# Patient Record
Sex: MALE | Race: WHITE | NOT HISPANIC OR LATINO | Employment: OTHER | URBAN - METROPOLITAN AREA
[De-identification: names, ages, dates, MRNs, and addresses within clinical notes are randomized per-mention and may not be internally consistent; named-entity substitution may affect disease eponyms.]

---

## 2017-01-20 ENCOUNTER — HOSPITAL ENCOUNTER (INPATIENT)
Facility: HOSPITAL | Age: 57
LOS: 1 days | Discharge: HOME | DRG: 683 | End: 2017-01-21
Attending: EMERGENCY MEDICINE | Admitting: INTERNAL MEDICINE
Payer: MEDICARE

## 2017-01-20 DIAGNOSIS — E78.5 HYPERLIPIDEMIA, UNSPECIFIED HYPERLIPIDEMIA TYPE: ICD-10-CM

## 2017-01-20 DIAGNOSIS — R55 SYNCOPE: ICD-10-CM

## 2017-01-20 DIAGNOSIS — I10 ESSENTIAL HYPERTENSION: ICD-10-CM

## 2017-01-20 DIAGNOSIS — I95.1 ORTHOSTATIC HYPOTENSION: ICD-10-CM

## 2017-01-20 DIAGNOSIS — E87.5 HYPERKALEMIA: ICD-10-CM

## 2017-01-20 DIAGNOSIS — G89.29 CHRONIC BILATERAL LOW BACK PAIN, WITH SCIATICA PRESENCE UNSPECIFIED: ICD-10-CM

## 2017-01-20 DIAGNOSIS — F20.3 UNDIFFERENTIATED SCHIZOPHRENIA: ICD-10-CM

## 2017-01-20 DIAGNOSIS — I95.9 HYPOTENSION, UNSPECIFIED HYPOTENSION TYPE: ICD-10-CM

## 2017-01-20 DIAGNOSIS — E86.0 DEHYDRATION: ICD-10-CM

## 2017-01-20 DIAGNOSIS — N17.9 AKI (ACUTE KIDNEY INJURY): Primary | ICD-10-CM

## 2017-01-20 DIAGNOSIS — R79.89 PRERENAL AZOTEMIA: ICD-10-CM

## 2017-01-20 DIAGNOSIS — M54.5 CHRONIC BILATERAL LOW BACK PAIN, WITH SCIATICA PRESENCE UNSPECIFIED: ICD-10-CM

## 2017-01-20 DIAGNOSIS — N28.9 ACUTE RENAL INSUFFICIENCY: ICD-10-CM

## 2017-01-20 DIAGNOSIS — R19.7 DIARRHEA, UNSPECIFIED TYPE: ICD-10-CM

## 2017-01-20 DIAGNOSIS — E11.9 TYPE 2 DIABETES MELLITUS WITHOUT COMPLICATION, WITHOUT LONG-TERM CURRENT USE OF INSULIN: ICD-10-CM

## 2017-01-20 LAB
ALBUMIN SERPL BCP-MCNC: 3.8 G/DL
ALP SERPL-CCNC: 67 U/L
ALT SERPL W/O P-5'-P-CCNC: 24 U/L
ANION GAP SERPL CALC-SCNC: 14 MMOL/L
AST SERPL-CCNC: 24 U/L
BASOPHILS # BLD AUTO: 0.02 K/UL
BASOPHILS NFR BLD: 0.3 %
BILIRUB SERPL-MCNC: 0.6 MG/DL
BUN SERPL-MCNC: 26 MG/DL
CALCIUM SERPL-MCNC: 9.1 MG/DL
CHLORIDE SERPL-SCNC: 103 MMOL/L
CO2 SERPL-SCNC: 17 MMOL/L
CREAT SERPL-MCNC: 2.3 MG/DL
CREAT UR-MCNC: 72.3 MG/DL
DIFFERENTIAL METHOD: ABNORMAL
EOSINOPHIL # BLD AUTO: 0.3 K/UL
EOSINOPHIL NFR BLD: 4.4 %
ERYTHROCYTE [DISTWIDTH] IN BLOOD BY AUTOMATED COUNT: 13.4 %
EST. GFR  (AFRICAN AMERICAN): 35 ML/MIN/1.73 M^2
EST. GFR  (NON AFRICAN AMERICAN): 31 ML/MIN/1.73 M^2
GLUCOSE SERPL-MCNC: 184 MG/DL
HCT VFR BLD AUTO: 38.6 %
HGB BLD-MCNC: 14 G/DL
LYMPHOCYTES # BLD AUTO: 0.9 K/UL
LYMPHOCYTES NFR BLD: 11.5 %
MCH RBC QN AUTO: 32.7 PG
MCHC RBC AUTO-ENTMCNC: 36.3 %
MCV RBC AUTO: 90 FL
MONOCYTES # BLD AUTO: 0.5 K/UL
MONOCYTES NFR BLD: 7 %
NEUTROPHILS # BLD AUTO: 5.8 K/UL
NEUTROPHILS NFR BLD: 76.7 %
PLATELET # BLD AUTO: 180 K/UL
PMV BLD AUTO: 9.2 FL
POCT GLUCOSE: 128 MG/DL (ref 70–110)
POCT GLUCOSE: 142 MG/DL (ref 70–110)
POTASSIUM SERPL-SCNC: 5.5 MMOL/L
PROT SERPL-MCNC: 6.2 G/DL
RBC # BLD AUTO: 4.28 M/UL
SODIUM SERPL-SCNC: 134 MMOL/L
SODIUM UR-SCNC: 47 MMOL/L
TROPONIN I SERPL DL<=0.01 NG/ML-MCNC: <0.006 NG/ML
WBC # BLD AUTO: 7.54 K/UL

## 2017-01-20 PROCEDURE — 84300 ASSAY OF URINE SODIUM: CPT

## 2017-01-20 PROCEDURE — 11000001 HC ACUTE MED/SURG PRIVATE ROOM

## 2017-01-20 PROCEDURE — 84484 ASSAY OF TROPONIN QUANT: CPT

## 2017-01-20 PROCEDURE — 96361 HYDRATE IV INFUSION ADD-ON: CPT

## 2017-01-20 PROCEDURE — 96360 HYDRATION IV INFUSION INIT: CPT

## 2017-01-20 PROCEDURE — 82570 ASSAY OF URINE CREATININE: CPT

## 2017-01-20 PROCEDURE — 80053 COMPREHEN METABOLIC PANEL: CPT

## 2017-01-20 PROCEDURE — 25000003 PHARM REV CODE 250: Performed by: STUDENT IN AN ORGANIZED HEALTH CARE EDUCATION/TRAINING PROGRAM

## 2017-01-20 PROCEDURE — 25000003 PHARM REV CODE 250: Performed by: EMERGENCY MEDICINE

## 2017-01-20 PROCEDURE — 93005 ELECTROCARDIOGRAM TRACING: CPT

## 2017-01-20 PROCEDURE — 25000003 PHARM REV CODE 250: Performed by: INTERNAL MEDICINE

## 2017-01-20 PROCEDURE — 83036 HEMOGLOBIN GLYCOSYLATED A1C: CPT

## 2017-01-20 PROCEDURE — 82962 GLUCOSE BLOOD TEST: CPT

## 2017-01-20 PROCEDURE — 96372 THER/PROPH/DIAG INJ SC/IM: CPT

## 2017-01-20 PROCEDURE — 85025 COMPLETE CBC W/AUTO DIFF WBC: CPT

## 2017-01-20 PROCEDURE — 93010 ELECTROCARDIOGRAM REPORT: CPT | Mod: ,,, | Performed by: INTERNAL MEDICINE

## 2017-01-20 PROCEDURE — 99285 EMERGENCY DEPT VISIT HI MDM: CPT | Mod: 25

## 2017-01-20 RX ORDER — FLUOXETINE HYDROCHLORIDE 20 MG/1
20 CAPSULE ORAL DAILY
Status: DISCONTINUED | OUTPATIENT
Start: 2017-01-21 | End: 2017-01-21 | Stop reason: HOSPADM

## 2017-01-20 RX ORDER — PREGABALIN 50 MG/1
50 CAPSULE ORAL 3 TIMES DAILY
Status: DISCONTINUED | OUTPATIENT
Start: 2017-01-20 | End: 2017-01-21 | Stop reason: HOSPADM

## 2017-01-20 RX ORDER — INSULIN ASPART 100 [IU]/ML
1-10 INJECTION, SOLUTION INTRAVENOUS; SUBCUTANEOUS
Status: DISCONTINUED | OUTPATIENT
Start: 2017-01-20 | End: 2017-01-21 | Stop reason: HOSPADM

## 2017-01-20 RX ORDER — QUETIAPINE FUMARATE 100 MG/1
100 TABLET, FILM COATED ORAL NIGHTLY
Status: DISCONTINUED | OUTPATIENT
Start: 2017-01-20 | End: 2017-01-21 | Stop reason: HOSPADM

## 2017-01-20 RX ORDER — IBUPROFEN 200 MG
24 TABLET ORAL
Status: DISCONTINUED | OUTPATIENT
Start: 2017-01-20 | End: 2017-01-21 | Stop reason: HOSPADM

## 2017-01-20 RX ORDER — DULOXETIN HYDROCHLORIDE 30 MG/1
60 CAPSULE, DELAYED RELEASE ORAL 2 TIMES DAILY
Status: DISCONTINUED | OUTPATIENT
Start: 2017-01-20 | End: 2017-01-21 | Stop reason: HOSPADM

## 2017-01-20 RX ORDER — GLUCAGON 1 MG
1 KIT INJECTION
Status: DISCONTINUED | OUTPATIENT
Start: 2017-01-20 | End: 2017-01-21 | Stop reason: HOSPADM

## 2017-01-20 RX ORDER — SODIUM CHLORIDE 9 MG/ML
INJECTION, SOLUTION INTRAVENOUS CONTINUOUS
Status: DISCONTINUED | OUTPATIENT
Start: 2017-01-20 | End: 2017-01-20

## 2017-01-20 RX ORDER — IBUPROFEN 200 MG
16 TABLET ORAL
Status: DISCONTINUED | OUTPATIENT
Start: 2017-01-20 | End: 2017-01-21 | Stop reason: HOSPADM

## 2017-01-20 RX ORDER — HEPARIN SODIUM 5000 [USP'U]/ML
5000 INJECTION, SOLUTION INTRAVENOUS; SUBCUTANEOUS EVERY 8 HOURS
Status: DISCONTINUED | OUTPATIENT
Start: 2017-01-20 | End: 2017-01-21 | Stop reason: HOSPADM

## 2017-01-20 RX ORDER — ACETAMINOPHEN 325 MG/1
650 TABLET ORAL EVERY 6 HOURS PRN
Status: DISCONTINUED | OUTPATIENT
Start: 2017-01-20 | End: 2017-01-21 | Stop reason: HOSPADM

## 2017-01-20 RX ORDER — SODIUM CHLORIDE 9 MG/ML
INJECTION, SOLUTION INTRAVENOUS CONTINUOUS
Status: ACTIVE | OUTPATIENT
Start: 2017-01-20 | End: 2017-01-21

## 2017-01-20 RX ORDER — PRAVASTATIN SODIUM 40 MG/1
40 TABLET ORAL DAILY
Status: DISCONTINUED | OUTPATIENT
Start: 2017-01-20 | End: 2017-01-21 | Stop reason: HOSPADM

## 2017-01-20 RX ORDER — OXYCODONE AND ACETAMINOPHEN 7.5; 325 MG/1; MG/1
1 TABLET ORAL ONCE AS NEEDED
Status: COMPLETED | OUTPATIENT
Start: 2017-01-20 | End: 2017-01-20

## 2017-01-20 RX ADMIN — PRAVASTATIN SODIUM 40 MG: 40 TABLET ORAL at 05:01

## 2017-01-20 RX ADMIN — SODIUM CHLORIDE: 0.9 INJECTION, SOLUTION INTRAVENOUS at 08:01

## 2017-01-20 RX ADMIN — SODIUM CHLORIDE 1000 ML: 0.9 INJECTION, SOLUTION INTRAVENOUS at 12:01

## 2017-01-20 RX ADMIN — HEPARIN SODIUM 5000 UNITS: 5000 INJECTION, SOLUTION INTRAVENOUS; SUBCUTANEOUS at 09:01

## 2017-01-20 RX ADMIN — DULOXETINE 60 MG: 30 CAPSULE, DELAYED RELEASE ORAL at 09:01

## 2017-01-20 RX ADMIN — OXYCODONE AND ACETAMINOPHEN 1 TABLET: 7.5; 325 TABLET ORAL at 09:01

## 2017-01-20 RX ADMIN — PREGABALIN 50 MG: 50 CAPSULE ORAL at 09:01

## 2017-01-20 RX ADMIN — HEPARIN SODIUM 5000 UNITS: 5000 INJECTION, SOLUTION INTRAVENOUS; SUBCUTANEOUS at 03:01

## 2017-01-20 RX ADMIN — SODIUM CHLORIDE 1000 ML: 0.9 INJECTION, SOLUTION INTRAVENOUS at 01:01

## 2017-01-20 RX ADMIN — SODIUM CHLORIDE: 0.9 INJECTION, SOLUTION INTRAVENOUS at 03:01

## 2017-01-20 RX ADMIN — QUETIAPINE FUMARATE 100 MG: 100 TABLET, FILM COATED ORAL at 09:01

## 2017-01-20 NOTE — H&P
[facility] Internal Medicine History and Physical - Resident Note    Admitting Team: [facility] Internal Medicine Team A  Attending Physician: Iris  Resident: Yair  Interns: Radha    Date of Admit: 1/20/2017    Chief Complaint and Duration     Sent by PCP for hypotension for 1 day    Subjective:      History of Present Illness:  Fabiano Malik is a 56 y.o. male who  has a past medical history of Chronic back pain greater than 3 months duration; Depression; Diabetes mellitus; Hypertension; and Schizophrenia. The patient presented to [facility] on 1/20/2017 with a primary complaint of Sent by PCP for hypotension.    The patient was in their usual state of health until Tuesday. Patient reports that at that time, he experienced 4 to 5 episodes of watery non-bloody diarrhea with concomitant crampy abdominal pain. Patient reports that his abdominal pain resolved since then. However, this AM, patient reports that he woke up and felt incredibly weak and dizzy. Patient states that while preparing to go to his PCP's office, he felt weak and fell though denies a loss of consciousness. Patient states that once he was at the PCP's office, he was urged to present to the ER due to his blood pressure. Patient denies chest pain, fevers, chills, problems urinating, problems tolerating PO, SOB.    Past Medical History:  Past Medical History   Diagnosis Date    Chronic back pain greater than 3 months duration     Depression     Diabetes mellitus     Hypertension     Schizophrenia        Past Surgical History:  Past Surgical History   Procedure Laterality Date    Neck surgery      Appendectomy         Allergies:  Review of patient's allergies indicates:   Allergen Reactions    Pcn [penicillins] Anaphylaxis       Home Medications:  Prior to Admission medications    Medication Sig Start Date End Date Taking? Authorizing Provider   amlodipine (NORVASC) 10 MG tablet Take 10 mg by mouth once  "daily.    Historical Provider, MD   benazepril (LOTENSIN) 40 MG tablet Take 40 mg by mouth once daily.    Historical Provider, MD   clonazePAM (KLONOPIN) 0.5 MG tablet Take 0.5 mg by mouth 2 (two) times daily as needed for Anxiety.    Historical Provider, MD   duloxetine (CYMBALTA) 60 MG capsule Take 1 capsule (60 mg total) by mouth 2 (two) times daily. 7/27/16 7/27/17  Richard Butcher MD   fluoxetine (PROZAC) 20 MG capsule Take 1 capsule (20 mg total) by mouth once daily. Take 1/2 table for 3 days then take 1 tablet by mouth daily starting on 7/30/16 7/30/16 7/30/17  Richard Butcher MD   metformin (GLUCOPHAGE) 1000 MG tablet Take 1,000 mg by mouth 2 (two) times daily with meals.    Historical Provider, MD   nabumetone (RELAFEN) 750 MG tablet Take 750 mg by mouth 2 (two) times daily.    Historical Provider, MD   omeprazole (PRILOSEC) 20 MG capsule Take 20 mg by mouth once daily.    Historical Provider, MD   pravastatin (PRAVACHOL) 40 MG tablet Take 40 mg by mouth once daily.    Historical Provider, MD   pregabalin (LYRICA) 50 MG capsule Take 50 mg by mouth 3 (three) times daily.    Historical Provider, MD   quetiapine (SEROQUEL) 300 MG Tab Take 1 tablet (300 mg total) by mouth every evening. 7/27/16 7/27/17  Richard Butcher MD       Family History:  History reviewed. No pertinent family history.    Social History:  Social History   Substance Use Topics    Smoking status: Never Smoker    Smokeless tobacco: None    Alcohol use Yes      Comment: "couple of beers a day"       Review of Systems:  Pertinent items are noted in HPI.    Health Maintaince:   Primary Care Physician: Hernesto Barbosa MD  Immunizations:   Currently on File:   Most Recent Immunizations   Administered Date(s) Administered    Pneumococcal Conjugate - 13 Valent 07/27/2016     TDap is up to date.  Influenza is up to date.  Pneumovax is up to date.  Cancer Screening:  Colonoscopy: is not up to date.    Objective:     Last 24 Hour Vital Signs:  BP  Min: " 71/50  Max: 116/56  Temp  Av.2 °F (36.8 °C)  Min: 98.2 °F (36.8 °C)  Max: 98.2 °F (36.8 °C)  Pulse  Av.9  Min: 95  Max: 121  Resp  Av  Min: 11  Max: 24  SpO2  Av.9 %  Min: 93 %  Max: 97 %  Height  Av' (182.9 cm)  Min: 6' (182.9 cm)  Max: 6' (182.9 cm)  Weight  Av.4 kg (250 lb)  Min: 113.4 kg (250 lb)  Max: 113.4 kg (250 lb)  Body mass index is 33.91 kg/(m^2).       Physical Examination:  Triage Vitals:  ED Triage Vitals   Enc Vitals Group      BP 17 1159 116/56      Pulse 17 1159 112      Resp 17 1159 20      Temp 17 1159 98.2 °F (36.8 °C)      Temp src --       SpO2 17 1159 97 %      Weight 17 1159 250 lb      Height 17 1159 6'       Admit Vitals:  Visit Vitals    BP 92/60    Pulse 95    Temp 98.2 °F (36.8 °C)    Resp (!) 24    Ht 6' (1.829 m)    Wt 113.4 kg (250 lb)    SpO2 95%    BMI 33.91 kg/m2       General appearance: alert, appears stated age, cooperative and no distress  Head: Normocephalic, without obvious abnormality, atraumatic  Eyes: conjunctivae/corneas clear. PERRL, EOM's intact. Fundi benign.  Throat: lips, mucosa, and tongue normal; teeth and gums normal  Neck: no adenopathy, no carotid bruit, no JVD, supple, symmetrical, trachea midline and thyroid not enlarged, symmetric, no tenderness/mass/nodules  Lungs: clear to auscultation bilaterally  Heart: regular rate and rhythm, S1, S2 normal, no murmur, click, rub or gallop  Abdomen: soft, non-tender; bowel sounds normal; no masses,  no organomegaly  Extremities: extremities normal, atraumatic, mild trace edema to BLE  Pulses: 2+ and symmetric  Skin: Skin color, texture, turgor normal. No rashes or lesions      Laboratory:  Most Recent Data:  CBC:  Lab Results   Component Value Date    WBC 7.54 2017    RBC 4.28 (L) 2017    HGB 14.0 2017    HCT 38.6 (L) 2017    MCV 90 2017    MCH 32.7 (H) 2017    MCHC 36.3 (H) 2017    RDW 13.4  01/20/2017     01/20/2017    MPV 9.2 01/20/2017    GRAN 5.8 01/20/2017    GRAN 76.7 (H) 01/20/2017    LYMPH 0.9 (L) 01/20/2017    LYMPH 11.5 (L) 01/20/2017    MONO 0.5 01/20/2017    MONO 7.0 01/20/2017    EOS 0.3 01/20/2017    BASO 0.02 01/20/2017    EOSINOPHIL 4.4 01/20/2017    BASOPHIL 0.3 01/20/2017     No additional cells seen    BMP: Lab Results   Component Value Date     (L) 01/20/2017    K 5.5 (H) 01/20/2017     01/20/2017    CO2 17 (L) 01/20/2017    BUN 26 (H) 01/20/2017    CREATININE 2.3 (H) 01/20/2017     (H) 01/20/2017    CALCIUM 9.1 01/20/2017    MG 1.3 (L) 07/25/2016     LFTs: Lab Results   Component Value Date    PROT 6.2 01/20/2017    ALBUMIN 3.8 01/20/2017    BILITOT 0.6 01/20/2017    AST 24 01/20/2017    ALKPHOS 67 01/20/2017    ALT 24 01/20/2017     Coags: No results found for: INR, PROTIME, PTT  FLP: No results found for: CHOL, HDL, LDLCALC, TRIG, CHOLHDL  DM: Lab Results   Component Value Date    HGBA1C 6.0 07/26/2016    CREATININE 2.3 (H) 01/20/2017     Thyroid: Lab Results   Component Value Date    TSH 2.421 07/25/2016     Anemia: No results found for: IRON, TIBC, FERRITIN, [id number], FOLATE  Cardiac: Lab Results   Component Value Date    TROPONINI <0.006 01/20/2017     Urinalysis: Lab Results   Component Value Date    COLORU Yellow 07/25/2016    SPECGRAV 1.015 07/25/2016    NITRITE Negative 07/25/2016    KETONESU Negative 07/25/2016    UROBILINOGEN Negative 07/25/2016       Trended Lab Data:    Recent Labs  Lab 01/20/17  1224   WBC 7.54   HGB 14.0   HCT 38.6*      MCV 90   RDW 13.4   *   K 5.5*      CO2 17*   BUN 26*   CREATININE 2.3*   *   PROT 6.2   ALBUMIN 3.8   BILITOT 0.6   AST 24   ALKPHOS 67   ALT 24       Trended Cardiac Data:    Recent Labs  Lab 01/20/17  1224   TROPONINI <0.006         Other Results:  EKG (my interpretation): sinus tachycardia.    Radiology:  Imaging Results         CT Head Without Contrast (Final result) Result  time:  01/20/17 13:12:31    Final result by Angela Armstrong MD (01/20/17 13:12:31)    Impression:        No acute intracranial process seen.      Electronically signed by: ANGELA ARMSTRONG MD  Date:     01/20/17  Time:    13:12     Narrative:    Head CT without contrast.    Comparison: none.    Technique: 5 mm axial images of the head were obtained.  No IV contrast was utilized.    Results: The brain is normally formed and exhibits normal density throughout.  The ventricular system is within normal limits of size for age and shows no distortion by mass effect.  The brain parenchyma shows no evidence of mass, hemorrhage, or abnormal calcifications. No evidence of acute or remote infarction. No extra-axial masses, hemorrhage or abnormal fluid collections are identified.  The skull appears normal.  The visualized paranasal sinuses demonstrate no significant abnormalities.  The orbits demonstrate no abnormalities.  The mastoid air cells are well aerated.            X-Ray Chest 1 View (Final result) Result time:  01/20/17 12:45:12    Final result by Anita Pond MD (01/20/17 12:45:12)    Impression:    Clear lungs.      Electronically signed by: ANITA POND MD  Date:     01/20/17  Time:    12:45     Narrative:    EXAM:  AP CHEST RADIOGRAPH.     CLINICAL INDICATION:  Syncope.    TECHNIQUE: Single AP view of the chest was obtained.     COMPARISON: Parkinson's/26/16    FINDINGS: The mediastinal structures are midline.  The cardiac silhouette is not enlarged. there is mild atherosclerotic calcification of the aortic arch.  The lungs appear grossly clear.  No osseous abnormalities are identified.               Assessment:     Fabiano Malik is a 56 y.o. male with:  Patient Active Problem List    Diagnosis Date Noted    Visual hallucinations 07/26/2016    Acute kidney failure 07/26/2016    Chronic lower back pain 07/26/2016    Type 2 diabetes mellitus without complication 07/26/2016    Essential  hypertension 07/26/2016    HLD (hyperlipidemia) 07/26/2016    MARY GRACE (acute kidney injury) 07/26/2016    Schizophrenia 07/26/2016    Hyperkalemia 07/26/2016       Plan:     MARY GRACE  - Patient's likely baseline Cr 1.2 to 1.3  - Cr 2.3 at admit  - Urine studies ordered  - IVF hydration as below  - Likely 2/2 Pre-Renal Azotemia given history of diarrheal illness  - Will continue to monitor for improvement in renal function    Essential Hypertension  - On Norvasc 10 qD and Lotensin 40 qD at home  - Hypotensive at admit; will hold and continue to monitor for improvement in BP    Schizophrenia  - Takes Prozac 20 qD, Cymbalta 60 BID, and Seroquel 300 qHS at home  - Will continue Seroquel at 100 qHS while in-house    History of Type 2 Diabetes Mellitus  - HgbA1c 6.0 on 7/2016  - On Metformin 1000 BID at home; will hold  - SSI with accuchecks while in-house  - Repeat A1c pending    Chronic Lower Back Pain  - Follows with outpatient pain management  - Takes Lyrica 50 TID, Duloxetine 60 BID, Nabumetone 750 BID, and Klonopin 0.5 BID PRN at home  - Will continue Pregabalin while in-house    Hyperlipidemia  - On Pravastatin 40 qD at home; will continue  - Lipid Panel pending      F: NS @ 100cc/hr x 10hr  E:   N: Cardiac, Diabetic  PPx: SQH  Dispo: Pending improvement in renal function    Code Status: Nathan Demarco  [facility] Internal Medicine HO-I  U IM Service    [facility] Medicine Hospitalist Pager Numbers:   [facility] Hospitalist Medicine Team A (Rah/Anum): [phone]  [facility] Hospitalist Medicine Team B (Shantelle/Cricket):  [phone]

## 2017-01-20 NOTE — ED NOTES
Pt co increasing dizziness for the past year, pt co diarrhea for the past few days with intermittent abd pain denies abd pain currently, pt denies chest pain or sob, awake alert in no acute distress, pt reports falling this morning without head trauma denies LOC, denies trauma from fall, pt reports low bp at MD office this morning

## 2017-01-20 NOTE — IP AVS SNAPSHOT
[facility]  [address]  [place] [address]  Phone: [phone]           Patient Discharge Instructions     Our goal is to set you up for success. This packet includes information on your condition, medications, and your home care. It will help you to care for yourself so you don't get sicker and need to go back to the hospital.     Please ask your nurse if you have any questions.        There are many details to remember when preparing to leave the hospital. Here is what you will need to do:    1. Take your medicine. If you are prescribed medications, review your Medication List in the following pages. You may have new medications to  at the pharmacy and others that you'll need to stop taking. Review the instructions for how and when to take your medications. Talk with your doctor or nurses if you are unsure of what to do.     2. Go to your follow-up appointments. Specific follow-up information is listed in the following pages. Your may be contacted by a transition nurse or clinical provider about future appointments. Be sure we have all of the phone numbers to reach you, if needed. Please contact your provider's office if you are unable to make an appointment.     3. Watch for warning signs. Your doctor or nurse will give you detailed warning signs to watch for and when to call for assistance. These instructions may also include educational information about your condition. If you experience any of warning signs to your health, call your doctor.               ** Verify the list of medication(s) below is accurate and up to date. Carry this with you in case of emergency. If your medications have changed, please notify your healthcare provider.             Medication List      CHANGE how you take these medications        Additional Info                      quetiapine 300 MG Tab   Commonly known as:  SEROQUEL   Quantity:  30 tablet   Refills:  11   Dose:  300 mg   What changed:  Another  medication with the same name was removed. Continue taking this medication, and follow the directions you see here.    Last time this was given:  100 mg on 1/20/2017  9:32 PM   Instructions:  Take 1 tablet (300 mg total) by mouth every evening.     Begin Date    AM    Noon    PM    Bedtime         CONTINUE taking these medications        Additional Info                      clonazePAM 0.5 MG tablet   Commonly known as:  KLONOPIN   Refills:  0   Dose:  0.5 mg    Instructions:  Take 0.5 mg by mouth 2 (two) times daily as needed for Anxiety.     Begin Date    AM    Noon    PM    Bedtime       duloxetine 60 MG capsule   Commonly known as:  CYMBALTA   Quantity:  60 capsule   Refills:  11   Dose:  60 mg    Last time this was given:  60 mg on 1/21/2017  9:13 AM   Instructions:  Take 1 capsule (60 mg total) by mouth 2 (two) times daily.     Begin Date    AM    Noon    PM    Bedtime       fluoxetine 20 MG capsule   Commonly known as:  PROZAC   Quantity:  30 capsule   Refills:  11   Dose:  20 mg    Last time this was given:  20 mg on 1/21/2017  9:13 AM   Instructions:  Take 1 capsule (20 mg total) by mouth once daily. Take 1/2 table for 3 days then take 1 tablet by mouth daily starting on 7/30/16     Begin Date    AM    Noon    PM    Bedtime       metformin 1000 MG tablet   Commonly known as:  GLUCOPHAGE   Refills:  0   Dose:  1000 mg    Instructions:  Take 1,000 mg by mouth 2 (two) times daily with meals.     Begin Date    AM    Noon    PM    Bedtime       nabumetone 750 MG tablet   Commonly known as:  RELAFEN   Refills:  0   Dose:  750 mg    Instructions:  Take 750 mg by mouth 2 (two) times daily.     Begin Date    AM    Noon    PM    Bedtime       omeprazole 20 MG capsule   Commonly known as:  PRILOSEC   Refills:  0   Dose:  20 mg    Instructions:  Take 20 mg by mouth once daily.     Begin Date    AM    Noon    PM    Bedtime       pravastatin 40 MG tablet   Commonly known as:  PRAVACHOL   Refills:  0   Dose:  40 mg    Last  time this was given:  40 mg on 1/21/2017  9:16 AM   Instructions:  Take 40 mg by mouth once daily.     Begin Date    AM    Noon    PM    Bedtime       pregabalin 50 MG capsule   Commonly known as:  LYRICA   Refills:  0   Dose:  50 mg    Last time this was given:  50 mg on 1/21/2017  5:58 AM   Instructions:  Take 50 mg by mouth 3 (three) times daily.     Begin Date    AM    Noon    PM    Bedtime         STOP taking these medications     amlodipine 10 MG tablet   Commonly known as:  NORVASC       benazepril 40 MG tablet   Commonly known as:  LOTENSIN                  Please bring to all follow up appointments:    1. A copy of your discharge instructions.  2. All medicines you are currently taking in their original bottles.  3. Identification and insurance card.    Please arrive 15 minutes ahead of scheduled appointment time.    Please call 24 hours in advance if you must reschedule your appointment and/or time.        Follow-up Information     Follow up with Hernesto Barbosa MD In 1 week.    Specialty:  Family Medicine    Contact information:    [address] [address]  [phone]          Discharge Instructions     Future Orders    Diet Cardiac     Diet Diabetic 2000 Calories         Discharge Instructions       KIDNEY INJURY, ACUTE, DISCHARGE INSTRUCTIONS FOR (ENGLISH) View Edit Remove  DEHYDRATION (ADULT) (ENGLISH) View Edit Remove  DEHYDRATION, PREVENTING (CHILD) (ENGLISH) View Edit Remove    KIDNEY INJURY, ACUTE, DISCHARGE INSTRUCTIONS FOR (ENGLISH) View Edit Remove  DEHYDRATION (ADULT) (ENGLISH) View Edit Remove  DEHYDRATION, PREVENTING (CHILD) (ENGLISH) View Edit Remove  QUETIAPINE FUMARATE ORAL TABLET (ENGLISH) View Edit Remove        Discharge References/Attachments     KIDNEY INJURY, ACUTE, DISCHARGE INSTRUCTIONS FOR (ENGLISH)    DEHYDRATION (ADULT) (ENGLISH)    DEHYDRATION, PREVENTING (CHILD) (ENGLISH)    QUETIAPINE FUMARATE ORAL TABLET (ENGLISH)        Primary Diagnosis     Your primary  diagnosis was:  Acute Nontraumatic Kidney Injury      Admission Information     Date & Time Provider Department CSN    1/20/2017 12:02 PM Yanely Rowan MD [facility] [id number]      Care Providers     Provider Role Specialty Primary office phone    Yanely Rowan MD Attending Provider Internal Medicine [phone]    Yanely Rowan MD Team Attending  Internal Medicine [phone]    Shivani Monreal MD Team Attending  Internal Medicine [phone]      Your Vitals Were     BP Pulse Temp Resp Height Weight    110/64 (BP Location: Right arm, Patient Position: Lying, BP Method: Automatic) 75 98.5 °F (36.9 °C) (Oral) 18 6' (1.829 m) 115 kg (253 lb 8.5 oz)    SpO2 BMI             95% 34.38 kg/m2         Recent Lab Values        7/26/2016 1/20/2017                        5:10 AM 12:24 PM          A1C 6.0 6.3 (H)          Comment for A1C at  5:10 AM on 7/26/2016:  According to ADA guidelines, hemoglobin A1C <7.0% represents  optimal control in non-pregnant diabetic patients.  Different  metrics may apply to specific populations.   Standards of Medical Care in Diabetes - 2016.  For the purpose of screening for the presence of diabetes:  <5.7%     Consistent with the absence of diabetes  5.7-6.4%  Consistent with increasing risk for diabetes   (prediabetes)  >or=6.5%  Consistent with diabetes  Currently no consensus exists for use of hemoglobin A1C  for diagnosis of diabetes for children.      Comment for A1C at 12:24 PM on 1/20/2017:  According to ADA guidelines, hemoglobin A1C <7.0% represents  optimal control in non-pregnant diabetic patients.  Different  metrics may apply to specific populations.   Standards of Medical Care in Diabetes - 2016.  For the purpose of screening for the presence of diabetes:  <5.7%     Consistent with the absence of diabetes  5.7-6.4%  Consistent with increasing risk for diabetes   (prediabetes)  >or=6.5%  Consistent with diabetes  Currently no consensus exists for  use of hemoglobin A1C  for diagnosis of diabetes for children.        Allergies as of 1/21/2017        Reactions    Pcn [Penicillins] Anaphylaxis      [facility]     [facility] Nurse Care Line - 24/7 Assistance  Unless otherwise directed by your provider, please contact [facility], our nurse care line that is available for 24/7 assistance.     Registered nurses in the [facility] provide clinical advisement, health education, appointment booking, and other advisory services.  Call for this free service at [phone].        Advance Directives     An advance directive is a document which, in the event you are no longer able to make decisions for yourself, tells your healthcare team what kind of treatment you do or do not want to receive, or who you would like to make those decisions for you.  If you do not currently have an advance directive, [facility] encourages you to create one.  For more information call:  [phone]), [phone] ([phone]),  or log on to [email or web address].        Language Assistance Services     ATTENTION: Language assistance services are available, free of charge. Please call [phone].      ATENCIÓN: Si habla español, tiene a borden disposición servicios gratuitos de asistencia lingüística. Llame al [phone].     CHÚ Ý: N?u b?n nói Ti?ng Vi?t, có các d?ch v? h? tr? ngôn ng? mi?n phí dành cho b?n. G?i s? [phone].        Diabetes Discharge Instructions                                   [facility] Sign-Up     Activating your [facility] account is as easy as 1-2-3!     1) Visit [email or web address], select Sign Up Now, enter this activation code and your date of birth, then select Next.  [email or web address]  Expires: 3/7/2017  8:52 AM      2) Create a username and password to use when you visit [facility] in the future and select a security question in case you lose your password and select Next.    3) Enter your e-mail address and click  Sign Up!    Additional Information  If you have questions, please e-mail [email or web address] or call [phone] to talk to our [facility] staff. Remember, [facility] is NOT to be used for urgent needs. For medical emergencies, dial 911.          [facility] complies with applicable Federal civil rights laws and does not discriminate on the basis of race, color, national origin, age, disability, or sex.

## 2017-01-21 VITALS
BODY MASS INDEX: 34.34 KG/M2 | RESPIRATION RATE: 18 BRPM | OXYGEN SATURATION: 95 % | TEMPERATURE: 99 F | DIASTOLIC BLOOD PRESSURE: 64 MMHG | HEART RATE: 75 BPM | WEIGHT: 253.5 LBS | SYSTOLIC BLOOD PRESSURE: 110 MMHG | HEIGHT: 72 IN

## 2017-01-21 PROBLEM — R19.7 DIARRHEA: Status: ACTIVE | Noted: 2017-01-21

## 2017-01-21 PROBLEM — R79.89 PRERENAL AZOTEMIA: Status: ACTIVE | Noted: 2017-01-21

## 2017-01-21 LAB
ALBUMIN SERPL BCP-MCNC: 3.3 G/DL
ALP SERPL-CCNC: 55 U/L
ALT SERPL W/O P-5'-P-CCNC: 21 U/L
ANION GAP SERPL CALC-SCNC: 7 MMOL/L
AST SERPL-CCNC: 20 U/L
BASOPHILS # BLD AUTO: 0.04 K/UL
BASOPHILS NFR BLD: 1 %
BILIRUB SERPL-MCNC: 0.6 MG/DL
BUN SERPL-MCNC: 17 MG/DL
CALCIUM SERPL-MCNC: 8.2 MG/DL
CHLORIDE SERPL-SCNC: 108 MMOL/L
CHOLEST/HDLC SERPL: 4 {RATIO}
CO2 SERPL-SCNC: 23 MMOL/L
CREAT SERPL-MCNC: 1.5 MG/DL
DIFFERENTIAL METHOD: ABNORMAL
EOSINOPHIL # BLD AUTO: 0.3 K/UL
EOSINOPHIL NFR BLD: 8.6 %
ERYTHROCYTE [DISTWIDTH] IN BLOOD BY AUTOMATED COUNT: 13.4 %
EST. GFR  (AFRICAN AMERICAN): 59 ML/MIN/1.73 M^2
EST. GFR  (NON AFRICAN AMERICAN): 51 ML/MIN/1.73 M^2
ESTIMATED AVG GLUCOSE: 134 MG/DL
GLUCOSE SERPL-MCNC: 91 MG/DL
HBA1C MFR BLD HPLC: 6.3 %
HCT VFR BLD AUTO: 34.8 %
HDL/CHOLESTEROL RATIO: 25 %
HDLC SERPL-MCNC: 124 MG/DL
HDLC SERPL-MCNC: 31 MG/DL
HGB BLD-MCNC: 11.9 G/DL
LDLC SERPL CALC-MCNC: 30 MG/DL
LYMPHOCYTES # BLD AUTO: 1.3 K/UL
LYMPHOCYTES NFR BLD: 32.5 %
MAGNESIUM SERPL-MCNC: 1.3 MG/DL
MCH RBC QN AUTO: 31.6 PG
MCHC RBC AUTO-ENTMCNC: 34.2 %
MCV RBC AUTO: 92 FL
MONOCYTES # BLD AUTO: 0.5 K/UL
MONOCYTES NFR BLD: 11.8 %
NEUTROPHILS # BLD AUTO: 1.8 K/UL
NEUTROPHILS NFR BLD: 45.8 %
NONHDLC SERPL-MCNC: 93 MG/DL
PHOSPHATE SERPL-MCNC: 3 MG/DL
PLATELET # BLD AUTO: 158 K/UL
PMV BLD AUTO: 9.4 FL
POCT GLUCOSE: 161 MG/DL (ref 70–110)
POTASSIUM SERPL-SCNC: 4.9 MMOL/L
PROT SERPL-MCNC: 5.5 G/DL
RBC # BLD AUTO: 3.77 M/UL
SODIUM SERPL-SCNC: 138 MMOL/L
TRIGL SERPL-MCNC: 315 MG/DL
WBC # BLD AUTO: 3.97 K/UL

## 2017-01-21 PROCEDURE — 85025 COMPLETE CBC W/AUTO DIFF WBC: CPT

## 2017-01-21 PROCEDURE — 80061 LIPID PANEL: CPT

## 2017-01-21 PROCEDURE — 25000003 PHARM REV CODE 250: Performed by: STUDENT IN AN ORGANIZED HEALTH CARE EDUCATION/TRAINING PROGRAM

## 2017-01-21 PROCEDURE — 83735 ASSAY OF MAGNESIUM: CPT

## 2017-01-21 PROCEDURE — 84100 ASSAY OF PHOSPHORUS: CPT

## 2017-01-21 PROCEDURE — 80053 COMPREHEN METABOLIC PANEL: CPT

## 2017-01-21 PROCEDURE — 36415 COLL VENOUS BLD VENIPUNCTURE: CPT

## 2017-01-21 PROCEDURE — 25000003 PHARM REV CODE 250: Performed by: INTERNAL MEDICINE

## 2017-01-21 RX ORDER — SODIUM CHLORIDE 9 MG/ML
INJECTION, SOLUTION INTRAVENOUS CONTINUOUS
Status: DISCONTINUED | OUTPATIENT
Start: 2017-01-21 | End: 2017-01-21 | Stop reason: HOSPADM

## 2017-01-21 RX ADMIN — SODIUM CHLORIDE: 0.9 INJECTION, SOLUTION INTRAVENOUS at 06:01

## 2017-01-21 RX ADMIN — HEPARIN SODIUM 5000 UNITS: 5000 INJECTION, SOLUTION INTRAVENOUS; SUBCUTANEOUS at 05:01

## 2017-01-21 RX ADMIN — DULOXETINE 60 MG: 30 CAPSULE, DELAYED RELEASE ORAL at 09:01

## 2017-01-21 RX ADMIN — PREGABALIN 50 MG: 50 CAPSULE ORAL at 05:01

## 2017-01-21 RX ADMIN — FLUOXETINE 20 MG: 20 CAPSULE ORAL at 09:01

## 2017-01-21 RX ADMIN — PRAVASTATIN SODIUM 40 MG: 40 TABLET ORAL at 09:01
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
## 2017-01-21 NOTE — PROGRESS NOTES
U Internal Medicine Resident HOAkilI Progress Note    Subjective:      NAEON. Reports lower back pain. Reports vigorous urine output. Denies feeling weak or lightheaded.      Objective:     Last 24 Hour Vital Signs:  BP  Min: 71/50  Max: 126/76  Temp  Av.5 °F (36.9 °C)  Min: 98.1 °F (36.7 °C)  Max: 98.9 °F (37.2 °C)  Pulse  Av.1  Min: 72  Max: 121  Resp  Av  Min: 11  Max: 24  SpO2  Av.8 %  Min: 93 %  Max: 97 %  Height  Av' (182.9 cm)  Min: 6' (182.9 cm)  Max: 6' (182.9 cm)  Weight  Av.2 kg (251 lb 12.2 oz)  Min: 113.4 kg (250 lb)  Max: 115 kg (253 lb 8.5 oz)       Physical Examination:  Visit Vitals    /65 (BP Location: Right arm, Patient Position: Lying, BP Method: Automatic)    Pulse 72    Temp 98.1 °F (36.7 °C) (Oral)    Resp 20    Ht 6' (1.829 m)    Wt 115 kg (253 lb 8.5 oz)    SpO2 95%    BMI 34.38 kg/m2     General appearance: alert, appears stated age, cooperative and no distress  Head: Normocephalic, without obvious abnormality, atraumatic  Eyes: conjunctivae/corneas clear. PERRL, EOM's intact. Fundi benign.  Throat: lips, mucosa, and tongue normal; teeth and gums normal  Neck: no adenopathy, no carotid bruit, no JVD, supple, symmetrical, trachea midline and thyroid not enlarged, symmetric, no tenderness/mass/nodules  Lungs: clear to auscultation bilaterally  Heart: regular rate and rhythm, S1, S2 normal, no murmur, click, rub or gallop  Abdomen: soft, non-tender; bowel sounds normal; no masses, no organomegaly  Extremities: extremities normal, atraumatic, mild trace edema to BLE  Pulses: 2+ and symmetric  Skin: Skin color, texture, turgor normal. No rashes or lesions     Laboratory:  Laboratory Data Reviewed: yes  Recent Labs      17   1224  17   0430   WBC  7.54  3.97   HGB  14.0  11.9*   HCT  38.6*  34.8*   PLT  180  158   MCV  90  92   RDW  13.4  13.4   GRAN  76.7*  5.8  45.8  1.8   LYMPH  11.5*  0.9*  32.5  1.3   MONO  7.0  0.5  11.8  0.5    EOS  0.3  0.3   BASO  0.02  0.04   EOSINOPHIL  4.4  8.6*   BASOPHIL  0.3  1.0       Recent Labs      01/20/17   1224  01/21/17   0430   NA  134*  138   K  5.5*  4.9   CL  103  108   CO2  17*  23   BUN  26*  17   CREATININE  2.3*  1.5*     Recent Labs      01/20/17   1757  01/20/17   2054  01/21/17   0559   POCTGLUCOSE  128*  142*  161*     Recent Labs      01/20/17   1224  01/21/17   0430   PROT  6.2  5.5*   ALBUMIN  3.8  3.3*   BILITOT  0.6  0.6   AST  24  20   ALT  24  21   ALKPHOS  67  55       Microbiology Data Reviewed: yes  None    Other Results:  EKG (my interpretation): No new    Radiology Data Reviewed: yes  No new    Current Medications:     Infusions:   sodium chloride 0.9% 100 mL/hr at 01/21/17 0647        Scheduled:   duloxetine  60 mg Oral BID    fluoxetine  20 mg Oral Daily    heparin (porcine)  5,000 Units Subcutaneous Q8H    pravastatin  40 mg Oral Daily    pregabalin  50 mg Oral TID    quetiapine  100 mg Oral QHS        PRN:  acetaminophen, dextrose 50%, dextrose 50%, glucagon (human recombinant), glucose, glucose, insulin aspart    Antibiotics and Day Number of Therapy:  None    Assessment:     Fabiano Malik is a 56 y.o.male with  Patient Active Problem List    Diagnosis Date Noted    Hypotension 01/20/2017    Visual hallucinations 07/26/2016    Acute kidney failure 07/26/2016    Chronic lower back pain 07/26/2016    Type 2 diabetes mellitus without complication 07/26/2016    Essential hypertension 07/26/2016    HLD (hyperlipidemia) 07/26/2016    MARY GRACE (acute kidney injury) 07/26/2016    Schizophrenia 07/26/2016    Hyperkalemia 07/26/2016          Plan:     MARY GRACE 2/2 Pre-Renal Azotemia  - Patient's likely baseline Cr 1.2 to 1.3  - Cr 2.3 at admit  - FENa at 1.12%; but with good urine output (some unmeasured) and taken after multiple fluid boluses in ER  - IVF hydration as below  - Likely 2/2 Pre-Renal Azotemia given history of diarrheal illness     Essential Hypertension  - On  Norvasc 10 qD and Lotensin 40 qD at home  - Hypotensive at admit; will hold and continue to monitor for improvement in BP     Schizophrenia  - Takes Prozac 20 qD, Cymbalta 60 BID, and Seroquel 300 qHS at home  - Will continue Seroquel at 100 qHS, Prozac, and Cymbalta while in-house     History of Type 2 Diabetes Mellitus  - HgbA1c 6.0 on 7/2016  - On Metformin 1000 BID at home; will hold  - SSI with accuchecks while in-house  - Repeat A1c pending     Chronic Lower Back Pain  - Follows with outpatient pain management  - Takes Lyrica 50 TID, Cymbalta 60 BID, Nabumetone 750 BID, and Klonopin 0.5 BID PRN at home  - Will continue Pregabalin, Cymbalta, and Prozac (see above) while in-house     Hyperlipidemia  - On Pravastatin 40 qD at home; will continue  - Lipid Panel TChol 124 / HDL 31 / LDL 30 /         F: NS @ 100cc/hr x 10hr  E:   N: Cardiac, Diabetic  PPx: SQH  Dispo: Pending improvement in renal function. Likely today.     Code Status: Full     Arun Demarco  [facility] Internal Medicine HO-I  U IM Service    [facility] Medicine Hospitalist Pager Numbers:   [facility] Hospitalist Medicine Team A (Rah/Anum): [phone]  [facility] Hospitalist Medicine Team B (Shantelle/Cricket):  [phone]

## 2017-01-21 NOTE — DISCHARGE INSTRUCTIONS
KIDNEY INJURY, ACUTE, DISCHARGE INSTRUCTIONS FOR (ENGLISH) View Edit Remove  DEHYDRATION (ADULT) (ENGLISH) View Edit Remove  DEHYDRATION, PREVENTING (CHILD) (ENGLISH) View Edit Remove    KIDNEY INJURY, ACUTE, DISCHARGE INSTRUCTIONS FOR (ENGLISH) View Edit Remove  DEHYDRATION (ADULT) (ENGLISH) View Edit Remove  DEHYDRATION, PREVENTING (CHILD) (ENGLISH) View Edit Remove  QUETIAPINE FUMARATE ORAL TABLET (ENGLISH) View Edit Remove

## 2017-01-21 NOTE — PLAN OF CARE
Discharge orders noted. No HH or DME.       01/21/17 0852   Final Note   Assessment Type Final Discharge Note   Discharge Disposition Home   What phone number can be called within the next 1-3 days to see how you are doing after discharge? [phone]   Hospital Follow Up  Appt(s) scheduled? No  (Offices closed on weekend, pt to call for follow up as soon as possible for  appt with Dr. Barbosa/Family Medicine for 1 wk)   Offered [facility] -- Bedside Delivery? n/a   Discharge/Hospital Encounter Summary to (non-[facility]) PCP Yes   Referral to Outpatient Case Management complete? n/a   Referral to / orders for Home Health Complete? n/a   30 day supply of medicines given at discharge, if documented non-compliance / non-adherence? n/a   Any social issues identified prior to discharge? n/a   Did you assess the readiness or willingness of the family or caregiver to support self management of care? n/a   Right Care Referral Info   Post Acute Recommendation No Care     Lulu Porras RN Transitional Navigator  [phone]

## 2018-01-08 NOTE — PROGRESS NOTES
Chronic Pain - New Consult    Referring Physician: Eron, Autumn    Chief Complaint: back pain     SUBJECTIVE:    Fabiano Malik presents to the clinic for the evaluation of back pain. The pain started over 10 ago following no specific incident and symptoms have been worsening.The pain is located in the lower back area and radiates to the back of both legs down to ankle.  The pain is described as burning, stabbing, throbbing and tingling and is rated as 10/10. The pain is rated with a score of  6/10 on the BEST day and a score of 10/10 on the WORST day.  Symptoms interfere with daily activity and sleeping. The pain is exacerbated by Sitting, Standing, Walking and Getting out of bed/chair.  The pain is mitigated by heat and rest. He reports spending 0 hours per day reclining. The patient reports 4 hours of uninterrupted sleep per night.  Patient states had seen pain provider outside where he received a steroid shot that helped with his pain . He states our office was recommended by his neighbor and he wanted to come for an evaluation   Patient denies night fever/night sweats, urinary incontinence, bowel incontinence, significant weight loss, significant motor weakness and loss of sensations.    Physical Therapy/Home Exercise: no      Pain Disability Index Review:  Last 3 PDI Scores 1/9/2018   Pain Disability Index (PDI) 68       Pain Medications:    - Opioids: None  - Adjuvant Medications: Cymbalta ( Duloxetine) and Lyrica ( Pregabalin)  - Anti-Coagulants: None  - Others: See med list     report:  Reviewed and consistent with medication use as prescribed.    Pain Procedures: Injection with Dr. McMayne ([facility])    Imaging:MRI Lumbar Spine Without Contrast    Narrative     MRI LUMBAR SPINE    TECHNIQUE: MRI lumbar spine was performed without contrast on a 1.5T magnet. The following sequences were obtained: Localizer; sagittal T1, T2, STIR; axial T1 and T2.    COMPARISON: Not  available.    FINDINGS:    There are 5 lumbar vertebrae.  Vertebral body heights and alignment are maintained.  Bone marrow signal is preserved.  Disk heights are mildly narrowed throughout the lumbar spine. Conus terminates at L1 and appears unremarkable. Limited evaluation of   posterior abdominal structures is unremarkable.  Paraspinal musculature is within normal limits.  Evaluation of sacroiliac joints is unremarkable.    L1-L2: Mild circumferential bulge is noted.  No spinal canal stenosis or neuroforaminal narrowing.    L2-L3: Mild circumferential bulge is noted. No spinal canal stenosis or neuroforaminal narrowing.    L3-L4: Mild circumferential disk bulge is noted.  No spinal canal stenosis or neuroforaminal narrowing.    L4-L5: Mild circumferential disk bulge is noted.  No spinal canal stenosis or neuroforaminal narrowing.    L5-S1: Mild circumferential disk bulge and moderate bilateral facet arthrosis result in mild right neural foraminal narrowing.   Impression      Mild degenerative changes as above.  Mild right neuroforaminal narrowing at L5-S1.      Electronically signed by: DANN DAVIDSON MD  Date: 08/08/13  Time: 11:42      MRI Cervical Spine Without Contrast    Narrative     Cervical spine MRI    Technique: MRI of cervical spine was performed without contrast and the following sequences were obtained: Localizer; sagittal T1, T2, and STIR; axial T2 and gradient.    Comparison: Not available.    Findings:    Status post C4-5 fusion. There is straightening of cervical lordosis.  Alignment is maintained.  Bone marrow signal is normal.    Visualized posterior fossa structures and cervical cord are unremarkable.    Limited evaluation of the neck soft tissues is unremarkable.    C2-3: No spinal canal stenosis or neuroforaminal narrowing.    C3-4: There is mild posterior disk osteophyte complex.  No spinal canal stenosis or neuroforaminal narrowing.    C4-5: No spinal canal stenosis or neuroforaminal  "narrowing.    C5-6: There is mild posterior disk osteophyte complex resulting in mild effacement of the ventral thecal sac and mild right neural foraminal narrowing.    C6-7: There is asymmetric right disk osteophyte complex with uncovertebral spurring resulting in mild right neural foraminal narrowing and mild effacement of the ventral thecal sac.    C7-T1: No spinal canal stenosis or neuroforaminal narrowing.   Impression       1.  Status post C4-5 fusion.  Mild degenerative changes as above.      Electronically signed by: DANN DAVIDSON MD  Date: 08/08/13  Time: 11:27          Past Medical History:   Diagnosis Date    Chronic back pain greater than 3 months duration     Depression     Diabetes mellitus     Hypertension     Schizophrenia      Past Surgical History:   Procedure Laterality Date    APPENDECTOMY      NECK SURGERY       Social History     Social History    Marital status:      Spouse name: N/A    Number of children: N/A    Years of education: N/A     Occupational History    Not on file.     Social History Main Topics    Smoking status: Never Smoker    Smokeless tobacco: Not on file    Alcohol use Yes      Comment: "couple of beers a day"    Drug use: No    Sexual activity: Not on file     Other Topics Concern    Not on file     Social History Narrative    No narrative on file     No family history on file.    Review of patient's allergies indicates:   Allergen Reactions    Pcn [penicillins] Anaphylaxis       Current Outpatient Prescriptions   Medication Sig    clonazePAM (KLONOPIN) 0.5 MG tablet Take 0.5 mg by mouth 2 (two) times daily as needed for Anxiety.    duloxetine (CYMBALTA) 60 MG capsule Take 1 capsule (60 mg total) by mouth 2 (two) times daily.    fluoxetine (PROZAC) 20 MG capsule Take 1 capsule (20 mg total) by mouth once daily. Take 1/2 table for 3 days then take 1 tablet by mouth daily starting on 7/30/16    metformin (GLUCOPHAGE) 1000 MG tablet Take 1,000 mg " "by mouth 2 (two) times daily with meals.    nabumetone (RELAFEN) 750 MG tablet Take 750 mg by mouth 2 (two) times daily.    omeprazole (PRILOSEC) 20 MG capsule Take 20 mg by mouth once daily.    pravastatin (PRAVACHOL) 40 MG tablet Take 40 mg by mouth once daily.    pregabalin (LYRICA) 50 MG capsule Take 50 mg by mouth 3 (three) times daily.    quetiapine (SEROQUEL) 300 MG Tab Take 1 tablet (300 mg total) by mouth every evening.     No current facility-administered medications for this visit.        REVIEW OF SYSTEMS:    GENERAL:  No weight loss, malaise or fevers.  HEENT:  Negative for frequent or significant headaches.  NECK:  Negative for lumps, goiter, pain and significant neck swelling.  RESPIRATORY:  Negative for cough, wheezing or shortness of breath.  CARDIOVASCULAR:  Negative for chest pain, leg swelling or palpitations.  GI:  Negative for abdominal discomfort, blood in stools or black stools or change in bowel habits.  MUSCULOSKELETAL:  See HPI.  SKIN:  Negative for lesions, rash, and itching.  PSYCH:  positive for sleep disturbance, mood disorder and recent psychosocial stressors.  HEMATOLOGY/LYMPHOLOGY:  Negative for prolonged bleeding, bruising easily or swollen nodes.  NEURO:   No history of headaches, syncope, paralysis, seizures or tremors.  All other reviewed and negative other than HPI.    OBJECTIVE:    /73   Pulse 79   Temp 98.1 °F (36.7 °C)   Ht 6' 1" (1.854 m)   Wt 108 kg (238 lb 1.6 oz)   BMI 31.41 kg/m²     PHYSICAL EXAMINATION:    General appearance: Well appearing, in no acute distress, alert and oriented x3.  Psych:  Mood and affect appropriate.  Skin: Skin color, texture, turgor normal, no rashes or lesions, in both upper and lower body.  Head/face:  Normocephalic, atraumatic. No palpable lymph nodes.  Neck: No pain to palpation over the cervical paraspinous muscles. Spurling Negative. No pain with neck flexion, extension, or lateral flexion.   Cor: RRR  Pulm: CTA  GI:  " Soft and non-tender.  Back: Straight leg raising in the sitting and supine positions is positive to radicular pain. moderate pain to palpation over the spine or costovertebral angles. Normal range of motion without pain reproduction.  Extremities: Peripheral joint ROM is full and pain free without obvious instability or laxity in all four extremities. No deformities, edema, or skin discoloration. Good capillary refill.  Musculoskeletal: Shoulder, hip, sacroiliac and knee provocative maneuvers are negative. Bilateral upper and lower extremity strength is normal and symmetric.  No atrophy or tone abnormalities are noted. Positive axial loading test bilateral. Positive tenderness over both SIJ, Positive FABERE,Ganselin and Yeoman's test on the both side.negative FADIR  Neuro: Bilateral upper and lower extremity coordination and muscle stretch reflexes are physiologic and symmetric.  Plantar response are downgoing. No loss of sensation is noted.  Gait: antalgic    ASSESSMENT: 57 y.o. year old male with low back pain, consistent with      1. Myositis, unspecified myositis type, unspecified site  X-Ray Lumbar Spine Complete 5 View    X-Ray Cervical Spine Complete 5 view    MRI Lumbar Spine Without Contrast    X-Ray Hips Bilateral 2 View Incl AP Pelvis   2. DDD (degenerative disc disease), lumbar  X-Ray Lumbar Spine Complete 5 View    X-Ray Cervical Spine Complete 5 view    MRI Lumbar Spine Without Contrast    X-Ray Hips Bilateral 2 View Incl AP Pelvis   3. DDD (degenerative disc disease), cervical  X-Ray Lumbar Spine Complete 5 View    X-Ray Cervical Spine Complete 5 view    MRI Lumbar Spine Without Contrast    X-Ray Hips Bilateral 2 View Incl AP Pelvis   4. Radiculopathy of thoracolumbar region  X-Ray Lumbar Spine Complete 5 View    X-Ray Cervical Spine Complete 5 view    MRI Lumbar Spine Without Contrast    X-Ray Hips Bilateral 2 View Incl AP Pelvis         PLAN:     - I have stressed the importance of physical activity  and a home exercise plan to help with pain and improve health.  - Patient can continue with medications for now per his providers  since they are providing benefits, using them appropriately, and without side effects.  - obtain outside records and images  -xray lumbar cervical spine and bilateral hips  - Schedule for TPI to help with pain and progress with a Home exercise program.  - RTC 4-6 weeks  - Counseled patient regarding the importance of activity modification, constant sleeping habits and physical therapy.    The above plan and management options were discussed at length with patient. Patient is in agreement with the above and verbalized understanding. It will be communicated with the referring physician via electronic record, fax, or mail.    Donavon Dennis MD    01/08/2018     Patient Name: Fabiano Malik  MRN: [id number]    INFORMED CONSENT: The procedure, risks, benefits and options were discussed with patient. There are no contraindications to the procedure. The patient expressed understanding and agreed to proceed. The personnel performing the procedure was discussed. I verify that I personally obtained Fabiano's consent prior to the start of the procedure and the signed consent can be found on the patient's chart.    Procedure Date: 01/09/2018    Anesthesia: Topical    Pre Procedure diagnosis:   1. Myositis, unspecified myositis type, unspecified site    2. DDD (degenerative disc disease), lumbar    3. DDD (degenerative disc disease), cervical    4. Radiculopathy of thoracolumbar region        Post-Procedure diagnosis: same      Sedation: None    PROCEDURE: TRIGGER POINT INJECTION  The patient was placed in a seated position. The site of pain and procedure were confirmed with the patient prior to starting the procedure. After performing time out. The patient's  trigger points were identified and marked. The skin was prepped with chlorhexidine three times.   After performing time out A 25-gauge 1.5  inch  needle was advanced through the skin and subcutaneous tissues.  Aspiration for blood, air and CSF was negative.  A total of 10 ml of Bupivacaine 0.25% and 40 mg Kenalog  was injected at all trigger point.  No complications were evident. No specimens collected.    Blood Loss: Nill  Specimen: None    Donavon Dennis MD

## 2018-01-09 ENCOUNTER — OFFICE VISIT (OUTPATIENT)
Dept: PAIN MEDICINE | Facility: CLINIC | Age: 58
End: 2018-01-09
Attending: ANESTHESIOLOGY
Payer: MEDICARE

## 2018-01-09 VITALS
WEIGHT: 238.13 LBS | SYSTOLIC BLOOD PRESSURE: 137 MMHG | HEART RATE: 79 BPM | DIASTOLIC BLOOD PRESSURE: 73 MMHG | HEIGHT: 73 IN | TEMPERATURE: 98 F | BODY MASS INDEX: 31.56 KG/M2

## 2018-01-09 DIAGNOSIS — M60.9 MYOSITIS, UNSPECIFIED MYOSITIS TYPE, UNSPECIFIED SITE: Primary | ICD-10-CM

## 2018-01-09 DIAGNOSIS — M50.30 DDD (DEGENERATIVE DISC DISEASE), CERVICAL: ICD-10-CM

## 2018-01-09 DIAGNOSIS — M51.36 DDD (DEGENERATIVE DISC DISEASE), LUMBAR: ICD-10-CM

## 2018-01-09 DIAGNOSIS — M54.15 RADICULOPATHY OF THORACOLUMBAR REGION: ICD-10-CM

## 2018-01-09 PROCEDURE — 20553 NJX 1/MLT TRIGGER POINTS 3/>: CPT | Mod: S$GLB,,, | Performed by: ANESTHESIOLOGY

## 2018-01-09 PROCEDURE — 99204 OFFICE O/P NEW MOD 45 MIN: CPT | Mod: S$GLB,,, | Performed by: ANESTHESIOLOGY

## 2018-01-09 PROCEDURE — 99999 PR PBB SHADOW E&M-EST. PATIENT-LVL III: CPT | Mod: PBBFAC,,, | Performed by: ANESTHESIOLOGY

## 2018-01-09 RX ORDER — BENAZEPRIL HYDROCHLORIDE 10 MG/1
TABLET ORAL
COMMUNITY
Start: 2018-01-06 | End: 2018-05-16

## 2018-01-09 RX ORDER — PREGABALIN 150 MG/1
CAPSULE ORAL
Status: ON HOLD | COMMUNITY
Start: 2018-01-02 | End: 2018-02-06

## 2018-01-09 RX ORDER — AZITHROMYCIN 250 MG/1
TABLET, FILM COATED ORAL
Refills: 0 | Status: ON HOLD | COMMUNITY
Start: 2017-12-01 | End: 2018-02-20

## 2018-01-09 RX ORDER — QUETIAPINE FUMARATE 400 MG/1
400 TABLET, FILM COATED ORAL DAILY
COMMUNITY
Start: 2018-01-08 | End: 2021-05-10

## 2018-01-09 RX ORDER — CLONAZEPAM 1 MG/1
TABLET ORAL
Refills: 3 | COMMUNITY
Start: 2017-12-05 | End: 2021-03-29

## 2018-01-09 RX ORDER — DULOXETIN HYDROCHLORIDE 60 MG/1
CAPSULE, DELAYED RELEASE ORAL
Refills: 1 | Status: ON HOLD | COMMUNITY
Start: 2017-12-01 | End: 2018-11-30 | Stop reason: HOSPADM

## 2018-01-11 DIAGNOSIS — M54.9 BACK PAIN, UNSPECIFIED BACK LOCATION, UNSPECIFIED BACK PAIN LATERALITY, UNSPECIFIED CHRONICITY: Primary | ICD-10-CM

## 2018-01-11 PROCEDURE — 96372 THER/PROPH/DIAG INJ SC/IM: CPT | Mod: S$GLB,,, | Performed by: ANESTHESIOLOGY

## 2018-01-11 RX ORDER — TRIAMCINOLONE ACETONIDE 40 MG/ML
40 INJECTION, SUSPENSION INTRA-ARTICULAR; INTRAMUSCULAR
Status: COMPLETED | OUTPATIENT
Start: 2018-01-09 | End: 2018-01-11

## 2018-01-11 RX ADMIN — TRIAMCINOLONE ACETONIDE 40 MG: 40 INJECTION, SUSPENSION INTRA-ARTICULAR; INTRAMUSCULAR at 01:01

## 2018-01-18 ENCOUNTER — HOSPITAL ENCOUNTER (OUTPATIENT)
Dept: RADIOLOGY | Facility: HOSPITAL | Age: 58
Discharge: HOME | End: 2018-01-18
Attending: ANESTHESIOLOGY
Payer: MEDICARE

## 2018-01-18 ENCOUNTER — TELEPHONE (OUTPATIENT)
Dept: PAIN MEDICINE | Facility: CLINIC | Age: 58
End: 2018-01-18

## 2018-01-18 DIAGNOSIS — M60.9 MYOSITIS, UNSPECIFIED MYOSITIS TYPE, UNSPECIFIED SITE: ICD-10-CM

## 2018-01-18 DIAGNOSIS — M54.15 RADICULOPATHY OF THORACOLUMBAR REGION: ICD-10-CM

## 2018-01-18 DIAGNOSIS — M51.36 DDD (DEGENERATIVE DISC DISEASE), LUMBAR: ICD-10-CM

## 2018-01-18 DIAGNOSIS — M50.30 DDD (DEGENERATIVE DISC DISEASE), CERVICAL: ICD-10-CM

## 2018-01-18 PROCEDURE — 72110 X-RAY EXAM L-2 SPINE 4/>VWS: CPT | Mod: TC,FY

## 2018-01-18 PROCEDURE — 72148 MRI LUMBAR SPINE W/O DYE: CPT | Mod: 26,,, | Performed by: RADIOLOGY

## 2018-01-18 PROCEDURE — 73521 X-RAY EXAM HIPS BI 2 VIEWS: CPT | Mod: 26,,, | Performed by: RADIOLOGY

## 2018-01-18 PROCEDURE — 73521 X-RAY EXAM HIPS BI 2 VIEWS: CPT | Mod: TC,FY

## 2018-01-18 PROCEDURE — 72050 X-RAY EXAM NECK SPINE 4/5VWS: CPT | Mod: 26,,, | Performed by: RADIOLOGY

## 2018-01-18 PROCEDURE — 72110 X-RAY EXAM L-2 SPINE 4/>VWS: CPT | Mod: 26,,, | Performed by: RADIOLOGY

## 2018-01-18 PROCEDURE — 72148 MRI LUMBAR SPINE W/O DYE: CPT | Mod: TC

## 2018-01-18 PROCEDURE — 72050 X-RAY EXAM NECK SPINE 4/5VWS: CPT | Mod: TC,FY

## 2018-01-18 NOTE — TELEPHONE ENCOUNTER
Called pt back.  NO answer.  Will wait for pt to call back.       ----- Message from Opal Hope sent at 1/18/2018 12:29 PM CST -----  Contact: pt   x 1st Request  _ 2nd Request  _ 3rd Request    Who: pt     Why: pt is calling to see if he has to complete the MRI. Pt states he completed a MRI at  in December.     What Number to Call Back:[phone]     When to Expect a call back: (Before the end of the day)  -- if call after 3:00 call back will be tomorrow.

## 2018-01-22 NOTE — PROGRESS NOTES
Chronic patient Established Note (Follow up visit)      SUBJECTIVE:    Fabiano Malik presents to the clinic for a follow-up appointment for back pain. He is S/P TRIGGER POINT INJECTION on 18 with 75% relief for a couple of days.  Pt presents today with low back with bilateral leg pain. Onset has been years, duration of pain is constant, pain is described as sharp, tingling in the legs, aching in back. Rest and heat mitigate pain, standing, getting out of bed, sitting makes pain worse. MRI shows degenerative disc, spondylosis, facet arthropathy, discussed that I will s/f a Lumbar MBB @ L2,3,4,5 bilaterally. I will also request his previous [facility] records, he has hx of injections from Dr. Devonte Medellin.   Since the last visit, Fabiano Malik states the pain has been stable. Current pain intensity is 10/10.    Pain Disability Index Review:  Last 3 PDI Scores 2018   Pain Disability Index (PDI) 61 68   `    Pain Medications:     - Opioids: None  - Adjuvant Medications: Cymbalta ( Duloxetine) and Lyrica ( Pregabalin)  - Anti-Coagulants: None  - Others: See med list    Opioid Contract: no     report:  Reviewed and consistent with medication use as prescribed.    Pain Procedures: 18 TRIGGER POINT INJECTION -75% relief  Injection with Dr. McMayne ([facility])     Physical Therapy/Home Exercise: no    Imagin18 X-Ray Hips Bilateral 2 View Incl AP Pelvis    Narrative     The AP pelvis, AP and lateral views both hips, 5 total views.  Dextroscoliosis, pelvis left hemipelvis.  Minimal mild DJD hip joints.  No fracture dislocation.   Impression      See results above.      Electronically signed by: NICKY DOYLE MD  Date: 18  Time: 15:08     Encounter     View Encounter          Signed by     Signed Credentials Date/Time  Phone Pager   HALLE DOYLE MD 2018 15:08 [phone]   Reviewed By     Donavon Dennis MD on 2018 15:56   Exam Details      Performed Procedure Technologist Supporting Staff Performing Physician   X-Ray Hips Bilateral 2 View Inc AP Pelvis Alex Thao        Appointment Date/Status Modality Department    1/18/2018     Completed [facility] XR1 [facility] XRAY OP       Begin Exam End Exam  End Exam Questionnaires   1/18/2018  2:27 PM 1/18/2018  2:53 PM  IMAGING END ALL      Reason For Exam   Priority: Routine   Dx: Myositis, unspecified myositis type, unspecified site [M60.9 (ICD-10-CM)]; DDD (degenerative disc disease), lumbar [M51.36 (ICD-10-CM)]; DDD (degenerative disc disease), cervical [M50.30 (ICD-10-CM)]; Radiculopathy of thoracolumbar region [M54.15 (ICD-10-CM)]   Order Report      Order Details     1/18/18 X-Ray Cervical Spine Complete 5 view    Narrative     Cervical spine with obliques.  There is fusion of the C4 and C5 vertebral bodies.  Prominent osteophytes C3-C7.  Mild neural foraminal narrowing on the right at the C5-6 level.  The left oblique is suboptimal in position making it difficult to evaluate the neural foramen.   Impression      See above      Electronically signed by: TAMMY BALL MD  Date: 01/18/18  Time: 15:49     Encounter     View Encounter          Signed by     Signed Credentials Date/Time  Phone Pager   TAMMY BALL JR, MD 1/18/2018 15:49 [phone]   Reviewed By     Donavon Dennis MD on 1/18/2018 15:55   Exam Details     Performed Procedure Technologist Supporting Staff Performing Physician   X-Ray Cervical Spine Complete 5 view Alex Thao        Appointment Date/Status Modality Department    1/18/2018     Completed [facility] XR1 [facility] XRAY OP       Begin Exam End Exam  End Exam Questionnaires   1/18/2018  2:27 PM 1/18/2018  2:53 PM  IMAGING END ALL      Reason For Exam   Priority: Routine   Dx: Myositis, unspecified myositis type, unspecified site [M60.9 (ICD-10-CM)]; DDD (degenerative disc disease), lumbar [M51.36 (ICD-10-CM)]; DDD (degenerative disc disease), cervical [M50.30 (ICD-10-CM)];  Radiculopathy of thoracolumbar region [M54.15 (ICD-10-CM)]   Order Report      Order Details     1/18/18 X-Ray Lumbar Spine Complete 5 View    Narrative     Lumbar spine 5 views, mild dextroscoliosis, bridging osteophytes lower dorsal lumbar spine,, straightening thoracic lumbar relationship on lateral view.  No fracture subluxation.   Impression      Moderate degenerative disc spondylosis facet arthropathy.      Electronically signed by: NICKY DOYLE MD  Date: 01/18/18  Time: 15:07     Encounter     View Encounter          Signed by     Signed Credentials Date/Time  Phone Pager   HALLE DOYLE MD 1/18/2018 15:07 [phone]   Reviewed By     Donavon Dennis MD on 1/18/2018 15:56   Exam Details     Performed Procedure Technologist Supporting Staff Performing Physician   X-Ray Lumbar Spine Complete 5 View Alex Thao        Appointment Date/Status Modality Department    1/18/2018     Completed [facility] XR1 [facility] XRAY OP       Begin Exam End Exam  End Exam Questionnaires   1/18/2018  2:27 PM 1/18/2018  2:52 PM  IMAGING END ALL      Reason For Exam   Priority: Routine   Dx: Myositis, unspecified myositis type, unspecified site [M60.9 (ICD-10-CM)]; DDD (degenerative disc disease), lumbar [M51.36 (ICD-10-CM)]; DDD (degenerative disc disease), cervical [M50.30 (ICD-10-CM)]; Radiculopathy of thoracolumbar region [M54.15 (ICD-10-CM)]   Order Report      Order Details         Allergies:   Review of patient's allergies indicates:   Allergen Reactions    Pcn [penicillins] Anaphylaxis       Current Medications:   Current Outpatient Prescriptions   Medication Sig Dispense Refill    azithromycin (Z-MCKAYLA) 250 MG tablet TK UTD  0    benazepril (LOTENSIN) 10 MG tablet       clonazePAM (KLONOPIN) 0.5 MG tablet Take 0.5 mg by mouth 2 (two) times daily as needed for Anxiety.      clonazePAM (KLONOPIN) 1 MG tablet TK 1 T PO TID  3    DULoxetine (CYMBALTA) 60 MG capsule TK 2 CS PO QAM  1    FLUVIRIN  5750-2495 45 mcg (15 mcg x 3)/0.5 mL Susp ADM 0.5ML IM UTD  0    LYRICA 150 mg capsule       metformin (GLUCOPHAGE) 1000 MG tablet Take 1,000 mg by mouth 2 (two) times daily with meals.      nabumetone (RELAFEN) 750 MG tablet Take 750 mg by mouth 2 (two) times daily.      omeprazole (PRILOSEC) 20 MG capsule Take 20 mg by mouth once daily.      pravastatin (PRAVACHOL) 40 MG tablet Take 40 mg by mouth once daily.      pregabalin (LYRICA) 50 MG capsule Take 50 mg by mouth 3 (three) times daily.      QUEtiapine (SEROQUEL) 400 MG tablet       fluoxetine (PROZAC) 20 MG capsule Take 1 capsule (20 mg total) by mouth once daily. Take 1/2 table for 3 days then take 1 tablet by mouth daily starting on 7/30/16 30 capsule 11     No current facility-administered medications for this visit.        REVIEW OF SYSTEMS:    GENERAL:  No weight loss, malaise or fevers.  HEENT:  Negative for frequent or significant headaches.  NECK:  Negative for lumps, goiter, pain and significant neck swelling.  RESPIRATORY:  Negative for cough, wheezing or shortness of breath.  CARDIOVASCULAR:  Negative for chest pain, leg swelling or palpitations.  GI:  Negative for abdominal discomfort, blood in stools or black stools or change in bowel habits.  MUSCULOSKELETAL:  See HPI.  SKIN:  Negative for lesions, rash, and itching.  PSYCH:  positive for sleep disturbance, mood disorder and recent psychosocial stressors.  HEMATOLOGY/LYMPHOLOGY:  Negative for prolonged bleeding, bruising easily or swollen nodes.  NEURO:   No history of headaches, syncope, paralysis, seizures or tremors.  All other reviewed and negative other than HPI.      Past Medical History:  Past Medical History:   Diagnosis Date    Chronic back pain greater than 3 months duration     Depression     Diabetes mellitus     Hypertension     Schizophrenia        Past Surgical History:  Past Surgical History:   Procedure Laterality Date    APPENDECTOMY      NECK SURGERY    "      Family History:  History reviewed. No pertinent family history.    Social History:  Social History     Social History    Marital status:      Spouse name: N/A    Number of children: N/A    Years of education: N/A     Social History Main Topics    Smoking status: Never Smoker    Smokeless tobacco: None    Alcohol use Yes      Comment: "couple of beers a day"    Drug use: No    Sexual activity: Not Asked     Other Topics Concern    None     Social History Narrative    None       OBJECTIVE:    /86   Pulse 98   Wt 117.5 kg (259 lb 0.7 oz)   BMI 34.18 kg/m²     PHYSICAL EXAMINATION:    General appearance: Well appearing, in no acute distress, alert and oriented x3.  Psych:  Mood and affect appropriate.  Skin: Skin color, texture, turgor normal, no rashes or lesions, in both upper and lower body.  Head/face:  Normocephalic, atraumatic. No palpable lymph nodes.  Neck: No pain to palpation over the cervical paraspinous muscles. Spurling Negative. No pain with neck flexion, extension, or lateral flexion.   Cor: RRR  Pulm: CTA  GI:  Soft and non-tender.  Back: Straight leg raising in the sitting and supine positions is positive to radicular pain. moderate pain to palpation over the spine or costovertebral angles. Normal range of motion without pain reproduction.  Extremities: Peripheral joint ROM is full and pain free without obvious instability or laxity in all four extremities. No deformities, edema, or skin discoloration. Good capillary refill.  Musculoskeletal: Shoulder, hip, sacroiliac and knee provocative maneuvers are negative. Bilateral upper and lower extremity strength is normal and symmetric.  No atrophy or tone abnormalities are noted. Positive axial loading test bilateral. Positive tenderness over both SIJ, Positive FABERE,Ganselin and Yeoman's test on the both side.negative FADIR  Neuro: Bilateral upper and lower extremity coordination and muscle stretch reflexes are physiologic " and symmetric.  Plantar response are downgoing. No loss of sensation is noted.  Gait: antalgic    ASSESSMENT: 57 y.o. year old male with low back pain, consistent with       Diagnosis:    1. DDD (degenerative disc disease), lumbar     2. Lumbar spondylolysis     3. Facet arthropathy     4. Facet hypertrophy of lumbar region           PLAN:     - I have stressed the importance of physical activity and a home exercise plan to help with pain and improve health.  - Patient can continue with medications for now since they are providing benefits, using them appropriately, and without side effects.  - Counseled patient regarding the importance of activity modification, constant sleeping habits and physical therapy  - Previous imaging was reviewed and discussed with the patient today.   -I will also request his past medical records from . He has a hx of injections from Dr. Devonte Gregg.   - Schedule for a Diagnostic/Therapeutic Medial branch block at Left/ Right L 2, 3,4, and L5 to help with axial low back pain and progress with a home exercise program.  - I have explained the risks, benefits, and alternatives of the procedure in detail. The patient voices understanding and all questions have been answered. The patient agrees to proceed as planned. Written Consent obtained.    -The above plan and management options were discussed at length with patient. Patient is in agreement with the above and verbalized understanding. Dr. Dennis   was consulted on this patient  and agrees with this plan.       CARLOS Swanson    01/23/2018

## 2018-01-23 ENCOUNTER — TELEPHONE (OUTPATIENT)
Dept: PAIN MEDICINE | Facility: CLINIC | Age: 58
End: 2018-01-23

## 2018-01-23 ENCOUNTER — OFFICE VISIT (OUTPATIENT)
Dept: PAIN MEDICINE | Facility: CLINIC | Age: 58
End: 2018-01-23
Payer: MEDICARE

## 2018-01-23 VITALS
BODY MASS INDEX: 34.18 KG/M2 | DIASTOLIC BLOOD PRESSURE: 86 MMHG | HEART RATE: 98 BPM | WEIGHT: 259.06 LBS | SYSTOLIC BLOOD PRESSURE: 126 MMHG

## 2018-01-23 DIAGNOSIS — M47.816 FACET HYPERTROPHY OF LUMBAR REGION: ICD-10-CM

## 2018-01-23 DIAGNOSIS — M43.06 LUMBAR SPONDYLOLYSIS: ICD-10-CM

## 2018-01-23 DIAGNOSIS — M47.816 ARTHROPATHY OF LUMBAR FACET JOINT: Primary | ICD-10-CM

## 2018-01-23 DIAGNOSIS — M51.36 DDD (DEGENERATIVE DISC DISEASE), LUMBAR: Primary | ICD-10-CM

## 2018-01-23 DIAGNOSIS — M47.819 FACET ARTHROPATHY: ICD-10-CM

## 2018-01-23 PROCEDURE — 99213 OFFICE O/P EST LOW 20 MIN: CPT | Mod: S$GLB,,, | Performed by: NURSE PRACTITIONER

## 2018-01-23 PROCEDURE — 99999 PR PBB SHADOW E&M-EST. PATIENT-LVL III: CPT | Mod: PBBFAC,,, | Performed by: NURSE PRACTITIONER

## 2018-01-25 ENCOUNTER — TELEPHONE (OUTPATIENT)
Dept: PAIN MEDICINE | Facility: CLINIC | Age: 58
End: 2018-01-25

## 2018-01-25 NOTE — TELEPHONE ENCOUNTER
----- Message from Link Trujillo sent at 1/25/2018 12:43 PM CST -----  Contact: self/[phone]  Patient called to speak with your office in regard to he fell last night and is in severe pain.      Please call and advise.

## 2018-01-25 NOTE — TELEPHONE ENCOUNTER
Pt states he fell last night at home, hurting his left side, pain is > than 10/10.  Advised pt that I will fwd his msg to Joseph Shafer NP for further advise.  I advised him that someone will get back with him tomorrow.  Pt verbalized understanding.

## 2018-01-29 ENCOUNTER — TELEPHONE (OUTPATIENT)
Dept: PAIN MEDICINE | Facility: CLINIC | Age: 58
End: 2018-01-29

## 2018-01-29 NOTE — TELEPHONE ENCOUNTER
Scheduled appt for pt Wednesday 1/31/18, due to ongoing pain, due to fall last Wednesday 1/24/18.  Pt does not have a ride, unless one of his children can bring him in or appt needed to be scheduled out 3d in order to scheduled transportation with [facility].  Pt verbalized understanding.

## 2018-02-02 ENCOUNTER — TELEPHONE (OUTPATIENT)
Dept: PAIN MEDICINE | Facility: HOSPITAL | Age: 58
End: 2018-02-02

## 2018-02-06 ENCOUNTER — SURGERY (OUTPATIENT)
Age: 58
End: 2018-02-06

## 2018-02-06 DIAGNOSIS — M54.40 CHRONIC BILATERAL LOW BACK PAIN WITH SCIATICA, SCIATICA LATERALITY UNSPECIFIED: Primary | ICD-10-CM

## 2018-02-06 DIAGNOSIS — G89.29 CHRONIC BILATERAL LOW BACK PAIN WITH SCIATICA, SCIATICA LATERALITY UNSPECIFIED: Primary | ICD-10-CM

## 2018-02-06 RX ORDER — PREGABALIN 200 MG/1
200 CAPSULE ORAL 2 TIMES DAILY
Qty: 60 CAPSULE | Refills: 0 | Status: SHIPPED | OUTPATIENT
Start: 2018-02-06 | End: 2018-04-17 | Stop reason: SDUPTHER

## 2018-02-06 RX ORDER — MELOXICAM 7.5 MG/1
7.5 TABLET ORAL
Qty: 30 TABLET | Refills: 2 | Status: SHIPPED | OUTPATIENT
Start: 2018-02-06 | End: 2018-06-26 | Stop reason: SDUPTHER

## 2018-02-08 ENCOUNTER — TELEPHONE (OUTPATIENT)
Dept: PAIN MEDICINE | Facility: CLINIC | Age: 58
End: 2018-02-08

## 2018-02-08 DIAGNOSIS — M47.816 LUMBAR FACET ARTHROPATHY: ICD-10-CM

## 2018-02-08 DIAGNOSIS — M51.36 DDD (DEGENERATIVE DISC DISEASE), LUMBAR: Primary | ICD-10-CM

## 2018-02-08 DIAGNOSIS — M47.816 FACET HYPERTROPHY OF LUMBAR REGION: ICD-10-CM

## 2018-02-08 DIAGNOSIS — M47.816 LUMBAR SPONDYLOSIS: ICD-10-CM

## 2018-02-16 ENCOUNTER — TELEPHONE (OUTPATIENT)
Dept: PAIN MEDICINE | Facility: HOSPITAL | Age: 58
End: 2018-02-16

## 2018-02-19 NOTE — DISCHARGE INSTRUCTIONS
Home Care Instructions Pain Management:    1.  DIET:    You may resume your normal diet today.    2.  BATHING:    You may shower with luke warm water.    3.  DRESSING:    You may remove your bandage today.    4.  ACTIVITY LEVEL:      You may resume your normal activities 24 hours after your procedure.    5.  MEDICATIONS:    You may resume your normal medications today.    6.  SPECIAL INSTRUCTIONS:    No heat to the injection site for 24 hours including bath or shower, heating pad, moist heat or hot tubs.    Use an ice pack to the injection site for any pain or discomfort.  Apply ice packs for 20 minute intervals as needed.    If you have received any sedatives by mouth today, you can not drive for 12 hours.    If you have received sedation through an IV, you can not drive for 24 hours.    PLEASE CALL YOUR DOCTOR FOR THE FOLLOWIN.  Redness or swelling around the injection site.  2.  Fever of 101 degrees.  3.  Drainage (pus) from the injection site.  4.  For any continuous bleeding (some dried blood over the incision is normal.)    FOR EMERGENCIES:    If any unusual problems or difficulties occur during clinic hours, call [phone] or dial [phone].    Follow up with with your physician in 2-3 weeks.       Procedural Sedation  Procedural sedation is medicine to ease discomfort, pain, and anxiety during a procedure. The medicine is often given through an IV (intravenous) line in your arm or hand. In some cases, the medicine may be taken by mouth or inhaled. While you are under sedation, you will likely be awake. But you may not remember anything afterward.  Why procedural sedation is used  Sedation is used for many types of procedures. The goal is to reduce pain, anxiety, and stressful memories of a procedure. It can also help your healthcare provider treat you. For example, having a broken bone fixed may be easier if you feel relaxed.  Procedural sedation is used only for short, basic procedures. It is not used  for complex surgeries. Some procedures that use this type of sedation include:  · Dental surgery  · Breast biopsy, to take a sample of breast tissue  · Endoscopy, to look at gastrointestinal problems  · Bronchoscopy, to check for lung problems  · Bone or joint realignment, to fix a broken bone or dislocated joint  · Minor foot or skin surgery  · Electrical cardioversion, to restore a normal heart rhythm  · Lumbar puncture, to assess neurological disease  Risks of procedural sedation  Procedural sedation has some risks and possible side effects, such as:  · Headache  · Nausea and vomiting  · Unpleasant memory of the procedure  · Lowered rate of breathing  · Changes in heart rate and blood pressure (rare)  · Inhalation of stomach contents into your lungs (rare)  Side effects will likely go away shortly after the procedure. Your healthcare team will watch your heart rate and breathing during your sedation. This is to help prevent problems.  Your own risks may vary based on your age and your overall health. They also depend on the type of sedation you are given. Talk with your healthcare provider about the risks that apply most to you.  Getting ready for procedural sedation  Talk with your healthcare provider about how to get ready for your procedure. Tell him or her about all the medicines you take. This includes over-the-counter medicines such as ibuprofen. It also includes vitamins, herbs, and other supplements. You may need to stop taking some medicines before the procedure, such as blood thinners and aspirin. If you smoke, you should stop to lessen the chance of a lung issue. Talk with your healthcare provider if you need help to stop smoking.  Tell your healthcare provider if you:  · Have had any problems in the past with sedation or anesthesia  · Have had any recent changes in your health, such as an infection or fever  · Are pregnant or think you may be pregnant  Also be sure to:  · Ask a family member or friend  to take you home after the procedure. You cant drive on the day you receive sedation.  · Not eat or drink after midnight the night before your procedure, if advised.  · Not plan on making any important decisions, such as financial or legal, for the day after you receive the sedation.  · Follow all other instructions from your healthcare provider.  During your procedural sedation  You may have your procedure in a hospital or a medical clinic. Sedation is done by a trained healthcare provider. In general, you can expect the following:  · You will be given medicine through an IV line in your arm or hand. Or you may receive a shot. The medicine may also be given by mouth. Or you may inhale it through a mask.  · If you receive medicine through an IV, you may feel the effects very quickly. You will start to feel relaxed and drowsy.  · During the procedure, your heart rate, breathing, and blood pressure will be closely watched. Your breathing and blood pressure may decrease a little. But you will likely not need help with your breathing. You may receive a little extra oxygen through a mask or through some soft plastic prongs underneath your nose.  · You will probably be awake the entire time. If you do fall asleep, you should be easy to wake up, if needed. You should feel little or no pain.  · When your procedure is over, the sedative medicine will be stopped.  After your procedural sedation  You will begin to feel more awake and aware. But you will likely be drowsy for a while afterward. You will be closely watched as you become more alert. You may have a faint memory of the procedure. Or you may not remember it at all.  You should be able to return home within an hour or two after your procedure. Plan to have someone stay with you for a few hours. Side effects such as headache and nausea may go away quickly. Tell your healthcare provider if they continue.  Dont drive or make any important decisions for at least 24  hours. Be sure to follow all after-care instructions.     When to call your healthcare provider  Have someone call your healthcare provider right away if you have any of these:  · Drowsiness that gets worse  · Weakness or dizziness that gets worse  · Repeated vomiting  · You cant be awakened  · Severe or ongoing pain from the procedure, not relieved by the pain medicine   Date Last Reviewed: 2/1/2017  © 6463-9760 [employer]. [address] [place] [address], [place] [address]. All rights reserved. This information is not intended as a substitute for professional medical care. Always follow your healthcare professional's instructions.

## 2018-02-20 ENCOUNTER — HOSPITAL ENCOUNTER (OUTPATIENT)
Facility: HOSPITAL | Age: 58
Discharge: HOME | End: 2018-02-20
Attending: ANESTHESIOLOGY | Admitting: ANESTHESIOLOGY
Payer: MEDICARE

## 2018-02-20 ENCOUNTER — SURGERY (OUTPATIENT)
Age: 58
End: 2018-02-20

## 2018-02-20 VITALS
HEART RATE: 95 BPM | SYSTOLIC BLOOD PRESSURE: 143 MMHG | TEMPERATURE: 99 F | OXYGEN SATURATION: 93 % | HEIGHT: 72 IN | RESPIRATION RATE: 16 BRPM | DIASTOLIC BLOOD PRESSURE: 81 MMHG | BODY MASS INDEX: 33.18 KG/M2 | WEIGHT: 245 LBS

## 2018-02-20 DIAGNOSIS — M47.9 OSTEOARTHRITIS OF SPINE, UNSPECIFIED SPINAL OSTEOARTHRITIS COMPLICATION STATUS, UNSPECIFIED SPINAL REGION: Primary | ICD-10-CM

## 2018-02-20 DIAGNOSIS — M47.819 SPONDYLOSIS WITHOUT MYELOPATHY: ICD-10-CM

## 2018-02-20 DIAGNOSIS — G89.29 CHRONIC PAIN: ICD-10-CM

## 2018-02-20 LAB — POCT GLUCOSE: 213 MG/DL (ref 70–110)

## 2018-02-20 PROCEDURE — 63600175 PHARM REV CODE 636 W HCPCS: Performed by: ANESTHESIOLOGY

## 2018-02-20 PROCEDURE — 25000003 PHARM REV CODE 250: Performed by: ANESTHESIOLOGY

## 2018-02-20 PROCEDURE — 64493 INJ PARAVERT F JNT L/S 1 LEV: CPT | Mod: 50 | Performed by: ANESTHESIOLOGY

## 2018-02-20 PROCEDURE — 64495 INJ PARAVERT F JNT L/S 3 LEV: CPT | Mod: 50 | Performed by: ANESTHESIOLOGY

## 2018-02-20 PROCEDURE — 64494 INJ PARAVERT F JNT L/S 2 LEV: CPT | Mod: 50,,, | Performed by: ANESTHESIOLOGY

## 2018-02-20 PROCEDURE — 64494 INJ PARAVERT F JNT L/S 2 LEV: CPT | Mod: 50 | Performed by: ANESTHESIOLOGY

## 2018-02-20 PROCEDURE — 64495 INJ PARAVERT F JNT L/S 3 LEV: CPT | Mod: 50,,, | Performed by: ANESTHESIOLOGY

## 2018-02-20 PROCEDURE — 64493 INJ PARAVERT F JNT L/S 1 LEV: CPT | Mod: 50,,, | Performed by: ANESTHESIOLOGY

## 2018-02-20 RX ORDER — MIDAZOLAM HYDROCHLORIDE 1 MG/ML
INJECTION, SOLUTION INTRAMUSCULAR; INTRAVENOUS
Status: DISCONTINUED | OUTPATIENT
Start: 2018-02-20 | End: 2018-02-20 | Stop reason: HOSPADM

## 2018-02-20 RX ORDER — BUPIVACAINE HYDROCHLORIDE 2.5 MG/ML
INJECTION, SOLUTION EPIDURAL; INFILTRATION; INTRACAUDAL
Status: DISCONTINUED | OUTPATIENT
Start: 2018-02-20 | End: 2018-02-20 | Stop reason: HOSPADM

## 2018-02-20 RX ORDER — LIDOCAINE HYDROCHLORIDE 10 MG/ML
INJECTION, SOLUTION EPIDURAL; INFILTRATION; INTRACAUDAL; PERINEURAL
Status: DISCONTINUED | OUTPATIENT
Start: 2018-02-20 | End: 2018-02-20 | Stop reason: HOSPADM

## 2018-02-20 RX ORDER — SODIUM CHLORIDE 9 MG/ML
500 INJECTION, SOLUTION INTRAVENOUS CONTINUOUS
Status: DISCONTINUED | OUTPATIENT
Start: 2018-02-20 | End: 2018-02-20 | Stop reason: HOSPADM

## 2018-02-20 RX ADMIN — BUPIVACAINE HYDROCHLORIDE 5 ML: 2.5 INJECTION, SOLUTION EPIDURAL; INFILTRATION; INTRACAUDAL; PERINEURAL at 10:02

## 2018-02-20 RX ADMIN — MIDAZOLAM 2 MG: 1 INJECTION INTRAMUSCULAR; INTRAVENOUS at 10:02

## 2018-02-20 RX ADMIN — LIDOCAINE HYDROCHLORIDE 5 ML: 10 INJECTION, SOLUTION EPIDURAL; INFILTRATION; INTRACAUDAL; PERINEURAL at 10:02

## 2018-02-20 RX ADMIN — SODIUM CHLORIDE 500 ML: 0.9 INJECTION, SOLUTION INTRAVENOUS at 10:02

## 2018-02-20 NOTE — OP NOTE
"Patient Name: Fabiano Malik Jr.  MRN: [id number]    INFORMED CONSENT: The procedure, risks, benefits and options were discussed with patient. There are no contraindications to the procedure. The patient expressed understanding and agreed to proceed. The personnel performing the procedure was discussed. I verify that I personally obtained Fabiano's consent prior to the start of the procedure and the signed consent can be found on the patient's chart.    Procedure Date: 02/20/2018    Anesthesia: Topical    Pre Procedure diagnosis: Lumbar spondylosis [M47.816]  DDD (degenerative disc disease), lumbar [M51.36]  Lumbar facet arthropathy [M46.96]  Facet hypertrophy of lumbar region [M47.896]  1. Osteoarthritis of spine, unspecified spinal osteoarthritis complication status, unspecified spinal region    2. Spondylosis without myelopathy    3. Chronic pain      Post-Procedure diagnosis: SAME      Moderate Sedation:  no- Midazolam 2 mg single dose      PROCEDURE: bilateral L2-3-4-5 LUMBAR FACET MEDIAL BRANCH NERVE BLOCK      DESCRIPTION OF PROCEDURE:The patient was brought to the procedure room. After performing time out. IV access was obtained prior to the procedure. The patient was positioned prone on the fluoroscopy table. Continuous hemodynamic monitoring was initiated including blood pressure, EKG, and pulse oximetry. The area of the lumbar spine was prepped chlorhexidine and draped into a sterile field. Fluoroscopy was used to identify the location of the bilateral side L2, L3, L4, and L5 medial branch nerves at the junctions of the superior articular process and the transverse processes of L3, L4, L5, and the sacral ala respectively. Skin anesthesia was achieved using 5 cc of Lidocaine 1% over the injection sites. A 22 gauge, 3 1/2" spinal needle was slowly inserted at each level using AP, lateral and oblique fluoroscopic imaging. Negative aspiration for blood or CSF was confirmed.  8 ml bupivacaine 0.25%was " injected at all sites. The needles were removed and bleeding was nil. A sterile dressing was applied. No specimens collected. Fabiano was taken back to the recovery room for further observation.     Blood Loss: Nill  Specimen: None      Discharge Diagnosis: Lumbar spondylosis [M47.816]  DDD (degenerative disc disease), lumbar [M51.36]  Lumbar facet arthropathy [M46.96]  Facet hypertrophy of lumbar region [M47.896]  Condition on Discharge: Stable.  Diet on Discharge: Same as before.  Activity: as per instruction sheet.  Discharge to: Home with a responsible adult.  Follow up: as per Discharge instructions        Donavon Dennis MD

## 2018-02-20 NOTE — H&P (VIEW-ONLY)
Patient BG>200 after measuring twice this am, procedure to be rescheduled   Patient informed nursing that he would harm himself because of the pain   When questioned by myself he stated he has no intention to and he has no plan   He follows with psychiatry   One offered to go to ED or have an emergent psych consult, he refused   We contacted and informed his psychiatrist for emergent appointment if needed    His psychiatrist mentioned patient has chronic suicidal ideation and it is fine to discharge if he had no plan and stated to physician he had no intension to harm himself or others   Patient was provided a script for lyrica and mobic to help control his pain until rescheduled procedure and given specific instruction to go to the ED and contact our office if pain increased       Donavon Dennis MD

## 2018-04-17 ENCOUNTER — OFFICE VISIT (OUTPATIENT)
Dept: PAIN MEDICINE | Facility: CLINIC | Age: 58
End: 2018-04-17
Payer: MEDICARE

## 2018-04-17 ENCOUNTER — TELEPHONE (OUTPATIENT)
Dept: ADMINISTRATIVE | Facility: OTHER | Age: 58
End: 2018-04-17

## 2018-04-17 VITALS
WEIGHT: 250 LBS | SYSTOLIC BLOOD PRESSURE: 124 MMHG | DIASTOLIC BLOOD PRESSURE: 84 MMHG | HEART RATE: 94 BPM | BODY MASS INDEX: 33.91 KG/M2

## 2018-04-17 DIAGNOSIS — M43.06 LUMBAR SPONDYLOLYSIS: ICD-10-CM

## 2018-04-17 DIAGNOSIS — G89.4 CHRONIC PAIN SYNDROME: ICD-10-CM

## 2018-04-17 DIAGNOSIS — M47.816 FACET HYPERTROPHY OF LUMBAR REGION: Primary | ICD-10-CM

## 2018-04-17 DIAGNOSIS — M54.9 BACK PAIN, UNSPECIFIED BACK LOCATION, UNSPECIFIED BACK PAIN LATERALITY, UNSPECIFIED CHRONICITY: ICD-10-CM

## 2018-04-17 DIAGNOSIS — M47.819 FACET ARTHROPATHY: ICD-10-CM

## 2018-04-17 DIAGNOSIS — M51.36 DDD (DEGENERATIVE DISC DISEASE), LUMBAR: ICD-10-CM

## 2018-04-17 PROCEDURE — 3074F SYST BP LT 130 MM HG: CPT | Mod: CPTII,S$GLB,, | Performed by: NURSE PRACTITIONER

## 2018-04-17 PROCEDURE — 99999 PR PBB SHADOW E&M-EST. PATIENT-LVL III: CPT | Mod: PBBFAC,,, | Performed by: NURSE PRACTITIONER

## 2018-04-17 PROCEDURE — 3079F DIAST BP 80-89 MM HG: CPT | Mod: CPTII,S$GLB,, | Performed by: NURSE PRACTITIONER

## 2018-04-17 PROCEDURE — 99214 OFFICE O/P EST MOD 30 MIN: CPT | Mod: S$GLB,,, | Performed by: NURSE PRACTITIONER

## 2018-04-17 RX ORDER — PREGABALIN 200 MG/1
200 CAPSULE ORAL 2 TIMES DAILY
Qty: 60 CAPSULE | Refills: 0 | Status: SHIPPED | OUTPATIENT
Start: 2018-04-17 | End: 2018-05-22 | Stop reason: SDUPTHER

## 2018-04-17 RX ORDER — PREGABALIN 200 MG/1
200 CAPSULE ORAL 2 TIMES DAILY
Qty: 60 CAPSULE | Refills: 0 | Status: SHIPPED | OUTPATIENT
Start: 2018-04-17 | End: 2018-04-17 | Stop reason: SDUPTHER

## 2018-04-17 NOTE — TELEPHONE ENCOUNTER
Left message on voicemail that appointment was moved to Joseph Shafer NP scheduled for same date and time.

## 2018-04-17 NOTE — TELEPHONE ENCOUNTER
----- Message from Donavon Dennis MD sent at 4/17/2018  6:32 AM CDT -----  pls move to [facility]    Mg

## 2018-04-17 NOTE — PROGRESS NOTES
Chronic patient Established Note (Follow up visit)      SUBJECTIVE:    Fabiano Malik Jr. presents to the clinic for a follow-up appointment for low back pain. He is S/P bilateral L2-3-4-5 LUMBAR FACET MEDIAL BRANCH NERVE BLOCK on 18 with 75% relief for 1-2 months, discussed that we will repeat bilateral L2-3-4-5 MBB and in the future I will consider Radiofrequency rhizotomy for longer pain relief. RFA has shown to be helpful for up to 6 to 18 months.  Since the last visit, Fabiano Malik Jr. states the pain has been worsening. Current pain intensity is 10/10.    Pain Disability Index Review:  Last 3 PDI Scores 2018   Pain Disability Index (PDI) 56 61 68          Pain Medications:     - Opioids: None  - Adjuvant Medications: Cymbalta ( Duloxetine) and Lyrica ( Pregabalin)  - Anti-Coagulants: None  - Others: See med list     Opioid Contract: no      report:  Reviewed and consistent with medication use as prescribed.     Pain Procedures: 18 bilateral L2-3-4-5 LUMBAR FACET MEDIAL BRANCH NERVE BLOCK   18 TRIGGER POINT INJECTION -75% relief  Injection with Dr. McMayne ([facility])     Physical Therapy/Home Exercise: no     Imagin18 X-Ray Hips Bilateral 2 View Incl AP Pelvis     Narrative      The AP pelvis, AP and lateral views both hips, 5 total views.  Dextroscoliosis, pelvis left hemipelvis.  Minimal mild DJD hip joints.  No fracture dislocation.   Impression       See results above.      Electronically signed by: NICKY DOYLE MD  Date: 18  Time: 15:08     Encounter      View Encounter          Signed by      Signed Credentials Date/Time   Phone Pager   HALLE DOYLE MD 2018 15:08 [phone]   Reviewed By      Donavon Dennis MD on 2018 15:56   Exam Details      Performed Procedure Technologist Supporting Staff Performing Physician   X-Ray Hips Bilateral 2 View Inc AP Pelvis Alex Thao           Appointment  Date/Status Modality Department     1/18/2018     Completed [facility] XR1 [facility] XRAY OP         Begin Exam End Exam   End Exam Questionnaires   1/18/2018  2:27 PM 1/18/2018  2:53 PM   IMAGING END ALL       Reason For Exam   Priority: Routine   Dx: Myositis, unspecified myositis type, unspecified site [M60.9 (ICD-10-CM)]; DDD (degenerative disc disease), lumbar [M51.36 (ICD-10-CM)]; DDD (degenerative disc disease), cervical [M50.30 (ICD-10-CM)]; Radiculopathy of thoracolumbar region [M54.15 (ICD-10-CM)]   Order Report       Order Details      1/18/18 X-Ray Cervical Spine Complete 5 view     Narrative      Cervical spine with obliques.  There is fusion of the C4 and C5 vertebral bodies.  Prominent osteophytes C3-C7.  Mild neural foraminal narrowing on the right at the C5-6 level.  The left oblique is suboptimal in position making it difficult to evaluate the neural foramen.   Impression       See above      Electronically signed by: TAMMY BALL MD  Date: 01/18/18  Time: 15:49     Encounter      View Encounter          Signed by      Signed Credentials Date/Time   Phone Pager   TAMMY BALL JR, MD 1/18/2018 15:49 [phone]   Reviewed By      Donavon Dennis MD on 1/18/2018 15:55   Exam Details      Performed Procedure Technologist Supporting Staff Performing Physician   X-Ray Cervical Spine Complete 5 view Alex Thao           Appointment Date/Status Modality Department     1/18/2018     Completed [facility] XR1 [facility] XRAY OP         Begin Exam End Exam   End Exam Questionnaires   1/18/2018  2:27 PM 1/18/2018  2:53 PM   IMAGING END ALL       Reason For Exam   Priority: Routine   Dx: Myositis, unspecified myositis type, unspecified site [M60.9 (ICD-10-CM)]; DDD (degenerative disc disease), lumbar [M51.36 (ICD-10-CM)]; DDD (degenerative disc disease), cervical [M50.30 (ICD-10-CM)]; Radiculopathy of thoracolumbar region [M54.15 (ICD-10-CM)]   Order Report       Order Details      1/18/18 X-Ray Lumbar Spine  Complete 5 View     Narrative      Lumbar spine 5 views, mild dextroscoliosis, bridging osteophytes lower dorsal lumbar spine,, straightening thoracic lumbar relationship on lateral view.  No fracture subluxation.   Impression       Moderate degenerative disc spondylosis facet arthropathy.      Electronically signed by: NICKY DOYLE MD  Date: 01/18/18  Time: 15:07     Encounter      View Encounter          Signed by      Signed Credentials Date/Time   Phone Pager   HALLE DOYLE MD 1/18/2018 15:07 [phone]   Reviewed By      Donavon Dennis MD on 1/18/2018 15:56   Exam Details      Performed Procedure Technologist Supporting Staff Performing Physician   X-Ray Lumbar Spine Complete 5 View Alex Thao           Appointment Date/Status Modality Department     1/18/2018     Completed [facility] XR1 [facility] XRAY OP         Begin Exam End Exam   End Exam Questionnaires   1/18/2018  2:27 PM 1/18/2018  2:52 PM   IMAGING END ALL       Reason For Exam   Priority: Routine   Dx: Myositis, unspecified myositis type, unspecified site [M60.9 (ICD-10-CM)]; DDD (degenerative disc disease), lumbar [M51.36 (ICD-10-CM)]; DDD (degenerative disc disease), cervical [M50.30 (ICD-10-CM)]; Radiculopathy of thoracolumbar region [M54.15 (ICD-10-CM)]   Order Report       Order     Allergies:   Review of patient's allergies indicates:   Allergen Reactions    Pcn [penicillins] Anaphylaxis       Current Medications:   Current Outpatient Prescriptions   Medication Sig Dispense Refill    benazepril (LOTENSIN) 10 MG tablet       clonazePAM (KLONOPIN) 0.5 MG tablet Take 0.5 mg by mouth 2 (two) times daily as needed for Anxiety.      clonazePAM (KLONOPIN) 1 MG tablet TK 1 T PO TID  3    DULoxetine (CYMBALTA) 60 MG capsule TK 2 CS PO QAM  1    FLUVIRIN [id number] 45 mcg (15 mcg x 3)/0.5 mL Susp ADM 0.5ML IM UTD  0    metformin (GLUCOPHAGE) 1000 MG tablet Take 1,000 mg by mouth 2 (two) times daily with meals.      nabumetone  (RELAFEN) 750 MG tablet Take 750 mg by mouth 2 (two) times daily.      omeprazole (PRILOSEC) 20 MG capsule Take 20 mg by mouth once daily.      pravastatin (PRAVACHOL) 40 MG tablet Take 40 mg by mouth once daily.      QUEtiapine (SEROQUEL) 400 MG tablet       fluoxetine (PROZAC) 20 MG capsule Take 1 capsule (20 mg total) by mouth once daily. Take 1/2 table for 3 days then take 1 tablet by mouth daily starting on 7/30/16 30 capsule 11    pregabalin (LYRICA) 200 MG Cap Take 1 capsule (200 mg total) by mouth 2 (two) times daily. 60 capsule 0     No current facility-administered medications for this visit.        REVIEW OF SYSTEMS:    GENERAL:  No weight loss, malaise or fevers.  HEENT:  Negative for frequent or significant headaches.  NECK:  Negative for lumps, goiter, pain and significant neck swelling.  RESPIRATORY:  Negative for cough, wheezing or shortness of breath.  CARDIOVASCULAR:  Negative for chest pain, leg swelling or palpitations.  GI:  Negative for abdominal discomfort, blood in stools or black stools or change in bowel habits.  MUSCULOSKELETAL:  See HPI.  SKIN:  Negative for lesions, rash, and itching.  PSYCH:  + for sleep disturbance, mood disorder and recent psychosocial stressors.  HEMATOLOGY/LYMPHOLOGY:  Negative for prolonged bleeding, bruising easily or swollen nodes.  NEURO:   No history of headaches, syncope, paralysis, seizures or tremors.  All other reviewed and negative other than HPI.    Past Medical History:  Past Medical History:   Diagnosis Date    Chronic back pain greater than 3 months duration     Depression     Diabetes mellitus     Hypertension     Schizophrenia        Past Surgical History:  Past Surgical History:   Procedure Laterality Date    APPENDECTOMY      NECK SURGERY         Family History:  No family history on file.    Social History:  Social History     Social History    Marital status:      Spouse name: N/A    Number of children: N/A    Years of  "education: N/A     Social History Main Topics    Smoking status: Never Smoker    Smokeless tobacco: None    Alcohol use Yes      Comment: "couple of beers a day"    Drug use: No    Sexual activity: Not Asked     Other Topics Concern    None     Social History Narrative    None       OBJECTIVE:    /84   Pulse 94   Wt 113.4 kg (250 lb)   BMI 33.91 kg/m²     PHYSICAL EXAMINATION:    General appearance: Well appearing, in no acute distress, alert and oriented x3.  Psych:  Mood and affect appropriate.  Skin: Skin color, texture, turgor normal, no rashes or lesions, in both upper and lower body.  Head/face:  Normocephalic, atraumatic. No palpable lymph nodes.  Neck: No pain to palpation over the cervical paraspinous muscles. Spurling Negative. No pain with neck flexion, extension, or lateral flexion.   Cor: RRR  Pulm: CTA  GI:  Soft and non-tender.  Back: Straight leg raising in the sitting and supine positions is positive to radicular pain. moderate pain to palpation over the spine or costovertebral angles. Normal range of motion without pain reproduction.  Extremities: Peripheral joint ROM is full and pain free without obvious instability or laxity in all four extremities. No deformities, edema, or skin discoloration. Good capillary refill.  Musculoskeletal: Shoulder, hip, sacroiliac and knee provocative maneuvers are negative. Bilateral upper and lower extremity strength is normal and symmetric.  No atrophy or tone abnormalities are noted. Positive axial loading test bilateral. Positive tenderness over both SIJ, Positive FABERE,Ganselin and Yeoman's test on the both side.negative FADIR  Neuro: Bilateral upper and lower extremity coordination and muscle stretch reflexes are physiologic and symmetric.  Plantar response are downgoing. No loss of sensation is noted.  Gait: antalgic    ASSESSMENT: 57 y.o. year old male with low back pain, consistent with       Diagnosis:    1. Facet hypertrophy of lumbar " region     2. Lumbar spondylolysis     3. Facet arthropathy     4. Back pain, unspecified back location, unspecified back pain laterality, unspecified chronicity     5. DDD (degenerative disc disease), lumbar     6. Chronic pain syndrome           PLAN:     - I have stressed the importance of physical activity and a home exercise plan to help with pain and improve health.  - Patient can continue with medications for now since they are providing benefits, using them appropriately, and without side effects.  - Previous imaging was reviewed and discussed with the patient today.   -Discussed and explained in detail Lumbar RFA procedure.   - Schedule for a Diagnostic/Therapeutic Medial branch block at Left/ Right L 2, 3,4, and L5 to help with axial low back pain and progress with a home exercise program.  - I have explained the risks, benefits, and alternatives of the procedure in detail. The patient voices understanding and all questions have been answered. The patient agrees to proceed as planned. Written Consent obtained.   - Counseled patient regarding the importance of activity modification, constant sleeping habits and physical therapy.  -RTC 2-3 weeks following procedure.  -The above plan and management options were discussed at length with patient. Patient is in agreement with the above and verbalized understanding. Dr. Dennis   was consulted on this patient  and agrees with this plan.    Joseph Shafer, BRANDON-C  Interventional Pain Management      04/17/2018

## 2018-04-20 ENCOUNTER — TELEPHONE (OUTPATIENT)
Dept: PAIN MEDICINE | Facility: CLINIC | Age: 58
End: 2018-04-20

## 2018-04-20 NOTE — TELEPHONE ENCOUNTER
Left message for patient to contact the office regarding message left for the staff. No answer at this time.

## 2018-04-20 NOTE — TELEPHONE ENCOUNTER
----- Message from Lilo Faye sent at 4/20/2018 11:54 AM CDT -----  Contact: alondra            Name of Who is Calling: alondra      What is the request in detail: needs information regarding procedure on 4/24/18. Call pt      Can the clinic reply by [facility]: no      [facility]: [phone]

## 2018-04-23 NOTE — DISCHARGE INSTRUCTIONS
Home Care Instructions Pain Management:    1.  DIET:    You may resume your normal diet today.    2.  BATHING:    You may shower with luke warm water.    3.  DRESSING:    You may remove your bandage today.    4.  ACTIVITY LEVEL:      You may resume your normal activities 24 hours after your procedure.    5.  MEDICATIONS:    You may resume your normal medications today.    6.  SPECIAL INSTRUCTIONS:    No heat to the injection site for 24 hours including bath or shower, heating pad, moist heat or hot tubs.    Use an ice pack to the injection site for any pain or discomfort.  Apply ice packs for 20 minute intervals as needed.    If you have received any sedatives by mouth today, you can not drive for 12 hours.    If you have received sedation through an IV, you can not drive for 24 hours.    PLEASE CALL YOUR DOCTOR FOR THE FOLLOWIN.  Redness or swelling around the injection site.  2.  Fever of 101 degrees.  3.  Drainage (pus) from the injection site.  4.  For any continuous bleeding (some dried blood over the incision is normal.)    FOR EMERGENCIES:    If any unusual problems or difficulties occur during clinic hours, call [phone] or dial [phone].    Follow up with with your physician in 2-3 weeks.       Procedural Sedation  Procedural sedation is medicine to ease discomfort, pain, and anxiety during a procedure. The medicine is often given through an IV (intravenous) line in your arm or hand. In some cases, the medicine may be taken by mouth or inhaled. While you are under sedation, you will likely be awake. But you may not remember anything afterward.  Why procedural sedation is used  Sedation is used for many types of procedures. The goal is to reduce pain, anxiety, and stressful memories of a procedure. It can also help your healthcare provider treat you. For example, having a broken bone fixed may be easier if you feel relaxed.  Procedural sedation is used only for short, basic procedures. It is not used  for complex surgeries. Some procedures that use this type of sedation include:  · Dental surgery  · Breast biopsy, to take a sample of breast tissue  · Endoscopy, to look at gastrointestinal problems  · Bronchoscopy, to check for lung problems  · Bone or joint realignment, to fix a broken bone or dislocated joint  · Minor foot or skin surgery  · Electrical cardioversion, to restore a normal heart rhythm  · Lumbar puncture, to assess neurological disease  Risks of procedural sedation  Procedural sedation has some risks and possible side effects, such as:  · Headache  · Nausea and vomiting  · Unpleasant memory of the procedure  · Lowered rate of breathing  · Changes in heart rate and blood pressure (rare)  · Inhalation of stomach contents into your lungs (rare)  Side effects will likely go away shortly after the procedure. Your healthcare team will watch your heart rate and breathing during your sedation. This is to help prevent problems.  Your own risks may vary based on your age and your overall health. They also depend on the type of sedation you are given. Talk with your healthcare provider about the risks that apply most to you.  Getting ready for procedural sedation  Talk with your healthcare provider about how to get ready for your procedure. Tell him or her about all the medicines you take. This includes over-the-counter medicines such as ibuprofen. It also includes vitamins, herbs, and other supplements. You may need to stop taking some medicines before the procedure, such as blood thinners and aspirin. If you smoke, you should stop to lessen the chance of a lung issue. Talk with your healthcare provider if you need help to stop smoking.  Tell your healthcare provider if you:  · Have had any problems in the past with sedation or anesthesia  · Have had any recent changes in your health, such as an infection or fever  · Are pregnant or think you may be pregnant  Also be sure to:  · Ask a family member or friend  to take you home after the procedure. You cant drive on the day you receive sedation.  · Not eat or drink after midnight the night before your procedure, if advised.  · Not plan on making any important decisions, such as financial or legal, for the day after you receive the sedation.  · Follow all other instructions from your healthcare provider.  During your procedural sedation  You may have your procedure in a hospital or a medical clinic. Sedation is done by a trained healthcare provider. In general, you can expect the following:  · You will be given medicine through an IV line in your arm or hand. Or you may receive a shot. The medicine may also be given by mouth. Or you may inhale it through a mask.  · If you receive medicine through an IV, you may feel the effects very quickly. You will start to feel relaxed and drowsy.  · During the procedure, your heart rate, breathing, and blood pressure will be closely watched. Your breathing and blood pressure may decrease a little. But you will likely not need help with your breathing. You may receive a little extra oxygen through a mask or through some soft plastic prongs underneath your nose.  · You will probably be awake the entire time. If you do fall asleep, you should be easy to wake up, if needed. You should feel little or no pain.  · When your procedure is over, the sedative medicine will be stopped.  After your procedural sedation  You will begin to feel more awake and aware. But you will likely be drowsy for a while afterward. You will be closely watched as you become more alert. You may have a faint memory of the procedure. Or you may not remember it at all.  You should be able to return home within an hour or two after your procedure. Plan to have someone stay with you for a few hours. Side effects such as headache and nausea may go away quickly. Tell your healthcare provider if they continue.  Dont drive or make any important decisions for at least 24  hours. Be sure to follow all after-care instructions.     When to call your healthcare provider  Have someone call your healthcare provider right away if you have any of these:  · Drowsiness that gets worse  · Weakness or dizziness that gets worse  · Repeated vomiting  · You cant be awakened  · Severe or ongoing pain from the procedure, not relieved by the pain medicine   Date Last Reviewed: 2/1/2017  © 4541-1792 [employer]. [address] [place] [address], [place] [address]. All rights reserved. This information is not intended as a substitute for professional medical care. Always follow your healthcare professional's instructions.

## 2018-04-24 ENCOUNTER — HOSPITAL ENCOUNTER (OUTPATIENT)
Facility: HOSPITAL | Age: 58
Discharge: HOME | End: 2018-04-24
Attending: ANESTHESIOLOGY | Admitting: ANESTHESIOLOGY
Payer: MEDICARE

## 2018-04-24 ENCOUNTER — SURGERY (OUTPATIENT)
Age: 58
End: 2018-04-24

## 2018-04-24 VITALS
TEMPERATURE: 99 F | WEIGHT: 250 LBS | OXYGEN SATURATION: 96 % | DIASTOLIC BLOOD PRESSURE: 76 MMHG | SYSTOLIC BLOOD PRESSURE: 133 MMHG | RESPIRATION RATE: 16 BRPM | HEART RATE: 95 BPM | BODY MASS INDEX: 33.86 KG/M2 | HEIGHT: 72 IN

## 2018-04-24 DIAGNOSIS — G89.29 CHRONIC PAIN: ICD-10-CM

## 2018-04-24 DIAGNOSIS — M47.819 SPONDYLOSIS WITHOUT MYELOPATHY: ICD-10-CM

## 2018-04-24 DIAGNOSIS — M47.9 OSTEOARTHRITIS OF SPINE, UNSPECIFIED SPINAL OSTEOARTHRITIS COMPLICATION STATUS, UNSPECIFIED SPINAL REGION: Primary | ICD-10-CM

## 2018-04-24 PROCEDURE — 64493 INJ PARAVERT F JNT L/S 1 LEV: CPT | Mod: 50,,, | Performed by: ANESTHESIOLOGY

## 2018-04-24 PROCEDURE — 64494 INJ PARAVERT F JNT L/S 2 LEV: CPT | Mod: 50,,, | Performed by: ANESTHESIOLOGY

## 2018-04-24 PROCEDURE — 64494 INJ PARAVERT F JNT L/S 2 LEV: CPT | Mod: 50 | Performed by: ANESTHESIOLOGY

## 2018-04-24 PROCEDURE — 64495 INJ PARAVERT F JNT L/S 3 LEV: CPT | Mod: 50,,, | Performed by: ANESTHESIOLOGY

## 2018-04-24 PROCEDURE — 63600175 PHARM REV CODE 636 W HCPCS: Performed by: ANESTHESIOLOGY

## 2018-04-24 PROCEDURE — 64495 INJ PARAVERT F JNT L/S 3 LEV: CPT | Mod: 50 | Performed by: ANESTHESIOLOGY

## 2018-04-24 PROCEDURE — 64493 INJ PARAVERT F JNT L/S 1 LEV: CPT | Mod: 50 | Performed by: ANESTHESIOLOGY

## 2018-04-24 PROCEDURE — 25000003 PHARM REV CODE 250: Performed by: ANESTHESIOLOGY

## 2018-04-24 RX ORDER — SODIUM CHLORIDE 9 MG/ML
500 INJECTION, SOLUTION INTRAVENOUS CONTINUOUS
Status: DISCONTINUED | OUTPATIENT
Start: 2018-04-24 | End: 2018-04-24 | Stop reason: HOSPADM

## 2018-04-24 RX ORDER — LIDOCAINE HYDROCHLORIDE 10 MG/ML
INJECTION, SOLUTION EPIDURAL; INFILTRATION; INTRACAUDAL; PERINEURAL
Status: DISCONTINUED | OUTPATIENT
Start: 2018-04-24 | End: 2018-04-24 | Stop reason: HOSPADM

## 2018-04-24 RX ORDER — BUPIVACAINE HYDROCHLORIDE 2.5 MG/ML
INJECTION, SOLUTION EPIDURAL; INFILTRATION; INTRACAUDAL
Status: DISCONTINUED | OUTPATIENT
Start: 2018-04-24 | End: 2018-04-24 | Stop reason: HOSPADM

## 2018-04-24 RX ORDER — MIDAZOLAM HYDROCHLORIDE 1 MG/ML
INJECTION, SOLUTION INTRAMUSCULAR; INTRAVENOUS
Status: DISCONTINUED | OUTPATIENT
Start: 2018-04-24 | End: 2018-04-24 | Stop reason: HOSPADM

## 2018-04-24 RX ADMIN — LIDOCAINE HYDROCHLORIDE 5 ML: 10 INJECTION, SOLUTION EPIDURAL; INFILTRATION; INTRACAUDAL; PERINEURAL at 11:04

## 2018-04-24 RX ADMIN — BUPIVACAINE HYDROCHLORIDE 5 ML: 2.5 INJECTION, SOLUTION EPIDURAL; INFILTRATION; INTRACAUDAL; PERINEURAL at 11:04

## 2018-04-24 RX ADMIN — MIDAZOLAM 2 MG: 1 INJECTION INTRAMUSCULAR; INTRAVENOUS at 11:04

## 2018-04-24 NOTE — PROGRESS NOTES
Discharge instructions reviewed with patient. Verbalized understanding, no questions at this time. IV d/c'd with tip intact. Procedure sites are CDI. VSS. No acute distress noted. Staff at bedside to help pt change. Wheeled to DC are by staff. Home with family in private vehicle.

## 2018-04-24 NOTE — OP NOTE
"Patient Name: Fabiano Malik Jr.  MRN: [id number]    INFORMED CONSENT: The procedure, risks, benefits and options were discussed with patient. There are no contraindications to the procedure. The patient expressed understanding and agreed to proceed. The personnel performing the procedure was discussed. I verify that I personally obtained Fabiano's consent prior to the start of the procedure and the signed consent can be found on the patient's chart.    Procedure Date: 04/24/2018    Anesthesia: Topical    Pre Procedure diagnosis: Facet arthropathy, lumbar [M46.96]  Facet hypertrophy of lumbar region [M47.896]  1. Osteoarthritis of spine, unspecified spinal osteoarthritis complication status, unspecified spinal region    2. Spondylosis without myelopathy    3. Chronic pain      Post-Procedure diagnosis: SAME      Moderate Sedation:  no- Midazolam 2 mg single dose        PROCEDURE: BILATERAL L2-3-4-5 LUMBAR FACET MEDIAL BRANCH NERVE BLOCK          DESCRIPTION OF PROCEDURE:The patient was brought to the procedure room.  After performing time out IV access was obtained prior to the procedure. The patient was positioned prone on the fluoroscopy table. Continuous hemodynamic monitoring was initiated including blood pressure, EKG, and pulse oximetry. The area of the lumbar spine was prepped chlorhexidine and draped into a sterile field. Fluoroscopy was used to identify the location of the bilateral side L2, L3, L4, and L5 medial branch nerves at the junctions of the superior articular process and the transverse processes of L3, L4, L5, and the sacral ala respectively. Skin anesthesia was achieved using 5 cc of Lidocaine 1% over the injection sites. A 22 gauge, 3 1/2" spinal needle was slowly inserted at each level using AP, lateral and oblique fluoroscopic imaging. Negative aspiration for blood or CSF was confirmed. .  8 ml bupivacaine 0.25%was injected at all sites.. The needles were removed and bleeding was nil. A " sterile dressing was applied. No specimens collected. Fabiano was taken back to the recovery room for further observation.     Blood Loss: Nill  Specimen: None        Discharge Diagnosis: Same as Pre and Post Procedure  Condition on Discharge: Stable.  Diet on Discharge: Same as before.  Activity: as per instruction sheet.  Discharge to: Home with a responsible adult.  Follow up: as per Discharge instructions        Donavon Dennis MD

## 2018-04-24 NOTE — H&P (VIEW-ONLY)
Chronic patient Established Note (Follow up visit)      SUBJECTIVE:    Fabiano Malik Jr. presents to the clinic for a follow-up appointment for low back pain. He is S/P bilateral L2-3-4-5 LUMBAR FACET MEDIAL BRANCH NERVE BLOCK on 18 with 75% relief for 1-2 months, discussed that we will repeat bilateral L2-3-4-5 MBB and in the future I will consider Radiofrequency rhizotomy for longer pain relief. RFA has shown to be helpful for up to 6 to 18 months.  Since the last visit, Fabiano Malik Jr. states the pain has been worsening. Current pain intensity is 10/10.    Pain Disability Index Review:  Last 3 PDI Scores 2018   Pain Disability Index (PDI) 56 61 68          Pain Medications:     - Opioids: None  - Adjuvant Medications: Cymbalta ( Duloxetine) and Lyrica ( Pregabalin)  - Anti-Coagulants: None  - Others: See med list     Opioid Contract: no      report:  Reviewed and consistent with medication use as prescribed.     Pain Procedures: 18 bilateral L2-3-4-5 LUMBAR FACET MEDIAL BRANCH NERVE BLOCK   18 TRIGGER POINT INJECTION -75% relief  Injection with Dr. McMayne ([facility])     Physical Therapy/Home Exercise: no     Imagin18 X-Ray Hips Bilateral 2 View Incl AP Pelvis     Narrative      The AP pelvis, AP and lateral views both hips, 5 total views.  Dextroscoliosis, pelvis left hemipelvis.  Minimal mild DJD hip joints.  No fracture dislocation.   Impression       See results above.      Electronically signed by: NICKY DOYLE MD  Date: 18  Time: 15:08     Encounter      View Encounter          Signed by      Signed Credentials Date/Time   Phone Pager   HALLE DOYLE MD 2018 15:08 [phone]   Reviewed By      Donavon Dennis MD on 2018 15:56   Exam Details      Performed Procedure Technologist Supporting Staff Performing Physician   X-Ray Hips Bilateral 2 View Inc AP Pelvis Alex Thao           Appointment  Date/Status Modality Department     1/18/2018     Completed [facility] XR1 [facility] XRAY OP         Begin Exam End Exam   End Exam Questionnaires   1/18/2018  2:27 PM 1/18/2018  2:53 PM   IMAGING END ALL       Reason For Exam   Priority: Routine   Dx: Myositis, unspecified myositis type, unspecified site [M60.9 (ICD-10-CM)]; DDD (degenerative disc disease), lumbar [M51.36 (ICD-10-CM)]; DDD (degenerative disc disease), cervical [M50.30 (ICD-10-CM)]; Radiculopathy of thoracolumbar region [M54.15 (ICD-10-CM)]   Order Report       Order Details      1/18/18 X-Ray Cervical Spine Complete 5 view     Narrative      Cervical spine with obliques.  There is fusion of the C4 and C5 vertebral bodies.  Prominent osteophytes C3-C7.  Mild neural foraminal narrowing on the right at the C5-6 level.  The left oblique is suboptimal in position making it difficult to evaluate the neural foramen.   Impression       See above      Electronically signed by: TAMMY BALL MD  Date: 01/18/18  Time: 15:49     Encounter      View Encounter          Signed by      Signed Credentials Date/Time   Phone Pager   TAMMY BALL JR, MD 1/18/2018 15:49 [phone]   Reviewed By      Donavon Dennis MD on 1/18/2018 15:55   Exam Details      Performed Procedure Technologist Supporting Staff Performing Physician   X-Ray Cervical Spine Complete 5 view Alex Thao           Appointment Date/Status Modality Department     1/18/2018     Completed [facility] XR1 [facility] XRAY OP         Begin Exam End Exam   End Exam Questionnaires   1/18/2018  2:27 PM 1/18/2018  2:53 PM   IMAGING END ALL       Reason For Exam   Priority: Routine   Dx: Myositis, unspecified myositis type, unspecified site [M60.9 (ICD-10-CM)]; DDD (degenerative disc disease), lumbar [M51.36 (ICD-10-CM)]; DDD (degenerative disc disease), cervical [M50.30 (ICD-10-CM)]; Radiculopathy of thoracolumbar region [M54.15 (ICD-10-CM)]   Order Report       Order Details      1/18/18 X-Ray Lumbar Spine  Complete 5 View     Narrative      Lumbar spine 5 views, mild dextroscoliosis, bridging osteophytes lower dorsal lumbar spine,, straightening thoracic lumbar relationship on lateral view.  No fracture subluxation.   Impression       Moderate degenerative disc spondylosis facet arthropathy.      Electronically signed by: NICKY DOYLE MD  Date: 01/18/18  Time: 15:07     Encounter      View Encounter          Signed by      Signed Credentials Date/Time   Phone Pager   HALLE DOYLE MD 1/18/2018 15:07 [phone]   Reviewed By      Donavon Dennis MD on 1/18/2018 15:56   Exam Details      Performed Procedure Technologist Supporting Staff Performing Physician   X-Ray Lumbar Spine Complete 5 View Alex Thao           Appointment Date/Status Modality Department     1/18/2018     Completed [facility] XR1 [facility] XRAY OP         Begin Exam End Exam   End Exam Questionnaires   1/18/2018  2:27 PM 1/18/2018  2:52 PM   IMAGING END ALL       Reason For Exam   Priority: Routine   Dx: Myositis, unspecified myositis type, unspecified site [M60.9 (ICD-10-CM)]; DDD (degenerative disc disease), lumbar [M51.36 (ICD-10-CM)]; DDD (degenerative disc disease), cervical [M50.30 (ICD-10-CM)]; Radiculopathy of thoracolumbar region [M54.15 (ICD-10-CM)]   Order Report       Order     Allergies:   Review of patient's allergies indicates:   Allergen Reactions    Pcn [penicillins] Anaphylaxis       Current Medications:   Current Outpatient Prescriptions   Medication Sig Dispense Refill    benazepril (LOTENSIN) 10 MG tablet       clonazePAM (KLONOPIN) 0.5 MG tablet Take 0.5 mg by mouth 2 (two) times daily as needed for Anxiety.      clonazePAM (KLONOPIN) 1 MG tablet TK 1 T PO TID  3    DULoxetine (CYMBALTA) 60 MG capsule TK 2 CS PO QAM  1    FLUVIRIN 1338-8940 45 mcg (15 mcg x 3)/0.5 mL Susp ADM 0.5ML IM UTD  0    metformin (GLUCOPHAGE) 1000 MG tablet Take 1,000 mg by mouth 2 (two) times daily with meals.      nabumetone  (RELAFEN) 750 MG tablet Take 750 mg by mouth 2 (two) times daily.      omeprazole (PRILOSEC) 20 MG capsule Take 20 mg by mouth once daily.      pravastatin (PRAVACHOL) 40 MG tablet Take 40 mg by mouth once daily.      QUEtiapine (SEROQUEL) 400 MG tablet       fluoxetine (PROZAC) 20 MG capsule Take 1 capsule (20 mg total) by mouth once daily. Take 1/2 table for 3 days then take 1 tablet by mouth daily starting on 7/30/16 30 capsule 11    pregabalin (LYRICA) 200 MG Cap Take 1 capsule (200 mg total) by mouth 2 (two) times daily. 60 capsule 0     No current facility-administered medications for this visit.        REVIEW OF SYSTEMS:    GENERAL:  No weight loss, malaise or fevers.  HEENT:  Negative for frequent or significant headaches.  NECK:  Negative for lumps, goiter, pain and significant neck swelling.  RESPIRATORY:  Negative for cough, wheezing or shortness of breath.  CARDIOVASCULAR:  Negative for chest pain, leg swelling or palpitations.  GI:  Negative for abdominal discomfort, blood in stools or black stools or change in bowel habits.  MUSCULOSKELETAL:  See HPI.  SKIN:  Negative for lesions, rash, and itching.  PSYCH:  + for sleep disturbance, mood disorder and recent psychosocial stressors.  HEMATOLOGY/LYMPHOLOGY:  Negative for prolonged bleeding, bruising easily or swollen nodes.  NEURO:   No history of headaches, syncope, paralysis, seizures or tremors.  All other reviewed and negative other than HPI.    Past Medical History:  Past Medical History:   Diagnosis Date    Chronic back pain greater than 3 months duration     Depression     Diabetes mellitus     Hypertension     Schizophrenia        Past Surgical History:  Past Surgical History:   Procedure Laterality Date    APPENDECTOMY      NECK SURGERY         Family History:  No family history on file.    Social History:  Social History     Social History    Marital status:      Spouse name: N/A    Number of children: N/A    Years of  "education: N/A     Social History Main Topics    Smoking status: Never Smoker    Smokeless tobacco: None    Alcohol use Yes      Comment: "couple of beers a day"    Drug use: No    Sexual activity: Not Asked     Other Topics Concern    None     Social History Narrative    None       OBJECTIVE:    /84   Pulse 94   Wt 113.4 kg (250 lb)   BMI 33.91 kg/m²     PHYSICAL EXAMINATION:    General appearance: Well appearing, in no acute distress, alert and oriented x3.  Psych:  Mood and affect appropriate.  Skin: Skin color, texture, turgor normal, no rashes or lesions, in both upper and lower body.  Head/face:  Normocephalic, atraumatic. No palpable lymph nodes.  Neck: No pain to palpation over the cervical paraspinous muscles. Spurling Negative. No pain with neck flexion, extension, or lateral flexion.   Cor: RRR  Pulm: CTA  GI:  Soft and non-tender.  Back: Straight leg raising in the sitting and supine positions is positive to radicular pain. moderate pain to palpation over the spine or costovertebral angles. Normal range of motion without pain reproduction.  Extremities: Peripheral joint ROM is full and pain free without obvious instability or laxity in all four extremities. No deformities, edema, or skin discoloration. Good capillary refill.  Musculoskeletal: Shoulder, hip, sacroiliac and knee provocative maneuvers are negative. Bilateral upper and lower extremity strength is normal and symmetric.  No atrophy or tone abnormalities are noted. Positive axial loading test bilateral. Positive tenderness over both SIJ, Positive FABERE,Ganselin and Yeoman's test on the both side.negative FADIR  Neuro: Bilateral upper and lower extremity coordination and muscle stretch reflexes are physiologic and symmetric.  Plantar response are downgoing. No loss of sensation is noted.  Gait: antalgic    ASSESSMENT: 57 y.o. year old male with low back pain, consistent with       Diagnosis:    1. Facet hypertrophy of lumbar " region     2. Lumbar spondylolysis     3. Facet arthropathy     4. Back pain, unspecified back location, unspecified back pain laterality, unspecified chronicity     5. DDD (degenerative disc disease), lumbar     6. Chronic pain syndrome           PLAN:     - I have stressed the importance of physical activity and a home exercise plan to help with pain and improve health.  - Patient can continue with medications for now since they are providing benefits, using them appropriately, and without side effects.  - Previous imaging was reviewed and discussed with the patient today.   -Discussed and explained in detail Lumbar RFA procedure.   - Schedule for a Diagnostic/Therapeutic Medial branch block at Left/ Right L 2, 3,4, and L5 to help with axial low back pain and progress with a home exercise program.  - I have explained the risks, benefits, and alternatives of the procedure in detail. The patient voices understanding and all questions have been answered. The patient agrees to proceed as planned. Written Consent obtained.   - Counseled patient regarding the importance of activity modification, constant sleeping habits and physical therapy.  -RTC 2-3 weeks following procedure.  -The above plan and management options were discussed at length with patient. Patient is in agreement with the above and verbalized understanding. Dr. Dennis   was consulted on this patient  and agrees with this plan.    Joseph Shafer, BRANDON-C  Interventional Pain Management      04/17/2018

## 2018-04-25 LAB — POCT GLUCOSE: 240 MG/DL (ref 70–110)

## 2018-05-08 ENCOUNTER — OFFICE VISIT (OUTPATIENT)
Dept: PAIN MEDICINE | Facility: CLINIC | Age: 58
End: 2018-05-08
Payer: MEDICARE

## 2018-05-08 VITALS
DIASTOLIC BLOOD PRESSURE: 85 MMHG | BODY MASS INDEX: 34.09 KG/M2 | HEART RATE: 101 BPM | WEIGHT: 251.31 LBS | SYSTOLIC BLOOD PRESSURE: 134 MMHG

## 2018-05-08 DIAGNOSIS — M47.9 OSTEOARTHRITIS OF SPINE, UNSPECIFIED SPINAL OSTEOARTHRITIS COMPLICATION STATUS, UNSPECIFIED SPINAL REGION: Primary | ICD-10-CM

## 2018-05-08 DIAGNOSIS — G89.4 CHRONIC PAIN SYNDROME: ICD-10-CM

## 2018-05-08 DIAGNOSIS — M47.819 FACET ARTHROPATHY: ICD-10-CM

## 2018-05-08 DIAGNOSIS — M47.816 FACET HYPERTROPHY OF LUMBAR REGION: ICD-10-CM

## 2018-05-08 DIAGNOSIS — M54.9 BACK PAIN, UNSPECIFIED BACK LOCATION, UNSPECIFIED BACK PAIN LATERALITY, UNSPECIFIED CHRONICITY: ICD-10-CM

## 2018-05-08 DIAGNOSIS — M47.819 SPONDYLOSIS WITHOUT MYELOPATHY: ICD-10-CM

## 2018-05-08 PROCEDURE — 3008F BODY MASS INDEX DOCD: CPT | Mod: CPTII,S$GLB,, | Performed by: NURSE PRACTITIONER

## 2018-05-08 PROCEDURE — 3075F SYST BP GE 130 - 139MM HG: CPT | Mod: CPTII,S$GLB,, | Performed by: NURSE PRACTITIONER

## 2018-05-08 PROCEDURE — 99213 OFFICE O/P EST LOW 20 MIN: CPT | Mod: S$GLB,,, | Performed by: NURSE PRACTITIONER

## 2018-05-08 PROCEDURE — 99999 PR PBB SHADOW E&M-EST. PATIENT-LVL III: CPT | Mod: PBBFAC,,, | Performed by: NURSE PRACTITIONER

## 2018-05-08 PROCEDURE — 3079F DIAST BP 80-89 MM HG: CPT | Mod: CPTII,S$GLB,, | Performed by: NURSE PRACTITIONER

## 2018-05-08 NOTE — PROGRESS NOTES
Chronic patient Established Note (Follow up visit)      SUBJECTIVE:    Fabiano Malik Jr. presents to the clinic for a 2 wk follow-up appointment for low back pain. He is S/P BILATERAL L2-3-4-5 LUMBAR FACET MEDIAL BRANCH NERVE BLOCK on 18 with 75% relief for 3 days, this will be his second Lumbar MBB with 70-80% relief for an average of 7 days or more. I will s/f right then left Lumbar RFA @ L2-3-4-5 for axial low back pain.  Previous imaging was reviewed and discussed with the patient today.   Since the last visit, Fabiano Malik Jr. states the pain has been persistant. Current pain intensity is 10/10.    Pain Disability Index Review:  Last 3 PDI Scores 2018   Pain Disability Index (PDI) 60 56 61       Pain Medications:     - Opioids: None  - Adjuvant Medications: Cymbalta ( Duloxetine) and Lyrica ( Pregabalin)  - Anti-Coagulants: None  - Others: See med list     Opioid Contract: no      report:  Reviewed and consistent with medication use as prescribed.     Pain Procedures: 18 bilateral L2-3-4-5 LUMBAR FACET MEDIAL BRANCH NERVE BLOCK   18 TRIGGER POINT INJECTION -75% relief  Injection with Dr. McMayne ([facility])     Physical Therapy/Home Exercise: no     Imagin18 X-Ray Hips Bilateral 2 View Incl AP Pelvis     Narrative      The AP pelvis, AP and lateral views both hips, 5 total views.  Dextroscoliosis, pelvis left hemipelvis.  Minimal mild DJD hip joints.  No fracture dislocation.   Impression       See results above.      Electronically signed by: NICKY DOYLE MD  Date: 18  Time: 15:08     Encounter      View Encounter          Signed by      Signed Credentials Date/Time   Phone Pager   HALLE DOYLE MD 2018 15:08 [phone]   Reviewed By      Donavon Dennis MD on 2018 15:56   Exam Details      Performed Procedure Technologist Supporting Staff Performing Physician   X-Ray Hips Bilateral 2 View Inc AP  Pelvis Alex Thao           Appointment Date/Status Modality Department     1/18/2018     Completed [facility] XR1 [facility] XRAY OP         Begin Exam End Exam   End Exam Questionnaires   1/18/2018  2:27 PM 1/18/2018  2:53 PM   IMAGING END ALL       Reason For Exam   Priority: Routine   Dx: Myositis, unspecified myositis type, unspecified site [M60.9 (ICD-10-CM)]; DDD (degenerative disc disease), lumbar [M51.36 (ICD-10-CM)]; DDD (degenerative disc disease), cervical [M50.30 (ICD-10-CM)]; Radiculopathy of thoracolumbar region [M54.15 (ICD-10-CM)]   Order Report       Order Details      1/18/18 X-Ray Cervical Spine Complete 5 view     Narrative      Cervical spine with obliques.  There is fusion of the C4 and C5 vertebral bodies.  Prominent osteophytes C3-C7.  Mild neural foraminal narrowing on the right at the C5-6 level.  The left oblique is suboptimal in position making it difficult to evaluate the neural foramen.   Impression       See above      Electronically signed by: TAMMY BALL MD  Date: 01/18/18  Time: 15:49     Encounter      View Encounter          Signed by      Signed Credentials Date/Time   Phone Pager   TAMMY BALL JR, MD 1/18/2018 15:49 [phone]   Reviewed By      Donavon Dennis MD on 1/18/2018 15:55   Exam Details      Performed Procedure Technologist Supporting Staff Performing Physician   X-Ray Cervical Spine Complete 5 view Alex Thao           Appointment Date/Status Modality Department     1/18/2018     Completed [facility] XR1 [facility] XRAY OP         Begin Exam End Exam   End Exam Questionnaires   1/18/2018  2:27 PM 1/18/2018  2:53 PM   IMAGING END ALL       Reason For Exam   Priority: Routine   Dx: Myositis, unspecified myositis type, unspecified site [M60.9 (ICD-10-CM)]; DDD (degenerative disc disease), lumbar [M51.36 (ICD-10-CM)]; DDD (degenerative disc disease), cervical [M50.30 (ICD-10-CM)]; Radiculopathy of thoracolumbar region [M54.15 (ICD-10-CM)]   Order Report       Order  Details      1/18/18 X-Ray Lumbar Spine Complete 5 View     Narrative      Lumbar spine 5 views, mild dextroscoliosis, bridging osteophytes lower dorsal lumbar spine,, straightening thoracic lumbar relationship on lateral view.  No fracture subluxation.   Impression       Moderate degenerative disc spondylosis facet arthropathy.      Electronically signed by: NICKY DOYLE MD  Date: 01/18/18  Time: 15:07     Encounter      View Encounter          Signed by      Signed Credentials Date/Time   Phone Pager   HALLE DOYLE MD 1/18/2018 15:07 [phone]   Reviewed By      Donavon Dennis MD on 1/18/2018 15:56   Exam Details      Performed Procedure Technologist Supporting Staff Performing Physician   X-Ray Lumbar Spine Complete 5 View Alex Thao           Appointment Date/Status Modality Department     1/18/2018     Completed [facility] XR1 [facility] XRAY OP         Begin Exam End Exam   End Exam Questionnaires   1/18/2018  2:27 PM 1/18/2018  2:52 PM   IMAGING END ALL       Reason For Exam   Priority: Routine   Dx: Myositis, unspecified myositis type, unspecified site [M60.9 (ICD-10-CM)]; DDD (degenerative disc disease), lumbar [M51.36 (ICD-10-CM)]; DDD (degenerative disc disease), cervical [M50.30 (ICD-10-CM)]; Radiculopathy of thoracolumbar region [M54.15 (ICD-10-CM)]   Order Report       Order     Allergies:   Review of patient's allergies indicates:   Allergen Reactions    Pcn [penicillins] Anaphylaxis       Current Medications:   Current Outpatient Prescriptions   Medication Sig Dispense Refill    benazepril (LOTENSIN) 10 MG tablet       clonazePAM (KLONOPIN) 0.5 MG tablet Take 0.5 mg by mouth 2 (two) times daily as needed for Anxiety.      clonazePAM (KLONOPIN) 1 MG tablet TK 1 T PO TID  3    DULoxetine (CYMBALTA) 60 MG capsule TK 2 CS PO QAM  1    FLUVIRIN 3805-1045 45 mcg (15 mcg x 3)/0.5 mL Susp ADM 0.5ML IM UTD  0    metformin (GLUCOPHAGE) 1000 MG tablet Take 1,000 mg by mouth 2 (two)  times daily with meals.      nabumetone (RELAFEN) 750 MG tablet Take 750 mg by mouth 2 (two) times daily.      omeprazole (PRILOSEC) 20 MG capsule Take 20 mg by mouth once daily.      pravastatin (PRAVACHOL) 40 MG tablet Take 40 mg by mouth once daily.      pregabalin (LYRICA) 200 MG Cap Take 1 capsule (200 mg total) by mouth 2 (two) times daily. 60 capsule 0    QUEtiapine (SEROQUEL) 400 MG tablet       fluoxetine (PROZAC) 20 MG capsule Take 1 capsule (20 mg total) by mouth once daily. Take 1/2 table for 3 days then take 1 tablet by mouth daily starting on 7/30/16 30 capsule 11     No current facility-administered medications for this visit.        REVIEW OF SYSTEMS:    GENERAL:  No weight loss, malaise or fevers.  HEENT:  Negative for frequent or significant headaches.  NECK:  Negative for lumps, goiter, pain and significant neck swelling.  RESPIRATORY:  Negative for cough, wheezing or shortness of breath.  CARDIOVASCULAR:  Negative for chest pain, leg swelling or palpitations.  GI:  Negative for abdominal discomfort, blood in stools or black stools or change in bowel habits.  MUSCULOSKELETAL:  See HPI.  SKIN:  Negative for lesions, rash, and itching.  PSYCH:  + for sleep disturbance, mood disorder and recent psychosocial stressors.  HEMATOLOGY/LYMPHOLOGY:  Negative for prolonged bleeding, bruising easily or swollen nodes.  NEURO:   No history of headaches, syncope, paralysis, seizures or tremors.  All other reviewed and negative other than HPI.    Past Medical History:  Past Medical History:   Diagnosis Date    Chronic back pain greater than 3 months duration     Depression     Diabetes mellitus     Hypertension     Schizophrenia        Past Surgical History:  Past Surgical History:   Procedure Laterality Date    APPENDECTOMY      NECK SURGERY         Family History:  No family history on file.    Social History:  Social History     Social History    Marital status:      Spouse name: N/A     "Number of children: N/A    Years of education: N/A     Social History Main Topics    Smoking status: Never Smoker    Smokeless tobacco: None    Alcohol use Yes      Comment: "couple of beers a day"    Drug use: No    Sexual activity: Not Asked     Other Topics Concern    None     Social History Narrative    None       OBJECTIVE:    /85   Pulse 101   Wt 114 kg (251 lb 5.2 oz)   BMI 34.09 kg/m²     PHYSICAL EXAMINATION:    General appearance: Well appearing, in no acute distress, alert and oriented x3.  Psych:  Mood and affect appropriate.  Skin: Skin color, texture, turgor normal, no rashes or lesions, in both upper and lower body.  Head/face:  Normocephalic, atraumatic. No palpable lymph nodes.  Neck: No pain to palpation over the cervical paraspinous muscles. Spurling Negative. No pain with neck flexion, extension, or lateral flexion.   Cor: RRR  Pulm: CTA  GI:  Soft and non-tender.  Back: Straight leg raising in the sitting and supine positions is positive to radicular pain. moderate pain to palpation over the spine or costovertebral angles. Normal range of motion without pain reproduction.  Extremities: Peripheral joint ROM is full and pain free without obvious instability or laxity in all four extremities. No deformities, edema, or skin discoloration. Good capillary refill.  Musculoskeletal: Shoulder, hip, sacroiliac and knee provocative maneuvers are negative. Bilateral upper and lower extremity strength is normal and symmetric.  No atrophy or tone abnormalities are noted. Positive axial loading test bilateral. Positive tenderness over both SIJ, Positive FABERE,Ganselin and Yeoman's test on the both side.negative FADIR  Neuro: Bilateral upper and lower extremity coordination and muscle stretch reflexes are physiologic and symmetric.  Plantar response are downgoing. No loss of sensation is noted.  Gait: antalgic    ASSESSMENT: 57 y.o. year old male with low back pain, consistent with     "   Diagnosis:    1. Osteoarthritis of spine, unspecified spinal osteoarthritis complication status, unspecified spinal region     2. Spondylosis without myelopathy     3. Chronic pain syndrome     4. Facet hypertrophy of lumbar region     5. Facet arthropathy     6. Back pain, unspecified back location, unspecified back pain laterality, unspecified chronicity           PLAN:     - I have stressed the importance of physical activity and a home exercise plan to help with pain and improve health.  - Patient can continue with medications for now since they are providing benefits, using them appropriately, and without side effects.  - Previous imaging was reviewed and discussed with the patient today.   - Counseled patient regarding the importance of activity modification, constant sleeping habits and physical therapy.  - Schedule for a  RIGHT then LEFT Radiofrequency ablation of the medial branches at L 2, 3,4, and L5 for longer pain relief.  - I have explained the risks, benefits, and alternatives of the procedure in detail. The patient voices understanding and all questions have been answered. The patient agrees to proceed as planned. Written Consent obtained.  -RTC 2-3 weeks following procedure.  -The above plan and management options were discussed at length with patient. Patient is in agreement with the above and verbalized understanding. Dr. Dennis   was consulted on this patient  and agrees with this plan.    TY SwansonC  Interventional Pain Management      05/08/2018

## 2018-05-16 ENCOUNTER — OFFICE VISIT (OUTPATIENT)
Dept: NEUROLOGY | Facility: HOSPITAL | Age: 58
End: 2018-05-16
Attending: INTERNAL MEDICINE
Payer: MEDICARE

## 2018-05-16 VITALS
HEART RATE: 97 BPM | HEIGHT: 72 IN | BODY MASS INDEX: 34.5 KG/M2 | SYSTOLIC BLOOD PRESSURE: 118 MMHG | WEIGHT: 254.75 LBS | DIASTOLIC BLOOD PRESSURE: 81 MMHG | TEMPERATURE: 99 F

## 2018-05-16 DIAGNOSIS — Z12.11 ENCOUNTER FOR SCREENING COLONOSCOPY: Primary | ICD-10-CM

## 2018-05-16 PROCEDURE — 99213 OFFICE O/P EST LOW 20 MIN: CPT | Performed by: INTERNAL MEDICINE

## 2018-05-16 RX ORDER — POLYETHYLENE GLYCOL 3350, SODIUM SULFATE ANHYDROUS, SODIUM BICARBONATE, SODIUM CHLORIDE, POTASSIUM CHLORIDE 236; 22.74; 6.74; 5.86; 2.97 G/4L; G/4L; G/4L; G/4L; G/4L
4 POWDER, FOR SOLUTION ORAL ONCE
Qty: 4000 ML | Refills: 0 | Status: SHIPPED | OUTPATIENT
Start: 2018-05-16 | End: 2018-05-16

## 2018-05-16 RX ORDER — MELOXICAM 15 MG/1
TABLET ORAL
Refills: 2 | COMMUNITY
Start: 2018-02-07 | End: 2018-06-26 | Stop reason: SDUPTHER

## 2018-05-16 NOTE — PROGRESS NOTES
[facility]    CC: screening colonoscopy    HPI 57 y.o. male with past medical history of HTN who presents for evaluation for a screening colonoscopy.  He denies melena, hematochezia and changes in his bowel patterns.  No family history of colon cancer.  He has never had a colonoscopy before.        Past Medical History:   Diagnosis Date    Chronic back pain greater than 3 months duration     Depression     Diabetes mellitus     Hypertension     Schizophrenia          Review of Systems  General ROS: negative for chills, fever or weight loss  Cardiovascular ROS: no chest pain or dyspnea on exertion  Gastrointestinal ROS: no abdominal pain, change in bowel habits, or black/ bloody stools    Physical Examination  /81   Pulse 97   Temp 99.2 °F (37.3 °C) (Oral)   Ht 6' (1.829 m)   Wt 115.5 kg (254 lb 11.9 oz)   BMI 34.55 kg/m²   General appearance: alert, cooperative, no distress  HENT: Normocephalic, atraumatic, neck symmetrical, no nasal discharge   Lungs: clear to auscultation bilaterally, no dullness to percussion bilaterally  Heart: regular rate and rhythm without rub; no displacement of the PMI   Abdomen: soft, non-tender; bowel sounds normoactive; no organomegaly  Extremities: extremities symmetric; no clubbing, cyanosis, or edema  Neurologic: Alert and oriented X 3, normal strength, normal coordination and gait    Labs:  H/H 11.9/34.8  Plt 158      Assessment: The patient is a 58 yo man with past medical history of HTN who presents today for evaluation for a screening colonoscopy.    Plan:  Scheduled screening colonoscopy for May 31, 2018    A prescription for bowel prep was sent to his pharmacy.    Patient of Dr. Hernesto Hatch MD   [address] [place] [address], [address]   [place] [address] [phone]

## 2018-05-16 NOTE — PATIENT INSTRUCTIONS
Nulytely Colonoscopy Prep Instructions    [facility] [place]   [address], [place] [address]  [phone]      PATIENT NAME:        Fabiano Malik          PROCEDURE DAY: Thursday, May 31, 2018    *Your procedure time many change.  The hospital will contact you the day prior to   the procedure to confirm your procedure time and arrival.       CLEAR LIQUID DIET (START THE DAY BEFORE PROCEDURE): Wednesday, May 30, 2018      Clear Liquid Diet means any liquid from the list below that is not red or purple in color:   Gatorade, Jose-Aid, Lemonade (Yellow ONLY)-Gatorade is the preferred liquid   Tea (no milk or dairy)   Carbonated beverages (soft drink), regular or diet   Apple juice, white grape juice, white cranberry juice   Jell-O (orange, lemon, or lime flavors ONLY)   Clear, fat-free, beef or chicken broth   Bouillon, clear consommé   Snowball, Popsicles (NOT red or purple)  * No Solid Food or Alcohol     ITEMS TO BE PURCHASED FOR PREP (Nu-Lytely requires a prescription):         Nu-Lytely preparation solution.          Gas tablets (Gas-X, Mylanta Gas, Simethicone)    BOWEL PREP INSTRUCTIONS THE DAY BEFORE THE EXAM:Wednesday, May 30, 2018  1. Drink only clear liquids (see the above diet) all day. Gatorade is the best liquid.   Drink an extra 8 ounces of clear liquid every hour from 11am to 5pm.         2.   At 6pm, mix Nu-Lytely powder according to the directions on the container and               drink 8 ounces of solution every 10 minutes until about half of the solution is                consumed. Place the remainder of the solution in the refrigerator.         3.   At 9pm, take two gas tablets with 8 ounces of clear liquid.        4.   At 10pm, take two gas tablets with 8 ounces of clear liquid.      THE DAY OF THE EXAM: Thursday, May 31, 2018        1.  Beginning 5 hours before your procedure time, drink the remaining half of              Nu-Lytely solution. Drink 8 ounces  of solution every 10 minutes until the solution              is gone.              *If your procedure is scheduled for the early morning, you will need to get up in               the middle of the night to take this dose of preparation. The correct timing of this               dose is essential to an effective preparation. If you do not take this dose, your               exam may be incomplete and need to be repeated.         2.  Have nothing to eat or drink for 3 hours before the procedure.         3.  Take your morning medications, if any, with a small sip of water.         4.  Bring someone to drive you home (you should not drive for 12 hrs after the exam)        5.  Report to Admitting, 1st floor hospital entrance 2 hours before procedure time.       If you are diabetic, do not take insulin or oral medications the morning of the procedure. Take only a half dose of insulin the day before your procedure. Do not take your diabetic pills the day of your procedure               Colonoscopy is used to view the inside of your lower digestive tract (colon and rectum). It can help screen for colon cancer and can also help find the source of abdominal pain, bleeding, and changes in bowel habits. The test is usually done in the hospital on an outpatient basis. During the exam, the doctor can remove a small tissue sample ( a biopsy) for testing. Small growths, such as polyps, may also be removed during colonoscopy.     A camera attached to a flexible tube with a viewing lens is used to take video pictures.    Getting Ready    Be sure to tell your doctor about any medications you take. Also tell your doctor about any health conditions you may have.   Discuss the risks of the test with your doctor. These include bleeding and bowel puncture.   Your rectum and colon must be empty for the test. So be sure to follow the diet and bowel prep instructions exactly. If you dont, the test may need to be rescheduled.   You will  need to have a friend or family member prepared to drive you home after the test.     Colonoscopy provides an inside view of the entire colon.    During the Test    You are given sedating (relaxing) medication through an IV line. You may be drowsy or completely asleep.   The procedure takes 30 minutes or longer.   The doctor performs a digital rectal exam to check for anal and rectal problems. The rectum is lubricated and the scope inserted.   If you are awake, you may have a feeling similar to needing to have a bowel movement. You may also feel pressure as air is pumped into the colon. Its okay to pass gas during the procedure.  After the Test    You may discuss the results with your doctor right away or at a future visit.   Try to pass all the gas right after the test to help prevent bloating and cramping.   After the test, you can go back to your normal eating and other activities.  Risks and Possible Complications Include:   Bleeding  A puncture or tear in the colon  Risks of anesthesia

## 2018-05-18 NOTE — DISCHARGE INSTRUCTIONS
Home Care Instructions Pain Management:    1.  DIET:    You may resume your normal diet today.    2.  BATHING:    You may shower with luke warm water.    3.  DRESSING:    You may remove your bandage today.    4.  ACTIVITY LEVEL:      You may resume your normal activities 24 hours after your procedure.    5.  MEDICATIONS:    You may resume your normal medications today.    6.  SPECIAL INSTRUCTIONS:    No heat to the injection site for 24 hours including bath or shower, heating pad, moist heat or hot tubs.    Use an ice pack to the injection site for any pain or discomfort.  Apply ice packs for 20 minute intervals as needed.    If you have received any sedatives by mouth today, you can not drive for 12 hours.    If you have received sedation through an IV, you can not drive for 24 hours.    PLEASE CALL YOUR DOCTOR FOR THE FOLLOWIN.  Redness or swelling around the injection site.  2.  Fever of 101 degrees.  3.  Drainage (pus) from the injection site.  4.  For any continuous bleeding (some dried blood over the incision is normal.)    FOR EMERGENCIES:    If any unusual problems or difficulties occur during clinic hours, call [phone] or dial [phone].    Follow up with with your physician in 2-3 weeks.       Procedural Sedation  Procedural sedation is medicine to ease discomfort, pain, and anxiety during a procedure. The medicine is often given through an IV (intravenous) line in your arm or hand. In some cases, the medicine may be taken by mouth or inhaled. While you are under sedation, you will likely be awake. But you may not remember anything afterward.  Why procedural sedation is used  Sedation is used for many types of procedures. The goal is to reduce pain, anxiety, and stressful memories of a procedure. It can also help your healthcare provider treat you. For example, having a broken bone fixed may be easier if you feel relaxed.  Procedural sedation is used only for short, basic procedures. It is not used  for complex surgeries. Some procedures that use this type of sedation include:  · Dental surgery  · Breast biopsy, to take a sample of breast tissue  · Endoscopy, to look at gastrointestinal problems  · Bronchoscopy, to check for lung problems  · Bone or joint realignment, to fix a broken bone or dislocated joint  · Minor foot or skin surgery  · Electrical cardioversion, to restore a normal heart rhythm  · Lumbar puncture, to assess neurological disease  Risks of procedural sedation  Procedural sedation has some risks and possible side effects, such as:  · Headache  · Nausea and vomiting  · Unpleasant memory of the procedure  · Lowered rate of breathing  · Changes in heart rate and blood pressure (rare)  · Inhalation of stomach contents into your lungs (rare)  Side effects will likely go away shortly after the procedure. Your healthcare team will watch your heart rate and breathing during your sedation. This is to help prevent problems.  Your own risks may vary based on your age and your overall health. They also depend on the type of sedation you are given. Talk with your healthcare provider about the risks that apply most to you.  Getting ready for procedural sedation  Talk with your healthcare provider about how to get ready for your procedure. Tell him or her about all the medicines you take. This includes over-the-counter medicines such as ibuprofen. It also includes vitamins, herbs, and other supplements. You may need to stop taking some medicines before the procedure, such as blood thinners and aspirin. If you smoke, you should stop to lessen the chance of a lung issue. Talk with your healthcare provider if you need help to stop smoking.  Tell your healthcare provider if you:  · Have had any problems in the past with sedation or anesthesia  · Have had any recent changes in your health, such as an infection or fever  · Are pregnant or think you may be pregnant  Also be sure to:  · Ask a family member or friend  to take you home after the procedure. You cant drive on the day you receive sedation.  · Not eat or drink after midnight the night before your procedure, if advised.  · Not plan on making any important decisions, such as financial or legal, for the day after you receive the sedation.  · Follow all other instructions from your healthcare provider.  During your procedural sedation  You may have your procedure in a hospital or a medical clinic. Sedation is done by a trained healthcare provider. In general, you can expect the following:  · You will be given medicine through an IV line in your arm or hand. Or you may receive a shot. The medicine may also be given by mouth. Or you may inhale it through a mask.  · If you receive medicine through an IV, you may feel the effects very quickly. You will start to feel relaxed and drowsy.  · During the procedure, your heart rate, breathing, and blood pressure will be closely watched. Your breathing and blood pressure may decrease a little. But you will likely not need help with your breathing. You may receive a little extra oxygen through a mask or through some soft plastic prongs underneath your nose.  · You will probably be awake the entire time. If you do fall asleep, you should be easy to wake up, if needed. You should feel little or no pain.  · When your procedure is over, the sedative medicine will be stopped.  After your procedural sedation  You will begin to feel more awake and aware. But you will likely be drowsy for a while afterward. You will be closely watched as you become more alert. You may have a faint memory of the procedure. Or you may not remember it at all.  You should be able to return home within an hour or two after your procedure. Plan to have someone stay with you for a few hours. Side effects such as headache and nausea may go away quickly. Tell your healthcare provider if they continue.  Dont drive or make any important decisions for at least 24  hours. Be sure to follow all after-care instructions.     When to call your healthcare provider  Have someone call your healthcare provider right away if you have any of these:  · Drowsiness that gets worse  · Weakness or dizziness that gets worse  · Repeated vomiting  · You cant be awakened  · Severe or ongoing pain from the procedure, not relieved by the pain medicine   Date Last Reviewed: 2/1/2017  © 8374-4954 [employer]. [address] [place] [address], [place] [address]. All rights reserved. This information is not intended as a substitute for professional medical care. Always follow your healthcare professional's instructions.

## 2018-05-21 ENCOUNTER — TELEPHONE (OUTPATIENT)
Dept: NEUROLOGY | Facility: HOSPITAL | Age: 58
End: 2018-05-21

## 2018-05-21 NOTE — TELEPHONE ENCOUNTER
----- Message from Lulu Arango LPN sent at 5/16/2018  3:56 PM CDT -----  Colonoscopy on May 31, 2018.  Cpt 71829, [id number].      Thanks.

## 2018-05-22 ENCOUNTER — SURGERY (OUTPATIENT)
Age: 58
End: 2018-05-22

## 2018-05-22 ENCOUNTER — HOSPITAL ENCOUNTER (OUTPATIENT)
Facility: HOSPITAL | Age: 58
Discharge: HOME | End: 2018-05-22
Attending: ANESTHESIOLOGY | Admitting: ANESTHESIOLOGY
Payer: MEDICARE

## 2018-05-22 VITALS
WEIGHT: 250 LBS | SYSTOLIC BLOOD PRESSURE: 134 MMHG | RESPIRATION RATE: 15 BRPM | HEIGHT: 72 IN | DIASTOLIC BLOOD PRESSURE: 81 MMHG | BODY MASS INDEX: 33.86 KG/M2 | HEART RATE: 87 BPM | TEMPERATURE: 98 F | OXYGEN SATURATION: 96 %

## 2018-05-22 DIAGNOSIS — M47.9 OSTEOARTHRITIS OF SPINE, UNSPECIFIED SPINAL OSTEOARTHRITIS COMPLICATION STATUS, UNSPECIFIED SPINAL REGION: Primary | ICD-10-CM

## 2018-05-22 DIAGNOSIS — G89.29 CHRONIC PAIN: ICD-10-CM

## 2018-05-22 PROCEDURE — 25000003 PHARM REV CODE 250: Performed by: ANESTHESIOLOGY

## 2018-05-22 PROCEDURE — 64636 DESTROY L/S FACET JNT ADDL: CPT | Performed by: ANESTHESIOLOGY

## 2018-05-22 PROCEDURE — 64635 DESTROY LUMB/SAC FACET JNT: CPT | Mod: RT,,, | Performed by: ANESTHESIOLOGY

## 2018-05-22 PROCEDURE — 99152 MOD SED SAME PHYS/QHP 5/>YRS: CPT | Mod: ,,, | Performed by: ANESTHESIOLOGY

## 2018-05-22 PROCEDURE — 64635 DESTROY LUMB/SAC FACET JNT: CPT | Performed by: ANESTHESIOLOGY

## 2018-05-22 PROCEDURE — 63600175 PHARM REV CODE 636 W HCPCS: Performed by: ANESTHESIOLOGY

## 2018-05-22 PROCEDURE — 64636 DESTROY L/S FACET JNT ADDL: CPT | Mod: RT,,, | Performed by: ANESTHESIOLOGY

## 2018-05-22 RX ORDER — DEXAMETHASONE SODIUM PHOSPHATE 4 MG/ML
INJECTION, SOLUTION INTRA-ARTICULAR; INTRALESIONAL; INTRAMUSCULAR; INTRAVENOUS; SOFT TISSUE
Status: DISCONTINUED | OUTPATIENT
Start: 2018-05-22 | End: 2018-05-22 | Stop reason: HOSPADM

## 2018-05-22 RX ORDER — SODIUM CHLORIDE 9 MG/ML
500 INJECTION, SOLUTION INTRAVENOUS CONTINUOUS
Status: DISCONTINUED | OUTPATIENT
Start: 2018-05-22 | End: 2018-05-22 | Stop reason: HOSPADM

## 2018-05-22 RX ORDER — FENTANYL CITRATE 50 UG/ML
INJECTION, SOLUTION INTRAMUSCULAR; INTRAVENOUS
Status: DISCONTINUED | OUTPATIENT
Start: 2018-05-22 | End: 2018-05-22 | Stop reason: HOSPADM

## 2018-05-22 RX ORDER — PREGABALIN 200 MG/1
200 CAPSULE ORAL 2 TIMES DAILY
Qty: 60 CAPSULE | Refills: 0 | Status: SHIPPED | OUTPATIENT
Start: 2018-05-22 | End: 2018-06-26 | Stop reason: SDUPTHER

## 2018-05-22 RX ORDER — MIDAZOLAM HYDROCHLORIDE 1 MG/ML
INJECTION INTRAMUSCULAR; INTRAVENOUS
Status: DISCONTINUED | OUTPATIENT
Start: 2018-05-22 | End: 2018-05-22 | Stop reason: HOSPADM

## 2018-05-22 RX ORDER — LIDOCAINE HYDROCHLORIDE 10 MG/ML
INJECTION, SOLUTION EPIDURAL; INFILTRATION; INTRACAUDAL; PERINEURAL
Status: DISCONTINUED | OUTPATIENT
Start: 2018-05-22 | End: 2018-05-22 | Stop reason: HOSPADM

## 2018-05-22 RX ORDER — BUPIVACAINE HYDROCHLORIDE 2.5 MG/ML
INJECTION, SOLUTION EPIDURAL; INFILTRATION; INTRACAUDAL
Status: DISCONTINUED | OUTPATIENT
Start: 2018-05-22 | End: 2018-05-22 | Stop reason: HOSPADM

## 2018-05-22 RX ADMIN — MIDAZOLAM HYDROCHLORIDE 1 MG: 1 INJECTION INTRAMUSCULAR; INTRAVENOUS at 10:05

## 2018-05-22 RX ADMIN — DEXAMETHASONE SODIUM PHOSPHATE 10 MG: 4 INJECTION, SOLUTION INTRAMUSCULAR; INTRAVENOUS at 10:05

## 2018-05-22 RX ADMIN — SODIUM CHLORIDE 500 ML: 9 INJECTION, SOLUTION INTRAVENOUS at 10:05

## 2018-05-22 RX ADMIN — FENTANYL CITRATE 50 MCG: 50 INJECTION, SOLUTION INTRAMUSCULAR; INTRAVENOUS at 10:05

## 2018-05-22 RX ADMIN — LIDOCAINE HYDROCHLORIDE 5 ML: 10 INJECTION, SOLUTION EPIDURAL; INFILTRATION; INTRACAUDAL; PERINEURAL at 10:05

## 2018-05-22 RX ADMIN — BUPIVACAINE HYDROCHLORIDE 5 ML: 2.5 INJECTION, SOLUTION EPIDURAL; INFILTRATION; INTRACAUDAL; PERINEURAL at 10:05

## 2018-05-22 NOTE — DISCHARGE SUMMARY
Discharge Note  Short Stay      SUMMARY     Admit Date: 5/22/2018    Attending Physician: Donavon Dennis      Discharge Physician: Donavon Dennis      Discharge Date: 5/22/2018 11:02 AM    Procedure(s) (LRB):  RADIOFREQUENCY THERMOCOAGULATION (RFTC)-NERVE-MEDIAN BRANCH-LUMBAR- Right L2-3-4-5 (Right)    Final Diagnosis: Facet arthropathy, lumbar [M46.96]  Facet hypertrophy of lumbar region [M47.896]    Disposition: Home or self care    Patient Instructions:   Current Discharge Medication List      CONTINUE these medications which have NOT CHANGED    Details   clonazePAM (KLONOPIN) 1 MG tablet TK 1 T PO TID  Refills: 3      DULoxetine (CYMBALTA) 60 MG capsule TK 2 CS PO QAM  Refills: 1      meloxicam (MOBIC) 15 MG tablet TK 1 T PO D WITH BREAKFAST  Refills: 2      metformin (GLUCOPHAGE) 1000 MG tablet Take 1,000 mg by mouth 2 (two) times daily with meals.      nabumetone (RELAFEN) 750 MG tablet Take 750 mg by mouth 2 (two) times daily.      omeprazole (PRILOSEC) 20 MG capsule Take 20 mg by mouth once daily.      pravastatin (PRAVACHOL) 40 MG tablet Take 40 mg by mouth once daily.      pregabalin (LYRICA) 200 MG Cap Take 1 capsule (200 mg total) by mouth 2 (two) times daily.  Qty: 60 capsule, Refills: 0      QUEtiapine (SEROQUEL) 400 MG tablet       fluoxetine (PROZAC) 20 MG capsule Take 1 capsule (20 mg total) by mouth once daily. Take 1/2 table for 3 days then take 1 tablet by mouth daily starting on 7/30/16  Qty: 30 capsule, Refills: 11      FLUVIRIN [id number] 45 mcg (15 mcg x 3)/0.5 mL Susp ADM 0.5ML IM UTD  Refills: 0                 Discharge Diagnosis: Facet arthropathy, lumbar [M46.96]  Facet hypertrophy of lumbar region [M47.896]  Condition on Discharge: Stable with no complications to procedure   Diet on Discharge: Same as before.  Activity: as per instruction sheet.  Discharge to: Home with a responsible adult.  Follow up: 2-4 weeks

## 2018-05-22 NOTE — H&P (VIEW-ONLY)
Chronic patient Established Note (Follow up visit)      SUBJECTIVE:    Fabiano Malik Jr. presents to the clinic for a 2 wk follow-up appointment for low back pain. He is S/P BILATERAL L2-3-4-5 LUMBAR FACET MEDIAL BRANCH NERVE BLOCK on 18 with 75% relief for 3 days, this will be his second Lumbar MBB with 70-80% relief for an average of 7 days or more. I will s/f right then left Lumbar RFA @ L2-3-4-5 for axial low back pain.  Previous imaging was reviewed and discussed with the patient today.   Since the last visit, Fabiano Malik Jr. states the pain has been persistant. Current pain intensity is 10/10.    Pain Disability Index Review:  Last 3 PDI Scores 2018   Pain Disability Index (PDI) 60 56 61       Pain Medications:     - Opioids: None  - Adjuvant Medications: Cymbalta ( Duloxetine) and Lyrica ( Pregabalin)  - Anti-Coagulants: None  - Others: See med list     Opioid Contract: no      report:  Reviewed and consistent with medication use as prescribed.     Pain Procedures: 18 bilateral L2-3-4-5 LUMBAR FACET MEDIAL BRANCH NERVE BLOCK   18 TRIGGER POINT INJECTION -75% relief  Injection with Dr. McMayne ([facility])     Physical Therapy/Home Exercise: no     Imagin18 X-Ray Hips Bilateral 2 View Incl AP Pelvis     Narrative      The AP pelvis, AP and lateral views both hips, 5 total views.  Dextroscoliosis, pelvis left hemipelvis.  Minimal mild DJD hip joints.  No fracture dislocation.   Impression       See results above.      Electronically signed by: NICKY DOYLE MD  Date: 18  Time: 15:08     Encounter      View Encounter          Signed by      Signed Credentials Date/Time   Phone Pager   HALLE DOYLE MD 2018 15:08 [phone]   Reviewed By      Donavon Dennis MD on 2018 15:56   Exam Details      Performed Procedure Technologist Supporting Staff Performing Physician   X-Ray Hips Bilateral 2 View Inc AP  Pelvis Alex Thao           Appointment Date/Status Modality Department     1/18/2018     Completed [facility] XR1 [facility] XRAY OP         Begin Exam End Exam   End Exam Questionnaires   1/18/2018  2:27 PM 1/18/2018  2:53 PM   IMAGING END ALL       Reason For Exam   Priority: Routine   Dx: Myositis, unspecified myositis type, unspecified site [M60.9 (ICD-10-CM)]; DDD (degenerative disc disease), lumbar [M51.36 (ICD-10-CM)]; DDD (degenerative disc disease), cervical [M50.30 (ICD-10-CM)]; Radiculopathy of thoracolumbar region [M54.15 (ICD-10-CM)]   Order Report       Order Details      1/18/18 X-Ray Cervical Spine Complete 5 view     Narrative      Cervical spine with obliques.  There is fusion of the C4 and C5 vertebral bodies.  Prominent osteophytes C3-C7.  Mild neural foraminal narrowing on the right at the C5-6 level.  The left oblique is suboptimal in position making it difficult to evaluate the neural foramen.   Impression       See above      Electronically signed by: TAMMY BALL MD  Date: 01/18/18  Time: 15:49     Encounter      View Encounter          Signed by      Signed Credentials Date/Time   Phone Pager   TAMMY BALL JR, MD 1/18/2018 15:49 [phone]   Reviewed By      Donavon Dennis MD on 1/18/2018 15:55   Exam Details      Performed Procedure Technologist Supporting Staff Performing Physician   X-Ray Cervical Spine Complete 5 view Alex Thao           Appointment Date/Status Modality Department     1/18/2018     Completed [facility] XR1 [facility] XRAY OP         Begin Exam End Exam   End Exam Questionnaires   1/18/2018  2:27 PM 1/18/2018  2:53 PM   IMAGING END ALL       Reason For Exam   Priority: Routine   Dx: Myositis, unspecified myositis type, unspecified site [M60.9 (ICD-10-CM)]; DDD (degenerative disc disease), lumbar [M51.36 (ICD-10-CM)]; DDD (degenerative disc disease), cervical [M50.30 (ICD-10-CM)]; Radiculopathy of thoracolumbar region [M54.15 (ICD-10-CM)]   Order Report       Order  Details      1/18/18 X-Ray Lumbar Spine Complete 5 View     Narrative      Lumbar spine 5 views, mild dextroscoliosis, bridging osteophytes lower dorsal lumbar spine,, straightening thoracic lumbar relationship on lateral view.  No fracture subluxation.   Impression       Moderate degenerative disc spondylosis facet arthropathy.      Electronically signed by: NICKY DOYLE MD  Date: 01/18/18  Time: 15:07     Encounter      View Encounter          Signed by      Signed Credentials Date/Time   Phone Pager   HALLE DOYLE MD 1/18/2018 15:07 [phone]   Reviewed By      Donavon Dennis MD on 1/18/2018 15:56   Exam Details      Performed Procedure Technologist Supporting Staff Performing Physician   X-Ray Lumbar Spine Complete 5 View Alex Thao           Appointment Date/Status Modality Department     1/18/2018     Completed [facility] XR1 [facility] XRAY OP         Begin Exam End Exam   End Exam Questionnaires   1/18/2018  2:27 PM 1/18/2018  2:52 PM   IMAGING END ALL       Reason For Exam   Priority: Routine   Dx: Myositis, unspecified myositis type, unspecified site [M60.9 (ICD-10-CM)]; DDD (degenerative disc disease), lumbar [M51.36 (ICD-10-CM)]; DDD (degenerative disc disease), cervical [M50.30 (ICD-10-CM)]; Radiculopathy of thoracolumbar region [M54.15 (ICD-10-CM)]   Order Report       Order     Allergies:   Review of patient's allergies indicates:   Allergen Reactions    Pcn [penicillins] Anaphylaxis       Current Medications:   Current Outpatient Prescriptions   Medication Sig Dispense Refill    benazepril (LOTENSIN) 10 MG tablet       clonazePAM (KLONOPIN) 0.5 MG tablet Take 0.5 mg by mouth 2 (two) times daily as needed for Anxiety.      clonazePAM (KLONOPIN) 1 MG tablet TK 1 T PO TID  3    DULoxetine (CYMBALTA) 60 MG capsule TK 2 CS PO QAM  1    FLUVIRIN [id number] 45 mcg (15 mcg x 3)/0.5 mL Susp ADM 0.5ML IM UTD  0    metformin (GLUCOPHAGE) 1000 MG tablet Take 1,000 mg by mouth 2 (two)  times daily with meals.      nabumetone (RELAFEN) 750 MG tablet Take 750 mg by mouth 2 (two) times daily.      omeprazole (PRILOSEC) 20 MG capsule Take 20 mg by mouth once daily.      pravastatin (PRAVACHOL) 40 MG tablet Take 40 mg by mouth once daily.      pregabalin (LYRICA) 200 MG Cap Take 1 capsule (200 mg total) by mouth 2 (two) times daily. 60 capsule 0    QUEtiapine (SEROQUEL) 400 MG tablet       fluoxetine (PROZAC) 20 MG capsule Take 1 capsule (20 mg total) by mouth once daily. Take 1/2 table for 3 days then take 1 tablet by mouth daily starting on 7/30/16 30 capsule 11     No current facility-administered medications for this visit.        REVIEW OF SYSTEMS:    GENERAL:  No weight loss, malaise or fevers.  HEENT:  Negative for frequent or significant headaches.  NECK:  Negative for lumps, goiter, pain and significant neck swelling.  RESPIRATORY:  Negative for cough, wheezing or shortness of breath.  CARDIOVASCULAR:  Negative for chest pain, leg swelling or palpitations.  GI:  Negative for abdominal discomfort, blood in stools or black stools or change in bowel habits.  MUSCULOSKELETAL:  See HPI.  SKIN:  Negative for lesions, rash, and itching.  PSYCH:  + for sleep disturbance, mood disorder and recent psychosocial stressors.  HEMATOLOGY/LYMPHOLOGY:  Negative for prolonged bleeding, bruising easily or swollen nodes.  NEURO:   No history of headaches, syncope, paralysis, seizures or tremors.  All other reviewed and negative other than HPI.    Past Medical History:  Past Medical History:   Diagnosis Date    Chronic back pain greater than 3 months duration     Depression     Diabetes mellitus     Hypertension     Schizophrenia        Past Surgical History:  Past Surgical History:   Procedure Laterality Date    APPENDECTOMY      NECK SURGERY         Family History:  No family history on file.    Social History:  Social History     Social History    Marital status:      Spouse name: N/A     "Number of children: N/A    Years of education: N/A     Social History Main Topics    Smoking status: Never Smoker    Smokeless tobacco: None    Alcohol use Yes      Comment: "couple of beers a day"    Drug use: No    Sexual activity: Not Asked     Other Topics Concern    None     Social History Narrative    None       OBJECTIVE:    /85   Pulse 101   Wt 114 kg (251 lb 5.2 oz)   BMI 34.09 kg/m²     PHYSICAL EXAMINATION:    General appearance: Well appearing, in no acute distress, alert and oriented x3.  Psych:  Mood and affect appropriate.  Skin: Skin color, texture, turgor normal, no rashes or lesions, in both upper and lower body.  Head/face:  Normocephalic, atraumatic. No palpable lymph nodes.  Neck: No pain to palpation over the cervical paraspinous muscles. Spurling Negative. No pain with neck flexion, extension, or lateral flexion.   Cor: RRR  Pulm: CTA  GI:  Soft and non-tender.  Back: Straight leg raising in the sitting and supine positions is positive to radicular pain. moderate pain to palpation over the spine or costovertebral angles. Normal range of motion without pain reproduction.  Extremities: Peripheral joint ROM is full and pain free without obvious instability or laxity in all four extremities. No deformities, edema, or skin discoloration. Good capillary refill.  Musculoskeletal: Shoulder, hip, sacroiliac and knee provocative maneuvers are negative. Bilateral upper and lower extremity strength is normal and symmetric.  No atrophy or tone abnormalities are noted. Positive axial loading test bilateral. Positive tenderness over both SIJ, Positive FABERE,Ganselin and Yeoman's test on the both side.negative FADIR  Neuro: Bilateral upper and lower extremity coordination and muscle stretch reflexes are physiologic and symmetric.  Plantar response are downgoing. No loss of sensation is noted.  Gait: antalgic    ASSESSMENT: 57 y.o. year old male with low back pain, consistent with     "   Diagnosis:    1. Osteoarthritis of spine, unspecified spinal osteoarthritis complication status, unspecified spinal region     2. Spondylosis without myelopathy     3. Chronic pain syndrome     4. Facet hypertrophy of lumbar region     5. Facet arthropathy     6. Back pain, unspecified back location, unspecified back pain laterality, unspecified chronicity           PLAN:     - I have stressed the importance of physical activity and a home exercise plan to help with pain and improve health.  - Patient can continue with medications for now since they are providing benefits, using them appropriately, and without side effects.  - Previous imaging was reviewed and discussed with the patient today.   - Counseled patient regarding the importance of activity modification, constant sleeping habits and physical therapy.  - Schedule for a  RIGHT then LEFT Radiofrequency ablation of the medial branches at L 2, 3,4, and L5 for longer pain relief.  - I have explained the risks, benefits, and alternatives of the procedure in detail. The patient voices understanding and all questions have been answered. The patient agrees to proceed as planned. Written Consent obtained.  -RTC 2-3 weeks following procedure.  -The above plan and management options were discussed at length with patient. Patient is in agreement with the above and verbalized understanding. Dr. Dennis   was consulted on this patient  and agrees with this plan.    TY SwansonC  Interventional Pain Management      05/08/2018

## 2018-05-22 NOTE — OP NOTE
Patient Name: Fabiano Malik Jr.  MRN: [id number]    INFORMED CONSENT: The procedure, risks, benefits and options were discussed with patient. There are no contraindications to the procedure. The patient expressed understanding and agreed to proceed. The personnel performing the procedure was discussed. I verify that I personally obtained Fabiano's consent prior to the start of the procedure and the signed consent can be found on the patient's chart.    Procedure Date: 05/22/2018    Anesthesia: Topical    Pre Procedure diagnosis: Facet arthropathy, lumbar [M46.96]  Facet hypertrophy of lumbar region [M47.896]  1. Osteoarthritis of spine, unspecified spinal osteoarthritis complication status, unspecified spinal region    2. Chronic pain      Post-Procedure diagnosis: SAME    Moderate Sedation: Yes - Fentanyl 100 mcg and Midazolam 2 mg    PROCEDURE: RIGHT L2-3-4-5 FACET MEDIAL BRANCH NERVE RADIOFREQUENCY NEUROTOMY (lumbar)      DESCRIPTION OF PROCEDURE: The patient was brought to the procedure room.  After performing time out IV access was obtained prior to the procedure. The patient was positioned prone on the fluoroscopy table. Continuous hemodynamic monitoring was initiated including blood pressure and pulse oximetry. IV sedation was administered incrementally to allow the patient to remain comfortable and conversant throughout the procedure. The area of the lumbar spine was prepped chlorhexidine three times and draped into a sterile field.  Fluoroscopy was used to identify the location of the RT side L2, L3, L4, and L5 medial branch nerves at the junctions of the superior articular process and the transverse processes of L3, L4, L5, and the sacral ala respectively.  Skin anesthesia was achieved using 3 cc of Lidocaine 1% over the injection sites. A 22 gauge, 100mm (10mm active tip) curved RF needle was slowly inserted at each level using AP, lateral and oblique fluoroscopic imaging. Negative aspiration for blood  or CSF was confirmed.  Sensory stimulation at 50Hz below 0.5V was achieved at every level. Motor stimulation at 2Hz up to 1.5V did not cause any radicular symptoms at any level. Each level was anesthetized with 1.5 cc of lidocaine 1%.  Radiofrequency lesioning was performed for 90 seconds at 80 degrees in two different positions at each level.  Total of 3 cc of bupivacaine 0.25% and 10 mg of Decadron was injected was injected at all levels.. The needles were removed and bleeding was nil.  A sterile dressing was applied. Fabiano was taken back to the recovery room for further observation.     Stimulation Results:  L2 = Sensory positive @ 0.4, Motor negative @ 1.5  L3 = Sensory positive @ 0.4, Motor negative @ 1.5  L4 = Sensory positive @ 0.7, Motor negative @ 1.5  L5 = Sensory positive @ 0.6, Motor negative @ 1.5    Blood Loss: Nill  Specimen: None    Donavon Dennis MD

## 2018-05-23 LAB — POCT GLUCOSE: 146 MG/DL (ref 70–110)

## 2018-05-24 ENCOUNTER — TELEPHONE (OUTPATIENT)
Dept: NEUROLOGY | Facility: HOSPITAL | Age: 58
End: 2018-05-24

## 2018-05-24 ENCOUNTER — TELEPHONE (OUTPATIENT)
Dept: PAIN MEDICINE | Facility: CLINIC | Age: 58
End: 2018-05-24

## 2018-05-24 NOTE — TELEPHONE ENCOUNTER
----- Message from Xenia Mcintyre sent at 5/24/2018  3:42 PM CDT -----  Contact: Patient  GI- Patient needs to speak to nurse regarding the prep. Call back number [phone].

## 2018-05-24 NOTE — TELEPHONE ENCOUNTER
Hello, this is staff I've received your request I'm returning your call from the pain management clinic at [facility]. I just wanted to let you know that I'm working on getting an answer for you by asked for a return call to the procedure area . ( Please add all other notes here if needed.)

## 2018-05-24 NOTE — TELEPHONE ENCOUNTER
----- Message from Laura Tapia sent at 5/24/2018  9:49 AM CDT -----  Contact: [phone]  Pt requesting to speak with you in regarding his procedure instructions . Please advise

## 2018-05-28 RX ORDER — SODIUM CHLORIDE 9 MG/ML
500 INJECTION, SOLUTION INTRAVENOUS CONTINUOUS
Status: CANCELLED | OUTPATIENT
Start: 2018-05-28

## 2018-05-28 NOTE — DISCHARGE INSTRUCTIONS
Home Care Instructions Pain Management:    1.  DIET:    You may resume your normal diet today.    2.  BATHING:    You may shower with luke warm water.    3.  DRESSING:    You may remove your bandage today.    4.  ACTIVITY LEVEL:      You may resume your normal activities 24 hours after your procedure.    5.  MEDICATIONS:    You may resume your normal medications today.    6.  SPECIAL INSTRUCTIONS:    No heat to the injection site for 24 hours including bath or shower, heating pad, moist heat or hot tubs.    Use an ice pack to the injection site for any pain or discomfort.  Apply ice packs for 20 minute intervals as needed.    If you have received any sedatives by mouth today, you can not drive for 12 hours.    If you have received sedation through an IV, you can not drive for 24 hours.    PLEASE CALL YOUR DOCTOR FOR THE FOLLOWIN.  Redness or swelling around the injection site.  2.  Fever of 101 degrees.  3.  Drainage (pus) from the injection site.  4.  For any continuous bleeding (some dried blood over the incision is normal.)    FOR EMERGENCIES:    If any unusual problems or difficulties occur during clinic hours, call [phone] or dial [phone].    Follow up with with your physician in 2-3 weeks.       Procedural Sedation  Procedural sedation is medicine to ease discomfort, pain, and anxiety during a procedure. The medicine is often given through an IV (intravenous) line in your arm or hand. In some cases, the medicine may be taken by mouth or inhaled. While you are under sedation, you will likely be awake. But you may not remember anything afterward.  Why procedural sedation is used  Sedation is used for many types of procedures. The goal is to reduce pain, anxiety, and stressful memories of a procedure. It can also help your healthcare provider treat you. For example, having a broken bone fixed may be easier if you feel relaxed.  Procedural sedation is used only for short, basic procedures. It is not used  for complex surgeries. Some procedures that use this type of sedation include:  · Dental surgery  · Breast biopsy, to take a sample of breast tissue  · Endoscopy, to look at gastrointestinal problems  · Bronchoscopy, to check for lung problems  · Bone or joint realignment, to fix a broken bone or dislocated joint  · Minor foot or skin surgery  · Electrical cardioversion, to restore a normal heart rhythm  · Lumbar puncture, to assess neurological disease  Risks of procedural sedation  Procedural sedation has some risks and possible side effects, such as:  · Headache  · Nausea and vomiting  · Unpleasant memory of the procedure  · Lowered rate of breathing  · Changes in heart rate and blood pressure (rare)  · Inhalation of stomach contents into your lungs (rare)  Side effects will likely go away shortly after the procedure. Your healthcare team will watch your heart rate and breathing during your sedation. This is to help prevent problems.  Your own risks may vary based on your age and your overall health. They also depend on the type of sedation you are given. Talk with your healthcare provider about the risks that apply most to you.  Getting ready for procedural sedation  Talk with your healthcare provider about how to get ready for your procedure. Tell him or her about all the medicines you take. This includes over-the-counter medicines such as ibuprofen. It also includes vitamins, herbs, and other supplements. You may need to stop taking some medicines before the procedure, such as blood thinners and aspirin. If you smoke, you should stop to lessen the chance of a lung issue. Talk with your healthcare provider if you need help to stop smoking.  Tell your healthcare provider if you:  · Have had any problems in the past with sedation or anesthesia  · Have had any recent changes in your health, such as an infection or fever  · Are pregnant or think you may be pregnant  Also be sure to:  · Ask a family member or friend  to take you home after the procedure. You cant drive on the day you receive sedation.  · Not eat or drink after midnight the night before your procedure, if advised.  · Not plan on making any important decisions, such as financial or legal, for the day after you receive the sedation.  · Follow all other instructions from your healthcare provider.  During your procedural sedation  You may have your procedure in a hospital or a medical clinic. Sedation is done by a trained healthcare provider. In general, you can expect the following:  · You will be given medicine through an IV line in your arm or hand. Or you may receive a shot. The medicine may also be given by mouth. Or you may inhale it through a mask.  · If you receive medicine through an IV, you may feel the effects very quickly. You will start to feel relaxed and drowsy.  · During the procedure, your heart rate, breathing, and blood pressure will be closely watched. Your breathing and blood pressure may decrease a little. But you will likely not need help with your breathing. You may receive a little extra oxygen through a mask or through some soft plastic prongs underneath your nose.  · You will probably be awake the entire time. If you do fall asleep, you should be easy to wake up, if needed. You should feel little or no pain.  · When your procedure is over, the sedative medicine will be stopped.  After your procedural sedation  You will begin to feel more awake and aware. But you will likely be drowsy for a while afterward. You will be closely watched as you become more alert. You may have a faint memory of the procedure. Or you may not remember it at all.  You should be able to return home within an hour or two after your procedure. Plan to have someone stay with you for a few hours. Side effects such as headache and nausea may go away quickly. Tell your healthcare provider if they continue.  Dont drive or make any important decisions for at least 24  hours. Be sure to follow all after-care instructions.     When to call your healthcare provider  Have someone call your healthcare provider right away if you have any of these:  · Drowsiness that gets worse  · Weakness or dizziness that gets worse  · Repeated vomiting  · You cant be awakened  · Severe or ongoing pain from the procedure, not relieved by the pain medicine   Date Last Reviewed: 2/1/2017  © 6175-8737 [employer]. [address] [place] [address], [place] [address]. All rights reserved. This information is not intended as a substitute for professional medical care. Always follow your healthcare professional's instructions.

## 2018-05-29 ENCOUNTER — SURGERY (OUTPATIENT)
Age: 58
End: 2018-05-29

## 2018-05-29 ENCOUNTER — HOSPITAL ENCOUNTER (OUTPATIENT)
Facility: HOSPITAL | Age: 58
Discharge: HOME | End: 2018-05-29
Attending: ANESTHESIOLOGY | Admitting: ANESTHESIOLOGY
Payer: MEDICARE

## 2018-05-29 VITALS
TEMPERATURE: 99 F | HEIGHT: 72 IN | BODY MASS INDEX: 33.86 KG/M2 | DIASTOLIC BLOOD PRESSURE: 80 MMHG | WEIGHT: 250 LBS | HEART RATE: 80 BPM | SYSTOLIC BLOOD PRESSURE: 166 MMHG | RESPIRATION RATE: 16 BRPM | OXYGEN SATURATION: 96 %

## 2018-05-29 DIAGNOSIS — G89.29 CHRONIC PAIN: ICD-10-CM

## 2018-05-29 DIAGNOSIS — M47.9 OSTEOARTHRITIS OF SPINE, UNSPECIFIED SPINAL OSTEOARTHRITIS COMPLICATION STATUS, UNSPECIFIED SPINAL REGION: ICD-10-CM

## 2018-05-29 DIAGNOSIS — M47.819 SPONDYLOSIS WITHOUT MYELOPATHY: Primary | ICD-10-CM

## 2018-05-29 LAB — POCT GLUCOSE: 195 MG/DL (ref 70–110)

## 2018-05-29 PROCEDURE — 25000003 PHARM REV CODE 250: Performed by: ANESTHESIOLOGY

## 2018-05-29 PROCEDURE — 63600175 PHARM REV CODE 636 W HCPCS: Performed by: ANESTHESIOLOGY

## 2018-05-29 PROCEDURE — 64635 DESTROY LUMB/SAC FACET JNT: CPT | Mod: LT,,, | Performed by: ANESTHESIOLOGY

## 2018-05-29 PROCEDURE — 64635 DESTROY LUMB/SAC FACET JNT: CPT | Performed by: ANESTHESIOLOGY

## 2018-05-29 PROCEDURE — 99152 MOD SED SAME PHYS/QHP 5/>YRS: CPT | Mod: ,,, | Performed by: ANESTHESIOLOGY

## 2018-05-29 PROCEDURE — 64636 DESTROY L/S FACET JNT ADDL: CPT | Performed by: ANESTHESIOLOGY

## 2018-05-29 PROCEDURE — 64636 DESTROY L/S FACET JNT ADDL: CPT | Mod: LT,,, | Performed by: ANESTHESIOLOGY

## 2018-05-29 RX ORDER — SODIUM CHLORIDE 9 MG/ML
500 INJECTION, SOLUTION INTRAVENOUS CONTINUOUS
Status: DISCONTINUED | OUTPATIENT
Start: 2018-05-29 | End: 2018-05-29 | Stop reason: HOSPADM

## 2018-05-29 RX ORDER — DEXAMETHASONE SODIUM PHOSPHATE 10 MG/ML
INJECTION INTRAMUSCULAR; INTRAVENOUS
Status: DISCONTINUED | OUTPATIENT
Start: 2018-05-29 | End: 2018-05-29 | Stop reason: HOSPADM

## 2018-05-29 RX ORDER — FENTANYL CITRATE 50 UG/ML
INJECTION, SOLUTION INTRAMUSCULAR; INTRAVENOUS
Status: DISCONTINUED | OUTPATIENT
Start: 2018-05-29 | End: 2018-05-29 | Stop reason: HOSPADM

## 2018-05-29 RX ORDER — LIDOCAINE HYDROCHLORIDE 10 MG/ML
INJECTION INFILTRATION; PERINEURAL
Status: DISCONTINUED | OUTPATIENT
Start: 2018-05-29 | End: 2018-05-29 | Stop reason: HOSPADM

## 2018-05-29 RX ORDER — BUPIVACAINE HYDROCHLORIDE 2.5 MG/ML
INJECTION, SOLUTION EPIDURAL; INFILTRATION; INTRACAUDAL
Status: DISCONTINUED | OUTPATIENT
Start: 2018-05-29 | End: 2018-05-29 | Stop reason: HOSPADM

## 2018-05-29 RX ORDER — MIDAZOLAM HYDROCHLORIDE 1 MG/ML
INJECTION INTRAMUSCULAR; INTRAVENOUS
Status: DISCONTINUED | OUTPATIENT
Start: 2018-05-29 | End: 2018-05-29 | Stop reason: HOSPADM

## 2018-05-29 RX ADMIN — BUPIVACAINE HYDROCHLORIDE 5 ML: 2.5 INJECTION, SOLUTION EPIDURAL; INFILTRATION; INTRACAUDAL; PERINEURAL at 10:05

## 2018-05-29 RX ADMIN — FENTANYL CITRATE 50 MCG: 50 INJECTION, SOLUTION INTRAMUSCULAR; INTRAVENOUS at 10:05

## 2018-05-29 RX ADMIN — MIDAZOLAM HYDROCHLORIDE 2 MG: 1 INJECTION INTRAMUSCULAR; INTRAVENOUS at 10:05

## 2018-05-29 RX ADMIN — SODIUM CHLORIDE 500 ML: 9 INJECTION, SOLUTION INTRAVENOUS at 09:05

## 2018-05-29 RX ADMIN — LIDOCAINE HYDROCHLORIDE 5 ML: 10 INJECTION, SOLUTION INFILTRATION; PERINEURAL at 10:05

## 2018-05-29 RX ADMIN — DEXAMETHASONE SODIUM PHOSPHATE 10 MG: 10 INJECTION, SOLUTION INTRAMUSCULAR; INTRAVENOUS at 10:05

## 2018-05-29 NOTE — OP NOTE
Patient Name: Fabiano Malik Jr.  MRN: [id number]    INFORMED CONSENT: The procedure, risks, benefits and options were discussed with patient. There are no contraindications to the procedure. The patient expressed understanding and agreed to proceed. The personnel performing the procedure was discussed. I verify that I personally obtained Fabiano's consent prior to the start of the procedure and the signed consent can be found on the patient's chart.    Procedure Date: 05/29/2018    Anesthesia: Topical    Pre Procedure diagnosis: Facet arthropathy, lumbar [M46.96]  Facet hypertrophy of lumbar region [M47.896]  1. Spondylosis without myelopathy    2. Osteoarthritis of spine, unspecified spinal osteoarthritis complication status, unspecified spinal region    3. Chronic pain      Post-Procedure diagnosis: SAME      Moderate Sedation: Yes - Fentanyl 100 mcg and Midazolam 2 mg    PROCEDURE: left L2-3-4-5 FACET MEDIAL BRANCH NERVE RADIOFREQUENCY NEUROTOMY (lumbar)          DESCRIPTION OF PROCEDURE: The patient was brought to the procedure room.  After performing time out IV access was obtained prior to the procedure. The patient was positioned prone on the fluoroscopy table. Continuous hemodynamic monitoring was initiated including blood pressure and pulse oximetry. IV sedation was administered incrementally to allow the patient to remain comfortable and conversant throughout the procedure. The area of the lumbar spine was prepped chlorhexidine three times and draped into a sterile field.  Fluoroscopy was used to identify the location of the left side L2, L3, L4, and L5 medial branch nerves at the junctions of the superior articular process and the transverse processes of L3, L4, L5, and the sacral ala respectively.  Skin anesthesia was achieved using 3 cc of Lidocaine 1% over the injection sites. A 18 gauge, 150mm (10mm active tip) curved RF needle was slowly inserted at each level using AP, lateral and oblique  fluoroscopic imaging. Negative aspiration for blood or CSF was confirmed.  Sensory stimulation at 50Hz below 0.5V was achieved at every level. Motor stimulation at 2Hz up to 1.5V did not cause any radicular symptoms at any level. Each level was anesthetized with 1.5 cc of lidocaine 1%.  Radiofrequency lesioning was performed for 90 seconds at 80 degrees in two different positions at each level.  Total of 5 cc of bupivacaine 0.25% and 10 mg of Decadron was injected was injected at all levels.. The needles were removed and bleeding was nil.  A sterile dressing was applied. Fabiano was taken back to the recovery room for further observation.     Stimulation Results:  L2 = Sensory positive @ 0.4, Motor negative @ 1.5  L3 = Sensory positive @ 0.5, Motor negative @ 1.5  L4 = Sensory positive @ 0.4, Motor negative @ 1.5  L5 = Sensory positive @ 0.8, Motor negative @ 1.5    Blood Loss: Nill  Specimen: None    Donavon Dennis MD

## 2018-05-29 NOTE — DISCHARGE SUMMARY
Discharge Note  Short Stay      SUMMARY     Admit Date: 5/29/2018    Attending Physician: Donavon Dennis      Discharge Physician: Donavon Dennis      Discharge Date: 5/29/2018 2:20 PM    Procedure(s) (LRB):  RADIOFREQUENCY THERMOCOAGULATION (RFTC)-NERVE-MEDIAN BRANCH-LUMBAR- Left L2-3-4-5 (Left)    Final Diagnosis: Facet arthropathy, lumbar [M46.96]  Facet hypertrophy of lumbar region [M47.896]    Disposition: Home or self care    Patient Instructions:   Discharge Medication List as of 5/28/2018  9:07 AM      START taking these medications    Details   clonazePAM (KLONOPIN) 1 MG tablet TK 1 T PO TID, Historical Med      DULoxetine (CYMBALTA) 60 MG capsule TK 2 CS PO QAM, Historical Med      meloxicam (MOBIC) 15 MG tablet TK 1 T PO D WITH BREAKFAST, Historical Med      metformin (GLUCOPHAGE) 1000 MG tablet Take 1,000 mg by mouth 2 (two) times daily with meals., Until Discontinued, Historical Med      nabumetone (RELAFEN) 750 MG tablet Take 750 mg by mouth 2 (two) times daily., Until Discontinued, Historical Med      omeprazole (PRILOSEC) 20 MG capsule Take 20 mg by mouth once daily., Until Discontinued, Historical Med      pravastatin (PRAVACHOL) 40 MG tablet Take 40 mg by mouth once daily., Until Discontinued, Historical Med      pregabalin (LYRICA) 200 MG Cap Take 1 capsule (200 mg total) by mouth 2 (two) times daily., Starting Tue 5/22/2018, Until Thu 6/21/2018, Normal      QUEtiapine (SEROQUEL) 400 MG tablet Starting Mon 1/8/2018, Historical Med      FLUVIRIN [id number] 45 mcg (15 mcg x 3)/0.5 mL Susp ADM 0.5ML IM UTD, Historical Med         CONTINUE these medications which have NOT CHANGED    Details   fluoxetine (PROZAC) 20 MG capsule Take 1 capsule (20 mg total) by mouth once daily. Take 1/2 table for 3 days then take 1 tablet by mouth daily starting on 7/30/16, Starting 7/30/2016, Until Sun 7/30/17, Print                 Discharge Diagnosis: Facet arthropathy, lumbar [M46.96]  Facet hypertrophy of lumbar  region [M47.896]  Condition on Discharge: Stable with no complications to procedure   Diet on Discharge: Same as before.  Activity: as per instruction sheet.  Discharge to: Home with a responsible adult.  Follow up: 2-4 weeks

## 2018-05-31 ENCOUNTER — SURGERY (OUTPATIENT)
Age: 58
End: 2018-05-31

## 2018-05-31 ENCOUNTER — ANESTHESIA (OUTPATIENT)
Dept: ENDOSCOPY | Facility: HOSPITAL | Age: 58
End: 2018-05-31
Payer: MEDICARE

## 2018-05-31 ENCOUNTER — ANESTHESIA EVENT (OUTPATIENT)
Dept: ENDOSCOPY | Facility: HOSPITAL | Age: 58
End: 2018-05-31
Payer: MEDICARE

## 2018-05-31 ENCOUNTER — HOSPITAL ENCOUNTER (OUTPATIENT)
Facility: HOSPITAL | Age: 58
Discharge: HOME | End: 2018-05-31
Attending: INTERNAL MEDICINE | Admitting: INTERNAL MEDICINE
Payer: MEDICARE

## 2018-05-31 DIAGNOSIS — K63.5 POLYP OF COLON, UNSPECIFIED PART OF COLON, UNSPECIFIED TYPE: ICD-10-CM

## 2018-05-31 DIAGNOSIS — Z12.11 ENCOUNTER FOR SCREENING COLONOSCOPY: Primary | ICD-10-CM

## 2018-05-31 LAB — GLUCOSE SERPL-MCNC: 206 MG/DL (ref 70–110)

## 2018-05-31 PROCEDURE — 37000008 HC ANESTHESIA 1ST 15 MINUTES: Performed by: INTERNAL MEDICINE

## 2018-05-31 PROCEDURE — 25000003 PHARM REV CODE 250: Performed by: INTERNAL MEDICINE

## 2018-05-31 PROCEDURE — 45385 COLONOSCOPY W/LESION REMOVAL: CPT | Performed by: INTERNAL MEDICINE

## 2018-05-31 PROCEDURE — 27201089 HC SNARE, DISP (ANY): Performed by: INTERNAL MEDICINE

## 2018-05-31 PROCEDURE — 63600175 PHARM REV CODE 636 W HCPCS: Performed by: NURSE ANESTHETIST, CERTIFIED REGISTERED

## 2018-05-31 PROCEDURE — 37000009 HC ANESTHESIA EA ADD 15 MINS: Performed by: INTERNAL MEDICINE

## 2018-05-31 PROCEDURE — 45380 COLONOSCOPY AND BIOPSY: CPT | Performed by: INTERNAL MEDICINE

## 2018-05-31 PROCEDURE — 88305 TISSUE EXAM BY PATHOLOGIST: CPT | Mod: 26,,, | Performed by: PATHOLOGY

## 2018-05-31 PROCEDURE — 88305 TISSUE EXAM BY PATHOLOGIST: CPT | Mod: 59 | Performed by: PATHOLOGY

## 2018-05-31 PROCEDURE — 82962 GLUCOSE BLOOD TEST: CPT | Performed by: INTERNAL MEDICINE

## 2018-05-31 PROCEDURE — 27201012 HC FORCEPS, HOT/COLD, DISP: Performed by: INTERNAL MEDICINE

## 2018-05-31 RX ORDER — PROPOFOL 10 MG/ML
VIAL (ML) INTRAVENOUS CONTINUOUS PRN
Status: DISCONTINUED | OUTPATIENT
Start: 2018-05-31 | End: 2018-05-31

## 2018-05-31 RX ORDER — PROPOFOL 10 MG/ML
VIAL (ML) INTRAVENOUS
Status: DISCONTINUED | OUTPATIENT
Start: 2018-05-31 | End: 2018-05-31

## 2018-05-31 RX ORDER — SODIUM CHLORIDE 9 MG/ML
INJECTION, SOLUTION INTRAVENOUS CONTINUOUS
Status: DISCONTINUED | OUTPATIENT
Start: 2018-05-31 | End: 2018-05-31 | Stop reason: HOSPADM

## 2018-05-31 RX ORDER — LIDOCAINE HCL/PF 100 MG/5ML
SYRINGE (ML) INTRAVENOUS
Status: DISCONTINUED | OUTPATIENT
Start: 2018-05-31 | End: 2018-05-31

## 2018-05-31 RX ORDER — LIDOCAINE HYDROCHLORIDE 10 MG/ML
1 INJECTION, SOLUTION EPIDURAL; INFILTRATION; INTRACAUDAL; PERINEURAL ONCE
Status: DISCONTINUED | OUTPATIENT
Start: 2018-05-31 | End: 2018-05-31 | Stop reason: HOSPADM

## 2018-05-31 RX ADMIN — PROPOFOL 100 MG: 10 INJECTION, EMULSION INTRAVENOUS at 08:05

## 2018-05-31 RX ADMIN — PROPOFOL 100 MG: 10 INJECTION, EMULSION INTRAVENOUS at 09:05

## 2018-05-31 RX ADMIN — LIDOCAINE HYDROCHLORIDE 100 MG: 20 INJECTION, SOLUTION INTRAVENOUS at 08:05

## 2018-05-31 RX ADMIN — PROPOFOL 160 MCG/KG/MIN: 10 INJECTION, EMULSION INTRAVENOUS at 08:05

## 2018-05-31 RX ADMIN — SODIUM CHLORIDE: 0.9 INJECTION, SOLUTION INTRAVENOUS at 08:05

## 2018-05-31 NOTE — ANESTHESIA PREPROCEDURE EVALUATION
05/31/2018  Fabiano Malik Jr. is a 57 y.o., male here for screening colonoscopy..  BMI 34.  DM type 2.  Schizophrenia.   Past Surgical History:   Procedure Laterality Date    APPENDECTOMY      NECK SURGERY      RADIOFREQUENCY ABLATION OF LUMBAR MEDIAL BRANCH NERVE AT SINGLE LEVEL Left 5/29/2018    Procedure: RADIOFREQUENCY THERMOCOAGULATION (RFTC)-NERVE-MEDIAN BRANCH-LUMBAR- Left L2-3-4-5;  Surgeon: Donavon Dennis MD;  Location: [facility];  Service: Pain Management;  Laterality: Left;       Anesthesia Evaluation         Review of Systems      Physical Exam  General:  Well nourished, Obesity    Airway/Jaw/Neck:  Airway Findings: Mouth Opening: Normal Tongue: Normal  General Airway Assessment: Adult  Mallampati: IV  TM Distance: Normal, at least 6 cm  Jaw/Neck Findings:     Neck ROM: Normal ROM      Dental:  Dental Findings: In tact   Chest/Lungs:  Chest/Lungs Findings: Clear to auscultation, Normal Respiratory Rate     Heart/Vascular:  Heart Findings: Rate: Normal  Rhythm: Regular Rhythm        Mental Status:  Mental Status Findings:  Cooperative, Alert and Oriented         Anesthesia Plan  Type of Anesthesia, risks & benefits discussed:  Anesthesia Type:  MAC  Patient's Preference: MAC  Intra-op Monitoring Plan:   Intra-op Monitoring Plan Comments:   Post Op Pain Control Plan:   Post Op Pain Control Plan Comments:   Induction:   IV  Beta Blocker:  Patient is not currently on a Beta-Blocker (No further documentation required).       Informed Consent: Patient understands risks and agrees with Anesthesia plan.  Questions answered. Anesthesia consent signed with patient.  ASA Score: 2     Day of Surgery Review of History & Physical:    H&P update referred to the provider.         Ready For Surgery From Anesthesia Perspective.

## 2018-05-31 NOTE — [employer] PATIENT INSTRUCTIONS
Discharge Summary/Instructions after an Endoscopic Procedure  Patient Name: Fabiano Malik  Patient MRN: [id number]  Patient YOB: 1960  Thursday, May 31, 2018  Guillaume Hatch MD  RESTRICTIONS:  During your procedure today, you received medications for sedation.  These   medications may affect your judgment, balance and coordination.  Therefore,   for 24 hours, you have the following restrictions:   - DO NOT drive a car, operate machinery, make legal/financial decisions,   sign important papers or drink alcohol.    ACTIVITY:  The following day: return to full activity including work, except no heavy   lifting, straining or running for 3 days if polyps were removed.  DIET:  Eat and drink normally unless instructed otherwise.     TREATMENT FOR COMMON SIDE EFFECTS:  - Mild abdominal pain, nausea, belching, bloating or excessive gas:  rest,   eat lightly and use a heating pad.  - Sore Throat: treat with throat lozenges and/or gargle with warm salt   water.  - Because air was used during the procedure, expelling large amounts of air   from your rectum or belching is normal.  - If a bowel prep was taken, you may not have a bowel movement for 1-3 days.    This is normal.  SYMPTOMS TO WATCH FOR AND REPORT TO YOUR PHYSICIAN:  1. Abdominal pain or bloating, other than gas cramps.  2. Chest pain.  3. Back pain.  4. Signs of infection such as: chills or fever occurring within 24 hours   after the procedure.  5. Rectal bleeding, which would show as bright red, maroon, or black stools.   (A tablespoon of blood from the rectum is not serious, especially if   hemorrhoids are present.)  6. Vomiting.  7. Weakness or dizziness.  GO DIRECTLY TO THE NEAREST EMERGENCY ROOM IF YOU HAVE ANY OF THE FOLLOWING:      Difficulty breathing              Chills and/or fever over 101 F   Persistent vomiting and/or vomiting blood   Severe abdominal pain   Severe chest pain   Black, tarry stools   Bleeding- more than one  tablespoon   Any other symptom or condition that you feel may need urgent attention  Your doctor recommends these additional instructions:  If any biopsies were taken, your doctors clinic will contact you in 1 to 2   weeks with any results.  Repeat colonoscopy in 3-5 years if one or more polyps are adenomatous   Condition stable   Resume previous diet   The signs and symptoms of potential delayed complications were discussed   with the patient. If signs or symptoms of these complications develop, call   the [facility] at 1 [phone].   Return to normal activities tomorrow.  Written discharge instructions were   provided to the patient.  Discharge patient to home  For questions, problems or results please call your physician - Guillaume Hatch MD at Work:  [phone].  EMERGENCY PHONE NUMBER: [phone],  LAB RESULTS: [phone]  IF A COMPLICATION OR EMERGENCY SITUATION ARISES AND YOU ARE UNABLE TO REACH   YOUR PHYSICIAN - GO DIRECTLY TO THE EMERGENCY ROOM.  MD Guillaume Franco MD  5/31/2018 9:13:56 AM  This report has been verified and signed electronically.  [employer]

## 2018-05-31 NOTE — ANESTHESIA POSTPROCEDURE EVALUATION
Anesthesia Post Evaluation    Patient: Fabiano Malik Jr.    Procedure(s) Performed: Procedure(s) (LRB):  COLONOSCOPY (N/A)    Final Anesthesia Type: MAC  Patient location during evaluation: GI PACU  Patient participation: Yes- Able to Participate  Level of consciousness: awake and alert  Post-procedure vital signs: reviewed and stable  Pain management: adequate  Airway patency: patent  PONV status at discharge: No PONV  Anesthetic complications: no      Cardiovascular status: blood pressure returned to baseline and hemodynamically stable  Respiratory status: unassisted, spontaneous ventilation and room air  Hydration status: euvolemic  Follow-up needed         Visit Vitals  /80 (Patient Position: Lying)   Pulse 75   Temp 36.8 °C (98.2 °F) (Oral)   Resp 18   Ht 6' (1.829 m)   Wt 113.4 kg (250 lb)   SpO2 95%   BMI 33.91 kg/m²       Pain/Pauline Score: Pain Assessment Performed: Yes (5/31/2018  8:12 AM)  Presence of Pain: complains of pain/discomfort (5/31/2018  8:12 AM)  Pain Rating Prior to Med Admin: 8 (5/31/2018  8:12 AM)  Pain Rating Post Med Admin: 8 (5/31/2018  8:12 AM)

## 2018-05-31 NOTE — H&P
I have reviewed the below H and P and agree.  There are no changes.    [facility]     CC: screening colonoscopy     HPI 57 y.o. male with past medical history of HTN who presents for evaluation for a screening colonoscopy.  He denies melena, hematochezia and changes in his bowel patterns.  No family history of colon cancer.  He has never had a colonoscopy before.                Past Medical History:   Diagnosis Date    Chronic back pain greater than 3 months duration      Depression      Diabetes mellitus      Hypertension      Schizophrenia              Review of Systems  General ROS: negative for chills, fever or weight loss  Cardiovascular ROS: no chest pain or dyspnea on exertion  Gastrointestinal ROS: no abdominal pain, change in bowel habits, or black/ bloody stools     Physical Examination  /81   Pulse 97   Temp 99.2 °F (37.3 °C) (Oral)   Ht 6' (1.829 m)   Wt 115.5 kg (254 lb 11.9 oz)   BMI 34.55 kg/m²   General appearance: alert, cooperative, no distress  HENT: Normocephalic, atraumatic, neck symmetrical, no nasal discharge   Lungs: clear to auscultation bilaterally, no dullness to percussion bilaterally  Heart: regular rate and rhythm without rub; no displacement of the PMI   Abdomen: soft, non-tender; bowel sounds normoactive; no organomegaly  Extremities: extremities symmetric; no clubbing, cyanosis, or edema  Neurologic: Alert and oriented X 3, normal strength, normal coordination and gait     Labs:  H/H 11.9/34.8  Plt 158        Assessment: The patient is a 58 yo man with past medical history of HTN who presents today for evaluation for a screening colonoscopy.     Plan:  Scheduled screening colonoscopy for May 31, 2018     A prescription for bowel prep was sent to his pharmacy.     Patient of Dr. Hernesto Hatch MD   [address] [place] [address], [address]   [place] [address] [phone]

## 2018-05-31 NOTE — TRANSFER OF CARE
Anesthesia Transfer of Care Note    Patient: Fabiano Malik Jr.    Procedure(s) Performed: Procedure(s) (LRB):  COLONOSCOPY (N/A)    Patient location: GI    Anesthesia Type: MAC    Transport from OR: Transported from OR on room air with adequate spontaneous ventilation    Post pain: adequate analgesia    Post assessment: no apparent anesthetic complications and tolerated procedure well    Post vital signs: stable    Level of consciousness: awake, alert and oriented    Nausea/Vomiting: no nausea/vomiting    Complications: none    Transfer of care protocol was followed      Last vitals:   Visit Vitals  /80 (Patient Position: Lying)   Pulse 75   Temp 36.8 °C (98.2 °F) (Oral)   Resp 18   Ht 6' (1.829 m)   Wt 113.4 kg (250 lb)   SpO2 95%   BMI 33.91 kg/m²

## 2018-06-01 VITALS
OXYGEN SATURATION: 98 % | WEIGHT: 250 LBS | SYSTOLIC BLOOD PRESSURE: 148 MMHG | HEART RATE: 64 BPM | BODY MASS INDEX: 33.86 KG/M2 | RESPIRATION RATE: 10 BRPM | TEMPERATURE: 98 F | DIASTOLIC BLOOD PRESSURE: 86 MMHG | HEIGHT: 72 IN

## 2018-06-04 ENCOUNTER — TELEPHONE (OUTPATIENT)
Dept: NEUROLOGY | Facility: HOSPITAL | Age: 58
End: 2018-06-04

## 2018-06-04 NOTE — TELEPHONE ENCOUNTER
I called to review his pathology but could not reach him; left a voicemail.  He has multiple (at least 3) small TA polyps removed and should have his colonoscopy repeated in 3 years.

## 2018-06-13 ENCOUNTER — TELEPHONE (OUTPATIENT)
Dept: PAIN MEDICINE | Facility: CLINIC | Age: 58
End: 2018-06-13

## 2018-06-13 NOTE — TELEPHONE ENCOUNTER
----- Message from Nataly Loza sent at 6/13/2018  2:57 PM CDT -----  Contact: Self [phone]  Patient Returning Your Phone Call

## 2018-06-13 NOTE — TELEPHONE ENCOUNTER
Spoke with pt regarding his upcoming appt. Pt was informed his appt was rescheduled from 6/19/18 to 6/21/18. Pt verbalized understanding and confirmed appt date and time.

## 2018-06-21 ENCOUNTER — TELEPHONE (OUTPATIENT)
Dept: PAIN MEDICINE | Facility: CLINIC | Age: 58
End: 2018-06-21

## 2018-06-22 NOTE — PROGRESS NOTES
Chronic patient Established Note (Follow up visit)      SUBJECTIVE:    Fabiano Malik Jr. presents to the clinic for a 4 wk follow-up appointment for neck and low back pain. He is S/P  left L2-3-4-5 FACET MEDIAL BRANCH NERVE RADIOFREQUENCY NEUROTOMY (lumbar) on 18 and  RIGHT L2-3-4-5 FACET MEDIAL BRANCH NERVE RADIOFREQUENCY NEUROTOMY on 18 with 70% relief for 2 weeks, pt states pain is back but overall better since procedure, I will order PT and refill Mobic and Lyrica today.  Since the last visit, Fabiano Mlaik Jr. states the pain has been persistant. Current pain intensity is 8/10.    Pain Disability Index Review:  Last 3 PDI Scores 2018   Pain Disability Index (PDI) 60 60 56       Pain Medications:     - Opioids: None  - Adjuvant Medications: Cymbalta ( Duloxetine) and Lyrica ( Pregabalin)  - Anti-Coagulants: None  - Others: See med list     Opioid Contract: no      report:  Reviewed and consistent with medication use as prescribed.     Pain Procedures: 18  left L2-3-4-5 FACET MEDIAL BRANCH NERVE RADIOFREQUENCY NEUROTOMY (lumbar)    18 RIGHT L2-3-4-5 FACET MEDIAL BRANCH NERVE RADIOFREQUENCY NEUROTOMY  18 bilateral L2-3-4-5 LUMBAR FACET MEDIAL BRANCH NERVE BLOCK   18 TRIGGER POINT INJECTION -75% relief  [place]     Physical Therapy/Home Exercise: no     Imagin18 X-Ray Hips Bilateral 2 View Incl AP Pelvis     Narrative      The AP pelvis, AP and lateral views both hips, 5 total views.  Dextroscoliosis, pelvis left hemipelvis.  Minimal mild DJD hip joints.  No fracture dislocation.   Impression       See results above.      Electronically signed by: NICKY DOYLE MD  Date: 18  Time: 15:08     Encounter      View Encounter          Signed by      Signed Credentials Date/Time   Phone Pager   HALLE DOYLE MD 2018 15:08 [phone]   Reviewed By      Donavon Dennis MD  on 1/18/2018 15:56   Exam Details      Performed Procedure Technologist Supporting Staff Performing Physician   X-Ray Hips Bilateral 2 View Inc AP Pelvis Alex Keesha           Appointment Date/Status Modality Department     1/18/2018     Completed [facility] XR1 [facility] XRAY OP         Begin Exam End Exam   End Exam Questionnaires   1/18/2018  2:27 PM 1/18/2018  2:53 PM   IMAGING END ALL       Reason For Exam   Priority: Routine   Dx: Myositis, unspecified myositis type, unspecified site [M60.9 (ICD-10-CM)]; DDD (degenerative disc disease), lumbar [M51.36 (ICD-10-CM)]; DDD (degenerative disc disease), cervical [M50.30 (ICD-10-CM)]; Radiculopathy of thoracolumbar region [M54.15 (ICD-10-CM)]   Order Report       Order Details      1/18/18 X-Ray Cervical Spine Complete 5 view     Narrative      Cervical spine with obliques.  There is fusion of the C4 and C5 vertebral bodies.  Prominent osteophytes C3-C7.  Mild neural foraminal narrowing on the right at the C5-6 level.  The left oblique is suboptimal in position making it difficult to evaluate the neural foramen.   Impression       See above      Electronically signed by: TAMMY BALL MD  Date: 01/18/18  Time: 15:49     Encounter      View Encounter          Signed by      Signed Credentials Date/Time   Phone Pager   TAMMY BALL JR, MD 1/18/2018 15:49 [phone]   Reviewed By      Donavon Dennis MD on 1/18/2018 15:55   Exam Details      Performed Procedure Technologist Supporting Staff Performing Physician   X-Ray Cervical Spine Complete 5 view Alex Thao           Appointment Date/Status Modality Department     1/18/2018     Completed [facility] XR1 [facility] XRAY OP         Begin Exam End Exam   End Exam Questionnaires   1/18/2018  2:27 PM 1/18/2018  2:53 PM   IMAGING END ALL       Reason For Exam   Priority: Routine   Dx: Myositis, unspecified myositis type, unspecified site [M60.9 (ICD-10-CM)]; DDD (degenerative disc disease), lumbar [M51.36 (ICD-10-CM)]; DDD  (degenerative disc disease), cervical [M50.30 (ICD-10-CM)]; Radiculopathy of thoracolumbar region [M54.15 (ICD-10-CM)]   Order Report       Order Details      1/18/18 X-Ray Lumbar Spine Complete 5 View     Narrative      Lumbar spine 5 views, mild dextroscoliosis, bridging osteophytes lower dorsal lumbar spine,, straightening thoracic lumbar relationship on lateral view.  No fracture subluxation.          Allergies:   Review of patient's allergies indicates:   Allergen Reactions    Pcn [penicillins] Anaphylaxis       Current Medications:   Current Outpatient Prescriptions   Medication Sig Dispense Refill    clonazePAM (KLONOPIN) 1 MG tablet TK 1 T PO TID  3    DULoxetine (CYMBALTA) 60 MG capsule TK 2 CS PO QAM  1    FLUVIRIN [id number] 45 mcg (15 mcg x 3)/0.5 mL Susp ADM 0.5ML IM UTD  0    meloxicam (MOBIC) 15 MG tablet TK 1 T PO D WITH BREAKFAST  2    metformin (GLUCOPHAGE) 1000 MG tablet Take 1,000 mg by mouth 2 (two) times daily with meals.      nabumetone (RELAFEN) 750 MG tablet Take 750 mg by mouth 2 (two) times daily.      omeprazole (PRILOSEC) 20 MG capsule Take 20 mg by mouth once daily.      pravastatin (PRAVACHOL) 40 MG tablet Take 40 mg by mouth once daily.      QUEtiapine (SEROQUEL) 400 MG tablet       fluoxetine (PROZAC) 20 MG capsule Take 1 capsule (20 mg total) by mouth once daily. Take 1/2 table for 3 days then take 1 tablet by mouth daily starting on 7/30/16 30 capsule 11    pregabalin (LYRICA) 200 MG Cap Take 1 capsule (200 mg total) by mouth 2 (two) times daily. 60 capsule 0     No current facility-administered medications for this visit.        REVIEW OF SYSTEMS:    GENERAL:  No weight loss, malaise or fevers.  HEENT:  Negative for frequent or significant headaches.  NECK:  Negative for lumps, goiter, pain and significant neck swelling.  RESPIRATORY:  Negative for cough, wheezing or shortness of breath.  CARDIOVASCULAR:  Negative for chest pain, leg swelling or palpitations.  GI:  " Negative for abdominal discomfort, blood in stools or black stools or change in bowel habits.  MUSCULOSKELETAL:  See HPI.  SKIN:  Negative for lesions, rash, and itching.  PSYCH:  + for sleep disturbance, mood disorder and recent psychosocial stressors.  HEMATOLOGY/LYMPHOLOGY:  Negative for prolonged bleeding, bruising easily or swollen nodes.  NEURO:   No history of headaches, syncope, paralysis, seizures or tremors.  All other reviewed and negative other than HPI.    Past Medical History:  Past Medical History:   Diagnosis Date    Chronic back pain greater than 3 months duration     Depression     Diabetes mellitus     Hypertension     Schizophrenia        Past Surgical History:  Past Surgical History:   Procedure Laterality Date    APPENDECTOMY      COLONOSCOPY N/A 5/31/2018    Procedure: COLONOSCOPY;  Surgeon: Guillaume Hatch MD;  Location: [facility] ENDO;  Service: Endoscopy;  Laterality: N/A;    NECK SURGERY      RADIOFREQUENCY ABLATION OF LUMBAR MEDIAL BRANCH NERVE AT SINGLE LEVEL Left 5/29/2018    Procedure: RADIOFREQUENCY THERMOCOAGULATION (RFTC)-NERVE-MEDIAN BRANCH-LUMBAR- Left L2-3-4-5;  Surgeon: Donavon Dennis MD;  Location: [facility] PAIN MGT;  Service: Pain Management;  Laterality: Left;       Family History:  No family history on file.    Social History:  Social History     Social History    Marital status:      Spouse name: N/A    Number of children: N/A    Years of education: N/A     Social History Main Topics    Smoking status: Never Smoker    Smokeless tobacco: None    Alcohol use Yes      Comment: "couple of beers a day"    Drug use: No    Sexual activity: Not Asked     Other Topics Concern    None     Social History Narrative    None       OBJECTIVE:    BP (!) 127/91   Pulse 88   Wt 114.5 kg (252 lb 6.8 oz)   BMI 34.24 kg/m²     PHYSICAL EXAMINATION:    General appearance: Well appearing, in no acute distress, alert and oriented x3.  Psych:  Mood and affect " appropriate.  Skin: Skin color, texture, turgor normal, no rashes or lesions, in both upper and lower body.  Head/face:  Normocephalic, atraumatic. No palpable lymph nodes.  Neck: No pain to palpation over the cervical paraspinous muscles. Spurling Negative. No pain with neck flexion, extension, or lateral flexion.   Cor: RRR  Pulm: CTA  GI:  Soft and non-tender.  Back: Straight leg raising in the sitting and supine positions is positive to radicular pain. moderate pain to palpation over the spine or costovertebral angles. Normal range of motion without pain reproduction.  Extremities: Peripheral joint ROM is full and pain free without obvious instability or laxity in all four extremities. No deformities, edema, or skin discoloration. Good capillary refill.  Musculoskeletal: Shoulder, hip, sacroiliac and knee provocative maneuvers are negative. Bilateral upper and lower extremity strength is normal and symmetric.  No atrophy or tone abnormalities are noted. Positive axial loading test bilateral. Positive tenderness over both SIJ, Positive FABERE,Ganselin and Yeoman's test on the both side.negative FADIR  Neuro: Bilateral upper and lower extremity coordination and muscle stretch reflexes are physiologic and symmetric.  Plantar response are downgoing. No loss of sensation is noted.  Gait: antalgic    ASSESSMENT: 57 y.o. year old male with low back pain, consistent with       Diagnosis:    1. Spondylosis without myelopathy  Ambulatory consult to Physical Therapy    meloxicam (MOBIC) 7.5 MG tablet    DISCONTINUED: meloxicam (MOBIC) 7.5 MG tablet   2. Osteoarthritis of spine, unspecified spinal osteoarthritis complication status, unspecified spinal region  Ambulatory consult to Physical Therapy    meloxicam (MOBIC) 7.5 MG tablet    DISCONTINUED: meloxicam (MOBIC) 7.5 MG tablet   3. Chronic pain syndrome  Ambulatory consult to Physical Therapy    meloxicam (MOBIC) 7.5 MG tablet    DISCONTINUED: meloxicam (MOBIC) 7.5 MG  tablet   4. Facet arthropathy  Ambulatory consult to Physical Therapy    meloxicam (MOBIC) 7.5 MG tablet    DISCONTINUED: meloxicam (MOBIC) 7.5 MG tablet   5. Back pain, unspecified back location, unspecified back pain laterality, unspecified chronicity  Ambulatory consult to Physical Therapy    meloxicam (MOBIC) 7.5 MG tablet    DISCONTINUED: meloxicam (MOBIC) 7.5 MG tablet   6. Lumbar spondylolysis     7. DDD (degenerative disc disease), lumbar           PLAN:     - I have stressed the importance of physical activity and a home exercise plan to help with pain and improve health.  - Patient can continue with medications for now since they are providing benefits, using them appropriately, and without side effects.  - Counseled patient regarding the importance of activity modification, constant sleeping habits and physical therapy.  -Referral to PT   -Rx of Meloxicam 15 mg daily PRN at breakfast  - Rx Lyrica 200 mg BID   -RTC as needed for returning or new pain  -The above plan and management options were discussed at length with patient. Patient is in agreement with the above and verbalized understanding. Dr. Dennis   was consulted on this patient  and agrees with this plan.      Joseph Shafer NP-C  Interventional Pain Management      06/26/2018

## 2018-06-26 ENCOUNTER — OFFICE VISIT (OUTPATIENT)
Dept: PAIN MEDICINE | Facility: CLINIC | Age: 58
End: 2018-06-26
Payer: MEDICARE

## 2018-06-26 VITALS
WEIGHT: 252.44 LBS | HEART RATE: 88 BPM | BODY MASS INDEX: 34.24 KG/M2 | DIASTOLIC BLOOD PRESSURE: 91 MMHG | SYSTOLIC BLOOD PRESSURE: 127 MMHG

## 2018-06-26 DIAGNOSIS — G89.4 CHRONIC PAIN SYNDROME: ICD-10-CM

## 2018-06-26 DIAGNOSIS — M54.9 BACK PAIN, UNSPECIFIED BACK LOCATION, UNSPECIFIED BACK PAIN LATERALITY, UNSPECIFIED CHRONICITY: ICD-10-CM

## 2018-06-26 DIAGNOSIS — M43.06 LUMBAR SPONDYLOLYSIS: ICD-10-CM

## 2018-06-26 DIAGNOSIS — M47.9 OSTEOARTHRITIS OF SPINE, UNSPECIFIED SPINAL OSTEOARTHRITIS COMPLICATION STATUS, UNSPECIFIED SPINAL REGION: ICD-10-CM

## 2018-06-26 DIAGNOSIS — M47.819 SPONDYLOSIS WITHOUT MYELOPATHY: Primary | ICD-10-CM

## 2018-06-26 DIAGNOSIS — M47.819 FACET ARTHROPATHY: ICD-10-CM

## 2018-06-26 DIAGNOSIS — M51.36 DDD (DEGENERATIVE DISC DISEASE), LUMBAR: ICD-10-CM

## 2018-06-26 PROCEDURE — 3080F DIAST BP >= 90 MM HG: CPT | Mod: CPTII,S$GLB,, | Performed by: NURSE PRACTITIONER

## 2018-06-26 PROCEDURE — 3074F SYST BP LT 130 MM HG: CPT | Mod: CPTII,S$GLB,, | Performed by: NURSE PRACTITIONER

## 2018-06-26 PROCEDURE — 99214 OFFICE O/P EST MOD 30 MIN: CPT | Mod: S$GLB,,, | Performed by: NURSE PRACTITIONER

## 2018-06-26 PROCEDURE — 99999 PR PBB SHADOW E&M-EST. PATIENT-LVL IV: CPT | Mod: PBBFAC,,, | Performed by: NURSE PRACTITIONER

## 2018-06-26 PROCEDURE — 3008F BODY MASS INDEX DOCD: CPT | Mod: CPTII,S$GLB,, | Performed by: NURSE PRACTITIONER

## 2018-06-26 RX ORDER — PREGABALIN 200 MG/1
200 CAPSULE ORAL 2 TIMES DAILY
Qty: 60 CAPSULE | Refills: 0 | Status: CANCELLED | OUTPATIENT
Start: 2018-06-26 | End: 2018-07-26

## 2018-06-26 RX ORDER — MELOXICAM 7.5 MG/1
7.5 TABLET ORAL
Qty: 30 TABLET | Refills: 2 | Status: SHIPPED | OUTPATIENT
Start: 2018-06-26 | End: 2018-06-26 | Stop reason: CLARIF

## 2018-06-26 RX ORDER — MELOXICAM 15 MG/1
7.5 TABLET ORAL DAILY PRN
Qty: 90 TABLET | Refills: 2 | Status: ON HOLD | OUTPATIENT
Start: 2018-06-26 | End: 2019-02-17 | Stop reason: SDUPTHER

## 2018-06-26 RX ORDER — PREGABALIN 200 MG/1
200 CAPSULE ORAL 2 TIMES DAILY
Qty: 60 CAPSULE | Refills: 0 | Status: SHIPPED | OUTPATIENT
Start: 2018-06-26 | End: 2018-11-28 | Stop reason: ALTCHOICE

## 2018-06-26 RX ORDER — MELOXICAM 7.5 MG/1
7.5 TABLET ORAL
Qty: 30 TABLET | Refills: 2 | Status: SHIPPED | OUTPATIENT
Start: 2018-06-26 | End: 2019-02-12 | Stop reason: SDUPTHER

## 2018-07-09 ENCOUNTER — TELEPHONE (OUTPATIENT)
Dept: NEUROLOGY | Facility: HOSPITAL | Age: 58
End: 2018-07-09

## 2018-07-09 NOTE — TELEPHONE ENCOUNTER
----- Message from Karina Murcia sent at 7/9/2018  1:50 PM CDT -----  Contact: Ally  GI- Patient has a question in regards to his medication polyethylene gly clay. Call patient at [phone].

## 2018-07-09 NOTE — TELEPHONE ENCOUNTER
----- Message from Karina Murcia sent at 7/9/2018  4:05 PM CDT -----  Contact: Patient  GI-Patient wants to know if a prescription was called into [facility] on [facility]. Call back number is [phone].

## 2018-07-09 NOTE — TELEPHONE ENCOUNTER
Pt notified Nulytely colon prep for pre colonoscopy only.  One time dispense.  Pt acknowledged understanding.

## 2018-07-19 ENCOUNTER — TELEPHONE (OUTPATIENT)
Dept: NEUROLOGY | Facility: HOSPITAL | Age: 58
End: 2018-07-19

## 2018-07-19 NOTE — TELEPHONE ENCOUNTER
----- Message from Karina Murcia sent at 7/19/2018 10:20 AM CDT -----  Contact: Patient  GI-Patient is returning a missed call from the nurse.  Call back number is [phone]

## 2018-07-19 NOTE — TELEPHONE ENCOUNTER
Pt recently checked voice messages.  Pt notified this nurse spoke to him on July 9th.  Pt had no additional needs at this time.

## 2018-08-15 ENCOUNTER — TELEPHONE (OUTPATIENT)
Dept: NEUROLOGY | Facility: HOSPITAL | Age: 58
End: 2018-08-15

## 2018-08-15 ENCOUNTER — TELEPHONE (OUTPATIENT)
Dept: PAIN MEDICINE | Facility: CLINIC | Age: 58
End: 2018-08-15

## 2018-08-15 NOTE — TELEPHONE ENCOUNTER
----- Message from Xenia Mcintyre sent at 8/15/2018 10:39 AM CDT -----  Contact: patient  GI- Patient was wondering if Dr Hatch could recommend a Doctor for his hernia. His hernia is hurting and he really needs a doctor. Call back number [phone]

## 2018-08-15 NOTE — TELEPHONE ENCOUNTER
Staff contacted patient regarding his message. There was no answer, left a voicemail to contact the office to discuss his request further.         Hello, this is staff I've received your request I'm returning your call from the pain management clinic at [facility]. I just wanted to let you know that I'm working on getting an answer for you by see above. ( Please add all other notes here if needed.)

## 2018-08-15 NOTE — TELEPHONE ENCOUNTER
"----- Message from Griselda Forbesin sent at 8/15/2018 10:42 AM CDT -----  Contact: URBAN SANCHEZ JR. [[id number]]      Name of Who is Calling: URBAN SANCHEZ JR. [[id number]]      What is the request in detail:  Patient called requesting a call. Patient is asking, "when will it be time for his next injection?"    Please give a call back at your earliest convenience.      THANKS!      Can the clinic reply by [facility]: No      What Number to Call Back:  URBAN SANCHEZ JR. / # [phone]                                    "

## 2018-09-12 DIAGNOSIS — M54.9 BACK PAIN, UNSPECIFIED BACK LOCATION, UNSPECIFIED BACK PAIN LATERALITY, UNSPECIFIED CHRONICITY: ICD-10-CM

## 2018-09-12 DIAGNOSIS — M47.819 FACET ARTHROPATHY: ICD-10-CM

## 2018-09-12 DIAGNOSIS — M47.9 OSTEOARTHRITIS OF SPINE, UNSPECIFIED SPINAL OSTEOARTHRITIS COMPLICATION STATUS, UNSPECIFIED SPINAL REGION: ICD-10-CM

## 2018-09-12 DIAGNOSIS — M47.819 SPONDYLOSIS WITHOUT MYELOPATHY: ICD-10-CM

## 2018-09-12 DIAGNOSIS — G89.4 CHRONIC PAIN SYNDROME: ICD-10-CM

## 2018-09-12 RX ORDER — MELOXICAM 7.5 MG/1
TABLET ORAL
Qty: 30 TABLET | Refills: 0 | OUTPATIENT
Start: 2018-09-12

## 2018-11-12 ENCOUNTER — TELEPHONE (OUTPATIENT)
Dept: ADMINISTRATIVE | Facility: HOSPITAL | Age: 58
End: 2018-11-12

## 2018-11-12 DIAGNOSIS — Z13.5 ENCOUNTER FOR SCREENING FOR DIABETIC RETINOPATHY: Primary | ICD-10-CM

## 2018-11-28 ENCOUNTER — HOSPITAL ENCOUNTER (INPATIENT)
Facility: HOSPITAL | Age: 58
LOS: 2 days | Discharge: HOME | DRG: 312 | End: 2018-11-30
Attending: EMERGENCY MEDICINE | Admitting: INTERNAL MEDICINE
Payer: MEDICARE

## 2018-11-28 ENCOUNTER — OFFICE VISIT (OUTPATIENT)
Dept: INTERNAL MEDICINE | Facility: CLINIC | Age: 58
End: 2018-11-28
Payer: MEDICARE

## 2018-11-28 VITALS
HEART RATE: 124 BPM | SYSTOLIC BLOOD PRESSURE: 80 MMHG | DIASTOLIC BLOOD PRESSURE: 52 MMHG | OXYGEN SATURATION: 97 % | BODY MASS INDEX: 33.35 KG/M2 | HEIGHT: 72 IN | WEIGHT: 246.25 LBS

## 2018-11-28 DIAGNOSIS — R06.02 SOB (SHORTNESS OF BREATH): ICD-10-CM

## 2018-11-28 DIAGNOSIS — Z12.5 PROSTATE CANCER SCREENING: ICD-10-CM

## 2018-11-28 DIAGNOSIS — E87.20 LACTIC ACIDOSIS: ICD-10-CM

## 2018-11-28 DIAGNOSIS — R00.0 TACHYCARDIA: ICD-10-CM

## 2018-11-28 DIAGNOSIS — I15.2 HYPERTENSION ASSOCIATED WITH DIABETES: ICD-10-CM

## 2018-11-28 DIAGNOSIS — M54.40 CHRONIC BILATERAL LOW BACK PAIN WITH SCIATICA, SCIATICA LATERALITY UNSPECIFIED: Chronic | ICD-10-CM

## 2018-11-28 DIAGNOSIS — A41.9 SEPSIS: ICD-10-CM

## 2018-11-28 DIAGNOSIS — R79.89 TROPONIN LEVEL ELEVATED: Primary | ICD-10-CM

## 2018-11-28 DIAGNOSIS — R42 DIZZINESS: ICD-10-CM

## 2018-11-28 DIAGNOSIS — M54.9 CHRONIC BACK PAIN, UNSPECIFIED BACK LOCATION, UNSPECIFIED BACK PAIN LATERALITY: ICD-10-CM

## 2018-11-28 DIAGNOSIS — G89.29 CHRONIC BACK PAIN, UNSPECIFIED BACK LOCATION, UNSPECIFIED BACK PAIN LATERALITY: ICD-10-CM

## 2018-11-28 DIAGNOSIS — N28.9 KIDNEY DISEASE: ICD-10-CM

## 2018-11-28 DIAGNOSIS — J06.9 UPPER RESPIRATORY TRACT INFECTION, UNSPECIFIED TYPE: ICD-10-CM

## 2018-11-28 DIAGNOSIS — E86.0 DEHYDRATION: ICD-10-CM

## 2018-11-28 DIAGNOSIS — G89.29 CHRONIC BILATERAL LOW BACK PAIN WITH SCIATICA, SCIATICA LATERALITY UNSPECIFIED: Chronic | ICD-10-CM

## 2018-11-28 DIAGNOSIS — I95.9 HYPOTENSION, UNSPECIFIED HYPOTENSION TYPE: ICD-10-CM

## 2018-11-28 DIAGNOSIS — E11.9 TYPE 2 DIABETES MELLITUS WITHOUT COMPLICATION, WITHOUT LONG-TERM CURRENT USE OF INSULIN: ICD-10-CM

## 2018-11-28 DIAGNOSIS — I95.9 HYPOTENSION, UNSPECIFIED HYPOTENSION TYPE: Primary | ICD-10-CM

## 2018-11-28 DIAGNOSIS — N17.9 AKI (ACUTE KIDNEY INJURY): ICD-10-CM

## 2018-11-28 DIAGNOSIS — E66.9 CLASS 1 OBESITY WITH SERIOUS COMORBIDITY AND BODY MASS INDEX (BMI) OF 33.0 TO 33.9 IN ADULT, UNSPECIFIED OBESITY TYPE: ICD-10-CM

## 2018-11-28 DIAGNOSIS — I95.9 HYPOTENSION: ICD-10-CM

## 2018-11-28 DIAGNOSIS — E11.59 HYPERTENSION ASSOCIATED WITH DIABETES: ICD-10-CM

## 2018-11-28 DIAGNOSIS — E78.5 HYPERLIPIDEMIA, UNSPECIFIED HYPERLIPIDEMIA TYPE: ICD-10-CM

## 2018-11-28 DIAGNOSIS — Z00.00 PREVENTATIVE HEALTH CARE: ICD-10-CM

## 2018-11-28 PROBLEM — Z12.11 ENCOUNTER FOR SCREENING COLONOSCOPY: Status: RESOLVED | Noted: 2018-05-31 | Resolved: 2018-11-28

## 2018-11-28 PROBLEM — E66.811 CLASS 1 OBESITY WITH SERIOUS COMORBIDITY AND BODY MASS INDEX (BMI) OF 33.0 TO 33.9 IN ADULT: Status: ACTIVE | Noted: 2018-11-28

## 2018-11-28 LAB
ALBUMIN SERPL BCP-MCNC: 3.8 G/DL
ALP SERPL-CCNC: 58 U/L
ALT SERPL W/O P-5'-P-CCNC: 29 U/L
ANION GAP SERPL CALC-SCNC: 15 MMOL/L
AST SERPL-CCNC: 32 U/L
BASOPHILS # BLD AUTO: 0.01 K/UL
BASOPHILS NFR BLD: 0.2 %
BILIRUB SERPL-MCNC: 0.8 MG/DL
BILIRUB UR QL STRIP: NEGATIVE
BNP SERPL-MCNC: 27 PG/ML
BUN SERPL-MCNC: 13 MG/DL
CALCIUM SERPL-MCNC: 9.3 MG/DL
CHLORIDE SERPL-SCNC: 104 MMOL/L
CLARITY UR: CLEAR
CO2 SERPL-SCNC: 20 MMOL/L
COLOR UR: YELLOW
CREAT SERPL-MCNC: 2 MG/DL
DIFFERENTIAL METHOD: ABNORMAL
EOSINOPHIL # BLD AUTO: 0 K/UL
EOSINOPHIL NFR BLD: 0.5 %
ERYTHROCYTE [DISTWIDTH] IN BLOOD BY AUTOMATED COUNT: 13.2 %
EST. GFR  (AFRICAN AMERICAN): 42 ML/MIN/1.73 M^2
EST. GFR  (NON AFRICAN AMERICAN): 36 ML/MIN/1.73 M^2
ESTIMATED AVG GLUCOSE: 148 MG/DL
FLUAV AG SPEC QL IA: NEGATIVE
FLUBV AG SPEC QL IA: NEGATIVE
GLUCOSE SERPL-MCNC: 124 MG/DL
GLUCOSE UR QL STRIP: NEGATIVE
HBA1C MFR BLD HPLC: 6.8 %
HCT VFR BLD AUTO: 39.5 %
HGB BLD-MCNC: 13.8 G/DL
HGB UR QL STRIP: NEGATIVE
INR PPP: 1
KETONES UR QL STRIP: ABNORMAL
LACTATE SERPL-SCNC: 1 MMOL/L
LACTATE SERPL-SCNC: 3.3 MMOL/L
LEUKOCYTE ESTERASE UR QL STRIP: NEGATIVE
LIPASE SERPL-CCNC: 10 U/L
LYMPHOCYTES # BLD AUTO: 0.7 K/UL
LYMPHOCYTES NFR BLD: 11.2 %
MAGNESIUM SERPL-MCNC: 1.5 MG/DL
MCH RBC QN AUTO: 31.4 PG
MCHC RBC AUTO-ENTMCNC: 34.9 G/DL
MCV RBC AUTO: 90 FL
MONOCYTES # BLD AUTO: 0.4 K/UL
MONOCYTES NFR BLD: 7.1 %
NEUTROPHILS # BLD AUTO: 5 K/UL
NEUTROPHILS NFR BLD: 80.8 %
NITRITE UR QL STRIP: NEGATIVE
PH UR STRIP: 6 [PH] (ref 5–8)
PHOSPHATE SERPL-MCNC: 5.9 MG/DL
PLATELET # BLD AUTO: 209 K/UL
PMV BLD AUTO: 8.3 FL
POCT GLUCOSE: 74 MG/DL (ref 70–110)
POCT GLUCOSE: 76 MG/DL (ref 70–110)
POTASSIUM SERPL-SCNC: 4.8 MMOL/L
PROT SERPL-MCNC: 6.3 G/DL
PROT UR QL STRIP: ABNORMAL
PROTHROMBIN TIME: 10.7 SEC
RBC # BLD AUTO: 4.39 M/UL
SODIUM SERPL-SCNC: 139 MMOL/L
SP GR UR STRIP: 1.02 (ref 1–1.03)
SPECIMEN SOURCE: NORMAL
TROPONIN I SERPL DL<=0.01 NG/ML-MCNC: 0.11 NG/ML
TROPONIN I SERPL DL<=0.01 NG/ML-MCNC: 0.32 NG/ML
URN SPEC COLLECT METH UR: ABNORMAL
UROBILINOGEN UR STRIP-ACNC: NEGATIVE EU/DL
WBC # BLD AUTO: 6.16 K/UL

## 2018-11-28 PROCEDURE — 63600175 PHARM REV CODE 636 W HCPCS

## 2018-11-28 PROCEDURE — 85025 COMPLETE CBC W/AUTO DIFF WBC: CPT

## 2018-11-28 PROCEDURE — 96365 THER/PROPH/DIAG IV INF INIT: CPT

## 2018-11-28 PROCEDURE — 99999 PR PBB SHADOW E&M-EST. PATIENT-LVL IV: CPT | Mod: PBBFAC,,, | Performed by: INTERNAL MEDICINE

## 2018-11-28 PROCEDURE — 83735 ASSAY OF MAGNESIUM: CPT

## 2018-11-28 PROCEDURE — 3074F SYST BP LT 130 MM HG: CPT | Mod: CPTII,S$GLB,, | Performed by: INTERNAL MEDICINE

## 2018-11-28 PROCEDURE — 96375 TX/PRO/DX INJ NEW DRUG ADDON: CPT

## 2018-11-28 PROCEDURE — 83036 HEMOGLOBIN GLYCOSYLATED A1C: CPT

## 2018-11-28 PROCEDURE — 25000003 PHARM REV CODE 250

## 2018-11-28 PROCEDURE — 84484 ASSAY OF TROPONIN QUANT: CPT

## 2018-11-28 PROCEDURE — 3008F BODY MASS INDEX DOCD: CPT | Mod: CPTII,S$GLB,, | Performed by: INTERNAL MEDICINE

## 2018-11-28 PROCEDURE — 82962 GLUCOSE BLOOD TEST: CPT

## 2018-11-28 PROCEDURE — 3078F DIAST BP <80 MM HG: CPT | Mod: CPTII,S$GLB,, | Performed by: INTERNAL MEDICINE

## 2018-11-28 PROCEDURE — 93005 ELECTROCARDIOGRAM TRACING: CPT

## 2018-11-28 PROCEDURE — 99214 OFFICE O/P EST MOD 30 MIN: CPT | Mod: S$GLB,,, | Performed by: INTERNAL MEDICINE

## 2018-11-28 PROCEDURE — 63600175 PHARM REV CODE 636 W HCPCS: Performed by: EMERGENCY MEDICINE

## 2018-11-28 PROCEDURE — 84100 ASSAY OF PHOSPHORUS: CPT

## 2018-11-28 PROCEDURE — 99285 EMERGENCY DEPT VISIT HI MDM: CPT | Mod: 25

## 2018-11-28 PROCEDURE — 81003 URINALYSIS AUTO W/O SCOPE: CPT

## 2018-11-28 PROCEDURE — 87040 BLOOD CULTURE FOR BACTERIA: CPT | Mod: 59

## 2018-11-28 PROCEDURE — 83690 ASSAY OF LIPASE: CPT

## 2018-11-28 PROCEDURE — 83880 ASSAY OF NATRIURETIC PEPTIDE: CPT

## 2018-11-28 PROCEDURE — 84484 ASSAY OF TROPONIN QUANT: CPT | Mod: 91

## 2018-11-28 PROCEDURE — 96367 TX/PROPH/DG ADDL SEQ IV INF: CPT

## 2018-11-28 PROCEDURE — 80053 COMPREHEN METABOLIC PANEL: CPT

## 2018-11-28 PROCEDURE — 99499 UNLISTED E&M SERVICE: CPT | Mod: S$GLB,,, | Performed by: INTERNAL MEDICINE

## 2018-11-28 PROCEDURE — 83605 ASSAY OF LACTIC ACID: CPT | Mod: 91

## 2018-11-28 PROCEDURE — S0030 INJECTION, METRONIDAZOLE: HCPCS | Performed by: EMERGENCY MEDICINE

## 2018-11-28 PROCEDURE — 85610 PROTHROMBIN TIME: CPT

## 2018-11-28 PROCEDURE — 25000003 PHARM REV CODE 250: Performed by: EMERGENCY MEDICINE

## 2018-11-28 PROCEDURE — 87400 INFLUENZA A/B EACH AG IA: CPT | Mod: 59

## 2018-11-28 PROCEDURE — 11000001 HC ACUTE MED/SURG PRIVATE ROOM

## 2018-11-28 RX ORDER — DULOXETIN HYDROCHLORIDE 30 MG/1
120 CAPSULE, DELAYED RELEASE ORAL DAILY
Status: DISCONTINUED | OUTPATIENT
Start: 2018-11-29 | End: 2018-11-30

## 2018-11-28 RX ORDER — ACETAMINOPHEN 325 MG/1
650 TABLET ORAL EVERY 6 HOURS PRN
Status: DISCONTINUED | OUTPATIENT
Start: 2018-11-28 | End: 2018-11-30 | Stop reason: HOSPADM

## 2018-11-28 RX ORDER — IBUPROFEN 200 MG
16 TABLET ORAL
Status: DISCONTINUED | OUTPATIENT
Start: 2018-11-28 | End: 2018-11-30 | Stop reason: HOSPADM

## 2018-11-28 RX ORDER — IBUPROFEN 200 MG
24 TABLET ORAL
Status: DISCONTINUED | OUTPATIENT
Start: 2018-11-28 | End: 2018-11-30 | Stop reason: HOSPADM

## 2018-11-28 RX ORDER — METRONIDAZOLE 500 MG/100ML
500 INJECTION, SOLUTION INTRAVENOUS
Status: DISCONTINUED | OUTPATIENT
Start: 2018-11-29 | End: 2018-11-30

## 2018-11-28 RX ORDER — QUETIAPINE FUMARATE 100 MG/1
400 TABLET, FILM COATED ORAL NIGHTLY
Status: DISCONTINUED | OUTPATIENT
Start: 2018-11-28 | End: 2018-11-30

## 2018-11-28 RX ORDER — MAGNESIUM SULFATE HEPTAHYDRATE 40 MG/ML
2 INJECTION, SOLUTION INTRAVENOUS ONCE
Status: COMPLETED | OUTPATIENT
Start: 2018-11-28 | End: 2018-11-29

## 2018-11-28 RX ORDER — GLUCAGON 1 MG
1 KIT INJECTION
Status: DISCONTINUED | OUTPATIENT
Start: 2018-11-28 | End: 2018-11-30 | Stop reason: HOSPADM

## 2018-11-28 RX ORDER — CLONAZEPAM 0.5 MG/1
1 TABLET ORAL 3 TIMES DAILY
Status: DISCONTINUED | OUTPATIENT
Start: 2018-11-28 | End: 2018-11-30 | Stop reason: HOSPADM

## 2018-11-28 RX ORDER — SODIUM CHLORIDE 0.9 % (FLUSH) 0.9 %
5 SYRINGE (ML) INJECTION
Status: DISCONTINUED | OUTPATIENT
Start: 2018-11-28 | End: 2018-11-30 | Stop reason: HOSPADM

## 2018-11-28 RX ORDER — BENAZEPRIL HYDROCHLORIDE 10 MG/1
10 TABLET ORAL DAILY
COMMUNITY
End: 2018-12-12 | Stop reason: SDUPTHER

## 2018-11-28 RX ORDER — METRONIDAZOLE 500 MG/100ML
500 INJECTION, SOLUTION INTRAVENOUS
Status: COMPLETED | OUTPATIENT
Start: 2018-11-28 | End: 2018-11-28

## 2018-11-28 RX ORDER — ASPIRIN 325 MG
325 TABLET, DELAYED RELEASE (ENTERIC COATED) ORAL
Status: COMPLETED | OUTPATIENT
Start: 2018-11-28 | End: 2018-11-28

## 2018-11-28 RX ORDER — KETOROLAC TROMETHAMINE 30 MG/ML
15 INJECTION, SOLUTION INTRAMUSCULAR; INTRAVENOUS
Status: COMPLETED | OUTPATIENT
Start: 2018-11-28 | End: 2018-11-28

## 2018-11-28 RX ORDER — PRAVASTATIN SODIUM 40 MG/1
40 TABLET ORAL DAILY
Status: DISCONTINUED | OUTPATIENT
Start: 2018-11-29 | End: 2018-11-30 | Stop reason: HOSPADM

## 2018-11-28 RX ORDER — CIPROFLOXACIN 2 MG/ML
400 INJECTION, SOLUTION INTRAVENOUS
Status: COMPLETED | OUTPATIENT
Start: 2018-11-28 | End: 2018-11-28

## 2018-11-28 RX ORDER — INSULIN ASPART 100 [IU]/ML
0-5 INJECTION, SOLUTION INTRAVENOUS; SUBCUTANEOUS
Status: DISCONTINUED | OUTPATIENT
Start: 2018-11-28 | End: 2018-11-30 | Stop reason: HOSPADM

## 2018-11-28 RX ORDER — CIPROFLOXACIN 2 MG/ML
400 INJECTION, SOLUTION INTRAVENOUS
Status: DISCONTINUED | OUTPATIENT
Start: 2018-11-29 | End: 2018-11-30

## 2018-11-28 RX ORDER — HEPARIN SODIUM 5000 [USP'U]/ML
5000 INJECTION, SOLUTION INTRAVENOUS; SUBCUTANEOUS EVERY 8 HOURS
Status: DISCONTINUED | OUTPATIENT
Start: 2018-11-28 | End: 2018-11-30 | Stop reason: HOSPADM

## 2018-11-28 RX ORDER — PANTOPRAZOLE SODIUM 40 MG/1
40 TABLET, DELAYED RELEASE ORAL DAILY
Status: DISCONTINUED | OUTPATIENT
Start: 2018-11-29 | End: 2018-11-30 | Stop reason: HOSPADM

## 2018-11-28 RX ADMIN — MAGNESIUM SULFATE HEPTAHYDRATE 2 G: 40 INJECTION, SOLUTION INTRAVENOUS at 11:11

## 2018-11-28 RX ADMIN — ASPIRIN 325 MG: 325 TABLET, COATED ORAL at 08:11

## 2018-11-28 RX ADMIN — CLONAZEPAM 1 MG: 0.5 TABLET ORAL at 09:11

## 2018-11-28 RX ADMIN — QUETIAPINE FUMARATE 400 MG: 100 TABLET ORAL at 09:11

## 2018-11-28 RX ADMIN — METRONIDAZOLE 500 MG: 500 INJECTION, SOLUTION INTRAVENOUS at 05:11

## 2018-11-28 RX ADMIN — SODIUM CHLORIDE 2755 ML: 0.9 INJECTION, SOLUTION INTRAVENOUS at 05:11

## 2018-11-28 RX ADMIN — HEPARIN SODIUM 5000 UNITS: 5000 INJECTION, SOLUTION INTRAVENOUS; SUBCUTANEOUS at 11:11

## 2018-11-28 RX ADMIN — CIPROFLOXACIN 400 MG: 2 INJECTION, SOLUTION INTRAVENOUS at 07:11

## 2018-11-28 RX ADMIN — KETOROLAC TROMETHAMINE 15 MG: 30 INJECTION, SOLUTION INTRAMUSCULAR at 06:11

## 2018-11-28 NOTE — ED PROVIDER NOTES
Encounter Date: 11/28/2018    SCRIBE #1 NOTE: I, Heidi Hale, am scribing for, and in the presence of,  Dr. Walton. I have scribed the following portions of the note - the EKG reading. Other sections scribed: updates on patient's care.       History     Chief Complaint   Patient presents with    Hypotension     had appt with new PCP, patient was tachycardic and hypotensive. denies n/v/d, states has had URI for 2-3 weeks     Patient describes not feeling well for several days, dizzy with fast heart rate. No chest pain, though has mild right upper abd pain, no n/v, no urinary complaints. Slight, non productive cough. No Nausea or vomiting or diarrhea. Chronic back pain.           Review of patient's allergies indicates:   Allergen Reactions    Pcn [penicillins] Anaphylaxis     Past Medical History:   Diagnosis Date    Chronic back pain greater than 3 months duration     Depression     Diabetes mellitus     Hypertension     Schizophrenia      Past Surgical History:   Procedure Laterality Date    APPENDECTOMY      BLOCK-NERVE-MEDIAL BRANCH-LUMBAR- Bilateral L2-3-4-5 Bilateral 4/24/2018    Performed by [facility]avon Dennis MD at [facility]    BLOCK-NERVE-MEDIAL BRANCH-LUMBAR- BILATERAL, L2,3,4,5 Bilateral 2/20/2018    Performed by [facility]on Dennis [facility] at [facility]    BLOCK-NERVE-MEDIAL BRANCH-LUMBAR- LUMBAR- BILATERAL- L2,3,4,5 Bilateral 2/6/2018    Performed by [facility]on Dennis [facility] at [facility]    COLONOSCOPY N/A 5/31/2018    Procedure: COLONOSCOPY;  Surgeon: Guillaume Hatch MD;  Location: [facility];  Service: Endoscopy;  Laterality: N/A;    COLONOSCOPY N/A 5/31/2018    Per[facility] by [facility] at [facility] ENDO    NECK SURGERY      RADIOFREQUENCY ABLATION OF LUMBAR MEDIAL BRANCH NERVE AT SINGLE LEVEL Left 5/29/2018    Procedure: RADIOFREQUENCY THERMOCOAGULATION (RFTC)-NERVE-MEDIAN BRANCH-LUMBAR- Left L2-3-4-5;  Surgeon: Donavon Dennis MD;  Location: [facility];  Service:  "Pain Management;  Laterality: Left;    RADIOFREQUENCY THERMOCOAGULATION (RFTC)-NERVE-MEDIAN BRANCH-LUMBAR- Left L2-3-4-5 Left 5/29/2018    Performed by [facility] at [facility]    RADIOFREQUENCY THERMOCOAGULATION (RFTC)-NERVE-MEDIAN BRANCH-LUMBAR- Right L2-3-4-5 Right 5/22/2018    Performed by Don[facility]on Dennis MD at [facility]     Family History   Problem Relation Age of Onset    Alzheimer's disease Mother     Heart defect Father     Cancer Father     Stroke Father     Heart attack Father     Heart defect Sister     Alzheimer's disease Sister      Social History     Tobacco Use    Smoking status: Never Smoker    Smokeless tobacco: Never Used   Substance Use Topics    Alcohol use: Yes     Comment: "couple of beers a day"    Drug use: No     Review of Systems   Constitutional: Positive for appetite change and fatigue. Negative for activity change, diaphoresis, fever and unexpected weight change.   HENT: Negative for postnasal drip, rhinorrhea, sinus pressure, sinus pain, sore throat and voice change.    Respiratory: Positive for cough and shortness of breath. Negative for apnea, choking, chest tightness, wheezing and stridor.    Cardiovascular: Negative for chest pain, palpitations and leg swelling.   Gastrointestinal: Positive for abdominal pain and nausea. Negative for abdominal distention, anal bleeding, blood in stool, constipation, diarrhea, rectal pain and vomiting.   Endocrine: Negative for cold intolerance, heat intolerance, polydipsia, polyphagia and polyuria.   Genitourinary: Negative for decreased urine volume, difficulty urinating, dysuria, enuresis, frequency, hematuria and urgency.   Musculoskeletal: Positive for arthralgias, back pain, myalgias and neck pain. Negative for gait problem, joint swelling and neck stiffness.   Skin: Negative.    Allergic/Immunologic: Negative.    Neurological: Positive for dizziness and weakness. Negative for tremors, seizures, syncope, facial " asymmetry, speech difficulty, light-headedness, numbness and headaches.   Hematological: Negative.    Psychiatric/Behavioral: Negative.        Physical Exam     Initial Vitals   BP Pulse Resp Temp SpO2   11/28/18 1725 11/28/18 1723 11/28/18 1723 11/28/18 1723 11/28/18 1723   (!) 92/50 (!) 120 20 97.8 °F (36.6 °C) 98 %      MAP       --                Physical Exam    Constitutional: He appears well-developed and well-nourished. He is not diaphoretic. He appears distressed.   HENT:   Head: Normocephalic and atraumatic.   Right Ear: External ear normal.   Left Ear: External ear normal.   Dry oropharynx, coated tongue, no signs of infection   Eyes: Conjunctivae and EOM are normal. Pupils are equal, round, and reactive to light. Right eye exhibits no discharge. Left eye exhibits no discharge. No scleral icterus.   Neck: Normal range of motion. Neck supple. No thyromegaly present. No tracheal deviation present.   Cardiovascular: Exam reveals no friction rub.    No murmur heard.  Tachycardic at 120   Pulmonary/Chest: No stridor.   Abdominal: Soft. Bowel sounds are normal. He exhibits no distension and no mass. There is tenderness. There is no rebound and no guarding.   Right upper quad pain to light and deep palp, no guard, no rebound, no mass   Musculoskeletal: Normal range of motion. He exhibits no edema or tenderness.   Lymphadenopathy:     He has no cervical adenopathy.   Neurological: He is alert and oriented to person, place, and time. He has normal strength. He displays normal reflexes. No cranial nerve deficit or sensory deficit. GCS score is 15. GCS eye subscore is 4. GCS verbal subscore is 5. GCS motor subscore is 6.   Skin: Skin is warm and dry. Capillary refill takes less than 2 seconds. No abscess noted. No pallor.   Psychiatric: He has a normal mood and affect. His behavior is normal. Thought content normal.         ED Course   Procedures  Labs Reviewed   CBC W/ AUTO DIFFERENTIAL - Abnormal; Notable for the  following components:       Result Value    RBC 4.39 (*)     Hemoglobin 13.8 (*)     Hematocrit 39.5 (*)     MCH 31.4 (*)     MPV 8.3 (*)     Lymph # 0.7 (*)     Gran% 80.8 (*)     Lymph% 11.2 (*)     All other components within normal limits   COMPREHENSIVE METABOLIC PANEL - Abnormal; Notable for the following components:    CO2 20 (*)     Glucose 124 (*)     Creatinine 2.0 (*)     eGFR if  42 (*)     eGFR if non  36 (*)     All other components within normal limits   LACTIC ACID, PLASMA - Abnormal; Notable for the following components:    Lactate (Lactic Acid) 3.3 (*)     All other components within normal limits   MAGNESIUM - Abnormal; Notable for the following components:    Magnesium 1.5 (*)     All other components within normal limits   PHOSPHORUS - Abnormal; Notable for the following components:    Phosphorus 5.9 (*)     All other components within normal limits   TROPONIN I - Abnormal; Notable for the following components:    Troponin I 0.113 (*)     All other components within normal limits   CULTURE, BLOOD   CULTURE, BLOOD   INFLUENZA A AND B ANTIGEN   PROTIME-INR   B-TYPE NATRIURETIC PEPTIDE   LIPASE   LACTIC ACID, PLASMA   URINALYSIS, REFLEX TO URINE CULTURE     EKG Readings: (Independently Interpreted)   Normal sinus rhythm with a rate of 95. WI interval of 170 ms. No STEMI   Other EKG Interpretations: Repeat EKG Reading: Normal sinus rhythm with rate of 73. QRS duration of 172 ms. WI interval f 172 ms. No STEMI. RBBB present before 1/23/17       Imaging Results          CT Abdomen Pelvis  Without Contrast (Final result)  Result time 11/28/18 19:31:10   Procedure changed from CT Abdomen Pelvis With Contrast     Final result by Guillaume So Jr., MD (11/28/18 19:31:10)                 Impression:      No significant abnormality identified to explain the abdominal pain.  No extraluminal fluid collection to indicate an abscess.  There is some perinephric stranding about  the kidneys nonspecific.  Correlation with clinical findings would be needed.      Electronically signed by: Guillaume So MD  Date:    11/28/2018  Time:    19:31             Narrative:    EXAMINATION:  CT ABDOMEN PELVIS WITHOUT CONTRAST    CLINICAL HISTORY:  Abd pain, fever, abscess suspected;    TECHNIQUE:  Low dose axial images, sagittal and coronal reformations were obtained from the lung bases to the pubic symphysis.    COMPARISON:  None    FINDINGS:  There are few patchy increased markings in the region of the right middle lobe and lingula.  No confluent consolidation.  No pleural fluid.    In the abdomen liver is normal in size.  Gallbladder is not distended.  Some high-density material in the gallbladder more likely sludge.  Pancreas is not enlarged.  No splenic lesion on this noncontrast study.  No adrenal masses.    Evaluation of the kidneys demonstrate 1.4 cm hypodensity in the right kidney presumably a cyst.  There is some perinephric stranding bilaterally.  There is a 9 mm hypodensity lower pole of the left kidney.  No intrarenal stones.  No significant dilatation of the collecting systems or ureters.    Aorta tapers normally.  Mild amount of plaque.  No significant para-aortic or pelvic adenopathy.  Prostate not enlarged.  Bladder is not significantly distended.    Visualized loops of bowel are not dilated.  Appendix not definitely visualized though no significant inflammatory change.  Scattered feces in the colon.  Bone windows demonstrate no lytic or blastic lesion.                               X-Ray Chest AP Portable (Final result)  Result time 11/28/18 18:17:07    Final result by Julio Garcia MD (11/28/18 18:17:07)                 Impression:      1. No acute cardiopulmonary process, hypoventilatory exam.      Electronically signed by: Julio Garcia MD  Date:    11/28/2018  Time:    18:17             Narrative:    EXAMINATION:  XR CHEST AP PORTABLE    CLINICAL  HISTORY:  Sepsis;    TECHNIQUE:  Single frontal view of the chest was performed.    COMPARISON:  01/20/2017    FINDINGS:  The cardiomediastinal silhouette is prominent, magnified by technique, stable as compared to the previous exam..  There is no pleural effusion.  The trachea is midline.  The lungs are symmetrically expanded bilaterally without evidence of acute parenchymal process. No large focal consolidation seen.  There is mild bilateral basilar subsegmental atelectasis.  There is no pneumothorax.  The osseous structures are remarkable for degenerative changes..  There is elevation of the right hemidiaphragm.                                 Medical Decision Making:   ED Management:  7:40 PM Case discussed with [facility] resident, will come to evaluate and admit the patient to the service of Dr. Pepper         medical decision making patient presents with clinical sepsis hypotensive tachycardic, afebrile elevated lactic acid worsening renal function with troponin elevation no clear source of infection with clean chest x-ray urine and CT scan of the abdomen and pelvis has recently you adequate fluid hydration here in the emergency department recheck of lactic acid pending received IV antibiotics for presumed intra-abdominal infection prior to CT findings subjectively much improved with normalization of blood pressure and heart rate.  To be admitted for further evaluation trending of labs.              Clinical Impression:     1. Troponin level elevated    2. Hypotension    3. Sepsis    4. Dehydration    5. Kidney disease    6. Lactic acidosis                I, Dr. Dustin Walton, personally performed the services described in this documentation.   All medical record entries made by the scribe were at my direction and in my presence.   I have reviewed the chart and agree that the record is accurate and complete.   Dustin Walton MD.  8:03 PM 11/28/2018              Dustin Walton MD  11/28/18  2003

## 2018-11-28 NOTE — PROGRESS NOTES
Pt. ID: Fabiano Malik Jr. is a 57 y.o. male      Chief complaint:   Chief Complaint   Patient presents with    Annual Exam       HPI: Pt. Here for f/u for DM, hyperlipedemia and HTN and to establish care; pt. Daughter, Priyanka, is with the pt.;  last HGBA1C dated 1/20/17 was 6.3; of note. Pt. Sees pain management for chronic back pain; weight is elevated; of note; pt. has low BP, dizziness and tachycardia associated SOB; he denies chest pain; he is taking BP med; pt. Reports cough for past 2-3 weeks; O2 sat 96 % RA; I advised pt. And daughter that pt. Should proceed to ER for acute evaluation of hypotension, dizziness and SOB; daughter will transport pt. To [facility] ER; I explained risks of non-compliance; pt. Is in no acute distress and can be transported via car      Review of Systems   Constitutional: Negative for chills and fever.   HENT: Positive for sore throat.    Respiratory: Positive for cough and shortness of breath.    Cardiovascular: Negative for chest pain.   Gastrointestinal: Negative for abdominal pain, nausea and vomiting.   Genitourinary: Negative for dysuria.   Musculoskeletal: Positive for myalgias.   Neurological: Positive for dizziness.         Objective:    Physical Exam   Constitutional: He is oriented to person, place, and time.   obese   Eyes: EOM are normal.   Neck: Normal range of motion.   Cardiovascular: Normal rate, regular rhythm and normal heart sounds.   Pulmonary/Chest: Effort normal and breath sounds normal. No respiratory distress. He has no wheezes. He has no rales.   Abdominal: Soft. There is no tenderness. There is no rebound and no guarding.   Musculoskeletal: Normal range of motion.   Neurological: He is alert and oriented to person, place, and time.   Skin: No rash noted.   Vitals reviewed.        Health Maintenance   Topic Date Due    Hepatitis C Screening  1960    Foot Exam  12/15/1970    Eye Exam  12/15/1970    TETANUS VACCINE  12/15/1978     Pneumococcal PPSV23 (Medium Risk) (1) 12/15/1978    Hemoglobin A1c  07/20/2017    Lipid Panel  01/21/2018    Influenza Vaccine  08/01/2018    Colonoscopy  05/31/2028         Assessment:     1. Hypotension, unspecified hypotension type Active   2. Tachycardia Active   3. Upper respiratory tract infection, unspecified type Active   4. Dizziness Active   5. SOB (shortness of breath) Active   6. Type 2 diabetes mellitus without complication, without long-term current use of insulin Active   7. Hypertension associated with diabetes Active   8. Hyperlipidemia, unspecified hyperlipidemia type Active   9. Chronic back pain, unspecified back location, unspecified back pain laterality Active   10. Class 1 obesity with serious comorbidity and body mass index (BMI) of 33.0 to 33.9 in adult, unspecified obesity type Sub-optimally controlled   11. Prostate cancer screening Active   12. Preventative health care Active         Plan: Hypotension, unspecified hypotension type  Comments:  with dizziness and SOB; refer to [facility] ER for acute evaluation     Tachycardia  Comments:  get EKG     Upper respiratory tract infection, unspecified type  Comments:  further workup in ER     Dizziness  Comments:  refer to ER for acute evaluation     SOB (shortness of breath)  Comments:  refer to ER for acute evaluation     Type 2 diabetes mellitus without complication, without long-term current use of insulin  Comments:  continue current regimen and encouraged ADA diet; repeat HGBA1C   Orders:  -     CBC auto differential; Future; Expected date: 11/28/2018  -     Comprehensive metabolic panel; Future; Expected date: 11/28/2018  -     Hemoglobin A1c; Future; Expected date: 11/28/2018  -     Urinalysis; Future; Expected date: 11/28/2018  -     Microalbumin/creatinine urine ratio; Future; Expected date: 11/28/2018    Hypertension associated with diabetes  Comments:  hold BP med and pt. will proceed to [facility] ER for evaluation for  hypotension   Orders:  -     CBC auto differential; Future; Expected date: 11/28/2018  -     Comprehensive metabolic panel; Future; Expected date: 11/28/2018  -     Urinalysis; Future; Expected date: 11/28/2018    Hyperlipidemia, unspecified hyperlipidemia type  Comments:  continue current regimen and encouraged diet modification; repeat lipids  Orders:  -     Lipid panel; Future; Expected date: 11/28/2018    Chronic back pain, unspecified back location, unspecified back pain laterality  Comments:  f/u pain management and continue current regimen     Class 1 obesity with serious comorbidity and body mass index (BMI) of 33.0 to 33.9 in adult, unspecified obesity type  Comments:  encouraged diet and explained risks     Prostate cancer screening  -     PSA, Screening; Future; Expected date: 11/28/2018    Preventative health care  -     CBC auto differential; Future; Expected date: 11/28/2018  -     Comprehensive metabolic panel; Future; Expected date: 11/28/2018  -     Lipid panel; Future; Expected date: 11/28/2018  -     Urinalysis; Future; Expected date: 11/28/2018        Problem List Items Addressed This Visit        ENT    Upper respiratory tract infection       Cardiac/Vascular    HLD (hyperlipidemia)    Relevant Orders    Lipid panel    Hypotension - Primary    Hypertension associated with diabetes    Relevant Orders    CBC auto differential    Comprehensive metabolic panel    Urinalysis    Tachycardia       Renal/    Prostate cancer screening    Relevant Orders    PSA, Screening       Endocrine    Type 2 diabetes mellitus without complication    Relevant Orders    CBC auto differential    Comprehensive metabolic panel    Hemoglobin A1c    Urinalysis    Microalbumin/creatinine urine ratio    Class 1 obesity with serious comorbidity and body mass index (BMI) of 33.0 to 33.9 in adult       Orthopedic    Chronic back pain       Other    Dizziness    SOB (shortness of breath)

## 2018-11-29 LAB
ALBUMIN SERPL BCP-MCNC: 3 G/DL
ALP SERPL-CCNC: 48 U/L
ALT SERPL W/O P-5'-P-CCNC: 22 U/L
ANION GAP SERPL CALC-SCNC: 7 MMOL/L
AORTIC ROOT ANNULUS: 3.83 CM
AORTIC VALVE CUSP SEPERATION: 2.47 CM
AST SERPL-CCNC: 24 U/L
AV INDEX (PROSTH): 1.04
AV MEAN GRADIENT: 2.96 MMHG
AV PEAK GRADIENT: 4.93 MMHG
AV VALVE AREA: 4.08 CM2
BASOPHILS # BLD AUTO: 0.03 K/UL
BASOPHILS NFR BLD: 0.8 %
BILIRUB SERPL-MCNC: 0.6 MG/DL
BSA FOR ECHO PROCEDURE: 2.4 M2
BUN SERPL-MCNC: 14 MG/DL
CALCIUM SERPL-MCNC: 8.1 MG/DL
CHLORIDE SERPL-SCNC: 105 MMOL/L
CO2 SERPL-SCNC: 24 MMOL/L
CREAT SERPL-MCNC: 1.6 MG/DL
CV ECHO LV RWT: 0.55 CM
DIFFERENTIAL METHOD: ABNORMAL
DOP CALC AO PEAK VEL: 1.11 M/S
DOP CALC AO VTI: 20.51 CM
DOP CALC LVOT AREA: 3.9 CM2
DOP CALC LVOT DIAMETER: 2.23 CM
DOP CALC LVOT STROKE VOLUME: 83.58 CM3
DOP CALCLVOT PEAK VEL VTI: 21.41 CM
E WAVE DECELERATION TIME: 206.18 MSEC
E/A RATIO: 0.84
ECHO LV POSTERIOR WALL: 1.29 CM (ref 0.6–1.1)
EOSINOPHIL # BLD AUTO: 0.2 K/UL
EOSINOPHIL NFR BLD: 5 %
ERYTHROCYTE [DISTWIDTH] IN BLOOD BY AUTOMATED COUNT: 13.3 %
EST. GFR  (AFRICAN AMERICAN): 54 ML/MIN/1.73 M^2
EST. GFR  (NON AFRICAN AMERICAN): 47 ML/MIN/1.73 M^2
FRACTIONAL SHORTENING: 34 % (ref 28–44)
GLUCOSE SERPL-MCNC: 219 MG/DL
HCT VFR BLD AUTO: 35.7 %
HGB BLD-MCNC: 12.3 G/DL
INTERVENTRICULAR SEPTUM: 1.69 CM (ref 0.6–1.1)
IVRT: 0.06 MSEC
LACTATE SERPL-SCNC: 1.1 MMOL/L
LEFT ATRIUM SIZE: 3.71 CM
LEFT INTERNAL DIMENSION IN SYSTOLE: 3.1 CM (ref 2.1–4)
LEFT VENTRICLE DIASTOLIC VOLUME INDEX: 42.53 ML/M2
LEFT VENTRICLE DIASTOLIC VOLUME: 102.06 ML
LEFT VENTRICLE MASS INDEX: 120.9 G/M2
LEFT VENTRICLE SYSTOLIC VOLUME INDEX: 15.8 ML/M2
LEFT VENTRICLE SYSTOLIC VOLUME: 38.01 ML
LEFT VENTRICULAR INTERNAL DIMENSION IN DIASTOLE: 4.69 CM (ref 3.5–6)
LEFT VENTRICULAR MASS: 290.18 G
LYMPHOCYTES # BLD AUTO: 1.1 K/UL
LYMPHOCYTES NFR BLD: 29.7 %
MAGNESIUM SERPL-MCNC: 2 MG/DL
MCH RBC QN AUTO: 31.2 PG
MCHC RBC AUTO-ENTMCNC: 34.5 G/DL
MCV RBC AUTO: 91 FL
MONOCYTES # BLD AUTO: 0.3 K/UL
MONOCYTES NFR BLD: 8.6 %
MV PEAK A VEL: 1.03 M/S
MV PEAK E VEL: 0.87 M/S
NEUTROPHILS # BLD AUTO: 2 K/UL
NEUTROPHILS NFR BLD: 55.6 %
PISA TR MAX VEL: 2.67 M/S
PLATELET # BLD AUTO: 156 K/UL
PMV BLD AUTO: 7.8 FL
POCT GLUCOSE: 150 MG/DL (ref 70–110)
POCT GLUCOSE: 168 MG/DL (ref 70–110)
POCT GLUCOSE: 175 MG/DL (ref 70–110)
POCT GLUCOSE: 179 MG/DL (ref 70–110)
POTASSIUM SERPL-SCNC: 3.8 MMOL/L
PROT SERPL-MCNC: 5.3 G/DL
PULM VEIN S/D RATIO: 1.21
PV PEAK D VEL: 0.33 M/S
PV PEAK S VEL: 0.4 M/S
PV PEAK VELOCITY: 0.96 CM/S
RA PRESSURE: 3 MMHG
RBC # BLD AUTO: 3.94 M/UL
RIGHT VENTRICULAR END-DIASTOLIC DIMENSION: 3.72 CM
SODIUM SERPL-SCNC: 136 MMOL/L
TR MAX PG: 28.52 MMHG
TROPONIN I SERPL DL<=0.01 NG/ML-MCNC: 0.12 NG/ML
TROPONIN I SERPL DL<=0.01 NG/ML-MCNC: 0.16 NG/ML
TROPONIN I SERPL DL<=0.01 NG/ML-MCNC: 0.27 NG/ML
TV REST PULMONARY ARTERY PRESSURE: 31.52 MMHG
WBC # BLD AUTO: 3.6 K/UL

## 2018-11-29 PROCEDURE — 85025 COMPLETE CBC W/AUTO DIFF WBC: CPT

## 2018-11-29 PROCEDURE — 36415 COLL VENOUS BLD VENIPUNCTURE: CPT

## 2018-11-29 PROCEDURE — 83605 ASSAY OF LACTIC ACID: CPT

## 2018-11-29 PROCEDURE — 80053 COMPREHEN METABOLIC PANEL: CPT

## 2018-11-29 PROCEDURE — 11000001 HC ACUTE MED/SURG PRIVATE ROOM

## 2018-11-29 PROCEDURE — 25000003 PHARM REV CODE 250: Performed by: STUDENT IN AN ORGANIZED HEALTH CARE EDUCATION/TRAINING PROGRAM

## 2018-11-29 PROCEDURE — 25000003 PHARM REV CODE 250

## 2018-11-29 PROCEDURE — S0030 INJECTION, METRONIDAZOLE: HCPCS

## 2018-11-29 PROCEDURE — 83735 ASSAY OF MAGNESIUM: CPT

## 2018-11-29 PROCEDURE — 84484 ASSAY OF TROPONIN QUANT: CPT | Mod: 91

## 2018-11-29 PROCEDURE — 63600175 PHARM REV CODE 636 W HCPCS

## 2018-11-29 PROCEDURE — 94761 N-INVAS EAR/PLS OXIMETRY MLT: CPT

## 2018-11-29 RX ORDER — TRAMADOL HYDROCHLORIDE 50 MG/1
50 TABLET ORAL ONCE
Status: COMPLETED | OUTPATIENT
Start: 2018-11-29 | End: 2018-11-29

## 2018-11-29 RX ORDER — BENZONATATE 100 MG/1
100 CAPSULE ORAL 3 TIMES DAILY PRN
Status: DISCONTINUED | OUTPATIENT
Start: 2018-11-29 | End: 2018-11-29

## 2018-11-29 RX ORDER — BENZONATATE 100 MG/1
100 CAPSULE ORAL 3 TIMES DAILY
Status: DISCONTINUED | OUTPATIENT
Start: 2018-11-29 | End: 2018-11-30 | Stop reason: HOSPADM

## 2018-11-29 RX ADMIN — QUETIAPINE FUMARATE 400 MG: 100 TABLET ORAL at 10:11

## 2018-11-29 RX ADMIN — HEPARIN SODIUM 5000 UNITS: 5000 INJECTION, SOLUTION INTRAVENOUS; SUBCUTANEOUS at 06:11

## 2018-11-29 RX ADMIN — BENZONATATE 100 MG: 100 CAPSULE ORAL at 02:11

## 2018-11-29 RX ADMIN — METRONIDAZOLE 500 MG: 500 INJECTION, SOLUTION INTRAVENOUS at 09:11

## 2018-11-29 RX ADMIN — CLONAZEPAM 1 MG: 0.5 TABLET ORAL at 09:11

## 2018-11-29 RX ADMIN — TRAMADOL HYDROCHLORIDE 50 MG: 50 TABLET, COATED ORAL at 10:11

## 2018-11-29 RX ADMIN — BENZONATATE 100 MG: 100 CAPSULE ORAL at 10:11

## 2018-11-29 RX ADMIN — METRONIDAZOLE 500 MG: 500 INJECTION, SOLUTION INTRAVENOUS at 02:11

## 2018-11-29 RX ADMIN — PANTOPRAZOLE SODIUM 40 MG: 40 TABLET, DELAYED RELEASE ORAL at 09:11

## 2018-11-29 RX ADMIN — PRAVASTATIN SODIUM 40 MG: 40 TABLET ORAL at 09:11

## 2018-11-29 RX ADMIN — CIPROFLOXACIN 400 MG: 2 INJECTION, SOLUTION INTRAVENOUS at 06:11

## 2018-11-29 RX ADMIN — CLONAZEPAM 1 MG: 0.5 TABLET ORAL at 02:11

## 2018-11-29 RX ADMIN — BENZONATATE 100 MG: 100 CAPSULE ORAL at 04:11

## 2018-11-29 RX ADMIN — HEPARIN SODIUM 5000 UNITS: 5000 INJECTION, SOLUTION INTRAVENOUS; SUBCUTANEOUS at 10:11

## 2018-11-29 RX ADMIN — DULOXETINE 120 MG: 30 CAPSULE, DELAYED RELEASE ORAL at 09:11

## 2018-11-29 RX ADMIN — BENZONATATE 100 MG: 100 CAPSULE ORAL at 11:11

## 2018-11-29 RX ADMIN — HEPARIN SODIUM 5000 UNITS: 5000 INJECTION, SOLUTION INTRAVENOUS; SUBCUTANEOUS at 02:11

## 2018-11-29 RX ADMIN — ACETAMINOPHEN 650 MG: 325 TABLET, FILM COATED ORAL at 05:11

## 2018-11-29 RX ADMIN — CLONAZEPAM 1 MG: 0.5 TABLET ORAL at 10:11

## 2018-11-29 RX ADMIN — METRONIDAZOLE 500 MG: 500 INJECTION, SOLUTION INTRAVENOUS at 05:11

## 2018-11-29 NOTE — NURSING
Admitted 57 year old male patient from ED, brought in to the unit around 2230H via wheelchair. Conscious , coherent to time, place date, and situation, with saline lock on the left AC gauge 20 , and gauge 18 wrist,  flushed patent, with clean and dry dressing.  Patient case of sepsis. Patient has chronic pain on his back, 6/10 afebrile, stable VS. Dr. Wiley informed regarding this admission. Telemetry  NSR to sinus tachy 's. Patient verbalized that he is experiencing sadness due to recent loss, his girlfriend passed away recently. Dr informed. Patient has no suicidal ideation, he said he is just feeling sad but not thinking of hurting himself. Oxygen saturation 97% on room air. Advised patient to call for any assistance. Safety fall precaution measures noted, Bed alarm ON. Call bell with in reach. Ipad provided. Will continue to monitor patient.

## 2018-11-29 NOTE — NURSING
VN rounding note: Pt resting comfortably in bed. Floor nurse at bedside. pt complaining of back and neck pain 9/10 and requesting a different pain medication. VN informed MD and will put order in. Bedside nurse notified. NAD noted. Allowed time for questions. Will cont to be available and intervene as needed.
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
## 2018-11-29 NOTE — ED NOTES
Patient's daughter left phone numbers for family as listed:  Tiff - daughter : [phone]  Behzad - Son: [phone]  Priyanka- daughter: [phone]

## 2018-11-29 NOTE — PLAN OF CARE
Problem: Patient Care Overview  Goal: Plan of Care Review  Outcome: Ongoing (interventions implemented as appropriate)   11/29/18 1170   Coping/Psychosocial   Plan Of Care Reviewed With patient   Patient is awake, alert, and orient. Patient is on tele, no ectopy noted. Patient is calm and cooperative, patient is not showing any signs of suicide risk. Patient is sad about personal death in the family. Patient glucose is monitoring ac/hs and covered per sliding scale. Patient bed alarm is n, bed in the lowest position, and call light within reach.

## 2018-11-29 NOTE — ED NOTES
Daughter states woman he lived with recently  and she is concerned that her father has been drinking because of it patient did not admit to drinking when asked states he drinks on occasion.

## 2018-11-29 NOTE — NURSING
Doctor and nursing staff notified of patient having thoughts of suicide and wanting to talk to someone about.  He states that he was placed in the same room in the ER where his girlfriend past away this week and it has increased his depression.

## 2018-11-29 NOTE — NURSING
VN cued into pt's room for introduction. Informed pt that VN would be working closely along side floor nurse, PCT, MD, the rest of care team and making rounds throughout the shift. Fall risk and bed alarm protocol education provided. Pt aware and agreeable. IV antibiotic currently infusing. Allowed time for questions. Pt states he's been coughing all morning. VN informed pt that MD ordered tessalon perles for his cough. NAD noted. Will cont to be available as needed.

## 2018-11-29 NOTE — ED TRIAGE NOTES
Patient states he has been feeling bad for the last couple of days with Upper respiratory symptoms; Today started to feel worse with some dizziness and lightheadedness went to pcp and had a high heart rate and blood pressure so was advised to come to ED for further evaluation.  Patient also mentioned that significant other recently passed away in this hospital.
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
## 2018-11-30 VITALS
DIASTOLIC BLOOD PRESSURE: 99 MMHG | WEIGHT: 240.5 LBS | OXYGEN SATURATION: 98 % | TEMPERATURE: 97 F | HEART RATE: 82 BPM | RESPIRATION RATE: 16 BRPM | HEIGHT: 72 IN | BODY MASS INDEX: 32.57 KG/M2 | SYSTOLIC BLOOD PRESSURE: 155 MMHG

## 2018-11-30 LAB
ALBUMIN SERPL BCP-MCNC: 3.1 G/DL
ALP SERPL-CCNC: 49 U/L
ALT SERPL W/O P-5'-P-CCNC: 21 U/L
ANION GAP SERPL CALC-SCNC: 7 MMOL/L
AST SERPL-CCNC: 22 U/L
BASOPHILS # BLD AUTO: 0.03 K/UL
BASOPHILS NFR BLD: 0.9 %
BILIRUB SERPL-MCNC: 0.5 MG/DL
BUN SERPL-MCNC: 9 MG/DL
CALCIUM SERPL-MCNC: 8.7 MG/DL
CHLORIDE SERPL-SCNC: 106 MMOL/L
CO2 SERPL-SCNC: 26 MMOL/L
CREAT SERPL-MCNC: 1.2 MG/DL
DIFFERENTIAL METHOD: ABNORMAL
EOSINOPHIL # BLD AUTO: 0.3 K/UL
EOSINOPHIL NFR BLD: 9 %
ERYTHROCYTE [DISTWIDTH] IN BLOOD BY AUTOMATED COUNT: 13.1 %
EST. GFR  (AFRICAN AMERICAN): >60 ML/MIN/1.73 M^2
EST. GFR  (NON AFRICAN AMERICAN): >60 ML/MIN/1.73 M^2
GLUCOSE SERPL-MCNC: 210 MG/DL
HCT VFR BLD AUTO: 36.9 %
HGB BLD-MCNC: 12.7 G/DL
LYMPHOCYTES # BLD AUTO: 1.1 K/UL
LYMPHOCYTES NFR BLD: 32.2 %
MAGNESIUM SERPL-MCNC: 1.7 MG/DL
MCH RBC QN AUTO: 31.2 PG
MCHC RBC AUTO-ENTMCNC: 34.4 G/DL
MCV RBC AUTO: 91 FL
MONOCYTES # BLD AUTO: 0.3 K/UL
MONOCYTES NFR BLD: 10.2 %
NEUTROPHILS # BLD AUTO: 1.6 K/UL
NEUTROPHILS NFR BLD: 47.4 %
PLATELET # BLD AUTO: 175 K/UL
PMV BLD AUTO: 8.7 FL
POCT GLUCOSE: 200 MG/DL (ref 70–110)
POCT GLUCOSE: 203 MG/DL (ref 70–110)
POTASSIUM SERPL-SCNC: 3.7 MMOL/L
PROCALCITONIN SERPL IA-MCNC: 0.07 NG/ML
PROT SERPL-MCNC: 5.4 G/DL
RBC # BLD AUTO: 4.07 M/UL
SODIUM SERPL-SCNC: 139 MMOL/L
WBC # BLD AUTO: 3.32 K/UL

## 2018-11-30 PROCEDURE — 83735 ASSAY OF MAGNESIUM: CPT

## 2018-11-30 PROCEDURE — S0030 INJECTION, METRONIDAZOLE: HCPCS

## 2018-11-30 PROCEDURE — 94761 N-INVAS EAR/PLS OXIMETRY MLT: CPT

## 2018-11-30 PROCEDURE — 85025 COMPLETE CBC W/AUTO DIFF WBC: CPT

## 2018-11-30 PROCEDURE — 25000003 PHARM REV CODE 250

## 2018-11-30 PROCEDURE — 36415 COLL VENOUS BLD VENIPUNCTURE: CPT

## 2018-11-30 PROCEDURE — 63600175 PHARM REV CODE 636 W HCPCS

## 2018-11-30 PROCEDURE — 84145 PROCALCITONIN (PCT): CPT

## 2018-11-30 PROCEDURE — 80053 COMPREHEN METABOLIC PANEL: CPT

## 2018-11-30 RX ORDER — POLYETHYLENE GLYCOL 3350 17 G/17G
17 POWDER, FOR SOLUTION ORAL 2 TIMES DAILY
Status: DISCONTINUED | OUTPATIENT
Start: 2018-11-30 | End: 2018-11-30

## 2018-11-30 RX ORDER — DULOXETIN HYDROCHLORIDE 60 MG/1
60 CAPSULE, DELAYED RELEASE ORAL 2 TIMES DAILY
Qty: 60 CAPSULE | Refills: 11 | Status: SHIPPED | OUTPATIENT
Start: 2018-11-30 | End: 2018-11-30

## 2018-11-30 RX ORDER — RISPERIDONE 0.25 MG/1
0.5 TABLET ORAL NIGHTLY
Status: DISCONTINUED | OUTPATIENT
Start: 2018-11-30 | End: 2018-11-30 | Stop reason: HOSPADM

## 2018-11-30 RX ORDER — METRONIDAZOLE 500 MG/1
500 TABLET ORAL EVERY 8 HOURS
Status: DISCONTINUED | OUTPATIENT
Start: 2018-11-30 | End: 2018-11-30

## 2018-11-30 RX ORDER — CIPROFLOXACIN 500 MG/1
500 TABLET ORAL EVERY 12 HOURS
Status: DISCONTINUED | OUTPATIENT
Start: 2018-11-30 | End: 2018-11-30

## 2018-11-30 RX ORDER — DULOXETIN HYDROCHLORIDE 30 MG/1
60 CAPSULE, DELAYED RELEASE ORAL 2 TIMES DAILY
Status: DISCONTINUED | OUTPATIENT
Start: 2018-11-30 | End: 2018-11-30 | Stop reason: HOSPADM

## 2018-11-30 RX ORDER — MAGNESIUM SULFATE HEPTAHYDRATE 40 MG/ML
2 INJECTION, SOLUTION INTRAVENOUS ONCE
Status: COMPLETED | OUTPATIENT
Start: 2018-11-30 | End: 2018-11-30

## 2018-11-30 RX ORDER — ACETAMINOPHEN 325 MG/1
650 TABLET ORAL EVERY 6 HOURS PRN
Qty: 120 TABLET | Refills: 3 | Status: ON HOLD | OUTPATIENT
Start: 2018-11-30 | End: 2023-08-18 | Stop reason: HOSPADM

## 2018-11-30 RX ORDER — IBUPROFEN 600 MG/1
600 TABLET ORAL EVERY 6 HOURS PRN
Qty: 120 TABLET | Refills: 3 | Status: SHIPPED | OUTPATIENT
Start: 2018-11-30 | End: 2019-01-14 | Stop reason: ALTCHOICE

## 2018-11-30 RX ORDER — BISACODYL 5 MG
5 TABLET, DELAYED RELEASE (ENTERIC COATED) ORAL ONCE
Status: COMPLETED | OUTPATIENT
Start: 2018-11-30 | End: 2018-11-30

## 2018-11-30 RX ORDER — BENAZEPRIL HYDROCHLORIDE 10 MG/1
10 TABLET ORAL DAILY
Status: DISCONTINUED | OUTPATIENT
Start: 2018-11-30 | End: 2018-11-30 | Stop reason: HOSPADM

## 2018-11-30 RX ORDER — TRAMADOL HYDROCHLORIDE 50 MG/1
50 TABLET ORAL EVERY 6 HOURS PRN
Status: DISCONTINUED | OUTPATIENT
Start: 2018-11-30 | End: 2018-11-30 | Stop reason: HOSPADM

## 2018-11-30 RX ORDER — RISPERIDONE 0.5 MG/1
0.5 TABLET ORAL NIGHTLY
Qty: 30 TABLET | Refills: 11 | Status: SHIPPED | OUTPATIENT
Start: 2018-11-30 | End: 2019-10-24 | Stop reason: SDUPTHER

## 2018-11-30 RX ORDER — DULOXETIN HYDROCHLORIDE 60 MG/1
60 CAPSULE, DELAYED RELEASE ORAL 2 TIMES DAILY
Qty: 60 CAPSULE | Refills: 11 | Status: SHIPPED | OUTPATIENT
Start: 2018-11-30 | End: 2019-02-12 | Stop reason: SDUPTHER

## 2018-11-30 RX ADMIN — BENZONATATE 100 MG: 100 CAPSULE ORAL at 08:11

## 2018-11-30 RX ADMIN — TRAMADOL HYDROCHLORIDE 50 MG: 50 TABLET, COATED ORAL at 01:11

## 2018-11-30 RX ADMIN — CLONAZEPAM 1 MG: 0.5 TABLET ORAL at 01:11

## 2018-11-30 RX ADMIN — METRONIDAZOLE 500 MG: 500 INJECTION, SOLUTION INTRAVENOUS at 01:11

## 2018-11-30 RX ADMIN — HEPARIN SODIUM 5000 UNITS: 5000 INJECTION, SOLUTION INTRAVENOUS; SUBCUTANEOUS at 01:11

## 2018-11-30 RX ADMIN — DULOXETINE 120 MG: 30 CAPSULE, DELAYED RELEASE ORAL at 08:11

## 2018-11-30 RX ADMIN — METRONIDAZOLE 500 MG: 500 INJECTION, SOLUTION INTRAVENOUS at 10:11

## 2018-11-30 RX ADMIN — INSULIN ASPART 2 UNITS: 100 INJECTION, SOLUTION INTRAVENOUS; SUBCUTANEOUS at 08:11

## 2018-11-30 RX ADMIN — CLONAZEPAM 1 MG: 0.5 TABLET ORAL at 08:11

## 2018-11-30 RX ADMIN — MAGNESIUM SULFATE IN WATER 2 G: 40 INJECTION, SOLUTION INTRAVENOUS at 08:11

## 2018-11-30 RX ADMIN — BENAZEPRIL HYDROCHLORIDE 10 MG: 10 TABLET, FILM COATED ORAL at 08:11

## 2018-11-30 RX ADMIN — CIPROFLOXACIN 400 MG: 2 INJECTION, SOLUTION INTRAVENOUS at 06:11

## 2018-11-30 RX ADMIN — PANTOPRAZOLE SODIUM 40 MG: 40 TABLET, DELAYED RELEASE ORAL at 08:11

## 2018-11-30 RX ADMIN — PRAVASTATIN SODIUM 40 MG: 40 TABLET ORAL at 08:11

## 2018-11-30 RX ADMIN — HEPARIN SODIUM 5000 UNITS: 5000 INJECTION, SOLUTION INTRAVENOUS; SUBCUTANEOUS at 06:11

## 2018-11-30 RX ADMIN — BISACODYL 5 MG: 5 TABLET, DELAYED RELEASE ORAL at 12:11

## 2018-11-30 RX ADMIN — BENZONATATE 100 MG: 100 CAPSULE ORAL at 01:11
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
## 2018-11-30 NOTE — NURSING
VN note: VN cued into pt's room for introduction. VN informed pt that VN would be working closely along side bedside nurse, PCT, and the rest of care team and making rounds throughout the shift. Fall risk education provided. Pt verbalized understanding. Patient requesting prn pain medication for his 9/10 chronic back pain. Secure chat message left for nurse. Allowed time for questions. VN will continue to be available to patient and intervene prn.

## 2018-11-30 NOTE — PROGRESS NOTES
Pharmacist Intervention IV to PO Note    Fabiano Malik Jr. is a 57 y.o. male being treated with IV medication metronidazole    Patient Data:    Vital Signs (Most Recent):  Temp: 96.2 °F (35.7 °C) (11/30/18 0713)  Pulse: 72 (11/30/18 0800)  Resp: 16 (11/30/18 0713)  BP: (!) 159/89 (11/30/18 0800)  SpO2: 98 % (11/30/18 0830)   Vital Signs (72h Range):  Temp:  [96.2 °F (35.7 °C)-98 °F (36.7 °C)]   Pulse:  []   Resp:  [9-20]   BP: ()/(50-92)   SpO2:  [89 %-100 %]      CBC:  Recent Labs   Lab 11/28/18 1742 11/29/18 0213 11/30/18  0522   WBC 6.16 3.60* 3.32*   RBC 4.39* 3.94* 4.07*   HGB 13.8* 12.3* 12.7*   HCT 39.5* 35.7* 36.9*    156 175   MCV 90 91 91   MCH 31.4* 31.2* 31.2*   MCHC 34.9 34.5 34.4     CMP:     Recent Labs   Lab 11/28/18 1742 11/29/18 0213 11/30/18  0521   * 219* 210*   CALCIUM 9.3 8.1* 8.7   ALBUMIN 3.8 3.0* 3.1*   PROT 6.3 5.3* 5.4*    136 139   K 4.8 3.8 3.7   CO2 20* 24 26    105 106   BUN 13 14 9   CREATININE 2.0* 1.6* 1.2   ALKPHOS 58 48* 49*   ALT 29 22 21   AST 32 24 22   BILITOT 0.8 0.6 0.5       Dietary Orders:  Diet Orders            Diet diabetic [facility]; 2000 Calorie: Diabetic starting at 11/28 2040            Based on the following criteria, this patient qualifies for intravenous to oral conversion:  [x] The patients gastrointestinal tract is functioning (tolerating medications via oral or enteral route for 24 hours and tolerating food or enteral feeds for 24 hours).  [x] The patient is hemodynamically stable for 24 hours (heart rate <100 beats per minute, systolic blood pressure >99 mm Hg, and respiratory rate <20 breaths per minute).  [x] The patient shows clinical improvement (afebrile for at least 24 hours and white blood cell count downtrending or normalized). Additionally, the patient must be non-neutropenic (absolute neutrophil count >500 cells/mm3).  [x] For antimicrobials, the patient has received IV therapy for at least 24  hours.    IV medication metronidazole 500 mg will be changed to oral medication metronidazole 500 mg po q8h    Pharmacist's Name: Sonia Pratt  Pharmacist's Extension: [phone]

## 2018-11-30 NOTE — PLAN OF CARE
Problem: Patient Care Overview  Goal: Plan of Care Review  Outcome: Ongoing (interventions implemented as appropriate)  PT IV and tele box removed. Education on diet, medications and follow up care give. Pt verbalized understanding dc instructions. Medications delivered to bedside. No acute distress noted at time of dc. Awaiting ride.

## 2018-11-30 NOTE — DISCHARGE INSTRUCTIONS
Hypotension, All Causes (English) View Edit Remove   Dizziness, Uncertain Cause (English) View Edit Remove   Hypotension, Orthostatic (English) View Edit Remove   Acetaminophen tablets or caplets (English) View Edit Remove   Ibuprofen tablets and capsules (English) View Edit Remove   Risperidone tablets (English) View Edit Remove   Kidney Injury, Acute, Discharge Instructions for (English) View Edit Remove
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
## 2018-11-30 NOTE — PROGRESS NOTES
Pharmacist Intervention IV to PO Note    Fabiano Malik Jr. is a 57 y.o. male being treated with IV medication ciprofloxacin    Patient Data:    Vital Signs (Most Recent):  Temp: 96.2 °F (35.7 °C) (11/30/18 0713)  Pulse: 72 (11/30/18 0800)  Resp: 16 (11/30/18 0713)  BP: (!) 159/89 (11/30/18 0800)  SpO2: 98 % (11/30/18 0830)   Vital Signs (72h Range):  Temp:  [96.2 °F (35.7 °C)-98 °F (36.7 °C)]   Pulse:  []   Resp:  [9-20]   BP: ()/(50-92)   SpO2:  [89 %-100 %]      CBC:  Recent Labs   Lab 11/28/18 1742 11/29/18 0213 11/30/18  0522   WBC 6.16 3.60* 3.32*   RBC 4.39* 3.94* 4.07*   HGB 13.8* 12.3* 12.7*   HCT 39.5* 35.7* 36.9*    156 175   MCV 90 91 91   MCH 31.4* 31.2* 31.2*   MCHC 34.9 34.5 34.4     CMP:     Recent Labs   Lab 11/28/18 1742 11/29/18 0213 11/30/18  0521   * 219* 210*   CALCIUM 9.3 8.1* 8.7   ALBUMIN 3.8 3.0* 3.1*   PROT 6.3 5.3* 5.4*    136 139   K 4.8 3.8 3.7   CO2 20* 24 26    105 106   BUN 13 14 9   CREATININE 2.0* 1.6* 1.2   ALKPHOS 58 48* 49*   ALT 29 22 21   AST 32 24 22   BILITOT 0.8 0.6 0.5       Dietary Orders:  Diet Orders            Diet diabetic [facility]; 2000 Calorie: Diabetic starting at 11/28 2040            Based on the following criteria, this patient qualifies for intravenous to oral conversion:  [x] The patients gastrointestinal tract is functioning (tolerating medications via oral or enteral route for 24 hours and tolerating food or enteral feeds for 24 hours).  [x] The patient is hemodynamically stable for 24 hours (heart rate <100 beats per minute, systolic blood pressure >99 mm Hg, and respiratory rate <20 breaths per minute).  [x] The patient shows clinical improvement (afebrile for at least 24 hours and white blood cell count downtrending or normalized). Additionally, the patient must be non-neutropenic (absolute neutrophil count >500 cells/mm3).  [x] For antimicrobials, the patient has received IV therapy for at least 24  hours.    IV medication ciprofloxacin 400mg will be changed to oral medication ciprofloxacin 500 mg po q12h    Pharmacist's Name: Sonia Pratt  Pharmacist's Extension: [phone]

## 2018-11-30 NOTE — PLAN OF CARE
Discharge order noted. No HH or PT noted.    Discharge rounds on patient. Discussed followup appointments, blue discharge folder, discharge nurse will go over home medications and reasons for medications and final discharge instructions. All patient/caregiver questions answered. Patient verbalized understanding.    Follow-up With  Details  Why  Contact Info   Trey Stanton MD  Schedule an appointment as soon as possible for a visit in 1 week  pt has to call for own appointment per Dr. Stanton's (Psychiatry & neurology - psychiatry) office staff  [address]   [place] [address]  [phone]   Hernesto Barbosa MD  Schedule an appointment as soon as possible for a visit in 1 week  Office is closed. Please call for hospital follow up appointment.  [address]  [place] [id number] [phone]          11/30/18 1553   Final Note   Assessment Type Final Discharge Note   Anticipated Discharge Disposition Home   What phone number can be called within the next 1-3 days to see how you are doing after discharge? [phone]   Hospital Follow Up  Appt(s) scheduled? No  (unable to schedule; office is closed)   Discharge plans and expectations educations in teach back method with documentation complete? Yes   Right Care Referral Info   Post Acute Recommendation SNF / Sub-Acute Rehab   Callie Ramirez RN-BC  Transitional Navigator  [phone]

## 2018-11-30 NOTE — PLAN OF CARE
TN met with pt. Pt AAOx 4,pt lives alone, pt independent with ADLs,  pt stated that he takes [employer] transportation at times to get to doctor's appointment, No DME or HH noted.  pt's daughter to provide transportation on discharge, pt admitted with sepsis.    Discharge brochure and blue discharge folder given to pt. TN updated contact information on whiteboard.       11/29/18 6891   Discharge Assessment   Assessment Type Discharge Planning Assessment   Confirmed/corrected address and phone number on facesheet? Yes   Assessment information obtained from? Patient;Medical Record   Prior to hospitilization cognitive status: Alert/Oriented   Prior to hospitalization functional status: Independent   Current cognitive status: Alert/Oriented   Current Functional Status: Independent   Lives With alone   Able to Return to Prior Arrangements yes   Is patient able to care for self after discharge? Yes   Patient's perception of discharge disposition admitted as an inpatient   Readmission Within The Last 30 Days no previous admission in last 30 days   Patient currently being followed by outpatient case management? No   Patient currently receives any other outside agency services? No   Equipment Currently Used at Home none   Do you have any problems affording any of your prescribed medications? No   Is the patient taking medications as prescribed? yes   Does the patient have transportation home? Yes   Does the patient receive services at the [facility]? No   Discharge Plan A Home;Home with family   Patient/Family In Agreement With Plan yes     Callie Ramirez RN-BC  Transitional Navigator  [phone]

## 2018-11-30 NOTE — PLAN OF CARE
Problem: Patient Care Overview  Goal: Plan of Care Review   11/30/18 0209   Coping/Psychosocial   Plan Of Care Reviewed With patient   Pt reports that he is under phychiatric care w/  at .  Gave pt condolences regarding significant others death.  Pt reports that she had been ill for many years but her death was unexpected.  Pt mentioned that he was a life long resident of [place].  Nurse & Pt Reminisced about how [place]  [place] we were kids.  Pt chuckled and laughed a couple of times and seemed to be less sad.       Tele: NSR,  HR 70 90,  No alarms.     Bed in lowest position, wheels locked, non skid socks, ID band worn, personal items and call bell with in reach, bed alarm set.

## 2018-12-03 LAB
BACTERIA BLD CULT: NORMAL
BACTERIA BLD CULT: NORMAL

## 2018-12-03 RX ORDER — INSULIN PUMP SYRINGE, 3 ML
EACH MISCELLANEOUS
Qty: 1 EACH | Refills: 0 | Status: SHIPPED | OUTPATIENT
Start: 2018-12-03 | End: 2020-04-23 | Stop reason: SDUPTHER

## 2018-12-03 RX ORDER — LANCETS 33 GAUGE
1 EACH MISCELLANEOUS 2 TIMES DAILY
Qty: 200 EACH | Refills: 2 | Status: SHIPPED | OUTPATIENT
Start: 2018-12-03 | End: 2020-03-24 | Stop reason: SDUPTHER
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
## 2018-12-03 NOTE — TELEPHONE ENCOUNTER
----- Message from Zoe Hernandez sent at 12/3/2018  4:11 PM CST -----  Contact: Self [phone]  Patient would like to speak with you about getting a blood monitor kit to help with his diabetes. Please advise

## 2018-12-06 ENCOUNTER — LAB VISIT (OUTPATIENT)
Dept: LAB | Facility: HOSPITAL | Age: 58
End: 2018-12-06
Attending: INTERNAL MEDICINE
Payer: MEDICARE

## 2018-12-06 DIAGNOSIS — Z12.5 PROSTATE CANCER SCREENING: ICD-10-CM

## 2018-12-06 DIAGNOSIS — Z00.00 PREVENTATIVE HEALTH CARE: ICD-10-CM

## 2018-12-06 DIAGNOSIS — E78.5 HYPERLIPIDEMIA, UNSPECIFIED HYPERLIPIDEMIA TYPE: ICD-10-CM

## 2018-12-06 DIAGNOSIS — E11.9 TYPE 2 DIABETES MELLITUS WITHOUT COMPLICATION, WITHOUT LONG-TERM CURRENT USE OF INSULIN: ICD-10-CM

## 2018-12-06 DIAGNOSIS — E11.59 HYPERTENSION ASSOCIATED WITH DIABETES: ICD-10-CM

## 2018-12-06 DIAGNOSIS — I15.2 HYPERTENSION ASSOCIATED WITH DIABETES: ICD-10-CM

## 2018-12-06 LAB
ALBUMIN SERPL BCP-MCNC: 3.2 G/DL
ALP SERPL-CCNC: 53 U/L
ALT SERPL W/O P-5'-P-CCNC: 26 U/L
ANION GAP SERPL CALC-SCNC: 7 MMOL/L
AST SERPL-CCNC: 31 U/L
BASOPHILS # BLD AUTO: 0.04 K/UL
BASOPHILS NFR BLD: 1 %
BILIRUB SERPL-MCNC: 0.7 MG/DL
BUN SERPL-MCNC: 10 MG/DL
CALCIUM SERPL-MCNC: 8.1 MG/DL
CHLORIDE SERPL-SCNC: 108 MMOL/L
CHOLEST SERPL-MCNC: 134 MG/DL
CHOLEST/HDLC SERPL: 3.1 {RATIO}
CO2 SERPL-SCNC: 27 MMOL/L
COMPLEXED PSA SERPL-MCNC: 0.23 NG/ML
CREAT SERPL-MCNC: 1.3 MG/DL
DIFFERENTIAL METHOD: ABNORMAL
EOSINOPHIL # BLD AUTO: 0.3 K/UL
EOSINOPHIL NFR BLD: 7.9 %
ERYTHROCYTE [DISTWIDTH] IN BLOOD BY AUTOMATED COUNT: 13.5 %
EST. GFR  (AFRICAN AMERICAN): >60 ML/MIN/1.73 M^2
EST. GFR  (NON AFRICAN AMERICAN): >60 ML/MIN/1.73 M^2
ESTIMATED AVG GLUCOSE: 146 MG/DL
GLUCOSE SERPL-MCNC: 176 MG/DL
HBA1C MFR BLD HPLC: 6.7 %
HCT VFR BLD AUTO: 37 %
HDLC SERPL-MCNC: 43 MG/DL
HDLC SERPL: 32.1 %
HGB BLD-MCNC: 12.5 G/DL
IMM GRANULOCYTES # BLD AUTO: 0.01 K/UL
IMM GRANULOCYTES NFR BLD AUTO: 0.2 %
LDLC SERPL CALC-MCNC: 58.4 MG/DL
LYMPHOCYTES # BLD AUTO: 1.3 K/UL
LYMPHOCYTES NFR BLD: 30.9 %
MCH RBC QN AUTO: 31.3 PG
MCHC RBC AUTO-ENTMCNC: 33.8 G/DL
MCV RBC AUTO: 93 FL
MONOCYTES # BLD AUTO: 0.4 K/UL
MONOCYTES NFR BLD: 10.6 %
NEUTROPHILS # BLD AUTO: 2 K/UL
NEUTROPHILS NFR BLD: 49.4 %
NONHDLC SERPL-MCNC: 91 MG/DL
NRBC BLD-RTO: 0 /100 WBC
PLATELET # BLD AUTO: 168 K/UL
PMV BLD AUTO: 9.3 FL
POTASSIUM SERPL-SCNC: 4.6 MMOL/L
PROT SERPL-MCNC: 5.7 G/DL
RBC # BLD AUTO: 4 M/UL
SODIUM SERPL-SCNC: 142 MMOL/L
TRIGL SERPL-MCNC: 163 MG/DL
WBC # BLD AUTO: 4.05 K/UL

## 2018-12-06 PROCEDURE — 36415 COLL VENOUS BLD VENIPUNCTURE: CPT | Mod: PO

## 2018-12-06 PROCEDURE — 80061 LIPID PANEL: CPT

## 2018-12-06 PROCEDURE — 80053 COMPREHEN METABOLIC PANEL: CPT

## 2018-12-06 PROCEDURE — 84153 ASSAY OF PSA TOTAL: CPT

## 2018-12-06 PROCEDURE — 85025 COMPLETE CBC W/AUTO DIFF WBC: CPT

## 2018-12-06 PROCEDURE — 83036 HEMOGLOBIN GLYCOSYLATED A1C: CPT

## 2018-12-12 ENCOUNTER — OFFICE VISIT (OUTPATIENT)
Dept: INTERNAL MEDICINE | Facility: CLINIC | Age: 58
End: 2018-12-12
Payer: MEDICARE

## 2018-12-12 VITALS
BODY MASS INDEX: 33.98 KG/M2 | WEIGHT: 250.88 LBS | DIASTOLIC BLOOD PRESSURE: 92 MMHG | SYSTOLIC BLOOD PRESSURE: 142 MMHG | OXYGEN SATURATION: 96 % | HEART RATE: 91 BPM | HEIGHT: 72 IN

## 2018-12-12 DIAGNOSIS — Z11.59 NEED FOR HEPATITIS C SCREENING TEST: ICD-10-CM

## 2018-12-12 DIAGNOSIS — G89.29 CHRONIC BACK PAIN, UNSPECIFIED BACK LOCATION, UNSPECIFIED BACK PAIN LATERALITY: ICD-10-CM

## 2018-12-12 DIAGNOSIS — D64.9 ANEMIA, UNSPECIFIED TYPE: ICD-10-CM

## 2018-12-12 DIAGNOSIS — N28.9 RENAL INSUFFICIENCY: ICD-10-CM

## 2018-12-12 DIAGNOSIS — M54.9 CHRONIC BACK PAIN, UNSPECIFIED BACK LOCATION, UNSPECIFIED BACK PAIN LATERALITY: ICD-10-CM

## 2018-12-12 DIAGNOSIS — E66.9 CLASS 1 OBESITY WITH SERIOUS COMORBIDITY AND BODY MASS INDEX (BMI) OF 34.0 TO 34.9 IN ADULT, UNSPECIFIED OBESITY TYPE: ICD-10-CM

## 2018-12-12 DIAGNOSIS — Z23 NEED FOR VACCINATION AGAINST STREPTOCOCCUS PNEUMONIAE USING PNEUMOCOCCAL CONJUGATE VACCINE 7: ICD-10-CM

## 2018-12-12 DIAGNOSIS — I15.2 HYPERTENSION ASSOCIATED WITH DIABETES: ICD-10-CM

## 2018-12-12 DIAGNOSIS — F32.A DEPRESSION, UNSPECIFIED DEPRESSION TYPE: ICD-10-CM

## 2018-12-12 DIAGNOSIS — E78.5 HYPERLIPIDEMIA, UNSPECIFIED HYPERLIPIDEMIA TYPE: ICD-10-CM

## 2018-12-12 DIAGNOSIS — E11.9 TYPE 2 DIABETES MELLITUS WITHOUT COMPLICATION, WITHOUT LONG-TERM CURRENT USE OF INSULIN: Primary | ICD-10-CM

## 2018-12-12 DIAGNOSIS — E11.59 HYPERTENSION ASSOCIATED WITH DIABETES: ICD-10-CM

## 2018-12-12 PROCEDURE — 3077F SYST BP >= 140 MM HG: CPT | Mod: CPTII,S$GLB,, | Performed by: INTERNAL MEDICINE

## 2018-12-12 PROCEDURE — 99214 OFFICE O/P EST MOD 30 MIN: CPT | Mod: 25,S$GLB,, | Performed by: INTERNAL MEDICINE

## 2018-12-12 PROCEDURE — 99999 PR PBB SHADOW E&M-EST. PATIENT-LVL IV: CPT | Mod: PBBFAC,,, | Performed by: INTERNAL MEDICINE

## 2018-12-12 PROCEDURE — 3080F DIAST BP >= 90 MM HG: CPT | Mod: CPTII,S$GLB,, | Performed by: INTERNAL MEDICINE

## 2018-12-12 PROCEDURE — 3044F HG A1C LEVEL LT 7.0%: CPT | Mod: CPTII,S$GLB,, | Performed by: INTERNAL MEDICINE

## 2018-12-12 PROCEDURE — G0009 ADMIN PNEUMOCOCCAL VACCINE: HCPCS | Mod: S$GLB,,, | Performed by: INTERNAL MEDICINE

## 2018-12-12 PROCEDURE — 3008F BODY MASS INDEX DOCD: CPT | Mod: CPTII,S$GLB,, | Performed by: INTERNAL MEDICINE

## 2018-12-12 PROCEDURE — 90732 PPSV23 VACC 2 YRS+ SUBQ/IM: CPT | Mod: S$GLB,,, | Performed by: INTERNAL MEDICINE

## 2018-12-12 RX ORDER — BENAZEPRIL HYDROCHLORIDE 10 MG/1
10 TABLET ORAL 2 TIMES DAILY
Qty: 60 TABLET | Refills: 1 | Status: SHIPPED | OUTPATIENT
Start: 2018-12-12 | End: 2019-02-15 | Stop reason: ALTCHOICE

## 2018-12-12 NOTE — PROGRESS NOTES
Pt. ID: Fabiano Malik Jr. is a 57 y.o. male      Chief complaint:   Chief Complaint   Patient presents with    Hospital Follow Up       HPI: pt. Here for f/u from hospital for hypotension; he was admitted to hospital and given fluids; echo showed EF of 55 %; diastolic dysfxn was noted; he is feeling better I reviewed labs dated 12/6/18; anemia is stable and colonoscopy dated 5/31/18 showed polyps which were removed; repeat colonoscopy in 3 years; kidney fxn is improving; HGBA1C was 6.7; cholesterol is WNL; triglycerides are mildly elevated ; BP is borderline elevated; pt. Has gained a few pounds; CT abdomen  And CXR showed no significant abnormalities; of note he is seeing psychiatry for depression and bipolar; eye exam 7/10/18; he had flu shot; he would like pneumovax and will get tetanus  Shot at local pharmacy; of note, pt. Sees pain management for chronic back pain     Review of Systems   Constitutional: Negative for chills and fever.   Respiratory: Negative for cough and shortness of breath.    Cardiovascular: Negative for chest pain.   Gastrointestinal: Negative for abdominal pain, nausea and vomiting.   Genitourinary: Negative for dysuria.         Objective:    Physical Exam   Constitutional: He is oriented to person, place, and time.   obese   Eyes: EOM are normal.   Neck: Normal range of motion.   Cardiovascular: Normal rate, regular rhythm and normal heart sounds.   Pulmonary/Chest: Effort normal and breath sounds normal.   Abdominal: Soft. There is no tenderness. There is no rebound and no guarding.   Musculoskeletal: Normal range of motion.   Neurological: He is alert and oriented to person, place, and time.   Skin: No rash noted.   Diabetic foot exam: decreased sensation to B/L feet without ulcers    Vitals reviewed.        Health Maintenance   Topic Date Due    Hepatitis C Screening  1960    Pneumococcal Vaccine (Medium Risk) (1 of 1 - PPSV23) 12/15/1979    TETANUS VACCINE  12/12/2019  (Originally 12/15/1978)    Hemoglobin A1c  06/06/2019    Eye Exam  07/10/2019    Lipid Panel  12/06/2019    Foot Exam  12/12/2019    Colonoscopy  05/31/2028    Influenza Vaccine  Completed         Assessment:     1. Type 2 diabetes mellitus without complication, without long-term current use of insulin Well controlled   2. Hypertension associated with diabetes Sub-optimally controlled   3. Hyperlipidemia, unspecified hyperlipidemia type Well controlled   4. Renal insufficiency Active   5. Anemia, unspecified type Stable   6. Chronic back pain, unspecified back location, unspecified back pain laterality Active   7. Depression, unspecified depression type Active   8. Class 1 obesity with serious comorbidity and body mass index (BMI) of 34.0 to 34.9 in adult, unspecified obesity type Sub-optimally controlled   9. Need for vaccination against Streptococcus pneumoniae using pneumococcal conjugate vaccine 7 Active   10. Need for hepatitis C screening test Active         Plan: Type 2 diabetes mellitus without complication, without long-term current use of insulin  Comments:  continue current regimen and encouraged ADA diet   Orders:  -     CBC auto differential; Future; Expected date: 12/12/2018  -     Comprehensive metabolic panel; Future; Expected date: 12/12/2018    Hypertension associated with diabetes  Comments:  increase benazepril to 10 mg BID and encouraged low na diet and weight loss; repeat BP on f/u in 1 month   Orders:  -     benazepril (LOTENSIN) 10 MG tablet; Take 1 tablet (10 mg total) by mouth 2 (two) times daily.  Dispense: 60 tablet; Refill: 1  -     CBC auto differential; Future; Expected date: 12/12/2018  -     Comprehensive metabolic panel; Future; Expected date: 12/12/2018    Hyperlipidemia, unspecified hyperlipidemia type  Comments:  continue current regimen and encouraged stricter low triglyceride diet     Renal insufficiency  Comments:  improving; monitor   Orders:  -     Comprehensive  metabolic panel; Future; Expected date: 12/12/2018    Anemia, unspecified type  Comments:  monitor   Orders:  -     CBC auto differential; Future; Expected date: 12/12/2018    Chronic back pain, unspecified back location, unspecified back pain laterality  Comments:  f/u pain management who is managing     Depression, unspecified depression type  Comments:  continue current regimen and f/u psychiatry who is managing     Class 1 obesity with serious comorbidity and body mass index (BMI) of 34.0 to 34.9 in adult, unspecified obesity type  Comments:  encouraged diet and explained risks     Need for vaccination against Streptococcus pneumoniae using pneumococcal conjugate vaccine 7  -     Pneumococcal Polysaccharide Vaccine (23 Valent) (SQ/IM)    Need for hepatitis C screening test  -     Hepatitis C antibody; Future; Expected date: 12/12/2018        Problem List Items Addressed This Visit        Psychiatric    Depression       Cardiac/Vascular    Hypertension associated with diabetes    Relevant Medications    benazepril (LOTENSIN) 10 MG tablet    Other Relevant Orders    CBC auto differential    Comprehensive metabolic panel    HLD (hyperlipidemia)       Renal/    Renal insufficiency    Relevant Orders    Comprehensive metabolic panel       ID    Need for hepatitis C screening test    Relevant Orders    Hepatitis C antibody    Need for vaccination against Streptococcus pneumoniae using pneumococcal conjugate vaccine 7    Relevant Orders    Pneumococcal Polysaccharide Vaccine (23 Valent) (SQ/IM)       Oncology    Anemia    Relevant Orders    CBC auto differential       Endocrine    Type 2 diabetes mellitus without complication, without long-term current use of insulin - Primary    Relevant Orders    CBC auto differential    Comprehensive metabolic panel    Class 1 obesity with serious comorbidity and body mass index (BMI) of 34.0 to 34.9 in adult       Orthopedic    Chronic back pain

## 2018-12-17 ENCOUNTER — TELEPHONE (OUTPATIENT)
Dept: PAIN MEDICINE | Facility: CLINIC | Age: 58
End: 2018-12-17

## 2018-12-17 ENCOUNTER — TELEPHONE (OUTPATIENT)
Dept: INTERNAL MEDICINE | Facility: CLINIC | Age: 58
End: 2018-12-17

## 2018-12-17 ENCOUNTER — OFFICE VISIT (OUTPATIENT)
Dept: PAIN MEDICINE | Facility: CLINIC | Age: 58
End: 2018-12-17
Payer: MEDICARE

## 2018-12-17 VITALS
HEART RATE: 99 BPM | DIASTOLIC BLOOD PRESSURE: 87 MMHG | SYSTOLIC BLOOD PRESSURE: 147 MMHG | BODY MASS INDEX: 33.09 KG/M2 | WEIGHT: 244 LBS

## 2018-12-17 DIAGNOSIS — G89.4 CHRONIC PAIN SYNDROME: ICD-10-CM

## 2018-12-17 DIAGNOSIS — M47.9 OSTEOARTHRITIS OF SPINE, UNSPECIFIED SPINAL OSTEOARTHRITIS COMPLICATION STATUS, UNSPECIFIED SPINAL REGION: ICD-10-CM

## 2018-12-17 DIAGNOSIS — M47.819 SPONDYLOSIS WITHOUT MYELOPATHY: Primary | ICD-10-CM

## 2018-12-17 DIAGNOSIS — M54.9 BACK PAIN, UNSPECIFIED BACK LOCATION, UNSPECIFIED BACK PAIN LATERALITY, UNSPECIFIED CHRONICITY: ICD-10-CM

## 2018-12-17 DIAGNOSIS — G89.29 CHRONIC BILATERAL LOW BACK PAIN WITH SCIATICA, SCIATICA LATERALITY UNSPECIFIED: ICD-10-CM

## 2018-12-17 DIAGNOSIS — M47.819 FACET ARTHROPATHY: ICD-10-CM

## 2018-12-17 DIAGNOSIS — M54.40 CHRONIC BILATERAL LOW BACK PAIN WITH SCIATICA, SCIATICA LATERALITY UNSPECIFIED: ICD-10-CM

## 2018-12-17 PROCEDURE — 3008F BODY MASS INDEX DOCD: CPT | Mod: CPTII,S$GLB,, | Performed by: NURSE PRACTITIONER

## 2018-12-17 PROCEDURE — 3079F DIAST BP 80-89 MM HG: CPT | Mod: CPTII,S$GLB,, | Performed by: NURSE PRACTITIONER

## 2018-12-17 PROCEDURE — 99214 OFFICE O/P EST MOD 30 MIN: CPT | Mod: S$GLB,,, | Performed by: NURSE PRACTITIONER

## 2018-12-17 PROCEDURE — 99999 PR PBB SHADOW E&M-EST. PATIENT-LVL III: CPT | Mod: PBBFAC,,, | Performed by: NURSE PRACTITIONER

## 2018-12-17 PROCEDURE — 3077F SYST BP >= 140 MM HG: CPT | Mod: CPTII,S$GLB,, | Performed by: NURSE PRACTITIONER

## 2018-12-17 RX ORDER — MELOXICAM 15 MG/1
7.5 TABLET ORAL DAILY PRN
Qty: 90 TABLET | Refills: 2 | Status: SHIPPED | OUTPATIENT
Start: 2018-12-17 | End: 2019-01-14 | Stop reason: ALTCHOICE

## 2018-12-17 NOTE — TELEPHONE ENCOUNTER
----- Message from Carmella Villa sent at 12/14/2018 10:35 AM CST -----  Contact: [employer]/[phone]/Sj  [employer] is requesting how often does patient test for diabetic supplies. Please call and advise

## 2018-12-17 NOTE — PROGRESS NOTES
[facility] Established Patient Evaluation    Chief Complaint  Chief Complaint   Patient presents with    Low-back Pain    Neck Pain       Last 3 PDI Scores 6/26/2018 5/8/2018 4/17/2018   Pain Disability Index (PDI) 60 60 56       Interval Update  Fabiano Malik Jr. presents to me for low back and neck pain her reports 9.5/10 pain today he has not been seen in our clinic for 6 months  patient states today his primary source of pain is low back with some bilateral leg pain. He is status post right and left lumbar RFA at L2-3-4-5 70% relief in May of 2018.    Location: Low back   Onset: 3 months  Current Pain Score: 9/10  Daily Pain of Range: 9-10/10  Quality: Burning, Throbbing, Grabbing and Sharp  Radiation: Bilateral legs   Worsened by: exercise, extension, flexion, lifting, lying down and sneezing  Improved by: heat and rest    Background from Chart Review      Fabiano Malik Jr. presents to the clinic for a 4 wk follow-up appointment for neck and low back pain. He is S/P  left L2-3-4-5 FACET MEDIAL BRANCH NERVE RADIOFREQUENCY NEUROTOMY (lumbar) on 5/29/18 and  RIGHT L2-3-4-5 FACET MEDIAL BRANCH NERVE RADIOFREQUENCY NEUROTOMY on 5/22/18 with 70% relief for 2 weeks, pt states pain is back but overall better since procedure, I will order PT and refill Mobic and Lyrica today.  Since the last visit, Fabiano Malik Jr. states the pain has been persistant. Current pain intensity is 8/10.         Treatment History  PT/OT:   HEP:   TENS:  Injections:   Surgery:  Medications:   - NSAIDS:   - MSK Relaxants:   - TCAs:   - SNRIs:   - Topicals:   - Opioids:   - Anticonvulsants:     Current Pain Medications  1. Cymbalta  60 mg BID   2. Tylenol   3. Klonopin  4. Seroquel  5. Risperdal    Full Medication List    Current Outpatient Medications:     acetaminophen (TYLENOL) 325 MG tablet, Take 2 tablets (650 mg total) by mouth every 6 (six) hours as needed., Disp: 120 tablet, Rfl: 3    benazepril (LOTENSIN)  10 MG tablet, Take 1 tablet (10 mg total) by mouth 2 (two) times daily., Disp: 60 tablet, Rfl: 1    blood sugar diagnostic Strp, 2 strips by Misc.(Non-Drug; Combo Route) route 2 (two) times daily., Disp: 200 strip, Rfl: 2    blood-glucose meter kit, Use as instructed, Disp: 1 each, Rfl: 0    clonazePAM (KLONOPIN) 1 MG tablet, TK 1 T PO TID, Disp: , Rfl: 3    DULoxetine (CYMBALTA) 60 MG capsule, Take 1 capsule (60 mg total) by mouth 2 (two) times daily., Disp: 60 capsule, Rfl: 11    ibuprofen (ADVIL,MOTRIN) 600 MG tablet, Take 1 tablet (600 mg total) by mouth every 6 (six) hours as needed for Pain., Disp: 120 tablet, Rfl: 3    lancets (ONETOUCH DELICA LANCETS) 33 gauge Misc, 1 lancet by Misc.(Non-Drug; Combo Route) route 2 (two) times daily., Disp: 200 each, Rfl: 2    meloxicam (MOBIC) 15 MG tablet, Take 0.5 tablets (7.5 mg total) by mouth daily as needed for Pain., Disp: 90 tablet, Rfl: 2    metformin (GLUCOPHAGE) 1000 MG tablet, Take 1,000 mg by mouth 2 (two) times daily with meals., Disp: , Rfl:     omeprazole (PRILOSEC) 20 MG capsule, Take 20 mg by mouth once daily., Disp: , Rfl:     pravastatin (PRAVACHOL) 40 MG tablet, Take 40 mg by mouth once daily., Disp: , Rfl:     QUEtiapine (SEROQUEL) 400 MG tablet, Take 400 mg by mouth once daily. , Disp: , Rfl:     risperiDONE (RISPERDAL) 0.5 MG Tab, Take 1 tablet (0.5 mg total) by mouth every evening., Disp: 30 tablet, Rfl: 11     Review of Systems  ROS    Medical History  Past Medical History:   Diagnosis Date    Chronic back pain greater than 3 months duration     Depression     Diabetes mellitus     Hypertension     Schizophrenia         Surgical History  Past Surgical History:   Procedure Laterality Date    APPENDECTOMY      BLOCK-NERVE-MEDIAL BRANCH-LUMBAR- Bilateral L2-3-4-5 Bilateral 4/24/2018    [facility] at [facility] PAIN MGT    BLOCK-NERVE-MEDIAL BRANCH-LUMBAR- BILATERAL, L2,3,4,5 Bilateral 2/20/2018    Performed by  [facility] at [facility]    BLOCK-NERVE-MEDIAL BRANCH-LUMBAR- LUMBAR- BILATERAL- L2,3,4,5 Bilateral 2/6/2018    Performed by [facility] at [facility]    COLONOSCOPY N/A 5/31/2018    Procedure: COLONOSCOPY;  Surgeon: Guillaume Hatch MD;  Location: [facility];  Service: Endoscopy;  Laterality: N/A;    COLONOSCOPY N/A 5/31/2018    [facility]O    NECK SURGERY      RADIOFREQUENCY ABLATION OF LUMBAR MEDIAL BRANCH NERVE AT SINGLE LEVEL Left 5/29/2018    Procedure: RADIOFREQUENCY THERMOCOAGULATION (RFTC)-NERVE-MEDIAN BRANCH-LUMBAR- Left L2-3-4-5;  Surgeon: Donavon Dennis MD;  Location: [facility];  Service: Pain Management;  Laterality: Left;    RADIOFREQUENCY THERMOCOAGULATION (RFTC)-NERVE-MEDIAN BRANCH-LUMBAR- Left L2-3-4-5 Left 5/29/2018    Performed by [facility] Dennis MD at [facility]    RADIOFREQUENCY THERMOCOAGULATION (RFTC)-NERVE-MEDIAN BRANCH-LUMBAR- Right L2-3-4-5 Right 5/22/2018    Performed by Donavon Dennis MD at [facility]        Physical Exam  Vitals:    12/17/18 1649   BP: (!) 147/87   Pulse: 99   Weight: 110.7 kg (244 lb)   PainSc:   9         General Musculoskeletal Exam   Gait: antalgic     Back (L-Spine & T-Spine) / Neck (C-Spine) Exam     Back (L-Spine & T-Spine) Range of Motion   Lateral bend right: abnormal   Lateral bend left: abnormal   Rotation right: abnormal   Rotation left: abnormal     Spinal Sensation   Right Side Sensation  L-Spine Level: hypersensitive  Left Side Sensation  L-Spine Level: hypersensitive    Back (L-Spine & T-Spine) Tests   Right Side Tests  Squat Test: unable to perform  Left Side Tests  Squat: unable to perform    Comments:  + Facet Loading Bilaterally.  Positive FABERE, Yeoman's and Galensen test on the both side.          Imaging    MRI lumbar spine I. contrast.  Comparison to previous radiographs.  Few 1 CM size is cyst mid and lower renal poles.  Marrow space normal.  Chronic  endplate change particularly L4-L5.  No fracture subluxation.  Spinal cord normal.    T11-T12 limited posterior disc bulge.    L1-L2 mild posterior paracentral disc bulge, mild compression adjacent spinal sac, facet arthropathy with mild spinal and foraminal stenosis.    L2-L3 minimal mild posterior disc bulge, mild facet arthropathy with mild spinal and foraminal stenosis.    L3-L4 minimal posterior disc bulge, facet joint arthropathy with minimal mild spinal and foraminal stenosis.    L4 L5 minimal mild posterior disc bulge without spinal or foraminal stenosis.    L5-S1 mild/moderate facet joint arthropathy, mild posterior disc bulge, mild spinal and foraminal stenosis.      Impression      1. Degenerative disc, spondylosis, facet arthropathy discussed above.    2.  No fracture, subluxation or disc prolapse.       Labs:  BMP  Lab Results   Component Value Date     12/06/2018    K 4.6 12/06/2018     12/06/2018    CO2 27 12/06/2018    BUN 10 12/06/2018    CREATININE 1.3 12/06/2018    CALCIUM 8.1 (L) 12/06/2018    ANIONGAP 7 (L) 12/06/2018    ESTGFRAFRICA >60.0 12/06/2018    EGFRNONAA >60.0 12/06/2018     Lab Results   Component Value Date    ALT 26 12/06/2018    AST 31 12/06/2018    ALKPHOS 53 (L) 12/06/2018    BILITOT 0.7 12/06/2018       Assessment:  Fabiano Malik Jr. is a 58 y.o. male with the following diagnoses based on history, exam, and imaging:    Problem List Items Addressed This Visit        Orthopedic    Chronic back pain      Other Visit Diagnoses     Spondylosis without myelopathy    -  Primary    Osteoarthritis of spine, unspecified spinal osteoarthritis complication status, unspecified spinal region        Chronic pain syndrome        Facet arthropathy        Back pain, unspecified back location, unspecified back pain laterality, unspecified chronicity              Plan & Recommendations  Procedures: S/F a right than left lumbar L2-3-4-5 RFA  Medications:  Continue medications as they  are prescribed refilled Cymbalta 60 mg b.i.d.  and Mobic today 15 mg p.r.n.  Imaging:  Previous imaging reviewed and discussed with patient today  PT/OT:  Once patient has completed RFA procedure will consider physical therapy  HEP: I encouraged the patient to maintain a home exercise regimen that includes daily, moderate cardiovascular exercise lasting at least 30 minutes.  This may include yoga, rachelle chi, walking, swimming, aqua aerobics, or other exercises that maintain a heart rate of 50-70% of the calculated maximum heart rate.  I also encouraged light, daily stretching focused on the target area.  Follow Up: RTC in 3-4 weeks    CARLOS Swanson  Interventional Pain Management      Disclaimer: This note was partly generated using dictation software which may occasionally result in transcription errors.

## 2018-12-17 NOTE — H&P (VIEW-ONLY)
[facility] Established Patient Evaluation    Chief Complaint  Chief Complaint   Patient presents with    Low-back Pain    Neck Pain       Last 3 PDI Scores 6/26/2018 5/8/2018 4/17/2018   Pain Disability Index (PDI) 60 60 56       Interval Update  Fabiano Malik Jr. presents to me for low back and neck pain her reports 9.5/10 pain today he has not been seen in our clinic for 6 months  patient states today his primary source of pain is low back with some bilateral leg pain. He is status post right and left lumbar RFA at L2-3-4-5 70% relief in May of 2018.    Location: Low back   Onset: 3 months  Current Pain Score: 9/10  Daily Pain of Range: 9-10/10  Quality: Burning, Throbbing, Grabbing and Sharp  Radiation: Bilateral legs   Worsened by: exercise, extension, flexion, lifting, lying down and sneezing  Improved by: heat and rest    Background from Chart Review      Fabiano Malik Jr. presents to the clinic for a 4 wk follow-up appointment for neck and low back pain. He is S/P  left L2-3-4-5 FACET MEDIAL BRANCH NERVE RADIOFREQUENCY NEUROTOMY (lumbar) on 5/29/18 and  RIGHT L2-3-4-5 FACET MEDIAL BRANCH NERVE RADIOFREQUENCY NEUROTOMY on 5/22/18 with 70% relief for 2 weeks, pt states pain is back but overall better since procedure, I will order PT and refill Mobic and Lyrica today.  Since the last visit, Fabiano Malik Jr. states the pain has been persistant. Current pain intensity is 8/10.         Treatment History  PT/OT:   HEP:   TENS:  Injections:   Surgery:  Medications:   - NSAIDS:   - MSK Relaxants:   - TCAs:   - SNRIs:   - Topicals:   - Opioids:   - Anticonvulsants:     Current Pain Medications  1. Cymbalta  60 mg BID   2. Tylenol   3. Klonopin  4. Seroquel  5. Risperdal    Full Medication List    Current Outpatient Medications:     acetaminophen (TYLENOL) 325 MG tablet, Take 2 tablets (650 mg total) by mouth every 6 (six) hours as needed., Disp: 120 tablet, Rfl: 3    benazepril (LOTENSIN)  10 MG tablet, Take 1 tablet (10 mg total) by mouth 2 (two) times daily., Disp: 60 tablet, Rfl: 1    blood sugar diagnostic Strp, 2 strips by Misc.(Non-Drug; Combo Route) route 2 (two) times daily., Disp: 200 strip, Rfl: 2    blood-glucose meter kit, Use as instructed, Disp: 1 each, Rfl: 0    clonazePAM (KLONOPIN) 1 MG tablet, TK 1 T PO TID, Disp: , Rfl: 3    DULoxetine (CYMBALTA) 60 MG capsule, Take 1 capsule (60 mg total) by mouth 2 (two) times daily., Disp: 60 capsule, Rfl: 11    ibuprofen (ADVIL,MOTRIN) 600 MG tablet, Take 1 tablet (600 mg total) by mouth every 6 (six) hours as needed for Pain., Disp: 120 tablet, Rfl: 3    lancets (ONETOUCH DELICA LANCETS) 33 gauge Misc, 1 lancet by Misc.(Non-Drug; Combo Route) route 2 (two) times daily., Disp: 200 each, Rfl: 2    meloxicam (MOBIC) 15 MG tablet, Take 0.5 tablets (7.5 mg total) by mouth daily as needed for Pain., Disp: 90 tablet, Rfl: 2    metformin (GLUCOPHAGE) 1000 MG tablet, Take 1,000 mg by mouth 2 (two) times daily with meals., Disp: , Rfl:     omeprazole (PRILOSEC) 20 MG capsule, Take 20 mg by mouth once daily., Disp: , Rfl:     pravastatin (PRAVACHOL) 40 MG tablet, Take 40 mg by mouth once daily., Disp: , Rfl:     QUEtiapine (SEROQUEL) 400 MG tablet, Take 400 mg by mouth once daily. , Disp: , Rfl:     risperiDONE (RISPERDAL) 0.5 MG Tab, Take 1 tablet (0.5 mg total) by mouth every evening., Disp: 30 tablet, Rfl: 11     Review of Systems  ROS    Medical History  Past Medical History:   Diagnosis Date    Chronic back pain greater than 3 months duration     Depression     Diabetes mellitus     Hypertension     Schizophrenia         Surgical History  Past Surgical History:   Procedure Laterality Date    APPENDECTOMY      BLOCK-NERVE-MEDIAL BRANCH-LUMBAR- Bilateral L2-3-4-5 Bilateral 4/24/2018    Performed by [facility] PAIN MGT    BLOCK-NERVE-MEDIAL BRANCH-LUMBAR- BILATERAL, L2,3,4,5 Bilateral 2/20/2018    Performed by  [facility] at [facility]    BLOCK-NERVE-MEDIAL BRANCH-LUMBAR- LUMBAR- BILATERAL- L2,3,4,5 Bilateral 2/6/2018    Performed by [facility] MD at [facility]    COLONOSCOPY N/A 5/31/2018    Procedure: COLONOSCOPY;  Surgeon: Guillaume Hatch MD;  Location: [facility];  Service: Endoscopy;  Laterality: N/A;    COLONOSCOPY N/A 5/31/2018    [facility] at [facility] ENDO    NECK SURGERY      RADIOFREQUENCY ABLATION OF LUMBAR MEDIAL BRANCH NERVE AT SINGLE LEVEL Left 5/29/2018    Procedure: RADIOFREQUENCY THERMOCOAGULATION (RFTC)-NERVE-MEDIAN BRANCH-LUMBAR- Left L2-3-4-5;  Surgeon: Donavon Dennis MD;  Location: [facility];  Service: Pain Management;  Laterality: Left;    RADIOFREQUENCY THERMOCOAGULATION (RFTC)-NERVE-MEDIAN BRANCH-LUMBAR- Left L2-3-4-5 Left 5/29/2018    Performed by [facility] Dennis MD at [facility]    RADIOFREQUENCY THERMOCOAGULATION (RFTC)-NERVE-MEDIAN BRANCH-LUMBAR- Right L2-3-4-5 Right 5/22/2018    Performed by Donavon Dennis MD at [facility]        Physical Exam  Vitals:    12/17/18 1649   BP: (!) 147/87   Pulse: 99   Weight: 110.7 kg (244 lb)   PainSc:   9         General Musculoskeletal Exam   Gait: antalgic     Back (L-Spine & T-Spine) / Neck (C-Spine) Exam     Back (L-Spine & T-Spine) Range of Motion   Lateral bend right: abnormal   Lateral bend left: abnormal   Rotation right: abnormal   Rotation left: abnormal     Spinal Sensation   Right Side Sensation  L-Spine Level: hypersensitive  Left Side Sensation  L-Spine Level: hypersensitive    Back (L-Spine & T-Spine) Tests   Right Side Tests  Squat Test: unable to perform  Left Side Tests  Squat: unable to perform    Comments:  + Facet Loading Bilaterally.  Positive FABERE, Yeoman's and Galensen test on the both side.          Imaging    MRI lumbar spine I. contrast.  Comparison to previous radiographs.  Few 1 CM size is cyst mid and lower renal poles.  Marrow space normal.  Chronic  endplate change particularly L4-L5.  No fracture subluxation.  Spinal cord normal.    T11-T12 limited posterior disc bulge.    L1-L2 mild posterior paracentral disc bulge, mild compression adjacent spinal sac, facet arthropathy with mild spinal and foraminal stenosis.    L2-L3 minimal mild posterior disc bulge, mild facet arthropathy with mild spinal and foraminal stenosis.    L3-L4 minimal posterior disc bulge, facet joint arthropathy with minimal mild spinal and foraminal stenosis.    L4 L5 minimal mild posterior disc bulge without spinal or foraminal stenosis.    L5-S1 mild/moderate facet joint arthropathy, mild posterior disc bulge, mild spinal and foraminal stenosis.      Impression      1. Degenerative disc, spondylosis, facet arthropathy discussed above.    2.  No fracture, subluxation or disc prolapse.       Labs:  BMP  Lab Results   Component Value Date     12/06/2018    K 4.6 12/06/2018     12/06/2018    CO2 27 12/06/2018    BUN 10 12/06/2018    CREATININE 1.3 12/06/2018    CALCIUM 8.1 (L) 12/06/2018    ANIONGAP 7 (L) 12/06/2018    ESTGFRAFRICA >60.0 12/06/2018    EGFRNONAA >60.0 12/06/2018     Lab Results   Component Value Date    ALT 26 12/06/2018    AST 31 12/06/2018    ALKPHOS 53 (L) 12/06/2018    BILITOT 0.7 12/06/2018       Assessment:  Fabiano Malik Jr. is a 58 y.o. male with the following diagnoses based on history, exam, and imaging:    Problem List Items Addressed This Visit        Orthopedic    Chronic back pain      Other Visit Diagnoses     Spondylosis without myelopathy    -  Primary    Osteoarthritis of spine, unspecified spinal osteoarthritis complication status, unspecified spinal region        Chronic pain syndrome        Facet arthropathy        Back pain, unspecified back location, unspecified back pain laterality, unspecified chronicity              Plan & Recommendations  Procedures: S/F a right than left lumbar L2-3-4-5 RFA  Medications:  Continue medications as they  are prescribed refilled Cymbalta 60 mg b.i.d.  and Mobic today 15 mg p.r.n.  Imaging:  Previous imaging reviewed and discussed with patient today  PT/OT:  Once patient has completed RFA procedure will consider physical therapy  HEP: I encouraged the patient to maintain a home exercise regimen that includes daily, moderate cardiovascular exercise lasting at least 30 minutes.  This may include yoga, rachelle chi, walking, swimming, aqua aerobics, or other exercises that maintain a heart rate of 50-70% of the calculated maximum heart rate.  I also encouraged light, daily stretching focused on the target area.  Follow Up: RTC in 3-4 weeks    CARLOS Swanson  Interventional Pain Management      Disclaimer: This note was partly generated using dictation software which may occasionally result in transcription errors.

## 2018-12-18 ENCOUNTER — TELEPHONE (OUTPATIENT)
Dept: INTERNAL MEDICINE | Facility: CLINIC | Age: 58
End: 2018-12-18

## 2018-12-18 NOTE — TELEPHONE ENCOUNTER
----- Message from Chago Sheldon sent at 12/18/2018  3:29 PM CST -----  Contact: [phone]/self  Patient states he's returning your call  Please call and advise

## 2018-12-18 NOTE — TELEPHONE ENCOUNTER
----- Message from Emma Swanson sent at 12/18/2018 12:49 PM CST -----  Contact: Self/ [phone]  Patient called in returning your call. Please call and advise.

## 2018-12-19 ENCOUNTER — TELEPHONE (OUTPATIENT)
Dept: PAIN MEDICINE | Facility: CLINIC | Age: 58
End: 2018-12-19

## 2018-12-19 DIAGNOSIS — M47.816 FACET ARTHROPATHY, LUMBAR: Primary | ICD-10-CM

## 2018-12-21 ENCOUNTER — TELEPHONE (OUTPATIENT)
Dept: PAIN MEDICINE | Facility: CLINIC | Age: 58
End: 2018-12-21

## 2018-12-21 NOTE — TELEPHONE ENCOUNTER
Returned call no answer left detailed message that only Mobic 15 mg was sent to his pharm, and to call us back if his question was not answered.         ----- Message from Elva Steven sent at 12/21/2018 11:13 AM CST -----  Contact: self [phone]  Patient would like about his medication. He wants to make sure it was the correct medication that was called in. Please call and advise.

## 2019-01-03 RX ORDER — PRAVASTATIN SODIUM 40 MG/1
40 TABLET ORAL DAILY
Qty: 30 TABLET | Refills: 2 | Status: SHIPPED | OUTPATIENT
Start: 2019-01-03 | End: 2019-03-19 | Stop reason: SDUPTHER

## 2019-01-03 NOTE — TELEPHONE ENCOUNTER
----- Message from Miriam Cornell sent at 1/3/2019  9:17 AM CST -----  Contact: [phone]/self  Refill request for: pravastatin (PRAVACHOL) 40 MG tablet    Be sent to: [id number] - [place], [place] - [address] AT [facility] [address]

## 2019-01-04 ENCOUNTER — TELEPHONE (OUTPATIENT)
Dept: PAIN MEDICINE | Facility: CLINIC | Age: 59
End: 2019-01-04

## 2019-01-04 NOTE — DISCHARGE INSTRUCTIONS
Home Care Instructions Pain Management:    1.  DIET:    You may resume your normal diet today.    2.  BATHING:    You may shower with luke warm water.    3.  DRESSING:    You may remove your bandage today.    4.  ACTIVITY LEVEL:      You may resume your normal activities 24 hours after your procedure.    5.  MEDICATIONS:    You may resume your normal medications today.    6.  SPECIAL INSTRUCTIONS:    No heat to the injection site for 24 hours including bath or shower, heating pad, moist heat or hot tubs.    Use an ice pack to the injection site for any pain or discomfort.  Apply ice packs for 20 minute intervals as needed.    If you have received any sedatives by mouth today, you can not drive for 12 hours.    If you have received sedation through an IV, you can not drive for 24 hours.    PLEASE CALL YOUR DOCTOR FOR THE FOLLOWIN.  Redness or swelling around the injection site.  2.  Fever of 101 degrees.  3.  Drainage (pus) from the injection site.  4.  For any continuous bleeding (some dried blood over the incision is normal.)    FOR EMERGENCIES:    If any unusual problems or difficulties occur during clinic hours, call [phone] or dial [phone].    Follow up with with your physician in 2-3 weeks.

## 2019-01-07 ENCOUNTER — LAB VISIT (OUTPATIENT)
Dept: LAB | Facility: HOSPITAL | Age: 59
End: 2019-01-07
Attending: INTERNAL MEDICINE
Payer: MEDICARE

## 2019-01-07 ENCOUNTER — TELEPHONE (OUTPATIENT)
Dept: PAIN MEDICINE | Facility: HOSPITAL | Age: 59
End: 2019-01-07

## 2019-01-07 DIAGNOSIS — I15.2 HYPERTENSION ASSOCIATED WITH DIABETES: ICD-10-CM

## 2019-01-07 DIAGNOSIS — D64.9 ANEMIA, UNSPECIFIED TYPE: ICD-10-CM

## 2019-01-07 DIAGNOSIS — N28.9 RENAL INSUFFICIENCY: ICD-10-CM

## 2019-01-07 DIAGNOSIS — Z11.59 NEED FOR HEPATITIS C SCREENING TEST: ICD-10-CM

## 2019-01-07 DIAGNOSIS — E11.9 TYPE 2 DIABETES MELLITUS WITHOUT COMPLICATION, WITHOUT LONG-TERM CURRENT USE OF INSULIN: ICD-10-CM

## 2019-01-07 DIAGNOSIS — E11.59 HYPERTENSION ASSOCIATED WITH DIABETES: ICD-10-CM

## 2019-01-07 LAB
ALBUMIN SERPL BCP-MCNC: 3.7 G/DL
ALP SERPL-CCNC: 51 U/L
ALT SERPL W/O P-5'-P-CCNC: 27 U/L
ANION GAP SERPL CALC-SCNC: 8 MMOL/L
AST SERPL-CCNC: 33 U/L
BASOPHILS # BLD AUTO: 0.06 K/UL
BASOPHILS NFR BLD: 1.1 %
BILIRUB SERPL-MCNC: 0.7 MG/DL
BUN SERPL-MCNC: 14 MG/DL
CALCIUM SERPL-MCNC: 9.6 MG/DL
CHLORIDE SERPL-SCNC: 107 MMOL/L
CO2 SERPL-SCNC: 22 MMOL/L
CREAT SERPL-MCNC: 1.5 MG/DL
DIFFERENTIAL METHOD: ABNORMAL
EOSINOPHIL # BLD AUTO: 0.5 K/UL
EOSINOPHIL NFR BLD: 9.1 %
ERYTHROCYTE [DISTWIDTH] IN BLOOD BY AUTOMATED COUNT: 12.8 %
EST. GFR  (AFRICAN AMERICAN): 58.5 ML/MIN/1.73 M^2
EST. GFR  (NON AFRICAN AMERICAN): 50.6 ML/MIN/1.73 M^2
GLUCOSE SERPL-MCNC: 155 MG/DL
HCT VFR BLD AUTO: 37.8 %
HGB BLD-MCNC: 13.2 G/DL
IMM GRANULOCYTES # BLD AUTO: 0.01 K/UL
IMM GRANULOCYTES NFR BLD AUTO: 0.2 %
LYMPHOCYTES # BLD AUTO: 1.4 K/UL
LYMPHOCYTES NFR BLD: 25.8 %
MCH RBC QN AUTO: 32 PG
MCHC RBC AUTO-ENTMCNC: 34.9 G/DL
MCV RBC AUTO: 92 FL
MONOCYTES # BLD AUTO: 0.5 K/UL
MONOCYTES NFR BLD: 9.1 %
NEUTROPHILS # BLD AUTO: 2.9 K/UL
NEUTROPHILS NFR BLD: 54.7 %
NRBC BLD-RTO: 0 /100 WBC
PLATELET # BLD AUTO: 192 K/UL
PMV BLD AUTO: 8.9 FL
POTASSIUM SERPL-SCNC: 5.5 MMOL/L
PROT SERPL-MCNC: 6.5 G/DL
RBC # BLD AUTO: 4.12 M/UL
SODIUM SERPL-SCNC: 137 MMOL/L
WBC # BLD AUTO: 5.28 K/UL

## 2019-01-07 PROCEDURE — 85025 COMPLETE CBC W/AUTO DIFF WBC: CPT

## 2019-01-07 PROCEDURE — 36415 COLL VENOUS BLD VENIPUNCTURE: CPT | Mod: PO

## 2019-01-07 PROCEDURE — 86803 HEPATITIS C AB TEST: CPT

## 2019-01-07 PROCEDURE — 80053 COMPREHEN METABOLIC PANEL: CPT

## 2019-01-07 NOTE — TELEPHONE ENCOUNTER
Spoke with pt and given arrival time of 0730 for procedure on 1/8/2019.  Instructed to remain NPO after MN and have someone available to drive pt home after.

## 2019-01-08 ENCOUNTER — HOSPITAL ENCOUNTER (OUTPATIENT)
Facility: HOSPITAL | Age: 59
Discharge: HOME | End: 2019-01-08
Attending: PAIN MEDICINE | Admitting: PAIN MEDICINE
Payer: MEDICARE

## 2019-01-08 VITALS
SYSTOLIC BLOOD PRESSURE: 120 MMHG | HEART RATE: 92 BPM | BODY MASS INDEX: 33.05 KG/M2 | HEIGHT: 72 IN | DIASTOLIC BLOOD PRESSURE: 57 MMHG | TEMPERATURE: 98 F | OXYGEN SATURATION: 98 % | RESPIRATION RATE: 16 BRPM | WEIGHT: 244 LBS

## 2019-01-08 DIAGNOSIS — G89.29 CHRONIC PAIN: ICD-10-CM

## 2019-01-08 DIAGNOSIS — G89.29 CHRONIC BILATERAL LOW BACK PAIN WITH SCIATICA, SCIATICA LATERALITY UNSPECIFIED: Primary | Chronic | ICD-10-CM

## 2019-01-08 DIAGNOSIS — M54.40 CHRONIC BILATERAL LOW BACK PAIN WITH SCIATICA, SCIATICA LATERALITY UNSPECIFIED: Primary | Chronic | ICD-10-CM

## 2019-01-08 DIAGNOSIS — M47.816 LUMBAR SPONDYLOSIS: ICD-10-CM

## 2019-01-08 LAB
HCV AB SERPL QL IA: NEGATIVE
POCT GLUCOSE: 190 MG/DL (ref 70–110)

## 2019-01-08 PROCEDURE — 64636 DESTROY L/S FACET JNT ADDL: CPT | Mod: 50,,, | Performed by: PAIN MEDICINE

## 2019-01-08 PROCEDURE — 25000003 PHARM REV CODE 250: Performed by: PAIN MEDICINE

## 2019-01-08 PROCEDURE — 64635 DESTROY LUMB/SAC FACET JNT: CPT | Performed by: PAIN MEDICINE

## 2019-01-08 PROCEDURE — 64636 DESTROY L/S FACET JNT ADDL: CPT | Performed by: PAIN MEDICINE

## 2019-01-08 PROCEDURE — 63600175 PHARM REV CODE 636 W HCPCS: Performed by: PAIN MEDICINE

## 2019-01-08 PROCEDURE — 64635 DESTROY LUMB/SAC FACET JNT: CPT | Mod: 50,,, | Performed by: PAIN MEDICINE

## 2019-01-08 PROCEDURE — S0020 INJECTION, BUPIVICAINE HYDRO: HCPCS | Performed by: PAIN MEDICINE

## 2019-01-08 PROCEDURE — 64635 PR DESTROY LUMB/SAC FACET JNT: ICD-10-PCS | Mod: 50,,, | Performed by: PAIN MEDICINE

## 2019-01-08 PROCEDURE — 99152 MOD SED SAME PHYS/QHP 5/>YRS: CPT | Mod: ,,, | Performed by: PAIN MEDICINE

## 2019-01-08 PROCEDURE — 99152 PR MOD CONSCIOUS SEDATION, SAME PHYS, 5+ YRS, FIRST 15 MIN: ICD-10-PCS | Mod: ,,, | Performed by: PAIN MEDICINE

## 2019-01-08 PROCEDURE — 64636 PR DESTROY L/S FACET JNT ADDL: ICD-10-PCS | Mod: 50,,, | Performed by: PAIN MEDICINE

## 2019-01-08 PROCEDURE — 99152 MOD SED SAME PHYS/QHP 5/>YRS: CPT | Performed by: PAIN MEDICINE

## 2019-01-08 RX ORDER — BUPIVACAINE HYDROCHLORIDE 5 MG/ML
INJECTION, SOLUTION EPIDURAL; INTRACAUDAL
Status: DISCONTINUED | OUTPATIENT
Start: 2019-01-08 | End: 2019-01-08 | Stop reason: HOSPADM

## 2019-01-08 RX ORDER — SODIUM CHLORIDE 9 MG/ML
500 INJECTION, SOLUTION INTRAVENOUS CONTINUOUS
Status: DISCONTINUED | OUTPATIENT
Start: 2019-01-08 | End: 2019-01-08 | Stop reason: HOSPADM

## 2019-01-08 RX ORDER — MIDAZOLAM HYDROCHLORIDE 1 MG/ML
INJECTION INTRAMUSCULAR; INTRAVENOUS
Status: DISCONTINUED | OUTPATIENT
Start: 2019-01-08 | End: 2019-01-08 | Stop reason: HOSPADM

## 2019-01-08 RX ORDER — FENTANYL CITRATE 50 UG/ML
INJECTION, SOLUTION INTRAMUSCULAR; INTRAVENOUS
Status: DISCONTINUED | OUTPATIENT
Start: 2019-01-08 | End: 2019-01-08 | Stop reason: HOSPADM

## 2019-01-08 RX ORDER — METHYLPREDNISOLONE ACETATE 40 MG/ML
INJECTION, SUSPENSION INTRA-ARTICULAR; INTRALESIONAL; INTRAMUSCULAR; SOFT TISSUE
Status: DISCONTINUED | OUTPATIENT
Start: 2019-01-08 | End: 2019-01-08 | Stop reason: HOSPADM

## 2019-01-08 RX ORDER — INDOMETHACIN 25 MG/1
CAPSULE ORAL
Status: DISCONTINUED | OUTPATIENT
Start: 2019-01-08 | End: 2019-01-08 | Stop reason: HOSPADM

## 2019-01-08 RX ORDER — LIDOCAINE HYDROCHLORIDE 10 MG/ML
INJECTION, SOLUTION EPIDURAL; INFILTRATION; INTRACAUDAL; PERINEURAL
Status: DISCONTINUED | OUTPATIENT
Start: 2019-01-08 | End: 2019-01-08 | Stop reason: HOSPADM

## 2019-01-08 RX ORDER — LIDOCAINE HYDROCHLORIDE 10 MG/ML
1 INJECTION, SOLUTION EPIDURAL; INFILTRATION; INTRACAUDAL; PERINEURAL ONCE
Status: DISCONTINUED | OUTPATIENT
Start: 2019-01-08 | End: 2019-01-08 | Stop reason: HOSPADM

## 2019-01-08 RX ADMIN — SODIUM CHLORIDE 500 ML: 0.9 INJECTION, SOLUTION INTRAVENOUS at 08:01

## 2019-01-08 NOTE — OP NOTE
Lumbar Medial Branch Nerve Radiofrequency Ablation    Preoperative Diagnosis: Lumbar Spondylosis  Postoperative Diagnosis: Lumbar Spondylosis  Procedure Date: 01/08/2019  Procedure Title:  (1) BILATERAL L2,3,4,5 Lumbar Medial Branch Radiofrequency Ablation     (2) Intraoperative Fluoroscopy     (3) IV Moderate Sedation (Midazolam 2 mg, Fentanyl 75 mcg)    Anesthesia: Local    Indications: Fabiano Malik Jr. is a 58 y.o. year-old male with a diagnosis of back pain due to lumbar or lumbosacral spondylosis.  The patient had significant relief of the pain with the previous diagnostic nerve blocks.     Findings: Final needle placement consistent with technically sucsessful procedure.     Procedure in Detail: The patients history and physical exam were reviewed.  Informed consent obtained after explaining the procedure and potential complications including local discomfort, infection, headache, temporary or permanent weakness and/or numbness of one or both legs, temporary or permanent paraplegia, heart attack and stroke. The patient agreed to proceed, and written informed consent was obtained.    Patient was brought back to procedure room and placed in a prone position and head resting comfortably in head ring. Prior to the initiation of the procedure, the patient's identity, the site, and the nature of the procedure were verified.  AP and oblique fluoroscopy were used to identify and vamshi the junctions between the superior articular processes and transverse processes at the L3, L4, and L5 levels on the Right side.  The Right sacral ala was also identified and marked.  The skin and subcutaneous tissues in these identified areas were anesthetized with 1% lidocaine. A 18 gauge 100 mm probe radiofrequency probe was advanced towards each of these points under fluoroscopic guidance. Once bone was contacted, negative aspiration was confirmed. Sensory stimulation was performed at 50 Hz & 0.4V, generating a pressure  sensation. Motor stimulation at 2 Hz & 2 V was negative for muscle contractions in the above mentioned nerve distributions. One mL of 2% lidocaine was injected at each level prior to lesioning, which was performed for 90 seconds at 80 degrees Centigrade. Once the lesion was complete, 1 mL of a solution consisting of 3 mL 0.5% bupivacaine and 40mg depomedrol was injected through each probe. The probes were removed with a 1% lidocaine flush.      After the procedure was completed, the patients back was cleaned and bandages were placed at the needle insertion sites.    Estimated blood loss: None  Complications: None     Disposition:  The patient tolerated the procedure well and there were no apparent complications. Vital signs remained stable throughout the procedure. The patient was taken to the recovery area where written discharge instructions for the procedure were given.     Follow-up: RTC as scheduled    Alberto Hernandez Jr, MD  Interventional Pain Medicine / Anesthesiology

## 2019-01-08 NOTE — DISCHARGE SUMMARY
[facility]  Discharge Note  Short Stay     Admit Date: 1/8/2019    Discharge Date: 1/8/2019     Attending Physician: Alberto Hernandez Jr, MD    Diagnoses:  Active Hospital Problems    Diagnosis  POA    *Chronic bilateral low back pain [M54.5, G89.29]  Yes     Chronic    Chronic pain [G89.29]  Yes      Resolved Hospital Problems   No resolved problems to display.     Discharged Condition: Good     Hospital Course: Patient was admitted for an outpatient interventional pain management procedure and tolerated the procedure well with no complications.     Final Diagnoses: Same as principal problem.     Disposition: Home or Self Care     Follow up/Patient Instructions:    Follow-up Information     Alberto Hernandez Jr, MD. Go in 2 weeks.    Specialty:  Pain Medicine  Why:  Post-procedural Follow Up As Scheduled, Call to make an appointment if you do not have one  Contact information:  [address]  [address]  [place] [address]  [phone]                   Reconciled Medications:     Medication List      CONTINUE taking these medications    acetaminophen 325 MG tablet  Commonly known as:  TYLENOL  Take 2 tablets (650 mg total) by mouth every 6 (six) hours as needed.     benazepril 10 MG tablet  Commonly known as:  LOTENSIN  Take 1 tablet (10 mg total) by mouth 2 (two) times daily.     blood sugar diagnostic Strp  2 strips by Misc.(Non-Drug; Combo Route) route 2 (two) times daily.     blood-glucose meter kit  Use as instructed     clonazePAM 1 MG tablet  Commonly known as:  KLONOPIN  TK 1 T PO TID     DULoxetine 60 MG capsule  Commonly known as:  CYMBALTA  Take 1 capsule (60 mg total) by mouth 2 (two) times daily.     ibuprofen 600 MG tablet  Commonly known as:  ADVIL,MOTRIN  Take 1 tablet (600 mg total) by mouth every 6 (six) hours as needed for Pain.     lancets 33 gauge Misc  Commonly known as:  ONETOUCH DELICA LANCETS  1 lancet by Misc.(Non-Drug; Combo Route) route 2 (two) times daily.      meloxicam 15 MG tablet  Commonly known as:  MOBIC  Take 0.5 tablets (7.5 mg total) by mouth daily as needed for Pain.     metFORMIN 1000 MG tablet  Commonly known as:  GLUCOPHAGE  Take 1,000 mg by mouth 2 (two) times daily with meals.     omeprazole 20 MG capsule  Commonly known as:  PRILOSEC  Take 20 mg by mouth once daily.     pravastatin 40 MG tablet  Commonly known as:  PRAVACHOL  Take 1 tablet (40 mg total) by mouth once daily.     QUEtiapine 400 MG tablet  Commonly known as:  SEROQUEL  Take 400 mg by mouth once daily.     risperiDONE 0.5 MG Tab  Commonly known as:  RISPERDAL  Take 1 tablet (0.5 mg total) by mouth every evening.           Discharge Procedure Orders (must include Diet, Follow-up, Activity)   Call MD for:  temperature >100.4     Call MD for:  severe uncontrolled pain     Call MD for:  redness, tenderness, or signs of infection (pain, swelling, redness, odor or green/yellow discharge around incision site)     Call MD for:  difficulty breathing or increased cough     Call MD for:  severe persistent headache     Call MD for:  worsening rash     Remove dressing in 24 hours       Alberto Hernandez Jr, MD  Interventional Pain Medicine / Anesthesiology

## 2019-01-10 ENCOUNTER — TELEPHONE (OUTPATIENT)
Dept: PAIN MEDICINE | Facility: CLINIC | Age: 59
End: 2019-01-10

## 2019-01-14 ENCOUNTER — OFFICE VISIT (OUTPATIENT)
Dept: INTERNAL MEDICINE | Facility: CLINIC | Age: 59
End: 2019-01-14
Payer: MEDICARE

## 2019-01-14 VITALS
HEART RATE: 114 BPM | OXYGEN SATURATION: 95 % | SYSTOLIC BLOOD PRESSURE: 152 MMHG | DIASTOLIC BLOOD PRESSURE: 84 MMHG | HEIGHT: 72 IN | BODY MASS INDEX: 33.68 KG/M2 | WEIGHT: 248.69 LBS

## 2019-01-14 DIAGNOSIS — G89.29 CHRONIC NECK PAIN: ICD-10-CM

## 2019-01-14 DIAGNOSIS — R94.31 EKG ABNORMALITIES: ICD-10-CM

## 2019-01-14 DIAGNOSIS — K21.9 GASTROESOPHAGEAL REFLUX DISEASE, ESOPHAGITIS PRESENCE NOT SPECIFIED: ICD-10-CM

## 2019-01-14 DIAGNOSIS — G89.29 CHRONIC MIDLINE BACK PAIN, UNSPECIFIED BACK LOCATION: ICD-10-CM

## 2019-01-14 DIAGNOSIS — R00.0 TACHYCARDIA: ICD-10-CM

## 2019-01-14 DIAGNOSIS — E87.5 HYPERKALEMIA: ICD-10-CM

## 2019-01-14 DIAGNOSIS — M54.2 CHRONIC NECK PAIN: ICD-10-CM

## 2019-01-14 DIAGNOSIS — E11.59 HYPERTENSION ASSOCIATED WITH DIABETES: ICD-10-CM

## 2019-01-14 DIAGNOSIS — E11.9 TYPE 2 DIABETES MELLITUS WITHOUT COMPLICATION, WITHOUT LONG-TERM CURRENT USE OF INSULIN: Primary | ICD-10-CM

## 2019-01-14 DIAGNOSIS — I15.2 HYPERTENSION ASSOCIATED WITH DIABETES: ICD-10-CM

## 2019-01-14 DIAGNOSIS — N18.30 CKD (CHRONIC KIDNEY DISEASE) STAGE 3, GFR 30-59 ML/MIN: ICD-10-CM

## 2019-01-14 DIAGNOSIS — M54.9 CHRONIC MIDLINE BACK PAIN, UNSPECIFIED BACK LOCATION: ICD-10-CM

## 2019-01-14 DIAGNOSIS — E66.9 CLASS 1 OBESITY WITH SERIOUS COMORBIDITY AND BODY MASS INDEX (BMI) OF 33.0 TO 33.9 IN ADULT, UNSPECIFIED OBESITY TYPE: ICD-10-CM

## 2019-01-14 DIAGNOSIS — R07.9 CHEST PAIN, UNSPECIFIED TYPE: ICD-10-CM

## 2019-01-14 PROCEDURE — 99499 UNLISTED E&M SERVICE: CPT | Mod: S$GLB,,, | Performed by: INTERNAL MEDICINE

## 2019-01-14 PROCEDURE — 93000 ELECTROCARDIOGRAM COMPLETE: CPT | Mod: S$GLB,,, | Performed by: INTERNAL MEDICINE

## 2019-01-14 PROCEDURE — 99999 PR PBB SHADOW E&M-EST. PATIENT-LVL III: CPT | Mod: PBBFAC,,, | Performed by: INTERNAL MEDICINE

## 2019-01-14 PROCEDURE — 3008F PR BODY MASS INDEX (BMI) DOCUMENTED: ICD-10-PCS | Mod: CPTII,S$GLB,, | Performed by: INTERNAL MEDICINE

## 2019-01-14 PROCEDURE — 3044F HG A1C LEVEL LT 7.0%: CPT | Mod: CPTII,S$GLB,, | Performed by: INTERNAL MEDICINE

## 2019-01-14 PROCEDURE — 99215 OFFICE O/P EST HI 40 MIN: CPT | Mod: S$GLB,,, | Performed by: INTERNAL MEDICINE

## 2019-01-14 PROCEDURE — 3044F PR MOST RECENT HEMOGLOBIN A1C LEVEL <7.0%: ICD-10-PCS | Mod: CPTII,S$GLB,, | Performed by: INTERNAL MEDICINE

## 2019-01-14 PROCEDURE — 99999 PR PBB SHADOW E&M-EST. PATIENT-LVL III: ICD-10-PCS | Mod: PBBFAC,,, | Performed by: INTERNAL MEDICINE

## 2019-01-14 PROCEDURE — 3077F SYST BP >= 140 MM HG: CPT | Mod: CPTII,S$GLB,, | Performed by: INTERNAL MEDICINE

## 2019-01-14 PROCEDURE — 93000 EKG 12-LEAD: ICD-10-PCS | Mod: S$GLB,,, | Performed by: INTERNAL MEDICINE

## 2019-01-14 PROCEDURE — 3008F BODY MASS INDEX DOCD: CPT | Mod: CPTII,S$GLB,, | Performed by: INTERNAL MEDICINE

## 2019-01-14 PROCEDURE — 3079F DIAST BP 80-89 MM HG: CPT | Mod: CPTII,S$GLB,, | Performed by: INTERNAL MEDICINE

## 2019-01-14 PROCEDURE — 93005 EKG 12-LEAD: ICD-10-PCS | Mod: S$GLB,,, | Performed by: INTERNAL MEDICINE

## 2019-01-14 PROCEDURE — 93005 ELECTROCARDIOGRAM TRACING: CPT | Mod: S$GLB,,, | Performed by: INTERNAL MEDICINE

## 2019-01-14 PROCEDURE — 99215 PR OFFICE/OUTPT VISIT, EST, LEVL V, 40-54 MIN: ICD-10-PCS | Mod: S$GLB,,, | Performed by: INTERNAL MEDICINE

## 2019-01-14 PROCEDURE — 3077F PR MOST RECENT SYSTOLIC BLOOD PRESSURE >= 140 MM HG: ICD-10-PCS | Mod: CPTII,S$GLB,, | Performed by: INTERNAL MEDICINE

## 2019-01-14 PROCEDURE — 99499 RISK ADDL DX/OHS AUDIT: ICD-10-PCS | Mod: S$GLB,,, | Performed by: INTERNAL MEDICINE

## 2019-01-14 PROCEDURE — 3079F PR MOST RECENT DIASTOLIC BLOOD PRESSURE 80-89 MM HG: ICD-10-PCS | Mod: CPTII,S$GLB,, | Performed by: INTERNAL MEDICINE

## 2019-01-14 RX ORDER — PANTOPRAZOLE SODIUM 40 MG/1
TABLET, DELAYED RELEASE ORAL
Qty: 90 TABLET | Refills: 0 | Status: SHIPPED | OUTPATIENT
Start: 2019-01-14 | End: 2019-10-18

## 2019-01-14 RX ORDER — TRAMADOL HYDROCHLORIDE 50 MG/1
50 TABLET ORAL 3 TIMES DAILY PRN
Qty: 21 TABLET | Refills: 0 | Status: SHIPPED | OUTPATIENT
Start: 2019-01-14 | End: 2019-01-21

## 2019-01-14 RX ORDER — PANTOPRAZOLE SODIUM 40 MG/1
40 TABLET, DELAYED RELEASE ORAL DAILY PRN
Qty: 30 TABLET | Refills: 1 | Status: SHIPPED | OUTPATIENT
Start: 2019-01-14 | End: 2019-01-14 | Stop reason: SDUPTHER

## 2019-01-14 NOTE — PROGRESS NOTES
Pt. ID: Fabiano Malik Jr. is a 58 y.o. male      Chief complaint:   Chief Complaint   Patient presents with    Follow-up    Diabetes       HPI: Pt. Here for f/u for DM and HTN; pt. Has increased benazepril to 10 mg BID and BP remains elevated; of note, pt. Is seeing pain management for chronic low back and neck pain; he had recent pain procedure with some improvement and his pain is 9/10; he is scheduled to see pain management tommorow;  I reviewed labs dated 1/7/19; anemia is improving;  Potassium was elevated; kidney fxn went up and he states he is taking ibuprofen and mobic and I advised him that he could not take both together; HGBA1C dated 12/6/18 was 6.7; weight is elevated but stable; repeat HR is 110; pt. Reports L sided chest pain with last episode last week; pain is stabbing in nature, lasting a few hours to days at a time; symptoms are on and off for years and he states he never brought it to anyone's attention and has never had stress test; he will start asa QD; he gets occasional GERD      Review of Systems   Constitutional: Negative for chills and fever.   Respiratory: Negative for cough and shortness of breath.    Cardiovascular: Positive for chest pain.   Gastrointestinal: Positive for heartburn. Negative for abdominal pain, nausea and vomiting.        GERD: does not take anything for it    Genitourinary: Negative for dysuria.   Musculoskeletal: Positive for back pain and neck pain.        Neck and low back pain especially with          Objective:    Physical Exam   Constitutional: He is oriented to person, place, and time.   obese   Eyes: EOM are normal.   Neck: Normal range of motion.   Cardiovascular: Normal rate, regular rhythm and normal heart sounds.   Pulmonary/Chest: Effort normal and breath sounds normal. No respiratory distress. He has no wheezes. He has no rales.   Abdominal: Soft. There is no tenderness. There is no rebound and no guarding.   Musculoskeletal:   neck and low back  pain with ROM    Neurological: He is alert and oriented to person, place, and time.   Skin: No rash noted.   Vitals reviewed.        Health Maintenance   Topic Date Due    TETANUS VACCINE  12/12/2019 (Originally 12/15/1978)    Hemoglobin A1c  06/06/2019    Eye Exam  07/10/2019    Lipid Panel  12/06/2019    Foot Exam  12/12/2019    Colonoscopy  05/31/2028    Hepatitis C Screening  Completed    Pneumococcal Vaccine (Medium Risk)  Completed    Influenza Vaccine  Completed         Assessment:     1. Type 2 diabetes mellitus without complication, without long-term current use of insulin Well controlled   2. Hypertension associated with diabetes Sub-optimally controlled   3. CKD (chronic kidney disease) stage 3, GFR 30-59 ml/min Sub-optimally controlled   4. Tachycardia Active   5. Hyperkalemia Active   6. Chest pain, unspecified type Active   7. EKG abnormalities Active   8. Chronic midline back pain, unspecified back location Active   9. Chronic neck pain Active   10. Gastroesophageal reflux disease, esophagitis presence not specified Active   11. Class 1 obesity with serious comorbidity and body mass index (BMI) of 33.0 to 33.9 in adult, unspecified obesity type Sub-optimally controlled         Plan: Type 2 diabetes mellitus without complication, without long-term current use of insulin  Comments:  continue current regimen and encouraged ADA diet   Orders:  -     Comprehensive metabolic panel; Future; Expected date: 02/04/2019    Hypertension associated with diabetes  Comments:  continue current regimen and encouraged low Na diet and weight loss; repeat BP in 1 month with better pain control   Orders:  -     Comprehensive metabolic panel; Future; Expected date: 02/04/2019    CKD (chronic kidney disease) stage 3, GFR 30-59 ml/min  Comments:  D/C NSAIDs; encouraged PO fluid intake and repeat kidney fxn prior to 1 month f/u   Orders:  -     Comprehensive metabolic panel; Future; Expected date:  02/04/2019    Tachycardia  Comments:  likeley related to pain; get EKG   Orders:  -     IN OFFICE EKG 12-LEAD (to Muse)    Hyperkalemia  Comments:  start kayexelate x 1 dose and repeat potassium prior to 1 month f/u   Orders:  -     sodium polystyrene (KAYEXALATE) 15 gram/60 mL Susp; Take 60 mLs (15 g total) by mouth once. for 1 dose  Dispense: 60 mL; Refill: 0  -     Comprehensive metabolic panel; Future; Expected date: 02/04/2019    Chest pain, unspecified type  Comments:  start asa QD and get EKG and dobutamine stress echo; asked pt. to proceed to ER for recurrence and monitor with treatment of GERD  Orders:  -     Echocardiogram stress test ([facility] Only); Future; Expected date: 01/15/2019    EKG abnormalities  Comments:  get stress echo; sinus tachycardia noted     Chronic midline back pain, unspecified back location  Comments:  D/C NSAIDs due to MARY GRACE and start 7 days of tramadol prn until pt. f/u pain management who is managing   Orders:  -     traMADol (ULTRAM) 50 mg tablet; Take 1 tablet (50 mg total) by mouth 3 (three) times daily as needed for Pain.  Dispense: 21 tablet; Refill: 0    Chronic neck pain  Comments:  D/C NSAIDs due to MARY GRACE and start 7 days of tramadol prn until pt. f/u pain management who is managing   Orders:  -     traMADol (ULTRAM) 50 mg tablet; Take 1 tablet (50 mg total) by mouth 3 (three) times daily as needed for Pain.  Dispense: 21 tablet; Refill: 0    Gastroesophageal reflux disease, esophagitis presence not specified  Comments:  start protonix prn   Orders:  -     pantoprazole (PROTONIX) 40 MG tablet; Take 1 tablet (40 mg total) by mouth daily as needed.  Dispense: 30 tablet; Refill: 1    Class 1 obesity with serious comorbidity and body mass index (BMI) of 33.0 to 33.9 in adult, unspecified obesity type  Comments:  encouraged diet and explained risks         Problem List Items Addressed This Visit        Cardiac/Vascular    Hypertension associated with diabetes    Relevant Orders     Comprehensive metabolic panel    Tachycardia    Relevant Orders    IN OFFICE EKG 12-LEAD (to Muse)    EKG abnormalities       Renal/    Hyperkalemia    Relevant Medications    sodium polystyrene (KAYEXALATE) 15 gram/60 mL Susp    Other Relevant Orders    Comprehensive metabolic panel    CKD (chronic kidney disease) stage 3, GFR 30-59 ml/min    Relevant Orders    Comprehensive metabolic panel       Endocrine    Type 2 diabetes mellitus without complication, without long-term current use of insulin - Primary    Relevant Orders    Comprehensive metabolic panel    Class 1 obesity with serious comorbidity and body mass index (BMI) of 33.0 to 33.9 in adult       GI    Gastroesophageal reflux disease    Relevant Medications    pantoprazole (PROTONIX) 40 MG tablet       Orthopedic    Chronic back pain    Relevant Medications    traMADol (ULTRAM) 50 mg tablet    Chronic neck pain    Relevant Medications    traMADol (ULTRAM) 50 mg tablet       Other    Chest pain    Relevant Orders    Echocardiogram stress test ([facility] Only)

## 2019-01-15 ENCOUNTER — DOCUMENTATION ONLY (OUTPATIENT)
Dept: ADMINISTRATIVE | Facility: HOSPITAL | Age: 59
End: 2019-01-15

## 2019-01-15 PROBLEM — G89.29 CHRONIC PAIN: Status: ACTIVE | Noted: 2019-01-15

## 2019-01-15 NOTE — PROGRESS NOTES
Patient states he had recent lasik surgery and had eye exam done, but does not remember when he had it, patient states he will call with that information.

## 2019-01-16 ENCOUNTER — TELEPHONE (OUTPATIENT)
Dept: INTERNAL MEDICINE | Facility: CLINIC | Age: 59
End: 2019-01-16

## 2019-01-16 ENCOUNTER — TELEPHONE (OUTPATIENT)
Dept: FAMILY MEDICINE | Facility: CLINIC | Age: 59
End: 2019-01-16

## 2019-01-16 DIAGNOSIS — E87.5 HYPERKALEMIA: ICD-10-CM

## 2019-01-16 NOTE — TELEPHONE ENCOUNTER
----- Message from Dinah Wood sent at 1/16/2019  8:49 AM CST -----  Contact: nilda [phone]  Pharmacist advised they do not have the KAYEXALATE and need an alternate medication. Please call.

## 2019-01-16 NOTE — TELEPHONE ENCOUNTER
See if [facility] has Kayexalate and I will forward  To that pharmacy for ptJohn Bahena

## 2019-01-16 NOTE — TELEPHONE ENCOUNTER
Called patient, no answer, left VM, prescription was sent to [facility], if you have any questions, please call [phone].

## 2019-01-17 ENCOUNTER — OFFICE VISIT (OUTPATIENT)
Dept: CARDIOLOGY | Facility: CLINIC | Age: 59
End: 2019-01-17
Payer: MEDICARE

## 2019-01-17 VITALS
BODY MASS INDEX: 33.61 KG/M2 | WEIGHT: 248.13 LBS | DIASTOLIC BLOOD PRESSURE: 63 MMHG | HEIGHT: 72 IN | OXYGEN SATURATION: 95 % | HEART RATE: 114 BPM | SYSTOLIC BLOOD PRESSURE: 92 MMHG

## 2019-01-17 DIAGNOSIS — R07.89 ATYPICAL CHEST PAIN: Primary | ICD-10-CM

## 2019-01-17 DIAGNOSIS — F20.3 UNDIFFERENTIATED SCHIZOPHRENIA: Chronic | ICD-10-CM

## 2019-01-17 DIAGNOSIS — R94.31 EKG ABNORMALITIES: ICD-10-CM

## 2019-01-17 DIAGNOSIS — E11.59 HYPERTENSION ASSOCIATED WITH DIABETES: ICD-10-CM

## 2019-01-17 DIAGNOSIS — G89.29 OTHER CHRONIC PAIN: ICD-10-CM

## 2019-01-17 DIAGNOSIS — I15.2 HYPERTENSION ASSOCIATED WITH DIABETES: ICD-10-CM

## 2019-01-17 DIAGNOSIS — R07.9 CHEST PAIN, UNSPECIFIED TYPE: ICD-10-CM

## 2019-01-17 DIAGNOSIS — R06.09 DOE (DYSPNEA ON EXERTION): ICD-10-CM

## 2019-01-17 DIAGNOSIS — E78.2 MIXED HYPERLIPIDEMIA: ICD-10-CM

## 2019-01-17 PROCEDURE — 3044F HG A1C LEVEL LT 7.0%: CPT | Mod: CPTII,S$GLB,, | Performed by: INTERNAL MEDICINE

## 2019-01-17 PROCEDURE — 99204 OFFICE O/P NEW MOD 45 MIN: CPT | Mod: S$GLB,,, | Performed by: INTERNAL MEDICINE

## 2019-01-17 PROCEDURE — 3074F SYST BP LT 130 MM HG: CPT | Mod: CPTII,S$GLB,, | Performed by: INTERNAL MEDICINE

## 2019-01-17 PROCEDURE — 99999 PR PBB SHADOW E&M-EST. PATIENT-LVL III: ICD-10-PCS | Mod: PBBFAC,,, | Performed by: INTERNAL MEDICINE

## 2019-01-17 PROCEDURE — 3078F DIAST BP <80 MM HG: CPT | Mod: CPTII,S$GLB,, | Performed by: INTERNAL MEDICINE

## 2019-01-17 PROCEDURE — 99499 UNLISTED E&M SERVICE: CPT | Mod: S$GLB,,, | Performed by: INTERNAL MEDICINE

## 2019-01-17 PROCEDURE — 3008F PR BODY MASS INDEX (BMI) DOCUMENTED: ICD-10-PCS | Mod: CPTII,S$GLB,, | Performed by: INTERNAL MEDICINE

## 2019-01-17 PROCEDURE — 99499 RISK ADDL DX/OHS AUDIT: ICD-10-PCS | Mod: S$GLB,,, | Performed by: INTERNAL MEDICINE

## 2019-01-17 PROCEDURE — 3074F PR MOST RECENT SYSTOLIC BLOOD PRESSURE < 130 MM HG: ICD-10-PCS | Mod: CPTII,S$GLB,, | Performed by: INTERNAL MEDICINE

## 2019-01-17 PROCEDURE — 99999 PR PBB SHADOW E&M-EST. PATIENT-LVL III: CPT | Mod: PBBFAC,,, | Performed by: INTERNAL MEDICINE

## 2019-01-17 PROCEDURE — 99204 PR OFFICE/OUTPT VISIT, NEW, LEVL IV, 45-59 MIN: ICD-10-PCS | Mod: S$GLB,,, | Performed by: INTERNAL MEDICINE

## 2019-01-17 PROCEDURE — 3078F PR MOST RECENT DIASTOLIC BLOOD PRESSURE < 80 MM HG: ICD-10-PCS | Mod: CPTII,S$GLB,, | Performed by: INTERNAL MEDICINE

## 2019-01-17 PROCEDURE — 3008F BODY MASS INDEX DOCD: CPT | Mod: CPTII,S$GLB,, | Performed by: INTERNAL MEDICINE

## 2019-01-17 PROCEDURE — 3044F PR MOST RECENT HEMOGLOBIN A1C LEVEL <7.0%: ICD-10-PCS | Mod: CPTII,S$GLB,, | Performed by: INTERNAL MEDICINE

## 2019-01-17 NOTE — PROGRESS NOTES
Subjective:   Patient ID:  Fabiano Malik Jr. is a 58 y.o. male who presents for evaluation of Abnormal ECG      HPI: 59 y/o male with PMH of chronic pain, DM and HTN present to establish care secondary to abnormal ECG showing possible inferior infarct. Echo done recently however showed normal ef with no WMA. He is having occasional chest pain located in mid chest and none radiating and usually occurs at rest. Pain is usually sharp and moderate in intensity and last 5-10 minutes. BP is controlled. He is having associated VALIENTE that is worsening. He is a diabetic but does not smoke. He does have family history of heart disease.     Past Medical History:   Diagnosis Date    Chronic back pain greater than 3 months duration     Depression     Diabetes mellitus     Hypertension     Schizophrenia        Past Surgical History:   Procedure Laterality Date    APPENDECTOMY      BLOCK-NERVE-MEDIAL BRANCH-LUMBAR- Bilateral L2-3-4-5 Bilateral 4/24/2018    Performed by Donavon Dennis MD at [facility]    BLOCK-NERVE-MEDIAL BRANCH-LUMBAR- BILATERAL, L2,3,4,5 Bilateral 2/20/2018    Performed by [facility] at [facility]    BLOCK-NERVE-MEDIAL BRANCH-LUMBAR- LUMBAR- BILATERAL- L2,3,4,5 Bilateral 2/6/2018    Performed by [facility]on [facility] at [facility]    COLONOSCOPY N/A 5/31/2018    Performed by [facility] at [facility]    NECK SURGERY      Radiofrequency Ablation, Nerve, Spinal, Lumbar, Medial Branch, L2,3,4,5 Right 1/8/2019    Performed by Alberto Hernandez Jr., MD at [facility]    RADIOFREQUENCY THERMOCOAGULATION (RFTC)-NERVE-MEDIAN BRANCH-LUMBAR- Left L2-3-4-5 Left 5/29/2018    Performed by Don[facility]on Dennis MD at [facility]    RADIOFREQUENCY THERMOCOAGULATION (RFTC)-NERVE-MEDIAN BRANCH-LUMBAR- Right L2-3-4-5 Right 5/22/2018    Performed by Donavon Dennis MD at [facility]       Social History     Tobacco Use    Smoking status: Never Smoker    Smokeless tobacco:  "Never Used   Substance Use Topics    Alcohol use: Yes     Comment: "couple of beers a day"    Drug use: No       Family History   Problem Relation Age of Onset    Alzheimer's disease Mother     Heart defect Father     Cancer Father     Stroke Father     Heart attack Father     Heart defect Sister     Alzheimer's disease Sister           Medication List           Accurate as of 1/17/19  8:58 AM. If you have any questions, ask your nurse or doctor.               CONTINUE taking these medications    acetaminophen 325 MG tablet  Commonly known as:  TYLENOL  Take 2 tablets (650 mg total) by mouth every 6 (six) hours as needed.     aspirin 81 MG EC tablet  Commonly known as:  ECOTRIN     benazepril 10 MG tablet  Commonly known as:  LOTENSIN  Take 1 tablet (10 mg total) by mouth 2 (two) times daily.     blood sugar diagnostic Strp  2 strips by Misc.(Non-Drug; Combo Route) route 2 (two) times daily.     blood-glucose meter kit  Use as instructed     clonazePAM 1 MG tablet  Commonly known as:  KLONOPIN     DULoxetine 60 MG capsule  Commonly known as:  CYMBALTA  Take 1 capsule (60 mg total) by mouth 2 (two) times daily.     lancets 33 gauge Misc  Commonly known as:  ONETOUCH DELICA LANCETS  1 lancet by Misc.(Non-Drug; Combo Route) route 2 (two) times daily.     metFORMIN 1000 MG tablet  Commonly known as:  GLUCOPHAGE     pantoprazole 40 MG tablet  Commonly known as:  PROTONIX  TAKE 1 TABLET(40 MG) BY MOUTH DAILY AS NEEDED     pravastatin 40 MG tablet  Commonly known as:  PRAVACHOL  Take 1 tablet (40 mg total) by mouth once daily.     QUEtiapine 400 MG tablet  Commonly known as:  SEROQUEL     risperiDONE 0.5 MG Tab  Commonly known as:  RISPERDAL  Take 1 tablet (0.5 mg total) by mouth every evening.     sodium polystyrene 15 gram/60 mL Susp  Commonly known as:  KAYEXALATE  Take 60 mLs (15 g total) by mouth once. for 1 dose     traMADol 50 mg tablet  Commonly known as:  ULTRAM  Take 1 tablet (50 mg total) by mouth 3 " (three) times daily as needed for Pain.             Review of patient's allergies indicates:   Allergen Reactions    Pcn [penicillins] Other (See Comments)     Childhood rxn, pt does not recall type of rxn       Review of Systems   Constitution: Negative.   HENT: Negative.    Eyes: Negative.    Cardiovascular: Positive for chest pain and dyspnea on exertion.   Respiratory: Negative.    Endocrine: Negative.    Hematologic/Lymphatic: Negative.    Skin: Negative.    Musculoskeletal: Negative.    Gastrointestinal: Negative.    Neurological: Negative.    Psychiatric/Behavioral: Negative.    Allergic/Immunologic: Negative.      Objective:   Physical Exam   Constitutional: He is oriented to person, place, and time. He appears well-developed and well-nourished. No distress.   Examination of the digits showed no clubbing or cyanosis   HENT:   Head: Normocephalic and atraumatic.   Eyes: Conjunctivae are normal. Pupils are equal, round, and reactive to light. Right eye exhibits no discharge.   Neck: Normal range of motion. Neck supple. No JVD present. No thyromegaly present.   No carotid bruits   Cardiovascular: Normal rate, regular rhythm, S1 normal, S2 normal, normal heart sounds, intact distal pulses and normal pulses. PMI is not displaced. Exam reveals no gallop, no friction rub and no opening snap.   No murmur heard.  Pulmonary/Chest: Effort normal and breath sounds normal. No respiratory distress. He has no wheezes. He has no rales. He exhibits no tenderness.   Abdominal: Soft. Bowel sounds are normal. He exhibits no distension and no mass. There is no tenderness. There is no guarding.   No hepatosplenomegaly   Musculoskeletal: Normal range of motion. He exhibits no edema or tenderness.   Lymphadenopathy:     He has no cervical adenopathy.   Neurological: He is alert and oriented to person, place, and time.   Skin: Skin is warm. No rash noted. He is not diaphoretic. No erythema.   Psychiatric: He has a normal mood and  affect.   Nursing note and vitals reviewed.      Lab Results   Component Value Date    WBC 5.28 01/07/2019    HGB 13.2 (L) 01/07/2019    HCT 37.8 (L) 01/07/2019    MCV 92 01/07/2019     01/07/2019         Chemistry        Component Value Date/Time     01/07/2019 0845    K 5.5 (H) 01/07/2019 0845     01/07/2019 0845    CO2 22 (L) 01/07/2019 0845    BUN 14 01/07/2019 0845    CREATININE 1.5 (H) 01/07/2019 0845     (H) 01/07/2019 0845        Component Value Date/Time    CALCIUM 9.6 01/07/2019 0845    ALKPHOS 51 (L) 01/07/2019 0845    AST 33 01/07/2019 0845    ALT 27 01/07/2019 0845    BILITOT 0.7 01/07/2019 0845    ESTGFRAFRICA 58.5 (A) 01/07/2019 0845    EGFRNONAA 50.6 (A) 01/07/2019 0845            Lab Results   Component Value Date    CHOL 134 12/06/2018    CHOL 124 01/21/2017     Lab Results   Component Value Date    HDL 43 12/06/2018    HDL 31 (L) 01/21/2017     Lab Results   Component Value Date    LDLCALC 58.4 (L) 12/06/2018    LDLCALC 30.0 (L) 01/21/2017     Lab Results   Component Value Date    TRIG 163 (H) 12/06/2018    TRIG 315 (H) 01/21/2017     Lab Results   Component Value Date    CHOLHDL 32.1 12/06/2018    CHOLHDL 25.0 01/21/2017       Lab Results   Component Value Date    TSH 2.421 07/25/2016       Lab Results   Component Value Date    HGBA1C 6.7 (H) 12/06/2018       ECGs reviewed- NSR  LABS reviewed  Imaging including Echoes reviewed- normal ef    Assessment:     1. Atypical chest pain    2. Hypertension associated with diabetes    3. Mixed hyperlipidemia    4. EKG abnormalities    5. Other chronic pain    6. Undifferentiated schizophrenia    7. VALIENTE (dyspnea on exertion)        Plan:     Patient with risk factors for CAD having ongoing chest pain with VALIENTE. Hold pain injections.  Will get Nuclear stress. Patient unable to walk treadmill and baseline ECG have some changes  Continue current medications  F/u in 1 month. Will clear for pain injection procedure after  stress.    Clinic time spent with patient discussing and treating medical condition including reviewing images, ECG, labs and medications > 40 minutes.

## 2019-01-17 NOTE — LETTER
January 17, 2019      [address] [address]           [place] [facility]  [address] [facility], [address]  [facility] [address]  Phone: [phone]          Patient: Fabiano Malik Jr.   MR Number: [id number]   YOB: 1960   Date of Visit: 1/17/2019       Dear [facility]:    Thank you for referring Fabiano Malik to me for evaluation. Attached you will find relevant portions of my assessment and plan of care.    If you have questions, please do not hesitate to call me. I look forward to following Fabiano Malik along with you.    Sincerely,    Kathie Magana MD    Enclosure  CC:  No Recipients    If you would like to receive this communication electronically, please contact [email or web address] or [phone] to request more information on [employer] Link access.    For providers and/or their staff who would like to refer a patient to [facility], please contact us through our one-stop-shop provider referral line, Marsha Lewis, at [phone].    If you feel you have received this communication in error or would no longer like to receive these types of communications, please e-mail [email or web address]

## 2019-01-22 ENCOUNTER — HOSPITAL ENCOUNTER (OUTPATIENT)
Dept: CARDIOLOGY | Facility: HOSPITAL | Age: 59
Discharge: HOME | End: 2019-01-22
Attending: INTERNAL MEDICINE
Payer: MEDICARE

## 2019-01-22 ENCOUNTER — HOSPITAL ENCOUNTER (OUTPATIENT)
Dept: RADIOLOGY | Facility: HOSPITAL | Age: 59
Discharge: HOME | End: 2019-01-22
Attending: INTERNAL MEDICINE
Payer: MEDICARE

## 2019-01-22 ENCOUNTER — TELEPHONE (OUTPATIENT)
Dept: CARDIOLOGY | Facility: CLINIC | Age: 59
End: 2019-01-22

## 2019-01-22 DIAGNOSIS — M54.9 CHRONIC MIDLINE BACK PAIN, UNSPECIFIED BACK LOCATION: ICD-10-CM

## 2019-01-22 DIAGNOSIS — G89.29 CHRONIC NECK PAIN: ICD-10-CM

## 2019-01-22 DIAGNOSIS — R07.9 CHEST PAIN, UNSPECIFIED TYPE: ICD-10-CM

## 2019-01-22 DIAGNOSIS — R07.89 ATYPICAL CHEST PAIN: ICD-10-CM

## 2019-01-22 DIAGNOSIS — M54.2 CHRONIC NECK PAIN: ICD-10-CM

## 2019-01-22 DIAGNOSIS — G89.29 CHRONIC MIDLINE BACK PAIN, UNSPECIFIED BACK LOCATION: ICD-10-CM

## 2019-01-22 LAB
CV STRESS BASE HR: 87
DIASTOLIC BLOOD PRESSURE: 72
OHS CV CPX 85 PERCENT MAX PREDICTED HEART RATE MALE: 138
OHS CV CPX MAX PREDICTED HEART RATE: 162
OHS CV CPX PATIENT IS FEMALE: 0
OHS CV CPX PATIENT IS MALE: 1
OHS CV CPX PEAK DIASTOLIC BLOOD PRESSURE: 72 MMHG
OHS CV CPX PEAK HEAR RATE: 105
OHS CV CPX PEAK RATE PRESSURE PRODUCT: NORMAL
OHS CV CPX PEAK SYSTOLIC BLOOD PRESSURE: 118
OHS CV CPX PERCENT MAX PREDICTED HEART RATE ACHIEVED: 65
OHS CV CPX RATE PRESSURE PRODUCT PRESENTING: NORMAL
SYSTOLIC BLOOD PRESSURE: 118

## 2019-01-22 PROCEDURE — 93018 CV STRESS TEST I&R ONLY: CPT | Mod: ,,, | Performed by: INTERNAL MEDICINE

## 2019-01-22 PROCEDURE — 93018 STRESS TEST ONLY (CUPID ONLY): ICD-10-PCS | Mod: ,,, | Performed by: INTERNAL MEDICINE

## 2019-01-22 PROCEDURE — 63600175 PHARM REV CODE 636 W HCPCS: Performed by: INTERNAL MEDICINE

## 2019-01-22 PROCEDURE — 93016 CV STRESS TEST SUPVJ ONLY: CPT | Mod: ,,, | Performed by: INTERNAL MEDICINE

## 2019-01-22 PROCEDURE — 93017 CV STRESS TEST TRACING ONLY: CPT

## 2019-01-22 PROCEDURE — 93016 STRESS TEST ONLY (CUPID ONLY): ICD-10-PCS | Mod: ,,, | Performed by: INTERNAL MEDICINE

## 2019-01-22 PROCEDURE — A9502 TC99M TETROFOSMIN: HCPCS

## 2019-01-22 PROCEDURE — 78452 HT MUSCLE IMAGE SPECT MULT: CPT | Mod: 26,,, | Performed by: RADIOLOGY

## 2019-01-22 PROCEDURE — 78452 NM MYOCARDIAL PERFUSION SPECT MULTI PHARM: ICD-10-PCS | Mod: 26,,, | Performed by: RADIOLOGY

## 2019-01-22 RX ORDER — REGADENOSON 0.08 MG/ML
0.4 INJECTION, SOLUTION INTRAVENOUS ONCE
Status: COMPLETED | OUTPATIENT
Start: 2019-01-22 | End: 2019-01-22

## 2019-01-22 RX ADMIN — REGADENOSON 0.4 MG: 0.08 INJECTION, SOLUTION INTRAVENOUS at 11:01

## 2019-01-22 NOTE — TELEPHONE ENCOUNTER
Informed patient that his stress test is negative for any concerning coronary blockages.  Patient expressed understanding.

## 2019-01-22 NOTE — TELEPHONE ENCOUNTER
Called and spoke w/ patient. Rx Kayexalate is on back order at [facility], patient okay to send to [facility] and is requesting a refill for Tramadol. I will forward refill message to Dr. Ellis. Patient verbalized understanding.        ----- Message from Brooklynn Brandt sent at 1/22/2019  3:54 PM CST -----  Contact: [phone]/self  Patient requesting to speak with you regarding medication that was supposed to be called in for him. He does not know the name.

## 2019-01-23 RX ORDER — TRAMADOL HYDROCHLORIDE 50 MG/1
50 TABLET ORAL 3 TIMES DAILY PRN
Qty: 21 TABLET | Refills: 0 | OUTPATIENT
Start: 2019-01-23 | End: 2019-01-30

## 2019-01-30 ENCOUNTER — TELEPHONE (OUTPATIENT)
Dept: INTERNAL MEDICINE | Facility: CLINIC | Age: 59
End: 2019-01-30

## 2019-01-30 ENCOUNTER — TELEPHONE (OUTPATIENT)
Dept: FAMILY MEDICINE | Facility: CLINIC | Age: 59
End: 2019-01-30

## 2019-01-30 NOTE — TELEPHONE ENCOUNTER
----- Message from Lilo Lemon sent at 1/30/2019 10:20 AM CST -----  Contact: self / [phone]  Patient is requesting a call back regarding, medication questions. Please advise

## 2019-01-30 NOTE — TELEPHONE ENCOUNTER
Notify pt. That I typically recommend a multi vitamin daily, such as centrum silver; Pt. Should be cautious taking OTC supplements

## 2019-01-30 NOTE — TELEPHONE ENCOUNTER
Called and spoke w/ patient. Patient states he already takes a multivitamin and will probably not take OTC supplement.

## 2019-01-31 DIAGNOSIS — M54.9 CHRONIC MIDLINE BACK PAIN, UNSPECIFIED BACK LOCATION: ICD-10-CM

## 2019-01-31 DIAGNOSIS — G89.29 CHRONIC NECK PAIN: ICD-10-CM

## 2019-01-31 DIAGNOSIS — M54.2 CHRONIC NECK PAIN: ICD-10-CM

## 2019-01-31 DIAGNOSIS — G89.29 CHRONIC MIDLINE BACK PAIN, UNSPECIFIED BACK LOCATION: ICD-10-CM

## 2019-02-05 RX ORDER — TRAMADOL HYDROCHLORIDE 50 MG/1
TABLET ORAL
Qty: 21 TABLET | Refills: 0 | OUTPATIENT
Start: 2019-02-05

## 2019-02-08 ENCOUNTER — LAB VISIT (OUTPATIENT)
Dept: LAB | Facility: HOSPITAL | Age: 59
End: 2019-02-08
Attending: INTERNAL MEDICINE
Payer: MEDICARE

## 2019-02-08 DIAGNOSIS — N18.30 CKD (CHRONIC KIDNEY DISEASE) STAGE 3, GFR 30-59 ML/MIN: ICD-10-CM

## 2019-02-08 DIAGNOSIS — E87.5 HYPERKALEMIA: ICD-10-CM

## 2019-02-08 DIAGNOSIS — I15.2 HYPERTENSION ASSOCIATED WITH DIABETES: ICD-10-CM

## 2019-02-08 DIAGNOSIS — E11.59 HYPERTENSION ASSOCIATED WITH DIABETES: ICD-10-CM

## 2019-02-08 DIAGNOSIS — E11.9 TYPE 2 DIABETES MELLITUS WITHOUT COMPLICATION, WITHOUT LONG-TERM CURRENT USE OF INSULIN: ICD-10-CM

## 2019-02-08 LAB
ALBUMIN SERPL BCP-MCNC: 4.2 G/DL
ALP SERPL-CCNC: 57 U/L
ALT SERPL W/O P-5'-P-CCNC: 23 U/L
ANION GAP SERPL CALC-SCNC: 11 MMOL/L
AST SERPL-CCNC: 21 U/L
BILIRUB SERPL-MCNC: 0.7 MG/DL
BUN SERPL-MCNC: 11 MG/DL
CALCIUM SERPL-MCNC: 10.3 MG/DL
CHLORIDE SERPL-SCNC: 104 MMOL/L
CO2 SERPL-SCNC: 26 MMOL/L
CREAT SERPL-MCNC: 1.1 MG/DL
EST. GFR  (AFRICAN AMERICAN): >60 ML/MIN/1.73 M^2
EST. GFR  (NON AFRICAN AMERICAN): >60 ML/MIN/1.73 M^2
GLUCOSE SERPL-MCNC: 131 MG/DL
POTASSIUM SERPL-SCNC: 4.4 MMOL/L
PROT SERPL-MCNC: 7.3 G/DL
SODIUM SERPL-SCNC: 141 MMOL/L

## 2019-02-08 PROCEDURE — 80053 COMPREHEN METABOLIC PANEL: CPT

## 2019-02-08 PROCEDURE — 36415 COLL VENOUS BLD VENIPUNCTURE: CPT | Mod: PO

## 2019-02-12 ENCOUNTER — OFFICE VISIT (OUTPATIENT)
Dept: PAIN MEDICINE | Facility: CLINIC | Age: 59
End: 2019-02-12
Payer: MEDICARE

## 2019-02-12 VITALS
DIASTOLIC BLOOD PRESSURE: 67 MMHG | WEIGHT: 240.75 LBS | BODY MASS INDEX: 32.65 KG/M2 | SYSTOLIC BLOOD PRESSURE: 96 MMHG | HEART RATE: 132 BPM

## 2019-02-12 DIAGNOSIS — M47.9 OSTEOARTHRITIS OF SPINE, UNSPECIFIED SPINAL OSTEOARTHRITIS COMPLICATION STATUS, UNSPECIFIED SPINAL REGION: ICD-10-CM

## 2019-02-12 DIAGNOSIS — M54.9 BACK PAIN, UNSPECIFIED BACK LOCATION, UNSPECIFIED BACK PAIN LATERALITY, UNSPECIFIED CHRONICITY: ICD-10-CM

## 2019-02-12 DIAGNOSIS — M47.819 FACET ARTHROPATHY: ICD-10-CM

## 2019-02-12 DIAGNOSIS — M47.819 SPONDYLOSIS WITHOUT MYELOPATHY: ICD-10-CM

## 2019-02-12 DIAGNOSIS — G89.4 CHRONIC PAIN SYNDROME: ICD-10-CM

## 2019-02-12 PROCEDURE — 99213 OFFICE O/P EST LOW 20 MIN: CPT | Mod: S$GLB,,, | Performed by: NURSE PRACTITIONER

## 2019-02-12 PROCEDURE — 99999 PR PBB SHADOW E&M-EST. PATIENT-LVL III: ICD-10-PCS | Mod: PBBFAC,,, | Performed by: NURSE PRACTITIONER

## 2019-02-12 PROCEDURE — 3008F PR BODY MASS INDEX (BMI) DOCUMENTED: ICD-10-PCS | Mod: CPTII,S$GLB,, | Performed by: NURSE PRACTITIONER

## 2019-02-12 PROCEDURE — 3078F DIAST BP <80 MM HG: CPT | Mod: CPTII,S$GLB,, | Performed by: NURSE PRACTITIONER

## 2019-02-12 PROCEDURE — 3078F PR MOST RECENT DIASTOLIC BLOOD PRESSURE < 80 MM HG: ICD-10-PCS | Mod: CPTII,S$GLB,, | Performed by: NURSE PRACTITIONER

## 2019-02-12 PROCEDURE — 3074F SYST BP LT 130 MM HG: CPT | Mod: CPTII,S$GLB,, | Performed by: NURSE PRACTITIONER

## 2019-02-12 PROCEDURE — 3074F PR MOST RECENT SYSTOLIC BLOOD PRESSURE < 130 MM HG: ICD-10-PCS | Mod: CPTII,S$GLB,, | Performed by: NURSE PRACTITIONER

## 2019-02-12 PROCEDURE — 99999 PR PBB SHADOW E&M-EST. PATIENT-LVL III: CPT | Mod: PBBFAC,,, | Performed by: NURSE PRACTITIONER

## 2019-02-12 PROCEDURE — 3008F BODY MASS INDEX DOCD: CPT | Mod: CPTII,S$GLB,, | Performed by: NURSE PRACTITIONER

## 2019-02-12 PROCEDURE — 99213 PR OFFICE/OUTPT VISIT, EST, LEVL III, 20-29 MIN: ICD-10-PCS | Mod: S$GLB,,, | Performed by: NURSE PRACTITIONER

## 2019-02-12 RX ORDER — MELOXICAM 7.5 MG/1
7.5 TABLET ORAL
Qty: 30 TABLET | Refills: 2 | Status: SHIPPED | OUTPATIENT
Start: 2019-02-12 | End: 2019-03-14

## 2019-02-12 RX ORDER — DULOXETIN HYDROCHLORIDE 60 MG/1
60 CAPSULE, DELAYED RELEASE ORAL 2 TIMES DAILY
Qty: 60 CAPSULE | Refills: 11 | Status: SHIPPED | OUTPATIENT
Start: 2019-02-12 | End: 2020-08-31

## 2019-02-12 NOTE — H&P (VIEW-ONLY)
[facility] Established Patient Evaluation    Chief Complaint  Chief Complaint   Patient presents with    Low-back Pain    Neck Pain       Last 3 PDI Scores 2/12/2019 6/26/2018 5/8/2018   Pain Disability Index (PDI) 70 60 60       Interval Update 2/12/19 Pt returns today S/P Right  L2,3,4,5 Lumbar Medial Branch Radiofrequency Ablation on 1/8/19 with 75% relief.       Fabiano Malik Jr. presents to me for low back and neck pain her reports 9.5/10 pain today he has not been seen in our clinic for 6 months  patient states today his primary source of pain is low back with some bilateral leg pain. He is status post right and left lumbar RFA at L2-3-4-5 70% relief in May of 2018.    Location: Low back   Onset: 3 months  Current Pain Score: 10/9/10  Daily Pain of Range: 9-10/10  Quality: Burning, Throbbing, Grabbing and Sharp  Radiation: Bilateral legs   Worsened by: exercise, extension, flexion, lifting, lying down and sneezing  Improved by: heat and rest    Background from Chart Review      Fabiano Malik Jr. presents to the clinic for a 4 wk follow-up appointment for neck and low back pain. He is S/P  left L2-3-4-5 FACET MEDIAL BRANCH NERVE RADIOFREQUENCY NEUROTOMY (lumbar) on 5/29/18 and  RIGHT L2-3-4-5 FACET MEDIAL BRANCH NERVE RADIOFREQUENCY NEUROTOMY on 5/22/18 with 70% relief for 2 weeks, pt states pain is back but overall better since procedure, I will order PT and refill Mobic and Lyrica today.  Since the last visit, Fabiano Malik Jr. states the pain has been persistant. Current pain intensity is 8/10.         Treatment History  PT/OT:   HEP:   TENS:  Injections: 1/8/19 BILATERAL L2,3,4,5 Lumbar Medial Branch Radiofrequency Ablation  Surgery:  Medications:   - NSAIDS:   - MSK Relaxants:   - TCAs:   - SNRIs:   - Topicals:   - Opioids:   - Anticonvulsants:     Current Pain Medications  1. Cymbalta  60 mg BID   2. Tylenol   3. Klonopin  4. Seroquel  5. Risperdal    Full Medication  List    Current Outpatient Medications:     acetaminophen (TYLENOL) 325 MG tablet, Take 2 tablets (650 mg total) by mouth every 6 (six) hours as needed., Disp: 120 tablet, Rfl: 3    aspirin (ECOTRIN) 81 MG EC tablet, Take 81 mg by mouth once daily., Disp: , Rfl:     benazepril (LOTENSIN) 10 MG tablet, Take 1 tablet (10 mg total) by mouth 2 (two) times daily., Disp: 60 tablet, Rfl: 1    blood sugar diagnostic Strp, 2 strips by Misc.(Non-Drug; Combo Route) route 2 (two) times daily., Disp: 200 strip, Rfl: 2    blood-glucose meter kit, Use as instructed, Disp: 1 each, Rfl: 0    clonazePAM (KLONOPIN) 1 MG tablet, TK 1 T PO TID, Disp: , Rfl: 3    DULoxetine (CYMBALTA) 60 MG capsule, Take 1 capsule (60 mg total) by mouth 2 (two) times daily., Disp: 60 capsule, Rfl: 11    lancets (ONETOUCH DELICA LANCETS) 33 gauge Misc, 1 lancet by Misc.(Non-Drug; Combo Route) route 2 (two) times daily., Disp: 200 each, Rfl: 2    metformin (GLUCOPHAGE) 1000 MG tablet, Take 1,000 mg by mouth 2 (two) times daily with meals., Disp: , Rfl:     pantoprazole (PROTONIX) 40 MG tablet, TAKE 1 TABLET(40 MG) BY MOUTH DAILY AS NEEDED, Disp: 90 tablet, Rfl: 0    pravastatin (PRAVACHOL) 40 MG tablet, Take 1 tablet (40 mg total) by mouth once daily., Disp: 30 tablet, Rfl: 2    QUEtiapine (SEROQUEL) 400 MG tablet, Take 400 mg by mouth once daily. , Disp: , Rfl:     risperiDONE (RISPERDAL) 0.5 MG Tab, Take 1 tablet (0.5 mg total) by mouth every evening., Disp: 30 tablet, Rfl: 11    sodium polystyrene (KAYEXALATE) 15 gram/60 mL Susp, Take 15 g by mouth., Disp: , Rfl:      Review of Systems  ROS    Medical History  Past Medical History:   Diagnosis Date    Chronic back pain greater than 3 months duration     Depression     Diabetes mellitus     Hypertension     Schizophrenia         Surgical History  Past Surgical History:   Procedure Laterality Date    APPENDECTOMY      BLOCK-NERVE-MEDIAL BRANCH-LUMBAR- Bilateral L2-3-4-5 Bilateral  4/24/2018    Performed by [facility]on [facility] at [facility]    BLOCK-NERVE-MEDIAL BRANCH-LUMBAR- BILATERAL, L2,3,4,5 Bilateral 2/20/2018    Performed by [facility]    BLOCK-NERVE-MEDIAL BRANCH-LUMBAR- LUMBAR- BILATERAL- L2,3,4,5 Bilateral 2/6/2018    Performed by [facility] at [facility]    COLONOSCOPY N/A 5/31/2018    [facility]    NECK SURGERY      Radiofrequency Ablation, Nerve, Spinal, Lumbar, Medial Branch, L2,3,4,5 Right 1/8/2019    Performed by Alberto Hernandez Jr., MD at [facility]    RADIOFREQUENCY THERMOCOAGULATION (RFTC)-NERVE-MEDIAN BRANCH-LUMBAR- Left L2-3-4-5 Left 5/29/2018    Performed by [facility] at [facility]    RADIOFREQUENCY THERMOCOAGULATION (RFTC)-NERVE-MEDIAN BRANCH-LUMBAR- Right L2-3-4-5 Right 5/22/2018    Performed by Donavon Dennis MD at [facility]        Physical Exam  Vitals:    02/12/19 1058   BP: 96/67   Pulse: (!) 132   Weight: 109.2 kg (240 lb 11.9 oz)   PainSc: 10-Worst pain ever         General Musculoskeletal Exam   Gait: antalgic     Back (L-Spine & T-Spine) / Neck (C-Spine) Exam     Back (L-Spine & T-Spine) Range of Motion   Lateral bend right: abnormal   Lateral bend left: abnormal   Rotation right: abnormal   Rotation left: abnormal     Spinal Sensation   Right Side Sensation  L-Spine Level: hypersensitive  Left Side Sensation  L-Spine Level: hypersensitive    Back (L-Spine & T-Spine) Tests   Right Side Tests  Squat Test: unable to perform  Left Side Tests  Squat: unable to perform    Comments:  + Facet Loading Bilaterally.  Positive FABERE, Yeoman's and Galensen test on the both side.          Imaging    MRI lumbar spine I. contrast.  Comparison to previous radiographs.  Few 1 CM size is cyst mid and lower renal poles.  Marrow space normal.  Chronic endplate change particularly L4-L5.  No fracture subluxation.  Spinal cord normal.    T11-T12 limited posterior  disc bulge.    L1-L2 mild posterior paracentral disc bulge, mild compression adjacent spinal sac, facet arthropathy with mild spinal and foraminal stenosis.    L2-L3 minimal mild posterior disc bulge, mild facet arthropathy with mild spinal and foraminal stenosis.    L3-L4 minimal posterior disc bulge, facet joint arthropathy with minimal mild spinal and foraminal stenosis.    L4 L5 minimal mild posterior disc bulge without spinal or foraminal stenosis.    L5-S1 mild/moderate facet joint arthropathy, mild posterior disc bulge, mild spinal and foraminal stenosis.      Impression      1. Degenerative disc, spondylosis, facet arthropathy discussed above.    2.  No fracture, subluxation or disc prolapse.       Labs:  BMP  Lab Results   Component Value Date     02/08/2019    K 4.4 02/08/2019     02/08/2019    CO2 26 02/08/2019    BUN 11 02/08/2019    CREATININE 1.1 02/08/2019    CALCIUM 10.3 02/08/2019    ANIONGAP 11 02/08/2019    ESTGFRAFRICA >60.0 02/08/2019    EGFRNONAA >60.0 02/08/2019     Lab Results   Component Value Date    ALT 23 02/08/2019    AST 21 02/08/2019    ALKPHOS 57 02/08/2019    BILITOT 0.7 02/08/2019       Assessment:  Fabiano Malik Jr. is a 58 y.o. male with the following diagnoses based on history, exam, and imaging:    Problem List Items Addressed This Visit        Neuro    Chronic pain    Relevant Medications    meloxicam (MOBIC) 7.5 MG tablet      Other Visit Diagnoses     Spondylosis without myelopathy        Relevant Medications    meloxicam (MOBIC) 7.5 MG tablet    Osteoarthritis of spine, unspecified spinal osteoarthritis complication status, unspecified spinal region        Relevant Medications    meloxicam (MOBIC) 7.5 MG tablet    Facet arthropathy        Relevant Medications    meloxicam (MOBIC) 7.5 MG tablet    Back pain, unspecified back location, unspecified back pain laterality, unspecified chronicity        Relevant Medications    meloxicam (MOBIC) 7.5 MG tablet           2/12/2019- 57 y/o male s/p right L2-3-4-5 Lumbar RFA with 75% relief, his left sided RFA s/f 1/15/2019  was cancelled due to reports of dyspnea on exertion and recent recommendation from his PCP to seek cardiology evaluation, per Dr. Magana's Patient has risk factors for CAD with ongoing chest pain with VALIENTE (dyspnea on exertion) and all injection should be held at this point.        Plan & Recommendations  Procedures:  None at this time, once cleared by Cardiology patient can be rescheduled for left lumbar RFA.  Medications:  Continue medications as they are prescribed refilled Cymbalta 60 mg b.i.d.  and Mobic today 15 mg p.r.n.  Imaging:  Previous imaging reviewed and discussed with patient today  PT/OT:  Once patient has completed RFA procedure will consider physical therapy  HEP: I encouraged the patient to maintain a home exercise regimen that includes daily, moderate cardiovascular exercise lasting at least 30 minutes.  This may include yoga, rachelle chi, walking, swimming, aqua aerobics, or other exercises that maintain a heart rate of 50-70% of the calculated maximum heart rate.  I also encouraged light, daily stretching focused on the target area.  Follow Up: RTC once he has been cleared by cardiology we can proceed with the left side RFA     CARLOS Swanson  Interventional Pain Management      Disclaimer: This note was partly generated using dictation software which may occasionally result in transcription errors.

## 2019-02-12 NOTE — PROGRESS NOTES
[facility] Established Patient Evaluation    Chief Complaint  Chief Complaint   Patient presents with    Low-back Pain    Neck Pain       Last 3 PDI Scores 2/12/2019 6/26/2018 5/8/2018   Pain Disability Index (PDI) 70 60 60       Interval Update 2/12/19 Pt returns today S/P Right  L2,3,4,5 Lumbar Medial Branch Radiofrequency Ablation on 1/8/19 with 75% relief.       Fabiano Malik Jr. presents to me for low back and neck pain her reports 9.5/10 pain today he has not been seen in our clinic for 6 months  patient states today his primary source of pain is low back with some bilateral leg pain. He is status post right and left lumbar RFA at L2-3-4-5 70% relief in May of 2018.    Location: Low back   Onset: 3 months  Current Pain Score: 10/9/10  Daily Pain of Range: 9-10/10  Quality: Burning, Throbbing, Grabbing and Sharp  Radiation: Bilateral legs   Worsened by: exercise, extension, flexion, lifting, lying down and sneezing  Improved by: heat and rest    Background from Chart Review      Fabiano Malik Jr. presents to the clinic for a 4 wk follow-up appointment for neck and low back pain. He is S/P  left L2-3-4-5 FACET MEDIAL BRANCH NERVE RADIOFREQUENCY NEUROTOMY (lumbar) on 5/29/18 and  RIGHT L2-3-4-5 FACET MEDIAL BRANCH NERVE RADIOFREQUENCY NEUROTOMY on 5/22/18 with 70% relief for 2 weeks, pt states pain is back but overall better since procedure, I will order PT and refill Mobic and Lyrica today.  Since the last visit, Fabiano Malik Jr. states the pain has been persistant. Current pain intensity is 8/10.         Treatment History  PT/OT:   HEP:   TENS:  Injections: 1/8/19 BILATERAL L2,3,4,5 Lumbar Medial Branch Radiofrequency Ablation  Surgery:  Medications:   - NSAIDS:   - MSK Relaxants:   - TCAs:   - SNRIs:   - Topicals:   - Opioids:   - Anticonvulsants:     Current Pain Medications  1. Cymbalta  60 mg BID   2. Tylenol   3. Klonopin  4. Seroquel  5. Risperdal    Full Medication  List    Current Outpatient Medications:     acetaminophen (TYLENOL) 325 MG tablet, Take 2 tablets (650 mg total) by mouth every 6 (six) hours as needed., Disp: 120 tablet, Rfl: 3    aspirin (ECOTRIN) 81 MG EC tablet, Take 81 mg by mouth once daily., Disp: , Rfl:     benazepril (LOTENSIN) 10 MG tablet, Take 1 tablet (10 mg total) by mouth 2 (two) times daily., Disp: 60 tablet, Rfl: 1    blood sugar diagnostic Strp, 2 strips by Misc.(Non-Drug; Combo Route) route 2 (two) times daily., Disp: 200 strip, Rfl: 2    blood-glucose meter kit, Use as instructed, Disp: 1 each, Rfl: 0    clonazePAM (KLONOPIN) 1 MG tablet, TK 1 T PO TID, Disp: , Rfl: 3    DULoxetine (CYMBALTA) 60 MG capsule, Take 1 capsule (60 mg total) by mouth 2 (two) times daily., Disp: 60 capsule, Rfl: 11    lancets (ONETOUCH DELICA LANCETS) 33 gauge Misc, 1 lancet by Misc.(Non-Drug; Combo Route) route 2 (two) times daily., Disp: 200 each, Rfl: 2    metformin (GLUCOPHAGE) 1000 MG tablet, Take 1,000 mg by mouth 2 (two) times daily with meals., Disp: , Rfl:     pantoprazole (PROTONIX) 40 MG tablet, TAKE 1 TABLET(40 MG) BY MOUTH DAILY AS NEEDED, Disp: 90 tablet, Rfl: 0    pravastatin (PRAVACHOL) 40 MG tablet, Take 1 tablet (40 mg total) by mouth once daily., Disp: 30 tablet, Rfl: 2    QUEtiapine (SEROQUEL) 400 MG tablet, Take 400 mg by mouth once daily. , Disp: , Rfl:     risperiDONE (RISPERDAL) 0.5 MG Tab, Take 1 tablet (0.5 mg total) by mouth every evening., Disp: 30 tablet, Rfl: 11    sodium polystyrene (KAYEXALATE) 15 gram/60 mL Susp, Take 15 g by mouth., Disp: , Rfl:      Review of Systems  ROS    Medical History  Past Medical History:   Diagnosis Date    Chronic back pain greater than 3 months duration     Depression     Diabetes mellitus     Hypertension     Schizophrenia         Surgical History  Past Surgical History:   Procedure Laterality Date    APPENDECTOMY      BLOCK-NERVE-MEDIAL BRANCH-LUMBAR- Bilateral L2-3-4-5 Bilateral  4/24/2018    Performed by [facility]    BLOCK-NERVE-MEDIAL BRANCH-LUMBAR- BILATERAL, L2,3,4,5 Bilateral 2/20/2018    Per[facility] by [facility] at [facility]    BLOCK-NERVE-MEDIAL BRANCH-LUMBAR- LUMBAR- BILATERAL- L2,3,4,5 Bilateral 2/6/2018    Performed by [facility] at [facility]    COLONOSCOPY N/A 5/31/2018    [facility] ENDO    NECK SURGERY      Radiofrequency Ablation, Nerve, Spinal, Lumbar, Medial Branch, L2,3,4,5 Right 1/8/2019    Performed by Alberto Hernandez Jr., [facility] at [facility]    RADIOFREQUENCY THERMOCOAGULATION (RFTC)-NERVE-MEDIAN BRANCH-LUMBAR- Left L2-3-4-5 Left 5/29/2018    Performed by [facility] Dennis [facility] at [facility]    RADIOFREQUENCY THERMOCOAGULATION (RFTC)-NERVE-MEDIAN BRANCH-LUMBAR- Right L2-3-4-5 Right 5/22/2018    Performed by Donavon Dennis MD at [facility]        Physical Exam  Vitals:    02/12/19 1058   BP: 96/67   Pulse: (!) 132   Weight: 109.2 kg (240 lb 11.9 oz)   PainSc: 10-Worst pain ever         General Musculoskeletal Exam   Gait: antalgic     Back (L-Spine & T-Spine) / Neck (C-Spine) Exam     Back (L-Spine & T-Spine) Range of Motion   Lateral bend right: abnormal   Lateral bend left: abnormal   Rotation right: abnormal   Rotation left: abnormal     Spinal Sensation   Right Side Sensation  L-Spine Level: hypersensitive  Left Side Sensation  L-Spine Level: hypersensitive    Back (L-Spine & T-Spine) Tests   Right Side Tests  Squat Test: unable to perform  Left Side Tests  Squat: unable to perform    Comments:  + Facet Loading Bilaterally.  Positive FABERE, Yeoman's and Galensen test on the both side.          Imaging    MRI lumbar spine I. contrast.  Comparison to previous radiographs.  Few 1 CM size is cyst mid and lower renal poles.  Marrow space normal.  Chronic endplate change particularly L4-L5.  No fracture subluxation.  Spinal cord normal.    T11-T12 limited posterior  disc bulge.    L1-L2 mild posterior paracentral disc bulge, mild compression adjacent spinal sac, facet arthropathy with mild spinal and foraminal stenosis.    L2-L3 minimal mild posterior disc bulge, mild facet arthropathy with mild spinal and foraminal stenosis.    L3-L4 minimal posterior disc bulge, facet joint arthropathy with minimal mild spinal and foraminal stenosis.    L4 L5 minimal mild posterior disc bulge without spinal or foraminal stenosis.    L5-S1 mild/moderate facet joint arthropathy, mild posterior disc bulge, mild spinal and foraminal stenosis.      Impression      1. Degenerative disc, spondylosis, facet arthropathy discussed above.    2.  No fracture, subluxation or disc prolapse.       Labs:  BMP  Lab Results   Component Value Date     02/08/2019    K 4.4 02/08/2019     02/08/2019    CO2 26 02/08/2019    BUN 11 02/08/2019    CREATININE 1.1 02/08/2019    CALCIUM 10.3 02/08/2019    ANIONGAP 11 02/08/2019    ESTGFRAFRICA >60.0 02/08/2019    EGFRNONAA >60.0 02/08/2019     Lab Results   Component Value Date    ALT 23 02/08/2019    AST 21 02/08/2019    ALKPHOS 57 02/08/2019    BILITOT 0.7 02/08/2019       Assessment:  Fabiano Malik Jr. is a 58 y.o. male with the following diagnoses based on history, exam, and imaging:    Problem List Items Addressed This Visit        Neuro    Chronic pain    Relevant Medications    meloxicam (MOBIC) 7.5 MG tablet      Other Visit Diagnoses     Spondylosis without myelopathy        Relevant Medications    meloxicam (MOBIC) 7.5 MG tablet    Osteoarthritis of spine, unspecified spinal osteoarthritis complication status, unspecified spinal region        Relevant Medications    meloxicam (MOBIC) 7.5 MG tablet    Facet arthropathy        Relevant Medications    meloxicam (MOBIC) 7.5 MG tablet    Back pain, unspecified back location, unspecified back pain laterality, unspecified chronicity        Relevant Medications    meloxicam (MOBIC) 7.5 MG tablet           2/12/2019- 57 y/o male s/p right L2-3-4-5 Lumbar RFA with 75% relief, his left sided RFA s/f 1/15/2019  was cancelled due to reports of dyspnea on exertion and recent recommendation from his PCP to seek cardiology evaluation, per Dr. Magana's Patient has risk factors for CAD with ongoing chest pain with VALIENTE (dyspnea on exertion) and all injection should be held at this point.        Plan & Recommendations  Procedures:  None at this time, once cleared by Cardiology patient can be rescheduled for left lumbar RFA.  Medications:  Continue medications as they are prescribed refilled Cymbalta 60 mg b.i.d.  and Mobic today 15 mg p.r.n.  Imaging:  Previous imaging reviewed and discussed with patient today  PT/OT:  Once patient has completed RFA procedure will consider physical therapy  HEP: I encouraged the patient to maintain a home exercise regimen that includes daily, moderate cardiovascular exercise lasting at least 30 minutes.  This may include yoga, rachelle chi, walking, swimming, aqua aerobics, or other exercises that maintain a heart rate of 50-70% of the calculated maximum heart rate.  I also encouraged light, daily stretching focused on the target area.  Follow Up: RTC once he has been cleared by cardiology we can proceed with the left side RFA     CARLOS Swanson  Interventional Pain Management      Disclaimer: This note was partly generated using dictation software which may occasionally result in transcription errors.

## 2019-02-15 ENCOUNTER — OFFICE VISIT (OUTPATIENT)
Dept: INTERNAL MEDICINE | Facility: CLINIC | Age: 59
End: 2019-02-15
Payer: MEDICARE

## 2019-02-15 VITALS
DIASTOLIC BLOOD PRESSURE: 96 MMHG | BODY MASS INDEX: 32.67 KG/M2 | SYSTOLIC BLOOD PRESSURE: 162 MMHG | HEIGHT: 72 IN | WEIGHT: 241.19 LBS | HEART RATE: 98 BPM | OXYGEN SATURATION: 97 %

## 2019-02-15 DIAGNOSIS — E11.9 TYPE 2 DIABETES MELLITUS WITHOUT COMPLICATION, WITHOUT LONG-TERM CURRENT USE OF INSULIN: Primary | ICD-10-CM

## 2019-02-15 DIAGNOSIS — R22.32 PALMAR NODULE, LEFT: ICD-10-CM

## 2019-02-15 DIAGNOSIS — E87.5 HYPERKALEMIA: ICD-10-CM

## 2019-02-15 DIAGNOSIS — M65.321 TRIGGER INDEX FINGER OF RIGHT HAND: ICD-10-CM

## 2019-02-15 DIAGNOSIS — N28.9 RENAL INSUFFICIENCY: ICD-10-CM

## 2019-02-15 DIAGNOSIS — E11.59 HYPERTENSION ASSOCIATED WITH DIABETES: ICD-10-CM

## 2019-02-15 DIAGNOSIS — E66.9 CLASS 1 OBESITY WITH SERIOUS COMORBIDITY AND BODY MASS INDEX (BMI) OF 32.0 TO 32.9 IN ADULT, UNSPECIFIED OBESITY TYPE: ICD-10-CM

## 2019-02-15 DIAGNOSIS — I15.2 HYPERTENSION ASSOCIATED WITH DIABETES: ICD-10-CM

## 2019-02-15 DIAGNOSIS — K21.9 GASTROESOPHAGEAL REFLUX DISEASE, ESOPHAGITIS PRESENCE NOT SPECIFIED: ICD-10-CM

## 2019-02-15 PROCEDURE — 3077F SYST BP >= 140 MM HG: CPT | Mod: CPTII,S$GLB,, | Performed by: INTERNAL MEDICINE

## 2019-02-15 PROCEDURE — 3077F PR MOST RECENT SYSTOLIC BLOOD PRESSURE >= 140 MM HG: ICD-10-PCS | Mod: CPTII,S$GLB,, | Performed by: INTERNAL MEDICINE

## 2019-02-15 PROCEDURE — 99999 PR PBB SHADOW E&M-EST. PATIENT-LVL III: ICD-10-PCS | Mod: PBBFAC,,, | Performed by: INTERNAL MEDICINE

## 2019-02-15 PROCEDURE — 3044F HG A1C LEVEL LT 7.0%: CPT | Mod: CPTII,S$GLB,, | Performed by: INTERNAL MEDICINE

## 2019-02-15 PROCEDURE — 3008F BODY MASS INDEX DOCD: CPT | Mod: CPTII,S$GLB,, | Performed by: INTERNAL MEDICINE

## 2019-02-15 PROCEDURE — 3080F PR MOST RECENT DIASTOLIC BLOOD PRESSURE >= 90 MM HG: ICD-10-PCS | Mod: CPTII,S$GLB,, | Performed by: INTERNAL MEDICINE

## 2019-02-15 PROCEDURE — 99214 OFFICE O/P EST MOD 30 MIN: CPT | Mod: S$GLB,,, | Performed by: INTERNAL MEDICINE

## 2019-02-15 PROCEDURE — 3080F DIAST BP >= 90 MM HG: CPT | Mod: CPTII,S$GLB,, | Performed by: INTERNAL MEDICINE

## 2019-02-15 PROCEDURE — 3044F PR MOST RECENT HEMOGLOBIN A1C LEVEL <7.0%: ICD-10-PCS | Mod: CPTII,S$GLB,, | Performed by: INTERNAL MEDICINE

## 2019-02-15 PROCEDURE — 99999 PR PBB SHADOW E&M-EST. PATIENT-LVL III: CPT | Mod: PBBFAC,,, | Performed by: INTERNAL MEDICINE

## 2019-02-15 PROCEDURE — 3008F PR BODY MASS INDEX (BMI) DOCUMENTED: ICD-10-PCS | Mod: CPTII,S$GLB,, | Performed by: INTERNAL MEDICINE

## 2019-02-15 PROCEDURE — 99214 PR OFFICE/OUTPT VISIT, EST, LEVL IV, 30-39 MIN: ICD-10-PCS | Mod: S$GLB,,, | Performed by: INTERNAL MEDICINE

## 2019-02-15 RX ORDER — OMEPRAZOLE 20 MG/1
20 CAPSULE, DELAYED RELEASE ORAL DAILY
Qty: 30 CAPSULE | Refills: 4 | Status: SHIPPED | OUTPATIENT
Start: 2019-02-15 | End: 2019-04-26 | Stop reason: SDUPTHER

## 2019-02-15 RX ORDER — OLMESARTAN MEDOXOMIL 20 MG/1
20 TABLET ORAL 2 TIMES DAILY
Qty: 60 TABLET | Refills: 1 | Status: SHIPPED | OUTPATIENT
Start: 2019-02-15 | End: 2019-03-19 | Stop reason: SDUPTHER

## 2019-02-15 NOTE — PROGRESS NOTES
Pt. ID: Fabiano Malik Jr. is a 58 y.o. male      Chief complaint:   Chief Complaint   Patient presents with    Follow-up       HPI: Of note, Epic system is down and intermittent; 2 notes have been created for this pt. And pt. Was informed of the system issue and I explained to pt. The reasons for visit delay; information from todays visit is on both notes  Review of Systems   Constitutional: Negative for chills and fever.   Respiratory: Negative for cough and shortness of breath.    Cardiovascular: Negative for chest pain.   Musculoskeletal: Positive for back pain and joint pain.        R trigger finger which has been bothersome on and off for years; L palmar nodule which has been present for years; back pain which is worse with ROM         Objective:    Physical Exam   Constitutional: He is oriented to person, place, and time.   obese   Eyes: EOM are normal.   Neck: Normal range of motion.   Cardiovascular: Normal rate, regular rhythm and normal heart sounds.   Pulmonary/Chest: Effort normal and breath sounds normal. No respiratory distress. He has no wheezes. He has no rales.   Abdominal: Soft. There is no tenderness. There is no rebound and no guarding.   Musculoskeletal:   Back pain with ROM; R index trigger finger and L palmar nodule   Neurological: He is alert and oriented to person, place, and time.   Skin: No rash noted.   Vitals reviewed.        Health Maintenance   Topic Date Due    TETANUS VACCINE  12/12/2019 (Originally 12/15/1978)    Hemoglobin A1c  06/06/2019    Eye Exam  07/10/2019    Lipid Panel  12/06/2019    Foot Exam  12/12/2019    Low Dose Statin  02/15/2020    Colonoscopy  05/31/2028    Hepatitis C Screening  Completed    Pneumococcal Vaccine (Medium Risk)  Completed    Influenza Vaccine  Completed         Assessment:     1. Type 2 diabetes mellitus without complication, without long-term current use of insulin Well controlled   2. Hypertension associated with diabetes  Sub-optimally controlled   3. Trigger index finger of right hand Active   4. Palmar nodule, left Active   5. Renal insufficiency Well controlled   6. Hyperkalemia Well controlled   7. Gastroesophageal reflux disease, esophagitis presence not specified Well controlled   8. Class 1 obesity with serious comorbidity and body mass index (BMI) of 32.0 to 32.9 in adult, unspecified obesity type Sub-optimally controlled         Plan: Type 2 diabetes mellitus without complication, without long-term current use of insulin  Comments:  continue current regimen and encouraged ADA diet   Orders:  -     Basic metabolic panel; Future; Expected date: 03/08/2019    Hypertension associated with diabetes  Comments:  change ACE to benicar 20 mg BID and repeat BP in 1 month   Orders:  -     olmesartan (BENICAR) 20 MG tablet; Take 1 tablet (20 mg total) by mouth 2 (two) times daily.  Dispense: 60 tablet; Refill: 1  -     Basic metabolic panel; Future; Expected date: 03/08/2019    Trigger index finger of right hand  Comments:  refer to ortho  Orders:  -     Ambulatory referral to Orthopedics    Palmar nodule, left  Comments:  refer to ortho  Orders:  -     Ambulatory referral to Orthopedics    Renal insufficiency  Comments:  normalized; monitor   Orders:  -     Basic metabolic panel; Future; Expected date: 03/08/2019    Hyperkalemia  Comments:  normalized; monitor   Orders:  -     Basic metabolic panel; Future; Expected date: 03/08/2019    Gastroesophageal reflux disease, esophagitis presence not specified  Comments:  continue prilosec prn   Orders:  -     omeprazole (PRILOSEC) 20 MG capsule; Take 1 capsule (20 mg total) by mouth once daily.  Dispense: 30 capsule; Refill: 4    Class 1 obesity with serious comorbidity and body mass index (BMI) of 32.0 to 32.9 in adult, unspecified obesity type  Comments:  encouraged diet and explained risks         Problem List Items Addressed This Visit        Cardiac/Vascular    Hypertension associated  with diabetes    Relevant Medications    olmesartan (BENICAR) 20 MG tablet    Other Relevant Orders    Basic metabolic panel       Renal/    Hyperkalemia    Relevant Orders    Basic metabolic panel    Renal insufficiency    Relevant Orders    Basic metabolic panel       Endocrine    Type 2 diabetes mellitus without complication, without long-term current use of insulin - Primary    Relevant Orders    Basic metabolic panel    Class 1 obesity with serious comorbidity and body mass index (BMI) of 32.0 to 32.9 in adult       GI    Gastroesophageal reflux disease    Relevant Medications    omeprazole (PRILOSEC) 20 MG capsule       Orthopedic    Trigger index finger of right hand    Relevant Orders    Ambulatory referral to Orthopedics       Other    Palmar nodule, left    Relevant Orders    Ambulatory referral to Orthopedics

## 2019-02-15 NOTE — PROGRESS NOTES
Pt. ID: Fabiano Malik Jr. is a 58 y.o. male      Chief complaint:   Chief Complaint   Patient presents with    Follow-up       HPI: Pt. Here for f/u for DM, back pain and HTN; I reviewed labs dated 2/8/19; potassium and kidney fxn have normalized; heart rate has improved; HGBA1C was 6.7; pt. Is seeing pain management for back pain; pt. Has f/u cardiology for chest pain and stress test was negative for ischemia; of note, EF at rest was 22% which is believed to be artifactual; pt. Will f/u cardiology; he is compliant with meds; BP is elevated      ROS    Reviewed:       Objective:    Physical Exam   Constitutional: He is oriented to person, place, and time.   Eyes: EOM are normal.   Neck: Normal range of motion.   Cardiovascular: Normal rate, regular rhythm and normal heart sounds.   Pulmonary/Chest: Effort normal and breath sounds normal.   Abdominal: Soft. There is no tenderness. There is no rebound and no guarding.   Musculoskeletal: Normal range of motion.   Neurological: He is alert and oriented to person, place, and time.   Skin: No rash noted.         Health Maintenance   Topic Date Due    TETANUS VACCINE  12/12/2019 (Originally 12/15/1978)    Hemoglobin A1c  06/06/2019    Eye Exam  07/10/2019    Lipid Panel  12/06/2019    Foot Exam  12/12/2019    Low Dose Statin  02/15/2020    Colonoscopy  05/31/2028    Hepatitis C Screening  Completed    Pneumococcal Vaccine (Medium Risk)  Completed    Influenza Vaccine  Completed         Assessment:     1. Type 2 diabetes mellitus without complication, without long-term current use of insulin    2. Hypertension associated with diabetes          Plan:     Problem List Items Addressed This Visit        Cardiac/Vascular    Hypertension associated with diabetes       Endocrine    Type 2 diabetes mellitus without complication, without long-term current use of insulin - Primary

## 2019-02-17 RX ORDER — MELOXICAM 15 MG/1
TABLET ORAL
Qty: 90 TABLET | Refills: 0 | Status: SHIPPED | OUTPATIENT
Start: 2019-02-17 | End: 2019-05-29 | Stop reason: ALTCHOICE

## 2019-02-18 ENCOUNTER — TELEPHONE (OUTPATIENT)
Dept: ADMINISTRATIVE | Facility: OTHER | Age: 59
End: 2019-02-18

## 2019-02-19 ENCOUNTER — TELEPHONE (OUTPATIENT)
Dept: PAIN MEDICINE | Facility: CLINIC | Age: 59
End: 2019-02-19

## 2019-02-19 ENCOUNTER — OFFICE VISIT (OUTPATIENT)
Dept: CARDIOLOGY | Facility: CLINIC | Age: 59
End: 2019-02-19
Payer: MEDICARE

## 2019-02-19 VITALS
SYSTOLIC BLOOD PRESSURE: 122 MMHG | DIASTOLIC BLOOD PRESSURE: 75 MMHG | WEIGHT: 238.31 LBS | HEIGHT: 72 IN | OXYGEN SATURATION: 99 % | HEART RATE: 75 BPM | BODY MASS INDEX: 32.28 KG/M2

## 2019-02-19 DIAGNOSIS — R94.31 EKG ABNORMALITIES: ICD-10-CM

## 2019-02-19 DIAGNOSIS — E11.59 HYPERTENSION ASSOCIATED WITH DIABETES: Primary | ICD-10-CM

## 2019-02-19 DIAGNOSIS — M47.816 LUMBAR FACET ARTHROPATHY: Primary | ICD-10-CM

## 2019-02-19 DIAGNOSIS — K21.9 GASTROESOPHAGEAL REFLUX DISEASE, ESOPHAGITIS PRESENCE NOT SPECIFIED: ICD-10-CM

## 2019-02-19 DIAGNOSIS — E78.2 MIXED HYPERLIPIDEMIA: ICD-10-CM

## 2019-02-19 DIAGNOSIS — I15.2 HYPERTENSION ASSOCIATED WITH DIABETES: Primary | ICD-10-CM

## 2019-02-19 PROCEDURE — 3074F SYST BP LT 130 MM HG: CPT | Mod: CPTII,S$GLB,, | Performed by: INTERNAL MEDICINE

## 2019-02-19 PROCEDURE — 3078F PR MOST RECENT DIASTOLIC BLOOD PRESSURE < 80 MM HG: ICD-10-PCS | Mod: CPTII,S$GLB,, | Performed by: INTERNAL MEDICINE

## 2019-02-19 PROCEDURE — 3044F HG A1C LEVEL LT 7.0%: CPT | Mod: CPTII,S$GLB,, | Performed by: INTERNAL MEDICINE

## 2019-02-19 PROCEDURE — 3008F PR BODY MASS INDEX (BMI) DOCUMENTED: ICD-10-PCS | Mod: CPTII,S$GLB,, | Performed by: INTERNAL MEDICINE

## 2019-02-19 PROCEDURE — 99999 PR PBB SHADOW E&M-EST. PATIENT-LVL III: ICD-10-PCS | Mod: PBBFAC,,, | Performed by: INTERNAL MEDICINE

## 2019-02-19 PROCEDURE — 99213 PR OFFICE/OUTPT VISIT, EST, LEVL III, 20-29 MIN: ICD-10-PCS | Mod: S$GLB,,, | Performed by: INTERNAL MEDICINE

## 2019-02-19 PROCEDURE — 3008F BODY MASS INDEX DOCD: CPT | Mod: CPTII,S$GLB,, | Performed by: INTERNAL MEDICINE

## 2019-02-19 PROCEDURE — 3078F DIAST BP <80 MM HG: CPT | Mod: CPTII,S$GLB,, | Performed by: INTERNAL MEDICINE

## 2019-02-19 PROCEDURE — 3044F PR MOST RECENT HEMOGLOBIN A1C LEVEL <7.0%: ICD-10-PCS | Mod: CPTII,S$GLB,, | Performed by: INTERNAL MEDICINE

## 2019-02-19 PROCEDURE — 3074F PR MOST RECENT SYSTOLIC BLOOD PRESSURE < 130 MM HG: ICD-10-PCS | Mod: CPTII,S$GLB,, | Performed by: INTERNAL MEDICINE

## 2019-02-19 PROCEDURE — 99213 OFFICE O/P EST LOW 20 MIN: CPT | Mod: S$GLB,,, | Performed by: INTERNAL MEDICINE

## 2019-02-19 PROCEDURE — 99999 PR PBB SHADOW E&M-EST. PATIENT-LVL III: CPT | Mod: PBBFAC,,, | Performed by: INTERNAL MEDICINE

## 2019-02-19 RX ORDER — TRAMADOL HYDROCHLORIDE 50 MG/1
50 TABLET ORAL
COMMUNITY
End: 2019-09-04

## 2019-02-19 NOTE — PROGRESS NOTES
Subjective:   Patient ID:  Fabiano Malik Jr. is a 58 y.o. male who presents for evaluation of Follow-up      HPI: 57 y/o male with PMH of chronic pain, DM and HTN present as f/u. He is doing better with no acute concerns. Nuclear stress negative for ischemia. BP is controlled. No further chest pain but does get VALIENTE with moderate activities. Discussed family history of CAD with patient. He is compliant with medications.      Patient's Medications   New Prescriptions    No medications on file   Previous Medications    ACETAMINOPHEN (TYLENOL) 325 MG TABLET    Take 2 tablets (650 mg total) by mouth every 6 (six) hours as needed.    ASPIRIN (ECOTRIN) 81 MG EC TABLET    Take 81 mg by mouth once daily.    BLOOD SUGAR DIAGNOSTIC STRP    2 strips by Misc.(Non-Drug; Combo Route) route 2 (two) times daily.    BLOOD-GLUCOSE METER KIT    Use as instructed    CLONAZEPAM (KLONOPIN) 1 MG TABLET    TK 1 T PO TID    DULOXETINE (CYMBALTA) 60 MG CAPSULE    Take 1 capsule (60 mg total) by mouth 2 (two) times daily.    LANCETS (ONETOUCH DELICA LANCETS) 33 GAUGE MISC    1 lancet by Misc.(Non-Drug; Combo Route) route 2 (two) times daily.    MELOXICAM (MOBIC) 15 MG TABLET    TAKE 1/2 TABLET= 7.5 MG BY MOUTH EVERY DAY AS NEEDED PAIN    MELOXICAM (MOBIC) 7.5 MG TABLET    Take 1 tablet (7.5 mg total) by mouth daily with breakfast.    METFORMIN (GLUCOPHAGE) 1000 MG TABLET    Take 1,000 mg by mouth 2 (two) times daily with meals.    OLMESARTAN (BENICAR) 20 MG TABLET    Take 1 tablet (20 mg total) by mouth 2 (two) times daily.    OMEPRAZOLE (PRILOSEC) 20 MG CAPSULE    Take 1 capsule (20 mg total) by mouth once daily.    PANTOPRAZOLE (PROTONIX) 40 MG TABLET    TAKE 1 TABLET(40 MG) BY MOUTH DAILY AS NEEDED    PRAVASTATIN (PRAVACHOL) 40 MG TABLET    Take 1 tablet (40 mg total) by mouth once daily.    QUETIAPINE (SEROQUEL) 400 MG TABLET    Take 400 mg by mouth once daily.     RISPERIDONE (RISPERDAL) 0.5 MG TAB    Take 1 tablet (0.5 mg total) by  mouth every evening.    SODIUM POLYSTYRENE (KAYEXALATE) 15 GRAM/60 ML SUSP    Take 15 g by mouth.   Modified Medications    No medications on file   Discontinued Medications    No medications on file        Review of patient's allergies indicates:   Allergen Reactions    Pcn [penicillins] Other (See Comments)     Childhood rxn, pt does not recall type of rxn       Review of Systems   Constitution: Negative.   HENT: Negative.    Eyes: Negative.    Cardiovascular: Positive for dyspnea on exertion. Negative for chest pain.   Respiratory: Negative.    Endocrine: Negative.    Hematologic/Lymphatic: Negative.    Skin: Negative.    Musculoskeletal: Negative.    Gastrointestinal: Negative.    Neurological: Negative.    Psychiatric/Behavioral: Negative.    Allergic/Immunologic: Negative.      Objective:   Physical Exam   Constitutional: He is oriented to person, place, and time. He appears well-developed and well-nourished. No distress.   Examination of the digits showed no clubbing or cyanosis   HENT:   Head: Normocephalic and atraumatic.   Eyes: Conjunctivae are normal. Pupils are equal, round, and reactive to light. Right eye exhibits no discharge.   Neck: Normal range of motion. Neck supple. No JVD present. No thyromegaly present.   No carotid bruits   Cardiovascular: Normal rate, regular rhythm, S1 normal, S2 normal, normal heart sounds, intact distal pulses and normal pulses. PMI is not displaced. Exam reveals no gallop, no friction rub and no opening snap.   No murmur heard.  Pulmonary/Chest: Effort normal and breath sounds normal. No respiratory distress. He has no wheezes. He has no rales. He exhibits no tenderness.   Abdominal: Soft. Bowel sounds are normal. He exhibits no distension and no mass. There is no tenderness. There is no guarding.   No hepatosplenomegaly   Musculoskeletal: Normal range of motion. He exhibits no edema or tenderness.   Lymphadenopathy:     He has no cervical adenopathy.   Neurological: He  is alert and oriented to person, place, and time.   Skin: Skin is warm. No rash noted. He is not diaphoretic. No erythema.   Psychiatric: He has a normal mood and affect.   Nursing note and vitals reviewed.      Lab Results   Component Value Date    WBC 5.28 01/07/2019    HGB 13.2 (L) 01/07/2019    HCT 37.8 (L) 01/07/2019    MCV 92 01/07/2019     01/07/2019         Chemistry        Component Value Date/Time     02/08/2019 0735    K 4.4 02/08/2019 0735     02/08/2019 0735    CO2 26 02/08/2019 0735    BUN 11 02/08/2019 0735    CREATININE 1.1 02/08/2019 0735     (H) 02/08/2019 0735        Component Value Date/Time    CALCIUM 10.3 02/08/2019 0735    ALKPHOS 57 02/08/2019 0735    AST 21 02/08/2019 0735    ALT 23 02/08/2019 0735    BILITOT 0.7 02/08/2019 0735    ESTGFRAFRICA >60.0 02/08/2019 0735    EGFRNONAA >60.0 02/08/2019 0735            Lab Results   Component Value Date    CHOL 134 12/06/2018    CHOL 124 01/21/2017     Lab Results   Component Value Date    HDL 43 12/06/2018    HDL 31 (L) 01/21/2017     Lab Results   Component Value Date    LDLCALC 58.4 (L) 12/06/2018    LDLCALC 30.0 (L) 01/21/2017     Lab Results   Component Value Date    TRIG 163 (H) 12/06/2018    TRIG 315 (H) 01/21/2017     Lab Results   Component Value Date    CHOLHDL 32.1 12/06/2018    CHOLHDL 25.0 01/21/2017       Lab Results   Component Value Date    TSH 2.421 07/25/2016       Lab Results   Component Value Date    HGBA1C 6.7 (H) 12/06/2018       ECGs reviewed- NSR  LABS reviewed  Imaging including Echoes reviewed- normal ef  Nuclear stress- negative for ischemia    Assessment:     1. Hypertension associated with diabetes    2. Mixed hyperlipidemia    3. EKG abnormalities        Plan:     Patient is low risk for pain management procedures with no contraindications. Mets>4.     Continue current medications  Activity as tolerate  Weight loss  F/u in 6 months.

## 2019-02-19 NOTE — LETTER
February 19, 2019        Joseph Shafer, BRYCE  [address]  [address]  [place] [address]             [facility]  [address], [address]  [place] [address]  Phone: [phone]   Patient: Fabiano Malik Jr.   MR Number: [id number]   YOB: 1960   Date of Visit: 2/19/2019       Dear Dr. Shafer:    Thank you for referring Faibano Malik to me for evaluation. Below are the relevant portions of my assessment and plan of care.       ECGs reviewed- NSR  LABS reviewed  Imaging including Echoes reviewed- normal ef  Nuclear stress- negative for ischemia    Assessment:     1. Hypertension associated with diabetes    2. Mixed hyperlipidemia    3. EKG abnormalities        Plan:     Patient is low risk for pain management procedures with no contraindications. Mets>4.     Continue current medications  Activity as tolerate  Weight loss  F/u in 6 months.        If you have questions, please do not hesitate to call me. I look forward to following Fabiano along with you.    Sincerely,      Kathie Magana MD           CC  No Recipients

## 2019-02-22 RX ORDER — PANTOPRAZOLE SODIUM 40 MG/1
TABLET, DELAYED RELEASE ORAL
Qty: 90 TABLET | Refills: 0 | OUTPATIENT
Start: 2019-02-22

## 2019-02-22 RX ORDER — TRAMADOL HYDROCHLORIDE 50 MG/1
50 TABLET ORAL
OUTPATIENT
Start: 2019-02-22

## 2019-03-04 ENCOUNTER — TELEPHONE (OUTPATIENT)
Dept: PAIN MEDICINE | Facility: CLINIC | Age: 59
End: 2019-03-04

## 2019-03-06 DIAGNOSIS — G89.29 CHRONIC NECK PAIN: ICD-10-CM

## 2019-03-06 DIAGNOSIS — M54.2 CHRONIC NECK PAIN: ICD-10-CM

## 2019-03-06 DIAGNOSIS — G89.29 CHRONIC MIDLINE BACK PAIN, UNSPECIFIED BACK LOCATION: ICD-10-CM

## 2019-03-06 DIAGNOSIS — M54.9 CHRONIC MIDLINE BACK PAIN, UNSPECIFIED BACK LOCATION: ICD-10-CM

## 2019-03-06 RX ORDER — TRAMADOL HYDROCHLORIDE 50 MG/1
TABLET ORAL
Qty: 21 TABLET | Refills: 0 | OUTPATIENT
Start: 2019-03-06

## 2019-03-08 ENCOUNTER — TELEPHONE (OUTPATIENT)
Dept: PAIN MEDICINE | Facility: CLINIC | Age: 59
End: 2019-03-08

## 2019-03-08 NOTE — DISCHARGE INSTRUCTIONS
Home Care Instructions Pain Management:    1.  DIET:    You may resume your normal diet today.    2.  BATHING:    You may shower with luke warm water.    3.  DRESSING:    You may remove your bandage today.    4.  ACTIVITY LEVEL:      You may resume your normal activities 24 hours after your procedure.    5.  MEDICATIONS:    You may resume your normal medications today.    6.  SPECIAL INSTRUCTIONS:    No heat to the injection site for 24 hours including bath or shower, heating pad, moist heat or hot tubs.    Use an ice pack to the injection site for any pain or discomfort.  Apply ice packs for 20 minute intervals as needed.    If you have received any sedatives by mouth today, you can not drive for 12 hours.    If you have received sedation through an IV, you can not drive for 24 hours.    PLEASE CALL YOUR DOCTOR FOR THE FOLLOWIN.  Redness or swelling around the injection site.  2.  Fever of 101 degrees.  3.  Drainage (pus) from the injection site.  4.  For any continuous bleeding (some dried blood over the incision is normal.)    FOR EMERGENCIES:    If any unusual problems or difficulties occur during clinic hours, call [phone] or dial [phone].    Follow up with with your physician in 2-3 weeks.       Recovery After Procedural Sedation (Adult)  You have been given medicine by vein to make you sleep during your surgery. This may have included both a pain medicine and sleeping medicine. Most of the effects have worn off. But you may still have some drowsiness for the next 6 to 8 hours.  Home care  Follow these guidelines when you get home:  · For the next 8 hours, you should be watched by a responsible adult. This person should make sure your condition is not getting worse.  · Don't drink any alcohol for the next 24 hours.  · Don't drive, operate dangerous machinery, or make important business or personal decisions during the next 24 hours.  Note: Your healthcare provider may tell you not to take any  medicine by mouth for pain or sleep in the next 4 hours. These medicines may react with the medicines you were given in the hospital. This could cause a much stronger response than usual.  Follow-up care  Follow up with your healthcare provider if you are not alert and back to your usual level of activity within 12 hours.  When to seek medical advice  Call your healthcare provider right away if any of these occur:  · Drowsiness gets worse  · Weakness or dizziness gets worse  · Repeated vomiting  · You can't be awakened   Date Last Reviewed: 10/18/2016  © 6700-1423 [employer]. [address] [place] [address]. All rights reserved. This information is not intended as a substitute for professional medical care. Always follow your healthcare professional's instructions.

## 2019-03-12 ENCOUNTER — HOSPITAL ENCOUNTER (OUTPATIENT)
Facility: HOSPITAL | Age: 59
Discharge: HOME | End: 2019-03-12
Attending: PAIN MEDICINE | Admitting: PAIN MEDICINE
Payer: MEDICARE

## 2019-03-12 VITALS
WEIGHT: 240 LBS | BODY MASS INDEX: 32.51 KG/M2 | RESPIRATION RATE: 16 BRPM | TEMPERATURE: 98 F | SYSTOLIC BLOOD PRESSURE: 155 MMHG | DIASTOLIC BLOOD PRESSURE: 84 MMHG | HEIGHT: 72 IN | OXYGEN SATURATION: 98 % | HEART RATE: 85 BPM

## 2019-03-12 DIAGNOSIS — G89.29 CHRONIC PAIN: ICD-10-CM

## 2019-03-12 DIAGNOSIS — M47.816 LUMBAR SPONDYLOSIS: ICD-10-CM

## 2019-03-12 DIAGNOSIS — M47.816 LUMBAR FACET ARTHROPATHY: Primary | ICD-10-CM

## 2019-03-12 PROCEDURE — 25000003 PHARM REV CODE 250: Performed by: PAIN MEDICINE

## 2019-03-12 PROCEDURE — 64635 DESTROY LUMB/SAC FACET JNT: CPT | Performed by: PAIN MEDICINE

## 2019-03-12 PROCEDURE — 64635 DESTROY LUMB/SAC FACET JNT: CPT | Mod: LT,,, | Performed by: PAIN MEDICINE

## 2019-03-12 PROCEDURE — 64636 PR DESTROY L/S FACET JNT ADDL: ICD-10-PCS | Mod: LT,,, | Performed by: PAIN MEDICINE

## 2019-03-12 PROCEDURE — 99152 MOD SED SAME PHYS/QHP 5/>YRS: CPT | Mod: ,,, | Performed by: PAIN MEDICINE

## 2019-03-12 PROCEDURE — 99152 PR MOD CONSCIOUS SEDATION, SAME PHYS, 5+ YRS, FIRST 15 MIN: ICD-10-PCS | Mod: ,,, | Performed by: PAIN MEDICINE

## 2019-03-12 PROCEDURE — 64636 DESTROY L/S FACET JNT ADDL: CPT | Performed by: PAIN MEDICINE

## 2019-03-12 PROCEDURE — 63600175 PHARM REV CODE 636 W HCPCS: Performed by: PAIN MEDICINE

## 2019-03-12 PROCEDURE — 64636 DESTROY L/S FACET JNT ADDL: CPT | Mod: LT,,, | Performed by: PAIN MEDICINE

## 2019-03-12 PROCEDURE — 64635 PR DESTROY LUMB/SAC FACET JNT: ICD-10-PCS | Mod: LT,,, | Performed by: PAIN MEDICINE

## 2019-03-12 RX ORDER — LIDOCAINE HYDROCHLORIDE 10 MG/ML
INJECTION INFILTRATION; PERINEURAL
Status: DISCONTINUED | OUTPATIENT
Start: 2019-03-12 | End: 2019-03-12 | Stop reason: HOSPADM

## 2019-03-12 RX ORDER — FENTANYL CITRATE 50 UG/ML
INJECTION, SOLUTION INTRAMUSCULAR; INTRAVENOUS
Status: DISCONTINUED | OUTPATIENT
Start: 2019-03-12 | End: 2019-03-12 | Stop reason: HOSPADM

## 2019-03-12 RX ORDER — MIDAZOLAM HYDROCHLORIDE 1 MG/ML
INJECTION, SOLUTION INTRAMUSCULAR; INTRAVENOUS
Status: DISCONTINUED | OUTPATIENT
Start: 2019-03-12 | End: 2019-03-12 | Stop reason: HOSPADM

## 2019-03-12 RX ORDER — BUPIVACAINE HYDROCHLORIDE 2.5 MG/ML
INJECTION, SOLUTION EPIDURAL; INFILTRATION; INTRACAUDAL
Status: DISCONTINUED | OUTPATIENT
Start: 2019-03-12 | End: 2019-03-12 | Stop reason: HOSPADM

## 2019-03-12 RX ORDER — ALPRAZOLAM 0.5 MG/1
0.5 TABLET, ORALLY DISINTEGRATING ORAL ONCE AS NEEDED
Status: DISCONTINUED | OUTPATIENT
Start: 2019-03-12 | End: 2021-03-29

## 2019-03-12 NOTE — DISCHARGE SUMMARY
[facility]  Discharge Note  Short Stay     Admit Date: 3/12/2019    Discharge Date: 3/12/2019     Attending Physician: Alberto Hernandez Jr, MD    Diagnoses:  Active Hospital Problems    Diagnosis  POA    *Lumbar facet arthropathy [M47.816]  Yes    Chronic pain [G89.29]  Yes      Resolved Hospital Problems   No resolved problems to display.     Discharged Condition: Good     Hospital Course: Patient was admitted for an outpatient interventional pain management procedure and tolerated the procedure well with no complications.     Final Diagnoses: Same as principal problem.     Disposition: Home or Self Care     Follow up/Patient Instructions:    Follow-up Information     Alberto Hernandez Jr, MD. Go in 2 weeks.    Specialty:  Pain Medicine  Why:  Post-procedural Follow Up As Scheduled, Call to make an appointment if you do not have one  Contact information:  [address]  [address]  [place] [address]  [phone]                   Reconciled Medications:     Medication List      CONTINUE taking these medications    acetaminophen 325 MG tablet  Commonly known as:  TYLENOL  Take 2 tablets (650 mg total) by mouth every 6 (six) hours as needed.     aspirin 81 MG EC tablet  Commonly known as:  ECOTRIN  Take 81 mg by mouth once daily.     blood sugar diagnostic Strp  2 strips by Misc.(Non-Drug; Combo Route) route 2 (two) times daily.     blood-glucose meter kit  Use as instructed     clonazePAM 1 MG tablet  Commonly known as:  KLONOPIN  TK 1 T PO TID     DULoxetine 60 MG capsule  Commonly known as:  CYMBALTA  Take 1 capsule (60 mg total) by mouth 2 (two) times daily.     lancets 33 gauge Misc  Commonly known as:  ONETOUCH DELICA LANCETS  1 lancet by Misc.(Non-Drug; Combo Route) route 2 (two) times daily.     * meloxicam 7.5 MG tablet  Commonly known as:  MOBIC  Take 1 tablet (7.5 mg total) by mouth daily with breakfast.     * meloxicam 15 MG tablet  Commonly known as:  MOBIC  TAKE 1/2 TABLET= 7.5 MG BY  MOUTH EVERY DAY AS NEEDED PAIN     metFORMIN 1000 MG tablet  Commonly known as:  GLUCOPHAGE  Take 1,000 mg by mouth 2 (two) times daily with meals.     olmesartan 20 MG tablet  Commonly known as:  BENICAR  Take 1 tablet (20 mg total) by mouth 2 (two) times daily.     omeprazole 20 MG capsule  Commonly known as:  PRILOSEC  Take 1 capsule (20 mg total) by mouth once daily.     pantoprazole 40 MG tablet  Commonly known as:  PROTONIX  TAKE 1 TABLET(40 MG) BY MOUTH DAILY AS NEEDED     pravastatin 40 MG tablet  Commonly known as:  PRAVACHOL  Take 1 tablet (40 mg total) by mouth once daily.     QUEtiapine 400 MG tablet  Commonly known as:  SEROQUEL  Take 400 mg by mouth once daily.     risperiDONE 0.5 MG Tab  Commonly known as:  RISPERDAL  Take 1 tablet (0.5 mg total) by mouth every evening.     sodium polystyrene 15 gram/60 mL Susp  Commonly known as:  KAYEXALATE  Take 15 g by mouth.     traMADol 50 mg tablet  Commonly known as:  ULTRAM  Take 50 mg by mouth as needed for Pain.         * This list has 2 medication(s) that are the same as other medications prescribed for you. Read the directions carefully, and ask your doctor or other care provider to review them with you.               Discharge Procedure Orders (must include Diet, Follow-up, Activity)   Call MD for:  temperature >100.4     Call MD for:  severe uncontrolled pain     Call MD for:  redness, tenderness, or signs of infection (pain, swelling, redness, odor or green/yellow discharge around incision site)     Call MD for:  difficulty breathing or increased cough     Call MD for:  severe persistent headache     Call MD for:  worsening rash     Remove dressing in 24 hours       Alberto Hernandez Jr, MD  Interventional Pain Medicine / Anesthesiology

## 2019-03-12 NOTE — OP NOTE
Procedure Note    Preoperative Diagnosis: Lumbar Spondylosis  Postoperative Diagnosis: Lumbar Spondylosis  Procedure Date: 03/12/2019  Procedure Title:  (1) LEFT L2,3,4,5 Lumbar Medial Branch Radiofrequency Ablation     (2) Intraoperative Fluoroscopy     (3) IV Moderate Sedation (Midazolam 2 mg, Fentanyl 50 mcg)    Anesthesia: Local    Indications: Fabiano Malik Jr. is a 58 y.o. year-old male with a diagnosis of back pain due to lumbar or lumbosacral spondylosis.  The patient had significant relief of the pain with the previous diagnostic nerve blocks.     Findings: Final needle placement consistent with technically sucsessful procedure.     Procedure in Detail: The patients history and physical exam were reviewed.  Informed consent obtained after explaining the procedure and potential complications including local discomfort, infection, headache, temporary or permanent weakness and/or numbness of one or both legs, temporary or permanent paraplegia, heart attack and stroke. The patient agreed to proceed, and written informed consent was obtained.    Patient was brought back to procedure room and placed in a prone position and head resting comfortably in head ring. Prior to the initiation of the procedure, the patient's identity, the site, and the nature of the procedure were verified.  AP and oblique fluoroscopy were used to identify and vamshi the junctions between the superior articular processes and transverse processes at the L3, L4, and L5 levels on the Left side.  The Left sacral ala was also identified and marked.  The skin and subcutaneous tissues in these identified areas was anesthetized with 2-3 mL of lidocaine 1% at each level. A 18 gauge 100 mm probe radiofrequency probe was advanced towards each of these points under fluoroscopic guidance. Once os was contacted, negative aspiration was confirmed. Needle placement was optimized in lateral fluoroscopic views.    Sensory stimulation was performed at  50 Hz & 0.4V, generating a pressure sensation. Motor stimulation at 2 Hz & 2 V was negative for muscle contractions in the above mentioned nerve distributions except for local multifidus twitch. One mL of 2% lidocaine was injected at each level prior to lesioning, which was performed for 90 seconds at 80 degrees Centigrade. Once the lesion was complete, needles were withdrawn slight and rotated 180 degrees.  A Second lesion was performed.    One mL from a solution consisting of 3 mL 0.5% bupivacaine and 40 mg Depomedrol was injected through each probe. The probes were removed with a 1% lidocaine flush.      After the procedure was completed, the patients back was cleaned and bandages were placed at the needle insertion sites.    Estimated blood loss: None    Complications: None     Disposition:  The patient tolerated the procedure well and there were no apparent complications. Vital signs remained stable throughout the procedure. The patient was taken to the recovery area where written discharge instructions for the procedure were given.     Follow-up: RTC as scheduled for clinic eval or next procedure.    Alberto Hernandez Jr, MD  Interventional Pain Medicine / Anesthesiology

## 2019-03-12 NOTE — PLAN OF CARE
Vss,I/V D/C. DRESSING WDL. DISCHARGE INSTRUCTIONS GIVEN. STAFF ASSISTED WITH DRESSING AND BROUGHT PT OUT IN WHEELCHAIR.

## 2019-03-14 ENCOUNTER — TELEPHONE (OUTPATIENT)
Dept: INTERNAL MEDICINE | Facility: CLINIC | Age: 59
End: 2019-03-14

## 2019-03-14 ENCOUNTER — LAB VISIT (OUTPATIENT)
Dept: LAB | Facility: HOSPITAL | Age: 59
End: 2019-03-14
Attending: INTERNAL MEDICINE
Payer: MEDICARE

## 2019-03-14 DIAGNOSIS — E87.5 HYPERKALEMIA: ICD-10-CM

## 2019-03-14 DIAGNOSIS — N28.9 RENAL INSUFFICIENCY: ICD-10-CM

## 2019-03-14 DIAGNOSIS — I15.2 HYPERTENSION ASSOCIATED WITH DIABETES: ICD-10-CM

## 2019-03-14 DIAGNOSIS — E11.9 TYPE 2 DIABETES MELLITUS WITHOUT COMPLICATION, WITHOUT LONG-TERM CURRENT USE OF INSULIN: ICD-10-CM

## 2019-03-14 DIAGNOSIS — E11.59 HYPERTENSION ASSOCIATED WITH DIABETES: ICD-10-CM

## 2019-03-14 DIAGNOSIS — K21.9 GASTROESOPHAGEAL REFLUX DISEASE, ESOPHAGITIS PRESENCE NOT SPECIFIED: ICD-10-CM

## 2019-03-14 LAB
ANION GAP SERPL CALC-SCNC: 10 MMOL/L
BUN SERPL-MCNC: 18 MG/DL
CALCIUM SERPL-MCNC: 9.6 MG/DL
CHLORIDE SERPL-SCNC: 102 MMOL/L
CO2 SERPL-SCNC: 27 MMOL/L
CREAT SERPL-MCNC: 1.4 MG/DL
EST. GFR  (AFRICAN AMERICAN): >60 ML/MIN/1.73 M^2
EST. GFR  (NON AFRICAN AMERICAN): 55 ML/MIN/1.73 M^2
GLUCOSE SERPL-MCNC: 118 MG/DL
POCT GLUCOSE: 112 MG/DL (ref 70–110)
POTASSIUM SERPL-SCNC: 5 MMOL/L
SODIUM SERPL-SCNC: 139 MMOL/L

## 2019-03-14 PROCEDURE — 80048 BASIC METABOLIC PNL TOTAL CA: CPT

## 2019-03-14 PROCEDURE — 36415 COLL VENOUS BLD VENIPUNCTURE: CPT | Mod: PO

## 2019-03-14 NOTE — TELEPHONE ENCOUNTER
Patient stopped by the office today wanting to know if he should continue medications or get refills. I called patient, no answer, left VM to call office.

## 2019-03-19 DIAGNOSIS — K21.9 GASTROESOPHAGEAL REFLUX DISEASE, ESOPHAGITIS PRESENCE NOT SPECIFIED: ICD-10-CM

## 2019-03-19 DIAGNOSIS — E11.59 HYPERTENSION ASSOCIATED WITH DIABETES: ICD-10-CM

## 2019-03-19 DIAGNOSIS — I15.2 HYPERTENSION ASSOCIATED WITH DIABETES: ICD-10-CM

## 2019-03-19 DIAGNOSIS — N28.9 RENAL INSUFFICIENCY: Primary | ICD-10-CM

## 2019-03-19 RX ORDER — METFORMIN HYDROCHLORIDE 1000 MG/1
TABLET ORAL
Qty: 180 TABLET | Refills: 0 | Status: SHIPPED | OUTPATIENT
Start: 2019-03-19 | End: 2019-05-10 | Stop reason: SDUPTHER

## 2019-03-19 RX ORDER — PANTOPRAZOLE SODIUM 40 MG/1
TABLET, DELAYED RELEASE ORAL
Qty: 90 TABLET | Refills: 0 | OUTPATIENT
Start: 2019-03-19

## 2019-03-19 NOTE — TELEPHONE ENCOUNTER
Called patient. Per Dr. Ellis, hold metformin until his next visit on 4/10/19 and blood work on 4/8/19 @ 8:30am. Patient verbalized understanding.        ----- Message from Caren Trujillo sent at 3/19/2019 11:44 AM CDT -----  Contact: [phone]/ self   Patient would like refill of the following sent to [facility] [id number]  [place], [place] - [address] [address] AT [facility]. Please advise.    1. olmesartan (BENICAR) 20 MG tablet  Sig: Take 1 tablet (20 mg total) by mouth 2 (two) times daily.     2. pantoprazole (PROTONIX) 40 MG tablet  Sig: TAKE 1 TABLET(40 MG) BY MOUTH DAILY AS NEEDED    3. risperiDONE (RISPERDAL) 0.5 MG Tab  Sig:  Take 1 tablet (0.5 mg total) by mouth every evening.    4. metformin (GLUCOPHAGE) 1000 MG tablet  Sig: Take 1,000 mg by mouth 2 (two) times daily with meals.     5. pravastatin (PRAVACHOL) 40 MG tablet  Sig: Take 1 tablet (40 mg total) by mouth once daily.

## 2019-03-19 NOTE — TELEPHONE ENCOUNTER
Pt. Kidney fxn was elevated on labs dated 3/14/19; ask pt. To hold metformin and f/u with me in 3 weeks with BMP prior to f/u; avoid NSAIDs and increase PO fluid intake; please cancel metformin refill sent to [facility]; BMP ordered for 2-3 weeks; schedule 3 week f/u

## 2019-03-21 ENCOUNTER — OFFICE VISIT (OUTPATIENT)
Dept: ORTHOPEDICS | Facility: CLINIC | Age: 59
End: 2019-03-21
Payer: MEDICARE

## 2019-03-21 ENCOUNTER — TELEPHONE (OUTPATIENT)
Dept: FAMILY MEDICINE | Facility: CLINIC | Age: 59
End: 2019-03-21

## 2019-03-21 VITALS — HEIGHT: 72 IN | WEIGHT: 240 LBS | BODY MASS INDEX: 32.51 KG/M2

## 2019-03-21 DIAGNOSIS — R22.32 PALMAR NODULE, LEFT: Primary | ICD-10-CM

## 2019-03-21 PROCEDURE — 3008F BODY MASS INDEX DOCD: CPT | Mod: CPTII,S$GLB,, | Performed by: ORTHOPAEDIC SURGERY

## 2019-03-21 PROCEDURE — 3008F PR BODY MASS INDEX (BMI) DOCUMENTED: ICD-10-PCS | Mod: CPTII,S$GLB,, | Performed by: ORTHOPAEDIC SURGERY

## 2019-03-21 PROCEDURE — 99203 OFFICE O/P NEW LOW 30 MIN: CPT | Mod: S$GLB,,, | Performed by: ORTHOPAEDIC SURGERY

## 2019-03-21 PROCEDURE — 99999 PR PBB SHADOW E&M-EST. PATIENT-LVL IV: CPT | Mod: PBBFAC,,, | Performed by: ORTHOPAEDIC SURGERY

## 2019-03-21 PROCEDURE — 99999 PR PBB SHADOW E&M-EST. PATIENT-LVL IV: ICD-10-PCS | Mod: PBBFAC,,, | Performed by: ORTHOPAEDIC SURGERY

## 2019-03-21 PROCEDURE — 99203 PR OFFICE/OUTPT VISIT, NEW, LEVL III, 30-44 MIN: ICD-10-PCS | Mod: S$GLB,,, | Performed by: ORTHOPAEDIC SURGERY

## 2019-03-21 RX ORDER — PRAVASTATIN SODIUM 40 MG/1
40 TABLET ORAL DAILY
Qty: 30 TABLET | Refills: 2 | Status: SHIPPED | OUTPATIENT
Start: 2019-03-21 | End: 2019-05-29 | Stop reason: SDUPTHER

## 2019-03-21 RX ORDER — TIZANIDINE 2 MG/1
2 TABLET ORAL 2 TIMES DAILY PRN
Qty: 60 TABLET | Refills: 1 | Status: SHIPPED | OUTPATIENT
Start: 2019-03-21 | End: 2019-03-21 | Stop reason: SDUPTHER

## 2019-03-21 RX ORDER — NAPROXEN AND ESOMEPRAZOLE MAGNESIUM 500; 20 MG/1; MG/1
TABLET, DELAYED RELEASE ORAL
Qty: 60 TABLET | Refills: 2 | Status: SHIPPED | OUTPATIENT
Start: 2019-03-21 | End: 2019-05-29 | Stop reason: ALTCHOICE

## 2019-03-21 RX ORDER — PANTOPRAZOLE SODIUM 40 MG/1
TABLET, DELAYED RELEASE ORAL
Qty: 90 TABLET | Refills: 0 | OUTPATIENT
Start: 2019-03-21

## 2019-03-21 RX ORDER — RISPERIDONE 0.5 MG/1
0.5 TABLET ORAL NIGHTLY
Qty: 30 TABLET | Refills: 2 | OUTPATIENT
Start: 2019-03-21 | End: 2020-03-20

## 2019-03-21 RX ORDER — OLMESARTAN MEDOXOMIL 20 MG/1
20 TABLET ORAL 2 TIMES DAILY
Qty: 60 TABLET | Refills: 2 | Status: SHIPPED | OUTPATIENT
Start: 2019-03-21 | End: 2019-04-05 | Stop reason: SDUPTHER

## 2019-03-21 RX ORDER — TIZANIDINE 2 MG/1
TABLET ORAL
Qty: 180 TABLET | Refills: 1 | Status: SHIPPED | OUTPATIENT
Start: 2019-03-21 | End: 2019-10-16 | Stop reason: SDUPTHER

## 2019-03-21 RX ORDER — GABAPENTIN 300 MG/1
300 CAPSULE ORAL NIGHTLY
Qty: 30 CAPSULE | Refills: 1 | Status: SHIPPED | OUTPATIENT
Start: 2019-03-21 | End: 2019-04-09 | Stop reason: SDUPTHER

## 2019-03-21 NOTE — TELEPHONE ENCOUNTER
Called and spoke w/ patient. He is seeing his psychiatrist tomorrow and will request a refill for Seroquel and Risperdal.

## 2019-03-21 NOTE — LETTER
March 21, 2019      Paras Ellis MD  [address] [facility]  [place] [address]           [facility]  [address] [place] [address]  [place] [address]  Phone: [phone]          Patient: Fabiano Malik Jr.   MR Number: [id number]   YOB: 1960   Date of Visit: 3/21/2019       Dear Dr. Paras Ellis:    Thank you for referring Fabiano Malik to me for evaluation. Attached you will find relevant portions of my assessment and plan of care.    If you have questions, please do not hesitate to call me. I look forward to following Fabiano Malik along with you.    Sincerely,    Everton Walter Jr., MD    Enclosure  CC:  No Recipients    If you would like to receive this communication electronically, please contact [email or web address] or [phone] to request more information on [employer] access.    For providers and/or their staff who would like to refer a patient to [facility], please contact us through our one-stop-shop provider referral line, [facility], at [phone].    If you feel you have received this communication in error or would no longer like to receive these types of communications, please e-mail [email or web address]

## 2019-03-21 NOTE — PROGRESS NOTES
INITIAL VISIT HISTORY:  A 58-year-old male presents for evaluation of bilateral   hand symptoms.  He describes a muscle spasm in both hands, which causes the   index fingers to extend from time to time.  These usually occur at night, but   occasionally during the day.  He is also concerned about a small nodule in the   palm of his left hand, which has been present for about six months to a year.    No recent trauma or injury is reported.  No numbness or tingling reported.    PAST MEDICAL HISTORY:  Significant for depression, diabetes, hypertension and   schizophrenia.    PAST SURGICAL HISTORY:  Include neck surgery, appendectomy, and colonoscopy.    FAMILY HISTORY:  Positive for cancer and Alzheimer's.    SOCIAL HISTORY:  The patient does not smoke.  He drinks a few beers per day.    REVIEW OF SYSTEMS:  Negative fever, chills, rashes.    CURRENT MEDICATIONS:  Reviewed on chart.    ALLERGIES:  Penicillin.    PHYSICAL EXAMINATION:  GENERAL:  Well-developed, well-nourished male in no acute distress, alert and   oriented x3.  MUSCULOSKELETAL:  Examination of the upper extremities demonstrating a small   nodule on the palm of the left hand consistent with a Dupuytren's cord, which   extends from the proximal palm to the base of the ring finger.  No significant   contractures noted.  There was some slight dimpling of the skin.  Full flexion.    No triggering.   strength intact.  Tinel sign negative, both hands.    IMPRESSION:  1.  Dupuytren nodule, left palm.  2.  Muscle spasm of bilateral hands, first dorsal interosseous.    PLAN:  I explained the nature of the Dupuytren to the patient.  I do not think   it is bad enough to need excision at this point, but I want him to keep an eye   on this and if it gets worse, we may recommend surgical excision.  As far as the   muscle spasms at night, I have ordered some Neurontin to take at nighttime and   also a muscle relaxer from time to time.  If the symptoms do not improve,  we may   need to get a nerve conduction study.  He has also asked about a medicated   cream and we have given him some Pennsaid samples, which can also help some of   these symptoms.  Follow up in six weeks.      BENITO  dd: 03/21/2019 13:29:33 (CDT)  td: 03/21/2019 23:51:43 (CDT)  Doc ID   #[id number]  Job ID #[id number]    CC:

## 2019-03-21 NOTE — TELEPHONE ENCOUNTER
Please clarify who is prescribing Seroquel and Risperdal for pt.; if he is seeing psychiatry, he should get refills from psychiatry

## 2019-03-24 DIAGNOSIS — K21.9 GASTROESOPHAGEAL REFLUX DISEASE, ESOPHAGITIS PRESENCE NOT SPECIFIED: ICD-10-CM

## 2019-03-25 RX ORDER — PANTOPRAZOLE SODIUM 40 MG/1
TABLET, DELAYED RELEASE ORAL
Qty: 30 TABLET | Refills: 0 | OUTPATIENT
Start: 2019-03-25

## 2019-03-26 ENCOUNTER — OFFICE VISIT (OUTPATIENT)
Dept: PAIN MEDICINE | Facility: CLINIC | Age: 59
End: 2019-03-26
Payer: MEDICARE

## 2019-03-26 ENCOUNTER — TELEPHONE (OUTPATIENT)
Dept: PAIN MEDICINE | Facility: CLINIC | Age: 59
End: 2019-03-26

## 2019-03-26 VITALS
DIASTOLIC BLOOD PRESSURE: 64 MMHG | BODY MASS INDEX: 32.74 KG/M2 | WEIGHT: 241.38 LBS | SYSTOLIC BLOOD PRESSURE: 100 MMHG | HEART RATE: 116 BPM

## 2019-03-26 DIAGNOSIS — M47.819 SPONDYLOSIS WITHOUT MYELOPATHY: ICD-10-CM

## 2019-03-26 DIAGNOSIS — M47.816 LUMBAR SPONDYLOSIS: Primary | ICD-10-CM

## 2019-03-26 DIAGNOSIS — M47.816 LUMBAR FACET ARTHROPATHY: ICD-10-CM

## 2019-03-26 DIAGNOSIS — R26.89 BALANCE PROBLEMS: ICD-10-CM

## 2019-03-26 DIAGNOSIS — R42 DIZZINESS ON STANDING: ICD-10-CM

## 2019-03-26 DIAGNOSIS — G89.4 CHRONIC PAIN SYNDROME: ICD-10-CM

## 2019-03-26 DIAGNOSIS — R42 DIZZINESS: ICD-10-CM

## 2019-03-26 DIAGNOSIS — M47.9 OSTEOARTHRITIS OF SPINE, UNSPECIFIED SPINAL OSTEOARTHRITIS COMPLICATION STATUS, UNSPECIFIED SPINAL REGION: ICD-10-CM

## 2019-03-26 DIAGNOSIS — M54.9 BACK PAIN, UNSPECIFIED BACK LOCATION, UNSPECIFIED BACK PAIN LATERALITY, UNSPECIFIED CHRONICITY: ICD-10-CM

## 2019-03-26 PROCEDURE — 99214 OFFICE O/P EST MOD 30 MIN: CPT | Mod: S$GLB,,, | Performed by: NURSE PRACTITIONER

## 2019-03-26 PROCEDURE — 3008F PR BODY MASS INDEX (BMI) DOCUMENTED: ICD-10-PCS | Mod: CPTII,S$GLB,, | Performed by: NURSE PRACTITIONER

## 2019-03-26 PROCEDURE — 99999 PR PBB SHADOW E&M-EST. PATIENT-LVL V: CPT | Mod: PBBFAC,,, | Performed by: NURSE PRACTITIONER

## 2019-03-26 PROCEDURE — 3074F PR MOST RECENT SYSTOLIC BLOOD PRESSURE < 130 MM HG: ICD-10-PCS | Mod: CPTII,S$GLB,, | Performed by: NURSE PRACTITIONER

## 2019-03-26 PROCEDURE — 99999 PR PBB SHADOW E&M-EST. PATIENT-LVL V: ICD-10-PCS | Mod: PBBFAC,,, | Performed by: NURSE PRACTITIONER

## 2019-03-26 PROCEDURE — 3078F PR MOST RECENT DIASTOLIC BLOOD PRESSURE < 80 MM HG: ICD-10-PCS | Mod: CPTII,S$GLB,, | Performed by: NURSE PRACTITIONER

## 2019-03-26 PROCEDURE — 99214 PR OFFICE/OUTPT VISIT, EST, LEVL IV, 30-39 MIN: ICD-10-PCS | Mod: S$GLB,,, | Performed by: NURSE PRACTITIONER

## 2019-03-26 PROCEDURE — 3078F DIAST BP <80 MM HG: CPT | Mod: CPTII,S$GLB,, | Performed by: NURSE PRACTITIONER

## 2019-03-26 PROCEDURE — 3008F BODY MASS INDEX DOCD: CPT | Mod: CPTII,S$GLB,, | Performed by: NURSE PRACTITIONER

## 2019-03-26 PROCEDURE — 3074F SYST BP LT 130 MM HG: CPT | Mod: CPTII,S$GLB,, | Performed by: NURSE PRACTITIONER

## 2019-03-26 NOTE — PROGRESS NOTES
[facility] Established Patient Evaluation    Chief Complaint  Chief Complaint   Patient presents with    Low-back Pain       Last 3 PDI Scores 3/26/2019 2/12/2019 6/26/2018   Pain Disability Index (PDI) 60 70 60       Interval Update 3/26/19 Pt returns today S/P LEFT L2,3,4,5 Lumbar Medial Branch Radiofrequency Ablation on 3/12/19 with 30% relief.     2/12/19 Pt returns today S/P Right  L2,3,4,5 Lumbar Medial Branch Radiofrequency Ablation on 1/8/19 with 75% relief.       Fabiano Malik Jr. presents to me for low back and neck pain her reports 9.5/10 pain today he has not been seen in our clinic for 6 months  patient states today his primary source of pain is low back with some bilateral leg pain. He is status post right and left lumbar RFA at L2-3-4-5 70% relief in May of 2018.    Location: Low back   Onset: 3 months  Current Pain Score: 10/10/9/10  Daily Pain of Range: 9-10/10  Quality: Burning, Throbbing, Grabbing and Sharp  Radiation: Bilateral legs   Worsened by: exercise, extension, flexion, lifting, lying down and sneezing  Improved by: heat and rest    Background from Chart Review      Fabiano Malik Jr. presents to the clinic for a 4 wk follow-up appointment for neck and low back pain. He is S/P  left L2-3-4-5 FACET MEDIAL BRANCH NERVE RADIOFREQUENCY NEUROTOMY (lumbar) on 5/29/18 and  RIGHT L2-3-4-5 FACET MEDIAL BRANCH NERVE RADIOFREQUENCY NEUROTOMY on 5/22/18 with 70% relief for 2 weeks, pt states pain is back but overall better since procedure, I will order PT and refill Mobic and Lyrica today.  Since the last visit, Fabiano Malik Jr. states the pain has been persistant. Current pain intensity is 8/10.         Treatment History  PT/OT:   HEP:   TENS:  Injections: 3/12/19 LEFT L2,3,4,5 Lumbar Medial Branch Radiofrequency Ablation  1/8/19 BILATERAL L2,3,4,5 Lumbar Medial Branch Radiofrequency Ablation  Surgery:  Medications:   - NSAIDS:   - MSK Relaxants:   - TCAs:   - SNRIs:   -  Topicals:   - Opioids:   - Anticonvulsants:     Current Pain Medications  1. Cymbalta  60 mg BID   2. Tylenol   3. Klonopin  4. Seroquel  5. Risperdal    Full Medication List    Current Outpatient Medications:     aspirin (ECOTRIN) 81 MG EC tablet, Take 81 mg by mouth once daily., Disp: , Rfl:     blood sugar diagnostic Strp, 2 strips by Misc.(Non-Drug; Combo Route) route 2 (two) times daily., Disp: 200 strip, Rfl: 2    blood-glucose meter kit, Use as instructed, Disp: 1 each, Rfl: 0    clonazePAM (KLONOPIN) 1 MG tablet, TK 1 T PO TID, Disp: , Rfl: 3    DULoxetine (CYMBALTA) 60 MG capsule, Take 1 capsule (60 mg total) by mouth 2 (two) times daily., Disp: 60 capsule, Rfl: 11    gabapentin (NEURONTIN) 300 MG capsule, Take 1 capsule (300 mg total) by mouth every evening., Disp: 30 capsule, Rfl: 1    lancets (ONETOUCH DELICA LANCETS) 33 gauge Misc, 1 lancet by Misc.(Non-Drug; Combo Route) route 2 (two) times daily., Disp: 200 each, Rfl: 2    meloxicam (MOBIC) 15 MG tablet, TAKE 1/2 TABLET= 7.5 MG BY MOUTH EVERY DAY AS NEEDED PAIN, Disp: 90 tablet, Rfl: 0    metFORMIN (GLUCOPHAGE) 1000 MG tablet, TAKE 1 TABLET BY MOUTH TWICE DAILY WITH MEALS, Disp: 180 tablet, Rfl: 0    olmesartan (BENICAR) 20 MG tablet, Take 1 tablet (20 mg total) by mouth 2 (two) times daily., Disp: 60 tablet, Rfl: 2    omeprazole (PRILOSEC) 20 MG capsule, Take 1 capsule (20 mg total) by mouth once daily., Disp: 30 capsule, Rfl: 4    pantoprazole (PROTONIX) 40 MG tablet, TAKE 1 TABLET(40 MG) BY MOUTH DAILY AS NEEDED, Disp: 90 tablet, Rfl: 0    pravastatin (PRAVACHOL) 40 MG tablet, Take 1 tablet (40 mg total) by mouth once daily., Disp: 30 tablet, Rfl: 2    QUEtiapine (SEROQUEL) 400 MG tablet, Take 400 mg by mouth once daily. , Disp: , Rfl:     risperiDONE (RISPERDAL) 0.5 MG Tab, Take 1 tablet (0.5 mg total) by mouth every evening., Disp: 30 tablet, Rfl: 11    sodium polystyrene (KAYEXALATE) 15 gram/60 mL Susp, Take 15 g by mouth., Disp:  , Rfl:     tiZANidine (ZANAFLEX) 2 MG tablet, TAKE 1 TABLET(2 MG) BY MOUTH TWICE DAILY AS NEEDED, Disp: 180 tablet, Rfl: 1    traMADol (ULTRAM) 50 mg tablet, Take 50 mg by mouth as needed for Pain., Disp: , Rfl:     VIMOVO 500-20 mg TbID, TAKE ONE TABLET BY MOUTH TWICE DAILY, Disp: 60 tablet, Rfl: 2    acetaminophen (TYLENOL) 325 MG tablet, Take 2 tablets (650 mg total) by mouth every 6 (six) hours as needed., Disp: 120 tablet, Rfl: 3  No current facility-administered medications for this visit.     Facility-Administered Medications Ordered in Other Visits:     alprazolam ODT dissolvable tablet 0.5 mg, 0.5 mg, Oral, Once PRN, Alberto Hernandez Jr., MD     Review of Systems  ROS    Medical History  Past Medical History:   Diagnosis Date    Chronic back pain greater than 3 months duration     Depression     Diabetes mellitus     Hypertension     Schizophrenia         Surgical History  Past Surgical History:   Procedure Laterality Date    APPENDECTOMY      BLOCK-NERVE-MEDIAL BRANCH-LUMBAR- Bilateral L2-3-4-5 Bilateral 4/24/2018    [facility]formed [facility] at [facility]    BLOCK-NERVE-MEDIAL BRANCH-LUMBAR- BILATERAL, L2,3,4,5 Bilateral 2/20/2018    [facility] by [facility] at [facility]    BLOCK-NERVE-MEDIAL BRANCH-LUMBAR- LUMBAR- BILATERAL- L2,3,4,5 Bilateral 2/6/2018    Performed by [facility] at [facility]    COLONOSCOPY N/A 5/31/2018    Performed by Guillaume Hatch [facility] at [facility]    NECK SURGERY      Radiofrequency Ablation, Nerve, Spinal, Lumbar, Medial Branch, L2,3,4,5 Right 1/8/2019    Per[facility] by Alber[facility] Jr[facility] at [facility]    Radiofrequency Ablation, Nerve, Spinal, Lumbar, Medial Branch, Left, L2,3,4,5 Left 3/12/2019    Per[facility] by Alber[facility] Jr[facility] at [facility]    RADIOFREQUENCY THERMOCOAGULATION (RFTC)-NERVE-MEDIAN BRANCH-LUMBAR- Left L2-3-4-5 Left 5/29/2018    Per[facility] by [facility] at [facility] MGT     RADIOFREQUENCY THERMOCOAGULATION (RFTC)-NERVE-MEDIAN BRANCH-LUMBAR- Right L2-3-4-5 Right 5/22/2018    Performed by [facility]avon Dennis MD at [facility] PAIN MGT        Physical Exam  Vitals:    03/26/19 0940   BP: 100/64   Pulse: (!) 116   Weight: 109.5 kg (241 lb 6.5 oz)   PainSc: 10-Worst pain ever         General Musculoskeletal Exam   Gait: antalgic     Back (L-Spine & T-Spine) / Neck (C-Spine) Exam     Back (L-Spine & T-Spine) Range of Motion   Lateral bend right: abnormal   Lateral bend left: abnormal   Rotation right: abnormal   Rotation left: abnormal     Spinal Sensation   Right Side Sensation  L-Spine Level: hypersensitive  Left Side Sensation  L-Spine Level: hypersensitive    Back (L-Spine & T-Spine) Tests   Right Side Tests  Squat Test: unable to perform  Left Side Tests  Squat: unable to perform    Comments:  + Facet Loading Bilaterally.  Positive FABERE, Yeoman's and Galensen test on the both side.          Imaging    MRI lumbar spine I. contrast.  Comparison to previous radiographs.  Few 1 CM size is cyst mid and lower renal poles.  Marrow space normal.  Chronic endplate change particularly L4-L5.  No fracture subluxation.  Spinal cord normal.    T11-T12 limited posterior disc bulge.    L1-L2 mild posterior paracentral disc bulge, mild compression adjacent spinal sac, facet arthropathy with mild spinal and foraminal stenosis.    L2-L3 minimal mild posterior disc bulge, mild facet arthropathy with mild spinal and foraminal stenosis.    L3-L4 minimal posterior disc bulge, facet joint arthropathy with minimal mild spinal and foraminal stenosis.    L4 L5 minimal mild posterior disc bulge without spinal or foraminal stenosis.    L5-S1 mild/moderate facet joint arthropathy, mild posterior disc bulge, mild spinal and foraminal stenosis.      Impression      1. Degenerative disc, spondylosis, facet arthropathy discussed above.    2.  No fracture, subluxation or disc prolapse.       Labs:  BMP  Lab Results    Component Value Date     03/14/2019    K 5.0 03/14/2019     03/14/2019    CO2 27 03/14/2019    BUN 18 03/14/2019    CREATININE 1.4 03/14/2019    CALCIUM 9.6 03/14/2019    ANIONGAP 10 03/14/2019    ESTGFRAFRICA >60.0 03/14/2019    EGFRNONAA 55.0 (A) 03/14/2019     Lab Results   Component Value Date    ALT 23 02/08/2019    AST 21 02/08/2019    ALKPHOS 57 02/08/2019    BILITOT 0.7 02/08/2019       Assessment:  Fabiano Malik Jr. is a 58 y.o. male with the following diagnoses based on history, exam, and imaging:    Problem List Items Addressed This Visit        Neuro    Chronic pain    Relevant Orders    Ambulatory consult to Physical Therapy       Orthopedic    Lumbar facet arthropathy    Relevant Orders    Ambulatory consult to Physical Therapy       Other    Dizziness      Other Visit Diagnoses     Lumbar spondylosis    -  Primary    Relevant Orders    Ambulatory consult to Physical Therapy    Spondylosis without myelopathy        Relevant Orders    Ambulatory consult to Physical Therapy    Osteoarthritis of spine, unspecified spinal osteoarthritis complication status, unspecified spinal region        Relevant Orders    Ambulatory consult to Physical Therapy    Back pain, unspecified back location, unspecified back pain laterality, unspecified chronicity        Relevant Orders    Ambulatory consult to Physical Therapy    Dizziness on standing        Relevant Orders    Ambulatory consult to Neurology    Balance problems        Relevant Orders    Ambulatory consult to Neurology          2/12/2019- 57 y/o male s/p right L2-3-4-5 Lumbar RFA with 75% relief, his left sided RFA s/f 1/15/2019  was cancelled due to reports of dyspnea on exertion and recent recommendation from his PCP to seek cardiology evaluation, per Dr. Magana's Patient has risk factors for CAD with ongoing chest pain with VALIENTE (dyspnea on exertion) and all injection should be held at this point.    03/26/2019 58-year-old pleasant male  presents today status post right L2-3-4-5 lumbar RFA with reported 30% relief, patient has a history of this procedure which has helped in the past however he did not get the usual relief following this lumbar RFA.  During clinic visit and noticed that patient had  a cane which he has never used before; when asked, he states that he has been feeling dizzy, since this morning I recommended he visit the ED patient refuse.  Also noticed instability with his gait and mild slurred speech.  Recommended that I consult Neurology for further assessment patient agreed and understood.  Reference to his low back pain I recommend we start physical therapy to help with instability, muscular strength and lumbar stabilization patient agreed and understood.      Plan & Recommendations  Procedures:  None at this time.   Medications:  Continue medications as they are prescribed  Cymbalta 60 mg b.i.d.  and Mobic today 15 mg p.r.n.  Imaging:  Previous imaging reviewed and discussed with patient today  PT/OT:  Referral for physical therapy today  Referral: Neurology Patient will also see Dr. Romy mcallister/[facility]  HEP: I encouraged the patient to maintain a home exercise regimen that includes daily, moderate cardiovascular exercise lasting at least 30 minutes.  This may include yoga, rachelle chi, walking, swimming, aqua aerobics, or other exercises that maintain a heart rate of 50-70% of the calculated maximum heart rate.  I also encouraged light, daily stretching focused on the target area.  Follow Up: RTC 6-8 weeks  More than 25 minutes spent with patient, over 50% of that time was spent in counseling.      TY SwansonC  Interventional Pain Management      Disclaimer: This note was partly generated using dictation software which may occasionally result in transcription errors.

## 2019-04-01 ENCOUNTER — HOSPITAL ENCOUNTER (EMERGENCY)
Facility: HOSPITAL | Age: 59
Discharge: HOME | End: 2019-04-02
Attending: EMERGENCY MEDICINE
Payer: MEDICARE

## 2019-04-01 DIAGNOSIS — R42 DIZZINESS: ICD-10-CM

## 2019-04-01 DIAGNOSIS — E86.0 DEHYDRATION: Primary | ICD-10-CM

## 2019-04-01 DIAGNOSIS — N17.9 AKI (ACUTE KIDNEY INJURY): ICD-10-CM

## 2019-04-01 DIAGNOSIS — R42 ORTHOSTATIC DIZZINESS: ICD-10-CM

## 2019-04-01 LAB
ALBUMIN SERPL BCP-MCNC: 3.9 G/DL (ref 3.5–5.2)
ALP SERPL-CCNC: 93 U/L (ref 55–135)
ALT SERPL W/O P-5'-P-CCNC: 25 U/L (ref 10–44)
AMPHET+METHAMPHET UR QL: NEGATIVE
ANION GAP SERPL CALC-SCNC: 12 MMOL/L (ref 8–16)
AST SERPL-CCNC: 29 U/L (ref 10–40)
BARBITURATES UR QL SCN>200 NG/ML: NEGATIVE
BENZODIAZ UR QL SCN>200 NG/ML: NEGATIVE
BILIRUB SERPL-MCNC: 1.5 MG/DL (ref 0.1–1)
BILIRUB UR QL STRIP: NEGATIVE
BNP SERPL-MCNC: 45 PG/ML (ref 0–99)
BUN SERPL-MCNC: 10 MG/DL (ref 6–20)
BZE UR QL SCN: NEGATIVE
CALCIUM SERPL-MCNC: 9.9 MG/DL (ref 8.7–10.5)
CANNABINOIDS UR QL SCN: NEGATIVE
CHLORIDE SERPL-SCNC: 100 MMOL/L (ref 95–110)
CK SERPL-CCNC: 28 U/L (ref 20–200)
CLARITY UR: CLEAR
CO2 SERPL-SCNC: 25 MMOL/L (ref 23–29)
COLOR UR: YELLOW
CREAT SERPL-MCNC: 1.7 MG/DL (ref 0.5–1.4)
CREAT UR-MCNC: 61.4 MG/DL (ref 23–375)
ERYTHROCYTE [DISTWIDTH] IN BLOOD BY AUTOMATED COUNT: 14.1 % (ref 11.5–14.5)
EST. GFR  (AFRICAN AMERICAN): 50 ML/MIN/1.73 M^2
EST. GFR  (NON AFRICAN AMERICAN): 43 ML/MIN/1.73 M^2
ETHANOL SERPL-MCNC: <10 MG/DL
GLUCOSE SERPL-MCNC: 191 MG/DL (ref 70–110)
GLUCOSE UR QL STRIP: NEGATIVE
HCT VFR BLD AUTO: 39.8 % (ref 40–54)
HGB BLD-MCNC: 14 G/DL (ref 14–18)
HGB UR QL STRIP: NEGATIVE
KETONES UR QL STRIP: ABNORMAL
LACTATE SERPL-SCNC: 2.3 MMOL/L (ref 0.5–2.2)
LEUKOCYTE ESTERASE UR QL STRIP: NEGATIVE
MCH RBC QN AUTO: 31.3 PG (ref 27–31)
MCHC RBC AUTO-ENTMCNC: 35.2 G/DL (ref 32–36)
MCV RBC AUTO: 89 FL (ref 82–98)
METHADONE UR QL SCN>300 NG/ML: NEGATIVE
NITRITE UR QL STRIP: NEGATIVE
OPIATES UR QL SCN: NEGATIVE
PCP UR QL SCN>25 NG/ML: NEGATIVE
PH UR STRIP: 8 [PH] (ref 5–8)
PLATELET # BLD AUTO: 194 K/UL (ref 150–350)
PMV BLD AUTO: 8.8 FL (ref 9.2–12.9)
POTASSIUM SERPL-SCNC: 4.6 MMOL/L (ref 3.5–5.1)
PROT SERPL-MCNC: 6.7 G/DL (ref 6–8.4)
PROT UR QL STRIP: ABNORMAL
RBC # BLD AUTO: 4.48 M/UL (ref 4.6–6.2)
SODIUM SERPL-SCNC: 137 MMOL/L (ref 136–145)
SP GR UR STRIP: 1.01 (ref 1–1.03)
TOXICOLOGY INFORMATION: NORMAL
TROPONIN I SERPL DL<=0.01 NG/ML-MCNC: <0.006 NG/ML (ref 0–0.03)
TSH SERPL DL<=0.005 MIU/L-ACNC: 2.67 UIU/ML (ref 0.4–4)
URN SPEC COLLECT METH UR: ABNORMAL
UROBILINOGEN UR STRIP-ACNC: NEGATIVE EU/DL
WBC # BLD AUTO: 11.86 K/UL (ref 3.9–12.7)

## 2019-04-01 PROCEDURE — 85027 COMPLETE CBC AUTOMATED: CPT

## 2019-04-01 PROCEDURE — 99284 EMERGENCY DEPT VISIT MOD MDM: CPT | Mod: 25

## 2019-04-01 PROCEDURE — 82550 ASSAY OF CK (CPK): CPT

## 2019-04-01 PROCEDURE — 83880 ASSAY OF NATRIURETIC PEPTIDE: CPT

## 2019-04-01 PROCEDURE — 80307 DRUG TEST PRSMV CHEM ANLYZR: CPT

## 2019-04-01 PROCEDURE — 84443 ASSAY THYROID STIM HORMONE: CPT

## 2019-04-01 PROCEDURE — 84484 ASSAY OF TROPONIN QUANT: CPT

## 2019-04-01 PROCEDURE — 83605 ASSAY OF LACTIC ACID: CPT

## 2019-04-01 PROCEDURE — 25000003 PHARM REV CODE 250: Performed by: EMERGENCY MEDICINE

## 2019-04-01 PROCEDURE — 81003 URINALYSIS AUTO W/O SCOPE: CPT | Mod: 59

## 2019-04-01 PROCEDURE — 96361 HYDRATE IV INFUSION ADD-ON: CPT

## 2019-04-01 PROCEDURE — 80320 DRUG SCREEN QUANTALCOHOLS: CPT

## 2019-04-01 PROCEDURE — 80053 COMPREHEN METABOLIC PANEL: CPT

## 2019-04-01 PROCEDURE — 93005 ELECTROCARDIOGRAM TRACING: CPT

## 2019-04-01 PROCEDURE — 96360 HYDRATION IV INFUSION INIT: CPT

## 2019-04-01 RX ORDER — SODIUM CHLORIDE 9 MG/ML
1000 INJECTION, SOLUTION INTRAVENOUS
Status: COMPLETED | OUTPATIENT
Start: 2019-04-01 | End: 2019-04-01

## 2019-04-01 RX ORDER — SODIUM CHLORIDE 9 MG/ML
1000 INJECTION, SOLUTION INTRAVENOUS
Status: COMPLETED | OUTPATIENT
Start: 2019-04-01 | End: 2019-04-02

## 2019-04-01 RX ORDER — MECLIZINE HYDROCHLORIDE 25 MG/1
25 TABLET ORAL
Status: COMPLETED | OUTPATIENT
Start: 2019-04-01 | End: 2019-04-01

## 2019-04-01 RX ADMIN — MECLIZINE HYDROCHLORIDE 25 MG: 25 TABLET ORAL at 09:04

## 2019-04-01 RX ADMIN — SODIUM CHLORIDE 1000 ML: 0.9 INJECTION, SOLUTION INTRAVENOUS at 09:04

## 2019-04-01 RX ADMIN — SODIUM CHLORIDE 1000 ML: 0.9 INJECTION, SOLUTION INTRAVENOUS at 08:04

## 2019-04-02 ENCOUNTER — TELEPHONE (OUTPATIENT)
Dept: INTERNAL MEDICINE | Facility: CLINIC | Age: 59
End: 2019-04-02

## 2019-04-02 VITALS
HEIGHT: 72 IN | HEART RATE: 93 BPM | BODY MASS INDEX: 32.64 KG/M2 | RESPIRATION RATE: 15 BRPM | TEMPERATURE: 99 F | OXYGEN SATURATION: 100 % | DIASTOLIC BLOOD PRESSURE: 73 MMHG | WEIGHT: 241 LBS | SYSTOLIC BLOOD PRESSURE: 130 MMHG

## 2019-04-02 LAB
ANION GAP SERPL CALC-SCNC: 9 MMOL/L (ref 8–16)
BUN SERPL-MCNC: 10 MG/DL (ref 6–20)
CALCIUM SERPL-MCNC: 8.9 MG/DL (ref 8.7–10.5)
CHLORIDE SERPL-SCNC: 105 MMOL/L (ref 95–110)
CO2 SERPL-SCNC: 25 MMOL/L (ref 23–29)
CREAT SERPL-MCNC: 1.3 MG/DL (ref 0.5–1.4)
EST. GFR  (AFRICAN AMERICAN): >60 ML/MIN/1.73 M^2
EST. GFR  (NON AFRICAN AMERICAN): >60 ML/MIN/1.73 M^2
GLUCOSE SERPL-MCNC: 117 MG/DL (ref 70–110)
POTASSIUM SERPL-SCNC: 4 MMOL/L (ref 3.5–5.1)
SODIUM SERPL-SCNC: 139 MMOL/L (ref 136–145)

## 2019-04-02 PROCEDURE — 80048 BASIC METABOLIC PNL TOTAL CA: CPT

## 2019-04-02 RX ORDER — MECLIZINE HCL 12.5 MG 12.5 MG/1
12.5 TABLET ORAL 3 TIMES DAILY PRN
Qty: 15 TABLET | Refills: 0 | Status: SHIPPED | OUTPATIENT
Start: 2019-04-02 | End: 2019-04-12 | Stop reason: SDUPTHER

## 2019-04-02 NOTE — ED NOTES
Iv fluid bolus patent; explained plan of care and repeat lab order after iv fluid bolus; pt using urinal in room; family member stepped out

## 2019-04-02 NOTE — ED NOTES
Xray tech to room upon pt return from ct scan; no changes; pt alert and speech clear; pt reports occ room spinning and chronic low back and neck pain

## 2019-04-02 NOTE — ED NOTES
rx x 1 given with med ed; reviewed er plan of care and results, reviewed home care and follow up care instructions; pt has a follow up appt April 10 with dr kay and a neurology appt pending also; pt alert; steady gait; pt departed with female family member per whch; pt stable; pt has his belongings and meds

## 2019-04-02 NOTE — DISCHARGE INSTRUCTIONS
Increase fluid intake over the next few days.  Call your primary care doctor to set up a follow-up appointment within the next week.  Return to the ER for any worsening symptoms, passing out, chest pain, shortness of breath, or any other concerns.

## 2019-04-02 NOTE — ED NOTES
Pt arrived from residence alert per ej ems and placed in room on stretcher per ej ems; pt reports dizziness x 1 year and worse today; pt alert and oriented with clear speech; cbg per ems 216; pt has iv access left a/c

## 2019-04-02 NOTE — TELEPHONE ENCOUNTER
Spoke w/ patient. Scheduled a E.R f/u on 4/5/19 @ 11:20am. Verbalized understanding.      ----- Message from Yolande Parr sent at 4/2/2019  2:22 PM CDT -----  Contact: [phone]/self  Patient called in returning your call. Please advise.

## 2019-04-02 NOTE — ED PROVIDER NOTES
Encounter Date: 4/1/2019    SCRIBE #1 NOTE: I, Nuris Moctezuma , am scribing for, and in the presence of,  Dr. Durbin . I have scribed the entire note.       History     Chief Complaint   Patient presents with    Dizziness     To ER per EMS with c/o dizziness and blurred vision x 1 year.  Pt states that he is worse all day today.  Pt unable to provide updated medication list or provide good medical information.     Fabiano Malik Jr. is a 58 y.o. male who  has a past medical history of Chronic back pain greater than 3 months duration, Depression, Diabetes mellitus, Hypertension, and Schizophrenia.    The patient presents to the ED due to dizziness.   He reports episodes of dizziness that have been present for over a year, but states today's episode is the worst. He feels as though the room is spinning.  He says that it has been making him fall a lot recently, with 2 falls yesterday and 4 times today. He reports he was unable to stand up and had ot be picke dup by his family members to get into the stretcher.  He states that he is on multiple medications and is compliant with taking them. He says that he takes a medicine for dizziness, but is not sure what it is.  He also endorses chronic lower back and neck pain, but does not take any pain medication.   Pt denies any fever, headache, N/V/D, CP/SOB, HI, or SI.   He is a poor historian, and additional information obtained via chart review.      The history is provided by the patient.     Review of patient's allergies indicates:   Allergen Reactions    Pcn [penicillins] Other (See Comments)     Childhood rxn, pt does not recall type of rxn     Past Medical History:   Diagnosis Date    Chronic back pain greater than 3 months duration     Depression     Diabetes mellitus     Hypertension     Schizophrenia      Past Surgical History:   Procedure Laterality Date    APPENDECTOMY      BLOCK-NERVE-MEDIAL BRANCH-LUMBAR- Bilateral L2-3-4-5 Bilateral 4/24/2018     "Performed by [facility]    BLOCK-NERVE-MEDIAL BRANCH-LUMBAR- BILATERAL, L2,3,4,5 Bilateral 2/20/2018    Performed by [facility]    BLOCK-NERVE-MEDIAL BRANCH-LUMBAR- LUMBAR- BILATERAL- L2,3,4,5 Bilateral 2/6/2018    Performed by [facility]on [facility] at [facility]    COLONOSCOPY N/A 5/31/2018    Performed by [facility]ume [facility]atch [facility] at [facility] ENDO    NECK SURGERY      Radiofrequency Ablation, Nerve, Spinal, Lumbar, Medial Branch, L2,3,4,5 Right 1/8/2019    Performed by Alberto Hernandez Jr., MD at [facility]    Radiofrequency Ablation, Nerve, Spinal, Lumbar, Medial Branch, Left, L2,3,4,5 Left 3/12/2019    Performed by Alberto Hernandez Jr., [facility] at [facility]    RADIOFREQUENCY THERMOCOAGULATION (RFTC)-NERVE-MEDIAN BRANCH-LUMBAR- Left L2-3-4-5 Left 5/29/2018    Performed by [facility]    RADIOFREQUENCY THERMOCOAGULATION (RFTC)-NERVE-MEDIAN BRANCH-LUMBAR- Right L2-3-4-5 Right 5/22/2018    Performed by [facility]avon Dennis [facility] at [facility]     Family History   Problem Relation Age of Onset    Alzheimer's disease Mother     Heart defect Father     Cancer Father     Stroke Father     Heart attack Father     Heart defect Sister     Alzheimer's disease Sister      Social History     Tobacco Use    Smoking status: Never Smoker    Smokeless tobacco: Never Used   Substance Use Topics    Alcohol use: Yes     Comment: "couple of beers a day"    Drug use: No     Review of Systems   Constitutional: Negative for chills and fever.   HENT: Negative for sore throat.    Respiratory: Negative for shortness of breath.    Cardiovascular: Negative for chest pain.   Gastrointestinal: Negative for constipation, diarrhea, nausea and vomiting.   Genitourinary: Negative for dysuria, frequency and urgency.   Musculoskeletal: Positive for back pain and neck pain.   Skin: Negative for rash and wound.   Neurological: Positive for dizziness and " weakness. Negative for seizures, syncope, numbness and headaches.   Hematological: Does not bruise/bleed easily.   Psychiatric/Behavioral: Negative for agitation, behavioral problems, confusion and suicidal ideas. The patient is not nervous/anxious.        Physical Exam     Initial Vitals [04/01/19 1931]   BP Pulse Resp Temp SpO2   (!) 103/55 (!) 127 18 98.7 °F (37.1 °C) 95 %      MAP       --         Physical Exam    Nursing note and vitals reviewed.  Constitutional: He appears well-developed and well-nourished. He is not diaphoretic. No distress.   HENT:   Head: Normocephalic and atraumatic.   Mouth/Throat: Oropharynx is clear and moist.   Eyes: EOM are normal. Pupils are equal, round, and reactive to light.   Neck: No tracheal deviation present.   Cardiovascular: Normal rate, regular rhythm, normal heart sounds and intact distal pulses.   Pulmonary/Chest: Breath sounds normal. No stridor. No respiratory distress.   Abdominal: Soft. He exhibits no distension and no mass. There is no tenderness.   Musculoskeletal: Normal range of motion. He exhibits no edema.   Neurological: He is alert and oriented to person, place, and time. No cranial nerve deficit or sensory deficit. He exhibits abnormal muscle tone (diffuse, all extremities). GCS eye subscore is 4. GCS verbal subscore is 5. GCS motor subscore is 6.   No focal weakness.   Skin: Skin is warm and dry. Capillary refill takes less than 2 seconds. No rash noted.   Psychiatric: He has a normal mood and affect. His behavior is normal. Thought content normal.         ED Course   Procedures  Labs Reviewed   CBC WITHOUT DIFFERENTIAL - Abnormal; Notable for the following components:       Result Value    RBC 4.48 (*)     Hematocrit 39.8 (*)     MCH 31.3 (*)     MPV 8.8 (*)     All other components within normal limits   COMPREHENSIVE METABOLIC PANEL - Abnormal; Notable for the following components:    Glucose 191 (*)     Creatinine 1.7 (*)     Total Bilirubin 1.5 (*)      eGFR if  50 (*)     eGFR if non  43 (*)     All other components within normal limits   URINALYSIS, REFLEX TO URINE CULTURE - Abnormal; Notable for the following components:    Protein, UA Trace (*)     Ketones, UA Trace (*)     All other components within normal limits    Narrative:     Preferred Collection Type->Urine, Clean Catch   LACTIC ACID, PLASMA - Abnormal; Notable for the following components:    Lactate (Lactic Acid) 2.3 (*)     All other components within normal limits   BASIC METABOLIC PANEL - Abnormal; Notable for the following components:    Glucose 117 (*)     All other components within normal limits   B-TYPE NATRIURETIC PEPTIDE   TROPONIN I   TSH   DRUG SCREEN PANEL, URINE EMERGENCY    Narrative:     Preferred Collection Type->Urine, Clean Catch   ALCOHOL,MEDICAL (ETHANOL)   LACTIC ACID, PLASMA   CK   CK     EKG Readings: (Independently Interpreted)   Rhythm: Sinus Tachycardia. Heart Rate: 119.   Non specific QRS widening   No ST changes   No Ischemia   Compared to prior EKG from 01/2019 , grossly stable no significant changes        Imaging Results          X-Ray Chest AP Portable (Final result)  Result time 04/01/19 20:04:36    Final result by Jordon Farooq MD (04/01/19 20:04:36)                 Impression:      No detrimental change or radiographic acute intrathoracic process seen.      Electronically signed by: Jordon Farooq MD  Date:    04/01/2019  Time:    20:04             Narrative:    EXAMINATION:  XR CHEST AP PORTABLE    CLINICAL HISTORY:  dizziness, tachycardia;    TECHNIQUE:  Single frontal view of the chest was performed.    COMPARISON:  Chest radiograph 11/20/2018    FINDINGS:  Monitoring leads overlie the chest.  No detrimental change.  Cardiomediastinal silhouette is midline and within normal limits for age noting minimal calcific atherosclerosis of the aortic arch.  Pulmonary vasculature and hilar regions are within normal limits.  The lungs are  symmetrically well expanded without focal consolidation, pleural effusion or pneumothorax.  No acute osseous process seen.  PA and lateral views can be obtained.                               CT Head Without Contrast (Final result)  Result time 04/01/19 20:14:24    Final result by Bulmaro Shaver MD (04/01/19 20:14:24)                 Impression:      No acute intracranial abnormalities identified.      Electronically signed by: Bulmaro Shaver MD  Date:    04/01/2019  Time:    20:14             Narrative:    EXAMINATION:  CT HEAD WITHOUT CONTRAST    CLINICAL HISTORY:  Dizziness;+vision changes x 1 year;    TECHNIQUE:  Low dose axial images were obtained through the head.  Coronal and sagittal reformations were also performed. Contrast was not administered.    COMPARISON:  CT head from January 2017.    FINDINGS:  No evidence of acute/recent major vascular distribution cerebral infarction, intraparenchymal hemorrhage, or intra-axial space occupying lesion. The ventricular system is normal in size and configuration with no evidence of hydrocephalus. No effacement of the skull-base cisterns. No abnormal extra-axial fluid collections or blood products. Visualized paranasal sinuses and mastoid air cells are clear.  Remote fracture noted of the right lamina papyracea.  The calvarium shows no significant abnormality.                                 Medical Decision Making:   History:   Old Medical Records: I decided to obtain old medical records.  Initial Assessment:   58 year old male with Hx of schizophrenia, depression, and chronic back pain, presents to the ED with a complaint of dizziness. He reports multiple falls recently.   Tachycardic on arrival, exam with generalized weakness, no focal neuro deficits. Will obtain cardiac evaluation, CT head, basic labs, give IV fluids, and reassess.     Differential Diagnosis:   Differential Diagnosis includes, but is not limited to:  Peripheral vertigo (labyrinthitis,  vestibular neuritis, BPPV, Meniere's disease), cerebellar stroke/CVA, TIA, SAH, carotid artery dissection, intracranial mass, medication reaction/noncompliance, substance abuse, depression, electrolyte abnormality, anemia, hemorrhage, renal failure, hepatic failure, sepsis/infection, UTI, viral illness, arrhythmia, CHF, thyroid disease, dehydration, depression, chronic disease.    Clinical Tests:   Lab Tests: Ordered and Reviewed  Radiological Study: Reviewed and Ordered  Medical Tests: Ordered and Reviewed  ED Management:  CT head without acute findings.   CXR clear.  Labs with slight MARY GRACE, Cr 1.7, otherwise unremarkable.  Tachycardia improved with IV fluids.  On reassessment, patient able to stand unassisted.  Orthostatic blood pressure negative, however, heart rate slightly increased with standing.  Will give additional L fluid bolus, and recheck creatinine.    After 2 L IV fluid, repeat creatinine 1.3.  Patient's vitals have continued to improve, heart rate 100.  Blood pressure stable.  Patient stable for discharge, follow up with PCP as soon as possible for further evaluation and management.  Upon re-evaluation, the patient's status has improved.  After complete ED evaluation, clinical impression is most consistent with dehydration, fatigue.  PCP follow-up within 1 week was recommended.    After taking into careful account the patient's history, physical exam findings, as well as empirical and objective data obtained throughout ED workup, I feel no emergent medical condition has been identified. No further evaluation or admission was felt to be required, and the patient is stable for discharge from the ED. The patient and any additional family present were updated with test results, overall clinical impression, and recommended further plan of care, including discharge instructions as provided and outpatient follow-up for continued evaluation and management as needed. All questions were answered. The patient  expressed understanding and agreed with current plan for discharge and follow-up plan of care. Strict ED return precautions were provided, including return/worsening of current symptoms, new symptoms, or any other concerns.                        Clinical Impression:     1. Dehydration    2. Dizziness    3. Orthostatic dizziness    4. MARY GRACE (acute kidney injury)             I, Dr. Cheko Durbin, personally performed the services described in this documentation. All medical record entries made by the scribe were at my direction and in my presence.  I have reviewed the chart and agree that the record reflects my personal performance and is accurate and complete.     Cheko Durbin MD.  10:06 PM 04/02/2019                   Cheko Durbin MD  04/02/19 0143

## 2019-04-02 NOTE — TELEPHONE ENCOUNTER
Called and spoke w/ patient. Patient fell yesterday and wet to the E. R. Patient will call back once he finds transportation and I will schedule an appointment.

## 2019-04-03 DIAGNOSIS — K21.9 GASTROESOPHAGEAL REFLUX DISEASE, ESOPHAGITIS PRESENCE NOT SPECIFIED: ICD-10-CM

## 2019-04-04 RX ORDER — PANTOPRAZOLE SODIUM 40 MG/1
TABLET, DELAYED RELEASE ORAL
Qty: 30 TABLET | Refills: 2 | OUTPATIENT
Start: 2019-04-04

## 2019-04-05 ENCOUNTER — OFFICE VISIT (OUTPATIENT)
Dept: INTERNAL MEDICINE | Facility: CLINIC | Age: 59
End: 2019-04-05
Payer: MEDICARE

## 2019-04-05 ENCOUNTER — TELEPHONE (OUTPATIENT)
Dept: PAIN MEDICINE | Facility: CLINIC | Age: 59
End: 2019-04-05

## 2019-04-05 VITALS
WEIGHT: 244.69 LBS | OXYGEN SATURATION: 98 % | HEART RATE: 80 BPM | SYSTOLIC BLOOD PRESSURE: 114 MMHG | HEIGHT: 72 IN | DIASTOLIC BLOOD PRESSURE: 76 MMHG | BODY MASS INDEX: 33.14 KG/M2

## 2019-04-05 DIAGNOSIS — M54.50 CHRONIC MIDLINE LOW BACK PAIN WITHOUT SCIATICA: ICD-10-CM

## 2019-04-05 DIAGNOSIS — G89.29 CHRONIC MIDLINE LOW BACK PAIN WITHOUT SCIATICA: ICD-10-CM

## 2019-04-05 DIAGNOSIS — H61.21 CERUMEN DEBRIS ON TYMPANIC MEMBRANE OF RIGHT EAR: ICD-10-CM

## 2019-04-05 DIAGNOSIS — E11.9 TYPE 2 DIABETES MELLITUS WITHOUT COMPLICATION, WITHOUT LONG-TERM CURRENT USE OF INSULIN: Primary | ICD-10-CM

## 2019-04-05 DIAGNOSIS — H91.92 HEARING LOSS OF LEFT EAR, UNSPECIFIED HEARING LOSS TYPE: ICD-10-CM

## 2019-04-05 DIAGNOSIS — R25.2 HAND OR FOOT SPASMS: ICD-10-CM

## 2019-04-05 DIAGNOSIS — E11.59 HYPERTENSION ASSOCIATED WITH DIABETES: ICD-10-CM

## 2019-04-05 DIAGNOSIS — R22.32 PALMAR NODULE, LEFT: ICD-10-CM

## 2019-04-05 DIAGNOSIS — Z00.00 PREVENTATIVE HEALTH CARE: ICD-10-CM

## 2019-04-05 DIAGNOSIS — I25.10 ATHEROSCLEROSIS OF NATIVE CORONARY ARTERY OF NATIVE HEART WITHOUT ANGINA PECTORIS: ICD-10-CM

## 2019-04-05 DIAGNOSIS — R42 DIZZINESS: ICD-10-CM

## 2019-04-05 DIAGNOSIS — I15.2 HYPERTENSION ASSOCIATED WITH DIABETES: ICD-10-CM

## 2019-04-05 DIAGNOSIS — N28.9 RENAL INSUFFICIENCY: ICD-10-CM

## 2019-04-05 DIAGNOSIS — E66.9 CLASS 1 OBESITY WITH SERIOUS COMORBIDITY AND BODY MASS INDEX (BMI) OF 33.0 TO 33.9 IN ADULT, UNSPECIFIED OBESITY TYPE: ICD-10-CM

## 2019-04-05 PROCEDURE — 99999 PR PBB SHADOW E&M-EST. PATIENT-LVL V: CPT | Mod: PBBFAC,,, | Performed by: INTERNAL MEDICINE

## 2019-04-05 PROCEDURE — 99214 PR OFFICE/OUTPT VISIT, EST, LEVL IV, 30-39 MIN: ICD-10-PCS | Mod: S$GLB,,, | Performed by: INTERNAL MEDICINE

## 2019-04-05 PROCEDURE — 3008F BODY MASS INDEX DOCD: CPT | Mod: CPTII,S$GLB,, | Performed by: INTERNAL MEDICINE

## 2019-04-05 PROCEDURE — 3044F PR MOST RECENT HEMOGLOBIN A1C LEVEL <7.0%: ICD-10-PCS | Mod: CPTII,S$GLB,, | Performed by: INTERNAL MEDICINE

## 2019-04-05 PROCEDURE — 99999 PR PBB SHADOW E&M-EST. PATIENT-LVL V: ICD-10-PCS | Mod: PBBFAC,,, | Performed by: INTERNAL MEDICINE

## 2019-04-05 PROCEDURE — 3078F PR MOST RECENT DIASTOLIC BLOOD PRESSURE < 80 MM HG: ICD-10-PCS | Mod: CPTII,S$GLB,, | Performed by: INTERNAL MEDICINE

## 2019-04-05 PROCEDURE — 3074F PR MOST RECENT SYSTOLIC BLOOD PRESSURE < 130 MM HG: ICD-10-PCS | Mod: CPTII,S$GLB,, | Performed by: INTERNAL MEDICINE

## 2019-04-05 PROCEDURE — 3008F PR BODY MASS INDEX (BMI) DOCUMENTED: ICD-10-PCS | Mod: CPTII,S$GLB,, | Performed by: INTERNAL MEDICINE

## 2019-04-05 PROCEDURE — 3078F DIAST BP <80 MM HG: CPT | Mod: CPTII,S$GLB,, | Performed by: INTERNAL MEDICINE

## 2019-04-05 PROCEDURE — 99214 OFFICE O/P EST MOD 30 MIN: CPT | Mod: S$GLB,,, | Performed by: INTERNAL MEDICINE

## 2019-04-05 PROCEDURE — 3074F SYST BP LT 130 MM HG: CPT | Mod: CPTII,S$GLB,, | Performed by: INTERNAL MEDICINE

## 2019-04-05 PROCEDURE — 3044F HG A1C LEVEL LT 7.0%: CPT | Mod: CPTII,S$GLB,, | Performed by: INTERNAL MEDICINE

## 2019-04-05 RX ORDER — OLMESARTAN MEDOXOMIL 20 MG/1
20 TABLET ORAL 2 TIMES DAILY
Qty: 60 TABLET | Refills: 2 | Status: SHIPPED | OUTPATIENT
Start: 2019-04-05 | End: 2019-04-24 | Stop reason: SDUPTHER

## 2019-04-05 NOTE — TELEPHONE ENCOUNTER
Returned call to pt.  Pt requesting rx for Tramadol.  Advised pt that Joseph Shafer has not been prescribing that for him, and that it appears that his last rx for Tramadol was from Dr Ellis on 2/19/19.  Pt states he cannot remember if he got that rx.  Pt states he saw Dr Ellis today, and was informed that he will need to call Pain Mgmt for future pain prescriptions.  Pt elected to schedule appt with Joseph Shafer to discuss further.  Appt scheduled on 4/8/19.

## 2019-04-05 NOTE — TELEPHONE ENCOUNTER
----- Message from Lilo Lemon sent at 4/5/2019  4:04 PM CDT -----  Contact: self / [phone]  Patient is requesting a refill on the below. Please advise    traMADol (ULTRAM) 50 mg tablet      Pharmacy     [facility] [address] [place] [address] DANN SIMMONS - [address] AT [facility] [place] [facility] [address]

## 2019-04-05 NOTE — PROGRESS NOTES
Pt. ID: Fabiano Malik Jr. is a 58 y.o. male      Chief complaint:   Chief Complaint   Patient presents with    Follow-up     dizziness       HPI: pt. Here for f/u for DM and HTN; pt. Has started benicar and BP is WNL; BMP dated 4/2/19 shows kidney fxn is improving; pt. Has started he has f/u ortho for palmar nodule and hand spasms; he was placed on neurontin and muscle relaxer and Dupuytren nodule will be monitored; his spasms are improving; of note, he is seeing pain management for low back pain; he has gained a few pounds; pt. Reports dizziness which is a chronic issue for years and takes antivert prn; usually occurs with change of positions and last minutes at a time; he recently went to the ER; he has f/u cardiology; nuclear stress test was negative for ischemia and EF was 60%; CXR  And CT head dated 4/1/19 showed no acute changes; he has gained a few pounds; of note he had hearing test and was told he has hearing loss to L ear and he would like to see ENT; he is schedule to see neurology; HGBA1C dated 12/6/18 was 6.7      Review of Systems   Constitutional: Negative for chills and fever.   HENT: Positive for hearing loss.         L ear hearing loss   Respiratory: Negative for cough and shortness of breath.    Cardiovascular: Negative for chest pain.   Gastrointestinal: Negative for abdominal pain, nausea and vomiting.   Musculoskeletal: Positive for back pain.        Low back pain which is worse with ROM; L palmar nodule   Neurological: Positive for dizziness.         Objective:    Physical Exam   Constitutional: He is oriented to person, place, and time.   obese   HENT:   R ear mild cerumen    Eyes: EOM are normal.   Neck: Normal range of motion.   Cardiovascular: Normal rate, regular rhythm and normal heart sounds.   Pulmonary/Chest: Effort normal and breath sounds normal. No respiratory distress. He has no wheezes. He has no rales.   Abdominal: Soft. There is no tenderness. There is no rebound and no  guarding.   Musculoskeletal:   L palmar nodule; low back pain with ROM    Neurological: He is alert and oriented to person, place, and time.   Gait instability: uses a cane; motor strength normal all extremities   Skin: No rash noted.   Vitals reviewed.        Health Maintenance   Topic Date Due    TETANUS VACCINE  12/12/2019 (Originally 12/15/1978)    Hemoglobin A1c  06/06/2019    Eye Exam  07/10/2019    Lipid Panel  12/06/2019    Foot Exam  12/12/2019    High Dose Statin  04/05/2020    Colonoscopy  05/31/2028    Hepatitis C Screening  Completed    Pneumococcal Vaccine (Medium Risk)  Completed    Influenza Vaccine  Completed         Assessment:     1. Type 2 diabetes mellitus without complication, without long-term current use of insulin Well controlled   2. Hypertension associated with diabetes Well controlled   3. Dizziness Active   4. Renal insufficiency Well controlled   5. Hand or foot spasms Well controlled   6. Palmar nodule, left Stable   7. Chronic midline low back pain without sciatica Active   8. Hearing loss of left ear, unspecified hearing loss type Active   9. Cerumen debris on tympanic membrane of right ear Active   10. Atherosclerosis of native coronary artery of native heart without angina pectoris  Active   11. Class 1 obesity with serious comorbidity and body mass index (BMI) of 33.0 to 33.9 in adult, unspecified obesity type Sub-optimally controlled   12. Preventative health care Active         Plan: Type 2 diabetes mellitus without complication, without long-term current use of insulin  Comments:  continue current regimen and encouraged ADA diet; repeat HGBA1C prior to 1 month f/u   Orders:  -     CBC auto differential; Future; Expected date: 04/05/2019  -     Comprehensive metabolic panel; Future; Expected date: 04/05/2019  -     Hemoglobin A1c; Future; Expected date: 04/05/2019    Hypertension associated with diabetes  Comments:  continue current regimen and encouraged low Na diet  and weight loss   Orders:  -     olmesartan (BENICAR) 20 MG tablet; Take 1 tablet (20 mg total) by mouth 2 (two) times daily.  Dispense: 60 tablet; Refill: 2  -     CBC auto differential; Future; Expected date: 04/05/2019  -     Comprehensive metabolic panel; Future; Expected date: 04/05/2019    Dizziness  Comments:  continue antivert prn and no focal neurologic deficits; scheduled to see neurology; refer to ENT and get carotid US; re-evaluate in 1 month   Orders:  -     US Carotid Bilateral; Future; Expected date: 04/05/2019  -     Ambulatory referral to ENT    Renal insufficiency  Comments:  improving; monitor and encouraged PO fluid intake; avoid NSAIDs   Orders:  -     Comprehensive metabolic panel; Future; Expected date: 04/05/2019    Hand or foot spasms  Comments:  continue current regimen and f/u ortho who is managing     Palmar nodule, left  Comments:  ortho is monitoring for now     Chronic midline low back pain without sciatica  Comments:  f/u pain management who is managing; continue tramadol prn which should be obtained from pain management     Hearing loss of left ear, unspecified hearing loss type  Comments:  refer to ENT  Orders:  -     Ambulatory referral to ENT    Cerumen debris on tympanic membrane of right ear  Comments:  asked pt. to try OTC earwax removal kit     Atherosclerosis of native coronary artery of native heart without angina pectoris   Comments:  continue current regimen   Orders:  -     US Carotid Bilateral; Future; Expected date: 04/05/2019    Class 1 obesity with serious comorbidity and body mass index (BMI) of 33.0 to 33.9 in adult, unspecified obesity type  Comments:  encouraged diet and explained risks     Preventative health care  -     CBC auto differential; Future; Expected date: 04/05/2019  -     Comprehensive metabolic panel; Future; Expected date: 04/05/2019  -     Hemoglobin A1c; Future; Expected date: 04/05/2019        Problem List Items Addressed This Visit        ENT     Hearing loss of left ear    Relevant Orders    Ambulatory referral to ENT    Cerumen debris on tympanic membrane of right ear    Overview     asked pt. to try OTC earwax removal kit             Cardiac/Vascular    Hypertension associated with diabetes    Relevant Medications    olmesartan (BENICAR) 20 MG tablet    Other Relevant Orders    CBC auto differential    Comprehensive metabolic panel    Atherosclerosis of native coronary artery of native heart without angina pectoris     Relevant Orders    US Carotid Bilateral       Renal/    Renal insufficiency    Relevant Orders    Comprehensive metabolic panel       Endocrine    Type 2 diabetes mellitus without complication, without long-term current use of insulin - Primary    Relevant Orders    CBC auto differential    Comprehensive metabolic panel    Hemoglobin A1c    Class 1 obesity with serious comorbidity and body mass index (BMI) of 33.0 to 33.9 in adult       Orthopedic    Chronic midline low back pain without sciatica (Chronic)       Other    Dizziness    Relevant Orders    US Carotid Bilateral    Ambulatory referral to ENT    Palmar nodule, left    Hand or foot spasms      Other Visit Diagnoses     Preventative health care   (Active)      Relevant Orders    CBC auto differential    Comprehensive metabolic panel    Hemoglobin A1c

## 2019-04-08 ENCOUNTER — OFFICE VISIT (OUTPATIENT)
Dept: PAIN MEDICINE | Facility: CLINIC | Age: 59
End: 2019-04-08
Payer: MEDICARE

## 2019-04-08 VITALS
SYSTOLIC BLOOD PRESSURE: 138 MMHG | WEIGHT: 242.06 LBS | HEART RATE: 89 BPM | DIASTOLIC BLOOD PRESSURE: 82 MMHG | BODY MASS INDEX: 32.83 KG/M2

## 2019-04-08 DIAGNOSIS — M47.819 SPONDYLOSIS WITHOUT MYELOPATHY: ICD-10-CM

## 2019-04-08 DIAGNOSIS — M47.9 OSTEOARTHRITIS OF SPINE, UNSPECIFIED SPINAL OSTEOARTHRITIS COMPLICATION STATUS, UNSPECIFIED SPINAL REGION: ICD-10-CM

## 2019-04-08 DIAGNOSIS — M54.9 BACK PAIN, UNSPECIFIED BACK LOCATION, UNSPECIFIED BACK PAIN LATERALITY, UNSPECIFIED CHRONICITY: ICD-10-CM

## 2019-04-08 DIAGNOSIS — G89.4 CHRONIC PAIN SYNDROME: ICD-10-CM

## 2019-04-08 DIAGNOSIS — M47.816 LUMBAR FACET ARTHROPATHY: ICD-10-CM

## 2019-04-08 DIAGNOSIS — M47.816 LUMBAR SPONDYLOSIS: Primary | ICD-10-CM

## 2019-04-08 PROCEDURE — 3079F PR MOST RECENT DIASTOLIC BLOOD PRESSURE 80-89 MM HG: ICD-10-PCS | Mod: CPTII,S$GLB,, | Performed by: NURSE PRACTITIONER

## 2019-04-08 PROCEDURE — 99499 UNLISTED E&M SERVICE: CPT | Mod: S$GLB,,, | Performed by: NURSE PRACTITIONER

## 2019-04-08 PROCEDURE — 99214 PR OFFICE/OUTPT VISIT, EST, LEVL IV, 30-39 MIN: ICD-10-PCS | Mod: S$GLB,,, | Performed by: NURSE PRACTITIONER

## 2019-04-08 PROCEDURE — 3008F PR BODY MASS INDEX (BMI) DOCUMENTED: ICD-10-PCS | Mod: CPTII,S$GLB,, | Performed by: NURSE PRACTITIONER

## 2019-04-08 PROCEDURE — 99999 PR PBB SHADOW E&M-EST. PATIENT-LVL IV: ICD-10-PCS | Mod: PBBFAC,,, | Performed by: NURSE PRACTITIONER

## 2019-04-08 PROCEDURE — 3008F BODY MASS INDEX DOCD: CPT | Mod: CPTII,S$GLB,, | Performed by: NURSE PRACTITIONER

## 2019-04-08 PROCEDURE — 3079F DIAST BP 80-89 MM HG: CPT | Mod: CPTII,S$GLB,, | Performed by: NURSE PRACTITIONER

## 2019-04-08 PROCEDURE — 3075F PR MOST RECENT SYSTOLIC BLOOD PRESS GE 130-139MM HG: ICD-10-PCS | Mod: CPTII,S$GLB,, | Performed by: NURSE PRACTITIONER

## 2019-04-08 PROCEDURE — 99999 PR PBB SHADOW E&M-EST. PATIENT-LVL IV: CPT | Mod: PBBFAC,,, | Performed by: NURSE PRACTITIONER

## 2019-04-08 PROCEDURE — 3075F SYST BP GE 130 - 139MM HG: CPT | Mod: CPTII,S$GLB,, | Performed by: NURSE PRACTITIONER

## 2019-04-08 PROCEDURE — 99214 OFFICE O/P EST MOD 30 MIN: CPT | Mod: S$GLB,,, | Performed by: NURSE PRACTITIONER

## 2019-04-08 PROCEDURE — 99499 RISK ADDL DX/OHS AUDIT: ICD-10-PCS | Mod: S$GLB,,, | Performed by: NURSE PRACTITIONER

## 2019-04-08 NOTE — PROGRESS NOTES
[facility] Established Patient Evaluation    Chief Complaint  Chief Complaint   Patient presents with    Medication Refill       Last 3 PDI Scores 4/8/2019 3/26/2019 2/12/2019   Pain Disability Index (PDI) 60 60 70       Interval Update 4/8/19 Pt returns today for med refill.     3/26/19 Pt returns today S/P LEFT L2,3,4,5 Lumbar Medial Branch Radiofrequency Ablation on 3/12/19 with 30% relief.    2/12/19 Pt returns today S/P Right  L2,3,4,5 Lumbar Medial Branch Radiofrequency Ablation on 1/8/19 with 75% relief.       Fabiano Malik Jr. presents to me for low back and neck pain her reports 9.5/10 pain today he has not been seen in our clinic for 6 months  patient states today his primary source of pain is low back with some bilateral leg pain. He is status post right and left lumbar RFA at L2-3-4-5 70% relief in May of 2018.    Location: Low back   Onset: 3 months  Current Pain Score: 10/10/9/10  Daily Pain of Range: 9-10/10  Quality: Burning, Throbbing, Grabbing and Sharp  Radiation: Bilateral legs   Worsened by: exercise, extension, flexion, lifting, lying down and sneezing  Improved by: heat and rest    Background from Chart Review      Fabiano Malik Jr. presents to the clinic for a 4 wk follow-up appointment for neck and low back pain. He is S/P  left L2-3-4-5 FACET MEDIAL BRANCH NERVE RADIOFREQUENCY NEUROTOMY (lumbar) on 5/29/18 and  RIGHT L2-3-4-5 FACET MEDIAL BRANCH NERVE RADIOFREQUENCY NEUROTOMY on 5/22/18 with 70% relief for 2 weeks, pt states pain is back but overall better since procedure, I will order PT and refill Mobic and Lyrica today.  Since the last visit, Fabiano Malik Jr. states the pain has been persistant. Current pain intensity is 8/10.         Treatment History  PT/OT:   HEP:   TENS:  Injections: 3/12/19 LEFT L2,3,4,5 Lumbar Medial Branch Radiofrequency Ablation  1/8/19 BILATERAL L2,3,4,5 Lumbar Medial Branch Radiofrequency Ablation  Surgery:  Medications:   - NSAIDS:    - MSK Relaxants:   - TCAs:   - SNRIs:   - Topicals:   - Opioids:   - Anticonvulsants:     Current Pain Medications  1. Cymbalta  60 mg BID   2. Tylenol   3. Klonopin  4. Seroquel  5. Risperdal    Full Medication List    Current Outpatient Medications:     acetaminophen (TYLENOL) 325 MG tablet, Take 2 tablets (650 mg total) by mouth every 6 (six) hours as needed., Disp: 120 tablet, Rfl: 3    aspirin (ECOTRIN) 81 MG EC tablet, Take 81 mg by mouth once daily., Disp: , Rfl:     blood sugar diagnostic Strp, 2 strips by Misc.(Non-Drug; Combo Route) route 2 (two) times daily., Disp: 200 strip, Rfl: 2    blood-glucose meter kit, Use as instructed, Disp: 1 each, Rfl: 0    clonazePAM (KLONOPIN) 1 MG tablet, TK 1 T PO TID, Disp: , Rfl: 3    DULoxetine (CYMBALTA) 60 MG capsule, Take 1 capsule (60 mg total) by mouth 2 (two) times daily., Disp: 60 capsule, Rfl: 11    gabapentin (NEURONTIN) 300 MG capsule, Take 1 capsule (300 mg total) by mouth every evening., Disp: 30 capsule, Rfl: 1    lancets (ONETOUCH DELICA LANCETS) 33 gauge Misc, 1 lancet by Misc.(Non-Drug; Combo Route) route 2 (two) times daily., Disp: 200 each, Rfl: 2    meloxicam (MOBIC) 15 MG tablet, TAKE 1/2 TABLET= 7.5 MG BY MOUTH EVERY DAY AS NEEDED PAIN, Disp: 90 tablet, Rfl: 0    metFORMIN (GLUCOPHAGE) 1000 MG tablet, TAKE 1 TABLET BY MOUTH TWICE DAILY WITH MEALS, Disp: 180 tablet, Rfl: 0    olmesartan (BENICAR) 20 MG tablet, Take 1 tablet (20 mg total) by mouth 2 (two) times daily., Disp: 60 tablet, Rfl: 2    omeprazole (PRILOSEC) 20 MG capsule, Take 1 capsule (20 mg total) by mouth once daily., Disp: 30 capsule, Rfl: 4    pantoprazole (PROTONIX) 40 MG tablet, TAKE 1 TABLET(40 MG) BY MOUTH DAILY AS NEEDED, Disp: 90 tablet, Rfl: 0    pravastatin (PRAVACHOL) 40 MG tablet, Take 1 tablet (40 mg total) by mouth once daily., Disp: 30 tablet, Rfl: 2    QUEtiapine (SEROQUEL) 400 MG tablet, Take 400 mg by mouth once daily. , Disp: , Rfl:     risperiDONE  (RISPERDAL) 0.5 MG Tab, Take 1 tablet (0.5 mg total) by mouth every evening., Disp: 30 tablet, Rfl: 11    sodium polystyrene (KAYEXALATE) 15 gram/60 mL Susp, Take 15 g by mouth., Disp: , Rfl:     tiZANidine (ZANAFLEX) 2 MG tablet, TAKE 1 TABLET(2 MG) BY MOUTH TWICE DAILY AS NEEDED, Disp: 180 tablet, Rfl: 1    traMADol (ULTRAM) 50 mg tablet, Take 50 mg by mouth as needed for Pain., Disp: , Rfl:     VIMOVO 500-20 mg TbID, TAKE ONE TABLET BY MOUTH TWICE DAILY, Disp: 60 tablet, Rfl: 2    meclizine (ANTIVERT) 12.5 mg tablet, Take 1 tablet (12.5 mg total) by mouth 3 (three) times daily as needed., Disp: 15 tablet, Rfl: 0  No current facility-administered medications for this visit.     Facility-Administered Medications Ordered in Other Visits:     alprazolam ODT dissolvable tablet 0.5 mg, 0.5 mg, Oral, Once PRN, Alberto Hernandez Jr., MD     Review of Systems  ROS    Medical History  Past Medical History:   Diagnosis Date    Chronic back pain greater than 3 months duration     Depression     Diabetes mellitus     Hypertension     Schizophrenia         Surgical History  Past Surgical History:   Procedure Laterality Date    APPENDECTOMY      BLOCK-NERVE-MEDIAL BRANCH-LUMBAR- Bilateral L2-3-4-5 Bilateral 4/24/2018    [facility]formed by [facility]on [facility] at [facility]    BLOCK-NERVE-MEDIAL BRANCH-LUMBAR- BILATERAL, L2,3,4,5 Bilateral 2/20/2018    [facility]formed by [facility] at [facility] PAIN T    BLOCK-NERVE-MEDIAL BRANCH-LUMBAR- LUMBAR- BILATERAL- L2,3,4,5 Bilateral 2/6/2018    [facility] by [facility]    COLONOSCOPY N/A 5/31/2018    Performed by Guillaume Hatch MD at [facility] ENDO    NECK SURGERY      Radiofrequency Ablation, Nerve, Spinal, Lumbar, Medial Branch, L2,3,4,5 Right 1/8/2019    Performed by Alberto Hernandez Jr., [facility] at [facility]    Radiofrequency Ablation, Nerve, Spinal, Lumbar, Medial Branch, Left, L2,3,4,5 Left 3/12/2019    Performed by Alberto Hernandez  MD [facility]    RADIOFREQUENCY THERMOCOAGULATION (RFTC)-NERVE-MEDIAN BRANCH-LUMBAR- Left L2-3-4-5 Left 5/29/2018    Performed by [facility] at [facility]    RADIOFREQUENCY THERMOCOAGULATION (RFTC)-NERVE-MEDIAN BRANCH-LUMBAR- Right L2-3-4-5 Right 5/22/2018    Performed by [facility]on [facility] at [facility]        Physical Exam  Vitals:    04/08/19 0936   BP: 138/82   Pulse: 89   Weight: 109.8 kg (242 lb 1 oz)   PainSc: 10-Worst pain ever         General Musculoskeletal Exam   Gait: antalgic     Back (L-Spine & T-Spine) / Neck (C-Spine) Exam     Back (L-Spine & T-Spine) Range of Motion   Lateral bend right: abnormal   Lateral bend left: abnormal   Rotation right: abnormal   Rotation left: abnormal     Spinal Sensation   Right Side Sensation  L-Spine Level: hypersensitive  Left Side Sensation  L-Spine Level: hypersensitive    Back (L-Spine & T-Spine) Tests   Right Side Tests  Squat Test: unable to perform  Left Side Tests  Squat: unable to perform    Comments:  + Facet Loading Bilaterally.  Positive FABERE, Yeoman's and Galensen test on the both side.          Imaging    MRI lumbar spine I. contrast.  Comparison to previous radiographs.  Few 1 CM size is cyst mid and lower renal poles.  Marrow space normal.  Chronic endplate change particularly L4-L5.  No fracture subluxation.  Spinal cord normal.    T11-T12 limited posterior disc bulge.    L1-L2 mild posterior paracentral disc bulge, mild compression adjacent spinal sac, facet arthropathy with mild spinal and foraminal stenosis.    L2-L3 minimal mild posterior disc bulge, mild facet arthropathy with mild spinal and foraminal stenosis.    L3-L4 minimal posterior disc bulge, facet joint arthropathy with minimal mild spinal and foraminal stenosis.    L4 L5 minimal mild posterior disc bulge without spinal or foraminal stenosis.    L5-S1 mild/moderate facet joint arthropathy, mild posterior disc bulge, mild spinal and foraminal stenosis.       Impression      1. Degenerative disc, spondylosis, facet arthropathy discussed above.    2.  No fracture, subluxation or disc prolapse.       Labs:  BMP  Lab Results   Component Value Date     04/02/2019    K 4.0 04/02/2019     04/02/2019    CO2 25 04/02/2019    BUN 10 04/02/2019    CREATININE 1.3 04/02/2019    CALCIUM 8.9 04/02/2019    ANIONGAP 9 04/02/2019    ESTGFRAFRICA >60 04/02/2019    EGFRNONAA >60 04/02/2019     Lab Results   Component Value Date    ALT 25 04/01/2019    AST 29 04/01/2019    ALKPHOS 93 04/01/2019    BILITOT 1.5 (H) 04/01/2019       Assessment:  Fabiano Malik Jr. is a 58 y.o. male with the following diagnoses based on history, exam, and imaging:    Problem List Items Addressed This Visit        Neuro    Chronic pain       Orthopedic    Lumbar facet arthropathy      Other Visit Diagnoses     Lumbar spondylosis    -  Primary    Spondylosis without myelopathy        Osteoarthritis of spine, unspecified spinal osteoarthritis complication status, unspecified spinal region        Back pain, unspecified back location, unspecified back pain laterality, unspecified chronicity              2/12/2019- 59 y/o male s/p right L2-3-4-5 Lumbar RFA with 75% relief, his left sided RFA s/f 1/15/2019  was cancelled due to reports of dyspnea on exertion and recent recommendation from his PCP to seek cardiology evaluation, per Dr. Magana's Patient has risk factors for CAD with ongoing chest pain with VALIENTE (dyspnea on exertion) and all injection should be held at this point.    03/26/2019 58-year-old pleasant male presents today status post right L2-3-4-5 lumbar RFA with reported 30% relief, patient has a history of this procedure which has helped in the past however he did not get the usual relief following this lumbar RFA.  During clinic visit and noticed that patient had  a cane which he has never used before; when asked, he states that he has been feeling dizzy, since this morning I recommended  he visit the ED patient refuse.  Also noticed instability with his gait and mild slurred speech.  Recommended that I consult Neurology for further assessment patient agreed and understood.  Reference to his low back pain I recommend we start physical therapy to help with instability, muscular strength and lumbar stabilization patient agreed and understood.      4/8/2019- 57 y/o male  Present today to discuss refilling Tramadol 50 mg BID  for his pain, he was previously getting from Dr Ellis. Recommended that patient use tylenol extra strength 500 mg -1000 mg q8 (max dose 3000 mg). Discussed that he is currently taking clonazepam per his psychiatrist and that the combination of benzodiazepine and opioid medications are dangerous and can lead to  Opioid induced respiratory depression, pt states he understood and agreed. Discussed that he fulfill all future appts neurology, US of his Carotids and ENT so that his dizziness is appropriately addressed by the right provider.      Plan & Recommendations  Procedures:  None at this time.   Medications:  Continue medications as they are prescribed  Cymbalta 60 mg b.i.d.  and Mobic today 15 mg p.r.n. Tylenol Extra Strength   -1000 mg Q 8 (max-3000 mg)  Imaging:  Previous imaging reviewed and discussed with patient today  PT/OT:  Referral for physical therapy done on previous CV, pt to give Rozel a call   Referral: Neurology Patient will also see Dr. Romy mcallister/[facility]  HEP: I encouraged the patient to maintain a home exercise regimen that includes daily, moderate cardiovascular exercise lasting at least 30 minutes.  This may include yoga, rachelle chi, walking, swimming, aqua aerobics, or other exercises that maintain a heart rate of 50-70% of the calculated maximum heart rate.  I also encouraged light, daily stretching focused on the target area.  Follow Up: RTC PRN   More than 25 minutes spent with patient, over 50% of that time was spent in  counseling.      Joseph Shafer NP-AMIRA  [facility]      Disclaimer: This note was partly generated using dictation software which may occasionally result in transcription errors.

## 2019-04-08 NOTE — TELEPHONE ENCOUNTER
----- Message from Alie Cornell sent at 4/8/2019  3:46 PM CDT -----  Contact: self/[phone]  Type:  RX Refill Request    Who Called: Fabiano Malik  RX Name and Strength: gabapentin (NEURONTIN) 300 MG capsule   How is the patient currently taking it? (ex. 1XDay): Sig - Route: Take 1 capsule (300 mg total) by mouth every evening. - Oral  Is this a 30 day or 90 day RX: 30  Preferred Pharmacy with phone number: [facility] [phone]   Local or Mail Order: local  Ordering Provider: Greek  Would the patient rather a call back or a response via [facility]? call  Best Call Back Number:[phone]  Additional Information: none

## 2019-04-09 RX ORDER — PANTOPRAZOLE SODIUM 40 MG/1
TABLET, DELAYED RELEASE ORAL
Qty: 90 TABLET | Refills: 0 | OUTPATIENT
Start: 2019-04-09

## 2019-04-09 RX ORDER — GABAPENTIN 300 MG/1
300 CAPSULE ORAL NIGHTLY
Qty: 30 CAPSULE | Refills: 1 | Status: SHIPPED | OUTPATIENT
Start: 2019-04-09 | End: 2019-07-09 | Stop reason: SDUPTHER

## 2019-04-09 NOTE — TELEPHONE ENCOUNTER
----- Message from Alie Cornell sent at 4/8/2019  3:33 PM CDT -----  Contact: self/[phone]  Type:  RX Refill Request    Who Called: Fabiano Malik  RX Name and Strength: pantoprazole (PROTONIX) 40 MG tablet   How is the patient currently ta shameka it? (ex. 1XDay):  Is this a 30 day or 90 day RX:90  Preferred Pharmacy with phone number:[facility]  [phone]   Local or Mail Order: local  Ordering Provider: Dr. Ellis  Would the patient rather a call back or a response via [facility]? Call back  Best Call Back Number: [phone]  Additional Information: none

## 2019-04-12 RX ORDER — MECLIZINE HCL 12.5 MG 12.5 MG/1
TABLET ORAL
Qty: 15 TABLET | Refills: 0 | Status: SHIPPED | OUTPATIENT
Start: 2019-04-12 | End: 2019-04-29 | Stop reason: SDUPTHER

## 2019-04-17 ENCOUNTER — HOSPITAL ENCOUNTER (OUTPATIENT)
Dept: RADIOLOGY | Facility: HOSPITAL | Age: 59
Discharge: HOME | End: 2019-04-17
Attending: INTERNAL MEDICINE
Payer: MEDICARE

## 2019-04-17 ENCOUNTER — OFFICE VISIT (OUTPATIENT)
Dept: NEUROLOGY | Facility: CLINIC | Age: 59
End: 2019-04-17
Payer: MEDICARE

## 2019-04-17 VITALS
WEIGHT: 244.69 LBS | BODY MASS INDEX: 33.14 KG/M2 | SYSTOLIC BLOOD PRESSURE: 127 MMHG | HEIGHT: 72 IN | DIASTOLIC BLOOD PRESSURE: 85 MMHG | HEART RATE: 96 BPM

## 2019-04-17 DIAGNOSIS — R42 DIZZINESS: ICD-10-CM

## 2019-04-17 DIAGNOSIS — E11.8 TYPE 2 DIABETES MELLITUS WITH COMPLICATION, UNSPECIFIED WHETHER LONG TERM INSULIN USE: ICD-10-CM

## 2019-04-17 DIAGNOSIS — G60.3 IDIOPATHIC PROGRESSIVE NEUROPATHY: ICD-10-CM

## 2019-04-17 DIAGNOSIS — R42 DIZZINESS: Primary | ICD-10-CM

## 2019-04-17 DIAGNOSIS — K21.9 GASTROESOPHAGEAL REFLUX DISEASE, ESOPHAGITIS PRESENCE NOT SPECIFIED: ICD-10-CM

## 2019-04-17 DIAGNOSIS — I25.10 ATHEROSCLEROSIS OF NATIVE CORONARY ARTERY OF NATIVE HEART WITHOUT ANGINA PECTORIS: ICD-10-CM

## 2019-04-17 PROCEDURE — 99499 UNLISTED E&M SERVICE: CPT | Mod: S$GLB,,, | Performed by: PSYCHIATRY & NEUROLOGY

## 2019-04-17 PROCEDURE — 3044F HG A1C LEVEL LT 7.0%: CPT | Mod: CPTII,S$GLB,, | Performed by: PSYCHIATRY & NEUROLOGY

## 2019-04-17 PROCEDURE — 3008F BODY MASS INDEX DOCD: CPT | Mod: CPTII,S$GLB,, | Performed by: PSYCHIATRY & NEUROLOGY

## 2019-04-17 PROCEDURE — 99203 OFFICE O/P NEW LOW 30 MIN: CPT | Mod: S$GLB,,, | Performed by: PSYCHIATRY & NEUROLOGY

## 2019-04-17 PROCEDURE — 3074F SYST BP LT 130 MM HG: CPT | Mod: CPTII,S$GLB,, | Performed by: PSYCHIATRY & NEUROLOGY

## 2019-04-17 PROCEDURE — 93880 EXTRACRANIAL BILAT STUDY: CPT | Mod: TC

## 2019-04-17 PROCEDURE — 93880 EXTRACRANIAL BILAT STUDY: CPT | Mod: 26,,, | Performed by: RADIOLOGY

## 2019-04-17 PROCEDURE — 3008F PR BODY MASS INDEX (BMI) DOCUMENTED: ICD-10-PCS | Mod: CPTII,S$GLB,, | Performed by: PSYCHIATRY & NEUROLOGY

## 2019-04-17 PROCEDURE — 3079F PR MOST RECENT DIASTOLIC BLOOD PRESSURE 80-89 MM HG: ICD-10-PCS | Mod: CPTII,S$GLB,, | Performed by: PSYCHIATRY & NEUROLOGY

## 2019-04-17 PROCEDURE — 99499 RISK ADDL DX/OHS AUDIT: ICD-10-PCS | Mod: S$GLB,,, | Performed by: PSYCHIATRY & NEUROLOGY

## 2019-04-17 PROCEDURE — 99999 PR PBB SHADOW E&M-EST. PATIENT-LVL V: ICD-10-PCS | Mod: PBBFAC,,, | Performed by: PSYCHIATRY & NEUROLOGY

## 2019-04-17 PROCEDURE — 3044F PR MOST RECENT HEMOGLOBIN A1C LEVEL <7.0%: ICD-10-PCS | Mod: CPTII,S$GLB,, | Performed by: PSYCHIATRY & NEUROLOGY

## 2019-04-17 PROCEDURE — 99999 PR PBB SHADOW E&M-EST. PATIENT-LVL V: CPT | Mod: PBBFAC,,, | Performed by: PSYCHIATRY & NEUROLOGY

## 2019-04-17 PROCEDURE — 93880 US CAROTID BILATERAL: ICD-10-PCS | Mod: 26,,, | Performed by: RADIOLOGY

## 2019-04-17 PROCEDURE — 3074F PR MOST RECENT SYSTOLIC BLOOD PRESSURE < 130 MM HG: ICD-10-PCS | Mod: CPTII,S$GLB,, | Performed by: PSYCHIATRY & NEUROLOGY

## 2019-04-17 PROCEDURE — 3079F DIAST BP 80-89 MM HG: CPT | Mod: CPTII,S$GLB,, | Performed by: PSYCHIATRY & NEUROLOGY

## 2019-04-17 PROCEDURE — 99203 PR OFFICE/OUTPT VISIT, NEW, LEVL III, 30-44 MIN: ICD-10-PCS | Mod: S$GLB,,, | Performed by: PSYCHIATRY & NEUROLOGY

## 2019-04-17 RX ORDER — PANTOPRAZOLE SODIUM 40 MG/1
40 TABLET, DELAYED RELEASE ORAL DAILY PRN
Qty: 30 TABLET | Refills: 1 | Status: CANCELLED | OUTPATIENT
Start: 2019-04-17 | End: 2020-04-16

## 2019-04-17 RX ORDER — PANTOPRAZOLE SODIUM 40 MG/1
TABLET, DELAYED RELEASE ORAL
Qty: 90 TABLET | Refills: 0 | OUTPATIENT
Start: 2019-04-17

## 2019-04-17 NOTE — LETTER
May 3, 2019      Joseph Shafer, FN  [address]  [address]  [facility] [place] [address]           [facility] Neurology  [address] [address]  Phone: [phone]  Fax: [phone]          Patient: Fabiano Malik Jr.   MR Number: [id number]   YOB: 1960   Date of Visit: 4/17/2019       Dear Joseph Shafer:    Thank you for referring Fabiano Malik to me for evaluation. Attached you will find relevant portions of my assessment and plan of care.    If you have questions, please do not hesitate to call me. I look forward to following Fabiano Malik along with you.    Sincerely,    Chris Sage MD    Enclosure  CC:  No Recipients    If you would like to receive this communication electronically, please contact [email or web address] or [phone] to request more information on [employer] Link access.    For providers and/or their staff who would like to refer a patient to [facility], please contact us through our one-stop-shop provider referral line, [facility], at [phone].    If you feel you have received this communication in error or would no longer like to receive these types of communications, please e-mail [email or web address]

## 2019-04-24 DIAGNOSIS — E11.59 HYPERTENSION ASSOCIATED WITH DIABETES: ICD-10-CM

## 2019-04-24 DIAGNOSIS — I15.2 HYPERTENSION ASSOCIATED WITH DIABETES: ICD-10-CM

## 2019-04-24 RX ORDER — OLMESARTAN MEDOXOMIL 20 MG/1
TABLET ORAL
Qty: 60 TABLET | Refills: 2 | Status: SHIPPED | OUTPATIENT
Start: 2019-04-24 | End: 2019-06-14 | Stop reason: SDUPTHER

## 2019-04-26 DIAGNOSIS — K21.9 GASTROESOPHAGEAL REFLUX DISEASE, ESOPHAGITIS PRESENCE NOT SPECIFIED: ICD-10-CM

## 2019-04-26 RX ORDER — PANTOPRAZOLE SODIUM 40 MG/1
TABLET, DELAYED RELEASE ORAL
Qty: 90 TABLET | Refills: 0 | OUTPATIENT
Start: 2019-04-26

## 2019-04-26 RX ORDER — OMEPRAZOLE 20 MG/1
20 CAPSULE, DELAYED RELEASE ORAL DAILY PRN
Qty: 30 CAPSULE | Refills: 2 | Status: SHIPPED | OUTPATIENT
Start: 2019-04-26 | End: 2019-10-17 | Stop reason: SDUPTHER

## 2019-04-29 RX ORDER — MECLIZINE HCL 12.5 MG 12.5 MG/1
12.5 TABLET ORAL 3 TIMES DAILY PRN
Qty: 60 TABLET | Refills: 0 | Status: SHIPPED | OUTPATIENT
Start: 2019-04-29 | End: 2019-05-08 | Stop reason: SDUPTHER

## 2019-05-01 ENCOUNTER — OFFICE VISIT (OUTPATIENT)
Dept: OTOLARYNGOLOGY | Facility: CLINIC | Age: 59
End: 2019-05-01
Payer: MEDICARE

## 2019-05-01 ENCOUNTER — CLINICAL SUPPORT (OUTPATIENT)
Dept: OTOLARYNGOLOGY | Facility: CLINIC | Age: 59
End: 2019-05-01
Payer: MEDICARE

## 2019-05-01 VITALS
DIASTOLIC BLOOD PRESSURE: 84 MMHG | WEIGHT: 241.31 LBS | HEART RATE: 107 BPM | HEIGHT: 72 IN | SYSTOLIC BLOOD PRESSURE: 132 MMHG | BODY MASS INDEX: 32.69 KG/M2

## 2019-05-01 DIAGNOSIS — H90.3 SENSORINEURAL HEARING LOSS (SNHL), BILATERAL: ICD-10-CM

## 2019-05-01 DIAGNOSIS — H90.3 SENSORINEURAL HEARING LOSS, BILATERAL: Primary | ICD-10-CM

## 2019-05-01 DIAGNOSIS — H93.13 TINNITUS AURIUM, BILATERAL: ICD-10-CM

## 2019-05-01 DIAGNOSIS — R42 DIZZINESS: Primary | ICD-10-CM

## 2019-05-01 PROCEDURE — 92557 PR COMPREHENSIVE HEARING TEST: ICD-10-PCS | Mod: S$GLB,,, | Performed by: AUDIOLOGIST-HEARING AID FITTER

## 2019-05-01 PROCEDURE — 99999 PR PBB SHADOW E&M-EST. PATIENT-LVL I: ICD-10-PCS | Mod: PBBFAC,,, | Performed by: AUDIOLOGIST-HEARING AID FITTER

## 2019-05-01 PROCEDURE — 99999 PR PBB SHADOW E&M-EST. PATIENT-LVL I: CPT | Mod: PBBFAC,,, | Performed by: AUDIOLOGIST-HEARING AID FITTER

## 2019-05-01 PROCEDURE — 92567 TYMPANOMETRY: CPT | Mod: S$GLB,,, | Performed by: AUDIOLOGIST-HEARING AID FITTER

## 2019-05-01 PROCEDURE — 3075F SYST BP GE 130 - 139MM HG: CPT | Mod: CPTII,S$GLB,, | Performed by: OTOLARYNGOLOGY

## 2019-05-01 PROCEDURE — 3008F BODY MASS INDEX DOCD: CPT | Mod: CPTII,S$GLB,, | Performed by: OTOLARYNGOLOGY

## 2019-05-01 PROCEDURE — 3008F PR BODY MASS INDEX (BMI) DOCUMENTED: ICD-10-PCS | Mod: CPTII,S$GLB,, | Performed by: OTOLARYNGOLOGY

## 2019-05-01 PROCEDURE — 99203 PR OFFICE/OUTPT VISIT, NEW, LEVL III, 30-44 MIN: ICD-10-PCS | Mod: S$GLB,,, | Performed by: OTOLARYNGOLOGY

## 2019-05-01 PROCEDURE — 99999 PR PBB SHADOW E&M-EST. PATIENT-LVL V: CPT | Mod: PBBFAC,,, | Performed by: OTOLARYNGOLOGY

## 2019-05-01 PROCEDURE — 3079F DIAST BP 80-89 MM HG: CPT | Mod: CPTII,S$GLB,, | Performed by: OTOLARYNGOLOGY

## 2019-05-01 PROCEDURE — 3079F PR MOST RECENT DIASTOLIC BLOOD PRESSURE 80-89 MM HG: ICD-10-PCS | Mod: CPTII,S$GLB,, | Performed by: OTOLARYNGOLOGY

## 2019-05-01 PROCEDURE — 3075F PR MOST RECENT SYSTOLIC BLOOD PRESS GE 130-139MM HG: ICD-10-PCS | Mod: CPTII,S$GLB,, | Performed by: OTOLARYNGOLOGY

## 2019-05-01 PROCEDURE — 92567 PR TYMPA2METRY: ICD-10-PCS | Mod: S$GLB,,, | Performed by: AUDIOLOGIST-HEARING AID FITTER

## 2019-05-01 PROCEDURE — 99203 OFFICE O/P NEW LOW 30 MIN: CPT | Mod: S$GLB,,, | Performed by: OTOLARYNGOLOGY

## 2019-05-01 PROCEDURE — 99999 PR PBB SHADOW E&M-EST. PATIENT-LVL V: ICD-10-PCS | Mod: PBBFAC,,, | Performed by: OTOLARYNGOLOGY

## 2019-05-01 PROCEDURE — 92557 COMPREHENSIVE HEARING TEST: CPT | Mod: S$GLB,,, | Performed by: AUDIOLOGIST-HEARING AID FITTER

## 2019-05-01 RX ORDER — BENAZEPRIL HYDROCHLORIDE 10 MG/1
TABLET ORAL
COMMUNITY
Start: 2019-03-09 | End: 2019-05-10

## 2019-05-01 NOTE — LETTER
May 1, 2019      Paras Ellis MD  [address]  [place] [address]           [facility] Otorhinolaryngology  [address] [place] [address], [address]  [place] [address]  Phone: [phone]  Fax: [phone]          Patient: Fabiano Malik Jr.   MR Number: [id number]   YOB: 1960   Date of Visit: 5/1/2019       Dear Dr. Paras Ellis:    Thank you for referring Fabiano Malik to me for evaluation. Attached you will find relevant portions of my assessment and plan of care.    If you have questions, please do not hesitate to call me. I look forward to following Fabiano Malik along with you.    Sincerely,    Syeda Laureano MD    Enclosure  CC:  No Recipients    If you would like to receive this communication electronically, please contact [email or web address] or [phone] to request more information on [employer] Link access.    For providers and/or their staff who would like to refer a patient to [facility], please contact us through our one-stop-shop provider referral line, [facility], at [phone].    If you feel you have received this communication in error or would no longer like to receive these types of communications, please e-mail [email or web address]
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
## 2019-05-01 NOTE — PATIENT INSTRUCTIONS
[facility] [address], [address]  [place] [address]  [phone]    Your physician may determine a need for one or more tests of your balance system at the time of your visit. One test is called a videonystagmogram (VNG), the other is called Posturography.  We ask that you prepare in the event that either of these tests are ordered.  The tests are simple and painless and take about 90 minutes (combined).  Please dress comfortably.  Certain medications will affect the results of theses tests.  In order to prevent this from happening, it will be necessary for you to follow these instructions:    1. DO NOT  take any of the following medications for at least 24 hours prior to the test:  a. Sleeping pills (Seconal, Donnatol, Dalmane, etc.)  b. Tranquilizers (Librium, Valium, Equanil, etc.)  c. Anti-histamines or over-the-counter cold medications  d. Anti-dizzy medications (Antivert, Dramamine, etc.)  e. Muscle relaxants    Seizure medications (Dilantin, Phenobartibal, etc.) interfere with the accuracy of the test.  They should be stopped BUT ONLY AFTER CLEARANCE FROM THE PHYSICIAN WHO PRESCRIBED THESE MEDICATIONS.    Heart or blood pressure medications ARE allowed.    2. DO NOT  eat or drink anything other than small sips of water for 4 hours prior to the VNG.    3. DO NOT drink alcoholic beverages for 24 hours prior to the test.      4. DO NOT wear makeup, especially mascara or eye liner for the VNG test.    5. Ladies who are scheduled for Posturography should wear slacks rather than skirts or dresses.    6. If you are diabetic, please inform the  when booking your appointment.  Please schedule a time that would make fasting for 4 hours most convenient to your medication routine.  If you have any concerns, check with your primary care physician.    Testing is scheduled at [facility] at [address] [place] [address]. After the physician orders your balance testing, a  representative from the main campus will call you to schedule your appointment. If you need to cancel or re-schedule, please call them at [phone].    If you have any questions, please call [phone] to reach us at [facility] in [place].

## 2019-05-02 ENCOUNTER — OFFICE VISIT (OUTPATIENT)
Dept: ORTHOPEDICS | Facility: CLINIC | Age: 59
End: 2019-05-02
Payer: MEDICARE

## 2019-05-02 VITALS — HEIGHT: 72 IN | WEIGHT: 241 LBS | BODY MASS INDEX: 32.64 KG/M2

## 2019-05-02 DIAGNOSIS — R22.32 PALMAR NODULE, LEFT: Primary | ICD-10-CM

## 2019-05-02 PROCEDURE — 3008F PR BODY MASS INDEX (BMI) DOCUMENTED: ICD-10-PCS | Mod: CPTII,S$GLB,, | Performed by: ORTHOPAEDIC SURGERY

## 2019-05-02 PROCEDURE — 99999 PR PBB SHADOW E&M-EST. PATIENT-LVL II: ICD-10-PCS | Mod: PBBFAC,,, | Performed by: ORTHOPAEDIC SURGERY

## 2019-05-02 PROCEDURE — 3008F BODY MASS INDEX DOCD: CPT | Mod: CPTII,S$GLB,, | Performed by: ORTHOPAEDIC SURGERY

## 2019-05-02 PROCEDURE — 99212 PR OFFICE/OUTPT VISIT, EST, LEVL II, 10-19 MIN: ICD-10-PCS | Mod: S$GLB,,, | Performed by: ORTHOPAEDIC SURGERY

## 2019-05-02 PROCEDURE — 99999 PR PBB SHADOW E&M-EST. PATIENT-LVL II: CPT | Mod: PBBFAC,,, | Performed by: ORTHOPAEDIC SURGERY

## 2019-05-02 PROCEDURE — 99212 OFFICE O/P EST SF 10 MIN: CPT | Mod: S$GLB,,, | Performed by: ORTHOPAEDIC SURGERY

## 2019-05-02 RX ORDER — DICLOFENAC SODIUM 20 MG/G
40 SOLUTION TOPICAL 2 TIMES DAILY
Qty: 1 BOTTLE | Refills: 1 | Status: SHIPPED | OUTPATIENT
Start: 2019-05-02 | End: 2020-01-23

## 2019-05-02 NOTE — PROGRESS NOTES
Subjective:      Patient ID: Fabiano Malik Jr. is a 58 y.o. male.    Chief Complaint: Follow-up (waqas index fingers)      HPI: Fabiano Malik Jr. is here in follow-up of bilateral hand symptoms. He was last seen and treated approximately 6 weeks ago by Dr. Walter with gabapentin and topical anti-inflammatory for muscle spasms of bilateral hands.  Today, the patient is doing very well.  He reports hand spasms at night have resolved.  He really has no complaints today.  Patient states he even thinks the Dupuytren's contracture has improved.    Past Medical History:   Diagnosis Date    Chronic back pain greater than 3 months duration     Depression     Diabetes mellitus     Hypertension     Schizophrenia        Current Outpatient Medications:     acetaminophen (TYLENOL) 325 MG tablet, Take 2 tablets (650 mg total) by mouth every 6 (six) hours as needed., Disp: 120 tablet, Rfl: 3    aspirin (ECOTRIN) 81 MG EC tablet, Take 81 mg by mouth once daily., Disp: , Rfl:     blood sugar diagnostic Strp, 2 strips by Misc.(Non-Drug; Combo Route) route 2 (two) times daily., Disp: 200 strip, Rfl: 2    blood-glucose meter kit, Use as instructed, Disp: 1 each, Rfl: 0    clonazePAM (KLONOPIN) 1 MG tablet, TK 1 T PO TID, Disp: , Rfl: 3    DULoxetine (CYMBALTA) 60 MG capsule, Take 1 capsule (60 mg total) by mouth 2 (two) times daily., Disp: 60 capsule, Rfl: 11    gabapentin (NEURONTIN) 300 MG capsule, Take 1 capsule (300 mg total) by mouth every evening., Disp: 30 capsule, Rfl: 1    lancets (ONETOUCH DELICA LANCETS) 33 gauge Misc, 1 lancet by Misc.(Non-Drug; Combo Route) route 2 (two) times daily., Disp: 200 each, Rfl: 2    meclizine (ANTIVERT) 12.5 mg tablet, Take 1 tablet (12.5 mg total) by mouth 3 (three) times daily as needed., Disp: 60 tablet, Rfl: 0    meloxicam (MOBIC) 15 MG tablet, TAKE 1/2 TABLET= 7.5 MG BY MOUTH EVERY DAY AS NEEDED PAIN, Disp: 90 tablet, Rfl: 0    omeprazole (PRILOSEC) 20 MG capsule,  Take 1 capsule (20 mg total) by mouth daily as needed., Disp: 30 capsule, Rfl: 2    pantoprazole (PROTONIX) 40 MG tablet, TAKE 1 TABLET(40 MG) BY MOUTH DAILY AS NEEDED, Disp: 90 tablet, Rfl: 0    pravastatin (PRAVACHOL) 40 MG tablet, Take 1 tablet (40 mg total) by mouth once daily., Disp: 30 tablet, Rfl: 2    QUEtiapine (SEROQUEL) 400 MG tablet, Take 400 mg by mouth once daily. , Disp: , Rfl:     risperiDONE (RISPERDAL) 0.5 MG Tab, Take 1 tablet (0.5 mg total) by mouth every evening., Disp: 30 tablet, Rfl: 11    tiZANidine (ZANAFLEX) 2 MG tablet, TAKE 1 TABLET(2 MG) BY MOUTH TWICE DAILY AS NEEDED, Disp: 180 tablet, Rfl: 1    VIMOVO 500-20 mg TbID, TAKE ONE TABLET BY MOUTH TWICE DAILY, Disp: 60 tablet, Rfl: 2    benazepril (LOTENSIN) 10 MG tablet, , Disp: , Rfl:     diclofenac sodium (PENNSAID) 20 mg/gram /actuation(2 %) sopm, Apply 40 mg topically 2 (two) times daily., Disp: 1 Bottle, Rfl: 1    metFORMIN (GLUCOPHAGE) 1000 MG tablet, TAKE 1 TABLET BY MOUTH TWICE DAILY WITH MEALS, Disp: 180 tablet, Rfl: 0    olmesartan (BENICAR) 20 MG tablet, TAKE 1 TABLET(20 MG) BY MOUTH TWICE DAILY, Disp: 60 tablet, Rfl: 2    sodium polystyrene (KAYEXALATE) 15 gram/60 mL Susp, Take 15 g by mouth., Disp: , Rfl:     traMADol (ULTRAM) 50 mg tablet, Take 50 mg by mouth as needed for Pain., Disp: , Rfl:   No current facility-administered medications for this visit.     Facility-Administered Medications Ordered in Other Visits:     alprazolam ODT dissolvable tablet 0.5 mg, 0.5 mg, Oral, Once PRN, Alberto Hernandez Jr., MD  Review of patient's allergies indicates:   Allergen Reactions    Pcn [penicillins] Other (See Comments)     Childhood rxn, pt does not recall type of rxn       Ht 6' (1.829 m)   Wt 109.3 kg (241 lb)   BMI 32.69 kg/m²     Review of Systems   Constitution: Negative for chills and fever.   Cardiovascular: Negative for chest pain and palpitations.   Respiratory: Negative for shortness of breath and wheezing.     Skin: Negative for poor wound healing and rash.   Musculoskeletal: Negative for arthritis, joint pain, muscle cramps and stiffness.   Gastrointestinal: Negative for nausea and vomiting.   Genitourinary: Negative for dysuria and hematuria.   Neurological: Negative for numbness, paresthesias, seizures and tremors.   Psychiatric/Behavioral: Negative for altered mental status.   Allergic/Immunologic: Negative for environmental allergies and persistent infections.         Objective:    Ortho Exam     bilateral hand:  Range of motion wrist and fingers full.   strength equal and full bilaterally. Sensation intact.  Pulses present. Small Dupuytren's cord noted at the base of the ring finger.  Very mild no triggering.  No tenderness of A1 pulleys.    Assessment:     Imaging:  No new imaging        1. Palmar nodule, left          Plan:       Patient's symptoms have resolved with topical anti-inflammatory gabapentin.  Continue to keep an eye on Dupuytren's contracture.    Orders Placed This Encounter    diclofenac sodium (PENNSAID) 20 mg/gram /actuation(2 %) sopm     No follow-ups on file.

## 2019-05-03 NOTE — PROGRESS NOTES
Neurology Clinic Visit  Primary Care Provider: Paras Ellis MD  Referring Provider: Joseph Shafer FNP  Date of Visit:   04/17/2019  Reason for Visit:  Dizziness     chief complaint:   Chief Complaint   Patient presents with    Dizziness     standing       History of present illness  Fabiano Malik Jr. is a 58 y.o. right handed male I have been asked to consult for evaluation of dizziness.  Patient reports he has had dizziness for a few years.  He reports that at times it feels like a spinning sensation that can cause him to lose his balance and fall to the floor.  The feeling can last for about 10 min.  He denies any nausea or vomiting associated with the dizziness.  This can occur when he is lying down, changing positions in bed, or just sitting in the recliner doing nothing.  It sometimes occurs when he is walking.  He does feel like he is off balance at times and feels short of breath.  He reports ringing in both of his ears for the past few years.  He has diabetes which is managed by his PCP.  He also has chronic lower back pain and neck pain and had a cervical fusion in the past.  He has bipolar, depression, PTSD which is managed by his psychiatrist.      Patient Active Problem List    Diagnosis Date Noted    Hand or foot spasms 04/05/2019    Hearing loss of left ear 04/05/2019    Atherosclerosis of native coronary artery of native heart without angina pectoris  04/05/2019    Cerumen debris on tympanic membrane of right ear 04/05/2019    Lumbar facet arthropathy 03/12/2019    Palmar nodule, left 02/15/2019    Trigger index finger of right hand 02/15/2019    Chronic pain 01/15/2019    CKD (chronic kidney disease) stage 3, GFR 30-59 ml/min 01/14/2019    EKG abnormalities 01/14/2019    Chest pain 01/14/2019    Gastroesophageal reflux disease 01/14/2019    Chronic neck pain 01/08/2019    Dehydration     Need for vaccination against Streptococcus pneumoniae using pneumococcal conjugate vaccine  7 12/12/2018    Depression 12/12/2018    Anemia 12/12/2018    Renal insufficiency 12/12/2018    Chronic back pain 12/12/2018    Class 1 obesity with serious comorbidity and body mass index (BMI) of 33.0 to 33.9 in adult 11/28/2018    Prostate cancer screening 11/28/2018    Tachycardia 11/28/2018    Upper respiratory tract infection 11/28/2018    Dizziness 11/28/2018    SOB (shortness of breath) 11/28/2018    Troponin level elevated 11/28/2018    Need for hepatitis C screening test 05/31/2018    Polyp of colon 05/31/2018    Prerenal azotemia 01/21/2017    Diarrhea 01/21/2017    Hypotension 01/20/2017    Visual hallucinations 07/26/2016    Acute kidney failure 07/26/2016    Chronic midline low back pain without sciatica 07/26/2016    Type 2 diabetes mellitus without complication, without long-term current use of insulin 07/26/2016    Hypertension associated with diabetes 07/26/2016    HLD (hyperlipidemia) 07/26/2016    MARY GRACE (acute kidney injury) 07/26/2016    Schizophrenia 07/26/2016    Hyperkalemia 07/26/2016     Past Medical History:   Diagnosis Date    Chronic back pain greater than 3 months duration     Depression     Diabetes mellitus     Hypertension     Schizophrenia      Past Surgical History:   Procedure Laterality Date    APPENDECTOMY      BLOCK-NERVE-MEDIAL BRANCH-LUMBAR- Bilateral L2-3-4-5 Bilateral 4/24/2018    Performed by [facility]avon Dennis [facility] at [facility]    BLOCK-NERVE-MEDIAL BRANCH-LUMBAR- BILATERAL, L2,3,4,5 Bilateral 2/20/2018    Performed by [facility]on [facility] at [facility]    BLOCK-NERVE-MEDIAL BRANCH-LUMBAR- LUMBAR- BILATERAL- L2,3,4,5 Bilateral 2/6/2018    Performed by [facility]on Dennis [facility] at [facility]    COLONOSCOPY N/A 5/31/2018    Performed by [facility]ume [facility]atch [facility] at [facility] END[facility]    NECK SURGERY      Radiofrequency Ablation, Nerve, Spinal, Lumbar, Medial Branch, L2,3,4,5 Right 1/8/2019    Performed by Alberto Hernandez Jr., MD at [facility]     Radiofrequency Ablation, Nerve, Spinal, Lumbar, Medial Branch, Left, L2,3,4,5 Left 3/12/2019    [facility] at [facility]    RADIOFREQUENCY THERMOCOAGULATION (RFTC)-NERVE-MEDIAN BRANCH-LUMBAR- Left L2-3-4-5 Left 5/29/2018    [facility] by [facility] at [facility]    RADIOFREQUENCY THERMOCOAGULATION (RFTC)-NERVE-MEDIAN BRANCH-LUMBAR- Right L2-3-4-5 Right 5/22/2018    Performed by [facility] Dennis [facility] at [facility]     Family History   Problem Relation Age of Onset    Alzheimer's disease Mother     Heart defect Father     Cancer Father     Stroke Father     Heart attack Father     Heart defect Sister     Alzheimer's disease Sister          Current Outpatient Medications   Medication Sig    acetaminophen (TYLENOL) 325 MG tablet Take 2 tablets (650 mg total) by mouth every 6 (six) hours as needed.    aspirin (ECOTRIN) 81 MG EC tablet Take 81 mg by mouth once daily.    blood sugar diagnostic Strp 2 strips by Misc.(Non-Drug; Combo Route) route 2 (two) times daily.    blood-glucose meter kit Use as instructed    clonazePAM (KLONOPIN) 1 MG tablet TK 1 T PO TID    DULoxetine (CYMBALTA) 60 MG capsule Take 1 capsule (60 mg total) by mouth 2 (two) times daily.    gabapentin (NEURONTIN) 300 MG capsule Take 1 capsule (300 mg total) by mouth every evening.    lancets (ONETOUCH DELICA LANCETS) 33 gauge Misc 1 lancet by Misc.(Non-Drug; Combo Route) route 2 (two) times daily.    meloxicam (MOBIC) 15 MG tablet TAKE 1/2 TABLET= 7.5 MG BY MOUTH EVERY DAY AS NEEDED PAIN    metFORMIN (GLUCOPHAGE) 1000 MG tablet TAKE 1 TABLET BY MOUTH TWICE DAILY WITH MEALS    pantoprazole (PROTONIX) 40 MG tablet TAKE 1 TABLET(40 MG) BY MOUTH DAILY AS NEEDED    pravastatin (PRAVACHOL) 40 MG tablet Take 1 tablet (40 mg total) by mouth once daily.    QUEtiapine (SEROQUEL) 400 MG tablet Take 400 mg by mouth once daily.     risperiDONE (RISPERDAL) 0.5 MG Tab Take 1 tablet (0.5 mg total)  "by mouth every evening.    sodium polystyrene (KAYEXALATE) 15 gram/60 mL Susp Take 15 g by mouth.    tiZANidine (ZANAFLEX) 2 MG tablet TAKE 1 TABLET(2 MG) BY MOUTH TWICE DAILY AS NEEDED    traMADol (ULTRAM) 50 mg tablet Take 50 mg by mouth as needed for Pain.    VIMOVO 500-20 mg TbID TAKE ONE TABLET BY MOUTH TWICE DAILY    benazepril (LOTENSIN) 10 MG tablet     diclofenac sodium (PENNSAID) 20 mg/gram /actuation(2 %) sopm Apply 40 mg topically 2 (two) times daily.    meclizine (ANTIVERT) 12.5 mg tablet Take 1 tablet (12.5 mg total) by mouth 3 (three) times daily as needed.    olmesartan (BENICAR) 20 MG tablet TAKE 1 TABLET(20 MG) BY MOUTH TWICE DAILY    omeprazole (PRILOSEC) 20 MG capsule Take 1 capsule (20 mg total) by mouth daily as needed.     No current facility-administered medications for this visit.      Facility-Administered Medications Ordered in Other Visits   Medication    alprazolam ODT dissolvable tablet 0.5 mg         Review of patient's allergies indicates:   Allergen Reactions    Pcn [penicillins] Other (See Comments)     Childhood rxn, pt does not recall type of rxn     Social History     Socioeconomic History    Marital status:      Spouse name: Not on file    Number of children: Not on file    Years of education: Not on file    Highest education level: Not on file   Occupational History    Not on file   Social Needs    Financial resource strain: Not on file    Food insecurity:     Worry: Not on file     Inability: Not on file    Transportation needs:     Medical: Not on file     Non-medical: Not on file   Tobacco Use    Smoking status: Never Smoker    Smokeless tobacco: Never Used   Substance and Sexual Activity    Alcohol use: Yes     Comment: "couple of beers a day"    Drug use: No    Sexual activity: Not on file   Lifestyle    Physical activity:     Days per week: Not on file     Minutes per session: Not on file    Stress: Not on file   Relationships    Social " connections:     Talks on phone: Not on file     Gets together: Not on file     Attends [employer] service: Not on file     Active member of club or organization: Not on file     Attends meetings of clubs or organizations: Not on file     Relationship status: Not on file   Other Topics Concern    Not on file   Social History Narrative    Not on file       Review of Systems    Constitutional: negative  Eyes: negative  Ears, nose, mouth, throat, and face: negative  Respiratory: negative  Cardiovascular: negative  Gastrointestinal: negative  Genitourinary:negative  Integument/breast: negative  Hematologic/lymphatic: negative  Musculoskeletal:negative  Neurological: negative  Behavioral/Psych: negative  Endocrine: negative  Allergic/Immunologic: negative    Objective:  Vital signs in last 24 hours:    Vitals:    04/17/19 1349   BP: 127/85   Pulse: 96   Weight: 111 kg (244 lb 11.4 oz)   Height: 6' (1.829 m)     General: no acute distress, well nourished, well-groomed  CVS: RRR, no murmur, gallops or rubs  Respiratory: Clear to ausculation  Extremities: no edema    Neurological Examination:    HIGHER INTEGRATIVE FUNCTIONS:  -Normal attention span and concentration; immediately responds to questions and commands  -Oriented to time, place and person  -Recent and remote memory intact  -Language normal (no aphasia or dysarthria)  -Normal fund of knowledge    CN:  -PERRLA, visual fields full, unable to visualize optic discs due to small pupils on fundus exam   -EOMI with normal saccades and smooth pursuit  -Facial sensation intact bilaterally  -Facial strength/movement intact bilaterally  -Hearing intact to voice  -Palate elevates symmetrically  -Normal shoulder shrug and head turn  -Tongue protrudes midline    MOTOR: (left/right graded 1-5)  -UE: 5/5 deltoids; 5/5 biceps, triceps; 5/5 wrist flexors, extensors; 5/5 interosseous; 5/5   -LEs: 5/5 hip flexion, extension; 5/5 knee flexion, extension; 5/5 ankle flexion,  extension  -Tone: normal  -No pronator drift, no orbiting    SENSORY:  -Light touch, temperature sensation intact bilaterally; decrease vibration distally    REFLEXES:  -2+ upper and lower bilaterally  -Flexor plantar reflex bilaterally  -No clonus    COORDINATION:  -FNF, HKS, TIESHA intact bilaterally    GAIT:  -Normal casual gait with intact tandem      Assessment/Plan:  This is a 58-year-old male who was sent for evaluation of dizziness.  It is unclear of the etiology of his dizziness given the description therefore I will obtain MRI of the brain and MRA of the head and neck.  He does report symptoms of feeling off balance when he is walking. On examination he has findings suggestive of a peripheral neuropathy which might explain his sense of being off balance.    1. Dizziness  2. Peripheral neuropathy, likely from DMII  3. DMII, per PCP    Plan:  Will obtain Neuropathy labs  Will obtain MRI Brain, MRA of head and neck  Keep appointment with ENT for evaluation of dizziness.    I discussed assessment and plan with the patient and answered the questions that he had.    Patient note was created using Dragon Dictation.  Any errors in syntax or even information may not have been identified and edited on initial review prior to signing this note.

## 2019-05-06 ENCOUNTER — HOSPITAL ENCOUNTER (OUTPATIENT)
Dept: RADIOLOGY | Facility: HOSPITAL | Age: 59
Discharge: HOME | End: 2019-05-06
Attending: PSYCHIATRY & NEUROLOGY
Payer: MEDICARE

## 2019-05-06 ENCOUNTER — LAB VISIT (OUTPATIENT)
Dept: LAB | Facility: HOSPITAL | Age: 59
End: 2019-05-06
Attending: INTERNAL MEDICINE
Payer: MEDICARE

## 2019-05-06 DIAGNOSIS — Z00.00 PREVENTATIVE HEALTH CARE: ICD-10-CM

## 2019-05-06 DIAGNOSIS — R42 DIZZINESS: ICD-10-CM

## 2019-05-06 DIAGNOSIS — I15.2 HYPERTENSION ASSOCIATED WITH DIABETES: ICD-10-CM

## 2019-05-06 DIAGNOSIS — N28.9 RENAL INSUFFICIENCY: ICD-10-CM

## 2019-05-06 DIAGNOSIS — E11.9 TYPE 2 DIABETES MELLITUS WITHOUT COMPLICATION, WITHOUT LONG-TERM CURRENT USE OF INSULIN: ICD-10-CM

## 2019-05-06 DIAGNOSIS — E11.59 HYPERTENSION ASSOCIATED WITH DIABETES: ICD-10-CM

## 2019-05-06 LAB
ALBUMIN SERPL BCP-MCNC: 3.9 G/DL (ref 3.5–5.2)
ALP SERPL-CCNC: 77 U/L (ref 55–135)
ALT SERPL W/O P-5'-P-CCNC: 22 U/L (ref 10–44)
ANION GAP SERPL CALC-SCNC: 11 MMOL/L (ref 8–16)
AST SERPL-CCNC: 32 U/L (ref 10–40)
BASOPHILS # BLD AUTO: 0.08 K/UL (ref 0–0.2)
BASOPHILS NFR BLD: 1.5 % (ref 0–1.9)
BILIRUB SERPL-MCNC: 0.9 MG/DL (ref 0.1–1)
BUN SERPL-MCNC: 7 MG/DL (ref 6–20)
CALCIUM SERPL-MCNC: 9.4 MG/DL (ref 8.7–10.5)
CHLORIDE SERPL-SCNC: 104 MMOL/L (ref 95–110)
CO2 SERPL-SCNC: 25 MMOL/L (ref 23–29)
CREAT SERPL-MCNC: 1.1 MG/DL (ref 0.5–1.4)
DIFFERENTIAL METHOD: ABNORMAL
EOSINOPHIL # BLD AUTO: 0.2 K/UL (ref 0–0.5)
EOSINOPHIL NFR BLD: 4.4 % (ref 0–8)
ERYTHROCYTE [DISTWIDTH] IN BLOOD BY AUTOMATED COUNT: 13.9 % (ref 11.5–14.5)
EST. GFR  (AFRICAN AMERICAN): >60 ML/MIN/1.73 M^2
EST. GFR  (NON AFRICAN AMERICAN): >60 ML/MIN/1.73 M^2
ESTIMATED AVG GLUCOSE: 160 MG/DL (ref 68–131)
GLUCOSE SERPL-MCNC: 156 MG/DL (ref 70–110)
HBA1C MFR BLD HPLC: 7.2 % (ref 4–5.6)
HCT VFR BLD AUTO: 39 % (ref 40–54)
HGB BLD-MCNC: 13.7 G/DL (ref 14–18)
IMM GRANULOCYTES # BLD AUTO: 0.01 K/UL (ref 0–0.04)
IMM GRANULOCYTES NFR BLD AUTO: 0.2 % (ref 0–0.5)
LYMPHOCYTES # BLD AUTO: 1.3 K/UL (ref 1–4.8)
LYMPHOCYTES NFR BLD: 24.6 % (ref 18–48)
MCH RBC QN AUTO: 31.9 PG (ref 27–31)
MCHC RBC AUTO-ENTMCNC: 35.1 G/DL (ref 32–36)
MCV RBC AUTO: 91 FL (ref 82–98)
MONOCYTES # BLD AUTO: 0.5 K/UL (ref 0.3–1)
MONOCYTES NFR BLD: 9.5 % (ref 4–15)
NEUTROPHILS # BLD AUTO: 3.1 K/UL (ref 1.8–7.7)
NEUTROPHILS NFR BLD: 59.8 % (ref 38–73)
NRBC BLD-RTO: 0 /100 WBC
PLATELET # BLD AUTO: 200 K/UL (ref 150–350)
PMV BLD AUTO: 9.2 FL (ref 9.2–12.9)
POTASSIUM SERPL-SCNC: 3.8 MMOL/L (ref 3.5–5.1)
PROT SERPL-MCNC: 6.9 G/DL (ref 6–8.4)
RBC # BLD AUTO: 4.3 M/UL (ref 4.6–6.2)
SODIUM SERPL-SCNC: 140 MMOL/L (ref 136–145)
WBC # BLD AUTO: 5.17 K/UL (ref 3.9–12.7)

## 2019-05-06 PROCEDURE — 70544 MRA BRAIN WITHOUT CONTRAST: ICD-10-PCS | Mod: 26,59,, | Performed by: RADIOLOGY

## 2019-05-06 PROCEDURE — 70551 MRI BRAIN STEM W/O DYE: CPT | Mod: TC

## 2019-05-06 PROCEDURE — 70547 MR ANGIOGRAPHY NECK W/O DYE: CPT | Mod: TC

## 2019-05-06 PROCEDURE — 83036 HEMOGLOBIN GLYCOSYLATED A1C: CPT

## 2019-05-06 PROCEDURE — 80053 COMPREHEN METABOLIC PANEL: CPT

## 2019-05-06 PROCEDURE — 70551 MRI BRAIN WITHOUT CONTRAST: ICD-10-PCS | Mod: 26,,, | Performed by: RADIOLOGY

## 2019-05-06 PROCEDURE — 36415 COLL VENOUS BLD VENIPUNCTURE: CPT | Mod: PO

## 2019-05-06 PROCEDURE — 70551 MRI BRAIN STEM W/O DYE: CPT | Mod: 26,,, | Performed by: RADIOLOGY

## 2019-05-06 PROCEDURE — 70547 MRA NECK WITHOUT CONTRAST: ICD-10-PCS | Mod: 26,,, | Performed by: RADIOLOGY

## 2019-05-06 PROCEDURE — 70544 MR ANGIOGRAPHY HEAD W/O DYE: CPT | Mod: TC

## 2019-05-06 PROCEDURE — 70547 MR ANGIOGRAPHY NECK W/O DYE: CPT | Mod: 26,,, | Performed by: RADIOLOGY

## 2019-05-06 PROCEDURE — 85025 COMPLETE CBC W/AUTO DIFF WBC: CPT

## 2019-05-06 PROCEDURE — 70544 MR ANGIOGRAPHY HEAD W/O DYE: CPT | Mod: 26,59,, | Performed by: RADIOLOGY

## 2019-05-08 RX ORDER — MECLIZINE HCL 12.5 MG 12.5 MG/1
TABLET ORAL
Qty: 60 TABLET | Refills: 0 | Status: SHIPPED | OUTPATIENT
Start: 2019-05-08 | End: 2019-06-03

## 2019-05-10 ENCOUNTER — OFFICE VISIT (OUTPATIENT)
Dept: INTERNAL MEDICINE | Facility: CLINIC | Age: 59
End: 2019-05-10
Payer: MEDICARE

## 2019-05-10 VITALS
SYSTOLIC BLOOD PRESSURE: 110 MMHG | HEIGHT: 72 IN | HEART RATE: 71 BPM | DIASTOLIC BLOOD PRESSURE: 80 MMHG | OXYGEN SATURATION: 96 % | WEIGHT: 245.13 LBS | BODY MASS INDEX: 33.2 KG/M2

## 2019-05-10 DIAGNOSIS — R42 DIZZINESS: Primary | ICD-10-CM

## 2019-05-10 DIAGNOSIS — I15.2 HYPERTENSION ASSOCIATED WITH DIABETES: ICD-10-CM

## 2019-05-10 DIAGNOSIS — M54.50 CHRONIC MIDLINE LOW BACK PAIN WITHOUT SCIATICA: Chronic | ICD-10-CM

## 2019-05-10 DIAGNOSIS — D64.9 ANEMIA, UNSPECIFIED TYPE: ICD-10-CM

## 2019-05-10 DIAGNOSIS — E66.9 CLASS 1 OBESITY WITH SERIOUS COMORBIDITY AND BODY MASS INDEX (BMI) OF 33.0 TO 33.9 IN ADULT, UNSPECIFIED OBESITY TYPE: ICD-10-CM

## 2019-05-10 DIAGNOSIS — E11.59 HYPERTENSION ASSOCIATED WITH DIABETES: ICD-10-CM

## 2019-05-10 DIAGNOSIS — E11.9 TYPE 2 DIABETES MELLITUS WITHOUT COMPLICATION, WITHOUT LONG-TERM CURRENT USE OF INSULIN: ICD-10-CM

## 2019-05-10 DIAGNOSIS — Z00.00 PREVENTATIVE HEALTH CARE: ICD-10-CM

## 2019-05-10 DIAGNOSIS — G89.29 CHRONIC MIDLINE LOW BACK PAIN WITHOUT SCIATICA: Chronic | ICD-10-CM

## 2019-05-10 PROCEDURE — 3008F BODY MASS INDEX DOCD: CPT | Mod: CPTII,S$GLB,, | Performed by: INTERNAL MEDICINE

## 2019-05-10 PROCEDURE — 3045F PR MOST RECENT HEMOGLOBIN A1C LEVEL 7.0-9.0%: CPT | Mod: CPTII,S$GLB,, | Performed by: INTERNAL MEDICINE

## 2019-05-10 PROCEDURE — 99999 PR PBB SHADOW E&M-EST. PATIENT-LVL IV: ICD-10-PCS | Mod: PBBFAC,,, | Performed by: INTERNAL MEDICINE

## 2019-05-10 PROCEDURE — 3079F DIAST BP 80-89 MM HG: CPT | Mod: CPTII,S$GLB,, | Performed by: INTERNAL MEDICINE

## 2019-05-10 PROCEDURE — 3008F PR BODY MASS INDEX (BMI) DOCUMENTED: ICD-10-PCS | Mod: CPTII,S$GLB,, | Performed by: INTERNAL MEDICINE

## 2019-05-10 PROCEDURE — 3079F PR MOST RECENT DIASTOLIC BLOOD PRESSURE 80-89 MM HG: ICD-10-PCS | Mod: CPTII,S$GLB,, | Performed by: INTERNAL MEDICINE

## 2019-05-10 PROCEDURE — 3074F SYST BP LT 130 MM HG: CPT | Mod: CPTII,S$GLB,, | Performed by: INTERNAL MEDICINE

## 2019-05-10 PROCEDURE — 3045F PR MOST RECENT HEMOGLOBIN A1C LEVEL 7.0-9.0%: ICD-10-PCS | Mod: CPTII,S$GLB,, | Performed by: INTERNAL MEDICINE

## 2019-05-10 PROCEDURE — 99214 OFFICE O/P EST MOD 30 MIN: CPT | Mod: S$GLB,,, | Performed by: INTERNAL MEDICINE

## 2019-05-10 PROCEDURE — 3074F PR MOST RECENT SYSTOLIC BLOOD PRESSURE < 130 MM HG: ICD-10-PCS | Mod: CPTII,S$GLB,, | Performed by: INTERNAL MEDICINE

## 2019-05-10 PROCEDURE — 99999 PR PBB SHADOW E&M-EST. PATIENT-LVL IV: CPT | Mod: PBBFAC,,, | Performed by: INTERNAL MEDICINE

## 2019-05-10 PROCEDURE — 99214 PR OFFICE/OUTPT VISIT, EST, LEVL IV, 30-39 MIN: ICD-10-PCS | Mod: S$GLB,,, | Performed by: INTERNAL MEDICINE

## 2019-05-10 RX ORDER — METFORMIN HYDROCHLORIDE 1000 MG/1
1000 TABLET ORAL 2 TIMES DAILY WITH MEALS
Qty: 180 TABLET | Refills: 1 | Status: SHIPPED | OUTPATIENT
Start: 2019-05-10 | End: 2019-10-17 | Stop reason: SDUPTHER

## 2019-05-10 NOTE — PROGRESS NOTES
Pt. ID: Fabiano Malik Jr. is a 58 y.o. male      Chief complaint:   Chief Complaint   Patient presents with    Follow-up     dizziness       HPI: Pt. Here for f/u for dizziness, DM and HTN; he has f/u neurology/ENT for dizziness; workup showed no significant abnormalities to date and he takes antivert prn which helps; he is scheduled to see audiology; I reviewed labs dated 5/6/19; pt. Has mild anemia and colonoscopy dated 5/31/18 showed polyps which were removed with repeat colonoscopy in 3 years;  HGBA1C was 7.2 and metformin was stopped due to renal insufficiency; his kidney fxn has normalized and I asked him to restart;  of note, he is seeing pain management for chronic low back pain; he is taking tyelenol prn; weight is elevated but stable; he is compliant with meds and BP is WNL; he is no longer on benazepril; he is taking asa 81 mg QD; nuclear stress dated 1/22/19 was negative for ischemia with EF 60%        Review of Systems   Constitutional: Negative for chills and fever.   Respiratory: Negative for cough and shortness of breath.    Cardiovascular: Negative for chest pain.   Gastrointestinal: Negative for abdominal pain, nausea and vomiting.   Genitourinary: Negative for dysuria.   Musculoskeletal: Positive for back pain.        Low back pain especially with ROM; pt. Sees pain management    Neurological: Positive for dizziness.         Objective:    Physical Exam   Constitutional: He is oriented to person, place, and time.   obese   Eyes: EOM are normal.   Neck: Normal range of motion.   Cardiovascular: Normal rate, regular rhythm and normal heart sounds.   Pulmonary/Chest: Effort normal and breath sounds normal. No respiratory distress. He has no wheezes. He has no rales.   Abdominal: Soft. There is no tenderness. There is no rebound and no guarding.   Musculoskeletal: Normal range of motion.   Neurological: He is alert and oriented to person, place, and time.   Skin: No rash noted.   Vitals  reviewed.        Health Maintenance   Topic Date Due    TETANUS VACCINE  12/12/2019 (Originally 12/15/1978)    Eye Exam  07/10/2019    Influenza Vaccine  08/01/2019    Hemoglobin A1c  11/06/2019    Lipid Panel  12/06/2019    Foot Exam  12/12/2019    High Dose Statin  05/10/2020    Colonoscopy  05/31/2028    Hepatitis C Screening  Completed    Pneumococcal Vaccine (Medium Risk)  Completed         Assessment:     1. Dizziness Active   2. Hypertension associated with diabetes Well controlled   3. Type 2 diabetes mellitus without complication, without long-term current use of insulin Sub-optimally controlled   4. Anemia, unspecified type Active   5. Chronic midline low back pain without sciatica Active   6. Class 1 obesity with serious comorbidity and body mass index (BMI) of 33.0 to 33.9 in adult, unspecified obesity type Sub-optimally controlled   7. Preventative health care Active         Plan: Dizziness  Comments:  continue antivert prn and f/u ENT who is working up; no focal neurologic deficits     Hypertension associated with diabetes  Comments:  continue current regimen and encouraged low Na diet and weight loss   Orders:  -     CBC auto differential; Future; Expected date: 08/10/2019  -     Comprehensive metabolic panel; Future; Expected date: 08/10/2019    Type 2 diabetes mellitus without complication, without long-term current use of insulin  Comments:  restart metformin and encouraged strict ADA diet; repeat HGBA1C on f/u in 4 months   Orders:  -     metFORMIN (GLUCOPHAGE) 1000 MG tablet; Take 1 tablet (1,000 mg total) by mouth 2 (two) times daily with meals.  Dispense: 180 tablet; Refill: 1  -     CBC auto differential; Future; Expected date: 08/10/2019  -     Comprehensive metabolic panel; Future; Expected date: 08/10/2019  -     Hemoglobin A1c; Future; Expected date: 08/10/2019    Anemia, unspecified type  Comments:  mildly low; repeat CBC on f/u     Chronic midline low back pain without  sciatica  Comments:  continue tyelenol prn and f/u pain management; avoid NSAID's due to recent RI which has normalized    Class 1 obesity with serious comorbidity and body mass index (BMI) of 33.0 to 33.9 in adult, unspecified obesity type  Comments:  encouraged diet and explained risks     Preventative health care  -     CBC auto differential; Future; Expected date: 08/10/2019  -     Comprehensive metabolic panel; Future; Expected date: 08/10/2019  -     Hemoglobin A1c; Future; Expected date: 08/10/2019        Problem List Items Addressed This Visit        Cardiac/Vascular    Hypertension associated with diabetes    Relevant Medications    metFORMIN (GLUCOPHAGE) 1000 MG tablet    Other Relevant Orders    CBC auto differential    Comprehensive metabolic panel       Oncology    Anemia       Endocrine    Type 2 diabetes mellitus without complication, without long-term current use of insulin    Relevant Medications    metFORMIN (GLUCOPHAGE) 1000 MG tablet    Other Relevant Orders    CBC auto differential    Comprehensive metabolic panel    Hemoglobin A1c    Class 1 obesity with serious comorbidity and body mass index (BMI) of 33.0 to 33.9 in adult       Orthopedic    Chronic midline low back pain without sciatica (Chronic)       Other    Dizziness - Primary    Preventative health care    Relevant Orders    CBC auto differential    Comprehensive metabolic panel    Hemoglobin A1c

## 2019-05-11 ENCOUNTER — TELEPHONE (OUTPATIENT)
Dept: FAMILY MEDICINE | Facility: CLINIC | Age: 59
End: 2019-05-11

## 2019-05-27 ENCOUNTER — TELEPHONE (OUTPATIENT)
Dept: INTERNAL MEDICINE | Facility: CLINIC | Age: 59
End: 2019-05-27

## 2019-05-27 NOTE — TELEPHONE ENCOUNTER
Called patient, no answer, left VM to call office.      ----- Message from Dinah Wood sent at 5/27/2019  2:21 PM CDT -----  Contact: [phone]  Patient requested to speak with the nurse about his medication. Please call.

## 2019-05-28 ENCOUNTER — CLINICAL SUPPORT (OUTPATIENT)
Dept: AUDIOLOGY | Facility: CLINIC | Age: 59
End: 2019-05-28
Payer: MEDICARE

## 2019-05-28 ENCOUNTER — TELEPHONE (OUTPATIENT)
Dept: OTOLARYNGOLOGY | Facility: CLINIC | Age: 59
End: 2019-05-28

## 2019-05-28 DIAGNOSIS — H81.4 CENTRAL VESTIBULAR VERTIGO: Primary | ICD-10-CM

## 2019-05-28 PROCEDURE — 92545 OSCILLATING TRACKING TEST: CPT | Mod: 59,S$GLB,, | Performed by: AUDIOLOGIST

## 2019-05-28 PROCEDURE — 92541 SPONTANEOUS NYSTAGMUS TEST: CPT | Mod: S$GLB,,, | Performed by: AUDIOLOGIST

## 2019-05-28 PROCEDURE — 92544 PR OPTOKINETIC NYSTAGMUS TEST, BIDIREC, FOVEAL, PERIPH, W RECORD: ICD-10-PCS | Mod: 59,S$GLB,, | Performed by: AUDIOLOGIST

## 2019-05-28 PROCEDURE — 92537 PR CALORIC VSTBLR TEST W/REC BITHERMAL: ICD-10-PCS | Mod: S$GLB,,, | Performed by: AUDIOLOGIST

## 2019-05-28 PROCEDURE — 92537 CALORIC VSTBLR TEST W/REC: CPT | Mod: S$GLB,,, | Performed by: AUDIOLOGIST

## 2019-05-28 PROCEDURE — 92545 PR OSCILLATING TRACKING TEST: ICD-10-PCS | Mod: 59,S$GLB,, | Performed by: AUDIOLOGIST

## 2019-05-28 PROCEDURE — 92541 PR SPONTANEOUS NYSTAGMUS TEST: ICD-10-PCS | Mod: S$GLB,,, | Performed by: AUDIOLOGIST

## 2019-05-28 PROCEDURE — 92544 OPTOKINETIC NYSTAGMUS TEST: CPT | Mod: 59,S$GLB,, | Performed by: AUDIOLOGIST

## 2019-05-28 NOTE — Clinical Note
Dr. Rosa Laureano, Please find your patient's VNG results attached. Please let me know if I can provide any additional information. Thanks, Zenon Conroy, CCC-A

## 2019-05-28 NOTE — PROGRESS NOTES
VNG Evaluation    Referring physician:  Dr. Rosa Laureano    58 y.o. male complains of dizziness, lightheadedness, imbalance, nausea and multiple falls. Symptoms are constant and are worsened by any motion. Symptoms have been recurring and gradually worsening over the last two years. The patient reports his symptoms are ameliorated by closing his eyes.    Gaze nystagmus: 1 d/s down-beating nystagmus in gaze right.  Oculomotor function was abnormal: abnormal saccadic latencies and accuracies, abnormal smooth pursuit and abnormal optokinetics.  Spontaneous nystagmus: 2 d/s right-beating spontaneous nystagmus with fixation.  The head-hanging left Hallpike was attempted but was discontinued due to the patient expressing pain in his back. As a result, the head-hanging right Anton-Hallpike and static positional testing was not completed.    Unilateral weakness: 10% (right)  Directional preponderance 4% (right-beating)  RW: 20 d/s  LW: 22 d/s  RW: 13 d/s  LW: 18 d/s  Fixation suppression was normal.    Impression: VNG findings suggest central vestibular dysfunction (presence of gaze nystagmus and spontaneous nystagmus with fixation) accompanied by a central oculomotor dysfunction (abnormal oculmotor findings).    Recommendations: 1. A trial with Cawthorne exercises. A copy of these exercises was provided for the patient at today's appointment. 2. Consider referral to neurology due to central vestibular and central oculomotor abnormalities. 3. A formal Risk of Falls assessment and formal vestibular/balance rehab.     *It should be noted that Blair-Hallpike and positional testing procedures were explained to the patient prior to attempts to complete the exercises. He verbally consented to the procedures and was encouraged to report if discomfort occurred.

## 2019-05-28 NOTE — TELEPHONE ENCOUNTER
----- Message from Syeda Laureano MD sent at 5/28/2019  1:44 PM CDT -----  Please notify patient that his VNG test shows that his dizziness is from a central(ie neurologic issue) not an inner ear issue. He needs to return to Dr. Sage for follow up.   ----- Message -----  From: DIDI Le  Sent: 5/28/2019  11:43 AM  To: MD Dr. Rosa Baca,     Please find your patient's VNG results attached. Please let me know if I can provide any additional information.     Thanks,     Zenon Conroy, CCC-A      Left message for patient to call the office at [phone].

## 2019-05-29 ENCOUNTER — TELEPHONE (OUTPATIENT)
Dept: AUDIOLOGY | Facility: CLINIC | Age: 59
End: 2019-05-29

## 2019-05-29 RX ORDER — RISPERIDONE 0.5 MG/1
0.5 TABLET ORAL NIGHTLY
Qty: 30 TABLET | Refills: 11 | OUTPATIENT
Start: 2019-05-29 | End: 2020-05-28

## 2019-05-29 RX ORDER — NABUMETONE 750 MG/1
TABLET, FILM COATED ORAL
Qty: 180 TABLET | Refills: 2 | OUTPATIENT
Start: 2019-05-29

## 2019-05-29 RX ORDER — NABUMETONE 750 MG/1
750 TABLET, FILM COATED ORAL 2 TIMES DAILY PRN
Qty: 30 TABLET | Refills: 2 | Status: SHIPPED | OUTPATIENT
Start: 2019-05-29 | End: 2019-10-17 | Stop reason: SDUPTHER

## 2019-05-29 RX ORDER — TRAMADOL HYDROCHLORIDE 50 MG/1
50 TABLET ORAL
OUTPATIENT
Start: 2019-05-29

## 2019-05-29 RX ORDER — PRAVASTATIN SODIUM 40 MG/1
40 TABLET ORAL DAILY
Qty: 90 TABLET | Refills: 0 | Status: SHIPPED | OUTPATIENT
Start: 2019-05-29 | End: 2019-05-31

## 2019-05-29 NOTE — TELEPHONE ENCOUNTER
Called and spoke w/ patient. Rx approved for relafen 750 mg. Sent to [facility]. Per Dr. Ellis avoid vimovo and mobic while taking relafen. Patient verbalized understanding.

## 2019-05-29 NOTE — TELEPHONE ENCOUNTER
I noticed med list shows pt. Is on mobic and vimovo, which he should not take with nabumetone (relafen), which are the same meds; please have pt. Stop mobic and vimovo

## 2019-05-29 NOTE — TELEPHONE ENCOUNTER
Called and spoke w/ patient. He is having neck and back pain and is requesting refills on nabumetone 750 mg and Tramadol.

## 2019-05-29 NOTE — TELEPHONE ENCOUNTER
----- Message from Dina Machado sent at 5/29/2019 10:58 AM CDT -----    Needs Advice    Reason for call:Please give pt a call back regarding vng         Communication Preference:[phone]    Additional Information:n/a

## 2019-05-30 ENCOUNTER — TELEPHONE (OUTPATIENT)
Dept: OTOLARYNGOLOGY | Facility: CLINIC | Age: 59
End: 2019-05-30

## 2019-05-30 NOTE — TELEPHONE ENCOUNTER
----- Message from Emma Swanson sent at 5/30/2019  3:45 PM CDT -----  Contact: Self/ [phone]  Patient called in returning your call. Please call and advise.    Per ,  Spoke with patient and explained to him that is VNG test for dizziness is neurologic and not an inner ear issue. Patient should follow up with . Patient stated he would call. Patient voice understanding.

## 2019-05-31 RX ORDER — PRAVASTATIN SODIUM 40 MG/1
TABLET ORAL
Qty: 30 TABLET | Refills: 0 | Status: SHIPPED | OUTPATIENT
Start: 2019-05-31 | End: 2019-09-04 | Stop reason: SDUPTHER

## 2019-06-03 RX ORDER — MECLIZINE HCL 12.5 MG 12.5 MG/1
TABLET ORAL
Qty: 60 TABLET | Refills: 0 | Status: SHIPPED | OUTPATIENT
Start: 2019-06-03 | End: 2019-07-09 | Stop reason: SDUPTHER

## 2019-06-14 DIAGNOSIS — I15.2 HYPERTENSION ASSOCIATED WITH DIABETES: ICD-10-CM

## 2019-06-14 DIAGNOSIS — E11.59 HYPERTENSION ASSOCIATED WITH DIABETES: ICD-10-CM

## 2019-06-14 RX ORDER — OLMESARTAN MEDOXOMIL 20 MG/1
TABLET ORAL
Qty: 60 TABLET | Refills: 2 | Status: SHIPPED | OUTPATIENT
Start: 2019-06-14 | End: 2019-10-24 | Stop reason: SDUPTHER

## 2019-06-14 RX ORDER — TRAMADOL HYDROCHLORIDE 50 MG/1
50 TABLET ORAL
OUTPATIENT
Start: 2019-06-14

## 2019-06-14 NOTE — TELEPHONE ENCOUNTER
Called and spoke w/ patient. Requesting refills on olmesartan and tramadol. I will forward to Dr. Ellis.        ----- Message from Carmella Villa sent at 6/14/2019  2:45 PM CDT -----  Contact: [phone] /self  Type:  Needs Medical Advice    Who Called:   Symptoms (please be specific):   How long has patient had these symptoms:    Pharmacy name and phone #:    Would the patient rather a call back or a response via [facility]? call  Best Call Back Number:   Additional Information: Should he take his 3 prescriptions. Call him to discuss

## 2019-06-14 NOTE — TELEPHONE ENCOUNTER
Called and spoke w/ patient, Pain Management doctor will not fill tramadol. He will call pain mngt doctor so they can note if PCP can fill.

## 2019-06-14 NOTE — TELEPHONE ENCOUNTER
Notify pt. He will need to discuss tramadol refills with pain management or pain management will have to document if it is okay I refill tramadol and at what dose

## 2019-06-24 ENCOUNTER — TELEPHONE (OUTPATIENT)
Dept: INTERNAL MEDICINE | Facility: CLINIC | Age: 59
End: 2019-06-24

## 2019-06-24 NOTE — TELEPHONE ENCOUNTER
----- Message from Kina Jasmine sent at 6/24/2019 11:04 AM CDT -----  No. [phone]   FYI    Patient is taking Latuda.   He would like to speak to the nurse.

## 2019-06-24 NOTE — TELEPHONE ENCOUNTER
----- Message from Chago Sheldon sent at 6/24/2019  2:45 PM CDT -----  Contact: [phone]/self  Patient is returning your call.  Please call back to assist at [phone]

## 2019-06-28 ENCOUNTER — TELEPHONE (OUTPATIENT)
Dept: INTERNAL MEDICINE | Facility: CLINIC | Age: 59
End: 2019-06-28

## 2019-06-28 NOTE — TELEPHONE ENCOUNTER
Called and spoke w/ patient. He fell yesterday at home, did not go to the [facility] Advised to get checked out, he is not having any symptoms at this time. Offered appt w/ Dr. Ellis and declined appt at this time.        ----- Message from Chago Sheldon sent at 6/28/2019  1:40 PM CDT -----  Contact: [phone]/self  Patient calling to speak with you concerning falling.   Please call back to assist at [phone]

## 2019-07-09 RX ORDER — GABAPENTIN 300 MG/1
CAPSULE ORAL
Qty: 30 CAPSULE | Refills: 0 | Status: SHIPPED | OUTPATIENT
Start: 2019-07-09 | End: 2019-08-22 | Stop reason: SDUPTHER

## 2019-07-10 RX ORDER — MECLIZINE HCL 12.5 MG 12.5 MG/1
TABLET ORAL
Qty: 60 TABLET | Refills: 0 | Status: SHIPPED | OUTPATIENT
Start: 2019-07-10 | End: 2019-09-17 | Stop reason: SDUPTHER

## 2019-08-12 PROBLEM — Z00.00 PREVENTATIVE HEALTH CARE: Status: RESOLVED | Noted: 2018-11-28 | Resolved: 2019-08-12

## 2019-08-22 RX ORDER — GABAPENTIN 300 MG/1
CAPSULE ORAL
Qty: 30 CAPSULE | Refills: 0 | Status: SHIPPED | OUTPATIENT
Start: 2019-08-22 | End: 2019-09-04

## 2019-08-30 ENCOUNTER — TELEPHONE (OUTPATIENT)
Dept: INTERNAL MEDICINE | Facility: CLINIC | Age: 59
End: 2019-08-30

## 2019-08-30 NOTE — TELEPHONE ENCOUNTER
Called and spoke w/ patient. Patient had a fall and would like to be seen next week. Rescheduled labs to 8/31/19 and appt on 9/3/19 @ 10:40am. Patient verbalized understanding.        ----- Message from Nataly Brito sent at 8/30/2019  8:49 AM CDT -----  Contact: Self [phone]  Patient Returning Your Phone Call

## 2019-08-31 ENCOUNTER — LAB VISIT (OUTPATIENT)
Dept: LAB | Facility: HOSPITAL | Age: 59
End: 2019-08-31
Attending: INTERNAL MEDICINE
Payer: MEDICARE

## 2019-08-31 DIAGNOSIS — I15.2 HYPERTENSION ASSOCIATED WITH DIABETES: ICD-10-CM

## 2019-08-31 DIAGNOSIS — Z00.00 PREVENTATIVE HEALTH CARE: ICD-10-CM

## 2019-08-31 DIAGNOSIS — E11.9 TYPE 2 DIABETES MELLITUS WITHOUT COMPLICATION, WITHOUT LONG-TERM CURRENT USE OF INSULIN: ICD-10-CM

## 2019-08-31 DIAGNOSIS — E11.59 HYPERTENSION ASSOCIATED WITH DIABETES: ICD-10-CM

## 2019-08-31 LAB
ALBUMIN SERPL BCP-MCNC: 3.8 G/DL (ref 3.5–5.2)
ALP SERPL-CCNC: 49 U/L (ref 55–135)
ALT SERPL W/O P-5'-P-CCNC: 34 U/L (ref 10–44)
ANION GAP SERPL CALC-SCNC: 9 MMOL/L (ref 8–16)
AST SERPL-CCNC: 33 U/L (ref 10–40)
BASOPHILS # BLD AUTO: 0.07 K/UL (ref 0–0.2)
BASOPHILS NFR BLD: 1.7 % (ref 0–1.9)
BILIRUB SERPL-MCNC: 0.6 MG/DL (ref 0.1–1)
BUN SERPL-MCNC: 12 MG/DL (ref 6–20)
CALCIUM SERPL-MCNC: 9.4 MG/DL (ref 8.7–10.5)
CHLORIDE SERPL-SCNC: 105 MMOL/L (ref 95–110)
CO2 SERPL-SCNC: 24 MMOL/L (ref 23–29)
CREAT SERPL-MCNC: 1.4 MG/DL (ref 0.5–1.4)
DIFFERENTIAL METHOD: ABNORMAL
EOSINOPHIL # BLD AUTO: 0.2 K/UL (ref 0–0.5)
EOSINOPHIL NFR BLD: 4.4 % (ref 0–8)
ERYTHROCYTE [DISTWIDTH] IN BLOOD BY AUTOMATED COUNT: 13.8 % (ref 11.5–14.5)
EST. GFR  (AFRICAN AMERICAN): >60 ML/MIN/1.73 M^2
EST. GFR  (NON AFRICAN AMERICAN): 55 ML/MIN/1.73 M^2
ESTIMATED AVG GLUCOSE: 143 MG/DL (ref 68–131)
GLUCOSE SERPL-MCNC: 145 MG/DL (ref 70–110)
HBA1C MFR BLD HPLC: 6.6 % (ref 4–5.6)
HCT VFR BLD AUTO: 38.8 % (ref 40–54)
HGB BLD-MCNC: 12.9 G/DL (ref 14–18)
IMM GRANULOCYTES # BLD AUTO: 0.01 K/UL (ref 0–0.04)
IMM GRANULOCYTES NFR BLD AUTO: 0.2 % (ref 0–0.5)
LYMPHOCYTES # BLD AUTO: 1.4 K/UL (ref 1–4.8)
LYMPHOCYTES NFR BLD: 34.1 % (ref 18–48)
MCH RBC QN AUTO: 32.3 PG (ref 27–31)
MCHC RBC AUTO-ENTMCNC: 33.2 G/DL (ref 32–36)
MCV RBC AUTO: 97 FL (ref 82–98)
MONOCYTES # BLD AUTO: 0.5 K/UL (ref 0.3–1)
MONOCYTES NFR BLD: 11.9 % (ref 4–15)
NEUTROPHILS # BLD AUTO: 2 K/UL (ref 1.8–7.7)
NEUTROPHILS NFR BLD: 47.7 % (ref 38–73)
NRBC BLD-RTO: 0 /100 WBC
PLATELET # BLD AUTO: 239 K/UL (ref 150–350)
PMV BLD AUTO: 9.4 FL (ref 9.2–12.9)
POTASSIUM SERPL-SCNC: 4.8 MMOL/L (ref 3.5–5.1)
PROT SERPL-MCNC: 6.6 G/DL (ref 6–8.4)
RBC # BLD AUTO: 4 M/UL (ref 4.6–6.2)
SODIUM SERPL-SCNC: 138 MMOL/L (ref 136–145)
WBC # BLD AUTO: 4.13 K/UL (ref 3.9–12.7)

## 2019-08-31 PROCEDURE — 36415 COLL VENOUS BLD VENIPUNCTURE: CPT | Mod: PO

## 2019-08-31 PROCEDURE — 83036 HEMOGLOBIN GLYCOSYLATED A1C: CPT

## 2019-08-31 PROCEDURE — 80053 COMPREHEN METABOLIC PANEL: CPT

## 2019-08-31 PROCEDURE — 85025 COMPLETE CBC W/AUTO DIFF WBC: CPT

## 2019-09-04 ENCOUNTER — PATIENT OUTREACH (OUTPATIENT)
Dept: ADMINISTRATIVE | Facility: OTHER | Age: 59
End: 2019-09-04

## 2019-09-04 ENCOUNTER — OFFICE VISIT (OUTPATIENT)
Dept: INTERNAL MEDICINE | Facility: CLINIC | Age: 59
End: 2019-09-04
Payer: MEDICARE

## 2019-09-04 VITALS
HEIGHT: 72 IN | SYSTOLIC BLOOD PRESSURE: 122 MMHG | OXYGEN SATURATION: 98 % | WEIGHT: 245.56 LBS | HEART RATE: 76 BPM | DIASTOLIC BLOOD PRESSURE: 86 MMHG | BODY MASS INDEX: 33.26 KG/M2

## 2019-09-04 DIAGNOSIS — I15.2 HYPERTENSION ASSOCIATED WITH DIABETES: ICD-10-CM

## 2019-09-04 DIAGNOSIS — R51.9 NONINTRACTABLE HEADACHE, UNSPECIFIED CHRONICITY PATTERN, UNSPECIFIED HEADACHE TYPE: ICD-10-CM

## 2019-09-04 DIAGNOSIS — M54.50 LOW BACK PAIN, NON-SPECIFIC: ICD-10-CM

## 2019-09-04 DIAGNOSIS — E11.9 TYPE 2 DIABETES MELLITUS WITHOUT COMPLICATION, WITHOUT LONG-TERM CURRENT USE OF INSULIN: Primary | ICD-10-CM

## 2019-09-04 DIAGNOSIS — N52.9 ERECTILE DYSFUNCTION, UNSPECIFIED ERECTILE DYSFUNCTION TYPE: ICD-10-CM

## 2019-09-04 DIAGNOSIS — R21 RASH: ICD-10-CM

## 2019-09-04 DIAGNOSIS — E11.59 HYPERTENSION ASSOCIATED WITH DIABETES: ICD-10-CM

## 2019-09-04 DIAGNOSIS — S09.90XA TRAUMATIC INJURY OF HEAD, INITIAL ENCOUNTER: ICD-10-CM

## 2019-09-04 DIAGNOSIS — E66.9 CLASS 1 OBESITY WITH SERIOUS COMORBIDITY AND BODY MASS INDEX (BMI) OF 33.0 TO 33.9 IN ADULT, UNSPECIFIED OBESITY TYPE: ICD-10-CM

## 2019-09-04 DIAGNOSIS — Z23 FLU VACCINE NEED: ICD-10-CM

## 2019-09-04 DIAGNOSIS — N28.9 RENAL INSUFFICIENCY: ICD-10-CM

## 2019-09-04 DIAGNOSIS — M54.2 NECK PAIN: ICD-10-CM

## 2019-09-04 PROCEDURE — 3074F PR MOST RECENT SYSTOLIC BLOOD PRESSURE < 130 MM HG: ICD-10-PCS | Mod: CPTII,S$GLB,, | Performed by: INTERNAL MEDICINE

## 2019-09-04 PROCEDURE — 90686 IIV4 VACC NO PRSV 0.5 ML IM: CPT | Mod: S$GLB,,, | Performed by: INTERNAL MEDICINE

## 2019-09-04 PROCEDURE — 3008F PR BODY MASS INDEX (BMI) DOCUMENTED: ICD-10-PCS | Mod: CPTII,S$GLB,, | Performed by: INTERNAL MEDICINE

## 2019-09-04 PROCEDURE — 3074F SYST BP LT 130 MM HG: CPT | Mod: CPTII,S$GLB,, | Performed by: INTERNAL MEDICINE

## 2019-09-04 PROCEDURE — 99999 PR PBB SHADOW E&M-EST. PATIENT-LVL V: CPT | Mod: PBBFAC,,, | Performed by: INTERNAL MEDICINE

## 2019-09-04 PROCEDURE — G0008 FLU VACCINE (QUAD) GREATER THAN OR EQUAL TO 3YO PRESERVATIVE FREE IM: ICD-10-PCS | Mod: S$GLB,,, | Performed by: INTERNAL MEDICINE

## 2019-09-04 PROCEDURE — 3044F HG A1C LEVEL LT 7.0%: CPT | Mod: CPTII,S$GLB,, | Performed by: INTERNAL MEDICINE

## 2019-09-04 PROCEDURE — 3079F DIAST BP 80-89 MM HG: CPT | Mod: CPTII,S$GLB,, | Performed by: INTERNAL MEDICINE

## 2019-09-04 PROCEDURE — 99214 PR OFFICE/OUTPT VISIT, EST, LEVL IV, 30-39 MIN: ICD-10-PCS | Mod: 25,S$GLB,, | Performed by: INTERNAL MEDICINE

## 2019-09-04 PROCEDURE — 99214 OFFICE O/P EST MOD 30 MIN: CPT | Mod: 25,S$GLB,, | Performed by: INTERNAL MEDICINE

## 2019-09-04 PROCEDURE — 3008F BODY MASS INDEX DOCD: CPT | Mod: CPTII,S$GLB,, | Performed by: INTERNAL MEDICINE

## 2019-09-04 PROCEDURE — G0008 ADMIN INFLUENZA VIRUS VAC: HCPCS | Mod: S$GLB,,, | Performed by: INTERNAL MEDICINE

## 2019-09-04 PROCEDURE — 90686 FLU VACCINE (QUAD) GREATER THAN OR EQUAL TO 3YO PRESERVATIVE FREE IM: ICD-10-PCS | Mod: S$GLB,,, | Performed by: INTERNAL MEDICINE

## 2019-09-04 PROCEDURE — 3079F PR MOST RECENT DIASTOLIC BLOOD PRESSURE 80-89 MM HG: ICD-10-PCS | Mod: CPTII,S$GLB,, | Performed by: INTERNAL MEDICINE

## 2019-09-04 PROCEDURE — 99999 PR PBB SHADOW E&M-EST. PATIENT-LVL V: ICD-10-PCS | Mod: PBBFAC,,, | Performed by: INTERNAL MEDICINE

## 2019-09-04 PROCEDURE — 3044F PR MOST RECENT HEMOGLOBIN A1C LEVEL <7.0%: ICD-10-PCS | Mod: CPTII,S$GLB,, | Performed by: INTERNAL MEDICINE

## 2019-09-04 RX ORDER — CLOTRIMAZOLE AND BETAMETHASONE DIPROPIONATE 10; .5 MG/ML; MG/ML
LOTION TOPICAL 2 TIMES DAILY PRN
Qty: 30 ML | Refills: 0 | Status: SHIPPED | OUTPATIENT
Start: 2019-09-04 | End: 2020-01-23

## 2019-09-04 RX ORDER — SILDENAFIL 50 MG/1
50 TABLET, FILM COATED ORAL DAILY PRN
Qty: 30 TABLET | Refills: 0 | Status: SHIPPED | OUTPATIENT
Start: 2019-09-04 | End: 2019-09-17 | Stop reason: SDUPTHER

## 2019-09-04 RX ORDER — TRAMADOL HYDROCHLORIDE 50 MG/1
50 TABLET ORAL 3 TIMES DAILY PRN
Qty: 21 TABLET | Refills: 0 | Status: SHIPPED | OUTPATIENT
Start: 2019-09-04 | End: 2019-09-11

## 2019-09-04 RX ORDER — PRAVASTATIN SODIUM 40 MG/1
TABLET ORAL
Qty: 90 TABLET | Refills: 0 | Status: SHIPPED | OUTPATIENT
Start: 2019-09-04 | End: 2019-12-02 | Stop reason: SDUPTHER

## 2019-09-04 NOTE — PROGRESS NOTES
Pt. ID: Fabiano Malik Jr. is a 58 y.o. male      Chief complaint:   Chief Complaint   Patient presents with    Follow-up       HPI: Pt. Here for f/u for HTN and DM; he is compliant with meds and BP is close to goal; I reviewed labs dated 8/31/19; anemia is stable and colonoscopy dated 5/31/18 showed 4 polyps which were removed; repeat colonoscopy in 3 years; kidney fxn is elevated; HGBA1C is 6.6; weight is elevated but stable; he would like a flu shot; he will get shingles vaccine at a local pharmacy; of note, pt. Sees pain management for chronic low back pain and is scheduled later this week; he is off tramadol, neurontin, but is taking zanaflex prn; of note, pt. Had an accidental fall last this past week; he states his neck and R low back have been bothering him; he does state he hit his head and has headaches as well; pt. Reports rash to buttock for past 2 weeks which does not itch; pt. Reports ED and would like treatment ; nuclear stress dated 1/17/19 was negative for ischemia      Review of Systems   Constitutional: Negative for chills and fever.   Respiratory: Negative for cough and shortness of breath.    Cardiovascular: Negative for chest pain.   Gastrointestinal: Negative for abdominal pain, nausea and vomiting.   Genitourinary: Negative for dysuria.        ED   Musculoskeletal: Positive for back pain and neck pain.        Neck and R low back pain with ROM   Skin: Positive for rash.        Circular erythematous rash to B/L buttock regions   Neurological: Positive for headaches.         Objective:    Physical Exam   Constitutional: He is oriented to person, place, and time.   obese   Eyes: Pupils are equal, round, and reactive to light. EOM are normal.   Neck: Normal range of motion.   Cardiovascular: Normal rate, regular rhythm and normal heart sounds.   Pulmonary/Chest: Effort normal and breath sounds normal. No respiratory distress. He has no wheezes. He has no rales.   Abdominal: Soft. There is no  tenderness. There is no rebound and no guarding.   Musculoskeletal:   R low back pain with ROM and neck pain with ROM; posterior neck tenderness noted    Neurological: He is alert and oriented to person, place, and time.   Skin: Rash noted.   Large, scaly, circular rash to B/L buttock   Vitals reviewed.        Health Maintenance   Topic Date Due    Eye Exam  07/10/2019    Lipid Panel  12/06/2019    Foot Exam  12/12/2019    Hemoglobin A1c  02/29/2020    High Dose Statin  09/04/2020    Colonoscopy  05/31/2028    TETANUS VACCINE  12/12/2028    Hepatitis C Screening  Completed    Pneumococcal Vaccine (Medium Risk)  Completed         Assessment:     1. Type 2 diabetes mellitus without complication, without long-term current use of insulin Well controlled   2. Hypertension associated with diabetes Well controlled   3. Traumatic injury of head, initial encounter Active   4. Nonintractable headache, unspecified chronicity pattern, unspecified headache type Active   5. Low back pain, non-specific Active   6. Neck pain Active   7. Renal insufficiency Active   8. Rash Active   9. Erectile dysfunction, unspecified erectile dysfunction type Active   10. Class 1 obesity with serious comorbidity and body mass index (BMI) of 33.0 to 33.9 in adult, unspecified obesity type Sub-optimally controlled   11. Flu vaccine need Active         Plan: Type 2 diabetes mellitus without complication, without long-term current use of insulin  Comments:  continue current regimen and encouraged ADA diet  Orders:  -     Basic metabolic panel; Future; Expected date: 09/11/2019    Hypertension associated with diabetes  Comments:  continue current regimen and encouraged low Na diet and weight loss   Orders:  -     Basic metabolic panel; Future; Expected date: 09/11/2019    Traumatic injury of head, initial encounter  Comments:  get CT head without contrast; no focal neurologic deficts   Orders:  -     CT Head Without Contrast; Future; Expected  date: 09/04/2019  -     traMADol (ULTRAM) 50 mg tablet; Take 1 tablet (50 mg total) by mouth 3 (three) times daily as needed for Pain.  Dispense: 21 tablet; Refill: 0    Nonintractable headache, unspecified chronicity pattern, unspecified headache type  Comments:  start tramadol prn; no focal neurologic deficits     Low back pain, non-specific  Comments:  start tramadol prn and continue zanaflex prn; cautioned on sedative effects; get lumbar xray   Orders:  -     X-Ray Lumbar Spine AP And Lateral; Future; Expected date: 09/04/2019  -     traMADol (ULTRAM) 50 mg tablet; Take 1 tablet (50 mg total) by mouth 3 (three) times daily as needed for Pain.  Dispense: 21 tablet; Refill: 0    Neck pain  Comments:  start tramadol prn and continue zanaflex prn; cautioned on sedative effects; get cervical xray   Orders:  -     X-Ray Cervical Spine AP And Lateral; Future; Expected date: 09/04/2019  -     traMADol (ULTRAM) 50 mg tablet; Take 1 tablet (50 mg total) by mouth 3 (three) times daily as needed for Pain.  Dispense: 21 tablet; Refill: 0    Renal insufficiency  Comments:  encouraged PO fluid intake and repeat BMP prior to 2 week f/u; avoid NSAIDs   Orders:  -     Basic metabolic panel; Future; Expected date: 09/11/2019    Rash  Comments:  start lotrisone and re-evaluate in 2 weeks   Orders:  -     clotrimazole-betamethasone (LOTRISONE) lotion; Apply topically 2 (two) times daily as needed.  Dispense: 30 mL; Refill: 0    Erectile dysfunction, unspecified erectile dysfunction type  Comments:  start viagra prn and re-evaluate in 2 weeks   Orders:  -     sildenafil (VIAGRA) 50 MG tablet; Take 1 tablet (50 mg total) by mouth daily as needed for Erectile Dysfunction (30 minutes prior to sex prn; max 1 tab/day; avoid with nitroglycerine).  Dispense: 30 tablet; Refill: 0    Class 1 obesity with serious comorbidity and body mass index (BMI) of 33.0 to 33.9 in adult, unspecified obesity type  Comments:  encouraged diet and explained  risks     Flu vaccine need  -     Influenza - Quadrivalent (3 years & older) (PF)        Problem List Items Addressed This Visit        Neuro    Head trauma    Relevant Medications    traMADol (ULTRAM) 50 mg tablet    Other Relevant Orders    CT Head Without Contrast    Nonintractable headache       Derm    Rash    Relevant Medications    clotrimazole-betamethasone (LOTRISONE) lotion       Cardiac/Vascular    Hypertension associated with diabetes    Relevant Orders    Basic metabolic panel       Renal/    Renal insufficiency    Relevant Orders    Basic metabolic panel    Erectile dysfunction    Relevant Medications    sildenafil (VIAGRA) 50 MG tablet       ID    Flu vaccine need    Relevant Orders    Influenza - Quadrivalent (3 years & older) (PF) (Completed)       Endocrine    Type 2 diabetes mellitus without complication, without long-term current use of insulin - Primary    Relevant Orders    Basic metabolic panel    Class 1 obesity with serious comorbidity and body mass index (BMI) of 33.0 to 33.9 in adult       Orthopedic    Low back pain, non-specific (Chronic)    Relevant Medications    traMADol (ULTRAM) 50 mg tablet    Other Relevant Orders    X-Ray Lumbar Spine AP And Lateral    Neck pain    Relevant Medications    traMADol (ULTRAM) 50 mg tablet    Other Relevant Orders    X-Ray Cervical Spine AP And Lateral

## 2019-09-05 ENCOUNTER — HOSPITAL ENCOUNTER (OUTPATIENT)
Dept: RADIOLOGY | Facility: HOSPITAL | Age: 59
Discharge: HOME | End: 2019-09-05
Attending: INTERNAL MEDICINE
Payer: MEDICARE

## 2019-09-05 DIAGNOSIS — M54.2 NECK PAIN: ICD-10-CM

## 2019-09-05 DIAGNOSIS — M54.50 LOW BACK PAIN, NON-SPECIFIC: ICD-10-CM

## 2019-09-05 PROCEDURE — 72040 X-RAY EXAM NECK SPINE 2-3 VW: CPT | Mod: TC,FY,PO

## 2019-09-05 PROCEDURE — 72040 X-RAY EXAM NECK SPINE 2-3 VW: CPT | Mod: 26,,, | Performed by: RADIOLOGY

## 2019-09-05 PROCEDURE — 72100 X-RAY EXAM L-S SPINE 2/3 VWS: CPT | Mod: 26,,, | Performed by: RADIOLOGY

## 2019-09-05 PROCEDURE — 72100 X-RAY EXAM L-S SPINE 2/3 VWS: CPT | Mod: TC,FY,PO

## 2019-09-05 PROCEDURE — 72040 XR CERVICAL SPINE AP LATERAL: ICD-10-PCS | Mod: 26,,, | Performed by: RADIOLOGY

## 2019-09-05 PROCEDURE — 72100 XR LUMBAR SPINE AP AND LATERAL: ICD-10-PCS | Mod: 26,,, | Performed by: RADIOLOGY

## 2019-09-05 NOTE — H&P (VIEW-ONLY)
[facility] Established Patient Evaluation    Chief Complaint  Chief Complaint   Patient presents with    Low-back Pain    Leg Pain     right        Last 3 PDI Scores 9/6/2019 4/8/2019 3/26/2019   Pain Disability Index (PDI) 70 60 60       Interval Update 9/6/19 Pt returns today complaining of low back pain.     4/8/19 Pt returns today for med refill.     3/26/19 Pt returns today S/P LEFT L2,3,4,5 Lumbar Medial Branch Radiofrequency Ablation on 3/12/19 with 30% relief.    2/12/19 Pt returns today S/P Right  L2,3,4,5 Lumbar Medial Branch Radiofrequency Ablation on 1/8/19 with 75% relief.       Fabiano Malik Jr. presents to me for low back and neck pain her reports 9.5/10 pain today he has not been seen in our clinic for 6 months  patient states today his primary source of pain is low back with some bilateral leg pain. He is status post right and left lumbar RFA at L2-3-4-5 70% relief in May of 2018.    Location: Low back   Onset: 3 months  Current Pain Score: 10/10/9/10  Daily Pain of Range: 9-10/10  Quality: Burning, Throbbing, Grabbing and Sharp  Radiation: Bilateral legs   Worsened by: exercise, extension, flexion, lifting, lying down and sneezing  Improved by: heat and rest    Background from Chart Review      Fabiano Malik Jr. presents to the clinic for a 4 wk follow-up appointment for neck and low back pain. He is S/P  left L2-3-4-5 FACET MEDIAL BRANCH NERVE RADIOFREQUENCY NEUROTOMY (lumbar) on 5/29/18 and  RIGHT L2-3-4-5 FACET MEDIAL BRANCH NERVE RADIOFREQUENCY NEUROTOMY on 5/22/18 with 70% relief for 2 weeks, pt states pain is back but overall better since procedure, I will order PT and refill Mobic and Lyrica today.  Since the last visit, Fabiano Malik Jr. states the pain has been persistant. Current pain intensity is 8/10.       Treatment History  PT/OT:   HEP:   TENS:  Injections: 3/12/19 LEFT L2,3,4,5 Lumbar Medial Branch Radiofrequency Ablation  1/8/19 BILATERAL L2,3,4,5 Lumbar  Medial Branch Radiofrequency Ablation  Surgery:  Medications:   - NSAIDS:   - MSK Relaxants:   - TCAs:   - SNRIs:   - Topicals:   - Opioids:   - Anticonvulsants:     Current Pain Medications  1. Cymbalta  60 mg BID   2. Tylenol   3. Klonopin  4. Seroquel  5. Risperdal    Full Medication List    Current Outpatient Medications:     acetaminophen (TYLENOL) 325 MG tablet, Take 2 tablets (650 mg total) by mouth every 6 (six) hours as needed., Disp: 120 tablet, Rfl: 3    aspirin (ECOTRIN) 81 MG EC tablet, Take 81 mg by mouth once daily., Disp: , Rfl:     blood sugar diagnostic Strp, 2 strips by Misc.(Non-Drug; Combo Route) route 2 (two) times daily., Disp: 200 strip, Rfl: 2    blood-glucose meter kit, Use as instructed, Disp: 1 each, Rfl: 0    clonazePAM (KLONOPIN) 1 MG tablet, TK 1 T PO TID, Disp: , Rfl: 3    clotrimazole-betamethasone (LOTRISONE) lotion, Apply topically 2 (two) times daily as needed., Disp: 30 mL, Rfl: 0    diclofenac sodium (PENNSAID) 20 mg/gram /actuation(2 %) sopm, Apply 40 mg topically 2 (two) times daily., Disp: 1 Bottle, Rfl: 1    DULoxetine (CYMBALTA) 60 MG capsule, Take 1 capsule (60 mg total) by mouth 2 (two) times daily., Disp: 60 capsule, Rfl: 11    lancets (ONETOUCH DELICA LANCETS) 33 gauge Misc, 1 lancet by Misc.(Non-Drug; Combo Route) route 2 (two) times daily., Disp: 200 each, Rfl: 2    meclizine (ANTIVERT) 12.5 mg tablet, TAKE 1 TABLET(12.5 MG) BY MOUTH THREE TIMES DAILY AS NEEDED, Disp: 60 tablet, Rfl: 0    metFORMIN (GLUCOPHAGE) 1000 MG tablet, Take 1 tablet (1,000 mg total) by mouth 2 (two) times daily with meals., Disp: 180 tablet, Rfl: 1    nabumetone (RELAFEN) 750 MG tablet, Take 1 tablet (750 mg total) by mouth 2 (two) times daily as needed (avoid all other NSAIDs)., Disp: 30 tablet, Rfl: 2    olmesartan (BENICAR) 20 MG tablet, TAKE 1 TABLET(20 MG) BY MOUTH TWICE DAILY, Disp: 60 tablet, Rfl: 2    omeprazole (PRILOSEC) 20 MG capsule, Take 1 capsule (20 mg total) by  mouth daily as needed., Disp: 30 capsule, Rfl: 2    pantoprazole (PROTONIX) 40 MG tablet, TAKE 1 TABLET(40 MG) BY MOUTH DAILY AS NEEDED, Disp: 90 tablet, Rfl: 0    pravastatin (PRAVACHOL) 40 MG tablet, TAKE 1 TABLET(40 MG) BY MOUTH EVERY DAY, Disp: 90 tablet, Rfl: 0    QUEtiapine (SEROQUEL) 400 MG tablet, Take 400 mg by mouth once daily. , Disp: , Rfl:     risperiDONE (RISPERDAL) 0.5 MG Tab, Take 1 tablet (0.5 mg total) by mouth every evening., Disp: 30 tablet, Rfl: 11    sildenafil (VIAGRA) 50 MG tablet, Take 1 tablet (50 mg total) by mouth daily as needed for Erectile Dysfunction (30 minutes prior to sex prn; max 1 tab/day; avoid with nitroglycerine)., Disp: 30 tablet, Rfl: 0    sodium polystyrene (KAYEXALATE) 15 gram/60 mL Susp, Take 15 g by mouth., Disp: , Rfl:     tiZANidine (ZANAFLEX) 2 MG tablet, TAKE 1 TABLET(2 MG) BY MOUTH TWICE DAILY AS NEEDED, Disp: 180 tablet, Rfl: 1    traMADol (ULTRAM) 50 mg tablet, Take 1 tablet (50 mg total) by mouth 3 (three) times daily as needed for Pain., Disp: 21 tablet, Rfl: 0  No current facility-administered medications for this visit.     Facility-Administered Medications Ordered in Other Visits:     alprazolam ODT dissolvable tablet 0.5 mg, 0.5 mg, Oral, Once PRN, Alberto Hernandez Jr., MD     Review of Systems  ROS    Medical History  Past Medical History:   Diagnosis Date    Chronic back pain greater than 3 months duration     Depression     Diabetes mellitus     Hypertension     Schizophrenia         Surgical History  Past Surgical History:   Procedure Laterality Date    APPENDECTOMY      BLOCK-NERVE-MEDIAL BRANCH-LUMBAR- Bilateral L2-3-4-5 Bilateral 4/24/2018    Performed [facility] at [facility] PAIN MGT    BLOCK-NERVE-MEDIAL BRANCH-LUMBAR- BILATERAL, L2,3,4,5 Bilateral 2/20/2018    Performed by [facility] at [facility] PAIN MGT    BLOCK-NERVE-MEDIAL BRANCH-LUMBAR- LUMBAR- BILATERAL- L2,3,4,5 Bilateral 2/6/2018    Performed by Donavon AMADO  MD Je[facility] at [facility]    COLONOSCOPY N/A 5/31/2018    Per[facility]ume [facility]atch [facility] at [facility] ENDO    NECK SURGERY      Radiofrequency Ablation, Nerve, Spinal, Lumbar, Medial Branch, L2,3,4,5 Right 1/8/2019    Performed by Alberto Hernandez Jr., MD at [facility]    Radiofrequency Ablation, Nerve, Spinal, Lumbar, Medial Branch, Left, L2,3,4,5 Left 3/12/2019    Performed by Alberto Hernandez Jr., MD at [facility]    RADIOFREQUENCY THERMOCOAGULATION (RFTC)-NERVE-MEDIAN BRANCH-LUMBAR- Left L2-3-4-5 Left 5/29/2018    Performed by [facility] Dennis MD at [facility]    RADIOFREQUENCY THERMOCOAGULATION (RFTC)-NERVE-MEDIAN BRANCH-LUMBAR- Right L2-3-4-5 Right 5/22/2018    Performed by Donavon Dennis MD at [facility]        Physical Exam  Vitals:    09/06/19 1033   BP: (!) 89/59   Pulse: 104   Weight: 111.4 kg (245 lb 11.2 oz)   PainSc: 10-Worst pain ever         General Musculoskeletal Exam   Gait: antalgic     Back (L-Spine & T-Spine) / Neck (C-Spine) Exam     Back (L-Spine & T-Spine) Range of Motion   Lateral bend right: abnormal   Lateral bend left: abnormal   Rotation right: abnormal   Rotation left: abnormal     Spinal Sensation   Right Side Sensation  L-Spine Level: hypersensitive  Left Side Sensation  L-Spine Level: hypersensitive    Back (L-Spine & T-Spine) Tests   Right Side Tests  Squat Test: unable to perform  Left Side Tests  Squat: unable to perform    Comments:  + Facet Loading Bilaterally.  Positive FABERE, Yeoman's and Galensen test on the both side.          Imaging    MRI lumbar spine I. contrast.  Comparison to previous radiographs.  Few 1 CM size is cyst mid and lower renal poles.  Marrow space normal.  Chronic endplate change particularly L4-L5.  No fracture subluxation.  Spinal cord normal.    T11-T12 limited posterior disc bulge.    L1-L2 mild posterior paracentral disc bulge, mild compression adjacent spinal sac, facet arthropathy with mild spinal and foraminal  stenosis.    L2-L3 minimal mild posterior disc bulge, mild facet arthropathy with mild spinal and foraminal stenosis.    L3-L4 minimal posterior disc bulge, facet joint arthropathy with minimal mild spinal and foraminal stenosis.    L4 L5 minimal mild posterior disc bulge without spinal or foraminal stenosis.    L5-S1 mild/moderate facet joint arthropathy, mild posterior disc bulge, mild spinal and foraminal stenosis.      Impression      1. Degenerative disc, spondylosis, facet arthropathy discussed above.    2.  No fracture, subluxation or disc prolapse.       Labs:  BMP  Lab Results   Component Value Date     08/31/2019    K 4.8 08/31/2019     08/31/2019    CO2 24 08/31/2019    BUN 12 08/31/2019    CREATININE 1.4 08/31/2019    CALCIUM 9.4 08/31/2019    ANIONGAP 9 08/31/2019    ESTGFRAFRICA >60.0 08/31/2019    EGFRNONAA 55.0 (A) 08/31/2019     Lab Results   Component Value Date    ALT 34 08/31/2019    AST 33 08/31/2019    ALKPHOS 49 (L) 08/31/2019    BILITOT 0.6 08/31/2019       Assessment:  Fabiano Malik Jr. is a 58 y.o. male with the following diagnoses based on history, exam, and imaging:    Problem List Items Addressed This Visit        Neuro    Chronic pain       Orthopedic    Chronic back pain      Other Visit Diagnoses     Radiculopathy of thoracolumbar region    -  Primary    DDD (degenerative disc disease), lumbar              2/12/2019- 57 y/o male s/p right L2-3-4-5 Lumbar RFA with 75% relief, his left sided RFA s/f 1/15/2019  was cancelled due to reports of dyspnea on exertion and recent recommendation from his PCP to seek cardiology evaluation, per Dr. Magana's Patient has risk factors for CAD with ongoing chest pain with VALIENTE (dyspnea on exertion) and all injection should be held at this point.    03/26/2019 58-year-old pleasant male presents today status post right L2-3-4-5 lumbar RFA with reported 30% relief, patient has a history of this procedure which has helped in the past  "however he did not get the usual relief following this lumbar RFA.  During clinic visit and noticed that patient had  a cane which he has never used before; when asked, he states that he has been feeling dizzy, since this morning I recommended he visit the ED patient refuse.  Also noticed instability with his gait and mild slurred speech.  Recommended that I consult Neurology for further assessment patient agreed and understood.  Reference to his low back pain I recommend we start physical therapy to help with instability, muscular strength and lumbar stabilization patient agreed and understood.      4/8/2019- 57 y/o male  Present today to discuss refilling Tramadol 50 mg BID  for his pain, he was previously getting from Dr Ellis. Recommended that patient use tylenol extra strength 500 mg -1000 mg q8 (max dose 3000 mg). Discussed that he is currently taking clonazepam per his psychiatrist and that the combination of benzodiazepine and opioid medications are dangerous and can lead to  Opioid induced respiratory depression, pt states he understood and agreed. Discussed that he fulfill all future appts neurology, US of his Carotids and ENT so that his dizziness is appropriately addressed by the right provider.      9/6/2019- 57 y/o male presents with low back and right leg pain, pt reports x2 falls over the last 2 weeks due to "just loosing his balance on first fall and dizziness on second fall. Pt has Hx HTN and DM; Pt is s/f a CAT scan of brain tomorrow am for further assessment of reoccurring dizziness. His primary source of pain is low back with accompanying posterior right leg pain stooping just above his knee,   pt describes LBP as aching, throbbing and sharp, leg pain is describes as numbness while sleeping. He has difficulty describing his pain today.  2018 Lumbar MRI shows at  L2-L3 minimal mild posterior disc bulge, mild facet arthropathy with mild spinal and foraminal stenosis.  L3-L4 minimal posterior disc " bulge, facet joint arthropathy with minimal mild spinal and foraminal stenosis, 2nd at  L4 L5 minimal mild posterior disc bulge without spinal or foraminal stenosis and finally at  L5-S1 he has mild/moderate facet joint arthropathy, mild posterior disc bulge, mild spinal and foraminal stenosis. I will s/f a right L4-5 and L5-S1 TFESI I am scheduling this based on his subjective symptoms his Lumbar MRI show minimal/mild NF stenosis, he is s/p Left Lumbar RFA on 3/12/19 for left sided back pain which he reports is doing well.      Plan & Recommendations  Procedures:  S/f Right L4-5 and L5-S1 TFESI   Medications:  Continue medications as they are prescribed  Cymbalta 60 mg b.i.d.  and Mobic today 15 mg p.r.n. Tylenol Extra Strength   -1000 mg Q 8 (max-3000 mg)  Imaging:  Previous imaging reviewed and discussed with patient today  PT/OT:  Once pain is under control   Referral: Neurology Patient will also see Dr. Romy mcallister/[facility]  HEP: I encouraged the patient to maintain a home exercise regimen that includes daily, moderate cardiovascular exercise lasting at least 30 minutes.  This may include yoga, rachelle chi, walking, swimming, aqua aerobics, or other exercises that maintain a heart rate of 50-70% of the calculated maximum heart rate.  I also encouraged light, daily stretching focused on the target area.  Follow Up: RTC 2-3 weeks   More than 25 minutes spent with patient, over 50% of that time was spent in counseling and educating using spine model, and patient's own imaging.       Joseph Shafer, NP-C  [facility]      Disclaimer: This note was partly generated using dictation software which may occasionally result in transcription errors.

## 2019-09-05 NOTE — PROGRESS NOTES
[facility] Established Patient Evaluation    Chief Complaint  Chief Complaint   Patient presents with    Low-back Pain    Leg Pain     right        Last 3 PDI Scores 9/6/2019 4/8/2019 3/26/2019   Pain Disability Index (PDI) 70 60 60       Interval Update 9/6/19 Pt returns today complaining of low back pain.     4/8/19 Pt returns today for med refill.     3/26/19 Pt returns today S/P LEFT L2,3,4,5 Lumbar Medial Branch Radiofrequency Ablation on 3/12/19 with 30% relief.    2/12/19 Pt returns today S/P Right  L2,3,4,5 Lumbar Medial Branch Radiofrequency Ablation on 1/8/19 with 75% relief.       Fabiano Malik Jr. presents to me for low back and neck pain her reports 9.5/10 pain today he has not been seen in our clinic for 6 months  patient states today his primary source of pain is low back with some bilateral leg pain. He is status post right and left lumbar RFA at L2-3-4-5 70% relief in May of 2018.    Location: Low back   Onset: 3 months  Current Pain Score: 10/10/9/10  Daily Pain of Range: 9-10/10  Quality: Burning, Throbbing, Grabbing and Sharp  Radiation: Bilateral legs   Worsened by: exercise, extension, flexion, lifting, lying down and sneezing  Improved by: heat and rest    Background from Chart Review      Fabiano Malik Jr. presents to the clinic for a 4 wk follow-up appointment for neck and low back pain. He is S/P  left L2-3-4-5 FACET MEDIAL BRANCH NERVE RADIOFREQUENCY NEUROTOMY (lumbar) on 5/29/18 and  RIGHT L2-3-4-5 FACET MEDIAL BRANCH NERVE RADIOFREQUENCY NEUROTOMY on 5/22/18 with 70% relief for 2 weeks, pt states pain is back but overall better since procedure, I will order PT and refill Mobic and Lyrica today.  Since the last visit, Fabiano Malik Jr. states the pain has been persistant. Current pain intensity is 8/10.       Treatment History  PT/OT:   HEP:   TENS:  Injections: 3/12/19 LEFT L2,3,4,5 Lumbar Medial Branch Radiofrequency Ablation  1/8/19 BILATERAL L2,3,4,5 Lumbar  Medial Branch Radiofrequency Ablation  Surgery:  Medications:   - NSAIDS:   - MSK Relaxants:   - TCAs:   - SNRIs:   - Topicals:   - Opioids:   - Anticonvulsants:     Current Pain Medications  1. Cymbalta  60 mg BID   2. Tylenol   3. Klonopin  4. Seroquel  5. Risperdal    Full Medication List    Current Outpatient Medications:     acetaminophen (TYLENOL) 325 MG tablet, Take 2 tablets (650 mg total) by mouth every 6 (six) hours as needed., Disp: 120 tablet, Rfl: 3    aspirin (ECOTRIN) 81 MG EC tablet, Take 81 mg by mouth once daily., Disp: , Rfl:     blood sugar diagnostic Strp, 2 strips by Misc.(Non-Drug; Combo Route) route 2 (two) times daily., Disp: 200 strip, Rfl: 2    blood-glucose meter kit, Use as instructed, Disp: 1 each, Rfl: 0    clonazePAM (KLONOPIN) 1 MG tablet, TK 1 T PO TID, Disp: , Rfl: 3    clotrimazole-betamethasone (LOTRISONE) lotion, Apply topically 2 (two) times daily as needed., Disp: 30 mL, Rfl: 0    diclofenac sodium (PENNSAID) 20 mg/gram /actuation(2 %) sopm, Apply 40 mg topically 2 (two) times daily., Disp: 1 Bottle, Rfl: 1    DULoxetine (CYMBALTA) 60 MG capsule, Take 1 capsule (60 mg total) by mouth 2 (two) times daily., Disp: 60 capsule, Rfl: 11    lancets (ONETOUCH DELICA LANCETS) 33 gauge Misc, 1 lancet by Misc.(Non-Drug; Combo Route) route 2 (two) times daily., Disp: 200 each, Rfl: 2    meclizine (ANTIVERT) 12.5 mg tablet, TAKE 1 TABLET(12.5 MG) BY MOUTH THREE TIMES DAILY AS NEEDED, Disp: 60 tablet, Rfl: 0    metFORMIN (GLUCOPHAGE) 1000 MG tablet, Take 1 tablet (1,000 mg total) by mouth 2 (two) times daily with meals., Disp: 180 tablet, Rfl: 1    nabumetone (RELAFEN) 750 MG tablet, Take 1 tablet (750 mg total) by mouth 2 (two) times daily as needed (avoid all other NSAIDs)., Disp: 30 tablet, Rfl: 2    olmesartan (BENICAR) 20 MG tablet, TAKE 1 TABLET(20 MG) BY MOUTH TWICE DAILY, Disp: 60 tablet, Rfl: 2    omeprazole (PRILOSEC) 20 MG capsule, Take 1 capsule (20 mg total) by  mouth daily as needed., Disp: 30 capsule, Rfl: 2    pantoprazole (PROTONIX) 40 MG tablet, TAKE 1 TABLET(40 MG) BY MOUTH DAILY AS NEEDED, Disp: 90 tablet, Rfl: 0    pravastatin (PRAVACHOL) 40 MG tablet, TAKE 1 TABLET(40 MG) BY MOUTH EVERY DAY, Disp: 90 tablet, Rfl: 0    QUEtiapine (SEROQUEL) 400 MG tablet, Take 400 mg by mouth once daily. , Disp: , Rfl:     risperiDONE (RISPERDAL) 0.5 MG Tab, Take 1 tablet (0.5 mg total) by mouth every evening., Disp: 30 tablet, Rfl: 11    sildenafil (VIAGRA) 50 MG tablet, Take 1 tablet (50 mg total) by mouth daily as needed for Erectile Dysfunction (30 minutes prior to sex prn; max 1 tab/day; avoid with nitroglycerine)., Disp: 30 tablet, Rfl: 0    sodium polystyrene (KAYEXALATE) 15 gram/60 mL Susp, Take 15 g by mouth., Disp: , Rfl:     tiZANidine (ZANAFLEX) 2 MG tablet, TAKE 1 TABLET(2 MG) BY MOUTH TWICE DAILY AS NEEDED, Disp: 180 tablet, Rfl: 1    traMADol (ULTRAM) 50 mg tablet, Take 1 tablet (50 mg total) by mouth 3 (three) times daily as needed for Pain., Disp: 21 tablet, Rfl: 0  No current facility-administered medications for this visit.     Facility-Administered Medications Ordered in Other Visits:     alprazolam ODT dissolvable tablet 0.5 mg, 0.5 mg, Oral, Once PRN, Alberto Hernandez Jr., MD     Review of Systems  ROS    Medical History  Past Medical History:   Diagnosis Date    Chronic back pain greater than 3 months duration     Depression     Diabetes mellitus     Hypertension     Schizophrenia         Surgical History  Past Surgical History:   Procedure Laterality Date    APPENDECTOMY      BLOCK-NERVE-MEDIAL BRANCH-LUMBAR- Bilateral L2-3-4-5 Bilateral 4/24/2018    Performed by Donavon Dennis [facility] at [facility] PAIN MGT    BLOCK-NERVE-MEDIAL BRANCH-LUMBAR- BILATERAL, L2,3,4,5 Bilateral 2/20/2018    Performed by [facility]avon [facility] at [facility] PAIN MGT    BLOCK-NERVE-MEDIAL BRANCH-LUMBAR- LUMBAR- BILATERAL- L2,3,4,5 Bilateral 2/6/2018    Performed by Donavon AMADO  MD Jeannie at [facility]    COLONOSCOPY N/A 5/31/2018    Performed by Guillaume Hatch MD at [facility] ENDO    NECK SURGERY      Radiofrequency Ablation, Nerve, Spinal, Lumbar, Medial Branch, L2,3,4,5 Right 1/8/2019    Performed by Alberto [facility] Jr., MD at [facility]    Radiofrequency Ablation, Nerve, Spinal, Lumbar, Medial Branch, Left, L2,3,4,5 Left 3/12/2019    Performed by Alberto Hernandez Jr., MD at [facility]    RADIOFREQUENCY THERMOCOAGULATION (RFTC)-NERVE-MEDIAN BRANCH-LUMBAR- Left L2-3-4-5 Left 5/29/2018    Performed by Donavon Dennis MD at [facility]    RADIOFREQUENCY THERMOCOAGULATION (RFTC)-NERVE-MEDIAN BRANCH-LUMBAR- Right L2-3-4-5 Right 5/22/2018    Performed by Donavon Dennis MD at [facility]        Physical Exam  Vitals:    09/06/19 1033   BP: (!) 89/59   Pulse: 104   Weight: 111.4 kg (245 lb 11.2 oz)   PainSc: 10-Worst pain ever         General Musculoskeletal Exam   Gait: antalgic     Back (L-Spine & T-Spine) / Neck (C-Spine) Exam     Back (L-Spine & T-Spine) Range of Motion   Lateral bend right: abnormal   Lateral bend left: abnormal   Rotation right: abnormal   Rotation left: abnormal     Spinal Sensation   Right Side Sensation  L-Spine Level: hypersensitive  Left Side Sensation  L-Spine Level: hypersensitive    Back (L-Spine & T-Spine) Tests   Right Side Tests  Squat Test: unable to perform  Left Side Tests  Squat: unable to perform    Comments:  + Facet Loading Bilaterally.  Positive FABERE, Yeoman's and Galensen test on the both side.          Imaging    MRI lumbar spine I. contrast.  Comparison to previous radiographs.  Few 1 CM size is cyst mid and lower renal poles.  Marrow space normal.  Chronic endplate change particularly L4-L5.  No fracture subluxation.  Spinal cord normal.    T11-T12 limited posterior disc bulge.    L1-L2 mild posterior paracentral disc bulge, mild compression adjacent spinal sac, facet arthropathy with mild spinal and foraminal  stenosis.    L2-L3 minimal mild posterior disc bulge, mild facet arthropathy with mild spinal and foraminal stenosis.    L3-L4 minimal posterior disc bulge, facet joint arthropathy with minimal mild spinal and foraminal stenosis.    L4 L5 minimal mild posterior disc bulge without spinal or foraminal stenosis.    L5-S1 mild/moderate facet joint arthropathy, mild posterior disc bulge, mild spinal and foraminal stenosis.      Impression      1. Degenerative disc, spondylosis, facet arthropathy discussed above.    2.  No fracture, subluxation or disc prolapse.       Labs:  BMP  Lab Results   Component Value Date     08/31/2019    K 4.8 08/31/2019     08/31/2019    CO2 24 08/31/2019    BUN 12 08/31/2019    CREATININE 1.4 08/31/2019    CALCIUM 9.4 08/31/2019    ANIONGAP 9 08/31/2019    ESTGFRAFRICA >60.0 08/31/2019    EGFRNONAA 55.0 (A) 08/31/2019     Lab Results   Component Value Date    ALT 34 08/31/2019    AST 33 08/31/2019    ALKPHOS 49 (L) 08/31/2019    BILITOT 0.6 08/31/2019       Assessment:  Fabiano Malik Jr. is a 58 y.o. male with the following diagnoses based on history, exam, and imaging:    Problem List Items Addressed This Visit        Neuro    Chronic pain       Orthopedic    Chronic back pain      Other Visit Diagnoses     Radiculopathy of thoracolumbar region    -  Primary    DDD (degenerative disc disease), lumbar              2/12/2019- 57 y/o male s/p right L2-3-4-5 Lumbar RFA with 75% relief, his left sided RFA s/f 1/15/2019  was cancelled due to reports of dyspnea on exertion and recent recommendation from his PCP to seek cardiology evaluation, per Dr. Magana's Patient has risk factors for CAD with ongoing chest pain with VALIENTE (dyspnea on exertion) and all injection should be held at this point.    03/26/2019 58-year-old pleasant male presents today status post right L2-3-4-5 lumbar RFA with reported 30% relief, patient has a history of this procedure which has helped in the past  "however he did not get the usual relief following this lumbar RFA.  During clinic visit and noticed that patient had  a cane which he has never used before; when asked, he states that he has been feeling dizzy, since this morning I recommended he visit the ED patient refuse.  Also noticed instability with his gait and mild slurred speech.  Recommended that I consult Neurology for further assessment patient agreed and understood.  Reference to his low back pain I recommend we start physical therapy to help with instability, muscular strength and lumbar stabilization patient agreed and understood.      4/8/2019- 59 y/o male  Present today to discuss refilling Tramadol 50 mg BID  for his pain, he was previously getting from Dr Ellis. Recommended that patient use tylenol extra strength 500 mg -1000 mg q8 (max dose 3000 mg). Discussed that he is currently taking clonazepam per his psychiatrist and that the combination of benzodiazepine and opioid medications are dangerous and can lead to  Opioid induced respiratory depression, pt states he understood and agreed. Discussed that he fulfill all future appts neurology, US of his Carotids and ENT so that his dizziness is appropriately addressed by the right provider.      9/6/2019- 59 y/o male presents with low back and right leg pain, pt reports x2 falls over the last 2 weeks due to "just loosing his balance on first fall and dizziness on second fall. Pt has Hx HTN and DM; Pt is s/f a CAT scan of brain tomorrow am for further assessment of reoccurring dizziness. His primary source of pain is low back with accompanying posterior right leg pain stooping just above his knee,   pt describes LBP as aching, throbbing and sharp, leg pain is describes as numbness while sleeping. He has difficulty describing his pain today.  2018 Lumbar MRI shows at  L2-L3 minimal mild posterior disc bulge, mild facet arthropathy with mild spinal and foraminal stenosis.  L3-L4 minimal posterior disc " bulge, facet joint arthropathy with minimal mild spinal and foraminal stenosis, 2nd at  L4 L5 minimal mild posterior disc bulge without spinal or foraminal stenosis and finally at  L5-S1 he has mild/moderate facet joint arthropathy, mild posterior disc bulge, mild spinal and foraminal stenosis. I will s/f a right L4-5 and L5-S1 TFESI I am scheduling this based on his subjective symptoms his Lumbar MRI show minimal/mild NF stenosis, he is s/p Left Lumbar RFA on 3/12/19 for left sided back pain which he reports is doing well.      Plan & Recommendations  Procedures:  S/f Right L4-5 and L5-S1 TFESI   Medications:  Continue medications as they are prescribed  Cymbalta 60 mg b.i.d.  and Mobic today 15 mg p.r.n. Tylenol Extra Strength   -1000 mg Q 8 (max-3000 mg)  Imaging:  Previous imaging reviewed and discussed with patient today  PT/OT:  Once pain is under control   Referral: Neurology Patient will also see Dr. Romy mcallister/[facility]  HEP: I encouraged the patient to maintain a home exercise regimen that includes daily, moderate cardiovascular exercise lasting at least 30 minutes.  This may include yoga, rachelle chi, walking, swimming, aqua aerobics, or other exercises that maintain a heart rate of 50-70% of the calculated maximum heart rate.  I also encouraged light, daily stretching focused on the target area.  Follow Up: RTC 2-3 weeks   More than 25 minutes spent with patient, over 50% of that time was spent in counseling and educating using spine model, and patient's own imaging.       Joseph Shafer, NP-C  Interventional Pain Management      Disclaimer: This note was partly generated using dictation software which may occasionally result in transcription errors.

## 2019-09-06 ENCOUNTER — TELEPHONE (OUTPATIENT)
Dept: PAIN MEDICINE | Facility: CLINIC | Age: 59
End: 2019-09-06

## 2019-09-06 ENCOUNTER — TELEPHONE (OUTPATIENT)
Dept: INTERNAL MEDICINE | Facility: CLINIC | Age: 59
End: 2019-09-06

## 2019-09-06 ENCOUNTER — OFFICE VISIT (OUTPATIENT)
Dept: PAIN MEDICINE | Facility: CLINIC | Age: 59
End: 2019-09-06
Payer: MEDICARE

## 2019-09-06 ENCOUNTER — TELEPHONE (OUTPATIENT)
Dept: FAMILY MEDICINE | Facility: CLINIC | Age: 59
End: 2019-09-06

## 2019-09-06 VITALS
BODY MASS INDEX: 33.32 KG/M2 | SYSTOLIC BLOOD PRESSURE: 89 MMHG | HEART RATE: 104 BPM | WEIGHT: 245.69 LBS | DIASTOLIC BLOOD PRESSURE: 59 MMHG

## 2019-09-06 DIAGNOSIS — M51.36 DDD (DEGENERATIVE DISC DISEASE), LUMBAR: ICD-10-CM

## 2019-09-06 DIAGNOSIS — G89.4 CHRONIC PAIN SYNDROME: ICD-10-CM

## 2019-09-06 DIAGNOSIS — M54.15 RADICULOPATHY OF THORACOLUMBAR REGION: Primary | ICD-10-CM

## 2019-09-06 DIAGNOSIS — M54.16 LUMBAR RADICULOPATHY: Primary | ICD-10-CM

## 2019-09-06 DIAGNOSIS — M54.40 CHRONIC BILATERAL LOW BACK PAIN WITH SCIATICA, SCIATICA LATERALITY UNSPECIFIED: ICD-10-CM

## 2019-09-06 DIAGNOSIS — G89.29 CHRONIC BILATERAL LOW BACK PAIN WITH SCIATICA, SCIATICA LATERALITY UNSPECIFIED: ICD-10-CM

## 2019-09-06 PROCEDURE — 99214 PR OFFICE/OUTPT VISIT, EST, LEVL IV, 30-39 MIN: ICD-10-PCS | Mod: S$GLB,,, | Performed by: NURSE PRACTITIONER

## 2019-09-06 PROCEDURE — 99214 OFFICE O/P EST MOD 30 MIN: CPT | Mod: S$GLB,,, | Performed by: NURSE PRACTITIONER

## 2019-09-06 PROCEDURE — 3074F PR MOST RECENT SYSTOLIC BLOOD PRESSURE < 130 MM HG: ICD-10-PCS | Mod: CPTII,S$GLB,, | Performed by: NURSE PRACTITIONER

## 2019-09-06 PROCEDURE — 3078F DIAST BP <80 MM HG: CPT | Mod: CPTII,S$GLB,, | Performed by: NURSE PRACTITIONER

## 2019-09-06 PROCEDURE — 3078F PR MOST RECENT DIASTOLIC BLOOD PRESSURE < 80 MM HG: ICD-10-PCS | Mod: CPTII,S$GLB,, | Performed by: NURSE PRACTITIONER

## 2019-09-06 PROCEDURE — 99999 PR PBB SHADOW E&M-EST. PATIENT-LVL IV: CPT | Mod: PBBFAC,,, | Performed by: NURSE PRACTITIONER

## 2019-09-06 PROCEDURE — 3074F SYST BP LT 130 MM HG: CPT | Mod: CPTII,S$GLB,, | Performed by: NURSE PRACTITIONER

## 2019-09-06 PROCEDURE — 3008F PR BODY MASS INDEX (BMI) DOCUMENTED: ICD-10-PCS | Mod: CPTII,S$GLB,, | Performed by: NURSE PRACTITIONER

## 2019-09-06 PROCEDURE — 99999 PR PBB SHADOW E&M-EST. PATIENT-LVL IV: ICD-10-PCS | Mod: PBBFAC,,, | Performed by: NURSE PRACTITIONER

## 2019-09-06 PROCEDURE — 3008F BODY MASS INDEX DOCD: CPT | Mod: CPTII,S$GLB,, | Performed by: NURSE PRACTITIONER

## 2019-09-06 NOTE — TELEPHONE ENCOUNTER
Notify pt. That neck and low back pain are negative for fracture; degenerative (arthritic) changes are noted and osteophytes (bone protrusion) are noted

## 2019-09-07 ENCOUNTER — HOSPITAL ENCOUNTER (OUTPATIENT)
Dept: RADIOLOGY | Facility: HOSPITAL | Age: 59
Discharge: HOME | End: 2019-09-07
Attending: INTERNAL MEDICINE
Payer: MEDICARE

## 2019-09-07 DIAGNOSIS — S09.90XA TRAUMATIC INJURY OF HEAD, INITIAL ENCOUNTER: ICD-10-CM

## 2019-09-07 PROCEDURE — 70450 CT HEAD/BRAIN W/O DYE: CPT | Mod: TC

## 2019-09-07 PROCEDURE — 70450 CT HEAD/BRAIN W/O DYE: CPT | Mod: 26,,, | Performed by: RADIOLOGY

## 2019-09-07 PROCEDURE — 70450 CT HEAD WITHOUT CONTRAST: ICD-10-PCS | Mod: 26,,, | Performed by: RADIOLOGY

## 2019-09-09 ENCOUNTER — TELEPHONE (OUTPATIENT)
Dept: INTERNAL MEDICINE | Facility: CLINIC | Age: 59
End: 2019-09-09

## 2019-09-09 ENCOUNTER — TELEPHONE (OUTPATIENT)
Dept: FAMILY MEDICINE | Facility: CLINIC | Age: 59
End: 2019-09-09

## 2019-09-09 NOTE — TELEPHONE ENCOUNTER
Called patient, went over x-ray results. Verbalized understanding.        ----- Message from Chago Sheldon sent at 9/9/2019  2:12 PM CDT -----  Contact: [phone]/self  Patient states he's returning your call.  Please call back to assist at [phone]

## 2019-09-10 ENCOUNTER — TELEPHONE (OUTPATIENT)
Dept: INTERNAL MEDICINE | Facility: CLINIC | Age: 59
End: 2019-09-10

## 2019-09-11 ENCOUNTER — TELEPHONE (OUTPATIENT)
Dept: PAIN MEDICINE | Facility: CLINIC | Age: 59
End: 2019-09-11

## 2019-09-11 ENCOUNTER — TELEPHONE (OUTPATIENT)
Dept: INTERNAL MEDICINE | Facility: CLINIC | Age: 59
End: 2019-09-11

## 2019-09-11 NOTE — TELEPHONE ENCOUNTER
Patient returned phone call, per Dr. Ellis, CT head showed no acute abnormalities. If pain persist proceed to the ER or if Dr. Ellis has availability, he can schedule an appointment. Patient verbalized understanding.

## 2019-09-11 NOTE — TELEPHONE ENCOUNTER
Returned call to Pt and left voicemail that his insurance did approve the procedure for tomorrow.     ----- Message from Link Trujillo sent at 9/11/2019  3:01 PM CDT -----  Contact: patient  Patient called to find out if the procedure for tomorrow has been approved by his insurance.    Please call [phone] to discuss today.

## 2019-09-11 NOTE — TELEPHONE ENCOUNTER
Returned call ANGELA Louise left detailed message that he was approved.      ----- Message from Chago Sheldon sent at 9/11/2019  4:40 PM CDT -----  Contact: [phone]/self  Patient is returning your call  Please advise

## 2019-09-12 ENCOUNTER — HOSPITAL ENCOUNTER (OUTPATIENT)
Facility: HOSPITAL | Age: 59
Discharge: HOME | End: 2019-09-12
Attending: PAIN MEDICINE | Admitting: PAIN MEDICINE
Payer: MEDICARE

## 2019-09-12 VITALS
WEIGHT: 245 LBS | DIASTOLIC BLOOD PRESSURE: 66 MMHG | SYSTOLIC BLOOD PRESSURE: 122 MMHG | OXYGEN SATURATION: 98 % | TEMPERATURE: 98 F | HEIGHT: 72 IN | RESPIRATION RATE: 16 BRPM | BODY MASS INDEX: 33.18 KG/M2 | HEART RATE: 86 BPM

## 2019-09-12 DIAGNOSIS — G89.29 CHRONIC PAIN: ICD-10-CM

## 2019-09-12 DIAGNOSIS — M54.16 LUMBAR RADICULOPATHY: Primary | ICD-10-CM

## 2019-09-12 LAB
GLUCOSE SERPL-MCNC: 153 MG/DL (ref 70–110)
POCT GLUCOSE: 153 MG/DL (ref 70–110)

## 2019-09-12 PROCEDURE — 63600175 PHARM REV CODE 636 W HCPCS: Performed by: PAIN MEDICINE

## 2019-09-12 PROCEDURE — 25000003 PHARM REV CODE 250: Performed by: PAIN MEDICINE

## 2019-09-12 PROCEDURE — 64484 PRA INJECT ANES/STEROID FORAMEN LUMBAR/SACRAL W IMG GUIDE ,EA ADD LEVEL: ICD-10-PCS | Mod: RT,,, | Performed by: PAIN MEDICINE

## 2019-09-12 PROCEDURE — 64484 NJX AA&/STRD TFRM EPI L/S EA: CPT | Mod: RT,,, | Performed by: PAIN MEDICINE

## 2019-09-12 PROCEDURE — 64484 NJX AA&/STRD TFRM EPI L/S EA: CPT | Performed by: PAIN MEDICINE

## 2019-09-12 PROCEDURE — 25500020 PHARM REV CODE 255: Performed by: PAIN MEDICINE

## 2019-09-12 PROCEDURE — 64483 PR EPIDURAL INJ, ANES/STEROID, TRANSFORAMINAL, LUMB/SACR, SNGL LEVL: ICD-10-PCS | Mod: RT,,, | Performed by: PAIN MEDICINE

## 2019-09-12 PROCEDURE — 64483 NJX AA&/STRD TFRM EPI L/S 1: CPT | Performed by: PAIN MEDICINE

## 2019-09-12 PROCEDURE — 64483 NJX AA&/STRD TFRM EPI L/S 1: CPT | Mod: RT,,, | Performed by: PAIN MEDICINE

## 2019-09-12 RX ORDER — SODIUM CHLORIDE 9 MG/ML
500 INJECTION, SOLUTION INTRAVENOUS CONTINUOUS
Status: DISCONTINUED | OUTPATIENT
Start: 2019-09-12 | End: 2019-09-12 | Stop reason: HOSPADM

## 2019-09-12 RX ORDER — LIDOCAINE HYDROCHLORIDE 10 MG/ML
1 INJECTION, SOLUTION EPIDURAL; INFILTRATION; INTRACAUDAL; PERINEURAL ONCE
Status: DISCONTINUED | OUTPATIENT
Start: 2019-09-12 | End: 2019-09-12 | Stop reason: HOSPADM

## 2019-09-12 RX ORDER — MIDAZOLAM HYDROCHLORIDE 1 MG/ML
INJECTION, SOLUTION INTRAMUSCULAR; INTRAVENOUS
Status: DISCONTINUED | OUTPATIENT
Start: 2019-09-12 | End: 2019-09-12 | Stop reason: HOSPADM

## 2019-09-12 RX ORDER — SODIUM BICARBONATE 1 MEQ/ML
SYRINGE (ML) INTRAVENOUS
Status: DISCONTINUED | OUTPATIENT
Start: 2019-09-12 | End: 2019-09-12 | Stop reason: HOSPADM

## 2019-09-12 RX ORDER — LIDOCAINE HYDROCHLORIDE 10 MG/ML
INJECTION, SOLUTION EPIDURAL; INFILTRATION; INTRACAUDAL; PERINEURAL
Status: DISCONTINUED | OUTPATIENT
Start: 2019-09-12 | End: 2019-09-12 | Stop reason: HOSPADM

## 2019-09-12 RX ORDER — FENTANYL CITRATE 50 UG/ML
INJECTION, SOLUTION INTRAMUSCULAR; INTRAVENOUS
Status: DISCONTINUED | OUTPATIENT
Start: 2019-09-12 | End: 2019-09-12 | Stop reason: HOSPADM

## 2019-09-12 RX ORDER — ROPIVACAINE HYDROCHLORIDE 2 MG/ML
INJECTION, SOLUTION EPIDURAL; INFILTRATION; PERINEURAL
Status: COMPLETED | OUTPATIENT
Start: 2019-09-12 | End: 2019-09-12

## 2019-09-12 RX ADMIN — SODIUM CHLORIDE 500 ML: 0.9 INJECTION, SOLUTION INTRAVENOUS at 01:09

## 2019-09-12 NOTE — DISCHARGE INSTRUCTIONS
Home Care Instructions Pain Management:    1.  DIET:    You may resume your normal diet today.    2.  BATHING:    You may shower with luke warm water.    3.  DRESSING:    You may remove your bandage today.    4.  ACTIVITY LEVEL:      You may resume your normal activities 24 hours after your procedure.    5.  MEDICATIONS:    You may resume your normal medications today.    6.  SPECIAL INSTRUCTIONS:    No heat to the injection site for 24 hours including bath or shower, heating pad, moist heat or hot tubs.    Use an ice pack to the injection site for any pain or discomfort.  Apply ice packs for 20 minute intervals as needed.    If you have received any sedatives by mouth today, you can not drive for 12 hours.    If you have received sedation through an IV, you can not drive for 24 hours.    PLEASE CALL YOUR DOCTOR FOR THE FOLLOWIN.  Redness or swelling around the injection site.  2.  Fever of 101 degrees.  3.  Drainage (pus) from the injection site.  4.  For any continuous bleeding (some dried blood over the incision is normal.)    FOR EMERGENCIES:    If any unusual problems or difficulties occur during clinic hours, call [phone] or dial [phone].    Follow up with with your physician in 2-3 weeks.

## 2019-09-12 NOTE — DISCHARGE SUMMARY
[facility]  Discharge Note  Short Stay     Admit Date: 9/12/2019    Discharge Date: 9/12/2019     Attending Physician: Alberto Hernandez Jr, MD    Diagnoses:  Active Hospital Problems    Diagnosis  POA    *Lumbar radiculopathy [M54.16]  Yes    Chronic pain [G89.29]  Yes      Resolved Hospital Problems   No resolved problems to display.     Discharged Condition: Good     Hospital Course: Patient was admitted for an outpatient interventional pain management procedure and tolerated the procedure well with no complications.     Final Diagnoses: Same as principal problem.     Disposition: Home or Self Care     Follow up/Patient Instructions:    Follow-up Information     Alberto Hernandez Jr, MD. Go in 2 weeks.    Specialty:  Pain Medicine  Why:  Post-procedural Follow Up As Scheduled, Call to make an appointment if you do not have one  Contact information:  [address]  [address]  [place] [address]  [phone]                   Reconciled Medications:     Medication List      CONTINUE taking these medications    acetaminophen 325 MG tablet  Commonly known as:  TYLENOL  Take 2 tablets (650 mg total) by mouth every 6 (six) hours as needed.     aspirin 81 MG EC tablet  Commonly known as:  ECOTRIN  Take 81 mg by mouth once daily.     blood sugar diagnostic Strp  2 strips by Misc.(Non-Drug; Combo Route) route 2 (two) times daily.     blood-glucose meter kit  Use as instructed     clonazePAM 1 MG tablet  Commonly known as:  KLONOPIN  TK 1 T PO TID     clotrimazole-betamethasone lotion  Commonly known as:  LOTRISONE  Apply topically 2 (two) times daily as needed.     diclofenac sodium 20 mg/gram /actuation(2 %) Sopm  Commonly known as:  PENNSAID  Apply 40 mg topically 2 (two) times daily.     DULoxetine 60 MG capsule  Commonly known as:  CYMBALTA  Take 1 capsule (60 mg total) by mouth 2 (two) times daily.     lancets 33 gauge Misc  Commonly known as:  ONETOUCH DELICA LANCETS  1 lancet by Misc.(Non-Drug;  Combo Route) route 2 (two) times daily.     meclizine 12.5 mg tablet  Commonly known as:  ANTIVERT  TAKE 1 TABLET(12.5 MG) BY MOUTH THREE TIMES DAILY AS NEEDED     metFORMIN 1000 MG tablet  Commonly known as:  GLUCOPHAGE  Take 1 tablet (1,000 mg total) by mouth 2 (two) times daily with meals.     nabumetone 750 MG tablet  Commonly known as:  RELAFEN  Take 1 tablet (750 mg total) by mouth 2 (two) times daily as needed (avoid all other NSAIDs).     olmesartan 20 MG tablet  Commonly known as:  BENICAR  TAKE 1 TABLET(20 MG) BY MOUTH TWICE DAILY     omeprazole 20 MG capsule  Commonly known as:  PRILOSEC  Take 1 capsule (20 mg total) by mouth daily as needed.     pantoprazole 40 MG tablet  Commonly known as:  PROTONIX  TAKE 1 TABLET(40 MG) BY MOUTH DAILY AS NEEDED     pravastatin 40 MG tablet  Commonly known as:  PRAVACHOL  TAKE 1 TABLET(40 MG) BY MOUTH EVERY DAY     QUEtiapine 400 MG tablet  Commonly known as:  SEROQUEL  Take 400 mg by mouth once daily.     risperiDONE 0.5 MG Tab  Commonly known as:  RISPERDAL  Take 1 tablet (0.5 mg total) by mouth every evening.     sildenafil 50 MG tablet  Commonly known as:  VIAGRA  Take 1 tablet (50 mg total) by mouth daily as needed for Erectile Dysfunction (30 minutes prior to sex prn; max 1 tab/day; avoid with nitroglycerine).     sodium polystyrene 15 gram/60 mL Susp  Commonly known as:  KAYEXALATE  Take 15 g by mouth.     tiZANidine 2 MG tablet  Commonly known as:  ZANAFLEX  TAKE 1 TABLET(2 MG) BY MOUTH TWICE DAILY AS NEEDED        ASK your doctor about these medications    traMADol 50 mg tablet  Commonly known as:  ULTRAM  Take 1 tablet (50 mg total) by mouth 3 (three) times daily as needed for Pain.  Ask about: Should I take this medication?           Discharge Procedure Orders (must include Diet, Follow-up, Activity)   Call MD for:  temperature >100.4     Call MD for:  severe uncontrolled pain     Call MD for:  redness, tenderness, or signs of infection (pain, swelling,  redness, odor or green/yellow discharge around incision site)     Call MD for:  difficulty breathing or increased cough     Call MD for:  severe persistent headache     Call MD for:  worsening rash     Remove dressing in 24 hours       Alberto Hernandez Jr, MD  Interventional Pain Medicine / Anesthesiology

## 2019-09-12 NOTE — OP NOTE
"Procedure Note    Pre-operative Diagnosis: Lumbar Radiculopathy  Post-operative Diagnosis: Lumbar Radiculopathy  Procedure Date: 09/12/2019  Procedure:  (1) Lumbar Transforaminal Epidural Steroid Injection, Two Levels    (2) Intraoperative fluoroscopy          Indications: To alleviate pain and suffering, and reduce functional impairment associated with Lumbar radiculopathy.      The patients history and physical exam were reviewed. The risks, benefits and alternatives to the procedure were discussed, and all questions were answered to the patients satisfaction. The patient agreed to proceed, and written informed consent was verified.    Procedure in Detail: Using a fenestrated drape and Chloro-prep, the skin was prepared and draped in sterile fashion. The Right L4-5 and L5-S1 neural foramena were identified by fluoroscopy by Right lateral oblique angle. Lidocaine 1% 5 mL was used to anesthetize the skin at the skin entry point and subcutaneous tissue with 25G 1.5" in needle. Then a 22G 5" spinal needle was advanced under intermittent fluoroscopy toward each target point until Kambin's triangle was entered anterolateral to the superior articular process. Fluoroscopy was then inspected from lateral and AP view and needle adjusted appropriately. There was no paresthesia with needle placement. Aspiration was negative for blood and CSF. Omnipaque contrast was injected live in an AP fluoroscopic view at each level demonstrating appropriate needle position with contrast spread into the nerve root sheath and medially into the epidural space without intravascular or intrathecal spread. Next, a mixture 1.5 mL MPF Ropivacaine 0.2% and 15 mg dexamethasone (total 3 mL) was divided evenly between the two levels and injected slowly and incrementally. The patient tolerated the procedure without complaint and was transported to the recovery room in stable condition.     Disposition: The patient tolerated the procedure well, and " there were no apparent complications. Vital signs remained stable throughout the procedure. The patient was taken to the recovery area where written discharge instructions for the procedure were given.     Follow-up: RTC as scheduled      Alberto Hernandez Jr, MD  Interventional Pain Medicine / Anesthesiology

## 2019-09-13 ENCOUNTER — TELEPHONE (OUTPATIENT)
Dept: INTERNAL MEDICINE | Facility: CLINIC | Age: 59
End: 2019-09-13

## 2019-09-13 ENCOUNTER — TELEPHONE (OUTPATIENT)
Dept: FAMILY MEDICINE | Facility: CLINIC | Age: 59
End: 2019-09-13

## 2019-09-13 DIAGNOSIS — R26.81 GAIT INSTABILITY: Primary | ICD-10-CM

## 2019-09-13 RX ORDER — TRAMADOL HYDROCHLORIDE 50 MG/1
50 TABLET ORAL EVERY 6 HOURS
COMMUNITY
End: 2019-09-23 | Stop reason: SDUPTHER

## 2019-09-13 RX ORDER — TRAMADOL HYDROCHLORIDE 50 MG/1
50 TABLET ORAL EVERY 6 HOURS
OUTPATIENT
Start: 2019-09-13

## 2019-09-13 NOTE — TELEPHONE ENCOUNTER
----- Message from Pauline Gordonars sent at 9/12/2019  4:07 PM CDT -----  Contact: [facility]   Pt is requesting a cane insurance company need documents        medical neccessary  Sign doctors orders, clinic notes       Faxed [phone]-

## 2019-09-17 ENCOUNTER — TELEPHONE (OUTPATIENT)
Dept: FAMILY MEDICINE | Facility: CLINIC | Age: 59
End: 2019-09-17

## 2019-09-17 ENCOUNTER — TELEPHONE (OUTPATIENT)
Dept: INTERNAL MEDICINE | Facility: CLINIC | Age: 59
End: 2019-09-17

## 2019-09-17 ENCOUNTER — TELEPHONE (OUTPATIENT)
Dept: PAIN MEDICINE | Facility: CLINIC | Age: 59
End: 2019-09-17

## 2019-09-17 DIAGNOSIS — N52.9 ERECTILE DYSFUNCTION, UNSPECIFIED ERECTILE DYSFUNCTION TYPE: ICD-10-CM

## 2019-09-17 RX ORDER — GABAPENTIN 300 MG/1
CAPSULE ORAL
Qty: 30 CAPSULE | Refills: 0 | Status: SHIPPED | OUTPATIENT
Start: 2019-09-17 | End: 2019-10-16 | Stop reason: SDUPTHER

## 2019-09-17 RX ORDER — SILDENAFIL 100 MG/1
100 TABLET, FILM COATED ORAL DAILY PRN
Qty: 30 TABLET | Refills: 0 | Status: SHIPPED | OUTPATIENT
Start: 2019-09-17 | End: 2019-09-23 | Stop reason: ALTCHOICE

## 2019-09-17 RX ORDER — SILDENAFIL 50 MG/1
50 TABLET, FILM COATED ORAL DAILY PRN
Qty: 30 TABLET | Refills: 0 | Status: SHIPPED | OUTPATIENT
Start: 2019-09-17 | End: 2019-09-17 | Stop reason: SDUPTHER

## 2019-09-17 NOTE — TELEPHONE ENCOUNTER
Called and spoke w/ patient. I faxed order and notes to [facility] for the cane. Dr. Ellis increased Viagra to 100mg. I will fax to [facility]. Verbalized understanding.

## 2019-09-18 RX ORDER — MECLIZINE HCL 12.5 MG 12.5 MG/1
12.5 TABLET ORAL 3 TIMES DAILY PRN
Qty: 60 TABLET | Refills: 0 | Status: SHIPPED | OUTPATIENT
Start: 2019-09-18 | End: 2019-10-28 | Stop reason: SDUPTHER

## 2019-09-21 ENCOUNTER — LAB VISIT (OUTPATIENT)
Dept: LAB | Facility: HOSPITAL | Age: 59
End: 2019-09-21
Attending: INTERNAL MEDICINE
Payer: MEDICARE

## 2019-09-21 DIAGNOSIS — E11.9 TYPE 2 DIABETES MELLITUS WITHOUT COMPLICATION, WITHOUT LONG-TERM CURRENT USE OF INSULIN: ICD-10-CM

## 2019-09-21 DIAGNOSIS — E11.59 HYPERTENSION ASSOCIATED WITH DIABETES: ICD-10-CM

## 2019-09-21 DIAGNOSIS — I15.2 HYPERTENSION ASSOCIATED WITH DIABETES: ICD-10-CM

## 2019-09-21 DIAGNOSIS — N28.9 RENAL INSUFFICIENCY: ICD-10-CM

## 2019-09-21 LAB
ANION GAP SERPL CALC-SCNC: 8 MMOL/L (ref 8–16)
BUN SERPL-MCNC: 13 MG/DL (ref 6–20)
CALCIUM SERPL-MCNC: 9 MG/DL (ref 8.7–10.5)
CHLORIDE SERPL-SCNC: 106 MMOL/L (ref 95–110)
CO2 SERPL-SCNC: 25 MMOL/L (ref 23–29)
CREAT SERPL-MCNC: 1.3 MG/DL (ref 0.5–1.4)
EST. GFR  (AFRICAN AMERICAN): >60 ML/MIN/1.73 M^2
EST. GFR  (NON AFRICAN AMERICAN): >60 ML/MIN/1.73 M^2
GLUCOSE SERPL-MCNC: 143 MG/DL (ref 70–110)
POTASSIUM SERPL-SCNC: 4.7 MMOL/L (ref 3.5–5.1)
SODIUM SERPL-SCNC: 139 MMOL/L (ref 136–145)

## 2019-09-21 PROCEDURE — 36415 COLL VENOUS BLD VENIPUNCTURE: CPT | Mod: PO

## 2019-09-21 PROCEDURE — 80048 BASIC METABOLIC PNL TOTAL CA: CPT

## 2019-09-23 ENCOUNTER — OFFICE VISIT (OUTPATIENT)
Dept: INTERNAL MEDICINE | Facility: CLINIC | Age: 59
End: 2019-09-23
Payer: MEDICARE

## 2019-09-23 VITALS
DIASTOLIC BLOOD PRESSURE: 66 MMHG | HEART RATE: 102 BPM | BODY MASS INDEX: 34.7 KG/M2 | HEIGHT: 72 IN | SYSTOLIC BLOOD PRESSURE: 102 MMHG | OXYGEN SATURATION: 95 % | WEIGHT: 256.19 LBS

## 2019-09-23 DIAGNOSIS — E78.5 HYPERLIPIDEMIA, UNSPECIFIED HYPERLIPIDEMIA TYPE: ICD-10-CM

## 2019-09-23 DIAGNOSIS — E11.9 TYPE 2 DIABETES MELLITUS WITHOUT COMPLICATION, WITHOUT LONG-TERM CURRENT USE OF INSULIN: ICD-10-CM

## 2019-09-23 DIAGNOSIS — M54.9 CHRONIC MIDLINE BACK PAIN, UNSPECIFIED BACK LOCATION: Primary | ICD-10-CM

## 2019-09-23 DIAGNOSIS — E66.9 CLASS 1 OBESITY WITH SERIOUS COMORBIDITY AND BODY MASS INDEX (BMI) OF 34.0 TO 34.9 IN ADULT, UNSPECIFIED OBESITY TYPE: ICD-10-CM

## 2019-09-23 DIAGNOSIS — E11.59 HYPERTENSION ASSOCIATED WITH DIABETES: ICD-10-CM

## 2019-09-23 DIAGNOSIS — N52.9 ERECTILE DYSFUNCTION, UNSPECIFIED ERECTILE DYSFUNCTION TYPE: ICD-10-CM

## 2019-09-23 DIAGNOSIS — I15.2 HYPERTENSION ASSOCIATED WITH DIABETES: ICD-10-CM

## 2019-09-23 DIAGNOSIS — G89.29 CHRONIC MIDLINE BACK PAIN, UNSPECIFIED BACK LOCATION: Primary | ICD-10-CM

## 2019-09-23 DIAGNOSIS — Z00.00 PREVENTATIVE HEALTH CARE: ICD-10-CM

## 2019-09-23 DIAGNOSIS — R21 RASH: ICD-10-CM

## 2019-09-23 PROCEDURE — 3044F PR MOST RECENT HEMOGLOBIN A1C LEVEL <7.0%: ICD-10-PCS | Mod: CPTII,S$GLB,, | Performed by: INTERNAL MEDICINE

## 2019-09-23 PROCEDURE — 3078F DIAST BP <80 MM HG: CPT | Mod: CPTII,S$GLB,, | Performed by: INTERNAL MEDICINE

## 2019-09-23 PROCEDURE — 3074F PR MOST RECENT SYSTOLIC BLOOD PRESSURE < 130 MM HG: ICD-10-PCS | Mod: CPTII,S$GLB,, | Performed by: INTERNAL MEDICINE

## 2019-09-23 PROCEDURE — 3074F SYST BP LT 130 MM HG: CPT | Mod: CPTII,S$GLB,, | Performed by: INTERNAL MEDICINE

## 2019-09-23 PROCEDURE — 99214 OFFICE O/P EST MOD 30 MIN: CPT | Mod: S$GLB,,, | Performed by: INTERNAL MEDICINE

## 2019-09-23 PROCEDURE — 3008F PR BODY MASS INDEX (BMI) DOCUMENTED: ICD-10-PCS | Mod: CPTII,S$GLB,, | Performed by: INTERNAL MEDICINE

## 2019-09-23 PROCEDURE — 3044F HG A1C LEVEL LT 7.0%: CPT | Mod: CPTII,S$GLB,, | Performed by: INTERNAL MEDICINE

## 2019-09-23 PROCEDURE — 99999 PR PBB SHADOW E&M-EST. PATIENT-LVL V: ICD-10-PCS | Mod: PBBFAC,,, | Performed by: INTERNAL MEDICINE

## 2019-09-23 PROCEDURE — 3078F PR MOST RECENT DIASTOLIC BLOOD PRESSURE < 80 MM HG: ICD-10-PCS | Mod: CPTII,S$GLB,, | Performed by: INTERNAL MEDICINE

## 2019-09-23 PROCEDURE — 3008F BODY MASS INDEX DOCD: CPT | Mod: CPTII,S$GLB,, | Performed by: INTERNAL MEDICINE

## 2019-09-23 PROCEDURE — 99214 PR OFFICE/OUTPT VISIT, EST, LEVL IV, 30-39 MIN: ICD-10-PCS | Mod: S$GLB,,, | Performed by: INTERNAL MEDICINE

## 2019-09-23 PROCEDURE — 99999 PR PBB SHADOW E&M-EST. PATIENT-LVL V: CPT | Mod: PBBFAC,,, | Performed by: INTERNAL MEDICINE

## 2019-09-23 RX ORDER — TRAMADOL HYDROCHLORIDE 50 MG/1
50 TABLET ORAL EVERY 8 HOURS PRN
Qty: 21 TABLET | Refills: 0 | Status: SHIPPED | OUTPATIENT
Start: 2019-09-23 | End: 2019-09-30

## 2019-09-23 RX ORDER — TADALAFIL 10 MG/1
10 TABLET ORAL DAILY PRN
Qty: 30 TABLET | Refills: 0 | Status: SHIPPED | OUTPATIENT
Start: 2019-09-23 | End: 2020-03-23 | Stop reason: SDUPTHER

## 2019-09-23 NOTE — PROGRESS NOTES
Pt. ID: Fabiano Malik Jr. is a 58 y.o. male      Chief complaint:   Chief Complaint   Patient presents with    Follow-up       HPI: Pt. Here for f/u for DM, buttock rash, ED and HTN; he is compliant with meds and BP is WNL; CT head dated 9/7/19 showed no acute abnormalities; cervical and lumbar xray showed DJD and osteophytes; pt. Recently had localized procedure to his low back; he would like refill on tramadol and I told pt. That I will give him a short course but he will need to get future refils from pain management;  vaigra prn does not help much and he would like to try cialis; cream helped buttock rash; he has gained weight; HGBA1C dated 8/31/19 was 6.6; pt. Had eye exam with Dr. Grigsby this past month      Review of Systems   Constitutional: Negative for chills and fever.   Respiratory: Negative for cough and shortness of breath.    Cardiovascular: Negative for chest pain.   Gastrointestinal: Negative for abdominal pain, nausea and vomiting.   Genitourinary: Negative for dysuria.        Viagra is not helping ED    Musculoskeletal: Positive for back pain.        Chronic low back pain    Skin: Negative for rash.        Buttock rash resolved with cream          Objective:    Physical Exam   Constitutional: He is oriented to person, place, and time.   obese   Eyes: EOM are normal.   Neck: Normal range of motion.   Cardiovascular: Normal rate, regular rhythm and normal heart sounds.   Pulmonary/Chest: Effort normal and breath sounds normal. No respiratory distress. He has no wheezes. He has no rales.   Abdominal: Soft. There is no tenderness. There is no rebound and no guarding.   Musculoskeletal:   Low back pain with ROM; gait instability: uses a cane    Neurological: He is alert and oriented to person, place, and time.   Skin: No rash noted.   Vitals reviewed.        Health Maintenance   Topic Date Due    Eye Exam  07/10/2019    Lipid Panel  12/06/2019    Foot Exam  12/12/2019    Hemoglobin A1c   02/29/2020    High Dose Statin  09/23/2020    Pneumococcal Vaccine (Highest Risk) (3 of 3 - PPSV23) 12/12/2023    Colonoscopy  05/31/2028    TETANUS VACCINE  12/12/2028    Hepatitis C Screening  Completed         Assessment:     1. Chronic midline back pain, unspecified back location Active   2. Hypertension associated with diabetes Well controlled   3. Type 2 diabetes mellitus without complication, without long-term current use of insulin Well controlled   4. Hyperlipidemia, unspecified hyperlipidemia type Active   5. Rash Well controlled   6. Erectile dysfunction, unspecified erectile dysfunction type Sub-optimally controlled   7. Class 1 obesity with serious comorbidity and body mass index (BMI) of 34.0 to 34.9 in adult, unspecified obesity type Sub-optimally controlled   8. Preventative health care Active         Plan: Chronic midline back pain, unspecified back location  Comments:  continue tramadol prn and f/u pain management who is managing   Orders:  -     traMADol (ULTRAM) 50 mg tablet; Take 1 tablet (50 mg total) by mouth every 8 (eight) hours as needed for Pain.  Dispense: 21 tablet; Refill: 0    Hypertension associated with diabetes  Comments:  continue current regimen and encouraged low Na diet and weight loss   Orders:  -     CBC auto differential; Future; Expected date: 09/23/2019  -     Comprehensive metabolic panel; Future; Expected date: 09/23/2019  -     Urinalysis    Type 2 diabetes mellitus without complication, without long-term current use of insulin  Comments:  continue current regimen and encouraged ADA diet   Orders:  -     CBC auto differential; Future; Expected date: 09/23/2019  -     Comprehensive metabolic panel; Future; Expected date: 09/23/2019  -     Hemoglobin A1c; Future; Expected date: 09/23/2019  -     Urinalysis  -     Microalbumin/creatinine urine ratio    Hyperlipidemia, unspecified hyperlipidemia type  Comments:  continue current regimen and encouraged diet modification    Orders:  -     Comprehensive metabolic panel; Future; Expected date: 09/23/2019  -     Lipid panel; Future; Expected date: 09/23/2019    Rash  Comments:  resolved with cream     Erectile dysfunction, unspecified erectile dysfunction type  Comments:  change viagra to cialis prn   Orders:  -     tadalafil (CIALIS) 10 MG tablet; Take 1 tablet (10 mg total) by mouth daily as needed for Erectile Dysfunction (30 minutes prior to sex prn; max 1 tab/day; avoid with nitroglycerine).  Dispense: 30 tablet; Refill: 0    Class 1 obesity with serious comorbidity and body mass index (BMI) of 34.0 to 34.9 in adult, unspecified obesity type  Comments:  encouraged diet and explained risks     Preventative health care  -     CBC auto differential; Future; Expected date: 09/23/2019  -     Comprehensive metabolic panel; Future; Expected date: 09/23/2019  -     Hemoglobin A1c; Future; Expected date: 09/23/2019  -     Lipid panel; Future; Expected date: 09/23/2019  -     Urinalysis        Problem List Items Addressed This Visit        Derm    Rash       Cardiac/Vascular    Hypertension associated with diabetes    Relevant Orders    CBC auto differential    Comprehensive metabolic panel    Urinalysis    HLD (hyperlipidemia)    Relevant Orders    Comprehensive metabolic panel    Lipid panel       Renal/    Erectile dysfunction    Relevant Medications    tadalafil (CIALIS) 10 MG tablet       Endocrine    Type 2 diabetes mellitus without complication, without long-term current use of insulin    Relevant Orders    CBC auto differential    Comprehensive metabolic panel    Hemoglobin A1c    Urinalysis    Microalbumin/creatinine urine ratio    Class 1 obesity with serious comorbidity and body mass index (BMI) of 34.0 to 34.9 in adult       Orthopedic    Chronic back pain - Primary    Relevant Medications    traMADol (ULTRAM) 50 mg tablet       Other    Preventative health care    Relevant Orders    CBC auto differential    Comprehensive  metabolic panel    Hemoglobin A1c    Lipid panel    Urinalysis

## 2019-09-26 ENCOUNTER — TELEPHONE (OUTPATIENT)
Dept: FAMILY MEDICINE | Facility: CLINIC | Age: 59
End: 2019-09-26

## 2019-09-26 NOTE — TELEPHONE ENCOUNTER
I received a denial of coverage for cane for pt.; please have Nurse navigator, Mi, see what can be done to get cane for the pt.

## 2019-09-29 DIAGNOSIS — S09.90XA TRAUMATIC INJURY OF HEAD, INITIAL ENCOUNTER: ICD-10-CM

## 2019-09-29 DIAGNOSIS — M54.50 LOW BACK PAIN, NON-SPECIFIC: ICD-10-CM

## 2019-09-29 DIAGNOSIS — M54.2 NECK PAIN: ICD-10-CM

## 2019-09-30 RX ORDER — TRAMADOL HYDROCHLORIDE 50 MG/1
TABLET ORAL
Qty: 21 TABLET | Refills: 0 | OUTPATIENT
Start: 2019-09-30

## 2019-10-04 ENCOUNTER — PATIENT OUTREACH (OUTPATIENT)
Dept: ADMINISTRATIVE | Facility: OTHER | Age: 59
End: 2019-10-04

## 2019-10-08 ENCOUNTER — OFFICE VISIT (OUTPATIENT)
Dept: PAIN MEDICINE | Facility: CLINIC | Age: 59
End: 2019-10-08
Payer: MEDICARE

## 2019-10-08 VITALS — WEIGHT: 256.19 LBS | BODY MASS INDEX: 34.74 KG/M2

## 2019-10-08 DIAGNOSIS — M47.819 SPONDYLOSIS WITHOUT MYELOPATHY: ICD-10-CM

## 2019-10-08 DIAGNOSIS — M47.10 OSTEOARTHRITIS OF SPINE WITH MYELOPATHY, UNSPECIFIED SPINAL REGION: ICD-10-CM

## 2019-10-08 DIAGNOSIS — R29.898 MUSCULAR DECONDITIONING: ICD-10-CM

## 2019-10-08 DIAGNOSIS — M51.36 DDD (DEGENERATIVE DISC DISEASE), LUMBAR: Primary | ICD-10-CM

## 2019-10-08 DIAGNOSIS — M47.816 FACET ARTHROPATHY, LUMBAR: ICD-10-CM

## 2019-10-08 PROCEDURE — 3008F BODY MASS INDEX DOCD: CPT | Mod: CPTII,S$GLB,, | Performed by: NURSE PRACTITIONER

## 2019-10-08 PROCEDURE — 99214 PR OFFICE/OUTPT VISIT, EST, LEVL IV, 30-39 MIN: ICD-10-PCS | Mod: S$GLB,,, | Performed by: NURSE PRACTITIONER

## 2019-10-08 PROCEDURE — 99999 PR PBB SHADOW E&M-EST. PATIENT-LVL IV: CPT | Mod: PBBFAC,,, | Performed by: NURSE PRACTITIONER

## 2019-10-08 PROCEDURE — 99214 OFFICE O/P EST MOD 30 MIN: CPT | Mod: S$GLB,,, | Performed by: NURSE PRACTITIONER

## 2019-10-08 PROCEDURE — 3008F PR BODY MASS INDEX (BMI) DOCUMENTED: ICD-10-PCS | Mod: CPTII,S$GLB,, | Performed by: NURSE PRACTITIONER

## 2019-10-08 PROCEDURE — 99999 PR PBB SHADOW E&M-EST. PATIENT-LVL IV: ICD-10-PCS | Mod: PBBFAC,,, | Performed by: NURSE PRACTITIONER

## 2019-10-08 NOTE — PROGRESS NOTES
[facility] Established Patient Evaluation    Chief Complaint  Chief Complaint   Patient presents with    Back Pain       Last 3 PDI Scores 10/8/2019 9/6/2019 4/8/2019   Pain Disability Index (PDI) 68 70 60       Interval Update 10/8/19 Pt returns today S/P Lumbar Transforaminal Epidural Steroid Injection on 9/12/19 with 30% relief.     9/6/19 Pt returns today complaining of low back pain.     4/8/19 Pt returns today for med refill.     3/26/19 Pt returns today S/P LEFT L2,3,4,5 Lumbar Medial Branch Radiofrequency Ablation on 3/12/19 with 30% relief.    2/12/19 Pt returns today S/P Right  L2,3,4,5 Lumbar Medial Branch Radiofrequency Ablation on 1/8/19 with 75% relief.       Fabiano Malik JrJohn presents to me for low back and neck pain her reports 9.5/10 pain today he has not been seen in our clinic for 6 months  patient states today his primary source of pain is low back with some bilateral leg pain. He is status post right and left lumbar RFA at L2-3-4-5 70% relief in May of 2018.    Location: Low back   Onset: 3 months  Current Pain Score: 10/10/9/10  Daily Pain of Range: 9-10/10  Quality: Burning, Throbbing, Grabbing and Sharp  Radiation: Bilateral legs   Worsened by: exercise, extension, flexion, lifting, lying down and sneezing  Improved by: heat and rest    Background from Chart Review      Fabiano Malik Jr. presents to the clinic for a 4 wk follow-up appointment for neck and low back pain. He is S/P  left L2-3-4-5 FACET MEDIAL BRANCH NERVE RADIOFREQUENCY NEUROTOMY (lumbar) on 5/29/18 and  RIGHT L2-3-4-5 FACET MEDIAL BRANCH NERVE RADIOFREQUENCY NEUROTOMY on 5/22/18 with 70% relief for 2 weeks, pt states pain is back but overall better since procedure, I will order PT and refill Mobic and Lyrica today.  Since the last visit, Fabiano Malik JrJohn states the pain has been persistant. Current pain intensity is 8/10.       Treatment History  PT/OT:   HEP:   TENS:  Injections: 3/12/19 LEFT  L2,3,4,5 Lumbar Medial Branch Radiofrequency Ablation  1/8/19 BILATERAL L2,3,4,5 Lumbar Medial Branch Radiofrequency Ablation  Surgery:  Medications:   - NSAIDS:   - MSK Relaxants:   - TCAs:   - SNRIs:   - Topicals:   - Opioids:   - Anticonvulsants:     Current Pain Medications  1. Cymbalta  60 mg BID   2. Tylenol   3. Klonopin  4. Seroquel  5. Risperdal    Full Medication List    Current Outpatient Medications:     acetaminophen (TYLENOL) 325 MG tablet, Take 2 tablets (650 mg total) by mouth every 6 (six) hours as needed., Disp: 120 tablet, Rfl: 3    aspirin (ECOTRIN) 81 MG EC tablet, Take 81 mg by mouth once daily., Disp: , Rfl:     blood sugar diagnostic Strp, 2 strips by Misc.(Non-Drug; Combo Route) route 2 (two) times daily., Disp: 200 strip, Rfl: 2    blood-glucose meter kit, Use as instructed, Disp: 1 each, Rfl: 0    clonazePAM (KLONOPIN) 1 MG tablet, TK 1 T PO TID, Disp: , Rfl: 3    clotrimazole-betamethasone (LOTRISONE) lotion, Apply topically 2 (two) times daily as needed., Disp: 30 mL, Rfl: 0    diclofenac sodium (PENNSAID) 20 mg/gram /actuation(2 %) sopm, Apply 40 mg topically 2 (two) times daily., Disp: 1 Bottle, Rfl: 1    DULoxetine (CYMBALTA) 60 MG capsule, Take 1 capsule (60 mg total) by mouth 2 (two) times daily., Disp: 60 capsule, Rfl: 11    gabapentin (NEURONTIN) 300 MG capsule, TAKE 1 CAPSULE(300 MG) BY MOUTH EVERY EVENING, Disp: 30 capsule, Rfl: 0    lancets (ONETOUCH DELICA LANCETS) 33 gauge Misc, 1 lancet by Misc.(Non-Drug; Combo Route) route 2 (two) times daily., Disp: 200 each, Rfl: 2    meclizine (ANTIVERT) 12.5 mg tablet, Take 1 tablet (12.5 mg total) by mouth 3 (three) times daily as needed for Dizziness (caution on sedative effects)., Disp: 60 tablet, Rfl: 0    metFORMIN (GLUCOPHAGE) 1000 MG tablet, Take 1 tablet (1,000 mg total) by mouth 2 (two) times daily with meals., Disp: 180 tablet, Rfl: 1    nabumetone (RELAFEN) 750 MG tablet, Take 1 tablet (750 mg total) by mouth 2  (two) times daily as needed (avoid all other NSAIDs)., Disp: 30 tablet, Rfl: 2    olmesartan (BENICAR) 20 MG tablet, TAKE 1 TABLET(20 MG) BY MOUTH TWICE DAILY, Disp: 60 tablet, Rfl: 2    omeprazole (PRILOSEC) 20 MG capsule, Take 1 capsule (20 mg total) by mouth daily as needed., Disp: 30 capsule, Rfl: 2    pantoprazole (PROTONIX) 40 MG tablet, TAKE 1 TABLET(40 MG) BY MOUTH DAILY AS NEEDED, Disp: 90 tablet, Rfl: 0    pravastatin (PRAVACHOL) 40 MG tablet, TAKE 1 TABLET(40 MG) BY MOUTH EVERY DAY, Disp: 90 tablet, Rfl: 0    QUEtiapine (SEROQUEL) 400 MG tablet, Take 400 mg by mouth once daily. , Disp: , Rfl:     risperiDONE (RISPERDAL) 0.5 MG Tab, Take 1 tablet (0.5 mg total) by mouth every evening., Disp: 30 tablet, Rfl: 11    sodium polystyrene (KAYEXALATE) 15 gram/60 mL Susp, Take 15 g by mouth., Disp: , Rfl:     tadalafil (CIALIS) 10 MG tablet, Take 1 tablet (10 mg total) by mouth daily as needed for Erectile Dysfunction (30 minutes prior to sex prn; max 1 tab/day; avoid with nitroglycerine)., Disp: 30 tablet, Rfl: 0    tiZANidine (ZANAFLEX) 2 MG tablet, TAKE 1 TABLET(2 MG) BY MOUTH TWICE DAILY AS NEEDED, Disp: 180 tablet, Rfl: 1  No current facility-administered medications for this visit.     Facility-Administered Medications Ordered in Other Visits:     alprazolam ODT dissolvable tablet 0.5 mg, 0.5 mg, Oral, Once PRN, Alberto Hernandez Jr., MD     Review of Systems  ROS    Medical History  Past Medical History:   Diagnosis Date    Chronic back pain greater than 3 months duration     Depression     Diabetes mellitus     Hypertension     Schizophrenia         Surgical History  Past Surgical History:   Procedure Laterality Date    APPENDECTOMY      COLONOSCOPY N/A 5/31/2018    Procedure: COLONOSCOPY;  Surgeon: Guillaume Hatch MD;  Location: [facility];  Service: Endoscopy;  Laterality: N/A;    NECK SURGERY      RADIOFREQUENCY ABLATION OF LUMBAR MEDIAL BRANCH NERVE AT SINGLE LEVEL Left 5/29/2018     Procedure: RADIOFREQUENCY THERMOCOAGULATION (RFTC)-NERVE-MEDIAN BRANCH-LUMBAR- Left L2-3-4-5;  Surgeon: Donavon Dennis MD;  Location: [facility];  Service: Pain Management;  Laterality: Left;    RADIOFREQUENCY ABLATION OF LUMBAR MEDIAL BRANCH NERVE AT SINGLE LEVEL Right 1/8/2019    Procedure: Radiofrequency Ablation, Nerve, Spinal, Lumbar, Medial Branch, L2,3,4,5;  Surgeon: Alberto Hernandez Jr., MD;  Location: [facility];  Service: Pain Management;  Laterality: Right;  Pt is diabetic    RADIOFREQUENCY ABLATION OF LUMBAR MEDIAL BRANCH NERVE AT SINGLE LEVEL Left 3/12/2019    Procedure: Radiofrequency Ablation, Nerve, Spinal, Lumbar, Medial Branch, Left, L2,3,4,5;  Surgeon: Alberto Hernandez Jr., MD;  Location: [facility];  Service: Pain Management;  Laterality: Left;  Pt will be consented the day of procedure.    TRANSFORAMINAL EPIDURAL INJECTION OF STEROID Right 9/12/2019    Procedure: Injection,steroid,epidural,transforaminal,right,L4-5 and L5-S1;  Surgeon: Alberto Hernandez Jr., MD;  Location: [facility];  Service: Pain Management;  Laterality: Right;  PT IS DIABETIC        Physical Exam  Vitals:    10/08/19 1053   Weight: 116.2 kg (256 lb 2.8 oz)   PainSc:   9         General Musculoskeletal Exam   Gait: antalgic     Back (L-Spine & T-Spine) / Neck (C-Spine) Exam     Back (L-Spine & T-Spine) Range of Motion   Lateral bend right: abnormal   Lateral bend left: abnormal   Rotation right: abnormal   Rotation left: abnormal     Spinal Sensation   Right Side Sensation  L-Spine Level: hypersensitive  Left Side Sensation  L-Spine Level: hypersensitive    Back (L-Spine & T-Spine) Tests   Right Side Tests  Squat Test: unable to perform  Left Side Tests  Squat: unable to perform    Comments:  + Facet Loading Bilaterally.  Positive FABERE, Yeoman's and Galensen test on the both side.          Imaging    MRI lumbar spine I. contrast.  Comparison to previous radiographs.  Few 1 CM size is cyst mid and lower  renal poles.  Marrow space normal.  Chronic endplate change particularly L4-L5.  No fracture subluxation.  Spinal cord normal.    T11-T12 limited posterior disc bulge.    L1-L2 mild posterior paracentral disc bulge, mild compression adjacent spinal sac, facet arthropathy with mild spinal and foraminal stenosis.    L2-L3 minimal mild posterior disc bulge, mild facet arthropathy with mild spinal and foraminal stenosis.    L3-L4 minimal posterior disc bulge, facet joint arthropathy with minimal mild spinal and foraminal stenosis.    L4 L5 minimal mild posterior disc bulge without spinal or foraminal stenosis.    L5-S1 mild/moderate facet joint arthropathy, mild posterior disc bulge, mild spinal and foraminal stenosis.      Impression      1. Degenerative disc, spondylosis, facet arthropathy discussed above.    2.  No fracture, subluxation or disc prolapse.       Labs:  BMP  Lab Results   Component Value Date     09/21/2019    K 4.7 09/21/2019     09/21/2019    CO2 25 09/21/2019    BUN 13 09/21/2019    CREATININE 1.3 09/21/2019    CALCIUM 9.0 09/21/2019    ANIONGAP 8 09/21/2019    ESTGFRAFRICA >60.0 09/21/2019    EGFRNONAA >60.0 09/21/2019     Lab Results   Component Value Date    ALT 34 08/31/2019    AST 33 08/31/2019    ALKPHOS 49 (L) 08/31/2019    BILITOT 0.6 08/31/2019       Assessment:  Fabiano Malik Jr. is a 58 y.o. male with the following diagnoses based on history, exam, and imaging:    Problem List Items Addressed This Visit     None          2/12/2019- 57 y/o male s/p right L2-3-4-5 Lumbar RFA with 75% relief, his left sided RFA s/f 1/15/2019  was cancelled due to reports of dyspnea on exertion and recent recommendation from his PCP to seek cardiology evaluation, per Dr. Magana's Patient has risk factors for CAD with ongoing chest pain with VALIENTE (dyspnea on exertion) and all injection should be held at this point.    03/26/2019 58-year-old pleasant male presents today status post right L2-3-4-5  "lumbar RFA with reported 30% relief, patient has a history of this procedure which has helped in the past however he did not get the usual relief following this lumbar RFA.  During clinic visit and noticed that patient had  a cane which he has never used before; when asked, he states that he has been feeling dizzy, since this morning I recommended he visit the ED patient refuse.  Also noticed instability with his gait and mild slurred speech.  Recommended that I consult Neurology for further assessment patient agreed and understood.  Reference to his low back pain I recommend we start physical therapy to help with instability, muscular strength and lumbar stabilization patient agreed and understood.      4/8/2019- 59 y/o male  Present today to discuss refilling Tramadol 50 mg BID  for his pain, he was previously getting from Dr Ellis. Recommended that patient use tylenol extra strength 500 mg -1000 mg q8 (max dose 3000 mg). Discussed that he is currently taking clonazepam per his psychiatrist and that the combination of benzodiazepine and opioid medications are dangerous and can lead to  Opioid induced respiratory depression, pt states he understood and agreed. Discussed that he fulfill all future appts neurology, US of his Carotids and ENT so that his dizziness is appropriately addressed by the right provider.      9/6/2019- 59 y/o male presents with low back and right leg pain, pt reports x2 falls over the last 2 weeks due to "just loosing his balance on first fall and dizziness on second fall. Pt has Hx HTN and DM; Pt is s/f a CAT scan of brain tomorrow am for further assessment of reoccurring dizziness. His primary source of pain is low back with accompanying posterior right leg pain stooping just above his knee,   pt describes LBP as aching, throbbing and sharp, leg pain is describes as numbness while sleeping. He has difficulty describing his pain today.  2018 Lumbar MRI shows at  L2-L3 minimal mild posterior " disc bulge, mild facet arthropathy with mild spinal and foraminal stenosis.  L3-L4 minimal posterior disc bulge, facet joint arthropathy with minimal mild spinal and foraminal stenosis, 2nd at  L4 L5 minimal mild posterior disc bulge without spinal or foraminal stenosis and finally at  L5-S1 he has mild/moderate facet joint arthropathy, mild posterior disc bulge, mild spinal and foraminal stenosis. I will s/f a right L4-5 and L5-S1 TFESI I am scheduling this based on his subjective symptoms his Lumbar MRI show minimal/mild NF stenosis, he is s/p Left Lumbar RFA on 3/12/19 for left sided back pain which he reports is doing well.      10/08/2019-58-y/o male presents with his girlfriend he is status post right L4-5 and L5-S1 TF JOHN with 30-40% relief patient states he continues to have low back pain with posterior bilateral buttocks pain into his legs stopping just below his knees states his right leg is worse than his left L2 to S1 patient has minimal mild posterior disc bulges with mild-to-moderate facet arthropathy with mild foraminal stenosis at L4-5 and L5-S1 which could be the cause of his bilateral posterior pain radiating down his leg stopping above his knee however I think the majority of his pathology is related to the facet arthropathy he has benefit from a right lumbar medial branch block and RFA however he did not benefit from the left lumbar medial branch blocks and then RFA.  I do not recommend any more interventions at this point what I recommend is 8-10 weeks of physical therapy to help with lumbar stabilization, muscular deconditioning, and overall muscular strengthening.  Also recommended Tylenol Extra Strength 500 to a 1000 Q 8 not to exceed 3000 mg for pain. Patient inquired about taking over his tramadol prescription I recommended against  use of opioid therapy, once he has completed physical therapy will look to optimize medications patient is currently taking gabapentin 300 mg q.h.s. and  Cymbalta 60 mg b.i.d.    Plan & Recommendations  Procedures:  None at this time  Medications:  Continue medications as they are prescribed  Cymbalta 60 mg b.i.d.  and Mobic today 15 mg p.r.n. Tylenol Extra Strength   -1000 mg Q 8 (max-3000 mg)  Imaging:  Previous imaging reviewed and discussed with patient today  PT/OT:  Referral to physical therapy today  HEP: I encouraged the patient to maintain a home exercise regimen that includes daily, moderate cardiovascular exercise lasting at least 30 minutes.  This may include yoga, rachelle chi, walking, swimming, aqua aerobics, or other exercises that maintain a heart rate of 50-70% of the calculated maximum heart rate.  I also encouraged light, daily stretching focused on the target area.  Follow Up: RTC 10 weeks       CARLOS Swanson  Inter[facility] Pain [facility]      Disclaimer: This note was partly generated using dictation software which may occasionally result in transcription errors.

## 2019-10-16 RX ORDER — TIZANIDINE 2 MG/1
TABLET ORAL
Qty: 180 TABLET | Refills: 0 | Status: SHIPPED | OUTPATIENT
Start: 2019-10-16 | End: 2019-10-24 | Stop reason: SDUPTHER

## 2019-10-16 RX ORDER — GABAPENTIN 300 MG/1
CAPSULE ORAL
Qty: 30 CAPSULE | Refills: 0 | Status: SHIPPED | OUTPATIENT
Start: 2019-10-16 | End: 2019-11-15 | Stop reason: SDUPTHER

## 2019-10-17 DIAGNOSIS — E11.9 TYPE 2 DIABETES MELLITUS WITHOUT COMPLICATION, WITHOUT LONG-TERM CURRENT USE OF INSULIN: ICD-10-CM

## 2019-10-17 DIAGNOSIS — K21.9 GASTROESOPHAGEAL REFLUX DISEASE, ESOPHAGITIS PRESENCE NOT SPECIFIED: ICD-10-CM

## 2019-10-17 NOTE — TELEPHONE ENCOUNTER
Called and spoke w/ patient. Refill request will send to covering physician. Gave last date of when he saw his cardiologist, needs to follow up.        ----- Message from Alice Kelsey sent at 10/17/2019  9:12 AM CDT -----  Contact: Self  Pt is calling [facility] with Ms. Castellanos regarding medication refills for nabumetone (RELAFEN) 750 MG tablet, metFORMIN (GLUCOPHAGE) 1000 MG tablet & omeprazole (PRILOSEC) 20 MG capsule.      He can be reached at [phone].    Thank you.

## 2019-10-18 RX ORDER — NABUMETONE 750 MG/1
750 TABLET, FILM COATED ORAL 2 TIMES DAILY PRN
Qty: 30 TABLET | Refills: 0 | Status: SHIPPED | OUTPATIENT
Start: 2019-10-18 | End: 2019-10-18 | Stop reason: SDUPTHER

## 2019-10-18 RX ORDER — OMEPRAZOLE 20 MG/1
20 CAPSULE, DELAYED RELEASE ORAL DAILY PRN
Qty: 30 CAPSULE | Refills: 0 | Status: SHIPPED | OUTPATIENT
Start: 2019-10-18 | End: 2019-10-24 | Stop reason: SDUPTHER

## 2019-10-18 RX ORDER — METFORMIN HYDROCHLORIDE 1000 MG/1
1000 TABLET ORAL 2 TIMES DAILY WITH MEALS
Qty: 180 TABLET | Refills: 0 | Status: SHIPPED | OUTPATIENT
Start: 2019-10-18 | End: 2019-10-24 | Stop reason: SDUPTHER

## 2019-10-18 RX ORDER — NABUMETONE 750 MG/1
TABLET, FILM COATED ORAL
Qty: 60 TABLET | Refills: 0 | Status: SHIPPED | OUTPATIENT
Start: 2019-10-18 | End: 2019-11-07 | Stop reason: SDUPTHER

## 2019-10-21 ENCOUNTER — TELEPHONE (OUTPATIENT)
Dept: CARDIOLOGY | Facility: CLINIC | Age: 59
End: 2019-10-21

## 2019-10-21 NOTE — TELEPHONE ENCOUNTER
Left pt message requesting call back to reschedule clinical visit due to Dr. Carreon being out this day

## 2019-10-24 DIAGNOSIS — I15.2 HYPERTENSION ASSOCIATED WITH DIABETES: ICD-10-CM

## 2019-10-24 DIAGNOSIS — E11.59 HYPERTENSION ASSOCIATED WITH DIABETES: ICD-10-CM

## 2019-10-24 DIAGNOSIS — E11.9 TYPE 2 DIABETES MELLITUS WITHOUT COMPLICATION, WITHOUT LONG-TERM CURRENT USE OF INSULIN: ICD-10-CM

## 2019-10-24 DIAGNOSIS — K21.9 GASTROESOPHAGEAL REFLUX DISEASE, ESOPHAGITIS PRESENCE NOT SPECIFIED: ICD-10-CM

## 2019-10-24 RX ORDER — TIZANIDINE 2 MG/1
TABLET ORAL
Qty: 180 TABLET | Refills: 0 | Status: SHIPPED | OUTPATIENT
Start: 2019-10-24 | End: 2020-02-13 | Stop reason: SDUPTHER

## 2019-10-24 RX ORDER — TRAMADOL HYDROCHLORIDE 50 MG/1
50 TABLET ORAL EVERY 6 HOURS
COMMUNITY
End: 2019-10-24 | Stop reason: SDUPTHER

## 2019-10-24 RX ORDER — METFORMIN HYDROCHLORIDE 1000 MG/1
1000 TABLET ORAL 2 TIMES DAILY WITH MEALS
Qty: 180 TABLET | Refills: 0 | Status: SHIPPED | OUTPATIENT
Start: 2019-10-24 | End: 2019-10-28 | Stop reason: SDUPTHER

## 2019-10-24 RX ORDER — OLMESARTAN MEDOXOMIL 20 MG/1
TABLET ORAL
Qty: 60 TABLET | Refills: 2 | Status: SHIPPED | OUTPATIENT
Start: 2019-10-24 | End: 2020-01-24 | Stop reason: SDUPTHER

## 2019-10-24 RX ORDER — OMEPRAZOLE 20 MG/1
20 CAPSULE, DELAYED RELEASE ORAL DAILY PRN
Qty: 30 CAPSULE | Refills: 0 | Status: SHIPPED | OUTPATIENT
Start: 2019-10-24 | End: 2019-11-23

## 2019-10-24 RX ORDER — RISPERIDONE 0.5 MG/1
0.5 TABLET ORAL NIGHTLY
Qty: 30 TABLET | Refills: 11 | Status: SHIPPED | OUTPATIENT
Start: 2019-10-24 | End: 2020-07-15

## 2019-10-25 RX ORDER — TRAMADOL HYDROCHLORIDE 50 MG/1
50 TABLET ORAL EVERY 6 HOURS
Qty: 30 TABLET | Refills: 0 | Status: SHIPPED | OUTPATIENT
Start: 2019-10-25 | End: 2019-11-29 | Stop reason: SDUPTHER

## 2019-10-28 DIAGNOSIS — E11.9 TYPE 2 DIABETES MELLITUS WITHOUT COMPLICATION, WITHOUT LONG-TERM CURRENT USE OF INSULIN: ICD-10-CM

## 2019-10-28 NOTE — TELEPHONE ENCOUNTER
Called patient, requesting a refill on metformin and meclizine. Will forward to covering physician.          ----- Message from Yolande Parr sent at 10/28/2019  8:26 AM CDT -----  Contact: [phone]/self  Patient called in requesting to speak with you. Patient prefers to speak with a nurse. Please advise.

## 2019-10-28 NOTE — TELEPHONE ENCOUNTER
----- Message from Yolande Parr sent at 10/28/2019  8:26 AM CDT -----  Contact: [phone]/self  Patient called in requesting to speak with you. Patient prefers to speak with a nurse. Please advise.

## 2019-10-29 RX ORDER — METFORMIN HYDROCHLORIDE 1000 MG/1
1000 TABLET ORAL 2 TIMES DAILY WITH MEALS
Qty: 180 TABLET | Refills: 0 | Status: SHIPPED | OUTPATIENT
Start: 2019-10-29 | End: 2020-05-11

## 2019-10-29 RX ORDER — MECLIZINE HCL 12.5 MG 12.5 MG/1
12.5 TABLET ORAL 3 TIMES DAILY PRN
Qty: 60 TABLET | Refills: 0 | Status: SHIPPED | OUTPATIENT
Start: 2019-10-29 | End: 2019-12-02 | Stop reason: SDUPTHER

## 2019-11-05 ENCOUNTER — TELEPHONE (OUTPATIENT)
Dept: FAMILY MEDICINE | Facility: CLINIC | Age: 59
End: 2019-11-05

## 2019-11-05 NOTE — TELEPHONE ENCOUNTER
Called and spoke w/ patient. Requesting blood work results sent to Dr. Collier's office and also needs his metformin. Pharmacy said it was too soon, I will call pharmacy and verify.          ----- Message from Link Trujillo sent at 11/5/2019 12:55 PM CST -----  Contact: patient  Patient called to speak with Jasmin today.    Please call [phone].

## 2019-11-07 RX ORDER — NABUMETONE 750 MG/1
TABLET, FILM COATED ORAL
Qty: 60 TABLET | Refills: 0 | Status: SHIPPED | OUTPATIENT
Start: 2019-11-07 | End: 2019-12-19 | Stop reason: SDUPTHER

## 2019-11-13 ENCOUNTER — TELEPHONE (OUTPATIENT)
Dept: CARDIOLOGY | Facility: CLINIC | Age: 59
End: 2019-11-13

## 2019-11-13 NOTE — TELEPHONE ENCOUNTER
Left pt VM in regards to scheduled robert to establish care with new provider Dr. Randle     Robert slip mailed out   Requested call back if any questions

## 2019-11-15 ENCOUNTER — PATIENT OUTREACH (OUTPATIENT)
Dept: ADMINISTRATIVE | Facility: OTHER | Age: 59
End: 2019-11-15

## 2019-11-15 RX ORDER — GABAPENTIN 300 MG/1
CAPSULE ORAL
Qty: 30 CAPSULE | Refills: 0 | Status: SHIPPED | OUTPATIENT
Start: 2019-11-15 | End: 2019-12-13 | Stop reason: SDUPTHER

## 2019-11-16 ENCOUNTER — TELEPHONE (OUTPATIENT)
Dept: FAMILY MEDICINE | Facility: HOSPITAL | Age: 59
End: 2019-11-16

## 2019-11-16 NOTE — TELEPHONE ENCOUNTER
Discussed with patient that during a routine audit we discovered a variation in the temperature of our medication refrigerator which may have affected the potency and effectiveness of our vaccines. The vaccine was not harmful but may have been ineffective so we recommend revaccination. Appointment set up for revaccination. He/She verbalized understanding and all questions were answered. Order entered.

## 2019-11-23 DIAGNOSIS — K21.9 GASTROESOPHAGEAL REFLUX DISEASE, ESOPHAGITIS PRESENCE NOT SPECIFIED: ICD-10-CM

## 2019-11-23 RX ORDER — OMEPRAZOLE 20 MG/1
CAPSULE, DELAYED RELEASE ORAL
Qty: 30 CAPSULE | Refills: 0 | Status: SHIPPED | OUTPATIENT
Start: 2019-11-23 | End: 2019-12-27 | Stop reason: SDUPTHER

## 2019-11-29 RX ORDER — TRAMADOL HYDROCHLORIDE 50 MG/1
50 TABLET ORAL EVERY 6 HOURS
Qty: 30 TABLET | Refills: 0 | Status: SHIPPED | OUTPATIENT
Start: 2019-11-29 | End: 2019-12-19 | Stop reason: SDUPTHER

## 2019-12-02 NOTE — TELEPHONE ENCOUNTER
----- Message from Yolande Parr sent at 12/2/2019  1:47 PM CST -----  Contact: [phone]/self  Patient called in requesting to speak with you. Patient prefers to speak with a nurse. Please advise.

## 2019-12-03 ENCOUNTER — CLINICAL SUPPORT (OUTPATIENT)
Dept: FAMILY MEDICINE | Facility: CLINIC | Age: 59
End: 2019-12-03
Payer: MEDICARE

## 2019-12-03 PROCEDURE — 90686 IIV4 VACC NO PRSV 0.5 ML IM: CPT | Mod: S$GLB,,, | Performed by: INTERNAL MEDICINE

## 2019-12-03 PROCEDURE — 90686 FLU VACCINE (QUAD) GREATER THAN OR EQUAL TO 3YO PRESERVATIVE FREE IM: ICD-10-PCS | Mod: S$GLB,,, | Performed by: INTERNAL MEDICINE

## 2019-12-03 PROCEDURE — G0008 FLU VACCINE (QUAD) GREATER THAN OR EQUAL TO 3YO PRESERVATIVE FREE IM: ICD-10-PCS | Mod: S$GLB,,, | Performed by: INTERNAL MEDICINE

## 2019-12-03 PROCEDURE — G0008 ADMIN INFLUENZA VIRUS VAC: HCPCS | Mod: S$GLB,,, | Performed by: INTERNAL MEDICINE

## 2019-12-03 RX ORDER — PRAVASTATIN SODIUM 40 MG/1
TABLET ORAL
Qty: 90 TABLET | Refills: 0 | Status: SHIPPED | OUTPATIENT
Start: 2019-12-03 | End: 2020-03-03

## 2019-12-03 RX ORDER — MECLIZINE HCL 12.5 MG 12.5 MG/1
12.5 TABLET ORAL 3 TIMES DAILY PRN
Qty: 60 TABLET | Refills: 0 | Status: SHIPPED | OUTPATIENT
Start: 2019-12-03 | End: 2020-01-10

## 2019-12-03 NOTE — PROGRESS NOTES
Two patient identifiers used. Allergies reviewed. Injection x1 given in [place]. Patient tolerated well. VIS given.

## 2019-12-13 RX ORDER — GABAPENTIN 300 MG/1
CAPSULE ORAL
Qty: 30 CAPSULE | Refills: 0 | Status: SHIPPED | OUTPATIENT
Start: 2019-12-13 | End: 2020-01-10

## 2019-12-19 RX ORDER — NABUMETONE 750 MG/1
TABLET, FILM COATED ORAL
Qty: 60 TABLET | Refills: 0 | Status: SHIPPED | OUTPATIENT
Start: 2019-12-19 | End: 2020-01-24 | Stop reason: SDUPTHER

## 2019-12-19 RX ORDER — TRAMADOL HYDROCHLORIDE 50 MG/1
50 TABLET ORAL EVERY 6 HOURS
Qty: 30 TABLET | Refills: 0 | Status: SHIPPED | OUTPATIENT
Start: 2019-12-19 | End: 2020-01-23 | Stop reason: SDUPTHER

## 2019-12-19 NOTE — TELEPHONE ENCOUNTER
Needs an apt to establish care with a new provider. Last refill. Needs to see someone q3 months for opioid refills.     His PCP has left.

## 2019-12-19 NOTE — TELEPHONE ENCOUNTER
----- Message from Yun Yuen sent at 12/19/2019  3:13 PM CST -----  Contact: Patient   Type:  RX Refill Request    Who Called:  Fabiano  Refill or New Rx:refill  RX Name and Strength: nabumetone (RELAFEN) 750 MG tablet  How is the patient currently taking it? (ex. 1XDay): 2 X day  Is this a 30 day or 90 day RX: 90 day  Preferred Pharmacy with phone number: [id number]  [place] - [address] AT [facility] [address]  Local or Mail Order: local  Ordering Provider: Dr. Crandall  Would the patient rather a call back or a response via [facility]? Call back   Best Call Back Number: [phone]  Additional Information:     needs refills also for:     traMADol (ULTRAM) 50 mg tablet---- every 6 hrs ---30 day

## 2019-12-23 PROBLEM — Z00.00 PREVENTATIVE HEALTH CARE: Status: RESOLVED | Noted: 2018-11-28 | Resolved: 2019-12-23

## 2019-12-27 DIAGNOSIS — K21.9 GASTROESOPHAGEAL REFLUX DISEASE, ESOPHAGITIS PRESENCE NOT SPECIFIED: ICD-10-CM

## 2019-12-27 RX ORDER — OMEPRAZOLE 20 MG/1
CAPSULE, DELAYED RELEASE ORAL
Qty: 30 CAPSULE | Refills: 0 | Status: SHIPPED | OUTPATIENT
Start: 2019-12-27 | End: 2020-01-24 | Stop reason: SDUPTHER

## 2019-12-27 RX ORDER — OMEPRAZOLE 20 MG/1
CAPSULE, DELAYED RELEASE ORAL
Qty: 90 CAPSULE | OUTPATIENT
Start: 2019-12-27

## 2020-01-04 ENCOUNTER — DOCUMENTATION ONLY (OUTPATIENT)
Dept: FAMILY MEDICINE | Facility: HOSPITAL | Age: 60
End: 2020-01-04

## 2020-01-09 RX ORDER — TRAMADOL HYDROCHLORIDE 50 MG/1
TABLET ORAL
Qty: 30 TABLET | OUTPATIENT
Start: 2020-01-09

## 2020-01-10 RX ORDER — GABAPENTIN 300 MG/1
CAPSULE ORAL
Qty: 30 CAPSULE | Refills: 0 | Status: SHIPPED | OUTPATIENT
Start: 2020-01-10 | End: 2020-02-07 | Stop reason: SDUPTHER

## 2020-01-10 RX ORDER — MECLIZINE HCL 12.5 MG 12.5 MG/1
TABLET ORAL
Qty: 60 TABLET | Refills: 0 | Status: SHIPPED | OUTPATIENT
Start: 2020-01-10 | End: 2020-02-07 | Stop reason: SDUPTHER

## 2020-01-17 ENCOUNTER — TELEPHONE (OUTPATIENT)
Dept: FAMILY MEDICINE | Facility: CLINIC | Age: 60
End: 2020-01-17

## 2020-01-17 NOTE — TELEPHONE ENCOUNTER
Called and spoke w/ patient. Cancelled blood work from former Dr. Ellis and will order new blood work w/ Dr. Santiago when he establishes care.      ----- Message from Chago Sheldon sent at 1/17/2020  3:50 PM CST -----  Contact: [phone]/self  Patient would like to know if labs is needed before his 1-23-20 appointment   Please advise

## 2020-01-22 DIAGNOSIS — K21.9 GASTROESOPHAGEAL REFLUX DISEASE, ESOPHAGITIS PRESENCE NOT SPECIFIED: ICD-10-CM

## 2020-01-22 RX ORDER — OMEPRAZOLE 20 MG/1
CAPSULE, DELAYED RELEASE ORAL
Qty: 30 CAPSULE | Refills: 0 | OUTPATIENT
Start: 2020-01-22

## 2020-01-23 ENCOUNTER — TELEPHONE (OUTPATIENT)
Dept: FAMILY MEDICINE | Facility: CLINIC | Age: 60
End: 2020-01-23

## 2020-01-23 ENCOUNTER — OFFICE VISIT (OUTPATIENT)
Dept: FAMILY MEDICINE | Facility: CLINIC | Age: 60
End: 2020-01-23
Payer: MEDICARE

## 2020-01-23 VITALS
SYSTOLIC BLOOD PRESSURE: 114 MMHG | OXYGEN SATURATION: 96 % | BODY MASS INDEX: 35.35 KG/M2 | HEIGHT: 72 IN | WEIGHT: 261 LBS | DIASTOLIC BLOOD PRESSURE: 72 MMHG | HEART RATE: 110 BPM

## 2020-01-23 DIAGNOSIS — G89.4 CHRONIC PAIN DISORDER: ICD-10-CM

## 2020-01-23 DIAGNOSIS — R55 SYNCOPE, UNSPECIFIED SYNCOPE TYPE: ICD-10-CM

## 2020-01-23 DIAGNOSIS — E11.65 TYPE 2 DIABETES MELLITUS WITH HYPERGLYCEMIA, WITHOUT LONG-TERM CURRENT USE OF INSULIN: ICD-10-CM

## 2020-01-23 DIAGNOSIS — Z12.5 SCREENING FOR PROSTATE CANCER: ICD-10-CM

## 2020-01-23 DIAGNOSIS — I10 ESSENTIAL HYPERTENSION: Primary | ICD-10-CM

## 2020-01-23 DIAGNOSIS — R40.4 TRANSIENT ALTERATION OF AWARENESS: ICD-10-CM

## 2020-01-23 DIAGNOSIS — F51.01 PRIMARY INSOMNIA: ICD-10-CM

## 2020-01-23 DIAGNOSIS — N18.30 CKD (CHRONIC KIDNEY DISEASE) STAGE 3, GFR 30-59 ML/MIN: ICD-10-CM

## 2020-01-23 DIAGNOSIS — E66.01 SEVERE OBESITY (BMI 35.0-39.9) WITH COMORBIDITY: ICD-10-CM

## 2020-01-23 DIAGNOSIS — R94.31 ABNORMAL EKG: ICD-10-CM

## 2020-01-23 DIAGNOSIS — F20.3 UNDIFFERENTIATED SCHIZOPHRENIA: Chronic | ICD-10-CM

## 2020-01-23 DIAGNOSIS — M54.16 LUMBAR RADICULOPATHY: ICD-10-CM

## 2020-01-23 DIAGNOSIS — R55 SYNCOPE: ICD-10-CM

## 2020-01-23 PROBLEM — R00.0 TACHYCARDIA: Status: RESOLVED | Noted: 2018-11-28 | Resolved: 2020-01-23

## 2020-01-23 PROBLEM — R79.89 TROPONIN LEVEL ELEVATED: Status: RESOLVED | Noted: 2018-11-28 | Resolved: 2020-01-23

## 2020-01-23 PROBLEM — R19.7 DIARRHEA: Status: RESOLVED | Noted: 2017-01-21 | Resolved: 2020-01-23

## 2020-01-23 PROBLEM — J06.9 UPPER RESPIRATORY TRACT INFECTION: Status: RESOLVED | Noted: 2018-11-28 | Resolved: 2020-01-23

## 2020-01-23 PROBLEM — R79.89 PRERENAL AZOTEMIA: Status: RESOLVED | Noted: 2017-01-21 | Resolved: 2020-01-23

## 2020-01-23 PROBLEM — R06.02 SOB (SHORTNESS OF BREATH): Status: RESOLVED | Noted: 2018-11-28 | Resolved: 2020-01-23

## 2020-01-23 PROCEDURE — 99999 PR PBB SHADOW E&M-EST. PATIENT-LVL IV: ICD-10-PCS | Mod: PBBFAC,,, | Performed by: FAMILY MEDICINE

## 2020-01-23 PROCEDURE — 99499 RISK ADDL DX/OHS AUDIT: ICD-10-PCS | Mod: S$GLB,,, | Performed by: FAMILY MEDICINE

## 2020-01-23 PROCEDURE — 3008F PR BODY MASS INDEX (BMI) DOCUMENTED: ICD-10-PCS | Mod: CPTII,S$GLB,, | Performed by: FAMILY MEDICINE

## 2020-01-23 PROCEDURE — 3074F SYST BP LT 130 MM HG: CPT | Mod: CPTII,S$GLB,, | Performed by: FAMILY MEDICINE

## 2020-01-23 PROCEDURE — 93005 EKG 12-LEAD: ICD-10-PCS | Mod: S$GLB,,, | Performed by: FAMILY MEDICINE

## 2020-01-23 PROCEDURE — 93005 ELECTROCARDIOGRAM TRACING: CPT | Mod: S$GLB,,, | Performed by: FAMILY MEDICINE

## 2020-01-23 PROCEDURE — 3078F PR MOST RECENT DIASTOLIC BLOOD PRESSURE < 80 MM HG: ICD-10-PCS | Mod: CPTII,S$GLB,, | Performed by: FAMILY MEDICINE

## 2020-01-23 PROCEDURE — 93010 ELECTROCARDIOGRAM REPORT: CPT | Mod: S$GLB,,, | Performed by: INTERNAL MEDICINE

## 2020-01-23 PROCEDURE — 3044F HG A1C LEVEL LT 7.0%: CPT | Mod: CPTII,S$GLB,, | Performed by: FAMILY MEDICINE

## 2020-01-23 PROCEDURE — 3074F PR MOST RECENT SYSTOLIC BLOOD PRESSURE < 130 MM HG: ICD-10-PCS | Mod: CPTII,S$GLB,, | Performed by: FAMILY MEDICINE

## 2020-01-23 PROCEDURE — 99214 PR OFFICE/OUTPT VISIT, EST, LEVL IV, 30-39 MIN: ICD-10-PCS | Mod: S$GLB,,, | Performed by: FAMILY MEDICINE

## 2020-01-23 PROCEDURE — 99499 UNLISTED E&M SERVICE: CPT | Mod: S$GLB,,, | Performed by: FAMILY MEDICINE

## 2020-01-23 PROCEDURE — 3078F DIAST BP <80 MM HG: CPT | Mod: CPTII,S$GLB,, | Performed by: FAMILY MEDICINE

## 2020-01-23 PROCEDURE — 3044F PR MOST RECENT HEMOGLOBIN A1C LEVEL <7.0%: ICD-10-PCS | Mod: CPTII,S$GLB,, | Performed by: FAMILY MEDICINE

## 2020-01-23 PROCEDURE — 3008F BODY MASS INDEX DOCD: CPT | Mod: CPTII,S$GLB,, | Performed by: FAMILY MEDICINE

## 2020-01-23 PROCEDURE — 99214 OFFICE O/P EST MOD 30 MIN: CPT | Mod: S$GLB,,, | Performed by: FAMILY MEDICINE

## 2020-01-23 PROCEDURE — 99999 PR PBB SHADOW E&M-EST. PATIENT-LVL IV: CPT | Mod: PBBFAC,,, | Performed by: FAMILY MEDICINE

## 2020-01-23 PROCEDURE — 93010 EKG 12-LEAD: ICD-10-PCS | Mod: S$GLB,,, | Performed by: INTERNAL MEDICINE

## 2020-01-23 RX ORDER — HYDROXYZINE PAMOATE 50 MG/1
50 CAPSULE ORAL NIGHTLY PRN
Qty: 90 CAPSULE | Refills: 0 | Status: SHIPPED | OUTPATIENT
Start: 2020-01-23 | End: 2020-04-07

## 2020-01-23 RX ORDER — TRAMADOL HYDROCHLORIDE 50 MG/1
50 TABLET ORAL EVERY 8 HOURS PRN
Qty: 40 TABLET | Refills: 0 | Status: SHIPPED | OUTPATIENT
Start: 2020-01-23 | End: 2020-02-13 | Stop reason: SDUPTHER

## 2020-01-23 NOTE — TELEPHONE ENCOUNTER
----- Message from Stephanie Ahuja sent at 1/23/2020  2:58 PM CST -----  Contact: [phone]/self   Patient is requesting to speak with nurse regarding a test. Please advise.

## 2020-01-23 NOTE — PROGRESS NOTES
Subjective:       Patient ID: Fabiano Malik Jr. is a 59 y.o. male.    Chief Complaint: Establish Care; Hypertension; Diabetes; and Anxiety    59. years old to the to establish with a new primary care physician.  Patient with significant, depression and problems with sleeping.  He was previously diagnosed with schizophrenia.  He wants to change his psychiatric doctor .  Patient with decreased kidney function but stable in comparison with previous reports.  No recent A1c.  No polyuria, polydipsia or polyphagia.  Patient with a BMI of 35 currently trying to lose weight.  Patient with frequent episode of syncope that lasts for a minute.  He was previously diagnosed with low blood pressure before.  Blood pressure today stable.  No chest pain, palpitation, orthopnea or PND.  Patient with chronic back pain. Patient follow-up before by pain management.  He is requesting refill of tramadol.    Review of Systems   Constitutional: Negative.    HENT: Negative.    Eyes: Negative.    Respiratory: Negative.    Cardiovascular: Negative.  Negative for chest pain, palpitations and leg swelling.   Gastrointestinal: Negative.    Genitourinary: Negative.    Musculoskeletal: Negative.    Skin: Negative.    Neurological: Positive for syncope.   Psychiatric/Behavioral: Positive for decreased concentration, dysphoric mood and sleep disturbance. Negative for self-injury and suicidal ideas. The patient is nervous/anxious.        Objective:      Physical Exam   Constitutional: He is oriented to person, place, and time. He appears well-developed and well-nourished. No distress.   HENT:   Head: Normocephalic and atraumatic.   Right Ear: External ear normal.   Left Ear: External ear normal.   Nose: Nose normal.   Mouth/Throat: Oropharynx is clear and moist. No oropharyngeal exudate.   Eyes: Pupils are equal, round, and reactive to light. Conjunctivae and EOM are normal. Right eye exhibits no discharge. Left eye exhibits no discharge. No  scleral icterus.   Neck: Normal range of motion. Neck supple. No JVD present. No tracheal deviation present. No thyromegaly present.   Cardiovascular: Normal rate, regular rhythm, normal heart sounds and intact distal pulses. Exam reveals no gallop and no friction rub.   No murmur heard.  Pulmonary/Chest: Effort normal and breath sounds normal. No stridor. No respiratory distress. He has no wheezes. He has no rales. He exhibits no tenderness.   Abdominal: Soft. Bowel sounds are normal. He exhibits no distension and no mass. There is no tenderness. There is no rebound and no guarding.   Musculoskeletal: Normal range of motion. He exhibits no edema.        Right shoulder: He exhibits tenderness and pain.   Lymphadenopathy:     He has no cervical adenopathy.   Neurological: He is alert and oriented to person, place, and time. He has normal reflexes. He displays normal reflexes. No cranial nerve deficit. He exhibits normal muscle tone. Coordination normal.   Skin: Skin is warm and dry. No rash noted. He is not diaphoretic. No erythema. No pallor.   Psychiatric: His behavior is normal. Judgment and thought content normal. His mood appears anxious. His affect is not angry, not blunt, not labile and not inappropriate. He exhibits a depressed mood.   Nursing note and vitals reviewed.      Assessment:       1. Essential hypertension    2. Lumbar radiculopathy    3. Chronic pain disorder    4. CKD (chronic kidney disease) stage 3, GFR 30-59 ml/min    5. Type 2 diabetes mellitus with hyperglycemia, without long-term current use of insulin    6. Undifferentiated schizophrenia    7. Syncope, unspecified syncope type    8. Screening for prostate cancer    9. Primary insomnia    10. Transient alteration of awareness     11. Syncope    12. Abnormal EKG        Plan:         Fabiano was seen today for establish care, hypertension, diabetes and anxiety.    Diagnoses and all orders for this visit:    Essential hypertension  -     TSH;  Future  -     Comprehensive metabolic panel; Future  -     Lipid panel; Future  -     Hemoglobin A1c; Future  -     CBC auto differential; Future  -     Echo Color Flow Doppler? Yes; Future    Lumbar radiculopathy  -     traMADol (ULTRAM) 50 mg tablet; Take 1 tablet (50 mg total) by mouth every 8 (eight) hours as needed for Pain.    Chronic pain disorder  -     traMADol (ULTRAM) 50 mg tablet; Take 1 tablet (50 mg total) by mouth every 8 (eight) hours as needed for Pain.    CKD (chronic kidney disease) stage 3, GFR 30-59 ml/min  -     Comprehensive metabolic panel; Future    Type 2 diabetes mellitus with hyperglycemia, without long-term current use of insulin  -     Comprehensive metabolic panel; Future  -     Lipid panel; Future  -     Hemoglobin A1c; Future  -     Microalbumin/creatinine urine ratio; Future    Undifferentiated schizophrenia  -     Ambulatory referral to Psychiatry  -     TSH; Future  -     Comprehensive metabolic panel; Future  -     CBC auto differential; Future    Syncope, unspecified syncope type  -     Echo Color Flow Doppler? Yes; Future  -     US Carotid Bilateral; Future  -     IN OFFICE EKG 12-LEAD (to Muse)    Screening for prostate cancer  -     PSA, Screening; Future    Primary insomnia  -     hydrOXYzine pamoate (VISTARIL) 50 MG Cap; Take 1 capsule (50 mg total) by mouth nightly as needed (insomnia).    Transient alteration of awareness   -     Echo Color Flow Doppler? Yes; Future    Syncope  -     IN OFFICE EKG 12-LEAD (to Muse)    Abnormal EKG  -     Ambulatory referral to Cardiology    Severe obesity (BMI 35.0-39.9) with comorbidity

## 2020-01-23 NOTE — TELEPHONE ENCOUNTER
Called and spoke w/ patient. Per Dr. Santiago, EKG came back slightly abnormal and put a referral to see cardiologist. Patient request to see Dr. Carreon. The referral coordinator will contact patient to schedule. Verbalized understanding.

## 2020-01-24 DIAGNOSIS — K21.9 GASTROESOPHAGEAL REFLUX DISEASE, ESOPHAGITIS PRESENCE NOT SPECIFIED: ICD-10-CM

## 2020-01-24 DIAGNOSIS — E11.59 HYPERTENSION ASSOCIATED WITH DIABETES: ICD-10-CM

## 2020-01-24 DIAGNOSIS — I15.2 HYPERTENSION ASSOCIATED WITH DIABETES: ICD-10-CM

## 2020-01-24 RX ORDER — OMEPRAZOLE 20 MG/1
CAPSULE, DELAYED RELEASE ORAL
Qty: 30 CAPSULE | Refills: 3 | Status: SHIPPED | OUTPATIENT
Start: 2020-01-24 | End: 2020-05-20

## 2020-01-24 RX ORDER — OLMESARTAN MEDOXOMIL 20 MG/1
TABLET ORAL
Qty: 60 TABLET | Refills: 2 | Status: SHIPPED | OUTPATIENT
Start: 2020-01-24 | End: 2020-02-07 | Stop reason: SDUPTHER

## 2020-01-24 RX ORDER — NABUMETONE 750 MG/1
TABLET, FILM COATED ORAL
Qty: 60 TABLET | Refills: 0 | Status: SHIPPED | OUTPATIENT
Start: 2020-01-24 | End: 2021-03-29

## 2020-01-24 NOTE — TELEPHONE ENCOUNTER
----- Message from Jozef Zepeda sent at 1/24/2020 10:14 AM CST -----  Contact: pt  Pt would like a call back regarding his medication refill he insisted on speaking with nurse first  Pt can be reached at [phone]

## 2020-01-27 ENCOUNTER — TELEPHONE (OUTPATIENT)
Dept: PAIN MEDICINE | Facility: CLINIC | Age: 60
End: 2020-01-27

## 2020-01-27 NOTE — TELEPHONE ENCOUNTER
Returned call and spoke to patient. He thought that one of our providers sent in a prescription. I informed him that we did not send anything in and it must of been another provider since we have not seen him since 10/8/19. Patient verbalized understanding.       ----- Message from Alexus Gonzalez sent at 1/27/2020  1:16 PM CST -----  Pt wife Kelsy can be reached at [phone]    Please give Kelsy a call back concerning pt medication that was called. In. Pt now sure what medication was for.    Thank you!

## 2020-01-28 ENCOUNTER — LAB VISIT (OUTPATIENT)
Dept: LAB | Facility: HOSPITAL | Age: 60
End: 2020-01-28
Attending: FAMILY MEDICINE
Payer: MEDICARE

## 2020-01-28 DIAGNOSIS — F20.3 UNDIFFERENTIATED SCHIZOPHRENIA: Chronic | ICD-10-CM

## 2020-01-28 DIAGNOSIS — I10 ESSENTIAL HYPERTENSION: ICD-10-CM

## 2020-01-28 DIAGNOSIS — E11.65 TYPE 2 DIABETES MELLITUS WITH HYPERGLYCEMIA, WITHOUT LONG-TERM CURRENT USE OF INSULIN: ICD-10-CM

## 2020-01-28 DIAGNOSIS — Z12.5 SCREENING FOR PROSTATE CANCER: ICD-10-CM

## 2020-01-28 DIAGNOSIS — N18.30 CKD (CHRONIC KIDNEY DISEASE) STAGE 3, GFR 30-59 ML/MIN: ICD-10-CM

## 2020-01-28 LAB
ALBUMIN SERPL BCP-MCNC: 4.1 G/DL (ref 3.5–5.2)
ALP SERPL-CCNC: 51 U/L (ref 55–135)
ALT SERPL W/O P-5'-P-CCNC: 22 U/L (ref 10–44)
ANION GAP SERPL CALC-SCNC: 8 MMOL/L (ref 8–16)
AST SERPL-CCNC: 19 U/L (ref 10–40)
BASOPHILS # BLD AUTO: 0.05 K/UL (ref 0–0.2)
BASOPHILS NFR BLD: 0.8 % (ref 0–1.9)
BILIRUB SERPL-MCNC: 0.6 MG/DL (ref 0.1–1)
BUN SERPL-MCNC: 16 MG/DL (ref 6–20)
CALCIUM SERPL-MCNC: 9 MG/DL (ref 8.7–10.5)
CHLORIDE SERPL-SCNC: 100 MMOL/L (ref 95–110)
CHOLEST SERPL-MCNC: 161 MG/DL (ref 120–199)
CHOLEST/HDLC SERPL: 4.7 {RATIO} (ref 2–5)
CO2 SERPL-SCNC: 26 MMOL/L (ref 23–29)
COMPLEXED PSA SERPL-MCNC: 0.42 NG/ML (ref 0–4)
CREAT SERPL-MCNC: 1.5 MG/DL (ref 0.5–1.4)
DIFFERENTIAL METHOD: ABNORMAL
EOSINOPHIL # BLD AUTO: 0.4 K/UL (ref 0–0.5)
EOSINOPHIL NFR BLD: 7.3 % (ref 0–8)
ERYTHROCYTE [DISTWIDTH] IN BLOOD BY AUTOMATED COUNT: 12.8 % (ref 11.5–14.5)
EST. GFR  (AFRICAN AMERICAN): 58.1 ML/MIN/1.73 M^2
EST. GFR  (NON AFRICAN AMERICAN): 50.2 ML/MIN/1.73 M^2
ESTIMATED AVG GLUCOSE: 166 MG/DL (ref 68–131)
GLUCOSE SERPL-MCNC: 269 MG/DL (ref 70–110)
HBA1C MFR BLD HPLC: 7.4 % (ref 4–5.6)
HCT VFR BLD AUTO: 41.9 % (ref 40–54)
HDLC SERPL-MCNC: 34 MG/DL (ref 40–75)
HDLC SERPL: 21.1 % (ref 20–50)
HGB BLD-MCNC: 14 G/DL (ref 14–18)
IMM GRANULOCYTES # BLD AUTO: 0.01 K/UL (ref 0–0.04)
IMM GRANULOCYTES NFR BLD AUTO: 0.2 % (ref 0–0.5)
LDLC SERPL CALC-MCNC: 68 MG/DL (ref 63–159)
LYMPHOCYTES # BLD AUTO: 1.5 K/UL (ref 1–4.8)
LYMPHOCYTES NFR BLD: 24.9 % (ref 18–48)
MCH RBC QN AUTO: 31.6 PG (ref 27–31)
MCHC RBC AUTO-ENTMCNC: 33.4 G/DL (ref 32–36)
MCV RBC AUTO: 95 FL (ref 82–98)
MONOCYTES # BLD AUTO: 0.6 K/UL (ref 0.3–1)
MONOCYTES NFR BLD: 9.3 % (ref 4–15)
NEUTROPHILS # BLD AUTO: 3.4 K/UL (ref 1.8–7.7)
NEUTROPHILS NFR BLD: 57.5 % (ref 38–73)
NONHDLC SERPL-MCNC: 127 MG/DL
NRBC BLD-RTO: 0 /100 WBC
PLATELET # BLD AUTO: 237 K/UL (ref 150–350)
PMV BLD AUTO: 9.2 FL (ref 9.2–12.9)
POTASSIUM SERPL-SCNC: 5.4 MMOL/L (ref 3.5–5.1)
PROT SERPL-MCNC: 6.7 G/DL (ref 6–8.4)
RBC # BLD AUTO: 4.43 M/UL (ref 4.6–6.2)
SODIUM SERPL-SCNC: 134 MMOL/L (ref 136–145)
TRIGL SERPL-MCNC: 295 MG/DL (ref 30–150)
TSH SERPL DL<=0.005 MIU/L-ACNC: 3.27 UIU/ML (ref 0.4–4)
WBC # BLD AUTO: 5.9 K/UL (ref 3.9–12.7)

## 2020-01-28 PROCEDURE — 36415 COLL VENOUS BLD VENIPUNCTURE: CPT | Mod: PO

## 2020-01-28 PROCEDURE — 85025 COMPLETE CBC W/AUTO DIFF WBC: CPT

## 2020-01-28 PROCEDURE — 80053 COMPREHEN METABOLIC PANEL: CPT

## 2020-01-28 PROCEDURE — 84443 ASSAY THYROID STIM HORMONE: CPT

## 2020-01-28 PROCEDURE — 80061 LIPID PANEL: CPT

## 2020-01-28 PROCEDURE — 84153 ASSAY OF PSA TOTAL: CPT

## 2020-01-28 PROCEDURE — 83036 HEMOGLOBIN GLYCOSYLATED A1C: CPT

## 2020-02-03 ENCOUNTER — HOSPITAL ENCOUNTER (OUTPATIENT)
Dept: CARDIOLOGY | Facility: HOSPITAL | Age: 60
Discharge: HOME | End: 2020-02-03
Attending: FAMILY MEDICINE
Payer: MEDICARE

## 2020-02-03 ENCOUNTER — HOSPITAL ENCOUNTER (OUTPATIENT)
Dept: RADIOLOGY | Facility: HOSPITAL | Age: 60
Discharge: HOME | End: 2020-02-03
Attending: FAMILY MEDICINE
Payer: MEDICARE

## 2020-02-03 DIAGNOSIS — R55 SYNCOPE, UNSPECIFIED SYNCOPE TYPE: ICD-10-CM

## 2020-02-03 DIAGNOSIS — R40.4 TRANSIENT ALTERATION OF AWARENESS: ICD-10-CM

## 2020-02-03 DIAGNOSIS — I10 ESSENTIAL HYPERTENSION: ICD-10-CM

## 2020-02-03 PROCEDURE — 93306 TTE W/DOPPLER COMPLETE: CPT

## 2020-02-03 PROCEDURE — 93306 ECHO (CUPID ONLY): ICD-10-PCS | Mod: 26,,, | Performed by: INTERNAL MEDICINE

## 2020-02-03 PROCEDURE — 93306 TTE W/DOPPLER COMPLETE: CPT | Mod: 26,,, | Performed by: INTERNAL MEDICINE

## 2020-02-03 PROCEDURE — 93880 EXTRACRANIAL BILAT STUDY: CPT | Mod: 26,,, | Performed by: RADIOLOGY

## 2020-02-03 PROCEDURE — 93880 EXTRACRANIAL BILAT STUDY: CPT | Mod: TC

## 2020-02-03 PROCEDURE — 93880 US CAROTID BILATERAL: ICD-10-PCS | Mod: 26,,, | Performed by: RADIOLOGY

## 2020-02-04 ENCOUNTER — TELEPHONE (OUTPATIENT)
Dept: FAMILY MEDICINE | Facility: CLINIC | Age: 60
End: 2020-02-04

## 2020-02-04 DIAGNOSIS — I10 ESSENTIAL HYPERTENSION: ICD-10-CM

## 2020-02-04 DIAGNOSIS — E11.65 TYPE 2 DIABETES MELLITUS WITH HYPERGLYCEMIA, WITHOUT LONG-TERM CURRENT USE OF INSULIN: Primary | ICD-10-CM

## 2020-02-04 LAB
AORTIC ROOT ANNULUS: 3.86 CM
ASCENDING AORTA: 3.63 CM
AV INDEX (PROSTH): 0.94
AV MEAN GRADIENT: 7 MMHG
AV PEAK GRADIENT: 11 MMHG
AV VALVE AREA: 4.1 CM2
AV VELOCITY RATIO: 0.9
CV ECHO LV RWT: 0.54 CM
DOP CALC AO PEAK VEL: 1.67 M/S
DOP CALC AO VTI: 31.71 CM
DOP CALC LVOT AREA: 4.4 CM2
DOP CALC LVOT DIAMETER: 2.36 CM
DOP CALC LVOT PEAK VEL: 1.5 M/S
DOP CALC LVOT STROKE VOLUME: 130.16 CM3
DOP CALCLVOT PEAK VEL VTI: 29.77 CM
E WAVE DECELERATION TIME: 148.96 MSEC
E/A RATIO: 0.64
E/E' RATIO: 15.64 M/S
ECHO LV POSTERIOR WALL: 1.4 CM (ref 0.6–1.1)
FRACTIONAL SHORTENING: 52 % (ref 28–44)
INTERVENTRICULAR SEPTUM: 1.2 CM (ref 0.6–1.1)
LA MAJOR: 4.48 CM
LA MINOR: 4.61 CM
LA WIDTH: 3.5 CM
LEFT ATRIUM SIZE: 3.76 CM
LEFT ATRIUM VOLUME: 50.83 CM3
LEFT INTERNAL DIMENSION IN SYSTOLE: 2.52 CM (ref 2.1–4)
LEFT VENTRICLE DIASTOLIC VOLUME: 87.02 ML
LEFT VENTRICLE SYSTOLIC VOLUME: 22.77 ML
LEFT VENTRICULAR INTERNAL DIMENSION IN DIASTOLE: 5.2 CM (ref 3.5–6)
LEFT VENTRICULAR MASS: 278.44 G
LV LATERAL E/E' RATIO: 17.2 M/S
LV SEPTAL E/E' RATIO: 14.33 M/S
MV PEAK A VEL: 1.35 M/S
MV PEAK E VEL: 0.86 M/S
PISA TR MAX VEL: 2.46 M/S
PULM VEIN S/D RATIO: 1.76
PV PEAK D VEL: 0.29 M/S
PV PEAK S VEL: 0.51 M/S
RA MAJOR: 3.65 CM
RA PRESSURE: 3 MMHG
RA WIDTH: 2.57 CM
STJ: 3.13 CM
TDI LATERAL: 0.05 M/S
TDI SEPTAL: 0.06 M/S
TDI: 0.06 M/S
TR MAX PG: 24 MMHG
TRICUSPID ANNULAR PLANE SYSTOLIC EXCURSION: 0.97 CM
TV REST PULMONARY ARTERY PRESSURE: 27 MMHG

## 2020-02-04 RX ORDER — CHLORTHALIDONE 25 MG/1
25 TABLET ORAL DAILY
Qty: 90 TABLET | Refills: 3 | Status: SHIPPED | OUTPATIENT
Start: 2020-02-04 | End: 2021-05-14 | Stop reason: SDUPTHER

## 2020-02-04 RX ORDER — POTASSIUM CHLORIDE 750 MG/1
10 TABLET, EXTENDED RELEASE ORAL DAILY
Qty: 90 TABLET | Refills: 3 | Status: SHIPPED | OUTPATIENT
Start: 2020-02-04 | End: 2021-03-02

## 2020-02-04 RX ORDER — GLIMEPIRIDE 2 MG/1
2 TABLET ORAL
Qty: 90 TABLET | Refills: 3 | Status: SHIPPED | OUTPATIENT
Start: 2020-02-04 | End: 2020-07-27

## 2020-02-04 NOTE — TELEPHONE ENCOUNTER
Spoke w/ patient. B/P today 107/93 Called in B/P readings for the last 7 days: 141/94, 161/102 145/85, 144/84, 124/72,141/87  159/93,107/93    Blood sugar: A.M. 208   P.M 248                                257         160                                183                                 252          233                                 265          185                                  223          258                                 234          279                                 243          268            ----- Message from Zoe Hernandez sent at 2/4/2020  9:03 AM CST -----  Contact: SELF [phone]  Patient would like to speak with you about a personal matter. Please advise

## 2020-02-04 NOTE — TELEPHONE ENCOUNTER
Amaryl was ordered to help control the blood sugar with the metformin.    Fluid pill was ordered for better control of the blood pressure.  Potassium supplement was ordered.    Schedule follow-up appointment.

## 2020-02-05 NOTE — TELEPHONE ENCOUNTER
Called and spoke with patient, per Dr. Santiago, 3 new prescriptions were sent to the pharmacy to help w/ B/P, blood sugar and a potassium supplement. Verbalized understanding. Patient has an appt on 4/28/2020.

## 2020-02-07 DIAGNOSIS — I15.2 HYPERTENSION ASSOCIATED WITH DIABETES: ICD-10-CM

## 2020-02-07 DIAGNOSIS — E11.59 HYPERTENSION ASSOCIATED WITH DIABETES: ICD-10-CM

## 2020-02-07 RX ORDER — OLMESARTAN MEDOXOMIL 20 MG/1
TABLET ORAL
Qty: 60 TABLET | Refills: 2 | Status: SHIPPED | OUTPATIENT
Start: 2020-02-07 | End: 2020-03-09

## 2020-02-07 RX ORDER — MECLIZINE HCL 12.5 MG 12.5 MG/1
TABLET ORAL
Qty: 60 TABLET | Refills: 0 | Status: SHIPPED | OUTPATIENT
Start: 2020-02-07 | End: 2020-03-08

## 2020-02-07 RX ORDER — GABAPENTIN 300 MG/1
CAPSULE ORAL
Qty: 30 CAPSULE | Refills: 2 | Status: SHIPPED | OUTPATIENT
Start: 2020-02-07 | End: 2020-04-13

## 2020-02-11 ENCOUNTER — TELEPHONE (OUTPATIENT)
Dept: FAMILY MEDICINE | Facility: CLINIC | Age: 60
End: 2020-02-11

## 2020-02-11 DIAGNOSIS — N18.30 CKD (CHRONIC KIDNEY DISEASE), STAGE III: Primary | ICD-10-CM

## 2020-02-12 NOTE — TELEPHONE ENCOUNTER
Patient with decreased kidney function but stable in comparison with previous reports.  Patient with slightly elevated potassium.    The increase fluid intake.    Repeat this week the kidney function and potassium.

## 2020-02-12 NOTE — TELEPHONE ENCOUNTER
Called pt.Scheduled appointment for kidney function and potassium level .Did not answer left voicemail to call back.

## 2020-02-13 DIAGNOSIS — G89.4 CHRONIC PAIN DISORDER: ICD-10-CM

## 2020-02-13 DIAGNOSIS — M54.16 LUMBAR RADICULOPATHY: ICD-10-CM

## 2020-02-13 RX ORDER — TRAMADOL HYDROCHLORIDE 50 MG/1
50 TABLET ORAL EVERY 8 HOURS PRN
Qty: 40 TABLET | Refills: 0 | Status: SHIPPED | OUTPATIENT
Start: 2020-02-13 | End: 2020-03-03

## 2020-02-13 RX ORDER — TIZANIDINE 2 MG/1
TABLET ORAL
Qty: 180 TABLET | Refills: 0 | Status: SHIPPED | OUTPATIENT
Start: 2020-02-13 | End: 2020-05-20

## 2020-02-13 NOTE — TELEPHONE ENCOUNTER
Spoke w/ patient, went over blood work results. Repeat labs per Dr. Santiago on 2/15/2020. Patient also requested refills.          ----- Message from Mi Aponte sent at 2/13/2020 10:06 AM CST -----  Contact: [phone] self   Pt states that he is returning your call. Please advise

## 2020-02-18 ENCOUNTER — OFFICE VISIT (OUTPATIENT)
Dept: CARDIOLOGY | Facility: CLINIC | Age: 60
End: 2020-02-18
Payer: MEDICARE

## 2020-02-18 VITALS
OXYGEN SATURATION: 98 % | BODY MASS INDEX: 35.49 KG/M2 | WEIGHT: 262 LBS | DIASTOLIC BLOOD PRESSURE: 73 MMHG | HEART RATE: 87 BPM | HEIGHT: 72 IN | SYSTOLIC BLOOD PRESSURE: 110 MMHG

## 2020-02-18 DIAGNOSIS — S09.90XA TRAUMATIC INJURY OF HEAD, INITIAL ENCOUNTER: ICD-10-CM

## 2020-02-18 DIAGNOSIS — G89.29 OTHER CHRONIC PAIN: ICD-10-CM

## 2020-02-18 DIAGNOSIS — I15.2 HYPERTENSION ASSOCIATED WITH DIABETES: ICD-10-CM

## 2020-02-18 DIAGNOSIS — F20.3 UNDIFFERENTIATED SCHIZOPHRENIA: Chronic | ICD-10-CM

## 2020-02-18 DIAGNOSIS — E11.59 HYPERTENSION ASSOCIATED WITH DIABETES: ICD-10-CM

## 2020-02-18 DIAGNOSIS — R51.9 NONINTRACTABLE HEADACHE, UNSPECIFIED CHRONICITY PATTERN, UNSPECIFIED HEADACHE TYPE: ICD-10-CM

## 2020-02-18 DIAGNOSIS — M54.16 LUMBAR RADICULOPATHY: ICD-10-CM

## 2020-02-18 DIAGNOSIS — E78.2 MIXED HYPERLIPIDEMIA: ICD-10-CM

## 2020-02-18 DIAGNOSIS — R94.31 ABNORMAL ECG: Primary | ICD-10-CM

## 2020-02-18 DIAGNOSIS — R94.31 EKG ABNORMALITIES: ICD-10-CM

## 2020-02-18 DIAGNOSIS — I25.10 ATHEROSCLEROSIS OF NATIVE CORONARY ARTERY OF NATIVE HEART WITHOUT ANGINA PECTORIS: ICD-10-CM

## 2020-02-18 DIAGNOSIS — M65.321 TRIGGER INDEX FINGER OF RIGHT HAND: ICD-10-CM

## 2020-02-18 DIAGNOSIS — N18.30 CKD (CHRONIC KIDNEY DISEASE) STAGE 3, GFR 30-59 ML/MIN: ICD-10-CM

## 2020-02-18 DIAGNOSIS — K21.9 GASTROESOPHAGEAL REFLUX DISEASE, ESOPHAGITIS PRESENCE NOT SPECIFIED: ICD-10-CM

## 2020-02-18 DIAGNOSIS — M54.50 LOW BACK PAIN, NON-SPECIFIC: Chronic | ICD-10-CM

## 2020-02-18 DIAGNOSIS — R55 SYNCOPE AND COLLAPSE: ICD-10-CM

## 2020-02-18 DIAGNOSIS — D64.9 ANEMIA, UNSPECIFIED TYPE: ICD-10-CM

## 2020-02-18 DIAGNOSIS — R44.1 VISUAL HALLUCINATIONS: ICD-10-CM

## 2020-02-18 DIAGNOSIS — F32.A DEPRESSION, UNSPECIFIED DEPRESSION TYPE: ICD-10-CM

## 2020-02-18 PROCEDURE — 93000 ELECTROCARDIOGRAM COMPLETE: CPT | Mod: S$GLB,,, | Performed by: INTERNAL MEDICINE

## 2020-02-18 PROCEDURE — 99214 OFFICE O/P EST MOD 30 MIN: CPT | Mod: S$GLB,,, | Performed by: INTERNAL MEDICINE

## 2020-02-18 PROCEDURE — 3074F SYST BP LT 130 MM HG: CPT | Mod: CPTII,S$GLB,, | Performed by: INTERNAL MEDICINE

## 2020-02-18 PROCEDURE — 3074F PR MOST RECENT SYSTOLIC BLOOD PRESSURE < 130 MM HG: ICD-10-PCS | Mod: CPTII,S$GLB,, | Performed by: INTERNAL MEDICINE

## 2020-02-18 PROCEDURE — 99499 RISK ADDL DX/OHS AUDIT: ICD-10-PCS | Mod: S$GLB,,, | Performed by: INTERNAL MEDICINE

## 2020-02-18 PROCEDURE — 3008F PR BODY MASS INDEX (BMI) DOCUMENTED: ICD-10-PCS | Mod: CPTII,S$GLB,, | Performed by: INTERNAL MEDICINE

## 2020-02-18 PROCEDURE — 3051F HG A1C>EQUAL 7.0%<8.0%: CPT | Mod: CPTII,S$GLB,, | Performed by: INTERNAL MEDICINE

## 2020-02-18 PROCEDURE — 99999 PR PBB SHADOW E&M-EST. PATIENT-LVL III: CPT | Mod: PBBFAC,,, | Performed by: INTERNAL MEDICINE

## 2020-02-18 PROCEDURE — 93000 EKG 12-LEAD: ICD-10-PCS | Mod: S$GLB,,, | Performed by: INTERNAL MEDICINE

## 2020-02-18 PROCEDURE — 3078F PR MOST RECENT DIASTOLIC BLOOD PRESSURE < 80 MM HG: ICD-10-PCS | Mod: CPTII,S$GLB,, | Performed by: INTERNAL MEDICINE

## 2020-02-18 PROCEDURE — 99999 PR PBB SHADOW E&M-EST. PATIENT-LVL III: ICD-10-PCS | Mod: PBBFAC,,, | Performed by: INTERNAL MEDICINE

## 2020-02-18 PROCEDURE — 3051F PR MOST RECENT HEMOGLOBIN A1C LEVEL 7.0 - < 8.0%: ICD-10-PCS | Mod: CPTII,S$GLB,, | Performed by: INTERNAL MEDICINE

## 2020-02-18 PROCEDURE — 3008F BODY MASS INDEX DOCD: CPT | Mod: CPTII,S$GLB,, | Performed by: INTERNAL MEDICINE

## 2020-02-18 PROCEDURE — 99214 PR OFFICE/OUTPT VISIT, EST, LEVL IV, 30-39 MIN: ICD-10-PCS | Mod: S$GLB,,, | Performed by: INTERNAL MEDICINE

## 2020-02-18 PROCEDURE — 99499 UNLISTED E&M SERVICE: CPT | Mod: S$GLB,,, | Performed by: INTERNAL MEDICINE

## 2020-02-18 PROCEDURE — 3078F DIAST BP <80 MM HG: CPT | Mod: CPTII,S$GLB,, | Performed by: INTERNAL MEDICINE

## 2020-02-18 NOTE — PROGRESS NOTES
"Subjective:    Patient ID:  Fabiano Malik Jr. is a 59 y.o. male who presents for follow-up of Abnormal Stress Test      HPI    60 y/o patient former pt of Dr Magana. He has a hx of HTN, HLD, lumbar radiculopathy, CKD, anemia, schizophrenia, who presents from PCP for eval of abnormal ECG. ECG in clinic today NSR with and inferior infarct. On review of previous ECG's, has had these changes for years and 1/2019 had normal nuclear SPECT. Has chronic syncopal episodes about twice monthly for years now and states that he has had an extensive w/u and was told it may be due to his heart. Syncope is with prodrome, episode lasts mins, and is groggy after he "comes out of it." No seizure like movements and no loss of bladder/bowel function. Denies CP, orthopnea, PND, palps. Non smoker and no EtOH.     Review of Systems   Constitution: Negative for malaise/fatigue.   HENT: Negative for congestion.    Eyes: Negative for blurred vision.   Cardiovascular: Positive for leg swelling and syncope. Negative for chest pain, claudication, cyanosis, dyspnea on exertion, irregular heartbeat, near-syncope, orthopnea, palpitations and paroxysmal nocturnal dyspnea.   Respiratory: Negative for shortness of breath.    Endocrine: Negative for polyuria.   Hematologic/Lymphatic: Negative for bleeding problem.   Skin: Negative for itching and rash.   Musculoskeletal: Positive for back pain, joint pain and muscle weakness. Negative for joint swelling and muscle cramps.   Gastrointestinal: Negative for abdominal pain, hematemesis, hematochezia, melena, nausea and vomiting.   Genitourinary: Negative for dysuria and hematuria.   Neurological: Negative for dizziness, focal weakness, headaches, light-headedness, loss of balance and weakness.   Psychiatric/Behavioral: Negative for depression. The patient is not nervous/anxious.         Objective:    Physical Exam   Constitutional: He is oriented to person, place, and time. He appears well-developed " and well-nourished.   HENT:   Head: Normocephalic and atraumatic.   Neck: Neck supple. No JVD present.   Cardiovascular: Normal rate, regular rhythm and normal heart sounds.   Pulses:       Carotid pulses are 2+ on the right side, and 2+ on the left side.       Radial pulses are 2+ on the right side, and 2+ on the left side.        Femoral pulses are 2+ on the right side, and 2+ on the left side.  Pulmonary/Chest: Effort normal and breath sounds normal.   Abdominal: Soft. Bowel sounds are normal.   Musculoskeletal: He exhibits edema.   Neurological: He is alert and oriented to person, place, and time.   Skin: Skin is warm and dry.   Psychiatric: He has a normal mood and affect. His behavior is normal. Thought content normal.         Assessment:       1. Abnormal ECG    2. EKG abnormalities    3. Mixed hyperlipidemia    4. Hypertension associated with diabetes    5. Atherosclerosis of native coronary artery of native heart without angina pectoris     6. Undifferentiated schizophrenia    7. Depression, unspecified depression type    8. Nonintractable headache, unspecified chronicity pattern, unspecified headache type    9. Lumbar radiculopathy    10. Other chronic pain    11. Traumatic injury of head, initial encounter    12. CKD (chronic kidney disease) stage 3, GFR 30-59 ml/min    13. Anemia, unspecified type    14. Gastroesophageal reflux disease, esophagitis presence not specified    15. Low back pain, non-specific    16. Trigger index finger of right hand    17. Visual hallucinations      58 y/o pt with hx and presentation as above. Doing well from a cardiac perspective and compensated from a HF perspective. ECG is unchanged, no acute ischemic changes and no significant cardiac symptoms. Reassuring is normal past stress test. Regarding syncope, has had carotid US/MRI/MRA in the past and will complete w/u with event monitor. Discussed the etiology, evaluation, and management of syncope, HTN, HLD, lumbar  radiculopathy. Discussed the importance of med compliance, heart healthy diet, and regular exercise.      Plan:       -30 day event monitor  -f/u in 2 months

## 2020-02-24 ENCOUNTER — CLINICAL SUPPORT (OUTPATIENT)
Dept: CARDIOLOGY | Facility: HOSPITAL | Age: 60
End: 2020-02-24
Attending: INTERNAL MEDICINE
Payer: MEDICARE

## 2020-02-24 DIAGNOSIS — R55 SYNCOPE AND COLLAPSE: ICD-10-CM

## 2020-02-24 PROCEDURE — 93272 CARDIAC EVENT MONITOR (CUPID ONLY): ICD-10-PCS | Mod: ,,, | Performed by: INTERNAL MEDICINE

## 2020-02-24 PROCEDURE — 93271 ECG/MONITORING AND ANALYSIS: CPT

## 2020-02-24 PROCEDURE — 93272 ECG/REVIEW INTERPRET ONLY: CPT | Mod: ,,, | Performed by: INTERNAL MEDICINE

## 2020-03-02 DIAGNOSIS — M54.16 LUMBAR RADICULOPATHY: ICD-10-CM

## 2020-03-02 DIAGNOSIS — G89.4 CHRONIC PAIN DISORDER: ICD-10-CM

## 2020-03-03 ENCOUNTER — TELEPHONE (OUTPATIENT)
Dept: CARDIOLOGY | Facility: CLINIC | Age: 60
End: 2020-03-03

## 2020-03-03 RX ORDER — PRAVASTATIN SODIUM 40 MG/1
TABLET ORAL
Qty: 90 TABLET | Refills: 3 | Status: SHIPPED | OUTPATIENT
Start: 2020-03-03 | End: 2020-04-08

## 2020-03-03 RX ORDER — TRAMADOL HYDROCHLORIDE 50 MG/1
TABLET ORAL
Qty: 40 TABLET | Refills: 0 | Status: SHIPPED | OUTPATIENT
Start: 2020-03-03 | End: 2020-04-02

## 2020-03-03 NOTE — TELEPHONE ENCOUNTER
----- Message from Brooklynn Brandt sent at 3/3/2020 10:06 AM CST -----  Contact: [phone]/patient with [facility]  Type:  Needs Medical Advice    Who Called: pt  Advice Regarding: a monitor that he is waiting to receive  Would the patient rather a call back or a response via [facility]? Call  Best Call Back Number: [phone]  Additional Information: I tried reach Cristian via [employer] but she did not reply

## 2020-03-03 NOTE — TELEPHONE ENCOUNTER
Reached out to Cristian in the arrhythmia departmant, stated that she reach out to the pt in regards to this message    Pt will be picking up event monitor from main campus tomorrow

## 2020-03-04 ENCOUNTER — TELEPHONE (OUTPATIENT)
Dept: CARDIOLOGY | Facility: HOSPITAL | Age: 60
End: 2020-03-04

## 2020-03-04 NOTE — TELEPHONE ENCOUNTER
----- Message from Mariana Grant sent at 3/4/2020  9:32 AM CST -----  Contact: Patient  The pt is calling to let you know that no one has called him yet to mail his monitor. Please call him back @ [phone]. Thanks, Mariana

## 2020-03-08 RX ORDER — MECLIZINE HCL 12.5 MG 12.5 MG/1
TABLET ORAL
Qty: 60 TABLET | Refills: 0 | Status: SHIPPED | OUTPATIENT
Start: 2020-03-08 | End: 2020-04-08

## 2020-03-09 DIAGNOSIS — E11.59 HYPERTENSION ASSOCIATED WITH DIABETES: ICD-10-CM

## 2020-03-09 DIAGNOSIS — I15.2 HYPERTENSION ASSOCIATED WITH DIABETES: ICD-10-CM

## 2020-03-09 RX ORDER — OLMESARTAN MEDOXOMIL 20 MG/1
TABLET ORAL
Qty: 60 TABLET | Refills: 2 | Status: SHIPPED | OUTPATIENT
Start: 2020-03-09 | End: 2020-05-13 | Stop reason: SDUPTHER

## 2020-03-10 ENCOUNTER — TELEPHONE (OUTPATIENT)
Dept: CARDIOLOGY | Facility: HOSPITAL | Age: 60
End: 2020-03-10

## 2020-03-23 DIAGNOSIS — N52.9 ERECTILE DYSFUNCTION, UNSPECIFIED ERECTILE DYSFUNCTION TYPE: ICD-10-CM

## 2020-03-23 RX ORDER — TADALAFIL 10 MG/1
10 TABLET ORAL DAILY PRN
Qty: 30 TABLET | Refills: 0 | Status: SHIPPED | OUTPATIENT
Start: 2020-03-23 | End: 2020-03-23 | Stop reason: SDUPTHER

## 2020-03-23 RX ORDER — TADALAFIL 10 MG/1
10 TABLET ORAL DAILY PRN
Qty: 30 TABLET | Refills: 0 | Status: SHIPPED | OUTPATIENT
Start: 2020-03-23 | End: 2021-03-23

## 2020-03-24 RX ORDER — LANCETS 33 GAUGE
1 EACH MISCELLANEOUS 2 TIMES DAILY
Qty: 200 EACH | Refills: 3 | Status: SHIPPED | OUTPATIENT
Start: 2020-03-24 | End: 2020-04-23 | Stop reason: SDUPTHER

## 2020-03-24 NOTE — TELEPHONE ENCOUNTER
----- Message from Mi SHIRLEYJohn Aponte sent at 3/24/2020 11:25 AM CDT -----  Contact: [phone] self   Pt is requesting a prescription for lancets (ONETOUCH DELICA LANCETS) 33 gauge Misc and blood sugar diagnostic Strp to be sent to [facility]. Please advise

## 2020-04-01 DIAGNOSIS — G89.4 CHRONIC PAIN DISORDER: ICD-10-CM

## 2020-04-01 DIAGNOSIS — M54.16 LUMBAR RADICULOPATHY: ICD-10-CM

## 2020-04-02 RX ORDER — TRAMADOL HYDROCHLORIDE 50 MG/1
TABLET ORAL
Qty: 40 TABLET | Refills: 0 | Status: SHIPPED | OUTPATIENT
Start: 2020-04-02 | End: 2020-04-30

## 2020-04-07 DIAGNOSIS — F51.01 PRIMARY INSOMNIA: ICD-10-CM

## 2020-04-07 RX ORDER — HYDROXYZINE PAMOATE 50 MG/1
CAPSULE ORAL
Qty: 90 CAPSULE | Refills: 0 | Status: SHIPPED | OUTPATIENT
Start: 2020-04-07 | End: 2020-06-15

## 2020-04-08 RX ORDER — MECLIZINE HCL 12.5 MG 12.5 MG/1
TABLET ORAL
Qty: 60 TABLET | Refills: 0 | Status: SHIPPED | OUTPATIENT
Start: 2020-04-08 | End: 2020-05-20

## 2020-04-08 RX ORDER — PRAVASTATIN SODIUM 40 MG/1
TABLET ORAL
Qty: 90 TABLET | Refills: 3 | Status: SHIPPED | OUTPATIENT
Start: 2020-04-08 | End: 2021-03-25 | Stop reason: SDUPTHER

## 2020-04-13 RX ORDER — GABAPENTIN 300 MG/1
CAPSULE ORAL
Qty: 30 CAPSULE | Refills: 2 | Status: SHIPPED | OUTPATIENT
Start: 2020-04-13 | End: 2020-07-12

## 2020-04-21 ENCOUNTER — TELEPHONE (OUTPATIENT)
Dept: FAMILY MEDICINE | Facility: CLINIC | Age: 60
End: 2020-04-21

## 2020-04-21 NOTE — TELEPHONE ENCOUNTER
Left detailed voicemail informing pt that we will reschedule her appt. In July due to COVID. Also notified if she needs to be seen sooner, call the office for a virtual visit appointment.

## 2020-04-23 RX ORDER — INSULIN PUMP SYRINGE, 3 ML
EACH MISCELLANEOUS
Qty: 1 EACH | Refills: 0 | Status: SHIPPED | OUTPATIENT
Start: 2020-04-23 | End: 2021-10-05 | Stop reason: SDUPTHER

## 2020-04-23 RX ORDER — LANCETS 33 GAUGE
1 EACH MISCELLANEOUS 2 TIMES DAILY
Qty: 200 EACH | Refills: 3 | Status: SHIPPED | OUTPATIENT
Start: 2020-04-23 | End: 2020-04-30 | Stop reason: SDUPTHER

## 2020-04-23 NOTE — TELEPHONE ENCOUNTER
----- Message from Chago Sheldon sent at 4/23/2020  3:04 PM CDT -----  Contact: Ruthann mcallister/[facility]  [phone]  Ruthann is requesting orders for diabetic testing supplies  Fax # [phone]

## 2020-04-23 NOTE — TELEPHONE ENCOUNTER
----- Message from Chago Sheldon sent at 4/23/2020  3:04 PM CDT -----  Contact: [facility]callister/[facility]  [phone]  Ruthann is requesting orders for diabetic testing supplies  Fax # [phone]

## 2020-04-24 ENCOUNTER — TELEPHONE (OUTPATIENT)
Dept: CARDIOLOGY | Facility: CLINIC | Age: 60
End: 2020-04-24

## 2020-04-24 NOTE — TELEPHONE ENCOUNTER
Reached out to pt regarding results of the event monitor. Message left, request a call back for any questions.

## 2020-04-24 NOTE — TELEPHONE ENCOUNTER
----- Message from Oscar Carreon MD sent at 4/24/2020  4:06 PM CDT -----  Your heart monitor showed normal heart rhythm and no abnormality

## 2020-04-27 ENCOUNTER — TELEPHONE (OUTPATIENT)
Dept: FAMILY MEDICINE | Facility: CLINIC | Age: 60
End: 2020-04-27

## 2020-04-27 NOTE — TELEPHONE ENCOUNTER
----- Message from Carmella Villa sent at 4/27/2020  4:27 PM CDT -----  Contact: [facility]/[phone]  [facility]/ does not supply diabetic supplies and don't accept [employer] health insurance. Thanks

## 2020-04-29 ENCOUNTER — TELEPHONE (OUTPATIENT)
Dept: CARDIOLOGY | Facility: CLINIC | Age: 60
End: 2020-04-29

## 2020-04-29 NOTE — TELEPHONE ENCOUNTER
Tried reaching out to pt in regards to rescheduling clinical visit with Dr. Carreon on 5/5 due to COVID-19 concerns and Dr. Carreon being out of the clinic that day     Left a detailed message requesting a call back to further discuss     Advised pt that dilma will be cancelled

## 2020-04-30 DIAGNOSIS — G89.4 CHRONIC PAIN DISORDER: ICD-10-CM

## 2020-04-30 DIAGNOSIS — M54.16 LUMBAR RADICULOPATHY: ICD-10-CM

## 2020-04-30 RX ORDER — TRAMADOL HYDROCHLORIDE 50 MG/1
TABLET ORAL
Qty: 40 TABLET | Refills: 0 | Status: SHIPPED | OUTPATIENT
Start: 2020-04-30 | End: 2020-06-15

## 2020-04-30 RX ORDER — LANCETS 33 GAUGE
1 EACH MISCELLANEOUS 2 TIMES DAILY
Qty: 200 EACH | Refills: 3 | Status: SHIPPED | OUTPATIENT
Start: 2020-04-30 | End: 2020-05-08 | Stop reason: CLARIF

## 2020-04-30 NOTE — TELEPHONE ENCOUNTER
----- Message from Costa Mau sent at 4/30/2020 10:09 AM CDT -----  Contact: Patient  Type:  RX Refill Request    Who Called: Fabiano  Refill or New Rx:refill  RX Name and Strength:lancets (ONETOUCH DELICA LANCETS) 33  How is the patient currently taking it? (ex. 1XDay):  Is this a 30 day or 90 day RX:  Preferred Pharmacy with phone number:[facility] [phone] (Phone)  [phone] (Fax)  Local or Mail Order:local  Ordering Provider:Jack  Would the patient rather a call back or a response via [facility]? call  Best Call Back Number:[phone]  Additional Information:

## 2020-05-06 ENCOUNTER — TELEPHONE (OUTPATIENT)
Dept: FAMILY MEDICINE | Facility: CLINIC | Age: 60
End: 2020-05-06

## 2020-05-06 NOTE — TELEPHONE ENCOUNTER
----- Message from Toshia Oliveros sent at 5/6/2020 12:27 PM CDT -----  Contact: [employer]   [employer] needing the pt refills forblood sugar diagnostic Strp and lancets (ONETOUCH DELICA LANCETS) 33 gauge Misc    sent to [facility] # [phone] or [facility] [phone]    Pt contact# [phone]

## 2020-05-08 DIAGNOSIS — E11.9 TYPE 2 DIABETES MELLITUS WITHOUT COMPLICATION, WITHOUT LONG-TERM CURRENT USE OF INSULIN: Primary | ICD-10-CM

## 2020-05-08 RX ORDER — LANCETS
200 EACH MISCELLANEOUS DAILY
COMMUNITY
End: 2020-05-08 | Stop reason: SDUPTHER

## 2020-05-08 RX ORDER — LANCETS
1 EACH MISCELLANEOUS DAILY
Qty: 200 EACH | Refills: 6 | Status: SHIPPED | OUTPATIENT
Start: 2020-05-08 | End: 2021-03-25 | Stop reason: SDUPTHER

## 2020-05-08 NOTE — TELEPHONE ENCOUNTER
----- Message from Yun Yuen sent at 5/8/2020 12:59 PM CDT -----  Contact: Patient   Type:  Diabetic/Medical Supplies Request    Name of Caller: Fabiano   What supplies are needed: Lancets and test strips   What is the brand of the supplies: True metrix   Refill or New Rx: refill   If checking glucose, how many times do they check it?:   Who prescribed the original supplies: Dr. Santiago   Pharmacy/Company Name, Phone #, Location: [facility] mail order ( durable equipment)   Requesting a Call Back?: yes   Would the patient rather a call back or a response via [facility]?  Call back   Best Call Back Number: [phone]   Additional Information:  Please call patient to confirm it was called in .

## 2020-05-13 DIAGNOSIS — I15.2 HYPERTENSION ASSOCIATED WITH DIABETES: ICD-10-CM

## 2020-05-13 DIAGNOSIS — E11.59 HYPERTENSION ASSOCIATED WITH DIABETES: ICD-10-CM

## 2020-05-13 RX ORDER — OLMESARTAN MEDOXOMIL 20 MG/1
20 TABLET ORAL DAILY
Qty: 90 TABLET | Refills: 3 | Status: SHIPPED | OUTPATIENT
Start: 2020-05-13 | End: 2021-07-19 | Stop reason: SDUPTHER

## 2020-05-13 NOTE — TELEPHONE ENCOUNTER
----- Message from Zoe Hernandez sent at 5/13/2020  2:20 PM CDT -----  Contact: Sonya ([facility])  [phone]  [facility] would like to receive an authorization on a refill for diabetic supplies. Sonya states its been a long time the patient has not received his supplies and he is completely out and wants the message sent on high alert. Please advise.

## 2020-05-19 DIAGNOSIS — K21.9 GASTROESOPHAGEAL REFLUX DISEASE, ESOPHAGITIS PRESENCE NOT SPECIFIED: ICD-10-CM

## 2020-05-20 RX ORDER — TIZANIDINE 2 MG/1
TABLET ORAL
Qty: 180 TABLET | Refills: 0 | Status: SHIPPED | OUTPATIENT
Start: 2020-05-20 | End: 2020-07-15

## 2020-05-20 RX ORDER — OMEPRAZOLE 20 MG/1
CAPSULE, DELAYED RELEASE ORAL
Qty: 30 CAPSULE | Refills: 3 | Status: SHIPPED | OUTPATIENT
Start: 2020-05-20 | End: 2020-07-15

## 2020-05-20 RX ORDER — MECLIZINE HCL 12.5 MG 12.5 MG/1
TABLET ORAL
Qty: 60 TABLET | Refills: 0 | Status: SHIPPED | OUTPATIENT
Start: 2020-05-20 | End: 2020-06-15

## 2020-07-12 NOTE — PROGRESS NOTES
Patient states he's doing good post procedure.   Reassurance.  Would avoid cranberry juice.  Follow-up as needed.

## 2020-08-21 DIAGNOSIS — E11.9 TYPE 2 DIABETES MELLITUS WITHOUT COMPLICATION: ICD-10-CM

## 2020-09-14 DIAGNOSIS — M54.16 LUMBAR RADICULOPATHY: ICD-10-CM

## 2020-09-14 DIAGNOSIS — G89.4 CHRONIC PAIN DISORDER: ICD-10-CM

## 2020-09-14 RX ORDER — TRAMADOL HYDROCHLORIDE 50 MG/1
50 TABLET ORAL EVERY 8 HOURS PRN
Qty: 40 TABLET | Refills: 0 | OUTPATIENT
Start: 2020-09-14

## 2020-09-14 NOTE — TELEPHONE ENCOUNTER
----- Message from Zoe Hernandez sent at 9/14/2020 10:12 AM CDT -----  Contact: SELF [phone]  Patient would like a refill for traMADoL (ULTRAM) 50 mg tablet sent to [facility] #[id number] - [place], [place] - [address] AT [facility] [address]. Please advise.

## 2020-10-05 ENCOUNTER — PATIENT MESSAGE (OUTPATIENT)
Dept: ADMINISTRATIVE | Facility: HOSPITAL | Age: 60
End: 2020-10-05

## 2020-11-10 ENCOUNTER — TELEPHONE (OUTPATIENT)
Dept: ENDOCRINOLOGY | Facility: CLINIC | Age: 60
End: 2020-11-10

## 2020-11-10 ENCOUNTER — TELEPHONE (OUTPATIENT)
Dept: FAMILY MEDICINE | Facility: CLINIC | Age: 60
End: 2020-11-10

## 2020-11-10 NOTE — TELEPHONE ENCOUNTER
I called the patient to schedule an appointment, but there was no answer. I left a voicemail for the patient to call the clinic to have an appointment reschedule.

## 2020-11-10 NOTE — TELEPHONE ENCOUNTER
----- Message from Marion Arriola sent at 11/10/2020 11:01 AM CST -----  Contact: URBAN SANCHEZ JR. [[id number]]  Type:  Patient Returning Call    Who Called: Urban   Who Left Message for Patient: Reji   Does the patient know what this is regarding?: sooner appt   Would the patient rather a call back or a response via [facility]?  Call back   Best Call Back Number: [phone]  Additional Information:  none

## 2020-11-10 NOTE — TELEPHONE ENCOUNTER
I spoke with patient and offered to scheduled a sooner appointment with Xenia. Patient declined and state that he would wait to see . I advised patient that he would be pl ace on the wait list. Patient voiced understanding.

## 2020-11-10 NOTE — TELEPHONE ENCOUNTER
----- Message from Shell Hancock sent at 11/10/2020 10:33 AM CST -----  Contact: [phone]/self  Type:  Sooner Apoointment Request    Caller is requesting a sooner appointment.  Caller declined first available appointment listed below.  Caller will not accept being placed on the waitlist and is requesting a message be sent to doctor.  Name of Caller:Pt  When is the first available appointment?05/06/21  Symptoms:f/u  Would the patient rather a call back or a response via [facility]? Call back  Best Call Back Number:[phone]  Additional Information: pt did schedule appt on 05/06 and was added to waiting list

## 2020-11-13 DIAGNOSIS — E11.9 TYPE 2 DIABETES MELLITUS WITHOUT COMPLICATION, WITHOUT LONG-TERM CURRENT USE OF INSULIN: ICD-10-CM

## 2020-11-13 RX ORDER — METFORMIN HYDROCHLORIDE 1000 MG/1
1000 TABLET ORAL 2 TIMES DAILY WITH MEALS
Qty: 180 TABLET | Refills: 3 | Status: SHIPPED | OUTPATIENT
Start: 2020-11-13 | End: 2021-09-08 | Stop reason: SDUPTHER

## 2020-11-13 RX ORDER — TIZANIDINE 2 MG/1
2 TABLET ORAL 2 TIMES DAILY PRN
Qty: 180 TABLET | Refills: 0 | Status: SHIPPED | OUTPATIENT
Start: 2020-11-13 | End: 2021-03-25 | Stop reason: SDUPTHER

## 2020-11-13 RX ORDER — GABAPENTIN 300 MG/1
300 CAPSULE ORAL NIGHTLY
Qty: 90 CAPSULE | Refills: 0 | Status: SHIPPED | OUTPATIENT
Start: 2020-11-13 | End: 2020-12-01 | Stop reason: SDUPTHER

## 2020-11-24 DIAGNOSIS — G89.4 CHRONIC PAIN DISORDER: ICD-10-CM

## 2020-11-24 DIAGNOSIS — M54.16 LUMBAR RADICULOPATHY: ICD-10-CM

## 2020-11-24 RX ORDER — TRAMADOL HYDROCHLORIDE 50 MG/1
50 TABLET ORAL EVERY 8 HOURS PRN
Qty: 40 TABLET | Refills: 0 | Status: SHIPPED | OUTPATIENT
Start: 2020-11-24 | End: 2021-01-11

## 2020-12-01 RX ORDER — GABAPENTIN 300 MG/1
300 CAPSULE ORAL NIGHTLY
Qty: 90 CAPSULE | Refills: 3 | Status: SHIPPED | OUTPATIENT
Start: 2020-12-01 | End: 2021-12-06 | Stop reason: SDUPTHER

## 2021-01-05 ENCOUNTER — PATIENT MESSAGE (OUTPATIENT)
Dept: ADMINISTRATIVE | Facility: HOSPITAL | Age: 61
End: 2021-01-05

## 2021-02-03 DIAGNOSIS — K21.9 GASTROESOPHAGEAL REFLUX DISEASE: ICD-10-CM

## 2021-02-03 DIAGNOSIS — K21.9 GASTROESOPHAGEAL REFLUX DISEASE, UNSPECIFIED WHETHER ESOPHAGITIS PRESENT: ICD-10-CM

## 2021-02-03 RX ORDER — OMEPRAZOLE 20 MG/1
CAPSULE, DELAYED RELEASE ORAL
Qty: 30 CAPSULE | Refills: 3 | Status: CANCELLED | OUTPATIENT
Start: 2021-02-03

## 2021-02-03 RX ORDER — OMEPRAZOLE 20 MG/1
20 CAPSULE, DELAYED RELEASE ORAL
Qty: 30 CAPSULE | Refills: 3 | Status: SHIPPED | OUTPATIENT
Start: 2021-02-03 | End: 2023-07-07 | Stop reason: SDUPTHER

## 2021-02-22 DIAGNOSIS — N52.9 ERECTILE DYSFUNCTION, UNSPECIFIED ERECTILE DYSFUNCTION TYPE: ICD-10-CM

## 2021-02-22 RX ORDER — TADALAFIL 20 MG/1
20 TABLET ORAL DAILY
Qty: 30 TABLET | Refills: 11 | Status: SHIPPED | OUTPATIENT
Start: 2021-02-22 | End: 2021-03-10 | Stop reason: SDUPTHER

## 2021-02-22 RX ORDER — TADALAFIL 10 MG/1
10 TABLET ORAL DAILY PRN
Qty: 30 TABLET | Refills: 0 | Status: CANCELLED | OUTPATIENT
Start: 2021-02-22 | End: 2022-02-22

## 2021-03-10 RX ORDER — TADALAFIL 20 MG/1
20 TABLET ORAL DAILY
Qty: 30 TABLET | Refills: 11 | Status: SHIPPED | OUTPATIENT
Start: 2021-03-10 | End: 2021-03-29

## 2021-03-15 ENCOUNTER — PATIENT OUTREACH (OUTPATIENT)
Dept: ADMINISTRATIVE | Facility: HOSPITAL | Age: 61
End: 2021-03-15

## 2021-03-15 DIAGNOSIS — Z12.5 SCREENING FOR PROSTATE CANCER: ICD-10-CM

## 2021-03-15 DIAGNOSIS — E11.9 TYPE 2 DIABETES MELLITUS WITHOUT COMPLICATION, WITHOUT LONG-TERM CURRENT USE OF INSULIN: Primary | ICD-10-CM

## 2021-03-15 DIAGNOSIS — Z13.220 SCREENING FOR HYPERCHOLESTEROLEMIA: ICD-10-CM

## 2021-03-25 DIAGNOSIS — F51.01 PRIMARY INSOMNIA: ICD-10-CM

## 2021-03-25 DIAGNOSIS — E11.9 TYPE 2 DIABETES MELLITUS WITHOUT COMPLICATION, WITHOUT LONG-TERM CURRENT USE OF INSULIN: ICD-10-CM

## 2021-03-25 RX ORDER — MECLIZINE HCL 12.5 MG 12.5 MG/1
12.5 TABLET ORAL 3 TIMES DAILY PRN
Qty: 60 TABLET | Refills: 0 | Status: SHIPPED | OUTPATIENT
Start: 2021-03-25 | End: 2021-05-14 | Stop reason: SDUPTHER

## 2021-03-25 RX ORDER — BLOOD-GLUCOSE CONTROL, NORMAL
1 EACH MISCELLANEOUS DAILY
Qty: 200 EACH | Refills: 6 | Status: SHIPPED | OUTPATIENT
Start: 2021-03-25 | End: 2021-10-05 | Stop reason: SDUPTHER

## 2021-03-25 RX ORDER — PRAVASTATIN SODIUM 40 MG/1
40 TABLET ORAL DAILY
Qty: 90 TABLET | Refills: 3 | Status: SHIPPED | OUTPATIENT
Start: 2021-03-25 | End: 2021-08-31

## 2021-03-25 RX ORDER — HYDROXYZINE PAMOATE 50 MG/1
CAPSULE ORAL
Qty: 90 CAPSULE | Refills: 0 | Status: SHIPPED | OUTPATIENT
Start: 2021-03-25 | End: 2021-04-29 | Stop reason: SDUPTHER

## 2021-03-25 RX ORDER — MECLIZINE HCL 12.5 MG 12.5 MG/1
12.5 TABLET ORAL 3 TIMES DAILY PRN
Qty: 60 TABLET | Refills: 0 | Status: SHIPPED | OUTPATIENT
Start: 2021-03-25 | End: 2021-03-25 | Stop reason: SDUPTHER

## 2021-03-25 RX ORDER — TIZANIDINE 2 MG/1
2 TABLET ORAL 2 TIMES DAILY PRN
Qty: 180 TABLET | Refills: 0 | Status: SHIPPED | OUTPATIENT
Start: 2021-03-25 | End: 2021-03-29

## 2021-03-25 RX ORDER — CALCIUM CITRATE/VITAMIN D3 200MG-6.25
1 TABLET ORAL 2 TIMES DAILY
Qty: 200 STRIP | Refills: 6 | Status: SHIPPED | OUTPATIENT
Start: 2021-03-25 | End: 2021-10-05 | Stop reason: SDUPTHER

## 2021-03-27 ENCOUNTER — LAB VISIT (OUTPATIENT)
Dept: LAB | Facility: HOSPITAL | Age: 61
End: 2021-03-27
Attending: FAMILY MEDICINE
Payer: MEDICARE

## 2021-03-27 DIAGNOSIS — Z13.220 SCREENING FOR HYPERCHOLESTEROLEMIA: ICD-10-CM

## 2021-03-27 DIAGNOSIS — Z12.5 SCREENING FOR PROSTATE CANCER: ICD-10-CM

## 2021-03-27 DIAGNOSIS — E11.9 TYPE 2 DIABETES MELLITUS WITHOUT COMPLICATION: ICD-10-CM

## 2021-03-27 DIAGNOSIS — E11.9 TYPE 2 DIABETES MELLITUS WITHOUT COMPLICATION, WITHOUT LONG-TERM CURRENT USE OF INSULIN: ICD-10-CM

## 2021-03-27 LAB
CHOLEST SERPL-MCNC: 134 MG/DL (ref 120–199)
CHOLEST/HDLC SERPL: 4.3 {RATIO} (ref 2–5)
COMPLEXED PSA SERPL-MCNC: 0.73 NG/ML (ref 0–4)
ESTIMATED AVG GLUCOSE: 263 MG/DL (ref 68–131)
HBA1C MFR BLD: 10.8 % (ref 4–5.6)
HDLC SERPL-MCNC: 31 MG/DL (ref 40–75)
HDLC SERPL: 23.1 % (ref 20–50)
LDLC SERPL CALC-MCNC: 32.8 MG/DL (ref 63–159)
NONHDLC SERPL-MCNC: 103 MG/DL
TRIGL SERPL-MCNC: 351 MG/DL (ref 30–150)

## 2021-03-27 PROCEDURE — 80061 LIPID PANEL: CPT | Performed by: FAMILY MEDICINE

## 2021-03-27 PROCEDURE — 84153 ASSAY OF PSA TOTAL: CPT | Performed by: FAMILY MEDICINE

## 2021-03-27 PROCEDURE — 83036 HEMOGLOBIN GLYCOSYLATED A1C: CPT | Performed by: FAMILY MEDICINE

## 2021-03-27 PROCEDURE — 36415 COLL VENOUS BLD VENIPUNCTURE: CPT | Mod: PO | Performed by: FAMILY MEDICINE

## 2021-03-29 ENCOUNTER — OFFICE VISIT (OUTPATIENT)
Dept: FAMILY MEDICINE | Facility: CLINIC | Age: 61
End: 2021-03-29
Payer: MEDICARE

## 2021-03-29 ENCOUNTER — TELEPHONE (OUTPATIENT)
Dept: PHARMACY | Facility: CLINIC | Age: 61
End: 2021-03-29

## 2021-03-29 ENCOUNTER — HOSPITAL ENCOUNTER (OUTPATIENT)
Dept: RADIOLOGY | Facility: HOSPITAL | Age: 61
Discharge: HOME | End: 2021-03-29
Attending: FAMILY MEDICINE
Payer: MEDICARE

## 2021-03-29 VITALS
SYSTOLIC BLOOD PRESSURE: 108 MMHG | BODY MASS INDEX: 33.53 KG/M2 | WEIGHT: 247.56 LBS | DIASTOLIC BLOOD PRESSURE: 78 MMHG | HEART RATE: 117 BPM | HEIGHT: 72 IN | OXYGEN SATURATION: 95 %

## 2021-03-29 DIAGNOSIS — F33.1 MODERATE EPISODE OF RECURRENT MAJOR DEPRESSIVE DISORDER: ICD-10-CM

## 2021-03-29 DIAGNOSIS — S20.212A RIB CONTUSION, LEFT, INITIAL ENCOUNTER: ICD-10-CM

## 2021-03-29 DIAGNOSIS — E11.65 TYPE 2 DIABETES MELLITUS WITH HYPERGLYCEMIA, WITHOUT LONG-TERM CURRENT USE OF INSULIN: ICD-10-CM

## 2021-03-29 DIAGNOSIS — I10 ESSENTIAL HYPERTENSION: Primary | ICD-10-CM

## 2021-03-29 DIAGNOSIS — N52.9 ERECTILE DYSFUNCTION, UNSPECIFIED ERECTILE DYSFUNCTION TYPE: ICD-10-CM

## 2021-03-29 PROCEDURE — 71100 X-RAY EXAM RIBS UNI 2 VIEWS: CPT | Mod: TC,FY,PO,LT

## 2021-03-29 PROCEDURE — 3046F HEMOGLOBIN A1C LEVEL >9.0%: CPT | Mod: CPTII,S$GLB,, | Performed by: FAMILY MEDICINE

## 2021-03-29 PROCEDURE — 3008F BODY MASS INDEX DOCD: CPT | Mod: CPTII,S$GLB,, | Performed by: FAMILY MEDICINE

## 2021-03-29 PROCEDURE — 3074F SYST BP LT 130 MM HG: CPT | Mod: CPTII,S$GLB,, | Performed by: FAMILY MEDICINE

## 2021-03-29 PROCEDURE — 99499 UNLISTED E&M SERVICE: CPT | Mod: S$GLB,,, | Performed by: FAMILY MEDICINE

## 2021-03-29 PROCEDURE — 71100 X-RAY EXAM RIBS UNI 2 VIEWS: CPT | Mod: 26,LT,, | Performed by: RADIOLOGY

## 2021-03-29 PROCEDURE — 71100 XR RIBS 2 VIEW LEFT: ICD-10-PCS | Mod: 26,LT,, | Performed by: RADIOLOGY

## 2021-03-29 PROCEDURE — 1125F AMNT PAIN NOTED PAIN PRSNT: CPT | Mod: S$GLB,,, | Performed by: FAMILY MEDICINE

## 2021-03-29 PROCEDURE — 99999 PR PBB SHADOW E&M-EST. PATIENT-LVL V: ICD-10-PCS | Mod: PBBFAC,,, | Performed by: FAMILY MEDICINE

## 2021-03-29 PROCEDURE — 99214 OFFICE O/P EST MOD 30 MIN: CPT | Mod: S$GLB,,, | Performed by: FAMILY MEDICINE

## 2021-03-29 PROCEDURE — 99999 PR PBB SHADOW E&M-EST. PATIENT-LVL V: CPT | Mod: PBBFAC,,, | Performed by: FAMILY MEDICINE

## 2021-03-29 PROCEDURE — 1125F PR PAIN SEVERITY QUANTIFIED, PAIN PRESENT: ICD-10-PCS | Mod: S$GLB,,, | Performed by: FAMILY MEDICINE

## 2021-03-29 PROCEDURE — 3046F PR MOST RECENT HEMOGLOBIN A1C LEVEL > 9.0%: ICD-10-PCS | Mod: CPTII,S$GLB,, | Performed by: FAMILY MEDICINE

## 2021-03-29 PROCEDURE — 3078F DIAST BP <80 MM HG: CPT | Mod: CPTII,S$GLB,, | Performed by: FAMILY MEDICINE

## 2021-03-29 PROCEDURE — 3074F PR MOST RECENT SYSTOLIC BLOOD PRESSURE < 130 MM HG: ICD-10-PCS | Mod: CPTII,S$GLB,, | Performed by: FAMILY MEDICINE

## 2021-03-29 PROCEDURE — 99499 RISK ADDL DX/OHS AUDIT: ICD-10-PCS | Mod: S$GLB,,, | Performed by: FAMILY MEDICINE

## 2021-03-29 PROCEDURE — 99214 PR OFFICE/OUTPT VISIT, EST, LEVL IV, 30-39 MIN: ICD-10-PCS | Mod: S$GLB,,, | Performed by: FAMILY MEDICINE

## 2021-03-29 PROCEDURE — 3008F PR BODY MASS INDEX (BMI) DOCUMENTED: ICD-10-PCS | Mod: CPTII,S$GLB,, | Performed by: FAMILY MEDICINE

## 2021-03-29 PROCEDURE — 3078F PR MOST RECENT DIASTOLIC BLOOD PRESSURE < 80 MM HG: ICD-10-PCS | Mod: CPTII,S$GLB,, | Performed by: FAMILY MEDICINE

## 2021-03-29 RX ORDER — DULAGLUTIDE 0.75 MG/.5ML
0.75 INJECTION, SOLUTION SUBCUTANEOUS
Qty: 2 ML | Refills: 11 | Status: SHIPPED | OUTPATIENT
Start: 2021-03-29 | End: 2021-09-08 | Stop reason: SDUPTHER

## 2021-03-29 RX ORDER — MELOXICAM 15 MG/1
15 TABLET ORAL DAILY
Qty: 30 TABLET | Refills: 0 | Status: SHIPPED | OUTPATIENT
Start: 2021-03-29 | End: 2021-04-29 | Stop reason: SDUPTHER

## 2021-03-29 RX ORDER — SILDENAFIL 100 MG/1
100 TABLET, FILM COATED ORAL DAILY PRN
Qty: 30 TABLET | Refills: 3 | Status: SHIPPED | OUTPATIENT
Start: 2021-03-29 | End: 2022-01-20 | Stop reason: SDUPTHER

## 2021-03-30 ENCOUNTER — TELEPHONE (OUTPATIENT)
Dept: FAMILY MEDICINE | Facility: CLINIC | Age: 61
End: 2021-03-30

## 2021-04-06 ENCOUNTER — PATIENT OUTREACH (OUTPATIENT)
Dept: ADMINISTRATIVE | Facility: OTHER | Age: 61
End: 2021-04-06

## 2021-04-15 ENCOUNTER — PATIENT MESSAGE (OUTPATIENT)
Dept: RESEARCH | Facility: HOSPITAL | Age: 61
End: 2021-04-15

## 2021-04-15 ENCOUNTER — TELEPHONE (OUTPATIENT)
Dept: PRIMARY CARE CLINIC | Facility: CLINIC | Age: 61
End: 2021-04-15

## 2021-04-29 DIAGNOSIS — S20.212A RIB CONTUSION, LEFT, INITIAL ENCOUNTER: ICD-10-CM

## 2021-04-29 DIAGNOSIS — F51.01 PRIMARY INSOMNIA: ICD-10-CM

## 2021-04-30 RX ORDER — MELOXICAM 15 MG/1
15 TABLET ORAL DAILY
Qty: 30 TABLET | Refills: 0 | Status: SHIPPED | OUTPATIENT
Start: 2021-04-30 | End: 2021-04-30

## 2021-04-30 RX ORDER — HYDROXYZINE PAMOATE 50 MG/1
CAPSULE ORAL
Qty: 90 CAPSULE | Refills: 0 | Status: SHIPPED | OUTPATIENT
Start: 2021-04-30 | End: 2021-05-05 | Stop reason: SDUPTHER

## 2021-05-05 ENCOUNTER — TELEPHONE (OUTPATIENT)
Dept: FAMILY MEDICINE | Facility: CLINIC | Age: 61
End: 2021-05-05

## 2021-05-05 DIAGNOSIS — F51.01 PRIMARY INSOMNIA: ICD-10-CM

## 2021-05-05 DIAGNOSIS — S20.212A RIB CONTUSION, LEFT, INITIAL ENCOUNTER: ICD-10-CM

## 2021-05-05 DIAGNOSIS — E11.65 TYPE 2 DIABETES MELLITUS WITH HYPERGLYCEMIA, WITHOUT LONG-TERM CURRENT USE OF INSULIN: Primary | ICD-10-CM

## 2021-05-05 RX ORDER — HYDROXYZINE PAMOATE 50 MG/1
CAPSULE ORAL
Qty: 90 CAPSULE | Refills: 0 | Status: SHIPPED | OUTPATIENT
Start: 2021-05-05 | End: 2021-09-08 | Stop reason: SDUPTHER

## 2021-05-05 RX ORDER — MELOXICAM 15 MG/1
15 TABLET ORAL DAILY PRN
Qty: 90 TABLET | Refills: 0 | Status: SHIPPED | OUTPATIENT
Start: 2021-05-05 | End: 2021-08-11 | Stop reason: SDUPTHER

## 2021-05-10 ENCOUNTER — HOSPITAL ENCOUNTER (OUTPATIENT)
Dept: RADIOLOGY | Facility: HOSPITAL | Age: 61
Discharge: HOME | End: 2021-05-10
Attending: FAMILY MEDICINE
Payer: MEDICARE

## 2021-05-10 ENCOUNTER — TELEPHONE (OUTPATIENT)
Dept: NEUROLOGY | Facility: HOSPITAL | Age: 61
End: 2021-05-10

## 2021-05-10 ENCOUNTER — OFFICE VISIT (OUTPATIENT)
Dept: FAMILY MEDICINE | Facility: CLINIC | Age: 61
End: 2021-05-10
Payer: MEDICARE

## 2021-05-10 ENCOUNTER — TELEPHONE (OUTPATIENT)
Dept: ADMINISTRATIVE | Facility: OTHER | Age: 61
End: 2021-05-10

## 2021-05-10 VITALS
HEIGHT: 72 IN | OXYGEN SATURATION: 98 % | DIASTOLIC BLOOD PRESSURE: 64 MMHG | BODY MASS INDEX: 32.82 KG/M2 | HEART RATE: 102 BPM | WEIGHT: 242.31 LBS | SYSTOLIC BLOOD PRESSURE: 102 MMHG

## 2021-05-10 DIAGNOSIS — M17.12 PRIMARY OSTEOARTHRITIS OF LEFT KNEE: ICD-10-CM

## 2021-05-10 DIAGNOSIS — I10 ESSENTIAL HYPERTENSION: ICD-10-CM

## 2021-05-10 DIAGNOSIS — Z00.00 ANNUAL PHYSICAL EXAM: Primary | ICD-10-CM

## 2021-05-10 DIAGNOSIS — R42 DIZZINESS: ICD-10-CM

## 2021-05-10 DIAGNOSIS — N52.9 ERECTILE DYSFUNCTION, UNSPECIFIED ERECTILE DYSFUNCTION TYPE: ICD-10-CM

## 2021-05-10 DIAGNOSIS — E11.65 TYPE 2 DIABETES MELLITUS WITH HYPERGLYCEMIA, WITHOUT LONG-TERM CURRENT USE OF INSULIN: ICD-10-CM

## 2021-05-10 DIAGNOSIS — Z12.11 SCREEN FOR COLON CANCER: ICD-10-CM

## 2021-05-10 DIAGNOSIS — Z11.4 ENCOUNTER FOR SCREENING FOR HIV: ICD-10-CM

## 2021-05-10 PROCEDURE — 3008F PR BODY MASS INDEX (BMI) DOCUMENTED: ICD-10-PCS | Mod: CPTII,S$GLB,, | Performed by: FAMILY MEDICINE

## 2021-05-10 PROCEDURE — 99214 PR OFFICE/OUTPT VISIT, EST, LEVL IV, 30-39 MIN: ICD-10-PCS | Mod: S$GLB,,, | Performed by: FAMILY MEDICINE

## 2021-05-10 PROCEDURE — 73560 XR KNEE 1 OR 2 VIEW LEFT: ICD-10-PCS | Mod: 26,LT,, | Performed by: RADIOLOGY

## 2021-05-10 PROCEDURE — 1125F PR PAIN SEVERITY QUANTIFIED, PAIN PRESENT: ICD-10-PCS | Mod: S$GLB,,, | Performed by: FAMILY MEDICINE

## 2021-05-10 PROCEDURE — 1125F AMNT PAIN NOTED PAIN PRSNT: CPT | Mod: S$GLB,,, | Performed by: FAMILY MEDICINE

## 2021-05-10 PROCEDURE — 99999 PR PBB SHADOW E&M-EST. PATIENT-LVL V: ICD-10-PCS | Mod: PBBFAC,,, | Performed by: FAMILY MEDICINE

## 2021-05-10 PROCEDURE — 73560 X-RAY EXAM OF KNEE 1 OR 2: CPT | Mod: 26,LT,, | Performed by: RADIOLOGY

## 2021-05-10 PROCEDURE — 99214 OFFICE O/P EST MOD 30 MIN: CPT | Mod: S$GLB,,, | Performed by: FAMILY MEDICINE

## 2021-05-10 PROCEDURE — 99499 UNLISTED E&M SERVICE: CPT | Mod: S$GLB,,, | Performed by: FAMILY MEDICINE

## 2021-05-10 PROCEDURE — 99999 PR PBB SHADOW E&M-EST. PATIENT-LVL V: CPT | Mod: PBBFAC,,, | Performed by: FAMILY MEDICINE

## 2021-05-10 PROCEDURE — 73560 X-RAY EXAM OF KNEE 1 OR 2: CPT | Mod: TC,FY,PO,LT

## 2021-05-10 PROCEDURE — 99499 RISK ADDL DX/OHS AUDIT: ICD-10-PCS | Mod: S$GLB,,, | Performed by: FAMILY MEDICINE

## 2021-05-10 PROCEDURE — 3008F BODY MASS INDEX DOCD: CPT | Mod: CPTII,S$GLB,, | Performed by: FAMILY MEDICINE

## 2021-05-11 ENCOUNTER — TELEPHONE (OUTPATIENT)
Dept: NEUROLOGY | Facility: HOSPITAL | Age: 61
End: 2021-05-11

## 2021-05-14 ENCOUNTER — TELEPHONE (OUTPATIENT)
Dept: NEUROLOGY | Facility: HOSPITAL | Age: 61
End: 2021-05-14

## 2021-05-14 DIAGNOSIS — I10 ESSENTIAL HYPERTENSION: ICD-10-CM

## 2021-05-15 RX ORDER — MECLIZINE HCL 12.5 MG 12.5 MG/1
12.5 TABLET ORAL 3 TIMES DAILY PRN
Qty: 60 TABLET | Refills: 0 | Status: SHIPPED | OUTPATIENT
Start: 2021-05-15 | End: 2021-07-19 | Stop reason: SDUPTHER

## 2021-05-15 RX ORDER — CHLORTHALIDONE 25 MG/1
25 TABLET ORAL DAILY
Qty: 90 TABLET | Refills: 3 | Status: SHIPPED | OUTPATIENT
Start: 2021-05-15 | End: 2021-09-08 | Stop reason: SDUPTHER

## 2021-05-17 ENCOUNTER — TELEPHONE (OUTPATIENT)
Dept: NEUROLOGY | Facility: HOSPITAL | Age: 61
End: 2021-05-17

## 2021-05-19 ENCOUNTER — PATIENT OUTREACH (OUTPATIENT)
Dept: ADMINISTRATIVE | Facility: OTHER | Age: 61
End: 2021-05-19

## 2021-05-20 NOTE — TELEPHONE ENCOUNTER
----- Message from Chago Sheldon sent at 4/23/2020  3:04 PM CDT -----  Contact: Ruthann mcallister/[facility]  [phone]  Ruthann is requesting orders for diabetic testing supplies  Fax # [phone]   Prophylactic measure Prophylactic measure Prophylactic measure

## 2021-05-23 ENCOUNTER — TELEPHONE (OUTPATIENT)
Dept: FAMILY MEDICINE | Facility: CLINIC | Age: 61
End: 2021-05-23

## 2021-05-23 DIAGNOSIS — R79.89 ABNORMAL LIVER FUNCTION TEST: Primary | ICD-10-CM

## 2021-05-24 ENCOUNTER — TELEPHONE (OUTPATIENT)
Dept: ADMINISTRATIVE | Facility: OTHER | Age: 61
End: 2021-05-24

## 2021-05-24 ENCOUNTER — TELEPHONE (OUTPATIENT)
Dept: CARDIOLOGY | Facility: CLINIC | Age: 61
End: 2021-05-24

## 2021-05-24 ENCOUNTER — TELEPHONE (OUTPATIENT)
Dept: FAMILY MEDICINE | Facility: CLINIC | Age: 61
End: 2021-05-24

## 2021-05-24 ENCOUNTER — CLINICAL SUPPORT (OUTPATIENT)
Dept: DIABETES | Facility: CLINIC | Age: 61
End: 2021-05-24
Payer: MEDICARE

## 2021-05-24 DIAGNOSIS — E11.65 TYPE 2 DIABETES MELLITUS WITH HYPERGLYCEMIA, WITHOUT LONG-TERM CURRENT USE OF INSULIN: ICD-10-CM

## 2021-05-24 PROCEDURE — G0108 DIAB MANAGE TRN  PER INDIV: HCPCS | Mod: S$GLB,,, | Performed by: DIETITIAN, REGISTERED

## 2021-05-24 PROCEDURE — G0108 PR DIAB MANAGE TRN  PER INDIV: ICD-10-PCS | Mod: S$GLB,,, | Performed by: DIETITIAN, REGISTERED

## 2021-06-14 ENCOUNTER — TELEPHONE (OUTPATIENT)
Dept: NEUROLOGY | Facility: HOSPITAL | Age: 61
End: 2021-06-14

## 2021-06-16 ENCOUNTER — TELEPHONE (OUTPATIENT)
Dept: NEUROLOGY | Facility: HOSPITAL | Age: 61
End: 2021-06-16

## 2021-06-24 ENCOUNTER — PATIENT OUTREACH (OUTPATIENT)
Dept: ADMINISTRATIVE | Facility: OTHER | Age: 61
End: 2021-06-24

## 2021-07-07 ENCOUNTER — TELEPHONE (OUTPATIENT)
Dept: NEUROLOGY | Facility: HOSPITAL | Age: 61
End: 2021-07-07

## 2021-07-14 ENCOUNTER — TELEPHONE (OUTPATIENT)
Dept: ADMINISTRATIVE | Facility: OTHER | Age: 61
End: 2021-07-14

## 2021-07-19 DIAGNOSIS — E11.59 HYPERTENSION ASSOCIATED WITH DIABETES: ICD-10-CM

## 2021-07-19 DIAGNOSIS — I15.2 HYPERTENSION ASSOCIATED WITH DIABETES: ICD-10-CM

## 2021-07-19 RX ORDER — MECLIZINE HCL 12.5 MG 12.5 MG/1
12.5 TABLET ORAL 3 TIMES DAILY PRN
Qty: 60 TABLET | Refills: 0 | Status: SHIPPED | OUTPATIENT
Start: 2021-07-19 | End: 2021-09-13 | Stop reason: SDUPTHER

## 2021-07-19 RX ORDER — OLMESARTAN MEDOXOMIL 20 MG/1
20 TABLET ORAL DAILY
Qty: 90 TABLET | Refills: 3 | Status: SHIPPED | OUTPATIENT
Start: 2021-07-19 | End: 2021-09-08 | Stop reason: SDUPTHER

## 2021-07-26 ENCOUNTER — TELEPHONE (OUTPATIENT)
Dept: ADMINISTRATIVE | Facility: OTHER | Age: 61
End: 2021-07-26

## 2021-07-28 ENCOUNTER — TELEPHONE (OUTPATIENT)
Dept: NEUROLOGY | Facility: HOSPITAL | Age: 61
End: 2021-07-28

## 2021-07-29 ENCOUNTER — OFFICE VISIT (OUTPATIENT)
Dept: FAMILY MEDICINE | Facility: CLINIC | Age: 61
End: 2021-07-29
Payer: MEDICARE

## 2021-07-29 ENCOUNTER — TELEPHONE (OUTPATIENT)
Dept: FAMILY MEDICINE | Facility: CLINIC | Age: 61
End: 2021-07-29

## 2021-07-29 ENCOUNTER — HOSPITAL ENCOUNTER (OUTPATIENT)
Dept: RADIOLOGY | Facility: HOSPITAL | Age: 61
Discharge: HOME | End: 2021-07-29
Attending: NURSE PRACTITIONER
Payer: MEDICARE

## 2021-07-29 ENCOUNTER — TELEPHONE (OUTPATIENT)
Dept: NEUROLOGY | Facility: HOSPITAL | Age: 61
End: 2021-07-29

## 2021-07-29 VITALS
BODY MASS INDEX: 32.37 KG/M2 | SYSTOLIC BLOOD PRESSURE: 118 MMHG | DIASTOLIC BLOOD PRESSURE: 78 MMHG | OXYGEN SATURATION: 94 % | HEIGHT: 72 IN | WEIGHT: 239 LBS | HEART RATE: 104 BPM

## 2021-07-29 DIAGNOSIS — M25.511 ACUTE PAIN OF RIGHT SHOULDER: Primary | ICD-10-CM

## 2021-07-29 DIAGNOSIS — M62.838 MUSCLE SPASM: ICD-10-CM

## 2021-07-29 DIAGNOSIS — M25.511 ACUTE PAIN OF RIGHT SHOULDER: ICD-10-CM
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
## 2021-07-29 DIAGNOSIS — E11.65 TYPE 2 DIABETES MELLITUS WITH HYPERGLYCEMIA, WITHOUT LONG-TERM CURRENT USE OF INSULIN: ICD-10-CM

## 2021-07-29 LAB — GLUCOSE SERPL-MCNC: 199 MG/DL (ref 70–110)

## 2021-07-29 PROCEDURE — 1125F PR PAIN SEVERITY QUANTIFIED, PAIN PRESENT: ICD-10-PCS | Mod: CPTII,S$GLB,, | Performed by: NURSE PRACTITIONER

## 2021-07-29 PROCEDURE — 3008F BODY MASS INDEX DOCD: CPT | Mod: CPTII,S$GLB,, | Performed by: NURSE PRACTITIONER

## 2021-07-29 PROCEDURE — 96372 PR INJECTION,THERAP/PROPH/DIAG2ST, IM OR SUBCUT: ICD-10-PCS | Mod: S$GLB,,, | Performed by: NURSE PRACTITIONER

## 2021-07-29 PROCEDURE — 1160F RVW MEDS BY RX/DR IN RCRD: CPT | Mod: CPTII,S$GLB,, | Performed by: NURSE PRACTITIONER

## 2021-07-29 PROCEDURE — 99213 OFFICE O/P EST LOW 20 MIN: CPT | Mod: 25,S$GLB,, | Performed by: NURSE PRACTITIONER

## 2021-07-29 PROCEDURE — 1159F MED LIST DOCD IN RCRD: CPT | Mod: CPTII,S$GLB,, | Performed by: NURSE PRACTITIONER

## 2021-07-29 PROCEDURE — 1125F AMNT PAIN NOTED PAIN PRSNT: CPT | Mod: CPTII,S$GLB,, | Performed by: NURSE PRACTITIONER

## 2021-07-29 PROCEDURE — 73030 X-RAY EXAM OF SHOULDER: CPT | Mod: 26,RT,, | Performed by: RADIOLOGY

## 2021-07-29 PROCEDURE — 82962 POCT GLUCOSE, HAND-HELD DEVICE: ICD-10-PCS | Mod: S$GLB,,, | Performed by: NURSE PRACTITIONER

## 2021-07-29 PROCEDURE — 96372 THER/PROPH/DIAG INJ SC/IM: CPT | Mod: S$GLB,,, | Performed by: NURSE PRACTITIONER

## 2021-07-29 PROCEDURE — 99999 PR PBB SHADOW E&M-EST. PATIENT-LVL V: CPT | Mod: PBBFAC,,, | Performed by: NURSE PRACTITIONER

## 2021-07-29 PROCEDURE — 3078F PR MOST RECENT DIASTOLIC BLOOD PRESSURE < 80 MM HG: ICD-10-PCS | Mod: CPTII,S$GLB,, | Performed by: NURSE PRACTITIONER

## 2021-07-29 PROCEDURE — 3074F PR MOST RECENT SYSTOLIC BLOOD PRESSURE < 130 MM HG: ICD-10-PCS | Mod: CPTII,S$GLB,, | Performed by: NURSE PRACTITIONER

## 2021-07-29 PROCEDURE — 3074F SYST BP LT 130 MM HG: CPT | Mod: CPTII,S$GLB,, | Performed by: NURSE PRACTITIONER

## 2021-07-29 PROCEDURE — 3051F PR MOST RECENT HEMOGLOBIN A1C LEVEL 7.0 - < 8.0%: ICD-10-PCS | Mod: CPTII,S$GLB,, | Performed by: NURSE PRACTITIONER

## 2021-07-29 PROCEDURE — 99213 PR OFFICE/OUTPT VISIT, EST, LEVL III, 20-29 MIN: ICD-10-PCS | Mod: 25,S$GLB,, | Performed by: NURSE PRACTITIONER

## 2021-07-29 PROCEDURE — 3008F PR BODY MASS INDEX (BMI) DOCUMENTED: ICD-10-PCS | Mod: CPTII,S$GLB,, | Performed by: NURSE PRACTITIONER

## 2021-07-29 PROCEDURE — 3078F DIAST BP <80 MM HG: CPT | Mod: CPTII,S$GLB,, | Performed by: NURSE PRACTITIONER

## 2021-07-29 PROCEDURE — 1160F PR REVIEW ALL MEDS BY PRESCRIBER/CLIN PHARMACIST DOCUMENTED: ICD-10-PCS | Mod: CPTII,S$GLB,, | Performed by: NURSE PRACTITIONER

## 2021-07-29 PROCEDURE — 1159F PR MEDICATION LIST DOCUMENTED IN MEDICAL RECORD: ICD-10-PCS | Mod: CPTII,S$GLB,, | Performed by: NURSE PRACTITIONER

## 2021-07-29 PROCEDURE — 73030 X-RAY EXAM OF SHOULDER: CPT | Mod: TC,FY,PO,RT

## 2021-07-29 PROCEDURE — 99999 PR PBB SHADOW E&M-EST. PATIENT-LVL V: ICD-10-PCS | Mod: PBBFAC,,, | Performed by: NURSE PRACTITIONER

## 2021-07-29 PROCEDURE — 73030 XR SHOULDER COMPLETE 2 OR MORE VIEWS RIGHT: ICD-10-PCS | Mod: 26,RT,, | Performed by: RADIOLOGY

## 2021-07-29 PROCEDURE — 82962 GLUCOSE BLOOD TEST: CPT | Mod: S$GLB,,, | Performed by: NURSE PRACTITIONER

## 2021-07-29 PROCEDURE — 3051F HG A1C>EQUAL 7.0%<8.0%: CPT | Mod: CPTII,S$GLB,, | Performed by: NURSE PRACTITIONER

## 2021-07-29 PROCEDURE — 99499 UNLISTED E&M SERVICE: CPT | Mod: S$GLB,,, | Performed by: NURSE PRACTITIONER

## 2021-07-29 PROCEDURE — 99499 RISK ADDL DX/OHS AUDIT: ICD-10-PCS | Mod: S$GLB,,, | Performed by: NURSE PRACTITIONER

## 2021-07-29 RX ORDER — KETOROLAC TROMETHAMINE 30 MG/ML
30 INJECTION, SOLUTION INTRAMUSCULAR; INTRAVENOUS
Status: COMPLETED | OUTPATIENT
Start: 2021-07-29 | End: 2021-07-29

## 2021-07-29 RX ORDER — TIZANIDINE 4 MG/1
4 TABLET ORAL EVERY 8 HOURS PRN
Qty: 30 TABLET | Refills: 0 | Status: SHIPPED | OUTPATIENT
Start: 2021-07-29 | End: 2021-08-21

## 2021-07-29 RX ADMIN — KETOROLAC TROMETHAMINE 30 MG: 30 INJECTION, SOLUTION INTRAMUSCULAR; INTRAVENOUS at 11:07

## 2021-07-30 ENCOUNTER — TELEPHONE (OUTPATIENT)
Dept: NEUROLOGY | Facility: HOSPITAL | Age: 61
End: 2021-07-30

## 2021-07-30 ENCOUNTER — PATIENT MESSAGE (OUTPATIENT)
Dept: NEUROLOGY | Facility: HOSPITAL | Age: 61
End: 2021-07-30

## 2021-08-03 ENCOUNTER — TELEPHONE (OUTPATIENT)
Dept: SPORTS MEDICINE | Facility: CLINIC | Age: 61
End: 2021-08-03

## 2021-08-03 ENCOUNTER — TELEPHONE (OUTPATIENT)
Dept: ORTHOPEDICS | Facility: CLINIC | Age: 61
End: 2021-08-03

## 2021-08-03 ENCOUNTER — PATIENT OUTREACH (OUTPATIENT)
Dept: ADMINISTRATIVE | Facility: OTHER | Age: 61
End: 2021-08-03

## 2021-08-03 DIAGNOSIS — E11.9 TYPE 2 DIABETES MELLITUS WITHOUT COMPLICATION, WITHOUT LONG-TERM CURRENT USE OF INSULIN: Primary | ICD-10-CM

## 2021-08-11 DIAGNOSIS — S20.212A RIB CONTUSION, LEFT, INITIAL ENCOUNTER: ICD-10-CM

## 2021-08-11 RX ORDER — MELOXICAM 15 MG/1
15 TABLET ORAL DAILY PRN
Qty: 90 TABLET | Refills: 0 | Status: SHIPPED | OUTPATIENT
Start: 2021-08-11 | End: 2021-09-02 | Stop reason: SDUPTHER

## 2021-08-16 ENCOUNTER — TELEPHONE (OUTPATIENT)
Dept: FAMILY MEDICINE | Facility: CLINIC | Age: 61
End: 2021-08-16

## 2021-08-16 ENCOUNTER — OFFICE VISIT (OUTPATIENT)
Dept: NEUROLOGY | Facility: HOSPITAL | Age: 61
End: 2021-08-16
Attending: INTERNAL MEDICINE
Payer: MEDICARE

## 2021-08-16 VITALS
HEIGHT: 72 IN | SYSTOLIC BLOOD PRESSURE: 148 MMHG | TEMPERATURE: 98 F | HEART RATE: 104 BPM | WEIGHT: 228.38 LBS | BODY MASS INDEX: 30.93 KG/M2 | DIASTOLIC BLOOD PRESSURE: 97 MMHG

## 2021-08-16 DIAGNOSIS — Z01.818 PRE-OP TESTING: ICD-10-CM

## 2021-08-16 DIAGNOSIS — Z08 ENCOUNTER FOR FOLLOW-UP SURVEILLANCE OF COLON CANCER: ICD-10-CM

## 2021-08-16 DIAGNOSIS — Z85.038 ENCOUNTER FOR FOLLOW-UP SURVEILLANCE OF COLON CANCER: ICD-10-CM

## 2021-08-16 DIAGNOSIS — R79.89 ABNORMAL LIVER FUNCTION TEST: Primary | ICD-10-CM

## 2021-08-16 DIAGNOSIS — Z86.010 HISTORY OF COLON POLYPS: Primary | ICD-10-CM

## 2021-08-16 PROCEDURE — 99215 OFFICE O/P EST HI 40 MIN: CPT | Performed by: INTERNAL MEDICINE

## 2021-08-16 RX ORDER — POLYETHYLENE GLYCOL 3350, SODIUM SULFATE ANHYDROUS, SODIUM BICARBONATE, SODIUM CHLORIDE, POTASSIUM CHLORIDE 236; 22.74; 6.74; 5.86; 2.97 G/4L; G/4L; G/4L; G/4L; G/4L
4 POWDER, FOR SOLUTION ORAL ONCE
Qty: 4000 ML | Refills: 0 | Status: SHIPPED | OUTPATIENT
Start: 2021-08-16 | End: 2021-08-17

## 2021-08-17 ENCOUNTER — TELEPHONE (OUTPATIENT)
Dept: ADMINISTRATIVE | Facility: OTHER | Age: 61
End: 2021-08-17

## 2021-08-18 ENCOUNTER — TELEPHONE (OUTPATIENT)
Dept: ADMINISTRATIVE | Facility: OTHER | Age: 61
End: 2021-08-18

## 2021-08-19 ENCOUNTER — HOSPITAL ENCOUNTER (OUTPATIENT)
Dept: RADIOLOGY | Facility: HOSPITAL | Age: 61
Discharge: HOME | End: 2021-08-19
Attending: FAMILY MEDICINE
Payer: MEDICARE

## 2021-08-19 ENCOUNTER — DOCUMENTATION ONLY (OUTPATIENT)
Dept: TRANSPLANT | Facility: CLINIC | Age: 61
End: 2021-08-19

## 2021-08-19 ENCOUNTER — TELEPHONE (OUTPATIENT)
Dept: ORTHOPEDICS | Facility: CLINIC | Age: 61
End: 2021-08-19

## 2021-08-19 DIAGNOSIS — R79.89 ABNORMAL LIVER FUNCTION TEST: ICD-10-CM

## 2021-08-19 PROCEDURE — 76700 US EXAM ABDOM COMPLETE: CPT | Mod: TC

## 2021-08-19 PROCEDURE — 76700 US EXAM ABDOM COMPLETE: CPT | Mod: 26,,, | Performed by: RADIOLOGY

## 2021-08-19 PROCEDURE — 76700 US ABDOMEN COMPLETE: ICD-10-PCS | Mod: 26,,, | Performed by: RADIOLOGY

## 2021-08-23 ENCOUNTER — TELEPHONE (OUTPATIENT)
Dept: FAMILY MEDICINE | Facility: CLINIC | Age: 61
End: 2021-08-23

## 2021-08-24 ENCOUNTER — TELEPHONE (OUTPATIENT)
Dept: FAMILY MEDICINE | Facility: CLINIC | Age: 61
End: 2021-08-24

## 2021-09-02 DIAGNOSIS — M62.838 MUSCLE SPASM: ICD-10-CM

## 2021-09-02 DIAGNOSIS — S20.212A RIB CONTUSION, LEFT, INITIAL ENCOUNTER: ICD-10-CM

## 2021-09-02 RX ORDER — MELOXICAM 15 MG/1
15 TABLET ORAL DAILY PRN
Qty: 90 TABLET | Refills: 0 | Status: SHIPPED | OUTPATIENT
Start: 2021-09-02 | End: 2021-10-13 | Stop reason: SDUPTHER

## 2021-09-08 ENCOUNTER — PATIENT OUTREACH (OUTPATIENT)
Dept: ADMINISTRATIVE | Facility: OTHER | Age: 61
End: 2021-09-08

## 2021-09-08 DIAGNOSIS — E11.9 TYPE 2 DIABETES MELLITUS WITHOUT COMPLICATION, WITHOUT LONG-TERM CURRENT USE OF INSULIN: ICD-10-CM

## 2021-09-08 DIAGNOSIS — I10 ESSENTIAL HYPERTENSION: ICD-10-CM

## 2021-09-08 DIAGNOSIS — F51.01 PRIMARY INSOMNIA: ICD-10-CM

## 2021-09-08 DIAGNOSIS — E11.59 HYPERTENSION ASSOCIATED WITH DIABETES: ICD-10-CM

## 2021-09-08 DIAGNOSIS — E11.65 TYPE 2 DIABETES MELLITUS WITH HYPERGLYCEMIA, WITHOUT LONG-TERM CURRENT USE OF INSULIN: ICD-10-CM

## 2021-09-08 DIAGNOSIS — I15.2 HYPERTENSION ASSOCIATED WITH DIABETES: ICD-10-CM

## 2021-09-08 RX ORDER — OLMESARTAN MEDOXOMIL 20 MG/1
20 TABLET ORAL DAILY
Qty: 90 TABLET | Refills: 0 | Status: SHIPPED | OUTPATIENT
Start: 2021-09-08 | End: 2023-09-23 | Stop reason: SDUPTHER

## 2021-09-08 RX ORDER — HYDROXYZINE PAMOATE 50 MG/1
CAPSULE ORAL
Qty: 90 CAPSULE | Refills: 0 | Status: SHIPPED | OUTPATIENT
Start: 2021-09-08 | End: 2021-12-07 | Stop reason: SDUPTHER

## 2021-09-08 RX ORDER — CHLORTHALIDONE 25 MG/1
25 TABLET ORAL DAILY
Qty: 90 TABLET | Refills: 0 | Status: SHIPPED | OUTPATIENT
Start: 2021-09-08 | End: 2022-02-22

## 2021-09-08 RX ORDER — METFORMIN HYDROCHLORIDE 1000 MG/1
1000 TABLET ORAL 2 TIMES DAILY WITH MEALS
Qty: 180 TABLET | Refills: 0 | Status: SHIPPED | OUTPATIENT
Start: 2021-09-08 | End: 2022-04-01 | Stop reason: SDUPTHER

## 2021-09-08 RX ORDER — GLIMEPIRIDE 2 MG/1
TABLET ORAL
Qty: 90 TABLET | Refills: 0 | Status: SHIPPED | OUTPATIENT
Start: 2021-09-08 | End: 2022-02-15 | Stop reason: SDUPTHER

## 2021-09-08 RX ORDER — DULOXETIN HYDROCHLORIDE 60 MG/1
CAPSULE, DELAYED RELEASE ORAL
Qty: 180 CAPSULE | Refills: 0 | Status: SHIPPED | OUTPATIENT
Start: 2021-09-08 | End: 2022-03-08 | Stop reason: SDUPTHER

## 2021-09-08 RX ORDER — DULAGLUTIDE 0.75 MG/.5ML
0.75 INJECTION, SOLUTION SUBCUTANEOUS
Qty: 4 PEN | Refills: 0 | Status: SHIPPED | OUTPATIENT
Start: 2021-09-08 | End: 2021-10-19 | Stop reason: SDUPTHER

## 2021-09-13 RX ORDER — TIZANIDINE 4 MG/1
4 TABLET ORAL EVERY 8 HOURS PRN
Qty: 30 TABLET | Refills: 0 | OUTPATIENT
Start: 2021-09-13 | End: 2021-09-23

## 2021-09-13 RX ORDER — MECLIZINE HCL 12.5 MG 12.5 MG/1
12.5 TABLET ORAL 3 TIMES DAILY PRN
Qty: 60 TABLET | Refills: 0 | Status: SHIPPED | OUTPATIENT
Start: 2021-09-13 | End: 2021-10-13 | Stop reason: SDUPTHER

## 2021-09-16 ENCOUNTER — TELEPHONE (OUTPATIENT)
Dept: NEUROLOGY | Facility: HOSPITAL | Age: 61
End: 2021-09-16

## 2021-09-23 ENCOUNTER — TELEPHONE (OUTPATIENT)
Dept: ADMINISTRATIVE | Facility: OTHER | Age: 61
End: 2021-09-23

## 2021-09-27 ENCOUNTER — TELEPHONE (OUTPATIENT)
Dept: ADMINISTRATIVE | Facility: OTHER | Age: 61
End: 2021-09-27

## 2021-09-28 ENCOUNTER — TELEPHONE (OUTPATIENT)
Dept: ADMINISTRATIVE | Facility: OTHER | Age: 61
End: 2021-09-28

## 2021-10-04 ENCOUNTER — PATIENT MESSAGE (OUTPATIENT)
Dept: ADMINISTRATIVE | Facility: HOSPITAL | Age: 61
End: 2021-10-04

## 2021-10-05 ENCOUNTER — TELEPHONE (OUTPATIENT)
Dept: FAMILY MEDICINE | Facility: CLINIC | Age: 61
End: 2021-10-05
Payer: MEDICARE

## 2021-10-05 DIAGNOSIS — M54.50 LOW BACK PAIN, NON-SPECIFIC: ICD-10-CM

## 2021-10-05 DIAGNOSIS — I15.2 HYPERTENSION ASSOCIATED WITH DIABETES: Primary | ICD-10-CM

## 2021-10-05 DIAGNOSIS — E11.59 HYPERTENSION ASSOCIATED WITH DIABETES: Primary | ICD-10-CM

## 2021-10-05 DIAGNOSIS — E11.9 TYPE 2 DIABETES MELLITUS WITHOUT COMPLICATION, WITHOUT LONG-TERM CURRENT USE OF INSULIN: ICD-10-CM

## 2021-10-05 RX ORDER — BLOOD-GLUCOSE CONTROL, NORMAL
1 EACH MISCELLANEOUS DAILY
Qty: 200 EACH | Refills: 6 | Status: SHIPPED | OUTPATIENT
Start: 2021-10-05 | End: 2021-10-08 | Stop reason: SDUPTHER

## 2021-10-05 RX ORDER — NEBULIZER AND COMPRESSOR
1 EACH MISCELLANEOUS DAILY
Qty: 1 EACH | Refills: 0 | Status: SHIPPED | OUTPATIENT
Start: 2021-10-05 | End: 2021-10-08 | Stop reason: SDUPTHER

## 2021-10-05 RX ORDER — DEXTROSE 4 G
TABLET,CHEWABLE ORAL
Qty: 1 EACH | Refills: 0 | Status: SHIPPED | OUTPATIENT
Start: 2021-10-05 | End: 2021-10-08 | Stop reason: SDUPTHER

## 2021-10-08 ENCOUNTER — TELEPHONE (OUTPATIENT)
Dept: FAMILY MEDICINE | Facility: CLINIC | Age: 61
End: 2021-10-08

## 2021-10-08 RX ORDER — NEBULIZER AND COMPRESSOR
1 EACH MISCELLANEOUS DAILY
Qty: 1 EACH | Refills: 0 | Status: SHIPPED | OUTPATIENT
Start: 2021-10-08

## 2021-10-08 RX ORDER — DEXTROSE 4 G
TABLET,CHEWABLE ORAL
Qty: 1 EACH | Refills: 0 | Status: SHIPPED | OUTPATIENT
Start: 2021-10-08 | End: 2023-10-16

## 2021-10-08 RX ORDER — BLOOD-GLUCOSE CONTROL, NORMAL
1 EACH MISCELLANEOUS DAILY
Qty: 200 EACH | Refills: 6 | Status: SHIPPED | OUTPATIENT
Start: 2021-10-08

## 2021-10-13 DIAGNOSIS — S20.212A RIB CONTUSION, LEFT, INITIAL ENCOUNTER: ICD-10-CM

## 2021-10-13 RX ORDER — MELOXICAM 15 MG/1
15 TABLET ORAL DAILY PRN
Qty: 90 TABLET | Refills: 0 | Status: SHIPPED | OUTPATIENT
Start: 2021-10-13 | End: 2021-12-27 | Stop reason: SDUPTHER

## 2021-10-13 RX ORDER — MECLIZINE HCL 12.5 MG 12.5 MG/1
12.5 TABLET ORAL 3 TIMES DAILY PRN
Qty: 60 TABLET | Refills: 0 | Status: SHIPPED | OUTPATIENT
Start: 2021-10-13 | End: 2021-10-18 | Stop reason: SDUPTHER

## 2021-10-14 ENCOUNTER — TELEPHONE (OUTPATIENT)
Dept: FAMILY MEDICINE | Facility: CLINIC | Age: 61
End: 2021-10-14

## 2021-10-14 DIAGNOSIS — F41.9 ANXIETY: Primary | ICD-10-CM

## 2021-10-14 RX ORDER — ALPRAZOLAM 0.25 MG/1
0.25 TABLET ORAL NIGHTLY PRN
Qty: 30 TABLET | Refills: 0 | Status: SHIPPED | OUTPATIENT
Start: 2021-10-14 | End: 2021-11-11 | Stop reason: SDUPTHER

## 2021-10-18 RX ORDER — MECLIZINE HCL 12.5 MG 12.5 MG/1
12.5 TABLET ORAL 3 TIMES DAILY PRN
Qty: 60 TABLET | Refills: 0 | Status: SHIPPED | OUTPATIENT
Start: 2021-10-18 | End: 2021-12-20 | Stop reason: SDUPTHER

## 2021-10-19 DIAGNOSIS — E11.65 TYPE 2 DIABETES MELLITUS WITH HYPERGLYCEMIA, WITHOUT LONG-TERM CURRENT USE OF INSULIN: ICD-10-CM

## 2021-10-20 RX ORDER — DULAGLUTIDE 0.75 MG/.5ML
0.75 INJECTION, SOLUTION SUBCUTANEOUS
Qty: 4 PEN | Refills: 0 | Status: SHIPPED | OUTPATIENT
Start: 2021-10-20 | End: 2021-11-17

## 2021-11-10 ENCOUNTER — PATIENT MESSAGE (OUTPATIENT)
Dept: ADMINISTRATIVE | Facility: HOSPITAL | Age: 61
End: 2021-11-10
Payer: MEDICARE

## 2021-11-11 DIAGNOSIS — F41.9 ANXIETY: ICD-10-CM

## 2021-11-12 RX ORDER — ALPRAZOLAM 0.25 MG/1
0.25 TABLET ORAL NIGHTLY PRN
Qty: 30 TABLET | Refills: 0 | Status: SHIPPED | OUTPATIENT
Start: 2021-11-12 | End: 2021-12-06 | Stop reason: SDUPTHER

## 2021-11-29 DIAGNOSIS — F41.9 ANXIETY: ICD-10-CM

## 2021-11-29 RX ORDER — ALPRAZOLAM 0.25 MG/1
0.25 TABLET ORAL NIGHTLY PRN
Qty: 30 TABLET | Refills: 0 | OUTPATIENT
Start: 2021-11-29 | End: 2021-12-29

## 2021-12-03 ENCOUNTER — TELEPHONE (OUTPATIENT)
Dept: FAMILY MEDICINE | Facility: CLINIC | Age: 61
End: 2021-12-03
Payer: MEDICARE

## 2021-12-06 DIAGNOSIS — F41.9 ANXIETY: ICD-10-CM

## 2021-12-06 RX ORDER — GABAPENTIN 300 MG/1
300 CAPSULE ORAL NIGHTLY
Qty: 90 CAPSULE | Refills: 3 | Status: SHIPPED | OUTPATIENT
Start: 2021-12-06 | End: 2021-12-07

## 2021-12-06 RX ORDER — ALPRAZOLAM 0.25 MG/1
0.25 TABLET ORAL NIGHTLY PRN
Qty: 30 TABLET | Refills: 0 | Status: SHIPPED | OUTPATIENT
Start: 2021-12-06 | End: 2021-12-07 | Stop reason: SDUPTHER

## 2021-12-07 ENCOUNTER — OFFICE VISIT (OUTPATIENT)
Dept: FAMILY MEDICINE | Facility: CLINIC | Age: 61
End: 2021-12-07
Payer: MEDICARE

## 2021-12-07 ENCOUNTER — LAB VISIT (OUTPATIENT)
Dept: LAB | Facility: HOSPITAL | Age: 61
End: 2021-12-07
Attending: FAMILY MEDICINE
Payer: MEDICARE

## 2021-12-07 VITALS
BODY MASS INDEX: 29.54 KG/M2 | OXYGEN SATURATION: 98 % | HEIGHT: 72 IN | WEIGHT: 218.06 LBS | SYSTOLIC BLOOD PRESSURE: 112 MMHG | HEART RATE: 108 BPM | DIASTOLIC BLOOD PRESSURE: 80 MMHG

## 2021-12-07 DIAGNOSIS — N18.31 TYPE 2 DIABETES MELLITUS WITH STAGE 3A CHRONIC KIDNEY DISEASE, WITHOUT LONG-TERM CURRENT USE OF INSULIN: ICD-10-CM

## 2021-12-07 DIAGNOSIS — F41.9 ANXIETY: ICD-10-CM

## 2021-12-07 DIAGNOSIS — E11.22 TYPE 2 DIABETES MELLITUS WITH STAGE 3A CHRONIC KIDNEY DISEASE, WITHOUT LONG-TERM CURRENT USE OF INSULIN: ICD-10-CM

## 2021-12-07 DIAGNOSIS — Z11.4 ENCOUNTER FOR SCREENING FOR HIV: Primary | ICD-10-CM

## 2021-12-07 DIAGNOSIS — N18.31 STAGE 3A CHRONIC KIDNEY DISEASE: ICD-10-CM

## 2021-12-07 DIAGNOSIS — Z11.4 ENCOUNTER FOR SCREENING FOR HIV: ICD-10-CM

## 2021-12-07 DIAGNOSIS — F20.3 UNDIFFERENTIATED SCHIZOPHRENIA: ICD-10-CM

## 2021-12-07 DIAGNOSIS — F51.01 PRIMARY INSOMNIA: ICD-10-CM

## 2021-12-07 DIAGNOSIS — M54.12 CERVICAL RADICULOPATHY: ICD-10-CM

## 2021-12-07 LAB
ALBUMIN SERPL BCP-MCNC: 3.8 G/DL (ref 3.5–5.2)
ALP SERPL-CCNC: 68 U/L (ref 55–135)
ALT SERPL W/O P-5'-P-CCNC: 18 U/L (ref 10–44)
ANION GAP SERPL CALC-SCNC: 15 MMOL/L (ref 8–16)
AST SERPL-CCNC: 25 U/L (ref 10–40)
BASOPHILS # BLD AUTO: 0.04 K/UL (ref 0–0.2)
BASOPHILS NFR BLD: 0.4 % (ref 0–1.9)
BILIRUB SERPL-MCNC: 1.1 MG/DL (ref 0.1–1)
BUN SERPL-MCNC: 9 MG/DL (ref 6–20)
CALCIUM SERPL-MCNC: 9.9 MG/DL (ref 8.7–10.5)
CHLORIDE SERPL-SCNC: 86 MMOL/L (ref 95–110)
CHOLEST SERPL-MCNC: 191 MG/DL (ref 120–199)
CHOLEST/HDLC SERPL: 4.3 {RATIO} (ref 2–5)
CO2 SERPL-SCNC: 27 MMOL/L (ref 23–29)
CREAT SERPL-MCNC: 1.3 MG/DL (ref 0.5–1.4)
DIFFERENTIAL METHOD: ABNORMAL
EOSINOPHIL # BLD AUTO: 0.1 K/UL (ref 0–0.5)
EOSINOPHIL NFR BLD: 0.7 % (ref 0–8)
ERYTHROCYTE [DISTWIDTH] IN BLOOD BY AUTOMATED COUNT: 13.2 % (ref 11.5–14.5)
EST. GFR  (AFRICAN AMERICAN): >60 ML/MIN/1.73 M^2
EST. GFR  (NON AFRICAN AMERICAN): 59.3 ML/MIN/1.73 M^2
ESTIMATED AVG GLUCOSE: 134 MG/DL (ref 68–131)
GLUCOSE SERPL-MCNC: 127 MG/DL (ref 70–110)
HBA1C MFR BLD: 6.3 % (ref 4–5.6)
HCT VFR BLD AUTO: 40.6 % (ref 40–54)
HDLC SERPL-MCNC: 44 MG/DL (ref 40–75)
HDLC SERPL: 23 % (ref 20–50)
HGB BLD-MCNC: 14.5 G/DL (ref 14–18)
IMM GRANULOCYTES # BLD AUTO: 0.03 K/UL (ref 0–0.04)
IMM GRANULOCYTES NFR BLD AUTO: 0.3 % (ref 0–0.5)
LDLC SERPL CALC-MCNC: 117.6 MG/DL (ref 63–159)
LYMPHOCYTES # BLD AUTO: 1.4 K/UL (ref 1–4.8)
LYMPHOCYTES NFR BLD: 15.3 % (ref 18–48)
MCH RBC QN AUTO: 29.7 PG (ref 27–31)
MCHC RBC AUTO-ENTMCNC: 35.7 G/DL (ref 32–36)
MCV RBC AUTO: 83 FL (ref 82–98)
MONOCYTES # BLD AUTO: 0.7 K/UL (ref 0.3–1)
MONOCYTES NFR BLD: 7.8 % (ref 4–15)
NEUTROPHILS # BLD AUTO: 6.7 K/UL (ref 1.8–7.7)
NEUTROPHILS NFR BLD: 75.5 % (ref 38–73)
NONHDLC SERPL-MCNC: 147 MG/DL
NRBC BLD-RTO: 0 /100 WBC
PLATELET # BLD AUTO: 270 K/UL (ref 150–450)
PMV BLD AUTO: 8.6 FL (ref 9.2–12.9)
POTASSIUM SERPL-SCNC: 3.6 MMOL/L (ref 3.5–5.1)
PROT SERPL-MCNC: 6.9 G/DL (ref 6–8.4)
RBC # BLD AUTO: 4.89 M/UL (ref 4.6–6.2)
SODIUM SERPL-SCNC: 128 MMOL/L (ref 136–145)
TRIGL SERPL-MCNC: 147 MG/DL (ref 30–150)
WBC # BLD AUTO: 8.93 K/UL (ref 3.9–12.7)

## 2021-12-07 PROCEDURE — 3061F PR NEG MICROALBUMINURIA RESULT DOCUMENTED/REVIEW: ICD-10-PCS | Mod: CPTII,S$GLB,, | Performed by: FAMILY MEDICINE

## 2021-12-07 PROCEDURE — 3061F NEG MICROALBUMINURIA REV: CPT | Mod: CPTII,S$GLB,, | Performed by: FAMILY MEDICINE

## 2021-12-07 PROCEDURE — 83036 HEMOGLOBIN GLYCOSYLATED A1C: CPT | Performed by: FAMILY MEDICINE

## 2021-12-07 PROCEDURE — 3066F PR DOCUMENTATION OF TREATMENT FOR NEPHROPATHY: ICD-10-PCS | Mod: CPTII,S$GLB,, | Performed by: FAMILY MEDICINE

## 2021-12-07 PROCEDURE — 85025 COMPLETE CBC W/AUTO DIFF WBC: CPT | Performed by: FAMILY MEDICINE

## 2021-12-07 PROCEDURE — 4010F ACE/ARB THERAPY RXD/TAKEN: CPT | Mod: CPTII,S$GLB,, | Performed by: FAMILY MEDICINE

## 2021-12-07 PROCEDURE — 80061 LIPID PANEL: CPT | Performed by: FAMILY MEDICINE

## 2021-12-07 PROCEDURE — 36415 COLL VENOUS BLD VENIPUNCTURE: CPT | Mod: PO | Performed by: FAMILY MEDICINE

## 2021-12-07 PROCEDURE — 99999 PR PBB SHADOW E&M-EST. PATIENT-LVL IV: CPT | Mod: PBBFAC,,, | Performed by: FAMILY MEDICINE

## 2021-12-07 PROCEDURE — 80053 COMPREHEN METABOLIC PANEL: CPT | Performed by: FAMILY MEDICINE

## 2021-12-07 PROCEDURE — 99499 RISK ADDL DX/OHS AUDIT: ICD-10-PCS | Mod: S$GLB,,, | Performed by: FAMILY MEDICINE

## 2021-12-07 PROCEDURE — 4010F PR ACE/ARB THEARPY RXD/TAKEN: ICD-10-PCS | Mod: CPTII,S$GLB,, | Performed by: FAMILY MEDICINE

## 2021-12-07 PROCEDURE — 3066F NEPHROPATHY DOC TX: CPT | Mod: CPTII,S$GLB,, | Performed by: FAMILY MEDICINE

## 2021-12-07 PROCEDURE — 87389 HIV-1 AG W/HIV-1&-2 AB AG IA: CPT | Performed by: FAMILY MEDICINE

## 2021-12-07 PROCEDURE — 99499 UNLISTED E&M SERVICE: CPT | Mod: S$GLB,,, | Performed by: FAMILY MEDICINE

## 2021-12-07 PROCEDURE — 99215 OFFICE O/P EST HI 40 MIN: CPT | Mod: S$GLB,,, | Performed by: FAMILY MEDICINE

## 2021-12-07 PROCEDURE — 99215 PR OFFICE/OUTPT VISIT, EST, LEVL V, 40-54 MIN: ICD-10-PCS | Mod: S$GLB,,, | Performed by: FAMILY MEDICINE

## 2021-12-07 PROCEDURE — 99999 PR PBB SHADOW E&M-EST. PATIENT-LVL IV: ICD-10-PCS | Mod: PBBFAC,,, | Performed by: FAMILY MEDICINE

## 2021-12-07 RX ORDER — PREGABALIN 50 MG/1
50 CAPSULE ORAL 2 TIMES DAILY
Qty: 180 CAPSULE | Refills: 0 | Status: SHIPPED | OUTPATIENT
Start: 2021-12-07 | End: 2022-03-28 | Stop reason: SDUPTHER

## 2021-12-07 RX ORDER — HYDROXYZINE PAMOATE 50 MG/1
CAPSULE ORAL
Qty: 90 CAPSULE | Refills: 0 | Status: SHIPPED | OUTPATIENT
Start: 2021-12-07 | End: 2022-02-16

## 2021-12-07 RX ORDER — ALPRAZOLAM 0.5 MG/1
0.5 TABLET ORAL NIGHTLY PRN
Qty: 30 TABLET | Refills: 0 | Status: SHIPPED | OUTPATIENT
Start: 2021-12-07 | End: 2021-12-27 | Stop reason: SDUPTHER

## 2021-12-08 LAB — HIV 1+2 AB+HIV1 P24 AG SERPL QL IA: NEGATIVE

## 2021-12-18 ENCOUNTER — TELEPHONE (OUTPATIENT)
Dept: FAMILY MEDICINE | Facility: CLINIC | Age: 61
End: 2021-12-18
Payer: MEDICARE

## 2021-12-18 DIAGNOSIS — E87.1 HYPONATREMIA: Primary | ICD-10-CM

## 2021-12-19 ENCOUNTER — TELEPHONE (OUTPATIENT)
Dept: FAMILY MEDICINE | Facility: CLINIC | Age: 61
End: 2021-12-19
Payer: MEDICARE

## 2021-12-19 NOTE — TELEPHONE ENCOUNTER
Repeat sodium next week.    Decreased kidney function but stable in comparison with previous reports .    Slightly low sodium noted.  Repeat sodium next week.

## 2021-12-20 ENCOUNTER — TELEPHONE (OUTPATIENT)
Dept: FAMILY MEDICINE | Facility: CLINIC | Age: 61
End: 2021-12-20
Payer: MEDICARE

## 2021-12-20 RX ORDER — MECLIZINE HCL 12.5 MG 12.5 MG/1
12.5 TABLET ORAL 3 TIMES DAILY PRN
Qty: 60 TABLET | Refills: 0 | Status: SHIPPED | OUTPATIENT
Start: 2021-12-20 | End: 2022-01-30 | Stop reason: SDUPTHER

## 2021-12-21 ENCOUNTER — TELEPHONE (OUTPATIENT)
Dept: FAMILY MEDICINE | Facility: CLINIC | Age: 61
End: 2021-12-21
Payer: MEDICARE

## 2021-12-23 DIAGNOSIS — E11.65 TYPE 2 DIABETES MELLITUS WITH HYPERGLYCEMIA: ICD-10-CM

## 2021-12-23 RX ORDER — DULAGLUTIDE 0.75 MG/.5ML
INJECTION, SOLUTION SUBCUTANEOUS
Qty: 4 PEN | Refills: 0 | Status: SHIPPED | OUTPATIENT
Start: 2021-12-23 | End: 2022-01-20 | Stop reason: SDUPTHER

## 2021-12-27 DIAGNOSIS — S20.212A RIB CONTUSION, LEFT, INITIAL ENCOUNTER: ICD-10-CM

## 2021-12-27 DIAGNOSIS — F41.9 ANXIETY: ICD-10-CM

## 2021-12-28 RX ORDER — MELOXICAM 15 MG/1
15 TABLET ORAL DAILY PRN
Qty: 90 TABLET | Refills: 0 | Status: SHIPPED | OUTPATIENT
Start: 2021-12-28 | End: 2022-04-24

## 2021-12-28 RX ORDER — ALPRAZOLAM 0.5 MG/1
0.5 TABLET ORAL NIGHTLY PRN
Qty: 30 TABLET | Refills: 0 | Status: SHIPPED | OUTPATIENT
Start: 2022-01-07 | End: 2022-02-02 | Stop reason: SDUPTHER

## 2022-01-20 DIAGNOSIS — E11.65 TYPE 2 DIABETES MELLITUS WITH HYPERGLYCEMIA: ICD-10-CM

## 2022-01-20 DIAGNOSIS — N52.9 ERECTILE DYSFUNCTION, UNSPECIFIED ERECTILE DYSFUNCTION TYPE: ICD-10-CM

## 2022-01-20 RX ORDER — SILDENAFIL 100 MG/1
100 TABLET, FILM COATED ORAL DAILY PRN
Qty: 30 TABLET | Refills: 3 | Status: SHIPPED | OUTPATIENT
Start: 2022-01-20 | End: 2022-05-25 | Stop reason: SDUPTHER

## 2022-01-20 RX ORDER — DULAGLUTIDE 0.75 MG/.5ML
INJECTION, SOLUTION SUBCUTANEOUS
Qty: 4 PEN | Refills: 0 | Status: SHIPPED | OUTPATIENT
Start: 2022-01-20 | End: 2022-02-21 | Stop reason: SDUPTHER

## 2022-01-20 NOTE — TELEPHONE ENCOUNTER
No new care gaps identified.  Powered by [employer]. Reference number: [id number].   1/20/2022 10:30:42 AM CST

## 2022-01-30 RX ORDER — MECLIZINE HCL 12.5 MG 12.5 MG/1
12.5 TABLET ORAL 3 TIMES DAILY PRN
Qty: 60 TABLET | Refills: 0 | Status: CANCELLED | OUTPATIENT
Start: 2022-01-30

## 2022-01-31 RX ORDER — MECLIZINE HCL 12.5 MG 12.5 MG/1
12.5 TABLET ORAL 3 TIMES DAILY PRN
Qty: 60 TABLET | Refills: 0 | Status: SHIPPED | OUTPATIENT
Start: 2022-01-31 | End: 2022-03-09 | Stop reason: SDUPTHER

## 2022-01-31 NOTE — TELEPHONE ENCOUNTER
No new care gaps identified.  Powered by [employer]. Reference number: [id number].   1/31/2022 11:29:49 AM CST

## 2022-01-31 NOTE — TELEPHONE ENCOUNTER
No new care gaps identified.  Powered by [employer]. Reference number: [id number].   1/30/2022 9:20:25 PM CST

## 2022-02-02 ENCOUNTER — LAB VISIT (OUTPATIENT)
Dept: LAB | Facility: HOSPITAL | Age: 62
End: 2022-02-02
Attending: FAMILY MEDICINE
Payer: MEDICARE

## 2022-02-02 DIAGNOSIS — E11.9 TYPE 2 DIABETES MELLITUS WITHOUT COMPLICATION, WITHOUT LONG-TERM CURRENT USE OF INSULIN: ICD-10-CM

## 2022-02-02 DIAGNOSIS — E87.1 HYPONATREMIA: ICD-10-CM

## 2022-02-02 DIAGNOSIS — F41.9 ANXIETY: ICD-10-CM

## 2022-02-02 LAB
ANION GAP SERPL CALC-SCNC: 9 MMOL/L (ref 8–16)
BUN SERPL-MCNC: 24 MG/DL (ref 8–23)
CALCIUM SERPL-MCNC: 10.2 MG/DL (ref 8.7–10.5)
CHLORIDE SERPL-SCNC: 101 MMOL/L (ref 95–110)
CO2 SERPL-SCNC: 31 MMOL/L (ref 23–29)
CREAT SERPL-MCNC: 1.2 MG/DL (ref 0.5–1.4)
EST. GFR  (AFRICAN AMERICAN): >60 ML/MIN/1.73 M^2
EST. GFR  (NON AFRICAN AMERICAN): >60 ML/MIN/1.73 M^2
ESTIMATED AVG GLUCOSE: 108 MG/DL (ref 68–131)
GLUCOSE SERPL-MCNC: 115 MG/DL (ref 70–110)
HBA1C MFR BLD: 5.4 % (ref 4–5.6)
POTASSIUM SERPL-SCNC: 4.4 MMOL/L (ref 3.5–5.1)
SODIUM SERPL-SCNC: 141 MMOL/L (ref 136–145)

## 2022-02-02 PROCEDURE — 83036 HEMOGLOBIN GLYCOSYLATED A1C: CPT | Performed by: FAMILY MEDICINE

## 2022-02-02 PROCEDURE — 36415 COLL VENOUS BLD VENIPUNCTURE: CPT | Mod: PO | Performed by: FAMILY MEDICINE

## 2022-02-02 PROCEDURE — 80048 BASIC METABOLIC PNL TOTAL CA: CPT | Performed by: FAMILY MEDICINE

## 2022-02-02 RX ORDER — ALPRAZOLAM 0.5 MG/1
0.5 TABLET ORAL NIGHTLY PRN
Qty: 30 TABLET | Refills: 0 | Status: SHIPPED | OUTPATIENT
Start: 2022-02-02 | End: 2022-03-22 | Stop reason: SDUPTHER

## 2022-02-02 NOTE — TELEPHONE ENCOUNTER
No new care gaps identified.  Powered by [employer]. Reference number: [id number].   2/02/2022 1:51:15 PM CST

## 2022-02-10 ENCOUNTER — TELEPHONE (OUTPATIENT)
Dept: FAMILY MEDICINE | Facility: CLINIC | Age: 62
End: 2022-02-10
Payer: MEDICARE

## 2022-02-10 DIAGNOSIS — R20.0 ARM NUMBNESS: Primary | ICD-10-CM

## 2022-02-10 NOTE — TELEPHONE ENCOUNTER
----- Message from Yadi Harris MA sent at 2/7/2022  3:24 PM CST -----  Regarding: Referal  Problems with right arm hand numbness and tingling feet as well wants a referral for Ortho   ----- Message -----  From: Pooja Wick  Sent: 2/7/2022   2:58 PM CST  To: Bernadette KEE Staff    Type: Requesting to speak with nurse         Who Called: PT  Regarding: Requesting call back about a referral please advise   Would the patient rather a call back or a response via [facility]? Call back  Best Call Back Number: [phone]  Additional Information: n/a

## 2022-02-14 ENCOUNTER — TELEPHONE (OUTPATIENT)
Dept: ADMINISTRATIVE | Facility: OTHER | Age: 62
End: 2022-02-14
Payer: MEDICARE

## 2022-02-15 DIAGNOSIS — I10 ESSENTIAL HYPERTENSION: ICD-10-CM

## 2022-02-15 DIAGNOSIS — E11.65 TYPE 2 DIABETES MELLITUS WITH HYPERGLYCEMIA, WITHOUT LONG-TERM CURRENT USE OF INSULIN: ICD-10-CM

## 2022-02-15 DIAGNOSIS — F51.01 PRIMARY INSOMNIA: ICD-10-CM

## 2022-02-15 RX ORDER — CHLORTHALIDONE 25 MG/1
25 TABLET ORAL DAILY
Qty: 90 TABLET | Refills: 3 | OUTPATIENT
Start: 2022-02-15

## 2022-02-15 RX ORDER — HYDROXYZINE PAMOATE 50 MG/1
CAPSULE ORAL
Qty: 90 CAPSULE | Refills: 0 | Status: CANCELLED | OUTPATIENT
Start: 2022-02-15

## 2022-02-15 RX ORDER — GLIMEPIRIDE 2 MG/1
TABLET ORAL
Qty: 90 TABLET | Refills: 1 | Status: SHIPPED | OUTPATIENT
Start: 2022-02-15 | End: 2022-08-10

## 2022-02-15 RX ORDER — CHLORTHALIDONE 25 MG/1
25 TABLET ORAL DAILY
Qty: 90 TABLET | Refills: 3 | Status: CANCELLED | OUTPATIENT
Start: 2022-02-15 | End: 2022-05-16

## 2022-02-15 NOTE — TELEPHONE ENCOUNTER
Refill Routing Note   Medication(s) are not appropriate for processing by [facility] for the following reason(s):      - Drug-Disease Interaction (chlorthalidone and Renal insufficiency; CKD (chronic kidney disease) stage 3, GFR 30-59 ml/min)    OR action(s):  Defer  Approve Medication-related problems identified: Drug-disease interaction        --->Care Gap information included in message below if applicable.   Medication reconciliation completed: No   Automatic Epic Generated Protocol Data:    Orders Placed This Encounter    glimepiride (AMARYL) 2 MG tablet      Requested Prescriptions   Pending Prescriptions Disp Refills    chlorthalidone (HYGROTEN) 25 MG Tab 90 tablet 3     Sig: Take 1 tablet (25 mg total) by mouth once daily.       Cardiovascular: Diuretics - Thiazide Passed - 2/15/2022 12:14 PM        Passed - Patient is at least 18 years old        Passed - Last BP in normal range within 360 days     BP Readings from Last 1 Encounters:   12/07/21 112/80               Passed - Valid encounter within last 15 months     Recent Visits  Date Type Provider Dept   12/07/21 Office Visit Lucien Fleming MD [facility]   05/10/21 Office Visit Lucien Fleming MD [facility]   03/29/21 Office Visit Lucien Fleming MD [facility]   Showing recent visits within past 720 days and meeting all other requirements  Future Appointments  No visits were found meeting these conditions.  Showing future appointments within next 150 days and meeting all other requirements      Future Appointments              In 3 months Lucien Fleming MD [facility]                Passed - Cr is 1.39 or below and within 360 days     Lab Results   Component Value Date    CREATININE 1.2 02/02/2022    CREATININE 1.3 12/07/2021    CREATININE 1.4 05/10/2021              Passed - K in normal range and within 360 days     Potassium   Date Value Ref Range  Status   02/02/2022 4.4 3.5 - 5.1 mmol/L Final   12/07/2021 3.6 3.5 - 5.1 mmol/L Final   05/10/2021 4.2 3.5 - 5.1 mmol/L Final              Passed - Na is between 130 and 148 and within 360 days     Sodium   Date Value Ref Range Status   02/02/2022 141 136 - 145 mmol/L Final   12/07/2021 128 (L) 136 - 145 mmol/L Final   05/10/2021 134 (L) 136 - 145 mmol/L Final              Passed - eGFR within 360 days     Lab Results   Component Value Date    EGFRNONAA >60.0 02/02/2022    EGFRNONAA 59.3 (A) 12/07/2021    EGFRNONAA 54.2 (A) 05/10/2021                 Signed Prescriptions Disp Refills    glimepiride (AMARYL) 2 MG tablet 90 tablet 1     Sig: TAKE 1 TABLET(2 MG) BY MOUTH BEFORE BREAKFAST       Endocrinology:  Diabetes - Sulfonylureas Passed - 2/15/2022 12:14 PM        Passed - Patient is at least 18 years old        Passed - Valid encounter within last 15 months     Recent Visits  Date Type Provider Dept   12/07/21 Office Visit Lucien Fleming MD [facility]   05/10/21 Office Visit Lucien Fleming MD [facility]   03/29/21 Office Visit Lucien Fleming MD [facility]   Showing recent visits within past 720 days and meeting all other requirements  Future Appointments  No visits were found meeting these conditions.  Showing future appointments within next 150 days and meeting all other requirements      Future Appointments              In 3 months Lucien Fleming MD [facility]                Passed - HBA1C within 180 days     Lab Results   Component Value Date    HGBA1C 5.4 02/02/2022    HGBA1C 6.3 (H) 12/07/2021    HGBA1C 7.3 (H) 05/10/2021              Passed - Cr is 1.39 or below and within 360 days     Lab Results   Component Value Date    CREATININE 1.2 02/02/2022    CREATININE 1.3 12/07/2021    CREATININE 1.4 05/10/2021              Passed - eGFR is 59 or above and within 360 days     Lab Results   Component Value Date     EGFRNONAA >60.0 02/02/2022    EGFRNONAA 59.3 (A) 12/07/2021    EGFRNONAA 54.2 (A) 05/10/2021                      Appointments  past 12m or future 3m with PCP    Date Provider   Last Visit   12/7/2021 Lucien Fleming MD   Next Visit   6/14/2022 Lucien Fleming MD   ED visits in past 90 days: 0        Note composed:2:32 PM 02/15/2022

## 2022-02-15 NOTE — TELEPHONE ENCOUNTER
No new care gaps identified.  Powered by [employer] by [employer]. Reference number: [id number].   2/15/2022 12:15:45 PM CST

## 2022-02-15 NOTE — TELEPHONE ENCOUNTER
No new care gaps identified.  Powered by [employer] by [employer]. Reference number: [id number].   2/15/2022 2:48:20 PM CST

## 2022-02-15 NOTE — TELEPHONE ENCOUNTER
Refill Routing Note   Medication(s) are not appropriate for processing by [facility] for the following reason(s):      - Outside of protocol    OR action(s):  Route  Quick Discontinue       Medication Therapy Plan: Hydroxyzine is outside of [facility] protocol; route  --->Care Gap information included in message below if applicable.   Medication reconciliation completed: No   Automatic Epic Generated Protocol Data:        Requested Prescriptions   Pending Prescriptions Disp Refills    hydrOXYzine pamoate (VISTARIL) 50 MG Cap 90 capsule 0     Sig: TAKE 1 CAPSULE (50 MG) BY MOUTH NIGHTLY AS NEEDED FOR INSOMNIA       There is no refill protocol information for this order      Refused Prescriptions Disp Refills    chlorthalidone (HYGROTEN) 25 MG Tab 90 tablet 3     Sig: Take 1 tablet (25 mg total) by mouth once daily.       Cardiovascular: Diuretics - Thiazide Passed - 2/15/2022  2:47 PM        Passed - Patient is at least 18 years old        Passed - Last BP in normal range within 360 days     BP Readings from Last 1 Encounters:   12/07/21 112/80               Passed - Valid encounter within last 15 months     Recent Visits  Date Type Provider Dept   12/07/21 Office Visit Lucien Fleming MD [facility]   05/10/21 Office Visit Lucien Fleming MD [facility]   03/29/21 Office Visit Lucien Fleming MD [facility]   Showing recent visits within past 720 days and meeting all other requirements  Future Appointments  No visits were found meeting these conditions.  Showing future appointments within next 150 days and meeting all other requirements      Future Appointments              In 3 months Lucien Fleming MD [facility]                Passed - Cr is 1.39 or below and within 360 days     Lab Results   Component Value Date    CREATININE 1.2 02/02/2022    CREATININE 1.3 12/07/2021    CREATININE 1.4 05/10/2021              Passed -  K in normal range and within 360 days     Potassium   Date Value Ref Range Status   02/02/2022 4.4 3.5 - 5.1 mmol/L Final   12/07/2021 3.6 3.5 - 5.1 mmol/L Final   05/10/2021 4.2 3.5 - 5.1 mmol/L Final              Passed - Na is between 130 and 148 and within 360 days     Sodium   Date Value Ref Range Status   02/02/2022 141 136 - 145 mmol/L Final   12/07/2021 128 (L) 136 - 145 mmol/L Final   05/10/2021 134 (L) 136 - 145 mmol/L Final              Passed - eGFR within 360 days     Lab Results   Component Value Date    EGFRNONAA >60.0 02/02/2022    EGFRNONAA 59.3 (A) 12/07/2021    EGFRNONAA 54.2 (A) 05/10/2021                      Appointments  past 12m or future 3m with PCP    Date Provider   Last Visit   12/7/2021 Lucien Fleming MD   Next Visit   6/14/2022 Lucien Fleming MD   ED visits in past 90 days: 0        Note composed:3:45 PM 02/15/2022        Pended Medication(s)   Requested Prescriptions     Pending Prescriptions Disp Refills    hydrOXYzine pamoate (VISTARIL) 50 MG Cap 90 capsule 0     Sig: TAKE 1 CAPSULE (50 MG) BY MOUTH NIGHTLY AS NEEDED FOR INSOMNIA     Refused Prescriptions Disp Refills    chlorthalidone (HYGROTEN) 25 MG Tab 90 tablet 3     Sig: Take 1 tablet (25 mg total) by mouth once daily.     Refused By: KATTY WILCOX     Reason for Refusal: Duplicate        Duplicate Pended Encounter(s)/ Last Prescribed Details:     Refill on 2/15/2022        Detailed Report                Note composed:3:45 PM 02/15/2022

## 2022-02-16 RX ORDER — HYDROXYZINE PAMOATE 50 MG/1
CAPSULE ORAL
Qty: 90 CAPSULE | Refills: 0 | Status: SHIPPED | OUTPATIENT
Start: 2022-02-16 | End: 2022-05-12 | Stop reason: SDUPTHER

## 2022-02-21 DIAGNOSIS — E11.65 TYPE 2 DIABETES MELLITUS WITH HYPERGLYCEMIA: ICD-10-CM

## 2022-02-21 DIAGNOSIS — I10 ESSENTIAL HYPERTENSION: ICD-10-CM

## 2022-02-21 NOTE — TELEPHONE ENCOUNTER
No new care gaps identified.  Powered by [employer]. Reference number: [id number].   2/21/2022 8:53:32 AM CST

## 2022-02-21 NOTE — TELEPHONE ENCOUNTER
No new care gaps identified.  Powered by [employer] by [employer]. Reference number: [id number].   2/21/2022 8:52:27 AM CST

## 2022-02-22 RX ORDER — CHLORTHALIDONE 25 MG/1
25 TABLET ORAL DAILY
Qty: 90 TABLET | Refills: 3 | Status: SHIPPED | OUTPATIENT
Start: 2022-02-22 | End: 2023-03-05 | Stop reason: SDUPTHER

## 2022-02-22 RX ORDER — DULAGLUTIDE 0.75 MG/.5ML
INJECTION, SOLUTION SUBCUTANEOUS
Qty: 12 PEN | Refills: 1 | Status: SHIPPED | OUTPATIENT
Start: 2022-02-22 | End: 2022-08-08 | Stop reason: SDUPTHER

## 2022-02-22 NOTE — TELEPHONE ENCOUNTER
Refill Authorization Note   Fabiano Malik  is requesting a refill authorization.  Brief Assessment and Rationale for Refill:  Approve     Medication Therapy Plan:       Medication Reconciliation Completed: No   Comments:   --->Care Gap information included below if applicable.   Orders Placed This Encounter    chlorthalidone (HYGROTEN) 25 MG Tab      Requested Prescriptions   Signed Prescriptions Disp Refills    chlorthalidone (HYGROTEN) 25 MG Tab 90 tablet 3     Sig: Take 1 tablet (25 mg total) by mouth once daily.       Cardiovascular: Diuretics - Thiazide Passed - 2/22/2022  3:57 PM        Passed - Patient is at least 18 years old        Passed - Last BP in normal range within 360 days     BP Readings from Last 1 Encounters:   12/07/21 112/80               Passed - Valid encounter within last 15 months     Recent Visits  Date Type Provider Dept   12/07/21 Office Visit Lucien Fleming MD [facility]   05/10/21 Office Visit Lucien Fleming MD [facility]   03/29/21 Office Visit Lucien Fleming MD [facility]   Showing recent visits within past 720 days and meeting all other requirements  Future Appointments  No visits were found meeting these conditions.  Showing future appointments within next 150 days and meeting all other requirements      Future Appointments              In 1 week Pop Hendrickson MD [facility]    In 2 weeks Everton Walter Jr., MD [facility]    In 3 months Lucien Fleming MD [facility]                Passed - Cr is 1.39 or below and within 360 days     Lab Results   Component Value Date    CREATININE 1.2 02/02/2022    CREATININE 1.3 12/07/2021    CREATININE 1.4 05/10/2021              Passed - K in normal range and within 360 days     Potassium   Date Value Ref Range Status   02/02/2022 4.4 3.5 - 5.1 mmol/L Final   12/07/2021 3.6 3.5 - 5.1 mmol/L Final    05/10/2021 4.2 3.5 - 5.1 mmol/L Final              Passed - Na is between 130 and 148 and within 360 days     Sodium   Date Value Ref Range Status   02/02/2022 141 136 - 145 mmol/L Final   12/07/2021 128 (L) 136 - 145 mmol/L Final   05/10/2021 134 (L) 136 - 145 mmol/L Final              Passed - eGFR within 360 days     Lab Results   Component Value Date    EGFRNONAA >60.0 02/02/2022    EGFRNONAA 59.3 (A) 12/07/2021    EGFRNONAA 54.2 (A) 05/10/2021                    Appointments  past 12m or future 3m with PCP    Date Provider   Last Visit   12/7/2021 Lucien Fleming MD   Next Visit   6/14/2022 Lucien Fleming MD   ED visits in past 90 days: 0     Note composed:4:00 PM 02/22/2022

## 2022-02-22 NOTE — TELEPHONE ENCOUNTER
Refill Authorization Note   Fabiano Malik  is requesting a refill authorization.  Brief Assessment and Rationale for Refill:  Approve     Medication Therapy Plan:       Medication Reconciliation Completed: No   Comments:   --->Care Gap information included below if applicable.       Requested Prescriptions   Pending Prescriptions Disp Refills    dulaglutide (TRULICITY) 0.75 mg/0.5 mL pen injector 12 pen 1     Sig: Inject 0.75 mg into the skin every 7 days.       Endocrinology:  Diabetes - GLP-1 Receptor Agonists Passed - 2/22/2022  4:03 PM        Passed - Patient is at least 18 years old        Passed - Valid encounter within last 15 months     Recent Visits  Date Type Provider Dept   12/07/21 Office Visit Lucien Fleming MD [facility]   05/10/21 Office Visit Lucien Fleming MD [facility]   03/29/21 Office Visit Lucien Fleming MD [facility]   Showing recent visits within past 720 days and meeting all other requirements  Future Appointments  No visits were found meeting these conditions.  Showing future appointments within next 150 days and meeting all other requirements      Future Appointments              In 1 week [facility]    In 2 weeks Everton Walter Jr., [facility]    In 3 months Lucien Fleming MD [facility]                Passed - HBA1C within 180 days     Lab Results   Component Value Date    HGBA1C 5.4 02/02/2022    HGBA1C 6.3 (H) 12/07/2021    HGBA1C 7.3 (H) 05/10/2021                  Appointments  past 12m or future 3m with PCP    Date Provider   Last Visit   12/7/2021 Lucien Fleming MD   Next Visit   2/21/2022 Lucien Fleming MD   ED visits in past 90 days: 0     Note composed:4:06 PM 02/22/2022

## 2022-03-02 ENCOUNTER — PATIENT OUTREACH (OUTPATIENT)
Dept: ADMINISTRATIVE | Facility: OTHER | Age: 62
End: 2022-03-02
Payer: MEDICARE

## 2022-03-02 NOTE — PROGRESS NOTES
Health Maintenance Due   Topic Date Due    Shingles Vaccine (1 of 2) Never done    Foot Exam  12/12/2019    COVID-19 Vaccine (2 - Pfizer 3-dose series) 12/30/2021     Updates were requested from care everywhere.  Chart was reviewed for overdue Proactive [facility] En[facility] ([facility]) topics (CRS, Breast Cancer Screening, Eye exam)  Health Maintenance has been updated.  LINKS immunization registry triggered.  Immunizations were reconciled.

## 2022-03-03 DIAGNOSIS — M25.562 LEFT KNEE PAIN, UNSPECIFIED CHRONICITY: Primary | ICD-10-CM

## 2022-03-03 RX ORDER — DULOXETIN HYDROCHLORIDE 60 MG/1
CAPSULE, DELAYED RELEASE ORAL
Qty: 180 CAPSULE | Refills: 0 | Status: CANCELLED | OUTPATIENT
Start: 2022-03-03

## 2022-03-03 NOTE — TELEPHONE ENCOUNTER
No new care gaps identified.  Powered by [employer] by [employer]. Reference number: [id number].   3/03/2022 5:50:13 PM CST

## 2022-03-04 ENCOUNTER — HOSPITAL ENCOUNTER (OUTPATIENT)
Dept: RADIOLOGY | Facility: HOSPITAL | Age: 62
Discharge: HOME | End: 2022-03-04
Attending: ORTHOPAEDIC SURGERY
Payer: MEDICARE

## 2022-03-04 ENCOUNTER — OFFICE VISIT (OUTPATIENT)
Dept: ORTHOPEDICS | Facility: CLINIC | Age: 62
End: 2022-03-04
Payer: MEDICARE

## 2022-03-04 VITALS — WEIGHT: 218.06 LBS | BODY MASS INDEX: 29.54 KG/M2 | HEIGHT: 72 IN

## 2022-03-04 DIAGNOSIS — M22.42 CHONDROMALACIA, PATELLA, LEFT: Primary | ICD-10-CM

## 2022-03-04 DIAGNOSIS — M25.562 LEFT KNEE PAIN, UNSPECIFIED CHRONICITY: ICD-10-CM

## 2022-03-04 PROCEDURE — 73564 X-RAY EXAM KNEE 4 OR MORE: CPT | Mod: TC,50,PN

## 2022-03-04 PROCEDURE — 3008F BODY MASS INDEX DOCD: CPT | Mod: CPTII,S$GLB,, | Performed by: ORTHOPAEDIC SURGERY

## 2022-03-04 PROCEDURE — 73564 X-RAY EXAM KNEE 4 OR MORE: CPT | Mod: 26,50,, | Performed by: RADIOLOGY

## 2022-03-04 PROCEDURE — 1159F PR MEDICATION LIST DOCUMENTED IN MEDICAL RECORD: ICD-10-PCS | Mod: CPTII,S$GLB,, | Performed by: ORTHOPAEDIC SURGERY

## 2022-03-04 PROCEDURE — 99202 PR OFFICE/OUTPT VISIT, NEW, LEVL II, 15-29 MIN: ICD-10-PCS | Mod: S$GLB,,, | Performed by: ORTHOPAEDIC SURGERY

## 2022-03-04 PROCEDURE — 1160F RVW MEDS BY RX/DR IN RCRD: CPT | Mod: CPTII,S$GLB,, | Performed by: ORTHOPAEDIC SURGERY

## 2022-03-04 PROCEDURE — 3044F PR MOST RECENT HEMOGLOBIN A1C LEVEL <7.0%: ICD-10-PCS | Mod: CPTII,S$GLB,, | Performed by: ORTHOPAEDIC SURGERY

## 2022-03-04 PROCEDURE — 1160F PR REVIEW ALL MEDS BY PRESCRIBER/CLIN PHARMACIST DOCUMENTED: ICD-10-PCS | Mod: CPTII,S$GLB,, | Performed by: ORTHOPAEDIC SURGERY

## 2022-03-04 PROCEDURE — 3044F HG A1C LEVEL LT 7.0%: CPT | Mod: CPTII,S$GLB,, | Performed by: ORTHOPAEDIC SURGERY

## 2022-03-04 PROCEDURE — 99999 PR PBB SHADOW E&M-EST. PATIENT-LVL IV: ICD-10-PCS | Mod: PBBFAC,,, | Performed by: ORTHOPAEDIC SURGERY

## 2022-03-04 PROCEDURE — 99202 OFFICE O/P NEW SF 15 MIN: CPT | Mod: S$GLB,,, | Performed by: ORTHOPAEDIC SURGERY

## 2022-03-04 PROCEDURE — 99999 PR PBB SHADOW E&M-EST. PATIENT-LVL IV: CPT | Mod: PBBFAC,,, | Performed by: ORTHOPAEDIC SURGERY

## 2022-03-04 PROCEDURE — 73564 XR KNEE ORTHO BILAT WITH FLEXION: ICD-10-PCS | Mod: 26,50,, | Performed by: RADIOLOGY

## 2022-03-04 PROCEDURE — 3008F PR BODY MASS INDEX (BMI) DOCUMENTED: ICD-10-PCS | Mod: CPTII,S$GLB,, | Performed by: ORTHOPAEDIC SURGERY

## 2022-03-04 PROCEDURE — 1159F MED LIST DOCD IN RCRD: CPT | Mod: CPTII,S$GLB,, | Performed by: ORTHOPAEDIC SURGERY

## 2022-03-04 RX ORDER — ZOSTER VACCINE RECOMBINANT, ADJUVANTED 50 MCG/0.5
KIT INTRAMUSCULAR
Status: ON HOLD | COMMUNITY
Start: 2021-12-09 | End: 2023-08-18 | Stop reason: CLARIF

## 2022-03-04 NOTE — PROGRESS NOTES
Subjective:      Patient ID: Fabiano Malik Jr. is a 61 y.o. male.    Chief Complaint: Consult and Knee Pain (LEFT )    HPI     They have experienced problems with their left knee over the past several years. The patient denies relevant history of injury/aggravation. Pain is located  anteriorly Associated symptoms include NA.  Symptoms are aggravated by no particular activity. They have been treated with nothing.   Symptoms have recently stayed the same. Ambulation reportedly has been impaired. Self care ADLs are painful.     Review of Systems   Constitutional: Negative for fever and weight loss.   HENT: Negative for congestion.    Eyes: Negative for visual disturbance.   Cardiovascular: Negative for chest pain.   Respiratory: Negative for shortness of breath.    Hematologic/Lymphatic: Negative for bleeding problem. Does not bruise/bleed easily.   Skin: Negative for poor wound healing.   Musculoskeletal: Positive for joint pain.   Gastrointestinal: Negative for abdominal pain.   Genitourinary: Negative for dysuria.   Neurological: Negative for seizures.   Psychiatric/Behavioral: Negative for altered mental status.   Allergic/Immunologic: Negative for persistent infections.         Objective:      Ortho/SPM Exam        Left knee    [unfilled]    The patient is not in acute distress.  He is a little tremulous and perspiring  Sclerae normal  Body habitus is normal.  Respiratory distress:  none   The patient walks with a limp.  Hip irritability  equivocal.   The skin over the knee is intact.  Knee effusion 0  Palpation- no localized tenderness  Range of motion- 4-260-atejsnlig resistance  Ligament laxity exam:  Stable to valgus and varus stress.  Negative sag, negative Lachman  Patellar apprehension negative.  Popliteal cyst negative  Patellar crepitation absent.  Meniscal irritability negative  Pulses DP present, PT present.  Motor normal 5/5 strength in all tested muscle groups.   Sensory normal.    I reviewed the  relevant imaging for the patient's condition:  Left knee films show no fracture or dislocation.  No localized bone lesion.  Joint space is maintained    Assessment:       Encounter Diagnosis   Name Primary?    Chondromalacia, patella, left Yes          This is a working diagnosis.  Other possibilities include referred pain from proximal sources, low-grade internal derangement and early osteoarthritis-not visualized on plain radiographs        Plan:       Fabiano was seen today for consult and knee pain.    Diagnoses and all orders for this visit:    Chondromalacia, patella, left    I explained my diagnostic impression and the reasoning behind it in detail, using layman's terms.  Models and/or pictures were used to help in the explanation.    Physical therapy    Over-the-counter analgesics    X-ray left hip next visit

## 2022-03-07 LAB
LEFT EYE DM RETINOPATHY: POSITIVE
RIGHT EYE DM RETINOPATHY: POSITIVE

## 2022-03-07 RX ORDER — DULOXETIN HYDROCHLORIDE 60 MG/1
CAPSULE, DELAYED RELEASE ORAL
Qty: 180 CAPSULE | Refills: 0 | Status: CANCELLED | OUTPATIENT
Start: 2022-03-03

## 2022-03-07 NOTE — TELEPHONE ENCOUNTER
No new care gaps identified.  Powered by [employer] by [employer]. Reference number: [id number].   3/07/2022 2:27:18 PM CST

## 2022-03-07 NOTE — TELEPHONE ENCOUNTER
No new care gaps identified.  Powered by [employer] by [employer]. Reference number: [id number].   3/07/2022 2:29:28 PM CST

## 2022-03-08 NOTE — TELEPHONE ENCOUNTER
No new care gaps identified.  Powered by [employer] by [employer]. Reference number: [id number].   3/08/2022 5:29:42 PM CST

## 2022-03-09 RX ORDER — MECLIZINE HCL 12.5 MG 12.5 MG/1
12.5 TABLET ORAL 3 TIMES DAILY PRN
Qty: 60 TABLET | Refills: 0 | Status: SHIPPED | OUTPATIENT
Start: 2022-03-09 | End: 2022-04-01 | Stop reason: SDUPTHER

## 2022-03-09 RX ORDER — DULOXETIN HYDROCHLORIDE 60 MG/1
CAPSULE, DELAYED RELEASE ORAL
Qty: 180 CAPSULE | Refills: 3 | Status: SHIPPED | OUTPATIENT
Start: 2022-03-09 | End: 2023-03-05 | Stop reason: SDUPTHER

## 2022-03-09 RX ORDER — DULOXETIN HYDROCHLORIDE 60 MG/1
CAPSULE, DELAYED RELEASE ORAL
Qty: 180 CAPSULE | Refills: 0 | OUTPATIENT
Start: 2022-03-09

## 2022-03-09 NOTE — TELEPHONE ENCOUNTER
No new care gaps identified.  Powered by [employer] by [employer]. Reference number: [id number].   3/09/2022 6:30:07 AM CST

## 2022-03-10 ENCOUNTER — PATIENT OUTREACH (OUTPATIENT)
Dept: ADMINISTRATIVE | Facility: HOSPITAL | Age: 62
End: 2022-03-10
Payer: MEDICARE

## 2022-03-10 NOTE — PROGRESS NOTES
Chart audit performed Care everywhere triggered & updatedLINKS triggered & updated HM Updated - DM EYE EXAM 2022

## 2022-03-11 ENCOUNTER — TELEPHONE (OUTPATIENT)
Dept: ORTHOPEDICS | Facility: CLINIC | Age: 62
End: 2022-03-11
Payer: MEDICARE

## 2022-03-14 ENCOUNTER — TELEPHONE (OUTPATIENT)
Dept: ORTHOPEDICS | Facility: CLINIC | Age: 62
End: 2022-03-14
Payer: MEDICARE
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
## 2022-03-18 ENCOUNTER — TELEPHONE (OUTPATIENT)
Dept: PODIATRY | Facility: CLINIC | Age: 62
End: 2022-03-18
Payer: MEDICARE

## 2022-03-18 NOTE — TELEPHONE ENCOUNTER
Attempted to reach out to pt to inquire if he was going to be able to attend his appt today 3/18/22 at 1 pm and/or if he needs any assistance with rescheduling if he is unable to attend. Left phone number of [phone] for him to call if further assistance is needed.

## 2022-03-22 DIAGNOSIS — F41.9 ANXIETY: ICD-10-CM

## 2022-03-22 RX ORDER — ALPRAZOLAM 0.5 MG/1
0.5 TABLET ORAL NIGHTLY PRN
Qty: 30 TABLET | Refills: 0 | Status: SHIPPED | OUTPATIENT
Start: 2022-03-22 | End: 2022-04-17 | Stop reason: SDUPTHER

## 2022-03-22 NOTE — TELEPHONE ENCOUNTER
No new care gaps identified.  Powered by [employer]. Reference number: [id number].   3/22/2022 1:47:58 PM CDT

## 2022-03-28 ENCOUNTER — TELEPHONE (OUTPATIENT)
Dept: ORTHOPEDICS | Facility: CLINIC | Age: 62
End: 2022-03-28
Payer: MEDICARE

## 2022-03-28 DIAGNOSIS — M54.12 CERVICAL RADICULOPATHY: ICD-10-CM

## 2022-03-28 RX ORDER — PREGABALIN 50 MG/1
50 CAPSULE ORAL 2 TIMES DAILY
Qty: 180 CAPSULE | Refills: 0 | Status: SHIPPED | OUTPATIENT
Start: 2022-03-28 | End: 2023-02-13 | Stop reason: SDUPTHER

## 2022-03-28 NOTE — TELEPHONE ENCOUNTER
No new care gaps identified.  Powered by [employer]. Reference number: [id number].   3/28/2022 9:31:23 AM CDT

## 2022-03-28 NOTE — TELEPHONE ENCOUNTER
----- Message from Lou Darnell sent at 3/28/2022  2:02 PM CDT -----  Type:  Needs Medical Advice    Who Called: pt  Symptoms (please be specific): pt is having right arm numbness and needs to be seen  How long has patient had these symptoms:  pt said its been months with this issue  Pharmacy name and phone #:    Would the patient rather a call back or a response via [facility]? call  Best Call Back Number: [phone]  Additional Information:

## 2022-04-01 DIAGNOSIS — E11.9 TYPE 2 DIABETES MELLITUS WITHOUT COMPLICATION, WITHOUT LONG-TERM CURRENT USE OF INSULIN: ICD-10-CM

## 2022-04-01 RX ORDER — MECLIZINE HCL 12.5 MG 12.5 MG/1
12.5 TABLET ORAL 3 TIMES DAILY PRN
Qty: 60 TABLET | Refills: 0 | Status: SHIPPED | OUTPATIENT
Start: 2022-04-01 | End: 2022-05-04 | Stop reason: SDUPTHER

## 2022-04-01 RX ORDER — METFORMIN HYDROCHLORIDE 1000 MG/1
1000 TABLET ORAL 2 TIMES DAILY WITH MEALS
Qty: 180 TABLET | Refills: 3 | Status: SHIPPED | OUTPATIENT
Start: 2022-04-01 | End: 2023-05-12 | Stop reason: SDUPTHER

## 2022-04-01 NOTE — TELEPHONE ENCOUNTER
No new care gaps identified.  Powered by [employer] by [employer]. Reference number: [id number].   4/01/2022 10:19:09 AM CDT

## 2022-04-01 NOTE — TELEPHONE ENCOUNTER
No new care gaps identified.  Powered by [employer] by [employer]. Reference number: [id number].   4/01/2022 10:18:00 AM CDT

## 2022-04-05 ENCOUNTER — TELEPHONE (OUTPATIENT)
Dept: ORTHOPEDICS | Facility: CLINIC | Age: 62
End: 2022-04-05
Payer: MEDICARE

## 2022-04-06 ENCOUNTER — PATIENT OUTREACH (OUTPATIENT)
Dept: ADMINISTRATIVE | Facility: OTHER | Age: 62
End: 2022-04-06
Payer: MEDICARE

## 2022-04-06 NOTE — PROGRESS NOTES
Health Maintenance Due   Topic Date Due    Shingles Vaccine (1 of 2) Never done    Foot Exam  12/12/2019    COVID-19 Vaccine (2 - Pfizer 3-dose series) 12/30/2021    PROSTATE-SPECIFIC ANTIGEN  03/27/2022     Updates were requested from care everywhere.  Chart was reviewed for overdue [facility] Encounters ([facility]) topics (CRS, Breast Cancer Screening, Eye exam)  Health Maintenance has been updated.  LINKS immunization registry triggered.  Immunizations were reconciled.

## 2022-04-07 ENCOUNTER — OFFICE VISIT (OUTPATIENT)
Dept: ORTHOPEDICS | Facility: CLINIC | Age: 62
End: 2022-04-07
Payer: MEDICARE

## 2022-04-07 DIAGNOSIS — R20.0 ARM NUMBNESS: ICD-10-CM

## 2022-04-07 DIAGNOSIS — G56.01 CARPAL TUNNEL SYNDROME OF RIGHT WRIST: ICD-10-CM

## 2022-04-07 PROCEDURE — 99999 PR PBB SHADOW E&M-EST. PATIENT-LVL III: ICD-10-PCS | Mod: PBBFAC,,, | Performed by: ORTHOPAEDIC SURGERY

## 2022-04-07 PROCEDURE — 3044F PR MOST RECENT HEMOGLOBIN A1C LEVEL <7.0%: ICD-10-PCS | Mod: CPTII,S$GLB,, | Performed by: ORTHOPAEDIC SURGERY

## 2022-04-07 PROCEDURE — 99213 OFFICE O/P EST LOW 20 MIN: CPT | Mod: S$GLB,,, | Performed by: ORTHOPAEDIC SURGERY

## 2022-04-07 PROCEDURE — 3044F HG A1C LEVEL LT 7.0%: CPT | Mod: CPTII,S$GLB,, | Performed by: ORTHOPAEDIC SURGERY

## 2022-04-07 PROCEDURE — 99999 PR PBB SHADOW E&M-EST. PATIENT-LVL III: CPT | Mod: PBBFAC,,, | Performed by: ORTHOPAEDIC SURGERY

## 2022-04-07 PROCEDURE — 1159F MED LIST DOCD IN RCRD: CPT | Mod: CPTII,S$GLB,, | Performed by: ORTHOPAEDIC SURGERY

## 2022-04-07 PROCEDURE — 1159F PR MEDICATION LIST DOCUMENTED IN MEDICAL RECORD: ICD-10-PCS | Mod: CPTII,S$GLB,, | Performed by: ORTHOPAEDIC SURGERY

## 2022-04-07 PROCEDURE — 99213 PR OFFICE/OUTPT VISIT, EST, LEVL III, 20-29 MIN: ICD-10-PCS | Mod: S$GLB,,, | Performed by: ORTHOPAEDIC SURGERY

## 2022-04-07 PROCEDURE — 1160F PR REVIEW ALL MEDS BY PRESCRIBER/CLIN PHARMACIST DOCUMENTED: ICD-10-PCS | Mod: CPTII,S$GLB,, | Performed by: ORTHOPAEDIC SURGERY

## 2022-04-07 PROCEDURE — 1160F RVW MEDS BY RX/DR IN RCRD: CPT | Mod: CPTII,S$GLB,, | Performed by: ORTHOPAEDIC SURGERY

## 2022-04-07 NOTE — PROGRESS NOTES
Subjective:      Patient ID: Fabiano Malik Jr. is a 61 y.o. male.    Chief Complaint: Pain and Numbness of the Right Arm (Started a few weeks ago)      HPI  Fabiano Malik Jr. is a  61 y.o. male presenting today for hand symptoms including numbness and tingling.  There was not a history of trauma.  Onset of symptoms began several months ago  He does report that the pain radiates up and down the right arm all the way from the neck down  He has occasional numbness in his left hand and feet as well  .      Review of patient's allergies indicates:   Allergen Reactions    Pcn [penicillins] Other (See Comments)     Childhood rxn, pt does not recall type of rxn         Current Outpatient Medications   Medication Sig Dispense Refill    acetaminophen (TYLENOL) 325 MG tablet Take 2 tablets (650 mg total) by mouth every 6 (six) hours as needed. 120 tablet 3    ALPRAZolam (XANAX) 0.5 MG tablet Take 1 tablet (0.5 mg total) by mouth nightly as needed for Anxiety. 30 tablet 0    BLOOD PRESSURE CUFF Misc 1 Units by Misc.(Non-Drug; Combo Route) route once daily. 1 each 0    blood sugar diagnostic (TRUE METRIX GLUCOSE TEST STRIP) Strp use 1 strip to check glucose 2 (two) times a day. 200 strip 6    blood-glucose meter Misc Use as instructed 1 each 0    chlorthalidone (HYGROTEN) 25 MG Tab Take 1 tablet (25 mg total) by mouth once daily. 90 tablet 3    dulaglutide (TRULICITY) 0.75 mg/0.5 mL pen injector Inject 0.75 mg into the skin every 7 days. 12 pen 1    DULoxetine (CYMBALTA) 60 MG capsule TAKE 2 CAPSULES BY MOUTH EVERY MORNING 180 capsule 3    glimepiride (AMARYL) 2 MG tablet TAKE 1 TABLET(2 MG) BY MOUTH BEFORE BREAKFAST 90 tablet 1    hydrOXYzine pamoate (VISTARIL) 50 MG Cap TAKE 1 CAPSULE (50 MG) BY MOUTH NIGHTLY AS NEEDED FOR INSOMNIA (Patient taking differently: TAKE 1 CAPSULE (50 MG) BY MOUTH NIGHTLY AS NEEDED FOR INSOMNIA) 90 capsule 0    lancets 30 gauge Misc 1 lancet by Misc.(Non-Drug; Combo Route)  route twice daily. 200 each 6    meclizine (ANTIVERT) 12.5 mg tablet TAKE 1 TABLET BY MOUTH THREE TIMES DAILY AS NEEDED FOR DIZZINESS 60 tablet 0    meloxicam (MOBIC) 15 MG tablet Take 1 tablet (15 mg total) by mouth daily as needed for Pain. 90 tablet 0    metFORMIN (GLUCOPHAGE) 1000 MG tablet Take 1 tablet (1,000 mg total) by mouth 2 (two) times daily with meals. 180 tablet 3    omeprazole (PRILOSEC) 20 MG capsule Take 1 capsule (20 mg total) by mouth before breakfast. 30 capsule 3    potassium chloride (KLOR-CON) 10 MEQ TbSR take 1 tablet by mouth once daily 90 tablet 3    pravastatin (PRAVACHOL) 40 MG tablet TAKE 1 TABLET(40 MG) BY MOUTH EVERY DAY 90 tablet 3    pregabalin (LYRICA) 50 MG capsule Take 1 capsule (50 mg total) by mouth 2 (two) times daily. 180 capsule 0    SHINGRIX, PF, 50 mcg/0.5 mL injection       sildenafiL (VIAGRA) 100 MG tablet Take 1 tablet (100 mg total) by mouth daily as needed for Erectile Dysfunction. 30 tablet 3    olmesartan (BENICAR) 20 MG tablet Take 1 tablet (20 mg total) by mouth once daily. 90 tablet 0     No current facility-administered medications for this visit.       Past Medical History:   Diagnosis Date    Chronic back pain greater than 3 months duration     Depression     Diabetes mellitus     Hypertension     Schizophrenia        Past Surgical History:   Procedure Laterality Date    APPENDECTOMY      COLONOSCOPY N/A 5/31/2018    Procedure: COLONOSCOPY;  Surgeon: Guillaume Hatch MD;  Location: [facility] ENDO;  Service: Endoscopy;  Laterality: N/A;    NECK SURGERY      RADIOFREQUENCY ABLATION OF LUMBAR MEDIAL BRANCH NERVE AT SINGLE LEVEL Left 5/29/2018    Procedure: RADIOFREQUENCY THERMOCOAGULATION (RFTC)-NERVE-MEDIAN BRANCH-LUMBAR- Left L2-3-4-5;  Surgeon: Donavon Dennis MD;  Location: [facility] PAIN MGT;  Service: Pain Management;  Laterality: Left;    RADIOFREQUENCY ABLATION OF LUMBAR MEDIAL BRANCH NERVE AT SINGLE LEVEL Right 1/8/2019    Procedure:  Radiofrequency Ablation, Nerve, Spinal, Lumbar, Medial Branch, L2,3,4,5;  Surgeon: Alberto Hernandez Jr., MD;  Location: [facility] PAIN [facility];  Service: Pain Management;  Laterality: Right;  Pt is diabetic    RADIOFREQUENCY ABLATION OF LUMBAR MEDIAL BRANCH NERVE AT SINGLE LEVEL Left 3/12/2019    Procedure: Radiofrequency Ablation, Nerve, Spinal, Lumbar, Medial Branch, Left, L2,3,4,5;  Surgeon: Alberto Hernandez Jr., MD;  Location: [facility] PAIN [facility];  Service: Pain Management;  Laterality: Left;  Pt will be consented the day of procedure.    TRANSFORAMINAL EPIDURAL INJECTION OF STEROID Right 9/12/2019    Procedure: Injection,steroid,epidural,transforaminal,right,L4-5 and L5-S1;  Surgeon: Alberto Hernandez Jr., MD;  Location: [facility] PAIN [facility];  Service: Pain Management;  Laterality: Right;  PT IS DIABETIC       Review of Systems:  ROS    OBJECTIVE:     PHYSICAL EXAM:       Vitals:    04/07/22 0939   PainSc: 10-Worst pain ever   PainLoc: Arm     Well developed, well nourished male in no acute distress  Alert and oriented x 3  HEENT- Normal exam  Lungs- Clear to auscultation  Heart- Regular rate and rhythm  Abdomen- Soft nontender  Extremity exam- examination right hand demonstrates some slight swelling mildly positive Tinel sign negative Phalen's test  Range of motion wrist fingers full decreased sensation all fingers including the small finger  No atrophy  strength decreased    RADIOGRAPHS:  None  Comments: I have personally reviewed the imaging and I agree with the above radiologist's report.    ASSESSMENT/PLAN:     IMPRESSION:  1. Right hand numbness.    2. Possible right carpal tunnel syndrome    PLAN:  Not sure of his symptoms are coming from carpal tunnel or more proximal in the neck area  I have ordered nerve conduction study for the right arm to check this  I have also given him a wrist brace mainly for nighttime use  Continue Lyrica which she is currently taking  Follow-up after the nerve test is complete       - We  talked at length about the anatomy and pathophysiology of   Encounter Diagnoses   Name Primary?    Arm numbness     Carpal tunnel syndrome of right wrist            Disclaimer: This note has been generated using voice-recognition software. There may be typographical errors that have been missed during proof-reading.

## 2022-04-17 DIAGNOSIS — F41.9 ANXIETY: ICD-10-CM

## 2022-04-17 NOTE — TELEPHONE ENCOUNTER
No new care gaps identified.  Powered by [employer]. Reference number: [id number].   4/17/2022 1:35:24 AM CDT

## 2022-04-18 RX ORDER — ALPRAZOLAM 0.5 MG/1
0.5 TABLET ORAL NIGHTLY PRN
Qty: 30 TABLET | Refills: 0 | Status: SHIPPED | OUTPATIENT
Start: 2022-04-18 | End: 2022-05-17 | Stop reason: SDUPTHER

## 2022-05-04 RX ORDER — MECLIZINE HCL 12.5 MG 12.5 MG/1
12.5 TABLET ORAL 3 TIMES DAILY PRN
Qty: 60 TABLET | Refills: 0 | Status: SHIPPED | OUTPATIENT
Start: 2022-05-04 | End: 2022-06-07 | Stop reason: SDUPTHER

## 2022-05-04 NOTE — TELEPHONE ENCOUNTER
Care Due:                  Date            Visit Type   Department     Provider  --------------------------------------------------------------------------------                                EP -                              PRIMARY      [facility]  Last Visit: 12-      CARE ([facility])   LESLEY Fleming                              EP -                              PRIMARY      [facility]  Next Visit: 06-      CARE ([facility])   LESLEY Fleming                                                            Last  Test          Frequency    Reason                     Performed    Due Date  --------------------------------------------------------------------------------    HBA1C.......  6 months...  dulaglutide, glimepiride,   02- 08-                             metFORMIN................    [employer] Embedded Care Gaps. Reference number: [id number]. 5/04/2022   2:51:31 PM CDT

## 2022-05-05 ENCOUNTER — HOSPITAL ENCOUNTER (OUTPATIENT)
Dept: RADIOLOGY | Facility: HOSPITAL | Age: 62
Discharge: HOME | End: 2022-05-05
Attending: PODIATRIST
Payer: MEDICARE

## 2022-05-05 ENCOUNTER — HOSPITAL ENCOUNTER (OUTPATIENT)
Dept: RADIOLOGY | Facility: HOSPITAL | Age: 62
Discharge: HOME | End: 2022-05-05
Attending: PHYSICIAN ASSISTANT
Payer: MEDICARE

## 2022-05-05 ENCOUNTER — TELEPHONE (OUTPATIENT)
Dept: NEUROSURGERY | Facility: CLINIC | Age: 62
End: 2022-05-05
Payer: MEDICARE

## 2022-05-05 ENCOUNTER — OFFICE VISIT (OUTPATIENT)
Dept: PODIATRY | Facility: CLINIC | Age: 62
End: 2022-05-05
Payer: MEDICARE

## 2022-05-05 VITALS
WEIGHT: 218 LBS | DIASTOLIC BLOOD PRESSURE: 87 MMHG | SYSTOLIC BLOOD PRESSURE: 145 MMHG | HEIGHT: 72 IN | HEART RATE: 89 BPM | BODY MASS INDEX: 29.53 KG/M2

## 2022-05-05 DIAGNOSIS — M48.02 CERVICAL SPINAL STENOSIS: ICD-10-CM

## 2022-05-05 DIAGNOSIS — M48.00 SPINAL STENOSIS, UNSPECIFIED SPINAL REGION: ICD-10-CM

## 2022-05-05 DIAGNOSIS — M48.02 CERVICAL SPINAL STENOSIS: Primary | ICD-10-CM

## 2022-05-05 DIAGNOSIS — E11.42 TYPE 2 DIABETES MELLITUS WITH DIABETIC POLYNEUROPATHY, WITHOUT LONG-TERM CURRENT USE OF INSULIN: Primary | ICD-10-CM

## 2022-05-05 PROCEDURE — 3079F DIAST BP 80-89 MM HG: CPT | Mod: CPTII,S$GLB,, | Performed by: PODIATRIST

## 2022-05-05 PROCEDURE — 72040 X-RAY EXAM NECK SPINE 2-3 VW: CPT | Mod: TC,PN

## 2022-05-05 PROCEDURE — 3077F PR MOST RECENT SYSTOLIC BLOOD PRESSURE >= 140 MM HG: ICD-10-PCS | Mod: CPTII,S$GLB,, | Performed by: PODIATRIST

## 2022-05-05 PROCEDURE — 99999 PR PBB SHADOW E&M-EST. PATIENT-LVL V: ICD-10-PCS | Mod: PBBFAC,,, | Performed by: PODIATRIST

## 2022-05-05 PROCEDURE — 3044F PR MOST RECENT HEMOGLOBIN A1C LEVEL <7.0%: ICD-10-PCS | Mod: CPTII,S$GLB,, | Performed by: PODIATRIST

## 2022-05-05 PROCEDURE — 99203 OFFICE O/P NEW LOW 30 MIN: CPT | Mod: S$GLB,,, | Performed by: PODIATRIST

## 2022-05-05 PROCEDURE — 3079F PR MOST RECENT DIASTOLIC BLOOD PRESSURE 80-89 MM HG: ICD-10-PCS | Mod: CPTII,S$GLB,, | Performed by: PODIATRIST

## 2022-05-05 PROCEDURE — 72040 XR CERVICAL SPINE 2 OR 3 VIEWS: ICD-10-PCS | Mod: 26,,, | Performed by: RADIOLOGY

## 2022-05-05 PROCEDURE — 99499 RISK ADDL DX/OHS AUDIT: ICD-10-PCS | Mod: S$GLB,,, | Performed by: PODIATRIST

## 2022-05-05 PROCEDURE — 99203 PR OFFICE/OUTPT VISIT, NEW, LEVL III, 30-44 MIN: ICD-10-PCS | Mod: S$GLB,,, | Performed by: PODIATRIST

## 2022-05-05 PROCEDURE — 3044F HG A1C LEVEL LT 7.0%: CPT | Mod: CPTII,S$GLB,, | Performed by: PODIATRIST

## 2022-05-05 PROCEDURE — 1160F RVW MEDS BY RX/DR IN RCRD: CPT | Mod: CPTII,S$GLB,, | Performed by: PODIATRIST

## 2022-05-05 PROCEDURE — 72040 X-RAY EXAM NECK SPINE 2-3 VW: CPT | Mod: 26,76,, | Performed by: RADIOLOGY

## 2022-05-05 PROCEDURE — 3077F SYST BP >= 140 MM HG: CPT | Mod: CPTII,S$GLB,, | Performed by: PODIATRIST

## 2022-05-05 PROCEDURE — 4010F PR ACE/ARB THEARPY RXD/TAKEN: ICD-10-PCS | Mod: CPTII,S$GLB,, | Performed by: PODIATRIST

## 2022-05-05 PROCEDURE — 72040 X-RAY EXAM NECK SPINE 2-3 VW: CPT | Mod: 26,,, | Performed by: RADIOLOGY

## 2022-05-05 PROCEDURE — 3008F PR BODY MASS INDEX (BMI) DOCUMENTED: ICD-10-PCS | Mod: CPTII,S$GLB,, | Performed by: PODIATRIST

## 2022-05-05 PROCEDURE — 1159F PR MEDICATION LIST DOCUMENTED IN MEDICAL RECORD: ICD-10-PCS | Mod: CPTII,S$GLB,, | Performed by: PODIATRIST

## 2022-05-05 PROCEDURE — 1159F MED LIST DOCD IN RCRD: CPT | Mod: CPTII,S$GLB,, | Performed by: PODIATRIST

## 2022-05-05 PROCEDURE — 72040 XR CERVICAL SPINE FLEXION  AND EXTENSION ONLY: ICD-10-PCS | Mod: 26,76,, | Performed by: RADIOLOGY

## 2022-05-05 PROCEDURE — 99499 UNLISTED E&M SERVICE: CPT | Mod: S$GLB,,, | Performed by: PODIATRIST

## 2022-05-05 PROCEDURE — 4010F ACE/ARB THERAPY RXD/TAKEN: CPT | Mod: CPTII,S$GLB,, | Performed by: PODIATRIST

## 2022-05-05 PROCEDURE — 1160F PR REVIEW ALL MEDS BY PRESCRIBER/CLIN PHARMACIST DOCUMENTED: ICD-10-PCS | Mod: CPTII,S$GLB,, | Performed by: PODIATRIST

## 2022-05-05 PROCEDURE — 99999 PR PBB SHADOW E&M-EST. PATIENT-LVL V: CPT | Mod: PBBFAC,,, | Performed by: PODIATRIST

## 2022-05-05 PROCEDURE — 3008F BODY MASS INDEX DOCD: CPT | Mod: CPTII,S$GLB,, | Performed by: PODIATRIST

## 2022-05-05 NOTE — PROGRESS NOTES
Subjective:      Patient ID: Fabiano Malik Jr. is a 61 y.o. male.    Chief Complaint: Foot Pain (Foot pain with c/o numbness to both feet)    Fabiano is a 61 y.o. male who presents to the clinic for evaluation and treatment of diabetic feet. Fabiano has a past medical history of Chronic back pain greater than 3 months duration, Depression, Diabetes mellitus, Diabetes mellitus, type 2, Hypertension, and Schizophrenia.  Complains of numbness in both feet and weakness in the lower extremities and both upper extremities but worse in the right upper extremity versus the left.  States he has a history of cervical spine fusion many years ago.  He has had multiple falls in the last 6 months is worried about balance and stability.    PCP: Lucien Fleming MD    Date Last Seen by PCP:     Current shoe gear: Tennis shoes    Hemoglobin A1C   Date Value Ref Range Status   02/02/2022 5.4 4.0 - 5.6 % Final     Comment:     ADA Screening Guidelines:  5.7-6.4%  Consistent with prediabetes  >or=6.5%  Consistent with diabetes    High levels of fetal hemoglobin interfere with the HbA1C  assay. Heterozygous hemoglobin variants (HbS, HgC, etc)do  not significantly interfere with this assay.   However, presence of multiple variants may affect accuracy.     12/07/2021 6.3 (H) 4.0 - 5.6 % Final     Comment:     ADA Screening Guidelines:  5.7-6.4%  Consistent with prediabetes  >or=6.5%  Consistent with diabetes    High levels of fetal hemoglobin interfere with the HbA1C  assay. Heterozygous hemoglobin variants (HbS, HgC, etc)do  not significantly interfere with this assay.   However, presence of multiple variants may affect accuracy.     05/10/2021 7.3 (H) 4.0 - 5.6 % Final     Comment:     ADA Screening Guidelines:  5.7-6.4%  Consistent with prediabetes  >or=6.5%  Consistent with diabetes    High levels of fetal hemoglobin interfere with the HbA1C  assay. Heterozygous hemoglobin variants (HbS, HgC, etc)do  not significantly  interfere with this assay.   However, presence of multiple variants may affect accuracy.       Vitals:    05/05/22 1403   BP: (!) 145/87   Pulse: 89   Weight: 98.9 kg (218 lb)   Height: 6' (1.829 m)   PainSc: 10-Worst pain ever      Past Medical History:   Diagnosis Date    Chronic back pain greater than 3 months duration     Depression     Diabetes mellitus     Diabetes mellitus, type 2     Hypertension     Schizophrenia        Past Surgical History:   Procedure Laterality Date    APPENDECTOMY      COLONOSCOPY N/A 5/31/2018    Procedure: COLONOSCOPY;  Surgeon: Guillaume Hatch MD;  Location: [facility] ENDO;  Service: Endoscopy;  Laterality: N/A;    NECK SURGERY      RADIOFREQUENCY ABLATION OF LUMBAR MEDIAL BRANCH NERVE AT SINGLE LEVEL Left 5/29/2018    Procedure: RADIOFREQUENCY THERMOCOAGULATION (RFTC)-NERVE-MEDIAN BRANCH-LUMBAR- Left L2-3-4-5;  Surgeon: Donavon Dennis MD;  Location: [facility];  Service: Pain Management;  Laterality: Left;    RADIOFREQUENCY ABLATION OF LUMBAR MEDIAL BRANCH NERVE AT SINGLE LEVEL Right 1/8/2019    Procedure: Radiofrequency Ablation, Nerve, Spinal, Lumbar, Medial Branch, L2,3,4,5;  Surgeon: Alberto Hernandez Jr., MD;  Location: [facility];  Service: Pain Management;  Laterality: Right;  Pt is diabetic    RADIOFREQUENCY ABLATION OF LUMBAR MEDIAL BRANCH NERVE AT SINGLE LEVEL Left 3/12/2019    Procedure: Radiofrequency Ablation, Nerve, Spinal, Lumbar, Medial Branch, Left, L2,3,4,5;  Surgeon: Alberto Hernandez Jr., MD;  Location: [facility];  Service: Pain Management;  Laterality: Left;  Pt will be consented the day of procedure.    TRANSFORAMINAL EPIDURAL INJECTION OF STEROID Right 9/12/2019    Procedure: Injection,steroid,epidural,transforaminal,right,L4-5 and L5-S1;  Surgeon: Alberto Hernandez Jr., MD;  Location: [facility];  Service: Pain Management;  Laterality: Right;  PT IS DIABETIC       Family History   Problem Relation Age of Onset    Alzheimer's  "disease Mother     Diabetes Mother     Heart defect Father     Cancer Father     Stroke Father     Heart attack Father     Heart defect Sister     Alzheimer's disease Sister        Social History     Socioeconomic History    Marital status:    Tobacco Use    Smoking status: Never Smoker    Smokeless tobacco: Never Used   Substance and Sexual Activity    Alcohol use: Yes     Comment: "couple of beers a day"    Drug use: No       Current Outpatient Medications   Medication Sig Dispense Refill    acetaminophen (TYLENOL) 325 MG tablet Take 2 tablets (650 mg total) by mouth every 6 (six) hours as needed. 120 tablet 3    ALPRAZolam (XANAX) 0.5 MG tablet Take 1 tablet (0.5 mg total) by mouth nightly as needed for Anxiety. 30 tablet 0    BLOOD PRESSURE CUFF Misc 1 Units by Misc.(Non-Drug; Combo Route) route once daily. 1 each 0    blood sugar diagnostic (TRUE METRIX GLUCOSE TEST STRIP) Strp use 1 strip to check glucose 2 (two) times a day. 200 strip 6    blood-glucose meter Misc Use as instructed 1 each 0    chlorthalidone (HYGROTEN) 25 MG Tab Take 1 tablet (25 mg total) by mouth once daily. 90 tablet 3    dulaglutide (TRULICITY) 0.75 mg/0.5 mL pen injector Inject 0.75 mg into the skin every 7 days. 12 pen 1    DULoxetine (CYMBALTA) 60 MG capsule TAKE 2 CAPSULES BY MOUTH EVERY MORNING 180 capsule 3    glimepiride (AMARYL) 2 MG tablet TAKE 1 TABLET(2 MG) BY MOUTH BEFORE BREAKFAST 90 tablet 1    hydrOXYzine pamoate (VISTARIL) 50 MG Cap TAKE 1 CAPSULE (50 MG) BY MOUTH NIGHTLY AS NEEDED FOR INSOMNIA (Patient taking differently: TAKE 1 CAPSULE (50 MG) BY MOUTH NIGHTLY AS NEEDED FOR INSOMNIA) 90 capsule 0    lancets 30 gauge Misc 1 lancet by Misc.(Non-Drug; Combo Route) route twice daily. 200 each 6    meclizine (ANTIVERT) 12.5 mg tablet TAKE 1 TABLET BY MOUTH THREE TIMES DAILY AS NEEDED FOR DIZZINESS 60 tablet 0    metFORMIN (GLUCOPHAGE) 1000 MG tablet Take 1 tablet (1,000 mg total) by mouth 2 " (two) times daily with meals. 180 tablet 3    omeprazole (PRILOSEC) 20 MG capsule Take 1 capsule (20 mg total) by mouth before breakfast. 30 capsule 3    potassium chloride (KLOR-CON) 10 MEQ TbSR take 1 tablet by mouth once daily 90 tablet 3    pregabalin (LYRICA) 50 MG capsule Take 1 capsule (50 mg total) by mouth 2 (two) times daily. 180 capsule 0    SHINGRIX, PF, 50 mcg/0.5 mL injection       sildenafiL (VIAGRA) 100 MG tablet Take 1 tablet (100 mg total) by mouth daily as needed for Erectile Dysfunction. 30 tablet 3    olmesartan (BENICAR) 20 MG tablet Take 1 tablet (20 mg total) by mouth once daily. 90 tablet 0    pravastatin (PRAVACHOL) 40 MG tablet TAKE 1 TABLET(40 MG) BY MOUTH EVERY DAY (Patient not taking: Reported on 5/5/2022) 90 tablet 3     No current facility-administered medications for this visit.       Review of patient's allergies indicates:   Allergen Reactions    Pcn [penicillins] Other (See Comments)     Childhood rxn, pt does not recall type of rxn           Review of Systems   Constitutional: Negative for chills and fever.   HENT: Negative for congestion and hearing loss.    Cardiovascular: Positive for claudication. Negative for chest pain.   Respiratory: Negative for cough and shortness of breath.    Skin: Negative for color change and poor wound healing.   Musculoskeletal: Positive for back pain and muscle cramps.   Gastrointestinal: Negative for nausea and vomiting.   Neurological: Positive for numbness and paresthesias.   Psychiatric/Behavioral: Negative for altered mental status.           Objective:      Physical Exam  Constitutional:       General: He is not in acute distress.     Appearance: Normal appearance. He is not ill-appearing.   Cardiovascular:      Pulses:           Dorsalis pedis pulses are 2+ on the right side and 2+ on the left side.        Posterior tibial pulses are 2+ on the right side and 2+ on the left side.      Comments: No significant lower extremity edema  bilateral.  Skin temp is warm to foot bilateral.  No rubor on dependency bilateral foot.  Musculoskeletal:      Right foot: Normal range of motion. No deformity.      Left foot: Normal range of motion. No deformity.      Comments: Manual muscle strength 4/5 bilateral lower extremity.  No pain with range of motion or manual muscle strength testing bilateral foot ankle.  No localized pain on palpation to the lower extremity bilateral.   Feet:      Right foot:      Protective Sensation: 10 sites tested. 0 sites sensed.      Toenail Condition: Right toenails are normal.      Left foot:      Protective Sensation: 10 sites tested. 0 sites sensed.      Toenail Condition: Left toenails are normal.   Skin:     General: Skin is warm.      Capillary Refill: Capillary refill takes less than 2 seconds.      Findings: No ecchymosis or erythema.      Nails: There is no clubbing.   Neurological:      Mental Status: He is alert.               Assessment:       Encounter Diagnoses   Name Primary?    Type 2 diabetes mellitus with diabetic polyneuropathy, without long-term current use of insulin Yes    Spinal stenosis, unspecified spinal region          Plan:       Fabiano was seen today for foot pain.    Diagnoses and all orders for this visit:    Type 2 diabetes mellitus with diabetic polyneuropathy, without long-term current use of insulin    Spinal stenosis, unspecified spinal region  -     Ambulatory referral/consult to Neurosurgery; Future  -     X-Ray Cervical Spine 2 or 3 Views; Future      I counseled the patient on his conditions, their implications and medical management.    Shoe inspection. Diabetic Foot Education. Patient reminded of the importance of good nutrition and blood sugar control to help prevent podiatric complications of diabetes. Patient instructed on proper foot hygeine. We discussed wearing proper shoe gear, daily foot inspections, never walking without protective shoe gear, never putting sharp instruments  to feet.    Comprehensive annual diabetic foot exam completed today.  Patient found to be high risk for overall foot complication.  The polyneuropathy most likely is extending from the cervical spine secondary to the upper and lower extremity involvement and well controlled type 2 diabetes.  I have recommended that he seek 2nd opinion from Neurosurgery for further evaluation.  The rapid increase in symptoms is suggestive of a mechanical issue in the spine.    RTC p.r.n. as discussed.    A portion of this note was generated by voice recognition software and may contain spelling and grammar errors.

## 2022-05-12 ENCOUNTER — PATIENT OUTREACH (OUTPATIENT)
Dept: ADMINISTRATIVE | Facility: OTHER | Age: 62
End: 2022-05-12
Payer: MEDICARE

## 2022-05-12 DIAGNOSIS — F51.01 PRIMARY INSOMNIA: ICD-10-CM

## 2022-05-12 NOTE — PROGRESS NOTES
Health Maintenance Due   Topic Date Due    Shingles Vaccine (1 of 2) Never done    Foot Exam  12/12/2019    COVID-19 Vaccine (2 - Pfizer series) 12/30/2021    PROSTATE-SPECIFIC ANTIGEN  03/27/2022     Updates were requested from care everywhere.  Chart was reviewed for overdue Proactive [facility] Encounters (LUIS) topics (CRS, Breast Cancer Screening, Eye exam)  Health Maintenance has been updated.  LINKS immunization registry triggered.  Immunizations were reconciled.

## 2022-05-13 RX ORDER — HYDROXYZINE PAMOATE 50 MG/1
CAPSULE ORAL
Qty: 90 CAPSULE | Refills: 3 | Status: SHIPPED | OUTPATIENT
Start: 2022-05-13 | End: 2022-10-03 | Stop reason: SDUPTHER

## 2022-05-17 DIAGNOSIS — F41.9 ANXIETY: ICD-10-CM

## 2022-05-18 RX ORDER — ALPRAZOLAM 0.5 MG/1
0.5 TABLET ORAL NIGHTLY PRN
Qty: 30 TABLET | Refills: 0 | Status: SHIPPED | OUTPATIENT
Start: 2022-05-18 | End: 2022-06-20 | Stop reason: SDUPTHER

## 2022-05-18 NOTE — TELEPHONE ENCOUNTER
No new care gaps identified.  [facility] Embedded Care Gaps. Reference number: [id number]. 5/17/2022   10:54:47 PM CDT

## 2022-05-25 DIAGNOSIS — N52.9 ERECTILE DYSFUNCTION, UNSPECIFIED ERECTILE DYSFUNCTION TYPE: ICD-10-CM

## 2022-05-25 RX ORDER — SILDENAFIL 100 MG/1
100 TABLET, FILM COATED ORAL DAILY PRN
Qty: 30 TABLET | Refills: 3 | Status: SHIPPED | OUTPATIENT
Start: 2022-05-25 | End: 2022-11-18 | Stop reason: SDUPTHER

## 2022-05-25 NOTE — TELEPHONE ENCOUNTER
No new care gaps identified.  [facility] Embedded Care Gaps. Reference number: [id number]. 5/25/2022   10:14:47 AM PERCYT

## 2022-05-25 NOTE — TELEPHONE ENCOUNTER
Refill Authorization Note   Fabiano Malik  is requesting a refill authorization.  Brief Assessment and Rationale for Refill:  Approve     Medication Therapy Plan:       Medication Reconciliation Completed: No   Comments:     No Care Gaps recommended.     Note composed:12:38 PM 05/25/2022

## 2022-06-02 RX ORDER — POTASSIUM CHLORIDE 750 MG/1
TABLET, EXTENDED RELEASE ORAL
Qty: 90 TABLET | Refills: 0 | Status: SHIPPED | OUTPATIENT
Start: 2022-06-02 | End: 2022-09-23 | Stop reason: SDUPTHER

## 2022-06-02 NOTE — TELEPHONE ENCOUNTER
No new care gaps identified.  [facility] Embedded Care Gaps. Reference number: [id number]. 6/02/2022   9:25:46 AM PERCYT

## 2022-06-07 RX ORDER — MECLIZINE HCL 12.5 MG 12.5 MG/1
12.5 TABLET ORAL 3 TIMES DAILY PRN
Qty: 60 TABLET | Refills: 0 | Status: SHIPPED | OUTPATIENT
Start: 2022-06-07 | End: 2022-07-14 | Stop reason: SDUPTHER

## 2022-06-07 NOTE — TELEPHONE ENCOUNTER
No new care gaps identified.  [facility] Embedded Care Gaps. Reference number: [id number]. 6/07/2022   10:59:57 AM PERCYT

## 2022-06-20 DIAGNOSIS — F41.9 ANXIETY: ICD-10-CM

## 2022-06-20 RX ORDER — ALPRAZOLAM 0.5 MG/1
0.5 TABLET ORAL NIGHTLY PRN
Qty: 30 TABLET | Refills: 0 | Status: SHIPPED | OUTPATIENT
Start: 2022-06-20 | End: 2022-07-19 | Stop reason: SDUPTHER

## 2022-06-20 NOTE — TELEPHONE ENCOUNTER
No new care gaps identified.  [facility] Embedded Care Gaps. Reference number: [id number]. 6/20/2022   10:13:12 AM CDT

## 2022-07-14 RX ORDER — MECLIZINE HCL 12.5 MG 12.5 MG/1
12.5 TABLET ORAL 3 TIMES DAILY PRN
Qty: 60 TABLET | Refills: 0 | Status: SHIPPED | OUTPATIENT
Start: 2022-07-14 | End: 2022-08-23 | Stop reason: SDUPTHER

## 2022-07-14 NOTE — TELEPHONE ENCOUNTER
Care Due:                  Date            Visit Type   Department     Provider  --------------------------------------------------------------------------------                                EP -                              PRIMARY      [facility]  Last Visit: 12-      CARE ([facility])   LESLEY Fleming                              EP -                              PRIMARY      [facility]  Next Visit: 08-      CARE ([facility])   LESLEY Fleming                                                            Last  Test          Frequency    Reason                     Performed    Due Date  --------------------------------------------------------------------------------    HBA1C.......  6 months...  dulaglutide, glimepiride,   02- 08-                             metFORMIN................    [employer] Embedded Care Gaps. Reference number: [id number]. 7/14/2022   2:05:37 PM CDT

## 2022-07-19 ENCOUNTER — TELEPHONE (OUTPATIENT)
Dept: INTERNAL MEDICINE | Facility: CLINIC | Age: 62
End: 2022-07-19
Payer: MEDICARE

## 2022-07-19 DIAGNOSIS — F41.9 ANXIETY: ICD-10-CM

## 2022-07-19 RX ORDER — ALPRAZOLAM 0.5 MG/1
0.5 TABLET ORAL NIGHTLY PRN
Qty: 30 TABLET | Refills: 0 | Status: SHIPPED | OUTPATIENT
Start: 2022-07-19 | End: 2022-08-23 | Stop reason: SDUPTHER

## 2022-07-19 NOTE — TELEPHONE ENCOUNTER
----- Message from Yadi Harris MA sent at 7/19/2022  1:34 PM CDT -----  Contact: [phone]  Received a text message offering a early appointment with Dr Santiago . Patient is pissed off and wants to speak to supervisor. Stated we dont know what the hell we are doing.  ----- Message -----  From: Cassandra Berger  Sent: 7/19/2022   1:23 PM CDT  To: Bernadette Mcgraw A Staff    Who Called: pt  Regarding: missed call from office for earlier appt  Would the patient rather a call back or a response via [facility]? Call back  Best Call Back Number: [phone]   Additional Information: n/a

## 2022-07-19 NOTE — TELEPHONE ENCOUNTER
Contacted patient and he states that he received an text messages about an sooner appointment with Dr. Santiago for today. Informed patient that Dr. Santiago was out of clinic this week and that the dilma may have sent it for another day. Patient insisted that it was for today stating that he needed an sooner appointment then 8/10. Patient offered another appointment from another provider and patient declined stating that he will just wait unto 8/10.

## 2022-07-19 NOTE — TELEPHONE ENCOUNTER
No new care gaps identified.  [facility] Embedded Care Gaps. Reference number: [id number]. 7/19/2022   8:40:39 AM CDT

## 2022-08-04 ENCOUNTER — PES CALL (OUTPATIENT)
Dept: ADMINISTRATIVE | Facility: CLINIC | Age: 62
End: 2022-08-04
Payer: MEDICARE

## 2022-08-08 DIAGNOSIS — E11.65 TYPE 2 DIABETES MELLITUS WITH HYPERGLYCEMIA: ICD-10-CM

## 2022-08-08 NOTE — TELEPHONE ENCOUNTER
Refill Routing Note   Medication(s) are not appropriate for processing by [facility] for the following reason(s):      - Required laboratory values are outdated    [facility] action(s):  Defer          Medication reconciliation completed: No     Appointments  past 12m or future 3m with PCP    Date Provider   Last Visit   12/7/2021 Lucien Felming MD   Next Visit   8/10/2022 Lucien Fleming MD   ED visits in past 90 days: 0        Note composed:5:59 PM 08/08/2022

## 2022-08-08 NOTE — TELEPHONE ENCOUNTER
No new care gaps identified.  [facility] Embedded Care Gaps. Reference number: [id number]. 8/08/2022   9:44:16 AM CDT

## 2022-08-09 RX ORDER — DULAGLUTIDE 0.75 MG/.5ML
INJECTION, SOLUTION SUBCUTANEOUS
Qty: 12 PEN | Refills: 1 | Status: SHIPPED | OUTPATIENT
Start: 2022-08-09 | End: 2023-01-19 | Stop reason: SDUPTHER

## 2022-08-10 ENCOUNTER — OFFICE VISIT (OUTPATIENT)
Dept: FAMILY MEDICINE | Facility: CLINIC | Age: 62
End: 2022-08-10
Payer: MEDICARE

## 2022-08-10 VITALS
WEIGHT: 213.38 LBS | SYSTOLIC BLOOD PRESSURE: 138 MMHG | DIASTOLIC BLOOD PRESSURE: 82 MMHG | HEART RATE: 106 BPM | OXYGEN SATURATION: 99 % | HEIGHT: 72 IN | BODY MASS INDEX: 28.9 KG/M2

## 2022-08-10 DIAGNOSIS — E11.9 TYPE 2 DIABETES MELLITUS WITHOUT COMPLICATION, WITHOUT LONG-TERM CURRENT USE OF INSULIN: ICD-10-CM

## 2022-08-10 DIAGNOSIS — R55 SYNCOPE AND COLLAPSE: ICD-10-CM

## 2022-08-10 DIAGNOSIS — R61 EXCESSIVE SWEATING: ICD-10-CM
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
## 2022-08-10 DIAGNOSIS — I10 ESSENTIAL HYPERTENSION: ICD-10-CM

## 2022-08-10 DIAGNOSIS — Z12.5 SCREENING FOR PROSTATE CANCER: ICD-10-CM

## 2022-08-10 PROCEDURE — 99499 UNLISTED E&M SERVICE: CPT | Mod: S$GLB,,, | Performed by: FAMILY MEDICINE

## 2022-08-10 PROCEDURE — 99999 PR PBB SHADOW E&M-EST. PATIENT-LVL V: ICD-10-PCS | Mod: PBBFAC,,, | Performed by: FAMILY MEDICINE

## 2022-08-10 PROCEDURE — 3044F PR MOST RECENT HEMOGLOBIN A1C LEVEL <7.0%: ICD-10-PCS | Mod: CPTII,S$GLB,, | Performed by: FAMILY MEDICINE

## 2022-08-10 PROCEDURE — 3008F PR BODY MASS INDEX (BMI) DOCUMENTED: ICD-10-PCS | Mod: CPTII,S$GLB,, | Performed by: FAMILY MEDICINE

## 2022-08-10 PROCEDURE — 3079F DIAST BP 80-89 MM HG: CPT | Mod: CPTII,S$GLB,, | Performed by: FAMILY MEDICINE

## 2022-08-10 PROCEDURE — 99215 OFFICE O/P EST HI 40 MIN: CPT | Mod: S$GLB,,, | Performed by: FAMILY MEDICINE

## 2022-08-10 PROCEDURE — 3044F HG A1C LEVEL LT 7.0%: CPT | Mod: CPTII,S$GLB,, | Performed by: FAMILY MEDICINE

## 2022-08-10 PROCEDURE — 99499 RISK ADDL DX/OHS AUDIT: ICD-10-PCS | Mod: S$GLB,,, | Performed by: FAMILY MEDICINE

## 2022-08-10 PROCEDURE — 4010F ACE/ARB THERAPY RXD/TAKEN: CPT | Mod: CPTII,S$GLB,, | Performed by: FAMILY MEDICINE

## 2022-08-10 PROCEDURE — 99999 PR PBB SHADOW E&M-EST. PATIENT-LVL V: CPT | Mod: PBBFAC,,, | Performed by: FAMILY MEDICINE

## 2022-08-10 PROCEDURE — 1159F PR MEDICATION LIST DOCUMENTED IN MEDICAL RECORD: ICD-10-PCS | Mod: CPTII,S$GLB,, | Performed by: FAMILY MEDICINE

## 2022-08-10 PROCEDURE — 1159F MED LIST DOCD IN RCRD: CPT | Mod: CPTII,S$GLB,, | Performed by: FAMILY MEDICINE

## 2022-08-10 PROCEDURE — 3008F BODY MASS INDEX DOCD: CPT | Mod: CPTII,S$GLB,, | Performed by: FAMILY MEDICINE

## 2022-08-10 PROCEDURE — 1160F PR REVIEW ALL MEDS BY PRESCRIBER/CLIN PHARMACIST DOCUMENTED: ICD-10-PCS | Mod: CPTII,S$GLB,, | Performed by: FAMILY MEDICINE

## 2022-08-10 PROCEDURE — 4010F PR ACE/ARB THEARPY RXD/TAKEN: ICD-10-PCS | Mod: CPTII,S$GLB,, | Performed by: FAMILY MEDICINE

## 2022-08-10 PROCEDURE — 3075F PR MOST RECENT SYSTOLIC BLOOD PRESS GE 130-139MM HG: ICD-10-PCS | Mod: CPTII,S$GLB,, | Performed by: FAMILY MEDICINE

## 2022-08-10 PROCEDURE — 99215 PR OFFICE/OUTPT VISIT, EST, LEVL V, 40-54 MIN: ICD-10-PCS | Mod: S$GLB,,, | Performed by: FAMILY MEDICINE

## 2022-08-10 PROCEDURE — 3075F SYST BP GE 130 - 139MM HG: CPT | Mod: CPTII,S$GLB,, | Performed by: FAMILY MEDICINE

## 2022-08-10 PROCEDURE — 1160F RVW MEDS BY RX/DR IN RCRD: CPT | Mod: CPTII,S$GLB,, | Performed by: FAMILY MEDICINE

## 2022-08-10 PROCEDURE — 3079F PR MOST RECENT DIASTOLIC BLOOD PRESSURE 80-89 MM HG: ICD-10-PCS | Mod: CPTII,S$GLB,, | Performed by: FAMILY MEDICINE

## 2022-08-10 RX ORDER — BNT162B2 0.23 MG/2.25ML
INJECTION, SUSPENSION INTRAMUSCULAR
Status: ON HOLD | COMMUNITY
Start: 2022-02-16 | End: 2023-08-18 | Stop reason: SDUPTHER

## 2022-08-10 NOTE — PROGRESS NOTES
Subjective:       Patient ID: Fabiano Malik Jr. is a 61 y.o. male.    Chief Complaint: Follow-up    61 years old male came to the clinic with 2 episodes of syncope associated with falls associated with sweating.  Blood sugar at home in the 120s.  No polyuria, polydipsia or polyphagia.  Patient denies focal weakness.  Blood pressure today was stable.  No chest pain, palpitation, orthopnea or PND.  Patient due for screening PSA.    Review of Systems   Constitutional: Negative.  Negative for fever.   HENT: Negative.    Eyes: Negative.    Respiratory: Negative.    Cardiovascular: Negative.  Negative for chest pain, palpitations, leg swelling and claudication.   Gastrointestinal: Negative.    Endocrine: Negative for polydipsia, polyphagia and polyuria.   Genitourinary: Negative.    Musculoskeletal: Negative.    Integumentary:  Negative.   Neurological: Positive for dizziness and syncope.   Psychiatric/Behavioral: Negative.          Objective:      Physical Exam  Vitals and nursing note reviewed.   Constitutional:       General: He is not in acute distress.     Appearance: He is well-developed. He is not diaphoretic.   HENT:      Head: Normocephalic and atraumatic.      Right Ear: External ear normal.      Left Ear: External ear normal.      Nose: Nose normal.      Mouth/Throat:      Pharynx: No oropharyngeal exudate.   Eyes:      General: No scleral icterus.        Right eye: No discharge.         Left eye: No discharge.      Conjunctiva/sclera: Conjunctivae normal.      Pupils: Pupils are equal, round, and reactive to light.   Neck:      Thyroid: No thyromegaly.      Vascular: No JVD.      Trachea: No tracheal deviation.   Cardiovascular:      Rate and Rhythm: Normal rate and regular rhythm.      Heart sounds: Normal heart sounds. No murmur heard.    No friction rub. No gallop.   Pulmonary:      Effort: Pulmonary effort is normal. No respiratory distress.      Breath sounds: Normal breath sounds. No stridor. No  wheezing or rales.   Chest:      Chest wall: No tenderness.   Abdominal:      General: Bowel sounds are normal. There is no distension.      Palpations: Abdomen is soft. There is no mass.      Tenderness: There is no abdominal tenderness. There is no guarding or rebound.   Musculoskeletal:         General: No tenderness. Normal range of motion.      Cervical back: Normal range of motion and neck supple.   Lymphadenopathy:      Cervical: No cervical adenopathy.   Skin:     General: Skin is warm and dry.      Coloration: Skin is not pale.      Findings: No erythema or rash.   Neurological:      Mental Status: He is alert and oriented to person, place, and time.      Cranial Nerves: No cranial nerve deficit.      Motor: No abnormal muscle tone.      Coordination: Coordination normal.      Deep Tendon Reflexes: Reflexes are normal and symmetric. Reflexes normal.   Psychiatric:         Mood and Affect: Mood is anxious.         Behavior: Behavior normal.         Thought Content: Thought content normal.         Judgment: Judgment normal.         Assessment:       Problem List Items Addressed This Visit     Type 2 diabetes mellitus without complication, without long-term current use of insulin    Relevant Orders    Comprehensive Metabolic Panel    Lipid Panel    Hemoglobin A1C    Microalbumin/Creatinine Ratio, Urine      Other Visit Diagnoses     Fall, initial encounter    -  Primary    Relevant Orders    CBC Auto Differential    Lipid Panel    Excessive sweating        Relevant Orders    CBC Auto Differential    Lipid Panel    Essential hypertension        Relevant Orders    CBC Auto Differential    Comprehensive Metabolic Panel    Lipid Panel    Screening for prostate cancer        Relevant Orders    PSA, Screening    Syncope and collapse        Relevant Orders    Holter monitor - 24 hour    Echo Saline Bubble? No    IN OFFICE EKG 12-LEAD (to Muse)    CBC Auto Differential    Comprehensive Metabolic Panel    CT Head  Without Contrast          Plan:           Patient is not able to wait for electrocardiogram.    He prefers to do testing next week.    Fabiano was seen today for follow-up.    Diagnoses and all orders for this visit:    Fall, initial encounter  -     CBC Auto Differential; Future  -     Lipid Panel; Future    Excessive sweating  -     CBC Auto Differential; Future  -     Lipid Panel; Future    Essential hypertension  -     CBC Auto Differential; Future  -     Comprehensive Metabolic Panel; Future  -     Lipid Panel; Future    Type 2 diabetes mellitus without complication, without long-term current use of insulin  -     Comprehensive Metabolic Panel; Future  -     Lipid Panel; Future  -     Hemoglobin A1C; Future  -     Microalbumin/Creatinine Ratio, Urine; Future    Screening for prostate cancer  -     PSA, Screening; Future    Syncope and collapse  -     Holter monitor - 24 hour; Future  -     Echo Saline Bubble? No; Future  -     IN OFFICE EKG 12-LEAD (to Muse)  -     CBC Auto Differential; Future  -     Comprehensive Metabolic Panel; Future  -     CT Head Without Contrast; Future

## 2022-08-22 ENCOUNTER — OFFICE VISIT (OUTPATIENT)
Dept: URGENT CARE | Facility: CLINIC | Age: 62
End: 2022-08-22
Payer: MEDICARE

## 2022-08-22 VITALS
TEMPERATURE: 98 F | HEIGHT: 73 IN | SYSTOLIC BLOOD PRESSURE: 151 MMHG | DIASTOLIC BLOOD PRESSURE: 94 MMHG | RESPIRATION RATE: 20 BRPM | WEIGHT: 213 LBS | OXYGEN SATURATION: 97 % | HEART RATE: 111 BPM | BODY MASS INDEX: 28.23 KG/M2

## 2022-08-22 DIAGNOSIS — S02.2XXA CLOSED FRACTURE OF NASAL BONE, INITIAL ENCOUNTER: Primary | ICD-10-CM

## 2022-08-22 DIAGNOSIS — S00.33XA HEMATOMA OF NASAL SEPTUM, INITIAL ENCOUNTER: ICD-10-CM

## 2022-08-22 PROCEDURE — 1159F MED LIST DOCD IN RCRD: CPT | Mod: CPTII,S$GLB,, | Performed by: PHYSICIAN ASSISTANT

## 2022-08-22 PROCEDURE — 99214 PR OFFICE/OUTPT VISIT, EST, LEVL IV, 30-39 MIN: ICD-10-PCS | Mod: S$GLB,,, | Performed by: PHYSICIAN ASSISTANT

## 2022-08-22 PROCEDURE — 3080F DIAST BP >= 90 MM HG: CPT | Mod: CPTII,S$GLB,, | Performed by: PHYSICIAN ASSISTANT

## 2022-08-22 PROCEDURE — 4010F ACE/ARB THERAPY RXD/TAKEN: CPT | Mod: CPTII,S$GLB,, | Performed by: PHYSICIAN ASSISTANT

## 2022-08-22 PROCEDURE — 3080F PR MOST RECENT DIASTOLIC BLOOD PRESSURE >= 90 MM HG: ICD-10-PCS | Mod: CPTII,S$GLB,, | Performed by: PHYSICIAN ASSISTANT

## 2022-08-22 PROCEDURE — 3077F SYST BP >= 140 MM HG: CPT | Mod: CPTII,S$GLB,, | Performed by: PHYSICIAN ASSISTANT

## 2022-08-22 PROCEDURE — 70160 XR NASAL BONES: ICD-10-PCS | Mod: FY,S$GLB,, | Performed by: RADIOLOGY

## 2022-08-22 PROCEDURE — 3008F BODY MASS INDEX DOCD: CPT | Mod: CPTII,S$GLB,, | Performed by: PHYSICIAN ASSISTANT

## 2022-08-22 PROCEDURE — 3044F PR MOST RECENT HEMOGLOBIN A1C LEVEL <7.0%: ICD-10-PCS | Mod: CPTII,S$GLB,, | Performed by: PHYSICIAN ASSISTANT

## 2022-08-22 PROCEDURE — 3077F PR MOST RECENT SYSTOLIC BLOOD PRESSURE >= 140 MM HG: ICD-10-PCS | Mod: CPTII,S$GLB,, | Performed by: PHYSICIAN ASSISTANT

## 2022-08-22 PROCEDURE — 3044F HG A1C LEVEL LT 7.0%: CPT | Mod: CPTII,S$GLB,, | Performed by: PHYSICIAN ASSISTANT

## 2022-08-22 PROCEDURE — 4010F PR ACE/ARB THEARPY RXD/TAKEN: ICD-10-PCS | Mod: CPTII,S$GLB,, | Performed by: PHYSICIAN ASSISTANT

## 2022-08-22 PROCEDURE — 99214 OFFICE O/P EST MOD 30 MIN: CPT | Mod: S$GLB,,, | Performed by: PHYSICIAN ASSISTANT

## 2022-08-22 PROCEDURE — 70160 X-RAY EXAM OF NASAL BONES: CPT | Mod: FY,S$GLB,, | Performed by: RADIOLOGY

## 2022-08-22 PROCEDURE — 1159F PR MEDICATION LIST DOCUMENTED IN MEDICAL RECORD: ICD-10-PCS | Mod: CPTII,S$GLB,, | Performed by: PHYSICIAN ASSISTANT

## 2022-08-22 PROCEDURE — 1160F PR REVIEW ALL MEDS BY PRESCRIBER/CLIN PHARMACIST DOCUMENTED: ICD-10-PCS | Mod: CPTII,S$GLB,, | Performed by: PHYSICIAN ASSISTANT

## 2022-08-22 PROCEDURE — 3008F PR BODY MASS INDEX (BMI) DOCUMENTED: ICD-10-PCS | Mod: CPTII,S$GLB,, | Performed by: PHYSICIAN ASSISTANT

## 2022-08-22 PROCEDURE — 1160F RVW MEDS BY RX/DR IN RCRD: CPT | Mod: CPTII,S$GLB,, | Performed by: PHYSICIAN ASSISTANT

## 2022-08-22 RX ORDER — MELOXICAM 15 MG/1
TABLET ORAL
Status: ON HOLD | COMMUNITY
Start: 2022-04-28 | End: 2023-08-18 | Stop reason: HOSPADM

## 2022-08-22 NOTE — PROGRESS NOTES
"Subjective:       Patient ID: Fabiano Malik Jr. is a 61 y.o. male.    Vitals:  height is 6' 1" (1.854 m) and weight is 96.6 kg (213 lb). His temperature is 98.3 °F (36.8 °C). His blood pressure is 151/94 (abnormal) and his pulse is 111 (abnormal). His respiration is 20 and oxygen saturation is 97%.     Chief Complaint: Facial Injury    Pt presents with nasal pain. He is diabetic and states he blacks out often. Last week pt states he blacked out and fell face first down the stairs. He reports LOC and woke to a bloody nose. Pt did not seek medical help at that time. He presents now due to severe nasal pain and states symptoms are worsening. He reports bloody discharge when blowing his nose. He tried aleve with no relief.     Facial Injury   Incident onset: 7-8 days ago. The injury mechanism was a fall. He lost consciousness for a period of 1 to 5 minutes. The volume of blood lost was large. The quality of the pain is described as shooting. The pain is at a severity of 10/10. The pain is severe. The pain has been constant since the injury. Pertinent negatives include no disorientation, headaches, numbness, vomiting or weakness. He has tried acetaminophen for the symptoms. The treatment provided mild relief.       Constitution: Negative for chills, sweating and fever.   HENT: Positive for facial swelling, facial trauma, congestion and nosebleeds. Negative for ear pain, dental problem, drooling, mouth sores, foreign body in nose, sore throat and trouble swallowing.    Neck: Negative for neck pain, neck stiffness and painful lymph nodes.   Cardiovascular: Negative for chest pain, palpitations and sob on exertion.   Eyes: Negative for eye discharge, eye itching and eye pain.   Respiratory: Negative for cough, sputum production and shortness of breath.    Gastrointestinal: Negative for abdominal pain, nausea, vomiting and diarrhea.   Genitourinary: Negative for dysuria, hematuria and pelvic pain.   Musculoskeletal: " Negative for pain, muscle cramps and muscle ache.   Skin: Positive for abrasion and bruising. Negative for pale, rash and wound.   Neurological: Negative for dizziness, light-headedness, headaches, disorientation and numbness.   Hematologic/Lymphatic: Negative for swollen lymph nodes.   Psychiatric/Behavioral: Negative for disorientation.       Objective:      Physical Exam   Constitutional: He is oriented to person, place, and time. He appears well-developed. He is cooperative. He does not appear ill. No distress.   HENT:   Head: Normocephalic. Head is with abrasion and with contusion. Head is without raccoon's eyes and without Abreu's sign.   Ears:   Right Ear: External ear normal.   Left Ear: External ear normal.   Nose: Mucosal edema, nose lacerations, sinus tenderness, nasal deformity and nasal septal hematoma (bilateral) present. No septal deviation. No epistaxis.  No foreign bodies.   Mouth/Throat: Oropharynx is clear and moist.   Eyes: Conjunctivae, EOM and lids are normal.   Neck: Trachea normal and phonation normal. Neck supple.   Cardiovascular: Regular rhythm and normal heart sounds.   No murmur heard.Exam reveals no gallop.   Pulmonary/Chest: Effort normal and breath sounds normal. No respiratory distress. He has no decreased breath sounds. He has no wheezes. He has no rhonchi. He has no rales.   Musculoskeletal: Normal range of motion.         General: Normal range of motion.   Neurological: He is alert and oriented to person, place, and time.   Skin: Skin is warm, dry, not diaphoretic and no rash. abrasion (nose) and bruising (nose)   Psychiatric: His speech is normal and behavior is normal. Judgment and thought content normal.   Nursing note and vitals reviewed.        Assessment:       1. Closed fracture of nasal bone, initial encounter    2. Hematoma of nasal septum, initial encounter        XR NASAL BONES    Result Date: 8/22/2022  EXAMINATION: XR NASAL BONES CLINICAL HISTORY: Other specified  disorders of nose and nasal sinuses TECHNIQUE: Frontal and lateral radiographs of the nasal bones. COMPARISON: CT scan of the head dated 09/07/2019. FINDINGS: The bone mineralization is within normal limits.  There is an acute nondisplaced fracture involving the anterior aspect of the nasal bone.  The remainder of the osseous structures are unremarkable. The paranasal sinuses are unremarkable.  The mastoid air cells are clear.     Acute nondisplaced fracture of the nasal bone. Electronically signed by: Omar Royal MD Date:    08/22/2022 Time:    16:08   Plan:     Discussed patient with Dr. Mesa and with on call ENT, Dr. Joyce Araya. Patient has bilateral nasal septum hematoma that appears to obstruct nasal passages. Dr. Araya states she can see patient in her clinic tomorrow morning at 9:40 am. Patient notified of appointment time and location and will report for evaluation.    Closed fracture of nasal bone, initial encounter  -     XR NASAL BONES; Future; Expected date: 08/22/2022  -     Ambulatory referral/consult to ENT    Hematoma of nasal septum, initial encounter  -     Ambulatory referral/consult to ENT

## 2022-08-23 ENCOUNTER — TELEPHONE (OUTPATIENT)
Dept: OTOLARYNGOLOGY | Facility: CLINIC | Age: 62
End: 2022-08-23
Payer: MEDICARE

## 2022-08-23 DIAGNOSIS — F41.9 ANXIETY: ICD-10-CM

## 2022-08-23 RX ORDER — MECLIZINE HCL 12.5 MG 12.5 MG/1
12.5 TABLET ORAL 3 TIMES DAILY PRN
Qty: 60 TABLET | Refills: 0 | Status: SHIPPED | OUTPATIENT
Start: 2022-08-23 | End: 2022-09-30 | Stop reason: SDUPTHER

## 2022-08-23 RX ORDER — ALPRAZOLAM 0.5 MG/1
0.5 TABLET ORAL NIGHTLY PRN
Qty: 30 TABLET | Refills: 0 | Status: SHIPPED | OUTPATIENT
Start: 2022-08-23 | End: 2022-09-23 | Stop reason: SDUPTHER

## 2022-08-23 NOTE — TELEPHONE ENCOUNTER
Returned call. Apt rescheduled to 8/29 at 1PM. Pt thanked me and verbalized understanding.     ----- Message from Cassandra Berger sent at 8/23/2022  1:05 PM CDT -----  Contact: [phone]  Who Called: PT  Regarding: had to cancel appointment looking to reschedule regarding nasal fracture. Referral is in epic   Would the patient rather a call back or a response via [facility]? Call back  Best Call Back Number:[phone]  Additional Information: n/a

## 2022-08-23 NOTE — TELEPHONE ENCOUNTER
No new care gaps identified.  [facility] Embedded Care Gaps. Reference number: [id number]. 8/23/2022   1:10:42 PM CDT

## 2022-08-26 ENCOUNTER — HOSPITAL ENCOUNTER (OUTPATIENT)
Dept: RADIOLOGY | Facility: HOSPITAL | Age: 62
Discharge: HOME | End: 2022-08-26
Attending: FAMILY MEDICINE
Payer: MEDICARE

## 2022-08-26 ENCOUNTER — HOSPITAL ENCOUNTER (EMERGENCY)
Facility: HOSPITAL | Age: 62
Discharge: HOME | End: 2022-08-26
Attending: EMERGENCY MEDICINE
Payer: MEDICARE

## 2022-08-26 ENCOUNTER — HOSPITAL ENCOUNTER (OUTPATIENT)
Dept: CARDIOLOGY | Facility: HOSPITAL | Age: 62
Discharge: HOME | End: 2022-08-26
Attending: FAMILY MEDICINE
Payer: MEDICARE

## 2022-08-26 VITALS
DIASTOLIC BLOOD PRESSURE: 95 MMHG | BODY MASS INDEX: 28.23 KG/M2 | RESPIRATION RATE: 20 BRPM | HEART RATE: 126 BPM | HEIGHT: 73 IN | WEIGHT: 213 LBS | SYSTOLIC BLOOD PRESSURE: 147 MMHG | OXYGEN SATURATION: 98 % | TEMPERATURE: 98 F

## 2022-08-26 VITALS — HEIGHT: 73 IN | WEIGHT: 213 LBS | BODY MASS INDEX: 28.23 KG/M2

## 2022-08-26 DIAGNOSIS — R55 SYNCOPE AND COLLAPSE: ICD-10-CM

## 2022-08-26 DIAGNOSIS — R00.0 TACHYCARDIA: ICD-10-CM

## 2022-08-26 LAB
ALBUMIN SERPL BCP-MCNC: 3 G/DL (ref 3.5–5.2)
ALP SERPL-CCNC: 98 U/L (ref 55–135)
ALT SERPL W/O P-5'-P-CCNC: 15 U/L (ref 10–44)
ANION GAP SERPL CALC-SCNC: 13 MMOL/L (ref 8–16)
AST SERPL-CCNC: 19 U/L (ref 10–40)
BASOPHILS # BLD AUTO: 0.02 K/UL (ref 0–0.2)
BASOPHILS NFR BLD: 0.2 % (ref 0–1.9)
BILIRUB SERPL-MCNC: 1.2 MG/DL (ref 0.1–1)
BUN SERPL-MCNC: 17 MG/DL (ref 8–23)
CALCIUM SERPL-MCNC: 9.1 MG/DL (ref 8.7–10.5)
CHLORIDE SERPL-SCNC: 92 MMOL/L (ref 95–110)
CO2 SERPL-SCNC: 31 MMOL/L (ref 23–29)
CREAT SERPL-MCNC: 1.4 MG/DL (ref 0.5–1.4)
DIFFERENTIAL METHOD: ABNORMAL
EOSINOPHIL # BLD AUTO: 0.2 K/UL (ref 0–0.5)
EOSINOPHIL NFR BLD: 1.8 % (ref 0–8)
ERYTHROCYTE [DISTWIDTH] IN BLOOD BY AUTOMATED COUNT: 12.1 % (ref 11.5–14.5)
EST. GFR  (NO RACE VARIABLE): 57 ML/MIN/1.73 M^2
GLUCOSE SERPL-MCNC: 213 MG/DL (ref 70–110)
HCT VFR BLD AUTO: 35.7 % (ref 40–54)
HGB BLD-MCNC: 12.9 G/DL (ref 14–18)
IMM GRANULOCYTES # BLD AUTO: 0.06 K/UL (ref 0–0.04)
IMM GRANULOCYTES NFR BLD AUTO: 0.6 % (ref 0–0.5)
LACTATE SERPL-SCNC: 1.4 MMOL/L (ref 0.5–2.2)
LYMPHOCYTES # BLD AUTO: 1.8 K/UL (ref 1–4.8)
LYMPHOCYTES NFR BLD: 17.9 % (ref 18–48)
MCH RBC QN AUTO: 31.1 PG (ref 27–31)
MCHC RBC AUTO-ENTMCNC: 36.1 G/DL (ref 32–36)
MCV RBC AUTO: 86 FL (ref 82–98)
MONOCYTES # BLD AUTO: 0.7 K/UL (ref 0.3–1)
MONOCYTES NFR BLD: 6.9 % (ref 4–15)
NEUTROPHILS # BLD AUTO: 7.4 K/UL (ref 1.8–7.7)
NEUTROPHILS NFR BLD: 72.6 % (ref 38–73)
NRBC BLD-RTO: 0 /100 WBC
PLATELET # BLD AUTO: 335 K/UL (ref 150–450)
PMV BLD AUTO: 8 FL (ref 9.2–12.9)
POTASSIUM SERPL-SCNC: 3.5 MMOL/L (ref 3.5–5.1)
PROT SERPL-MCNC: 6.8 G/DL (ref 6–8.4)
RBC # BLD AUTO: 4.15 M/UL (ref 4.6–6.2)
SODIUM SERPL-SCNC: 136 MMOL/L (ref 136–145)
WBC # BLD AUTO: 10.16 K/UL (ref 3.9–12.7)

## 2022-08-26 PROCEDURE — 93306 ECHO (CUPID ONLY): ICD-10-PCS | Mod: 26,,, | Performed by: INTERNAL MEDICINE

## 2022-08-26 PROCEDURE — 83605 ASSAY OF LACTIC ACID: CPT | Performed by: NURSE PRACTITIONER

## 2022-08-26 PROCEDURE — 63600175 PHARM REV CODE 636 W HCPCS: Performed by: NURSE PRACTITIONER

## 2022-08-26 PROCEDURE — 93010 EKG 12-LEAD: ICD-10-PCS | Mod: ,,, | Performed by: INTERNAL MEDICINE

## 2022-08-26 PROCEDURE — 93010 ELECTROCARDIOGRAM REPORT: CPT | Mod: ,,, | Performed by: INTERNAL MEDICINE

## 2022-08-26 PROCEDURE — 99284 EMERGENCY DEPT VISIT MOD MDM: CPT | Mod: 25

## 2022-08-26 PROCEDURE — 85025 COMPLETE CBC W/AUTO DIFF WBC: CPT | Performed by: NURSE PRACTITIONER

## 2022-08-26 PROCEDURE — 80053 COMPREHEN METABOLIC PANEL: CPT | Performed by: NURSE PRACTITIONER

## 2022-08-26 PROCEDURE — 70450 CT HEAD/BRAIN W/O DYE: CPT | Mod: TC

## 2022-08-26 PROCEDURE — 70450 CT HEAD WITHOUT CONTRAST: ICD-10-PCS | Mod: 26,,, | Performed by: STUDENT IN AN ORGANIZED HEALTH CARE EDUCATION/TRAINING PROGRAM

## 2022-08-26 PROCEDURE — 93306 TTE W/DOPPLER COMPLETE: CPT | Mod: 26,,, | Performed by: INTERNAL MEDICINE

## 2022-08-26 PROCEDURE — 70450 CT HEAD/BRAIN W/O DYE: CPT | Mod: 26,,, | Performed by: STUDENT IN AN ORGANIZED HEALTH CARE EDUCATION/TRAINING PROGRAM

## 2022-08-26 PROCEDURE — 93226 XTRNL ECG REC<48 HR SCAN A/R: CPT

## 2022-08-26 PROCEDURE — 96374 THER/PROPH/DIAG INJ IV PUSH: CPT | Mod: 59

## 2022-08-26 PROCEDURE — 93005 ELECTROCARDIOGRAM TRACING: CPT

## 2022-08-26 PROCEDURE — 93306 TTE W/DOPPLER COMPLETE: CPT

## 2022-08-26 RX ORDER — HYDROMORPHONE HYDROCHLORIDE 1 MG/ML
0.5 INJECTION, SOLUTION INTRAMUSCULAR; INTRAVENOUS; SUBCUTANEOUS
Status: COMPLETED | OUTPATIENT
Start: 2022-08-26 | End: 2022-08-26

## 2022-08-26 RX ADMIN — HYDROMORPHONE HYDROCHLORIDE 0.5 MG: 1 INJECTION, SOLUTION INTRAMUSCULAR; INTRAVENOUS; SUBCUTANEOUS at 02:08

## 2022-08-26 NOTE — ED NOTES
Pt ambulated into room 20 from waiting room. Pt had a fall a few days ago and was seen at urgent care. Pt has a follow up appointment scheduled with ENT. Pt has scabbed abrasions to face. Head to toe assessment completed, plan of care reviewed, call bell within reach.

## 2022-08-26 NOTE — ED PROVIDER NOTES
Encounter Date: 8/26/2022    SCRIBE #1 NOTE: I, Sarah Way am scribing for, and in the presence of,  Pooja Lantigua NP. I have scribed the following portions of the note - Other sections scribed: HPI, ROS.       History     Chief Complaint   Patient presents with    Facial Injury     Pt. Has had recurrent syncope for months. He is under care of cardiology and currently wearing holter monitor after syncopal episode last week, falling face down and sustaining nasal fracture. He was treated at urgent care for his injuries and has pending follow-up with cardiology. He is here for nasal congestion and pain.      61 year old male presents to the ED with complaint of nasal injury, onset last week. Patient has a history of reoccurring syncopal episodes. His last syncopal episode was last week which caused him to fall face down and sustain a nasal fracture. Patient states he was seen in Urgent Care, but he was not prescribed antibiotics. He complains of yellow colored fluid continuously leaking from his nose and states the pain worsened today causing him to come to the ED. He also compains of SOB and difficulty breathing through his nose. Patient also has redness around his mouth. Patient reports he cancelled him appointment with [facility] due to a busy schedule. Patient denies fever. No other complains at this time.     The history is provided by the patient.     Review of patient's allergies indicates:   Allergen Reactions    Pcn [penicillins] Other (See Comments)     Childhood rxn, pt does not recall type of rxn     Past Medical History:   Diagnosis Date    Chronic back pain greater than 3 months duration     Depression     Diabetes mellitus     Diabetes mellitus, type 2     Hypertension     Schizophrenia      Past Surgical History:   Procedure Laterality Date    APPENDECTOMY      COLONOSCOPY N/A 5/31/2018    Procedure: COLONOSCOPY;  Surgeon: Guillaume Hatch MD;  [facility];  Service: Endoscopy;   "Laterality: N/A;    NECK SURGERY      RADIOFREQUENCY ABLATION OF LUMBAR MEDIAL BRANCH NERVE AT SINGLE LEVEL Left 5/29/2018    Procedure: RADIOFREQUENCY THERMOCOAGULATION (RFTC)-NERVE-MEDIAN BRANCH-LUMBAR- Left L2-3-4-5;  Surgeon: Donavon Dennis MD;  Location: [facility];  Service: Pain Management;  Laterality: Left;    RADIOFREQUENCY ABLATION OF LUMBAR MEDIAL BRANCH NERVE AT SINGLE LEVEL Right 1/8/2019    Procedure: Radiofrequency Ablation, Nerve, Spinal, Lumbar, Medial Branch, L2,3,4,5;  Surgeon: Alberto Hernandez Jr., MD;  Location: [facility];  Service: Pain Management;  Laterality: Right;  Pt is diabetic    RADIOFREQUENCY ABLATION OF LUMBAR MEDIAL BRANCH NERVE AT SINGLE LEVEL Left 3/12/2019    Procedure: Radiofrequency Ablation, Nerve, Spinal, Lumbar, Medial Branch, Left, L2,3,4,5;  Surgeon: Alberto Hernandez Jr., MD;  Location: [facility];  Service: Pain Management;  Laterality: Left;  Pt will be consented the day of procedure.    TRANSFORAMINAL EPIDURAL INJECTION OF STEROID Right 9/12/2019    Procedure: Injection,steroid,epidural,transforaminal,right,L4-5 and L5-S1;  Surgeon: Alberto Hernandez Jr., MD;  Location: [facility];  Service: Pain Management;  Laterality: Right;  PT IS DIABETIC     Family History   Problem Relation Age of Onset    Alzheimer's disease Mother     Diabetes Mother     Heart defect Father     Cancer Father     Stroke Father     Heart attack Father     Heart defect Sister     Alzheimer's disease Sister      Social History     Tobacco Use    Smoking status: Never Smoker    Smokeless tobacco: Never Used   Substance Use Topics    Alcohol use: Yes     Comment: "couple of beers a day"    Drug use: No     Review of Systems   Constitutional: Negative for chills and fever.   HENT: Positive for sinus pain. Negative for sore throat.         Nasal drainage   Respiratory: Negative for cough, chest tightness and stridor.    Cardiovascular: Negative for chest pain. "   Gastrointestinal: Negative for abdominal pain and nausea.   Genitourinary: Negative for dysuria and genital sores.   Musculoskeletal: Negative for back pain and myalgias.   Skin: Negative for rash.   Neurological: Negative for weakness.   Hematological: Does not bruise/bleed easily.   All other systems reviewed and are negative.    Physical Exam     Initial Vitals [08/26/22 1252]   BP Pulse Resp Temp SpO2   (!) 146/95 (!) 126 20 98.1 °F (36.7 °C) 98 %      MAP       --         Physical Exam    Nursing note and vitals reviewed.  Constitutional: He appears well-developed and well-nourished.   HENT:   Head: Normocephalic and atraumatic.   Nose: Sinus tenderness, nasal deformity and nasal septal hematoma present.   Yellow drainage noted to bilateral nares concerning that he has developed an abscess   Eyes: Conjunctivae and EOM are normal. Pupils are equal, round, and reactive to light.   Neck:   Normal range of motion.  Cardiovascular: Regular rhythm, normal heart sounds and intact distal pulses. Tachycardia present.  Exam reveals no gallop and no friction rub.    No murmur heard.  Pulmonary/Chest: Breath sounds normal. No respiratory distress. He has no wheezes. He has no rhonchi. He has no rales. He exhibits no tenderness.   Musculoskeletal:         General: No tenderness or edema. Normal range of motion.      Cervical back: Normal range of motion.     Neurological: He is alert and oriented to person, place, and time. He has normal strength. GCS score is 15. GCS eye subscore is 4. GCS verbal subscore is 5. GCS motor subscore is 6.   Skin: Skin is warm.       ED Course   Procedures  Labs Reviewed   CBC W/ AUTO DIFFERENTIAL - Abnormal; Notable for the following components:       Result Value    RBC 4.15 (*)     Hemoglobin 12.9 (*)     Hematocrit 35.7 (*)     MCH 31.1 (*)     MCHC 36.1 (*)     MPV 8.0 (*)     Immature Granulocytes 0.6 (*)     Immature Grans (Abs) 0.06 (*)     Lymph % 17.9 (*)     All other components  within normal limits   COMPREHENSIVE METABOLIC PANEL - Abnormal; Notable for the following components:    Chloride 92 (*)     CO2 31 (*)     Glucose 213 (*)     Albumin 3.0 (*)     Total Bilirubin 1.2 (*)     eGFR 57 (*)     All other components within normal limits   LACTIC ACID, PLASMA     EKG Readings: (Independently Interpreted)   Initial Reading: No STEMI. Rhythm: Sinus Tachycardia. Heart Rate: 105. Ectopy: No Ectopy. Conduction: Normal. T Waves: Normal. Clinical Impression: Sinus Tachycardia Other Impression: QT interval 368        Medications   iohexoL (OMNIPAQUE 350) injection 75 mL (has no administration in time range)   HYDROmorphone injection 0.5 mg (0.5 mg Intravenous Given 8/26/22 1433)     Medical Decision Making:   History:   Old Medical Records: I decided to obtain old medical records.  Old Records Summarized: other records.       <> Summary of Records: Pt seen at [facility] urgent care on 8/22/22 after a syncopal episode which resulted in a nasal fracture and bilateral septal hematomas. Pt had close follow up with ENT the next morning and cancelled his appt and rescheduled it for 8/29/22.  Initial Assessment:   62 y/o male which presents to the ED worsening facial pain, swelling and drainage since being diagnosed with a nasal fracture with bilateral septal hematomas.   Differential Diagnosis:   Abscess, cellulitis, nasal fracture  Clinical Tests:   Lab Tests: Ordered and Reviewed  The following lab test(s) were unremarkable: CBC  Radiological Study: Ordered  ED Management:  Pt examined and has extensive swelling in his nares which appeared to be obstructing his nasal airway. He had thick yellow drainage concerning that he had developed an abscess. Labs and CT maxillofacial ordered but the patient removed his IV and left AMA.          Scribe Attestation:   Scribe #1: I performed the above scribed service and the documentation accurately describes the services I performed. I attest to the accuracy of the  note.        ED Course as of 08/26/22 1735   Fri Aug 26, 2022   1358 BP(!): 146/95 [AT]   1358 Temp: 98.1 °F (36.7 °C) [AT]   1358 Temp src: Oral [AT]   1358 Pulse(!): 126 [AT]   1358 Resp: 20 [AT]   1358 SpO2: 98 % [AT]   1504 Glucose(!): 213 [AT]   1504 Chloride(!): 92 [AT]      ED Course User Index  [AT] Pooja Lantigua [facility]             Clinical Impression:   Final diagnoses:  [R00.0] Tachycardia          ED Disposition Condition    AMA       This document was produced by a scribe under my direction and in my presence. I agree with the content of the note and have made any necessary edits.     Pooja Lantigua, DNP, FNP-BC          Pooja Lantigua, [facility]  08/26/22 1719       Pooja Lantigua, [facility]  08/26/22 1730

## 2022-08-26 NOTE — ED NOTES
Patient's significant other wants to leave - she states that she can look at the patient portal for results - explained to patient that he has not had all of his tests completed and that he would have to leave against medical advise - significant other is insistant that he leave

## 2022-08-26 NOTE — Clinical Note
Condition at discharge: stable  Mode of discharge: ambulatory  Pt accompanied by: significant other  Questions answered and discharge instructions discussed with: patient signed out against medical advise  Understanding of discharge instructions and  follow-up verbalized by: patient signed out against medical advise before all testing complete and before results completed    Escorted to discharge window for check-out.

## 2022-08-29 ENCOUNTER — OFFICE VISIT (OUTPATIENT)
Dept: OTOLARYNGOLOGY | Facility: CLINIC | Age: 62
End: 2022-08-29
Payer: MEDICARE

## 2022-08-29 VITALS
DIASTOLIC BLOOD PRESSURE: 79 MMHG | HEIGHT: 73 IN | BODY MASS INDEX: 26.36 KG/M2 | SYSTOLIC BLOOD PRESSURE: 122 MMHG | WEIGHT: 198.88 LBS | HEART RATE: 122 BPM | TEMPERATURE: 98 F

## 2022-08-29 DIAGNOSIS — J34.0 NASAL SEPTAL ABSCESS: Primary | ICD-10-CM

## 2022-08-29 DIAGNOSIS — S02.2XXA CLOSED FRACTURE OF NASAL BONE, INITIAL ENCOUNTER: ICD-10-CM

## 2022-08-29 DIAGNOSIS — S00.33XA NASAL SEPTAL HEMATOMA, INITIAL ENCOUNTER: ICD-10-CM

## 2022-08-29 LAB
AORTIC ROOT ANNULUS: 3.69 CM
AV INDEX (PROSTH): 0.79
AV MEAN GRADIENT: 3 MMHG
AV PEAK GRADIENT: 6 MMHG
AV VALVE AREA: 3.41 CM2
AV VELOCITY RATIO: 0.83
BSA FOR ECHO PROCEDURE: 2.23 M2
CV ECHO LV RWT: 0.32 CM
DOP CALC AO PEAK VEL: 1.2 M/S
DOP CALC AO VTI: 18.67 CM
DOP CALC LVOT AREA: 4.3 CM2
DOP CALC LVOT DIAMETER: 2.35 CM
DOP CALC LVOT PEAK VEL: 0.99 M/S
DOP CALC LVOT STROKE VOLUME: 63.68 CM3
DOP CALCLVOT PEAK VEL VTI: 14.69 CM
ECHO LV POSTERIOR WALL: 0.83 CM (ref 0.6–1.1)
EJECTION FRACTION: 75 %
FRACTIONAL SHORTENING: 23 % (ref 28–44)
INTERVENTRICULAR SEPTUM: 0.98 CM (ref 0.6–1.1)
LA MAJOR: 4.62 CM
LA MINOR: 4.94 CM
LA WIDTH: 3.54 CM
LEFT ATRIUM VOLUME INDEX MOD: 21.6 ML/M2
LEFT ATRIUM VOLUME MOD: 47.66 CM3
LEFT INTERNAL DIMENSION IN SYSTOLE: 3.97 CM (ref 2.1–4)
LEFT VENTRICLE DIASTOLIC VOLUME INDEX: 57.05 ML/M2
LEFT VENTRICLE DIASTOLIC VOLUME: 126.08 ML
LEFT VENTRICLE MASS INDEX: 76 G/M2
LEFT VENTRICLE SYSTOLIC VOLUME INDEX: 31.2 ML/M2
LEFT VENTRICLE SYSTOLIC VOLUME: 68.88 ML
LEFT VENTRICULAR INTERNAL DIMENSION IN DIASTOLE: 5.14 CM (ref 3.5–6)
LEFT VENTRICULAR MASS: 166.92 G
PISA MRMAX VEL: 0.03 M/S
PISA TR MAX VEL: 2.25 M/S
PV PEAK VELOCITY: 0.98 CM/S
RA MAJOR: 4.44 CM
RA PRESSURE: 8 MMHG
RA WIDTH: 2.55 CM
RV TISSUE DOPPLER FREE WALL SYSTOLIC VELOCITY 1 (APICAL 4 CHAMBER VIEW): 17.71 CM/S
TDI LATERAL: 0.08 M/S
TDI SEPTAL: 0.06 M/S
TDI: 0.07 M/S
TR MAX PG: 20 MMHG
TV REST PULMONARY ARTERY PRESSURE: 28 MMHG

## 2022-08-29 PROCEDURE — 1159F PR MEDICATION LIST DOCUMENTED IN MEDICAL RECORD: ICD-10-PCS | Mod: CPTII,S$GLB,, | Performed by: OTOLARYNGOLOGY

## 2022-08-29 PROCEDURE — 99205 PR OFFICE/OUTPT VISIT, NEW, LEVL V, 60-74 MIN: ICD-10-PCS | Mod: 25,S$GLB,, | Performed by: OTOLARYNGOLOGY

## 2022-08-29 PROCEDURE — 3044F HG A1C LEVEL LT 7.0%: CPT | Mod: CPTII,S$GLB,, | Performed by: OTOLARYNGOLOGY

## 2022-08-29 PROCEDURE — 4010F PR ACE/ARB THEARPY RXD/TAKEN: ICD-10-PCS | Mod: CPTII,S$GLB,, | Performed by: OTOLARYNGOLOGY

## 2022-08-29 PROCEDURE — 4010F ACE/ARB THERAPY RXD/TAKEN: CPT | Mod: CPTII,S$GLB,, | Performed by: OTOLARYNGOLOGY

## 2022-08-29 PROCEDURE — 3008F BODY MASS INDEX DOCD: CPT | Mod: CPTII,S$GLB,, | Performed by: OTOLARYNGOLOGY

## 2022-08-29 PROCEDURE — 30000 DRAINAGE OF NOSE LESION: CPT | Mod: S$GLB,,, | Performed by: OTOLARYNGOLOGY

## 2022-08-29 PROCEDURE — 3078F PR MOST RECENT DIASTOLIC BLOOD PRESSURE < 80 MM HG: ICD-10-PCS | Mod: CPTII,S$GLB,, | Performed by: OTOLARYNGOLOGY

## 2022-08-29 PROCEDURE — 3074F SYST BP LT 130 MM HG: CPT | Mod: CPTII,S$GLB,, | Performed by: OTOLARYNGOLOGY

## 2022-08-29 PROCEDURE — 3044F PR MOST RECENT HEMOGLOBIN A1C LEVEL <7.0%: ICD-10-PCS | Mod: CPTII,S$GLB,, | Performed by: OTOLARYNGOLOGY

## 2022-08-29 PROCEDURE — 87186 SC STD MICRODIL/AGAR DIL: CPT | Performed by: OTOLARYNGOLOGY

## 2022-08-29 PROCEDURE — 87075 CULTR BACTERIA EXCEPT BLOOD: CPT | Performed by: OTOLARYNGOLOGY

## 2022-08-29 PROCEDURE — 99205 OFFICE O/P NEW HI 60 MIN: CPT | Mod: 25,S$GLB,, | Performed by: OTOLARYNGOLOGY

## 2022-08-29 PROCEDURE — 87077 CULTURE AEROBIC IDENTIFY: CPT | Performed by: OTOLARYNGOLOGY

## 2022-08-29 PROCEDURE — 30000 PR DRAIN ABSCESS/HEMATOMA,NASAL: ICD-10-PCS | Mod: S$GLB,,, | Performed by: OTOLARYNGOLOGY

## 2022-08-29 PROCEDURE — 87070 CULTURE OTHR SPECIMN AEROBIC: CPT | Performed by: OTOLARYNGOLOGY

## 2022-08-29 PROCEDURE — 87076 CULTURE ANAEROBE IDENT EACH: CPT | Performed by: OTOLARYNGOLOGY

## 2022-08-29 PROCEDURE — 87102 FUNGUS ISOLATION CULTURE: CPT | Performed by: OTOLARYNGOLOGY

## 2022-08-29 PROCEDURE — 3008F PR BODY MASS INDEX (BMI) DOCUMENTED: ICD-10-PCS | Mod: CPTII,S$GLB,, | Performed by: OTOLARYNGOLOGY

## 2022-08-29 PROCEDURE — 3074F PR MOST RECENT SYSTOLIC BLOOD PRESSURE < 130 MM HG: ICD-10-PCS | Mod: CPTII,S$GLB,, | Performed by: OTOLARYNGOLOGY

## 2022-08-29 PROCEDURE — 1159F MED LIST DOCD IN RCRD: CPT | Mod: CPTII,S$GLB,, | Performed by: OTOLARYNGOLOGY

## 2022-08-29 PROCEDURE — 99999 PR PBB SHADOW E&M-EST. PATIENT-LVL IV: CPT | Mod: PBBFAC,,, | Performed by: OTOLARYNGOLOGY

## 2022-08-29 PROCEDURE — 99999 PR PBB SHADOW E&M-EST. PATIENT-LVL IV: ICD-10-PCS | Mod: PBBFAC,,, | Performed by: OTOLARYNGOLOGY

## 2022-08-29 PROCEDURE — 3078F DIAST BP <80 MM HG: CPT | Mod: CPTII,S$GLB,, | Performed by: OTOLARYNGOLOGY

## 2022-08-29 RX ORDER — CLINDAMYCIN HYDROCHLORIDE 300 MG/1
300 CAPSULE ORAL 2 TIMES DAILY
Qty: 30 CAPSULE | Refills: 0 | Status: SHIPPED | OUTPATIENT
Start: 2022-08-29 | End: 2022-09-13

## 2022-08-29 RX ORDER — MUPIROCIN 20 MG/G
OINTMENT TOPICAL 2 TIMES DAILY
Qty: 15 G | Refills: 3 | Status: SHIPPED | OUTPATIENT
Start: 2022-08-29 | End: 2022-09-08

## 2022-08-29 NOTE — PROCEDURES
Nasal/sinus endoscopy    Date/Time: 8/29/2022 1:00 PM  Performed by: Joyce Araya MD  Authorized by: Joyce Araya MD     Consent Done?:  Yes (Verbal)  Anesthesia:     Endoscope type: 0 degree rigid.    Patient tolerance:  Patient tolerated the procedure well with no immediate complications  Nose:     Procedure Performed:  Nasal Endoscopy  External:      No external nasal deformity  Intranasal:      Mucosa no polyps     Mucosa ulcers not present     No mucosa lesions present     Turbinates not enlarged     Septum gross deformity (Significant swelling and asymmetry of the anterior portion of the septum due to hematoma, abscess, fracture.)  Nasopharynx:      No mucosa lesions     Adenoids not present     Posterior choanae patent     Eustachian tube patent    Joyce Araya MD  [facility]

## 2022-08-29 NOTE — PATIENT INSTRUCTIONS
Do not remove packing.   Ok to wash face around nose.     Apply mupirocin ointment to nose twice daily.   Use saline nasal spray onto packing 2-3 times daily.   Start clindamycin today.      Follow up in 3 days to remove packing.

## 2022-08-29 NOTE — PROGRESS NOTES
Chief Complaint   Patient presents with    Other     Fractured nose x two weeks ago (fell and hit face)        Subjective:     Fabiano Malik Jr. is a 61 y.o. male who was referred to me by Leticia Martin in consultation for nasal trauma and septal hematoma.  Patient was scheduled to see me last week but canceled appointment and rescheduled today.  He was seen in the ER 3 days ago for purulent drainage from the right nostril.  He had not seen anyone for the septal hematoma since his ED visit over a week ago.  He does note that he was given antibiotics in the ER but was not sent home with any medications.  He has been complaining of bilateral nasal congestion and some drainage from the nose since the time of his nasal trauma.      He does not have a prior history of nasal trauma.  There is not a prior history of sinonasal surgery.  He is not an active smoker.    Nasal x-ray 08/22/2022:  FINDINGS:  The bone mineralization is within normal limits.  There is an acute nondisplaced fracture involving the anterior aspect of the nasal bone.  The remainder of the osseous structures are unremarkable.     The paranasal sinuses are unremarkable.  The mastoid air cells are clear.     Impression:     Acute nondisplaced fracture of the nasal bone.    Past Medical History  He has a past medical history of Chronic back pain greater than 3 months duration, Depression, Diabetes mellitus, Diabetes mellitus, type 2, Hypertension, and Schizophrenia.    Past Surgical History  He has a past surgical history that includes Neck surgery; Appendectomy; Radiofrequency ablation of lumbar medial branch nerve at single level (Left, 5/29/2018); Colonoscopy (N/A, 5/31/2018); Radiofrequency ablation of lumbar medial branch nerve at single level (Right, 1/8/2019); Radiofrequency ablation of lumbar medial branch nerve at single level (Left, 3/12/2019); and Transforaminal epidural injection of steroid (Right, 9/12/2019).    Family History  His family  history includes Alzheimer's disease in his mother and sister; Cancer in his father; Diabetes in his mother; Heart attack in his father; Heart defect in his father and sister; Stroke in his father.    Social History  He reports that he has never smoked. He has never used smokeless tobacco. He reports current alcohol use. He reports that he does not use drugs.    Allergies  He is allergic to pcn [penicillins].    Medications  He has a current medication list which includes the following prescription(s): alprazolam, blood sugar diagnostic, blood-glucose meter, chlorthalidone, trulicity, duloxetine, hydroxyzine pamoate, lancets, meclizine, meloxicam, metformin, potassium chloride, pravastatin, sildenafil, acetaminophen, blood pressure cuff, clindamycin, mupirocin, olmesartan, omeprazole, pfizer covid-19 rogerio vaccn(pf), pregabalin, and shingrix (pf).    Review of Systems  General: negative for chills, fever or weight loss  Psychological: negative for mood changes or depression  Ophthalmic: negative for blurry vision, photophobia or eye pain  ENT: see HPI  Respiratory: no cough, shortness of breath, or wheezing  Cardiovascular: no chest pain or dyspnea on exertion  Gastrointestinal: no abdominal pain, change in bowel habits, or black/ bloody stools  Musculoskeletal: negative for gait disturbance or muscular weakness  Neurological: no syncope or seizures; no ataxia  Dermatological: negative for pruritis,  rash and jaundice  Hematologic/lymphatic: no easy bruising, no new adenopathy         Objective:       Physical Exam     Vitals:    08/29/22 1320   BP: 122/79   Pulse: (!) 122   Temp: 98.2 °F (36.8 °C)       Constitutional: Well appearing / communicating.  NAD.  Eyes: EOM I Bilaterally  Head/Face: Normocephalic.  Negative paranasal sinus pressure/tenderness.  Salivary glands WNL.  House Brackmann I Bilaterally.    Right Ear: External Auditory Canal WNL,TM w/o masses/lesions/perforations.  Auricle WNL.  Left Ear: External  Auditory Canal WNL,TM w/o masses/lesions/perforations. Auricle WNL.  Nose:  Swelling of the anterior portion of the septum with purulent drainage from the right anterior nasal septum with palpation.  Fluctuance noted of the swollen portion of the inferior anterior septum.  Positive nasal septal cartilage deviation.  inferior Turbinates 3+ bilaterally.  No septal perforation.  No masses/lesions. External nasal skin without masses/lesions.  Oral Cavity: Gingiva/lips WNL.  FOM Soft, no masses palpated. Oral Tongue mobile. Hard Palate WNL.   Oropharynx: BOT WNL. No masses/lesions noted. Tonsillar fossa/pharyngeal wall without lesions. Posterior oropharynx WNL.  Soft palate without masses. Midline uvula.   Neck/Lymphatic: No LAD I-VI bilaterally.  No thyromegaly.  No masses noted on exam.    Mirror laryngoscopy/nasopharyngoscopy: Active gag reflex.  Unable to perform.    Neuro/Psychiatric: AOx3.  Normal mood and affect.   Cardiovascular: Normal carotid pulses bilaterally, no increasing jugular venous distention noted at cervical region bilaterally.    Respiratory: Normal respiratory effort, no stridor, no retractions noted.      Procedure  See separate procedure note for incision and drainage of nasal septal abscess        Data Reviewed  Nasal x-ray reviewed above.      Assessment:     1. Nasal septal abscess    2. Closed fracture of nasal bone, initial encounter    3. Nasal septal hematoma, initial encounter          Plan:     I spent a total of 60 minutes on the day of the visit.This includes face to face time and non-face to face time preparing to see the patient (eg, review of tests), obtaining and/or reviewing separately obtained history, documenting clinical information in the electronic or other health record, performing procedures, independently interpreting results and communicating results to the patient/family/caregiver, or care coordinator.     Patient was placed on clindamycin antibiotics as well as  mupirocin ointment.  He is to use saline nasal spray to moisten the nasal packing that was placed today.  He may use OTC Tylenol and ibuprofen for any pain or discomfort.  Culture was sent of the fluid expressed from the nasal septal abscess.  Follow-up in 3 days for packing removal and reexamination.    Follow up in about 3 days (around 9/1/2022), or if symptoms worsen or fail to improve.    Joyce Araya MD  [facility]

## 2022-09-01 ENCOUNTER — OFFICE VISIT (OUTPATIENT)
Dept: OTOLARYNGOLOGY | Facility: CLINIC | Age: 62
End: 2022-09-01
Payer: MEDICARE

## 2022-09-01 VITALS
WEIGHT: 198 LBS | HEART RATE: 110 BPM | BODY MASS INDEX: 26.12 KG/M2 | SYSTOLIC BLOOD PRESSURE: 135 MMHG | DIASTOLIC BLOOD PRESSURE: 92 MMHG

## 2022-09-01 DIAGNOSIS — S00.33XS: ICD-10-CM

## 2022-09-01 DIAGNOSIS — S02.2XXD CLOSED FRACTURE OF NASAL BONE WITH ROUTINE HEALING, SUBSEQUENT ENCOUNTER: ICD-10-CM

## 2022-09-01 DIAGNOSIS — J34.0 NASAL SEPTAL ABSCESS: Primary | ICD-10-CM

## 2022-09-01 LAB — BACTERIA SPEC AEROBE CULT: ABNORMAL

## 2022-09-01 PROCEDURE — 3008F PR BODY MASS INDEX (BMI) DOCUMENTED: ICD-10-PCS | Mod: CPTII,S$GLB,, | Performed by: OTOLARYNGOLOGY

## 2022-09-01 PROCEDURE — 1159F PR MEDICATION LIST DOCUMENTED IN MEDICAL RECORD: ICD-10-PCS | Mod: CPTII,S$GLB,, | Performed by: OTOLARYNGOLOGY

## 2022-09-01 PROCEDURE — 3075F PR MOST RECENT SYSTOLIC BLOOD PRESS GE 130-139MM HG: ICD-10-PCS | Mod: CPTII,S$GLB,, | Performed by: OTOLARYNGOLOGY

## 2022-09-01 PROCEDURE — 3080F PR MOST RECENT DIASTOLIC BLOOD PRESSURE >= 90 MM HG: ICD-10-PCS | Mod: CPTII,S$GLB,, | Performed by: OTOLARYNGOLOGY

## 2022-09-01 PROCEDURE — 99024 PR POST-OP FOLLOW-UP VISIT: ICD-10-PCS | Mod: S$GLB,,, | Performed by: OTOLARYNGOLOGY

## 2022-09-01 PROCEDURE — 3008F BODY MASS INDEX DOCD: CPT | Mod: CPTII,S$GLB,, | Performed by: OTOLARYNGOLOGY

## 2022-09-01 PROCEDURE — 3080F DIAST BP >= 90 MM HG: CPT | Mod: CPTII,S$GLB,, | Performed by: OTOLARYNGOLOGY

## 2022-09-01 PROCEDURE — 1159F MED LIST DOCD IN RCRD: CPT | Mod: CPTII,S$GLB,, | Performed by: OTOLARYNGOLOGY

## 2022-09-01 PROCEDURE — 99999 PR PBB SHADOW E&M-EST. PATIENT-LVL IV: ICD-10-PCS | Mod: PBBFAC,,, | Performed by: OTOLARYNGOLOGY

## 2022-09-01 PROCEDURE — 99024 POSTOP FOLLOW-UP VISIT: CPT | Mod: S$GLB,,, | Performed by: OTOLARYNGOLOGY

## 2022-09-01 PROCEDURE — 3044F PR MOST RECENT HEMOGLOBIN A1C LEVEL <7.0%: ICD-10-PCS | Mod: CPTII,S$GLB,, | Performed by: OTOLARYNGOLOGY

## 2022-09-01 PROCEDURE — 3075F SYST BP GE 130 - 139MM HG: CPT | Mod: CPTII,S$GLB,, | Performed by: OTOLARYNGOLOGY

## 2022-09-01 PROCEDURE — 99999 PR PBB SHADOW E&M-EST. PATIENT-LVL IV: CPT | Mod: PBBFAC,,, | Performed by: OTOLARYNGOLOGY

## 2022-09-01 PROCEDURE — 3044F HG A1C LEVEL LT 7.0%: CPT | Mod: CPTII,S$GLB,, | Performed by: OTOLARYNGOLOGY

## 2022-09-01 PROCEDURE — 4010F ACE/ARB THERAPY RXD/TAKEN: CPT | Mod: CPTII,S$GLB,, | Performed by: OTOLARYNGOLOGY

## 2022-09-01 PROCEDURE — 4010F PR ACE/ARB THEARPY RXD/TAKEN: ICD-10-PCS | Mod: CPTII,S$GLB,, | Performed by: OTOLARYNGOLOGY

## 2022-09-01 NOTE — PATIENT INSTRUCTIONS
Do not blow nose for 1 week.  Sneeze with mouth open.  Do not take ibuprofen or aspirin for 1 week.  Place saline nasal gel or ointment, if prescribed, in nostrils at bedtime.  May use saline nose drops or gel during the day to prevent dryness.    Continue clindamycin.  My office will contact you if there are changes to the needed antibiotics.

## 2022-09-01 NOTE — PROGRESS NOTES
CC: Removal of nasal packing      HPI:  Patient is a 61 years old male who has previously seen me for drainage of a septal abscess/hematoma.  Patient had packing placed after drainage of the abscess 3 days ago.  He returns today for removal of packing and reexamination of wound.  Patient was placed on clindamycin and mupirocin ointment.    Since the last visit, the patient reports nasal congestion due to packing, otherwise has been doing well.  Patient is using ointment and taking his antibiotics.    Active Ambulatory Problems     Diagnosis Date Noted    Visual hallucinations 07/26/2016    Low back pain, non-specific 07/26/2016    Type 2 diabetes mellitus without complication, without long-term current use of insulin 07/26/2016    Hypertension associated with diabetes 07/26/2016    HLD (hyperlipidemia) 07/26/2016    Schizophrenia 07/26/2016    Hyperkalemia 07/26/2016    Hypotension 01/20/2017    Need for hepatitis C screening test 05/31/2018    Polyp of colon 05/31/2018    Class 1 obesity with serious comorbidity and body mass index (BMI) of 34.0 to 34.9 in adult 11/28/2018    Dizziness 11/28/2018    Need for vaccination against Streptococcus pneumoniae using pneumococcal conjugate vaccine 7 12/12/2018    Depression 12/12/2018    Anemia 12/12/2018    Renal insufficiency 12/12/2018    Chronic back pain 12/12/2018    Dehydration     Neck pain 01/08/2019    CKD (chronic kidney disease) stage 3, GFR 30-59 ml/min 01/14/2019    EKG abnormalities 01/14/2019    Chest pain 01/14/2019    Gastroesophageal reflux disease 01/14/2019    Chronic pain 01/15/2019    Palmar nodule, left 02/15/2019    Trigger index finger of right hand 02/15/2019    Lumbar facet arthropathy 03/12/2019    Hand or foot spasms 04/05/2019    Hearing loss of left ear 04/05/2019    Atherosclerosis of native coronary artery of native heart without angina pectoris  04/05/2019    Cerumen debris on tympanic membrane of right ear 04/05/2019    Flu vaccine need  09/04/2019    Head trauma 09/04/2019    Erectile dysfunction 09/04/2019    Rash 09/04/2019    Nonintractable headache 09/04/2019    Lumbar radiculopathy 09/12/2019    Carpal tunnel syndrome of right wrist 04/07/2022     Resolved Ambulatory Problems     Diagnosis Date Noted    Acute kidney failure 07/26/2016    MARY GRACE (acute kidney injury) 07/26/2016    Prerenal azotemia 01/21/2017    Diarrhea 01/21/2017    Preventative health care 11/28/2018    Tachycardia 11/28/2018    Upper respiratory tract infection 11/28/2018    SOB (shortness of breath) 11/28/2018    Troponin level elevated 11/28/2018     Past Medical History:   Diagnosis Date    Chronic back pain greater than 3 months duration     Diabetes mellitus     Diabetes mellitus, type 2     Hypertension        Review of Systems  General: negative for chills, fever or weight loss  Psychological: negative for mood changes or depression  Ophthalmic: negative for blurry vision, photophobia or eye pain  ENT: see HPI  Respiratory: no cough, shortness of breath, or wheezing  Cardiovascular: no chest pain or dyspnea on exertion  Gastrointestinal: no abdominal pain, change in bowel habits, or black/ bloody stools  Musculoskeletal: negative for gait disturbance or muscular weakness  Neurological: no syncope or seizures; no ataxia  Dermatological: negative for pruritis,  rash and jaundice  Hematologic/lymphatic: no easy bruising, no new adenopathy    Physical Exam     Vitals:    09/01/22 1118   BP: (!) 135/92   Pulse: 110       Constitutional: Well appearing / communicating.  NAD.  Eyes: EOM I Bilaterally  Head/Face: Normocephalic.  Negative paranasal sinus pressure/tenderness.  Salivary glands WNL.  House Brackmann I Bilaterally.    Right Ear: External Auditory Canal WNL,TM w/o masses/lesions/perforations.  Auricle WNL.  Left Ear: External Auditory Canal WNL,TM w/o masses/lesions/perforations. Auricle WNL.  Nose:  Packing removed from the nose bilaterally.  Septum intact and  without perforation or hematoma.  No reaccumulation of fluid where I and D performed.  Small amount of purulence at I and D site, but no accumulation of purulent fluid in the septum.  Inferior Turbinates 3+ bilaterally. No septal perforation. No masses/lesions. External nasal skin without masses/lesions.  Oral Cavity: Gingiva/lips WNL.  FOM Soft, no masses palpated. Oral Tongue mobile. Hard Palate WNL.   Oropharynx: BOT WNL. No masses/lesions noted. Tonsillar fossa/pharyngeal wall without lesions. Posterior oropharynx WNL.  Soft palate without masses. Midline uvula.   Neck/Lymphatic: No LAD I-VI bilaterally.  No thyromegaly.  No masses noted on exam.    Mirror laryngoscopy/nasopharyngoscopy: Active gag reflex.  Unable to perform.    Neuro/Psychiatric: AOx3.  Normal mood and affect.   Cardiovascular: Normal carotid pulses bilaterally, no increasing jugular venous distention noted at cervical region bilaterally.    Respiratory: Normal respiratory effort, no stridor, no retractions noted.    Diagnostics:  Culture from septal abscess 08/22/2022  Aerobic Bacterial Culture  Abnormal   METHICILLIN RESISTANT STAPHYLOCOCCUS AUREUS   Many     Resulting Agency [facility]        Susceptibility     Methicillin resistant Staphylococcus aureus     CULTURE, AEROBIC  (SPECIFY SOURCE)     Clindamycin <=0.5 mcg/mL Sensitive     Erythromycin >4 mcg/mL Resistant     Oxacillin >2 mcg/mL Resistant     Penicillin 8 mcg/mL Resistant     Tetracycline <=4 mcg/mL Sensitive     Trimeth/Sulfa <=0.5/9.5 m... Sensitive     Vancomycin 2 mcg/mL Sensitive                   Assessment/Plan:    Fabiano was seen today for packing removal and wound check.    Diagnoses and all orders for this visit:    Nasal septal abscess    Closed fracture of nasal bone with routine healing, subsequent encounter    Nasal septal hematoma, sequela      Patient will continue mupirocin ointment and clindamycin, which the staph is sensitive to.    Patient will follow-up in a few  months for reexamination, but if there are further issues prior to then he may come back sooner.  We discussed that he may need further repair the septum depending on healing over the next few months.    Joyce Araya MD  [facility]

## 2022-09-06 LAB — BACTERIA SPEC ANAEROBE CULT: ABNORMAL

## 2022-09-07 ENCOUNTER — TELEPHONE (OUTPATIENT)
Dept: OTOLARYNGOLOGY | Facility: CLINIC | Age: 62
End: 2022-09-07
Payer: MEDICARE

## 2022-09-07 NOTE — TELEPHONE ENCOUNTER
Attempted to return call. No answer, left voicemail to return my call.     ----- Message from Anny Dallas, Patient Care Assistant sent at 9/6/2022  4:04 PM CDT -----  Type:  Sooner Apoointment Request    Caller is requesting a sooner appointment.  Caller declined first available appointment listed below.  Caller will not accept being placed on the waitlist and is requesting a message be sent to doctor.  Name of Caller: pt  When is the first available appointment? 10/06  Symptoms: having hard time breathing and dizziness  Would the patient rather a call back or a response via [facility]?  Please call  Best Call Back Number: [phone]  Additional Information:

## 2022-09-15 ENCOUNTER — TELEPHONE (OUTPATIENT)
Dept: OTOLARYNGOLOGY | Facility: CLINIC | Age: 62
End: 2022-09-15
Payer: MEDICARE

## 2022-09-15 NOTE — TELEPHONE ENCOUNTER
Attempted to return call, no answer. Unable to leave a voicemail.    ----- Message from Zainab Hall sent at 9/15/2022  1:11 PM CDT -----  Type:  Patient Returning Call    Who Called:pt   Who Left Message for Patient:office  Does the patient know what this is regarding?:yes   Would the patient rather a call back or a response via [facility]? [facility]  Best Call Back Number:[phone]  Additional Information: pt can't receive vm

## 2022-09-23 DIAGNOSIS — F41.9 ANXIETY: ICD-10-CM

## 2022-09-23 RX ORDER — POTASSIUM CHLORIDE 750 MG/1
TABLET, EXTENDED RELEASE ORAL
Qty: 90 TABLET | Refills: 3 | Status: SHIPPED | OUTPATIENT
Start: 2022-09-23 | End: 2023-09-23 | Stop reason: SDUPTHER

## 2022-09-23 RX ORDER — ALPRAZOLAM 0.5 MG/1
0.5 TABLET ORAL NIGHTLY PRN
Qty: 30 TABLET | Refills: 0 | Status: SHIPPED | OUTPATIENT
Start: 2022-09-23 | End: 2022-10-24 | Stop reason: SDUPTHER

## 2022-09-23 NOTE — TELEPHONE ENCOUNTER
Care Due:                  Date            Visit Type   Department     Provider  --------------------------------------------------------------------------------                                EP -                              PRIMARY      [facility]  Last Visit: 08-      CARE ([facility])   LESLEY Fleming                              EP -                              PRIMARY      [facility]  Next Visit: 02-      CARE ([facility])   LESLEY Fleming                                                            Last  Test          Frequency    Reason                     Performed    Due Date  --------------------------------------------------------------------------------    HBA1C.......  6 months...  dulaglutide, metFORMIN...  02- 08-    Lipid Panel.  12 months..  pravastatin..............  12- 12-    [facility] Embedded Care Gaps. Reference number: [id number]. 9/23/2022   2:40:35 AM CDT

## 2022-09-26 LAB — FUNGUS SPEC CULT: NORMAL

## 2022-09-30 RX ORDER — MECLIZINE HCL 12.5 MG 12.5 MG/1
12.5 TABLET ORAL 3 TIMES DAILY PRN
Qty: 60 TABLET | Refills: 0 | Status: SHIPPED | OUTPATIENT
Start: 2022-09-30 | End: 2022-10-24 | Stop reason: SDUPTHER

## 2022-09-30 NOTE — TELEPHONE ENCOUNTER
No new care gaps identified.  [facility] Embedded Care Gaps. Reference number: [id number]. 9/30/2022   10:07:48 AM PERCYT

## 2022-10-03 ENCOUNTER — TELEPHONE (OUTPATIENT)
Dept: FAMILY MEDICINE | Facility: CLINIC | Age: 62
End: 2022-10-03

## 2022-10-03 ENCOUNTER — PATIENT MESSAGE (OUTPATIENT)
Dept: FAMILY MEDICINE | Facility: CLINIC | Age: 62
End: 2022-10-03
Payer: MEDICARE

## 2022-10-03 ENCOUNTER — TELEPHONE (OUTPATIENT)
Dept: FAMILY MEDICINE | Facility: CLINIC | Age: 62
End: 2022-10-03
Payer: MEDICARE

## 2022-10-03 DIAGNOSIS — F51.01 PRIMARY INSOMNIA: ICD-10-CM

## 2022-10-03 RX ORDER — HYDROXYZINE PAMOATE 50 MG/1
CAPSULE ORAL
Qty: 90 CAPSULE | Refills: 3 | Status: SHIPPED | OUTPATIENT
Start: 2022-10-03 | End: 2022-11-23 | Stop reason: SDUPTHER

## 2022-10-03 NOTE — TELEPHONE ENCOUNTER
Pt was transferred to my extension by the call center. Pt is requesting a sleep aid due to him not being able to sleep because the passing of his friend, a formal employee of [facility]. The pt states he is unable to sleep and is dealing with anxiety over the loss of his friend.

## 2022-10-10 ENCOUNTER — PATIENT MESSAGE (OUTPATIENT)
Dept: ADMINISTRATIVE | Facility: HOSPITAL | Age: 62
End: 2022-10-10
Payer: MEDICARE

## 2022-10-11 ENCOUNTER — TELEPHONE (OUTPATIENT)
Dept: INTERNAL MEDICINE | Facility: CLINIC | Age: 62
End: 2022-10-11
Payer: MEDICARE

## 2022-10-11 NOTE — TELEPHONE ENCOUNTER
----- Message from Cassandra Berger sent at 10/11/2022 10:14 AM CDT -----  Contact: [phone]  Who Called: PT    Regarding:  pt will not be able to make his appointment for this Thursday @ 1pm for his heart monitor hook up  he did not get his transportation in time. No dates are available in [place], they are all for [place] . He says he cannot go that far     Would the patient rather a call back or a response via [facility]? Call back    Best Call Back Number: [phone]    Additional Information: n/a

## 2022-10-19 ENCOUNTER — HOSPITAL ENCOUNTER (OUTPATIENT)
Dept: CARDIOLOGY | Facility: HOSPITAL | Age: 62
Discharge: HOME | End: 2022-10-19
Attending: FAMILY MEDICINE
Payer: MEDICARE

## 2022-10-19 PROCEDURE — 93226 XTRNL ECG REC<48 HR SCAN A/R: CPT

## 2022-10-19 PROCEDURE — 93227 HOLTER MONITOR - 24 HOUR (CUPID ONLY): ICD-10-PCS | Mod: ,,, | Performed by: INTERNAL MEDICINE

## 2022-10-19 PROCEDURE — 93227 XTRNL ECG REC<48 HR R&I: CPT | Mod: ,,, | Performed by: INTERNAL MEDICINE

## 2022-10-24 ENCOUNTER — TELEPHONE (OUTPATIENT)
Dept: NEUROLOGY | Facility: HOSPITAL | Age: 62
End: 2022-10-24
Payer: MEDICARE

## 2022-10-24 DIAGNOSIS — F41.9 ANXIETY: ICD-10-CM

## 2022-10-24 RX ORDER — MECLIZINE HCL 12.5 MG 12.5 MG/1
12.5 TABLET ORAL 3 TIMES DAILY PRN
Qty: 60 TABLET | Refills: 0 | Status: SHIPPED | OUTPATIENT
Start: 2022-10-24 | End: 2022-11-23 | Stop reason: SDUPTHER

## 2022-10-24 RX ORDER — ALPRAZOLAM 0.5 MG/1
0.5 TABLET ORAL NIGHTLY PRN
Qty: 30 TABLET | Refills: 0 | Status: SHIPPED | OUTPATIENT
Start: 2022-10-24 | End: 2022-11-23 | Stop reason: SDUPTHER

## 2022-10-24 NOTE — TELEPHONE ENCOUNTER
No new care gaps identified.  [facility] Embedded Care Gaps. Reference number: [id number]. 10/24/2022   7:28:00 AM CDT

## 2022-10-24 NOTE — TELEPHONE ENCOUNTER
----- Message from Zainab Hall sent at 10/24/2022  4:24 PM CDT -----  Type:  schedule appt    Who Called:Pt   Would the patient rather a call back or a response via [facility]? Call back  Best Call Back Number:[phone]  Additional Information:     Pt would like a call back to schedule dilma   Pt prefers 10:30am

## 2022-10-25 LAB
OHS CV EVENT MONITOR DAY: 0
OHS CV HOLTER LENGTH DECIMAL HOURS: 23.98
OHS CV HOLTER LENGTH HOURS: 23
OHS CV HOLTER LENGTH MINUTES: 59
OHS CV HOLTER SINUS AVERAGE HR: 93
OHS CV HOLTER SINUS MAX HR: 140
OHS CV HOLTER SINUS MIN HR: 66

## 2022-10-27 ENCOUNTER — TELEPHONE (OUTPATIENT)
Dept: ORTHOPEDICS | Facility: CLINIC | Age: 62
End: 2022-10-27
Payer: MEDICARE

## 2022-10-27 NOTE — TELEPHONE ENCOUNTER
----- Message from Cassandra Berger sent at 10/27/2022  9:26 AM CDT -----  Contact: [phone]  Who Called: PT  Regarding: nerve test results  Would the patient rather a call back or a response via [facility]? Call back  Best Call Back Number: [phone]  Additional Information: n/a

## 2022-10-31 ENCOUNTER — PATIENT MESSAGE (OUTPATIENT)
Dept: ADMINISTRATIVE | Facility: HOSPITAL | Age: 62
End: 2022-10-31
Payer: MEDICARE

## 2022-11-07 ENCOUNTER — OFFICE VISIT (OUTPATIENT)
Dept: ORTHOPEDICS | Facility: CLINIC | Age: 62
End: 2022-11-07
Payer: MEDICARE

## 2022-11-07 VITALS — WEIGHT: 198 LBS | HEIGHT: 73 IN | BODY MASS INDEX: 26.24 KG/M2

## 2022-11-07 DIAGNOSIS — G56.01 CARPAL TUNNEL SYNDROME OF RIGHT WRIST: Primary | ICD-10-CM

## 2022-11-07 PROCEDURE — 3044F HG A1C LEVEL LT 7.0%: CPT | Mod: CPTII,S$GLB,, | Performed by: ORTHOPAEDIC SURGERY

## 2022-11-07 PROCEDURE — 99999 PR PBB SHADOW E&M-EST. PATIENT-LVL III: ICD-10-PCS | Mod: PBBFAC,,, | Performed by: ORTHOPAEDIC SURGERY

## 2022-11-07 PROCEDURE — 3008F PR BODY MASS INDEX (BMI) DOCUMENTED: ICD-10-PCS | Mod: CPTII,S$GLB,, | Performed by: ORTHOPAEDIC SURGERY

## 2022-11-07 PROCEDURE — 99999 PR PBB SHADOW E&M-EST. PATIENT-LVL III: CPT | Mod: PBBFAC,,, | Performed by: ORTHOPAEDIC SURGERY

## 2022-11-07 PROCEDURE — 1159F MED LIST DOCD IN RCRD: CPT | Mod: CPTII,S$GLB,, | Performed by: ORTHOPAEDIC SURGERY

## 2022-11-07 PROCEDURE — 3044F PR MOST RECENT HEMOGLOBIN A1C LEVEL <7.0%: ICD-10-PCS | Mod: CPTII,S$GLB,, | Performed by: ORTHOPAEDIC SURGERY

## 2022-11-07 PROCEDURE — 99214 OFFICE O/P EST MOD 30 MIN: CPT | Mod: S$GLB,,, | Performed by: ORTHOPAEDIC SURGERY

## 2022-11-07 PROCEDURE — 1159F PR MEDICATION LIST DOCUMENTED IN MEDICAL RECORD: ICD-10-PCS | Mod: CPTII,S$GLB,, | Performed by: ORTHOPAEDIC SURGERY

## 2022-11-07 PROCEDURE — 99214 PR OFFICE/OUTPT VISIT, EST, LEVL IV, 30-39 MIN: ICD-10-PCS | Mod: S$GLB,,, | Performed by: ORTHOPAEDIC SURGERY

## 2022-11-07 PROCEDURE — 4010F ACE/ARB THERAPY RXD/TAKEN: CPT | Mod: CPTII,S$GLB,, | Performed by: ORTHOPAEDIC SURGERY

## 2022-11-07 PROCEDURE — 4010F PR ACE/ARB THEARPY RXD/TAKEN: ICD-10-PCS | Mod: CPTII,S$GLB,, | Performed by: ORTHOPAEDIC SURGERY

## 2022-11-07 PROCEDURE — 3008F BODY MASS INDEX DOCD: CPT | Mod: CPTII,S$GLB,, | Performed by: ORTHOPAEDIC SURGERY

## 2022-11-07 RX ORDER — TRAMADOL HYDROCHLORIDE 50 MG/1
50 TABLET ORAL EVERY 6 HOURS PRN
Qty: 30 TABLET | Refills: 0 | Status: SHIPPED | OUTPATIENT
Start: 2022-11-07 | End: 2022-11-18 | Stop reason: SDUPTHER

## 2022-11-07 RX ORDER — CLINDAMYCIN PHOSPHATE 900 MG/50ML
900 INJECTION, SOLUTION INTRAVENOUS
Status: CANCELLED | OUTPATIENT
Start: 2022-11-07

## 2022-11-07 NOTE — PROGRESS NOTES
CC:  Right carpal tunnel syndrome and right cubital tunnel syndrome        HPI:  Fabiano Malik Jr. is a very pleasant 61 y.o. male with ongoing symptoms right hand for the past 6-9 months   He reports numbness tingling of all fingers of the right hand   Recent nerve conduction study showed evidence of right carpal tunnel syndrome, right cubital tunnel syndrome, as well as cervical radiculopathy affecting both upper extremities   I went over those findings with the patient and gave him a copy the report   He also has diabetes            PAST MEDICAL HISTORY:   Past Medical History:   Diagnosis Date    Chronic back pain greater than 3 months duration     Depression     Diabetes mellitus     Diabetes mellitus, type 2     Hypertension     Schizophrenia      PAST SURGICAL HISTORY:   Past Surgical History:   Procedure Laterality Date    APPENDECTOMY      COLONOSCOPY N/A 5/31/2018    Procedure: COLONOSCOPY;  Surgeon: Guillaume Hatch MD;  Location: [facility];  Service: Endoscopy;  Laterality: N/A;    NECK SURGERY      RADIOFREQUENCY ABLATION OF LUMBAR MEDIAL BRANCH NERVE AT SINGLE LEVEL Left 5/29/2018    Procedure: RADIOFREQUENCY THERMOCOAGULATION (RFTC)-NERVE-MEDIAN BRANCH-LUMBAR- Left L2-3-4-5;  Surgeon: Donavon Dennis MD;  Location: [facility];  Service: Pain Management;  Laterality: Left;    RADIOFREQUENCY ABLATION OF LUMBAR MEDIAL BRANCH NERVE AT SINGLE LEVEL Right 1/8/2019    Procedure: Radiofrequency Ablation, Nerve, Spinal, Lumbar, Medial Branch, L2,3,4,5;  Surgeon: Alberto Hernandez Jr., MD;  Location: [facility];  Service: Pain Management;  Laterality: Right;  Pt is diabetic    RADIOFREQUENCY ABLATION OF LUMBAR MEDIAL BRANCH NERVE AT SINGLE LEVEL Left 3/12/2019    Procedure: Radiofrequency Ablation, Nerve, Spinal, Lumbar, Medial Branch, Left, L2,3,4,5;  Surgeon: Alberto Hernandez Jr., MD;  Location: [facility];  Service: Pain Management;  Laterality: Left;  Pt will be consented the day of  "procedure.    TRANSFORAMINAL EPIDURAL INJECTION OF STEROID Right 9/12/2019    Procedure: Injection,steroid,epidural,transforaminal,right,L4-5 and L5-S1;  Surgeon: Alberto Hernandez Jr., MD;  Location: [facility];  Service: Pain Management;  Laterality: Right;  PT IS DIABETIC     FAMILY HISTORY:   Family History   Problem Relation Age of Onset    Alzheimer's disease Mother     Diabetes Mother     Heart defect Father     Cancer Father     Stroke Father     Heart attack Father     Heart defect Sister     Alzheimer's disease Sister      SOCIAL HISTORY:   Social History     Socioeconomic History    Marital status:    Tobacco Use    Smoking status: Never    Smokeless tobacco: Never   Substance and Sexual Activity    Alcohol use: Yes     Comment: "couple of beers a day"    Drug use: No    Sexual activity: Not Currently       MEDICATIONS:   Current Outpatient Medications:     acetaminophen (TYLENOL) 325 MG tablet, Take 2 tablets (650 mg total) by mouth every 6 (six) hours as needed., Disp: 120 tablet, Rfl: 3    ALPRAZolam (XANAX) 0.5 MG tablet, Take 1 tablet (0.5 mg total) by mouth nightly as needed for Anxiety., Disp: 30 tablet, Rfl: 0    BLOOD PRESSURE CUFF Misc, 1 Units by Misc.(Non-Drug; Combo Route) route once daily., Disp: 1 each, Rfl: 0    blood sugar diagnostic (TRUE METRIX GLUCOSE TEST STRIP) Strp, use 1 strip to check glucose 2 (two) times a day., Disp: 200 strip, Rfl: 6    blood-glucose meter Misc, Use as instructed, Disp: 1 each, Rfl: 0    chlorthalidone (HYGROTEN) 25 MG Tab, Take 1 tablet (25 mg total) by mouth once daily., Disp: 90 tablet, Rfl: 3    dulaglutide (TRULICITY) 0.75 mg/0.5 mL pen injector, Inject 0.75 mg into the skin every 7 days., Disp: 12 pen, Rfl: 1    DULoxetine (CYMBALTA) 60 MG capsule, TAKE 2 CAPSULES BY MOUTH EVERY MORNING, Disp: 180 capsule, Rfl: 3    hydrOXYzine pamoate (VISTARIL) 50 MG Cap, TAKE 1 CAPSULE (50 MG) BY MOUTH NIGHTLY AS NEEDED FOR INSOMNIA, Disp: 90 capsule, Rfl: " 3    lancets 30 gauge Misc, 1 lancet by Misc.(Non-Drug; Combo Route) route twice daily., Disp: 200 each, Rfl: 6    meclizine (ANTIVERT) 12.5 mg tablet, TAKE 1 TABLET BY MOUTH THREE TIMES DAILY AS NEEDED FOR DIZZINESS, Disp: 60 tablet, Rfl: 0    meloxicam (MOBIC) 15 MG tablet, , Disp: , Rfl:     metFORMIN (GLUCOPHAGE) 1000 MG tablet, Take 1 tablet (1,000 mg total) by mouth 2 (two) times daily with meals., Disp: 180 tablet, Rfl: 3    omeprazole (PRILOSEC) 20 MG capsule, Take 1 capsule (20 mg total) by mouth before breakfast., Disp: 30 capsule, Rfl: 3    PFIZER COVID-19 CIELO VACCN,PF, 30 mcg/0.3 mL injection, , Disp: , Rfl:     potassium chloride (KLOR-CON) 10 MEQ TbSR, take 1 tablet by mouth once daily, Disp: 90 tablet, Rfl: 3    pravastatin (PRAVACHOL) 40 MG tablet, TAKE 1 TABLET(40 MG) BY MOUTH EVERY DAY, Disp: 90 tablet, Rfl: 3    SHINGRIX, PF, 50 mcg/0.5 mL injection, , Disp: , Rfl:     sildenafiL (VIAGRA) 100 MG tablet, Take 1 tablet (100 mg total) by mouth daily as needed for Erectile Dysfunction., Disp: 30 tablet, Rfl: 3    olmesartan (BENICAR) 20 MG tablet, Take 1 tablet (20 mg total) by mouth once daily., Disp: 90 tablet, Rfl: 0    pregabalin (LYRICA) 50 MG capsule, Take 1 capsule (50 mg total) by mouth 2 (two) times daily., Disp: 180 capsule, Rfl: 0  ALLERGIES:   Review of patient's allergies indicates:   Allergen Reactions    Pcn [penicillins] Other (See Comments)     Childhood rxn, pt does not recall type of rxn       Review of Systems:  Constitutional: no fever or chills  ENT: no nasal congestion or sore throat  Respiratory: no cough or shortness of breath  Cardiovascular: no chest pain or palpitations  Gastrointestinal: no nausea or vomiting, PUD, GERD, NSAID intolerance  Genitourinary: no hematuria or dysuria  Integument/Breast: no rash or pruritis  Hematologic/Lymphatic: no easy bruising or lymphadenopathy  Musculoskeletal: see HPI  Neurological: no seizures or tremors  Behavioral/Psych: no auditory or  "visual hallucinations      Physical Exam   Vitals:    11/07/22 1311   Weight: 89.8 kg (198 lb)   Height: 6' 1" (1.854 m)   PainSc: 10-Worst pain ever       Constitutional: Oriented to person, place, and time. Appears well-developed and well-nourished.   HENT:   Head: Normocephalic and atraumatic.   Nose: Nose normal.   Eyes: No scleral icterus.   Neck: Normal range of motion.   Cardiovascular: Normal rate and regular rhythm.    Pulses:       Radial pulses are 2+ on the right side, and 2+ on the left side.   Pulmonary/Chest: Effort normal and breath sounds normal.   Abdominal: Soft.   Neurological: Alert and oriented to person, place, and time.   Skin: Skin is warm.   Psychiatric: Normal mood and affect.     MUSCULOSKELETAL UPPER EXTREMITY:  Examination right hand mild swelling noted positive Tinel sign at the wrist positive Tinel sign at the elbow   Range of motion wrist fingers full  strength decreased sensation decreased on the tips of all fingers   Mild atrophy of the right thenar  No clawing no triggering            Diagnostic Studies:  Nerve conduction study demonstrates combined cervical radiculopathy with right carpal tunnel syndrome and right cubital tunnel syndrome        Assessment:  1. Right carpal tunnel syndrome.      2. Right cubital tunnel syndrome.      3. Double crush cervical radiculopathy    Plan:  I went over these findings with the patient and how they add up to multiple issues   We talked about options including surgical treatment for the right hand and elbow   Would like to go ahead proceed with surgery for the right carpal tunnel and ulnar nerve decompression at the right elbow   I explained to the patient this would not limited all of the symptoms including those coming from his neck he understands  Risks and benefits also discussed      The risks and benefits of surgery were discussed with the patient today and they understand.  The consent was signed in the office for " "surgery.      Everton Walter MD (Jay)  [facility]  Orthopedic Upper Extremity Surgery       "

## 2022-11-09 ENCOUNTER — TELEPHONE (OUTPATIENT)
Dept: NEUROLOGY | Facility: HOSPITAL | Age: 62
End: 2022-11-09
Payer: MEDICARE

## 2022-11-09 ENCOUNTER — OFFICE VISIT (OUTPATIENT)
Dept: NEUROLOGY | Facility: HOSPITAL | Age: 62
End: 2022-11-09
Attending: INTERNAL MEDICINE
Payer: MEDICARE

## 2022-11-09 VITALS
SYSTOLIC BLOOD PRESSURE: 93 MMHG | HEART RATE: 87 BPM | BODY MASS INDEX: 26.24 KG/M2 | WEIGHT: 198 LBS | DIASTOLIC BLOOD PRESSURE: 60 MMHG | HEIGHT: 73 IN

## 2022-11-09 DIAGNOSIS — E80.4 GILBERT'S SYNDROME: Primary | ICD-10-CM

## 2022-11-09 DIAGNOSIS — D12.6 TUBULAR ADENOMA OF COLON: ICD-10-CM

## 2022-11-09 DIAGNOSIS — Z12.11 COLON CANCER SCREENING: ICD-10-CM

## 2022-11-09 PROCEDURE — 99215 OFFICE O/P EST HI 40 MIN: CPT | Performed by: INTERNAL MEDICINE

## 2022-11-09 RX ORDER — SODIUM, POTASSIUM,MAG SULFATES 17.5-3.13G
2 SOLUTION, RECONSTITUTED, ORAL ORAL ONCE
Qty: 354 ML | Refills: 0 | Status: SHIPPED | OUTPATIENT
Start: 2022-11-09 | End: 2022-11-12

## 2022-11-09 NOTE — PROGRESS NOTES
[facility]    CC: CRC surveillance    HPI 61 y.o. male with PMH depression, DM, HTN,Schizophrenia, 4, <5 mm adenomas (2018) here for above chief complaint. Reports feeling well. Recently loss girlfriend, and grieving from this. Denies AH/VH/HI/SI. Denies n/v, abdominal pain, melena, heamtochezia, unintentional weight loss. Uses Aleeve about 1-2x per month    Physical Examination  General appearance: alert, cooperative, no distress  HENT: Normocephalic, atraumatic, neck symmetrical, no nasal discharge   Abdomen: soft, non-tender; bowel sounds normoactive; no organomegaly    Abdominal U/S 2021 = homogenous liver that is enlarged (17.5 cm)    Assessment:   #3, <5 mm adenomas - 2018  #CRC surveillance, average risk  #Normocytic anemia  #Elevated T bili - mild (1.2 on 9/2022), suspect Gilbert's (minor stable chronic problem)    Plan:  -colonoscopy on 11/17/22  -ordered suprep  -does not need to hold any meds prior to procedure  -Reinterview next visit but patient known to me from previous exam in 2018       Guillaume Hatch MD   [address], [address]   [place] [address] [phone]

## 2022-11-09 NOTE — PATIENT INSTRUCTIONS
SUPREP Instructions    [facility] [address]  Clinic Office [phone]  Endoscopy Lab [phone]    You are scheduled for a Colonoscopy with Dr. Hatch  on Thursday, November 17, 2022 at [facility] in [place].    Check in at the Hospital -1st floor, Information desk.   Call [phone] to reschedule.    An adult friend/family member must come with you to drive you home.  You cannot drive, take a taxi, Uber/Lyft or bus to leave the Endoscopy Center alone.  If you do not have someone to drive you home, your test will be cancelled.     Please follow the directions of your doctor if you take any pills that thin your blood. If you take these meds: Aggrenox, Brilinta, Effient, Eliquis, Lovenox, Plavix, Pletal, Pradaxa, Ticilid, Xarelto or Coumadin, let the doctor's office know.    DON'T: On the morning of the test do not take insulin or pills for diabetes.     DO: On the morning of the test, do take any pills for blood pressure, heart, anti-rejection and or seizures with a small sip of water. Bring any inhalers with you.    To have a good prep, you must follow these instructions - please do not use the directions from the pharmacy.    The doctor will send a prescription for the SUPREP.      The Day Before the test:    You can only drink CLEAR LIQUIDS the whole day before your test.  You can't eat any food for the whole day.    You CAN have:  Water, Coffee or decaf coffee (no milk or cream)  Tea  Soft drinks - regular and sugar free  Jello (green or yellow)  Apple Juice, white grape juice, white cranberry juice  Gatorade, Power Aid, Crystal Light, Jose Aid  Lemonade and Limeade  Bouillon, clear soup  Snowball, popsicles  YOU CAN'T DRINK ANYTHING RED, PURPLE ORANGE OR BLUE   YOU CAN'T DRINK ALCOHOL  ONLY DRINK WHAT IS ON THE LIST      At 5 pm the night before your test:    Pour the 1st bottle of SUPREP into the cup provided in the box. Add water to the line on the cup and mix well.   Drink the whole cup and then drink 2 more full cups of water over 1 hour.  This can be easier to drink if it is cold. You can mix it 20 minutes ahead of time and place in the refrigerator before you drink it.  You must drink it within 30-45 minutes of mixing it.  Do NOT pour the drink over ice.  You can drink it with a straw.    The Day of the test - We will call you 2 days before your test to tell you what time to get there.    5 hours before you come to the hospital (this may be in the middle of the night)  Pour the 2nd bottle of SUPREP into the cup provided in the box. Add water to the line on the cup and mix well.  Drink the whole cup and then drink 2 more full cups of water over 1 hour.  It might be easier to drink if it is cold. You can mix it 20 minutes ahead of time and place in the refrigerator before you drink it.  You must drink it within 30-45 minutes of mixing it.  Do NOT pour the drink over ice.  You can drink it with a straw.    YOU CAN'T EAT OR DRINK ANYTHING ELSE ONCE YOU FINISH THE PREP    Leave all valuables and jewelry at home. You will be at the hospital for 2-4 hours.    Call the Endoscopy department at [phone] with any questions about your procedure.

## 2022-11-15 ENCOUNTER — TELEPHONE (OUTPATIENT)
Dept: ENDOSCOPY | Facility: HOSPITAL | Age: 62
End: 2022-11-15
Payer: MEDICARE

## 2022-11-15 NOTE — TELEPHONE ENCOUNTER
Spoke with patient about arrival time @ 1000.     Prep instructions reviewed: the day before the procedure, follow a clear liquid diet all day, then start the first 1/2 of prep at 5pm and take 2nd 1/2 of prep @ 0500.  Pt must be completely NPO when prep completed @ 0700.              Medications: Do not take Insulin or oral diabetic medications the day of the procedure.  Take as prescribed: heart, seizure and blood pressure medication in the morning with a sip of water (less than an ounce).  Take any breathing medications and bring inhalers to hospital with you Leave all valuables and jewelry at home.     Wear comfortable clothes to procedure to change into hospital gown You cannot drive for 24 hours after your procedure because you will receive sedation for your procedure to make you comfortable.  A ride must be provided at discharge.

## 2022-11-17 ENCOUNTER — ANESTHESIA EVENT (OUTPATIENT)
Dept: ENDOSCOPY | Facility: HOSPITAL | Age: 62
End: 2022-11-17
Payer: MEDICARE

## 2022-11-17 ENCOUNTER — ANESTHESIA (OUTPATIENT)
Dept: ENDOSCOPY | Facility: HOSPITAL | Age: 62
End: 2022-11-17
Payer: MEDICARE

## 2022-11-17 ENCOUNTER — TELEPHONE (OUTPATIENT)
Dept: NEUROLOGY | Facility: HOSPITAL | Age: 62
End: 2022-11-17
Payer: MEDICARE

## 2022-11-17 ENCOUNTER — HOSPITAL ENCOUNTER (OUTPATIENT)
Facility: HOSPITAL | Age: 62
Discharge: HOME | End: 2022-11-17
Attending: INTERNAL MEDICINE | Admitting: INTERNAL MEDICINE
Payer: MEDICARE

## 2022-11-17 VITALS
DIASTOLIC BLOOD PRESSURE: 67 MMHG | RESPIRATION RATE: 17 BRPM | HEIGHT: 73 IN | TEMPERATURE: 98 F | OXYGEN SATURATION: 100 % | WEIGHT: 195 LBS | BODY MASS INDEX: 25.84 KG/M2 | SYSTOLIC BLOOD PRESSURE: 120 MMHG | HEART RATE: 86 BPM

## 2022-11-17 DIAGNOSIS — Z86.010 HISTORY OF COLON POLYPS: ICD-10-CM

## 2022-11-17 DIAGNOSIS — K63.5 POLYP OF COLON, UNSPECIFIED PART OF COLON, UNSPECIFIED TYPE: Primary | ICD-10-CM

## 2022-11-17 LAB — POCT GLUCOSE: 107 MG/DL (ref 70–110)

## 2022-11-17 PROCEDURE — 25000003 PHARM REV CODE 250: Performed by: NURSE ANESTHETIST, CERTIFIED REGISTERED

## 2022-11-17 PROCEDURE — D9220A PRA ANESTHESIA: ICD-10-PCS | Mod: PT,ANES,, | Performed by: STUDENT IN AN ORGANIZED HEALTH CARE EDUCATION/TRAINING PROGRAM

## 2022-11-17 PROCEDURE — 37000009 HC ANESTHESIA EA ADD 15 MINS: Performed by: INTERNAL MEDICINE

## 2022-11-17 PROCEDURE — 37000008 HC ANESTHESIA 1ST 15 MINUTES: Performed by: INTERNAL MEDICINE

## 2022-11-17 PROCEDURE — 25000003 PHARM REV CODE 250: Performed by: INTERNAL MEDICINE

## 2022-11-17 PROCEDURE — 88305 TISSUE EXAM BY PATHOLOGIST: CPT | Mod: 26,,, | Performed by: PATHOLOGY

## 2022-11-17 PROCEDURE — 88305 TISSUE EXAM BY PATHOLOGIST: CPT | Performed by: PATHOLOGY

## 2022-11-17 PROCEDURE — D9220A PRA ANESTHESIA: Mod: PT,CRNA,, | Performed by: NURSE ANESTHETIST, CERTIFIED REGISTERED

## 2022-11-17 PROCEDURE — 88305 TISSUE EXAM BY PATHOLOGIST: ICD-10-PCS | Mod: 26,,, | Performed by: PATHOLOGY

## 2022-11-17 PROCEDURE — 82962 GLUCOSE BLOOD TEST: CPT | Performed by: INTERNAL MEDICINE

## 2022-11-17 PROCEDURE — 27201089 HC SNARE, DISP (ANY): Performed by: INTERNAL MEDICINE

## 2022-11-17 PROCEDURE — D9220A PRA ANESTHESIA: Mod: PT,ANES,, | Performed by: STUDENT IN AN ORGANIZED HEALTH CARE EDUCATION/TRAINING PROGRAM

## 2022-11-17 PROCEDURE — 45385 COLONOSCOPY W/LESION REMOVAL: CPT | Mod: PT | Performed by: INTERNAL MEDICINE

## 2022-11-17 PROCEDURE — 63600175 PHARM REV CODE 636 W HCPCS: Performed by: NURSE ANESTHETIST, CERTIFIED REGISTERED

## 2022-11-17 PROCEDURE — D9220A PRA ANESTHESIA: ICD-10-PCS | Mod: PT,CRNA,, | Performed by: NURSE ANESTHETIST, CERTIFIED REGISTERED

## 2022-11-17 RX ORDER — SODIUM CHLORIDE 9 MG/ML
INJECTION, SOLUTION INTRAVENOUS CONTINUOUS PRN
Status: DISCONTINUED | OUTPATIENT
Start: 2022-11-17 | End: 2022-11-17

## 2022-11-17 RX ORDER — PROPOFOL 10 MG/ML
VIAL (ML) INTRAVENOUS
Status: DISCONTINUED | OUTPATIENT
Start: 2022-11-17 | End: 2022-11-17

## 2022-11-17 RX ORDER — LIDOCAINE HCL/PF 100 MG/5ML
SYRINGE (ML) INTRAVENOUS
Status: DISCONTINUED | OUTPATIENT
Start: 2022-11-17 | End: 2022-11-17

## 2022-11-17 RX ORDER — PROPOFOL 10 MG/ML
VIAL (ML) INTRAVENOUS CONTINUOUS PRN
Status: DISCONTINUED | OUTPATIENT
Start: 2022-11-17 | End: 2022-11-17

## 2022-11-17 RX ORDER — SODIUM CHLORIDE 0.9 % (FLUSH) 0.9 %
10 SYRINGE (ML) INJECTION
Status: DISCONTINUED | OUTPATIENT
Start: 2022-11-17 | End: 2022-11-17 | Stop reason: HOSPADM

## 2022-11-17 RX ORDER — PHENYLEPHRINE HYDROCHLORIDE 10 MG/ML
INJECTION INTRAVENOUS
Status: DISCONTINUED | OUTPATIENT
Start: 2022-11-17 | End: 2022-11-17

## 2022-11-17 RX ORDER — ONDANSETRON 2 MG/ML
4 INJECTION INTRAMUSCULAR; INTRAVENOUS DAILY PRN
Status: DISCONTINUED | OUTPATIENT
Start: 2022-11-17 | End: 2022-11-17 | Stop reason: HOSPADM

## 2022-11-17 RX ORDER — FENTANYL CITRATE 50 UG/ML
25 INJECTION, SOLUTION INTRAMUSCULAR; INTRAVENOUS EVERY 5 MIN PRN
Status: DISCONTINUED | OUTPATIENT
Start: 2022-11-17 | End: 2022-11-17 | Stop reason: HOSPADM

## 2022-11-17 RX ORDER — SODIUM CHLORIDE 9 MG/ML
INJECTION, SOLUTION INTRAVENOUS CONTINUOUS
Status: DISCONTINUED | OUTPATIENT
Start: 2022-11-17 | End: 2022-11-17 | Stop reason: HOSPADM

## 2022-11-17 RX ADMIN — PHENYLEPHRINE HYDROCHLORIDE 100 MCG: 10 INJECTION INTRAVENOUS at 11:11

## 2022-11-17 RX ADMIN — LIDOCAINE HYDROCHLORIDE 50 MG: 20 INJECTION, SOLUTION INTRAVENOUS at 11:11

## 2022-11-17 RX ADMIN — PROPOFOL 60 MG: 10 INJECTION, EMULSION INTRAVENOUS at 11:11

## 2022-11-17 RX ADMIN — SODIUM CHLORIDE: 0.9 INJECTION, SOLUTION INTRAVENOUS at 10:11

## 2022-11-17 RX ADMIN — SODIUM CHLORIDE: 0.9 INJECTION, SOLUTION INTRAVENOUS at 11:11

## 2022-11-17 RX ADMIN — PROPOFOL 150 MCG/KG/MIN: 10 INJECTION, EMULSION INTRAVENOUS at 11:11

## 2022-11-17 RX ADMIN — PROPOFOL 40 MG: 10 INJECTION, EMULSION INTRAVENOUS at 11:11

## 2022-11-17 NOTE — H&P
[facility]    CC: CRC surveillance    HPI 61 y.o. male with PMH depression, DM, HTN,Schizophrenia, 4, <5 mm adenomas (2018) here for above chief complaint. Reports feeling well. Recently loss girlfriend, and grieving from this. Denies AH/VH/HI/SI. Denies n/v, abdominal pain, melena, heamtochezia, unintentional weight loss. Uses Aleeve about 1-2x per month    Physical Examination  General appearance: alert, cooperative, no distress  HENT: Normocephalic, atraumatic, neck symmetrical, no nasal discharge   Abdomen: soft, non-tender; bowel sounds normoactive; no organomegaly    Abdominal U/S 2021 = homogenous liver that is enlarged (17.5 cm)    Assessment:   #3, <5 mm adenomas - 2018  #CRC surveillance, average risk  #Normocytic anemia  #Elevated T bili - mild (1.2 on 9/2022), suspect Gilbert's (minor stable chronic problem)    Plan:  -Proceed with colonoscopy today      Guillaume Hatch MD   [address], [address]   [place] [address] [phone]

## 2022-11-17 NOTE — [employer] PATIENT INSTRUCTIONS
Discharge Summary/Instructions after an Endoscopic Procedure  Patient Name: Fabiano Malik  Patient MRN: [id number]  Patient YOB: 1960  Thursday, November 17, 2022  Guillaume Hatch MD  Dear patient,  As a result of recent federal legislation (The Federal Cures Act), you may   receive lab or pathology results from your procedure in your [facility]   account before your physician is able to contact you. Your physician or   their representative will relay the results to you with their   recommendations at their soonest availability.  Thank you,  Your health is very important to us during the Covid Crisis. Following your   procedure today, you will receive a daily text for 2 weeks asking about   signs or symptoms of Covid 19.  Please respond to this text when you   receive it so we can follow up and keep you as safe as possible.   RESTRICTIONS:  During your procedure today, you received medications for sedation.  These   medications may affect your judgment, balance and coordination.  Therefore,   for 24 hours, you have the following restrictions:   - DO NOT drive a car, operate machinery, make legal/financial decisions,   sign important papers or drink alcohol.    ACTIVITY:  Today: no heavy lifting, straining or running due to procedural   sedation/anesthesia.  The following day: return to full activity including work.  DIET:  Eat and drink normally unless instructed otherwise.     TREATMENT FOR COMMON SIDE EFFECTS:  - Mild abdominal pain, nausea, belching, bloating or excessive gas:  rest,   eat lightly and use a heating pad.  - Sore Throat: treat with throat lozenges and/or gargle with warm salt   water.  - Because air was used during the procedure, expelling large amounts of air   from your rectum or belching is normal.  - If a bowel prep was taken, you may not have a bowel movement for 1-3 days.    This is normal.  SYMPTOMS TO WATCH FOR AND REPORT TO YOUR PHYSICIAN:  1. Abdominal pain or bloating,  other than gas cramps.  2. Chest pain.  3. Back pain.  4. Signs of infection such as: chills or fever occurring within 24 hours   after the procedure.  5. Rectal bleeding, which would show as bright red, maroon, or black stools.   (A tablespoon of blood from the rectum is not serious, especially if   hemorrhoids are present.)  6. Vomiting.  7. Weakness or dizziness.  GO DIRECTLY TO THE NEAREST EMERGENCY ROOM IF YOU HAVE ANY OF THE FOLLOWING:      Difficulty breathing              Chills and/or fever over 101 F   Persistent vomiting and/or vomiting blood   Severe abdominal pain   Severe chest pain   Black, tarry stools   Bleeding- more than one tablespoon   Any other symptom or condition that you feel may need urgent attention  Your doctor recommends these additional instructions:  If any biopsies were taken, your doctors clinic will contact you in 1 to 2   weeks with any results.  - Discharge patient to home.   - Resume previous diet.   - Continue present medications.   - Await pathology results.   - Repeat colonoscopy likely in 3 years for surveillance pending pathology   results  For questions, problems or results please call your physician - Guillaume Hatch MD.  EMERGENCY PHONE NUMBER: [phone],  LAB RESULTS: [phone]  IF A COMPLICATION OR EMERGENCY SITUATION ARISES AND YOU ARE UNABLE TO REACH   YOUR PHYSICIAN - GO DIRECTLY TO THE EMERGENCY ROOM.  MD Guillaume Franco MD  11/17/2022 12:02:19 PM  This report has been verified and signed electronically.  Dear patient,  As a result of recent federal legislation (The Federal Cures Act), you may   receive lab or pathology results from your procedure in your [facility]   account before your physician is able to contact you. Your physician or   their representative will relay the results to you with their   recommendations at their soonest availability.  Thank you,  [employer]

## 2022-11-17 NOTE — TELEPHONE ENCOUNTER
----- Message from Carmella Villa sent at 11/14/2022  8:24 AM CST -----  Regarding: procedure time  Contact: [phone]  Patient is requesting a call back regarding setting up his transportation. He needs the time to arrive at the hospital 3 days in advance.   Would the patient rather a call back or a response via [facility]?  Call   Best Call Back Number:  [phone]  Additional Information:

## 2022-11-17 NOTE — TRANSFER OF CARE
"Anesthesia Transfer of Care Note    Patient: Fabiano Malik Jr.    Procedure(s) Performed: Procedure(s) (LRB):  COLONOSCOPY (N/A)    Patient location: GI    Anesthesia Type: general    Transport from OR: Transported from OR on room air with adequate spontaneous ventilation    Post pain: adequate analgesia    Post assessment: no apparent anesthetic complications and tolerated procedure well    Post vital signs: stable    Level of consciousness: awake, alert and oriented    Nausea/Vomiting: no nausea/vomiting    Complications: none    Transfer of care protocol was followed      Last vitals:   Visit Vitals  /69 (BP Location: Left arm, Patient Position: Lying)   Pulse 90   Temp 36.7 °C (98.1 °F) (Temporal)   Resp 18   Ht 6' 1" (1.854 m)   Wt 88.5 kg (195 lb)   SpO2 97%   BMI 25.73 kg/m²     "

## 2022-11-17 NOTE — ANESTHESIA PREPROCEDURE EVALUATION
[facility]  Anesthesia Pre-Operative Evaluation         Patient Name: Fabiano Malik Jr.  YOB: 1960  MRN: [id number]    SUBJECTIVE:     11/17/2022    Procedure(s) (LRB):  COLONOSCOPY (N/A)    Fabiano Malik Jr. is a 61 y.o. male here for Procedure(s) (LRB):  COLONOSCOPY (N/A)    Drips:     Patient Active Problem List   Diagnosis    Visual hallucinations    Low back pain, non-specific    Type 2 diabetes mellitus without complication, without long-term current use of insulin    Hypertension associated with diabetes    HLD (hyperlipidemia)    Schizophrenia    Hyperkalemia    Hypotension    Need for hepatitis C screening test    Polyp of colon    Class 1 obesity with serious comorbidity and body mass index (BMI) of 34.0 to 34.9 in adult    Dizziness    Need for vaccination against Streptococcus pneumoniae using pneumococcal conjugate vaccine 7    Depression    Anemia    Renal insufficiency    Chronic back pain    Dehydration    Neck pain    CKD (chronic kidney disease) stage 3, GFR 30-59 ml/min    EKG abnormalities    Chest pain    Gastroesophageal reflux disease    Chronic pain    Palmar nodule, left    Trigger index finger of right hand    Lumbar facet arthropathy    Hand or foot spasms    Hearing loss of left ear    Atherosclerosis of native coronary artery of native heart without angina pectoris     Cerumen debris on tympanic membrane of right ear    Flu vaccine need    Head trauma    Erectile dysfunction    Rash    Nonintractable headache    Lumbar radiculopathy    Carpal tunnel syndrome of right wrist       Review of patient's allergies indicates:   Allergen Reactions    Pcn [penicillins] Other (See Comments)     Childhood rxn, pt does not recall type of rxn       No current facility-administered medications on file prior to encounter.     Current Outpatient Medications on File Prior to Encounter   Medication Sig Dispense  Refill    acetaminophen (TYLENOL) 325 MG tablet Take 2 tablets (650 mg total) by mouth every 6 (six) hours as needed. 120 tablet 3    ALPRAZolam (XANAX) 0.5 MG tablet Take 1 tablet (0.5 mg total) by mouth nightly as needed for Anxiety. 30 tablet 0    BLOOD PRESSURE CUFF Misc 1 Units by Misc.(Non-Drug; Combo Route) route once daily. 1 each 0    blood sugar diagnostic (TRUE METRIX GLUCOSE TEST STRIP) Strp use 1 strip to check glucose 2 (two) times a day. 200 strip 6    blood-glucose meter Misc Use as instructed 1 each 0    chlorthalidone (HYGROTEN) 25 MG Tab Take 1 tablet (25 mg total) by mouth once daily. 90 tablet 3    dulaglutide (TRULICITY) 0.75 mg/0.5 mL pen injector Inject 0.75 mg into the skin every 7 days. 12 pen 1    DULoxetine (CYMBALTA) 60 MG capsule TAKE 2 CAPSULES BY MOUTH EVERY MORNING 180 capsule 3    hydrOXYzine pamoate (VISTARIL) 50 MG Cap TAKE 1 CAPSULE (50 MG) BY MOUTH NIGHTLY AS NEEDED FOR INSOMNIA 90 capsule 3    lancets 30 gauge Misc 1 lancet by Misc.(Non-Drug; Combo Route) route twice daily. 200 each 6    meclizine (ANTIVERT) 12.5 mg tablet TAKE 1 TABLET BY MOUTH THREE TIMES DAILY AS NEEDED FOR DIZZINESS 60 tablet 0    meloxicam (MOBIC) 15 MG tablet       metFORMIN (GLUCOPHAGE) 1000 MG tablet Take 1 tablet (1,000 mg total) by mouth 2 (two) times daily with meals. 180 tablet 3    olmesartan (BENICAR) 20 MG tablet Take 1 tablet (20 mg total) by mouth once daily. 90 tablet 0    omeprazole (PRILOSEC) 20 MG capsule Take 1 capsule (20 mg total) by mouth before breakfast. 30 capsule 3    PFIZER COVID-19 CIELO VACCN,PF, 30 mcg/0.3 mL injection       potassium chloride (KLOR-CON) 10 MEQ TbSR take 1 tablet by mouth once daily 90 tablet 3    pravastatin (PRAVACHOL) 40 MG tablet TAKE 1 TABLET(40 MG) BY MOUTH EVERY DAY 90 tablet 3    pregabalin (LYRICA) 50 MG capsule Take 1 capsule (50 mg total) by mouth 2 (two) times daily. 180 capsule 0    SHINGRIX, PF, 50 mcg/0.5 mL injection        "sildenafiL (VIAGRA) 100 MG tablet Take 1 tablet (100 mg total) by mouth daily as needed for Erectile Dysfunction. 30 tablet 3    traMADoL (ULTRAM) 50 mg tablet Take 1 tablet (50 mg total) by mouth every 6 (six) hours as needed for Pain. 30 tablet 0       Past Surgical History:   Procedure Laterality Date    APPENDECTOMY      COLONOSCOPY N/A 5/31/2018    Procedure: COLONOSCOPY;  Surgeon: Guillaume Hatch MD;  Location: [facility] ENDO;  Service: Endoscopy;  Laterality: N/A;    NECK SURGERY      RADIOFREQUENCY ABLATION OF LUMBAR MEDIAL BRANCH NERVE AT SINGLE LEVEL Left 5/29/2018    Procedure: RADIOFREQUENCY THERMOCOAGULATION (RFTC)-NERVE-MEDIAN BRANCH-LUMBAR- Left L2-3-4-5;  Surgeon: Donavon Dennis MD;  [place] [facility] [place];  Service: Pain Management;  Laterality: Left;    RADIOFREQUENCY ABLATION OF LUMBAR MEDIAL BRANCH NERVE AT SINGLE LEVEL Right 1/8/2019    Procedure: Radiofrequency Ablation, Nerve, Spinal, Lumbar, Medial Branch, L2,3,4,5;  Surgeon: Alberto Hernandez Jr., MD;  Location: [facility];  Service: Pain Management;  Laterality: Right;  Pt is diabetic    RADIOFREQUENCY ABLATION OF LUMBAR MEDIAL BRANCH NERVE AT SINGLE LEVEL Left 3/12/2019    Procedure: Radiofrequency Ablation, Nerve, Spinal, Lumbar, Medial Branch, Left, L2,3,4,5;  Surgeon: Alberto Hernandez Jr., MD;  Location: [facility];  Service: Pain Management;  Laterality: Left;  Pt will be consented the day of procedure.    TRANSFORAMINAL EPIDURAL INJECTION OF STEROID Right 9/12/2019    Procedure: Injection,steroid,epidural,transforaminal,right,L4-5 and L5-S1;  Surgeon: Alberto Hernandez Jr., MD;  Location: [facility];  Service: Pain Management;  Laterality: Right;  PT IS DIABETIC       Social History     Socioeconomic History    Marital status:    Tobacco Use    Smoking status: Never    Smokeless tobacco: Never   Substance and Sexual Activity    Alcohol use: Yes     Comment: "couple of beers a day"    Drug use: No    " Sexual activity: Not Currently         OBJECTIVE:     Vital Signs Range (Last 24H):       Significant Labs:  Lab Results   Component Value Date    WBC 10.16 08/26/2022    HGB 12.9 (L) 08/26/2022    HCT 35.7 (L) 08/26/2022     08/26/2022    CHOL 191 12/07/2021    TRIG 147 12/07/2021    HDL 44 12/07/2021    ALT 15 08/26/2022    AST 19 08/26/2022     08/26/2022    K 3.5 08/26/2022    CL 92 (L) 08/26/2022    CREATININE 1.4 08/26/2022    BUN 17 08/26/2022    CO2 31 (H) 08/26/2022    TSH 12.023 (H) 05/10/2021    PSA 0.73 03/27/2021    INR 1.0 11/28/2018    HGBA1C 5.4 02/02/2022       Diagnostic Studies:    EKG:   Results for orders placed or performed during the hospital encounter of 08/26/22   EKG 12-lead    Collection Time: 08/26/22  1:15 PM    Narrative    Test Reason : R00.0,    Vent. Rate : 105 BPM     Atrial Rate : 105 BPM     P-R Int : 168 ms          QRS Dur : 100 ms      QT Int : 368 ms       P-R-T Axes : 064 071 041 degrees     QTc Int : 486 ms    Sinus tachycardia  Cannot rule out Anterior infarct ,age undetermined  Abnormal ECG  When compared with ECG of 18-FEB-2020 15:21,  QRS duration has decreased  Minimal criteria for Anterior infarct are now Present  Confirmed by Megan KILPATRICK, Humberto AWAN ([id number]) on 8/29/2022 2:28:15 PM    Referred By: [email or web address]   SELF           Confirmed By:Humberto Guzman MD       2D ECHO:  TTE:  No results found for this or any previous visit.  Results for orders placed or performed during the hospital encounter of 08/26/22   Echo Saline Bubble? No   Result Value Ref Range    AV mean gradient 3 mmHg    Ao peak jae 1.20 m/s    Ao VTI 18.67 cm    IVS 0.98 0.6 - 1.1 cm    Left Atrium Major Axis 4.62 cm    Left Atrium Minor Axis 4.94 cm    LVIDd 5.14 3.5 - 6.0 cm    LVIDs 3.97 2.1 - 4.0 cm    LVOT diameter 2.35 cm    LVOT peak VTI 14.69 cm    Posterior Wall 0.83 0.6 - 1.1 cm    RA Major Axis 4.44 cm    RA Width 2.55 cm    TR Max Jae 2.25 m/s    TDI LATERAL 0.08 m/s    TDI SEPTAL  "0.06 m/s    LA WIDTH 3.54 cm    Ao root annulus 3.69 cm    PV PEAK VELOCITY 0.98 cm/s    LV Diastolic Volume 126.08 mL    LV Systolic Volume 68.88 mL    LVOT peak virgen 0.99 m/s    Mr max virgen 0.03 m/s    LA volume (mod) 47.66 cm3    RV S' 17.71 cm/s    FS 23 %    LV mass 166.92 g    Left Ventricle Relative Wall Thickness 0.32 cm    AV valve area 3.41 cm2    AV Velocity Ratio 0.83     AV index (prosthetic) 0.79     Mean e' 0.07 m/s    LVOT area 4.3 cm2    LVOT stroke volume 63.68 cm3    AV peak gradient 6 mmHg    Triscuspid Valve Regurgitation Peak Gradient 20 mmHg    BSA 2.23 m2    LV Systolic Volume Index 31.2 mL/m2    LV Diastolic Volume Index 57.05 mL/m2    LV Mass Index 76 g/m2    LA Volume Index (Mod) 21.6 mL/m2    Right Atrial Pressure (from IVC) 8 mmHg    EF 75 %    TV rest pulmonary artery pressure 28 mmHg    Narrative    · The left ventricle is normal in size with hyperdynamic systolic   function.  · The estimated ejection fraction is 75%.  · Indeterminate left ventricular diastolic function.  · The estimated PA systolic pressure is 28 mmHg.  · Normal right ventricular size with normal right ventricular systolic   function.  · Intermediate central venous pressure (8 mmHg).  · The study was difficult due to patient's heart rhythm. Tachycardia was   present during the study.           Holter 10/19/2022: "Reported symptoms correlated with normal sinus rhythm and sinus tachycardia with HR's 88-"       Pre-op Assessment    I have reviewed the Patient Summary Reports.     I have reviewed the Nursing Notes. I have reviewed the NPO Status.   I have reviewed the Medications.     Review of Systems  Anesthesia Hx:  No problems with previous Anesthesia  History of prior surgery of interest to airway management or planning:  Denies Personal Hx of Anesthesia complications.   Social:  Non-Smoker, Social Alcohol Use    Hematology/Oncology:  Hematology Normal   Oncology Normal     EENT/Dental:EENT/Dental Normal "   Cardiovascular:   Hypertension CAD      Pulmonary:  Pulmonary Normal    Renal/:   Chronic Renal Disease    Hepatic/GI:   GERD    Neurological:   Neuromuscular Disease, Headaches    Endocrine:   Diabetes    Psych:   Psychiatric History (Schizophrenia) depression          Physical Exam  General: Well nourished, Cooperative, Alert and Anxious  + Beard  Airway:  Mallampati: II / I  Mouth Opening: Normal  TM Distance: Normal      Dental:  Intact    Chest/Lungs:  Normal Respiratory Rate        Anesthesia Plan  Type of Anesthesia, risks & benefits discussed:    Anesthesia Type: Gen Natural Airway  Intra-op Monitoring Plan: Standard ASA Monitors  Post Op Pain Control Plan: multimodal analgesia and IV/PO Opioids PRN  Induction:  IV  Informed Consent: Informed consent signed with the Patient and all parties understand the risks and agree with anesthesia plan.  All questions answered.   ASA Score: 3  Day of Surgery Review of History & Physical: H&P Update referred to the surgeon/provider.    Ready For Surgery From Anesthesia Perspective.     .

## 2022-11-18 DIAGNOSIS — N52.9 ERECTILE DYSFUNCTION, UNSPECIFIED ERECTILE DYSFUNCTION TYPE: ICD-10-CM

## 2022-11-18 RX ORDER — SILDENAFIL 100 MG/1
100 TABLET, FILM COATED ORAL DAILY PRN
Qty: 30 TABLET | Refills: 3 | Status: SHIPPED | OUTPATIENT
Start: 2022-11-18 | End: 2023-12-19 | Stop reason: SDUPTHER

## 2022-11-18 RX ORDER — TRAMADOL HYDROCHLORIDE 50 MG/1
50 TABLET ORAL EVERY 6 HOURS PRN
Qty: 30 TABLET | Refills: 0 | Status: SHIPPED | OUTPATIENT
Start: 2022-11-18 | End: 2022-11-28

## 2022-11-18 NOTE — TELEPHONE ENCOUNTER
Vasu Walter,     Please see the attached refill request. Medication was prescribed on 11/7/2022, at patient's last office visit.     Thanks!!!

## 2022-11-18 NOTE — TELEPHONE ENCOUNTER
No new care gaps identified.  [facility] Embedded Care Gaps. Reference number: [id number]. 11/18/2022   10:48:34 AM CST

## 2022-11-21 PROBLEM — Z86.010 HISTORY OF COLON POLYPS: Status: ACTIVE | Noted: 2022-11-21

## 2022-11-21 PROBLEM — Z86.0100 HISTORY OF COLON POLYPS: Status: ACTIVE | Noted: 2022-11-21

## 2022-11-22 ENCOUNTER — TELEPHONE (OUTPATIENT)
Dept: NEUROLOGY | Facility: HOSPITAL | Age: 62
End: 2022-11-22
Payer: MEDICARE

## 2022-11-22 NOTE — TELEPHONE ENCOUNTER
----- Message from Yeny Adams sent at 11/22/2022 10:32 AM CST -----  Type:  Test Results    Who Called: pt  Name of Test (Lab/Mammo/Etc):  colonoscopy  Date of Test: 11/17  Ordering Provider:   Where the test was performed: [facility]  Would the patient rather a call back or a response via [facility]? call  Best Call Back Number: [phone] (M)   Additional Information:

## 2022-11-23 DIAGNOSIS — F51.01 PRIMARY INSOMNIA: ICD-10-CM

## 2022-11-23 DIAGNOSIS — F41.9 ANXIETY: ICD-10-CM

## 2022-11-23 LAB
FINAL PATHOLOGIC DIAGNOSIS: NORMAL
GROSS: NORMAL
Lab: NORMAL

## 2022-11-23 RX ORDER — ALPRAZOLAM 0.5 MG/1
0.5 TABLET ORAL NIGHTLY PRN
Qty: 30 TABLET | Refills: 0 | Status: SHIPPED | OUTPATIENT
Start: 2022-11-23 | End: 2022-12-19 | Stop reason: SDUPTHER

## 2022-11-23 RX ORDER — HYDROXYZINE PAMOATE 50 MG/1
CAPSULE ORAL
Qty: 90 CAPSULE | Refills: 3 | Status: SHIPPED | OUTPATIENT
Start: 2022-11-23 | End: 2023-07-07 | Stop reason: SDUPTHER

## 2022-11-23 RX ORDER — MECLIZINE HCL 12.5 MG 12.5 MG/1
12.5 TABLET ORAL 3 TIMES DAILY PRN
Qty: 60 TABLET | Refills: 0 | Status: SHIPPED | OUTPATIENT
Start: 2022-11-23 | End: 2022-12-19 | Stop reason: SDUPTHER

## 2022-11-23 NOTE — TELEPHONE ENCOUNTER
Care Due:                  Date            Visit Type   Department     Provider  --------------------------------------------------------------------------------                                EP -                              PRIMARY      [facility]  Last Visit: 08-      CARE ([facility])   LESLEY Fleming                              EP -                              PRIMARY      [facility]  Next Visit: 02-      CARE ([facility])   LESLEY Fleming                                                            Last  Test          Frequency    Reason                     Performed    Due Date  --------------------------------------------------------------------------------    HBA1C.......  6 months...  dulaglutide, metFORMIN...  02- 08-    Lipid Panel.  12 months..  pravastatin..............  12- 12-    [facility] Embedded Care Gaps. Reference number: [id number]. 11/23/2022   9:16:05 AM CST

## 2022-11-30 ENCOUNTER — DOCUMENTATION ONLY (OUTPATIENT)
Dept: NEUROLOGY | Facility: HOSPITAL | Age: 62
End: 2022-11-30
Payer: MEDICARE

## 2022-11-30 ENCOUNTER — OFFICE VISIT (OUTPATIENT)
Dept: NEUROLOGY | Facility: HOSPITAL | Age: 62
End: 2022-11-30
Attending: INTERNAL MEDICINE
Payer: MEDICARE

## 2022-11-30 DIAGNOSIS — D64.9 ANEMIA, NORMOCYTIC NORMOCHROMIC: Primary | ICD-10-CM

## 2022-11-30 NOTE — PROGRESS NOTES
Attempted to contact patient via phone call but no answer after multiple attempts. Voicemail not set up either. Sent one way secure text message with [employer] application explaining the following test results:    Final Pathologic Diagnosis 1. Colon, cecum, polypectomy:   - Tubular adenoma   2. Colon, transverse, polyp, polypectomy:   - Tubular adenoma   3. Colon, transverse, polyp, polypectomy:   - Fragments of tubular adenoma   4. Rectum, polyp, polypectomy:   - Benign colorectal mucosa with scattered lymphoid aggregates   - Multiple additional levels have been examined   - Negative for dysplasia and malignancy      Explained that he will need to get his next colonscopy in 3 years given these results. Provided him with clinic call back number should.

## 2022-12-01 ENCOUNTER — TELEPHONE (OUTPATIENT)
Dept: GASTROENTEROLOGY | Facility: CLINIC | Age: 62
End: 2022-12-01
Payer: MEDICARE

## 2022-12-01 NOTE — TELEPHONE ENCOUNTER
Pt provided path results.    Final Pathologic Diagnosis 1. Colon, cecum, polypectomy:   - Tubular adenoma   2. Colon, transverse, polyp, polypectomy:   - Tubular adenoma   3. Colon, transverse, polyp, polypectomy:   - Fragments of tubular adenoma   4. Rectum, polyp, polypectomy:   - Benign colorectal mucosa with scattered lymphoid aggregates   - Multiple additional levels have been examined   - Negative for dysplasia and malignancy       Explained that he will need to get his next colonscopy in 3 years given these results.  Pt acknowledged by repeating to repeat colonoscopy in 3 years.

## 2022-12-01 NOTE — TELEPHONE ENCOUNTER
----- Message from Mere Branch sent at 12/1/2022  3:59 PM CST -----  Regarding: Results  Contact: [phone]  Calling in regards to missed call for results of colonoscopy. Please call and discuss.

## 2022-12-01 NOTE — TELEPHONE ENCOUNTER
----- Message from Marek Pace sent at 12/1/2022 10:15 AM CST -----  .Type: Test Results  Who Called: Pt  Name of Test (Lab/Mammo/Etc): Colonoscopy  Would the patient rather a call back or a response via [facility]? Call  Best Call Back Number: [phone]

## 2022-12-08 ENCOUNTER — TELEPHONE (OUTPATIENT)
Dept: ORTHOPEDICS | Facility: CLINIC | Age: 62
End: 2022-12-08
Payer: MEDICARE

## 2022-12-08 RX ORDER — TRAMADOL HYDROCHLORIDE 50 MG/1
50 TABLET ORAL EVERY 12 HOURS PRN
Qty: 20 TABLET | Refills: 0 | Status: SHIPPED | OUTPATIENT
Start: 2022-12-08 | End: 2022-12-18

## 2022-12-08 NOTE — TELEPHONE ENCOUNTER
----- Message from Lai Steven sent at 12/8/2022 10:09 AM CST -----  Type:  RX Refill Request    Who Called: pt  Refill or New Rx:refill  RX Name and Strength:traMADoL (ULTRAM) 50 mg tablet  How is the patient currently taking it? (ex. 1XDay):Take 1 tablet (50 mg total) by mouth every 6 (six) hours as needed for Pain  Is this a 30 day or 90 day RX:30  Preferred Pharmacy with phone number:[facility]  Local or Mail Order:local  Ordering Provider: Everton Walter   Would the patient rather a call back or a response via [facility]? call  Best Call Back Number:[phone]  Additional Information:

## 2022-12-08 NOTE — TELEPHONE ENCOUNTER
----- Message from Link Trujillo sent at 6/24/2019  1:31 PM CDT -----  Contact: Patient  Patient requesting a call back in regard to his psych medication have been changed.      Please call back to assist at [phone]      
Attempt to return patient's call.  Left message.   
4 = No assist / stand by assistance

## 2022-12-09 ENCOUNTER — ANESTHESIA EVENT (OUTPATIENT)
Dept: SURGERY | Facility: HOSPITAL | Age: 62
End: 2022-12-09
Payer: MEDICARE

## 2022-12-13 ENCOUNTER — ANESTHESIA (OUTPATIENT)
Dept: SURGERY | Facility: HOSPITAL | Age: 62
End: 2022-12-13
Payer: MEDICARE

## 2022-12-13 ENCOUNTER — HOSPITAL ENCOUNTER (OUTPATIENT)
Facility: HOSPITAL | Age: 62
Discharge: HOME | End: 2022-12-13
Attending: ORTHOPAEDIC SURGERY | Admitting: ORTHOPAEDIC SURGERY
Payer: MEDICARE

## 2022-12-13 ENCOUNTER — NURSE TRIAGE (OUTPATIENT)
Dept: ADMINISTRATIVE | Facility: CLINIC | Age: 62
End: 2022-12-13
Payer: MEDICARE

## 2022-12-13 DIAGNOSIS — G56.01 CARPAL TUNNEL SYNDROME OF RIGHT WRIST: ICD-10-CM

## 2022-12-13 LAB
ANION GAP SERPL CALC-SCNC: 9 MMOL/L (ref 8–16)
BUN SERPL-MCNC: 20 MG/DL (ref 8–23)
CALCIUM SERPL-MCNC: 9 MG/DL (ref 8.7–10.5)
CHLORIDE SERPL-SCNC: 100 MMOL/L (ref 95–110)
CO2 SERPL-SCNC: 27 MMOL/L (ref 23–29)
CREAT SERPL-MCNC: 1.4 MG/DL (ref 0.5–1.4)
EST. GFR  (NO RACE VARIABLE): 57 ML/MIN/1.73 M^2
GLUCOSE SERPL-MCNC: 120 MG/DL (ref 70–110)
POCT GLUCOSE: 117 MG/DL (ref 70–110)
POTASSIUM SERPL-SCNC: 3.5 MMOL/L (ref 3.5–5.1)
SODIUM SERPL-SCNC: 136 MMOL/L (ref 136–145)

## 2022-12-13 PROCEDURE — 76942 ECHO GUIDE FOR BIOPSY: CPT | Performed by: ANESTHESIOLOGY

## 2022-12-13 PROCEDURE — 37000009 HC ANESTHESIA EA ADD 15 MINS: Performed by: ORTHOPAEDIC SURGERY

## 2022-12-13 PROCEDURE — 25000003 PHARM REV CODE 250: Performed by: ORTHOPAEDIC SURGERY

## 2022-12-13 PROCEDURE — 64721 CARPAL TUNNEL SURGERY: CPT | Mod: 51,RT,, | Performed by: ORTHOPAEDIC SURGERY

## 2022-12-13 PROCEDURE — 71000015 HC POSTOP RECOV 1ST HR: Performed by: ORTHOPAEDIC SURGERY

## 2022-12-13 PROCEDURE — D9220A PRA ANESTHESIA: Mod: CRNA,,, | Performed by: NURSE ANESTHETIST, CERTIFIED REGISTERED

## 2022-12-13 PROCEDURE — 36000706: Performed by: ORTHOPAEDIC SURGERY

## 2022-12-13 PROCEDURE — 25000003 PHARM REV CODE 250: Performed by: NURSE ANESTHETIST, CERTIFIED REGISTERED

## 2022-12-13 PROCEDURE — D9220A PRA ANESTHESIA: ICD-10-PCS | Mod: ANES,,, | Performed by: ANESTHESIOLOGY

## 2022-12-13 PROCEDURE — 36000707: Performed by: ORTHOPAEDIC SURGERY

## 2022-12-13 PROCEDURE — 36415 COLL VENOUS BLD VENIPUNCTURE: CPT | Performed by: ORTHOPAEDIC SURGERY

## 2022-12-13 PROCEDURE — 64718 PR REVISE ULNAR NERVE AT ELBOW: ICD-10-PCS | Mod: RT,,, | Performed by: ORTHOPAEDIC SURGERY

## 2022-12-13 PROCEDURE — 63600175 PHARM REV CODE 636 W HCPCS: Performed by: NURSE ANESTHETIST, CERTIFIED REGISTERED

## 2022-12-13 PROCEDURE — 80048 BASIC METABOLIC PNL TOTAL CA: CPT | Performed by: ORTHOPAEDIC SURGERY

## 2022-12-13 PROCEDURE — D9220A PRA ANESTHESIA: Mod: ANES,,, | Performed by: ANESTHESIOLOGY

## 2022-12-13 PROCEDURE — 63600175 PHARM REV CODE 636 W HCPCS: Performed by: ANESTHESIOLOGY

## 2022-12-13 PROCEDURE — 37000008 HC ANESTHESIA 1ST 15 MINUTES: Performed by: ORTHOPAEDIC SURGERY

## 2022-12-13 PROCEDURE — 64718 REVISE ULNAR NERVE AT ELBOW: CPT | Mod: RT,,, | Performed by: ORTHOPAEDIC SURGERY

## 2022-12-13 PROCEDURE — 64721 PR REVISE MEDIAN N/CARPAL TUNNEL SURG: ICD-10-PCS | Mod: 51,RT,, | Performed by: ORTHOPAEDIC SURGERY

## 2022-12-13 PROCEDURE — D9220A PRA ANESTHESIA: ICD-10-PCS | Mod: CRNA,,, | Performed by: NURSE ANESTHETIST, CERTIFIED REGISTERED

## 2022-12-13 RX ORDER — ONDANSETRON 2 MG/ML
INJECTION INTRAMUSCULAR; INTRAVENOUS
Status: DISCONTINUED | OUTPATIENT
Start: 2022-12-13 | End: 2022-12-13

## 2022-12-13 RX ORDER — PROPOFOL 10 MG/ML
VIAL (ML) INTRAVENOUS
Status: DISCONTINUED | OUTPATIENT
Start: 2022-12-13 | End: 2022-12-13

## 2022-12-13 RX ORDER — ACETAMINOPHEN 325 MG/1
650 TABLET ORAL EVERY 4 HOURS PRN
Status: CANCELLED | OUTPATIENT
Start: 2022-12-13

## 2022-12-13 RX ORDER — ONDANSETRON 8 MG/1
8 TABLET, ORALLY DISINTEGRATING ORAL EVERY 8 HOURS PRN
Status: CANCELLED | OUTPATIENT
Start: 2022-12-13

## 2022-12-13 RX ORDER — CLINDAMYCIN PHOSPHATE 900 MG/50ML
900 INJECTION, SOLUTION INTRAVENOUS
Status: COMPLETED | OUTPATIENT
Start: 2022-12-13 | End: 2022-12-13

## 2022-12-13 RX ORDER — PROPOFOL 10 MG/ML
VIAL (ML) INTRAVENOUS CONTINUOUS PRN
Status: DISCONTINUED | OUTPATIENT
Start: 2022-12-13 | End: 2022-12-13

## 2022-12-13 RX ORDER — KETOROLAC TROMETHAMINE 30 MG/ML
15 INJECTION, SOLUTION INTRAMUSCULAR; INTRAVENOUS ONCE
Status: CANCELLED | OUTPATIENT
Start: 2022-12-13 | End: 2022-12-16

## 2022-12-13 RX ORDER — ROPIVACAINE HYDROCHLORIDE 5 MG/ML
INJECTION, SOLUTION EPIDURAL; INFILTRATION; PERINEURAL
Status: COMPLETED | OUTPATIENT
Start: 2022-12-13 | End: 2022-12-13

## 2022-12-13 RX ORDER — HYDROCODONE BITARTRATE AND ACETAMINOPHEN 5; 325 MG/1; MG/1
1 TABLET ORAL EVERY 4 HOURS PRN
Qty: 20 TABLET | Refills: 0 | Status: SHIPPED | OUTPATIENT
Start: 2022-12-13 | End: 2022-12-20 | Stop reason: SDUPTHER

## 2022-12-13 RX ORDER — MIDAZOLAM HYDROCHLORIDE 1 MG/ML
INJECTION, SOLUTION INTRAMUSCULAR; INTRAVENOUS
Status: DISCONTINUED | OUTPATIENT
Start: 2022-12-13 | End: 2022-12-13

## 2022-12-13 RX ORDER — OXYCODONE HYDROCHLORIDE 5 MG/1
10 TABLET ORAL EVERY 4 HOURS PRN
Status: CANCELLED | OUTPATIENT
Start: 2022-12-13

## 2022-12-13 RX ORDER — LIDOCAINE HYDROCHLORIDE 20 MG/ML
INJECTION INTRAVENOUS
Status: DISCONTINUED | OUTPATIENT
Start: 2022-12-13 | End: 2022-12-13

## 2022-12-13 RX ADMIN — PROPOFOL 50 MG: 10 INJECTION, EMULSION INTRAVENOUS at 08:12

## 2022-12-13 RX ADMIN — PROPOFOL 30 MG: 10 INJECTION, EMULSION INTRAVENOUS at 08:12

## 2022-12-13 RX ADMIN — ROPIVACAINE HYDROCHLORIDE 30 ML: 5 INJECTION, SOLUTION EPIDURAL; INFILTRATION; PERINEURAL at 07:12

## 2022-12-13 RX ADMIN — ONDANSETRON 8 MG: 2 INJECTION, SOLUTION INTRAMUSCULAR; INTRAVENOUS at 08:12

## 2022-12-13 RX ADMIN — MIDAZOLAM 2 MG: 1 INJECTION INTRAMUSCULAR; INTRAVENOUS at 07:12

## 2022-12-13 RX ADMIN — LIDOCAINE HYDROCHLORIDE 50 MG: 20 INJECTION, SOLUTION INTRAVENOUS at 08:12

## 2022-12-13 RX ADMIN — PROPOFOL 150 MCG/KG/MIN: 10 INJECTION, EMULSION INTRAVENOUS at 08:12

## 2022-12-13 RX ADMIN — CLINDAMYCIN PHOSPHATE 900 MG: 18 INJECTION, SOLUTION INTRAVENOUS at 08:12

## 2022-12-13 RX ADMIN — SODIUM CHLORIDE, SODIUM LACTATE, POTASSIUM CHLORIDE, AND CALCIUM CHLORIDE: .6; .31; .03; .02 INJECTION, SOLUTION INTRAVENOUS at 07:12

## 2022-12-13 NOTE — OP NOTE
[facility] - Surgery (Hospital)  Operative Note      Date of Procedure: 12/13/2022     Procedure: Procedure(s) (LRB):  RELEASE, CARPAL TUNNEL (Right)  DECOMPRESSION, NERVE, ULNAR (Right)     Surgeon(s) and Role:     * Everton Walter Jr., MD - Primary    Assisting Surgeon: None    Pre-Operative Diagnosis: Carpal tunnel syndrome of right wrist [G56.01]    Post-Operative Diagnosis: Post-Op Diagnosis Codes:     * Carpal tunnel syndrome of right wrist [G56.01]    Anesthesia: Regional    Operative Findings (including complications, if any):  Carpal tunnel syndrome and right cubital tunnel syndrome    Description of Technical Procedures:     Preop diagnosis:  1. Right cubital tunnel syndrome.      2. Right carpal tunnel syndrome.      Postop diagnosis: Same.      Operative procedure:  1. Ulnar nerve decompression right elbow insitu    2. Right carpal tunnel release.      Surgeon: Jose Angel.      Anesthesia:  Regional block.      EBL:  Minimal.      Complications: None.      Specimen:  None.      Operative procedure in detail as follows:    After operative consent was obtained patient brought the operating room placed supine operating table.  Anesthesia by regional block performed by the anesthesia staff.  A tourniquet applied high on the right arm the right upper extremity prepped and draped out in the normal sterile fashion.  The Esmarch used to exsanguinate the limb and the tourniquet inflated 225 mmHg.      The carpal tunnel was approached 1st with a 2.5 cm incision thenar crease right palm with a 15 blade.  Palmar fascia divided deep retractors placed transverse carpal ligament identified and carefully divided with the Blackfeet blade.  The median nerve protected with the [place] elevator and division of the ligament continued proximally and distally under direct visualization.  After complete release the contents of the canal were inspected and noted to be intact including the recurrent branch.  The wound irrigated hemostasis  achieved with Bovie and the skin and subcutaneous tissue closed with 4-0 Monocryl Dermabond on the skin.      Attention then turned to the right elbow where a curved medial incision made just posterior to the medial epicondyle with 15 blade.  Careful dissection used to divide subcutaneous tissue superficial nerve branches were protected.  Beginning proximally the ulnar nerve was identified and traced from proximal to distal carefully unroofing Foy's fascia dividing muscle and distally dividing the FCU muscle and fascia.  There was an hourglass constriction which was released and complete release performed proximally and distally.  Range of motion was then checked and there was no tendency to dislocate so it was not transposed.  Hemostasis achieved with the Bovie.  The wound irrigated and closed with 3-0 Vicryl in subcutaneous layer Steri-Strips on the skin.  Sterile dressings applied to both the wrist and elbow a light wrap and the tourniquet deflated patient brought to recovery room in stable condition all sponge needle counts reported as correct no complications      Significant Surgical Tasks Conducted by the Assistant(s), if Applicable: none    Estimated Blood Loss (EBL): * No values recorded between 12/13/2022  8:33 AM and 12/13/2022  9:18 AM *           Implants: * No implants in log *    Specimens:   Specimen (24h ago, onward)      None                    Condition: Good    Disposition: PACU - hemodynamically stable.    Attestation: I was present and scrubbed for the entire procedure.    Discharge Note    OUTCOME: Patient tolerated treatment/procedure well without complication and is now ready for discharge.    DISPOSITION: Home or Self Care    FINAL DIAGNOSIS:  Carpal tunnel syndrome of right wrist    FOLLOWUP: In clinic    DISCHARGE INSTRUCTIONS:    Discharge Procedure Orders   Diet general     Call MD for:  temperature >100.4     Call MD for:  persistent nausea and vomiting     Call MD for:  severe  uncontrolled pain     Keep surgical extremity elevated     Remove dressing in 72 hours

## 2022-12-13 NOTE — ANESTHESIA PREPROCEDURE EVALUATION
12/13/2022     Fabiano Malik Jr. is a 61 y.o., male here for CTR    Past Medical History:   Diagnosis Date    Chronic back pain greater than 3 months duration     Depression     Diabetes mellitus     Diabetes mellitus, type 2     Hypertension     Schizophrenia      Past Surgical History:   Procedure Laterality Date    APPENDECTOMY      COLONOSCOPY N/A 5/31/2018    Procedure: COLONOSCOPY;  Surgeon: Guillaume Hatch MD;  Location: [facility] ENDO;  Service: Endoscopy;  Laterality: N/A;    COLONOSCOPY N/A 11/17/2022    Procedure: COLONOSCOPY;  Surgeon: Guillaume Hatch MD;  Location: [facility] ENDO;  Service: Endoscopy;  Laterality: N/A;    NECK SURGERY      RADIOFREQUENCY ABLATION OF LUMBAR MEDIAL BRANCH NERVE AT SINGLE LEVEL Left 5/29/2018    Procedure: RADIOFREQUENCY THERMOCOAGULATION (RFTC)-NERVE-MEDIAN BRANCH-LUMBAR- Left L2-3-4-5;  Surgeon: Donavon Dennis MD;  Location: [facility];  Service: Pain Management;  Laterality: Left;    RADIOFREQUENCY ABLATION OF LUMBAR MEDIAL BRANCH NERVE AT SINGLE LEVEL Right 1/8/2019    Procedure: Radiofrequency Ablation, Nerve, Spinal, Lumbar, Medial Branch, L2,3,4,5;  Surgeon: Alberto Hernandez Jr., MD;  Location: [facility];  Service: Pain Management;  Laterality: Right;  Pt is diabetic    RADIOFREQUENCY ABLATION OF LUMBAR MEDIAL BRANCH NERVE AT SINGLE LEVEL Left 3/12/2019    Procedure: Radiofrequency Ablation, Nerve, Spinal, Lumbar, Medial Branch, Left, L2,3,4,5;  Surgeon: Alberto Hernandez Jr., MD;  Location: [facility];  Service: Pain Management;  Laterality: Left;  Pt will be consented the day of procedure.    TRANSFORAMINAL EPIDURAL INJECTION OF STEROID Right 9/12/2019    Procedure: Injection,steroid,epidural,transforaminal,right,L4-5 and L5-S1;  Surgeon: Alberto Hernandez Jr., MD;  Location: [facility];  Service: Pain Management;  Laterality:  Right;  PT IS DIABETIC           Pre-op Assessment    I have reviewed the Patient Summary Reports.     I have reviewed the Nursing Notes. I have reviewed the NPO Status.      Review of Systems  Anesthesia Hx:  History of prior surgery of interest to airway management or planning:  Denies Personal Hx of Anesthesia complications.   Cardiovascular:   Hypertension CAD      Renal/:   Chronic Renal Disease    Hepatic/GI:   GERD    Neurological:   Neuromuscular Disease, Headaches    Endocrine:   Diabetes    Psych:   Psychiatric History          Physical Exam  General: Well nourished    Airway:  Mallampati: II   Mouth Opening: Normal  Neck ROM: Normal ROM        Anesthesia Plan  Type of Anesthesia, risks & benefits discussed:    Anesthesia Type: MAC, Gen Natural Airway  Informed Consent: Informed consent signed with the Patient and all parties understand the risks and agree with anesthesia plan.  All questions answered.   ASA Score: 2    Ready For Surgery From Anesthesia Perspective.     .

## 2022-12-13 NOTE — DISCHARGE INSTRUCTIONS
After Hand Surgery  After surgery, the better you take care of yourself--especially your hand--the sooner it will heal. Follow your surgeons instructions. Try not to bump your hand, and dont move or lift anything while youre still wearing bandages, a splint, or a cast.    Care for your hand    Keep your hand elevated above heart level as much as possible for the first several days after surgery. This helps reduce swelling and pain.  To help prevent infection and speed healing, take care not to get your cast or bandages wet.    Relieve pain as directed  Your surgeon may prescribe pain medicine or suggest you take an anti-inflammatory medicine. You might also be instructed to apply ice (or another cold source) to your hand. If you use ice cubes, put them in a plastic bag and rest it on top of your bandages. Leave the cold source on your hand for as long as its comfortable. Do this several times a day for the first few days after surgery. It may take several minutes before you can feel the cold through the cast or bandages.    Follow up with your surgeon  During a follow-up visit after surgery, your surgeon will check your progress. The stitches, bandages, splint, or cast may be removed. A new cast or splint may be placed. If your hand has healed enough, your surgeon may prescribe exercises.    Call your surgeon if you have...  A fever higher than 100.4°F (38.0°C) taken by mouth  Side effects from your medicine, such as prolonged nausea  A wet or loose dressing, or a dressing that is too tight  Excessive bleeding  Increased, ongoing pain or numbness  Signs of infection (such as drainage, warmth, or redness) at the incision site      ANESTHESIA  -For the first 24 hours after surgery:  Do not drive, use heavy equipment, make important decisions, or drink alcohol  -It is normal to feel sleepy for several hours.  Rest until you are more awake.  -Have someone stay with you, if needed.  They can watch for problems and  help keep you safe.  -Some possible post anesthesia side effects include: nausea and vomiting, sore throat and hoarseness, sleepiness, and dizziness.    PAIN  -If you have pain after surgery, pain medicine will help you feel better.  Take it as directed, before pain becomes severe.  Most pain relievers taken by mouth need at least 20-30 minutes to start working.  -Do not drive or drink alcohol while taking pain medicine.  -Pain medication can upset your stomach.  Taking them with a little food may help.  -Other ways to help control pain: elevation, ice, and relaxation  -Call your surgeon if still having unmanageable pain an hour after taking pain medicine.  -Pain medicine can cause constipation.  Taking an over-the counter stool softener while on prescription pain medicine and drinking plenty of fluids can prevent this side effect.  -Call your surgeon if you have severe side effects like: breathing problems, trouble waking up, dizziness, confusion, or severe constipation.    NAUSEA  -Some people have nausea after surgery.  This is often because of anesthesia, pain, pain medicine, or the stress of surgery.  -Do not push yourself to eat.  Start off with clear liquids and soup.  Slowly move to solid foods.  Don't eat fatty, rich, spicy foods at first.  Eat smaller amounts.  -If you develop persistent nausea and vomiting please notify your surgeon immediately.    BLEEDING  -Different types of surgery require different types of care and dressing changes.  It is important to follow all instructions and advice from your surgeon.  Change dressing as directed.  Call your surgeon for any concerns regarding postop bleeding.    SIGNS OF INFECTION  -Signs of infection include: fever, swelling, drainage, and redness  -Notify your surgeon if you have a fever of 100.4 F (38.0 C) or higher.  -Notify your surgeon if you notice redness, swelling, increased pain, pus, or a foul smell at the incision site.    Notify Dr. Walter for any  questions or concerns

## 2022-12-13 NOTE — TRANSFER OF CARE
Anesthesia Transfer of Care Note    Patient: Fabiano Malik Jr.    Procedure(s) Performed: Procedure(s) (LRB):  RELEASE, CARPAL TUNNEL (Right)  DECOMPRESSION, NERVE, ULNAR (Right)    Patient location: [facility]    Anesthesia Type: general    Transport from OR: Transported from OR on room air with adequate spontaneous ventilation    Post pain: adequate analgesia    Post assessment: no apparent anesthetic complications and tolerated procedure well    Post vital signs: stable    Level of consciousness: awake and alert    Nausea/Vomiting: no nausea/vomiting    Complications: none    Transfer of care protocol was followed      Last vitals:   Visit Vitals  /77   Pulse 79   Resp 14

## 2022-12-13 NOTE — TELEPHONE ENCOUNTER
Pt called to see if he can have a sip water to drink as he is NPO for surgery this morning.  Pt advised NPO is nothing by mouth, this includes water.  Pt verbally understands, all questions answered.  Advised to call back for any additional questions or concerns.    Reason for Disposition   Question about upcoming scheduled test, no triage required and triager able to answer question    Protocols used: Information Only Call - No Triage-A-

## 2022-12-13 NOTE — H&P
CC:  Right carpal tunnel syndrome and right cubital tunnel syndrome           HPI:  Fabiano Malik Jr. is a very pleasant 61 y.o. male with ongoing symptoms right hand for the past 6-9 months   He reports numbness tingling of all fingers of the right hand   Recent nerve conduction study showed evidence of right carpal tunnel syndrome, right cubital tunnel syndrome, as well as cervical radiculopathy affecting both upper extremities   I went over those findings with the patient and gave him a copy the report   He also has diabetes               PAST MEDICAL HISTORY:        Past Medical History:   Diagnosis Date    Chronic back pain greater than 3 months duration      Depression      Diabetes mellitus      Diabetes mellitus, type 2      Hypertension      Schizophrenia        PAST SURGICAL HISTORY:         Past Surgical History:   Procedure Laterality Date    APPENDECTOMY        COLONOSCOPY N/A 5/31/2018     Procedure: COLONOSCOPY;  Surgeon: Guillaume Hatch MD;  Location: [facility];  Service: Endoscopy;  Laterality: N/A;    NECK SURGERY        RADIOFREQUENCY ABLATION OF LUMBAR MEDIAL BRANCH NERVE AT SINGLE LEVEL Left 5/29/2018     Procedure: RADIOFREQUENCY THERMOCOAGULATION (RFTC)-NERVE-MEDIAN BRANCH-LUMBAR- Left L2-3-4-5;  Surgeon: Donavon Dennis MD;  Location: [facility];  Service: Pain Management;  Laterality: Left;    RADIOFREQUENCY ABLATION OF LUMBAR MEDIAL BRANCH NERVE AT SINGLE LEVEL Right 1/8/2019     Procedure: Radiofrequency Ablation, Nerve, Spinal, Lumbar, Medial Branch, L2,3,4,5;  Surgeon: Alberto Hernandez Jr., MD;  Location: [facility];  Service: Pain Management;  Laterality: Right;  Pt is diabetic    RADIOFREQUENCY ABLATION OF LUMBAR MEDIAL BRANCH NERVE AT SINGLE LEVEL Left 3/12/2019     Procedure: Radiofrequency Ablation, Nerve, Spinal, Lumbar, Medial Branch, Left, L2,3,4,5;  Surgeon: Alberto Hernandez Jr., MD;  Location: [facility];  Service: Pain Management;  Laterality: Left;  Pt  "will be consented the day of procedure.    TRANSFORAMINAL EPIDURAL INJECTION OF STEROID Right 9/12/2019     Procedure: Injection,steroid,epidural,transforaminal,right,L4-5 and L5-S1;  Surgeon: Alberto Hernandez Jr., MD;  Location: [facility];  Service: Pain Management;  Laterality: Right;  PT IS DIABETIC      FAMILY HISTORY:         Family History   Problem Relation Age of Onset    Alzheimer's disease Mother      Diabetes Mother      Heart defect Father      Cancer Father      Stroke Father      Heart attack Father      Heart defect Sister      Alzheimer's disease Sister        SOCIAL HISTORY:   Social History               Socioeconomic History    Marital status:    Tobacco Use    Smoking status: Never    Smokeless tobacco: Never   Substance and Sexual Activity    Alcohol use: Yes       Comment: "couple of beers a day"    Drug use: No    Sexual activity: Not Currently            MEDICATIONS:   Current Outpatient Medications:     acetaminophen (TYLENOL) 325 MG tablet, Take 2 tablets (650 mg total) by mouth every 6 (six) hours as needed., Disp: 120 tablet, Rfl: 3    ALPRAZolam (XANAX) 0.5 MG tablet, Take 1 tablet (0.5 mg total) by mouth nightly as needed for Anxiety., Disp: 30 tablet, Rfl: 0    BLOOD PRESSURE CUFF Misc, 1 Units by Misc.(Non-Drug; Combo Route) route once daily., Disp: 1 each, Rfl: 0    blood sugar diagnostic (TRUE METRIX GLUCOSE TEST STRIP) Strp, use 1 strip to check glucose 2 (two) times a day., Disp: 200 strip, Rfl: 6    blood-glucose meter Misc, Use as instructed, Disp: 1 each, Rfl: 0    chlorthalidone (HYGROTEN) 25 MG Tab, Take 1 tablet (25 mg total) by mouth once daily., Disp: 90 tablet, Rfl: 3    dulaglutide (TRULICITY) 0.75 mg/0.5 mL pen injector, Inject 0.75 mg into the skin every 7 days., Disp: 12 pen, Rfl: 1    DULoxetine (CYMBALTA) 60 MG capsule, TAKE 2 CAPSULES BY MOUTH EVERY MORNING, Disp: 180 capsule, Rfl: 3    hydrOXYzine pamoate (VISTARIL) 50 MG Cap, TAKE 1 CAPSULE (50 MG) " BY MOUTH NIGHTLY AS NEEDED FOR INSOMNIA, Disp: 90 capsule, Rfl: 3    lancets 30 gauge Misc, 1 lancet by Misc.(Non-Drug; Combo Route) route twice daily., Disp: 200 each, Rfl: 6    meclizine (ANTIVERT) 12.5 mg tablet, TAKE 1 TABLET BY MOUTH THREE TIMES DAILY AS NEEDED FOR DIZZINESS, Disp: 60 tablet, Rfl: 0    meloxicam (MOBIC) 15 MG tablet, , Disp: , Rfl:     metFORMIN (GLUCOPHAGE) 1000 MG tablet, Take 1 tablet (1,000 mg total) by mouth 2 (two) times daily with meals., Disp: 180 tablet, Rfl: 3    omeprazole (PRILOSEC) 20 MG capsule, Take 1 capsule (20 mg total) by mouth before breakfast., Disp: 30 capsule, Rfl: 3    PFIZER COVID-19 CIELO VACCN,PF, 30 mcg/0.3 mL injection, , Disp: , Rfl:     potassium chloride (KLOR-CON) 10 MEQ TbSR, take 1 tablet by mouth once daily, Disp: 90 tablet, Rfl: 3    pravastatin (PRAVACHOL) 40 MG tablet, TAKE 1 TABLET(40 MG) BY MOUTH EVERY DAY, Disp: 90 tablet, Rfl: 3    SHINGRIX, PF, 50 mcg/0.5 mL injection, , Disp: , Rfl:     sildenafiL (VIAGRA) 100 MG tablet, Take 1 tablet (100 mg total) by mouth daily as needed for Erectile Dysfunction., Disp: 30 tablet, Rfl: 3    olmesartan (BENICAR) 20 MG tablet, Take 1 tablet (20 mg total) by mouth once daily., Disp: 90 tablet, Rfl: 0    pregabalin (LYRICA) 50 MG capsule, Take 1 capsule (50 mg total) by mouth 2 (two) times daily., Disp: 180 capsule, Rfl: 0  ALLERGIES:         Review of patient's allergies indicates:   Allergen Reactions    Pcn [penicillins] Other (See Comments)       Childhood rxn, pt does not recall type of rxn         Review of Systems:  Constitutional: no fever or chills  ENT: no nasal congestion or sore throat  Respiratory: no cough or shortness of breath  Cardiovascular: no chest pain or palpitations  Gastrointestinal: no nausea or vomiting, PUD, GERD, NSAID intolerance  Genitourinary: no hematuria or dysuria  Integument/Breast: no rash or pruritis  Hematologic/Lymphatic: no easy bruising or lymphadenopathy  Musculoskeletal: see  "HPI  Neurological: no seizures or tremors  Behavioral/Psych: no auditory or visual hallucinations        Physical Exam       Vitals:     11/07/22 1311   Weight: 89.8 kg (198 lb)   Height: 6' 1" (1.854 m)   PainSc: 10-Worst pain ever         Constitutional: Oriented to person, place, and time. Appears well-developed and well-nourished.   HENT:   Head: Normocephalic and atraumatic.   Nose: Nose normal.   Eyes: No scleral icterus.   Neck: Normal range of motion.   Cardiovascular: Normal rate and regular rhythm.    Pulses:       Radial pulses are 2+ on the right side, and 2+ on the left side.   Pulmonary/Chest: Effort normal and breath sounds normal.   Abdominal: Soft.   Neurological: Alert and oriented to person, place, and time.   Skin: Skin is warm.   Psychiatric: Normal mood and affect.      MUSCULOSKELETAL UPPER EXTREMITY:  Examination right hand mild swelling noted positive Tinel sign at the wrist positive Tinel sign at the elbow   Range of motion wrist fingers full  strength decreased sensation decreased on the tips of all fingers   Mild atrophy of the right thenar  No clawing no triggering                 Diagnostic Studies:  Nerve conduction study demonstrates combined cervical radiculopathy with right carpal tunnel syndrome and right cubital tunnel syndrome           Assessment:  1. Right carpal tunnel syndrome.      2. Right cubital tunnel syndrome.      3. Double crush cervical radiculopathy     Plan:  I went over these findings with the patient and how they add up to multiple issues   We talked about options including surgical treatment for the right hand and elbow   Would like to go ahead proceed with surgery for the right carpal tunnel and ulnar nerve decompression at the right elbow   I explained to the patient this would not limited all of the symptoms including those coming from his neck he understands  Risks and benefits also discussed        The risks and benefits of surgery were discussed with " "the patient today and they understand.  The consent was signed in the office for surgery.        Everton Walter MD (Jay)  [facility]  Orthopedic Upper Extremity Surgery  "

## 2022-12-13 NOTE — PLAN OF CARE
Right Supraclavicular block performed at bedside per anesthesia.  Versed 2 mg given per anesthesia.  Patient tolerated well. No  complications noted.  Patient taken to surgery immediately after.

## 2022-12-13 NOTE — ANESTHESIA PROCEDURE NOTES
Peripheral Block    Patient location during procedure: pre-op   Block not for primary anesthetic.  Reason for block: at surgeon's request and post-op pain management   Post-op Pain Location: right hand   Start time: 12/13/2022 7:47 AM  Timeout: 12/13/2022 7:45 AM   End time: 12/13/2022 7:50 AM    Staffing  Authorizing Provider: Fly Castillo MD  Performing Provider: Fly Castillo MD    Preanesthetic Checklist  Completed: patient identified, IV checked, site marked, risks and benefits discussed, surgical consent, monitors and equipment checked, pre-op evaluation and timeout performed  Peripheral Block  Patient position: supine  Prep: ChloraPrep  Patient monitoring: heart rate, cardiac monitor, continuous pulse ox, continuous capnometry and frequent blood pressure checks  Block type: supraclavicular  Laterality: right  Injection technique: single shot  Needle  Needle type: Stimuplex   Needle gauge: 22 G  Needle length: 2 in  Needle localization: anatomical landmarks and ultrasound guidance   -ultrasound image captured on disc.  Assessment  Injection assessment: negative aspiration, negative parasthesia and local visualized surrounding nerve  Paresthesia pain: none  Heart rate change: no  Slow fractionated injection: yes    Medications:    Medications: ropivacaine (NAROPIN) injection 0.5% - Perineural   30 mL - 12/13/2022 7:50:00 AM    Additional Notes  VSS.  [facility] RN monitoring vitals throughout procedure.  Patient tolerated procedure well.

## 2022-12-13 NOTE — ANESTHESIA POSTPROCEDURE EVALUATION
Anesthesia Post Evaluation    Patient: Fabiano Malik Jr.    Procedure(s) Performed: Procedure(s) (LRB):  RELEASE, CARPAL TUNNEL (Right)  DECOMPRESSION, NERVE, ULNAR (Right)    Final Anesthesia Type: MAC      Patient location during evaluation: PACU  Patient participation: Yes- Able to Participate  Level of consciousness: awake and alert  Post-procedure vital signs: reviewed and stable  Pain management: adequate  Airway patency: patent    PONV status at discharge: No PONV  Anesthetic complications: no      Cardiovascular status: blood pressure returned to baseline  Respiratory status: unassisted  Hydration status: euvolemic            Vitals Value Taken Time   /68 12/13/22 1020   Temp 36.7 °C (98 °F) 12/13/22 1020   Pulse 75 12/13/22 1020   Resp 16 12/13/22 1020   SpO2 96 % 12/13/22 1020         No case tracking events are documented in the log.      Pain/Pauline Score: Pauline Score: 10 (12/13/2022 10:20 AM)

## 2022-12-14 ENCOUNTER — TELEPHONE (OUTPATIENT)
Dept: ORTHOPEDICS | Facility: CLINIC | Age: 62
End: 2022-12-14
Payer: MEDICARE

## 2022-12-14 ENCOUNTER — PATIENT MESSAGE (OUTPATIENT)
Dept: ORTHOPEDICS | Facility: CLINIC | Age: 62
End: 2022-12-14
Payer: MEDICARE

## 2022-12-14 NOTE — TELEPHONE ENCOUNTER
"Pt calls asking for clarification on his surgical site dressing; "When can I take the dressing off and are there staples or stitches?"    NT reviews discharge information with pt from his AVS packet regarding his incision sites and how to care for them. Pt verbalizes understanding and is instructed to call back with any new/worsening sxs, questions, or concerns.   Reason for Disposition   Health Information question, no triage required and triager able to answer question    Protocols used: Information Only Call - No Triage-A-    "

## 2022-12-14 NOTE — TELEPHONE ENCOUNTER
----- Message from Yeny Thomasroe sent at 12/14/2022  2:12 PM CST -----  Type:  Needs Medical Advice    Who Called:   Symptoms (please be specific):    How long has patient had these symptoms:    Pharmacy name and phone #:    Preferred Pharmacies     [facility]   Phone:  [phone]  Fax:  [phone]    Would the patient rather a call back or a response via [facility]?   Best Call Back Number: [phone]  Additional Information: wants to speak to office about getting a cane that has the 4 feet/quad cane.  Also wants to know if he can get any pain medicine. Surgery was done yesterday.

## 2022-12-14 NOTE — TELEPHONE ENCOUNTER
Good Morning Dr. Walter,     Spoke with patient in regards to some post-op pain that he is having. Patient states that the pain medication he is taking is not helping. Please advise.     Thanks

## 2022-12-14 NOTE — TELEPHONE ENCOUNTER
Spoke with patient. Advised patient to take ibuprofen along with pain medication for extra pain relief. Patient also inquired about a cane, I advised patient to contact pcp for a cane as Dr Walter does not order canes since he deals with the upper extremity only Patient verbalized understanding.

## 2022-12-15 ENCOUNTER — TELEPHONE (OUTPATIENT)
Dept: FAMILY MEDICINE | Facility: CLINIC | Age: 62
End: 2022-12-15
Payer: MEDICARE

## 2022-12-15 ENCOUNTER — TELEPHONE (OUTPATIENT)
Dept: ORTHOPEDICS | Facility: CLINIC | Age: 62
End: 2022-12-15
Payer: MEDICARE

## 2022-12-15 VITALS
DIASTOLIC BLOOD PRESSURE: 68 MMHG | OXYGEN SATURATION: 96 % | HEART RATE: 75 BPM | SYSTOLIC BLOOD PRESSURE: 130 MMHG | TEMPERATURE: 98 F | RESPIRATION RATE: 16 BRPM

## 2022-12-15 NOTE — TELEPHONE ENCOUNTER
----- Message from Luz Gutierrez sent at 12/15/2022 11:41 AM CST -----  Type:  Needs Medical Advice    Who Called: self  Reason:wants to know how long he has to wear the sling for   Would the patient rather a call back or a response via [facility]? call  Best Call Back Number: [phone]  Additional Information: pool     [place] is a 52year old male that presents for annual physical exam.     Patient presents with:  Physical  Lab Results: completed  9/3      Diet and exercise: diet varied, exercises  STI testing desired: no  Colonoscopy: Colonoscopy Never done Activity      Alcohol use:  Yes        Alcohol/week: 0.0 standard drinks        Comment: OCCASIONAL      Drug use: No      Sexual activity: Not on file    Other Topics      Concerns:        Caffeine Concern: Not Asked        Exercise: Not Asked        Seat exertion  CARDIOVASCULAR: denies chest pain on exertion  GI: denies abdominal pain, denies heartburn  : denies dysuria, hematuria, erectile dysfunction, nocturia   MUSCULOSKELETAL: denies back pain  NEURO: denies headaches  PSYCHE: denies depression or a Dispense: 30 tablet; Refill: 0  - start lisinopril and f/u in 1 mo, bp goal < 140/90  - ok to do e-visit if pt can check bp at home    5. Hyperkalemia  - BASIC METABOLIC PANEL (8);  Future  - unclear etiology, repeat lab in 1 mo and further w/u if not resol

## 2022-12-15 NOTE — TELEPHONE ENCOUNTER
Spoke with patient. Informed patient that he is welcome to keep sling off at home but if leaving out the house, to have it on, to avoid bumping elbow into anything. Patient verbalized understanding.

## 2022-12-17 ENCOUNTER — TELEPHONE (OUTPATIENT)
Dept: FAMILY MEDICINE | Facility: CLINIC | Age: 62
End: 2022-12-17

## 2022-12-17 DIAGNOSIS — M54.50 LOW BACK PAIN, NON-SPECIFIC: Primary | Chronic | ICD-10-CM

## 2022-12-17 DIAGNOSIS — R26.9 ABNORMALITY OF GAIT: ICD-10-CM

## 2022-12-19 DIAGNOSIS — F41.9 ANXIETY: ICD-10-CM

## 2022-12-20 ENCOUNTER — TELEPHONE (OUTPATIENT)
Dept: ORTHOPEDICS | Facility: CLINIC | Age: 62
End: 2022-12-20
Payer: MEDICARE

## 2022-12-20 RX ORDER — TRAMADOL HYDROCHLORIDE 50 MG/1
50 TABLET ORAL EVERY 12 HOURS PRN
Qty: 20 TABLET | Refills: 0 | OUTPATIENT
Start: 2022-12-20 | End: 2022-12-30

## 2022-12-20 RX ORDER — MECLIZINE HCL 12.5 MG 12.5 MG/1
12.5 TABLET ORAL 3 TIMES DAILY PRN
Qty: 60 TABLET | Refills: 0 | Status: SHIPPED | OUTPATIENT
Start: 2022-12-20 | End: 2023-02-13 | Stop reason: SDUPTHER

## 2022-12-20 RX ORDER — HYDROCODONE BITARTRATE AND ACETAMINOPHEN 5; 325 MG/1; MG/1
1 TABLET ORAL EVERY 12 HOURS PRN
Qty: 12 TABLET | Refills: 0 | Status: SHIPPED | OUTPATIENT
Start: 2022-12-20 | End: 2022-12-27

## 2022-12-20 RX ORDER — ALPRAZOLAM 0.5 MG/1
0.5 TABLET ORAL NIGHTLY PRN
Qty: 30 TABLET | Refills: 0 | Status: SHIPPED | OUTPATIENT
Start: 2022-12-20 | End: 2023-02-08 | Stop reason: SDUPTHER

## 2022-12-20 NOTE — TELEPHONE ENCOUNTER
No new care gaps identified.  [facility] Embedded Care Gaps. Reference number: [id number]. 12/19/2022   11:47:05 PM CST

## 2022-12-20 NOTE — TELEPHONE ENCOUNTER
----- Message from Tejal Nascimento sent at 12/20/2022  1:51 PM CST -----  Regarding: refill  Contact: patient  .Type:  RX Refill Request    Who Called: patient  Refill or New Rx:refill  RX Name and Strength:HYDROcodone-acetaminophen (NORCO) 5-325 mg per tablet  How is the patient currently taking it? (ex. 1XDay):Sig - Route: Take 1 tablet by mouth every 4 (four) hours as needed for Pain.   Is this a 30 day or 90 day RX:30  Preferred Pharmacy with phone number:[facility]   Phone:  [phone]        Local or Mail Order:mail  Ordering Provider:Everton Walter  Would the patient rather a call back or a response via [facility]? call  Best Call Back Number:[phone]  Additional Information: pharmacy told patient to call doctor to have prescription refilled

## 2022-12-21 ENCOUNTER — TELEPHONE (OUTPATIENT)
Dept: OTOLARYNGOLOGY | Facility: CLINIC | Age: 62
End: 2022-12-21
Payer: MEDICARE

## 2022-12-21 NOTE — TELEPHONE ENCOUNTER
Returned call to patient and offered visit with Dr. Araya tomorrow but pt declined stating he needs transportation at least week in advance. Offered next avaialble at [facility] for 1/10 and pt accepted. Was thanked for calling.

## 2022-12-21 NOTE — TELEPHONE ENCOUNTER
----- Message from Luz Gutierrez sent at 12/21/2022  1:54 PM CST -----  Type:  Needs Medical Advice    Who Called: self  Reason:cant hear out of left ear  Would the patient rather a call back or a response via [facility]? call  Best Call Back Number: [phone]  Additional Information:none

## 2022-12-23 ENCOUNTER — OFFICE VISIT (OUTPATIENT)
Dept: ORTHOPEDICS | Facility: CLINIC | Age: 62
End: 2022-12-23
Payer: MEDICARE

## 2022-12-23 ENCOUNTER — TELEPHONE (OUTPATIENT)
Dept: ORTHOPEDICS | Facility: CLINIC | Age: 62
End: 2022-12-23
Payer: MEDICARE

## 2022-12-23 DIAGNOSIS — Z98.890 S/P CARPAL TUNNEL RELEASE: ICD-10-CM

## 2022-12-23 DIAGNOSIS — G89.18 POST-OP PAIN: Primary | ICD-10-CM

## 2022-12-23 DIAGNOSIS — Z98.890 S/P DECOMPRESSION OF ULNAR NERVE AT ELBOW: ICD-10-CM

## 2022-12-23 PROCEDURE — 3044F PR MOST RECENT HEMOGLOBIN A1C LEVEL <7.0%: ICD-10-PCS | Mod: CPTII,S$GLB,, | Performed by: ORTHOPAEDIC SURGERY

## 2022-12-23 PROCEDURE — 99024 PR POST-OP FOLLOW-UP VISIT: ICD-10-PCS | Mod: S$GLB,,, | Performed by: ORTHOPAEDIC SURGERY

## 2022-12-23 PROCEDURE — 1160F RVW MEDS BY RX/DR IN RCRD: CPT | Mod: CPTII,S$GLB,, | Performed by: ORTHOPAEDIC SURGERY

## 2022-12-23 PROCEDURE — 4010F ACE/ARB THERAPY RXD/TAKEN: CPT | Mod: CPTII,S$GLB,, | Performed by: ORTHOPAEDIC SURGERY

## 2022-12-23 PROCEDURE — 1159F PR MEDICATION LIST DOCUMENTED IN MEDICAL RECORD: ICD-10-PCS | Mod: CPTII,S$GLB,, | Performed by: ORTHOPAEDIC SURGERY

## 2022-12-23 PROCEDURE — 99024 POSTOP FOLLOW-UP VISIT: CPT | Mod: S$GLB,,, | Performed by: ORTHOPAEDIC SURGERY

## 2022-12-23 PROCEDURE — 99999 PR PBB SHADOW E&M-EST. PATIENT-LVL IV: ICD-10-PCS | Mod: PBBFAC,,, | Performed by: ORTHOPAEDIC SURGERY

## 2022-12-23 PROCEDURE — 99999 PR PBB SHADOW E&M-EST. PATIENT-LVL IV: CPT | Mod: PBBFAC,,, | Performed by: ORTHOPAEDIC SURGERY

## 2022-12-23 PROCEDURE — 3044F HG A1C LEVEL LT 7.0%: CPT | Mod: CPTII,S$GLB,, | Performed by: ORTHOPAEDIC SURGERY

## 2022-12-23 PROCEDURE — 4010F PR ACE/ARB THEARPY RXD/TAKEN: ICD-10-PCS | Mod: CPTII,S$GLB,, | Performed by: ORTHOPAEDIC SURGERY

## 2022-12-23 PROCEDURE — 1160F PR REVIEW ALL MEDS BY PRESCRIBER/CLIN PHARMACIST DOCUMENTED: ICD-10-PCS | Mod: CPTII,S$GLB,, | Performed by: ORTHOPAEDIC SURGERY

## 2022-12-23 PROCEDURE — 1159F MED LIST DOCD IN RCRD: CPT | Mod: CPTII,S$GLB,, | Performed by: ORTHOPAEDIC SURGERY

## 2022-12-23 RX ORDER — TRAMADOL HYDROCHLORIDE 50 MG/1
50 TABLET ORAL EVERY 6 HOURS PRN
Qty: 14 TABLET | Refills: 0 | Status: SHIPPED | OUTPATIENT
Start: 2022-12-23 | End: 2023-01-11 | Stop reason: SDUPTHER

## 2022-12-23 NOTE — TELEPHONE ENCOUNTER
----- Message from Tejal Nascimento sent at 12/23/2022  9:11 AM CST -----  Regarding: late arrival  Contact: Patient  .Type:  Needs Medical Advice      Best Call Back Number: [phone]  Additional Information: Pt transportation notified him that they wont be able to pick him up until 9:30 am.  For 9:15 appt 12/23/2022

## 2022-12-23 NOTE — TELEPHONE ENCOUNTER
Pt stated that he is still waiting on his ride and he will get here when he can. Pt was informed that I told the provider. Pt was informed that if he can't make or anything changes to please give me a call.

## 2022-12-27 NOTE — PROGRESS NOTES
Subjective:          Chief Complaint: Fabiano Malik Jr. is a 62 y.o. male who had concerns including Post-op Evaluation of the Right Shoulder.    HPI    Patient is here s/p Right CTR and elbow insitu for 2 week post-op appointment. He reports 8/10 pain and is taking tramadol every day. He denies any nausea, vomiting, fever, chills, shortness of breath, or calf pain. He has not begun formal PT yet.     Operative procedure:  1. Ulnar nerve decompression right elbow insitu  2. Right carpal tunnel release.    Surgeon:  Everton Walter Jr, MD    Review of Systems   Constitutional: Negative.   HENT: Negative.     Eyes: Negative.    Cardiovascular: Negative.    Respiratory: Negative.     Endocrine: Negative.    Hematologic/Lymphatic: Negative.    Skin: Negative.    Musculoskeletal:  Positive for joint pain. Negative for joint swelling, muscle weakness and stiffness.   Neurological: Negative.    Psychiatric/Behavioral: Negative.     Allergic/Immunologic: Negative.                  Objective:        General: Fabiano is well-developed, well-nourished, appears stated age, in no acute distress, alert and oriented to time, place and person.     General    Constitutional: He is oriented to person, place, and time. He appears well-developed and well-nourished. No distress.   Cardiovascular:  Intact distal pulses.            Neurological: He is alert and oriented to person, place, and time.   Psychiatric: He has a normal mood and affect. His behavior is normal. Thought content normal.             Right Hand/Wrist Exam     Inspection   Scars: Wrist - present   Effusion: Wrist - absent   Bruising: Wrist - absent   Deformity: Wrist - deformity       Right Elbow Exam     Inspection   Scars: present  Effusion: absent  Bruising: absent  Deformity: absent  Atrophy: absent    Comments:  Incision sites healing well, without signs of erythema, infection, or wound dehiscence.    Near full elbow ROM    Limited wrist extension with near full  flexion          Vascular Exam       Capillary Refill  Right Hand: normal capillary refill                Assessment:       Encounter Diagnoses   Name Primary?    Post-op pain Yes    S/P carpal tunnel release     S/P decompression of ulnar nerve at elbow           Plan:         Ambulatory referral for formal physical therapy at [facility]    2. Refill given for Tramadol to take for pain no more than 3 times a day.  3. Discussed working on aggressive scar massage and continue to refrain from any lifting more than 5 lb for another 2 weeks. May return to full weight-bearing after 3 weeks.  4. RTC to see Everton Walter PA-C in 4 weeks for 6 week postop visit.      All of the patient's questions were answered. Patient was advised to call the clinic or contact me through the patient portal for any questions or concerns.       Medical Dictation software was used during the dictation of portions or the entirety of this medical record.  Phonetic or grammatic errors may exist due to the use of this software. For clarification, refer to the author of the document.                    Patient questionnaires may have been collected.

## 2023-01-10 ENCOUNTER — OFFICE VISIT (OUTPATIENT)
Dept: OTOLARYNGOLOGY | Facility: CLINIC | Age: 63
End: 2023-01-10
Payer: MEDICARE

## 2023-01-10 ENCOUNTER — CLINICAL SUPPORT (OUTPATIENT)
Dept: AUDIOLOGY | Facility: CLINIC | Age: 63
End: 2023-01-10
Payer: MEDICARE

## 2023-01-10 VITALS — HEART RATE: 84 BPM | SYSTOLIC BLOOD PRESSURE: 147 MMHG | DIASTOLIC BLOOD PRESSURE: 83 MMHG

## 2023-01-10 DIAGNOSIS — H61.23 BILATERAL IMPACTED CERUMEN: ICD-10-CM

## 2023-01-10 DIAGNOSIS — H90.3 SENSORINEURAL HEARING LOSS (SNHL) OF BOTH EARS: ICD-10-CM

## 2023-01-10 DIAGNOSIS — T16.2XXA FOREIGN BODY OF LEFT EAR, INITIAL ENCOUNTER: Primary | ICD-10-CM

## 2023-01-10 DIAGNOSIS — H91.93 HIGH FREQUENCY HEARING LOSS OF BOTH EARS: Primary | ICD-10-CM

## 2023-01-10 PROCEDURE — 69210 REMOVE IMPACTED EAR WAX UNI: CPT | Mod: S$GLB,,, | Performed by: OTOLARYNGOLOGY

## 2023-01-10 PROCEDURE — 92567 TYMPANOMETRY: CPT | Mod: S$GLB,,, | Performed by: AUDIOLOGIST

## 2023-01-10 PROCEDURE — 69210 PR REMOVAL IMPACTED CERUMEN REQUIRING INSTRUMENTATION, UNILATERAL: ICD-10-PCS | Mod: S$GLB,,, | Performed by: OTOLARYNGOLOGY

## 2023-01-10 PROCEDURE — 1159F PR MEDICATION LIST DOCUMENTED IN MEDICAL RECORD: ICD-10-PCS | Mod: CPTII,S$GLB,, | Performed by: OTOLARYNGOLOGY

## 2023-01-10 PROCEDURE — 92557 PR COMPREHENSIVE HEARING TEST: ICD-10-PCS | Mod: S$GLB,,, | Performed by: AUDIOLOGIST

## 2023-01-10 PROCEDURE — 92567 PR TYMPA2METRY: ICD-10-PCS | Mod: S$GLB,,, | Performed by: AUDIOLOGIST

## 2023-01-10 PROCEDURE — 69200 PR REMV EXT CANAL FOREIGN BODY: ICD-10-PCS | Mod: 59,LT,S$GLB, | Performed by: OTOLARYNGOLOGY

## 2023-01-10 PROCEDURE — 1159F MED LIST DOCD IN RCRD: CPT | Mod: CPTII,S$GLB,, | Performed by: OTOLARYNGOLOGY

## 2023-01-10 PROCEDURE — 92557 COMPREHENSIVE HEARING TEST: CPT | Mod: S$GLB,,, | Performed by: AUDIOLOGIST

## 2023-01-10 PROCEDURE — 99212 PR OFFICE/OUTPT VISIT, EST, LEVL II, 10-19 MIN: ICD-10-PCS | Mod: 25,S$GLB,, | Performed by: OTOLARYNGOLOGY

## 2023-01-10 PROCEDURE — 99999 PR PBB SHADOW E&M-EST. PATIENT-LVL III: ICD-10-PCS | Mod: PBBFAC,,, | Performed by: OTOLARYNGOLOGY

## 2023-01-10 PROCEDURE — 99999 PR PBB SHADOW E&M-EST. PATIENT-LVL III: CPT | Mod: PBBFAC,,, | Performed by: OTOLARYNGOLOGY

## 2023-01-10 PROCEDURE — 99212 OFFICE O/P EST SF 10 MIN: CPT | Mod: 25,S$GLB,, | Performed by: OTOLARYNGOLOGY

## 2023-01-10 PROCEDURE — 3077F PR MOST RECENT SYSTOLIC BLOOD PRESSURE >= 140 MM HG: ICD-10-PCS | Mod: CPTII,S$GLB,, | Performed by: OTOLARYNGOLOGY

## 2023-01-10 PROCEDURE — 1160F RVW MEDS BY RX/DR IN RCRD: CPT | Mod: CPTII,S$GLB,, | Performed by: OTOLARYNGOLOGY

## 2023-01-10 PROCEDURE — 3079F PR MOST RECENT DIASTOLIC BLOOD PRESSURE 80-89 MM HG: ICD-10-PCS | Mod: CPTII,S$GLB,, | Performed by: OTOLARYNGOLOGY

## 2023-01-10 PROCEDURE — 1160F PR REVIEW ALL MEDS BY PRESCRIBER/CLIN PHARMACIST DOCUMENTED: ICD-10-PCS | Mod: CPTII,S$GLB,, | Performed by: OTOLARYNGOLOGY

## 2023-01-10 PROCEDURE — 3079F DIAST BP 80-89 MM HG: CPT | Mod: CPTII,S$GLB,, | Performed by: OTOLARYNGOLOGY

## 2023-01-10 PROCEDURE — 3077F SYST BP >= 140 MM HG: CPT | Mod: CPTII,S$GLB,, | Performed by: OTOLARYNGOLOGY

## 2023-01-10 PROCEDURE — 69200 CLEAR OUTER EAR CANAL: CPT | Mod: 59,LT,S$GLB, | Performed by: OTOLARYNGOLOGY

## 2023-01-10 NOTE — PROGRESS NOTES
62 y.o. male who presents for evaluation of ear fullness on left and discomfort with reduced hearing. Tried to use a qtip and got out some wax. Another provider said there is something in the left ear - maybe a FB. May have a ear bud tip in his ear. Not sure when it happened. No active drainage. Not severe pain but some discomfort. No prior ear surgery.    PMHx, PSHx, Meds, Allergies, SocHx, FamHx reviewed in EPIC    ROS:  Gen: no f/c  ENT: as above    PE: BP (!) 147/83   Pulse 84    Gen: male, well nourished, well developed, NAD, cooperative, good historian  Ears:  EAC cerumen laterally partially obstructive; EAC left with black oval like FB filling the left EAC obstructing view (FB removed & cerumen cleared - see below)  & TM translucent with normal bony landmarks bilaterally  Respiratory: Breathing comfortably without retractions  Skin: facial skin intact without visible lesions or flushing  Neuro:  facial movement symmetric, speech fluid, gait stable  Psych: alert & oriented x 3, reasonable, normal affect    Procedure: Removal of left foreign body from ear canal and cerumen impaction using microsurgical instrumentation.  After explaining the procedure and obtaining verbal assent, the patient was positioned in chair and both external auditory canal visualized with magnification. The left foreign body was gently grasped for alligator forcep and removed (ear bud tip intact - black, soft plastic oval - filled EAC). Deep to foreign body was copious soft cerumen. The obstructing cerumen was removed with microsurgical instrumentation (curette on right, suction for left ear deep cerumen) to reveal patent external auditory canals.  Both ears cleared. The patient tolerated this procedure well without complication. Pt's ear felt better and hearing improved most ear was cleared but left ear still felt slightly different.      Mild HF SNHL. Audio reviewed with pt. WRS excellent.          Impression:   1. Foreign body of  left ear, initial encounter        2. Bilateral impacted cerumen        3. Sensorineural hearing loss (SNHL) of both ears            Discussion and Plan:       Avoid q-tips. Keep left ear dry for 5-7 days to allow skin to dry out.  Check ear buds to make sure tips do not become lodged in EAC.    Follow up in one - two years to recheck hearing.    Otherwise, follow up with our clinic for any ear, nose or throat problems ([phone]).     Parts or all of this note were created by voice recognition software; typographical errors in translating may be present.

## 2023-01-10 NOTE — PROGRESS NOTES
Audiologic Evaluation 1/10/2023:       Fabiaon Malik Jr., a 62 y.o. male, was seen today in the clinic for an audiologic evaluation for hearing loss. Initially, Mr. Malik reported muffled hearing of the left ear, but he reported improved hearing after a foreign object was removed from the left ear canal by Dr. Wilkes prior to his audiologic evaluation. Mr. Malik reported occasional bilateral tinnitus and dizziness when laying down in bed and arising from bed. Mr. Malik denied otalgia.     Tympanometry revealed Type A tympanogram in the right ear and Type A tympanogram in the left ear. Audiogram results revealed normal hearing sensitivity with a mild hearing loss 4776-8308 Hz in the right ear and normal hearing sensitivity with a mild sensorineural hearing loss 4000 Hz-8000 Hz in the left ear.  Speech reception thresholds were noted at 15 dB in the right ear and 15 dB in the left ear.  Speech discrimination scores were 100% in the right ear and 100% in the left ear.    Recommendations:  Otologic evaluation  Annual audiogram  Hearing protection when in noise

## 2023-01-10 NOTE — PATIENT INSTRUCTIONS
Avoid q-tips. Keep left ear dry for 5-7 days to allow skin to dry out.  Check ear buds to make sure tips do not become lodged in EAC.    Follow up in one - two years to recheck hearing.    Otherwise, follow up with our clinic for any ear, nose or throat problems ([phone]).

## 2023-01-11 DIAGNOSIS — G89.18 POST-OP PAIN: ICD-10-CM

## 2023-01-11 RX ORDER — TRAMADOL HYDROCHLORIDE 50 MG/1
50 TABLET ORAL EVERY 6 HOURS PRN
Qty: 14 TABLET | Refills: 0 | Status: CANCELLED | OUTPATIENT
Start: 2023-01-11

## 2023-01-12 RX ORDER — TRAMADOL HYDROCHLORIDE 50 MG/1
50 TABLET ORAL EVERY 6 HOURS PRN
Qty: 14 TABLET | Refills: 0 | Status: SHIPPED | OUTPATIENT
Start: 2023-01-12 | End: 2023-02-13

## 2023-01-17 ENCOUNTER — PATIENT MESSAGE (OUTPATIENT)
Dept: ADMINISTRATIVE | Facility: HOSPITAL | Age: 63
End: 2023-01-17
Payer: MEDICARE

## 2023-01-18 RX ORDER — MECLIZINE HCL 12.5 MG 12.5 MG/1
12.5 TABLET ORAL 3 TIMES DAILY PRN
Qty: 60 TABLET | Refills: 0 | Status: CANCELLED | OUTPATIENT
Start: 2023-01-18

## 2023-01-18 NOTE — TELEPHONE ENCOUNTER
No new care gaps identified.  [facility] Embedded Care Gaps. Reference number: [id number]. 1/18/2023   9:48:47 AM CST

## 2023-01-19 ENCOUNTER — PATIENT MESSAGE (OUTPATIENT)
Dept: ADMINISTRATIVE | Facility: HOSPITAL | Age: 63
End: 2023-01-19
Payer: MEDICARE

## 2023-01-19 DIAGNOSIS — E11.65 TYPE 2 DIABETES MELLITUS WITH HYPERGLYCEMIA: ICD-10-CM

## 2023-01-19 NOTE — TELEPHONE ENCOUNTER
No new care gaps identified.  [facility] Embedded Care Gaps. Reference number: [id number]. 1/19/2023   2:15:25 PM CST

## 2023-01-21 RX ORDER — DULAGLUTIDE 0.75 MG/.5ML
0.75 INJECTION, SOLUTION SUBCUTANEOUS
Qty: 12 PEN | Refills: 1 | Status: SHIPPED | OUTPATIENT
Start: 2023-01-21 | End: 2023-07-09 | Stop reason: SDUPTHER

## 2023-01-25 ENCOUNTER — CLINICAL SUPPORT (OUTPATIENT)
Dept: REHABILITATION | Facility: HOSPITAL | Age: 63
End: 2023-01-25
Attending: ORTHOPAEDIC SURGERY
Payer: MEDICARE

## 2023-01-25 DIAGNOSIS — Z98.890 S/P DECOMPRESSION OF ULNAR NERVE AT ELBOW: ICD-10-CM

## 2023-01-25 DIAGNOSIS — M25.60 STIFFNESS IN JOINT: ICD-10-CM

## 2023-01-25 DIAGNOSIS — Z98.890 S/P CARPAL TUNNEL RELEASE: ICD-10-CM

## 2023-01-25 DIAGNOSIS — M25.531 WRIST PAIN, RIGHT: ICD-10-CM

## 2023-01-25 DIAGNOSIS — M25.521 RIGHT ELBOW PAIN: ICD-10-CM

## 2023-01-25 DIAGNOSIS — R53.1 WEAKNESS: ICD-10-CM

## 2023-01-25 PROCEDURE — 97110 THERAPEUTIC EXERCISES: CPT | Mod: PN

## 2023-01-25 PROCEDURE — 97165 OT EVAL LOW COMPLEX 30 MIN: CPT | Mod: PN

## 2023-01-25 NOTE — PLAN OF CARE
"  [facility] Occupational Therapy  Initial Evaluation     Date: 1/25/2023  Patient: Fabiano Malik Jr.  Chart Number: [id number]  Referring Physician: Kaz Randolph*  Therapy Diagnosis:   1. S/P carpal tunnel release  Ambulatory referral/consult to Physical/Occupational Therapy      2. S/P decompression of ulnar nerve at elbow  Ambulatory referral/consult to Physical/Occupational Therapy      3. Wrist pain, right        4. Right elbow pain        5. Stiffness in joint        6. Weakness            Physician Orders: OT eval/treat  Medical Diagnosis: Z98.890 (ICD-10-CM) - S/P carpal tunnel release Z98.890 (ICD-10-CM) - S/P decompression of ulnar nerve at elbow   Evaluation Date: 1/25/2023  Plan of Care Certification Date: 3/24/23  Authorization Period: 1/4/23 - 2/1/23  Surgery Date and Procedure: Operative procedure:   1. Ulnar nerve decompression right elbow insitu 2. Right carpal tunnel release.   Surgeon: Everton Walter Jr, MD. 12/13/22  Date of Return to MD: tbd    Visit #: 1 of 1  Time In: 1450  Time Out: 1535  Total Billable Time: 45    Precautions: Standard , 6 weeks post -op as of 1/24/23    Subjective     History of Current Condition: Pt states that his pain, numbness, and tingling started 6-9 months ago. He had a nerve conduction test that was positive and underwent surgery on 12/13/23. He reports still having 10/10 pain and difficulty holding onto items.   Involved Side: R  Dominant Side: R  Date of Onset: 12/13/22  Imaging: n/a  Previous Therapy: N    Patient's Goals for Therapy: "To be able to use my hand properly"    Pain:  Functional Pain Scale Rating 0-10:   10/10 on average  10/10 at best  10/10 at worst  Location: wrist and elbow  Description: sharp  Aggravating Factors: completing ADL  Easing Factors: wearing wrist brace     Occupation:  Disability  Working presently: disability    Functional Limitations/Social History:    Prior Level of Function: Independent   Current " Level of Function:Independent with pain     Home/Living environment : lives alone  Home Access: accessible   DME: single point cane     Leisure: n/a    Driving: N- patient rely's on transportation services     Past Medical History/Physical Systems Review:   Fabiano Malik Jr.  has a past medical history of Chronic back pain greater than 3 months duration, Depression, Diabetes mellitus, Diabetes mellitus, type 2, Hypertension, and Schizophrenia.    Fabiano Malik Jr.  has a past surgical history that includes Neck surgery; Appendectomy; Radiofrequency ablation of lumbar medial branch nerve at single level (Left, 5/29/2018); Colonoscopy (N/A, 5/31/2018); Radiofrequency ablation of lumbar medial branch nerve at single level (Right, 1/8/2019); Radiofrequency ablation of lumbar medial branch nerve at single level (Left, 3/12/2019); Transforaminal epidural injection of steroid (Right, 9/12/2019); Colonoscopy (N/A, 11/17/2022); Carpal tunnel release (Right, 12/13/2022); and decompression, nerve, ulnar (Right, 12/13/2022).    Fabiano has a current medication list which includes the following prescription(s): acetaminophen, alprazolam, blood pressure cuff, blood sugar diagnostic, blood-glucose meter, chlorthalidone, trulicity, duloxetine, hydroxyzine pamoate, lancets, meclizine, meloxicam, metformin, olmesartan, omeprazole, pfizer covid-19 rogerio vaccn(pf), potassium chloride, pravastatin, pregabalin, shingrix (pf), sildenafil, and tramadol.    Review of patient's allergies indicates:   Allergen Reactions    Pcn [penicillins] Other (See Comments)     Childhood rxn, pt does not recall type of rxn          Objective     Mental status: alert    Observation:   Incisions healed     Edema: Circumferential measurements: In centimters     Left   1/25/2023 Right  1/25/2023   MPs 21 cm 22 cm   PPC (Proximal Gray Crease) 22.5 cm 23 cm   PWC (Proximal Wrist Crease) 18 cm 18 cm      Left   1/25/2023 Right  1/25/2023   3 Inches  distal to elbow crease 28 cm 26.5 cm   Elbow crease 28 cm 28 cm   3 Inches proximal to elbow crease 29.5 cm 27.5 cm       Range of Motion:   Right      Elbow Left  1/25/2023 Right  1/25/2023   Flexion 150 150   Extension 0 0       Wrist Left  1/25/2023 Right  1/25/2023   Extension 60 55   Flexion 40 40   Ulnar Deviation 35 15   Radial Deviation 30 25        Left Thumb range of motion  1/25/2023 Right Thumb range of motion  1/25/2023   MP WNL WNL   IP WNL WNL   Radial Abduction WNL WNL   Palmar Abduction WNL WNL   Opposition WNL Top of small finger with effort        Strength: (ANDREW Dynamometer in psi.)      1/25/2023 1/25/2023    Left Right   Rung  80       Pinch Strength (Measured in psi)     1/25/2023 1/25/2023    Left Right   Key Pinch 17  17   3pt Pinch 16 12   2pt Pinch 13  15       CMS Impairment/Limitation/Restriction for FOTO Hand/Wrist/Finger Survey    Therapist reviewed FOTO scores for Fabiano [employer] on 1/25/2023.   FOTO documents entered into [employer] - see Media section.    Limitation Score: %  Category: Self Care      - will give FOTO next visit         Treatment     Treatment Time In: 1520  Treatment Time Out: 1535  Total Treatment time separate from Evaluation time:15      Fabiano performed therapeutic exercises for 15 minutes including:  -explanation and demonstration of HEP      Home Exercise Program/Education:  Issued HEP (see patient instructions in EMR) and educated on modality use for pain management . Exercises were reviewed and Fabiano was able to demonstrate them prior to the end of the session.   Pt received a written copy of exercises to perform at home. Fabiano demonstrated good  understanding of the education provided.  Pt was advised to perform these exercises free of pain, and to stop performing them if pain occurs.    Patient/Family Education: role of OT, goals for OT, scheduling/cancellations - pt verbalized understanding. Discussed insurance limitations with  patient.      Assessment     Fabiano Malik Jr. is a 62 y.o. male presents with limitations as described in problem list. Patient can benefit from Occupational Therapy services for Iontophoresis, ultrasound, moist heat, therapeutic exercises, home exercise program provied with written instructions, ice and strengthening and orthotics, if deemed necessary . The following goals were discussed with the patient and she is in agreement with them as to be addressed in the treatment plan.    The patient's rehab potential is Good.     Anticipated barriers to occupational therapy: Transportation   Pt has no cultural, educational or language barriers to learning provided.    Profile and History Assessment of Occupational Performance Level of Clinical Decision Making Complexity Score   Occupational Profile:   Fabiano Malik Jr. is a 62 y.o. male who is on disability Fabiano Malik Jr. has difficulty with  ADLs and IADLs as listed previously, which  affecting his/her daily functional abilities.      Comorbidities:    has a past medical history of Chronic back pain greater than 3 months duration, Depression, Diabetes mellitus, Diabetes mellitus, type 2, Hypertension, and Schizophrenia.    Medical and Therapy History Review:   Expanded               Performance Deficits    Physical:  Muscle Power/Strength  Muscle Endurance   Strength  Pinch Strength  Gross Motor Coordination  Pain    Cognitive:  No Deficits    Psychosocial:    Habits  Routines  Rituals     Clinical Decision Making:  low    Assessment Process:  Problem-Focused Assessments    Modification/Need for Assistance:  Not Necessary    Intervention Selection:  Several Treatment Options       low  Based on PMHX, co morbidities , data from assessments and functional level of assistance required with task and clinical presentation directly impacting function.         Goals:    LTG's (8 weeks):  1)   Increase ROM 20 degrees in wrist  to increase functional hand  use for ADLs  2)   Increase  strength 20 lbs. to grasp for ADLs/work/leisure activities.   3)   Decrease complaints of pain to  4 out of 10 at worst to increase functional hand use for ADL/work/leisure activities.  4)   Pt will return to near to prior level of function for ADLs and household management reporting I or Mod I with ADLs (dressing, feeding, grooming, toileting).     STG's (4 weeks)  1)   Patient to be IND with HEP and modalities for pain/edema managment.  2)   Increase ROM 10 degrees in wrist to increase functional hand use for ADLs/work/leisure activities.  3)   Increase  strength 10 lbs. to improve functional grasp for ADLs/work/leisure activities.    4)   Patient to be IND wiht Orthotic use, wear and care precautions.   5)   Decrease complaints of pain to  7 out of 10 at worst to increase functional hand use for ADL/work/leisure activities.          Plan     Pt to be treated by Occupational Therapy 2 times per week for 8 weeks during the certification period from 1/25/2023 to 3/24/23 to achieve the established goals.     Treatment to include: Paraffin, Fluidotherapy, Manual therapy/joint mobilizations, Modalities for pain management, US 3 mhz, Therapeutic exercises/activities., Iontophoresis with 2.0 cc Dexamethasone, Strengthening, Orthotic Fabrication/Fit/Training, Edema Control, Scar Management, Wound Care, Electrical Modalities, Joint Protection, and Energy Conservation, as well as any other treatments deemed necessary based on the patient's needs or progress.       PAM Mishra

## 2023-02-06 ENCOUNTER — TELEPHONE (OUTPATIENT)
Dept: ORTHOPEDICS | Facility: CLINIC | Age: 63
End: 2023-02-06
Payer: MEDICARE

## 2023-02-06 NOTE — TELEPHONE ENCOUNTER
----- Message from Yeny Adams sent at 2/6/2023  8:54 AM CST -----  Type:  Sooner Apoointment Request    Caller is requesting a sooner appointment.  Caller declined first available appointment listed below.  Caller will not accept being placed on the waitlist and is requesting a message be sent to doctor.  Name of Caller: pt  When is the first available appointment? 3/2023  Symptoms: post op  Would the patient rather a call back or a response via [facility]?   Best Call Back Number:[phone]  Additional Information: pt had dilma[t today but needs to r/s for something sooner than March

## 2023-02-08 DIAGNOSIS — G89.18 POST-OP PAIN: ICD-10-CM

## 2023-02-08 DIAGNOSIS — F41.9 ANXIETY: ICD-10-CM

## 2023-02-09 ENCOUNTER — TELEPHONE (OUTPATIENT)
Dept: SPORTS MEDICINE | Facility: CLINIC | Age: 63
End: 2023-02-09
Payer: MEDICARE

## 2023-02-09 RX ORDER — ALPRAZOLAM 0.5 MG/1
0.5 TABLET ORAL NIGHTLY PRN
Qty: 30 TABLET | Refills: 0 | Status: SHIPPED | OUTPATIENT
Start: 2023-02-09 | End: 2023-03-30 | Stop reason: SDUPTHER

## 2023-02-09 RX ORDER — TRAMADOL HYDROCHLORIDE 50 MG/1
50 TABLET ORAL EVERY 6 HOURS PRN
Qty: 14 TABLET | Refills: 0 | OUTPATIENT
Start: 2023-02-09

## 2023-02-09 NOTE — TELEPHONE ENCOUNTER
After speaking with [facility] Randolph PA-C in regards to a refill request that was sent in. Patient was informed that he is two months out from sx and that if he is in pain, patient will need to take ibuprofen or tylenol. Patient stated that he has swelling, pain, and can't always feel his hand. Patient stated that he had sx with Dr. Everton Walter. Patient stated that he would like to speak with someone in Dr. Walter office regarding his pain. Staff Message was routed to Dr. Walter staff (High).

## 2023-02-09 NOTE — TELEPHONE ENCOUNTER
Care Due:                  Date            Visit Type   Department     Provider  --------------------------------------------------------------------------------                                EP -                              PRIMARY      [facility]  Last Visit: 08-      CARE ([facility])   LESLEY Fleming                              EP -                              PRIMARY      [facility]  Next Visit: 02-      CARE ([facility])   LESLEY Fleming                                                            Last  Test          Frequency    Reason                     Performed    Due Date  --------------------------------------------------------------------------------    HBA1C.......  6 months...  dulaglutide, metFORMIN...  02- 08-    Lipid Panel.  12 months..  pravastatin..............  12- 12-    [facility] Embedded Care Gaps. Reference number: [id number]. 2/08/2023   11:55:27 PM CST

## 2023-02-10 ENCOUNTER — TELEPHONE (OUTPATIENT)
Dept: ORTHOPEDICS | Facility: CLINIC | Age: 63
End: 2023-02-10
Payer: MEDICARE

## 2023-02-10 NOTE — TELEPHONE ENCOUNTER
----- Message from Nicolasa Reece MA sent at 2/9/2023 10:53 AM CST -----  Good morning Ines,       I spoke with the patient this morning regarding medication. Patient stated that he would like for someone from Dr. Walter office to give him a call. Patient states that he is having swelling, pain, and some other things going on with his hand.     Thanks!    [facility]  Nicolasa Reece MA  To [facility] Randolph PA-C

## 2023-02-10 NOTE — TELEPHONE ENCOUNTER
Spoke with patient. Informed patient that we would need to evaluate him in the office before prescribing pain medication 6 weeks after procedure. Offered patient an appointment  for 2/13 and 2/15 but patient stated he has appointment scheduled those days already and relies on transportation. Appointment scheduled for 2/17

## 2023-02-10 NOTE — TELEPHONE ENCOUNTER
----- Message from Ceferino Quigley sent at 2/10/2023  2:47 PM CST -----  Type:  Patient Returning Call    Who Called:pt   Would the patient rather a call back or a response via [facility]? Call   Best Call Back Number:[phone]  Additional Information:

## 2023-02-13 ENCOUNTER — OFFICE VISIT (OUTPATIENT)
Dept: FAMILY MEDICINE | Facility: CLINIC | Age: 63
End: 2023-02-13
Payer: MEDICARE

## 2023-02-13 ENCOUNTER — LAB VISIT (OUTPATIENT)
Dept: LAB | Facility: HOSPITAL | Age: 63
End: 2023-02-13
Attending: FAMILY MEDICINE
Payer: MEDICARE

## 2023-02-13 ENCOUNTER — TELEPHONE (OUTPATIENT)
Dept: ORTHOPEDICS | Facility: CLINIC | Age: 63
End: 2023-02-13
Payer: MEDICARE

## 2023-02-13 VITALS
HEART RATE: 94 BPM | OXYGEN SATURATION: 98 % | HEIGHT: 73 IN | SYSTOLIC BLOOD PRESSURE: 116 MMHG | DIASTOLIC BLOOD PRESSURE: 86 MMHG | BODY MASS INDEX: 28.14 KG/M2 | WEIGHT: 212.31 LBS

## 2023-02-13 DIAGNOSIS — F20.3 UNDIFFERENTIATED SCHIZOPHRENIA: ICD-10-CM

## 2023-02-13 DIAGNOSIS — I10 ESSENTIAL HYPERTENSION: ICD-10-CM

## 2023-02-13 DIAGNOSIS — R42 DIZZINESS: ICD-10-CM

## 2023-02-13 DIAGNOSIS — I27.20 PULMONARY HYPERTENSION, UNSPECIFIED: ICD-10-CM

## 2023-02-13 DIAGNOSIS — F13.20 SEDATIVE, HYPNOTIC OR ANXIOLYTIC DEPENDENCE, UNCOMPLICATED: ICD-10-CM

## 2023-02-13 DIAGNOSIS — M54.16 LUMBAR RADICULOPATHY: ICD-10-CM

## 2023-02-13 DIAGNOSIS — Z12.5 SCREENING FOR PROSTATE CANCER: ICD-10-CM

## 2023-02-13 DIAGNOSIS — E11.51 TYPE 2 DIABETES MELLITUS WITH DIABETIC PERIPHERAL ANGIOPATHY WITHOUT GANGRENE, WITHOUT LONG-TERM CURRENT USE OF INSULIN: ICD-10-CM

## 2023-02-13 DIAGNOSIS — E11.42 TYPE 2 DIABETES MELLITUS WITH DIABETIC POLYNEUROPATHY, WITHOUT LONG-TERM CURRENT USE OF INSULIN: ICD-10-CM

## 2023-02-13 DIAGNOSIS — N18.32 STAGE 3B CHRONIC KIDNEY DISEASE: ICD-10-CM

## 2023-02-13 DIAGNOSIS — G89.29 OTHER CHRONIC PAIN: Primary | ICD-10-CM

## 2023-02-13 DIAGNOSIS — M54.12 CERVICAL RADICULOPATHY: ICD-10-CM

## 2023-02-13 LAB
ALBUMIN SERPL BCP-MCNC: 4.5 G/DL (ref 3.5–5.2)
ALP SERPL-CCNC: 75 U/L (ref 55–135)
ALT SERPL W/O P-5'-P-CCNC: 16 U/L (ref 10–44)
ANION GAP SERPL CALC-SCNC: 10 MMOL/L (ref 8–16)
AST SERPL-CCNC: 21 U/L (ref 10–40)
BASOPHILS # BLD AUTO: 0.08 K/UL (ref 0–0.2)
BASOPHILS NFR BLD: 1 % (ref 0–1.9)
BILIRUB SERPL-MCNC: 0.7 MG/DL (ref 0.1–1)
BUN SERPL-MCNC: 20 MG/DL (ref 8–23)
CALCIUM SERPL-MCNC: 10.1 MG/DL (ref 8.7–10.5)
CHLORIDE SERPL-SCNC: 99 MMOL/L (ref 95–110)
CHOLEST SERPL-MCNC: 164 MG/DL (ref 120–199)
CHOLEST/HDLC SERPL: 4.4 {RATIO} (ref 2–5)
CO2 SERPL-SCNC: 28 MMOL/L (ref 23–29)
COMPLEXED PSA SERPL-MCNC: 0.59 NG/ML (ref 0–4)
CREAT SERPL-MCNC: 1.5 MG/DL (ref 0.5–1.4)
DIFFERENTIAL METHOD: ABNORMAL
EOSINOPHIL # BLD AUTO: 0.4 K/UL (ref 0–0.5)
EOSINOPHIL NFR BLD: 5 % (ref 0–8)
ERYTHROCYTE [DISTWIDTH] IN BLOOD BY AUTOMATED COUNT: 13.1 % (ref 11.5–14.5)
EST. GFR  (NO RACE VARIABLE): 52.3 ML/MIN/1.73 M^2
ESTIMATED AVG GLUCOSE: 140 MG/DL (ref 68–131)
GLUCOSE SERPL-MCNC: 101 MG/DL (ref 70–110)
HBA1C MFR BLD: 6.5 % (ref 4–5.6)
HCT VFR BLD AUTO: 42.5 % (ref 40–54)
HDLC SERPL-MCNC: 37 MG/DL (ref 40–75)
HDLC SERPL: 22.6 % (ref 20–50)
HGB BLD-MCNC: 14.2 G/DL (ref 14–18)
IMM GRANULOCYTES # BLD AUTO: 0.02 K/UL (ref 0–0.04)
IMM GRANULOCYTES NFR BLD AUTO: 0.2 % (ref 0–0.5)
LDLC SERPL CALC-MCNC: 103.4 MG/DL (ref 63–159)
LYMPHOCYTES # BLD AUTO: 2 K/UL (ref 1–4.8)
LYMPHOCYTES NFR BLD: 23.9 % (ref 18–48)
MCH RBC QN AUTO: 28.3 PG (ref 27–31)
MCHC RBC AUTO-ENTMCNC: 33.4 G/DL (ref 32–36)
MCV RBC AUTO: 85 FL (ref 82–98)
MONOCYTES # BLD AUTO: 0.6 K/UL (ref 0.3–1)
MONOCYTES NFR BLD: 7.3 % (ref 4–15)
NEUTROPHILS # BLD AUTO: 5.2 K/UL (ref 1.8–7.7)
NEUTROPHILS NFR BLD: 62.6 % (ref 38–73)
NONHDLC SERPL-MCNC: 127 MG/DL
NRBC BLD-RTO: 0 /100 WBC
PLATELET # BLD AUTO: 316 K/UL (ref 150–450)
PMV BLD AUTO: 9 FL (ref 9.2–12.9)
POTASSIUM SERPL-SCNC: 3.9 MMOL/L (ref 3.5–5.1)
PROT SERPL-MCNC: 7.5 G/DL (ref 6–8.4)
RBC # BLD AUTO: 5.02 M/UL (ref 4.6–6.2)
SODIUM SERPL-SCNC: 137 MMOL/L (ref 136–145)
TRIGL SERPL-MCNC: 118 MG/DL (ref 30–150)
WBC # BLD AUTO: 8.23 K/UL (ref 3.9–12.7)

## 2023-02-13 PROCEDURE — 3074F SYST BP LT 130 MM HG: CPT | Mod: CPTII,S$GLB,, | Performed by: FAMILY MEDICINE

## 2023-02-13 PROCEDURE — 80061 LIPID PANEL: CPT | Performed by: FAMILY MEDICINE

## 2023-02-13 PROCEDURE — 99215 OFFICE O/P EST HI 40 MIN: CPT | Mod: S$GLB,,, | Performed by: FAMILY MEDICINE

## 2023-02-13 PROCEDURE — 1159F MED LIST DOCD IN RCRD: CPT | Mod: CPTII,S$GLB,, | Performed by: FAMILY MEDICINE

## 2023-02-13 PROCEDURE — 99215 PR OFFICE/OUTPT VISIT, EST, LEVL V, 40-54 MIN: ICD-10-PCS | Mod: S$GLB,,, | Performed by: FAMILY MEDICINE

## 2023-02-13 PROCEDURE — 83036 HEMOGLOBIN GLYCOSYLATED A1C: CPT | Performed by: FAMILY MEDICINE

## 2023-02-13 PROCEDURE — 3079F DIAST BP 80-89 MM HG: CPT | Mod: CPTII,S$GLB,, | Performed by: FAMILY MEDICINE

## 2023-02-13 PROCEDURE — 3079F PR MOST RECENT DIASTOLIC BLOOD PRESSURE 80-89 MM HG: ICD-10-PCS | Mod: CPTII,S$GLB,, | Performed by: FAMILY MEDICINE

## 2023-02-13 PROCEDURE — 84153 ASSAY OF PSA TOTAL: CPT | Performed by: FAMILY MEDICINE

## 2023-02-13 PROCEDURE — 85025 COMPLETE CBC W/AUTO DIFF WBC: CPT | Performed by: FAMILY MEDICINE

## 2023-02-13 PROCEDURE — 99999 PR PBB SHADOW E&M-EST. PATIENT-LVL IV: CPT | Mod: PBBFAC,,, | Performed by: FAMILY MEDICINE

## 2023-02-13 PROCEDURE — 99999 PR PBB SHADOW E&M-EST. PATIENT-LVL IV: ICD-10-PCS | Mod: PBBFAC,,, | Performed by: FAMILY MEDICINE

## 2023-02-13 PROCEDURE — 80053 COMPREHEN METABOLIC PANEL: CPT | Performed by: FAMILY MEDICINE

## 2023-02-13 PROCEDURE — 3008F PR BODY MASS INDEX (BMI) DOCUMENTED: ICD-10-PCS | Mod: CPTII,S$GLB,, | Performed by: FAMILY MEDICINE

## 2023-02-13 PROCEDURE — 3074F PR MOST RECENT SYSTOLIC BLOOD PRESSURE < 130 MM HG: ICD-10-PCS | Mod: CPTII,S$GLB,, | Performed by: FAMILY MEDICINE

## 2023-02-13 PROCEDURE — 1159F PR MEDICATION LIST DOCUMENTED IN MEDICAL RECORD: ICD-10-PCS | Mod: CPTII,S$GLB,, | Performed by: FAMILY MEDICINE

## 2023-02-13 PROCEDURE — 1160F RVW MEDS BY RX/DR IN RCRD: CPT | Mod: CPTII,S$GLB,, | Performed by: FAMILY MEDICINE

## 2023-02-13 PROCEDURE — 36415 COLL VENOUS BLD VENIPUNCTURE: CPT | Mod: PO | Performed by: FAMILY MEDICINE

## 2023-02-13 PROCEDURE — 3008F BODY MASS INDEX DOCD: CPT | Mod: CPTII,S$GLB,, | Performed by: FAMILY MEDICINE

## 2023-02-13 PROCEDURE — 1160F PR REVIEW ALL MEDS BY PRESCRIBER/CLIN PHARMACIST DOCUMENTED: ICD-10-PCS | Mod: CPTII,S$GLB,, | Performed by: FAMILY MEDICINE

## 2023-02-13 RX ORDER — PREGABALIN 100 MG/1
100 CAPSULE ORAL 2 TIMES DAILY
Qty: 180 CAPSULE | Refills: 0 | Status: SHIPPED | OUTPATIENT
Start: 2023-02-13 | End: 2023-04-26 | Stop reason: SDUPTHER

## 2023-02-13 RX ORDER — MECLIZINE HCL 12.5 MG 12.5 MG/1
12.5 TABLET ORAL 3 TIMES DAILY PRN
Qty: 180 TABLET | Refills: 0 | Status: SHIPPED | OUTPATIENT
Start: 2023-02-13 | End: 2023-04-26 | Stop reason: SDUPTHER

## 2023-02-13 NOTE — TELEPHONE ENCOUNTER
Patient will be running a few minutes for appointment scheduled on 2/17. Noted in appointment notes.

## 2023-02-13 NOTE — PROGRESS NOTES
Subjective:       Patient ID: Fabiano Malik Jr. is a 62 y.o. male.    Chief Complaint: Follow-up    62 years old male came to the clinic with chronic pain associated with numbness over the right arm and lower extremities.  Patient previously diagnosed with neuropathy and radiculopathy.  He is willing to try a medicine to help with the symptoms.  He was previously diagnosed with pulmonary hypertension.  No previous A1c.  No polyuria, polydipsia or polyphagia.  Patient with decreased kidney function but stable in comparison with previous reports.  No flare ups of schizophrenia.  He is requesting meclizine to help with episodic dizziness.    Follow-up  Associated symptoms include arthralgias and numbness. Pertinent negatives include no chest pain.   Diabetes  Pertinent negatives for diabetes include no chest pain, no polydipsia, no polyphagia and no polyuria.   Hypertension  Pertinent negatives include no chest pain or palpitations.   Review of Systems   Constitutional: Negative.    HENT: Negative.     Eyes: Negative.    Respiratory: Negative.     Cardiovascular: Negative.  Negative for chest pain, palpitations, leg swelling and claudication.   Gastrointestinal: Negative.    Endocrine: Negative for polydipsia, polyphagia and polyuria.   Genitourinary: Negative.    Musculoskeletal:  Positive for arthralgias and back pain.   Integumentary:  Negative.   Neurological:  Positive for numbness.   Psychiatric/Behavioral: Negative.         Objective:      Physical Exam  Vitals and nursing note reviewed.   Constitutional:       General: He is not in acute distress.     Appearance: He is well-developed. He is not diaphoretic.   HENT:      Head: Normocephalic and atraumatic.      Right Ear: External ear normal.      Left Ear: External ear normal.      Nose: Nose normal.      Mouth/Throat:      Pharynx: No oropharyngeal exudate.   Eyes:      General: No scleral icterus.        Right eye: No discharge.         Left eye: No  discharge.      Conjunctiva/sclera: Conjunctivae normal.      Pupils: Pupils are equal, round, and reactive to light.   Neck:      Thyroid: No thyromegaly.      Vascular: No JVD.      Trachea: No tracheal deviation.   Cardiovascular:      Rate and Rhythm: Normal rate and regular rhythm.      Heart sounds: Normal heart sounds. No murmur heard.    No friction rub. No gallop.   Pulmonary:      Effort: Pulmonary effort is normal. No respiratory distress.      Breath sounds: Normal breath sounds. No stridor. No wheezing or rales.   Chest:      Chest wall: No tenderness.   Abdominal:      General: Bowel sounds are normal. There is no distension.      Palpations: Abdomen is soft. There is no mass.      Tenderness: There is no abdominal tenderness. There is no guarding or rebound.   Musculoskeletal:         General: No tenderness. Normal range of motion.      Cervical back: Normal range of motion and neck supple.   Lymphadenopathy:      Cervical: No cervical adenopathy.   Skin:     General: Skin is warm and dry.      Coloration: Skin is not pale.      Findings: No erythema or rash.   Neurological:      Mental Status: He is alert and oriented to person, place, and time.      Cranial Nerves: No cranial nerve deficit.      Motor: No abnormal muscle tone.      Coordination: Coordination normal.      Deep Tendon Reflexes: Reflexes are normal and symmetric. Reflexes normal.   Psychiatric:         Behavior: Behavior normal.         Thought Content: Thought content normal.         Judgment: Judgment normal.       Assessment:       Problem List Items Addressed This Visit       Schizophrenia (Chronic)    Relevant Orders    CBC Auto Differential    Comprehensive Metabolic Panel    Dizziness    Relevant Medications    meclizine (ANTIVERT) 12.5 mg tablet    CKD (chronic kidney disease) stage 3, GFR 30-59 ml/min    Chronic pain - Primary    Relevant Medications    pregabalin (LYRICA) 100 MG capsule    Lumbar radiculopathy    Relevant  Medications    pregabalin (LYRICA) 100 MG capsule    Sedative, hypnotic or anxiolytic dependence, uncomplicated    Pulmonary hypertension, unspecified    Type 2 diabetes mellitus with diabetic peripheral angiopathy without gangrene, without long-term current use of insulin    Relevant Orders    Comprehensive Metabolic Panel    Hemoglobin A1C    Lipid Panel    Microalbumin/Creatinine Ratio, Urine     Other Visit Diagnoses       Cervical radiculopathy        Relevant Medications    pregabalin (LYRICA) 100 MG capsule    Essential hypertension        Relevant Orders    CBC Auto Differential    Comprehensive Metabolic Panel    Lipid Panel    Type 2 diabetes mellitus with diabetic polyneuropathy, without long-term current use of insulin        Relevant Medications    pregabalin (LYRICA) 100 MG capsule    Other Relevant Orders    Comprehensive Metabolic Panel    Hemoglobin A1C    Lipid Panel    Microalbumin/Creatinine Ratio, Urine              Plan:         Fabiano was seen today for follow-up, diabetes and hypertension.    Diagnoses and all orders for this visit:    Other chronic pain  -     pregabalin (LYRICA) 100 MG capsule; Take 1 capsule (100 mg total) by mouth 2 (two) times daily.    Lumbar radiculopathy  -     pregabalin (LYRICA) 100 MG capsule; Take 1 capsule (100 mg total) by mouth 2 (two) times daily.    Stage 3b chronic kidney disease    Cervical radiculopathy  -     pregabalin (LYRICA) 100 MG capsule; Take 1 capsule (100 mg total) by mouth 2 (two) times daily.    Essential hypertension  -     CBC Auto Differential; Future  -     Comprehensive Metabolic Panel; Future  -     Lipid Panel; Future    Sedative, hypnotic or anxiolytic dependence, uncomplicated    Pulmonary hypertension, unspecified    Type 2 diabetes mellitus with diabetic peripheral angiopathy without gangrene, without long-term current use of insulin  -     Comprehensive Metabolic Panel; Future  -     Hemoglobin A1C; Future  -     Lipid Panel;  Future  -     Microalbumin/Creatinine Ratio, Urine; Future    Type 2 diabetes mellitus with diabetic polyneuropathy, without long-term current use of insulin  -     pregabalin (LYRICA) 100 MG capsule; Take 1 capsule (100 mg total) by mouth 2 (two) times daily.  -     Comprehensive Metabolic Panel; Future  -     Hemoglobin A1C; Future  -     Lipid Panel; Future  -     Microalbumin/Creatinine Ratio, Urine; Future    Undifferentiated schizophrenia  -     CBC Auto Differential; Future  -     Comprehensive Metabolic Panel; Future    Dizziness  -     meclizine (ANTIVERT) 12.5 mg tablet; Take 1 tablet (12.5 mg total) by mouth 3 (three) times daily as needed for Dizziness.     Follow-up with Dr. Walter.    Continue monitoring blood sugar at home,ADA diet.   Continue monitoring blood pressure at home, low sodium diet.

## 2023-02-13 NOTE — TELEPHONE ENCOUNTER
----- Message from Julissa León sent at 2/13/2023  2:33 PM CST -----  Type:  Needs Medical Advice    Who Called:  pt  Symptoms (please be specific):  calling to inform office that due to transportation he will be running a few mins late for appt on  02/17/2023

## 2023-02-16 NOTE — PROGRESS NOTES
Subjective:      Patient ID: Fabiano Malik Jr. is a 62 y.o. male.    Chief Complaint: Post-op Evaluation (2 mth p/o- rt [facility] ), Wrist Pain (right ), and Arm Pain (right )      HPI: Fabiano Malik Jr. is here postop follow-up.  He is approximately 9 weeks status post right carpal tunnel release and right ulnar nerve decompression.  Today, he reports numbness and tingling unchanged.  He continues to have pain in his entire upper arm, starting at the neck and radiating down. He continues to complain of weakness and dropping items.  His EMG prior to surgery also was significant for chronic cervical radiculopathy.  He seemed to be unaware of cervical radiculopathy diagnosis. He was seen by his PCP 3 days ago and prescribed Lyrica for his continued neuropathy.    Past Medical History:   Diagnosis Date    Chronic back pain greater than 3 months duration     Depression     Diabetes mellitus     Diabetes mellitus, type 2     Hypertension     Schizophrenia        Current Outpatient Medications:     BLOOD PRESSURE CUFF Misc, 1 Units by Misc.(Non-Drug; Combo Route) route once daily., Disp: 1 each, Rfl: 0    blood sugar diagnostic (TRUE METRIX GLUCOSE TEST STRIP) Strp, use 1 strip to check glucose 2 (two) times a day., Disp: 200 strip, Rfl: 6    chlorthalidone (HYGROTEN) 25 MG Tab, Take 1 tablet (25 mg total) by mouth once daily., Disp: 90 tablet, Rfl: 3    dulaglutide (TRULICITY) 0.75 mg/0.5 mL pen injector, Inject 0.75 mg (one pen) into the skin every 7 days., Disp: 12 pen, Rfl: 1    DULoxetine (CYMBALTA) 60 MG capsule, TAKE 2 CAPSULES BY MOUTH EVERY MORNING, Disp: 180 capsule, Rfl: 3    lancets 30 gauge Misc, 1 lancet by Misc.(Non-Drug; Combo Route) route twice daily., Disp: 200 each, Rfl: 6    meloxicam (MOBIC) 15 MG tablet, , Disp: , Rfl:     metFORMIN (GLUCOPHAGE) 1000 MG tablet, Take 1 tablet (1,000 mg total) by mouth 2 (two) times daily with meals., Disp: 180 tablet, Rfl: 3    omeprazole (PRILOSEC) 20 MG capsule,  "Take 1 capsule (20 mg total) by mouth before breakfast., Disp: 30 capsule, Rfl: 3    PFIZER COVID-19 CIELO VACCN,PF, 30 mcg/0.3 mL injection, , Disp: , Rfl:     potassium chloride (KLOR-CON) 10 MEQ TbSR, take 1 tablet by mouth once daily, Disp: 90 tablet, Rfl: 3    pravastatin (PRAVACHOL) 40 MG tablet, TAKE 1 TABLET(40 MG) BY MOUTH EVERY DAY, Disp: 90 tablet, Rfl: 3    pregabalin (LYRICA) 100 MG capsule, Take 1 capsule (100 mg total) by mouth 2 (two) times daily., Disp: 180 capsule, Rfl: 0    SHINGRIX, PF, 50 mcg/0.5 mL injection, , Disp: , Rfl:     sildenafiL (VIAGRA) 100 MG tablet, Take 1 tablet (100 mg total) by mouth daily as needed for Erectile Dysfunction., Disp: 30 tablet, Rfl: 3    acetaminophen (TYLENOL) 325 MG tablet, Take 2 tablets (650 mg total) by mouth every 6 (six) hours as needed. (Patient not taking: Reported on 2/17/2023), Disp: 120 tablet, Rfl: 3    ALPRAZolam (XANAX) 0.5 MG tablet, Take 1 tablet (0.5 mg total) by mouth nightly as needed for Anxiety. (Patient not taking: Reported on 2/17/2023), Disp: 30 tablet, Rfl: 0    blood-glucose meter Misc, Use as instructed, Disp: 1 each, Rfl: 0    hydrOXYzine pamoate (VISTARIL) 50 MG Cap, TAKE 1 CAPSULE (50 MG) BY MOUTH NIGHTLY AS NEEDED FOR INSOMNIA (Patient not taking: Reported on 2/17/2023), Disp: 90 capsule, Rfl: 3    meclizine (ANTIVERT) 12.5 mg tablet, Take 1 tablet (12.5 mg total) by mouth 3 (three) times daily as needed for Dizziness. (Patient not taking: Reported on 2/17/2023), Disp: 180 tablet, Rfl: 0    olmesartan (BENICAR) 20 MG tablet, Take 1 tablet (20 mg total) by mouth once daily., Disp: 90 tablet, Rfl: 0  Review of patient's allergies indicates:   Allergen Reactions    Pcn [penicillins] Other (See Comments)     Childhood rxn, pt does not recall type of rxn       Ht 6' 1" (1.854 m)   Wt 96.3 kg (212 lb 4.9 oz)   BMI 28.01 kg/m²     Review of Systems   Constitutional: Negative for chills and fever.   Cardiovascular:  Negative for chest pain " and palpitations.   Respiratory:  Negative for shortness of breath and wheezing.    Skin:  Negative for poor wound healing and rash.   Musculoskeletal:  Positive for muscle weakness.   Gastrointestinal:  Negative for nausea and vomiting.   Genitourinary:  Negative for dysuria and hematuria.   Neurological:  Positive for numbness and paresthesias. Negative for seizures and tremors.   Psychiatric/Behavioral:  Negative for altered mental status.    Allergic/Immunologic: Negative for environmental allergies and persistent infections.       Objective:    Ortho Exam       Right UE: Skin healed carpal tunnel incision and medial elbow incision. Swelling none. TTP none. ROM fingers, wrist, elbow and shoulder full. Sensation decreased entire hand. Tinels negative. Pulses present. Cap refill brisk   GEN: Well developed, well nourished male. AAOX3. No acute distress.   Normocephalic, atraumatic.   NISSA  Breathing unlabored.  Mood and affect appropriate.     Assessment:     Imaging: no new imaging        1. Chronic cervical radiculopathy          Plan:       Orders Placed This Encounter    Ambulatory referral/consult to Pain Clinic     I explained to the patient that numbness and tingling related to cubital tunnel and carpal tunnel syndrome may continue to improve up to 6 months following surgical decompression  I also explained some of his symptoms in his upper arm are related to the chronic cervical radiculopathy which will not improve after having the surgery.  He seemed to be unaware of having cervical radiculopathy seen on EMG, he has never been evaluated or treated for this.  I recommend follow-up with Pain Management who is well established with for similar issues in his lumbar spine.  He was just prescribed Lyrica which I think will be helpful, unfortunately he has not started the medication yet  Continue to monitor numbness and tingling in the median and ulnar nerve distribution  Activity as tolerated    Follow up in  about 6 weeks (around 3/31/2023).

## 2023-02-17 ENCOUNTER — OFFICE VISIT (OUTPATIENT)
Dept: ORTHOPEDICS | Facility: CLINIC | Age: 63
End: 2023-02-17
Payer: MEDICARE

## 2023-02-17 VITALS — BODY MASS INDEX: 28.14 KG/M2 | WEIGHT: 212.31 LBS | HEIGHT: 73 IN

## 2023-02-17 DIAGNOSIS — M54.12 CHRONIC CERVICAL RADICULOPATHY: Primary | ICD-10-CM

## 2023-02-17 PROCEDURE — 1160F RVW MEDS BY RX/DR IN RCRD: CPT | Mod: CPTII,S$GLB,, | Performed by: ORTHOPAEDIC SURGERY

## 2023-02-17 PROCEDURE — 3061F PR NEG MICROALBUMINURIA RESULT DOCUMENTED/REVIEW: ICD-10-PCS | Mod: CPTII,S$GLB,, | Performed by: ORTHOPAEDIC SURGERY

## 2023-02-17 PROCEDURE — 3066F NEPHROPATHY DOC TX: CPT | Mod: CPTII,S$GLB,, | Performed by: ORTHOPAEDIC SURGERY

## 2023-02-17 PROCEDURE — 1159F MED LIST DOCD IN RCRD: CPT | Mod: CPTII,S$GLB,, | Performed by: ORTHOPAEDIC SURGERY

## 2023-02-17 PROCEDURE — 3044F PR MOST RECENT HEMOGLOBIN A1C LEVEL <7.0%: ICD-10-PCS | Mod: CPTII,S$GLB,, | Performed by: ORTHOPAEDIC SURGERY

## 2023-02-17 PROCEDURE — 3044F HG A1C LEVEL LT 7.0%: CPT | Mod: CPTII,S$GLB,, | Performed by: ORTHOPAEDIC SURGERY

## 2023-02-17 PROCEDURE — 3066F PR DOCUMENTATION OF TREATMENT FOR NEPHROPATHY: ICD-10-PCS | Mod: CPTII,S$GLB,, | Performed by: ORTHOPAEDIC SURGERY

## 2023-02-17 PROCEDURE — 1160F PR REVIEW ALL MEDS BY PRESCRIBER/CLIN PHARMACIST DOCUMENTED: ICD-10-PCS | Mod: CPTII,S$GLB,, | Performed by: ORTHOPAEDIC SURGERY

## 2023-02-17 PROCEDURE — 99999 PR PBB SHADOW E&M-EST. PATIENT-LVL IV: CPT | Mod: PBBFAC,,, | Performed by: ORTHOPAEDIC SURGERY

## 2023-02-17 PROCEDURE — 1159F PR MEDICATION LIST DOCUMENTED IN MEDICAL RECORD: ICD-10-PCS | Mod: CPTII,S$GLB,, | Performed by: ORTHOPAEDIC SURGERY

## 2023-02-17 PROCEDURE — 3008F BODY MASS INDEX DOCD: CPT | Mod: CPTII,S$GLB,, | Performed by: ORTHOPAEDIC SURGERY

## 2023-02-17 PROCEDURE — 99024 POSTOP FOLLOW-UP VISIT: CPT | Mod: S$GLB,,, | Performed by: ORTHOPAEDIC SURGERY

## 2023-02-17 PROCEDURE — 3008F PR BODY MASS INDEX (BMI) DOCUMENTED: ICD-10-PCS | Mod: CPTII,S$GLB,, | Performed by: ORTHOPAEDIC SURGERY

## 2023-02-17 PROCEDURE — 3061F NEG MICROALBUMINURIA REV: CPT | Mod: CPTII,S$GLB,, | Performed by: ORTHOPAEDIC SURGERY

## 2023-02-17 PROCEDURE — 99999 PR PBB SHADOW E&M-EST. PATIENT-LVL IV: ICD-10-PCS | Mod: PBBFAC,,, | Performed by: ORTHOPAEDIC SURGERY

## 2023-02-17 PROCEDURE — 99024 PR POST-OP FOLLOW-UP VISIT: ICD-10-PCS | Mod: S$GLB,,, | Performed by: ORTHOPAEDIC SURGERY

## 2023-02-24 ENCOUNTER — HOSPITAL ENCOUNTER (OUTPATIENT)
Dept: RADIOLOGY | Facility: HOSPITAL | Age: 63
Discharge: HOME | End: 2023-02-24
Attending: ORTHOPAEDIC SURGERY
Payer: MEDICARE

## 2023-02-24 DIAGNOSIS — M22.42 CHONDROMALACIA, PATELLA, LEFT: ICD-10-CM

## 2023-02-24 PROCEDURE — 73502 X-RAY EXAM HIP UNI 2-3 VIEWS: CPT | Mod: 26,LT,, | Performed by: RADIOLOGY

## 2023-02-24 PROCEDURE — 73502 XR HIP WITH PELVIS WHEN PERFORMED, 2 OR 3 VIEWS LEFT: ICD-10-PCS | Mod: 26,LT,, | Performed by: RADIOLOGY

## 2023-02-24 PROCEDURE — 73502 X-RAY EXAM HIP UNI 2-3 VIEWS: CPT | Mod: TC,FY,LT

## 2023-02-26 ENCOUNTER — PATIENT MESSAGE (OUTPATIENT)
Dept: ADMINISTRATIVE | Facility: HOSPITAL | Age: 63
End: 2023-02-26
Payer: MEDICARE

## 2023-02-27 ENCOUNTER — TELEPHONE (OUTPATIENT)
Dept: PODIATRY | Facility: CLINIC | Age: 63
End: 2023-02-27
Payer: MEDICARE

## 2023-02-27 NOTE — TELEPHONE ENCOUNTER
Spoke with pt in regards to message in the portal. Pt was advise that Dr. Pederson next available appointment is not until 3/16. Pt was offered to see a different provider. Pt agreed to see Dr. Burks. Pt agreed to date and time. Call ended. ----- Message from Lakshmi Flores sent at 2/27/2023 10:00 AM CST -----  Contact: URBAN SANCHEZ JR. [[id number]] [phone]  GENERAL CALL    Patient requests a call back - his insurance advised he is eligible for prescription ortho shoes and wants to get more information on how to go about getting those: [phone]

## 2023-03-02 ENCOUNTER — OFFICE VISIT (OUTPATIENT)
Dept: PODIATRY | Facility: CLINIC | Age: 63
End: 2023-03-02
Payer: MEDICARE

## 2023-03-02 ENCOUNTER — TELEPHONE (OUTPATIENT)
Dept: PODIATRY | Facility: CLINIC | Age: 63
End: 2023-03-02

## 2023-03-02 ENCOUNTER — OFFICE VISIT (OUTPATIENT)
Dept: OTOLARYNGOLOGY | Facility: CLINIC | Age: 63
End: 2023-03-02
Payer: MEDICARE

## 2023-03-02 VITALS
HEIGHT: 73 IN | HEART RATE: 81 BPM | DIASTOLIC BLOOD PRESSURE: 66 MMHG | WEIGHT: 223.13 LBS | BODY MASS INDEX: 29.57 KG/M2 | SYSTOLIC BLOOD PRESSURE: 115 MMHG

## 2023-03-02 VITALS
HEIGHT: 73 IN | HEART RATE: 81 BPM | SYSTOLIC BLOOD PRESSURE: 110 MMHG | BODY MASS INDEX: 28.01 KG/M2 | DIASTOLIC BLOOD PRESSURE: 69 MMHG

## 2023-03-02 DIAGNOSIS — J34.89 NASAL VESTIBULITIS: Primary | Chronic | ICD-10-CM

## 2023-03-02 DIAGNOSIS — J34.0 CELLULITIS OF MUCOUS MEMBRANE OF NOSE: ICD-10-CM

## 2023-03-02 DIAGNOSIS — L60.9 DISEASE OF NAIL: ICD-10-CM

## 2023-03-02 DIAGNOSIS — M21.40 PES PLANUS, UNSPECIFIED LATERALITY: ICD-10-CM

## 2023-03-02 DIAGNOSIS — J34.0 CELLULITIS OF NOSE, EXTERNAL: ICD-10-CM

## 2023-03-02 DIAGNOSIS — E11.9 ENCOUNTER FOR DIABETIC FOOT EXAM: ICD-10-CM

## 2023-03-02 DIAGNOSIS — E11.42 TYPE 2 DIABETES MELLITUS WITH DIABETIC POLYNEUROPATHY, WITHOUT LONG-TERM CURRENT USE OF INSULIN: Primary | ICD-10-CM

## 2023-03-02 PROCEDURE — 3078F DIAST BP <80 MM HG: CPT | Mod: CPTII,S$GLB,, | Performed by: STUDENT IN AN ORGANIZED HEALTH CARE EDUCATION/TRAINING PROGRAM

## 2023-03-02 PROCEDURE — 3044F PR MOST RECENT HEMOGLOBIN A1C LEVEL <7.0%: ICD-10-PCS | Mod: CPTII,S$GLB,, | Performed by: STUDENT IN AN ORGANIZED HEALTH CARE EDUCATION/TRAINING PROGRAM

## 2023-03-02 PROCEDURE — 1159F PR MEDICATION LIST DOCUMENTED IN MEDICAL RECORD: ICD-10-PCS | Mod: CPTII,S$GLB,, | Performed by: STUDENT IN AN ORGANIZED HEALTH CARE EDUCATION/TRAINING PROGRAM

## 2023-03-02 PROCEDURE — 3061F NEG MICROALBUMINURIA REV: CPT | Mod: CPTII,S$GLB,, | Performed by: STUDENT IN AN ORGANIZED HEALTH CARE EDUCATION/TRAINING PROGRAM

## 2023-03-02 PROCEDURE — 3066F NEPHROPATHY DOC TX: CPT | Mod: CPTII,S$GLB,, | Performed by: OTOLARYNGOLOGY

## 2023-03-02 PROCEDURE — 3061F PR NEG MICROALBUMINURIA RESULT DOCUMENTED/REVIEW: ICD-10-PCS | Mod: CPTII,S$GLB,, | Performed by: STUDENT IN AN ORGANIZED HEALTH CARE EDUCATION/TRAINING PROGRAM

## 2023-03-02 PROCEDURE — 3066F NEPHROPATHY DOC TX: CPT | Mod: CPTII,S$GLB,, | Performed by: STUDENT IN AN ORGANIZED HEALTH CARE EDUCATION/TRAINING PROGRAM

## 2023-03-02 PROCEDURE — 1159F PR MEDICATION LIST DOCUMENTED IN MEDICAL RECORD: ICD-10-PCS | Mod: CPTII,S$GLB,, | Performed by: OTOLARYNGOLOGY

## 2023-03-02 PROCEDURE — 3074F SYST BP LT 130 MM HG: CPT | Mod: CPTII,S$GLB,, | Performed by: OTOLARYNGOLOGY

## 2023-03-02 PROCEDURE — 3066F PR DOCUMENTATION OF TREATMENT FOR NEPHROPATHY: ICD-10-PCS | Mod: CPTII,S$GLB,, | Performed by: STUDENT IN AN ORGANIZED HEALTH CARE EDUCATION/TRAINING PROGRAM

## 2023-03-02 PROCEDURE — 99214 PR OFFICE/OUTPT VISIT, EST, LEVL IV, 30-39 MIN: ICD-10-PCS | Mod: S$GLB,,, | Performed by: OTOLARYNGOLOGY

## 2023-03-02 PROCEDURE — 3008F BODY MASS INDEX DOCD: CPT | Mod: CPTII,S$GLB,, | Performed by: OTOLARYNGOLOGY

## 2023-03-02 PROCEDURE — 3074F SYST BP LT 130 MM HG: CPT | Mod: CPTII,S$GLB,, | Performed by: STUDENT IN AN ORGANIZED HEALTH CARE EDUCATION/TRAINING PROGRAM

## 2023-03-02 PROCEDURE — 1159F MED LIST DOCD IN RCRD: CPT | Mod: CPTII,S$GLB,, | Performed by: OTOLARYNGOLOGY

## 2023-03-02 PROCEDURE — 3008F BODY MASS INDEX DOCD: CPT | Mod: CPTII,S$GLB,, | Performed by: STUDENT IN AN ORGANIZED HEALTH CARE EDUCATION/TRAINING PROGRAM

## 2023-03-02 PROCEDURE — 99999 PR PBB SHADOW E&M-EST. PATIENT-LVL IV: ICD-10-PCS | Mod: PBBFAC,,, | Performed by: OTOLARYNGOLOGY

## 2023-03-02 PROCEDURE — 99999 PR PBB SHADOW E&M-EST. PATIENT-LVL III: ICD-10-PCS | Mod: PBBFAC,,, | Performed by: STUDENT IN AN ORGANIZED HEALTH CARE EDUCATION/TRAINING PROGRAM

## 2023-03-02 PROCEDURE — 99214 PR OFFICE/OUTPT VISIT, EST, LEVL IV, 30-39 MIN: ICD-10-PCS | Mod: 25,S$GLB,, | Performed by: STUDENT IN AN ORGANIZED HEALTH CARE EDUCATION/TRAINING PROGRAM

## 2023-03-02 PROCEDURE — 3066F PR DOCUMENTATION OF TREATMENT FOR NEPHROPATHY: ICD-10-PCS | Mod: CPTII,S$GLB,, | Performed by: OTOLARYNGOLOGY

## 2023-03-02 PROCEDURE — 99999 PR PBB SHADOW E&M-EST. PATIENT-LVL IV: CPT | Mod: PBBFAC,,, | Performed by: OTOLARYNGOLOGY

## 2023-03-02 PROCEDURE — 99214 OFFICE O/P EST MOD 30 MIN: CPT | Mod: 25,S$GLB,, | Performed by: STUDENT IN AN ORGANIZED HEALTH CARE EDUCATION/TRAINING PROGRAM

## 2023-03-02 PROCEDURE — 3044F HG A1C LEVEL LT 7.0%: CPT | Mod: CPTII,S$GLB,, | Performed by: STUDENT IN AN ORGANIZED HEALTH CARE EDUCATION/TRAINING PROGRAM

## 2023-03-02 PROCEDURE — 99214 OFFICE O/P EST MOD 30 MIN: CPT | Mod: S$GLB,,, | Performed by: OTOLARYNGOLOGY

## 2023-03-02 PROCEDURE — 3044F HG A1C LEVEL LT 7.0%: CPT | Mod: CPTII,S$GLB,, | Performed by: OTOLARYNGOLOGY

## 2023-03-02 PROCEDURE — 1159F MED LIST DOCD IN RCRD: CPT | Mod: CPTII,S$GLB,, | Performed by: STUDENT IN AN ORGANIZED HEALTH CARE EDUCATION/TRAINING PROGRAM

## 2023-03-02 PROCEDURE — 3074F PR MOST RECENT SYSTOLIC BLOOD PRESSURE < 130 MM HG: ICD-10-PCS | Mod: CPTII,S$GLB,, | Performed by: OTOLARYNGOLOGY

## 2023-03-02 PROCEDURE — 3044F PR MOST RECENT HEMOGLOBIN A1C LEVEL <7.0%: ICD-10-PCS | Mod: CPTII,S$GLB,, | Performed by: OTOLARYNGOLOGY

## 2023-03-02 PROCEDURE — 3078F DIAST BP <80 MM HG: CPT | Mod: CPTII,S$GLB,, | Performed by: OTOLARYNGOLOGY

## 2023-03-02 PROCEDURE — 3061F NEG MICROALBUMINURIA REV: CPT | Mod: CPTII,S$GLB,, | Performed by: OTOLARYNGOLOGY

## 2023-03-02 PROCEDURE — 3008F PR BODY MASS INDEX (BMI) DOCUMENTED: ICD-10-PCS | Mod: CPTII,S$GLB,, | Performed by: OTOLARYNGOLOGY

## 2023-03-02 PROCEDURE — 11721 DEBRIDE NAIL 6 OR MORE: CPT | Mod: Q9,S$GLB,, | Performed by: STUDENT IN AN ORGANIZED HEALTH CARE EDUCATION/TRAINING PROGRAM

## 2023-03-02 PROCEDURE — 3078F PR MOST RECENT DIASTOLIC BLOOD PRESSURE < 80 MM HG: ICD-10-PCS | Mod: CPTII,S$GLB,, | Performed by: STUDENT IN AN ORGANIZED HEALTH CARE EDUCATION/TRAINING PROGRAM

## 2023-03-02 PROCEDURE — 3078F PR MOST RECENT DIASTOLIC BLOOD PRESSURE < 80 MM HG: ICD-10-PCS | Mod: CPTII,S$GLB,, | Performed by: OTOLARYNGOLOGY

## 2023-03-02 PROCEDURE — 99999 PR PBB SHADOW E&M-EST. PATIENT-LVL III: CPT | Mod: PBBFAC,,, | Performed by: STUDENT IN AN ORGANIZED HEALTH CARE EDUCATION/TRAINING PROGRAM

## 2023-03-02 PROCEDURE — 3074F PR MOST RECENT SYSTOLIC BLOOD PRESSURE < 130 MM HG: ICD-10-PCS | Mod: CPTII,S$GLB,, | Performed by: STUDENT IN AN ORGANIZED HEALTH CARE EDUCATION/TRAINING PROGRAM

## 2023-03-02 PROCEDURE — 11721 ROUTINE FOOT CARE: ICD-10-PCS | Mod: Q9,S$GLB,, | Performed by: STUDENT IN AN ORGANIZED HEALTH CARE EDUCATION/TRAINING PROGRAM

## 2023-03-02 PROCEDURE — 3061F PR NEG MICROALBUMINURIA RESULT DOCUMENTED/REVIEW: ICD-10-PCS | Mod: CPTII,S$GLB,, | Performed by: OTOLARYNGOLOGY

## 2023-03-02 PROCEDURE — 3008F PR BODY MASS INDEX (BMI) DOCUMENTED: ICD-10-PCS | Mod: CPTII,S$GLB,, | Performed by: STUDENT IN AN ORGANIZED HEALTH CARE EDUCATION/TRAINING PROGRAM

## 2023-03-02 RX ORDER — SULFAMETHOXAZOLE AND TRIMETHOPRIM 800; 160 MG/1; MG/1
1 TABLET ORAL 2 TIMES DAILY
Qty: 20 TABLET | Refills: 0 | Status: SHIPPED | OUTPATIENT
Start: 2023-03-02 | End: 2023-03-12

## 2023-03-02 RX ORDER — MUPIROCIN 20 MG/G
OINTMENT TOPICAL 2 TIMES DAILY
Qty: 22 G | Refills: 3 | Status: SHIPPED | OUTPATIENT
Start: 2023-03-02 | End: 2023-03-12

## 2023-03-02 NOTE — PROGRESS NOTES
Chief Complaint   Patient presents with    Nasal Congestion     Left nasal       HPI:  Patient is a 62 y.o. male who has previously seen me for septal hematoma and septal abscess.  He reports feeling a swelling and tenderness over the left upper lateral cartilage and soft tissue are of the nose for the last few days.  He has not had any significant facial swelling.  He does have some nasal congestion and has been using OTC nasal spray (likely Afrin, but he is unsure the exact medication).        Active Ambulatory Problems     Diagnosis Date Noted    Visual hallucinations 07/26/2016    Low back pain, non-specific 07/26/2016    Type 2 diabetes mellitus without complication, without long-term current use of insulin 07/26/2016    Hypertension associated with diabetes 07/26/2016    HLD (hyperlipidemia) 07/26/2016    Schizophrenia 07/26/2016    Hyperkalemia 07/26/2016    Hypotension 01/20/2017    Need for hepatitis C screening test 05/31/2018    Polyp of colon 05/31/2018    Class 1 obesity with serious comorbidity and body mass index (BMI) of 34.0 to 34.9 in adult 11/28/2018    Dizziness 11/28/2018    Need for vaccination against Streptococcus pneumoniae using pneumococcal conjugate vaccine 7 12/12/2018    Depression 12/12/2018    Anemia 12/12/2018    Renal insufficiency 12/12/2018    Chronic back pain 12/12/2018    Dehydration     Neck pain 01/08/2019    CKD (chronic kidney disease) stage 3, GFR 30-59 ml/min 01/14/2019    EKG abnormalities 01/14/2019    Chest pain 01/14/2019    Gastroesophageal reflux disease 01/14/2019    Chronic pain 01/15/2019    Palmar nodule, left 02/15/2019    Trigger index finger of right hand 02/15/2019    Lumbar facet arthropathy 03/12/2019    Hand or foot spasms 04/05/2019    Hearing loss of left ear 04/05/2019    Atherosclerosis of native coronary artery of native heart without angina pectoris  04/05/2019    Cerumen debris on tympanic membrane of right ear 04/05/2019    Flu vaccine need  09/04/2019    Head trauma 09/04/2019    Erectile dysfunction 09/04/2019    Rash 09/04/2019    Nonintractable headache 09/04/2019    Lumbar radiculopathy 09/12/2019    Carpal tunnel syndrome of right wrist 04/07/2022    History of colon polyps 11/21/2022    Wrist pain, right 01/25/2023    Right elbow pain 01/25/2023    Stiffness in joint 01/25/2023    Weakness 01/25/2023    Sedative, hypnotic or anxiolytic dependence, uncomplicated 02/13/2023    Pulmonary hypertension, unspecified 02/13/2023    Type 2 diabetes mellitus with diabetic peripheral angiopathy without gangrene, without long-term current use of insulin 02/13/2023     Resolved Ambulatory Problems     Diagnosis Date Noted    Acute kidney failure 07/26/2016    MARY GRACE (acute kidney injury) 07/26/2016    Prerenal azotemia 01/21/2017    Diarrhea 01/21/2017    Preventative health care 11/28/2018    Tachycardia 11/28/2018    Upper respiratory tract infection 11/28/2018    SOB (shortness of breath) 11/28/2018    Troponin level elevated 11/28/2018     Past Medical History:   Diagnosis Date    Chronic back pain greater than 3 months duration     Diabetes mellitus     Diabetes mellitus, type 2     Hypertension        Review of Systems  General: negative for chills, fever or weight loss  Psychological: negative for mood changes or depression  Ophthalmic: negative for blurry vision, photophobia or eye pain  ENT: see HPI  Respiratory: no cough, shortness of breath, or wheezing  Cardiovascular: no chest pain or dyspnea on exertion  Gastrointestinal: no abdominal pain, change in bowel habits, or black/ bloody stools  Musculoskeletal: negative for gait disturbance or muscular weakness  Neurological: no syncope or seizures; no ataxia  Dermatological: negative for pruritis, rash and jaundice  Hematologic/lymphatic: no easy bruising, no new adenopathy    Physical Exam     Vitals:    03/02/23 1415   BP: 115/66   Pulse: 81         Constitutional:   He is oriented to person, place,  and time. Vital signs are normal. He appears well-developed and well-nourished. He appears alert. He is cooperative. Normal speech.      Head:  Normocephalic and atraumatic. Salivary glands normal.  Facial strength is normal.      Ears:  Hearing normal to normal and whispered voice; external ear normal without scars, lesions, or masses; ear canal, tympanic membrane, and middle ear normal., right ear hearing normal to normal and whispered voice; external ear normal without scars, lesions, or masses; ear canal, tympanic membrane, and middle ear normal. and left ear hearing normal to normal and whispered voice; external ear normal without scars, lesions, or masses; ear canal, tympanic membrane, and middle ear normal..   Right Ear: Tympanic membrane is not perforated, not erythematous and not retracted. No middle ear effusion.   Left Ear: Tympanic membrane is not perforated, not erythematous and not retracted.  No middle ear effusion.     Nose:  Mucosal edema, rhinorrhea (scant purulent secretions in lower nasal cavity on left) and sinus tenderness (over left lower lateral cartilage and surrounding soft tissue) present. No septal deviation or polyps. Turbinates abnormal and turbinate hypertrophy (3+, boggy, congested mucosa).  Right sinus exhibits no maxillary sinus tenderness and no frontal sinus tenderness. Left sinus exhibits no maxillary sinus tenderness and no frontal sinus tenderness.         Mouth/Throat  Oropharynx clear and moist without lesions or asymmetry, lips, teeth, and gums normal and oropharynx normal. No mucous membrane lesions. No oropharyngeal exudate, posterior oropharyngeal edema or posterior oropharyngeal erythema. +1.  Tonsillar erythema, tonsillar exudate.  Mirror exam not performed due to patient tolerance.  Mirror exam not performed due to patient tolerance.      Neck:  Neck normal without thyromegaly masses, asymmetry, normal tracheal structure, crepitus, and tenderness, thyroid normal,  trachea normal, phonation normal, full range of motion with neck supple and no adenopathy. No JVD present. Carotid bruit is not present. No thyroid mass and no thyromegaly present.     He has no cervical adenopathy.     Cardiovascular:    Normal rate, regular rhythm and rate and rhythm, heart sounds, and pulses normal.              Pulmonary/Chest:   Effort and breath sounds normal.     Psychiatric:   He has a normal mood and affect. His speech is normal and behavior is normal.     Neurological:   He is alert and oriented to person, place, and time. He has neurological normal, alert and oriented. No cranial nerve deficit.     Skin:   No abrasions, lacerations, lesions, or rashes.         Previous culture results reviewed:   Susceptibility     Methicillin resistant Staphylococcus aureus     CULTURE, AEROBIC  (SPECIFY SOURCE)     Clindamycin <=0.5 mcg/mL Sensitive     Erythromycin >4 mcg/mL Resistant     Oxacillin >2 mcg/mL Resistant     Penicillin 8 mcg/mL Resistant     Tetracycline <=4 mcg/mL Sensitive     Trimeth/Sulfa <=0.5/9.5 m... Sensitive     Vancomycin 2 mcg/mL Sensitive               Linear View         Specimen Collected: 08/29/22 16:38 Last Resulted: 09/01/22 11:56        Lab Flowsheet     Order Details     View Encounter     Lab and Collection Details     Routing     Result History     View Encounter Conversation           Result Care Coordination      Patient Communication     Add Comments   Add Notifications  Back to Top           All Reviewers List    Joyce Araya MD on 9/26/2022 16:55   Joyce Araya MD on 9/13/2022 09:44   Joyce Araya MD on 9/6/2022 15:16   Joyce Araya MD on 9/1/2022 16:47     Micro Results Same Day      Contains abnormal data Aerobic culture  Order: [id number]  Status: Final result     Visible to patient: Yes (not seen)     Next appt: 03/16/2023 at 10:30 AM in Orthopedics (Everton Walter Jr, MD)     Dx: Nasal septal abscess     Specimen Information: Nose; Abscess   0  Result Notes      Component 6 mo ago   Aerobic Bacterial Culture  Abnormal   METHICILLIN RESISTANT STAPHYLOCOCCUS AUREUS   Many     Resulting Agency [facility]        Susceptibility     Methicillin resistant Staphylococcus aureus     CULTURE, AEROBIC  (SPECIFY SOURCE)     Clindamycin <=0.5 mcg/mL Sensitive     Erythromycin >4 mcg/mL Resistant     Oxacillin >2 mcg/mL Resistant     Penicillin 8 mcg/mL Resistant     Tetracycline <=4 mcg/mL Sensitive     Trimeth/Sulfa <=0.5/9.5 m... Sensitive     Vancomycin 2 mcg/mL Sensitive                  Specimen Collected: 08/29/22 16:38 Last Resulted: 09/01/22 11:56       Order Details     Lab and Collection Details     Routing     Result History     View Encounter Conversation           Result Care Coordination      Patient Communication     Add Comments   Add Notifications  Back to Top             Lab Inquiry View Complete Results    Contains abnormal data Culture, Anaerobic  Order: [id number]  Status: Final result     Visible to patient: Yes (not seen)     Next appt: 03/16/2023 at 10:30 AM in Orthopedics (Everton Walter Jr, MD)     Dx: Nasal septal abscess     Specimen Information: Nares, Left; Sinus   0 Result Notes      Component 6 mo ago   Anaerobic Culture  Abnormal   CUTIBACTERIUM ACNES   Few     Resulting Agency [facility]              Specimen Collected: 08/29/22 16:38 Last Resulted: 09/06/22 14:23               Assessment/Plan:      ICD-10-CM ICD-9-CM    1. Nasal vestibulitis  J34.89 478.19 mupirocin (BACTROBAN) 2 % ointment      sulfamethoxazole-trimethoprim 800-160mg (BACTRIM DS) 800-160 mg Tab    recurrent        2. Cellulitis of mucous membrane of nose  J34.0 478.19       3. Cellulitis of nose, external  J34.0 682.0             Start mupirocin ointment BID.   Bactrim BID.    Discontinue OTC nasal sprays and use only nasal saline.    Follow up in 8 weeks and contact me if symptoms progress.     Joyce Araya MD  [facility]

## 2023-03-02 NOTE — PROCEDURES
"Routine Foot Care    Date/Time: 3/2/2023 1:15 PM  Performed by: Shalini Burks DPM  Authorized by: Shalini Burks DPM     Time out: Immediately prior to procedure a "time out" was called to verify the correct patient, procedure, equipment, support staff and site/side marked as required.    Consent Done?:  Yes (Verbal)  Hyperkeratotic Skin Lesions?: No      Nail Care Type:  Debride  Location(s): All  (Left 1st Toe, Left 3rd Toe, Left 2nd Toe, Left 4th Toe, Left 5th Toe, Right 1st Toe, Right 2nd Toe, Right 3rd Toe, Right 4th Toe and Right 5th Toe)  Patient tolerance:  Patient tolerated the procedure well with no immediate complications  "

## 2023-03-02 NOTE — TELEPHONE ENCOUNTER
Faxed/routed the RX for diabetic shoes and the last clinical note over to [facility]

## 2023-03-02 NOTE — PROGRESS NOTES
Subjective:      Patient ID: Fabiano Malik Jr. is a 62 y.o. male.    Chief Complaint: Diabetes Mellitus, Diabetic Foot Exam, and Routine Foot Care (PCP Dr. Fleming, 2/13/23)    Fabiano is a 62 y.o. male who presents to the clinic upon referral from Dr. Ana perdomo. provider found  for evaluation and treatment of diabetic feet. Fabiano has a past medical history of Chronic back pain greater than 3 months duration, Depression, Diabetes mellitus, Diabetes mellitus, type 2, Hypertension, and Schizophrenia. Patient relates no major problem with feet. Only complaints today consist of here for a diabetic foot exam. States his feet are numb. States has trouble trimming his toenails. No further pedal complaints.    PCP: Lucien Fleming MD    Date Last Seen by PCP: 2/13/23    Current shoe gear: Tennis shoes    Hemoglobin A1C   Date Value Ref Range Status   02/13/2023 6.5 (H) 4.0 - 5.6 % Final     Comment:     ADA Screening Guidelines:  5.7-6.4%  Consistent with prediabetes  >or=6.5%  Consistent with diabetes    High levels of fetal hemoglobin interfere with the HbA1C  assay. Heterozygous hemoglobin variants (HbS, HgC, etc)do  not significantly interfere with this assay.   However, presence of multiple variants may affect accuracy.     02/02/2022 5.4 4.0 - 5.6 % Final     Comment:     ADA Screening Guidelines:  5.7-6.4%  Consistent with prediabetes  >or=6.5%  Consistent with diabetes    High levels of fetal hemoglobin interfere with the HbA1C  assay. Heterozygous hemoglobin variants (HbS, HgC, etc)do  not significantly interfere with this assay.   However, presence of multiple variants may affect accuracy.     12/07/2021 6.3 (H) 4.0 - 5.6 % Final     Comment:     ADA Screening Guidelines:  5.7-6.4%  Consistent with prediabetes  >or=6.5%  Consistent with diabetes    High levels of fetal hemoglobin interfere with the HbA1C  assay. Heterozygous hemoglobin variants (HbS, HgC, etc)do  not significantly interfere with  this assay.   However, presence of multiple variants may affect accuracy.           Review of Systems   Constitutional: Negative for chills, decreased appetite, diaphoresis and fever.   HENT:  Negative for congestion and hearing loss.    Cardiovascular:  Negative for chest pain, claudication, leg swelling and syncope.   Respiratory:  Negative for cough and shortness of breath.    Skin:  Positive for dry skin and nail changes. Negative for color change, flushing, itching, poor wound healing and rash.   Musculoskeletal:  Positive for arthritis, back pain and joint pain.   Gastrointestinal:  Negative for nausea and vomiting.   Neurological:  Positive for numbness and paresthesias. Negative for focal weakness and weakness.   Psychiatric/Behavioral:  Negative for altered mental status. The patient is not nervous/anxious.          Objective:      Physical Exam  Constitutional:       General: He is not in acute distress.     Appearance: Normal appearance. He is well-developed. He is not diaphoretic.   Cardiovascular:      Comments: Dorsalis pedis and posterior tibial pulses are within normal limits. Skin temperature is within normal limits. Toes are cool to touch and feet are warm proximally. Hair growth is within normal limits. Skin is normotrophic and without hyperpigmentation. No edema noted. No varicosities or spider veins noted, bilaterally.       Musculoskeletal:         General: No tenderness.      Comments: Adequate joint range of motion without pain, limitation, nor crepitation to foot and ankle joints. Muscle strength is 5/5 in all groups bilaterally.    Pes planus, bilateral foot       Feet:      Right foot:      Skin integrity: Dry skin present. No ulcer, blister, erythema or warmth.      Left foot:      Skin integrity: Dry skin present. No ulcer, blister, erythema or warmth.   Skin:     General: Skin is warm and dry.      Capillary Refill: Capillary refill takes less than 2 seconds.      Comments: Skin is warm  and dry, no acute signs of infection noted bilaterally      Toenails are thickened by 2-4 mm's, dystrophic, and are darkened in coloration with subungual fungal debris.     Otherwise, no open wounds, macerations or hyperkeratotic lesions, bilaterally            Neurological:      Mental Status: He is alert and oriented to person, place, and time.      Sensory: Sensory deficit present.      Motor: No abnormal muscle tone.      Comments: Light touch is diminished. Nixa-Alonso 5.07 monofilamant testing is diminished. Vibratory sensation is diminished bilaterally.   Psychiatric:         Mood and Affect: Mood normal.         Behavior: Behavior normal.         Thought Content: Thought content normal.           Assessment:       Encounter Diagnoses   Name Primary?    Type 2 diabetes mellitus with diabetic polyneuropathy, without long-term current use of insulin Yes    Pes planus, unspecified laterality     Disease of nail     Encounter for diabetic foot exam          Plan:       Fabiano was seen today for diabetes mellitus, diabetic foot exam and routine foot care.    Diagnoses and all orders for this visit:    Type 2 diabetes mellitus with diabetic polyneuropathy, without long-term current use of insulin  -     Routine Foot Care  -     DIABETIC SHOES FOR HOME USE    Pes planus, unspecified laterality  -     DIABETIC SHOES FOR HOME USE    Disease of nail  -     Routine Foot Care    Encounter for diabetic foot exam  -     DIABETIC SHOES FOR HOME USE      I counseled the patient on his conditions, their implications and medical management.  Routine foot care per attached note  Shoe inspection. Diabetic Foot Education. Patient reminded of the importance of good nutrition and blood sugar control to help prevent podiatric complications of diabetes. Patient instructed on proper foot hygeine. We discussed wearing proper shoe gear, daily foot inspections, never walking without protective shoe gear, never putting sharp  instruments to feet, routine podiatric nail visits    Return to clinic in 3 mo, sooner PRN

## 2023-03-02 NOTE — PATIENT INSTRUCTIONS
Mupirocin ointment to nose twice daily.   Nasal saline spray when needed.  Discontinue other OTC nasal sprays.     Start bactrim.  Take entire Rx.  Take with food.

## 2023-03-05 DIAGNOSIS — I10 ESSENTIAL HYPERTENSION: ICD-10-CM

## 2023-03-05 NOTE — TELEPHONE ENCOUNTER
No new care gaps identified.  [facility] Embedded Care Gaps. Reference number: [id number]. 3/05/2023   12:50:00 PM CST

## 2023-03-05 NOTE — TELEPHONE ENCOUNTER
No new care gaps identified.  [facility] Embedded Care Gaps. Reference number: [id number]. 3/05/2023   12:51:01 PM CST

## 2023-03-06 RX ORDER — DULOXETIN HYDROCHLORIDE 60 MG/1
CAPSULE, DELAYED RELEASE ORAL
Qty: 180 CAPSULE | Refills: 3 | Status: ON HOLD | OUTPATIENT
Start: 2023-03-06 | End: 2023-10-05 | Stop reason: SDUPTHER

## 2023-03-06 RX ORDER — CHLORTHALIDONE 25 MG/1
25 TABLET ORAL DAILY
Qty: 90 TABLET | Refills: 3 | Status: ON HOLD | OUTPATIENT
Start: 2023-03-06 | End: 2023-10-05 | Stop reason: HOSPADM

## 2023-03-06 NOTE — TELEPHONE ENCOUNTER
Refill Routing Note   Medication(s) are not appropriate for processing by [facility] for the following reason(s):       Required labs abnormal    [facility] action(s):  Defer         Medication reconciliation completed: No   Extended chart review required: No  Alert overridden per protocol: No            Appointments  past 12m or future 3m with PCP    Date Provider   Last Visit   2/13/2023 Lucien Fleming MD   Next Visit   8/9/2023 Lucien Fleming MD   ED visits in past 90 days: 0        Note composed:8:39 AM 03/06/2023

## 2023-03-06 NOTE — TELEPHONE ENCOUNTER
Refill Routing Note   Medication(s) are not appropriate for processing by [facility] for the following reason(s):       Required labs abnormal    [facility] action(s):  Defer         Medication reconciliation completed: No   Extended chart review required: No  Alert overridden per protocol: No            Appointments  past 12m or future 3m with PCP    Date Provider   Last Visit   2/13/2023 Lucien Fleming MD   Next Visit   3/5/2023 Lucien Fleming MD   ED visits in past 90 days: 0        Note composed:8:38 AM 03/06/2023

## 2023-03-09 LAB
LEFT EYE DM RETINOPATHY: POSITIVE
RIGHT EYE DM RETINOPATHY: POSITIVE

## 2023-03-16 ENCOUNTER — OFFICE VISIT (OUTPATIENT)
Dept: ORTHOPEDICS | Facility: CLINIC | Age: 63
End: 2023-03-16
Payer: MEDICARE

## 2023-03-16 VITALS — HEIGHT: 73 IN | BODY MASS INDEX: 29.44 KG/M2

## 2023-03-16 DIAGNOSIS — G89.4 CHRONIC PAIN SYNDROME: Primary | ICD-10-CM

## 2023-03-16 DIAGNOSIS — G56.01 CARPAL TUNNEL SYNDROME OF RIGHT WRIST: ICD-10-CM

## 2023-03-16 PROCEDURE — 3061F NEG MICROALBUMINURIA REV: CPT | Mod: CPTII,S$GLB,, | Performed by: ORTHOPAEDIC SURGERY

## 2023-03-16 PROCEDURE — 99999 PR PBB SHADOW E&M-EST. PATIENT-LVL III: ICD-10-PCS | Mod: PBBFAC,,, | Performed by: ORTHOPAEDIC SURGERY

## 2023-03-16 PROCEDURE — 3066F PR DOCUMENTATION OF TREATMENT FOR NEPHROPATHY: ICD-10-PCS | Mod: CPTII,S$GLB,, | Performed by: ORTHOPAEDIC SURGERY

## 2023-03-16 PROCEDURE — 3044F PR MOST RECENT HEMOGLOBIN A1C LEVEL <7.0%: ICD-10-PCS | Mod: CPTII,S$GLB,, | Performed by: ORTHOPAEDIC SURGERY

## 2023-03-16 PROCEDURE — 99213 PR OFFICE/OUTPT VISIT, EST, LEVL III, 20-29 MIN: ICD-10-PCS | Mod: S$GLB,,, | Performed by: ORTHOPAEDIC SURGERY

## 2023-03-16 PROCEDURE — 3066F NEPHROPATHY DOC TX: CPT | Mod: CPTII,S$GLB,, | Performed by: ORTHOPAEDIC SURGERY

## 2023-03-16 PROCEDURE — 1159F PR MEDICATION LIST DOCUMENTED IN MEDICAL RECORD: ICD-10-PCS | Mod: CPTII,S$GLB,, | Performed by: ORTHOPAEDIC SURGERY

## 2023-03-16 PROCEDURE — 3044F HG A1C LEVEL LT 7.0%: CPT | Mod: CPTII,S$GLB,, | Performed by: ORTHOPAEDIC SURGERY

## 2023-03-16 PROCEDURE — 99213 OFFICE O/P EST LOW 20 MIN: CPT | Mod: S$GLB,,, | Performed by: ORTHOPAEDIC SURGERY

## 2023-03-16 PROCEDURE — 3008F PR BODY MASS INDEX (BMI) DOCUMENTED: ICD-10-PCS | Mod: CPTII,S$GLB,, | Performed by: ORTHOPAEDIC SURGERY

## 2023-03-16 PROCEDURE — 1159F MED LIST DOCD IN RCRD: CPT | Mod: CPTII,S$GLB,, | Performed by: ORTHOPAEDIC SURGERY

## 2023-03-16 PROCEDURE — 99999 PR PBB SHADOW E&M-EST. PATIENT-LVL III: CPT | Mod: PBBFAC,,, | Performed by: ORTHOPAEDIC SURGERY

## 2023-03-16 PROCEDURE — 3008F BODY MASS INDEX DOCD: CPT | Mod: CPTII,S$GLB,, | Performed by: ORTHOPAEDIC SURGERY

## 2023-03-16 PROCEDURE — 3061F PR NEG MICROALBUMINURIA RESULT DOCUMENTED/REVIEW: ICD-10-PCS | Mod: CPTII,S$GLB,, | Performed by: ORTHOPAEDIC SURGERY

## 2023-03-16 NOTE — PROGRESS NOTES
Subjective:      Patient ID: Fabiano Malik Jr. is a 62 y.o. male.  Chief Complaint: Post-op Evaluation (Right CTR/Ulnar ( Sx 12/130 c/o pain, tingling and numbness)      HPI  Fabiano Malik Jr. is a  62 y.o. male presenting today for follow up of right carpal tunnel release and ulnar nerve decompression of the right elbow.  He reports that he is still having symptoms of numbness in the right hand   He does have underlying cervical radiculopathy as well as diabetes   I explained to the patient that some of his residual symptoms are related to those issues and not the surgery per se   I was hoping that some of his symptoms would improve with surgery but it is not clear that they have   He is scheduled to see pain management and is currently on Lyrica.    Review of patient's allergies indicates:   Allergen Reactions    Pcn [penicillins] Other (See Comments)     Childhood rxn, pt does not recall type of rxn         Current Outpatient Medications   Medication Sig Dispense Refill    acetaminophen (TYLENOL) 325 MG tablet Take 2 tablets (650 mg total) by mouth every 6 (six) hours as needed. 120 tablet 3    ALPRAZolam (XANAX) 0.5 MG tablet Take 1 tablet (0.5 mg total) by mouth nightly as needed for Anxiety. 30 tablet 0    BLOOD PRESSURE CUFF Misc 1 Units by Misc.(Non-Drug; Combo Route) route once daily. 1 each 0    blood sugar diagnostic (TRUE METRIX GLUCOSE TEST STRIP) Strp use 1 strip to check glucose 2 (two) times a day. 200 strip 6    blood-glucose meter Misc Use as instructed 1 each 0    chlorthalidone (HYGROTEN) 25 MG Tab Take 1 tablet (25 mg total) by mouth once daily. 90 tablet 3    dulaglutide (TRULICITY) 0.75 mg/0.5 mL pen injector Inject 0.75 mg (one pen) into the skin every 7 days. 12 pen 1    DULoxetine (CYMBALTA) 60 MG capsule TAKE 2 CAPSULES BY MOUTH EVERY MORNING 180 capsule 3    flu vacc [id number] 6mos up,PF, (FLUARIX QUAD [id number], PF,) 60 mcg (15 mcg x 4)/0.5 mL Syrg Inject into the muscle. 0.5  mL 0    hydrOXYzine pamoate (VISTARIL) 50 MG Cap TAKE 1 CAPSULE (50 MG) BY MOUTH NIGHTLY AS NEEDED FOR INSOMNIA 90 capsule 3    lancets 30 gauge Misc 1 lancet by Misc.(Non-Drug; Combo Route) route twice daily. 200 each 6    meclizine (ANTIVERT) 12.5 mg tablet Take 1 tablet (12.5 mg total) by mouth 3 (three) times daily as needed for Dizziness. 180 tablet 0    meloxicam (MOBIC) 15 MG tablet       metFORMIN (GLUCOPHAGE) 1000 MG tablet Take 1 tablet (1,000 mg total) by mouth 2 (two) times daily with meals. 180 tablet 3    omeprazole (PRILOSEC) 20 MG capsule Take 1 capsule (20 mg total) by mouth before breakfast. 30 capsule 3    PFIZER COVID-19 CIELO VACCN,PF, 30 mcg/0.3 mL injection       potassium chloride (KLOR-CON) 10 MEQ TbSR take 1 tablet by mouth once daily 90 tablet 3    pravastatin (PRAVACHOL) 40 MG tablet TAKE 1 TABLET(40 MG) BY MOUTH EVERY DAY 90 tablet 3    pregabalin (LYRICA) 100 MG capsule Take 1 capsule (100 mg total) by mouth 2 (two) times daily. 180 capsule 0    SHINGRIX, PF, 50 mcg/0.5 mL injection       sildenafiL (VIAGRA) 100 MG tablet Take 1 tablet (100 mg total) by mouth daily as needed for Erectile Dysfunction. 30 tablet 3    olmesartan (BENICAR) 20 MG tablet Take 1 tablet (20 mg total) by mouth once daily. 90 tablet 0     No current facility-administered medications for this visit.       Past Medical History:   Diagnosis Date    Chronic back pain greater than 3 months duration     Depression     Diabetes mellitus     Diabetes mellitus, type 2     Hypertension     Schizophrenia        Past Surgical History:   Procedure Laterality Date    APPENDECTOMY      CARPAL TUNNEL RELEASE Right 12/13/2022    Procedure: RELEASE, CARPAL TUNNEL;  Surgeon: Everton Walter Jr., MD;  Location: [facility] OR;  Service: Orthopedics;  Laterality: Right;    COLONOSCOPY N/A 5/31/2018    Procedure: COLONOSCOPY;  Surgeon: Guillaume Hatch MD;  Location: [facility] ENDO;  Service: Endoscopy;  Laterality: N/A;    COLONOSCOPY N/A  "11/17/2022    Procedure: COLONOSCOPY;  Surgeon: Guillaume Hatch MD;  Location: [facility] ENDO;  Service: Endoscopy;  Laterality: N/A;    DECOMPRESSION, NERVE, ULNAR Right 12/13/2022    Procedure: DECOMPRESSION, NERVE, ULNAR;  Surgeon: Everton Walter Jr., MD;  Location: [facility] OR;  Service: Orthopedics;  Laterality: Right;    NECK SURGERY      RADIOFREQUENCY ABLATION OF LUMBAR MEDIAL BRANCH NERVE AT SINGLE LEVEL Left 5/29/2018    Procedure: RADIOFREQUENCY THERMOCOAGULATION (RFTC)-NERVE-MEDIAN BRANCH-LUMBAR- Left L2-3-4-5;  Surgeon: Donavon Dennis MD;  Location: [facility];  Service: Pain Management;  Laterality: Left;    RADIOFREQUENCY ABLATION OF LUMBAR MEDIAL BRANCH NERVE AT SINGLE LEVEL Right 1/8/2019    Procedure: Radiofrequency Ablation, Nerve, Spinal, Lumbar, Medial Branch, L2,3,4,5;  Surgeon: Alberto Hernandez Jr., MD;  [place] [facility] [place] [facility]  Service: Pain Management;  Laterality: Right;  Pt is diabetic    RADIOFREQUENCY ABLATION OF LUMBAR MEDIAL BRANCH NERVE AT SINGLE LEVEL Left 3/12/2019    Procedure: Radiofrequency Ablation, Nerve, Spinal, Lumbar, Medial Branch, Left, L2,3,4,5;  Surgeon: Alberto Hernandez Jr., MD;  Location: [facility];  Service: Pain Management;  Laterality: Left;  Pt will be consented the day of procedure.    TRANSFORAMINAL EPIDURAL INJECTION OF STEROID Right 9/12/2019    Procedure: Injection,steroid,epidural,transforaminal,right,L4-5 and L5-S1;  Surgeon: Alberto Hernandez Jr., MD;  Location: [facility];  Service: Pain Management;  Laterality: Right;  PT IS DIABETIC       OBJECTIVE:   PHYSICAL EXAM:  Height: 6' 1" (185.4 cm)    Vitals:    03/16/23 1024   Height: 6' 1" (1.854 m)   PainSc: 10-Worst pain ever   PainLoc: Hand     Ortho/SPM Exam  Examination right arm both incisions well-healed swelling minimal total no tenderness range of motion wrist fingers full  strength is decreased    RADIOGRAPHS:  None  Comments: I have personally reviewed the imaging and I agree with " the above radiologist's report.    ASSESSMENT/PLAN:     IMPRESSION:  Status post right carpal tunnel release and ulnar nerve decompression right elbow    PLAN:  I recommended that he continue with OT and a squeeze ball for strengthening of the right hand and wrist  Continue Lyrica   Follow-up with pain management    FOLLOW UP:  2 months    Disclaimer: This note has been generated using voice-recognition software. There may be typographical errors that have been missed during proof-reading.

## 2023-03-21 ENCOUNTER — TELEPHONE (OUTPATIENT)
Dept: ORTHOPEDICS | Facility: CLINIC | Age: 63
End: 2023-03-21
Payer: MEDICARE

## 2023-03-21 RX ORDER — MELOXICAM 15 MG/1
15 TABLET ORAL DAILY
Qty: 90 TABLET | Refills: 1 | Status: SHIPPED | OUTPATIENT
Start: 2023-03-21 | End: 2023-07-31 | Stop reason: SDUPTHER

## 2023-03-21 NOTE — TELEPHONE ENCOUNTER
----- Message from Arash Crespo sent at 3/21/2023 12:39 PM CDT -----  .Type:  RX Refill Request    Who Called: pt  Refill or New Rx:refill  RX Name and Strength:meloxicam (MOBIC) 15 MG tablet  How is the patient currently taking it? (ex. 1XDay)  Is this a 30 day or 90 day RX:  Preferred Pharmacy with phone number:[facility] [place] [facility]  Local or Mail Order:local  Ordering Provider:  Would the patient rather a call back or a response via [facility]? Call back  Best Call Back Number:[phone]  Additional Information:

## 2023-03-30 DIAGNOSIS — F41.9 ANXIETY: ICD-10-CM

## 2023-03-30 NOTE — TELEPHONE ENCOUNTER
No new care gaps identified.  [facility] Embedded Care Gaps. Reference number: [id number]. 3/30/2023   4:13:43 PM CDT

## 2023-03-31 RX ORDER — ALPRAZOLAM 0.5 MG/1
0.5 TABLET ORAL NIGHTLY PRN
Qty: 30 TABLET | Refills: 0 | Status: SHIPPED | OUTPATIENT
Start: 2023-03-31 | End: 2023-05-12 | Stop reason: SDUPTHER

## 2023-04-06 ENCOUNTER — PATIENT OUTREACH (OUTPATIENT)
Dept: ADMINISTRATIVE | Facility: HOSPITAL | Age: 63
End: 2023-04-06
Payer: MEDICARE

## 2023-04-06 NOTE — PROGRESS NOTES
Non-compliant GAP report chart review -  04/06/2023  Chart review completed for the following  test if overdue  ( Dilated EYE EXAM)   Care Everywhere and Media reports - updates requested and reviewed.    [facility] and [facility] reviewed. DIS reviewed for test needed.    updated with external   DM EYE EXAM  2023     Health Maintenance Due   Topic Date Due    Shingles Vaccine (1 of 2) Never done    Foot Exam  12/12/2019    COVID-19 Vaccine (2 - Pfizer series) 12/30/2021

## 2023-04-26 DIAGNOSIS — G89.29 OTHER CHRONIC PAIN: ICD-10-CM

## 2023-04-26 DIAGNOSIS — E11.42 TYPE 2 DIABETES MELLITUS WITH DIABETIC POLYNEUROPATHY, WITHOUT LONG-TERM CURRENT USE OF INSULIN: ICD-10-CM

## 2023-04-26 DIAGNOSIS — M54.12 CERVICAL RADICULOPATHY: ICD-10-CM

## 2023-04-26 DIAGNOSIS — R42 DIZZINESS: ICD-10-CM

## 2023-04-26 DIAGNOSIS — M54.16 LUMBAR RADICULOPATHY: ICD-10-CM

## 2023-04-27 DIAGNOSIS — E11.65 TYPE 2 DIABETES MELLITUS WITH HYPERGLYCEMIA, WITHOUT LONG-TERM CURRENT USE OF INSULIN: ICD-10-CM

## 2023-04-27 NOTE — TELEPHONE ENCOUNTER
No new care gaps identified.  [facility] Embedded Care Gaps. Reference number: [id number]. 4/26/2023   9:11:54 PM CDT

## 2023-04-27 NOTE — TELEPHONE ENCOUNTER
No care due was identified.  [facility] Embedded Care Due Messages. Reference number: [id number].   4/27/2023 2:46:18 PM CDT

## 2023-04-28 RX ORDER — PREGABALIN 100 MG/1
100 CAPSULE ORAL 2 TIMES DAILY
Qty: 180 CAPSULE | Refills: 0 | Status: SHIPPED | OUTPATIENT
Start: 2023-04-28 | End: 2023-05-18 | Stop reason: SDUPTHER

## 2023-04-28 RX ORDER — GLIMEPIRIDE 2 MG/1
2 TABLET ORAL
Qty: 90 TABLET | Refills: 3 | Status: ON HOLD | OUTPATIENT
Start: 2023-04-28 | End: 2023-10-05 | Stop reason: HOSPADM

## 2023-04-28 RX ORDER — MECLIZINE HCL 12.5 MG 12.5 MG/1
12.5 TABLET ORAL 3 TIMES DAILY PRN
Qty: 180 TABLET | Refills: 0 | Status: SHIPPED | OUTPATIENT
Start: 2023-04-28 | End: 2023-06-21 | Stop reason: SDUPTHER

## 2023-05-12 DIAGNOSIS — E11.9 TYPE 2 DIABETES MELLITUS WITHOUT COMPLICATION, WITHOUT LONG-TERM CURRENT USE OF INSULIN: ICD-10-CM

## 2023-05-12 DIAGNOSIS — F41.9 ANXIETY: ICD-10-CM

## 2023-05-12 RX ORDER — METFORMIN HYDROCHLORIDE 1000 MG/1
1000 TABLET ORAL 2 TIMES DAILY WITH MEALS
Qty: 180 TABLET | Refills: 1 | Status: SHIPPED | OUTPATIENT
Start: 2023-05-12 | End: 2023-11-12 | Stop reason: SDUPTHER

## 2023-05-12 NOTE — TELEPHONE ENCOUNTER
No care due was identified.  [facility] Embedded Care Due Messages. Reference number: [id number].   5/12/2023 2:52:17 PM CDT

## 2023-05-12 NOTE — TELEPHONE ENCOUNTER
Refill Decision Note   Fabiano Malik  is requesting a refill authorization.  Brief Assessment and Rationale for Refill:  Approve     Medication Therapy Plan:  Cr slightly elevated at 1.5    Medication Reconciliation Completed: No   Comments:     No Care Gaps recommended.     Note composed:4:57 PM 05/12/2023

## 2023-05-12 NOTE — TELEPHONE ENCOUNTER
No care due was identified.  Health [facility] Embedded Care Due Messages. Reference number: [id number].   5/12/2023 2:51:46 PM CDT

## 2023-05-15 RX ORDER — ALPRAZOLAM 0.5 MG/1
0.5 TABLET ORAL NIGHTLY PRN
Qty: 30 TABLET | Refills: 0 | Status: SHIPPED | OUTPATIENT
Start: 2023-05-15 | End: 2023-06-19 | Stop reason: SDUPTHER

## 2023-05-18 ENCOUNTER — OFFICE VISIT (OUTPATIENT)
Dept: ORTHOPEDICS | Facility: CLINIC | Age: 63
End: 2023-05-18
Payer: MEDICARE

## 2023-05-18 VITALS — WEIGHT: 220 LBS | BODY MASS INDEX: 29.16 KG/M2 | HEIGHT: 73 IN

## 2023-05-18 DIAGNOSIS — Z98.890 S/P DECOMPRESSION OF ULNAR NERVE AT ELBOW: Primary | ICD-10-CM

## 2023-05-18 DIAGNOSIS — M54.12 CERVICAL RADICULOPATHY: ICD-10-CM

## 2023-05-18 DIAGNOSIS — E11.42 TYPE 2 DIABETES MELLITUS WITH DIABETIC POLYNEUROPATHY, WITHOUT LONG-TERM CURRENT USE OF INSULIN: ICD-10-CM

## 2023-05-18 DIAGNOSIS — G89.29 OTHER CHRONIC PAIN: ICD-10-CM

## 2023-05-18 DIAGNOSIS — Z98.890 S/P CARPAL TUNNEL RELEASE: ICD-10-CM

## 2023-05-18 DIAGNOSIS — M54.16 LUMBAR RADICULOPATHY: ICD-10-CM

## 2023-05-18 PROCEDURE — 3061F NEG MICROALBUMINURIA REV: CPT | Mod: CPTII,,, | Performed by: ORTHOPAEDIC SURGERY

## 2023-05-18 PROCEDURE — 1159F PR MEDICATION LIST DOCUMENTED IN MEDICAL RECORD: ICD-10-PCS | Mod: CPTII,,, | Performed by: ORTHOPAEDIC SURGERY

## 2023-05-18 PROCEDURE — 3008F BODY MASS INDEX DOCD: CPT | Mod: CPTII,,, | Performed by: ORTHOPAEDIC SURGERY

## 2023-05-18 PROCEDURE — 99214 PR OFFICE/OUTPT VISIT, EST, LEVL IV, 30-39 MIN: ICD-10-PCS | Mod: ,,, | Performed by: ORTHOPAEDIC SURGERY

## 2023-05-18 PROCEDURE — 3044F PR MOST RECENT HEMOGLOBIN A1C LEVEL <7.0%: ICD-10-PCS | Mod: CPTII,,, | Performed by: ORTHOPAEDIC SURGERY

## 2023-05-18 PROCEDURE — 3044F HG A1C LEVEL LT 7.0%: CPT | Mod: CPTII,,, | Performed by: ORTHOPAEDIC SURGERY

## 2023-05-18 PROCEDURE — 99999 PR PBB SHADOW E&M-EST. PATIENT-LVL IV: CPT | Mod: PBBFAC,,, | Performed by: ORTHOPAEDIC SURGERY

## 2023-05-18 PROCEDURE — 99999 PR PBB SHADOW E&M-EST. PATIENT-LVL IV: ICD-10-PCS | Mod: PBBFAC,,, | Performed by: ORTHOPAEDIC SURGERY

## 2023-05-18 PROCEDURE — 3008F PR BODY MASS INDEX (BMI) DOCUMENTED: ICD-10-PCS | Mod: CPTII,,, | Performed by: ORTHOPAEDIC SURGERY

## 2023-05-18 PROCEDURE — 1159F MED LIST DOCD IN RCRD: CPT | Mod: CPTII,,, | Performed by: ORTHOPAEDIC SURGERY

## 2023-05-18 PROCEDURE — 3066F PR DOCUMENTATION OF TREATMENT FOR NEPHROPATHY: ICD-10-PCS | Mod: CPTII,,, | Performed by: ORTHOPAEDIC SURGERY

## 2023-05-18 PROCEDURE — 3066F NEPHROPATHY DOC TX: CPT | Mod: CPTII,,, | Performed by: ORTHOPAEDIC SURGERY

## 2023-05-18 PROCEDURE — 3061F PR NEG MICROALBUMINURIA RESULT DOCUMENTED/REVIEW: ICD-10-PCS | Mod: CPTII,,, | Performed by: ORTHOPAEDIC SURGERY

## 2023-05-18 PROCEDURE — 99214 OFFICE O/P EST MOD 30 MIN: CPT | Mod: ,,, | Performed by: ORTHOPAEDIC SURGERY

## 2023-05-18 RX ORDER — PREGABALIN 100 MG/1
100 CAPSULE ORAL 2 TIMES DAILY
Qty: 180 CAPSULE | Refills: 0 | Status: SHIPPED | OUTPATIENT
Start: 2023-05-18 | End: 2023-06-20 | Stop reason: SDUPTHER

## 2023-05-18 NOTE — PROGRESS NOTES
Subjective:      Patient ID: Fabiano Malik Jr. is a 62 y.o. male.    Chief Complaint: Follow-up and Pain of the Right Hand      HPI: Fabiano Malik Jr. Is here in follow-up of continued right arm pain s/p carpal tunnel and cubital tunnel release 5 months ago. We have previously discussed his residual sx are likely from chronic cervical radiculopathy seen on EMG and/or DM. He was referred to pain management for this last visit but does not have an appointment until the end of June. He has been using Lyrica with modest benefit. He was also seen and treated by Dr. Walter about 2 months ago with OT and squeeze ball but reports no improvement, in fact he feels his sx may have worsened. He continues to have pain in his entire upper arm, starting at the neck and radiating down. He continues to complain of weakness and dropping items.     Patient does report a remote history of previous cervical spine surgery.  Possible diskectomy at C3?  There was also an MRI from 2013 that showed status post fusion at C4-5. By Dr. Moi Mendez.    Past Medical History:   Diagnosis Date    Chronic back pain greater than 3 months duration     Depression     Diabetes mellitus     Diabetes mellitus, type 2     Hypertension     Schizophrenia        Current Outpatient Medications:     acetaminophen (TYLENOL) 325 MG tablet, Take 2 tablets (650 mg total) by mouth every 6 (six) hours as needed., Disp: 120 tablet, Rfl: 3    ALPRAZolam (XANAX) 0.5 MG tablet, Take 1 tablet (0.5 mg total) by mouth nightly as needed for Anxiety., Disp: 30 tablet, Rfl: 0    BLOOD PRESSURE CUFF Misc, 1 Units by Misc.(Non-Drug; Combo Route) route once daily., Disp: 1 each, Rfl: 0    blood sugar diagnostic (TRUE METRIX GLUCOSE TEST STRIP) Strp, use 1 strip to check glucose 2 (two) times a day., Disp: 200 strip, Rfl: 6    chlorthalidone (HYGROTEN) 25 MG Tab, Take 1 tablet (25 mg total) by mouth once daily., Disp: 90 tablet, Rfl: 3    dulaglutide (TRULICITY) 0.75  mg/0.5 mL pen injector, Inject 0.75 mg (one pen) into the skin every 7 days., Disp: 12 pen, Rfl: 1    DULoxetine (CYMBALTA) 60 MG capsule, TAKE 2 CAPSULES BY MOUTH EVERY MORNING, Disp: 180 capsule, Rfl: 3    glimepiride (AMARYL) 2 MG tablet, Take 1 tablet (2 mg total) by mouth before breakfast., Disp: 90 tablet, Rfl: 3    hydrOXYzine pamoate (VISTARIL) 50 MG Cap, TAKE 1 CAPSULE (50 MG) BY MOUTH NIGHTLY AS NEEDED FOR INSOMNIA, Disp: 90 capsule, Rfl: 3    lancets 30 gauge Misc, 1 lancet by Misc.(Non-Drug; Combo Route) route twice daily., Disp: 200 each, Rfl: 6    meclizine (ANTIVERT) 12.5 mg tablet, Take 1 tablet (12.5 mg total) by mouth 3 (three) times daily as needed for Dizziness., Disp: 180 tablet, Rfl: 0    metFORMIN (GLUCOPHAGE) 1000 MG tablet, Take 1 tablet (1,000 mg total) by mouth 2 (two) times daily with meals., Disp: 180 tablet, Rfl: 1    potassium chloride (KLOR-CON) 10 MEQ TbSR, take 1 tablet by mouth once daily, Disp: 90 tablet, Rfl: 3    pravastatin (PRAVACHOL) 40 MG tablet, TAKE 1 TABLET(40 MG) BY MOUTH EVERY DAY, Disp: 90 tablet, Rfl: 3    sildenafiL (VIAGRA) 100 MG tablet, Take 1 tablet (100 mg total) by mouth daily as needed for Erectile Dysfunction., Disp: 30 tablet, Rfl: 3    blood-glucose meter Misc, Use as instructed, Disp: 1 each, Rfl: 0    flu vacc [id number] 6mos up,PF, (FLUARIX QUAD [id number], PF,) 60 mcg (15 mcg x 4)/0.5 mL Syrg, Inject into the muscle. (Patient not taking: Reported on 5/18/2023), Disp: 0.5 mL, Rfl: 0    meloxicam (MOBIC) 15 MG tablet, , Disp: , Rfl:     meloxicam (MOBIC) 15 MG tablet, Take 1 tablet (15 mg total) by mouth once daily. (Patient not taking: Reported on 5/18/2023), Disp: 90 tablet, Rfl: 1    olmesartan (BENICAR) 20 MG tablet, Take 1 tablet (20 mg total) by mouth once daily. (Patient not taking: Reported on 5/18/2023), Disp: 90 tablet, Rfl: 0    omeprazole (PRILOSEC) 20 MG capsule, Take 1 capsule (20 mg total) by mouth before breakfast. (Patient not taking:  "Reported on 5/18/2023), Disp: 30 capsule, Rfl: 3    PFIZER COVID-19 CIELO VACCN,PF, 30 mcg/0.3 mL injection, , Disp: , Rfl:     pregabalin (LYRICA) 100 MG capsule, Take 1 capsule (100 mg total) by mouth 2 (two) times daily., Disp: 180 capsule, Rfl: 0    SHINGRIX, PF, 50 mcg/0.5 mL injection, , Disp: , Rfl:   Review of patient's allergies indicates:   Allergen Reactions    Pcn [penicillins] Other (See Comments)     Childhood rxn, pt does not recall type of rxn       Ht 6' 1" (1.854 m)   Wt 99.8 kg (220 lb)   BMI 29.03 kg/m²     Review of Systems   Constitutional: Negative for chills and fever.   Cardiovascular:  Negative for chest pain and palpitations.   Respiratory:  Negative for shortness of breath and wheezing.    Skin:  Negative for poor wound healing and rash.   Musculoskeletal:  Positive for muscle weakness.   Gastrointestinal:  Negative for nausea and vomiting.   Genitourinary:  Negative for dysuria and hematuria.   Neurological:  Positive for numbness and paresthesias. Negative for seizures and tremors.   Psychiatric/Behavioral:  Negative for altered mental status.    Allergic/Immunologic: Negative for environmental allergies and persistent infections.       Objective:    Ortho Exam       GEN: Well developed, well nourished male. AAOX3. No acute distress.   Normocephalic, atraumatic.   NISSA  Breathing unlabored.  Mood and affect appropriate.   right UE: Skin healed carpal tunnel and medial elbow incisions. Swelling none. TTP none. ROM full. Sensation decreased. Tinels negative. Pulses present. Cap refill brisk.  strength decreased. Special tests:  None      Assessment:     Imaging: no new        1. S/P decompression of ulnar nerve at elbow    2. Other chronic pain    3. Lumbar radiculopathy    4. Cervical radiculopathy    5. Type 2 diabetes mellitus with diabetic polyneuropathy, without long-term current use of insulin    6. S/P carpal tunnel release            Plan:         I explained to the patient " that numbness and tingling related to cubital tunnel and carpal tunnel syndrome may continue to improve up to 6 months following surgical decompression.  However, it has been 5 months and he has seen no change.  At this time I recommend referral to Neurosurgery for evaluation of chronic cervical radiculopathy/ refractory neuropathy.  Recommend he continue follow-up with pain management in the meantime    Refill Lyrica  Activities as tolerated    Orders Placed This Encounter    Ambulatory referral/consult to Neurosurgery    pregabalin (LYRICA) 100 MG capsule     Follow up if symptoms worsen or fail to improve.

## 2023-05-19 DIAGNOSIS — M48.02 SPINAL STENOSIS, CERVICAL REGION: Primary | ICD-10-CM

## 2023-05-31 ENCOUNTER — TELEPHONE (OUTPATIENT)
Dept: PODIATRY | Facility: CLINIC | Age: 63
End: 2023-05-31

## 2023-05-31 ENCOUNTER — OFFICE VISIT (OUTPATIENT)
Dept: PODIATRY | Facility: CLINIC | Age: 63
End: 2023-05-31
Payer: MEDICARE

## 2023-05-31 ENCOUNTER — HOSPITAL ENCOUNTER (OUTPATIENT)
Dept: RADIOLOGY | Facility: HOSPITAL | Age: 63
Discharge: HOME | End: 2023-05-31
Attending: NEUROLOGICAL SURGERY
Payer: MEDICARE

## 2023-05-31 VITALS
DIASTOLIC BLOOD PRESSURE: 84 MMHG | BODY MASS INDEX: 29.03 KG/M2 | HEART RATE: 79 BPM | SYSTOLIC BLOOD PRESSURE: 134 MMHG | HEIGHT: 73 IN

## 2023-05-31 DIAGNOSIS — E11.42 TYPE 2 DIABETES MELLITUS WITH DIABETIC POLYNEUROPATHY, WITHOUT LONG-TERM CURRENT USE OF INSULIN: ICD-10-CM

## 2023-05-31 DIAGNOSIS — M48.02 SPINAL STENOSIS, CERVICAL REGION: ICD-10-CM

## 2023-05-31 DIAGNOSIS — L60.9 DISEASE OF NAIL: ICD-10-CM

## 2023-05-31 DIAGNOSIS — M21.40 PES PLANUS, UNSPECIFIED LATERALITY: Primary | ICD-10-CM

## 2023-05-31 PROCEDURE — 99999 PR PBB SHADOW E&M-EST. PATIENT-LVL IV: CPT | Mod: PBBFAC,,, | Performed by: STUDENT IN AN ORGANIZED HEALTH CARE EDUCATION/TRAINING PROGRAM

## 2023-05-31 PROCEDURE — 72141 MRI NECK SPINE W/O DYE: CPT | Mod: TC

## 2023-05-31 PROCEDURE — 72050 X-RAY EXAM NECK SPINE 4/5VWS: CPT | Mod: TC,FY

## 2023-05-31 PROCEDURE — 3075F SYST BP GE 130 - 139MM HG: CPT | Mod: CPTII,S$GLB,, | Performed by: STUDENT IN AN ORGANIZED HEALTH CARE EDUCATION/TRAINING PROGRAM

## 2023-05-31 PROCEDURE — 72141 MRI NECK SPINE W/O DYE: CPT | Mod: 26,,, | Performed by: RADIOLOGY

## 2023-05-31 PROCEDURE — 3044F HG A1C LEVEL LT 7.0%: CPT | Mod: CPTII,S$GLB,, | Performed by: STUDENT IN AN ORGANIZED HEALTH CARE EDUCATION/TRAINING PROGRAM

## 2023-05-31 PROCEDURE — 11721 DEBRIDE NAIL 6 OR MORE: CPT | Mod: Q9,S$GLB,, | Performed by: STUDENT IN AN ORGANIZED HEALTH CARE EDUCATION/TRAINING PROGRAM

## 2023-05-31 PROCEDURE — 3075F PR MOST RECENT SYSTOLIC BLOOD PRESS GE 130-139MM HG: ICD-10-PCS | Mod: CPTII,S$GLB,, | Performed by: STUDENT IN AN ORGANIZED HEALTH CARE EDUCATION/TRAINING PROGRAM

## 2023-05-31 PROCEDURE — 3079F DIAST BP 80-89 MM HG: CPT | Mod: CPTII,S$GLB,, | Performed by: STUDENT IN AN ORGANIZED HEALTH CARE EDUCATION/TRAINING PROGRAM

## 2023-05-31 PROCEDURE — 1159F PR MEDICATION LIST DOCUMENTED IN MEDICAL RECORD: ICD-10-PCS | Mod: CPTII,S$GLB,, | Performed by: STUDENT IN AN ORGANIZED HEALTH CARE EDUCATION/TRAINING PROGRAM

## 2023-05-31 PROCEDURE — 72050 X-RAY EXAM NECK SPINE 4/5VWS: CPT | Mod: 26,,, | Performed by: RADIOLOGY

## 2023-05-31 PROCEDURE — 3066F PR DOCUMENTATION OF TREATMENT FOR NEPHROPATHY: ICD-10-PCS | Mod: CPTII,S$GLB,, | Performed by: STUDENT IN AN ORGANIZED HEALTH CARE EDUCATION/TRAINING PROGRAM

## 2023-05-31 PROCEDURE — 72141 MRI CERVICAL SPINE WITHOUT CONTRAST: ICD-10-PCS | Mod: 26,,, | Performed by: RADIOLOGY

## 2023-05-31 PROCEDURE — 3061F PR NEG MICROALBUMINURIA RESULT DOCUMENTED/REVIEW: ICD-10-PCS | Mod: CPTII,S$GLB,, | Performed by: STUDENT IN AN ORGANIZED HEALTH CARE EDUCATION/TRAINING PROGRAM

## 2023-05-31 PROCEDURE — 72050 XR CERVICAL SPINE AP LAT WITH FLEX EXTEN: ICD-10-PCS | Mod: 26,,, | Performed by: RADIOLOGY

## 2023-05-31 PROCEDURE — 11721 ROUTINE FOOT CARE: ICD-10-PCS | Mod: Q9,S$GLB,, | Performed by: STUDENT IN AN ORGANIZED HEALTH CARE EDUCATION/TRAINING PROGRAM

## 2023-05-31 PROCEDURE — 3061F NEG MICROALBUMINURIA REV: CPT | Mod: CPTII,S$GLB,, | Performed by: STUDENT IN AN ORGANIZED HEALTH CARE EDUCATION/TRAINING PROGRAM

## 2023-05-31 PROCEDURE — 3044F PR MOST RECENT HEMOGLOBIN A1C LEVEL <7.0%: ICD-10-PCS | Mod: CPTII,S$GLB,, | Performed by: STUDENT IN AN ORGANIZED HEALTH CARE EDUCATION/TRAINING PROGRAM

## 2023-05-31 PROCEDURE — 3066F NEPHROPATHY DOC TX: CPT | Mod: CPTII,S$GLB,, | Performed by: STUDENT IN AN ORGANIZED HEALTH CARE EDUCATION/TRAINING PROGRAM

## 2023-05-31 PROCEDURE — 3008F BODY MASS INDEX DOCD: CPT | Mod: CPTII,S$GLB,, | Performed by: STUDENT IN AN ORGANIZED HEALTH CARE EDUCATION/TRAINING PROGRAM

## 2023-05-31 PROCEDURE — 3008F PR BODY MASS INDEX (BMI) DOCUMENTED: ICD-10-PCS | Mod: CPTII,S$GLB,, | Performed by: STUDENT IN AN ORGANIZED HEALTH CARE EDUCATION/TRAINING PROGRAM

## 2023-05-31 PROCEDURE — 99214 OFFICE O/P EST MOD 30 MIN: CPT | Mod: 25,S$GLB,, | Performed by: STUDENT IN AN ORGANIZED HEALTH CARE EDUCATION/TRAINING PROGRAM

## 2023-05-31 PROCEDURE — 99999 PR PBB SHADOW E&M-EST. PATIENT-LVL IV: ICD-10-PCS | Mod: PBBFAC,,, | Performed by: STUDENT IN AN ORGANIZED HEALTH CARE EDUCATION/TRAINING PROGRAM

## 2023-05-31 PROCEDURE — 3079F PR MOST RECENT DIASTOLIC BLOOD PRESSURE 80-89 MM HG: ICD-10-PCS | Mod: CPTII,S$GLB,, | Performed by: STUDENT IN AN ORGANIZED HEALTH CARE EDUCATION/TRAINING PROGRAM

## 2023-05-31 PROCEDURE — 1159F MED LIST DOCD IN RCRD: CPT | Mod: CPTII,S$GLB,, | Performed by: STUDENT IN AN ORGANIZED HEALTH CARE EDUCATION/TRAINING PROGRAM

## 2023-05-31 PROCEDURE — 99214 PR OFFICE/OUTPT VISIT, EST, LEVL IV, 30-39 MIN: ICD-10-PCS | Mod: 25,S$GLB,, | Performed by: STUDENT IN AN ORGANIZED HEALTH CARE EDUCATION/TRAINING PROGRAM

## 2023-05-31 NOTE — PROCEDURES
"Routine Foot Care    Date/Time: 5/31/2023 1:45 PM  Performed by: Shalini Burks DPM  Authorized by: Shalini Burks DPM     Time out: Immediately prior to procedure a "time out" was called to verify the correct patient, procedure, equipment, support staff and site/side marked as required.    Consent Done?:  Yes (Verbal)  Hyperkeratotic Skin Lesions?: No      Nail Care Type:  Debride  Location(s): All  (Left 1st Toe, Left 3rd Toe, Left 2nd Toe, Left 4th Toe, Left 5th Toe, Right 1st Toe, Right 2nd Toe, Right 3rd Toe, Right 4th Toe and Right 5th Toe)  Patient tolerance:  Patient tolerated the procedure well with no immediate complications  "

## 2023-05-31 NOTE — PATIENT INSTRUCTIONS
[employer]   SAS or [employer] Balance Diabetic Shoes, Velcro straps  Low arch supports      
Unknown if ever smoked

## 2023-05-31 NOTE — PROGRESS NOTES
Subjective:      Patient ID: Fabiano Malik Jr. is a 62 y.o. male.    Chief Complaint: Diabetes Mellitus (Lucien Fleming MD    08/09/2023), Diabetic Foot Exam, and Foot Orthotics    Fabiano is a 62 y.o. male who presents to the clinic upon referral from Dr. Perez ref. provider found  for evaluation and treatment of diabetic feet. Fabiano has a past medical history of Chronic back pain greater than 3 months duration, Depression, Diabetes mellitus, Diabetes mellitus, type 2, Hypertension, and Schizophrenia. Patient relates no major problem with feet. Only complaints today consist of here for a diabetic foot exam. States his feet are numb. States has trouble trimming his toenails. No further pedal complaints.    5/31/23: Seen today for routine foot care,  is waiting on diabetic shoes. Relates to occasional pain to left foot, arch. No further pedal complaints.     PCP: Lucien Fleming MD    Date Last Seen by PCP: 2/13/23    Current shoe gear: Tennis shoes    Hemoglobin A1C   Date Value Ref Range Status   02/13/2023 6.5 (H) 4.0 - 5.6 % Final     Comment:     ADA Screening Guidelines:  5.7-6.4%  Consistent with prediabetes  >or=6.5%  Consistent with diabetes    High levels of fetal hemoglobin interfere with the HbA1C  assay. Heterozygous hemoglobin variants (HbS, HgC, etc)do  not significantly interfere with this assay.   However, presence of multiple variants may affect accuracy.     02/02/2022 5.4 4.0 - 5.6 % Final     Comment:     ADA Screening Guidelines:  5.7-6.4%  Consistent with prediabetes  >or=6.5%  Consistent with diabetes    High levels of fetal hemoglobin interfere with the HbA1C  assay. Heterozygous hemoglobin variants (HbS, HgC, etc)do  not significantly interfere with this assay.   However, presence of multiple variants may affect accuracy.     12/07/2021 6.3 (H) 4.0 - 5.6 % Final     Comment:     ADA Screening Guidelines:  5.7-6.4%  Consistent with prediabetes  >or=6.5%  Consistent  with diabetes    High levels of fetal hemoglobin interfere with the HbA1C  assay. Heterozygous hemoglobin variants (HbS, HgC, etc)do  not significantly interfere with this assay.   However, presence of multiple variants may affect accuracy.           Review of Systems   Constitutional: Negative for chills, decreased appetite, diaphoresis and fever.   HENT:  Negative for congestion and hearing loss.    Cardiovascular:  Negative for chest pain, claudication, leg swelling and syncope.   Respiratory:  Negative for cough and shortness of breath.    Skin:  Positive for dry skin and nail changes. Negative for color change, flushing, itching, poor wound healing and rash.   Musculoskeletal:  Positive for arthritis, back pain and joint pain.   Gastrointestinal:  Negative for nausea and vomiting.   Neurological:  Positive for numbness and paresthesias. Negative for focal weakness and weakness.   Psychiatric/Behavioral:  Negative for altered mental status. The patient is not nervous/anxious.          Objective:      Physical Exam  Constitutional:       General: He is not in acute distress.     Appearance: Normal appearance. He is well-developed. He is not diaphoretic.   Cardiovascular:      Comments: Dorsalis pedis and posterior tibial pulses are within normal limits. Skin temperature is within normal limits. Toes are cool to touch and feet are warm proximally. Hair growth is within normal limits. Skin is normotrophic and without hyperpigmentation. No edema noted. No varicosities or spider veins noted, bilaterally.       Musculoskeletal:         General: No tenderness.      Comments: Adequate joint range of motion without pain, limitation, nor crepitation to foot and ankle joints. Muscle strength is 5/5 in all groups bilaterally.    Pes planus, bilateral foot       Feet:      Right foot:      Skin integrity: Dry skin present. No ulcer, blister, erythema or warmth.      Left foot:      Skin integrity: Dry skin present. No ulcer,  blister, erythema or warmth.   Skin:     General: Skin is warm and dry.      Capillary Refill: Capillary refill takes less than 2 seconds.      Comments: Skin is warm and dry, no acute signs of infection noted bilaterally      Toenails are thickened by 2-4 mm's, dystrophic, and are darkened in coloration with subungual fungal debris.     Otherwise, no open wounds, macerations or hyperkeratotic lesions, bilaterally            Neurological:      Mental Status: He is alert and oriented to person, place, and time.      Sensory: Sensory deficit present.      Motor: No abnormal muscle tone.      Comments: Light touch is diminished. [place]-Al[place] 5.07 monofilamant testing is diminished. Vibratory sensation is diminished bilaterally.   Psychiatric:         Mood and Affect: Mood normal.         Behavior: Behavior normal.         Thought Content: Thought content normal.           Assessment:       Encounter Diagnoses   Name Primary?    Pes planus, unspecified laterality Yes    Disease of nail     Type 2 diabetes mellitus with diabetic polyneuropathy, without long-term current use of insulin          Plan:       Fabiano was seen today for diabetes mellitus, diabetic foot exam and foot orthotics.    Diagnoses and all orders for this visit:    Pes planus, unspecified laterality  -     X-Ray Foot Complete Left; Future  -     Ambulatory referral/consult to Physical/Occupational Therapy; Future    Disease of nail  -     Routine Foot Care    Type 2 diabetes mellitus with diabetic polyneuropathy, without long-term current use of insulin  -     Routine Foot Care      I counseled the patient on his conditions, their implications and medical management.  Routine foot care per attached note  Discussed may obtain OTC diabetic shoes with arch supports while waiting for prescription shoes  Baseline xray ordered  Referral to PT placed  RICE, OTC pain medication PRN   Shoe inspection. Diabetic Foot Education. Patient reminded of the  importance of good nutrition and blood sugar control to help prevent podiatric complications of diabetes. Patient instructed on proper foot hygeine. We discussed wearing proper shoe gear, daily foot inspections, never walking without protective shoe gear, never putting sharp instruments to feet, routine podiatric nail visits    Return to clinic in 3 mo, sooner PRN

## 2023-06-03 NOTE — TELEPHONE ENCOUNTER
----- Message from Lnik Trujillo sent at 9/17/2019  2:05 PM CDT -----  Contact: patient  Patient called to speak with your office to get a refill on a pain medication that his PCP had previously prescribed to him.    Please call [phone] to discuss today.    traMADol (ULTRAM) 50 mg tablet  
Spoke with pt regarding a refill for Tramadol that was prescribed from his PCP. I informed pt he needs to contact his PCP for a refill. Pt verbalized understanding.   
Resident

## 2023-06-07 ENCOUNTER — OFFICE VISIT (OUTPATIENT)
Dept: NEUROSURGERY | Facility: CLINIC | Age: 63
End: 2023-06-07
Payer: MEDICARE

## 2023-06-07 ENCOUNTER — TELEPHONE (OUTPATIENT)
Dept: NEUROSURGERY | Facility: CLINIC | Age: 63
End: 2023-06-07
Payer: MEDICARE

## 2023-06-07 VITALS — BODY MASS INDEX: 29.16 KG/M2 | WEIGHT: 220 LBS | HEIGHT: 73 IN

## 2023-06-07 DIAGNOSIS — M54.12 CERVICAL RADICULOPATHY: ICD-10-CM

## 2023-06-07 DIAGNOSIS — Z98.890 S/P CARPAL TUNNEL RELEASE: ICD-10-CM

## 2023-06-07 DIAGNOSIS — Z98.890 S/P DECOMPRESSION OF ULNAR NERVE AT ELBOW: ICD-10-CM

## 2023-06-07 PROCEDURE — 1159F MED LIST DOCD IN RCRD: CPT | Mod: CPTII,S$GLB,, | Performed by: NEUROLOGICAL SURGERY

## 2023-06-07 PROCEDURE — 3044F PR MOST RECENT HEMOGLOBIN A1C LEVEL <7.0%: ICD-10-PCS | Mod: CPTII,S$GLB,, | Performed by: NEUROLOGICAL SURGERY

## 2023-06-07 PROCEDURE — 3061F NEG MICROALBUMINURIA REV: CPT | Mod: CPTII,S$GLB,, | Performed by: NEUROLOGICAL SURGERY

## 2023-06-07 PROCEDURE — 3066F PR DOCUMENTATION OF TREATMENT FOR NEPHROPATHY: ICD-10-PCS | Mod: CPTII,S$GLB,, | Performed by: NEUROLOGICAL SURGERY

## 2023-06-07 PROCEDURE — 99999 PR PBB SHADOW E&M-EST. PATIENT-LVL IV: CPT | Mod: PBBFAC,,, | Performed by: NEUROLOGICAL SURGERY

## 2023-06-07 PROCEDURE — 99499 UNLISTED E&M SERVICE: CPT | Mod: S$GLB,,, | Performed by: NEUROLOGICAL SURGERY

## 2023-06-07 PROCEDURE — 1159F PR MEDICATION LIST DOCUMENTED IN MEDICAL RECORD: ICD-10-PCS | Mod: CPTII,S$GLB,, | Performed by: NEUROLOGICAL SURGERY

## 2023-06-07 PROCEDURE — 99499 RISK ADDL DX/OHS AUDIT: ICD-10-PCS | Mod: S$GLB,,, | Performed by: NEUROLOGICAL SURGERY

## 2023-06-07 PROCEDURE — 99999 PR PBB SHADOW E&M-EST. PATIENT-LVL IV: ICD-10-PCS | Mod: PBBFAC,,, | Performed by: NEUROLOGICAL SURGERY

## 2023-06-07 PROCEDURE — 3061F PR NEG MICROALBUMINURIA RESULT DOCUMENTED/REVIEW: ICD-10-PCS | Mod: CPTII,S$GLB,, | Performed by: NEUROLOGICAL SURGERY

## 2023-06-07 PROCEDURE — 3044F HG A1C LEVEL LT 7.0%: CPT | Mod: CPTII,S$GLB,, | Performed by: NEUROLOGICAL SURGERY

## 2023-06-07 PROCEDURE — 99205 OFFICE O/P NEW HI 60 MIN: CPT | Mod: S$GLB,,, | Performed by: NEUROLOGICAL SURGERY

## 2023-06-07 PROCEDURE — 3008F BODY MASS INDEX DOCD: CPT | Mod: CPTII,S$GLB,, | Performed by: NEUROLOGICAL SURGERY

## 2023-06-07 PROCEDURE — 3008F PR BODY MASS INDEX (BMI) DOCUMENTED: ICD-10-PCS | Mod: CPTII,S$GLB,, | Performed by: NEUROLOGICAL SURGERY

## 2023-06-07 PROCEDURE — 99205 PR OFFICE/OUTPT VISIT, NEW, LEVL V, 60-74 MIN: ICD-10-PCS | Mod: S$GLB,,, | Performed by: NEUROLOGICAL SURGERY

## 2023-06-07 PROCEDURE — 3066F NEPHROPATHY DOC TX: CPT | Mod: CPTII,S$GLB,, | Performed by: NEUROLOGICAL SURGERY
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
## 2023-06-07 NOTE — TELEPHONE ENCOUNTER
Attempted to return patient's phone call. No answer    ----- Message from Belia Morales MA sent at 6/7/2023  9:05 AM CDT -----  Type:  Patient Returning Call    Who Called:pt  Does the patient know what this is regarding?: yes   Would the patient rather a call back or a response via [employer]? Call back  Best Call Back Number: [phone]  Additional Information: pt states still waiting on transportation and may be a few mins late, please contact pt

## 2023-06-12 ENCOUNTER — TELEPHONE (OUTPATIENT)
Dept: NEUROSURGERY | Facility: CLINIC | Age: 63
End: 2023-06-12
Payer: MEDICARE

## 2023-06-12 DIAGNOSIS — G95.9 CERVICAL MYELOPATHY: ICD-10-CM

## 2023-06-12 DIAGNOSIS — M54.12 CERVICAL RADICULOPATHY: ICD-10-CM

## 2023-06-12 DIAGNOSIS — M48.02 CERVICAL SPINAL STENOSIS: ICD-10-CM

## 2023-06-12 DIAGNOSIS — M50.30 DDD (DEGENERATIVE DISC DISEASE), CERVICAL: Primary | ICD-10-CM

## 2023-06-13 ENCOUNTER — TELEPHONE (OUTPATIENT)
Dept: PREADMISSION TESTING | Facility: HOSPITAL | Age: 63
End: 2023-06-13
Payer: MEDICARE

## 2023-06-13 ENCOUNTER — OFFICE VISIT (OUTPATIENT)
Dept: OTOLARYNGOLOGY | Facility: CLINIC | Age: 63
End: 2023-06-13
Payer: MEDICARE

## 2023-06-13 ENCOUNTER — HOSPITAL ENCOUNTER (OUTPATIENT)
Dept: RADIOLOGY | Facility: HOSPITAL | Age: 63
Discharge: HOME | End: 2023-06-13
Attending: STUDENT IN AN ORGANIZED HEALTH CARE EDUCATION/TRAINING PROGRAM
Payer: MEDICARE

## 2023-06-13 VITALS
WEIGHT: 228.06 LBS | SYSTOLIC BLOOD PRESSURE: 134 MMHG | HEART RATE: 83 BPM | BODY MASS INDEX: 30.23 KG/M2 | DIASTOLIC BLOOD PRESSURE: 83 MMHG | HEIGHT: 73 IN

## 2023-06-13 DIAGNOSIS — Z79.4 TYPE 2 DIABETES MELLITUS WITHOUT COMPLICATION, WITH LONG-TERM CURRENT USE OF INSULIN: ICD-10-CM

## 2023-06-13 DIAGNOSIS — I10 HYPERTENSION, UNSPECIFIED TYPE: ICD-10-CM

## 2023-06-13 DIAGNOSIS — J31.0 CHRONIC RHINITIS: ICD-10-CM

## 2023-06-13 DIAGNOSIS — E11.9 TYPE 2 DIABETES MELLITUS WITHOUT COMPLICATION, WITH LONG-TERM CURRENT USE OF INSULIN: ICD-10-CM

## 2023-06-13 DIAGNOSIS — Z01.818 PRE-OP EVALUATION: Primary | ICD-10-CM

## 2023-06-13 DIAGNOSIS — J34.89 NASAL VESTIBULITIS: Primary | ICD-10-CM

## 2023-06-13 DIAGNOSIS — M21.40 PES PLANUS, UNSPECIFIED LATERALITY: ICD-10-CM

## 2023-06-13 PROCEDURE — 3079F DIAST BP 80-89 MM HG: CPT | Mod: CPTII,S$GLB,, | Performed by: OTOLARYNGOLOGY

## 2023-06-13 PROCEDURE — 99999 PR PBB SHADOW E&M-EST. PATIENT-LVL IV: ICD-10-PCS | Mod: PBBFAC,,, | Performed by: OTOLARYNGOLOGY

## 2023-06-13 PROCEDURE — 73630 X-RAY EXAM OF FOOT: CPT | Mod: TC,FY,LT

## 2023-06-13 PROCEDURE — 3044F HG A1C LEVEL LT 7.0%: CPT | Mod: CPTII,S$GLB,, | Performed by: OTOLARYNGOLOGY

## 2023-06-13 PROCEDURE — 3044F PR MOST RECENT HEMOGLOBIN A1C LEVEL <7.0%: ICD-10-PCS | Mod: CPTII,S$GLB,, | Performed by: OTOLARYNGOLOGY

## 2023-06-13 PROCEDURE — 73630 XR FOOT COMPLETE 3 VIEW LEFT: ICD-10-PCS | Mod: 26,LT,, | Performed by: INTERNAL MEDICINE

## 2023-06-13 PROCEDURE — 3008F BODY MASS INDEX DOCD: CPT | Mod: CPTII,S$GLB,, | Performed by: OTOLARYNGOLOGY

## 2023-06-13 PROCEDURE — 1159F PR MEDICATION LIST DOCUMENTED IN MEDICAL RECORD: ICD-10-PCS | Mod: CPTII,S$GLB,, | Performed by: OTOLARYNGOLOGY

## 2023-06-13 PROCEDURE — 99999 PR PBB SHADOW E&M-EST. PATIENT-LVL IV: CPT | Mod: PBBFAC,,, | Performed by: OTOLARYNGOLOGY

## 2023-06-13 PROCEDURE — 99213 PR OFFICE/OUTPT VISIT, EST, LEVL III, 20-29 MIN: ICD-10-PCS | Mod: S$GLB,,, | Performed by: OTOLARYNGOLOGY

## 2023-06-13 PROCEDURE — 3079F PR MOST RECENT DIASTOLIC BLOOD PRESSURE 80-89 MM HG: ICD-10-PCS | Mod: CPTII,S$GLB,, | Performed by: OTOLARYNGOLOGY

## 2023-06-13 PROCEDURE — 3061F NEG MICROALBUMINURIA REV: CPT | Mod: CPTII,S$GLB,, | Performed by: OTOLARYNGOLOGY

## 2023-06-13 PROCEDURE — 3075F SYST BP GE 130 - 139MM HG: CPT | Mod: CPTII,S$GLB,, | Performed by: OTOLARYNGOLOGY

## 2023-06-13 PROCEDURE — 3066F PR DOCUMENTATION OF TREATMENT FOR NEPHROPATHY: ICD-10-PCS | Mod: CPTII,S$GLB,, | Performed by: OTOLARYNGOLOGY

## 2023-06-13 PROCEDURE — 3066F NEPHROPATHY DOC TX: CPT | Mod: CPTII,S$GLB,, | Performed by: OTOLARYNGOLOGY

## 2023-06-13 PROCEDURE — 1159F MED LIST DOCD IN RCRD: CPT | Mod: CPTII,S$GLB,, | Performed by: OTOLARYNGOLOGY

## 2023-06-13 PROCEDURE — 99213 OFFICE O/P EST LOW 20 MIN: CPT | Mod: S$GLB,,, | Performed by: OTOLARYNGOLOGY

## 2023-06-13 PROCEDURE — 73630 X-RAY EXAM OF FOOT: CPT | Mod: 26,LT,, | Performed by: INTERNAL MEDICINE

## 2023-06-13 PROCEDURE — 3008F PR BODY MASS INDEX (BMI) DOCUMENTED: ICD-10-PCS | Mod: CPTII,S$GLB,, | Performed by: OTOLARYNGOLOGY

## 2023-06-13 PROCEDURE — 3075F PR MOST RECENT SYSTOLIC BLOOD PRESS GE 130-139MM HG: ICD-10-PCS | Mod: CPTII,S$GLB,, | Performed by: OTOLARYNGOLOGY

## 2023-06-13 PROCEDURE — 3061F PR NEG MICROALBUMINURIA RESULT DOCUMENTED/REVIEW: ICD-10-PCS | Mod: CPTII,S$GLB,, | Performed by: OTOLARYNGOLOGY

## 2023-06-13 RX ORDER — AZELASTINE 1 MG/ML
1 SPRAY, METERED NASAL 2 TIMES DAILY
Qty: 30 ML | Refills: 5 | Status: SHIPPED | OUTPATIENT
Start: 2023-06-13 | End: 2023-08-02 | Stop reason: ALTCHOICE

## 2023-06-13 RX ORDER — MUPIROCIN 20 MG/G
OINTMENT TOPICAL 2 TIMES DAILY
Qty: 15 G | Refills: 3 | Status: SHIPPED | OUTPATIENT
Start: 2023-06-13 | End: 2023-06-24

## 2023-06-13 NOTE — PATIENT INSTRUCTIONS
Use mupirocin in nostrils twice daily for the 1-2 weeks before upcoming cervical surgery to help prevent bacterial colonization.    The patient was given a prescription for a nasal steroid and/or nasal antihistamine nasal spray and we discussed in detail the proper mechanism of use directing the spray away from the nasal septum.  In addition, we also discussed that it will take 3-4 weeks of daily use to achieve maximal effectiveness.  The patient will please call in 3-4 weeks with their progress.      How do you use a Nasal Corticosteroid Spray?    Make sure you understand your dosing instructions. Spray only the number of prescribed sprays in each nostril. Read the package instructions before using your spray the first time.    Most corticosteroid sprays suggest the following steps:    Wash your hands well.    Gently blow your nose to clear the passageway.    Shake the container several times.    Tilt your head slightly downward.  Use the opposite hand from the nostril you will be spraying to hold the spray bottle.    Block one nostril with your finger.  Insert the nasal applicator into the other nostril.    Aim the spray toward the outer wall of the nostril.  Inhale slowly through the nose and press the .    Breathe out and repeat to apply the prescribed number of sprays.  Repeat these steps for the other nostril.     Avoid sneezing or blowing your nose right after spraying.

## 2023-06-13 NOTE — PROGRESS NOTES
Chief Complaint   Patient presents with    Other     Pt stated who body is hurting like he is falling apart       HPI:  Patient is a 62 y.o. male who has previously seen me for septal abscess, recurrent MRSA infections of the nose, MRSA cellulitis.      Since the last visit, the patient reports he has overall been doing well.  No recurrent issues with cellulitis.  He still has minor congestion in the nose and some occasional feeling of runny nose.  He is scheduled to have cervical vertebral procedure next week.    Active Ambulatory Problems     Diagnosis Date Noted    Visual hallucinations 07/26/2016    Low back pain, non-specific 07/26/2016    Type 2 diabetes mellitus without complication, without long-term current use of insulin 07/26/2016    Hypertension associated with diabetes 07/26/2016    HLD (hyperlipidemia) 07/26/2016    Schizophrenia 07/26/2016    Hyperkalemia 07/26/2016    Hypotension 01/20/2017    Need for hepatitis C screening test 05/31/2018    Polyp of colon 05/31/2018    Class 1 obesity with serious comorbidity and body mass index (BMI) of 34.0 to 34.9 in adult 11/28/2018    Dizziness 11/28/2018    Need for vaccination against Streptococcus pneumoniae using pneumococcal conjugate vaccine 7 12/12/2018    Depression 12/12/2018    Anemia 12/12/2018    Renal insufficiency 12/12/2018    Chronic back pain 12/12/2018    Dehydration     Neck pain 01/08/2019    CKD (chronic kidney disease) stage 3, GFR 30-59 ml/min 01/14/2019    EKG abnormalities 01/14/2019    Chest pain 01/14/2019    Gastroesophageal reflux disease 01/14/2019    Chronic pain 01/15/2019    Palmar nodule, left 02/15/2019    Trigger index finger of right hand 02/15/2019    Lumbar facet arthropathy 03/12/2019    Hand or foot spasms 04/05/2019    Hearing loss of left ear 04/05/2019    Atherosclerosis of native coronary artery of native heart without angina pectoris  04/05/2019    Cerumen debris on tympanic membrane of right ear 04/05/2019    Flu  vaccine need 09/04/2019    Head trauma 09/04/2019    Erectile dysfunction 09/04/2019    Rash 09/04/2019    Nonintractable headache 09/04/2019    Lumbar radiculopathy 09/12/2019    Carpal tunnel syndrome of right wrist 04/07/2022    History of colon polyps 11/21/2022    Wrist pain, right 01/25/2023    Right elbow pain 01/25/2023    Stiffness in joint 01/25/2023    Weakness 01/25/2023    Sedative, hypnotic or anxiolytic dependence, uncomplicated 02/13/2023    Pulmonary hypertension, unspecified 02/13/2023    Type 2 diabetes mellitus with diabetic peripheral angiopathy without gangrene, without long-term current use of insulin 02/13/2023     Resolved Ambulatory Problems     Diagnosis Date Noted    Acute kidney failure 07/26/2016    MARY GRACE (acute kidney injury) 07/26/2016    Prerenal azotemia 01/21/2017    Diarrhea 01/21/2017    Preventative health care 11/28/2018    Tachycardia 11/28/2018    Upper respiratory tract infection 11/28/2018    SOB (shortness of breath) 11/28/2018    Troponin level elevated 11/28/2018     Past Medical History:   Diagnosis Date    Chronic back pain greater than 3 months duration     Diabetes mellitus     Diabetes mellitus, type 2     Hypertension        Review of Systems  General: negative for chills, fever or weight loss  Psychological: negative for mood changes or depression  Ophthalmic: negative for blurry vision, photophobia or eye pain  ENT: see HPI  Respiratory: no cough, shortness of breath, or wheezing  Cardiovascular: no chest pain or dyspnea on exertion  Gastrointestinal: no abdominal pain, change in bowel habits, or black/ bloody stools  Musculoskeletal: negative for gait disturbance or muscular weakness  Neurological: no syncope or seizures; no ataxia  Dermatological: negative for pruritis, rash and jaundice  Hematologic/lymphatic: no easy bruising, no new adenopathy    Physical Exam     Vitals:    06/13/23 1411   BP: 134/83   Pulse: 83         Constitutional:   He is oriented to  person, place, and time. Vital signs are normal. He appears well-developed and well-nourished. He appears alert. He is cooperative. Normal speech.      Head:  Normocephalic and atraumatic. Salivary glands normal.  Facial strength is normal.      Ears:  Hearing normal to normal and whispered voice; external ear normal without scars, lesions, or masses; ear canal, tympanic membrane, and middle ear normal., right ear hearing normal to normal and whispered voice; external ear normal without scars, lesions, or masses; ear canal, tympanic membrane, and middle ear normal. and left ear hearing normal to normal and whispered voice; external ear normal without scars, lesions, or masses; ear canal, tympanic membrane, and middle ear normal..   Right Ear: Tympanic membrane is not perforated, not erythematous and not retracted. No middle ear effusion.   Left Ear: Tympanic membrane is not perforated, not erythematous and not retracted.  No middle ear effusion.     Nose:  Mucosal edema (mild congestion of nasal mucosa bilaterally, no purulent drainage or cellulitis noted), rhinorrhea and septal deviation present. No nasal septal hematoma or polyps. Turbinates abnormal and turbinate hypertrophy (3+, boggy, congested mucosa).  Right sinus exhibits no maxillary sinus tenderness and no frontal sinus tenderness. Left sinus exhibits no maxillary sinus tenderness and no frontal sinus tenderness.     Mouth/Throat  Oropharynx clear and moist without lesions or asymmetry, lips, teeth, and gums normal and oropharynx normal. No mucous membrane lesions. No oropharyngeal exudate, posterior oropharyngeal edema or posterior oropharyngeal erythema. Mirror exam not performed due to patient tolerance.  Mirror exam not performed due to patient tolerance.      Neck:  Neck normal without thyromegaly masses, asymmetry, normal tracheal structure, crepitus, and tenderness, thyroid normal, trachea normal, phonation normal, full range of motion with neck  supple and no adenopathy. No JVD present. Carotid bruit is not present. No thyroid mass and no thyromegaly present.     He has no cervical adenopathy.     Cardiovascular:    Normal rate, regular rhythm and rate and rhythm, heart sounds, and pulses normal.              Pulmonary/Chest:   Effort and breath sounds normal.     Psychiatric:   He has a normal mood and affect. His speech is normal and behavior is normal.     Neurological:   He is alert and oriented to person, place, and time. He has neurological normal, alert and oriented. No cranial nerve deficit.     Skin:   No abrasions, lacerations, lesions, or rashes.       Assessment/Plan:      ICD-10-CM ICD-9-CM    1. Nasal vestibulitis  J34.89 478.19       2. Chronic rhinitis  J31.0 472.0             Prescription given for azelastine for patient to try this medication to help with rhinitis and congestion.    Instructed patient to use mupirocin ointment bilateral nostrils b.i.d. for the week leading up to surgery to prevent MRSA colonization.      Joyce Araya MD  [facility]

## 2023-06-19 DIAGNOSIS — F41.9 ANXIETY: ICD-10-CM

## 2023-06-19 RX ORDER — ALPRAZOLAM 0.5 MG/1
0.5 TABLET ORAL NIGHTLY PRN
Qty: 30 TABLET | Refills: 0 | Status: SHIPPED | OUTPATIENT
Start: 2023-06-19 | End: 2023-07-27 | Stop reason: SDUPTHER

## 2023-06-19 NOTE — TELEPHONE ENCOUNTER
Care Due:                  Date            Visit Type   Department     Provider  --------------------------------------------------------------------------------                                EP -                              PRIMARY      [facility]  Last Visit: 02-      CARE ([facility])   LESLEY Fleming                              EP -                              PRIMARY      [facility]  Next Visit: 07-      CARE ([facility])   LESLEY Fleming                                                            Last  Test          Frequency    Reason                     Performed    Due Date  --------------------------------------------------------------------------------    HBA1C.......  6 months...  dulaglutide, glimepiride,   02- 08-                             metFORMIN................    [employer] Embedded Care Due Messages. Reference number: [id number].   6/19/2023 9:26:43 AM CDT

## 2023-06-20 ENCOUNTER — OFFICE VISIT (OUTPATIENT)
Dept: PAIN MEDICINE | Facility: CLINIC | Age: 63
End: 2023-06-20
Payer: MEDICARE

## 2023-06-20 VITALS
WEIGHT: 227.94 LBS | BODY MASS INDEX: 30.08 KG/M2 | SYSTOLIC BLOOD PRESSURE: 156 MMHG | DIASTOLIC BLOOD PRESSURE: 101 MMHG

## 2023-06-20 DIAGNOSIS — M54.12 CHRONIC CERVICAL RADICULOPATHY: Primary | ICD-10-CM

## 2023-06-20 DIAGNOSIS — M47.812 CERVICAL SPONDYLOSIS: ICD-10-CM

## 2023-06-20 DIAGNOSIS — G89.29 OTHER CHRONIC PAIN: ICD-10-CM

## 2023-06-20 DIAGNOSIS — E11.42 TYPE 2 DIABETES MELLITUS WITH DIABETIC POLYNEUROPATHY, WITHOUT LONG-TERM CURRENT USE OF INSULIN: ICD-10-CM

## 2023-06-20 DIAGNOSIS — M54.12 CERVICAL RADICULOPATHY: ICD-10-CM

## 2023-06-20 PROCEDURE — 3077F SYST BP >= 140 MM HG: CPT | Mod: CPTII,S$GLB,, | Performed by: STUDENT IN AN ORGANIZED HEALTH CARE EDUCATION/TRAINING PROGRAM

## 2023-06-20 PROCEDURE — 3061F PR NEG MICROALBUMINURIA RESULT DOCUMENTED/REVIEW: ICD-10-PCS | Mod: CPTII,S$GLB,, | Performed by: STUDENT IN AN ORGANIZED HEALTH CARE EDUCATION/TRAINING PROGRAM

## 2023-06-20 PROCEDURE — 3008F PR BODY MASS INDEX (BMI) DOCUMENTED: ICD-10-PCS | Mod: CPTII,S$GLB,, | Performed by: STUDENT IN AN ORGANIZED HEALTH CARE EDUCATION/TRAINING PROGRAM

## 2023-06-20 PROCEDURE — 3008F BODY MASS INDEX DOCD: CPT | Mod: CPTII,S$GLB,, | Performed by: STUDENT IN AN ORGANIZED HEALTH CARE EDUCATION/TRAINING PROGRAM

## 2023-06-20 PROCEDURE — 3061F NEG MICROALBUMINURIA REV: CPT | Mod: CPTII,S$GLB,, | Performed by: STUDENT IN AN ORGANIZED HEALTH CARE EDUCATION/TRAINING PROGRAM

## 2023-06-20 PROCEDURE — 3066F PR DOCUMENTATION OF TREATMENT FOR NEPHROPATHY: ICD-10-PCS | Mod: CPTII,S$GLB,, | Performed by: STUDENT IN AN ORGANIZED HEALTH CARE EDUCATION/TRAINING PROGRAM

## 2023-06-20 PROCEDURE — 99999 PR PBB SHADOW E&M-EST. PATIENT-LVL III: CPT | Mod: PBBFAC,,, | Performed by: STUDENT IN AN ORGANIZED HEALTH CARE EDUCATION/TRAINING PROGRAM

## 2023-06-20 PROCEDURE — 3080F DIAST BP >= 90 MM HG: CPT | Mod: CPTII,S$GLB,, | Performed by: STUDENT IN AN ORGANIZED HEALTH CARE EDUCATION/TRAINING PROGRAM

## 2023-06-20 PROCEDURE — 1159F MED LIST DOCD IN RCRD: CPT | Mod: CPTII,S$GLB,, | Performed by: STUDENT IN AN ORGANIZED HEALTH CARE EDUCATION/TRAINING PROGRAM

## 2023-06-20 PROCEDURE — 99999 PR PBB SHADOW E&M-EST. PATIENT-LVL III: ICD-10-PCS | Mod: PBBFAC,,, | Performed by: STUDENT IN AN ORGANIZED HEALTH CARE EDUCATION/TRAINING PROGRAM

## 2023-06-20 PROCEDURE — 1159F PR MEDICATION LIST DOCUMENTED IN MEDICAL RECORD: ICD-10-PCS | Mod: CPTII,S$GLB,, | Performed by: STUDENT IN AN ORGANIZED HEALTH CARE EDUCATION/TRAINING PROGRAM

## 2023-06-20 PROCEDURE — 3077F PR MOST RECENT SYSTOLIC BLOOD PRESSURE >= 140 MM HG: ICD-10-PCS | Mod: CPTII,S$GLB,, | Performed by: STUDENT IN AN ORGANIZED HEALTH CARE EDUCATION/TRAINING PROGRAM

## 2023-06-20 PROCEDURE — 3044F HG A1C LEVEL LT 7.0%: CPT | Mod: CPTII,S$GLB,, | Performed by: STUDENT IN AN ORGANIZED HEALTH CARE EDUCATION/TRAINING PROGRAM

## 2023-06-20 PROCEDURE — 3080F PR MOST RECENT DIASTOLIC BLOOD PRESSURE >= 90 MM HG: ICD-10-PCS | Mod: CPTII,S$GLB,, | Performed by: STUDENT IN AN ORGANIZED HEALTH CARE EDUCATION/TRAINING PROGRAM

## 2023-06-20 PROCEDURE — 3044F PR MOST RECENT HEMOGLOBIN A1C LEVEL <7.0%: ICD-10-PCS | Mod: CPTII,S$GLB,, | Performed by: STUDENT IN AN ORGANIZED HEALTH CARE EDUCATION/TRAINING PROGRAM

## 2023-06-20 PROCEDURE — 3066F NEPHROPATHY DOC TX: CPT | Mod: CPTII,S$GLB,, | Performed by: STUDENT IN AN ORGANIZED HEALTH CARE EDUCATION/TRAINING PROGRAM

## 2023-06-20 PROCEDURE — 99204 OFFICE O/P NEW MOD 45 MIN: CPT | Mod: S$GLB,,, | Performed by: STUDENT IN AN ORGANIZED HEALTH CARE EDUCATION/TRAINING PROGRAM

## 2023-06-20 PROCEDURE — 99204 PR OFFICE/OUTPT VISIT, NEW, LEVL IV, 45-59 MIN: ICD-10-PCS | Mod: S$GLB,,, | Performed by: STUDENT IN AN ORGANIZED HEALTH CARE EDUCATION/TRAINING PROGRAM

## 2023-06-20 RX ORDER — PREGABALIN 100 MG/1
200 CAPSULE ORAL 2 TIMES DAILY
Qty: 360 CAPSULE | Refills: 1 | Status: SHIPPED | OUTPATIENT
Start: 2023-06-20 | End: 2023-11-20 | Stop reason: SDUPTHER

## 2023-06-20 NOTE — PROGRESS NOTES
[facility] - New Patient Evaluation    Referred by: Camelia Tomlinson   Reason for referral: Chronic cervical radiculopathy     CC: No chief complaint on file.    Last 3 PDI Scores 6/20/2023 10/8/2019 9/6/2019   Pain Disability Index (PDI) 60 68 70       Subjective 06/20/2023:   Fabiano Malik Jr. is a 62 y.o. male who presents complaining of neck pain that radiates into the right arm down to the hand.  He reports numbness in the right arm and hand. He has previously undergone right carpal tunnel release and ulnar nerve decompression in 2022 with no improvement in his arm pain.  He has been evaluated by Dr Washington with NSGY and is currently scheduled for cervical fusion surgery on 7/14/2023. He was recently started on Lyrica 100 mg BID.  He has not noticed a difference in his pain with this medication and denies any side effects.     Location: right arm,neck,back   Onset: years  Current Pain Score: 10/10  Daily Pain of Range: 6-10/10  Quality: Aching, Throbbing, Grabbing, Tingling, Numb, Sharp, and Hot  Radiation: right shoulder,back,neck,legs  Worsened by: extension, flexion, lying down, sitting, standing for more than 1 minutes, walking for more than 1 minutes, and daily activity  Improved by: nothing    Patient denies urinary incontinence, bowel incontinence, significant weight loss, and significant motor weakness.    Previous Interventions:  - 09/2019 -Right L4/5 and L5/S1 TFESI - [place]  - 02/2019 - RFA L2, 3, 4, 5 - [place]    Previous Therapies:  PT/OT:   Chiropractor:   HEP:   Relevant Surgery: yes   Many years ago - C4-5 anterior fusion   2022 - right carpal tunnel release and ulnar nerve decompression  Previous Medications:   - NSAIDS: meloxicam  - Muscle Relaxants:    - TCAs:   - SNRIs: Cymbalta  - Topicals:   - Anticonvulsants: lyrica   - Opioids:   - Adjuvants:     Current Pain Medications:  Lyrica  Cymbalta     Review of Systems:  ROS     GENERAL:  No weight loss, malaise  or fevers. +DM type II  HEENT:   No recent changes in vision or hearing  NECK:  No difficulty with swallowing. No stridor.   RESPIRATORY:  Negative for cough, wheezing or shortness of breath, patient denies any recent URI.  CARDIOVASCULAR:  Negative for chest pain, leg swelling or palpitations.  +HTN  HLD  GI:  Negative for abdominal discomfort, blood in stools or black stools or change in bowel habits.  MUSCULOSKELETAL:  See HPI.  SKIN:  Negative for lesions, rash, and itching.  PSYCH:  No mood disorder or recent psychosocial stressors.  +Depression, Schizophrenia   HEMATOLOGY/LYMPHOLOGY:  Negative for prolonged bleeding, bruising easily or swollen nodes.  Patient is not currently taking any anti-coagulants  NEURO:   No history of headaches, syncope, paralysis, seizures or tremors.  All other reviewed and negative other than HPI.    History:  Current medications, allergies, medical history, surgical history,   family history, and social history were reviewed in the chart as marked.    Full Medication List:    Current Outpatient Medications:     acetaminophen (TYLENOL) 325 MG tablet, Take 2 tablets (650 mg total) by mouth every 6 (six) hours as needed., Disp: 120 tablet, Rfl: 3    ALPRAZolam (XANAX) 0.5 MG tablet, Take 1 tablet (0.5 mg total) by mouth nightly as needed for Anxiety., Disp: 30 tablet, Rfl: 0    azelastine (ASTELIN) 137 mcg (0.1 %) nasal spray, 1 spray (137 mcg total) by Nasal route 2 (two) times daily., Disp: 30 mL, Rfl: 5    BLOOD PRESSURE CUFF Misc, 1 Units by Misc.(Non-Drug; Combo Route) route once daily., Disp: 1 each, Rfl: 0    blood sugar diagnostic (TRUE METRIX GLUCOSE TEST STRIP) Strp, use 1 strip to check glucose 2 (two) times a day., Disp: 200 strip, Rfl: 6    blood-glucose meter Misc, Use as instructed, Disp: 1 each, Rfl: 0    chlorthalidone (HYGROTEN) 25 MG Tab, Take 1 tablet (25 mg total) by mouth once daily., Disp: 90 tablet, Rfl: 3    dulaglutide (TRULICITY) 0.75 mg/0.5 mL pen injector,  Inject 0.75 mg (one pen) into the skin every 7 days., Disp: 12 pen, Rfl: 1    DULoxetine (CYMBALTA) 60 MG capsule, TAKE 2 CAPSULES BY MOUTH EVERY MORNING, Disp: 180 capsule, Rfl: 3    flu vacc [id number] 6mos up,PF, (FLUARIX QUAD [id number], PF,) 60 mcg (15 mcg x 4)/0.5 mL Syrg, Inject into the muscle., Disp: 0.5 mL, Rfl: 0    glimepiride (AMARYL) 2 MG tablet, Take 1 tablet (2 mg total) by mouth before breakfast., Disp: 90 tablet, Rfl: 3    hydrOXYzine pamoate (VISTARIL) 50 MG Cap, TAKE 1 CAPSULE (50 MG) BY MOUTH NIGHTLY AS NEEDED FOR INSOMNIA, Disp: 90 capsule, Rfl: 3    lancets 30 gauge Misc, 1 lancet by Misc.(Non-Drug; Combo Route) route twice daily., Disp: 200 each, Rfl: 6    meclizine (ANTIVERT) 12.5 mg tablet, Take 1 tablet (12.5 mg total) by mouth 3 (three) times daily as needed for Dizziness., Disp: 180 tablet, Rfl: 0    meloxicam (MOBIC) 15 MG tablet, , Disp: , Rfl:     metFORMIN (GLUCOPHAGE) 1000 MG tablet, Take 1 tablet (1,000 mg total) by mouth 2 (two) times daily with meals., Disp: 180 tablet, Rfl: 1    mupirocin (BACTROBAN) 2 % ointment, Apply to nose twice daily for 10 days, Disp: 15 g, Rfl: 3    olmesartan (BENICAR) 20 MG tablet, Take 1 tablet (20 mg total) by mouth once daily., Disp: 90 tablet, Rfl: 0    omeprazole (PRILOSEC) 20 MG capsule, Take 1 capsule (20 mg total) by mouth before breakfast., Disp: 30 capsule, Rfl: 3    PFIZER COVID-19 CIELO VACCN,PF, 30 mcg/0.3 mL injection, , Disp: , Rfl:     potassium chloride (KLOR-CON) 10 MEQ TbSR, take 1 tablet by mouth once daily, Disp: 90 tablet, Rfl: 3    pravastatin (PRAVACHOL) 40 MG tablet, TAKE 1 TABLET(40 MG) BY MOUTH EVERY DAY, Disp: 90 tablet, Rfl: 3    pregabalin (LYRICA) 100 MG capsule, Take 2 capsules (200 mg total) by mouth 2 (two) times daily., Disp: 360 capsule, Rfl: 1    SHINGRIX, PF, 50 mcg/0.5 mL injection, , Disp: , Rfl:     sildenafiL (VIAGRA) 100 MG tablet, Take 1 tablet (100 mg total) by mouth daily as needed for Erectile Dysfunction.,  Disp: 30 tablet, Rfl: 3     Allergies:  Pcn [penicillins]     Medical History:   has a past medical history of Chronic back pain greater than 3 months duration, Depression, Diabetes mellitus, Diabetes mellitus, type 2, Hypertension, and Schizophrenia.    Surgical History:   has a past surgical history that includes Neck surgery; Appendectomy; Radiofrequency ablation of lumbar medial branch nerve at single level (Left, 5/29/2018); Colonoscopy (N/A, 5/31/2018); Radiofrequency ablation of lumbar medial branch nerve at single level (Right, 1/8/2019); Radiofrequency ablation of lumbar medial branch nerve at single level (Left, 3/12/2019); Transforaminal epidural injection of steroid (Right, 9/12/2019); Colonoscopy (N/A, 11/17/2022); Carpal tunnel release (Right, 12/13/2022); and decompression, nerve, ulnar (Right, 12/13/2022).    Family History:  family history includes Alzheimer's disease in his mother and sister; Cancer in his father; Diabetes in his mother; Heart attack in his father; Heart defect in his father and sister; Stroke in his father.    Social History:   reports that he has never smoked. He has never used smokeless tobacco. He reports current alcohol use. He reports that he does not use drugs.    Physical Exam:  Vitals:    06/20/23 1106   BP: (!) 156/101   Weight: 103.4 kg (227 lb 15.3 oz)   PainSc: 10-Worst pain ever   PainLoc: Neck       GENERAL: Well appearing, in no acute distress, alert and oriented x3.  PSYCH:  Mood and affect appropriate.  SKIN: Skin color, texture, turgor normal, no rashes or lesions.  HEAD/FACE:  Normocephalic, atraumatic. Cranial nerves grossly intact.  NECK: Decreased ROM 2/2 pain. Supple. There is pain to palpation over the cervical paraspinous muscles. Spurling positive. Pain with facet loading.   CV: RRR with palpation of the radial artery.  PULM: No evidence of respiratory difficulty, symmetric chest rise.  GI:  Soft and non-distended.  MSK: No obvious deformities, edema, or  skin discoloration.  No atrophy or tone abnormalities are noted.   NEURO: Altered sensation noted over all 5 digits of the right hand compared to left.   MENTAL STATUS: A x O x 3, good concentration, speech is fluent and goal directed  MOTOR: 5/5 in b/l UE muscle groups  GAIT: Ambulates unassisted.    Imaging:  MRI CERVICAL SPINE WITHOUT CONTRAST     CLINICAL HISTORY:  Spinal stenosis, cervical;.  Spinal stenosis, cervical region     TECHNIQUE:  Multiplanar, multisequence MR images of the cervical spine were acquired without the administration of contrast.     COMPARISON:  MRI cervical spine dated 08/08/2013     FINDINGS:  Prior fusion changes at the C4-C5 vertebral bodies.  Remaining vertebral body heights appear maintained.  No infiltrative T1 marrow process.  No abnormal cord STIR edema.  Visualized brainstem and cerebellum are unremarkable.  Prevertebral and paraspinal soft tissues demonstrate no acute abnormality     C2-C3: Mild posterior disc osteophyte with left greater than right facet DJD.  No significant neural foraminal encroachment or spinal canal stenosis.     C3-C4: Posterior disc osteophyte and facet DJD.  Thinning with near effacement of the ventral thecal sac.  Posterior subarachnoid space is maintained.  No significant neural foraminal encroachment.     C4-C5: Vertebral body fusion without significant spinal canal stenosis or neural foraminal impingement.     C5-C6: Posterior disc osteophyte with uncovertebral spurring and facet DJD.  Effacement of the ventral thecal sac with abutment and slight indentation of the anterior cord.  Thinning without effacement of the posterior subarachnoid space.  Severe right and mild-moderate left neural foraminal narrowing.     C6-C7: Posterior disc osteophyte with uncovertebral spurring and facet DJD.  Effacement of the ventral thecal sac with abutment and slight indentation of the right paracentral anterior cord.  Thinning with near effacement of the posterior  subarachnoid space.  Severe neural foraminal narrowing.     C7-T1: No significant spinal canal stenosis or neural foraminal impingement.     Impression:     Cervical spondylosis noting levels of lower cervical spinal canal stenosis and variable neural foraminal narrowing as detailed.     Electronically signed by: Armin Motley  Date:                                            05/31/2023    XR CERVICAL SPINE AP LAT WITH FLEX EXTEN     CLINICAL HISTORY:  Spinal stenosis, cervical region     TECHNIQUE:  Three views of the cervical spine plus flexion and extension views were performed.     COMPARISON:  05/05/2022     FINDINGS:  Visualization to the level of C6-C7 on lateral view.  Similar fusion changes across C4-C5.  Additional cervical discogenic change with marginal spurring elsewhere.  Straightening of the normal cervical lordosis.  Lateral masses appear well seated.  There is minimal grade 1 anterolisthesis at C2-C3 and C3-C4 with flexion, reduced with extension.  Normal precervical soft tissue thickness.        Electronically signed by: Armin Motley  Date:                                            05/31/2023      Labs:  BMP  Lab Results   Component Value Date     02/13/2023    K 3.9 02/13/2023    CL 99 02/13/2023    CO2 28 02/13/2023    BUN 20 02/13/2023    CREATININE 1.5 (H) 02/13/2023    CALCIUM 10.1 02/13/2023    ANIONGAP 10 02/13/2023    EGFRNORACEVR 52.3 (A) 02/13/2023     Lab Results   Component Value Date    ALT 16 02/13/2023    AST 21 02/13/2023    ALKPHOS 75 02/13/2023    BILITOT 0.7 02/13/2023     Lab Results   Component Value Date    WBC 8.23 02/13/2023    HGB 14.2 02/13/2023    HCT 42.5 02/13/2023    MCV 85 02/13/2023     02/13/2023             Assessment:  Problem List Items Addressed This Visit          Neuro    Chronic pain    Relevant Medications    pregabalin (LYRICA) 100 MG capsule     Other Visit Diagnoses       Chronic cervical radiculopathy    -  Primary    Cervical  radiculopathy        Relevant Medications    pregabalin (LYRICA) 100 MG capsule    Type 2 diabetes mellitus with diabetic polyneuropathy, without long-term current use of insulin        Relevant Medications    pregabalin (LYRICA) 100 MG capsule    Cervical spondylosis              06/20/2023 - Fabiano Malik Jr. presents with pain consistent with right cervical radiculopathy.  He is currently scheduled for c-spine fusion surgery on  07/14/2023 with Dr. Washington.  I do not recommend any interventional procedures at this time as there steroid could affect healing and infection risk. Discussed optimizing his Lyrica.     Treatment Plan:   Procedures: None at this time as pt is scheduled for spine surgery in 3 weeks.   PT/OT/HEP: I have stressed the importance of physical activity and a home exercise plan to help with pain and improve health.  Medications:    - Increase Lyrica to 200 mg BID.    -  Reviewed and consistent with medication use as prescribed.  Imaging: Reviewed.   Follow Up: RTC prn following surgery with Dr Felix Suarez,    Interventional Pain Medicine / Anesthesiology    Disclaimer: This note was partly generated using dictation software which may occasionally result in transcription errors.

## 2023-06-21 DIAGNOSIS — R42 DIZZINESS: ICD-10-CM

## 2023-06-22 RX ORDER — MECLIZINE HCL 12.5 MG 12.5 MG/1
12.5 TABLET ORAL 3 TIMES DAILY PRN
Qty: 180 TABLET | Refills: 0 | Status: SHIPPED | OUTPATIENT
Start: 2023-06-22 | End: 2023-08-08 | Stop reason: SDUPTHER

## 2023-06-22 NOTE — TELEPHONE ENCOUNTER
No care due was identified.  [facility] Embedded Care Due Messages. Reference number: [id number].   6/21/2023 7:42:38 PM CDT

## 2023-06-29 ENCOUNTER — CLINICAL SUPPORT (OUTPATIENT)
Dept: LAB | Facility: HOSPITAL | Age: 63
End: 2023-06-29
Attending: FAMILY MEDICINE
Payer: MEDICARE

## 2023-06-29 ENCOUNTER — TELEPHONE (OUTPATIENT)
Dept: NEUROSURGERY | Facility: CLINIC | Age: 63
End: 2023-06-29
Payer: MEDICARE

## 2023-06-29 DIAGNOSIS — I10 HYPERTENSION, UNSPECIFIED TYPE: ICD-10-CM

## 2023-06-29 DIAGNOSIS — E11.9 TYPE 2 DIABETES MELLITUS WITHOUT COMPLICATION, WITH LONG-TERM CURRENT USE OF INSULIN: ICD-10-CM

## 2023-06-29 DIAGNOSIS — Z01.818 PRE-OP EVALUATION: ICD-10-CM

## 2023-06-29 DIAGNOSIS — Z79.4 TYPE 2 DIABETES MELLITUS WITHOUT COMPLICATION, WITH LONG-TERM CURRENT USE OF INSULIN: ICD-10-CM

## 2023-06-29 LAB
ANION GAP SERPL CALC-SCNC: 8 MMOL/L (ref 8–16)
BUN SERPL-MCNC: 14 MG/DL (ref 8–23)
CALCIUM SERPL-MCNC: 9.4 MG/DL (ref 8.7–10.5)
CHLORIDE SERPL-SCNC: 100 MMOL/L (ref 95–110)
CO2 SERPL-SCNC: 25 MMOL/L (ref 23–29)
CREAT SERPL-MCNC: 1 MG/DL (ref 0.5–1.4)
EST. GFR  (NO RACE VARIABLE): >60 ML/MIN/1.73 M^2
ESTIMATED AVG GLUCOSE: 117 MG/DL (ref 68–131)
GLUCOSE SERPL-MCNC: 70 MG/DL (ref 70–110)
HBA1C MFR BLD: 5.7 % (ref 4–5.6)
POTASSIUM SERPL-SCNC: 3.9 MMOL/L (ref 3.5–5.1)
SODIUM SERPL-SCNC: 133 MMOL/L (ref 136–145)

## 2023-06-29 PROCEDURE — 80048 BASIC METABOLIC PNL TOTAL CA: CPT | Performed by: NURSE PRACTITIONER

## 2023-06-29 PROCEDURE — 93005 ELECTROCARDIOGRAM TRACING: CPT

## 2023-06-29 PROCEDURE — 36415 COLL VENOUS BLD VENIPUNCTURE: CPT | Performed by: NURSE PRACTITIONER

## 2023-06-29 PROCEDURE — 83036 HEMOGLOBIN GLYCOSYLATED A1C: CPT | Performed by: NURSE PRACTITIONER

## 2023-06-29 PROCEDURE — 93010 ELECTROCARDIOGRAM REPORT: CPT | Mod: ,,, | Performed by: INTERNAL MEDICINE

## 2023-06-29 PROCEDURE — 93010 EKG 12-LEAD: ICD-10-PCS | Mod: ,,, | Performed by: INTERNAL MEDICINE

## 2023-06-29 NOTE — TELEPHONE ENCOUNTER
Returned pt's call, pt stated that he would like to know what location and arrival time of his surgery with . I stated to pt that he will have his surgery in [place] and we do not give out surgery times in office. I stated someone from the surgery department will call the day before his surgery to let him know an arrival time. Pt stated that will not work for him he needs to know now to set up transportation. I stated that we will not be able to let him know now due to his surgery being a month away and anything can change in that time, so he should tell his transportation to be on stand by all day. Pt voiced understanding

## 2023-07-05 DIAGNOSIS — R94.31 ABNORMAL EKG: Primary | ICD-10-CM

## 2023-07-06 ENCOUNTER — TELEPHONE (OUTPATIENT)
Dept: NEUROSURGERY | Facility: CLINIC | Age: 63
End: 2023-07-06
Payer: MEDICARE

## 2023-07-07 ENCOUNTER — TELEPHONE (OUTPATIENT)
Dept: FAMILY MEDICINE | Facility: CLINIC | Age: 63
End: 2023-07-07
Payer: MEDICARE

## 2023-07-07 ENCOUNTER — TELEPHONE (OUTPATIENT)
Dept: PREADMISSION TESTING | Facility: HOSPITAL | Age: 63
End: 2023-07-07
Payer: MEDICARE

## 2023-07-07 ENCOUNTER — LAB VISIT (OUTPATIENT)
Dept: LAB | Facility: HOSPITAL | Age: 63
End: 2023-07-07
Attending: FAMILY MEDICINE
Payer: MEDICARE

## 2023-07-07 ENCOUNTER — HOSPITAL ENCOUNTER (OUTPATIENT)
Dept: PREADMISSION TESTING | Facility: HOSPITAL | Age: 63
Discharge: HOME | End: 2023-07-07
Attending: NURSE PRACTITIONER
Payer: MEDICARE

## 2023-07-07 ENCOUNTER — OFFICE VISIT (OUTPATIENT)
Dept: FAMILY MEDICINE | Facility: CLINIC | Age: 63
End: 2023-07-07
Payer: MEDICARE

## 2023-07-07 VITALS
BODY MASS INDEX: 31 KG/M2 | SYSTOLIC BLOOD PRESSURE: 130 MMHG | DIASTOLIC BLOOD PRESSURE: 88 MMHG | HEIGHT: 73 IN | WEIGHT: 233.94 LBS

## 2023-07-07 DIAGNOSIS — R94.31 ABNORMAL EKG: ICD-10-CM

## 2023-07-07 DIAGNOSIS — Z01.818 PREOP EXAMINATION: ICD-10-CM

## 2023-07-07 DIAGNOSIS — K21.9 GASTROESOPHAGEAL REFLUX DISEASE, UNSPECIFIED WHETHER ESOPHAGITIS PRESENT: ICD-10-CM

## 2023-07-07 DIAGNOSIS — K92.1 HEMATOCHEZIA: ICD-10-CM

## 2023-07-07 DIAGNOSIS — Z01.818 PREOP EXAMINATION: Primary | ICD-10-CM

## 2023-07-07 DIAGNOSIS — N40.1 BENIGN PROSTATIC HYPERPLASIA WITH URINARY OBSTRUCTION: ICD-10-CM

## 2023-07-07 DIAGNOSIS — N13.8 BENIGN PROSTATIC HYPERPLASIA WITH URINARY OBSTRUCTION: ICD-10-CM

## 2023-07-07 DIAGNOSIS — F51.01 PRIMARY INSOMNIA: ICD-10-CM

## 2023-07-07 LAB
BILIRUB UR QL STRIP: NEGATIVE
CLARITY UR REFRACT.AUTO: CLEAR
COLOR UR AUTO: YELLOW
GLUCOSE UR QL STRIP: NEGATIVE
HGB UR QL STRIP: NEGATIVE
KETONES UR QL STRIP: NEGATIVE
LEUKOCYTE ESTERASE UR QL STRIP: ABNORMAL
MICROSCOPIC COMMENT: NORMAL
NITRITE UR QL STRIP: NEGATIVE
PH UR STRIP: 7 [PH] (ref 5–8)
PROT UR QL STRIP: NEGATIVE
RBC #/AREA URNS AUTO: 0 /HPF (ref 0–4)
SP GR UR STRIP: 1 (ref 1–1.03)
URN SPEC COLLECT METH UR: ABNORMAL
WBC #/AREA URNS AUTO: 3 /HPF (ref 0–5)

## 2023-07-07 PROCEDURE — 3079F PR MOST RECENT DIASTOLIC BLOOD PRESSURE 80-89 MM HG: ICD-10-PCS | Mod: CPTII,S$GLB,, | Performed by: FAMILY MEDICINE

## 2023-07-07 PROCEDURE — 99999 PR PBB SHADOW E&M-EST. PATIENT-LVL V: CPT | Mod: PBBFAC,,, | Performed by: FAMILY MEDICINE

## 2023-07-07 PROCEDURE — 99215 OFFICE O/P EST HI 40 MIN: CPT | Mod: S$GLB,,, | Performed by: FAMILY MEDICINE

## 2023-07-07 PROCEDURE — 81001 URINALYSIS AUTO W/SCOPE: CPT | Performed by: FAMILY MEDICINE

## 2023-07-07 PROCEDURE — 1160F RVW MEDS BY RX/DR IN RCRD: CPT | Mod: CPTII,S$GLB,, | Performed by: FAMILY MEDICINE

## 2023-07-07 PROCEDURE — 3044F PR MOST RECENT HEMOGLOBIN A1C LEVEL <7.0%: ICD-10-PCS | Mod: CPTII,S$GLB,, | Performed by: FAMILY MEDICINE

## 2023-07-07 PROCEDURE — 3061F NEG MICROALBUMINURIA REV: CPT | Mod: CPTII,S$GLB,, | Performed by: FAMILY MEDICINE

## 2023-07-07 PROCEDURE — 3079F DIAST BP 80-89 MM HG: CPT | Mod: CPTII,S$GLB,, | Performed by: FAMILY MEDICINE

## 2023-07-07 PROCEDURE — 1160F PR REVIEW ALL MEDS BY PRESCRIBER/CLIN PHARMACIST DOCUMENTED: ICD-10-PCS | Mod: CPTII,S$GLB,, | Performed by: FAMILY MEDICINE

## 2023-07-07 PROCEDURE — 1159F PR MEDICATION LIST DOCUMENTED IN MEDICAL RECORD: ICD-10-PCS | Mod: CPTII,S$GLB,, | Performed by: FAMILY MEDICINE

## 2023-07-07 PROCEDURE — 3066F PR DOCUMENTATION OF TREATMENT FOR NEPHROPATHY: ICD-10-PCS | Mod: CPTII,S$GLB,, | Performed by: FAMILY MEDICINE

## 2023-07-07 PROCEDURE — 1159F MED LIST DOCD IN RCRD: CPT | Mod: CPTII,S$GLB,, | Performed by: FAMILY MEDICINE

## 2023-07-07 PROCEDURE — 99215 PR OFFICE/OUTPT VISIT, EST, LEVL V, 40-54 MIN: ICD-10-PCS | Mod: S$GLB,,, | Performed by: FAMILY MEDICINE

## 2023-07-07 PROCEDURE — 3008F BODY MASS INDEX DOCD: CPT | Mod: CPTII,S$GLB,, | Performed by: FAMILY MEDICINE

## 2023-07-07 PROCEDURE — 99999 PR PBB SHADOW E&M-EST. PATIENT-LVL V: ICD-10-PCS | Mod: PBBFAC,,, | Performed by: FAMILY MEDICINE

## 2023-07-07 PROCEDURE — 3044F HG A1C LEVEL LT 7.0%: CPT | Mod: CPTII,S$GLB,, | Performed by: FAMILY MEDICINE

## 2023-07-07 PROCEDURE — 3061F PR NEG MICROALBUMINURIA RESULT DOCUMENTED/REVIEW: ICD-10-PCS | Mod: CPTII,S$GLB,, | Performed by: FAMILY MEDICINE

## 2023-07-07 PROCEDURE — 3008F PR BODY MASS INDEX (BMI) DOCUMENTED: ICD-10-PCS | Mod: CPTII,S$GLB,, | Performed by: FAMILY MEDICINE

## 2023-07-07 PROCEDURE — 3075F SYST BP GE 130 - 139MM HG: CPT | Mod: CPTII,S$GLB,, | Performed by: FAMILY MEDICINE

## 2023-07-07 PROCEDURE — 3075F PR MOST RECENT SYSTOLIC BLOOD PRESS GE 130-139MM HG: ICD-10-PCS | Mod: CPTII,S$GLB,, | Performed by: FAMILY MEDICINE

## 2023-07-07 PROCEDURE — 3066F NEPHROPATHY DOC TX: CPT | Mod: CPTII,S$GLB,, | Performed by: FAMILY MEDICINE

## 2023-07-07 RX ORDER — TAMSULOSIN HYDROCHLORIDE 0.4 MG/1
0.4 CAPSULE ORAL NIGHTLY
Qty: 90 CAPSULE | Refills: 0 | Status: SHIPPED | OUTPATIENT
Start: 2023-07-07 | End: 2023-09-30 | Stop reason: SDUPTHER

## 2023-07-07 RX ORDER — OMEPRAZOLE 20 MG/1
20 CAPSULE, DELAYED RELEASE ORAL
Qty: 90 CAPSULE | Refills: 3 | Status: SHIPPED | OUTPATIENT
Start: 2023-07-07 | End: 2024-07-06

## 2023-07-07 RX ORDER — HYDROXYZINE PAMOATE 50 MG/1
CAPSULE ORAL
Qty: 90 CAPSULE | Refills: 3 | Status: SHIPPED | OUTPATIENT
Start: 2023-07-07

## 2023-07-07 NOTE — DISCHARGE INSTRUCTIONS
Your surgery is scheduled for 7/14/23.    Please report to [facility] on the 1st Floor at 0630 a.m.    THIS TIME IS SUBJECT TO CHANGE.  YOU WILL RECEIVE A PHONE CALL THE DAY BEFORE SURGERY BY 3:30 PM TO CONFIRM YOUR TIME OF ARRIVAL.  IF YOU HAVE NOT RECEIVED A PHONE CALL BY 3:30 PM THE DAY BEFORE YOUR SURGERY PLEASE CALL [phone]     INSTRUCTIONS IMPORTANT!!!  ¨ Do not eat or drink after 12 midnight-including water, candy, gum, & mints. OK to brush teeth.      ____  Proceed to [facility] on *** for additional testing.        ____  Do not wear makeup, including mascara.  ____  No powder, lotions or creams to surgical area.  ____  Please remove all jewelry, including piercings and leave at home.  ____  No money or valuables needed. Please leave at home.  ____  Please bring any documents given by your doctor.  ____  If going home the same day, arrange for a ride home. You will not be able to             drive if Anesthesia was used.  ____  Children under 18 years require a parent / guardian present the entire time             they are in surgery / recovery.  ____  Wear loose fitting clothing. Allow for dressings, bandages.  ____  Stop Aspirin, Ibuprofen, Motrin, Aleve, Goody's/BC powders, Excedrine and Naproxen (NSAIDS) at least 3-5 days before surgery, unless otherwise instructed by your doctor, or the nurse.   You MAY use Tylenol/acetaminophen until day of surgery.  ____  Wash the surgical area with Hibiclens or Dial Antibacterial bar soap the night before surgery, and again the             morning of surgery.  Be sure to rinse hibiclens or Dial Antibacterial bar soap off completely (if instructed by   nurse).  ____  If you take diabetic medication including Metformin, Glimepiride, Glipizide, Glyburide, Byetta, Januvia, Actos, do not take am of surgery unless instructed by Doctor.  ____ Hold Invokana, Farxiga, and Jardiance for 3 days prior to surgery.   ____  Call MD for temperature  above 101 degrees or any other signs of infection such as Urinary (bladder) infection, Upper respiratory infection, skin boils, etc.  ____ Stop taking any Fish Oil supplement or any Vitamins that contain Vitamin E at least 5 days prior to surgery.  ____ Do Not wear your contact lenses the day of your procedure.  You may wear your glasses.      ____Do not shave surgical site for 3 days prior to surgery.  ____ Practice Good hand washing before, during, and after procedure.      I have read or had read and explained to me, and understand the above information.  Additional comments or instructions:  For additional questions call [phone]      ANESTHESIA SIDE EFFECTS  -For the first 24 hours after surgery:  Do not drive, use heavy equipment, make important decisions, or drink alcohol  -It is normal to feel sleepy for several hours.  Rest until you are more awake.  -Have someone stay with you, if needed.  They can watch for problems and help keep you safe.  -Some possible post anesthesia side effects include: nausea and vomiting, sore throat and hoarseness, sleepiness, and dizziness.        Pre-Op Bathing Instructions    Before surgery, you can play an important role in your own health.    Because skin is not sterile, we need to be sure that your skin is as free of germs as possible. By following the instructions below, you can reduce the number of germs on your skin before surgery.    IMPORTANT: You will need to shower with a special soap called Hibiclens*, available at any pharmacy.  If you are allergic to Chlorhexidine (the antiseptic in Hibiclens), use an antibacterial soap such as Dial Soap for your preoperative shower.  You will shower with Hibiclens both the night before your surgery and the morning of your surgery.  Do not use Hibiclens on the head, face or genitals to avoid injury to those areas.    STEP #1: THE NIGHT BEFORE YOUR SURGERY     Do not shave the area of your body where your surgery will be  performed.  Shower and wash your hair and body as usual with your normal soap and shampoo.  Rinse your hair and body thoroughly after you shower to remove all soap residue.  With your hand, apply one packet of Hibiclens soap to the surgical site.   Wash the site gently for five (5) minutes. Do not scrub your skin too hard.   Do not wash with your regular soap after Hibiclens is used.  Rinse your body thoroughly.  Pat yourself dry with a clean, soft towel.  Do not use lotion, cream, or powder.  Wear clean clothes.    STEP #2: THE MORNING OF YOUR SURGERY     Repeat Step #1.    * Not to be used by people allergic to Chlorhexidine.

## 2023-07-07 NOTE — PROGRESS NOTES
Subjective     Patient ID: Fabiano Malik Jr. is a 62 y.o. male.    Chief Complaint: Pre-op Exam    62 years old male came to the clinic for preoperative evaluation for neck surgery.  Last EKG was slightly abnormal.  No recent follow-up with cardiologist .  Patient also reports 1 episode of rectal bleeding this morning.  Patient with problems with urination sometimes.  Last PSA was normal.    Review of Systems   Constitutional: Negative.    HENT: Negative.     Eyes: Negative.    Respiratory: Negative.     Cardiovascular: Negative.    Gastrointestinal:  Positive for blood in stool.   Genitourinary:  Positive for difficulty urinating. Negative for frequency and urgency.   Musculoskeletal: Negative.    Integumentary:  Negative.   Neurological: Negative.    Psychiatric/Behavioral: Negative.          Objective     Physical Exam  Vitals and nursing note reviewed.   Constitutional:       General: He is not in acute distress.     Appearance: He is well-developed. He is not diaphoretic.   HENT:      Head: Normocephalic and atraumatic.      Right Ear: External ear normal.      Left Ear: External ear normal.      Nose: Nose normal.      Mouth/Throat:      Pharynx: No oropharyngeal exudate.   Eyes:      General: No scleral icterus.        Right eye: No discharge.         Left eye: No discharge.      Conjunctiva/sclera: Conjunctivae normal.      Pupils: Pupils are equal, round, and reactive to light.   Neck:      Thyroid: No thyromegaly.      Vascular: No JVD.      Trachea: No tracheal deviation.   Cardiovascular:      Rate and Rhythm: Normal rate and regular rhythm.      Heart sounds: Normal heart sounds. No murmur heard.    No friction rub. No gallop.   Pulmonary:      Effort: Pulmonary effort is normal. No respiratory distress.      Breath sounds: Normal breath sounds. No stridor. No wheezing or rales.   Chest:      Chest wall: No tenderness.   Abdominal:      General: Bowel sounds are normal. There is no distension.       Palpations: Abdomen is soft. There is no mass.      Tenderness: There is no abdominal tenderness. There is no guarding or rebound.   Musculoskeletal:         General: No tenderness. Normal range of motion.      Cervical back: Normal range of motion and neck supple.   Lymphadenopathy:      Cervical: No cervical adenopathy.   Skin:     General: Skin is warm and dry.      Coloration: Skin is not pale.      Findings: No erythema or rash.   Neurological:      Mental Status: He is alert and oriented to person, place, and time.      Cranial Nerves: No cranial nerve deficit.      Motor: No abnormal muscle tone.      Coordination: Coordination normal.      Deep Tendon Reflexes: Reflexes are normal and symmetric. Reflexes normal.   Psychiatric:         Mood and Affect: Mood is anxious and depressed.         Behavior: Behavior normal.         Thought Content: Thought content normal.         Judgment: Judgment normal.          Assessment and Plan     1. Preop examination  -     CBC Auto Differential; Future; Expected date: 07/07/2023  -     Urinalysis; Future  -     X-Ray Chest PA And Lateral; Future; Expected date: 07/07/2023  -     Ambulatory referral/consult to Cardiology; Future; Expected date: 07/14/2023    2. Abnormal EKG  -     Ambulatory referral/consult to Cardiology; Future; Expected date: 07/14/2023    3. Primary insomnia  -     hydrOXYzine pamoate (VISTARIL) 50 MG Cap; TAKE 1 CAPSULE (50 MG) BY MOUTH NIGHTLY AS NEEDED FOR INSOMNIA  Dispense: 90 capsule; Refill: 3    4. Gastroesophageal reflux disease, unspecified whether esophagitis present  -     omeprazole (PRILOSEC) 20 MG capsule; Take 1 capsule (20 mg total) by mouth before breakfast.  Dispense: 90 capsule; Refill: 3      Trial with Flomax.  Follow-up with the urologist .  Hematochezia colorectal evaluation was ordered.           Follow-up in 6 months .

## 2023-07-07 NOTE — TELEPHONE ENCOUNTER
----- Message from Lucien Fleming MD sent at 7/5/2023 11:07 AM CDT -----  Regarding: Abnormal EKG  Cardiology follow-up was ordered.  Please notify the patient with appointment.  ----- Message -----  From: Haven Lee DNP  Sent: 7/5/2023   9:45 AM CDT  To: Lucien Fleming MD    EKG completed as a part of pre admit testing. Please follow up with your PCP or cardiologist for further evaluation. Evaluation should be prior to surgery if you are experiencing chest pain or shortness of breath.

## 2023-07-09 DIAGNOSIS — E11.65 TYPE 2 DIABETES MELLITUS WITH HYPERGLYCEMIA: ICD-10-CM

## 2023-07-09 NOTE — TELEPHONE ENCOUNTER
No care due was identified.  [facility] Embedded Care Due Messages. Reference number: [id number].   7/09/2023 2:51:37 PM CDT

## 2023-07-10 ENCOUNTER — HOSPITAL ENCOUNTER (OUTPATIENT)
Dept: RADIOLOGY | Facility: HOSPITAL | Age: 63
Discharge: HOME | End: 2023-07-10
Attending: FAMILY MEDICINE
Payer: MEDICARE

## 2023-07-10 ENCOUNTER — ANESTHESIA EVENT (OUTPATIENT)
Dept: SURGERY | Facility: HOSPITAL | Age: 63
DRG: 471 | End: 2023-07-10
Payer: MEDICARE

## 2023-07-10 DIAGNOSIS — Z01.818 PREOP EXAMINATION: ICD-10-CM

## 2023-07-10 PROCEDURE — 71046 XR CHEST PA AND LATERAL: ICD-10-PCS | Mod: 26,,, | Performed by: RADIOLOGY

## 2023-07-10 PROCEDURE — 71046 X-RAY EXAM CHEST 2 VIEWS: CPT | Mod: 26,,, | Performed by: RADIOLOGY

## 2023-07-10 PROCEDURE — 71046 X-RAY EXAM CHEST 2 VIEWS: CPT | Mod: TC,FY

## 2023-07-10 RX ORDER — DULAGLUTIDE 0.75 MG/.5ML
0.75 INJECTION, SOLUTION SUBCUTANEOUS
Qty: 12 PEN | Refills: 1 | Status: SHIPPED | OUTPATIENT
Start: 2023-07-10 | End: 2023-12-19 | Stop reason: SDUPTHER

## 2023-07-10 NOTE — TELEPHONE ENCOUNTER
Refill Routing Note   Medication(s) are not appropriate for processing by [facility] for the following reason(s):      Drug-disease interaction    [facility] action(s):  Defer Care Due:  None identified   Medication Therapy Plan: Drug-Disease: TRULICITY and Dehydration    Pharmacist review requested: Yes     Appointments  past 12m or future 3m with PCP    Date Provider   Last Visit   7/7/2023 Lucien Fleming MD   Next Visit   Visit date not found Lucien Fleming MD   ED visits in past 90 days: 0        Note composed:1:12 PM 07/10/2023

## 2023-07-11 ENCOUNTER — CLINICAL SUPPORT (OUTPATIENT)
Dept: REHABILITATION | Facility: HOSPITAL | Age: 63
End: 2023-07-11
Attending: STUDENT IN AN ORGANIZED HEALTH CARE EDUCATION/TRAINING PROGRAM
Payer: MEDICARE

## 2023-07-11 DIAGNOSIS — G89.29 CHRONIC FOOT PAIN, LEFT: ICD-10-CM

## 2023-07-11 DIAGNOSIS — R26.89 IMPAIRED GAIT AND MOBILITY: ICD-10-CM

## 2023-07-11 DIAGNOSIS — M79.672 CHRONIC FOOT PAIN, LEFT: ICD-10-CM

## 2023-07-11 PROBLEM — M25.531 WRIST PAIN, RIGHT: Status: RESOLVED | Noted: 2023-01-25 | Resolved: 2023-07-11

## 2023-07-11 PROBLEM — M25.60 STIFFNESS IN JOINT: Status: RESOLVED | Noted: 2023-01-25 | Resolved: 2023-07-11

## 2023-07-11 PROBLEM — M25.521 RIGHT ELBOW PAIN: Status: RESOLVED | Noted: 2023-01-25 | Resolved: 2023-07-11

## 2023-07-11 PROCEDURE — 97162 PT EVAL MOD COMPLEX 30 MIN: CPT | Mod: PN

## 2023-07-11 PROCEDURE — 97110 THERAPEUTIC EXERCISES: CPT | Mod: PN
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
## 2023-07-11 NOTE — TELEPHONE ENCOUNTER
Refill Routing Note   Medication(s) are not appropriate for processing by [facility] for the following reason(s):      Drug-disease interaction: dehydration    [facility] action(s):  Defer Care Due:  None identified        Pharmacist review requested: Yes     Appointments  past 12m or future 3m with PCP    Date Provider   Last Visit   7/7/2023 Lucien Fleming MD   Next Visit   Visit date not found Lucien Fleming MD   ED visits in past 90 days: 0        Note composed:9:26 PM 07/10/2023

## 2023-07-14 ENCOUNTER — OFFICE VISIT (OUTPATIENT)
Dept: CARDIOLOGY | Facility: CLINIC | Age: 63
End: 2023-07-14
Payer: MEDICARE

## 2023-07-14 VITALS
DIASTOLIC BLOOD PRESSURE: 82 MMHG | OXYGEN SATURATION: 98 % | BODY MASS INDEX: 32.04 KG/M2 | WEIGHT: 241.75 LBS | HEIGHT: 73 IN | HEART RATE: 92 BPM | SYSTOLIC BLOOD PRESSURE: 147 MMHG

## 2023-07-14 DIAGNOSIS — R94.31 ABNORMAL EKG: ICD-10-CM

## 2023-07-14 DIAGNOSIS — Z01.818 PREOP EXAMINATION: ICD-10-CM

## 2023-07-14 DIAGNOSIS — R94.31 EKG ABNORMALITIES: Primary | ICD-10-CM

## 2023-07-14 DIAGNOSIS — I70.0 AORTIC CALCIFICATION: ICD-10-CM

## 2023-07-14 DIAGNOSIS — E11.59 HYPERTENSION ASSOCIATED WITH DIABETES: ICD-10-CM

## 2023-07-14 DIAGNOSIS — E11.51 TYPE 2 DIABETES MELLITUS WITH DIABETIC PERIPHERAL ANGIOPATHY WITHOUT GANGRENE, WITHOUT LONG-TERM CURRENT USE OF INSULIN: ICD-10-CM

## 2023-07-14 DIAGNOSIS — I15.2 HYPERTENSION ASSOCIATED WITH DIABETES: ICD-10-CM

## 2023-07-14 PROBLEM — G89.29 CHRONIC FOOT PAIN, LEFT: Status: ACTIVE | Noted: 2023-07-14

## 2023-07-14 PROBLEM — I25.10 ATHEROSCLEROSIS OF NATIVE CORONARY ARTERY OF NATIVE HEART WITHOUT ANGINA PECTORIS: Status: RESOLVED | Noted: 2019-04-05 | Resolved: 2023-07-14

## 2023-07-14 PROBLEM — M79.672 CHRONIC FOOT PAIN, LEFT: Status: ACTIVE | Noted: 2023-07-14

## 2023-07-14 PROCEDURE — 1159F MED LIST DOCD IN RCRD: CPT | Mod: CPTII,S$GLB,, | Performed by: INTERNAL MEDICINE

## 2023-07-14 PROCEDURE — 1160F PR REVIEW ALL MEDS BY PRESCRIBER/CLIN PHARMACIST DOCUMENTED: ICD-10-PCS | Mod: CPTII,S$GLB,, | Performed by: INTERNAL MEDICINE

## 2023-07-14 PROCEDURE — 3044F HG A1C LEVEL LT 7.0%: CPT | Mod: CPTII,S$GLB,, | Performed by: INTERNAL MEDICINE

## 2023-07-14 PROCEDURE — 99999 PR PBB SHADOW E&M-EST. PATIENT-LVL V: ICD-10-PCS | Mod: PBBFAC,,, | Performed by: INTERNAL MEDICINE

## 2023-07-14 PROCEDURE — 3008F PR BODY MASS INDEX (BMI) DOCUMENTED: ICD-10-PCS | Mod: CPTII,S$GLB,, | Performed by: INTERNAL MEDICINE

## 2023-07-14 PROCEDURE — 3077F PR MOST RECENT SYSTOLIC BLOOD PRESSURE >= 140 MM HG: ICD-10-PCS | Mod: CPTII,S$GLB,, | Performed by: INTERNAL MEDICINE

## 2023-07-14 PROCEDURE — 3079F PR MOST RECENT DIASTOLIC BLOOD PRESSURE 80-89 MM HG: ICD-10-PCS | Mod: CPTII,S$GLB,, | Performed by: INTERNAL MEDICINE

## 2023-07-14 PROCEDURE — 3077F SYST BP >= 140 MM HG: CPT | Mod: CPTII,S$GLB,, | Performed by: INTERNAL MEDICINE

## 2023-07-14 PROCEDURE — 3044F PR MOST RECENT HEMOGLOBIN A1C LEVEL <7.0%: ICD-10-PCS | Mod: CPTII,S$GLB,, | Performed by: INTERNAL MEDICINE

## 2023-07-14 PROCEDURE — 99214 OFFICE O/P EST MOD 30 MIN: CPT | Mod: S$GLB,,, | Performed by: INTERNAL MEDICINE

## 2023-07-14 PROCEDURE — 1159F PR MEDICATION LIST DOCUMENTED IN MEDICAL RECORD: ICD-10-PCS | Mod: CPTII,S$GLB,, | Performed by: INTERNAL MEDICINE

## 2023-07-14 PROCEDURE — 3079F DIAST BP 80-89 MM HG: CPT | Mod: CPTII,S$GLB,, | Performed by: INTERNAL MEDICINE

## 2023-07-14 PROCEDURE — 99999 PR PBB SHADOW E&M-EST. PATIENT-LVL V: CPT | Mod: PBBFAC,,, | Performed by: INTERNAL MEDICINE

## 2023-07-14 PROCEDURE — 3061F NEG MICROALBUMINURIA REV: CPT | Mod: CPTII,S$GLB,, | Performed by: INTERNAL MEDICINE

## 2023-07-14 PROCEDURE — 3008F BODY MASS INDEX DOCD: CPT | Mod: CPTII,S$GLB,, | Performed by: INTERNAL MEDICINE

## 2023-07-14 PROCEDURE — 1160F RVW MEDS BY RX/DR IN RCRD: CPT | Mod: CPTII,S$GLB,, | Performed by: INTERNAL MEDICINE

## 2023-07-14 PROCEDURE — 99214 PR OFFICE/OUTPT VISIT, EST, LEVL IV, 30-39 MIN: ICD-10-PCS | Mod: S$GLB,,, | Performed by: INTERNAL MEDICINE

## 2023-07-14 PROCEDURE — 3066F NEPHROPATHY DOC TX: CPT | Mod: CPTII,S$GLB,, | Performed by: INTERNAL MEDICINE

## 2023-07-14 PROCEDURE — 3066F PR DOCUMENTATION OF TREATMENT FOR NEPHROPATHY: ICD-10-PCS | Mod: CPTII,S$GLB,, | Performed by: INTERNAL MEDICINE

## 2023-07-14 PROCEDURE — 3061F PR NEG MICROALBUMINURIA RESULT DOCUMENTED/REVIEW: ICD-10-PCS | Mod: CPTII,S$GLB,, | Performed by: INTERNAL MEDICINE

## 2023-07-14 NOTE — PROGRESS NOTES
"Subjective:    Patient ID:  Fabiano Malik Jr. is a 62 y.o. male who presents for follow-up of Abnormal ECG      HPI    61 y/o patient former pt of Dr Magana. He has a hx of HTN, HLD, lumbar radiculopathy, CKD, anemia, schizophrenia, who presents from PCP for eval of abnormal ECG. ECG in clinic today NSR with an inferior infarct. On review of previous ECG's, has had these changes for years and 1/2019 had normal nuclear SPECT. Has chronic syncopal episodes about twice monthly for years now and states that he has had an extensive w/u and was told it may be due to his heart. Syncope is with prodrome, episode lasts mins, and is groggy after he "comes out of it." No seizure like movements and no loss of bladder/bowel function. Denies CP, orthopnea, PND, palps. Non smoker and no EtOH.     7/14/2023:  Pt referred back for "abnormal ECG." ECG read with "anterior infarct." I personally reviewed ECG and no evidence of anterior infarct. There may be poor R-wave progression in the precordial leads, however, no infarct or ischemic changes. ECG is normal. Repeat ECG today in clinic, per my interpretation is also normal. No active cardiac complaints and previously normal 2DE and stress tests. Compliant with meds. Upcoming neck surgery and able to accomplish ADL's (> 4METs) without cardiac symptoms.    Review of Systems   Constitutional: Negative for malaise/fatigue.   HENT:  Negative for congestion.    Eyes:  Negative for blurred vision.   Cardiovascular:  Negative for chest pain, claudication, cyanosis, dyspnea on exertion, irregular heartbeat, leg swelling, near-syncope, orthopnea, palpitations and paroxysmal nocturnal dyspnea.   Respiratory:  Negative for shortness of breath.    Endocrine: Negative for polyuria.   Hematologic/Lymphatic: Negative for bleeding problem.   Skin:  Negative for itching and rash.   Musculoskeletal:  Positive for back pain, joint pain and muscle weakness. Negative for joint swelling and muscle " cramps.   Gastrointestinal:  Negative for abdominal pain, hematemesis, hematochezia, melena, nausea and vomiting.   Genitourinary:  Negative for dysuria and hematuria.   Neurological:  Negative for dizziness, focal weakness, headaches, light-headedness, loss of balance and weakness.   Psychiatric/Behavioral:  Negative for depression. The patient is not nervous/anxious.       Objective:    Physical Exam  Constitutional:       Appearance: He is well-developed.   HENT:      Head: Normocephalic and atraumatic.   Neck:      Vascular: No JVD.   Cardiovascular:      Rate and Rhythm: Normal rate and regular rhythm.      Pulses:           Carotid pulses are 2+ on the right side and 2+ on the left side.       Radial pulses are 2+ on the right side and 2+ on the left side.        Femoral pulses are 2+ on the right side and 2+ on the left side.     Heart sounds: Normal heart sounds.   Pulmonary:      Effort: Pulmonary effort is normal.      Breath sounds: Normal breath sounds.   Abdominal:      General: Bowel sounds are normal.      Palpations: Abdomen is soft.   Musculoskeletal:      Cervical back: Neck supple.   Skin:     General: Skin is warm and dry.   Neurological:      Mental Status: He is alert and oriented to person, place, and time.   Psychiatric:         Behavior: Behavior normal.         Thought Content: Thought content normal.         Assessment:       1. EKG abnormalities    2. Preop examination    3. Abnormal EKG    4. Aortic calcification    5. Hypertension associated with diabetes    6. Type 2 diabetes mellitus with diabetic peripheral angiopathy without gangrene, without long-term current use of insulin      63 y/o pt with hx and presentation as above. Doing well from a cardiac perspective and compensated from a HF perspective. ECG is unchanged, no acute ischemic changes and no significant cardiac symptoms. Reassuring is normal past stress test. No further evaluation. Discussed the etiology, evaluation, and  management of syncope, HTN, HLD, lumbar radiculopathy. Discussed the importance of med compliance, heart healthy diet, and regular exercise.      Plan:       -The pt is at an acceptable risk from a cardiac perspective for upcoming non cardiac procedure  -f/u PRN

## 2023-07-18 PROBLEM — R26.89 IMPAIRED GAIT AND MOBILITY: Status: ACTIVE | Noted: 2023-07-18

## 2023-07-19 ENCOUNTER — OFFICE VISIT (OUTPATIENT)
Dept: UROLOGY | Facility: CLINIC | Age: 63
End: 2023-07-19
Payer: MEDICARE

## 2023-07-19 VITALS
BODY MASS INDEX: 31.72 KG/M2 | DIASTOLIC BLOOD PRESSURE: 93 MMHG | SYSTOLIC BLOOD PRESSURE: 161 MMHG | HEART RATE: 89 BPM | WEIGHT: 239.31 LBS | HEIGHT: 73 IN

## 2023-07-19 DIAGNOSIS — N40.1 BENIGN PROSTATIC HYPERPLASIA WITH URINARY OBSTRUCTION: ICD-10-CM

## 2023-07-19 DIAGNOSIS — N13.8 BENIGN PROSTATIC HYPERPLASIA WITH URINARY OBSTRUCTION: ICD-10-CM

## 2023-07-19 PROCEDURE — 3066F NEPHROPATHY DOC TX: CPT | Mod: CPTII,S$GLB,, | Performed by: UROLOGY

## 2023-07-19 PROCEDURE — 99999 PR PBB SHADOW E&M-EST. PATIENT-LVL V: ICD-10-PCS | Mod: PBBFAC,,, | Performed by: UROLOGY

## 2023-07-19 PROCEDURE — 99204 PR OFFICE/OUTPT VISIT, NEW, LEVL IV, 45-59 MIN: ICD-10-PCS | Mod: S$GLB,,, | Performed by: UROLOGY

## 2023-07-19 PROCEDURE — 3080F PR MOST RECENT DIASTOLIC BLOOD PRESSURE >= 90 MM HG: ICD-10-PCS | Mod: CPTII,S$GLB,, | Performed by: UROLOGY

## 2023-07-19 PROCEDURE — 3077F PR MOST RECENT SYSTOLIC BLOOD PRESSURE >= 140 MM HG: ICD-10-PCS | Mod: CPTII,S$GLB,, | Performed by: UROLOGY

## 2023-07-19 PROCEDURE — 99204 OFFICE O/P NEW MOD 45 MIN: CPT | Mod: S$GLB,,, | Performed by: UROLOGY

## 2023-07-19 PROCEDURE — 3008F PR BODY MASS INDEX (BMI) DOCUMENTED: ICD-10-PCS | Mod: CPTII,S$GLB,, | Performed by: UROLOGY

## 2023-07-19 PROCEDURE — 3080F DIAST BP >= 90 MM HG: CPT | Mod: CPTII,S$GLB,, | Performed by: UROLOGY

## 2023-07-19 PROCEDURE — 3008F BODY MASS INDEX DOCD: CPT | Mod: CPTII,S$GLB,, | Performed by: UROLOGY

## 2023-07-19 PROCEDURE — 99999 PR PBB SHADOW E&M-EST. PATIENT-LVL V: CPT | Mod: PBBFAC,,, | Performed by: UROLOGY

## 2023-07-19 PROCEDURE — 3061F PR NEG MICROALBUMINURIA RESULT DOCUMENTED/REVIEW: ICD-10-PCS | Mod: CPTII,S$GLB,, | Performed by: UROLOGY

## 2023-07-19 PROCEDURE — 1159F PR MEDICATION LIST DOCUMENTED IN MEDICAL RECORD: ICD-10-PCS | Mod: CPTII,S$GLB,, | Performed by: UROLOGY

## 2023-07-19 PROCEDURE — 3044F PR MOST RECENT HEMOGLOBIN A1C LEVEL <7.0%: ICD-10-PCS | Mod: CPTII,S$GLB,, | Performed by: UROLOGY

## 2023-07-19 PROCEDURE — 3066F PR DOCUMENTATION OF TREATMENT FOR NEPHROPATHY: ICD-10-PCS | Mod: CPTII,S$GLB,, | Performed by: UROLOGY

## 2023-07-19 PROCEDURE — 1159F MED LIST DOCD IN RCRD: CPT | Mod: CPTII,S$GLB,, | Performed by: UROLOGY

## 2023-07-19 PROCEDURE — 3061F NEG MICROALBUMINURIA REV: CPT | Mod: CPTII,S$GLB,, | Performed by: UROLOGY

## 2023-07-19 PROCEDURE — 3044F HG A1C LEVEL LT 7.0%: CPT | Mod: CPTII,S$GLB,, | Performed by: UROLOGY

## 2023-07-19 PROCEDURE — 3077F SYST BP >= 140 MM HG: CPT | Mod: CPTII,S$GLB,, | Performed by: UROLOGY

## 2023-07-19 NOTE — PROGRESS NOTES
[facility] Urology   Clinic Note    SUBJECTIVE:     Chief Complaint   Patient presents with    Benign Prostatic Hypertrophy       Referral from: Lucien Fleming.    History of Present Illness:  Fabiano Malik Jr. is a 62 y.o. male who presents to clinic for lower urinary tract symptoms and BPH.    Patient here for bothersome urgency frequency weak stream and straining to void.  He reports nocturia 3 times per night.  Reports that his symptoms have improved since being on tamsulosin which he was started about 1 week ago.  Reports that he is multiple medical problems in his currently being worked up to undergo neck and back surgery.    Previous medications for BPH include: yes: tamsulosin  AUA Symptom Score: 27/5  Previous prostatic surgery: No    Patient endorses no additional complaints at this time.    Past Medical History:   Diagnosis Date    Chronic back pain greater than 3 months duration     Depression     Diabetes mellitus     Diabetes mellitus, type 2     Hypertension     Schizophrenia        Past Surgical History:   Procedure Laterality Date    APPENDECTOMY      CARPAL TUNNEL RELEASE Right 12/13/2022    Procedure: RELEASE, CARPAL TUNNEL;  Surgeon: Everton Walter Jr., MD;  Location: [facility] OR;  Service: Orthopedics;  Laterality: Right;    COLONOSCOPY N/A 5/31/2018    Procedure: COLONOSCOPY;  Surgeon: Guillaume Hatch MD;  Location: [facility] ENDO;  Service: Endoscopy;  Laterality: N/A;    COLONOSCOPY N/A 11/17/2022    Procedure: COLONOSCOPY;  Surgeon: Guillaume Hatch MD;  Location: [facility] ENDO;  Service: Endoscopy;  Laterality: N/A;    DECOMPRESSION, NERVE, ULNAR Right 12/13/2022    Procedure: DECOMPRESSION, NERVE, ULNAR;  Surgeon: Everton Walter Jr., MD;  Location: [facility] OR;  Service: Orthopedics;  Laterality: Right;    NECK SURGERY      RADIOFREQUENCY ABLATION OF LUMBAR MEDIAL BRANCH NERVE AT SINGLE LEVEL Left 5/29/2018    Procedure: RADIOFREQUENCY THERMOCOAGULATION (RFTC)-NERVE-MEDIAN  "BRANCH-LUMBAR- Left L2-3-4-5;  Surgeon: Donavon Dennis MD;  Location: [facility]IN [facility]GT;  Service: Pain Management;  Laterality: Left;    RADIOFREQUENCY ABLATION OF LUMBAR MEDIAL BRANCH NERVE AT SINGLE LEVEL Right 1/8/2019    Procedure: Radiofrequency Ablation, Nerve, Spinal, Lumbar, Medial Branch, L2,3,4,5;  Surgeon: Alberto Hernandez Jr., MD;  Location: [facility];  Service: Pain Management;  Laterality: Right;  Pt is diabetic    RADIOFREQUENCY ABLATION OF LUMBAR MEDIAL BRANCH NERVE AT SINGLE LEVEL Left 3/12/2019    Procedure: Radiofrequency Ablation, Nerve, Spinal, Lumbar, Medial Branch, Left, L2,3,4,5;  Surgeon: Alberto Hernandez Jr., MD;  Location: [facility];  Service: Pain Management;  Laterality: Left;  Pt will be consented the day of procedure.    TRANSFORAMINAL EPIDURAL INJECTION OF STEROID Right 9/12/2019    Procedure: Injection,steroid,epidural,transforaminal,right,L4-5 and L5-S1;  Surgeon: Alberto Hernandez Jr., MD;  Location: [facility];  Service: Pain Management;  Laterality: Right;  PT IS DIABETIC       Family History   Problem Relation Age of Onset    Alzheimer's disease Mother     Diabetes Mother     Heart defect Father     Cancer Father     Stroke Father     Heart attack Father     Heart defect Sister     Alzheimer's disease Sister        Social History     Tobacco Use    Smoking status: Never    Smokeless tobacco: Never   Substance Use Topics    Alcohol use: Yes     Comment: "couple of beers a day"    Drug use: No       Current Outpatient Medications on File Prior to Visit   Medication Sig Dispense Refill    acetaminophen (TYLENOL) 325 MG tablet Take 2 tablets (650 mg total) by mouth every 6 (six) hours as needed. 120 tablet 3    ALPRAZolam (XANAX) 0.5 MG tablet Take 1 tablet (0.5 mg total) by mouth nightly as needed for Anxiety. 30 tablet 0    azelastine (ASTELIN) 137 mcg (0.1 %) nasal spray 1 spray (137 mcg total) by Nasal route 2 (two) times daily. 30 mL 5    BLOOD PRESSURE CUFF Misc " 1 Units by Misc.(Non-Drug; Combo Route) route once daily. 1 each 0    blood sugar diagnostic (TRUE METRIX GLUCOSE TEST STRIP) Strp use 1 strip to check glucose 2 (two) times a day. 200 strip 6    chlorthalidone (HYGROTEN) 25 MG Tab Take 1 tablet (25 mg total) by mouth once daily. 90 tablet 3    dulaglutide (TRULICITY) 0.75 mg/0.5 mL pen injector Inject 0.75 mg (one pen) into the skin every 7 days. 12 pen 1    DULoxetine (CYMBALTA) 60 MG capsule TAKE 2 CAPSULES BY MOUTH EVERY MORNING 180 capsule 3    flu vacc [id number] 6mos up,PF, (FLUARIX QUAD [id number], PF,) 60 mcg (15 mcg x 4)/0.5 mL Syrg Inject into the muscle. 0.5 mL 0    glimepiride (AMARYL) 2 MG tablet Take 1 tablet (2 mg total) by mouth before breakfast. 90 tablet 3    hydrOXYzine pamoate (VISTARIL) 50 MG Cap TAKE 1 CAPSULE (50 MG) BY MOUTH NIGHTLY AS NEEDED FOR INSOMNIA 90 capsule 3    lancets 30 gauge Misc 1 lancet by Misc.(Non-Drug; Combo Route) route twice daily. 200 each 6    meclizine (ANTIVERT) 12.5 mg tablet Take 1 tablet (12.5 mg total) by mouth 3 (three) times daily as needed for Dizziness. 180 tablet 0    meloxicam (MOBIC) 15 MG tablet       meloxicam (MOBIC) 15 MG tablet Take 1 tablet (15 mg total) by mouth once daily. 90 tablet 1    metFORMIN (GLUCOPHAGE) 1000 MG tablet Take 1 tablet (1,000 mg total) by mouth 2 (two) times daily with meals. 180 tablet 1    omeprazole (PRILOSEC) 20 MG capsule Take 1 capsule (20 mg total) by mouth before breakfast. 90 capsule 3    PFIZER COVID-19 CIELO VACCN,PF, 30 mcg/0.3 mL injection       potassium chloride (KLOR-CON) 10 MEQ TbSR take 1 tablet by mouth once daily 90 tablet 3    pravastatin (PRAVACHOL) 40 MG tablet TAKE 1 TABLET(40 MG) BY MOUTH EVERY DAY 90 tablet 3    pregabalin (LYRICA) 100 MG capsule Take 2 capsules (200 mg total) by mouth 2 (two) times daily. 360 capsule 1    SHINGRIX, PF, 50 mcg/0.5 mL injection       sildenafiL (VIAGRA) 100 MG tablet Take 1 tablet (100 mg total) by mouth daily as needed for  "Erectile Dysfunction. 30 tablet 3    tamsulosin (FLOMAX) 0.4 mg Cap Take 1 capsule (0.4 mg total) by mouth every evening. 90 capsule 0    blood-glucose meter Misc Use as instructed 1 each 0    olmesartan (BENICAR) 20 MG tablet Take 1 tablet (20 mg total) by mouth once daily. 90 tablet 0     No current facility-administered medications on file prior to visit.       Review of patient's allergies indicates:   Allergen Reactions    Pcn [penicillins] Other (See Comments)     Childhood rxn, pt does not recall type of rxn       Review of Systems:  A review of 10+ systems was conducted with pertinent positive and negative findings documented in HPI with all other systems reviewed and negative.    OBJECTIVE:     Estimated body mass index is 31.57 kg/m² as calculated from the following:    Height as of this encounter: 6' 1" (1.854 m).    Weight as of this encounter: 108.6 kg (239 lb 5 oz).    Vital Signs (Most Recent)  Vitals:    07/19/23 1419   BP: (!) 161/93   Pulse: 89       Physical Exam:  GENERAL: patient sitting comfortably  HEENT: normocephalic  NECK: supple, no JVD  PULM: normal chest rise, no increased WOB  HEART: non-diaphoretic  ABDO: soft, nondistended, nontender  BACK: no CVA tenderness bilaterally  SKIN: warm, dry, well perfused  EXT: no bruising or edema  NEURO: grossly normal with no focal deficits  PSYCH: appropriate mood and affect    Genitourinary Exam:  deferred    Lab Results   Component Value Date    BUN 14 06/29/2023    CREATININE 1.0 06/29/2023    WBC 9.82 07/07/2023    HGB 12.9 (L) 07/07/2023    HCT 39.5 (L) 07/07/2023     07/07/2023    AST 21 02/13/2023    ALT 16 02/13/2023    ALKPHOS 75 02/13/2023    ALBUMIN 4.5 02/13/2023    HGBA1C 5.7 (H) 06/29/2023    INR 1.0 11/28/2018        Imaging:  I have personally reviewed all relevant imaging studies.    No results found. However, due to the size of the patient record, not all encounters were searched. Please check Results Review for a complete set " of results.  Results for orders placed or performed during the hospital encounter of 05/31/23 (from the past 2160 hour(s))   MRI Cervical Spine Without Contrast    Impression    Cervical spondylosis noting levels of lower cervical spinal canal stenosis and variable neural foraminal narrowing as detailed.      Electronically signed by: Armin Motley  Date:    05/31/2023  Time:    13:06     *Note: Due to a large number of results and/or encounters for the requested time period, some results have not been displayed. A complete set of results can be found in Results Review.     X-Ray Chest PA And Lateral  Narrative: EXAMINATION:  XR CHEST PA AND LATERAL    CLINICAL HISTORY:  Encounter for other preprocedural examination    FINDINGS:  Chest two views: Heart size is normal.  There is aortic plaque.  The bones showed DJD and dish.  Impression: No acute process seen.    Electronically signed by: Rudy Navarro MD  Date:    07/10/2023  Time:    10:16       PSA:  Lab Results   Component Value Date    PSA 0.59 02/13/2023    PSA 0.73 03/27/2021    PSA 0.42 01/28/2020    PSA 0.23 12/06/2018       Testosterone:  Lab Results   Component Value Date    TESTOSTERONE 4.7 05/10/2021        IPSS Questionnaire (AUA-SS):  Over the past month    1)  Incomplete Emptying - How often have you had a sensation of not emptying your bladder completely after you finish urinating?  4 - More than half the time   2)  Frequency - How often have you had to urinate again less than two hours after you finished urinating? 3 - About half the time   3)  Intermittency - How often have you found you stopped and started again several times when you urinated?  5 - Almost always   4) Urgency - How difficult have you found it to postpone urination?  5 - Almost always   5) Weak Stream - How often have you had a weak urinary stream?  4 - More than half the time   6) Straining  - How often have you had to push or strain to begin urination?  3 - About half the  time   7) Nocturia - How many times did you most typically get up to urinate from the time you went to bed until the time you got up in the morning?  3 - 3 times   Total score:  0-7 mild, 8-19 moderate, 20-35 severe 27   Quality of Life:  5 - Unhappy       ASSESSMENT     1. Benign prostatic hyperplasia with urinary obstruction        PLAN:     BPH with LUTS    - We discussed the association of lower urinary tract symptoms (LUTS) and prostate enlargement (BPH). The management of BPH varies widely depending on the degree of bother, adverse events related to BPH, and the overall size of the prostate.     - We discussed behavioral modifications and medical management with alpha-blockers or 5 alpha-reductase inhibitors. We reviewed the surgical options available including Rezum, Urolift, TURP, HoLEP, and simple prostatectomy with both robotic and open approaches. We reviewed the indications for each as well as the risks, benefits, and re-treatment rates associated with each procedure.     - After a long discussion, we will continue tamsulosin.  Patient can follow up with me in 3 months.     Chino Cardozo MD  Urology  [facility]    Disclaimer: This note has been generated using voice-recognition software. There may be typographical errors that have been missed during proof-reading.

## 2023-07-25 ENCOUNTER — TELEPHONE (OUTPATIENT)
Dept: GASTROENTEROLOGY | Facility: CLINIC | Age: 63
End: 2023-07-25
Payer: MEDICARE

## 2023-07-25 NOTE — TELEPHONE ENCOUNTER
Spoke with pt and informed him Sheron states its is best for the pt to see a Colorectal dr due to his history of hemmoroids. Pt was ok with me canceling his appt with Sheron. And requested to be transferred to call center to schedule with colorectal.

## 2023-07-26 ENCOUNTER — TELEPHONE (OUTPATIENT)
Dept: ADMINISTRATIVE | Facility: OTHER | Age: 63
End: 2023-07-26
Payer: MEDICARE

## 2023-07-27 DIAGNOSIS — F41.9 ANXIETY: ICD-10-CM

## 2023-07-27 NOTE — TELEPHONE ENCOUNTER
No care due was identified.  [facility] Embedded Care Due Messages. Reference number: [id number].   7/27/2023 8:11:02 AM CDT

## 2023-07-28 ENCOUNTER — TELEPHONE (OUTPATIENT)
Dept: FAMILY MEDICINE | Facility: CLINIC | Age: 63
End: 2023-07-28
Payer: MEDICARE

## 2023-07-28 RX ORDER — ALPRAZOLAM 0.5 MG/1
0.5 TABLET ORAL NIGHTLY PRN
Qty: 30 TABLET | Refills: 0 | Status: SHIPPED | OUTPATIENT
Start: 2023-07-28 | End: 2023-08-21 | Stop reason: SDUPTHER

## 2023-07-28 NOTE — TELEPHONE ENCOUNTER
Spoke with patient notifying him I re faxed the MITS form.  Also I informed him I will fax over xanax prescription to pharmacy due to him not having transportation.

## 2023-07-28 NOTE — TELEPHONE ENCOUNTER
----- Message from Nixon Trujillo sent at 7/28/2023  8:34 AM CDT -----  Pt Note to Doctor    Who called: pt  Who called for pt:  Best call back #: [phone](pt)   Add notes: pt requests his application be faxed to [facility]; pt said the doctor's office already have the fax number when I asked him for it

## 2023-07-30 ENCOUNTER — NURSE TRIAGE (OUTPATIENT)
Dept: ADMINISTRATIVE | Facility: CLINIC | Age: 63
End: 2023-07-30
Payer: MEDICARE

## 2023-07-30 NOTE — TELEPHONE ENCOUNTER
Pt states that he checks BS once a day. BS Friday 99, Saturday 65 and this am 49. Pt states that he ate syrup an hour ago, BS now 126 and asymptomatic. Advised per protocol. Verbalized understanding. Encounter routed to provider.   Reason for Disposition   [1] Morning (before breakfast) blood glucose < 80 mg/dL (4.4 mmol/L) AND [2] more than once in past week    Additional Information   Negative: Unconscious or difficult to awaken   Negative: Seizure occurs   Negative: Acting confused (e.g., disoriented, slurred speech)   Negative: Very weak (e.g., can't stand)   Negative: Sounds like a life-threatening emergency to the triager   Negative: [1] Vomiting AND [2] signs of dehydration (e.g., very dry mouth, lightheaded, dark urine)   Negative: [1] Low blood sugar symptoms persist > 30 minutes AND [2] using low blood sugar Care Advice   Negative: [1] Low blood glucose (70 mg/dl [3.9 mmol/l] or below) persists > 30 minutes AND [2] using low blood sugar Care Advice   Negative: Patient sounds very sick or weak to the triager   Negative: [1] Low blood sugar symptoms with no other adult present AND [2] hasn't tried Care Advice   Negative: [1] Low blood glucose (70 mg/dl [3.9 mmol/l] or below)) with no other adult present AND [2] hasn't tried Care Advice   Negative: Diabetes drug error or overdose (e.g., insulin error or extra dose)   Negative: [1] Caller has URGENT medication or insulin pump question AND [2] triager unable to answer question   Negative: [1] Blood glucose 70  mg/dL (3.9 mmol/L) or below OR symptomatic, now improved with Care Advice AND [2] cause unknown   Negative: [1] Caller has NON-URGENT medication or insulin pump question AND [2] triager unable to answer question    Protocols used: [facility]

## 2023-07-31 RX ORDER — MELOXICAM 15 MG/1
15 TABLET ORAL DAILY
Qty: 90 TABLET | Refills: 1 | Status: ON HOLD | OUTPATIENT
Start: 2023-07-31 | End: 2023-08-18 | Stop reason: HOSPADM

## 2023-08-02 ENCOUNTER — HOSPITAL ENCOUNTER (OUTPATIENT)
Dept: PREADMISSION TESTING | Facility: HOSPITAL | Age: 63
Discharge: HOME | End: 2023-08-02
Attending: NURSE PRACTITIONER
Payer: MEDICARE

## 2023-08-02 NOTE — DISCHARGE INSTRUCTIONS
Your surgery is scheduled for 8/11/23.    Please report to [facility] Lobby on the 1st Floor at 1115 a.m.    THIS TIME IS SUBJECT TO CHANGE.  YOU WILL RECEIVE A PHONE CALL THE DAY BEFORE SURGERY BY 3:30 PM TO CONFIRM YOUR TIME OF ARRIVAL.  IF YOU HAVE NOT RECEIVED A PHONE CALL BY 3:30 PM THE DAY BEFORE YOUR SURGERY PLEASE CALL [phone]     INSTRUCTIONS IMPORTANT!!!  ¨ Do not eat or drink after 12 midnight-including water, candy, gum, & mints. OK to brush teeth.      ____  Proceed to [facility] on *** for additional testing.        ____  Do not wear makeup, including mascara.  ____  No powder, lotions or creams to surgical area.  ____  Please remove all jewelry, including piercings and leave at home.  ____  No money or valuables needed. Please leave at home.  ____  Please bring any documents given by your doctor.  ____  If going home the same day, arrange for a ride home. You will not be able to             drive if Anesthesia was used.  ____  Children under 18 years require a parent / guardian present the entire time             they are in surgery / recovery.  ____  Wear loose fitting clothing. Allow for dressings, bandages.  ____  Stop Aspirin, Ibuprofen, Motrin, Aleve, Goody's/BC powders, Excedrine and Naproxen (NSAIDS) at least 3-5 days before surgery, unless otherwise instructed by your doctor, or the nurse.   You MAY use Tylenol/acetaminophen until day of surgery.  ____  Wash the surgical area with Hibiclens or Dial Antibacterial bar soap the night before surgery, and again the             morning of surgery.  Be sure to rinse hibiclens or Dial Antibacterial bar soap off completely (if instructed by   nurse).  ____  If you take diabetic medication including Metformin, Glimepiride, Glipizide, Glyburide, Byetta, Januvia, Actos, do not take am of surgery unless instructed by Doctor.  ____ Hold Invokana, Farxiga, and Jardiance for 3 days prior to surgery.   ____  Call MD for temperature  above 101 degrees or any other signs of infection such as Urinary (bladder) infection, Upper respiratory infection, skin boils, etc.  ____ Stop taking any Fish Oil supplement or any Vitamins that contain Vitamin E at least 5 days prior to surgery.  ____ Do Not wear your contact lenses the day of your procedure.  You may wear your glasses.      ____Do not shave surgical site for 3 days prior to surgery.  ____ Practice Good hand washing before, during, and after procedure.      I have read or had read and explained to me, and understand the above information.  Additional comments or instructions:  For additional questions call [phone]      ANESTHESIA SIDE EFFECTS  -For the first 24 hours after surgery:  Do not drive, use heavy equipment, make important decisions, or drink alcohol  -It is normal to feel sleepy for several hours.  Rest until you are more awake.  -Have someone stay with you, if needed.  They can watch for problems and help keep you safe.  -Some possible post anesthesia side effects include: nausea and vomiting, sore throat and hoarseness, sleepiness, and dizziness.        Pre-Op Bathing Instructions    Before surgery, you can play an important role in your own health.    Because skin is not sterile, we need to be sure that your skin is as free of germs as possible. By following the instructions below, you can reduce the number of germs on your skin before surgery.    IMPORTANT: You will need to shower with a special soap called Hibiclens*, available at any pharmacy.  If you are allergic to Chlorhexidine (the antiseptic in Hibiclens), use an antibacterial soap such as Dial Soap for your preoperative shower.  You will shower with Hibiclens both the night before your surgery and the morning of your surgery.  Do not use Hibiclens on the head, face or genitals to avoid injury to those areas.    STEP #1: THE NIGHT BEFORE YOUR SURGERY     Do not shave the area of your body where your surgery will be  performed.  Shower and wash your hair and body as usual with your normal soap and shampoo.  Rinse your hair and body thoroughly after you shower to remove all soap residue.  With your hand, apply one packet of Hibiclens soap to the surgical site.   Wash the site gently for five (5) minutes. Do not scrub your skin too hard.   Do not wash with your regular soap after Hibiclens is used.  Rinse your body thoroughly.  Pat yourself dry with a clean, soft towel.  Do not use lotion, cream, or powder.  Wear clean clothes.    STEP #2: THE MORNING OF YOUR SURGERY     Repeat Step #1.    * Not to be used by people allergic to Chlorhexidine.

## 2023-08-07 ENCOUNTER — TELEPHONE (OUTPATIENT)
Dept: NEUROSURGERY | Facility: CLINIC | Age: 63
End: 2023-08-07
Payer: MEDICARE

## 2023-08-07 NOTE — TELEPHONE ENCOUNTER
Returned pt's call, pt stated that he would like to know his arrival time for his surgery with . I stated to pt that someone from the surgery department will call the day before his surgery to let him know an arrival time. Pt stated that he has someone bringing him and they need to know a couple of days before and is requesting to be transferred to the surgery department. I stated to pt that he can call the main number back and ask to be transferred to the surgery department. Pt voiced understanding

## 2023-08-08 DIAGNOSIS — R42 DIZZINESS: ICD-10-CM

## 2023-08-08 NOTE — TELEPHONE ENCOUNTER
No care due was identified.  [facility] Embedded Care Due Messages. Reference number: [id number].   8/08/2023 3:55:31 PM CDT

## 2023-08-09 ENCOUNTER — TELEPHONE (OUTPATIENT)
Dept: NEUROSURGERY | Facility: CLINIC | Age: 63
End: 2023-08-09
Payer: MEDICARE

## 2023-08-09 RX ORDER — MECLIZINE HCL 12.5 MG 12.5 MG/1
12.5 TABLET ORAL 3 TIMES DAILY PRN
Qty: 180 TABLET | Refills: 0 | Status: SHIPPED | OUTPATIENT
Start: 2023-08-09 | End: 2023-09-30 | Stop reason: SDUPTHER

## 2023-08-09 NOTE — TELEPHONE ENCOUNTER
Returned call to pt, pt stated that he received his pre-op information last Friday through the portal and now he cant pull it up through the portal. I stated to pt that I will send a message to the pre-admit staff to see if they can send them again. Pt voiced understanding

## 2023-08-10 ENCOUNTER — PATIENT MESSAGE (OUTPATIENT)
Dept: PREADMISSION TESTING | Facility: HOSPITAL | Age: 63
End: 2023-08-10
Payer: MEDICARE

## 2023-08-10 ENCOUNTER — TELEPHONE (OUTPATIENT)
Dept: PREADMISSION TESTING | Facility: HOSPITAL | Age: 63
End: 2023-08-10
Payer: MEDICARE

## 2023-08-11 ENCOUNTER — ANESTHESIA (OUTPATIENT)
Dept: SURGERY | Facility: HOSPITAL | Age: 63
DRG: 471 | End: 2023-08-11
Payer: MEDICARE

## 2023-08-11 ENCOUNTER — HOSPITAL ENCOUNTER (INPATIENT)
Facility: HOSPITAL | Age: 63
LOS: 7 days | Discharge: HOME HEALTH | DRG: 471 | End: 2023-08-18
Attending: NEUROLOGICAL SURGERY | Admitting: NEUROLOGICAL SURGERY
Payer: MEDICARE

## 2023-08-11 DIAGNOSIS — E11.59 HYPERTENSION ASSOCIATED WITH DIABETES: ICD-10-CM

## 2023-08-11 DIAGNOSIS — I27.20 PULMONARY HYPERTENSION, UNSPECIFIED: ICD-10-CM

## 2023-08-11 DIAGNOSIS — Z98.1 S/P CERVICAL SPINAL FUSION: ICD-10-CM

## 2023-08-11 DIAGNOSIS — R13.12 OROPHARYNGEAL DYSPHAGIA: ICD-10-CM

## 2023-08-11 DIAGNOSIS — E11.51 TYPE 2 DIABETES MELLITUS WITH DIABETIC PERIPHERAL ANGIOPATHY WITHOUT GANGRENE, WITHOUT LONG-TERM CURRENT USE OF INSULIN: ICD-10-CM

## 2023-08-11 DIAGNOSIS — M79.672 CHRONIC FOOT PAIN, LEFT: ICD-10-CM

## 2023-08-11 DIAGNOSIS — N18.32 STAGE 3B CHRONIC KIDNEY DISEASE: ICD-10-CM

## 2023-08-11 DIAGNOSIS — R50.82 POST-PROCEDURAL FEVER: ICD-10-CM

## 2023-08-11 DIAGNOSIS — M47.12 CERVICAL SPONDYLOSIS WITH MYELOPATHY AND RADICULOPATHY: Primary | ICD-10-CM

## 2023-08-11 DIAGNOSIS — G89.29 CHRONIC FOOT PAIN, LEFT: ICD-10-CM

## 2023-08-11 DIAGNOSIS — R26.89 IMPAIRED GAIT AND MOBILITY: ICD-10-CM

## 2023-08-11 DIAGNOSIS — M47.22 CERVICAL SPONDYLOSIS WITH MYELOPATHY AND RADICULOPATHY: Primary | ICD-10-CM

## 2023-08-11 DIAGNOSIS — I15.2 HYPERTENSION ASSOCIATED WITH DIABETES: ICD-10-CM

## 2023-08-11 DIAGNOSIS — J69.0 ASPIRATION PNEUMONIA, UNSPECIFIED ASPIRATION PNEUMONIA TYPE, UNSPECIFIED LATERALITY, UNSPECIFIED PART OF LUNG: ICD-10-CM

## 2023-08-11 LAB
ABO + RH BLD: NORMAL
BLD GP AB SCN CELLS X3 SERPL QL: NORMAL
POCT GLUCOSE: 106 MG/DL (ref 70–110)
POCT GLUCOSE: 170 MG/DL (ref 70–110)
SPECIMEN OUTDATE: NORMAL

## 2023-08-11 PROCEDURE — 22551 ARTHRD ANT NTRBDY CERVICAL: CPT | Mod: ,,, | Performed by: NEUROLOGICAL SURGERY

## 2023-08-11 PROCEDURE — 63600175 PHARM REV CODE 636 W HCPCS: Performed by: NURSE ANESTHETIST, CERTIFIED REGISTERED

## 2023-08-11 PROCEDURE — 36000711: Performed by: NEUROLOGICAL SURGERY

## 2023-08-11 PROCEDURE — 22552 ARTHRD ANT NTRBD CERVICAL EA: CPT | Mod: ,,, | Performed by: NEUROLOGICAL SURGERY

## 2023-08-11 PROCEDURE — 63600175 PHARM REV CODE 636 W HCPCS: Performed by: NEUROLOGICAL SURGERY

## 2023-08-11 PROCEDURE — 36620 INSERTION CATHETER ARTERY: CPT | Mod: 59,,, | Performed by: NURSE ANESTHETIST, CERTIFIED REGISTERED

## 2023-08-11 PROCEDURE — D9220A PRA ANESTHESIA: Mod: CRNA,,, | Performed by: NURSE ANESTHETIST, CERTIFIED REGISTERED

## 2023-08-11 PROCEDURE — 94799 UNLISTED PULMONARY SVC/PX: CPT

## 2023-08-11 PROCEDURE — D9220A PRA ANESTHESIA: ICD-10-PCS | Mod: ANES,,, | Performed by: STUDENT IN AN ORGANIZED HEALTH CARE EDUCATION/TRAINING PROGRAM

## 2023-08-11 PROCEDURE — 37000009 HC ANESTHESIA EA ADD 15 MINS: Performed by: NEUROLOGICAL SURGERY

## 2023-08-11 PROCEDURE — 27800903 OPTIME MED/SURG SUP & DEVICES OTHER IMPLANTS: Performed by: NEUROLOGICAL SURGERY

## 2023-08-11 PROCEDURE — 25000003 PHARM REV CODE 250: Performed by: STUDENT IN AN ORGANIZED HEALTH CARE EDUCATION/TRAINING PROGRAM

## 2023-08-11 PROCEDURE — 22853 INSJ BIOMECHANICAL DEVICE: CPT | Mod: ,,, | Performed by: NEUROLOGICAL SURGERY

## 2023-08-11 PROCEDURE — 20930 SP BONE ALGRFT MORSEL ADD-ON: CPT | Mod: ,,, | Performed by: NEUROLOGICAL SURGERY

## 2023-08-11 PROCEDURE — C1713 ANCHOR/SCREW BN/BN,TIS/BN: HCPCS | Performed by: NEUROLOGICAL SURGERY

## 2023-08-11 PROCEDURE — 63600175 PHARM REV CODE 636 W HCPCS: Performed by: STUDENT IN AN ORGANIZED HEALTH CARE EDUCATION/TRAINING PROGRAM

## 2023-08-11 PROCEDURE — 63600175 PHARM REV CODE 636 W HCPCS: Performed by: NURSE PRACTITIONER

## 2023-08-11 PROCEDURE — 37000008 HC ANESTHESIA 1ST 15 MINUTES: Performed by: NEUROLOGICAL SURGERY

## 2023-08-11 PROCEDURE — 36620 ARTERIAL: ICD-10-PCS | Mod: 59,,, | Performed by: NURSE ANESTHETIST, CERTIFIED REGISTERED

## 2023-08-11 PROCEDURE — 71000039 HC RECOVERY, EACH ADD'L HOUR: Performed by: NEUROLOGICAL SURGERY

## 2023-08-11 PROCEDURE — 27201423 OPTIME MED/SURG SUP & DEVICES STERILE SUPPLY: Performed by: NEUROLOGICAL SURGERY

## 2023-08-11 PROCEDURE — 63600175 PHARM REV CODE 636 W HCPCS

## 2023-08-11 PROCEDURE — 71000033 HC RECOVERY, INTIAL HOUR: Performed by: NEUROLOGICAL SURGERY

## 2023-08-11 PROCEDURE — 22551 PR ARTHRODESIS ANT INTERBODY INC DISCECTOMY, CERVICAL BELOW C2: ICD-10-PCS | Mod: ,,, | Performed by: NEUROLOGICAL SURGERY

## 2023-08-11 PROCEDURE — 22853 PR INSERT BIOMECH DEV W/INTERBODY ARTHRODESIS, EA CONTIGUOUS DEFECT: ICD-10-PCS | Mod: ,,, | Performed by: NEUROLOGICAL SURGERY

## 2023-08-11 PROCEDURE — 20930 PR ALLOGRAFT FOR SPINE SURGERY ONLY MORSELIZED: ICD-10-PCS | Mod: ,,, | Performed by: NEUROLOGICAL SURGERY

## 2023-08-11 PROCEDURE — D9220A PRA ANESTHESIA: Mod: ANES,,, | Performed by: STUDENT IN AN ORGANIZED HEALTH CARE EDUCATION/TRAINING PROGRAM

## 2023-08-11 PROCEDURE — 36415 COLL VENOUS BLD VENIPUNCTURE: CPT | Performed by: NEUROLOGICAL SURGERY

## 2023-08-11 PROCEDURE — 25000003 PHARM REV CODE 250: Performed by: NEUROLOGICAL SURGERY

## 2023-08-11 PROCEDURE — 22845 PR ANTERIOR INSTRUMENTATION 2-3 VERTEBRAL SEGMENTS: ICD-10-PCS | Mod: 59,,, | Performed by: NEUROLOGICAL SURGERY

## 2023-08-11 PROCEDURE — 94761 N-INVAS EAR/PLS OXIMETRY MLT: CPT

## 2023-08-11 PROCEDURE — 36000710: Performed by: NEUROLOGICAL SURGERY

## 2023-08-11 PROCEDURE — 22552 PR ARTHRODESIS ANT INTERBODY INC DISCECTOMY, CERVICAL BELOW C2 EACH ADDL: ICD-10-PCS | Mod: ,,, | Performed by: NEUROLOGICAL SURGERY

## 2023-08-11 PROCEDURE — 25000003 PHARM REV CODE 250: Performed by: NURSE ANESTHETIST, CERTIFIED REGISTERED

## 2023-08-11 PROCEDURE — D9220A PRA ANESTHESIA: ICD-10-PCS | Mod: CRNA,,, | Performed by: NURSE ANESTHETIST, CERTIFIED REGISTERED

## 2023-08-11 PROCEDURE — 22845 INSERT SPINE FIXATION DEVICE: CPT | Mod: 59,,, | Performed by: NEUROLOGICAL SURGERY

## 2023-08-11 PROCEDURE — 86900 BLOOD TYPING SEROLOGIC ABO: CPT | Performed by: NEUROLOGICAL SURGERY

## 2023-08-11 PROCEDURE — 11000001 HC ACUTE MED/SURG PRIVATE ROOM

## 2023-08-11 DEVICE — SCREW BONE ANT SKYLINE 18MM TI: Type: IMPLANTABLE DEVICE | Site: SPINE CERVICAL | Status: FUNCTIONAL

## 2023-08-11 DEVICE — GRAFT PRIME DBM HD BONE 2.5CC: Type: IMPLANTABLE DEVICE | Site: SPINE CERVICAL | Status: FUNCTIONAL

## 2023-08-11 RX ORDER — IBUPROFEN 200 MG
24 TABLET ORAL
Status: DISCONTINUED | OUTPATIENT
Start: 2023-08-11 | End: 2023-08-18 | Stop reason: HOSPADM

## 2023-08-11 RX ORDER — BISACODYL 10 MG
10 SUPPOSITORY, RECTAL RECTAL DAILY
Status: DISCONTINUED | OUTPATIENT
Start: 2023-08-12 | End: 2023-08-18 | Stop reason: HOSPADM

## 2023-08-11 RX ORDER — PHENYLEPHRINE HYDROCHLORIDE 10 MG/ML
INJECTION INTRAVENOUS CONTINUOUS PRN
Status: DISCONTINUED | OUTPATIENT
Start: 2023-08-11 | End: 2023-08-11

## 2023-08-11 RX ORDER — SODIUM CHLORIDE, SODIUM LACTATE, POTASSIUM CHLORIDE, CALCIUM CHLORIDE 600; 310; 30; 20 MG/100ML; MG/100ML; MG/100ML; MG/100ML
INJECTION, SOLUTION INTRAVENOUS CONTINUOUS
Status: DISCONTINUED | OUTPATIENT
Start: 2023-08-11 | End: 2023-08-11

## 2023-08-11 RX ORDER — MUPIROCIN 20 MG/G
OINTMENT TOPICAL 2 TIMES DAILY
Status: ACTIVE | OUTPATIENT
Start: 2023-08-11 | End: 2023-08-16

## 2023-08-11 RX ORDER — ACETAMINOPHEN 325 MG/1
650 TABLET ORAL
Status: COMPLETED | OUTPATIENT
Start: 2023-08-11 | End: 2023-08-11

## 2023-08-11 RX ORDER — PREGABALIN 75 MG/1
75 CAPSULE ORAL
Status: COMPLETED | OUTPATIENT
Start: 2023-08-11 | End: 2023-08-11

## 2023-08-11 RX ORDER — HEPARIN SODIUM 5000 [USP'U]/ML
5000 INJECTION, SOLUTION INTRAVENOUS; SUBCUTANEOUS EVERY 12 HOURS
Status: DISCONTINUED | OUTPATIENT
Start: 2023-08-11 | End: 2023-08-18 | Stop reason: HOSPADM

## 2023-08-11 RX ORDER — DULOXETIN HYDROCHLORIDE 30 MG/1
60 CAPSULE, DELAYED RELEASE ORAL 2 TIMES DAILY
Status: DISCONTINUED | OUTPATIENT
Start: 2023-08-11 | End: 2023-08-18 | Stop reason: HOSPADM

## 2023-08-11 RX ORDER — LIDOCAINE HYDROCHLORIDE 10 MG/ML
1 INJECTION, SOLUTION EPIDURAL; INFILTRATION; INTRACAUDAL; PERINEURAL ONCE
Status: DISCONTINUED | OUTPATIENT
Start: 2023-08-11 | End: 2023-08-11

## 2023-08-11 RX ORDER — HYDROMORPHONE HYDROCHLORIDE 2 MG/ML
0.5 INJECTION, SOLUTION INTRAMUSCULAR; INTRAVENOUS; SUBCUTANEOUS EVERY 10 MIN PRN
Status: DISCONTINUED | OUTPATIENT
Start: 2023-08-11 | End: 2023-08-11 | Stop reason: HOSPADM

## 2023-08-11 RX ORDER — HYDRALAZINE HYDROCHLORIDE 20 MG/ML
10 INJECTION INTRAMUSCULAR; INTRAVENOUS ONCE
Status: COMPLETED | OUTPATIENT
Start: 2023-08-11 | End: 2023-08-11

## 2023-08-11 RX ORDER — OXYCODONE HCL 10 MG/1
10 TABLET, FILM COATED, EXTENDED RELEASE ORAL
Status: COMPLETED | OUTPATIENT
Start: 2023-08-11 | End: 2023-08-11

## 2023-08-11 RX ORDER — EPHEDRINE SULFATE 50 MG/ML
INJECTION, SOLUTION INTRAVENOUS
Status: DISCONTINUED | OUTPATIENT
Start: 2023-08-11 | End: 2023-08-11

## 2023-08-11 RX ORDER — CHLORTHALIDONE 25 MG/1
25 TABLET ORAL DAILY
Status: DISCONTINUED | OUTPATIENT
Start: 2023-08-11 | End: 2023-08-11 | Stop reason: HOSPADM

## 2023-08-11 RX ORDER — ONDANSETRON 4 MG/1
8 TABLET, ORALLY DISINTEGRATING ORAL EVERY 6 HOURS PRN
Status: DISCONTINUED | OUTPATIENT
Start: 2023-08-11 | End: 2023-08-18 | Stop reason: HOSPADM

## 2023-08-11 RX ORDER — PROPOFOL 10 MG/ML
VIAL (ML) INTRAVENOUS CONTINUOUS PRN
Status: DISCONTINUED | OUTPATIENT
Start: 2023-08-11 | End: 2023-08-11

## 2023-08-11 RX ORDER — AMOXICILLIN 250 MG
2 CAPSULE ORAL NIGHTLY PRN
Status: DISCONTINUED | OUTPATIENT
Start: 2023-08-11 | End: 2023-08-18 | Stop reason: HOSPADM

## 2023-08-11 RX ORDER — GLUCAGON 1 MG
1 KIT INJECTION
Status: DISCONTINUED | OUTPATIENT
Start: 2023-08-11 | End: 2023-08-18 | Stop reason: HOSPADM

## 2023-08-11 RX ORDER — CHLORTHALIDONE 25 MG/1
25 TABLET ORAL DAILY
Status: DISCONTINUED | OUTPATIENT
Start: 2023-08-12 | End: 2023-08-18 | Stop reason: HOSPADM

## 2023-08-11 RX ORDER — FENTANYL CITRATE 50 UG/ML
25 INJECTION, SOLUTION INTRAMUSCULAR; INTRAVENOUS EVERY 5 MIN PRN
Status: DISCONTINUED | OUTPATIENT
Start: 2023-08-11 | End: 2023-08-11 | Stop reason: HOSPADM

## 2023-08-11 RX ORDER — ACETAMINOPHEN 325 MG/1
650 TABLET ORAL EVERY 6 HOURS
Status: DISCONTINUED | OUTPATIENT
Start: 2023-08-11 | End: 2023-08-18 | Stop reason: HOSPADM

## 2023-08-11 RX ORDER — KETAMINE HCL IN 0.9 % NACL 50 MG/5 ML
SYRINGE (ML) INTRAVENOUS
Status: DISCONTINUED | OUTPATIENT
Start: 2023-08-11 | End: 2023-08-11

## 2023-08-11 RX ORDER — TAMSULOSIN HYDROCHLORIDE 0.4 MG/1
0.4 CAPSULE ORAL NIGHTLY
Status: DISCONTINUED | OUTPATIENT
Start: 2023-08-11 | End: 2023-08-18 | Stop reason: HOSPADM

## 2023-08-11 RX ORDER — LIDOCAINE HYDROCHLORIDE 10 MG/ML
INJECTION, SOLUTION INTRAVENOUS
Status: DISCONTINUED | OUTPATIENT
Start: 2023-08-11 | End: 2023-08-11

## 2023-08-11 RX ORDER — MAG HYDROX/ALUMINUM HYD/SIMETH 200-200-20
30 SUSPENSION, ORAL (FINAL DOSE FORM) ORAL EVERY 4 HOURS PRN
Status: DISCONTINUED | OUTPATIENT
Start: 2023-08-11 | End: 2023-08-18 | Stop reason: HOSPADM

## 2023-08-11 RX ORDER — ALPRAZOLAM 0.25 MG/1
0.5 TABLET ORAL NIGHTLY PRN
Status: DISCONTINUED | OUTPATIENT
Start: 2023-08-11 | End: 2023-08-18 | Stop reason: HOSPADM

## 2023-08-11 RX ORDER — FENTANYL CITRATE 50 UG/ML
INJECTION, SOLUTION INTRAMUSCULAR; INTRAVENOUS
Status: DISCONTINUED | OUTPATIENT
Start: 2023-08-11 | End: 2023-08-11

## 2023-08-11 RX ORDER — CELECOXIB 100 MG/1
200 CAPSULE ORAL
Status: COMPLETED | OUTPATIENT
Start: 2023-08-11 | End: 2023-08-11

## 2023-08-11 RX ORDER — POTASSIUM CHLORIDE 750 MG/1
10 TABLET, EXTENDED RELEASE ORAL DAILY
Status: DISCONTINUED | OUTPATIENT
Start: 2023-08-12 | End: 2023-08-18 | Stop reason: HOSPADM

## 2023-08-11 RX ORDER — DEXAMETHASONE SODIUM PHOSPHATE 4 MG/ML
INJECTION, SOLUTION INTRA-ARTICULAR; INTRALESIONAL; INTRAMUSCULAR; INTRAVENOUS; SOFT TISSUE
Status: DISCONTINUED | OUTPATIENT
Start: 2023-08-11 | End: 2023-08-11

## 2023-08-11 RX ORDER — TRAMADOL HYDROCHLORIDE 50 MG/1
50 TABLET ORAL EVERY 6 HOURS PRN
Status: DISCONTINUED | OUTPATIENT
Start: 2023-08-11 | End: 2023-08-18 | Stop reason: HOSPADM

## 2023-08-11 RX ORDER — METHOCARBAMOL 750 MG/1
750 TABLET, FILM COATED ORAL 3 TIMES DAILY
Status: DISCONTINUED | OUTPATIENT
Start: 2023-08-11 | End: 2023-08-18 | Stop reason: HOSPADM

## 2023-08-11 RX ORDER — LIDOCAINE HYDROCHLORIDE AND EPINEPHRINE 10; 10 MG/ML; UG/ML
INJECTION, SOLUTION INFILTRATION; PERINEURAL
Status: DISCONTINUED | OUTPATIENT
Start: 2023-08-11 | End: 2023-08-11 | Stop reason: HOSPADM

## 2023-08-11 RX ORDER — HYDROMORPHONE HYDROCHLORIDE 1 MG/ML
1 INJECTION, SOLUTION INTRAMUSCULAR; INTRAVENOUS; SUBCUTANEOUS
Status: DISCONTINUED | OUTPATIENT
Start: 2023-08-11 | End: 2023-08-18 | Stop reason: HOSPADM

## 2023-08-11 RX ORDER — IBUPROFEN 200 MG
16 TABLET ORAL
Status: DISCONTINUED | OUTPATIENT
Start: 2023-08-11 | End: 2023-08-18 | Stop reason: HOSPADM

## 2023-08-11 RX ORDER — HYDROXYZINE PAMOATE 25 MG/1
50 CAPSULE ORAL NIGHTLY PRN
Status: DISCONTINUED | OUTPATIENT
Start: 2023-08-11 | End: 2023-08-18 | Stop reason: HOSPADM

## 2023-08-11 RX ORDER — MIDAZOLAM HYDROCHLORIDE 1 MG/ML
INJECTION, SOLUTION INTRAMUSCULAR; INTRAVENOUS
Status: DISCONTINUED | OUTPATIENT
Start: 2023-08-11 | End: 2023-08-11

## 2023-08-11 RX ORDER — SODIUM CHLORIDE, SODIUM LACTATE, POTASSIUM CHLORIDE, CALCIUM CHLORIDE 600; 310; 30; 20 MG/100ML; MG/100ML; MG/100ML; MG/100ML
INJECTION, SOLUTION INTRAVENOUS CONTINUOUS
Status: DISCONTINUED | OUTPATIENT
Start: 2023-08-11 | End: 2023-08-12

## 2023-08-11 RX ORDER — PROCHLORPERAZINE EDISYLATE 5 MG/ML
5 INJECTION INTRAMUSCULAR; INTRAVENOUS EVERY 30 MIN PRN
Status: DISCONTINUED | OUTPATIENT
Start: 2023-08-11 | End: 2023-08-11 | Stop reason: HOSPADM

## 2023-08-11 RX ORDER — OXYCODONE HYDROCHLORIDE 5 MG/1
10 TABLET ORAL EVERY 6 HOURS PRN
Status: DISCONTINUED | OUTPATIENT
Start: 2023-08-11 | End: 2023-08-18 | Stop reason: HOSPADM

## 2023-08-11 RX ORDER — MUPIROCIN 20 MG/G
OINTMENT TOPICAL 2 TIMES DAILY
Status: DISCONTINUED | OUTPATIENT
Start: 2023-08-11 | End: 2023-08-12

## 2023-08-11 RX ORDER — MECLIZINE HCL 12.5 MG 12.5 MG/1
12.5 TABLET ORAL 3 TIMES DAILY PRN
Status: DISCONTINUED | OUTPATIENT
Start: 2023-08-11 | End: 2023-08-18 | Stop reason: HOSPADM

## 2023-08-11 RX ORDER — PROCHLORPERAZINE EDISYLATE 5 MG/ML
5 INJECTION INTRAMUSCULAR; INTRAVENOUS EVERY 6 HOURS PRN
Status: DISCONTINUED | OUTPATIENT
Start: 2023-08-11 | End: 2023-08-18 | Stop reason: HOSPADM

## 2023-08-11 RX ORDER — PANTOPRAZOLE SODIUM 40 MG/1
40 TABLET, DELAYED RELEASE ORAL DAILY
Status: DISCONTINUED | OUTPATIENT
Start: 2023-08-12 | End: 2023-08-18 | Stop reason: HOSPADM

## 2023-08-11 RX ADMIN — DULOXETINE 60 MG: 30 CAPSULE, DELAYED RELEASE ORAL at 09:08

## 2023-08-11 RX ADMIN — HYDRALAZINE HYDROCHLORIDE 10 MG: 20 INJECTION, SOLUTION INTRAMUSCULAR; INTRAVENOUS at 03:08

## 2023-08-11 RX ADMIN — SODIUM CHLORIDE: 0.9 INJECTION, SOLUTION INTRAVENOUS at 12:08

## 2023-08-11 RX ADMIN — Medication 25 MG: at 12:08

## 2023-08-11 RX ADMIN — PREGABALIN 75 MG: 75 CAPSULE ORAL at 11:08

## 2023-08-11 RX ADMIN — GLYCOPYRROLATE 0.2 MG: 0.2 INJECTION, SOLUTION INTRAMUSCULAR; INTRAVITREAL at 12:08

## 2023-08-11 RX ADMIN — ACETAMINOPHEN 650 MG: 325 TABLET ORAL at 09:08

## 2023-08-11 RX ADMIN — HYDROMORPHONE HYDROCHLORIDE 0.5 MG: 2 INJECTION, SOLUTION INTRAMUSCULAR; INTRAVENOUS; SUBCUTANEOUS at 04:08

## 2023-08-11 RX ADMIN — SODIUM CHLORIDE 0.2 MCG/KG/MIN: 9 INJECTION, SOLUTION INTRAVENOUS at 12:08

## 2023-08-11 RX ADMIN — ACETAMINOPHEN 650 MG: 325 TABLET ORAL at 11:08

## 2023-08-11 RX ADMIN — VANCOMYCIN HYDROCHLORIDE 1000 MG: 1 INJECTION, POWDER, LYOPHILIZED, FOR SOLUTION INTRAVENOUS at 12:08

## 2023-08-11 RX ADMIN — DEXAMETHASONE SODIUM PHOSPHATE 8 MG: 4 INJECTION, SOLUTION INTRA-ARTICULAR; INTRALESIONAL; INTRAMUSCULAR; INTRAVENOUS; SOFT TISSUE at 12:08

## 2023-08-11 RX ADMIN — OXYCODONE HYDROCHLORIDE 10 MG: 10 TABLET, FILM COATED, EXTENDED RELEASE ORAL at 11:08

## 2023-08-11 RX ADMIN — MUPIROCIN: 20 OINTMENT TOPICAL at 09:08

## 2023-08-11 RX ADMIN — CELECOXIB 200 MG: 100 CAPSULE ORAL at 11:08

## 2023-08-11 RX ADMIN — TAMSULOSIN HYDROCHLORIDE 0.4 MG: 0.4 CAPSULE ORAL at 09:08

## 2023-08-11 RX ADMIN — CHLORTHALIDONE 25 MG: 25 TABLET ORAL at 06:08

## 2023-08-11 RX ADMIN — LIDOCAINE HYDROCHLORIDE 50 MG: 10 INJECTION, SOLUTION INTRAVENOUS at 12:08

## 2023-08-11 RX ADMIN — EPHEDRINE SULFATE 5 MG: 50 INJECTION, SOLUTION INTRAMUSCULAR; INTRAVENOUS; SUBCUTANEOUS at 01:08

## 2023-08-11 RX ADMIN — HYDROMORPHONE HYDROCHLORIDE 0.5 MG: 2 INJECTION, SOLUTION INTRAMUSCULAR; INTRAVENOUS; SUBCUTANEOUS at 03:08

## 2023-08-11 RX ADMIN — PROPOFOL 80 MCG/KG/MIN: 10 INJECTION, EMULSION INTRAVENOUS at 12:08

## 2023-08-11 RX ADMIN — METHOCARBAMOL TABLETS 750 MG: 750 TABLET, COATED ORAL at 04:08

## 2023-08-11 RX ADMIN — SODIUM CHLORIDE, POTASSIUM CHLORIDE, SODIUM LACTATE AND CALCIUM CHLORIDE: 600; 310; 30; 20 INJECTION, SOLUTION INTRAVENOUS at 09:08

## 2023-08-11 RX ADMIN — MIDAZOLAM 2 MG: 1 INJECTION INTRAMUSCULAR; INTRAVENOUS at 12:08

## 2023-08-11 RX ADMIN — HEPARIN SODIUM 5000 UNITS: 5000 INJECTION INTRAVENOUS; SUBCUTANEOUS at 09:08

## 2023-08-11 RX ADMIN — PHENYLEPHRINE HYDROCHLORIDE 50 MCG/KG/MIN: 10 INJECTION INTRAVENOUS at 12:08

## 2023-08-11 RX ADMIN — PREGABALIN 200 MG: 75 CAPSULE ORAL at 09:08

## 2023-08-11 RX ADMIN — FENTANYL CITRATE 100 MCG: 50 INJECTION, SOLUTION INTRAMUSCULAR; INTRAVENOUS at 12:08

## 2023-08-11 RX ADMIN — METHOCARBAMOL TABLETS 750 MG: 750 TABLET, COATED ORAL at 09:08

## 2023-08-11 RX ADMIN — HYDROMORPHONE HYDROCHLORIDE 1 MG: 1 INJECTION, SOLUTION INTRAMUSCULAR; INTRAVENOUS; SUBCUTANEOUS at 09:08

## 2023-08-11 RX ADMIN — SODIUM CHLORIDE, POTASSIUM CHLORIDE, SODIUM LACTATE AND CALCIUM CHLORIDE: 600; 310; 30; 20 INJECTION, SOLUTION INTRAVENOUS at 11:08

## 2023-08-11 RX ADMIN — HYDRALAZINE HYDROCHLORIDE 10 MG: 20 INJECTION, SOLUTION INTRAMUSCULAR; INTRAVENOUS at 04:08

## 2023-08-11 RX ADMIN — OXYCODONE HYDROCHLORIDE 10 MG: 5 TABLET ORAL at 04:08

## 2023-08-11 RX ADMIN — FENTANYL CITRATE 100 MCG: 50 INJECTION, SOLUTION INTRAMUSCULAR; INTRAVENOUS at 02:08
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
## 2023-08-11 NOTE — ANESTHESIA PROCEDURE NOTES
Arterial    Diagnosis: cervical stenosis    Patient location during procedure: done in OR    Staffing  Authorizing Provider: Aron Rhodes MD  Performing Provider: Marquise Barry CRNA    Staffing  Performed by: Marquise Barry CRNA  Authorized by: Aron Rhodes MD    Anesthesiologist was present at the time of the procedure.    Preanesthetic Checklist  Completed: patient identified, IV checked, site marked, risks and benefits discussed, surgical consent, monitors and equipment checked, pre-op evaluation, timeout performed and anesthesia consent givenArterial  Skin Prep: chlorhexidine gluconate  Local Infiltration: none  Orientation: right  Location: radial    Catheter Size: 20 G  Catheter placement by Ultrasound guidance. Heme positive aspiration all ports.   Vessel Caliber: large, patent, compressibility normal  Needle advanced into vessel with real time Ultrasound guidance.

## 2023-08-11 NOTE — ANESTHESIA POSTPROCEDURE EVALUATION
Anesthesia Post Evaluation    Patient: Fabiano Malik Jr.    Procedure(s) Performed: Procedure(s) (LRB):  FUSION, SPINE, CERVICAL (N/A)    Final Anesthesia Type: general      Patient location during evaluation: PACU  Patient participation: Yes- Able to Participate  Level of consciousness: awake and alert, oriented and awake  Post-procedure vital signs: reviewed and stable  Pain management: adequate  Airway patency: patent  MARIKA mitigation strategies: Multimodal analgesia, Extubation while patient is awake and Verification of full reversal of neuromuscular block  PONV status at discharge: No PONV  Anesthetic complications: no      Cardiovascular status: blood pressure returned to baseline and hemodynamically stable  Respiratory status: unassisted and spontaneous ventilation  Hydration status: euvolemic  Follow-up not needed.          Vitals Value Taken Time   /84 08/11/23 1650   Temp  08/11/23 1652   Pulse 66 08/11/23 1651   Resp 9 08/11/23 1651   SpO2 94 % 08/11/23 1651   Vitals shown include unvalidated device data.      No case tracking events are documented in the log.      Pain/Pauline Score: Pain Rating Prior to Med Admin: 10 (8/11/2023  4:23 PM)

## 2023-08-11 NOTE — TRANSFER OF CARE
"Anesthesia Transfer of Care Note    Patient: Fabiano Malik Jr.    Procedure(s) Performed: Procedure(s) (LRB):  FUSION, SPINE, CERVICAL (N/A)    Patient location: PACU    Transport from OR: Transported from OR on 6-10 L/min O2 by face mask with adequate spontaneous ventilation    Post pain: adequate analgesia    Post assessment: no apparent anesthetic complications    Post vital signs: stable    Level of consciousness: sedated    Nausea/Vomiting: no nausea/vomiting    Complications: none    Transfer of care protocol was followed      Last vitals:   Visit Vitals  BP (!) 140/77 (BP Location: Left arm, Patient Position: Lying)   Pulse 73   Temp 37.1 °C (98.7 °F) (Skin)   Resp 16   Ht 6' 1" (1.854 m)   Wt 104.3 kg (230 lb)   SpO2 100%   BMI 30.34 kg/m²     "
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
## 2023-08-11 NOTE — ANESTHESIA PROCEDURE NOTES
Intubation    Date/Time: 8/11/2023 12:07 PM    Performed by: Marquise Barry CRNA  Authorized by: Aron Rhodes MD    Intubation:     Induction:  Intravenous    Mask Ventilation:  Easy mask    Attempts:  1    Attempted By:  CRNA    Method of Intubation:  Video laryngoscopy    Laryngeal View Grade: Grade I - full view of cords      Difficult Airway Encountered?: No      Complications:  None    Airway Device:  Oral endotracheal tube    Airway Device Size:  7.5    Style/Cuff Inflation:  Cuffed    Tube secured:  21    Secured at:  The lips    Placement Verified By:  Capnometry    Complicating Factors:  None    Findings Post-Intubation:  BS equal bilateral

## 2023-08-12 LAB
ANION GAP SERPL CALC-SCNC: 9 MMOL/L (ref 8–16)
BASOPHILS # BLD AUTO: 0.02 K/UL (ref 0–0.2)
BASOPHILS NFR BLD: 0.2 % (ref 0–1.9)
BUN SERPL-MCNC: 17 MG/DL (ref 8–23)
CALCIUM SERPL-MCNC: 9.4 MG/DL (ref 8.7–10.5)
CHLORIDE SERPL-SCNC: 104 MMOL/L (ref 95–110)
CO2 SERPL-SCNC: 26 MMOL/L (ref 23–29)
CREAT SERPL-MCNC: 1.1 MG/DL (ref 0.5–1.4)
DIFFERENTIAL METHOD: ABNORMAL
EOSINOPHIL # BLD AUTO: 0 K/UL (ref 0–0.5)
EOSINOPHIL NFR BLD: 0 % (ref 0–8)
ERYTHROCYTE [DISTWIDTH] IN BLOOD BY AUTOMATED COUNT: 13.2 % (ref 11.5–14.5)
EST. GFR  (NO RACE VARIABLE): >60 ML/MIN/1.73 M^2
GLUCOSE SERPL-MCNC: 124 MG/DL (ref 70–110)
HCT VFR BLD AUTO: 36.1 % (ref 40–54)
HGB BLD-MCNC: 12.5 G/DL (ref 14–18)
IMM GRANULOCYTES # BLD AUTO: 0.02 K/UL (ref 0–0.04)
IMM GRANULOCYTES NFR BLD AUTO: 0.2 % (ref 0–0.5)
LYMPHOCYTES # BLD AUTO: 1.1 K/UL (ref 1–4.8)
LYMPHOCYTES NFR BLD: 10.3 % (ref 18–48)
MCH RBC QN AUTO: 28.7 PG (ref 27–31)
MCHC RBC AUTO-ENTMCNC: 34.6 G/DL (ref 32–36)
MCV RBC AUTO: 83 FL (ref 82–98)
MONOCYTES # BLD AUTO: 0.5 K/UL (ref 0.3–1)
MONOCYTES NFR BLD: 5.3 % (ref 4–15)
NEUTROPHILS # BLD AUTO: 8.6 K/UL (ref 1.8–7.7)
NEUTROPHILS NFR BLD: 84 % (ref 38–73)
NRBC BLD-RTO: 0 /100 WBC
PLATELET # BLD AUTO: 244 K/UL (ref 150–450)
PMV BLD AUTO: 8.9 FL (ref 9.2–12.9)
POCT GLUCOSE: 100 MG/DL (ref 70–110)
POCT GLUCOSE: 110 MG/DL (ref 70–110)
POCT GLUCOSE: 111 MG/DL (ref 70–110)
POCT GLUCOSE: 96 MG/DL (ref 70–110)
POTASSIUM SERPL-SCNC: 3.9 MMOL/L (ref 3.5–5.1)
RBC # BLD AUTO: 4.35 M/UL (ref 4.6–6.2)
SODIUM SERPL-SCNC: 139 MMOL/L (ref 136–145)
WBC # BLD AUTO: 10.22 K/UL (ref 3.9–12.7)

## 2023-08-12 PROCEDURE — 97535 SELF CARE MNGMENT TRAINING: CPT

## 2023-08-12 PROCEDURE — 97161 PT EVAL LOW COMPLEX 20 MIN: CPT

## 2023-08-12 PROCEDURE — 80048 BASIC METABOLIC PNL TOTAL CA: CPT | Performed by: NEUROLOGICAL SURGERY

## 2023-08-12 PROCEDURE — 97116 GAIT TRAINING THERAPY: CPT

## 2023-08-12 PROCEDURE — 99900035 HC TECH TIME PER 15 MIN (STAT)

## 2023-08-12 PROCEDURE — 94761 N-INVAS EAR/PLS OXIMETRY MLT: CPT

## 2023-08-12 PROCEDURE — 25000003 PHARM REV CODE 250: Performed by: NEUROLOGICAL SURGERY

## 2023-08-12 PROCEDURE — 97530 THERAPEUTIC ACTIVITIES: CPT

## 2023-08-12 PROCEDURE — 36415 COLL VENOUS BLD VENIPUNCTURE: CPT | Performed by: NEUROLOGICAL SURGERY

## 2023-08-12 PROCEDURE — 11000001 HC ACUTE MED/SURG PRIVATE ROOM

## 2023-08-12 PROCEDURE — 63600175 PHARM REV CODE 636 W HCPCS: Performed by: NEUROLOGICAL SURGERY

## 2023-08-12 PROCEDURE — 97165 OT EVAL LOW COMPLEX 30 MIN: CPT

## 2023-08-12 PROCEDURE — 94799 UNLISTED PULMONARY SVC/PX: CPT

## 2023-08-12 PROCEDURE — 85025 COMPLETE CBC W/AUTO DIFF WBC: CPT | Performed by: NEUROLOGICAL SURGERY

## 2023-08-12 RX ADMIN — ACETAMINOPHEN 650 MG: 325 TABLET ORAL at 11:08

## 2023-08-12 RX ADMIN — MUPIROCIN: 20 OINTMENT TOPICAL at 08:08

## 2023-08-12 RX ADMIN — OXYCODONE HYDROCHLORIDE 10 MG: 5 TABLET ORAL at 12:08

## 2023-08-12 RX ADMIN — METHOCARBAMOL TABLETS 750 MG: 750 TABLET, COATED ORAL at 08:08

## 2023-08-12 RX ADMIN — TRAMADOL HYDROCHLORIDE 50 MG: 50 TABLET, COATED ORAL at 05:08

## 2023-08-12 RX ADMIN — TRAMADOL HYDROCHLORIDE 50 MG: 50 TABLET, COATED ORAL at 03:08

## 2023-08-12 RX ADMIN — TRAMADOL HYDROCHLORIDE 50 MG: 50 TABLET, COATED ORAL at 11:08

## 2023-08-12 RX ADMIN — PANTOPRAZOLE SODIUM 40 MG: 40 TABLET, DELAYED RELEASE ORAL at 08:08

## 2023-08-12 RX ADMIN — CHLORTHALIDONE 25 MG: 25 TABLET ORAL at 08:08

## 2023-08-12 RX ADMIN — ACETAMINOPHEN 650 MG: 325 TABLET ORAL at 06:08

## 2023-08-12 RX ADMIN — POTASSIUM CHLORIDE 10 MEQ: 750 TABLET, EXTENDED RELEASE ORAL at 08:08

## 2023-08-12 RX ADMIN — HYDROXYZINE PAMOATE 50 MG: 25 CAPSULE ORAL at 11:08

## 2023-08-12 RX ADMIN — OXYCODONE HYDROCHLORIDE 10 MG: 5 TABLET ORAL at 02:08

## 2023-08-12 RX ADMIN — TAMSULOSIN HYDROCHLORIDE 0.4 MG: 0.4 CAPSULE ORAL at 08:08

## 2023-08-12 RX ADMIN — ACETAMINOPHEN 650 MG: 325 TABLET ORAL at 05:08

## 2023-08-12 RX ADMIN — OXYCODONE HYDROCHLORIDE 10 MG: 5 TABLET ORAL at 06:08

## 2023-08-12 RX ADMIN — HYDROMORPHONE HYDROCHLORIDE 1 MG: 1 INJECTION, SOLUTION INTRAMUSCULAR; INTRAVENOUS; SUBCUTANEOUS at 08:08

## 2023-08-12 RX ADMIN — HYDROXYZINE PAMOATE 50 MG: 25 CAPSULE ORAL at 12:08

## 2023-08-12 RX ADMIN — PREGABALIN 200 MG: 75 CAPSULE ORAL at 08:08

## 2023-08-12 RX ADMIN — ALPRAZOLAM 0.5 MG: 0.25 TABLET ORAL at 08:08

## 2023-08-12 RX ADMIN — OXYCODONE HYDROCHLORIDE 10 MG: 5 TABLET ORAL at 08:08

## 2023-08-12 RX ADMIN — HEPARIN SODIUM 5000 UNITS: 5000 INJECTION INTRAVENOUS; SUBCUTANEOUS at 08:08

## 2023-08-12 RX ADMIN — DULOXETINE 60 MG: 30 CAPSULE, DELAYED RELEASE ORAL at 08:08

## 2023-08-12 RX ADMIN — HYDROMORPHONE HYDROCHLORIDE 1 MG: 1 INJECTION, SOLUTION INTRAMUSCULAR; INTRAVENOUS; SUBCUTANEOUS at 03:08

## 2023-08-12 NOTE — NURSING
Pt swallowing PO medication without difficulty, tolerating clear liquid diet, no c/o nausea. Pt rating pain 10/10, prn medication administered per MAR.

## 2023-08-12 NOTE — NURSING
This nurse brought pt back to room. Pt transferred himself back to bed with stand by assistance, tolerated well.

## 2023-08-12 NOTE — PLAN OF CARE
Occupational therapy evaluation completed, coevaluation completed with physical therapy 2/2 patient at self reported 10/10 pain at rest and thus anticipated poor activity tolerance. At functional baseline prehospitalization patient resides alone, is modified independent with straight cane in completed mobility, ADLs, IADLs, etc, and utilizes public transportation for community mobility. Patient with pre-admission hx of right UE numbness / dropping items, and with hx of carpal tunnel in 2022 without relief, and hx of 1 fall in past 3 months. Patient is s/p C5-7 ACDF by Dr. Washington 2023. Patient on evaluation able to complete log rolling with min assist, functional mobility with straight cane with  instability/trunk sway  and CGA with improving steadiness with rolling walker, and ADLs with primarily CGA to setup.  Patient continues with pain rating at 10/10 but endorses feeling better both during and post out of bed activity. Anticipate upon medically stable patient to d/c home with low intensity therapy (home health OT / PT ) to assist in reintegration to environment and reduce risk for readmission to hospital (benefits from skilled practitioner check ins as patient lives alone and reports no primary support person. DME recommended: rolling walker, tub transfer bench (handout provided).    Problem: Occupational Therapy  Goal: Occupational Therapy Goal  Description: Goals to be met by: 2023     Patient will increase functional independence with ADLs by performin% reciprocation of spinal precautions / cervical precautions.  Log Rolling to bilateral with [place].   Standing tolerance progression to 5 minutes with BUE support as needed.  Dressing routine inclusive of item retrieval with Modified [place] with least restrictive device as needed.  Toileting routine inclusive of functional mobility into / out of bathroom and toileting hygiene/clothing management with modified independence.    100% verbalized understanding of fall risk reduction education and DME to support safe d/c to home.    Outcome: Ongoing, Progressing

## 2023-08-12 NOTE — PROGRESS NOTES
NEUROSURGICAL POST-OPERATIVE PROGRESS NOTE    DATE OF SERVICE:  08/12/2023      ATTENDING PHYSICIAN:  Guillaume Washington MD    SUBJECTIVE:    INTERIM HISTORY:    This is a very pleasant 62 y.o. y.o. male, who is status postop day 1 C5-7 anterior fusion and instrumentation.  Doing well.  Tolerating liquid diet.  Voice likely horse.  No new onset of motor weakness.  Reports already improvement in his right arm pain compared to before surgery..               OBJECTIVE:    PHYSICAL EXAMINATION:   Vitals:    08/12/23 0811   BP:    Pulse:    Resp: 18   Temp:        Physical Exam:  Vitals reviewed.    Constitutional: He appears well-developed and well-nourished.     Eyes: Pupils are equal, round, and reactive to light. Conjunctivae and EOM are normal.     Cardiovascular: Normal distal pulses and no edema.     Abdominal: Soft.     Skin: Skin displays no rash on trunk and no rash on extremities. Skin displays no lesions on trunk and no lesions on extremities.     Psych/Behavior: He is alert. He is oriented to person, place, and time. He has a normal mood and affect.       Ortho Exam    Neurologic Exam     Mental Status   Oriented to person, place, and time.     Cranial Nerves     CN III, IV, VI   Pupils are equal, round, and reactive to light.  Extraocular motions are normal.     Motor Exam     Strength   Strength 5/5 throughout.       Dressing CDI    DIAGNOSTIC DATA:    Drain 100 cc    ASSESMENT:    This is a 62 y.o. male who is s/p day 1 C5-7 anterior fusion.  Doing well    Problem List Items Addressed This Visit          Neuro    * (Principal) Cervical spondylosis with myelopathy and radiculopathy - Primary    Relevant Orders    Admit to Inpatient (Completed)    Vital signs    Turn cough deep breathe    Oral Care    Neurovascular checks    Intake and output    Place sequential compression device    Chlorohexidine Gluconate Bath    Incentive spirometry    PT evaluate and treat    OT evaluate and treat    Transfer patient  (Completed)    PT assistance with ambulation    Drain - full suction    Document drain output    Keep Aspen brace on at all times    Discontinue Arguelles Catheter now (Completed)    Advance diet as tolerated to Regular diet    CBC auto differential (Completed)    Basic metabolic panel (Completed)    X-Ray Cervical Spine AP And Lateral    POCT glucose    If any glucose result is less than 50 or greater than 400:    If 2nd result is less than 50 or greater than 400:    If glucose greater than 400 mg/dL treat per correction scale.  If glucose remains elevated above 400 mg/dL at next scheduled check, notify provider    Do not admin Aspart correction between scheduled prandial Aspart    Recheck Blood Glucose:    Full code       Cardiac/Vascular    Hypertension associated with diabetes    Relevant Orders    Vital signs    Turn cough deep breathe    Oral Care    Neurovascular checks    Intake and output    Place sequential compression device    Chlorohexidine Gluconate Bath    Incentive spirometry    PT evaluate and treat    OT evaluate and treat    Transfer patient (Completed)    PT assistance with ambulation    Drain - full suction    Document drain output    Keep Aspen brace on at all times    Discontinue Arguelles Catheter now (Completed)    Advance diet as tolerated to Regular diet    CBC auto differential (Completed)    Basic metabolic panel (Completed)    X-Ray Cervical Spine AP And Lateral    POCT glucose    If any glucose result is less than 50 or greater than 400:    If 2nd result is less than 50 or greater than 400:    If glucose greater than 400 mg/dL treat per correction scale.  If glucose remains elevated above 400 mg/dL at next scheduled check, notify provider    Do not admin Aspart correction between scheduled prandial Aspart    Recheck Blood Glucose:    Pulmonary hypertension, unspecified    Relevant Orders    Vital signs    Turn cough deep breathe    Oral Care    Neurovascular checks    Intake and output     Place sequential compression device    Chlorohexidine Gluconate Bath    Incentive spirometry    PT evaluate and treat    OT evaluate and treat    Transfer patient (Completed)    PT assistance with ambulation    Drain - full suction    Document drain output    Keep Aspen brace on at all times    Discontinue Arguelles Catheter now (Completed)    Advance diet as tolerated to Regular diet    CBC auto differential (Completed)    Basic metabolic panel (Completed)    X-Ray Cervical Spine AP And Lateral    POCT glucose    If any glucose result is less than 50 or greater than 400:    If 2nd result is less than 50 or greater than 400:    If glucose greater than 400 mg/dL treat per correction scale.  If glucose remains elevated above 400 mg/dL at next scheduled check, notify provider    Do not admin Aspart correction between scheduled prandial Aspart    Recheck Blood Glucose:       Renal/    CKD (chronic kidney disease) stage 3, GFR 30-59 ml/min    Relevant Orders    Vital signs    Turn cough deep breathe    Oral Care    Neurovascular checks    Intake and output    Place sequential compression device    Chlorohexidine Gluconate Bath    Incentive spirometry    PT evaluate and treat    OT evaluate and treat    Transfer patient (Completed)    PT assistance with ambulation    Drain - full suction    Document drain output    Keep Aspen brace on at all times    Discontinue Arguelles Catheter now (Completed)    Advance diet as tolerated to Regular diet    CBC auto differential (Completed)    Basic metabolic panel (Completed)    X-Ray Cervical Spine AP And Lateral    POCT glucose    If any glucose result is less than 50 or greater than 400:    If 2nd result is less than 50 or greater than 400:    If glucose greater than 400 mg/dL treat per correction scale.  If glucose remains elevated above 400 mg/dL at next scheduled check, notify provider    Do not admin Aspart correction between scheduled prandial Aspart    Recheck Blood Glucose:        Endocrine    Type 2 diabetes mellitus with diabetic peripheral angiopathy without gangrene, without long-term current use of insulin    Relevant Orders    Vital signs    Turn cough deep breathe    Oral Care    Neurovascular checks    Intake and output    Place sequential compression device    Chlorohexidine Gluconate Bath    Incentive spirometry    PT evaluate and treat    OT evaluate and treat    Transfer patient (Completed)    PT assistance with ambulation    Drain - full suction    Document drain output    Keep Aspen brace on at all times    Discontinue Arguellse Catheter now (Completed)    Advance diet as tolerated to Regular diet    CBC auto differential (Completed)    Basic metabolic panel (Completed)    X-Ray Cervical Spine AP And Lateral    POCT glucose    If any glucose result is less than 50 or greater than 400:    If 2nd result is less than 50 or greater than 400:    If glucose greater than 400 mg/dL treat per correction scale.  If glucose remains elevated above 400 mg/dL at next scheduled check, notify provider    Do not admin Aspart correction between scheduled prandial Aspart    Recheck Blood Glucose:       PLAN:    Progress diet to regular diet  Okay to mobilize with PT  Cervical x-ray  DC IV fluid  Keep drain in        Guillaume Washington MD  Pager: [phone]

## 2023-08-12 NOTE — NURSING
Pt care assumed. Pt sitting up in bed, donning aspen brace, SY drain to full suction, HOB elevated at 45, IVF infusing, rates pain 10/10. Pt oriented to room, instructed to call when need, verbalized understanding. Bed in lowest position, locked and bed alarms on. Call bell within pt's reach.

## 2023-08-12 NOTE — PLAN OF CARE
Problem: Physical Therapy  Goal: Physical Therapy Goal  Description: Goals to be met by: 2023     Patient will increase functional independence with mobility by performin. Supine <>sit with Modified [place] and Supervision  2. Sit to stand transfer with Modified [place] and Supervision  3. Bed to chair transfer with Modified [place] and Supervision   4. Gait  x 200 feet with Modified [place] and Supervision using most appropriate AD  5. Ascend/descend 4 stair with left Handrails Supervision using Single-point Cane .     Outcome: Ongoing, Progressing   PT evaluation was completed. Pt  required MIN/CGAx 1 with supine<>sit, CGAx 1 with sit<>stand transfer and ambulation using a rw 100 ft  requiring CG/SBA.  Pt also ambulated room distances using his SPC  with  CGAx 1 exhibiting lat trunk sway, decreased B step length and decreased dynamic stand balance . Pt demonstrated increased stability and increased reciprocal gait pattern using RW vs SPC. Pt will benefit from  PT upon d/c to increase independence, safety and decrease fall risk.

## 2023-08-12 NOTE — PT/OT/SLP EVAL
Physical Therapy Evaluation and Treatment    Patient Name:  Fabiano Malik Jr.   MRN:  [id number]    Recommendations:     Discharge Recommendations: home health PT (low intensity therapy; HH PT and aide to assist with bathing)   Discharge Equipment Recommendations:  (TBD: may requires a RW pending progress)   Barriers to discharge: None    Assessment:     Fabiano Malik Jr. is a 62 y.o. male admitted with a medical diagnosis of Cervical spondylosis with myelopathy and radiculopathy.  He presents with the following impairments/functional limitations: impaired endurance, impaired functional mobility, gait instability, impaired balance, impaired self care skills, decreased upper extremity function, pain, orthopedic precautions. Pt  required MIN/CGAx 1 with supine<>sit, CGAx 1 with sit<>stand transfer and ambulation using a rw 100 ft  requiring CG/SBA.  Pt also ambulated room distances using his SPC  with  CGAx 1 exhibiting lat trunk sway, decreased B step length and decreased dynamic stand balance . Pt demonstrated increased stability and increased reciprocal gait pattern using RW vs SPC. Pt will benefit from HH PT upon d/c to increase independence, safety and decrease fall risk..    Rehab Prognosis: Good; patient would benefit from acute skilled PT services to address these deficits and reach maximum level of function.    Recent Surgery: Procedure(s) (LRB):  FUSION, SPINE, CERVICAL (N/A) 1 Day Post-Op    Plan:     During this hospitalization, patient to be seen daily to address the identified rehab impairments via gait training, therapeutic activities, therapeutic exercises and progress toward the following goals:    Plan of Care Expires:  09/12/23    Subjective     Chief Complaint:  10/10 surgical site pain  Patient/Family Comments/goals: to return to PLOF of independence  Pain/Comfort:  Pain Rating 1: 10/10  Location - Orientation 1: posterior  Location 1: neck  Pain Addressed 1: Pre-medicate for activity,  Reposition, Distraction, Nurse notified  Pain Rating Post-Intervention 1: 10/10    Patients cultural, spiritual, Moravian conflicts given the current situation: no    Living Environment:  Pt lives alone in 1st floor apartment, 5 stairs with L HR to enter, T/s combo with grab bar. Pt reports being independent with ADLs including cooking / housekeeping and mobility skills using a SPC but has exhibited falls in the past.  Pt doesn't drive and uses public transportation / cabs for transportation needs.  Prior to admission, patients level of function was MOD (I) using a SPC.  Equipment used at home: cane, straight.  DME owned (not currently used): none.  Upon discharge, patient reports he will not have assistant from anyone .    Objective:     Communicated with RN prior to session.  Patient found HOB elevated with peripheral IV, SY drain  upon PT entry to room.    General Precautions: Standard, fall  Orthopedic Precautions:spinal precautions; HOB  elevated 30 degrees  Braces: Aspen collar  Respiratory Status: Room air    Exams:  Gross Motor Coordination:  WFL B LE  Sensation:    -       Intact  light/touch B LE but endorses numbness B foot ( plantar and dorsal surfaces)  RLE ROM: WFL  RLE Strength: 5/5  LLE ROM: WFL  LLE Strength: 5/5    Functional Mobility:  Bed Mobility:     Rolling Right: contact guard assistance and minimum assistance with cues for proper log rolling technique  Supine to Sit: contact guard assistance and minimum assistance with cues for proper log rolling technique  Transfers:     Sit to Stand:  contact guard assistance with rolling walker needing cues for proper hand placement and decreasing excessive trunk/ cervical  flexion  Gait: ambulates up to 100 ft using a rw with CG/SBA and cues for safety/ balance inocente with turns, retropulsion/ change in direction  Balance: Static stand: G using rw   Dynamic stand : F/F+  using a rw      AM-PAC 6 CLICK MOBILITY  Total Score:18       Treatment &  Education:  Pt was educated in role of PT and POC  Pt was educated in spinal precautions, wearing Aspen collar at all times and proper log rolling with bed mobility for adherence of precautions.  Pt  performed supine<>sit <>stand as reported above with cues for adherence of spinal precautions with functional tasks. Pt ambulated 5 ft using a rw to b/s chair requiring CGAx 1 and cues for proper use of AD/ sequencing gait pattern. Pt rested in b/s chair, then ambulated room distances using  SPC requiring CGAx 1 with lat trunk sway, decreased B step length and decrease balance. Pt also ambulated 100 ft  with rw  as reported above exhibiting improved gait pattern and stability using RW  vs SPC at this time.    Patient left up in chair with all lines intact, call button in reach, chair alarm on, and RN notified.    GOALS:   Multidisciplinary Problems       Physical Therapy Goals          Problem: Physical Therapy    Goal Priority Disciplines Outcome Goal Variances Interventions   Physical Therapy Goal     PT, PT/OT Ongoing, Progressing     Description: Goals to be met by: 2023     Patient will increase functional independence with mobility by performin. Supine <>sit with Modified [place] and Supervision  2. Sit to stand transfer with Modified [place] and Supervision  3. Bed to chair transfer with Modified [place] and Supervision   4. Gait  x 200 feet with Modified [place] and Supervision using most appropriate AD  5. Ascend/descend 4 stair with left Handrails Supervision using Single-point Cane .                          History:     Past Medical History:   Diagnosis Date    Chronic back pain greater than 3 months duration     Depression     Diabetes mellitus     Diabetes mellitus, type 2     Hypertension     Schizophrenia        Past Surgical History:   Procedure Laterality Date    APPENDECTOMY      CARPAL TUNNEL RELEASE Right 2022    Procedure: RELEASE, CARPAL TUNNEL;  Surgeon:  Everton Walter Jr., MD;  Location: [facility] OR;  Service: Orthopedics;  Laterality: Right;    COLONOSCOPY N/A 5/31/2018    Procedure: COLONOSCOPY;  Surgeon: Guillaume Hatch MD;  Location: [facility] ENDO;  Service: Endoscopy;  Laterality: N/A;    COLONOSCOPY N/A 11/17/2022    Procedure: COLONOSCOPY;  Surgeon: Guillaume Hatch MD;  Location: [facility] ENDO;  Service: Endoscopy;  Laterality: N/A;    DECOMPRESSION, NERVE, ULNAR Right 12/13/2022    Procedure: DECOMPRESSION, NERVE, ULNAR;  Surgeon: Everton Walter Jr., MD;  Location: [facility] OR;  Service: Orthopedics;  Laterality: Right;    NECK SURGERY      RADIOFREQUENCY ABLATION OF LUMBAR MEDIAL BRANCH NERVE AT SINGLE LEVEL Left 5/29/2018    Procedure: RADIOFREQUENCY THERMOCOAGULATION (RFTC)-NERVE-MEDIAN BRANCH-LUMBAR- Left L2-3-4-5;  Surgeon: Donavon Dennis MD;  Location: [facility] PAIN MGT;  Service: Pain Management;  Laterality: Left;    RADIOFREQUENCY ABLATION OF LUMBAR MEDIAL BRANCH NERVE AT SINGLE LEVEL Right 1/8/2019    Procedure: Radiofrequency Ablation, Nerve, Spinal, Lumbar, Medial Branch, L2,3,4,5;  Surgeon: Alberto Hernandez Jr., MD;  Location: [facility] PAIN T;  Service: Pain Management;  Laterality: Right;  Pt is diabetic    RADIOFREQUENCY ABLATION OF LUMBAR MEDIAL BRANCH NERVE AT SINGLE LEVEL Left 3/12/2019    Procedure: Radiofrequency Ablation, Nerve, Spinal, Lumbar, Medial Branch, Left, L2,3,4,5;  Surgeon: Alberto Hernandez Jr., MD;  Location: [facility] PAIN MGT;  Service: Pain Management;  Laterality: Left;  Pt will be consented the day of procedure.    TRANSFORAMINAL EPIDURAL INJECTION OF STEROID Right 9/12/2019    Procedure: Injection,steroid,epidural,transforaminal,right,L4-5 and L5-S1;  Surgeon: Alberto Hernandez Jr., MD;  Location: [facility] PAIN MGT;  Service: Pain Management;  Laterality: Right;  PT IS DIABETIC       Time Tracking:     PT Received On: 08/12/23 ( co-tx with OT)  PT Start Time: 1118     PT Stop Time: 1151  PT Total Time (min): 33 min     Billable  Minutes: Evaluation 15 and Gait Training 18      08/12/2023

## 2023-08-12 NOTE — PT/OT/SLP EVAL
Occupational Therapy   Evaluation and Treatment    Name: Fabiano Malik Jr.  MRN: [id number]  Admitting Diagnosis: Cervical spondylosis with myelopathy and radiculopathy  Recent Surgery: Procedure(s) (LRB):  FUSION, SPINE, CERVICAL (N/A) 1 Day Post-Op    Recommendations:     Discharge Recommendations: other (see comments) (low intensity therapy; home health OT / PT / aid)  Discharge Equipment Recommendations:  other (see comments) (anticipate rolling walker; tub transfer bench handout provided)  Barriers to discharge:  Other (Comment) (no therapy barriers; however, pain is not controlled at this time)    Assessment:     Fabiano Malik Jr. is a 62 y.o. male with a medical diagnosis of Cervical spondylosis with myelopathy and radiculopathy.  He presents with ..The primary encounter diagnosis was Cervical spondylosis with myelopathy and radiculopathy. Diagnoses of Type 2 diabetes mellitus with diabetic peripheral angiopathy without gangrene, without long-term current use of insulin, Pulmonary hypertension, unspecified, Hypertension associated with diabetes, and Stage 3b chronic kidney disease were also pertinent to this visit.  . Performance deficits affecting function: weakness, impaired endurance, impaired sensation, impaired self care skills, impaired functional mobility, gait instability, impaired balance, pain, decreased upper extremity function, decreased lower extremity function, decreased coordination, decreased ROM, impaired fine motor.      Occupational therapy evaluation completed, coevaluation completed with physical therapy 2/2 patient at self reported 10/10 pain at rest and thus anticipated poor activity tolerance. At functional baseline prehospitalization patient resides alone, is modified independent with straight cane in completed mobility, ADLs, IADLs, etc, and utilizes public transportation for community mobility. Patient with pre-admission hx of right UE numbness / dropping items, and with hx  "of carpal tunnel in December 2022 without relief, and hx of 1 fall in past 3 months. Patient is s/p C5-7 ACDF by Dr. Washington 8/11/2023. Patient on evaluation able to complete log rolling with min assist, functional mobility with straight cane with  instability/trunk sway  and CGA with improving steadiness with rolling walker, and ADLs with primarily CGA to setup.  Patient continues with pain rating at 10/10 but endorses feeling better both during and post out of bed activity. Anticipate upon medically stable patient to d/c home with low intensity therapy (home health OT / PT ) to assist in reintegration to environment and reduce risk for readmission to hospital (benefits from skilled practitioner check ins as patient lives alone and reports no primary support person. DME recommended: rolling walker, tub transfer bench (handout provided).    Rehab Prognosis: Good; patient would benefit from acute skilled OT services to address these deficits and reach maximum level of function.       Plan:     Patient to be seen 4 x/week to address the above listed problems via self-care/home management, therapeutic activities, therapeutic exercises, neuromuscular re-education  Plan of Care Expires: 09/09/23  Plan of Care Reviewed with: patient    Subjective     Chief Complaint: "10/10 pain"  Patient/Family Comments/goals: reports feels better with movement and after movement out of bed. Reports fear of crowds (thus during out of room mobility efforts, patient guided to non-crowded hallway)    Occupational Profile:  Living Environment: Patient resides alone in a first floor apartment with 5 steps to enter with a left handrail. Tub shower combo.  Previous level of function: Prior to admission patient is modified independent with straight cane in completed mobility, ADLs, IADLs, etc, and utilizes public transportation for community mobility with prior hx of  right UE numbness / dropping items, and with hx of carpal tunnel in December " 2022 without relief, and hx of 1 fall in past 3 months  Equipment Used at Home: cane, straight  Assistance upon Discharge: none; lives alone    Pain/Comfort:  Pain Rating 1: 10/10  Pain Addressed 1: Reposition, Pre-medicate for activity, Cessation of Activity, Nurse notified  Pain Rating Post-Intervention 1: 10/10 (however, reports subjectively to feel better during and after mobility efforts)      Objective:     Communicated with: nursing prior to session.  Patient found HOB elevated with bed alarm, peripheral IV, SY drain, cervical collar upon OT entry to room.    General Precautions: Standard, fall  Orthopedic Precautions: spinal precautions  Braces: Aspen collar (on at all times)  Respiratory Status: Room air    Occupational Performance:    Bed Mobility:    Patient completed Supine to Sit with guided log roll method with min assist, increased time, followed by scooting to eob with SBA    Functional Mobility/Transfers:  Patient completed Sit <> Stand Transfer with contact guard assistance.  Patient completed Bed <> Chair Transfer using step transfer technique with CGA with quad cane x1 trial and with rolling walker x1 trial  Patient completed *simulated* Toilet Transfer via step transfer technique with CGA with rolling walker and instructed technique for backing up to feel surface, reaching back with one UE.  Functional Mobility: In room functional mobility with single point cane and min unsteadiness with CGA. Out of room functional mobility inclusive of small turns, negotiation around objects with CGA and use of rolling walker with PT instructing on steps and OT cueing for proximity to walker/ fall risk reduction technique of visualizing ahead.    Activities of Daily Living:  Feeding:  modified independence ; educated on recommendations for use of straws when drinking 2/2 restricted cervical ROM/precautions  Upper Body Dressing: minimum assistance 2/2 IV; educated on wearing of ASPEN at all times  Lower Body  Dressing: contact guard assistance ; cross leg approach  Toileting: simulation only; CGA; teachback education for maintaining one UE on walker/ managing clothing down with other hand and switch    Cognitive/Visual Perceptual:  Cognitive/Psychosocial Skills:     -       Oriented to: Person, Place, Time, and Situation   -       Follows Commands/attention:Follows two-step commands  -       Memory: functional cognition intact; further teaching for cervical spinal precautions needed  -       Safety awareness/insight to disability: intact   -       Mood/Affect/Coping skills/emotional control: cooperative; discloses fear of crowds/anxiety and poor prior experience of outpatient therapy setting 2/2 excessive amount of persons/overstimulated setting    Physical Exam:  Balance:    -         Postural examination/scapula alignment:    -       Rounded shoulders  Skin integrity: surgical wound with ASPEN Collar and drain intact  Sensation:    -       Impaired  light touch to B feet; B hands (R hand worse than L hand); R hand to proximal R elbow; reports chronic since prior to surgery  Dominant hand:    -       right  Upper Extremity Range of Motion:     -       Bilateral Upper Extremity: WFL for ADL needs; did not facilitate ROM above ~95 shoulder flexion bilaterally 2/2 increased discomfort  Bilateral Upper Extremity Strength: not formally assessed 2/2 pain / cervical spinal precautions; accepts antigravity but proximal deficits present   Strength: bilateral gross grasp intact  Fine Motor Coordination: WFL but mild deficits observed functionally    AMPAC 6 Click ADL:  AMPAC Total Score: 19    Treatment & Education:  Patient educated on role of OT/ POC development. Co-evaluation with physical therapy in consideration of patient's pain. Occupational profile developed  via interview with patient. Instructed / education patient on cervical spinal precautions and wearing of ASPEN collar with fair teachback. Patient guided to  transition eob for assessment via log roll. Initiated ADL / functional mobility training as above with emphasis on body positioning techniques, spinal precautions, and alternative adaptive ADL 2/2 restrictions. Educated patient on importance of out of bed mobility and patient assisted to recliner. Reports 10/10 pain throughout with RN aware, but does report improvement during and post mobility. Educated on tub transfer bench, handout provided . Answered questions within scope.      Patient left up in chair with all lines intact, call button in reach, chair alarm on, and nursing notified    GOALS:   Multidisciplinary Problems       Occupational Therapy Goals          Problem: Occupational Therapy    Goal Priority Disciplines Outcome Interventions   Occupational Therapy Goal     OT, PT/OT Ongoing, Progressing    Description: Goals to be met by: 2023     Patient will increase functional independence with ADLs by performin% reciprocation of spinal precautions / cervical precautions.  Log Rolling to bilateral with Hines.   Standing tolerance progression to 5 minutes with BUE support as needed.  Dressing routine inclusive of item retrieval with Modified Hines with least restrictive device as needed.  Toileting routine inclusive of functional mobility into / out of bathroom and toileting hygiene/clothing management with modified independence.   100% verbalized understanding of fall risk reduction education and DME to support safe d/c to home.                         History:     Past Medical History:   Diagnosis Date    Chronic back pain greater than 3 months duration     Depression     Diabetes mellitus     Diabetes mellitus, type 2     Hypertension     Schizophrenia          Past Surgical History:   Procedure Laterality Date    APPENDECTOMY      CARPAL TUNNEL RELEASE Right 2022    Procedure: RELEASE, CARPAL TUNNEL;  Surgeon: Everton Walter Jr., MD;  Location: [facility] OR;  Service:  Orthopedics;  Laterality: Right;    COLONOSCOPY N/A 5/31/2018    Procedure: COLONOSCOPY;  Surgeon: Guillaume Hatch MD;  Location: [facility] ENDO;  Service: Endoscopy;  Laterality: N/A;    COLONOSCOPY N/A 11/17/2022    Procedure: COLONOSCOPY;  Surgeon: Guillaume Hatch MD;  Location: [facility] ENDO;  Service: Endoscopy;  Laterality: N/A;    DECOMPRESSION, NERVE, ULNAR Right 12/13/2022    Procedure: DECOMPRESSION, NERVE, ULNAR;  Surgeon: Everton Walter Jr., MD;  Location: [facility] OR;  Service: Orthopedics;  Laterality: Right;    NECK SURGERY      RADIOFREQUENCY ABLATION OF LUMBAR MEDIAL BRANCH NERVE AT SINGLE LEVEL Left 5/29/2018    Procedure: RADIOFREQUENCY THERMOCOAGULATION (RFTC)-NERVE-MEDIAN BRANCH-LUMBAR- Left L2-3-4-5;  Surgeon: Donavon Dennis MD;  [place] [facility] [place] [facility] [place]  Service: Pain Management;  Laterality: Left;    RADIOFREQUENCY ABLATION OF LUMBAR MEDIAL BRANCH NERVE AT SINGLE LEVEL Right 1/8/2019    Procedure: Radiofrequency Ablation, Nerve, Spinal, Lumbar, Medial Branch, L2,3,4,5;  Surgeon: Alberto Hernandez Jr., MD;  Location: [facility];  Service: Pain Management;  Laterality: Right;  Pt is diabetic    RADIOFREQUENCY ABLATION OF LUMBAR MEDIAL BRANCH NERVE AT SINGLE LEVEL Left 3/12/2019    Procedure: Radiofrequency Ablation, Nerve, Spinal, Lumbar, Medial Branch, Left, L2,3,4,5;  Surgeon: Alberto Hernandez Jr., MD;  Location: [facility];  Service: Pain Management;  Laterality: Left;  Pt will be consented the day of procedure.    TRANSFORAMINAL EPIDURAL INJECTION OF STEROID Right 9/12/2019    Procedure: Injection,steroid,epidural,transforaminal,right,L4-5 and L5-S1;  Surgeon: Alberto Hernandez Jr., MD;  Location: [facility];  Service: Pain Management;  Laterality: Right;  PT IS DIABETIC       Time Tracking:     OT Date of Treatment: 08/12/23  OT Start Time: 1118  OT Stop Time: 1151  OT Total Time (min): 33 min co eval with PT    Billable Minutes:Evaluation 10 min  Self Care/Home Management 15  min  Therapeutic Activity 8 min    8/12/2023

## 2023-08-13 LAB
POCT GLUCOSE: 105 MG/DL (ref 70–110)
POCT GLUCOSE: 122 MG/DL (ref 70–110)
POCT GLUCOSE: 191 MG/DL (ref 70–110)
POCT GLUCOSE: 94 MG/DL (ref 70–110)

## 2023-08-13 PROCEDURE — 94761 N-INVAS EAR/PLS OXIMETRY MLT: CPT

## 2023-08-13 PROCEDURE — 63600175 PHARM REV CODE 636 W HCPCS: Performed by: NEUROLOGICAL SURGERY

## 2023-08-13 PROCEDURE — 99900035 HC TECH TIME PER 15 MIN (STAT)

## 2023-08-13 PROCEDURE — 11000001 HC ACUTE MED/SURG PRIVATE ROOM

## 2023-08-13 PROCEDURE — 97530 THERAPEUTIC ACTIVITIES: CPT | Mod: CQ

## 2023-08-13 PROCEDURE — 94799 UNLISTED PULMONARY SVC/PX: CPT

## 2023-08-13 PROCEDURE — 25000003 PHARM REV CODE 250: Performed by: NEUROLOGICAL SURGERY

## 2023-08-13 RX ADMIN — DULOXETINE 60 MG: 30 CAPSULE, DELAYED RELEASE ORAL at 09:08

## 2023-08-13 RX ADMIN — METHOCARBAMOL TABLETS 750 MG: 750 TABLET, COATED ORAL at 08:08

## 2023-08-13 RX ADMIN — TRAMADOL HYDROCHLORIDE 50 MG: 50 TABLET, COATED ORAL at 09:08

## 2023-08-13 RX ADMIN — MUPIROCIN: 20 OINTMENT TOPICAL at 09:08

## 2023-08-13 RX ADMIN — HYDROMORPHONE HYDROCHLORIDE 1 MG: 1 INJECTION, SOLUTION INTRAMUSCULAR; INTRAVENOUS; SUBCUTANEOUS at 08:08

## 2023-08-13 RX ADMIN — PREGABALIN 200 MG: 75 CAPSULE ORAL at 09:08

## 2023-08-13 RX ADMIN — CHLORTHALIDONE 25 MG: 25 TABLET ORAL at 08:08

## 2023-08-13 RX ADMIN — OXYCODONE HYDROCHLORIDE 10 MG: 5 TABLET ORAL at 04:08

## 2023-08-13 RX ADMIN — ACETAMINOPHEN 650 MG: 325 TABLET ORAL at 05:08

## 2023-08-13 RX ADMIN — HYDROXYZINE PAMOATE 50 MG: 25 CAPSULE ORAL at 11:08

## 2023-08-13 RX ADMIN — OXYCODONE HYDROCHLORIDE 10 MG: 5 TABLET ORAL at 11:08

## 2023-08-13 RX ADMIN — ACETAMINOPHEN 650 MG: 325 TABLET ORAL at 11:08

## 2023-08-13 RX ADMIN — METHOCARBAMOL TABLETS 750 MG: 750 TABLET, COATED ORAL at 05:08

## 2023-08-13 RX ADMIN — TAMSULOSIN HYDROCHLORIDE 0.4 MG: 0.4 CAPSULE ORAL at 09:08

## 2023-08-13 RX ADMIN — ACETAMINOPHEN 650 MG: 325 TABLET ORAL at 12:08

## 2023-08-13 RX ADMIN — PANTOPRAZOLE SODIUM 40 MG: 40 TABLET, DELAYED RELEASE ORAL at 08:08

## 2023-08-13 RX ADMIN — TRAMADOL HYDROCHLORIDE 50 MG: 50 TABLET, COATED ORAL at 05:08

## 2023-08-13 RX ADMIN — HEPARIN SODIUM 5000 UNITS: 5000 INJECTION INTRAVENOUS; SUBCUTANEOUS at 08:08

## 2023-08-13 RX ADMIN — DULOXETINE 60 MG: 30 CAPSULE, DELAYED RELEASE ORAL at 08:08

## 2023-08-13 RX ADMIN — POTASSIUM CHLORIDE 10 MEQ: 750 TABLET, EXTENDED RELEASE ORAL at 08:08

## 2023-08-13 RX ADMIN — TRAMADOL HYDROCHLORIDE 50 MG: 50 TABLET, COATED ORAL at 12:08

## 2023-08-13 RX ADMIN — HEPARIN SODIUM 5000 UNITS: 5000 INJECTION INTRAVENOUS; SUBCUTANEOUS at 09:08

## 2023-08-13 RX ADMIN — ALPRAZOLAM 0.5 MG: 0.25 TABLET ORAL at 09:08

## 2023-08-13 NOTE — PT/OT/SLP PROGRESS
Physical Therapy Treatment    Patient Name:  Fabiano Malik Jr.   MRN:  [id number]    Recommendations:     Discharge Recommendations: other (see comments) (low intensity therapy.)  Discharge Equipment Recommendations: other (see comments) (TBD)  Barriers to discharge: None    Assessment:     Fabiano Malik Jr. is a 62 y.o. male admitted with a medical diagnosis of Cervical spondylosis with myelopathy and radiculopathy.  He presents with the following impairments/functional limitations: weakness, impaired endurance, decreased coordination, decreased lower extremity function, impaired functional mobility, gait instability, impaired balance, decreased safety awareness.    Rehab Prognosis: Good; patient would benefit from acute skilled PT services to address these deficits and reach maximum level of function.    Recent Surgery: Procedure(s) (LRB):  FUSION, SPINE, CERVICAL (N/A) 2 Days Post-Op    Plan:     During this hospitalization, patient to be seen daily to address the identified rehab impairments via gait training, therapeutic activities, therapeutic exercises and progress toward the following goals:    Plan of Care Expires:  09/12/23    Subjective     Chief Complaint: Pt with no complaints or concerns at this time.   Patient/Family Comments/goals: Pt agreeable to PT treatment.   Pain/Comfort:  Pain Rating 1:  (not rated)      Objective:     Patient found HOB elevated with peripheral IV, SY drain upon PT entry to room.     General Precautions: Standard, fall  Orthopedic Precautions: spinal precautions  Braces: Aspen collar  Respiratory Status: Room air     Functional Mobility:  Bed Mobility:     Supine to Sit: stand by assistance  Sit to Supine: stand by assistance  Transfers:     Sit to Stand:  stand by assistance with rolling walker  Gait: Pt ambulated 50 ft with RW, SBA requiring minor cueing on proper gait mechanics to improve gait efficiency.   Balance: Pt with good sitting and fair standing balance.        AM-PAC 6 CLICK MOBILITY  Turning over in bed (including adjusting bedclothes, sheets and blankets)?: 3  Sitting down on and standing up from a chair with arms (e.g., wheelchair, bedside commode, etc.): 3  Moving from lying on back to sitting on the side of the bed?: 3  Moving to and from a bed to a chair (including a wheelchair)?: 3  Need to walk in hospital room?: 3  Climbing 3-5 steps with a railing?: 3  Basic Mobility Total Score: 18       Treatment & Education:      Patient left HOB elevated with all lines intact and call button in reach..    GOALS:   Multidisciplinary Problems       Physical Therapy Goals          Problem: Physical Therapy    Goal Priority Disciplines Outcome Goal Variances Interventions   Physical Therapy Goal     PT, PT/OT Ongoing, Progressing     Description: Goals to be met by: 2023     Patient will increase functional independence with mobility by performin. Supine <>sit with Modified [place] and Supervision  2. Sit to stand transfer with Modified [place] and Supervision  3. Bed to chair transfer with Modified [place] and Supervision   4. Gait  x 200 feet with Modified [place] and Supervision using most appropriate AD  5. Ascend/descend 4 stair with left Handrails Supervision using Single-point Cane .                          Time Tracking:     PT Received On: 23  PT Start Time: 1149     PT Stop Time: 1200  PT Total Time (min): 11 min     Billable Minutes: Therapeutic Activity 11    Treatment Type: Treatment  PT/PTA: PTA     Number of PTA visits since last PT visit: 2023

## 2023-08-13 NOTE — PLAN OF CARE
AAO,aspen collar in place,dressing to neck w mod drng and jacinto compressed w sml drng,c/o pain ,sched and prn given,scds applied to ble,safety reinforced,bed alarm set.

## 2023-08-13 NOTE — PLAN OF CARE
Patient A&Ox4. On aspen brace, SY drain intact with serosanguineous fluid drainage. Dr Washington informed about pt coughing when drinking thin liquids, speech consulted to evaluate and treat. A packet of thickener was mixed with 1 cup of water and patient claims that he was able to swallow better. Prn pain medicine given. Call light within reach. Bed alarm on.

## 2023-08-14 ENCOUNTER — TELEPHONE (OUTPATIENT)
Dept: OTOLARYNGOLOGY | Facility: CLINIC | Age: 63
End: 2023-08-14
Payer: MEDICARE

## 2023-08-14 LAB
POCT GLUCOSE: 121 MG/DL (ref 70–110)
POCT GLUCOSE: 146 MG/DL (ref 70–110)
POCT GLUCOSE: 167 MG/DL (ref 70–110)
POCT GLUCOSE: 168 MG/DL (ref 70–110)

## 2023-08-14 PROCEDURE — 97535 SELF CARE MNGMENT TRAINING: CPT

## 2023-08-14 PROCEDURE — 92610 EVALUATE SWALLOWING FUNCTION: CPT

## 2023-08-14 PROCEDURE — 25000003 PHARM REV CODE 250: Performed by: NEUROLOGICAL SURGERY

## 2023-08-14 PROCEDURE — 97116 GAIT TRAINING THERAPY: CPT | Mod: CQ

## 2023-08-14 PROCEDURE — 63600175 PHARM REV CODE 636 W HCPCS: Performed by: NEUROLOGICAL SURGERY

## 2023-08-14 PROCEDURE — 94799 UNLISTED PULMONARY SVC/PX: CPT

## 2023-08-14 PROCEDURE — 97530 THERAPEUTIC ACTIVITIES: CPT | Mod: CQ

## 2023-08-14 PROCEDURE — 11000001 HC ACUTE MED/SURG PRIVATE ROOM

## 2023-08-14 PROCEDURE — 97535 SELF CARE MNGMENT TRAINING: CPT | Mod: CO

## 2023-08-14 PROCEDURE — 94761 N-INVAS EAR/PLS OXIMETRY MLT: CPT

## 2023-08-14 PROCEDURE — 97530 THERAPEUTIC ACTIVITIES: CPT | Mod: CO

## 2023-08-14 PROCEDURE — 99900035 HC TECH TIME PER 15 MIN (STAT)

## 2023-08-14 RX ORDER — GUAIFENESIN 600 MG/1
600 TABLET, EXTENDED RELEASE ORAL 2 TIMES DAILY
Status: DISCONTINUED | OUTPATIENT
Start: 2023-08-14 | End: 2023-08-18 | Stop reason: HOSPADM

## 2023-08-14 RX ADMIN — DULOXETINE 60 MG: 30 CAPSULE, DELAYED RELEASE ORAL at 09:08

## 2023-08-14 RX ADMIN — CHLORTHALIDONE 25 MG: 25 TABLET ORAL at 09:08

## 2023-08-14 RX ADMIN — POTASSIUM CHLORIDE 10 MEQ: 750 TABLET, EXTENDED RELEASE ORAL at 09:08

## 2023-08-14 RX ADMIN — TRAMADOL HYDROCHLORIDE 50 MG: 50 TABLET, COATED ORAL at 05:08

## 2023-08-14 RX ADMIN — TAMSULOSIN HYDROCHLORIDE 0.4 MG: 0.4 CAPSULE ORAL at 08:08

## 2023-08-14 RX ADMIN — HEPARIN SODIUM 5000 UNITS: 5000 INJECTION INTRAVENOUS; SUBCUTANEOUS at 08:08

## 2023-08-14 RX ADMIN — PREGABALIN 200 MG: 75 CAPSULE ORAL at 09:08

## 2023-08-14 RX ADMIN — HYDROMORPHONE HYDROCHLORIDE 1 MG: 1 INJECTION, SOLUTION INTRAMUSCULAR; INTRAVENOUS; SUBCUTANEOUS at 11:08

## 2023-08-14 RX ADMIN — ACETAMINOPHEN 650 MG: 325 TABLET ORAL at 05:08

## 2023-08-14 RX ADMIN — DULOXETINE 60 MG: 30 CAPSULE, DELAYED RELEASE ORAL at 08:08

## 2023-08-14 RX ADMIN — PANTOPRAZOLE SODIUM 40 MG: 40 TABLET, DELAYED RELEASE ORAL at 09:08

## 2023-08-14 RX ADMIN — ACETAMINOPHEN 650 MG: 325 TABLET ORAL at 11:08

## 2023-08-14 RX ADMIN — GUAIFENESIN 600 MG: 600 TABLET, EXTENDED RELEASE ORAL at 08:08

## 2023-08-14 RX ADMIN — METHOCARBAMOL TABLETS 750 MG: 750 TABLET, COATED ORAL at 09:08

## 2023-08-14 RX ADMIN — MUPIROCIN: 20 OINTMENT TOPICAL at 08:08

## 2023-08-14 RX ADMIN — PREGABALIN 200 MG: 75 CAPSULE ORAL at 08:08

## 2023-08-14 RX ADMIN — METHOCARBAMOL TABLETS 750 MG: 750 TABLET, COATED ORAL at 08:08

## 2023-08-14 RX ADMIN — MUPIROCIN: 20 OINTMENT TOPICAL at 09:08

## 2023-08-14 RX ADMIN — METHOCARBAMOL TABLETS 750 MG: 750 TABLET, COATED ORAL at 03:08

## 2023-08-14 RX ADMIN — HYDROXYZINE PAMOATE 50 MG: 25 CAPSULE ORAL at 11:08

## 2023-08-14 RX ADMIN — OXYCODONE HYDROCHLORIDE 10 MG: 5 TABLET ORAL at 09:08

## 2023-08-14 RX ADMIN — HEPARIN SODIUM 5000 UNITS: 5000 INJECTION INTRAVENOUS; SUBCUTANEOUS at 09:08

## 2023-08-14 RX ADMIN — ALPRAZOLAM 0.5 MG: 0.25 TABLET ORAL at 08:08

## 2023-08-14 NOTE — PROGRESS NOTES
NEUROSURGICAL POST-OPERATIVE PROGRESS NOTE    DATE OF SERVICE:  08/14/2023      ATTENDING PHYSICIAN:  Guillaume Washington MD    SUBJECTIVE:    INTERIM HISTORY:    This is a very pleasant 62 y.o. y.o. male, who is status postop day 3 C5-7 anterior fusion with instrumentation..  Doing well with less right arm pain compared to before surgery.  Still has some difficulty swallowing solid food.  Voice slightly hoarse.              OBJECTIVE:    PHYSICAL EXAMINATION:   Vitals:    08/14/23 1610   BP: (!) 151/75   Pulse: 77   Resp: 18   Temp: 97.7 °F (36.5 °C)       Neurosurgery Physical Exam    Ortho Exam    Neurologic Exam    Incision clean and dry    DIAGNOSTIC DATA:    X-ray of the cervical spine, AP and lateral views reveals the fusion hardware is intact. No loosening of screws or migration of hardware.    ASSESMENT:    This is a 62 y.o. male who is s/p postop day 3 C5-7 anterior fusion.  Postoperative dysphagia.    Problem List Items Addressed This Visit          Neuro    * (Principal) Cervical spondylosis with myelopathy and radiculopathy - Primary    Relevant Orders    Admit to Inpatient (Completed)    Vital signs    Turn cough deep breathe    Oral Care    Neurovascular checks    Intake and output    Place sequential compression device    Chlorohexidine Gluconate Bath    Incentive spirometry    PT evaluate and treat    OT evaluate and treat    Transfer patient (Completed)    PT assistance with ambulation    Drain - full suction    Document drain output    Keep Aspen brace on at all times    Discontinue Arguelles Catheter now (Completed)    Advance diet as tolerated to Regular diet    CBC auto differential (Completed)    Basic metabolic panel (Completed)    X-Ray Cervical Spine AP And Lateral (Completed)    POCT glucose    If any glucose result is less than 50 or greater than 400:    If 2nd result is less than 50 or greater than 400:    If glucose greater than 400 mg/dL treat per correction scale.  If glucose remains elevated  above 400 mg/dL at next scheduled check, notify provider    Do not admin Aspart correction between scheduled prandial Aspart    Recheck Blood Glucose:    Full code       Cardiac/Vascular    Hypertension associated with diabetes    Relevant Orders    Vital signs    Turn cough deep breathe    Oral Care    Neurovascular checks    Intake and output    Place sequential compression device    Chlorohexidine Gluconate Bath    Incentive spirometry    PT evaluate and treat    OT evaluate and treat    Transfer patient (Completed)    PT assistance with ambulation    Drain - full suction    Document drain output    Keep Aspen brace on at all times    Discontinue Arguelles Catheter now (Completed)    Advance diet as tolerated to Regular diet    CBC auto differential (Completed)    Basic metabolic panel (Completed)    X-Ray Cervical Spine AP And Lateral (Completed)    POCT glucose    If any glucose result is less than 50 or greater than 400:    If 2nd result is less than 50 or greater than 400:    If glucose greater than 400 mg/dL treat per correction scale.  If glucose remains elevated above 400 mg/dL at next scheduled check, notify provider    Do not admin Aspart correction between scheduled prandial Aspart    Recheck Blood Glucose:    Pulmonary hypertension, unspecified    Relevant Orders    Vital signs    Turn cough deep breathe    Oral Care    Neurovascular checks    Intake and output    Place sequential compression device    Chlorohexidine Gluconate Bath    Incentive spirometry    PT evaluate and treat    OT evaluate and treat    Transfer patient (Completed)    PT assistance with ambulation    Drain - full suction    Document drain output    Keep Aspen brace on at all times    Discontinue Arguelles Catheter now (Completed)    Advance diet as tolerated to Regular diet    CBC auto differential (Completed)    Basic metabolic panel (Completed)    X-Ray Cervical Spine AP And Lateral (Completed)    POCT glucose    If any glucose result is  less than 50 or greater than 400:    If 2nd result is less than 50 or greater than 400:    If glucose greater than 400 mg/dL treat per correction scale.  If glucose remains elevated above 400 mg/dL at next scheduled check, notify provider    Do not admin Aspart correction between scheduled prandial Aspart    Recheck Blood Glucose:       Renal/    CKD (chronic kidney disease) stage 3, GFR 30-59 ml/min    Relevant Orders    Vital signs    Turn cough deep breathe    Oral Care    Neurovascular checks    Intake and output    Place sequential compression device    Chlorohexidine Gluconate Bath    Incentive spirometry    PT evaluate and treat    OT evaluate and treat    Transfer patient (Completed)    PT assistance with ambulation    Drain - full suction    Document drain output    Keep Aspen brace on at all times    Discontinue Arguelles Catheter now (Completed)    Advance diet as tolerated to Regular diet    CBC auto differential (Completed)    Basic metabolic panel (Completed)    X-Ray Cervical Spine AP And Lateral (Completed)    POCT glucose    If any glucose result is less than 50 or greater than 400:    If 2nd result is less than 50 or greater than 400:    If glucose greater than 400 mg/dL treat per correction scale.  If glucose remains elevated above 400 mg/dL at next scheduled check, notify provider    Do not admin Aspart correction between scheduled prandial Aspart    Recheck Blood Glucose:       Endocrine    Type 2 diabetes mellitus with diabetic peripheral angiopathy without gangrene, without long-term current use of insulin    Relevant Orders    Vital signs    Turn cough deep breathe    Oral Care    Neurovascular checks    Intake and output    Place sequential compression device    Chlorohexidine Gluconate Bath    Incentive spirometry    PT evaluate and treat    OT evaluate and treat    Transfer patient (Completed)    PT assistance with ambulation    Drain - full suction    Document drain output    Keep [place]  brace on at all times    Discontinue Arguelles Catheter now (Completed)    Advance diet as tolerated to Regular diet    CBC auto differential (Completed)    Basic metabolic panel (Completed)    X-Ray Cervical Spine AP And Lateral (Completed)    POCT glucose    If any glucose result is less than 50 or greater than 400:    If 2nd result is less than 50 or greater than 400:    If glucose greater than 400 mg/dL treat per correction scale.  If glucose remains elevated above 400 mg/dL at next scheduled check, notify provider    Do not admin Aspart correction between scheduled prandial Aspart    Recheck Blood Glucose:       Other    Impaired gait and mobility    Relevant Orders    Ambulatory referral/consult to Home Health     Other Visit Diagnoses       Oropharyngeal dysphagia        Relevant Orders    SLP Evaluate and treat    Ambulatory referral/consult to Home Health    Ambulatory referral/consult to ENT    Fl Modified Barium Swallow Speech    SLP video swallow    S/P cervical spinal fusion        Relevant Orders    Ambulatory referral/consult to Home Health            PLAN:    Drain removed  Okay to go home tomorrow with home health PT/OT  Outpatient follow-up with ENT and swallowing barium study for postoperative dysphagia.        Guillaume Washington MD  Pager: [phone]
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
## 2023-08-14 NOTE — TELEPHONE ENCOUNTER
I was able to schedule an appointment for 8/21/23 at 2:20 pm with  at the [place] location. If any changes need to be made please contact us urgently.   ----- Message from Rach Macias sent at 8/14/2023  2:19 PM CDT -----  Regarding: HFU  Patient will be discharging from [facility] and a HFU was requested with ENT by DC provider.  Please schedule and message me back so DC can relay appointment information to patient prior to discharge.  Patient is established.    DX:  Dysphagia s/p Cervical Spine surgery    Tonya Roper  Physician Referral Specialist/Discharge

## 2023-08-14 NOTE — PLAN OF CARE
Problem: Physical Therapy  Goal: Physical Therapy Goal  Description: Goals to be met by: 2023     Patient will increase functional independence with mobility by performin. Supine <>sit with Modified [place] and Supervision  2. Sit to stand transfer with Modified [place] and Supervision  3. Bed to chair transfer with Modified [place] and Supervision   4. Gait  x 200 feet with Modified [place] and Supervision using most appropriate AD  5. Ascend/descend 4 stair with left Handrails Supervision using Single-point Cane .     Outcome: Ongoing, Progressing

## 2023-08-14 NOTE — PT/OT/SLP PROGRESS
Occupational Therapy   Treatment    Name: Fabiano Malik Jr.  MRN: [id number]  Admitting Diagnosis:  Cervical spondylosis with myelopathy and radiculopathy  3 Days Post-Op    Recommendations:     Discharge Recommendations: other (see comments) (low intensity therapy; home health OT / PT / aid)  Discharge Equipment Recommendations:  other (see comments) (anticipate rolling walker; tub transfer bench handout provided)  Barriers to discharge:  Decreased caregiver support (Increased level of assistance)    Assessment:     Fabiano Malik Jr. is a 62 y.o. male with a medical diagnosis of Cervical spondylosis with myelopathy and radiculopathy.  Performance deficits affecting function are weakness, impaired endurance, impaired self care skills, impaired functional mobility, gait instability, impaired balance, decreased coordination, decreased upper extremity function, decreased lower extremity function, pain, decreased ROM, impaired skin, edema.    Pt found in bed, agreeable to therapy on 2nd attempt reporting pain is better controlled.  Pt is progressing well towards goals. Continue OT services to address functional goals, progressing as able.        Rehab Prognosis:  Good; patient would benefit from acute skilled OT services to address these deficits and reach maximum level of function.       Plan:     Patient to be seen 4 x/week to address the above listed problems via self-care/home management, therapeutic activities, therapeutic exercises  Plan of Care Expires: 09/09/23  Plan of Care Reviewed with: patient    Subjective     Chief Complaint: My pain is better.   Patient/Family Comments/goals: to go home  Pain/Comfort:  Pain Rating 1:  (did not rate but reports improved pain in neck)    Objective:     Communicated with: RN prior to session.  Patient found HOB elevated with bed alarm, cervical collar, peripheral IV upon OT entry to room.    General Precautions: Standard, fall    Orthopedic Precautions:spinal  precautions  Braces: Aspen collar  Respiratory Status: Room air     Occupational Performance:     Bed Mobility:    Patient completed Rolling/Turning to Right with modified independence  Patient completed Scooting/Bridging with modified independence  Patient completed Supine to Sit with modified independence, HOB elevated, increased time and effort, vc's for effective technique  Patient completed Sit to Supine with modified independence, HOB elevated     Functional Mobility/Transfers:  Patient completed Sit <> Stand Transfer with modified independence  with  rolling walker and vcs for hand placement   Functional Mobility: Pt ambulated extended distances, in preparation for ambulatory level ADL/IADL, at Supervision level.  Pt requires vcs for upright posture and for RW proximity.      Activities of Daily Living:  Grooming: modified independence standing at sink   Lower Body Dressing: supervision dof/don socks seated EOB using Figure 4 position with increased effort.        [facility] 6 Click ADL: 20    Treatment & Education:  Pt recalls 3/3 spinal precautions and maintains with min vcs during fx mobility and BADL tasks.    Educated pt on:   -home environment modifications to increase safety, reduce risk of fall as well as increase pt independence in home environment   -RW safety/mgmt during transfers and BADL's   -DME recommendations and use of- BSC, RW, tub chair   -LB drsg technique using figure 4 position    -Spinal precautions and maintaining during BADL's and mobility.      Patient left HOB elevated with all lines intact, call button in reach, and bed alarm on    GOALS:   Multidisciplinary Problems       Occupational Therapy Goals          Problem: Occupational Therapy    Goal Priority Disciplines Outcome Interventions   Occupational Therapy Goal     OT, PT/OT Ongoing, Progressing    Description: Goals to be met by: 2023     Patient will increase functional independence with ADLs by performin%  reciprocation of spinal precautions / cervical precautions.  Log Rolling to bilateral with [place].   Standing tolerance progression to 5 minutes with BUE support as needed.  Dressing routine inclusive of item retrieval with Modified [place] with least restrictive device as needed.  Toileting routine inclusive of functional mobility into / out of bathroom and toileting hygiene/clothing management with modified independence.   100% verbalized understanding of fall risk reduction education and DME to support safe d/c to home.                         Time Tracking:     OT Date of Treatment: 08/14/23  OT Start Time: 1428  OT Stop Time: 1453  OT Total Time (min): 25 min    Billable Minutes:Self Care/Home Management 15  Therapeutic Activity 10               8/14/2023

## 2023-08-14 NOTE — PT/OT/SLP PROGRESS
Physical Therapy Treatment    Patient Name:  Fabiano Malik Jr.   MRN:  [id number]    Recommendations:     Discharge Recommendations:  (low intensity)  Discharge Equipment Recommendations:  (pt requests a rollator(will discuss with a PT))  Barriers to discharge: Decreased caregiver support and decreased mobility,strength and endurance    Assessment:     Fabiano Malik Jr. is a 62 y.o. male admitted with a medical diagnosis of Cervical spondylosis with myelopathy and radiculopathy.  He presents with the following impairments/functional limitations: weakness, impaired endurance, impaired functional mobility, gait instability, impaired balance, decreased lower extremity function, pain, decreased ROM, impaired coordination, impaired skin, impaired joint extensibility, orthopedic precautions,pt with good participation and remains with decreased strength,balance and endurance,pt will benefit from low intensity therapy upon discharge.    Rehab Prognosis: Good; patient would benefit from acute skilled PT services to address these deficits and reach maximum level of function.    Recent Surgery: Procedure(s) (LRB):  FUSION, SPINE, CERVICAL (N/A) 3 Days Post-Op    Plan:     During this hospitalization, patient to be seen daily to address the identified rehab impairments via gait training, therapeutic activities, therapeutic exercises, neuromuscular re-education and progress toward the following goals:    Plan of Care Expires:  09/12/23    Subjective     Chief Complaint: n/a  Patient/Family Comments/goals: pt agreeable to rx.  Pain/Comfort:  Pain Rating 1: 8/10  Location - Orientation 1: posterior  Location 1: neck  Pain Addressed 1: Distraction, Nurse notified  Pain Rating Post-Intervention 1: 8/10      Objective:     Communicated with nsg prior to session.  Patient found supine with bed alarm, cervical collar, SY drain, peripheral IV upon PT entry to room.     General Precautions: Standard, fall  Orthopedic Precautions:  spinal precautions  Braces: Aspen collar  Respiratory Status: Room air     Functional Mobility:  Bed Mobility:     Supine to Sit: modified independence  Transfers:     Sit to Stand:  supervision with rolling walker  Bed to Chair: supervision and stand by assistance with  rolling walker  using  ambulation  Gait: anb ~80' X 2 with RW and S with Aspen collar donned  Balance: fair standing balance with RW      AM-PAC 6 CLICK MOBILITY  Turning over in bed (including adjusting bedclothes, sheets and blankets)?: 3  Sitting down on and standing up from a chair with arms (e.g., wheelchair, bedside commode, etc.): 3  Moving from lying on back to sitting on the side of the bed?: 3  Moving to and from a bed to a chair (including a wheelchair)?: 3  Need to walk in hospital room?: 3  Climbing 3-5 steps with a railing?: 3  Basic Mobility Total Score: 18       Treatment & Education: pt stood outside on [facility] ~8 mins with S,reviewed pt safety.      Patient left up in chair with chair alarm on and nsg notified..    GOALS: see general POC  Multidisciplinary Problems       Physical Therapy Goals          Problem: Physical Therapy    Goal Priority Disciplines Outcome Goal Variances Interventions   Physical Therapy Goal     PT, PT/OT Ongoing, Progressing     Description: Goals to be met by: 2023     Patient will increase functional independence with mobility by performin. Supine <>sit with Modified [place] and Supervision  2. Sit to stand transfer with Modified [place] and Supervision  3. Bed to chair transfer with Modified [place] and Supervision   4. Gait  x 200 feet with Modified [place] and Supervision using most appropriate AD  5. Ascend/descend 4 stair with left Handrails Supervision using Single-point Cane .                          Time Tracking:     PT Received On: 23  PT Start Time: 821     PT Stop Time: 846  PT Total Time (min): 25 min     Billable Minutes: Gait Training 15 and  Therapeutic Activity 10    Treatment Type: Treatment  PT/PTA: PTA     Number of PTA visits since last PT visit: 2     08/14/2023

## 2023-08-14 NOTE — PLAN OF CARE
Problem: Occupational Therapy  Goal: Occupational Therapy Goal  Description: Goals to be met by: 2023     Patient will increase functional independence with ADLs by performin% reciprocation of spinal precautions / cervical precautions.  Log Rolling to bilateral with [place].   Standing tolerance progression to 5 minutes with BUE support as needed.  Dressing routine inclusive of item retrieval with Modified [place] with least restrictive device as needed.  Toileting routine inclusive of functional mobility into / out of bathroom and toileting hygiene/clothing management with modified independence.   100% verbalized understanding of fall risk reduction education and DME to support safe d/c to home.    Outcome: Ongoing, Progressing   Fabiano Malik Jr. is a 62 y.o. male with a medical diagnosis of Cervical spondylosis with myelopathy and radiculopathy.  Performance deficits affecting function are weakness, impaired endurance, impaired self care skills, impaired functional mobility, gait instability, impaired balance, decreased coordination, decreased upper extremity function, decreased lower extremity function, pain, decreased ROM, impaired skin, edema.    Pt found in bed, agreeable to therapy on 2nd attempt reporting pain is better controlled.  Pt is progressing well towards goals. Continue OT services to address functional goals, progressing as able.

## 2023-08-14 NOTE — PLAN OF CARE
Aspen collar on all times,neck dressing intact w old drng,jacinto drain stripped w scant drng,c/o pain and meds admin as ordered,bed alarm is set.

## 2023-08-15 PROBLEM — R50.9 FEVER: Status: ACTIVE | Noted: 2023-08-15

## 2023-08-15 LAB
ANION GAP SERPL CALC-SCNC: 12 MMOL/L (ref 8–16)
BASOPHILS # BLD AUTO: 0.06 K/UL (ref 0–0.2)
BASOPHILS NFR BLD: 0.5 % (ref 0–1.9)
BILIRUB UR QL STRIP: NEGATIVE
BUN SERPL-MCNC: 18 MG/DL (ref 8–23)
CALCIUM SERPL-MCNC: 9.8 MG/DL (ref 8.7–10.5)
CHLORIDE SERPL-SCNC: 97 MMOL/L (ref 95–110)
CLARITY UR: CLEAR
CO2 SERPL-SCNC: 25 MMOL/L (ref 23–29)
COLOR UR: YELLOW
CREAT SERPL-MCNC: 1 MG/DL (ref 0.5–1.4)
DIFFERENTIAL METHOD: ABNORMAL
EOSINOPHIL # BLD AUTO: 0.1 K/UL (ref 0–0.5)
EOSINOPHIL NFR BLD: 0.9 % (ref 0–8)
ERYTHROCYTE [DISTWIDTH] IN BLOOD BY AUTOMATED COUNT: 12.8 % (ref 11.5–14.5)
EST. GFR  (NO RACE VARIABLE): >60 ML/MIN/1.73 M^2
GLUCOSE SERPL-MCNC: 187 MG/DL (ref 70–110)
GLUCOSE UR QL STRIP: NEGATIVE
HCT VFR BLD AUTO: 40.3 % (ref 40–54)
HGB BLD-MCNC: 14.2 G/DL (ref 14–18)
HGB UR QL STRIP: NEGATIVE
IMM GRANULOCYTES # BLD AUTO: 0.03 K/UL (ref 0–0.04)
IMM GRANULOCYTES NFR BLD AUTO: 0.2 % (ref 0–0.5)
KETONES UR QL STRIP: NEGATIVE
LACTATE SERPL-SCNC: 0.8 MMOL/L (ref 0.5–2.2)
LEUKOCYTE ESTERASE UR QL STRIP: NEGATIVE
LYMPHOCYTES # BLD AUTO: 1.1 K/UL (ref 1–4.8)
LYMPHOCYTES NFR BLD: 8.7 % (ref 18–48)
MCH RBC QN AUTO: 28.7 PG (ref 27–31)
MCHC RBC AUTO-ENTMCNC: 35.2 G/DL (ref 32–36)
MCV RBC AUTO: 82 FL (ref 82–98)
MONOCYTES # BLD AUTO: 0.8 K/UL (ref 0.3–1)
MONOCYTES NFR BLD: 5.9 % (ref 4–15)
NEUTROPHILS # BLD AUTO: 10.7 K/UL (ref 1.8–7.7)
NEUTROPHILS NFR BLD: 83.8 % (ref 38–73)
NITRITE UR QL STRIP: NEGATIVE
NRBC BLD-RTO: 0 /100 WBC
PH UR STRIP: 7 [PH] (ref 5–8)
PLATELET # BLD AUTO: 244 K/UL (ref 150–450)
PMV BLD AUTO: 8.6 FL (ref 9.2–12.9)
POCT GLUCOSE: 159 MG/DL (ref 70–110)
POCT GLUCOSE: 163 MG/DL (ref 70–110)
POCT GLUCOSE: 168 MG/DL (ref 70–110)
POCT GLUCOSE: 216 MG/DL (ref 70–110)
POTASSIUM SERPL-SCNC: 3.6 MMOL/L (ref 3.5–5.1)
PROT UR QL STRIP: NEGATIVE
RBC # BLD AUTO: 4.94 M/UL (ref 4.6–6.2)
SODIUM SERPL-SCNC: 134 MMOL/L (ref 136–145)
SP GR UR STRIP: 1.01 (ref 1–1.03)
URN SPEC COLLECT METH UR: NORMAL
UROBILINOGEN UR STRIP-ACNC: NEGATIVE EU/DL
WBC # BLD AUTO: 12.8 K/UL (ref 3.9–12.7)

## 2023-08-15 PROCEDURE — 11000001 HC ACUTE MED/SURG PRIVATE ROOM

## 2023-08-15 PROCEDURE — 81003 URINALYSIS AUTO W/O SCOPE: CPT | Performed by: NEUROLOGICAL SURGERY

## 2023-08-15 PROCEDURE — 93010 ELECTROCARDIOGRAM REPORT: CPT | Mod: ,,, | Performed by: INTERNAL MEDICINE

## 2023-08-15 PROCEDURE — 99900035 HC TECH TIME PER 15 MIN (STAT)

## 2023-08-15 PROCEDURE — 25000003 PHARM REV CODE 250: Performed by: NEUROLOGICAL SURGERY

## 2023-08-15 PROCEDURE — 94799 UNLISTED PULMONARY SVC/PX: CPT

## 2023-08-15 PROCEDURE — 85025 COMPLETE CBC W/AUTO DIFF WBC: CPT | Performed by: NEUROLOGICAL SURGERY

## 2023-08-15 PROCEDURE — 87040 BLOOD CULTURE FOR BACTERIA: CPT | Mod: 59 | Performed by: NEUROLOGICAL SURGERY

## 2023-08-15 PROCEDURE — 94761 N-INVAS EAR/PLS OXIMETRY MLT: CPT

## 2023-08-15 PROCEDURE — 36415 COLL VENOUS BLD VENIPUNCTURE: CPT | Performed by: NEUROLOGICAL SURGERY

## 2023-08-15 PROCEDURE — 83605 ASSAY OF LACTIC ACID: CPT | Performed by: NEUROLOGICAL SURGERY

## 2023-08-15 PROCEDURE — 25000003 PHARM REV CODE 250: Performed by: NURSE PRACTITIONER

## 2023-08-15 PROCEDURE — 80048 BASIC METABOLIC PNL TOTAL CA: CPT | Performed by: NEUROLOGICAL SURGERY

## 2023-08-15 PROCEDURE — 93010 EKG 12-LEAD: ICD-10-PCS | Mod: ,,, | Performed by: INTERNAL MEDICINE

## 2023-08-15 PROCEDURE — 92507 TX SP LANG VOICE COMM INDIV: CPT

## 2023-08-15 PROCEDURE — 93005 ELECTROCARDIOGRAM TRACING: CPT

## 2023-08-15 PROCEDURE — 63600175 PHARM REV CODE 636 W HCPCS: Performed by: NEUROLOGICAL SURGERY

## 2023-08-15 PROCEDURE — 92526 ORAL FUNCTION THERAPY: CPT

## 2023-08-15 RX ORDER — METRONIDAZOLE 500 MG/100ML
500 INJECTION, SOLUTION INTRAVENOUS
Status: DISCONTINUED | OUTPATIENT
Start: 2023-08-15 | End: 2023-08-16

## 2023-08-15 RX ORDER — SODIUM CHLORIDE 9 MG/ML
INJECTION, SOLUTION INTRAVENOUS CONTINUOUS
Status: DISCONTINUED | OUTPATIENT
Start: 2023-08-15 | End: 2023-08-18 | Stop reason: HOSPADM

## 2023-08-15 RX ADMIN — METHOCARBAMOL TABLETS 750 MG: 750 TABLET, COATED ORAL at 03:08

## 2023-08-15 RX ADMIN — VANCOMYCIN HYDROCHLORIDE 2500 MG: 750 INJECTION, POWDER, LYOPHILIZED, FOR SOLUTION INTRAVENOUS at 06:08

## 2023-08-15 RX ADMIN — METHOCARBAMOL TABLETS 750 MG: 750 TABLET, COATED ORAL at 08:08

## 2023-08-15 RX ADMIN — CEFEPIME 2 G: 2 INJECTION, POWDER, FOR SOLUTION INTRAVENOUS at 05:08

## 2023-08-15 RX ADMIN — ACETAMINOPHEN 650 MG: 325 TABLET ORAL at 11:08

## 2023-08-15 RX ADMIN — HYDROMORPHONE HYDROCHLORIDE 1 MG: 1 INJECTION, SOLUTION INTRAMUSCULAR; INTRAVENOUS; SUBCUTANEOUS at 11:08

## 2023-08-15 RX ADMIN — POTASSIUM CHLORIDE 10 MEQ: 750 TABLET, EXTENDED RELEASE ORAL at 08:08

## 2023-08-15 RX ADMIN — HEPARIN SODIUM 5000 UNITS: 5000 INJECTION INTRAVENOUS; SUBCUTANEOUS at 08:08

## 2023-08-15 RX ADMIN — VANCOMYCIN HYDROCHLORIDE 1500 MG: 1.5 INJECTION, POWDER, LYOPHILIZED, FOR SOLUTION INTRAVENOUS at 06:08

## 2023-08-15 RX ADMIN — TAMSULOSIN HYDROCHLORIDE 0.4 MG: 0.4 CAPSULE ORAL at 08:08

## 2023-08-15 RX ADMIN — GUAIFENESIN 600 MG: 600 TABLET, EXTENDED RELEASE ORAL at 08:08

## 2023-08-15 RX ADMIN — PANTOPRAZOLE SODIUM 40 MG: 40 TABLET, DELAYED RELEASE ORAL at 08:08

## 2023-08-15 RX ADMIN — SODIUM CHLORIDE: 9 INJECTION, SOLUTION INTRAVENOUS at 06:08

## 2023-08-15 RX ADMIN — IBUPROFEN 600 MG: 400 TABLET ORAL at 08:08

## 2023-08-15 RX ADMIN — ACETAMINOPHEN 650 MG: 325 TABLET ORAL at 06:08

## 2023-08-15 RX ADMIN — OXYCODONE HYDROCHLORIDE 10 MG: 5 TABLET ORAL at 10:08

## 2023-08-15 RX ADMIN — METRONIDAZOLE 500 MG: 5 INJECTION, SOLUTION INTRAVENOUS at 10:08

## 2023-08-15 RX ADMIN — CHLORTHALIDONE 25 MG: 25 TABLET ORAL at 08:08

## 2023-08-15 RX ADMIN — DULOXETINE 60 MG: 30 CAPSULE, DELAYED RELEASE ORAL at 08:08

## 2023-08-15 RX ADMIN — PREGABALIN 200 MG: 75 CAPSULE ORAL at 08:08

## 2023-08-15 RX ADMIN — MUPIROCIN: 20 OINTMENT TOPICAL at 08:08

## 2023-08-15 RX ADMIN — CEFEPIME 2 G: 2 INJECTION, POWDER, FOR SOLUTION INTRAVENOUS at 09:08

## 2023-08-15 RX ADMIN — ACETAMINOPHEN 650 MG: 325 TABLET ORAL at 05:08

## 2023-08-15 RX ADMIN — ONDANSETRON 8 MG: 4 TABLET, ORALLY DISINTEGRATING ORAL at 08:08

## 2023-08-15 RX ADMIN — CEFEPIME 2 G: 2 INJECTION, POWDER, FOR SOLUTION INTRAVENOUS at 02:08

## 2023-08-15 RX ADMIN — ALPRAZOLAM 0.5 MG: 0.25 TABLET ORAL at 08:08

## 2023-08-15 RX ADMIN — METRONIDAZOLE 500 MG: 5 INJECTION, SOLUTION INTRAVENOUS at 06:08

## 2023-08-15 RX ADMIN — OXYCODONE HYDROCHLORIDE 10 MG: 5 TABLET ORAL at 06:08

## 2023-08-15 RX ADMIN — SODIUM CHLORIDE 1000 ML: 9 INJECTION, SOLUTION INTRAVENOUS at 05:08

## 2023-08-15 RX ADMIN — METRONIDAZOLE 500 MG: 5 INJECTION, SOLUTION INTRAVENOUS at 03:08

## 2023-08-15 RX ADMIN — SODIUM CHLORIDE: 9 INJECTION, SOLUTION INTRAVENOUS at 10:08

## 2023-08-15 RX ADMIN — HYDROXYZINE PAMOATE 50 MG: 25 CAPSULE ORAL at 09:08
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
## 2023-08-15 NOTE — PT/OT/SLP PROGRESS
"Speech Language Pathology Treatment    Patient Name:  Fabiano Malik Jr.   MRN:  [id number]  Admitting Diagnosis: Cervical spondylosis with myelopathy and radiculopathy    Recommendations:                 General Recommendations:  diet follow up and ensure safety with meal, needs ENT consult OP for vocal cord paresis  Diet recommendations:  Puree, Mildly thick/Nectar thick liquids - IDDSI Level 2   Aspiration Precautions: daily and frequent oral care, upright for meals, slow rate, alternate sips and bites, add thickener to liquids for NECTAR Thick, small meds with liquids (thickened)   General Precautions: Standard, fall    Assessment:     Fabiano Malik Jr. is a 62 y.o. male s/p  with Cervical spondylosis with myelopathy and radiculopathy with ACDF who presents with dysphonia and dysphagia.       Subjective     Pt seen this date with PO trials. Pt awake and alert, pt wearing aspen collar in place and reporting pain in neck. RN was called to room and notified about his pain and beeping IV.   Patient goals: "I need to swallow better."     Pain/Comfort:  Pain Rating 1: 10/10  Location 2:  (neck pain)    Respiratory Status: room air     Objective:     Has the patient been evaluated by SLP for swallowing?   Yes  Keep patient NPO? No      Swallowing: Pt seen with meal tray, pt on pureed tray and nectar thick liquids. Pt had pre-thickened liquids on meal tray using cup swallows. Pt continues to report throat clear and pain when swallowing.   Pt had minced meats and sausage bites on meat tray that he could not tolerate. SLP will make dietician aware.   Pt did wait on oral meds after RN was called to come to room. Pt able to take whole meds in cluster with sips of lightly thickened water. Pt did cough with liquids and needed to throat clear multiple times.   Pt stated it was hurting his throat when he swallowed. SLP explained that his liquids were not the correct texture.   Pt tolerated small bites of applesauce with " no coughing, throat x2 but pt noted to be shoveling applesauce and not pacing self. SLP reminded him to take double swallows and make sure his throat felt clear before next bite.   MD was made aware of continued dysphagia signs and persistent hoarseness.     Voice: Pt continues to report increased vocal fatigue and hoarseness when talking. Pt with reported L vocal cord paresis per Dr. Washington in secure chat that may have occurred during this ACDF.   Pt will need to follow up with voice center for treatment options if applicable. Pt also continues with suspected post op dysphagia due to recent procedure on 8/11.   SLP will continue to follow. Pt educated on vocal hygiene and vocal rest due to his dysphonia. He will need reinforcement.     Goals:   Multidisciplinary Problems       SLP Goals          Problem: SLP    Goal Priority Disciplines Outcome   SLP Goal     SLP Ongoing, Progressing   Description: Short Term Goals:  1. Pt will participate in swallow eval to determine safest diet level.   2. Pt will tolerate nectar thick liquids and pureed texture with no audible dysphagia signs.                        Plan:     Patient to be seen:  3 x/week   Plan of Care expires:  09/12/23  Plan of Care reviewed with:  patient   SLP Follow-Up:  Yes       Discharge recommendations:   (low intensity at DC)   Barriers to Discharge:  none    Time Tracking:     SLP Treatment Date:   08/15/23  Speech Start Time:  0900  Speech Stop Time:  0924     Speech Total Time (min):  24 min    Billable Minutes: Speech Therapy Individual 14 and Treatment Swallowing Dysfunction 12    08/15/2023

## 2023-08-15 NOTE — PT/OT/SLP PROGRESS
Physical Therapy      Patient Name:  Fabiano Malik Jr.   MRN:  [id number]    Patient not seen today secondary to  (pt states he worked earlier with OT and refused PT rx this PM(9603)). Will follow-up tomorrow.

## 2023-08-15 NOTE — PROGRESS NOTES
Rapid Response Nurse Follow-up Note     Followed up with patient for proactive rounding via chart review. Vitals stable. , /69, temp 97.5. No acute issues at this time. Team will continue to follow. Please call Rapid Response RN, Aleksandra Aguero RN with any questions or concerns at [phone].

## 2023-08-15 NOTE — ASSESSMENT & PLAN NOTE
Creatine stable for now. BMP reviewed- noted Estimated Creatinine Clearance: 98.9 mL/min (based on SCr of 1 mg/dL). according to latest data. Based on current GFR, CKD stage is stage 2 - GFR 60-89.  Monitor UOP and serial BMP and adjust therapy as needed. Renally dose meds. Avoid nephrotoxic medications and procedures.

## 2023-08-15 NOTE — ASSESSMENT & PLAN NOTE
Status post cervical fusion on 08/11/2023.  Postop orders per Neurosurgery.  PT and OT consulted.

## 2023-08-15 NOTE — ASSESSMENT & PLAN NOTE
Patient's FSGs are controlled on current medication regimen.  Last A1c reviewed-   Lab Results   Component Value Date    HGBA1C 5.7 (H) 06/29/2023     Most recent fingerstick glucose reviewed-   Recent Labs   Lab 08/14/23  1906 08/15/23  0456 08/15/23  1117 08/15/23  1601   POCTGLUCOSE 167* 168* 163* 159*     Current correctional scale  Low  Maintain anti-hyperglycemic dose as follows-   Antihyperglycemics (From admission, onward)    None        Hold Oral hypoglycemics while patient is in the hospital.
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
## 2023-08-15 NOTE — PLAN OF CARE
CM spoke with pt    8/11 s/p Cervical Spine Fusion - drain removed today     Pt lives alone - has 3 adult children but they are unable to assist   Pt uses [employer] to get to apts     Mr. Malik will need transportation to home at d/c     Will also need ENT f/u with [facility] Araya MD at d/c    Therapy rec HH/pt/ot, Rollator and TTB -  Rollator ordered - pt to purchase bench        08/14/23 9017   Discharge Assessment   Assessment Type Discharge Planning Assessment   Confirmed/corrected address, phone number and insurance Yes   Confirmed Demographics Correct on Facesheet   Source of Information patient;health record   Communicated AMANUEL with patient/caregiver Yes   People in Home alone   Do you expect to return to your current living situation? Yes   Do you have help at home or someone to help you manage your care at home? No   Prior to hospitilization cognitive status: Alert/Oriented   Current cognitive status: Alert/Oriented   Equipment Currently Used at Home none   Readmission within 30 days? No   Patient currently being followed by outpatient case management? No   Do you currently have service(s) that help you manage your care at home? No   Who is going to help you get home at discharge? needs transportation to home at d/c   How do you get to doctors appointments? public transportation  ([employer])   Are you on dialysis? No   Do you take coumadin? No   Discharge Plan A Home;Home Health  (HH per PHN)   DME Needed Upon Discharge  rollator;bath bench  (pt will purchase a TTB)   Discharge Plan discussed with: Patient   Transition of Care Barriers None

## 2023-08-15 NOTE — PLAN OF CARE
Problem: Adult Inpatient Plan of Care  Goal: Plan of Care Review  Outcome: Ongoing, Progressing  Goal: Patient-Specific Goal (Individualized)  Outcome: Ongoing, Progressing  Goal: Absence of Hospital-Acquired Illness or Injury  Outcome: Ongoing, Progressing  Goal: Optimal Comfort and Wellbeing  Outcome: Ongoing, Progressing  Goal: Readiness for Transition of Care  Outcome: Ongoing, Progressing     Problem: Diabetes Comorbidity  Goal: Blood Glucose Level Within Targeted Range  Outcome: Ongoing, Progressing     Problem: Infection  Goal: Absence of Infection Signs and Symptoms  Outcome: Ongoing, Progressing     Problem: Fall Injury Risk  Goal: Absence of Fall and Fall-Related Injury  Outcome: Ongoing, Progressing     Problem: Hypertension Comorbidity  Goal: Blood Pressure in Desired Range  Outcome: Ongoing, Progressing     Problem: Bleeding (Spinal Surgery)  Goal: Absence of Bleeding  Outcome: Ongoing, Progressing     Problem: Bowel Motility Impaired (Spinal Surgery)  Goal: Effective Bowel Elimination  Outcome: Ongoing, Progressing     Problem: Fluid and Electrolyte Imbalance (Spinal Surgery)  Goal: Fluid and Electrolyte Balance  Outcome: Ongoing, Progressing     Problem: Functional Ability Impaired (Spinal Surgery)  Goal: Optimal Functional Ability  Outcome: Ongoing, Progressing     Problem: Infection (Spinal Surgery)  Goal: Absence of Infection Signs and Symptoms  Outcome: Ongoing, Progressing     Problem: Neurologic Impairment (Spinal Surgery)  Goal: Optimal Neurologic Function  Outcome: Ongoing, Progressing     Problem: Pain (Spinal Surgery)  Goal: Acceptable Pain Control  Outcome: Ongoing, Progressing     Problem: Postoperative Nausea and Vomiting (Spinal Surgery)  Goal: Nausea and Vomiting Relief  Outcome: Ongoing, Progressing     Problem: Respiratory Compromise (Spinal Surgery)  Goal: Effective Oxygenation and Ventilation  Outcome: Ongoing, Progressing     Problem: Skin Injury Risk Increased  Goal: Skin Health  and Integrity  Outcome: Ongoing, Progressing

## 2023-08-15 NOTE — NURSING
"RAPID RESPONSE NURSE PROACTIVE ROUNDING NOTE     Time of Visit: 0535    Admit Date: 2023  LOS: 4  Code Status: Full Code   Date of Visit: 08/15/2023  : 1960  Age: 62 y.o.  Sex: male  Race: White  Bed: K517/K517 A:   MRN: [id number]  Was the patient discharged from an ICU this admission? No   Was the patient discharged from a PACU within last 24 hours?  No  Did the patient receive conscious sedation/general anesthesia in last 24 hours?  No  Was the patient in the ED within the past 24 hours?  No  Was the patient started on NIPPV within the past 24 hours?  No  Attending Physician: Guillaume Washington MD  Primary Service: Networked reference to record PCT     ASSESSMENT     Notified by charge RN via phone call.  Reason for alert: Tachy in 120s.     Diagnosis: Cervical spondylosis with myelopathy and radiculopathy    Abnormal Vital Signs: /63 (BP Location: Left arm, Patient Position: Lying)   Pulse 107   Temp 99.2 °F (37.3 °C) (Oral)   Resp 18   Ht 6' 1" (1.854 m)   Wt 108.3 kg (238 lb 12.1 oz)   SpO2 (!) 93%   BMI 31.50 kg/m²      Clinical Issues: Circulatory    Patient  has a past medical history of Chronic back pain greater than 3 months duration, Depression, Diabetes mellitus, Diabetes mellitus, type 2, Hypertension, and Schizophrenia.      Patient A&Ox4, , /63 MAP 83;     -Called because patient's HR was in 120s, spiking fevers, tachypneic, concern for sepsis as patient had surgery on  and no abx ordered.      INTERVENTIONS/ RECOMMENDATIONS     -Charge RN spoke with Dr. Washington who consulted hospital medicine  -EKG ordered  -Vanc and zosyn ordered  - Fluid bolus ordered  - CBC, BMP, lactate, and bcx ordered    Discussed plan of care with RNAmparo.    PHYSICIAN ESCALATION     Yes/No  Yes    Orders received and case discussed with Dr. Washington .    Disposition: Remain in room 517.    FOLLOW-UP     Call back the Rapid Response Nurse, Daisy Bello RN at [phone] for additional " questions or concerns.

## 2023-08-15 NOTE — PROGRESS NOTES
"Pharmacokinetic Initial Assessment: IV Vancomycin    Assessment/Plan:    Initiate intravenous vancomycin with loading dose of 2500 mg once followed by a maintenance dose of vancomycin 1500mg IV every 12 hours  Desired empiric serum trough concentration is 15 to 20 mcg/mL  Draw vancomycin trough level 60 min prior to fourth dose on 8/16 at approximately 1730  Pharmacy will continue to follow and monitor vancomycin.      Please contact pharmacy at extension [phone] with any questions regarding this assessment.     Thank you for the consult,   Florencia Kay       Patient brief summary:  Fabiano Malik Jr. is a 62 y.o. male initiated on antimicrobial therapy with IV Vancomycin for treatment of suspected sepsis    Drug Allergies:   Review of patient's allergies indicates:   Allergen Reactions    Pcn [penicillins] Other (See Comments)     Childhood rxn, pt does not recall type of rxn       Actual Body Weight:   108.3 kg    Renal Function:   Estimated Creatinine Clearance: 89.9 mL/min (based on SCr of 1.1 mg/dL).,     Dialysis Method (if applicable):  N/A    CBC (last 72 hours):  No results for input(s): "WHITE BLOOD CELL COUNT", "HEMOGLOBIN", "HEMATOCRIT", "PLATELETS", "GRAN%", "LYMPH%", "MONO%", "EOSINOPHIL%", "BASOPHIL%", "DIFFERENTIAL METHOD" in the last 72 hours.    Metabolic Panel (last 72 hours):  No results for input(s): "SODIUM", "POTASSIUM", "CHLORIDE", "CO2", "GLUCOSE", "BUN BLD", "CREATININE", "ALBUMIN", "BILIRUBIN TOTAL", "ALK PHOS", "AST", "ALT", "MAGNESIUM", "PHOSPHORUS" in the last 72 hours.    Drug levels (last 3 results):  No results for input(s): "VANCOMYCINRA", "VANCORANDOM", "VANCOMYCINPE", "VANCOPEAK", "VANCOMYCINTR", "VANCOTROUGH" in the last 72 hours.    Microbiologic Results:  Microbiology Results (last 7 days)       Procedure Component Value Units Date/Time    Blood culture [[id number]] Collected: 08/15/23 0459    Order Status: Sent Specimen: Blood from Antecubital, Right Updated: 08/15/23 0459 " Pt to ER with father for bleeding from mouth after a fall, father denies LOC, bleeding controlled at this time    Awake, alert and aware of environment with age appropriate behavior. No acute distress noted. Skin is warm and dry with normal color. Airway is open and patent, respirations are spontaneous, unlabored with normal rate and effort. Abdomen is soft and non distended. Patient is moving all extremities spontaneously. No obvious musculoskeletal deformities noted.        Blood culture [[id number]]     Order Status: Sent Specimen: Blood

## 2023-08-15 NOTE — PT/OT/SLP PROGRESS
"Occupational Therapy      Patient Name:  Fabiano Malik Jr.   MRN:  [id number]    Patient not seen today secondary to Patient unwilling to participate (PM 1418: Pt decline OT stating "I'm having a rough time."  Pt reporting increased throat pain and difficulty swallowing.). Pt requested OT to return tomorrow. Will follow-up then.    8/15/2023  "

## 2023-08-15 NOTE — NURSING
Pt had fever throughout the night tachy, high respt, notified MD Washington, new orders placed  Scheduled tylenol given per order

## 2023-08-15 NOTE — SUBJECTIVE & OBJECTIVE
Past Medical History:   Diagnosis Date    Chronic back pain greater than 3 months duration     Depression     Diabetes mellitus     Diabetes mellitus, type 2     Hypertension     Schizophrenia        Past Surgical History:   Procedure Laterality Date    APPENDECTOMY      CARPAL TUNNEL RELEASE Right 12/13/2022    Procedure: RELEASE, CARPAL TUNNEL;  Surgeon: Everton Walter Jr., MD;  Location: [facility] OR;  Service: Orthopedics;  Laterality: Right;    COLONOSCOPY N/A 5/31/2018    Procedure: COLONOSCOPY;  Surgeon: Guillaume Hatch MD;  Location: [facility] ENDO;  Service: Endoscopy;  Laterality: N/A;    COLONOSCOPY N/A 11/17/2022    Procedure: COLONOSCOPY;  Surgeon: Guillaume Hatch MD;  Location: [facility] ENDO;  Service: Endoscopy;  Laterality: N/A;    DECOMPRESSION, NERVE, ULNAR Right 12/13/2022    Procedure: DECOMPRESSION, NERVE, ULNAR;  Surgeon: Everton Walter Jr., MD;  Location: [facility] OR;  Service: Orthopedics;  Laterality: Right;    FUSION, SPINE, CERVICAL N/A 8/11/2023    Procedure: FUSION, SPINE, CERVICAL;  Surgeon: Guillaume Washington MD;  Location: [facility] OR;  Service: Neurosurgery;  Laterality: N/A;  C3-4, C5-6, C6-7 ACDF C3-C7 anterior instrumentation Depuy  -ANTERIOR DECOMPREESSION 2 LEVELS   -ANTERIOR INSTRUMENTATION INTERBODY CAGE INTERSPACE 3   -LOP 3.5 HR   -LOS 3 DAYS   -GENERAL   -TYPE AND SCREEN   - C ARM   -EMG, SEP, MEP hari notified cc  -ASPEN   -REG    NECK SURGERY      RADIOFREQUENCY ABLATION OF LUMBAR MEDIAL BRANCH NERVE AT SINGLE LEVEL Left 5/29/2018    Procedure: RADIOFREQUENCY THERMOCOAGULATION (RFTC)-NERVE-MEDIAN BRANCH-LUMBAR- Left L2-3-4-5;  Surgeon: Donavon Dennis MD;  Location: [facility] PAIN MGT;  Service: Pain Management;  Laterality: Left;    RADIOFREQUENCY ABLATION OF LUMBAR MEDIAL BRANCH NERVE AT SINGLE LEVEL Right 1/8/2019    Procedure: Radiofrequency Ablation, Nerve, Spinal, Lumbar, Medial Branch, L2,3,4,5;  Surgeon: Alberto Hernandez Jr., MD;  Location: [facility] PAIN MGT;  Service: Pain  Management;  Laterality: Right;  Pt is diabetic    RADIOFREQUENCY ABLATION OF LUMBAR MEDIAL BRANCH NERVE AT SINGLE LEVEL Left 3/12/2019    Procedure: Radiofrequency Ablation, Nerve, Spinal, Lumbar, Medial Branch, Left, L2,3,4,5;  Surgeon: Alberto Hernandez Jr., MD;  Location: [facility] PAIN T;  Service: Pain Management;  Laterality: Left;  Pt will be consented the day of procedure.    TRANSFORAMINAL EPIDURAL INJECTION OF STEROID Right 9/12/2019    Procedure: Injection,steroid,epidural,transforaminal,right,L4-5 and L5-S1;  Surgeon: Alberto Hernandez Jr., MD;  [place] [facility] [place] [facility] [place]  Service: Pain Management;  Laterality: Right;  PT IS DIABETIC       Review of patient's allergies indicates:   Allergen Reactions    Pcn [penicillins] Other (See Comments)     Childhood rxn, pt does not recall type of rxn       No current facility-administered medications on file prior to encounter.     Current Outpatient Medications on File Prior to Encounter   Medication Sig    acetaminophen (TYLENOL) 325 MG tablet Take 2 tablets (650 mg total) by mouth every 6 (six) hours as needed.    BLOOD PRESSURE CUFF Misc 1 Units by Misc.(Non-Drug; Combo Route) route once daily.    blood sugar diagnostic (TRUE METRIX GLUCOSE TEST STRIP) Strp use 1 strip to check glucose 2 (two) times a day.    blood-glucose meter Misc Use as instructed    chlorthalidone (HYGROTEN) 25 MG Tab Take 1 tablet (25 mg total) by mouth once daily.    DULoxetine (CYMBALTA) 60 MG capsule TAKE 2 CAPSULES BY MOUTH EVERY MORNING    flu vacc [id number] 6mos up,PF, (FLUARIX QUAD 0969-2229, PF,) 60 mcg (15 mcg x 4)/0.5 mL Syrg Inject into the muscle.    glimepiride (AMARYL) 2 MG tablet Take 1 tablet (2 mg total) by mouth before breakfast.    lancets 30 gauge Misc 1 lancet by Misc.(Non-Drug; Combo Route) route twice daily.    meloxicam (MOBIC) 15 MG tablet     metFORMIN (GLUCOPHAGE) 1000 MG tablet Take 1 tablet (1,000 mg total) by mouth 2 (two) times daily with meals.    olmesartan  "(BENICAR) 20 MG tablet Take 1 tablet (20 mg total) by mouth once daily.    PFIZER COVID-19 CIELO VACCN,PF, 30 mcg/0.3 mL injection     potassium chloride (KLOR-CON) 10 MEQ TbSR take 1 tablet by mouth once daily    pravastatin (PRAVACHOL) 40 MG tablet TAKE 1 TABLET(40 MG) BY MOUTH EVERY DAY    SHINGRIX, PF, 50 mcg/0.5 mL injection     sildenafiL (VIAGRA) 100 MG tablet Take 1 tablet (100 mg total) by mouth daily as needed for Erectile Dysfunction.     Family History       Problem Relation (Age of Onset)    Alzheimer's disease Mother, Sister    Cancer Father    Diabetes Mother    Heart attack Father    Heart defect Father, Sister    Stroke Father          Tobacco Use    Smoking status: Never    Smokeless tobacco: Never   Substance and Sexual Activity    Alcohol use: Yes     Comment: "couple of beers a day"    Drug use: No    Sexual activity: Not Currently     Review of Systems   Constitutional:  Positive for activity change and fever.   HENT:  Positive for sore throat and trouble swallowing.    Respiratory:  Positive for cough and shortness of breath.    Musculoskeletal:  Positive for arthralgias and back pain.   Neurological:  Positive for numbness. Negative for light-headedness.     Objective:     Vital Signs (Most Recent):  Temp: 97.3 °F (36.3 °C) (08/15/23 1559)  Pulse: 92 (08/15/23 1559)  Resp: 18 (08/15/23 1559)  BP: 127/64 (08/15/23 1559)  SpO2: (!) 94 % (08/15/23 1559) Vital Signs (24h Range):  Temp:  [97.3 °F (36.3 °C)-101.3 °F (38.5 °C)] 97.3 °F (36.3 °C)  Pulse:  [] 92  Resp:  [16-18] 18  SpO2:  [90 %-97 %] 94 %  BP: (111-138)/(61-77) 127/64     Weight: 108.3 kg (238 lb 12.1 oz)  Body mass index is 31.5 kg/m².     Physical Exam  Constitutional:       Appearance: Normal appearance.   Cardiovascular:      Rate and Rhythm: Regular rhythm. Tachycardia present.      Heart sounds: No murmur heard.  Pulmonary:      Breath sounds: Rhonchi present.   Skin:     Comments: Cervical dressing intact. C-collar in " place   Neurological:      Mental Status: He is alert.      Sensory: Sensory deficit (bilateral lower ext decreased sensation) present.          Significant Labs: All pertinent labs within the past 24 hours have been reviewed.  BMP:   Recent Labs   Lab 08/15/23  0505   *   *   K 3.6   CL 97   CO2 25   BUN 18   CREATININE 1.0   CALCIUM 9.8     CBC:   Recent Labs   Lab 08/15/23  0505   WBC 12.80*   HGB 14.2   HCT 40.3          Significant Imaging: I have reviewed all pertinent imaging results/findings within the past 24 hours.

## 2023-08-15 NOTE — ASSESSMENT & PLAN NOTE
Chronic, controlled.  Latest blood pressure and vitals reviewed-     Temp:  [97.3 °F (36.3 °C)-101.3 °F (38.5 °C)]   Pulse:  []   Resp:  [16-18]   BP: (111-138)/(61-77)   SpO2:  [90 %-97 %] .   Home meds for hypertension were reviewed and noted below-  Hypertension Medications             chlorthalidone (HYGROTEN) 25 MG Tab Take 1 tablet (25 mg total) by mouth once daily.    olmesartan (BENICAR) 20 MG tablet Take 1 tablet (20 mg total) by mouth once daily.          While in the hospital, will manage blood pressure as follows; Continue home antihypertensive regimen    Will utilize p.r.n. blood pressure medication only if patient's blood pressure greater than 180/110 and he develops symptoms such as worsening chest pain or shortness of breath.

## 2023-08-15 NOTE — PROGRESS NOTES
Future Appointments   Date Time Provider Department Center   8/17/2023  1:00 PM Zari Reynoso, PT [facility] OP [facility]   8/22/2023 10:45 AM Shalini Burks, BUD [facility] PODIAT [facility]   8/25/2023  2:15 PM [facility] ODC XR-A LIMIT 350 LBS [facility] XRAY OP [facility]   8/25/2023  3:30 PM Massiel Lund, PA-C [facility] NEUROSU [facility]   8/31/2023  2:20 PM Joyce Araya MD [facility] ALCON [facility]   10/16/2023  8:15 AM [facility] ODC XR-A LIMIT 350 LBS [facility] XRAY OP [facility]   10/16/2023  9:30 AM Guillaume Washington MD [facility] NEUROSU [facility]   10/18/2023  2:15 PM Chino Cardozo MD [facility] UROLOGY [facility]   1/10/2024  1:30 PM Lucien Fleming MD [facility]
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
## 2023-08-15 NOTE — PROGRESS NOTES
Fabiano Malik JrJohn #[id number] (CSN: [id number]) (62 y.o. M) (Adm: 08/11/23)  [facility] [id number] A  PCP    CHEYENNE KAM  Date of Birth    1960     Demographics    Address:   [facility] [address] [place] [facility] [address] [place] [address]    Home Phone:   [phone]    Work Phone:       Mobile Phone:   [phone]              SSN:       Insurance:   [employer]ARE    Marital Status:       Taoist:   Religion                Admission Dx    M54.12 Cervical radiculopathy (Cervical radiculopathy [M54.12])   M50.30 DDD (degenerative disc disease), cervical (DDD (degenerative disc disease), cervical [M50.30])   M48.02 Cervical spinal stenosis (Cervical spinal stenosis [M48.02])   G95.9 Cervical myelopathy (Cervical myelopathy [G95.9])   M47.12, M47.22 Cervical spondylosis with myelopathy and radiculopathy     Chief Complaint    None  Documents Filed to Patient    Power of  Living Will Clinical Unknown Study Attachment Consent Form ABN Waiver After Visit Summary Lab Result Scan Code Status Patient Portal Status    Not on File  Not on File  Not on File  Not on File  Filed  Not on File  Filed  Not on File  FULL [Updated on 08/12/23 0827]  Active     Admission Information    Current Information    Attending Provider Admitting Provider Admission Type Admission Status   Guillaume Washington MD Denis, Daniel R., MD Elective Confirmed Admission          Admission Date/Time Discharge Date Hospital Service Auth/Cert Status   08/11/23  0942  Neurosurgery Incomplete          Hospital Area Unit Room/Bed    [facility] MEDICAL SURGICAL UNIT ACUTE K517/K517 A              Hospital Account    Name Acct ID Class Status Primary Coverage   Fabiano Malik JrJohn [id number] IP- Inpatient [facility] [employer] [employer] MEDICARE - [facility]            Guarantor Account (for Hospital Account #[id number])    Name Relation to Pt Service Area Active? Acct Type    Fabiano Malik Jr. Self OHSSA Yes Personal/Family   Address Phone     [address]   [address]   [place] [id number] [phone](H)              Coverage Information (for Hospital Account #[id number])    F/O Payor/Plan Precert #   [facility] [id number]   Subscriber Subscriber #   Fabiano Malik Jr. [id number]   Address Phone   [address]   [place] [address] [phone]            Emergency Contact Information    Name: Tiff Malik Relationship: Daughter   Address:     City: [place] State: [place] Zip: [address] Phone:     Business phone: [phone]        Ambulatory referral/consult to Home Health [[id number]] (Order [id number])  Outpatient Referral  Date and Time: 8/13/2023 12:24 PM Department: [facility] Medical Surgical Unit Acute Rel By/Authorizing: Guillaume Washington MD     Linked Results    Procedure Abnormality Status   Ambulatory referral/consult to Home Health         Dept Order Information    Date Department   8/13/2023 [facility] Medical Surgical Unit Acute             Order Information    Order Date/Time Release Date/Time Start Date/Time End Date/Time   08/13/23 12:24 PM None 08/13/23 12:23 PM None           Order Details    Frequency Duration Priority Order Class   2-3x/week 12  occurrences Routine Internal Referral       Quantity    Ordering Quantity   1     Quantity    Ordering Quantity Ordering Quantity   1 1         Order Details    Order ID   [id number]       Comments    [facility] - Med Surg     HOME HEALTH ORDERS   FACE TO FACE ENCOUNTER     Patient Name: Fabiano Malik Jr.   YOB: 1960     PCP: Lucien Fleming MD   PCP Address: [address] [place] [address] / [place] [address]   PCP Phone Number: [phone]   PCP Fax: [phone]     Encounter Date: 08/13/2023     Admit to Home Health     Diagnoses:   Active Hospital Problems     *Cervical spondylosis with myelopathy and radiculopathy [M47.12, M47.22]      POA: Yes        CKD (chronic kidney disease) stage 3, GFR 30-59 ml/min [N18.30]      POA: Yes       Type 2 diabetes mellitus without complication, without long-term current use of insulin [E11.9]      POA: Yes       Hypertension associated with diabetes [E11.59, I15.2]      POA: Yes       Schizophrenia [F20.9]      POA: Yes          Chronic       Resolved Hospital Problems   No resolved problems to display.       Future Appointments   8/17/2023  1:00 PM    Zari Reynoso, PT        [facility]         [facility]   8/22/2023  10:45 AM   Shalini Burks, DPM     [facility] PODIAT         [facility]   8/25/2023  2:15 PM    [facility] O[facility] XR-A LIMIT 350 L* [facility] XRAY OP        [facility]   8/25/2023  3:30 PM    Massiel Lund PA* [facility] NEUROSU        [facility]   10/16/2023 8:15 AM    [facility] XR-A LIMIT 350 L* [facility] XRAY OP        [facility]   10/16/2023 9:30 AM    Guillaume Washington MD       [facility] NEUROSU        [facility]   10/18/2023 2:15 PM    Chino Cardozo MD         [facility] UROLOGY        [facility]   1/10/2024  1:30 PM    Lucien Fleming, * [facility]        [place]   [unfilled]       I have seen and examined this patient face to face today. My clinical findings that support the need for the home health skilled services and home bound status are the following:   Weakness/numbness causing balance and gait disturbance due to Surgery making it taxing to leave home.     Allergies:Review of patient's allergies indicates:    -- Pcn [penicillins] -- Other (See Comments)     --  Childhood rxn, pt does not recall type of rxn     Diet: dental soft diet     Activities: activity as tolerated     Nursing:   SN to complete comprehensive assessment including routine vital signs. Instruct on disease process and s/s of complications to report to MD. Review/verify medication list sent home with the patient at time of discharge  and instruct patient/caregiver as needed. Frequency may be adjusted depending on start of  care date. If patient has enteral feeding tube (NG, PEG, J-tube, G-tube), flush tube before and after feeding and/or medication administration with 20-30 mL of water.     Notify MD if SBP > 160 or < 90; DBP > 90 or < 50; HR > 120 or < 50; Temp > 101; Other:           CONSULTS:     Physical Therapy to evaluate and treat. Evaluate for home safety and equipment needs; Establish/upgrade home exercise program. Perform / instruct on therapeutic exercises, gait training, transfer training, and Range of Motion.   Occupational Therapy to evaluate and treat. Evaluate home environment for safety and equipment needs. Perform/Instruct on transfers, ADL training, ROM, and therapeutic exercises.   Speech Therapy  to evaluate and treat for swallowing .   Aide to provide assistance with personal care, ADLs, and vital signs.     MISCELLANEOUS CARE:   N/A     WOUND CARE ORDERS   no       Medications: Review discharge medications with patient and family and provide education.       Current Discharge Medication List     CONTINUE these medications which have NOT CHANGED     acetaminophen (TYLENOL) 325 MG tablet   Take 2 tablets (650 mg total) by mouth every 6 (six) hours as needed.   Qty: 120 tablet Refills: 3     ALPRAZolam (XANAX) 0.5 MG tablet   Take 1 tablet (0.5 mg total) by mouth nightly as needed for Anxiety.   Qty: 30 tablet Refills: 0   Associated Diagnoses:Anxiety     BLOOD PRESSURE CUFF Misc   1 Units by Misc.(Non-Drug; Combo Route) route once daily.   Qty: 1 each Refills: 0   Associated Diagnoses:Hypertension associated with diabetes     blood sugar diagnostic (TRUE METRIX GLUCOSE TEST STRIP) Strp   use 1 strip to check glucose 2 (two) times a day.   Qty: 200 strip Refills: 6   Associated Diagnoses:Type 2 diabetes mellitus without complication, without long-term current use of insulin     blood-glucose meter Misc   Use as instructed   Qty: 1 each Refills: 0   Associated Diagnoses:Type 2 diabetes mellitus without complication,  without long-term current use of insulin     chlorthalidone (HYGROTEN) 25 MG Tab   Take 1 tablet (25 mg total) by mouth once daily.   Qty: 90 tablet Refills: 3   Comments: .   Associated Diagnoses:Essential hypertension     dulaglutide (TRULICITY) 0.75 mg/0.5 mL pen injector   Inject 0.75 mg (one pen) into the skin every 7 days.   Qty: 12 pen  Refills: 1   Associated Diagnoses:Type 2 diabetes mellitus with hyperglycemia     DULoxetine (CYMBALTA) 60 MG capsule   TAKE 2 CAPSULES BY MOUTH EVERY MORNING   Qty: 180 capsule Refills: 3     flu vacc [id number] 6mos up,PF, (FLUARIX QUAD 6804-1810, PF,) 60 mcg (15 mcg x 4)/0.5 mL Syrg   Inject into the muscle.   Qty: 0.5 mL Refills: 0     glimepiride (AMARYL) 2 MG tablet   Take 1 tablet (2 mg total) by mouth before breakfast.   Qty: 90 tablet Refills: 3   Associated Diagnoses:Type 2 diabetes mellitus with hyperglycemia, without long-term current use of insulin     hydrOXYzine pamoate (VISTARIL) 50 MG Cap   TAKE 1 CAPSULE (50 MG) BY MOUTH NIGHTLY AS NEEDED FOR INSOMNIA   Qty: 90 capsule Refills: 3   Associated Diagnoses:Primary insomnia     lancets 30 gauge Misc   1 lancet by Misc.(Non-Drug; Combo Route) route twice daily.   Qty: 200 each Refills: 6   Comments: True metrix   Associated Diagnoses:Type 2 diabetes mellitus without complication, without long-term current use of insulin     meclizine (ANTIVERT) 12.5 mg tablet   Take 1 tablet (12.5 mg total) by mouth 3 (three) times daily as needed for Dizziness.   Qty: 180 tablet Refills: 0   Associated Diagnoses:Dizziness     !! meloxicam (MOBIC) 15 MG tablet       !! meloxicam (MOBIC) 15 MG tablet   Take 1 tablet (15 mg total) by mouth once daily.   Qty: 90 tablet Refills: 1     metFORMIN (GLUCOPHAGE) 1000 MG tablet   Take 1 tablet (1,000 mg total) by mouth 2 (two) times daily with meals.   Qty: 180 tablet Refills: 1   Associated Diagnoses:Type 2 diabetes mellitus without complication, without long-term current use of insulin      olmesartan (BENICAR) 20 MG tablet   Take 1 tablet (20 mg total) by mouth once daily.   Qty: 90 tablet Refills: 0   Comments: .   Associated Diagnoses:Hypertension associated with diabetes     omeprazole (PRILOSEC) 20 MG capsule   Take 1 capsule (20 mg total) by mouth before breakfast.   Qty: 90 capsule Refills: 3   Associated Diagnoses:Gastroesophageal reflux disease, unspecified whether esophagitis present     PFIZER COVID-19 CIELO VACCN,PF, 30 mcg/0.3 mL injection       potassium chloride (KLOR-CON) 10 MEQ TbSR   take 1 tablet by mouth once daily   Qty: 90 tablet Refills: 3     pravastatin (PRAVACHOL) 40 MG tablet   TAKE 1 TABLET(40 MG) BY MOUTH EVERY DAY   Qty: 90 tablet Refills: 3     pregabalin (LYRICA) 100 MG capsule   Take 2 capsules (200 mg total) by mouth 2 (two) times daily.   Qty: 360 capsule Refills: 1   Associated Diagnoses:Other chronic pain; Cervical radiculopathy; Type 2 diabetes mellitus with diabetic polyneuropathy, without long-term current use of insulin     SHINGRIX, PF, 50 mcg/0.5 mL injection       sildenafiL (VIAGRA) 100 MG tablet   Take 1 tablet (100 mg total) by mouth daily as needed for Erectile Dysfunction.   Qty: 30 tablet Refills: 3   Associated Diagnoses:Erectile dysfunction, unspecified erectile dysfunction type     tamsulosin (FLOMAX) 0.4 mg Cap   Take 1 capsule (0.4 mg total) by mouth every evening.   Qty: 90 capsule Refills: 0   Associated Diagnoses:Benign prostatic hyperplasia with urinary obstruction     !! - Potential duplicate medications found. Please discuss with provider.           I certify that this patient is confined to his home and needs intermittent skilled nursing care, physical therapy, speech therapy, and occupational therapy.                    Reprint Order Requisition    Ambulatory referral/consult to [facility] (Order #[id number]) on 8/13/23       Standing Order Information    Remaining Occurrences Interval      12/12 2-3x/week                     Associated  "Diagnoses     ICD-10-CM ICD-9-CM   Oropharyngeal dysphagia     R13.12 787.22   Impaired gait and mobility     R26.89 781.2   S/P cervical spinal fusion     Z98.1 V45.4         Ambulatory referral/consult to Home Health: Patient Communication     Not Released  Not seen                 Process Instructions    Patient should be seen by the "Expected Date".        Collection Information              Acknowledgement Info    For At Acknowledged By Acknowledged On   Placing Order 23 1224 Levi Garcia RN 23 1224                         Patient Details  MRN:[id number]  Name Legal Sex:  SSBehzad Gatica Jr. Male 1960          Address Phone Email Aliases   [address]   [address]    [place] [address] Home: [phone]  Work:   Cell: [phone] [email or web address] LENNY          Inpatient Unit Inpatient Room Inpatient Bed    [facility] MEDICAL SURGICAL UNIT ACUTE K517 K517 A           PCP Allergies     Lucien Fleming MD Penicillins                Primary Visit Coverage    Payer Plan Sponsor Code Group Number Group Name   [employer] MEDICARE [employer] SECUREFULL        Primary Visit Coverage Subscriber    Subscriber ID Subscriber Name Subscriber Address   [id number] URB[employer] SANCHEZ JR. [address]     [address]      [place] [id number]           Additional Information    Associated Reports   Priority and Order Details           ADT-Related Order Information         Encounter    View Encounter               Order Provider Info        Office phone Pager E-mail   Ordering User  Guillaume Washington MD  [phone] [email or web address]   Authorizing Provider  Guillaume Washington MD  [phone] [email or web address]   Attending Provider  Guillaume Washington MD  [phone] [email or web address]     Ambulatory referral/consult to Home Health [[id number]]    Electronically " signed by: Guillaume Washington MD on 08/13/23 1224 Status: Active   Ordering user: Guillaume Washington MD 08/13/23 1224 Ordering provider: Guillaume Washington MD   Authorized by: Guillaume Washington MD Ordering mode: Standard   Frequency: 2-3x/week 08/13/23 1223 - 12  occurrences   Acknowledged: eLvi Garcia RN 08/13/23 1224 for Placing Order   Diagnoses   Oropharyngeal dysphagia [R13.12]   Impaired gait and mobility [R26.89]   S/P cervical spinal fusion [Z98.1]   Order comments: [facility] - Med Surg HOME HEALTH ORDERS FACE TO FACE ENCOUNTER Patient Name: Fabiano Malik Jr. YOB: 1960 PCP: Lucien Fleming MD PCP Address: [address] [place] [address] [place] [address] PCP Phone Number: [phone] PCP Fax: [phone] Encounter Date: 08/13/2023 Admit to Home Health Diagnoses: Active Hospital Problems   *Cervical spondylosis with myelopathy and radiculopathy [M47.12, M47.22]    POA: Yes   CKD (chronic kidney disease) stage 3, GFR 30-59 ml/min [N18.30]    POA: Yes   Type 2 diabetes mellitus without complication, without long-term current use of insulin [E11.9]    POA: Yes   Hypertension associated with diabetes [E11.59, I15.2]    POA: Yes   Schizophrenia [F20.9]    POA: Yes        Chronic Resolved Hospital Problems No resolved problems to display. Future Appointments 8/17/2023  1:00 PM    Zari Reynoso, PT        [facility] OP RHB         [place] [facility] 8/22/2023  10:45 AM   Shalini Burks DPM     [facility] PODIAT         [facility] 8/25/2023  2:15 PM    [facility] ODC XR-A LIMIT 350 L* [facility] XRAY OP        [facility] 8/25/2023  3:30 PM    Massiel Lund PA* [facility] NEUROSU        [facility] 10/16/2023 8:15 AM    [facility] ODC XR-A LIMIT 350 L* [facility] XRAY OP        [facility] 10/16/2023 9:30 AM    Guillaume Washington MD       [facility] NEUROSU        [facility] 10/18/2023 2:15 PM    Chino Cardozo MD         [facility] UROLOGY        [facility] 1/10/2024  1:30 PM    Lucien Fleimng, * [facility]         [place] [email or web address]@ I have seen and examined this patient face to face today. My clinical findings that support the need for the home health skilled services and home bound status are the following: Weakness/numbness causing balance and gait disturbance due to Surgery making it taxing to leave home. Allergies:Review of patient's allergies indicates:  -- Pcn [penicillins] -- Other (See Comments)   --  Childhood rxn, pt does not recall type of rxn Diet: dental soft diet Activities: activity as tolerated Nursing: SN to complete comprehensive assessment including routine vital signs. Instruct on disease process and s/s of complications to report to MD. Review/verify medication list sent home with the patient at time of discharge  and instruct patient/caregiver as needed. Frequency may be adjusted depending on start of care date. If patient has enteral feeding tube (NG, PEG, J-tube, G-tube), flush tube before and after feeding and/or medication administration with 20-30 mL of water. Notify MD if SBP > 160 or < 90; DBP > 90 or < 50; HR > 120 or < 50; Temp > 101; Other:     CONSULTS:   Physical Therapy to evaluate and treat. Evaluate for home safety and equipment needs; Establish/upgrade home exercise program. Perform / instruct on therapeutic exercises, gait training, transfer training, and Range of Motion. Occupational Therapy to evaluate and treat. Evaluate home environment for safety and equipment needs. Perform/Instruct on transfers, ADL training, ROM, and therapeutic exercises. Speech Therapy  to evaluate and treat for swallowing . Aide to provide assistance with personal care, ADLs, and vital signs. MISCELLANEOUS CARE: N/A WOUND CARE ORDERS no Medications: Review discharge medications with patient and family and provide education.   Current Discharge Medication List CONTINUE these medications which have NOT CHANGED acetaminophen (TYLENOL) 325 MG tablet Take 2 tablets (650 mg total) by mouth every 6 (six)  hours as needed. Qty: 120 tablet Refills: 3 ALPRAZolam (XANAX) 0.5 MG tablet Take 1 tablet (0.5 mg total) by mouth nightly as needed for Anxiety. Qty: 30 tablet Refills: 0 Associated Diagnoses:Anxiety BLOOD PRESSURE CUFF Misc 1 Units by Misc.(Non-Drug; Combo Route) route once daily. Qty: 1 each Refills: 0 Associated Diagnoses:Hypertension associated with diabetes blood sugar diagnostic (TRUE METRIX GLUCOSE TEST STRIP) Strp use 1 strip to check glucose 2 (two) times a day. Qty: 200 strip Refills: 6 Associated Diagnoses:Type 2 diabetes mellitus without complication, without long-term current use of insulin blood-glucose meter Misc Use as instructed Qty: 1 each Refills: 0 Associated Diagnoses:Type 2 diabetes mellitus without complication, without long-term current use of insulin chlorthalidone (HYGROTEN) 25 MG Tab Take 1 tablet (25 mg total) by mouth once daily. Qty: 90 tablet Refills: 3 Comments: . Associated Diagnoses:Essential hypertension dulaglutide (TRULICITY) 0.75 mg/0.5 mL pen injector Inject 0.75 mg (one pen) into the skin every 7 days. Qty: 12 pen  Refills: 1 Associated Diagnoses:Type 2 diabetes mellitus with hyperglycemia DULoxetine (CYMBALTA) 60 MG capsule TAKE 2 CAPSULES BY MOUTH EVERY MORNING Qty: 180 capsule Refills: 3 flu vacc [id number] 6mos up,PF, (FLUARIX QUAD 9745-6040, PF,) 60 mcg (15 mcg x 4)/0.5 mL Syrg Inject into the muscle. Qty: 0.5 mL Refills: 0 glimepiride (AMARYL) 2 MG tablet Take 1 tablet (2 mg total) by mouth before breakfast. Qty: 90 tablet Refills: 3 Associated Diagnoses:Type 2 diabetes mellitus with hyperglycemia, without long-term current use of insulin hydrOXYzine pamoate (VISTARIL) 50 MG Cap TAKE 1 CAPSULE (50 MG) BY MOUTH NIGHTLY AS NEEDED FOR INSOMNIA Qty: 90 capsule Refills: 3 Associated Diagnoses:Primary insomnia lancets 30 gauge Misc 1 lancet by Misc.(Non-Drug; Combo Route) route twice daily. Qty: 200 each Refills: 6 Comments: True metrix Associated Diagnoses:Type 2 diabetes  mellitus without complication, without long-term current use of insulin meclizine (ANTIVERT) 12.5 mg tablet Take 1 tablet (12.5 mg total) by mouth 3 (three) times daily as needed for Dizziness. Qty: 180 tablet Refills: 0 Associated Diagnoses:Dizziness !! meloxicam (MOBIC) 15 MG tablet !! meloxicam (MOBIC) 15 MG tablet Take 1 tablet (15 mg total) by mouth once daily. Qty: 90 tablet Refills: 1 metFORMIN (GLUCOPHAGE) 1000 MG tablet Take 1 tablet (1,000 mg total) by mouth 2 (two) times daily with meals. Qty: 180 tablet Refills: 1 Associated Diagnoses:Type 2 diabetes mellitus without complication, without long-term current use of insulin olmesartan (BENICAR) 20 MG tablet Take 1 tablet (20 mg total) by mouth once daily. Qty: 90 tablet Refills: 0 Comments: . Associated Diagnoses:Hypertension associated with diabetes omeprazole (PRILOSEC) 20 MG capsule Take 1 capsule (20 mg total) by mouth before breakfast. Qty: 90 capsule Refills: 3 Associated Diagnoses:Gastroesophageal reflux disease, unspecified whether esophagitis present PFIZER COVID-19 CIELO VACCN,PF, 30 mcg/0.3 mL injection potassium chloride (KLOR-CON) 10 MEQ TbSR take 1 tablet by mouth once daily Qty: 90 tablet Refills: 3 pravastatin (PRAVACHOL) 40 MG tablet TAKE 1 TABLET(40 MG) BY MOUTH EVERY DAY Qty: 90 tablet Refills: 3 pregabalin (LYRICA) 100 MG capsule Take 2 capsules (200 mg total) by mouth 2 (two) times daily. Qty: 360 capsule Refills: 1 Associated Diagnoses:Other chronic pain; Cervical radiculopathy; Type 2 diabetes mellitus with diabetic polyneuropathy, without long-term current use of insulin SHINGRIX, PF, 50 mcg/0.5 mL injection sildenafiL (VIAGRA) 100 MG tablet Take 1 tablet (100 mg total) by mouth daily as needed for Erectile Dysfunction. Qty: 30 tablet Refills: 3 Associated Diagnoses:Erectile dysfunction, unspecified erectile dysfunction type tamsulosin (FLOMAX) 0.4 mg Cap Take 1 capsule (0.4 mg total) by mouth every evening. Qty: 90 capsule Refills: 0  Associated Diagnoses:Benign prostatic hyperplasia with urinary obstruction !! - Potential duplicate medications found. Please discuss with provider. I certify that this patient is confined to his home and needs intermittent skilled nursing care, physical therapy, speech therapy, and occupational therapy.       Order Diagnosis and CPT Display    Order Diagnosis: Oropharyngeal dysphagia [R13.12 (ICD-10-CM)]   Impaired gait and mobility [R26.89 (ICD-10-CM)]   S/P cervical spinal fusion [Z98.1 (ICD-10-CM)] CPT:                  Electronically signed by: Guillaume Washington MD Lic # [id number] NPI: [id number]

## 2023-08-15 NOTE — ASSESSMENT & PLAN NOTE
Due to pneumonia.  Likely secondary to aspiration pneumonia patient with vocal cord inflammation and dysphagia.  Speech therapy consulted.  Patient initiated on IV cefepime and vanc.

## 2023-08-16 PROBLEM — J69.0 ASPIRATION PNEUMONIA: Status: ACTIVE | Noted: 2023-08-15

## 2023-08-16 LAB
ANION GAP SERPL CALC-SCNC: 8 MMOL/L (ref 8–16)
BASOPHILS # BLD AUTO: 0.09 K/UL (ref 0–0.2)
BASOPHILS NFR BLD: 0.8 % (ref 0–1.9)
BUN SERPL-MCNC: 19 MG/DL (ref 8–23)
CALCIUM SERPL-MCNC: 8.6 MG/DL (ref 8.7–10.5)
CHLORIDE SERPL-SCNC: 101 MMOL/L (ref 95–110)
CO2 SERPL-SCNC: 26 MMOL/L (ref 23–29)
CREAT SERPL-MCNC: 1.4 MG/DL (ref 0.5–1.4)
DIFFERENTIAL METHOD: ABNORMAL
EOSINOPHIL # BLD AUTO: 0.3 K/UL (ref 0–0.5)
EOSINOPHIL NFR BLD: 2.5 % (ref 0–8)
ERYTHROCYTE [DISTWIDTH] IN BLOOD BY AUTOMATED COUNT: 12.9 % (ref 11.5–14.5)
EST. GFR  (NO RACE VARIABLE): 57 ML/MIN/1.73 M^2
GLUCOSE SERPL-MCNC: 195 MG/DL (ref 70–110)
HCT VFR BLD AUTO: 33 % (ref 40–54)
HGB BLD-MCNC: 11.2 G/DL (ref 14–18)
IMM GRANULOCYTES # BLD AUTO: 0.04 K/UL (ref 0–0.04)
IMM GRANULOCYTES NFR BLD AUTO: 0.3 % (ref 0–0.5)
LYMPHOCYTES # BLD AUTO: 1.3 K/UL (ref 1–4.8)
LYMPHOCYTES NFR BLD: 10.7 % (ref 18–48)
MCH RBC QN AUTO: 28.2 PG (ref 27–31)
MCHC RBC AUTO-ENTMCNC: 33.9 G/DL (ref 32–36)
MCV RBC AUTO: 83 FL (ref 82–98)
MONOCYTES # BLD AUTO: 0.8 K/UL (ref 0.3–1)
MONOCYTES NFR BLD: 6.8 % (ref 4–15)
NEUTROPHILS # BLD AUTO: 9.3 K/UL (ref 1.8–7.7)
NEUTROPHILS NFR BLD: 78.9 % (ref 38–73)
NRBC BLD-RTO: 0 /100 WBC
PLATELET # BLD AUTO: 218 K/UL (ref 150–450)
PMV BLD AUTO: 8.8 FL (ref 9.2–12.9)
POCT GLUCOSE: 150 MG/DL (ref 70–110)
POCT GLUCOSE: 169 MG/DL (ref 70–110)
POCT GLUCOSE: 181 MG/DL (ref 70–110)
POTASSIUM SERPL-SCNC: 4 MMOL/L (ref 3.5–5.1)
RBC # BLD AUTO: 3.97 M/UL (ref 4.6–6.2)
SODIUM SERPL-SCNC: 135 MMOL/L (ref 136–145)
VANCOMYCIN SERPL-MCNC: 19.4 UG/ML
WBC # BLD AUTO: 11.76 K/UL (ref 3.9–12.7)

## 2023-08-16 PROCEDURE — 36415 COLL VENOUS BLD VENIPUNCTURE: CPT | Performed by: PHYSICIAN ASSISTANT

## 2023-08-16 PROCEDURE — 11000001 HC ACUTE MED/SURG PRIVATE ROOM

## 2023-08-16 PROCEDURE — 25500020 PHARM REV CODE 255: Performed by: NEUROLOGICAL SURGERY

## 2023-08-16 PROCEDURE — A9698 NON-RAD CONTRAST MATERIALNOC: HCPCS | Performed by: NEUROLOGICAL SURGERY

## 2023-08-16 PROCEDURE — 80048 BASIC METABOLIC PNL TOTAL CA: CPT | Performed by: PHYSICIAN ASSISTANT

## 2023-08-16 PROCEDURE — 36415 COLL VENOUS BLD VENIPUNCTURE: CPT | Performed by: NEUROLOGICAL SURGERY

## 2023-08-16 PROCEDURE — 99900035 HC TECH TIME PER 15 MIN (STAT)

## 2023-08-16 PROCEDURE — 63600175 PHARM REV CODE 636 W HCPCS: Performed by: NEUROLOGICAL SURGERY

## 2023-08-16 PROCEDURE — 85025 COMPLETE CBC W/AUTO DIFF WBC: CPT | Performed by: PHYSICIAN ASSISTANT

## 2023-08-16 PROCEDURE — 97535 SELF CARE MNGMENT TRAINING: CPT

## 2023-08-16 PROCEDURE — 25000003 PHARM REV CODE 250: Performed by: NEUROLOGICAL SURGERY

## 2023-08-16 PROCEDURE — 94799 UNLISTED PULMONARY SVC/PX: CPT

## 2023-08-16 PROCEDURE — 80202 ASSAY OF VANCOMYCIN: CPT | Performed by: NEUROLOGICAL SURGERY

## 2023-08-16 PROCEDURE — 94761 N-INVAS EAR/PLS OXIMETRY MLT: CPT

## 2023-08-16 PROCEDURE — 92611 MOTION FLUOROSCOPY/SWALLOW: CPT

## 2023-08-16 PROCEDURE — 25000003 PHARM REV CODE 250: Performed by: NURSE PRACTITIONER

## 2023-08-16 PROCEDURE — 97530 THERAPEUTIC ACTIVITIES: CPT

## 2023-08-16 PROCEDURE — 92526 ORAL FUNCTION THERAPY: CPT

## 2023-08-16 PROCEDURE — 97530 THERAPEUTIC ACTIVITIES: CPT | Mod: CO

## 2023-08-16 PROCEDURE — 97535 SELF CARE MNGMENT TRAINING: CPT | Mod: CO

## 2023-08-16 RX ORDER — METRONIDAZOLE 500 MG/1
500 TABLET ORAL EVERY 8 HOURS
Status: DISCONTINUED | OUTPATIENT
Start: 2023-08-16 | End: 2023-08-18 | Stop reason: HOSPADM

## 2023-08-16 RX ADMIN — POTASSIUM CHLORIDE 10 MEQ: 750 TABLET, EXTENDED RELEASE ORAL at 09:08

## 2023-08-16 RX ADMIN — PREGABALIN 200 MG: 75 CAPSULE ORAL at 08:08

## 2023-08-16 RX ADMIN — METHOCARBAMOL TABLETS 750 MG: 750 TABLET, COATED ORAL at 02:08

## 2023-08-16 RX ADMIN — ALPRAZOLAM 0.5 MG: 0.25 TABLET ORAL at 08:08

## 2023-08-16 RX ADMIN — OXYCODONE HYDROCHLORIDE 10 MG: 5 TABLET ORAL at 04:08

## 2023-08-16 RX ADMIN — CEFEPIME 2 G: 2 INJECTION, POWDER, FOR SOLUTION INTRAVENOUS at 05:08

## 2023-08-16 RX ADMIN — METHOCARBAMOL TABLETS 750 MG: 750 TABLET, COATED ORAL at 08:08

## 2023-08-16 RX ADMIN — ACETAMINOPHEN 650 MG: 325 TABLET ORAL at 12:08

## 2023-08-16 RX ADMIN — VANCOMYCIN HYDROCHLORIDE 1500 MG: 1.5 INJECTION, POWDER, LYOPHILIZED, FOR SOLUTION INTRAVENOUS at 06:08

## 2023-08-16 RX ADMIN — BARIUM SULFATE 50 ML: 0.81 POWDER, FOR SUSPENSION ORAL at 12:08

## 2023-08-16 RX ADMIN — DULOXETINE 60 MG: 30 CAPSULE, DELAYED RELEASE ORAL at 09:08

## 2023-08-16 RX ADMIN — MUPIROCIN: 20 OINTMENT TOPICAL at 09:08

## 2023-08-16 RX ADMIN — SODIUM CHLORIDE: 9 INJECTION, SOLUTION INTRAVENOUS at 02:08

## 2023-08-16 RX ADMIN — ACETAMINOPHEN 650 MG: 325 TABLET ORAL at 05:08

## 2023-08-16 RX ADMIN — GUAIFENESIN 600 MG: 600 TABLET, EXTENDED RELEASE ORAL at 09:08

## 2023-08-16 RX ADMIN — GUAIFENESIN 600 MG: 600 TABLET, EXTENDED RELEASE ORAL at 08:08

## 2023-08-16 RX ADMIN — CEFEPIME 2 G: 2 INJECTION, POWDER, FOR SOLUTION INTRAVENOUS at 10:08

## 2023-08-16 RX ADMIN — METRONIDAZOLE 500 MG: 500 TABLET ORAL at 10:08

## 2023-08-16 RX ADMIN — METRONIDAZOLE 500 MG: 5 INJECTION, SOLUTION INTRAVENOUS at 06:08

## 2023-08-16 RX ADMIN — METRONIDAZOLE 500 MG: 500 TABLET ORAL at 02:08

## 2023-08-16 RX ADMIN — ACETAMINOPHEN 650 MG: 325 TABLET ORAL at 10:08

## 2023-08-16 RX ADMIN — CEFEPIME 2 G: 2 INJECTION, POWDER, FOR SOLUTION INTRAVENOUS at 02:08

## 2023-08-16 RX ADMIN — PREGABALIN 200 MG: 75 CAPSULE ORAL at 09:08

## 2023-08-16 RX ADMIN — PANTOPRAZOLE SODIUM 40 MG: 40 TABLET, DELAYED RELEASE ORAL at 08:08

## 2023-08-16 RX ADMIN — DULOXETINE 60 MG: 30 CAPSULE, DELAYED RELEASE ORAL at 08:08

## 2023-08-16 RX ADMIN — HEPARIN SODIUM 5000 UNITS: 5000 INJECTION INTRAVENOUS; SUBCUTANEOUS at 11:08

## 2023-08-16 RX ADMIN — HEPARIN SODIUM 5000 UNITS: 5000 INJECTION INTRAVENOUS; SUBCUTANEOUS at 08:08

## 2023-08-16 RX ADMIN — TAMSULOSIN HYDROCHLORIDE 0.4 MG: 0.4 CAPSULE ORAL at 08:08

## 2023-08-16 NOTE — PLAN OF CARE
Problem: Occupational Therapy  Goal: Occupational Therapy Goal  Description: Goals to be met by: 2023     Patient will increase functional independence with ADLs by performin% reciprocation of spinal precautions / cervical precautions.  Log Rolling to bilateral with Mill[place].   Standing tolerance progression to 5 minutes with BUE support as needed. MET   Dressing routine inclusive of item retrieval with Modified Millard with least restrictive device as needed.  Toileting routine inclusive of functional mobility into / out of bathroom and toileting hygiene/clothing management with modified independence.   100% verbalized understanding of fall risk reduction education and DME to support safe d/c to home.    Outcome: Ongoing, Progressing   Fabiano Malik Jr. is a 62 y.o. male with a medical diagnosis of Cervical spondylosis with myelopathy and radiculopathy.  Performance deficits affecting function are weakness, impaired endurance, impaired self care skills, impaired functional mobility, gait instability, impaired balance, decreased upper extremity function, decreased lower extremity function, pain, decreased ROM, impaired skin, edema.    Pt found in bed, agreeable to therapy despite 10/10 pain.  Pt is progressing towards goals. Continue OT services to address functional goals, progressing as able.

## 2023-08-16 NOTE — PROGRESS NOTES
Pharmacokinetic Assessment Follow Up: IV Vancomycin    Vancomycin serum concentration assessment(s):    The random level was drawn correctly and can be used to guide therapy at this time. The measurement is within the desired definitive target range of 15 to 20 mcg/mL.    Vancomycin Regimen Plan:    Change regimen to Vancomycin 1500 mg IV every 24 hours with next serum trough concentration measured at 1130 prior to next dose on 8/17/23    Drug levels (last 3 results):  Recent Labs   Lab Result Units 08/16/23  1725   Vancomycin, Random ug/mL 19.4       Pharmacy will continue to follow and monitor vancomycin.    Please contact pharmacy at extension [phone] for questions regarding this assessment.    Thank you for the consult,   Bel Herr       Patient brief summary:  Fabiano Malik Jr. is a 62 y.o. male initiated on antimicrobial therapy with IV Vancomycin for treatment of sepsis    The patient's current regimen is vancomycin 1500 mg q24h.    Drug Allergies:   Review of patient's allergies indicates:   Allergen Reactions    Pcn [penicillins] Other (See Comments)     Childhood rxn, pt does not recall type of rxn       Actual Body Weight:   110.8 kg    Renal Function:   Estimated Creatinine Clearance: 71.4 mL/min (based on SCr of 1.4 mg/dL).,     Dialysis Method (if applicable):  N/A    CBC (last 72 hours):  Recent Labs   Lab Result Units 08/15/23  0505 08/16/23  0418   WBC K/uL 12.80* 11.76   Hemoglobin g/dL 14.2 11.2*   Hematocrit % 40.3 33.0*   Platelets K/uL 244 218   Gran % % 83.8* 78.9*   Lymph % % 8.7* 10.7*   Mono % % 5.9 6.8   Eosinophil % % 0.9 2.5   Basophil % % 0.5 0.8   Differential Method  Automated Automated       Metabolic Panel (last 72 hours):  Recent Labs   Lab Result Units 08/15/23  0505 08/15/23  1420 08/16/23  0419   Sodium mmol/L 134*  --  135*   Potassium mmol/L 3.6  --  4.0   Chloride mmol/L 97  --  101   CO2 mmol/L 25  --  26   Glucose mg/dL 187*  --  195*   Glucose, UA   --  Negative  --     BUN mg/dL 18  --  19   Creatinine mg/dL 1.0  --  1.4       Vancomycin Administrations:  vancomycin given in the last 96 hours                     vancomycin 1,500 mg in dextrose 5 % (D5W) 250 mL IVPB (Vial-Mate) (mg) 1,500 mg New Bag 08/16/23 0632     1,500 mg New Bag 08/15/23 1819    vancomycin (VANCOCIN) 2,500 mg in dextrose 5 % (D5W) 500 mL IVPB (mg) 2,500 mg New Bag 08/15/23 0614                    Microbiologic Results:  Microbiology Results (last 7 days)       Procedure Component Value Units Date/Time    Blood culture [[id number]] Collected: 08/15/23 0505    Order Status: Completed Specimen: Blood Updated: 08/16/23 1212     Blood Culture, Routine No Growth to date      No Growth to date    Blood culture [[id number]] Collected: 08/15/23 0459    Order Status: Completed Specimen: Blood from Antecubital, Right Updated: 08/16/23 1212     Blood Culture, Routine No Growth to date      No Growth to date

## 2023-08-16 NOTE — PT/OT/SLP PROGRESS
Physical Therapy Treatment    Patient Name:  Fabiano Malik Jr.   MRN:  [id number]    Recommendations:     Discharge Recommendations:  (low intensity therapy)  Discharge Equipment Recommendations: bedside commode, shower chair  Barriers to discharge: Decreased caregiver support    Assessment:     Fabiano Malik Jr. is a 62 y.o. male admitted with a medical diagnosis of Cervical spondylosis with myelopathy and radiculopathy.  He presents with the following impairments/functional limitations: weakness, gait instability, impaired balance, impaired endurance, impaired self care skills, impaired functional mobility, impaired skin, pain, decreased lower extremity function, decreased upper extremity function, edema, decreased ROM, orthopedic precautions . Pt reporting increased back pain and fatigue from busy day and sitting up for a prolonged period of time for imaging. Pt agreeable to practice step negotiation. Pt progressing towards goals and has met 2/5. Goals adjusted.     Rehab Prognosis: Good; patient would benefit from acute skilled PT services to address these deficits and reach maximum level of function.    Recent Surgery: Procedure(s) (LRB):  FUSION, SPINE, CERVICAL (N/A) 5 Days Post-Op    Plan:     During this hospitalization, patient to be seen daily to address the identified rehab impairments via gait training, therapeutic activities, therapeutic exercises, neuromuscular re-education and progress toward the following goals:    Plan of Care Expires:  09/12/23    Subjective     Chief Complaint: increased back pain from prolonged sitting for imaging  Patient/Family Comments/goals: return home  Pain/Comfort:  Pain Rating 1:  (increased neck and back pain, pt did not rate)  Pain Addressed 1: Distraction, Reposition, Cessation of Activity, Nurse notified  Pain Rating Post-Intervention 1:  (did not rate)      Objective:     Communicated with nsg prior to session.  Patient found HOB elevated with peripheral IV,  "SCD upon PT entry to room.     General Precautions: Standard, fall, aspiration  Orthopedic Precautions: spinal precautions  Braces: Aspen collar  Respiratory Status: Room air     Functional Mobility:  Bed Mobility:     Rolling Right: modified independence  Scooting: modified independence  Supine to Sit: modified independence  Sit to Supine: modified independence  Transfers:     Sit to Stand:  modified independence with 4 wheeled walker  Gait: ~5 ft forward/backward/laterally with rollator and Supervision  Stairs:  Pt ascended/descended  8" step  with left handrail with Contact Guard Assistance and Minimal Assistance. X 5 reps - pt utilizing Napoleon / R HHA for 1st trial then required CGA and only use of L handrail for remaining trials      AM-PAC 6 CLICK MOBILITY  Turning over in bed (including adjusting bedclothes, sheets and blankets)?: 4  Sitting down on and standing up from a chair with arms (e.g., wheelchair, bedside commode, etc.): 4  Moving from lying on back to sitting on the side of the bed?: 4  Moving to and from a bed to a chair (including a wheelchair)?: 3  Need to walk in hospital room?: 3  Climbing 3-5 steps with a railing?: 3  Basic Mobility Total Score: 21       Treatment & Education:  Pt reporting increased back pain and fatigue from busy day and sitting up for a prolonged period of time for imaging.   Pt agreeable to practice step negotiation.  Pt found without Aspen collar  Assisted pt with donning aspen collar  Pt performed log roll then transition supine>sit  Pt performed stand and PT adjusted pt's rollator to appropriate height  Performed 5 step ups on 8" step with HHA and L bed rail for 1st trials then pt able to use only L bed rail for remaining trials   Pt declined further ambulation and returned supine  SCDs replaced    Patient left HOB elevated with all lines intact, call button in reach, bed alarm on, and nsg notified..    GOALS:   Multidisciplinary Problems       Physical Therapy Goals  "         Problem: Physical Therapy    Goal Priority Disciplines Outcome Goal Variances Interventions   Physical Therapy Goal     PT, PT/OT Ongoing, Progressing     Description: Goals to be met by: 2023     Patient will increase functional independence with mobility by performin. Supine <>sit with Modified [place] and Supervision MET 23  2. Sit to stand transfer with Modified Waldo and Supervision MET 23  3. Bed to chair transfer with Modified Waldo  4. Gait  x 200 feet with Modified [place] using rollator  5. Ascend/descend 4 steps with left Handrails Supervision using Single-point Cane .                          Time Tracking:     PT Received On: 23  PT Start Time: 1503     PT Stop Time: 1527  PT Total Time (min): 24 min     Billable Minutes: Therapeutic Activity 24     Treatment Type: Treatment  PT/PTA: PT     Number of PTA visits since last PT visit: 0     2023

## 2023-08-16 NOTE — SUBJECTIVE & OBJECTIVE
Interval History:  Patient reports nausea without emesis.  Persistent cough and difficulty swallowing.  Pain is presently a 10/10 prior to receiving pain medication.  Reviewed speech therapy note.  Plan for modified barium swallow study today.    Labs reviewed.  WBC 11.7 hemoglobin 11.2.    Review of Systems   Constitutional:  Positive for appetite change.   HENT:  Positive for sore throat and trouble swallowing.    Respiratory:  Positive for cough.      Objective:     Vital Signs (Most Recent):  Temp: 98.4 °F (36.9 °C) (08/16/23 0715)  Pulse: 77 (08/16/23 0801)  Resp: 18 (08/16/23 0801)  BP: (!) 99/55 (08/16/23 0715)  SpO2: (!) 90 % (08/16/23 0801) Vital Signs (24h Range):  Temp:  [97.3 °F (36.3 °C)-100 °F (37.8 °C)] 98.4 °F (36.9 °C)  Pulse:  [] 77  Resp:  [17-20] 18  SpO2:  [89 %-96 %] 90 %  BP: ()/(55-64) 99/55     Weight: 110.8 kg (244 lb 4.3 oz)  Body mass index is 32.23 kg/m².    Intake/Output Summary (Last 24 hours) at 8/16/2023 1117  Last data filed at 8/16/2023 0802  Gross per 24 hour   Intake 480 ml   Output 1550 ml   Net -1070 ml         Physical Exam  Vitals and nursing note reviewed.   Constitutional:       Appearance: Normal appearance.   Cardiovascular:      Rate and Rhythm: Normal rate.   Pulmonary:      Effort: Pulmonary effort is normal.      Comments: Decreased breath sounds  Musculoskeletal:      Comments: Cervical collar in place.  Surgical dressing intact.   Neurological:      Mental Status: He is alert and oriented to person, place, and time.             Significant Labs: All pertinent labs within the past 24 hours have been reviewed.  BMP:   Recent Labs   Lab 08/16/23  0419   *   *   K 4.0      CO2 26   BUN 19   CREATININE 1.4   CALCIUM 8.6*     CBC:   Recent Labs   Lab 08/15/23  0505 08/16/23  0418   WBC 12.80* 11.76   HGB 14.2 11.2*   HCT 40.3 33.0*    218       Significant Imaging: I have reviewed all pertinent imaging results/findings within the past  24 hours.

## 2023-08-16 NOTE — PLAN OF CARE
Problem: Adult Inpatient Plan of Care  Goal: Plan of Care Review  Outcome: Ongoing, Progressing  Goal: Patient-Specific Goal (Individualized)  Outcome: Ongoing, Progressing  Goal: Absence of Hospital-Acquired Illness or Injury  Outcome: Ongoing, Progressing  Goal: Optimal Comfort and Wellbeing  Outcome: Ongoing, Progressing  Goal: Readiness for Transition of Care  Outcome: Ongoing, Progressing     Problem: Diabetes Comorbidity  Goal: Blood Glucose Level Within Targeted Range  Outcome: Ongoing, Progressing     Problem: Infection  Goal: Absence of Infection Signs and Symptoms  Outcome: Ongoing, Progressing     Problem: Fall Injury Risk  Goal: Absence of Fall and Fall-Related Injury  Outcome: Ongoing, Progressing     Problem: Hypertension Comorbidity  Goal: Blood Pressure in Desired Range  Outcome: Ongoing, Progressing     Problem: Bleeding (Spinal Surgery)  Goal: Absence of Bleeding  Outcome: Ongoing, Progressing     Problem: Bowel Motility Impaired (Spinal Surgery)  Goal: Effective Bowel Elimination  Outcome: Ongoing, Progressing     Problem: Fluid and Electrolyte Imbalance (Spinal Surgery)  Goal: Fluid and Electrolyte Balance  Outcome: Ongoing, Progressing     Problem: Functional Ability Impaired (Spinal Surgery)  Goal: Optimal Functional Ability  Outcome: Ongoing, Progressing     Problem: Infection (Spinal Surgery)  Goal: Absence of Infection Signs and Symptoms  Outcome: Ongoing, Progressing     Problem: Neurologic Impairment (Spinal Surgery)  Goal: Optimal Neurologic Function  Outcome: Ongoing, Progressing     Problem: Pain (Spinal Surgery)  Goal: Acceptable Pain Control  Outcome: Ongoing, Progressing     Problem: Postoperative Nausea and Vomiting (Spinal Surgery)  Goal: Nausea and Vomiting Relief  Outcome: Ongoing, Progressing

## 2023-08-16 NOTE — PT/OT/SLP PROGRESS
Occupational Therapy   Treatment    Name: Fabiano Malik Jr.  MRN: [id number]  Admitting Diagnosis:  Cervical spondylosis with myelopathy and radiculopathy  5 Days Post-Op    Recommendations:     Discharge Recommendations: other (see comments) (low intensity therapy)  Discharge Equipment Recommendations:  bedside commode, shower chair  Barriers to discharge:  Decreased caregiver support, Other (Comment) (Increased level of assistance)    Assessment:     Fabiano Malik Jr. is a 62 y.o. male with a medical diagnosis of Cervical spondylosis with myelopathy and radiculopathy.  Performance deficits affecting function are weakness, impaired endurance, impaired self care skills, impaired functional mobility, gait instability, impaired balance, decreased upper extremity function, decreased lower extremity function, pain, decreased ROM, impaired skin, edema.    Pt found in bed, agreeable to therapy despite 10/10 pain.  Pt is progressing towards goals. Continue OT services to address functional goals, progressing as able.      Rehab Prognosis:  Good; patient would benefit from acute skilled OT services to address these deficits and reach maximum level of function.       Plan:     Patient to be seen 4 x/week to address the above listed problems via self-care/home management, therapeutic activities, therapeutic exercises  Plan of Care Expires: 09/09/23  Plan of Care Reviewed with: patient    Subjective     Chief Complaint: My throat is not any better.  I can't swallow water.    Patient/Family Comments/goals: to go home   Pain/Comfort:  Pain Rating 1: 10/10 (neck pain)    Objective:     Communicated with: RN prior to session.  Patient found HOB elevated with bed alarm, cervical collar, peripheral IV upon OT entry to room.    General Precautions: Standard, fall, aspiration    Orthopedic Precautions:spinal precautions  Braces: Aspen collar  Respiratory Status: Room air     Occupational Performance:     Bed Mobility:     Patient completed Rolling/Turning to Right with modified independence  Patient completed Scooting/Bridging with modified independence  Patient completed Supine to Sit with modified independence HOB elevated    Functional Mobility/Transfers:  Patient completed Sit <> Stand Transfer with modified independence  with  rollator   Patient completed Bed <> Chair Transfer using Stand Pivot technique with modified independence with rollator  Functional Mobility: Pt ambulated extended distances, in preparation for ambulatory level ADL/IADL, with S using rollator.     Activities of Daily Living:  Grooming: modified independence standing at sink      [facility] 6 Click ADL: 20    Treatment & Education:  Pt ambulated outside to [facility].    Pt stood statically ~10 min at Mod I level using rollator.  Pt required vcs for upright posture and to relax shoulders.    Reviewed safe use of and placement of rollator during BADL tasks.  Emphasized importance of locking breaks before standing and sitting.  Pt verbalized understanding.        Patient left  in   with  transport.     GOALS:   Multidisciplinary Problems       Occupational Therapy Goals          Problem: Occupational Therapy    Goal Priority Disciplines Outcome Interventions   Occupational Therapy Goal     OT, PT/OT Ongoing, Progressing    Description: Goals to be met by: 2023     Patient will increase functional independence with ADLs by performin% reciprocation of spinal precautions / cervical precautions.  Log Rolling to bilateral with [place].   Standing tolerance progression to 5 minutes with BUE support as needed.  Dressing routine inclusive of item retrieval with Modified [place] with least restrictive device as needed.  Toileting routine inclusive of functional mobility into / out of bathroom and toileting hygiene/clothing management with modified independence.   100% verbalized understanding of fall risk reduction education and DME to support safe  d/c to home.                         Time Tracking:     OT Date of Treatment: 08/16/23  OT Start Time: 1045  OT Stop Time: 1110  OT Total Time (min): 25 min    Billable Minutes:Self Care/Home Management 10  Therapeutic Activity 15               8/16/2023
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
## 2023-08-16 NOTE — PLAN OF CARE
Problem: SLP  Goal: SLP Goal  Description: Updated Short Term Goals:  1. Pt will participate in swallow eval to determine safest diet level.   2. Pt will tolerate nectar thick liquids and pureed texture with no audible dysphagia signs.   3. Pt will successfully participate in Modified [place]ium Swallow study to radiographically assess swallow function, rule out aspiration and determine safest/least restrictive diet.  4. Pt will participate in soft solid trials with SLP with no audible dysphagia signs.   5. Pt will work on easy onset of exercises and vocal hygiene to reduce strain on voicing with mod cues.   Outcome: Ongoing, Progressing   MBS completed this date, pt with tracheal aspiration on thin liquids with immediate coughing and attempting to eject material from larynx. Pt may continue nectar thick and pureed diet for now. SLP will continue to follow while on acute unit.

## 2023-08-16 NOTE — PROGRESS NOTES
Pharmacist Intervention IV to PO Note    Fabiano Malik Jr. is a 62 y.o. male being treated with IV medication metronidazole    Patient Data:    Vital Signs (Most Recent):  Temp: 98.4 °F (36.9 °C) (08/16/23 0715)  Pulse: 77 (08/16/23 0801)  Resp: 18 (08/16/23 0801)  BP: (!) 99/55 (08/16/23 0715)  SpO2: (!) 90 % (08/16/23 0801) Vital Signs (72h Range):  Temp:  [97.3 °F (36.3 °C)-101.3 °F (38.5 °C)]   Pulse:  []   Resp:  [15-22]   BP: ()/(55-77)   SpO2:  [89 %-99 %]      CBC:  Recent Labs   Lab 08/12/23  0342 08/15/23  0505 08/16/23  0418   WBC 10.22 12.80* 11.76   RBC 4.35* 4.94 3.97*   HGB 12.5* 14.2 11.2*   HCT 36.1* 40.3 33.0*    244 218   MCV 83 82 83   MCH 28.7 28.7 28.2   MCHC 34.6 35.2 33.9     CMP:     Recent Labs   Lab 08/12/23  0342 08/15/23  0505 08/16/23  0419   * 187* 195*   CALCIUM 9.4 9.8 8.6*    134* 135*   K 3.9 3.6 4.0   CO2 26 25 26    97 101   BUN 17 18 19   CREATININE 1.1 1.0 1.4       Dietary Orders:  Diet Orders            Dietary nutrition supplements [facility]; Thickener starting at 08/15 0745    Diet Dysphagia Pureed (IDDSI Level 4) [facility]; Standard Tray; Nectar Thick: Dysphagia 1 (Pureed) starting at 08/14 1034            Based on the following criteria, this patient qualifies for intravenous to oral conversion:  [x] The patients gastrointestinal tract is functioning (tolerating medications via oral or enteral route for 24 hours and tolerating food or enteral feeds for 24 hours).  [x] The patient is hemodynamically stable for 24 hours (heart rate <100 beats per minute, systolic blood pressure >99 mm Hg, and respiratory rate <20 breaths per minute).  [x] The patient shows clinical improvement (afebrile for at least 24 hours and white blood cell count downtrending or normalized). Additionally, the patient must be non-neutropenic (absolute neutrophil count >500 cells/mm3).  [x] For antimicrobials, the patient has received IV therapy  for at least 24 hours.    IV medication metronidazole will be changed to oral medication metronidazole    Pharmacist's Name: Hamida Andrews  Pharmacist's Extension: [phone]

## 2023-08-16 NOTE — PROGRESS NOTES
Pharmacokinetic Assessment Follow Up: IV Vancomycin    Vancomycin serum concentration assessment(s):    The random level was drawn correctly and can be used to guide therapy at this time. The measurement is within the desired definitive target range of 15 to 20 mcg/mL.    Vancomycin Regimen Plan:    Discontinue the scheduled vancomycin regimen and re-dose when the random level is less than 20 mcg/mL, next level to be drawn at 0400 on 8/17.    Drug levels (last 3 results):  Recent Labs   Lab Result Units 08/16/23  1725   Vancomycin, Random ug/mL 19.4       Pharmacy will continue to follow and monitor vancomycin.    Please contact pharmacy at extension [phone] for questions regarding this assessment.    Thank you for the consult,   Bel Herr       Patient brief summary:  Fabiano Malik Jr. is a 62 y.o. male initiated on antimicrobial therapy with IV Vancomycin for treatment of sepsis    The patient's current regimen is vancomycin 1500 mg once daily.    Drug Allergies:   Review of patient's allergies indicates:   Allergen Reactions    Pcn [penicillins] Other (See Comments)     Childhood rxn, pt does not recall type of rxn       Actual Body Weight:   110.8 kg    Renal Function:   Estimated Creatinine Clearance: 71.4 mL/min (based on SCr of 1.4 mg/dL).,     Dialysis Method (if applicable):  N/A    CBC (last 72 hours):  Recent Labs   Lab Result Units 08/15/23  0505 08/16/23  0418   WBC K/uL 12.80* 11.76   Hemoglobin g/dL 14.2 11.2*   Hematocrit % 40.3 33.0*   Platelets K/uL 244 218   Gran % % 83.8* 78.9*   Lymph % % 8.7* 10.7*   Mono % % 5.9 6.8   Eosinophil % % 0.9 2.5   Basophil % % 0.5 0.8   Differential Method  Automated Automated       Metabolic Panel (last 72 hours):  Recent Labs   Lab Result Units 08/15/23  0505 08/15/23  1420 08/16/23  0419   Sodium mmol/L 134*  --  135*   Potassium mmol/L 3.6  --  4.0   Chloride mmol/L 97  --  101   CO2 mmol/L 25  --  26   Glucose mg/dL 187*  --  195*   Glucose, UA   --   Negative  --    BUN mg/dL 18  --  19   Creatinine mg/dL 1.0  --  1.4       Vancomycin Administrations:  vancomycin given in the last 96 hours                     vancomycin 1,500 mg in dextrose 5 % (D5W) 250 mL IVPB (Vial-Mate) (mg) 1,500 mg New Bag 08/16/23 0632     1,500 mg New Bag 08/15/23 1819    vancomycin (VANCOCIN) 2,500 mg in dextrose 5 % (D5W) 500 mL IVPB (mg) 2,500 mg New Bag 08/15/23 0614                    Microbiologic Results:  Microbiology Results (last 7 days)       Procedure Component Value Units Date/Time    Blood culture [[id number]] Collected: 08/15/23 0505    Order Status: Completed Specimen: Blood Updated: 08/16/23 1212     Blood Culture, Routine No Growth to date      No Growth to date    Blood culture [[id number]] Collected: 08/15/23 0459    Order Status: Completed Specimen: Blood from Antecubital, Right Updated: 08/16/23 1212     Blood Culture, Routine No Growth to date      No Growth to date
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
## 2023-08-17 LAB
ANION GAP SERPL CALC-SCNC: 7 MMOL/L (ref 8–16)
BASOPHILS # BLD AUTO: 0.06 K/UL (ref 0–0.2)
BASOPHILS NFR BLD: 0.9 % (ref 0–1.9)
BUN SERPL-MCNC: 17 MG/DL (ref 8–23)
CALCIUM SERPL-MCNC: 8.8 MG/DL (ref 8.7–10.5)
CHLORIDE SERPL-SCNC: 104 MMOL/L (ref 95–110)
CO2 SERPL-SCNC: 27 MMOL/L (ref 23–29)
CREAT SERPL-MCNC: 1.2 MG/DL (ref 0.5–1.4)
DIFFERENTIAL METHOD: ABNORMAL
EOSINOPHIL # BLD AUTO: 0.9 K/UL (ref 0–0.5)
EOSINOPHIL NFR BLD: 12.7 % (ref 0–8)
ERYTHROCYTE [DISTWIDTH] IN BLOOD BY AUTOMATED COUNT: 13.2 % (ref 11.5–14.5)
EST. GFR  (NO RACE VARIABLE): >60 ML/MIN/1.73 M^2
GLUCOSE SERPL-MCNC: 166 MG/DL (ref 70–110)
HCT VFR BLD AUTO: 32.9 % (ref 40–54)
HGB BLD-MCNC: 11.3 G/DL (ref 14–18)
IMM GRANULOCYTES # BLD AUTO: 0.02 K/UL (ref 0–0.04)
IMM GRANULOCYTES NFR BLD AUTO: 0.3 % (ref 0–0.5)
LYMPHOCYTES # BLD AUTO: 1.5 K/UL (ref 1–4.8)
LYMPHOCYTES NFR BLD: 21 % (ref 18–48)
MCH RBC QN AUTO: 28.8 PG (ref 27–31)
MCHC RBC AUTO-ENTMCNC: 34.3 G/DL (ref 32–36)
MCV RBC AUTO: 84 FL (ref 82–98)
MONOCYTES # BLD AUTO: 0.6 K/UL (ref 0.3–1)
MONOCYTES NFR BLD: 8.3 % (ref 4–15)
NEUTROPHILS # BLD AUTO: 4 K/UL (ref 1.8–7.7)
NEUTROPHILS NFR BLD: 56.8 % (ref 38–73)
NRBC BLD-RTO: 0 /100 WBC
PLATELET # BLD AUTO: 224 K/UL (ref 150–450)
PMV BLD AUTO: 8.7 FL (ref 9.2–12.9)
POCT GLUCOSE: 141 MG/DL (ref 70–110)
POCT GLUCOSE: 145 MG/DL (ref 70–110)
POCT GLUCOSE: 150 MG/DL (ref 70–110)
POCT GLUCOSE: 175 MG/DL (ref 70–110)
POTASSIUM SERPL-SCNC: 3.9 MMOL/L (ref 3.5–5.1)
RBC # BLD AUTO: 3.92 M/UL (ref 4.6–6.2)
SODIUM SERPL-SCNC: 138 MMOL/L (ref 136–145)
VANCOMYCIN SERPL-MCNC: 13.2 UG/ML
WBC # BLD AUTO: 7.01 K/UL (ref 3.9–12.7)

## 2023-08-17 PROCEDURE — 94761 N-INVAS EAR/PLS OXIMETRY MLT: CPT

## 2023-08-17 PROCEDURE — 25000003 PHARM REV CODE 250: Performed by: NEUROLOGICAL SURGERY

## 2023-08-17 PROCEDURE — 11000001 HC ACUTE MED/SURG PRIVATE ROOM

## 2023-08-17 PROCEDURE — 36415 COLL VENOUS BLD VENIPUNCTURE: CPT | Performed by: NEUROLOGICAL SURGERY

## 2023-08-17 PROCEDURE — 97116 GAIT TRAINING THERAPY: CPT

## 2023-08-17 PROCEDURE — 80048 BASIC METABOLIC PNL TOTAL CA: CPT | Performed by: PHYSICIAN ASSISTANT

## 2023-08-17 PROCEDURE — 92526 ORAL FUNCTION THERAPY: CPT

## 2023-08-17 PROCEDURE — 97535 SELF CARE MNGMENT TRAINING: CPT

## 2023-08-17 PROCEDURE — 99900035 HC TECH TIME PER 15 MIN (STAT)

## 2023-08-17 PROCEDURE — 97110 THERAPEUTIC EXERCISES: CPT

## 2023-08-17 PROCEDURE — 63600175 PHARM REV CODE 636 W HCPCS: Performed by: NEUROLOGICAL SURGERY

## 2023-08-17 PROCEDURE — 85025 COMPLETE CBC W/AUTO DIFF WBC: CPT | Performed by: PHYSICIAN ASSISTANT

## 2023-08-17 PROCEDURE — 92507 TX SP LANG VOICE COMM INDIV: CPT

## 2023-08-17 PROCEDURE — 80202 ASSAY OF VANCOMYCIN: CPT | Performed by: NEUROLOGICAL SURGERY

## 2023-08-17 PROCEDURE — 94799 UNLISTED PULMONARY SVC/PX: CPT

## 2023-08-17 PROCEDURE — 97530 THERAPEUTIC ACTIVITIES: CPT

## 2023-08-17 RX ADMIN — HEPARIN SODIUM 5000 UNITS: 5000 INJECTION INTRAVENOUS; SUBCUTANEOUS at 09:08

## 2023-08-17 RX ADMIN — DULOXETINE 60 MG: 30 CAPSULE, DELAYED RELEASE ORAL at 08:08

## 2023-08-17 RX ADMIN — ACETAMINOPHEN 650 MG: 325 TABLET ORAL at 01:08

## 2023-08-17 RX ADMIN — METRONIDAZOLE 500 MG: 500 TABLET ORAL at 05:08

## 2023-08-17 RX ADMIN — METHOCARBAMOL TABLETS 750 MG: 750 TABLET, COATED ORAL at 04:08

## 2023-08-17 RX ADMIN — ACETAMINOPHEN 650 MG: 325 TABLET ORAL at 05:08

## 2023-08-17 RX ADMIN — PREGABALIN 200 MG: 75 CAPSULE ORAL at 09:08

## 2023-08-17 RX ADMIN — METHOCARBAMOL TABLETS 750 MG: 750 TABLET, COATED ORAL at 08:08

## 2023-08-17 RX ADMIN — PANTOPRAZOLE SODIUM 40 MG: 40 TABLET, DELAYED RELEASE ORAL at 08:08

## 2023-08-17 RX ADMIN — DULOXETINE 60 MG: 30 CAPSULE, DELAYED RELEASE ORAL at 09:08

## 2023-08-17 RX ADMIN — CHLORTHALIDONE 25 MG: 25 TABLET ORAL at 08:08

## 2023-08-17 RX ADMIN — PREGABALIN 200 MG: 75 CAPSULE ORAL at 08:08

## 2023-08-17 RX ADMIN — GUAIFENESIN 600 MG: 600 TABLET, EXTENDED RELEASE ORAL at 09:08

## 2023-08-17 RX ADMIN — ALPRAZOLAM 0.5 MG: 0.25 TABLET ORAL at 09:08

## 2023-08-17 RX ADMIN — POTASSIUM CHLORIDE 10 MEQ: 750 TABLET, EXTENDED RELEASE ORAL at 08:08

## 2023-08-17 RX ADMIN — METRONIDAZOLE 500 MG: 500 TABLET ORAL at 01:08

## 2023-08-17 RX ADMIN — HEPARIN SODIUM 5000 UNITS: 5000 INJECTION INTRAVENOUS; SUBCUTANEOUS at 08:08

## 2023-08-17 RX ADMIN — OXYCODONE HYDROCHLORIDE 10 MG: 5 TABLET ORAL at 05:08

## 2023-08-17 RX ADMIN — VANCOMYCIN HYDROCHLORIDE 1750 MG: 500 INJECTION, POWDER, LYOPHILIZED, FOR SOLUTION INTRAVENOUS at 08:08

## 2023-08-17 RX ADMIN — CEFEPIME 2 G: 2 INJECTION, POWDER, FOR SOLUTION INTRAVENOUS at 01:08

## 2023-08-17 RX ADMIN — METHOCARBAMOL TABLETS 750 MG: 750 TABLET, COATED ORAL at 09:08

## 2023-08-17 RX ADMIN — TAMSULOSIN HYDROCHLORIDE 0.4 MG: 0.4 CAPSULE ORAL at 09:08

## 2023-08-17 RX ADMIN — CEFEPIME 2 G: 2 INJECTION, POWDER, FOR SOLUTION INTRAVENOUS at 09:08

## 2023-08-17 RX ADMIN — METRONIDAZOLE 500 MG: 500 TABLET ORAL at 09:08

## 2023-08-17 RX ADMIN — GUAIFENESIN 600 MG: 600 TABLET, EXTENDED RELEASE ORAL at 08:08

## 2023-08-17 RX ADMIN — CEFEPIME 2 G: 2 INJECTION, POWDER, FOR SOLUTION INTRAVENOUS at 05:08

## 2023-08-17 RX ADMIN — TRAMADOL HYDROCHLORIDE 50 MG: 50 TABLET, COATED ORAL at 09:08

## 2023-08-17 NOTE — ASSESSMENT & PLAN NOTE
Due to pneumonia.  Likely secondary to aspiration pneumonia patient with vocal cord inflammation and dysphagia.  Speech therapy consulted.  Patient initiated on IV cefepime and vanc.  MBSS completed. See interval history.   ST educated with teach back method proper consistency of diet.     pna resolving

## 2023-08-17 NOTE — PT/OT/SLP PROGRESS
Physical Therapy Treatment    Patient Name:  Fabiano Malik Jr.   MRN:  [id number]    Recommendations:     Discharge Recommendations: home health OT, home health PT, home health speech therapy (low intensity therapy)  Discharge Equipment Recommendations: bedside commode, shower chair  Barriers to discharge: None    Assessment:     Fabiano Malik Jr. is a 62 y.o. male admitted with a medical diagnosis of Cervical spondylosis with myelopathy and radiculopathy.  He presents with the following impairments/functional limitations: weakness, gait instability, impaired balance, impaired endurance, impaired self care skills, impaired functional mobility, decreased lower extremity function, decreased upper extremity function, decreased ROM, orthopedic precautions, impaired skin, edema, pain, impaired sensation, impaired joint extensibility . Pt continues to progress well towards goals.     Rehab Prognosis: Good; patient would benefit from acute skilled PT services to address these deficits and reach maximum level of function.    Recent Surgery: Procedure(s) (LRB):  FUSION, SPINE, CERVICAL (N/A) 6 Days Post-Op    Plan:     During this hospitalization, patient to be seen daily to address the identified rehab impairments via gait training, therapeutic activities, therapeutic exercises, neuromuscular re-education and progress toward the following goals:    Plan of Care Expires:  09/12/23    Subjective     Chief Complaint: back pain  Patient/Family Comments/goals: pt is pleasant and agreeable to therapy  Pain/Comfort:  Pain Rating 1:  (increased pain)  Location 1: back (and neck but mostly back)  Pain Addressed 1: Reposition, Distraction, Cessation of Activity, Pre-medicate for activity, Nurse notified  Pain Rating Post-Intervention 1:  (continued pain)      Objective:     Communicated with nsg prior to session.  Patient found HOB elevated with peripheral IV, SCD upon PT entry to room.     General Precautions: Standard, fall,  aspiration, pureed diet, nectar thick  Orthopedic Precautions: spinal precautions  Braces: Aspen collar  Respiratory Status: Room air     Functional Mobility:  Bed Mobility:     Rolling Left:  modified independence  Scooting: modified independence  Supine to Sit: modified independence  Transfers:     Sit to Stand:  modified independence with 4 wheeled walker  Gait: Pt ambulated ~100 ft x 2 with rollator and Supervision; VC's for upright posture due to pt forward flexed      AM-PAC 6 CLICK MOBILITY  Turning over in bed (including adjusting bedclothes, sheets and blankets)?: 4  Sitting down on and standing up from a chair with arms (e.g., wheelchair, bedside commode, etc.): 4  Moving from lying on back to sitting on the side of the bed?: 4  Moving to and from a bed to a chair (including a wheelchair)?: 3  Need to walk in hospital room?: 3  Climbing 3-5 steps with a railing?: 3  Basic Mobility Total Score: 21       Treatment & Education:  Pt transitioned supine>sit  Performed ~10-15 reps ankle pumps and LAQs  Pt performed sit>stand and ~5-6 standing marches  Pt ambulated to [facility] as reported above  Prolonged standing rest break taken on [facility] for fresh air and change in environment to elevate mood and for overall well-being   Postural re-training performed with pt standing with back against wall and performing shoulder retractions / scap squeezes to improve posture  Pt returned to room and ambulated to chair      Patient left up in chair with all lines intact, call button in reach, chair alarm on, and nsg notified.    GOALS:   Multidisciplinary Problems       Physical Therapy Goals          Problem: Physical Therapy    Goal Priority Disciplines Outcome Goal Variances Interventions   Physical Therapy Goal     PT, PT/OT Ongoing, Progressing     Description: Goals to be met by: 2023     Patient will increase functional independence with mobility by performin. Supine <>sit with Modified [place] and  Supervision MET 8/16/23  2. Sit to stand transfer with Modified [place] and Supervision MET 8/16/23  3. Bed to chair transfer with Modified [place]  4. Gait  x 200 feet with Modified [place] using rollator  5. Ascend/descend 4 steps with left Handrails Supervision using Single-point Cane .                          Time Tracking:     PT Received On: 08/17/23  PT Start Time: 0935     PT Stop Time: 1020  PT Total Time (min): 45 min     Billable Minutes: Gait Training 15, Therapeutic Activity 15, and Therapeutic Exercise 15    Treatment Type: Treatment  PT/PTA: PT     Number of PTA visits since last PT visit: 0     08/17/2023

## 2023-08-17 NOTE — PLAN OF CARE
CM met with pt -   Per am meeting pt will dc with HH -   Will d/c tomorrow - pt will need WC Van transport to home - CM will book for 1:00 pm  tomorrow.    Pt has been set up with [facility] per PHN   - CM called agency - per Cindy - 1st visit will be Saturday as pt is discharging on Friday       Future Appointments   Date Time Provider Department Center   8/22/2023 10:45 AM Shalini Burks DPM [facility] PODIAT [facility]   8/25/2023  2:15 PM [facility] ODC XR-A LIMIT 350 LBS [facility] XRAY OP [facility]   8/25/2023  3:30 PM Massiel Lund, PA-C [facility] NEUROSU [facility]   9/13/2023  1:00 PM Lonnie Mccord MD [facility] BENSON [facility]   10/16/2023  8:15 AM [facility] ODC XR-A LIMIT 350 LBS [facility] XRAY OP [facility]   10/16/2023  9:30 AM Guillaume Washington MD [facility] NEUROSU [facility]   10/18/2023  2:15 PM Chino Cardozo MD [facility] UROLOGY [facility]   1/10/2024  1:30 PM Lucien Fleming MD [facility] MED [place]      08/17/23 5849   Post-Acute Status   Post-Acute Authorization Home Health  (Per [facility])   Discharge Plan   Discharge Plan B Home;Home Health

## 2023-08-17 NOTE — PLAN OF CARE
Problem: Adult Inpatient Plan of Care  Goal: Plan of Care Review  Outcome: Ongoing, Progressing  Goal: Patient-Specific Goal (Individualized)  Outcome: Ongoing, Progressing  Goal: Absence of Hospital-Acquired Illness or Injury  Outcome: Ongoing, Progressing  Goal: Optimal Comfort and Wellbeing  Outcome: Ongoing, Progressing  Goal: Readiness for Transition of Care  Outcome: Ongoing, Progressing     Problem: Diabetes Comorbidity  Goal: Blood Glucose Level Within Targeted Range  Outcome: Ongoing, Progressing     Problem: Infection  Goal: Absence of Infection Signs and Symptoms  Outcome: Ongoing, Progressing     Problem: Fall Injury Risk  Goal: Absence of Fall and Fall-Related Injury  Outcome: Ongoing, Progressing     Problem: Hypertension Comorbidity  Goal: Blood Pressure in Desired Range  Outcome: Ongoing, Progressing     Problem: Bleeding (Spinal Surgery)  Goal: Absence of Bleeding  Outcome: Ongoing, Progressing     Problem: Bowel Motility Impaired (Spinal Surgery)  Goal: Effective Bowel Elimination  Outcome: Ongoing, Progressing     Problem: Fluid and Electrolyte Imbalance (Spinal Surgery)  Goal: Fluid and Electrolyte Balance  Outcome: Ongoing, Progressing     Problem: Functional Ability Impaired (Spinal Surgery)  Goal: Optimal Functional Ability  Outcome: Ongoing, Progressing     Problem: Infection (Spinal Surgery)  Goal: Absence of Infection Signs and Symptoms  Outcome: Ongoing, Progressing     Problem: Neurologic Impairment (Spinal Surgery)  Goal: Optimal Neurologic Function  Outcome: Ongoing, Progressing     Problem: Pain (Spinal Surgery)  Goal: Acceptable Pain Control  Outcome: Ongoing, Progressing     Problem: Postoperative Nausea and Vomiting (Spinal Surgery)  Goal: Nausea and Vomiting Relief  Outcome: Ongoing, Progressing     Problem: Respiratory Compromise (Spinal Surgery)  Goal: Effective Oxygenation and Ventilation  Outcome: Ongoing, Progressing     Problem: Skin Injury Risk Increased  Goal: Skin Health  and Integrity  Outcome: Ongoing, Progressing     Problem: Behavioral Health Comorbidity  Goal: Maintenance of Behavioral Health Symptom Control  Outcome: Ongoing, Progressing     Problem: Pain Chronic (Persistent) (Comorbidity Management)  Goal: Acceptable Pain Control and Functional Ability  Outcome: Ongoing, Progressing     Problem: Fluid Imbalance (Pneumonia)  Goal: Fluid Balance  Outcome: Ongoing, Progressing     Problem: Infection (Pneumonia)  Goal: Resolution of Infection Signs and Symptoms  Outcome: Ongoing, Progressing     Problem: Respiratory Compromise (Pneumonia)  Goal: Effective Oxygenation and Ventilation  Outcome: Ongoing, Progressing     Problem: Swallowing Impairment  Goal: Optimal Eating and Swallowing Without Aspiration  Outcome: Ongoing, Progressing
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
## 2023-08-17 NOTE — PLAN OF CARE
Problem: SLP  Goal: SLP Goal  Description: Updated Short Term Goals:  1. Pt will participate in swallow eval to determine safest diet level.   2. Pt will tolerate nectar thick liquids and pureed texture with no audible dysphagia signs.   3. Pt will successfully participate in Modified Barium Swallow study to radiographically assess swallow function, rule out aspiration and determine safest/least restrictive diet.  4. Pt will participate in soft solid trials with SLP with no audible dysphagia signs.   5. Pt will work on easy onset of exercises and vocal hygiene to reduce strain on voicing with mod cues.   Outcome: Ongoing, Progressing   Pt participated in therapy with SLP, pt was cooperative and indicated understanding on use of swallow precautions including: diet textures, use of thickener, alternating sips and bites during meals and hard swallows in between bites.

## 2023-08-17 NOTE — PROGRESS NOTES
Pharmacokinetic Assessment Follow Up: IV Vancomycin    Vancomycin serum concentration assessment(s):    The random level was drawn correctly and can be used to guide therapy at this time. The measurement is below the desired definitive target range of 15 to 20 mcg/mL.    Vancomycin Regimen Plan:    Based on patient specific clearance, expect that this patient may need q 24 hours dosing. Will give one dose of vancomycin 1750 mg now and recheck vancomycin random in 24 hours on 8/18 at 0700. If scr continues to improve, will schedule vancomycin dosing.     Drug levels (last 3 results):  Recent Labs   Lab Result Units 08/16/23  1725 08/17/23  0504   Vancomycin, Random ug/mL 19.4 13.2       Pharmacy will continue to follow and monitor vancomycin.    Please contact pharmacy at extension [phone] for questions regarding this assessment.    Thank you for the consult,   Hamida Andrews       Patient brief summary:  Fabiano Malik Jr. is a 62 y.o. male initiated on antimicrobial therapy with IV Vancomycin for treatment of sepsis    The patient's current regimen is pulse dose    Drug Allergies:   Review of patient's allergies indicates:   Allergen Reactions    Pcn [penicillins] Other (See Comments)     Childhood rxn, pt does not recall type of rxn       Actual Body Weight:   111 kg    Renal Function:   Estimated Creatinine Clearance: 83.3 mL/min (based on SCr of 1.2 mg/dL).,     Dialysis Method (if applicable):  N/A    CBC (last 72 hours):  Recent Labs   Lab Result Units 08/15/23  0505 08/16/23  0418 08/17/23  0504   WBC K/uL 12.80* 11.76 7.01   Hemoglobin g/dL 14.2 11.2* 11.3*   Hematocrit % 40.3 33.0* 32.9*   Platelets K/uL 244 218 224   Gran % % 83.8* 78.9* 56.8   Lymph % % 8.7* 10.7* 21.0   Mono % % 5.9 6.8 8.3   Eosinophil % % 0.9 2.5 12.7*   Basophil % % 0.5 0.8 0.9   Differential Method  Automated Automated Automated       Metabolic Panel (last 72 hours):  Recent Labs   Lab Result Units 08/15/23  0505 08/15/23  1420  08/16/23  0419 08/17/23  0504   Sodium mmol/L 134*  --  135* 138   Potassium mmol/L 3.6  --  4.0 3.9   Chloride mmol/L 97  --  101 104   CO2 mmol/L 25  --  26 27   Glucose mg/dL 187*  --  195* 166*   Glucose, UA   --  Negative  --   --    BUN mg/dL 18  --  19 17   Creatinine mg/dL 1.0  --  1.4 1.2       Vancomycin Administrations:  vancomycin given in the last 96 hours                     vancomycin 1,500 mg in dextrose 5 % (D5W) 250 mL IVPB (Vial-Mate) (mg) 1,500 mg New Bag 08/16/23 0632     1,500 mg New Bag 08/15/23 1819    vancomycin (VANCOCIN) 2,500 mg in dextrose 5 % (D5W) 500 mL IVPB (mg) 2,500 mg New Bag 08/15/23 0614                    Microbiologic Results:  Microbiology Results (last 7 days)       Procedure Component Value Units Date/Time    Blood culture [[id number]] Collected: 08/15/23 0505    Order Status: Completed Specimen: Blood Updated: 08/16/23 1212     Blood Culture, Routine No Growth to date      No Growth to date    Blood culture [[id number]] Collected: 08/15/23 0459    Order Status: Completed Specimen: Blood from Antecubital, Right Updated: 08/16/23 1212     Blood Culture, Routine No Growth to date      No Growth to date

## 2023-08-17 NOTE — SUBJECTIVE & OBJECTIVE
Interval History:  Patient reports he is feeling better this am. Sitting up in bed. +Persistent cough and difficulty swallowing. Tolerating thickened diet.  Pain is controlled with pain meds.       Reviewed speech therapy note.   Reviewed MBSS- Functional oral phase of the swallow with MODERATE degree of pharyngeal dysphagia marked by tracheal aspiration on thin liquids, penetration on nectar thick and soft solid textures      Labs reviewed.  WBC 7.0 hemoglobin 11.3    Review of Systems   Constitutional:  Positive for appetite change.   HENT:  Positive for sore throat and trouble swallowing.    Respiratory:  Positive for cough.      Objective:     Vital Signs (Most Recent):  Temp: 98.1 °F (36.7 °C) (08/17/23 0741)  Pulse: 82 (08/17/23 0754)  Resp: 18 (08/17/23 0941)  BP: 135/73 (08/17/23 0741)  SpO2: (!) 94 % (08/17/23 0754) Vital Signs (24h Range):  Temp:  [97.4 °F (36.3 °C)-98.1 °F (36.7 °C)] 98.1 °F (36.7 °C)  Pulse:  [73-82] 82  Resp:  [17-20] 18  SpO2:  [94 %-97 %] 94 %  BP: (111-142)/(57-75) 135/73     Weight: 111 kg (244 lb 11.4 oz)  Body mass index is 32.29 kg/m².    Intake/Output Summary (Last 24 hours) at 8/17/2023 1016  Last data filed at 8/17/2023 0740  Gross per 24 hour   Intake 1200 ml   Output 5500 ml   Net -4300 ml           Physical Exam  Vitals and nursing note reviewed.   Constitutional:       Appearance: Normal appearance.   Cardiovascular:      Rate and Rhythm: Normal rate.   Pulmonary:      Effort: Pulmonary effort is normal.      Comments: Decreased breath sounds, on RA  Musculoskeletal:      Comments: Cervical collar in place.  Surgical dressing intact.   Neurological:      Mental Status: He is alert and oriented to person, place, and time.             Significant Labs: All pertinent labs within the past 24 hours have been reviewed.  BMP:   Recent Labs   Lab 08/17/23  0504   *      K 3.9      CO2 27   BUN 17   CREATININE 1.2   CALCIUM 8.8       CBC:   Recent Labs   Lab  08/16/23  0418 08/17/23  0504   WBC 11.76 7.01   HGB 11.2* 11.3*   HCT 33.0* 32.9*    224         Significant Imaging: I have reviewed all pertinent imaging results/findings within the past 24 hours.

## 2023-08-17 NOTE — PROGRESS NOTES
Pharmacokinetic Assessment Follow Up: IV Vancomycin    Vancomycin serum concentration assessment(s):    The random level was drawn correctly and can be used to guide therapy at this time. The measurement is below the desired definitive target range of 15 to 20 mcg/mL.    Vancomycin Regimen Plan:    Give 1750 mg x 1 (15 mg/kg) due to MARY GRACE.  Re-dose when the random level is less than 20 mcg/mL, next level to be drawn at 2000 on 08/17/23    Drug levels (last 3 results):  Recent Labs   Lab Result Units 08/16/23  1725 08/17/23  0504   Vancomycin, Random ug/mL 19.4 13.2       Pharmacy will continue to follow and monitor vancomycin.    Please contact pharmacy at extension [phone] for questions regarding this assessment.    Thank you for the consult,   Chris Cardona       Patient brief summary:  Fabiano Malik Jr. is a 62 y.o. male initiated on antimicrobial therapy with IV Vancomycin for treatment of sepsis    Drug Allergies:   Review of patient's allergies indicates:   Allergen Reactions    Pcn [penicillins] Other (See Comments)     Childhood rxn, pt does not recall type of rxn       Actual Body Weight:   111 kg    Renal Function:   Estimated Creatinine Clearance: 83.3 mL/min (based on SCr of 1.2 mg/dL).,     Dialysis Method (if applicable):  N/A    CBC (last 72 hours):  Recent Labs   Lab Result Units 08/15/23  0505 08/16/23  0418 08/17/23  0504   WBC K/uL 12.80* 11.76 7.01   Hemoglobin g/dL 14.2 11.2* 11.3*   Hematocrit % 40.3 33.0* 32.9*   Platelets K/uL 244 218 224   Gran % % 83.8* 78.9* 56.8   Lymph % % 8.7* 10.7* 21.0   Mono % % 5.9 6.8 8.3   Eosinophil % % 0.9 2.5 12.7*   Basophil % % 0.5 0.8 0.9   Differential Method  Automated Automated Automated       Metabolic Panel (last 72 hours):  Recent Labs   Lab Result Units 08/15/23  0505 08/15/23  1420 08/16/23  0419 08/17/23  0504   Sodium mmol/L 134*  --  135* 138   Potassium mmol/L 3.6  --  4.0 3.9   Chloride mmol/L 97  --  101 104   CO2 mmol/L 25  --  26 27   Glucose  mg/dL 187*  --  195* 166*   Glucose, UA   --  Negative  --   --    BUN mg/dL 18  --  19 17   Creatinine mg/dL 1.0  --  1.4 1.2       Vancomycin Administrations:  vancomycin given in the last 96 hours                     vancomycin 1,500 mg in dextrose 5 % (D5W) 250 mL IVPB (Vial-Mate) (mg) 1,500 mg New Bag 08/16/23 0632     1,500 mg New Bag 08/15/23 1819    vancomycin (VANCOCIN) 2,500 mg in dextrose 5 % (D5W) 500 mL IVPB (mg) 2,500 mg New Bag 08/15/23 0614                    Microbiologic Results:  Microbiology Results (last 7 days)       Procedure Component Value Units Date/Time    Blood culture [[id number]] Collected: 08/15/23 0505    Order Status: Completed Specimen: Blood Updated: 08/16/23 1212     Blood Culture, Routine No Growth to date      No Growth to date    Blood culture [[id number]] Collected: 08/15/23 0459    Order Status: Completed Specimen: Blood from Antecubital, Right Updated: 08/16/23 1212     Blood Culture, Routine No Growth to date      No Growth to date

## 2023-08-17 NOTE — PT/OT/SLP PROGRESS
"Speech Language Pathology Treatment    Patient Name:  Fabiano Malik Jr.   MRN:  [id number]  Admitting Diagnosis: Cervical spondylosis with myelopathy and radiculopathy    Recommendations:                 Recommendations:   PT, OT and SLP at [place], pt to be seen by SLP as outpatient for repeat MBS              General Recommendations:  continue ST remediation for diet management to ensure adequate nutrition and hydration  Diet recommendations:  Puree, Puree Diet - IDDSI Level 4, Nectar Thick, Mildly thick/Nectar thick liquids - IDDSI Level 2   Aspiration Precautions: upright for meals, slow rate, at least 3-4 hard swallows, whole meds with nectar thick liquids or crush oral meds, alternate sips and bites, no guzzling, cup swallows, NO Straws  Add thickener to liquids for now to NECTAR thick texture  No Jello, ice cream, popsicles, or frozen drinks!  In order to achieve NECTAR consistency, mix 1 packet of thickener with 4oz of liquids.  ALL liquids must be thickened, including broth and soups.     General Precautions: Standard, fall, aspiration  Communication strategies:  none  Assessment:     Fabiano Malik Jr. is a 62 y.o. male admitted with Cervical spondylosis with myelopathy and radiculopathy who presents with an SLP diagnosis of Dysphagia and Dysphonia.  He presents with post operative swelling from ACDF which impacts his ability to tolerate thin liquids. Pt with questionable L vocal cord paresis per Neurosurgery. .    Subjective     Pt seen in room for direct and indirect dysphagia tx.   Patient goals: "Oh I just feel worse now." Pt reports he needs pain meds and his cough is annoying.      Pain/Comfort:  Pain Rating 1: 6/10 (reports pain)  Location 1:  (neck and back pain is reported)  Pain Addressed 1: Nurse notified, Distraction    Respiratory Status: room air     Objective:     Has the patient been evaluated by SLP for swallowing?   Yes  Keep patient NPO? No   Current Respiratory Status:    room air " "    Swallowing: Pt agreed to PO trials. Pt given nectar thick cranberry juice (consumed 4 oz cup), soft solid (diced peaches juice drained). Pt able to tolerate nectar thick liquids and verbalized "this does go down much better." Pt needed clinician cues to implement alternating sips and bites, reminders to perform at least 3 hard swallows in between soft solid trials. This SLP discussed diet compliance with him and why pureed was a better texture for him. This writer also explained positioning when eating is crucial and to be seated in recliner is more optimal. Pt will need reinforcement.     Voice: SLP discussed with patient how vocal cords work, importance of not having vocal strain, continued oral hygiene, increased hydration of water to keep cords moist, reviewed vocal abuse behaviors including talking over his threshold, muting TV, not straining or getting louder which does cause added vocal fatigue. Pt indicated understanding and questions were answered as applicable to SLP scope.     Education: Pt was provided with additional samples of thickener, visual demonstration was provided to him on how to thicken his liquids from THIN to nectar, gel thickener and powder thickener have been given to him. Pt indicated understanding and has handout on strict swallow precautions, reviewed double swallows, effortful swallows, easy onset of voicing, muting TV to not compete with background noise and not straining his voice and talking louder than normal due to his dysphonia.     Medical provider: SLP did speak in person to GRACIELA Rankin [facility] results and overall progress with pt this am.   Goals:   Multidisciplinary Problems       SLP Goals          Problem: SLP    Goal Priority Disciplines Outcome   SLP Goal     SLP Ongoing, Progressing   Description: Updated Short Term Goals:  1. Pt will participate in swallow eval to determine safest diet level.   2. Pt will tolerate nectar thick liquids " and pureed texture with no audible dysphagia signs.   3. Pt will successfully participate in Modified Barium Swallow study to radiographically assess swallow function, rule out aspiration and determine safest/least restrictive diet.  4. Pt will participate in soft solid trials with SLP with no audible dysphagia signs.   5. Pt will work on easy onset of exercises and vocal hygiene to reduce strain on voicing with mod cues.                        Plan:     Patient to be seen:  3 x/week   Plan of Care expires:  09/12/23  Plan of Care reviewed with:  patient   SLP Follow-Up:  Yes       Discharge recommendations:   (low intensity therapy, HH PT, OT and SLP services)   Barriers to Discharge:  none    Time Tracking:     SLP Treatment Date:   08/17/23  Speech Start Time:  0900  Speech Stop Time:  0938     Speech Total Time (min):  38 min    Billable Minutes: Speech Therapy Individual 12, Treatment Swallowing Dysfunction 14, and Self Care/Home Management Training 12    08/17/2023

## 2023-08-18 ENCOUNTER — TELEPHONE (OUTPATIENT)
Dept: NEUROSURGERY | Facility: CLINIC | Age: 63
End: 2023-08-18
Payer: MEDICARE

## 2023-08-18 ENCOUNTER — TELEPHONE (OUTPATIENT)
Dept: SPEECH THERAPY | Facility: HOSPITAL | Age: 63
End: 2023-08-18
Payer: MEDICARE

## 2023-08-18 VITALS
WEIGHT: 236.56 LBS | RESPIRATION RATE: 18 BRPM | OXYGEN SATURATION: 95 % | HEART RATE: 76 BPM | BODY MASS INDEX: 31.35 KG/M2 | TEMPERATURE: 98 F | HEIGHT: 73 IN | SYSTOLIC BLOOD PRESSURE: 154 MMHG | DIASTOLIC BLOOD PRESSURE: 86 MMHG

## 2023-08-18 DIAGNOSIS — Z98.1 S/P CERVICAL SPINAL FUSION: ICD-10-CM

## 2023-08-18 DIAGNOSIS — M54.12 CERVICAL RADICULOPATHY: Primary | ICD-10-CM

## 2023-08-18 DIAGNOSIS — R13.10 DYSPHAGIA, UNSPECIFIED TYPE: Primary | ICD-10-CM

## 2023-08-18 LAB
ANION GAP SERPL CALC-SCNC: 9 MMOL/L (ref 8–16)
BASOPHILS # BLD AUTO: 0.09 K/UL (ref 0–0.2)
BASOPHILS NFR BLD: 1.2 % (ref 0–1.9)
BUN SERPL-MCNC: 13 MG/DL (ref 8–23)
CALCIUM SERPL-MCNC: 9.4 MG/DL (ref 8.7–10.5)
CHLORIDE SERPL-SCNC: 104 MMOL/L (ref 95–110)
CO2 SERPL-SCNC: 25 MMOL/L (ref 23–29)
CREAT SERPL-MCNC: 1.1 MG/DL (ref 0.5–1.4)
DIFFERENTIAL METHOD: ABNORMAL
EOSINOPHIL # BLD AUTO: 0.8 K/UL (ref 0–0.5)
EOSINOPHIL NFR BLD: 10.5 % (ref 0–8)
ERYTHROCYTE [DISTWIDTH] IN BLOOD BY AUTOMATED COUNT: 13.2 % (ref 11.5–14.5)
EST. GFR  (NO RACE VARIABLE): >60 ML/MIN/1.73 M^2
GLUCOSE SERPL-MCNC: 170 MG/DL (ref 70–110)
HCT VFR BLD AUTO: 33.1 % (ref 40–54)
HGB BLD-MCNC: 11.5 G/DL (ref 14–18)
IMM GRANULOCYTES # BLD AUTO: 0.03 K/UL (ref 0–0.04)
IMM GRANULOCYTES NFR BLD AUTO: 0.4 % (ref 0–0.5)
LYMPHOCYTES # BLD AUTO: 1.4 K/UL (ref 1–4.8)
LYMPHOCYTES NFR BLD: 18.5 % (ref 18–48)
MCH RBC QN AUTO: 28.9 PG (ref 27–31)
MCHC RBC AUTO-ENTMCNC: 34.7 G/DL (ref 32–36)
MCV RBC AUTO: 83 FL (ref 82–98)
MONOCYTES # BLD AUTO: 0.6 K/UL (ref 0.3–1)
MONOCYTES NFR BLD: 7.6 % (ref 4–15)
NEUTROPHILS # BLD AUTO: 4.6 K/UL (ref 1.8–7.7)
NEUTROPHILS NFR BLD: 61.8 % (ref 38–73)
NRBC BLD-RTO: 0 /100 WBC
PLATELET # BLD AUTO: 253 K/UL (ref 150–450)
PMV BLD AUTO: 8.3 FL (ref 9.2–12.9)
POCT GLUCOSE: 153 MG/DL (ref 70–110)
POTASSIUM SERPL-SCNC: 4 MMOL/L (ref 3.5–5.1)
RBC # BLD AUTO: 3.98 M/UL (ref 4.6–6.2)
SODIUM SERPL-SCNC: 138 MMOL/L (ref 136–145)
VANCOMYCIN SERPL-MCNC: 12.1 UG/ML
WBC # BLD AUTO: 7.36 K/UL (ref 3.9–12.7)

## 2023-08-18 PROCEDURE — 97530 THERAPEUTIC ACTIVITIES: CPT | Mod: CO

## 2023-08-18 PROCEDURE — 63600175 PHARM REV CODE 636 W HCPCS: Performed by: NEUROLOGICAL SURGERY

## 2023-08-18 PROCEDURE — 94761 N-INVAS EAR/PLS OXIMETRY MLT: CPT

## 2023-08-18 PROCEDURE — 80048 BASIC METABOLIC PNL TOTAL CA: CPT | Performed by: NEUROLOGICAL SURGERY

## 2023-08-18 PROCEDURE — 80202 ASSAY OF VANCOMYCIN: CPT | Performed by: NEUROLOGICAL SURGERY

## 2023-08-18 PROCEDURE — 85025 COMPLETE CBC W/AUTO DIFF WBC: CPT | Performed by: PHYSICIAN ASSISTANT

## 2023-08-18 PROCEDURE — 25000003 PHARM REV CODE 250: Performed by: NURSE PRACTITIONER

## 2023-08-18 PROCEDURE — 97535 SELF CARE MNGMENT TRAINING: CPT | Mod: CO

## 2023-08-18 PROCEDURE — 94799 UNLISTED PULMONARY SVC/PX: CPT

## 2023-08-18 PROCEDURE — 25000003 PHARM REV CODE 250: Performed by: NEUROLOGICAL SURGERY

## 2023-08-18 PROCEDURE — 99900035 HC TECH TIME PER 15 MIN (STAT)

## 2023-08-18 PROCEDURE — 36415 COLL VENOUS BLD VENIPUNCTURE: CPT | Performed by: NEUROLOGICAL SURGERY

## 2023-08-18 RX ORDER — METHOCARBAMOL 750 MG/1
750 TABLET, FILM COATED ORAL 3 TIMES DAILY PRN
Qty: 60 TABLET | Refills: 0 | Status: SHIPPED | OUTPATIENT
Start: 2023-08-18 | End: 2023-09-14 | Stop reason: SDUPTHER

## 2023-08-18 RX ORDER — CLINDAMYCIN HYDROCHLORIDE 300 MG/1
300 CAPSULE ORAL EVERY 6 HOURS
Qty: 20 CAPSULE | Refills: 0 | Status: SHIPPED | OUTPATIENT
Start: 2023-08-18 | End: 2023-08-23

## 2023-08-18 RX ORDER — POLYETHYLENE GLYCOL 3350 17 G/17G
17 POWDER, FOR SOLUTION ORAL DAILY
Status: DISCONTINUED | OUTPATIENT
Start: 2023-08-18 | End: 2023-08-18 | Stop reason: HOSPADM

## 2023-08-18 RX ORDER — OXYCODONE AND ACETAMINOPHEN 10; 325 MG/1; MG/1
1 TABLET ORAL EVERY 6 HOURS PRN
Qty: 60 TABLET | Refills: 0 | Status: SHIPPED | OUTPATIENT
Start: 2023-08-18 | End: 2023-09-14 | Stop reason: SDUPTHER

## 2023-08-18 RX ORDER — LACTULOSE 10 G/15ML
20 SOLUTION ORAL ONCE
Status: COMPLETED | OUTPATIENT
Start: 2023-08-18 | End: 2023-08-18

## 2023-08-18 RX ORDER — GUAIFENESIN 600 MG/1
600 TABLET, EXTENDED RELEASE ORAL 2 TIMES DAILY
Qty: 20 TABLET | Refills: 0 | Status: SHIPPED | OUTPATIENT
Start: 2023-08-18 | End: 2023-08-28

## 2023-08-18 RX ADMIN — VANCOMYCIN HYDROCHLORIDE 1500 MG: 1.5 INJECTION, POWDER, LYOPHILIZED, FOR SOLUTION INTRAVENOUS at 08:08

## 2023-08-18 RX ADMIN — ACETAMINOPHEN 650 MG: 325 TABLET ORAL at 05:08

## 2023-08-18 RX ADMIN — DULOXETINE 60 MG: 30 CAPSULE, DELAYED RELEASE ORAL at 08:08

## 2023-08-18 RX ADMIN — CHLORTHALIDONE 25 MG: 25 TABLET ORAL at 08:08

## 2023-08-18 RX ADMIN — PREGABALIN 200 MG: 75 CAPSULE ORAL at 08:08

## 2023-08-18 RX ADMIN — HEPARIN SODIUM 5000 UNITS: 5000 INJECTION INTRAVENOUS; SUBCUTANEOUS at 08:08

## 2023-08-18 RX ADMIN — METHOCARBAMOL TABLETS 750 MG: 750 TABLET, COATED ORAL at 08:08

## 2023-08-18 RX ADMIN — LACTULOSE 20 G: 20 SOLUTION ORAL at 08:08

## 2023-08-18 RX ADMIN — GUAIFENESIN 600 MG: 600 TABLET, EXTENDED RELEASE ORAL at 08:08

## 2023-08-18 RX ADMIN — ACETAMINOPHEN 650 MG: 325 TABLET ORAL at 12:08

## 2023-08-18 RX ADMIN — POTASSIUM CHLORIDE 10 MEQ: 750 TABLET, EXTENDED RELEASE ORAL at 08:08

## 2023-08-18 RX ADMIN — PANTOPRAZOLE SODIUM 40 MG: 40 TABLET, DELAYED RELEASE ORAL at 08:08

## 2023-08-18 RX ADMIN — METRONIDAZOLE 500 MG: 500 TABLET ORAL at 05:08

## 2023-08-18 RX ADMIN — ACETAMINOPHEN 650 MG: 325 TABLET ORAL at 01:08

## 2023-08-18 RX ADMIN — POLYETHYLENE GLYCOL 3350 17 G: 17 POWDER, FOR SOLUTION ORAL at 08:08

## 2023-08-18 RX ADMIN — CEFEPIME 2 G: 2 INJECTION, POWDER, FOR SOLUTION INTRAVENOUS at 05:08

## 2023-08-18 NOTE — TELEPHONE ENCOUNTER
Received vm for this pt to have a mbss completed. Pt is currently inpatient and have also had a swallow study done in the hospital while admitted.  Please clarify what is needed by calling my ext [phone] or you can reach out to SLP Jazmin Gao who performed the test in [place].  No further action is required.  Thanks    [facility]  Speech Pathology Admin  [phone] ext [phone]

## 2023-08-18 NOTE — PLAN OF CARE
[facility]    HOME HEALTH ORDERS  FACE TO FACE ENCOUNTER    Patient Name: Fabiano Malik Jr.  YOB: 1960    PCP: Lucien Fleming MD   PCP Address: [address] / [place] [address]  PCP Phone Number: [phone]  PCP Fax: [phone]       Encounter Date: 08/18/2023    Admit to Home Health    Diagnoses:  Active Hospital Problems    Diagnosis  POA    *Cervical spondylosis with myelopathy and radiculopathy [M47.12, M47.22]  Yes    Aspiration pneumonia [J69.0]  No    CKD (chronic kidney disease) stage 3, GFR 30-59 ml/min [N18.30]  Yes    Type 2 diabetes mellitus without complication, without long-term current use of insulin [E11.9]  Yes    Hypertension associated with diabetes [E11.59, I15.2]  Yes    Schizophrenia [F20.9]  Yes     Chronic      Resolved Hospital Problems   No resolved problems to display.       Future Appointments   Date Time Provider Department Center   8/22/2023 10:45 AM Shalini Burks DPM [facility] PODIAT [place] [facility]   8/25/2023  2:15 PM [facility] ODC XR-A LIMIT 350 LBS [facility] XRAY OP [place] [facility]   8/25/2023  3:30 PM Massiel Lund PA-C [facility] NEUROSU [place] [facility]   9/13/2023  1:00 PM Lonnie Mccord MD [facility] [place] [facility]   10/16/2023  8:15 AM [facility] ODC XR-A LIMIT 350 LBS [facility] XRAY OP [place] [facility]   10/16/2023  9:30 AM Guillaume Washington MD [facility] NEUROSU [place] [facility]   10/18/2023  2:15 PM Chino Cardozo MD [facility] UROLOGY [place] [facility]   1/10/2024  1:30 PM Lucien Fleming MD [facility]      Follow-up Information       ENT f/u Follow up.    Why: Massiel Ba, PADILAN Follow up on 8/25/2023.    Specialties: Neurosurgery, Spine Surgery  Why: 2:15 pm -- xray   3:30 pm -- GRACIELA Key - Neurosurgery  Contact information:  [address]  [address]  [facility] [id number] [phone]               Joyce Araya MD Follow up.    Specialty: Otolaryngology  Why:  WILL ASSIST WITH HOSPITAL  FOLLOW UP APT.  Contact information:  [address]  [address]  [place] [address]  [phone]               [facility] Follow up.    Specialty: Home Health Services  Why: ASSIGNED BY PHN  Contact information:  [address]  [address]  [place] [address]  [phone]                               I have seen and examined this patient face to face today. My clinical findings that support the need for the home health skilled services and home bound status are the following:  Weakness/numbness causing balance and gait disturbance due to Weakness/Debility and Surgery making it taxing to leave home.  Requiring assistive device to leave home due to unsteady gait caused by  Weakness/Debility and Surgery.  Medical restrictions requiring assistance of another human to leave home due to  Unstable ambulation and Post surgery monitoring.    Allergies:  Review of patient's allergies indicates:   Allergen Reactions    Pcn [penicillins] Other (See Comments)     Childhood rxn, pt does not recall type of rxn       Diet: pureed diet nectar thick    Activities: activity as tolerated    Nursing:   SN to complete comprehensive assessment including routine vital signs. Instruct on disease process and s/s of complications to report to MD. Review/verify medication list sent home with the patient at time of discharge  and instruct patient/caregiver as needed. Frequency may be adjusted depending on start of care date.    Notify MD if SBP > 160 or < 90; DBP > 90 or < 50; HR > 120 or < 50; Temp > 101; Other:         CONSULTS:    Physical Therapy to evaluate and treat. Evaluate for home safety and equipment needs; Establish/upgrade home exercise program. Perform / instruct on therapeutic exercises, gait training, transfer training, and Range of Motion.  Occupational Therapy to evaluate and treat. Evaluate home environment for safety and equipment needs. Perform/Instruct on transfers, ADL training, ROM, and therapeutic  exercises.  Speech Therapy  to evaluate and treat for  Swallowing.  Aide to provide assistance with personal care, ADLs, and vital signs.          Medications: Review discharge medications with patient and family and provide education.      Current Discharge Medication List        START taking these medications    Details   guaiFENesin (MUCINEX) 600 mg 12 hr tablet Take 1 tablet (600 mg total) by mouth 2 (two) times daily. for 7 days  Qty: 20 tablet, Refills: 0      methocarbamoL (ROBAXIN) 750 MG Tab Take 1 tablet (750 mg total) by mouth 3 (three) times daily as needed (muscle spasms).  Qty: 60 tablet, Refills: 0      oxyCODONE-acetaminophen (PERCOCET)  mg per tablet Take 1 tablet by mouth every 6 (six) hours as needed for Pain.  Qty: 60 tablet, Refills: 0    Comments: Quantity prescribed more than 7 day supply? Yes, quantity medically necessary           CONTINUE these medications which have NOT CHANGED    Details   ALPRAZolam (XANAX) 0.5 MG tablet Take 1 tablet (0.5 mg total) by mouth nightly as needed for Anxiety.  Qty: 30 tablet, Refills: 0    Associated Diagnoses: Anxiety      BLOOD PRESSURE CUFF Misc 1 Units by Misc.(Non-Drug; Combo Route) route once daily.  Qty: 1 each, Refills: 0    Associated Diagnoses: Hypertension associated with diabetes      blood sugar diagnostic (TRUE METRIX GLUCOSE TEST STRIP) Strp use 1 strip to check glucose 2 (two) times a day.  Qty: 200 strip, Refills: 6    Associated Diagnoses: Type 2 diabetes mellitus without complication, without long-term current use of insulin      blood-glucose meter Misc Use as instructed  Qty: 1 each, Refills: 0    Associated Diagnoses: Type 2 diabetes mellitus without complication, without long-term current use of insulin      chlorthalidone (HYGROTEN) 25 MG Tab Take 1 tablet (25 mg total) by mouth once daily.  Qty: 90 tablet, Refills: 3    Comments: .  Associated Diagnoses: Essential hypertension      dulaglutide (TRULICITY) 0.75 mg/0.5 mL pen  injector Inject 0.75 mg (one pen) into the skin every 7 days.  Qty: 12 pen , Refills: 1    Associated Diagnoses: Type 2 diabetes mellitus with hyperglycemia      DULoxetine (CYMBALTA) 60 MG capsule TAKE 2 CAPSULES BY MOUTH EVERY MORNING  Qty: 180 capsule, Refills: 3      flu vacc [id number] 6mos up,PF, (FLUARIX QUAD [id number], PF,) 60 mcg (15 mcg x 4)/0.5 mL Syrg Inject into the muscle.  Qty: 0.5 mL, Refills: 0      glimepiride (AMARYL) 2 MG tablet Take 1 tablet (2 mg total) by mouth before breakfast.  Qty: 90 tablet, Refills: 3    Associated Diagnoses: Type 2 diabetes mellitus with hyperglycemia, without long-term current use of insulin      hydrOXYzine pamoate (VISTARIL) 50 MG Cap TAKE 1 CAPSULE (50 MG) BY MOUTH NIGHTLY AS NEEDED FOR INSOMNIA  Qty: 90 capsule, Refills: 3    Associated Diagnoses: Primary insomnia      lancets 30 gauge Misc 1 lancet by Misc.(Non-Drug; Combo Route) route twice daily.  Qty: 200 each, Refills: 6    Comments: True metrix  Associated Diagnoses: Type 2 diabetes mellitus without complication, without long-term current use of insulin      meclizine (ANTIVERT) 12.5 mg tablet Take 1 tablet (12.5 mg total) by mouth 3 (three) times daily as needed for Dizziness.  Qty: 180 tablet, Refills: 0    Associated Diagnoses: Dizziness      metFORMIN (GLUCOPHAGE) 1000 MG tablet Take 1 tablet (1,000 mg total) by mouth 2 (two) times daily with meals.  Qty: 180 tablet, Refills: 1    Associated Diagnoses: Type 2 diabetes mellitus without complication, without long-term current use of insulin      olmesartan (BENICAR) 20 MG tablet Take 1 tablet (20 mg total) by mouth once daily.  Qty: 90 tablet, Refills: 0    Comments: .  Associated Diagnoses: Hypertension associated with diabetes      omeprazole (PRILOSEC) 20 MG capsule Take 1 capsule (20 mg total) by mouth before breakfast.  Qty: 90 capsule, Refills: 3    Associated Diagnoses: Gastroesophageal reflux disease, unspecified whether esophagitis present       PFIZER COVID-19 CIELO VACCN,PF, 30 mcg/0.3 mL injection       potassium chloride (KLOR-CON) 10 MEQ TbSR take 1 tablet by mouth once daily  Qty: 90 tablet, Refills: 3      pravastatin (PRAVACHOL) 40 MG tablet TAKE 1 TABLET(40 MG) BY MOUTH EVERY DAY  Qty: 90 tablet, Refills: 3      pregabalin (LYRICA) 100 MG capsule Take 2 capsules (200 mg total) by mouth 2 (two) times daily.  Qty: 360 capsule, Refills: 1    Associated Diagnoses: Other chronic pain; Cervical radiculopathy; Type 2 diabetes mellitus with diabetic polyneuropathy, without long-term current use of insulin      SHINGRIX, PF, 50 mcg/0.5 mL injection       sildenafiL (VIAGRA) 100 MG tablet Take 1 tablet (100 mg total) by mouth daily as needed for Erectile Dysfunction.  Qty: 30 tablet, Refills: 3    Associated Diagnoses: Erectile dysfunction, unspecified erectile dysfunction type      tamsulosin (FLOMAX) 0.4 mg Cap Take 1 capsule (0.4 mg total) by mouth every evening.  Qty: 90 capsule, Refills: 0    Associated Diagnoses: Benign prostatic hyperplasia with urinary obstruction           STOP taking these medications       acetaminophen (TYLENOL) 325 MG tablet Comments:   Reason for Stopping:         meloxicam (MOBIC) 15 MG tablet Comments:   Reason for Stopping:         meloxicam (MOBIC) 15 MG tablet Comments:   Reason for Stopping:               I certify that this patient is confined to his home and needs intermittent skilled nursing care, physical therapy, speech therapy, and occupational therapy.    Massiel Lund, [facility], PA-C  Neurosurgery  [facility]  08/18/2023

## 2023-08-18 NOTE — PLAN OF CARE
Problem: Occupational Therapy  Goal: Occupational Therapy Goal  Outcome: Met   Fabiano Malik Jr. is a 62 y.o. male with a medical diagnosis of Cervical spondylosis with myelopathy and radiculopathy.  Performance deficits affecting function are weakness, impaired endurance, impaired functional mobility, impaired self care skills, pain, decreased ROM, impaired skin, edema, orthopedic precautions.    Pt met all OT goals.  Pain improving. Pt expected to discharge home today.

## 2023-08-18 NOTE — PLAN OF CARE
CM spoke with pt prior to d/c   Pt wants [facility] to deliver to pt's room - Pt has rollator - delivered earlier this week.    HH set up per PHN - updated orders sent to Ms. Guo - she is aware that ST has been added to orders.       WC Van set for 1pm .     Pt will purchase a SC on his own.    discharging nurse to review all d/c meds/instructions     Future Appointments   Date Time Provider Department Center   8/22/2023 10:45 AM Shalini Burks DPM [facility] PODIAT [place] [facility]   8/25/2023  2:15 PM [facility] ODC XR-A LIMIT 350 LBS [facility] XRAY OP [place] [facility]   8/25/2023  3:30 PM Massiel Lund PA-C [facility] NEUROSU [place] [facility]   9/13/2023  1:00 PM Lonnie Mccord MD [facility]I [address]   10/16/2023  8:15 AM [facility] ODC XR-A LIMIT 350 LBS [facility] XRAY OP [place] [facility]   10/16/2023  9:30 AM Guillaume Washington MD [facility] NEUROSU [place] [facility]   10/18/2023  2:15 PM Chino Cradozo MD [facility] UROLOGY [place] [facility]   1/10/2024  1:30 PM Lucien Fleming MD [facility] FAM MED [place]        08/18/23 1318   Final Note   Assessment Type Final Discharge Note   Anticipated Discharge Disposition Home-Health  ([facility] per PHN)   What phone number can be called within the next 1-3 days to see how you are doing after discharge? [phone]   Hospital Resources/Appts/Education Provided Post-Acute resouces added to AVS;Appointments scheduled and added to AVS   Post-Acute Status   Post-Acute Authorization Home Health   Home Health Status Set-up Complete/Auth obtained   Discharge Delays None known at this time

## 2023-08-18 NOTE — PROGRESS NOTES
Fabiano Malik  #[id number] (CSN: [id number]) (62 y.o. M) (Adm: 08/11/23)  [facility] [id number]  PCP    CHEYENNE KAM  Date of Birth    1960     Demographics    Address:   [facility] [address] [facility] [address] [address]    Home Phone:   [phone]    Work Phone:       Mobile Phone:   [phone]              SSN:       Insurance:   [employer]ICARE    Marital Status:       Buddhism:   Episcopalian                Admission Dx    M54.12 Cervical radiculopathy (Cervical radiculopathy [M54.12])   M50.30 DDD (degenerative disc disease), cervical (DDD (degenerative disc disease), cervical [M50.30])   M48.02 Cervical spinal stenosis (Cervical spinal stenosis [M48.02])   G95.9 Cervical myelopathy (Cervical myelopathy [G95.9])   M47.12, M47.22 Cervical spondylosis with myelopathy and radiculopathy     Chief Complaint    None  Documents Filed to Patient    Power of  Living Will Clinical Unknown Study Attachment Consent Form ABN Waiver After Visit Summary Lab Result Scan Code Status Patient Portal Status    Not on File  Not on File  Not on File  Not on File  Filed  Not on File  Filed  Not on File  FULL [Updated on 08/12/23 0827]  Active     Admission Information    Current Information    Attending Provider Admitting Provider Admission Type Admission Status   Guillaume Washington MD Denis, Daniel R., MD Elective Confirmed Admission          Admission Date/Time Discharge Date Hospital Service Auth/Cert Status   08/11/23  0942  Neurosurgery Incomplete          Hospital Area Unit Room/Bed    [facility] MEDICAL SURGICAL UNIT ACUTE [id number] A            Discharge Disposition Discharge Destination   Home-Health Care Svc      Hospital Account    Name Acct ID Class Status Primary Coverage   Fabiano Malik JrJohn [id number] IP- Inpatient Open Virtual [facility] Center[facility] HE[facility] MANAGED MEDICARE [facility]            Guarantor Account (for Hospital Account  "#[id number])    Name Relation to Pt Service Area Active? Acct Type   Fabiano Malik Jr. Self OHSSA Yes Personal/Family   Address Phone     [address]   [address]   [place] [address] [phone](H)              Coverage Information (for Hospital Account #[id number])    F/O Payor/Plan Precert #   [facility] [id number]   Subscriber Subscriber #   Fabiano Malik Jr. [id number]   Address Phone   [address]   [place] [address] [phone]            Emergency Contact Information    Name: Tiff Malik Relationship: Daughter   Address:     City: [place] State: [place] Zip: [address] Phone:     Business phone: [phone]        Referral  Referral # [id number]    Patient Demographics           Fabiano Malik Jr. "Behzad"    Legal sex: Male    1960, 62 yrs    SSN:     PCP: Cheyenne Kam MD          [address]      [address]      [facility] [address]       [phone] (H)      [phone] (M)       [email or web address]            Verification Status: Verified         Patient Name: Behzad Malik Jr.   MRN: [id number]   PCP: CHEYENNE KAM ([phone])   Emerg Contact: Tiff Malik   Emerg Contact Ph: [phone]        Referral Information    Referral # Creation Date Referral Status Status Update    [id number] 08/13/2023 Pending Review 08/13/2023: Status History     Status Reason Referral Type Referral Reasons Referral Class   System Automatically Pend Home Health Specialty Services Required Internal     To Specialty To Provider To Location/Place of Service To Department   Home Health Services none none none     To Vendor Referred By By Location/Place of Service By Department   none Guillaume Washington MD \[facility] [place] [facility]\"" MEDICAL SURGICAL UNIT ACUTE     Priority Start Date Expiration Date Referral Entered By   Routine 08/13/2023 08/12/2024 Guillaume Washington MD     Visits Requested Visits " Authorized Visits Completed Visits Scheduled   1 1       Procedure Information    Service Details  Procedure Modifiers Provider Requested Approved   REF34 - AMB REFERRAL/CONSULT TO HOME HEALTH none  1 1     Scheduling    None     Diagnosis Information    Diagnosis   R13.12 (ICD-10-CM) - Oropharyngeal dysphagia   R26.89 (ICD-10-CM) - Impaired gait and mobility   Z98.1 (ICD-10-CM) - S/P cervical spinal fusion     Referral Notes  Number of Notes: 2  .  Type Date User Summary Attachment   Provider Comments 08/13/2023 12:24 PM Guillaume Washington MD Provider Comments -   Note:    [facility] - Med Surg     HOME HEALTH ORDERS  FACE TO FACE ENCOUNTER     Patient Name: Fabiano Malik Jr.  YOB: 1960     PCP: Lucien Fleming MD   PCP Address: [address] [place] [address] / [place] [address] [address]  PCP Phone Number: [phone]  PCP Fax: [phone]     Encounter Date: 08/13/2023     Admit to Home Health     Diagnoses:  Active Hospital Problems    *Cervical spondylosis with myelopathy and radiculopathy [M47.12, M47.22]     POA: Yes       CKD (chronic kidney disease) stage 3, GFR 30-59 ml/min [N18.30]     POA: Yes       Type 2 diabetes mellitus without complication, without long-term current use of insulin [E11.9]     POA: Yes       Hypertension associated with diabetes [E11.59, I15.2]     POA: Yes       Schizophrenia [F20.9]     POA: Yes         Chronic        Resolved Hospital Problems  No resolved problems to display.        Future Appointments  8/17/2023  1:00 PM    Zari Reynoso, BIRD        [facility] OP RHB         [facility]  8/22/2023  10:45 AM   Shalini Burks, VINCEM     [facility] PODIAT         [facility]  8/25/2023  2:15 PM    [facility] ODC XR-A LIMIT 350 L* [facility] XRAY OP        [facility]  8/25/2023  3:30 PM    Massiel Lund PA* [facility] NEUROSU        [facility]  10/16/2023 8:15 AM    [facility] ODC XR-A LIMIT 350 L* [facility] XRAY OP        [facility]  10/16/2023 9:30 AM    Guillaume Washington MD       [facility]  NEUROSU        [facility]  10/18/2023 2:15 PM    Chino Cardozo MD         [facility] UROLOGY        [facility]  1/10/2024  1:30 PM    Lucien Fleming, * [facility]        [place]  [unfilled]        I have seen and examined this patient face to face today. My clinical findings that support the need for the home health skilled services and home bound status are the following:  Weakness/numbness causing balance and gait disturbance due to Surgery making it taxing to leave home.     Allergies:Review of patient's allergies indicates:   -- Pcn [penicillins] -- Other (See Comments)    --  Childhood rxn, pt does not recall type of rxn     Diet: dental soft diet     Activities: activity as tolerated     Nursing:   SN to complete comprehensive assessment including routine vital signs. Instruct on disease process and s/s of complications to report to MD. Review/verify medication list sent home with the patient at time of discharge  and instruct patient/caregiver as needed. Frequency may be adjusted depending on start of care date. If patient has enteral feeding tube (NG, PEG, J-tube, G-tube), flush tube before and after feeding and/or medication administration with 20-30 mL of water.     Notify MD if SBP > 160 or < 90; DBP > 90 or < 50; HR > 120 or < 50; Temp > 101; Other:            CONSULTS:    Physical Therapy to evaluate and treat. Evaluate for home safety and equipment needs; Establish/upgrade home exercise program. Perform / instruct on therapeutic exercises, gait training, transfer training, and Range of Motion.  Occupational Therapy to evaluate and treat. Evaluate home environment for safety and equipment needs. Perform/Instruct on transfers, ADL training, ROM, and therapeutic exercises.  Speech Therapy  to evaluate and treat for swallowing .  Aide to provide assistance with personal care, ADLs, and vital signs.     MISCELLANEOUS CARE:  N/A     WOUND CARE ORDERS  no        Medications: Review  discharge medications with patient and family and provide education.       Current Discharge Medication List     CONTINUE these medications which have NOT CHANGED     acetaminophen (TYLENOL) 325 MG tablet  Take 2 tablets (650 mg total) by mouth every 6 (six) hours as needed.  Qty: 120 tablet Refills: 3     ALPRAZolam (XANAX) 0.5 MG tablet  Take 1 tablet (0.5 mg total) by mouth nightly as needed for Anxiety.  Qty: 30 tablet Refills: 0  Associated Diagnoses:Anxiety     BLOOD PRESSURE CUFF Misc  1 Units by Misc.(Non-Drug; Combo Route) route once daily.  Qty: 1 each Refills: 0  Associated Diagnoses:Hypertension associated with diabetes     blood sugar diagnostic (TRUE METRIX GLUCOSE TEST STRIP) Strp  use 1 strip to check glucose 2 (two) times a day.  Qty: 200 strip Refills: 6  Associated Diagnoses:Type 2 diabetes mellitus without complication, without long-term current use of insulin     blood-glucose meter Misc  Use as instructed  Qty: 1 each Refills: 0  Associated Diagnoses:Type 2 diabetes mellitus without complication, without long-term current use of insulin     chlorthalidone (HYGROTEN) 25 MG Tab  Take 1 tablet (25 mg total) by mouth once daily.  Qty: 90 tablet Refills: 3  Comments: .  Associated Diagnoses:Essential hypertension     dulaglutide (TRULICITY) 0.75 mg/0.5 mL pen injector  Inject 0.75 mg (one pen) into the skin every 7 days.  Qty: 12 pen  Refills: 1  Associated Diagnoses:Type 2 diabetes mellitus with hyperglycemia     DULoxetine (CYMBALTA) 60 MG capsule  TAKE 2 CAPSULES BY MOUTH EVERY MORNING  Qty: 180 capsule Refills: 3     flu vacc [id number] 6mos up,PF, (FLUARIX QUAD [id number], PF,) 60 mcg (15 mcg x 4)/0.5 mL Syrg  Inject into the muscle.  Qty: 0.5 mL Refills: 0     glimepiride (AMARYL) 2 MG tablet  Take 1 tablet (2 mg total) by mouth before breakfast.  Qty: 90 tablet Refills: 3  Associated Diagnoses:Type 2 diabetes mellitus with hyperglycemia, without long-term current use of insulin     hydrOXYzine  pamoate (VISTARIL) 50 MG Cap  TAKE 1 CAPSULE (50 MG) BY MOUTH NIGHTLY AS NEEDED FOR INSOMNIA  Qty: 90 capsule Refills: 3  Associated Diagnoses:Primary insomnia     lancets 30 gauge Misc  1 lancet by Misc.(Non-Drug; Combo Route) route twice daily.  Qty: 200 each Refills: 6  Comments: True metrix  Associated Diagnoses:Type 2 diabetes mellitus without complication, without long-term current use of insulin     meclizine (ANTIVERT) 12.5 mg tablet  Take 1 tablet (12.5 mg total) by mouth 3 (three) times daily as needed for Dizziness.  Qty: 180 tablet Refills: 0  Associated Diagnoses:Dizziness     !! meloxicam (MOBIC) 15 MG tablet        !! meloxicam (MOBIC) 15 MG tablet  Take 1 tablet (15 mg total) by mouth once daily.  Qty: 90 tablet Refills: 1     metFORMIN (GLUCOPHAGE) 1000 MG tablet  Take 1 tablet (1,000 mg total) by mouth 2 (two) times daily with meals.  Qty: 180 tablet Refills: 1  Associated Diagnoses:Type 2 diabetes mellitus without complication, without long-term current use of insulin     olmesartan (BENICAR) 20 MG tablet  Take 1 tablet (20 mg total) by mouth once daily.  Qty: 90 tablet Refills: 0  Comments: .  Associated Diagnoses:Hypertension associated with diabetes     omeprazole (PRILOSEC) 20 MG capsule  Take 1 capsule (20 mg total) by mouth before breakfast.  Qty: 90 capsule Refills: 3  Associated Diagnoses:Gastroesophageal reflux disease, unspecified whether esophagitis present     PFIZER COVID-19 CIELO VACCN,PF, 30 mcg/0.3 mL injection        potassium chloride (KLOR-CON) 10 MEQ TbSR  take 1 tablet by mouth once daily  Qty: 90 tablet Refills: 3     pravastatin (PRAVACHOL) 40 MG tablet  TAKE 1 TABLET(40 MG) BY MOUTH EVERY DAY  Qty: 90 tablet Refills: 3     pregabalin (LYRICA) 100 MG capsule  Take 2 capsules (200 mg total) by mouth 2 (two) times daily.  Qty: 360 capsule Refills: 1  Associated Diagnoses:Other chronic pain; Cervical radiculopathy; Type 2 diabetes mellitus with diabetic polyneuropathy, without  long-term current use of insulin     SHINGRIX, PF, 50 mcg/0.5 mL injection        sildenafiL (VIAGRA) 100 MG tablet  Take 1 tablet (100 mg total) by mouth daily as needed for Erectile Dysfunction.  Qty: 30 tablet Refills: 3  Associated Diagnoses:Erectile dysfunction, unspecified erectile dysfunction type     tamsulosin (FLOMAX) 0.4 mg Cap  Take 1 capsule (0.4 mg total) by mouth every evening.  Qty: 90 capsule Refills: 0  Associated Diagnoses:Benign prostatic hyperplasia with urinary obstruction     !! - Potential duplicate medications found. Please discuss with provider.              I certify that this patient is confined to his home and needs intermittent skilled nursing care, physical therapy, speech therapy, and occupational therapy.  .  Type Date User Summary Attachment   Provider Comments 08/13/2023 12:24 PM Guillaume Washington MD Provider Comments -   Note:    It is my medical opinion that this patient is medically contraindicated to leave to the home because the patient is either Suspected/Confirmed COVID-19, or this patient has a medical condition that may make the patient more susceptible to alfred COVID-19.     Skilled Nurse to admit patient for home health care services, perform head to toe assessment and complete labs as ordered.  Education on disease process, observation and assessment of disease state, review medication management and compliance. Education on infection control practices and respiratory precautions to prevent transmission of COVID-19. Report to MD any abnormal findings.  Referral Order    Order   Ambulatory referral/consult to Home Health (Order # [id number]) on 08/13/2023   View Encounter        Questionnaire    On what date was the Face to Face Encounter?    What type of referral is this?    What is the requested SOC/ZULMA date?    What disciplines are requested?      Communications    No communications were found.

## 2023-08-18 NOTE — PT/OT/SLP PROGRESS
Occupational Therapy   Treatment    Name: Fabiano Malik Jr.  MRN: [id number]  Admitting Diagnosis:  Cervical spondylosis with myelopathy and radiculopathy  7 Days Post-Op    Recommendations:     Discharge Recommendations: low intensity therapy  Discharge Equipment Recommendations:  bedside commode, shower chair  Barriers to discharge:  None    Assessment:     Fabiano Malik Jr. is a 62 y.o. male with a medical diagnosis of Cervical spondylosis with myelopathy and radiculopathy.  Performance deficits affecting function are weakness, impaired endurance, impaired functional mobility, impaired self care skills, pain, decreased ROM, impaired skin, edema, orthopedic precautions.    Pt met all OT goals.  Pain improving. Pt expected to discharge home today.      Rehab Prognosis:  Good; patient would benefit from acute skilled OT services to address these deficits and reach maximum level of function.       Plan:     Patient to be seen 4 x/week to address the above listed problems via self-care/home management, therapeutic activities, therapeutic exercises  Plan of Care Expires: 09/09/23  Plan of Care Reviewed with: patient    Subjective     Chief Complaint: I feel better.  Patient/Family Comments/goals: to go home  Pain/Comfort:  Pain Rating 1: 8/10  Location - Side 1: Bilateral  Location - Orientation 1: generalized  Location 1:  (back and neck)    Objective:     Communicated with: RN prior to session.  Patient found HOB elevated with peripheral IV upon OT entry to room.    General Precautions: Standard, fall, aspiration    Orthopedic Precautions:spinal precautions  Braces: Aspen collar  Respiratory Status: Room air     Occupational Performance:     Bed Mobility:    Patient completed Rolling/Turning to Right with modified independence  Patient completed Scooting/Bridging with modified independence  Patient completed Supine to Sit with modified independence  Patient completed Sit to Supine with modified independence      Functional Mobility/Transfers:  Patient completed Sit <> Stand Transfer with modified independence  with  4 wheeled walker   Patient completed Toilet Transfer Stand Pivot technique with modified independence with  4 wheeled walker  Functional Mobility: Pt ambulated at Mod I level using rollator.      Activities of Daily Living:  Grooming: modified independence standing at sink   Lower Body Dressing: modified independence figure 4 position   Toileting: modified independence        [facility] 6 Click ADL: 23    Treatment & Education:  Pt recalls spinal precautions and maintains during BADL's and functional mobility.      Patient left HOB elevated with all lines intact, call button in reach, and bed alarm on    GOALS:   Multidisciplinary Problems       Occupational Therapy Goals       Not on file              Multidisciplinary Problems (Resolved)          Problem: Occupational Therapy    Goal Priority Disciplines Outcome Interventions   Occupational Therapy Goal   (Resolved)     OT, PT/OT Met    Description: Goals to be met by: 2023     Patient will increase functional independence with ADLs by performin% reciprocation of spinal precautions / cervical precautions. MET  Log Rolling to bilateral with Simpson.  MET  Standing tolerance progression to 5 minutes with BUE support as needed. MET  Dressing routine inclusive of item retrieval with Modified Simpson with least restrictive device as needed. MET  Toileting routine inclusive of functional mobility into / out of bathroom and toileting hygiene/clothing management with modified independence.  MET  100% verbalized understanding of fall risk reduction education and DME to support safe d/c to home. MET                         Time Tracking:     OT Date of Treatment: 23  OT Start Time: 949  OT Stop Time:   OT Total Time (min): 34 min    Billable Minutes:Self Care/Home Management 17  Therapeutic Activity 17               2023

## 2023-08-18 NOTE — DISCHARGE INSTRUCTIONS
Please follow ONLY the instructions that are checked below.    Activity Restrictions:  [x]  Return to work will be determined on an individual basis.  [x]  No lifting greater than 10 pounds.  [x]  Avoid bending and twisting the area of your surgery more than 45 degrees from neutral position in any direction.  [x]  No driving or operating machinery:  [x]  until cleared by your surgeon.  [x]  while taking narcotic pain medications or muscle relaxants.  []  No cervical collar, soft collar, or lumbar brace required.  [x]  Wear your brace at all times except when eating and showering.  []  Wear your brace at all times except when flat in bed.  []  Wear brace for comfort only.  [x]  Increase ambulation over the next 2 weeks so that you are walking 2 miles per day at 2 weeks post-operatively.  [x]  Walk on paved surfaces only. It is okay to walk up and down stairs while holding onto a side rail.  [x]  No sexual activity for 2-3 weeks.    Discharge Medication/Follow-up:  [x]  Please refer to discharge medication reconciliation form.  [x]  Do not take ANY non-steroidal anti-inflammatory drugs (NSAIDS), including the following: ibuprofen, naprosyn, Aleve, Advil, Indocin, Mobic, or Celebrex for:  []  4 weeks  []  8 weeks  [x]  6 months  [x]  Prescriptions for appropriate medication will be given upon discharge.   [x]  Pain control:             [x]  Muscle relaxer:            [x]  Take docusate (Colace 100 mg): take one capsule a day as needed for constipation. You can get this over the counter.  [x]  Follow-up appointment:  [x]  10-14 days post-op for wound check by physician assistant/nurse  [x]  4-6 weeks with MD:  [x]  with x-rays  []  without x-rays  []  An appointment will be mailed to you.    Wound Care:  []  Remove dressing or bandaid in    days.  [x]  No bandage required. Keep your incision open to the air.  [x]  You may shower on the 2nd day after your surgery. Have the force of water hit you opposite from the  incision. Pat the incision dry after your shower; do not scrub the incision.  []  You cannot take a bath until 8 weeks after surgery.  []  Apply bacitracin to incision twice a day for    more days.    Call your doctor or go to the Emergency Room for any signs of infection, including: increased redness, drainage, pain, or fever (temperature ?101.5 for 24 hours). Call your doctor or go to the Emergency Room if there are any localized neurological changes; problems with speech, vision, numbness, tingling, weakness, or severe headache; or for other concerns.    Special Instructions:  []  No use of tobacco products.  [x]  Diet: Please eat a pureed diet as tolerated.    Diet recommendations:  Puree, Puree Diet - IDDSI Level 4, Nectar Thick, Mildly thick/Nectar thick liquids - IDDSI Level 2   Aspiration Precautions: upright for meals, slow rate, at least 3-4 hard swallows, whole meds with nectar thick liquids or crush oral meds, alternate sips and bites, no guzzling, cup swallows, NO Straws  Add thickener to liquids for now to NECTAR thick texture  No Jello, ice cream, popsicles, or frozen drinks!  In order to achieve NECTAR consistency, mix 1 packet of thickener with 4oz of liquids.  ALL liquids must be thickened, including broth and soups.        Physicians need 3 days' notice for pain medicine to be refilled. Pain medicine will only be refilled between 8 AM and 5 PM, Monday through Friday, due to Food and Drug Administration regulation of documentation.        Form No. 41297 (Revised 10/31/2013)

## 2023-08-18 NOTE — TELEPHONE ENCOUNTER
Please schedule MBSS for the end of next week.    Thanks,  Massiel Lund [facility], PA-C  Neurosurgery  [facility]  08/18/2023

## 2023-08-18 NOTE — PROGRESS NOTES
Virtual Nurse:Discharge orders noted; additional clinical references attached.  and pharmacy tech notified.  Patient's discharge instruction packet given by bedside RN.    Cued into patient's room.  Permission received per patient to turn camera to view patient.  Introduced as VN that will be instructing on discharge instructions. Educated patient on reason for admission; medications to hold, continue, and start, appointment to follow-up with doctor, and when to return to ED. Teach back method used. Verbalized understanding  Number given for 24/7 Nurse Line. Opportunity given for questions and questions answered.  Bedside nurse updated. Transport wheelchair van requested by case management.       08/18/23 1249   Admission   Communication Issues? None   Shift   Virtual Nurse - Rounding Complete   Pain Management Interventions relaxation techniques promoted;quiet environment facilitated   Virtual Nurse - Patient Verbalized Approval Of Camera Use;VN Rounding   Safety/Activity   Patient Rounds visualized patient   Safety Promotion/Fall Prevention Fall Risk reviewed with patient/family
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
## 2023-08-18 NOTE — PROGRESS NOTES
Pharmacokinetic Assessment Follow Up: IV Vancomycin    Vancomycin serum concentration assessment(s):    The random level was drawn correctly and can be used to guide therapy at this time. The measurement is within the desired definitive target range of 10 to 15 mcg/mL.    Vancomycin Regimen Plan:    Change regimen to Vancomycin 1500 mg IV every 24 hours with next serum trough concentration measured at 0830 prior to 3rd dose on 8/19    Drug levels (last 3 results):  Recent Labs   Lab Result Units 08/16/23  1725 08/17/23  0504 08/18/23  0649   Vancomycin, Random ug/mL 19.4 13.2 12.1       Pharmacy will continue to follow and monitor vancomycin.    Please contact pharmacy at extension [phone] for questions regarding this assessment.    Thank you for the consult,   Hamida Andrews       Patient brief summary:  Fabiano Malik Jr. is a 62 y.o. male initiated on antimicrobial therapy with IV Vancomycin for treatment of sepsis    The patient's current regimen is pulse dose    Drug Allergies:   Review of patient's allergies indicates:   Allergen Reactions    Pcn [penicillins] Other (See Comments)     Childhood rxn, pt does not recall type of rxn       Actual Body Weight:   107.3 kg    Renal Function:   Estimated Creatinine Clearance: 82.1 mL/min (based on SCr of 1.2 mg/dL).,     Dialysis Method (if applicable):  N/A    CBC (last 72 hours):  Recent Labs   Lab Result Units 08/16/23  0418 08/17/23  0504 08/18/23  0649   WBC K/uL 11.76 7.01 7.36   Hemoglobin g/dL 11.2* 11.3* 11.5*   Hematocrit % 33.0* 32.9* 33.1*   Platelets K/uL 218 224 253   Gran % % 78.9* 56.8 61.8   Lymph % % 10.7* 21.0 18.5   Mono % % 6.8 8.3 7.6   Eosinophil % % 2.5 12.7* 10.5*   Basophil % % 0.8 0.9 1.2   Differential Method  Automated Automated Automated       Metabolic Panel (last 72 hours):  Recent Labs   Lab Result Units 08/15/23  1420 08/16/23  0419 08/17/23  0504   Sodium mmol/L  --  135* 138   Potassium mmol/L  --  4.0 3.9   Chloride mmol/L  --   101 104   CO2 mmol/L  --  26 27   Glucose mg/dL  --  195* 166*   Glucose, UA  Negative  --   --    BUN mg/dL  --  19 17   Creatinine mg/dL  --  1.4 1.2       Vancomycin Administrations:  vancomycin given in the last 96 hours                     vancomycin (VANCOCIN) 1,750 mg in dextrose 5 % (D5W) 500 mL IVPB (mg) 1,750 mg New Bag 08/17/23 0852    vancomycin 1,500 mg in dextrose 5 % (D5W) 250 mL IVPB (Vial-Mate) (mg) 1,500 mg New Bag 08/16/23 0632     1,500 mg New Bag 08/15/23 1819    vancomycin (VANCOCIN) 2,500 mg in dextrose 5 % (D5W) 500 mL IVPB (mg) 2,500 mg New Bag 08/15/23 0614                    Microbiologic Results:  Microbiology Results (last 7 days)       Procedure Component Value Units Date/Time    Blood culture [[id number]] Collected: 08/15/23 0505    Order Status: Completed Specimen: Blood Updated: 08/17/23 1212     Blood Culture, Routine No Growth to date      No Growth to date      No Growth to date    Blood culture [[id number]] Collected: 08/15/23 0459    Order Status: Completed Specimen: Blood from Antecubital, Right Updated: 08/17/23 1212     Blood Culture, Routine No Growth to date      No Growth to date      No Growth to date

## 2023-08-19 ENCOUNTER — NURSE TRIAGE (OUTPATIENT)
Dept: ADMINISTRATIVE | Facility: CLINIC | Age: 63
End: 2023-08-19
Payer: MEDICARE

## 2023-08-19 PROCEDURE — G0180 MD CERTIFICATION HHA PATIENT: HCPCS | Mod: ,,, | Performed by: PHYSICIAN ASSISTANT

## 2023-08-19 PROCEDURE — G0180 PR HOME HEALTH MD CERTIFICATION: ICD-10-PCS | Mod: ,,, | Performed by: PHYSICIAN ASSISTANT

## 2023-08-20 LAB
BACTERIA BLD CULT: NORMAL
BACTERIA BLD CULT: NORMAL

## 2023-08-20 NOTE — TELEPHONE ENCOUNTER
Post op pt calling to see if he may have hot green tea to drink. I have given him advice, per protocol and he verbalizes understanding. Explained the use of a thickener and he reports he has only been using this in his water. He is ok with waiting until Monday for further information in this regard. He also may benefit from speaking with someone about his diet as he may be interested in other options such as Gatorade.     Reason for Disposition   [1] Caller has NON-URGENT question AND [2] triager unable to answer question    Additional Information   Negative: Sounds like a life-threatening emergency to the triager   Negative: [1] Widespread rash AND [2] bright red, sunburn-like   Negative: [1] SEVERE headache AND [2] after spinal (epidural) anesthesia   Negative: [1] Vomiting AND [2] persists > 4 hours   Negative: [1] Vomiting AND [2] abdomen looks much more swollen than usual   Negative: [1] Drinking very little AND [2] dehydration suspected (e.g., no urine > 12 hours, very dry mouth, very lightheaded)   Negative: Patient sounds very sick or weak to the triager   Negative: Sounds like a serious complication to the triager   Negative: Fever > 100.4 F (38.0 C)   Negative: [1] SEVERE post-op pain (e.g., excruciating, pain scale 8-10) AND [2] not controlled with pain medications   Negative: [1] Caller has URGENT question AND [2] triager unable to answer question   Negative: [1] Headache AND [2] after spinal (epidural) anesthesia AND [3] not severe   Negative: Fever present > 3 days (72 hours)    Protocols used: Post-Op Symptoms and [id number]

## 2023-08-21 ENCOUNTER — PATIENT OUTREACH (OUTPATIENT)
Dept: ADMINISTRATIVE | Facility: CLINIC | Age: 63
End: 2023-08-21
Payer: MEDICARE

## 2023-08-21 ENCOUNTER — NURSE TRIAGE (OUTPATIENT)
Dept: ADMINISTRATIVE | Facility: CLINIC | Age: 63
End: 2023-08-21
Payer: MEDICARE

## 2023-08-21 DIAGNOSIS — F41.9 ANXIETY: ICD-10-CM

## 2023-08-21 NOTE — TELEPHONE ENCOUNTER
"Pt states the had a "neck surgery" on 8/11/23. He is still having trouble drinking thin liquids; can only drink water with the thickener. He also complains of severe lower back pain (10/10). The percocet helps a little; last dose around 10 am. Care advice is to have a nurse from the office call back within an hour. Will route encounter.       Reason for Disposition   SEVERE post-op pain (e.g., excruciating, pain scale 8-10) that is not controlled with pain medications    Additional Information   Negative: Sounds like a life-threatening emergency to the triager   Negative: Bright red, wide-spread, sunburn-like rash   Negative: SEVERE headache and after spinal (epidural) anesthesia   Negative: Vomiting and persists > 4 hours   Negative: Vomiting and abdomen looks much more swollen than usual   Negative: Drinking very little and dehydration suspected (e.g., no urine > 12 hours, very dry mouth, very lightheaded)   Negative: Patient sounds very sick or weak to the triager   Negative: Sounds like a serious complication to the triager   Negative: Fever > 100.4 F (38.0 C)   Negative: Caller has URGENT question and triager unable to answer question    Protocols used: Post-Op Symptoms and [id number]    "

## 2023-08-21 NOTE — PROGRESS NOTES
C3 nurse attempted to contact Fabiano Malik  and daughter, Tiff, for a TCC post hospital discharge follow up call. No answer. Left voicemail with callback information. The patient does not have a scheduled HOSFU appointment. Message sent to PCP staff for assistance with scheduling visit with patient.

## 2023-08-22 ENCOUNTER — DOCUMENTATION ONLY (OUTPATIENT)
Dept: REHABILITATION | Facility: HOSPITAL | Age: 63
End: 2023-08-22
Payer: MEDICARE

## 2023-08-22 PROBLEM — G89.29 CHRONIC FOOT PAIN, LEFT: Status: RESOLVED | Noted: 2023-07-14 | Resolved: 2023-08-22

## 2023-08-22 PROBLEM — R26.89 IMPAIRED GAIT AND MOBILITY: Status: RESOLVED | Noted: 2023-07-18 | Resolved: 2023-08-22

## 2023-08-22 PROBLEM — M79.672 CHRONIC FOOT PAIN, LEFT: Status: RESOLVED | Noted: 2023-07-14 | Resolved: 2023-08-22

## 2023-08-22 RX ORDER — ALPRAZOLAM 0.5 MG/1
0.5 TABLET ORAL NIGHTLY PRN
Qty: 30 TABLET | Refills: 0 | Status: SHIPPED | OUTPATIENT
Start: 2023-08-22 | End: 2023-09-07 | Stop reason: SDUPTHER

## 2023-08-22 NOTE — PROGRESS NOTES
Patient was evaluated on 2023 and was seen 1 times for physical therapy. Patient has not attended physical therapy since evaluation. Patient given home exercise program. Plan of care and/or authorization . Current status is unknown. Patient to be discharged at this time.

## 2023-08-22 NOTE — TELEPHONE ENCOUNTER
No care due was identified.  [facility] Embedded Care Due Messages. Reference number: [id number].   8/21/2023 9:18:02 PM CDT

## 2023-08-25 ENCOUNTER — HOSPITAL ENCOUNTER (OUTPATIENT)
Dept: RADIOLOGY | Facility: HOSPITAL | Age: 63
Discharge: HOME | End: 2023-08-25
Attending: NEUROLOGICAL SURGERY
Payer: MEDICARE

## 2023-08-25 ENCOUNTER — OFFICE VISIT (OUTPATIENT)
Dept: NEUROSURGERY | Facility: CLINIC | Age: 63
End: 2023-08-25
Payer: MEDICARE

## 2023-08-25 VITALS
BODY MASS INDEX: 31.35 KG/M2 | SYSTOLIC BLOOD PRESSURE: 111 MMHG | WEIGHT: 236.56 LBS | HEIGHT: 73 IN | DIASTOLIC BLOOD PRESSURE: 74 MMHG | HEART RATE: 85 BPM

## 2023-08-25 DIAGNOSIS — M54.12 CERVICAL RADICULOPATHY: ICD-10-CM

## 2023-08-25 DIAGNOSIS — M50.30 DDD (DEGENERATIVE DISC DISEASE), CERVICAL: ICD-10-CM

## 2023-08-25 DIAGNOSIS — M48.02 CERVICAL SPINAL STENOSIS: ICD-10-CM

## 2023-08-25 DIAGNOSIS — Z98.1 S/P CERVICAL SPINAL FUSION: Primary | ICD-10-CM

## 2023-08-25 DIAGNOSIS — G95.9 CERVICAL MYELOPATHY: ICD-10-CM

## 2023-08-25 PROCEDURE — 3008F PR BODY MASS INDEX (BMI) DOCUMENTED: ICD-10-PCS | Mod: CPTII,S$GLB,, | Performed by: PHYSICIAN ASSISTANT

## 2023-08-25 PROCEDURE — 3044F PR MOST RECENT HEMOGLOBIN A1C LEVEL <7.0%: ICD-10-PCS | Mod: CPTII,S$GLB,, | Performed by: PHYSICIAN ASSISTANT

## 2023-08-25 PROCEDURE — 99024 POSTOP FOLLOW-UP VISIT: CPT | Mod: S$GLB,,, | Performed by: PHYSICIAN ASSISTANT

## 2023-08-25 PROCEDURE — 1159F PR MEDICATION LIST DOCUMENTED IN MEDICAL RECORD: ICD-10-PCS | Mod: CPTII,S$GLB,, | Performed by: PHYSICIAN ASSISTANT

## 2023-08-25 PROCEDURE — 3074F SYST BP LT 130 MM HG: CPT | Mod: CPTII,S$GLB,, | Performed by: PHYSICIAN ASSISTANT

## 2023-08-25 PROCEDURE — 3078F DIAST BP <80 MM HG: CPT | Mod: CPTII,S$GLB,, | Performed by: PHYSICIAN ASSISTANT

## 2023-08-25 PROCEDURE — 1160F RVW MEDS BY RX/DR IN RCRD: CPT | Mod: CPTII,S$GLB,, | Performed by: PHYSICIAN ASSISTANT

## 2023-08-25 PROCEDURE — 3078F PR MOST RECENT DIASTOLIC BLOOD PRESSURE < 80 MM HG: ICD-10-PCS | Mod: CPTII,S$GLB,, | Performed by: PHYSICIAN ASSISTANT

## 2023-08-25 PROCEDURE — 3066F NEPHROPATHY DOC TX: CPT | Mod: CPTII,S$GLB,, | Performed by: PHYSICIAN ASSISTANT

## 2023-08-25 PROCEDURE — 99024 PR POST-OP FOLLOW-UP VISIT: ICD-10-PCS | Mod: S$GLB,,, | Performed by: PHYSICIAN ASSISTANT

## 2023-08-25 PROCEDURE — 3061F NEG MICROALBUMINURIA REV: CPT | Mod: CPTII,S$GLB,, | Performed by: PHYSICIAN ASSISTANT

## 2023-08-25 PROCEDURE — 72040 XR CERVICAL SPINE AP LATERAL: ICD-10-PCS | Mod: 26,,, | Performed by: RADIOLOGY

## 2023-08-25 PROCEDURE — 3061F PR NEG MICROALBUMINURIA RESULT DOCUMENTED/REVIEW: ICD-10-PCS | Mod: CPTII,S$GLB,, | Performed by: PHYSICIAN ASSISTANT

## 2023-08-25 PROCEDURE — 3044F HG A1C LEVEL LT 7.0%: CPT | Mod: CPTII,S$GLB,, | Performed by: PHYSICIAN ASSISTANT

## 2023-08-25 PROCEDURE — 1160F PR REVIEW ALL MEDS BY PRESCRIBER/CLIN PHARMACIST DOCUMENTED: ICD-10-PCS | Mod: CPTII,S$GLB,, | Performed by: PHYSICIAN ASSISTANT

## 2023-08-25 PROCEDURE — 72040 X-RAY EXAM NECK SPINE 2-3 VW: CPT | Mod: TC,FY

## 2023-08-25 PROCEDURE — 3008F BODY MASS INDEX DOCD: CPT | Mod: CPTII,S$GLB,, | Performed by: PHYSICIAN ASSISTANT

## 2023-08-25 PROCEDURE — 99999 PR PBB SHADOW E&M-EST. PATIENT-LVL V: ICD-10-PCS | Mod: PBBFAC,,, | Performed by: PHYSICIAN ASSISTANT

## 2023-08-25 PROCEDURE — 1159F MED LIST DOCD IN RCRD: CPT | Mod: CPTII,S$GLB,, | Performed by: PHYSICIAN ASSISTANT

## 2023-08-25 PROCEDURE — 3074F PR MOST RECENT SYSTOLIC BLOOD PRESSURE < 130 MM HG: ICD-10-PCS | Mod: CPTII,S$GLB,, | Performed by: PHYSICIAN ASSISTANT

## 2023-08-25 PROCEDURE — 72040 X-RAY EXAM NECK SPINE 2-3 VW: CPT | Mod: 26,,, | Performed by: RADIOLOGY

## 2023-08-25 PROCEDURE — 99999 PR PBB SHADOW E&M-EST. PATIENT-LVL V: CPT | Mod: PBBFAC,,, | Performed by: PHYSICIAN ASSISTANT

## 2023-08-25 PROCEDURE — 3066F PR DOCUMENTATION OF TREATMENT FOR NEPHROPATHY: ICD-10-PCS | Mod: CPTII,S$GLB,, | Performed by: PHYSICIAN ASSISTANT

## 2023-08-25 NOTE — PROGRESS NOTES
"Postoperative Wound Check:    Date of surgery 08/11/2023     Preop diagnosis   1. Cervical spondylosis with radiculopathy and myelopathy  2. Status post C4-5 fusion     Postop diagnosis   Same     Surgery   1. C5-6 and C6-7 anterior arthrodesis with placement of interbody spacer, Depuy ACIS spacers filled with allograft DBM  2. C5-C7 anterior instrumentation with separate plate DePuy [place] system  3. Fluoroscopy  4. Neuro monitoring using MEP, SSEP, EMG     Surgeon   Guillaume Washington MD    HPI:    Patient states he has some pain on the left side of his neck.  He is still having difficulty swallowing.  She is still coughing.  He denies any fever or chills.  Still having some sweats.  Some numbness in his right forearm and hand and a little bit in the left hand.  He is walking with a walker.  He has been wearing his brace.  He has home health coming.    Patient denies any problems with the incisions.  No redness, swelling, or drainage, and no fever, chills, or sweats.      Physical Exam:    Vitals:    08/25/23 1433   BP: 111/74   Pulse: 85   Weight: 107.3 kg (236 lb 8.9 oz)   Height: 6' 1" (1.854 m)   PainSc:   9   PainLoc: Neck         Incision:  Wound edges are approximated.  No redness, swelling, or drainage.      Diagnosis:     1. S/P cervical spinal fusion              Plan:              -Medical Assistant removed the staples without any complications.  Chloraprep applied.  -x-rays show good hardware placement  -patient has a swallow study next week will follow-up        The patient will follow up with me in 4 weeks for the 6 week po fu with xrays beforehand.    Massiel Lund, [facility], PA-C  Neurosurgery  [facility]        "

## 2023-08-29 ENCOUNTER — HOSPITAL ENCOUNTER (OUTPATIENT)
Dept: RADIOLOGY | Facility: HOSPITAL | Age: 63
Discharge: HOME | End: 2023-08-29
Attending: NEUROLOGICAL SURGERY
Payer: MEDICARE

## 2023-08-29 ENCOUNTER — DOCUMENT SCAN (OUTPATIENT)
Dept: HOME HEALTH SERVICES | Facility: HOSPITAL | Age: 63
End: 2023-08-29
Payer: MEDICARE

## 2023-08-29 DIAGNOSIS — R13.12 OROPHARYNGEAL DYSPHAGIA: ICD-10-CM

## 2023-08-29 PROCEDURE — 74230 X-RAY XM SWLNG FUNCJ C+: CPT | Mod: TC

## 2023-08-29 PROCEDURE — 74230 FL MODIFIED BARIUM SWALLOW SPEECH STUDY: ICD-10-PCS | Mod: 26,,, | Performed by: RADIOLOGY

## 2023-08-29 PROCEDURE — 74230 X-RAY XM SWLNG FUNCJ C+: CPT | Mod: 26,,, | Performed by: RADIOLOGY

## 2023-08-29 NOTE — PROCEDURES
Modified Barium Swallow    Patient Name:  Fabiano Malik Jr.   MRN:  [id number]      Recommendations:     Recommendations:                General Recommendations:  Dysphagia therapy  Diet recommendations:  Minced & Moist Diet - IDDSI Level 5, Thin liquids - IDDSI Level 0   Aspiration Precautions: 1 bite/sip at a time, Alternating bites/sips, Double swallow with each bite/sip, HOB to 90 degrees, Meds crushed in puree, No straws, and Small bites/sips   General Precautions: Standard,    Communication strategies:  none    Referral     Reason for Referral  Patient was referred for a Modified Barium Swallow Study to assess the efficiency of his/her swallow function, rule out aspiration and make recommendations regarding safe dietary consistencies, effective compensatory strategies, and safe eating environment.     Diagnosis: <principal problem not specified>       History:     Past Medical History:   Diagnosis Date    Chronic back pain greater than 3 months duration     Depression     Diabetes mellitus     Diabetes mellitus, type 2     Hypertension     Schizophrenia        Objective:     Current Respiratory Status: 08/29/23    Alert: yes    Cooperative: yes    Follows Directions: yes    Visualization  Patient was seen in the lateral view    Oral Peripheral Examination  Oral Musculature: WFL  Dentition: present and adequate  Secretion Management: adequate  Mucosal Quality: good  Oral Labial Strength and Mobility: WFL  Lingual Strength and Mobility: WFL  Volitional Cough: Weak  Volitional Swallow: Delayed  Voice Prior to PO Intake: Dcr intensity    Consistencies Assessed  Thin ~30 mL via straw 7 cup-edge  Puree ~10mL via tsp  Solids -x1 bite sized piece of krish cracker coated in barium     Oral Preparation/Oral Phase  prolonged mastication  Decreased base of tongue mobility  Premature spillage    Pharyngeal Phase   Pt with dcr base of tongue retraction leading to delayed swallow initiation, to the level of the pyriform  sinuses with thin liquids, and valleculae with puree and solid trials. Vallecular stasis noted (mod), which was cleared some via spontaneous consecutive swallows. Trace-mild stasis remained post swallow. Dcr hyoalryngeal elevation and excursion noted. Posterior pharyngeal wall edema noted ~C4-7 where hardware is noted from recent ACDF 8/2023. Although edema noted, this slowed the passage of boluses, however, did not obstruct pharyngeal passageway. Inconsistent complete epiglottic inversion noted, with x1 instance of aspiration of thin liquids via straw. Pt made no attempt to clear material when above the vocal folds, however, once contact was made with vocal folds, coughing noted. Coughing was unsuccessful in clearing aspirated material. No additional penetration or aspiration noted with thin via controlled cup-edge, puree or solids. Dcr pharyngeal stripping wave and constriction noted 2/2 pharyngeal edema. Sepcific results as follows:    THIN LIQUIDS  Rosenbek 8-Point Penetration-Aspiration Scale Score:   BEST 1: Material does not enter the airway.  WORST 7: Material enters the airway, passes below the vocal folds, and is not ejected from the airway, despite effort.      Pharyngeal Residue  Valleculae: Mild  Pyriforms: Mild  *Pt cleared most of residue via cued subsequent swallow.    - - - -     PUREE  Rosenbek 8-Point Penetration-Aspiration Scale Score: 1: Material does not enter the airway.     Pharyngeal Residue  Valleculae: Mod  Pyriforms: Mild-mod  **Pt cleared some of residue via cued subsequent swallow.    - - - -     SOFT SOLID/ REGULAR SOLID  Rosenbek 8-Point Penetration-Aspiration Scale Score: 1: Material does not enter the airway.     Pharyngeal Residue  Valleculae: Mild-mod  Pyriforms: Mild  **Pt cleared some of residue via cued subsequent swallow.      Cervical Esophageal Phase  UES appeared to accommodate all bolus types without stasis or retrograde movement observed     Assessment:     Impressions     Pt presents with improved pharyngeal swallowing since initial MBSS on 8/17/23, however, pt with cont mod pharyngeal dysphagia that is c/b by the following: dcr base of tongue retraction with subsequent delayed swallow initiation, inconsistent epiglottic inversion led to x1 instance of aspiration of thin liquids via straw, dcr hyolaryngeal excursion and variable pharyngeal stasis which was impacted by pt's cont PPW edema 2/2 recent ACDF.        Prognosis: Good    Barriers:  None    Plan  ST recs advance diet to minced with thin liquids via CUP-EDGE ONLY. NO STRAWS. Strict adherence to the following swallowing precautions during intake:    -Upright for all meals  -Small bites/sips  -Single sips  -NO straws  -Alternate bites/sips every 2 bites   -Double swallows with each bite    Education  ST utilized live swallow study video via monitor to discuss the following with the pt: normal swallowing anatomy and physiology, pt's specific anatomy and physiology, diet recs, as well as swallowing precautions.          Plan:   Patient to be seen:      Plan of Care expires:     Plan of Care reviewed with:  patient        Discharge recommendations:      Barriers to Discharge:  None    Time Tracking:   SLP Treatment Date:   08/29/23  Speech Start Time:  1105  Speech Stop Time:  1130     Speech Total Time (min):  25 min    08/29/2023

## 2023-09-05 ENCOUNTER — TELEPHONE (OUTPATIENT)
Dept: NEUROSURGERY | Facility: CLINIC | Age: 63
End: 2023-09-05
Payer: MEDICARE

## 2023-09-05 NOTE — TELEPHONE ENCOUNTER
Returned call to Alexus at [facility], she stated that the pt had a terrible coughing spell last nigh and since then he has not been able to eat or sleep. She also stated that the pt stated that his pain is at 9 now. I stated that I will send a message to Massiel and once I get a response I will contact her and the pt to let them know how to proceed. Alexus voiced understanding

## 2023-09-05 NOTE — TELEPHONE ENCOUNTER
No care due was identified.  [facility] Embedded Care Due Messages. Reference number: [id number].   9/05/2023 5:36:51 PM CDT

## 2023-09-06 ENCOUNTER — TELEPHONE (OUTPATIENT)
Dept: NEUROSURGERY | Facility: CLINIC | Age: 63
End: 2023-09-06
Payer: MEDICARE

## 2023-09-06 RX ORDER — PRAVASTATIN SODIUM 40 MG/1
40 TABLET ORAL DAILY
Qty: 90 TABLET | Refills: 3 | Status: SHIPPED | OUTPATIENT
Start: 2023-09-06

## 2023-09-06 NOTE — TELEPHONE ENCOUNTER
Returned pt's call, I informed pt that I did send a message to Massiel a couple of days ago when I spoke with his speech therapist. I stated I am waiting for a response and once I get a response I will be back in contact with him. Pt voiced understanding

## 2023-09-06 NOTE — TELEPHONE ENCOUNTER
Baldomero Wu,    What did you think after seeing him a week after discharge?  And what was the results of his swallow study?  I couldn't find it in Epic.      Dr. Washington,    Should we try a medrol pack for his pain?    Thanks,  Massiel Lund, [facility], PA-C  Neurosurgery  [facility]  09/06/2023

## 2023-09-06 NOTE — TELEPHONE ENCOUNTER
----- Message from Marek Rodgers MA sent at 9/5/2023  2:02 PM CDT -----  Regarding: FW: coughing  Contact: Dori @ [phone]  Pt complained of a lot of coughing last night and since then he has not been able to sleep or eat. Also stated that his pain is now at a 9. Please advise    MT   ----- Message -----  From: Carmel Mendez  Sent: 9/5/2023  12:09 PM CDT  To: [facility] Staff  Subject: coughing                                         Dori from [facility] is calling to inform office that pt had bad coughing spell last. Asking for a call back. Pain is at 9

## 2023-09-07 ENCOUNTER — TELEPHONE (OUTPATIENT)
Dept: NEUROSURGERY | Facility: CLINIC | Age: 63
End: 2023-09-07
Payer: MEDICARE

## 2023-09-07 DIAGNOSIS — F41.9 ANXIETY: ICD-10-CM

## 2023-09-07 RX ORDER — METHYLPREDNISOLONE 4 MG/1
TABLET ORAL
Qty: 21 TABLET | Refills: 0 | Status: SHIPPED | OUTPATIENT
Start: 2023-09-07 | End: 2023-10-04

## 2023-09-07 NOTE — TELEPHONE ENCOUNTER
Medrol steroid pack prescribed.  Take with food.  Will increase blood glucose so monitor that closely.    He will have to ask his PCP about and appetite stimulant.    Thanks,  Massiel Lund, [facility], PA-C  Neurosurgery  [facility]  09/07/2023

## 2023-09-07 NOTE — TELEPHONE ENCOUNTER
"Informed pt, per Massiel    "Medrol steroid pack prescribed.  Take with food.  Will increase blood glucose so monitor that closely.     He will have to ask his PCP about and appetite stimulant."    Pt voiced understanding       "

## 2023-09-07 NOTE — TELEPHONE ENCOUNTER
No care due was identified.  [facility] Embedded Care Due Messages. Reference number: [id number].   9/07/2023 3:47:37 PM CDT

## 2023-09-07 NOTE — TELEPHONE ENCOUNTER
Thank you. I read the report.    Does he need to fu with you all at some point or any further testing needed going forward?    Thanks,  Massiel Lund, [facility], PA-C  Neurosurgery  [facility]  09/07/2023

## 2023-09-07 NOTE — TELEPHONE ENCOUNTER
----- Message from Marek Rodgers MA sent at 9/6/2023  4:24 PM CDT -----  Contact: [phone]  Pt stated that he does not have an appetite and his neck pain is still 9/10  ----- Message -----  From: Jose Elias Agee  Sent: 9/6/2023  12:21 PM CDT  To: [facility]he[facility] ERAZO Staff    Hi, pt's speech therapist called to say the pt's pain is a 9/10 in the neck, lower back and throat. Pt would like to know if there was anything else he could take? Pls call the pt at [phone]. Pt wants to know, could he get an appetite stimulant?       [phone] Pam(Speech Therapist)

## 2023-09-12 RX ORDER — ALPRAZOLAM 0.5 MG/1
0.5 TABLET ORAL NIGHTLY PRN
Qty: 30 TABLET | Refills: 0 | Status: SHIPPED | OUTPATIENT
Start: 2023-09-12 | End: 2023-09-23 | Stop reason: SDUPTHER

## 2023-09-13 ENCOUNTER — OFFICE VISIT (OUTPATIENT)
Dept: OTOLARYNGOLOGY | Facility: CLINIC | Age: 63
End: 2023-09-13
Payer: MEDICARE

## 2023-09-13 VITALS — SYSTOLIC BLOOD PRESSURE: 118 MMHG | HEART RATE: 81 BPM | DIASTOLIC BLOOD PRESSURE: 80 MMHG

## 2023-09-13 DIAGNOSIS — J38.01 UNILATERAL COMPLETE VOCAL FOLD PARALYSIS: ICD-10-CM

## 2023-09-13 DIAGNOSIS — R49.0 DYSPHONIA: Primary | ICD-10-CM

## 2023-09-13 DIAGNOSIS — R13.12 OROPHARYNGEAL DYSPHAGIA: ICD-10-CM

## 2023-09-13 DIAGNOSIS — R13.14 DYSPHAGIA, PHARYNGOESOPHAGEAL: ICD-10-CM

## 2023-09-13 PROCEDURE — 3044F HG A1C LEVEL LT 7.0%: CPT | Mod: CPTII,S$GLB,, | Performed by: OTOLARYNGOLOGY

## 2023-09-13 PROCEDURE — 3066F PR DOCUMENTATION OF TREATMENT FOR NEPHROPATHY: ICD-10-PCS | Mod: CPTII,S$GLB,, | Performed by: OTOLARYNGOLOGY

## 2023-09-13 PROCEDURE — 3074F SYST BP LT 130 MM HG: CPT | Mod: CPTII,S$GLB,, | Performed by: OTOLARYNGOLOGY

## 2023-09-13 PROCEDURE — 3079F DIAST BP 80-89 MM HG: CPT | Mod: CPTII,S$GLB,, | Performed by: OTOLARYNGOLOGY

## 2023-09-13 PROCEDURE — 31579 PR LARYNGOSCOPY, FLEX/RIGID TELESCOPIC, W/STROBOSCOPY: ICD-10-PCS | Mod: S$GLB,,, | Performed by: OTOLARYNGOLOGY

## 2023-09-13 PROCEDURE — 1160F PR REVIEW ALL MEDS BY PRESCRIBER/CLIN PHARMACIST DOCUMENTED: ICD-10-PCS | Mod: CPTII,S$GLB,, | Performed by: OTOLARYNGOLOGY

## 2023-09-13 PROCEDURE — 1111F PR DISCHARGE MEDS RECONCILED W/ CURRENT OUTPATIENT MED LIST: ICD-10-PCS | Mod: CPTII,S$GLB,, | Performed by: OTOLARYNGOLOGY

## 2023-09-13 PROCEDURE — 3044F PR MOST RECENT HEMOGLOBIN A1C LEVEL <7.0%: ICD-10-PCS | Mod: CPTII,S$GLB,, | Performed by: OTOLARYNGOLOGY

## 2023-09-13 PROCEDURE — 3079F PR MOST RECENT DIASTOLIC BLOOD PRESSURE 80-89 MM HG: ICD-10-PCS | Mod: CPTII,S$GLB,, | Performed by: OTOLARYNGOLOGY

## 2023-09-13 PROCEDURE — 3074F PR MOST RECENT SYSTOLIC BLOOD PRESSURE < 130 MM HG: ICD-10-PCS | Mod: CPTII,S$GLB,, | Performed by: OTOLARYNGOLOGY

## 2023-09-13 PROCEDURE — 99999 PR PBB SHADOW E&M-EST. PATIENT-LVL V: ICD-10-PCS | Mod: PBBFAC,,, | Performed by: OTOLARYNGOLOGY

## 2023-09-13 PROCEDURE — 99214 OFFICE O/P EST MOD 30 MIN: CPT | Mod: 25,S$GLB,, | Performed by: OTOLARYNGOLOGY

## 2023-09-13 PROCEDURE — 1111F DSCHRG MED/CURRENT MED MERGE: CPT | Mod: CPTII,S$GLB,, | Performed by: OTOLARYNGOLOGY

## 2023-09-13 PROCEDURE — 3061F NEG MICROALBUMINURIA REV: CPT | Mod: CPTII,S$GLB,, | Performed by: OTOLARYNGOLOGY

## 2023-09-13 PROCEDURE — 1159F PR MEDICATION LIST DOCUMENTED IN MEDICAL RECORD: ICD-10-PCS | Mod: CPTII,S$GLB,, | Performed by: OTOLARYNGOLOGY

## 2023-09-13 PROCEDURE — 3066F NEPHROPATHY DOC TX: CPT | Mod: CPTII,S$GLB,, | Performed by: OTOLARYNGOLOGY

## 2023-09-13 PROCEDURE — 99999 PR PBB SHADOW E&M-EST. PATIENT-LVL V: CPT | Mod: PBBFAC,,, | Performed by: OTOLARYNGOLOGY

## 2023-09-13 PROCEDURE — 3061F PR NEG MICROALBUMINURIA RESULT DOCUMENTED/REVIEW: ICD-10-PCS | Mod: CPTII,S$GLB,, | Performed by: OTOLARYNGOLOGY

## 2023-09-13 PROCEDURE — 31579 LARYNGOSCOPY TELESCOPIC: CPT | Mod: S$GLB,,, | Performed by: OTOLARYNGOLOGY

## 2023-09-13 PROCEDURE — 99214 PR OFFICE/OUTPT VISIT, EST, LEVL IV, 30-39 MIN: ICD-10-PCS | Mod: 25,S$GLB,, | Performed by: OTOLARYNGOLOGY

## 2023-09-13 PROCEDURE — 1160F RVW MEDS BY RX/DR IN RCRD: CPT | Mod: CPTII,S$GLB,, | Performed by: OTOLARYNGOLOGY

## 2023-09-13 PROCEDURE — 1159F MED LIST DOCD IN RCRD: CPT | Mod: CPTII,S$GLB,, | Performed by: OTOLARYNGOLOGY

## 2023-09-13 NOTE — PATIENT INSTRUCTIONS
VOCAL CORD PARALYSIS & PARESIS     What is vocal fold paralysis/paresis?     Vocal fold paralysis is a condition in which one or more nerves to the vocal folds work poorly, and results in the vocal folds moving weakly or asymmetrically. In most cases, vocal fold closure is reduced, which results in a weak or breathy voice. There may also be problems with the vocal folds elongating, so it becomes difficult to produce high pitches. In other more rare cases, the vocal folds may not be able to move apart, causing breathing difficulty. Paresis is a similar condition, but less severe.     Vocal fold paralysis and vocal fold paresis have several causes. These include viruses, certain types of surgeries, heart problems, tumors, or other diseases of the brain.     How is it treated?     First, it is best to determine the cause of the paralysis/paresis. Based on the cause, special tests may be conducted to determine if the paralysis or paresis is new or old, and if it is getting worse or better. Clearly, if breathing is an issue, securing the airway surgically is the first step. Otherwise, depending on the results of the tests, it is common to either adopt a wait and see approach or to begin voice therapy. In some cases, microsurgery is used to mechanically reposition the affected vocal fold so that it gets good closure with the good one, thereby improving voice and swallowing.         VOCAL FOLD INJECTION AUGMENTATION     Description   If the vocal folds (vocal cords) cannot close completely, you may experience voice problems: roughness, breathiness, inability to get loud, increased vocal effort, increased vocal fatigue. Some patients may also experience aspiration (coughing or choking with swallowing). Vocal fold injection augmentation plumps up the vocal fold and/or repositions it in the midline in order to help the vocal folds close completely while speaking or swallowing. Following the procedure, most patients  experience a louder, stronger, clearer voice. The procedure also helps some patients protect against aspiration, although the swallowing improvement is not as dramatic as the voice improvement. We use the following materials for the procedure: hyaluronic acid (Restylane); carboxymethylcellulose (Radiesse Voice Gel); and calcium hydroxyapatite (Radiesse Voice). For most patients, the injection is performed with a small needle passed through the skin of the neck. However, in some patients we perform the injection with a device passed through the mouth. In either case, the injection is guided by the visualization provided by a scope passed through the nose.     What to expect during the procedure   We perform the injection in our office under local (topical) anesthesia. You are awake the whole time, and the entire procedure lasts about 15 minutes. Our primary focus is your safety and comfort. We usually make the larynx numb by spraying the throat and/or dripping anesthetic on the larynx. At this time, you may cough or gag, or you may have the sensation that you spilled some water down the wrong pipe. These are temporary sensations that allow us to get you numb. The numbing process takes about 2 minutes. We will not proceed until we know you will be as comfortable as possible. A small needle is passed either through the skin of the neck or via a long instrument through the mouth to perform the injection. During the injection, you may experience mild discomfort in the throat. You may feel an unusual sensation in the ear because the larynx and the ear share the same sensory nerve. In rare cases in which a patient does not tolerate the procedure, it may be performed in the operating room, with the patient completely asleep under general anesthesia.     What to expect afterwards   Most patients note a stronger, less effortful voice immediately after the injection. Sometimes the voice is tight or pressed voice is noted right  after the injection. The voice usually has good days and bad days and gradually improves until you reach your new baseline voice over the first 1-2 weeks. Voice therapy may be a necessary part of your treatment plan to optimize your vocal outcome. None of the materials we inject are permanent. As the material dissolves, you may experience a gradual worsening of voice quality over the course of several months. At this point we may consider repeating the injection. You may be a candidate for a permanent fix, which involves an open surgery in the neck performed in the operating room.     Instructions: before the procedure   1. Do not take aspirin-containing products or other medications that can thin the blood (such as ibuprofen, Advil, Motrin, Aleve, Plavix) for 7 days prior to the injection. If you take Coumadin (warfarin), you may need to stop using this a few days prior to the injection. If you are on blood thinning (anti-platelet or anti-coagulant) medication and it is not clear what you should do, please clarify this with your physician.   2. On the day of your procedure, please make sure you take your other regular morning medications.   3. On the day of your procedure, it is OK to eat and drink as you would normally, up until 3 hours before to your appointment time. During that time frame, we ask that you restrict yourself only to clear liquids. A clear liquid is anything you can see through (water, ginger ale, apple juice).     Instructions: after the procedure   1. Please refrain from eating or drinking for 1 hour following the procedure. This allows time for the numbing medication to wear off.   2. Most patients experience very little pain. If necessary, most patients are able to keep comfortable with plain Tylenol (acetaminophen) and/or other non-steroidal anti-inflammatory medications such as ibuprofen (Advil, Motrin). Please follow package instructions if considering taking these medications.   3. In most  instances, it is OK to use your voice immediately after the procedure. However, for the first week, you should avoid speaking over heavy background noise or in a very loud voice. It is rare, but in some cases you will be asked to rest your voice for the first 24 hours.   4. Please call the [facility] at [phone] if   You have a temperature above 101°F   You develop Increasing throat pain not relieved by over-the-counter medications   You have any other post-operative questions or concerns   5. Please go immediately to the nearest emergency room if you are experiencing   Shortness of breath   Difficulty breathing   Difficulty swallowing   Severe bleeding     FREQUENTLY ASKED QUESTIONS     Is this a Botox injection? No. Botox weakens the voice box muscles. Instead, with a vocal fold injection augmentation, we are injecting filler material to bulk up the vocal fold(s).     How do you decide which material to use for the injection? Our decision is based on the indication for the procedure, the position of the vocal fold, and other patient historical/anatomical factors. In some instances, the approval of your insurance company is an important factor.     How to you decide which technique to use (through the neck versus through the mouth)? Patient anatomy and the position of the vocal fold play an important role. Other patient factors such as gag reflex are also strongly considered.     Why do you perform this in your office instead of in the operating room? Performing the procedure in the office is safe and precise. In addition, performing the injection with the patient awake gives us direct visual and auditory feedback, which we do not get when the patient is asleep in the operating room. Furthermore, an office-based injection is much less time consuming, is more convenient for the patient, and does not involve the risks or the recovery time associated with general anesthesia. We can still do this in the  operating room; we save that setting for specific diagnoses or situations, as well as for the rare patient who is unable to tolerate the awake procedure.     Why did I have discomfort in my ear (during or after the injection)? This is an example of referred pain. The ear and the larynx share the same sensory nerve.     I got an injection 3 days ago. Why is my voice still hoarse? To optimize vocal outcome, we overinject a little bit. Additionally, there may be a mild amount of swelling. Finally, the muscles of the larynx need to adjust to the injected material. Most people will have good days and bad days at first. After 1-2 weeks, you should settle out to your new baseline voice.     How long does the injection last? Carboxymethylcellulose (Radiesse Voice Gel) lasts approximately 1-2 months. Hyaluronic acid (Restylane) lasts approximately 4 months. Calcium hydroxyapatite (Radiesse Voice) lasts up to 1 year; however its characteristics are such that only few patients are appropriate for using this material.     Is there a permanent injectable material? No.     Can the injection be repeated? Yes. There is no limit to the number of times an injection can be repeated. However, a permanent surgical fix is often an option to consider.

## 2023-09-13 NOTE — PROGRESS NOTES
[facility]  Department of Otorhinolaryngology and Communication Sciences    Fabiano Malik Jr. is a 62 y.o. male who [facility] the [facility] for consultation at the kind request of Dr. Guillaume Washington for further management of  voice and swallowing difficulty .     He complains of a quiet voice and difficulty swallowing, beginning acutely following cervical fusion surgery last month.  Modified barium swallow study performed 5 days postoperatively cleared him for a puree diet with nectar thick liquids, to which he has been adhering.  He reports symptoms of food boluses getting stuck in the throat--both with solid and liquid consistencies.  He finds that his voice is increasingly airy and he is difficulty projecting it.  Symptoms are essentially stable.  He denies any exacerbating or alleviating factors.    Past Medical History  He has a past medical history of Chronic back pain greater than 3 months duration, Depression, Diabetes mellitus, Diabetes mellitus, type 2, Hypertension, and Schizophrenia.    Past Surgical History  He has a past surgical history that includes Neck surgery; Appendectomy; Radiofrequency ablation of lumbar medial branch nerve at single level (Left, 5/29/2018); Colonoscopy (N/A, 5/31/2018); Radiofrequency ablation of lumbar medial branch nerve at single level (Right, 1/8/2019); Radiofrequency ablation of lumbar medial branch nerve at single level (Left, 3/12/2019); Transforaminal epidural injection of steroid (Right, 9/12/2019); Colonoscopy (N/A, 11/17/2022); Carpal tunnel release (Right, 12/13/2022); decompression, nerve, ulnar (Right, 12/13/2022); and fusion, spine, cervical (N/A, 8/11/2023).    Family History  His family history includes Alzheimer's disease in his mother and sister; Cancer in his father; Diabetes in his mother; Heart attack in his father; Heart defect in his father and sister; Stroke in his father.    Social History  He reports that he has never smoked. He  has never used smokeless tobacco. He reports current alcohol use. He reports that he does not use drugs.    Allergies  He is allergic to pcn [penicillins].    Medications  He has a current medication list which includes the following prescription(s): alprazolam, blood pressure cuff, blood sugar diagnostic, blood-glucose meter, chlorthalidone, trulicity, duloxetine, glimepiride, hydroxyzine pamoate, lancets, meclizine, metformin, methocarbamol, methylprednisolone, olmesartan, omeprazole, oxycodone-acetaminophen, potassium chloride, pravastatin, pregabalin, sildenafil, and tamsulosin.    Review of Systems   Constitutional:  Positive for appetite change and chills. Negative for fever.   HENT:  Positive for ear pain, postnasal drip, sore throat, trouble swallowing and voice change.    Eyes:  Positive for itching. Negative for visual disturbance.   Respiratory:  Positive for cough, shortness of breath and wheezing.    Cardiovascular:  Positive for chest pain.   Gastrointestinal:  Negative for nausea.   Endocrine: Negative.    Genitourinary:  Positive for difficulty urinating.   Musculoskeletal:  Positive for back pain and neck pain. Negative for arthralgias.   Skin: Negative.  Negative for rash.   Allergic/Immunologic: Negative.    Neurological:  Positive for dizziness, tremors and light-headedness.   Hematological: Negative.  Does not bruise/bleed easily.   Psychiatric/Behavioral:  Positive for decreased concentration and sleep disturbance. The patient is nervous/anxious.           Objective:     VS reviewed     Physical Exam  Constitutional: comfortable, well dressed; in C-collar  Psychiatric: appropriate affect  Respiratory: comfortably breathing, symmetric chest rise, no stridor  Voice: moderate to severe dense breathiness  Cardiovascular: upper extremities non-edematous  Lymphatic: no cervical lymphadenopathy  Neurologic: alert and oriented to time, place, person, and situation; cranial nerves 3-12 grossly  intact  Head: normocephalic  Eyes: conjunctivae and sclerae clear  Ears: normal pinnae, normal external auditory canals, tympanic membranes intact  Nose: mucosa pink and noncongested, no masses, no mucopurulence, no polyps  Oral cavity / oropharynx: no mucosal lesions  Neck: soft, full range of motion, laryngotracheal complex palpable with appropriate landmarks, larynx elevates on swallowing  Indirect laryngoscopy: limited due to gag    Procedure  Flexible Laryngeal Videostroboscopy (03473): Laryngeal videostroboscopy is indicated to assess laryngeal vibratory biomechanics and vocal fold oscillation, which cannot be assessed with a plain light examination. This was carried out transnasally with a distal chip videoendoscope. After verbal consent was obtained, the patient was positioned and the nose was topically decongested with 1% phenylephrine and topically anesthetized with 4% lidocaine. The endoscope was passed through the most patent nasal cavity and positioned to image the nasopharynx, larynx, and hypopharynx in detail. The following features were examined: nasopharyngeal, laryngeal, hypopharyngeal masses; velopharyngeal strength, closure, and symmetry of motion; vocal fold range and symmetry of motion; laryngeal mucosal edema, erythema, inflammation, and hydration; salivary pooling; and gross laryngeal sensation. During phonation, the vocal folds were assessed for glottal closure; mucosal wave; vocal fold lesions; vibratory periodicity, amplitude, and phase symmetry; and vertical height match. The equipment was removed. The patient tolerated the procedure well without complication. All findings were normal except:  - right vocal fold immobile, intermediate position, bowed, foreshortened  - glottal insufficiency across all frequencies  - severe concentric supraglottic hyperfunction with aperiodic false vocal fold mucosal vibration        Assessment:     Fabiano Chancesiva Love is a 62 y.o. male with right vocal  fold immobility, potentially recoverable, following spinal fusion surgery in 8/2023.       Plan:        I had a discussion with the patient regarding his condition and the further workup and management options.      I demonstrated his examination to him on the video monitor so that he might better understand his condition.    The risks and benefits of vocal fold injection augmentation were discussed with the patient. Risks included but were not limited to bleeding, infection, allergic reaction to the injectable, scarring, worsening of voice, early resorption, need for additional procedures, pain, epistaxis, and airway edema which could necessitate need for intubation or tracheostomy. We informed the patient that while in the past this procedure was performed mainly in the operating room under general anesthesia, we now primarily perform this procedure in our procedure suite without the need for general anesthesia.    All questions were answered and the patient would like to proceed with an office-based right vocal fold injection augmentation procedure. We will plan to use hyaluronic acid. Informed consent was obtained. We will arrange this in the coming weeks.    I recommended and updated MBSS about 1-2 weeks after the procedure. Ongoing SLP swallowing therapy is medically necessary for retsoration of swallowing function    All questions were answered, and the patient is in agreement with the above.     Lonnie Mccord M.D.  [facility]  Department of Otorhinolaryngology and Communication Sciences

## 2023-09-15 RX ORDER — METHOCARBAMOL 750 MG/1
750 TABLET, FILM COATED ORAL 3 TIMES DAILY PRN
Qty: 60 TABLET | Refills: 0 | Status: SHIPPED | OUTPATIENT
Start: 2023-09-15 | End: 2023-10-07 | Stop reason: SDUPTHER

## 2023-09-15 RX ORDER — OXYCODONE AND ACETAMINOPHEN 10; 325 MG/1; MG/1
1 TABLET ORAL EVERY 6 HOURS PRN
Qty: 60 TABLET | Refills: 0 | Status: SHIPPED | OUTPATIENT
Start: 2023-09-15 | End: 2023-10-10 | Stop reason: SDUPTHER

## 2023-09-18 ENCOUNTER — DOCUMENT SCAN (OUTPATIENT)
Dept: HOME HEALTH SERVICES | Facility: HOSPITAL | Age: 63
End: 2023-09-18
Payer: MEDICARE

## 2023-09-19 ENCOUNTER — PATIENT MESSAGE (OUTPATIENT)
Dept: OTOLARYNGOLOGY | Facility: CLINIC | Age: 63
End: 2023-09-19
Payer: MEDICARE

## 2023-09-19 DIAGNOSIS — J38.01 UNILATERAL COMPLETE VOCAL FOLD PARALYSIS: Primary | ICD-10-CM

## 2023-09-20 ENCOUNTER — TELEPHONE (OUTPATIENT)
Dept: OTOLARYNGOLOGY | Facility: CLINIC | Age: 63
End: 2023-09-20
Payer: MEDICARE

## 2023-09-20 ENCOUNTER — EXTERNAL HOME HEALTH (OUTPATIENT)
Dept: HOME HEALTH SERVICES | Facility: HOSPITAL | Age: 63
End: 2023-09-20
Payer: MEDICARE

## 2023-09-20 NOTE — TELEPHONE ENCOUNTER
----- Message from Carmella Villa sent at 9/19/2023  1:38 PM CDT -----  Regarding: reschedule  Contact: [phone]  Patient is requesting a call back regarding there are no opening available with transportation on 09/26. Pt needs to reschedule his procedure.   Would the patient rather a call back or a response via [facility]?  Call   Best Call Back Number:  [phone]  Additional Information:

## 2023-09-23 DIAGNOSIS — F41.9 ANXIETY: ICD-10-CM

## 2023-09-23 DIAGNOSIS — E11.59 HYPERTENSION ASSOCIATED WITH DIABETES: ICD-10-CM

## 2023-09-23 DIAGNOSIS — I15.2 HYPERTENSION ASSOCIATED WITH DIABETES: ICD-10-CM

## 2023-09-24 NOTE — TELEPHONE ENCOUNTER
No care due was identified.  [facility] Embedded Care Due Messages. Reference number: [id number].   9/23/2023 7:31:29 PM CDT

## 2023-09-24 NOTE — TELEPHONE ENCOUNTER
No care due was identified.  [facility] Embedded Care Due Messages. Reference number: [id number].   9/23/2023 7:32:33 PM CDT

## 2023-09-25 ENCOUNTER — PATIENT MESSAGE (OUTPATIENT)
Dept: OTOLARYNGOLOGY | Facility: CLINIC | Age: 63
End: 2023-09-25
Payer: MEDICARE

## 2023-09-25 ENCOUNTER — TELEPHONE (OUTPATIENT)
Dept: SPEECH THERAPY | Facility: HOSPITAL | Age: 63
End: 2023-09-25
Payer: MEDICARE

## 2023-09-25 RX ORDER — ALPRAZOLAM 0.5 MG/1
0.5 TABLET ORAL NIGHTLY PRN
Qty: 30 TABLET | Refills: 0 | Status: ON HOLD | OUTPATIENT
Start: 2023-09-25 | End: 2023-10-05 | Stop reason: HOSPADM

## 2023-09-25 RX ORDER — POTASSIUM CHLORIDE 750 MG/1
TABLET, EXTENDED RELEASE ORAL
Qty: 90 TABLET | Refills: 3 | Status: ON HOLD | OUTPATIENT
Start: 2023-09-25 | End: 2023-10-05 | Stop reason: HOSPADM

## 2023-09-25 RX ORDER — OLMESARTAN MEDOXOMIL 20 MG/1
20 TABLET ORAL DAILY
Qty: 90 TABLET | Refills: 0 | Status: SHIPPED | OUTPATIENT
Start: 2023-09-25 | End: 2023-12-19 | Stop reason: SDUPTHER

## 2023-09-28 ENCOUNTER — TELEPHONE (OUTPATIENT)
Dept: NEUROSURGERY | Facility: CLINIC | Age: 63
End: 2023-09-28

## 2023-09-28 ENCOUNTER — OFFICE VISIT (OUTPATIENT)
Dept: NEUROSURGERY | Facility: CLINIC | Age: 63
End: 2023-09-28
Payer: MEDICARE

## 2023-09-28 ENCOUNTER — HOSPITAL ENCOUNTER (OUTPATIENT)
Dept: RADIOLOGY | Facility: HOSPITAL | Age: 63
Discharge: HOME | End: 2023-09-28
Attending: NEUROLOGICAL SURGERY
Payer: MEDICARE

## 2023-09-28 ENCOUNTER — OFFICE VISIT (OUTPATIENT)
Dept: PODIATRY | Facility: CLINIC | Age: 63
End: 2023-09-28
Payer: MEDICARE

## 2023-09-28 VITALS
HEIGHT: 73 IN | SYSTOLIC BLOOD PRESSURE: 131 MMHG | BODY MASS INDEX: 31.35 KG/M2 | HEART RATE: 77 BPM | DIASTOLIC BLOOD PRESSURE: 82 MMHG | WEIGHT: 236.56 LBS

## 2023-09-28 VITALS
SYSTOLIC BLOOD PRESSURE: 120 MMHG | BODY MASS INDEX: 31.21 KG/M2 | HEART RATE: 77 BPM | HEIGHT: 73 IN | DIASTOLIC BLOOD PRESSURE: 81 MMHG

## 2023-09-28 DIAGNOSIS — G95.9 CERVICAL MYELOPATHY: ICD-10-CM

## 2023-09-28 DIAGNOSIS — L60.9 DISEASE OF NAIL: ICD-10-CM

## 2023-09-28 DIAGNOSIS — M21.40 PES PLANUS, UNSPECIFIED LATERALITY: Primary | ICD-10-CM

## 2023-09-28 DIAGNOSIS — Z98.1 S/P CERVICAL SPINAL FUSION: Primary | ICD-10-CM

## 2023-09-28 DIAGNOSIS — M77.30 CALCANEAL SPUR, UNSPECIFIED LATERALITY: ICD-10-CM

## 2023-09-28 DIAGNOSIS — M50.30 DDD (DEGENERATIVE DISC DISEASE), CERVICAL: ICD-10-CM

## 2023-09-28 DIAGNOSIS — M19.079 ARTHRITIS OF MIDFOOT: ICD-10-CM

## 2023-09-28 DIAGNOSIS — M48.02 CERVICAL SPINAL STENOSIS: ICD-10-CM

## 2023-09-28 DIAGNOSIS — E11.42 TYPE 2 DIABETES MELLITUS WITH DIABETIC POLYNEUROPATHY, WITHOUT LONG-TERM CURRENT USE OF INSULIN: ICD-10-CM

## 2023-09-28 DIAGNOSIS — M54.12 CERVICAL RADICULOPATHY: ICD-10-CM

## 2023-09-28 PROCEDURE — 99499 NO LOS: ICD-10-PCS | Mod: S$GLB,,, | Performed by: STUDENT IN AN ORGANIZED HEALTH CARE EDUCATION/TRAINING PROGRAM

## 2023-09-28 PROCEDURE — 99999 PR PBB SHADOW E&M-EST. PATIENT-LVL IV: CPT | Mod: PBBFAC,,, | Performed by: PHYSICIAN ASSISTANT

## 2023-09-28 PROCEDURE — 3079F PR MOST RECENT DIASTOLIC BLOOD PRESSURE 80-89 MM HG: ICD-10-PCS | Mod: CPTII,S$GLB,, | Performed by: PHYSICIAN ASSISTANT

## 2023-09-28 PROCEDURE — 3066F PR DOCUMENTATION OF TREATMENT FOR NEPHROPATHY: ICD-10-PCS | Mod: CPTII,S$GLB,, | Performed by: STUDENT IN AN ORGANIZED HEALTH CARE EDUCATION/TRAINING PROGRAM

## 2023-09-28 PROCEDURE — 3044F PR MOST RECENT HEMOGLOBIN A1C LEVEL <7.0%: ICD-10-PCS | Mod: CPTII,S$GLB,, | Performed by: PHYSICIAN ASSISTANT

## 2023-09-28 PROCEDURE — 1159F PR MEDICATION LIST DOCUMENTED IN MEDICAL RECORD: ICD-10-PCS | Mod: CPTII,S$GLB,, | Performed by: PHYSICIAN ASSISTANT

## 2023-09-28 PROCEDURE — 3044F HG A1C LEVEL LT 7.0%: CPT | Mod: CPTII,S$GLB,, | Performed by: STUDENT IN AN ORGANIZED HEALTH CARE EDUCATION/TRAINING PROGRAM

## 2023-09-28 PROCEDURE — 3066F PR DOCUMENTATION OF TREATMENT FOR NEPHROPATHY: ICD-10-PCS | Mod: CPTII,S$GLB,, | Performed by: PHYSICIAN ASSISTANT

## 2023-09-28 PROCEDURE — 3061F NEG MICROALBUMINURIA REV: CPT | Mod: CPTII,S$GLB,, | Performed by: STUDENT IN AN ORGANIZED HEALTH CARE EDUCATION/TRAINING PROGRAM

## 2023-09-28 PROCEDURE — 1160F PR REVIEW ALL MEDS BY PRESCRIBER/CLIN PHARMACIST DOCUMENTED: ICD-10-PCS | Mod: CPTII,S$GLB,, | Performed by: PHYSICIAN ASSISTANT

## 2023-09-28 PROCEDURE — 11721 DEBRIDE NAIL 6 OR MORE: CPT | Mod: Q9,S$GLB,, | Performed by: STUDENT IN AN ORGANIZED HEALTH CARE EDUCATION/TRAINING PROGRAM

## 2023-09-28 PROCEDURE — 3008F BODY MASS INDEX DOCD: CPT | Mod: CPTII,S$GLB,, | Performed by: PHYSICIAN ASSISTANT

## 2023-09-28 PROCEDURE — 3044F PR MOST RECENT HEMOGLOBIN A1C LEVEL <7.0%: ICD-10-PCS | Mod: CPTII,S$GLB,, | Performed by: STUDENT IN AN ORGANIZED HEALTH CARE EDUCATION/TRAINING PROGRAM

## 2023-09-28 PROCEDURE — 3061F PR NEG MICROALBUMINURIA RESULT DOCUMENTED/REVIEW: ICD-10-PCS | Mod: CPTII,S$GLB,, | Performed by: STUDENT IN AN ORGANIZED HEALTH CARE EDUCATION/TRAINING PROGRAM

## 2023-09-28 PROCEDURE — 4010F PR ACE/ARB THEARPY RXD/TAKEN: ICD-10-PCS | Mod: CPTII,S$GLB,, | Performed by: STUDENT IN AN ORGANIZED HEALTH CARE EDUCATION/TRAINING PROGRAM

## 2023-09-28 PROCEDURE — 3074F PR MOST RECENT SYSTOLIC BLOOD PRESSURE < 130 MM HG: ICD-10-PCS | Mod: CPTII,S$GLB,, | Performed by: STUDENT IN AN ORGANIZED HEALTH CARE EDUCATION/TRAINING PROGRAM

## 2023-09-28 PROCEDURE — 3008F BODY MASS INDEX DOCD: CPT | Mod: CPTII,S$GLB,, | Performed by: STUDENT IN AN ORGANIZED HEALTH CARE EDUCATION/TRAINING PROGRAM

## 2023-09-28 PROCEDURE — 3008F PR BODY MASS INDEX (BMI) DOCUMENTED: ICD-10-PCS | Mod: CPTII,S$GLB,, | Performed by: STUDENT IN AN ORGANIZED HEALTH CARE EDUCATION/TRAINING PROGRAM

## 2023-09-28 PROCEDURE — 99499 UNLISTED E&M SERVICE: CPT | Mod: S$GLB,,, | Performed by: STUDENT IN AN ORGANIZED HEALTH CARE EDUCATION/TRAINING PROGRAM

## 2023-09-28 PROCEDURE — 3061F NEG MICROALBUMINURIA REV: CPT | Mod: CPTII,S$GLB,, | Performed by: PHYSICIAN ASSISTANT

## 2023-09-28 PROCEDURE — 1159F MED LIST DOCD IN RCRD: CPT | Mod: CPTII,S$GLB,, | Performed by: PHYSICIAN ASSISTANT

## 2023-09-28 PROCEDURE — 1160F RVW MEDS BY RX/DR IN RCRD: CPT | Mod: CPTII,S$GLB,, | Performed by: PHYSICIAN ASSISTANT

## 2023-09-28 PROCEDURE — 99024 POSTOP FOLLOW-UP VISIT: CPT | Mod: S$GLB,,, | Performed by: PHYSICIAN ASSISTANT

## 2023-09-28 PROCEDURE — 3079F DIAST BP 80-89 MM HG: CPT | Mod: CPTII,S$GLB,, | Performed by: STUDENT IN AN ORGANIZED HEALTH CARE EDUCATION/TRAINING PROGRAM

## 2023-09-28 PROCEDURE — 3075F PR MOST RECENT SYSTOLIC BLOOD PRESS GE 130-139MM HG: ICD-10-PCS | Mod: CPTII,S$GLB,, | Performed by: PHYSICIAN ASSISTANT

## 2023-09-28 PROCEDURE — 3044F HG A1C LEVEL LT 7.0%: CPT | Mod: CPTII,S$GLB,, | Performed by: PHYSICIAN ASSISTANT

## 2023-09-28 PROCEDURE — 3075F SYST BP GE 130 - 139MM HG: CPT | Mod: CPTII,S$GLB,, | Performed by: PHYSICIAN ASSISTANT

## 2023-09-28 PROCEDURE — 72040 XR CERVICAL SPINE AP LATERAL: ICD-10-PCS | Mod: 26,,, | Performed by: RADIOLOGY

## 2023-09-28 PROCEDURE — 72040 X-RAY EXAM NECK SPINE 2-3 VW: CPT | Mod: 26,,, | Performed by: RADIOLOGY

## 2023-09-28 PROCEDURE — 3079F PR MOST RECENT DIASTOLIC BLOOD PRESSURE 80-89 MM HG: ICD-10-PCS | Mod: CPTII,S$GLB,, | Performed by: STUDENT IN AN ORGANIZED HEALTH CARE EDUCATION/TRAINING PROGRAM

## 2023-09-28 PROCEDURE — 3061F PR NEG MICROALBUMINURIA RESULT DOCUMENTED/REVIEW: ICD-10-PCS | Mod: CPTII,S$GLB,, | Performed by: PHYSICIAN ASSISTANT

## 2023-09-28 PROCEDURE — 4010F ACE/ARB THERAPY RXD/TAKEN: CPT | Mod: CPTII,S$GLB,, | Performed by: STUDENT IN AN ORGANIZED HEALTH CARE EDUCATION/TRAINING PROGRAM

## 2023-09-28 PROCEDURE — 3066F NEPHROPATHY DOC TX: CPT | Mod: CPTII,S$GLB,, | Performed by: PHYSICIAN ASSISTANT

## 2023-09-28 PROCEDURE — 72040 X-RAY EXAM NECK SPINE 2-3 VW: CPT | Mod: TC,FY

## 2023-09-28 PROCEDURE — 99024 PR POST-OP FOLLOW-UP VISIT: ICD-10-PCS | Mod: S$GLB,,, | Performed by: PHYSICIAN ASSISTANT

## 2023-09-28 PROCEDURE — 99999 PR PBB SHADOW E&M-EST. PATIENT-LVL IV: CPT | Mod: PBBFAC,,, | Performed by: STUDENT IN AN ORGANIZED HEALTH CARE EDUCATION/TRAINING PROGRAM

## 2023-09-28 PROCEDURE — 3074F SYST BP LT 130 MM HG: CPT | Mod: CPTII,S$GLB,, | Performed by: STUDENT IN AN ORGANIZED HEALTH CARE EDUCATION/TRAINING PROGRAM

## 2023-09-28 PROCEDURE — 99999 PR PBB SHADOW E&M-EST. PATIENT-LVL IV: ICD-10-PCS | Mod: PBBFAC,,, | Performed by: PHYSICIAN ASSISTANT

## 2023-09-28 PROCEDURE — 4010F PR ACE/ARB THEARPY RXD/TAKEN: ICD-10-PCS | Mod: CPTII,S$GLB,, | Performed by: PHYSICIAN ASSISTANT

## 2023-09-28 PROCEDURE — 4010F ACE/ARB THERAPY RXD/TAKEN: CPT | Mod: CPTII,S$GLB,, | Performed by: PHYSICIAN ASSISTANT

## 2023-09-28 PROCEDURE — 11721 ROUTINE FOOT CARE: ICD-10-PCS | Mod: Q9,S$GLB,, | Performed by: STUDENT IN AN ORGANIZED HEALTH CARE EDUCATION/TRAINING PROGRAM

## 2023-09-28 PROCEDURE — 3079F DIAST BP 80-89 MM HG: CPT | Mod: CPTII,S$GLB,, | Performed by: PHYSICIAN ASSISTANT

## 2023-09-28 PROCEDURE — 3066F NEPHROPATHY DOC TX: CPT | Mod: CPTII,S$GLB,, | Performed by: STUDENT IN AN ORGANIZED HEALTH CARE EDUCATION/TRAINING PROGRAM

## 2023-09-28 PROCEDURE — 99999 PR PBB SHADOW E&M-EST. PATIENT-LVL IV: ICD-10-PCS | Mod: PBBFAC,,, | Performed by: STUDENT IN AN ORGANIZED HEALTH CARE EDUCATION/TRAINING PROGRAM

## 2023-09-28 PROCEDURE — 3008F PR BODY MASS INDEX (BMI) DOCUMENTED: ICD-10-PCS | Mod: CPTII,S$GLB,, | Performed by: PHYSICIAN ASSISTANT

## 2023-09-28 NOTE — PROGRESS NOTES
"Subjective:     Patient ID:  Fabiano Malik Jr. is a 62 y.o. male.    Felix    Chief Complaint: 6 week po fu    Date of surgery 08/11/2023     Preop diagnosis   1. Cervical spondylosis with radiculopathy and myelopathy  2. Status post C4-5 fusion     Postop diagnosis   Same     Surgery   1. C5-6 and C6-7 anterior arthrodesis with placement of interbody spacer, Depuy ACIS spacers filled with allograft DBM  2. C5-C7 anterior instrumentation with separate plate DePuy [place] system  3. Fluoroscopy  4. Neuro monitoring using MEP, SSEP, EMG     Surgeon   Guillaume Washington MD         HPI    Fabiano Malik Jr. is a 62 y.o. male who presents for follow up.  Patient feels like his neck pain has gotten worse.  He feels like his right arm pain to the fingers is worse and as well.  He has left arm pain that comes and goes but the right arm is worse.  His voice has gotten better.  He has seen ENT and is scheduled to have a vocal cord injection soon.  He is still having trouble swallowing.  He has to use thickener with his liquids.  He has 2 mash solids.  The swallowing problem is about the same.  He has some dizziness.  The home health nurse comes 1 time a week.  PTOT.  Stop coming.  He is still using his Rollator to walk.  He is wearing his neck brace.    He is taking oxycodone, lyrica, and robaxin.    Patient denies any recent accidents or trauma, no saddle anesthesias, and no bowel or bladder incontinence.    Review of Systems:  Constitution: Negative for chills, fever, night sweats and weight loss.   Musculoskeletal: Negative for falls.   Gastrointestinal: Negative for bowel incontinence, nausea and vomiting.   Genitourinary: Negative for bladder incontinence.   Neurological: Negative for disturbances in coordination and loss of balance.      Objective:      Vitals:    09/28/23 1301   BP: 131/82   Pulse: 77   Weight: 107.3 kg (236 lb 8.9 oz)   Height: 6' 1" (1.854 m)   PainSc:   9         Physical Exam: Exam done in the " chair      General:  Fabiano Malik Jr. is well-developed, well-nourished, appears stated age, in no acute distress, alert and oriented to person, place, and time.    Pulmonary/Chest:  Respiratory effort normal  Abdominal: Exhibits no distension  Psychiatric:  Normal mood and affect.  Behavior is normal.  Judgement and thought content normal      Musculoskeletal:    Cervical Spine Inspection:  Healed anterior surgical scars with no visible rashes.    Cervical Spine Palpation:  No tenderness to cervical spine palpation.      Neurological:    Muscle strength against resistance:     Right Left   Deltoid  5 / 5 5 / 5   Biceps  4 / 5 5 / 5   Triceps 5 / 5 5 / 5   Wrist flexion  5 / 5 5 / 5   Wrist extension 5 / 5 5 / 5   Finger abduction 5 / 5 5 / 5   Thumb opposition 5 / 5 5 / 5   Handgrip 5 / 5 5 / 5   Hip flexion  5 / 5 5 / 5   Hip extension 5 / 5 5 / 5   Hip abduction 5 / 5 5 / 5   Hip adduction 5/ 5 5 / 5   Knee extension  5 / 5 5 / 5   Knee flexion  5 / 5 5 / 5   Dorsiflexion  5 / 5 5 / 5   EHL  5 / 5 5 / 5   Plantar flexion  5 / 5 5 / 5   Inversion of the feet 5 / 5 5 / 5   Eversion of the feet 5 / 5 5 / 5       Reflexes:     Right Left   Triceps 2+ 2+   Biceps 2+ 2+   Brachioradialis 2+ 2+   Patellar 2+ 2+   Achilles 2+ 2+       Clonus:  Negative bilaterally  Niño: Negative bilaterally    On gross examination of the bilateral lower extremities, patient has full painfree ROM with no signs of clubbing, cyanosis, edema, and weakness.      XRAY Interpretation:     Cervical spine xrays were personally reviewed today.  No fractures.  Hardware intact.      Assessment:          1. S/P cervical spinal fusion            Plan:             -Continue neck brace for now  -Continue medications  -Continue fu with ENT  -Will review further with Dr. Washington and ST      Follow-Up:  Follow up in about 6 weeks (around 11/9/2023). If there are any questions prior to this, the patient was instructed to contact the office.        Massiel Lund, [facility], PA-C  Neurosurgery  [facility]  09/28/2023

## 2023-09-28 NOTE — PROGRESS NOTES
Subjective:      Patient ID: Fabiano Malik Jr. is a 62 y.o. male.    Chief Complaint: Diabetes Mellitus (Lucien Fleming MD    10/19/2023) and Nail Care    Fabiano is a 62 y.o. male who presents to the clinic upon referral from Dr. Ana perdomo. provider found  for evaluation and treatment of diabetic feet. Fabiano has a past medical history of Chronic back pain greater than 3 months duration, Depression, Diabetes mellitus, Diabetes mellitus, type 2, Hypertension, and Schizophrenia. Patient relates no major problem with feet. Only complaints today consist of here for a diabetic foot exam. States his feet are numb. States has trouble trimming his toenails. No further pedal complaints.    9/28/23: Seen today for follow up of left foot pain and to review xray results. Relates foot pain is slowly getting better with exercises he has been performing at home. Here for routine foot care.     PCP: Lucien Fleming MD    Date Last Seen by PCP: 10/19/23    Current shoe gear: Tennis shoes    Hemoglobin A1C   Date Value Ref Range Status   06/29/2023 5.7 (H) 4.0 - 5.6 % Final     Comment:     ADA Screening Guidelines:  5.7-6.4%  Consistent with prediabetes  >or=6.5%  Consistent with diabetes    High levels of fetal hemoglobin interfere with the HbA1C  assay. Heterozygous hemoglobin variants (HbS, HgC, etc)do  not significantly interfere with this assay.   However, presence of multiple variants may affect accuracy.     02/13/2023 6.5 (H) 4.0 - 5.6 % Final     Comment:     ADA Screening Guidelines:  5.7-6.4%  Consistent with prediabetes  >or=6.5%  Consistent with diabetes    High levels of fetal hemoglobin interfere with the HbA1C  assay. Heterozygous hemoglobin variants (HbS, HgC, etc)do  not significantly interfere with this assay.   However, presence of multiple variants may affect accuracy.     02/02/2022 5.4 4.0 - 5.6 % Final     Comment:     ADA Screening Guidelines:  5.7-6.4%  Consistent with  prediabetes  >or=6.5%  Consistent with diabetes    High levels of fetal hemoglobin interfere with the HbA1C  assay. Heterozygous hemoglobin variants (HbS, HgC, etc)do  not significantly interfere with this assay.   However, presence of multiple variants may affect accuracy.           Review of Systems   Constitutional: Negative for chills, decreased appetite, diaphoresis and fever.   HENT:  Negative for congestion and hearing loss.    Cardiovascular:  Negative for chest pain, claudication, leg swelling and syncope.   Respiratory:  Negative for cough and shortness of breath.    Skin:  Positive for dry skin and nail changes. Negative for color change, flushing, itching, poor wound healing and rash.   Musculoskeletal:  Positive for arthritis, back pain and joint pain.   Gastrointestinal:  Negative for nausea and vomiting.   Neurological:  Positive for numbness and paresthesias. Negative for focal weakness and weakness.   Psychiatric/Behavioral:  Negative for altered mental status. The patient is not nervous/anxious.            Objective:      Physical Exam  Constitutional:       General: He is not in acute distress.     Appearance: Normal appearance. He is well-developed. He is not diaphoretic.   Cardiovascular:      Comments: Dorsalis pedis and posterior tibial pulses are within normal limits. Skin temperature is within normal limits. Toes are cool to touch and feet are warm proximally. Hair growth is within normal limits. Skin is normotrophic and without hyperpigmentation. No edema noted. No varicosities or spider veins noted, bilaterally.       Musculoskeletal:         General: No tenderness.      Comments: Adequate joint range of motion without pain, limitation, nor crepitation to foot and ankle joints. Muscle strength is 5/5 in all groups bilaterally.    Pes planus, bilateral foot       Feet:      Right foot:      Skin integrity: Dry skin present. No ulcer, blister, erythema or warmth.      Left foot:      Skin  integrity: Dry skin present. No ulcer, blister, erythema or warmth.   Skin:     General: Skin is warm and dry.      Capillary Refill: Capillary refill takes less than 2 seconds.      Comments: Skin is warm and dry, no acute signs of infection noted bilaterally      Toenails are thickened by 2-4 mm's, dystrophic, and are darkened in coloration with subungual fungal debris.     Otherwise, no open wounds, macerations or hyperkeratotic lesions, bilaterally            Neurological:      Mental Status: He is alert and oriented to person, place, and time.      Sensory: Sensory deficit present.      Motor: No abnormal muscle tone.      Comments: Light touch is diminished. [place]-Al[place] 5.07 monofilamant testing is diminished. Vibratory sensation is diminished bilaterally.   Psychiatric:         Mood and Affect: Mood normal.         Behavior: Behavior normal.         Thought Content: Thought content normal.             Assessment:       Encounter Diagnoses   Name Primary?    Pes planus, unspecified laterality Yes    Arthritis of midfoot     Calcaneal spur, unspecified laterality     Disease of nail     Type 2 diabetes mellitus with diabetic polyneuropathy, without long-term current use of insulin            Plan:       Fabiano was seen today for diabetes mellitus and nail care.    Diagnoses and all orders for this visit:    Pes planus, unspecified laterality    Arthritis of midfoot    Calcaneal spur, unspecified laterality    Disease of nail  -     Routine Foot Care    Type 2 diabetes mellitus with diabetic polyneuropathy, without long-term current use of insulin  -     Routine Foot Care        I counseled the patient on his conditions, their implications and medical management.  Routine foot care per attached note  Xray independently reviewed with patient noting midfoot arthritis and pes planus foot deformity and calcaneal heel spur. Recommend supportive tennis shoes with arch supports at all times while ambulating.  Discussed may obtain OTC diabetic shoes with arch supports while waiting for prescription shoes  RICE, OTC pain medication PRN   Continue stretching exercises   Return to clinic in 3 mo, sooner PRN

## 2023-09-28 NOTE — TELEPHONE ENCOUNTER
Patient states he is still having the same about of difficulty swallowing as he had immediate post op.  Using thickener and mashing up his solid foods.    What would his next steps be?    Thanks,  Massiel Lund [facility], PA-C  Neurosurgery  [facility]  09/28/2023

## 2023-09-28 NOTE — PROCEDURES
"Routine Foot Care    Date/Time: 9/28/2023 2:45 PM    Performed by: Shalini Burks DPM  Authorized by: Shalini Burks DPM    Time out: Immediately prior to procedure a "time out" was called to verify the correct patient, procedure, equipment, support staff and site/side marked as required.    Consent Done?:  Yes (Verbal)  Hyperkeratotic Skin Lesions?: No      Nail Care Type:  Debride  Location(s): All  (Left 1st Toe, Left 3rd Toe, Left 2nd Toe, Left 4th Toe, Left 5th Toe, Right 1st Toe, Right 2nd Toe, Right 3rd Toe, Right 4th Toe and Right 5th Toe)  Patient tolerance:  Patient tolerated the procedure well with no immediate complications    "

## 2023-09-28 NOTE — TELEPHONE ENCOUNTER
Dr. Washington,    Patient is 6 weeks po acdf.  He feels like his neck pain has gotten worse.  He feels like his right arm pain to the fingers is worse as well.  He has left arm pain that comes and goes but the right arm is worse.  His voice has gotten better.  He has seen ENT and is scheduled to have a vocal cord injection soon.  He is still having trouble swallowing.  He has to use thickener with his liquids.  He has 2 mash solids.  The swallowing problem is about the same.  He has some dizziness.  The home health nurse comes 1 time a week.  PTOT.  Stop coming.  He is still using his Rollator to walk.  He is wearing his neck brace.    He is taking oxycodone, lyrica, and robaxin.    I am reaching out to .    Can he remove his brace now?  Did you want an MRI ordered?    Thanks,  Massiel Lund, [facility], PA-C  Neurosurgery  [facility]  09/28/2023

## 2023-09-30 DIAGNOSIS — N40.1 BENIGN PROSTATIC HYPERPLASIA WITH URINARY OBSTRUCTION: ICD-10-CM

## 2023-09-30 DIAGNOSIS — R42 DIZZINESS: ICD-10-CM

## 2023-09-30 DIAGNOSIS — N13.8 BENIGN PROSTATIC HYPERPLASIA WITH URINARY OBSTRUCTION: ICD-10-CM

## 2023-09-30 NOTE — TELEPHONE ENCOUNTER
Care Due:                  Date            Visit Type   Department     Provider  --------------------------------------------------------------------------------                                EP                               PRIMARY      [facility]  Last Visit: 07-      CARE ([facility])   MEDICINE       Lucien Fleming                              MYCHART                              FOLLOWUP/OF  [facility]  Next Visit: 10-      [facility] VISIT   MEDICINE       Lucien Fleming                                                            Last  Test          Frequency    Reason                     Performed    Due Date  --------------------------------------------------------------------------------    HBA1C.......  6 months...  dulaglutide, glimepiride,   06- 12-                             metFORMIN................    [employer] Embedded Care Due Messages. Reference number: [id number].   9/30/2023 1:16:43 PM CDT

## 2023-10-02 RX ORDER — MECLIZINE HCL 12.5 MG 12.5 MG/1
12.5 TABLET ORAL 3 TIMES DAILY PRN
Qty: 180 TABLET | Refills: 3 | Status: ON HOLD | OUTPATIENT
Start: 2023-10-02 | End: 2023-10-05 | Stop reason: HOSPADM

## 2023-10-02 RX ORDER — TAMSULOSIN HYDROCHLORIDE 0.4 MG/1
0.4 CAPSULE ORAL NIGHTLY
Qty: 90 CAPSULE | Refills: 3 | Status: ON HOLD | OUTPATIENT
Start: 2023-10-02 | End: 2023-10-05 | Stop reason: HOSPADM

## 2023-10-04 ENCOUNTER — HOSPITAL ENCOUNTER (OUTPATIENT)
Facility: HOSPITAL | Age: 63
Discharge: HOME | End: 2023-10-05
Attending: EMERGENCY MEDICINE | Admitting: INTERNAL MEDICINE
Payer: COMMERCIAL

## 2023-10-04 DIAGNOSIS — D64.9 ANEMIA, UNSPECIFIED TYPE: ICD-10-CM

## 2023-10-04 DIAGNOSIS — E16.2 HYPOGLYCEMIA: Primary | ICD-10-CM

## 2023-10-04 DIAGNOSIS — E87.5 HYPERKALEMIA: ICD-10-CM

## 2023-10-04 DIAGNOSIS — R55 SYNCOPE: ICD-10-CM

## 2023-10-04 LAB
ALBUMIN SERPL BCP-MCNC: 3.6 G/DL (ref 3.5–5.2)
ALP SERPL-CCNC: 76 U/L (ref 55–135)
ALT SERPL W/O P-5'-P-CCNC: 23 U/L (ref 10–44)
ANION GAP SERPL CALC-SCNC: 16 MMOL/L (ref 8–16)
AST SERPL-CCNC: 45 U/L (ref 10–40)
BASOPHILS # BLD AUTO: 0.07 K/UL (ref 0–0.2)
BASOPHILS NFR BLD: 1.1 % (ref 0–1.9)
BILIRUB SERPL-MCNC: 0.6 MG/DL (ref 0.1–1)
BILIRUB UR QL STRIP: NEGATIVE
BUN SERPL-MCNC: 11 MG/DL (ref 8–23)
CALCIUM SERPL-MCNC: 8.7 MG/DL (ref 8.7–10.5)
CHLORIDE SERPL-SCNC: 96 MMOL/L (ref 95–110)
CLARITY UR: CLEAR
CO2 SERPL-SCNC: 23 MMOL/L (ref 23–29)
COLOR UR: YELLOW
CREAT SERPL-MCNC: 1.2 MG/DL (ref 0.5–1.4)
DIFFERENTIAL METHOD: ABNORMAL
EOSINOPHIL # BLD AUTO: 1.8 K/UL (ref 0–0.5)
EOSINOPHIL NFR BLD: 27.2 % (ref 0–8)
ERYTHROCYTE [DISTWIDTH] IN BLOOD BY AUTOMATED COUNT: 13.8 % (ref 11.5–14.5)
EST. GFR  (NO RACE VARIABLE): >60 ML/MIN/1.73 M^2
ESTIMATED AVG GLUCOSE: 97 MG/DL (ref 68–131)
GLUCOSE SERPL-MCNC: 42 MG/DL (ref 70–110)
GLUCOSE UR QL STRIP: NEGATIVE
HBA1C MFR BLD: 5 % (ref 4–5.6)
HCT VFR BLD AUTO: 35.8 % (ref 40–54)
HGB BLD-MCNC: 12.5 G/DL (ref 14–18)
HGB UR QL STRIP: NEGATIVE
IMM GRANULOCYTES # BLD AUTO: 0.01 K/UL (ref 0–0.04)
IMM GRANULOCYTES NFR BLD AUTO: 0.2 % (ref 0–0.5)
KETONES UR QL STRIP: NEGATIVE
LEUKOCYTE ESTERASE UR QL STRIP: NEGATIVE
LYMPHOCYTES # BLD AUTO: 1.6 K/UL (ref 1–4.8)
LYMPHOCYTES NFR BLD: 24.6 % (ref 18–48)
MAGNESIUM SERPL-MCNC: 1.3 MG/DL (ref 1.6–2.6)
MCH RBC QN AUTO: 28.9 PG (ref 27–31)
MCHC RBC AUTO-ENTMCNC: 34.9 G/DL (ref 32–36)
MCV RBC AUTO: 83 FL (ref 82–98)
MONOCYTES # BLD AUTO: 0.4 K/UL (ref 0.3–1)
MONOCYTES NFR BLD: 6.5 % (ref 4–15)
NEUTROPHILS # BLD AUTO: 2.7 K/UL (ref 1.8–7.7)
NEUTROPHILS NFR BLD: 40.4 % (ref 38–73)
NITRITE UR QL STRIP: NEGATIVE
NRBC BLD-RTO: 0 /100 WBC
PH UR STRIP: 7 [PH] (ref 5–8)
PHOSPHATE SERPL-MCNC: 3 MG/DL (ref 2.7–4.5)
PLATELET # BLD AUTO: 187 K/UL (ref 150–450)
PMV BLD AUTO: 8.3 FL (ref 9.2–12.9)
POCT GLUCOSE: 104 MG/DL (ref 70–110)
POCT GLUCOSE: 122 MG/DL (ref 70–110)
POCT GLUCOSE: 125 MG/DL (ref 70–110)
POCT GLUCOSE: 135 MG/DL (ref 70–110)
POCT GLUCOSE: 138 MG/DL (ref 70–110)
POCT GLUCOSE: 163 MG/DL (ref 70–110)
POCT GLUCOSE: 197 MG/DL (ref 70–110)
POCT GLUCOSE: 40 MG/DL (ref 70–110)
POCT GLUCOSE: 42 MG/DL (ref 70–110)
POCT GLUCOSE: 93 MG/DL (ref 70–110)
POTASSIUM SERPL-SCNC: 3.9 MMOL/L (ref 3.5–5.1)
PROT SERPL-MCNC: 6.1 G/DL (ref 6–8.4)
PROT UR QL STRIP: NEGATIVE
RBC # BLD AUTO: 4.32 M/UL (ref 4.6–6.2)
SODIUM SERPL-SCNC: 135 MMOL/L (ref 136–145)
SP GR UR STRIP: 1.01 (ref 1–1.03)
TROPONIN I SERPL DL<=0.01 NG/ML-MCNC: 0.01 NG/ML (ref 0–0.03)
URN SPEC COLLECT METH UR: NORMAL
UROBILINOGEN UR STRIP-ACNC: NEGATIVE EU/DL
WBC # BLD AUTO: 6.66 K/UL (ref 3.9–12.7)

## 2023-10-04 PROCEDURE — 80053 COMPREHEN METABOLIC PANEL: CPT | Performed by: EMERGENCY MEDICINE

## 2023-10-04 PROCEDURE — 93010 EKG 12-LEAD: ICD-10-PCS | Mod: ,,, | Performed by: INTERNAL MEDICINE

## 2023-10-04 PROCEDURE — 94761 N-INVAS EAR/PLS OXIMETRY MLT: CPT

## 2023-10-04 PROCEDURE — 83735 ASSAY OF MAGNESIUM: CPT | Performed by: EMERGENCY MEDICINE

## 2023-10-04 PROCEDURE — 85025 COMPLETE CBC W/AUTO DIFF WBC: CPT | Performed by: EMERGENCY MEDICINE

## 2023-10-04 PROCEDURE — 25000003 PHARM REV CODE 250: Performed by: EMERGENCY MEDICINE

## 2023-10-04 PROCEDURE — 99285 EMERGENCY DEPT VISIT HI MDM: CPT | Mod: 25

## 2023-10-04 PROCEDURE — G0378 HOSPITAL OBSERVATION PER HR: HCPCS

## 2023-10-04 PROCEDURE — 96372 THER/PROPH/DIAG INJ SC/IM: CPT | Performed by: STUDENT IN AN ORGANIZED HEALTH CARE EDUCATION/TRAINING PROGRAM

## 2023-10-04 PROCEDURE — 96375 TX/PRO/DX INJ NEW DRUG ADDON: CPT

## 2023-10-04 PROCEDURE — 93005 ELECTROCARDIOGRAM TRACING: CPT

## 2023-10-04 PROCEDURE — 63600175 PHARM REV CODE 636 W HCPCS: Performed by: EMERGENCY MEDICINE

## 2023-10-04 PROCEDURE — 63600175 PHARM REV CODE 636 W HCPCS: Performed by: STUDENT IN AN ORGANIZED HEALTH CARE EDUCATION/TRAINING PROGRAM

## 2023-10-04 PROCEDURE — 83036 HEMOGLOBIN GLYCOSYLATED A1C: CPT | Performed by: EMERGENCY MEDICINE

## 2023-10-04 PROCEDURE — 82962 GLUCOSE BLOOD TEST: CPT

## 2023-10-04 PROCEDURE — 25000003 PHARM REV CODE 250: Performed by: STUDENT IN AN ORGANIZED HEALTH CARE EDUCATION/TRAINING PROGRAM

## 2023-10-04 PROCEDURE — 96366 THER/PROPH/DIAG IV INF ADDON: CPT

## 2023-10-04 PROCEDURE — 93010 ELECTROCARDIOGRAM REPORT: CPT | Mod: ,,, | Performed by: INTERNAL MEDICINE

## 2023-10-04 PROCEDURE — 84100 ASSAY OF PHOSPHORUS: CPT | Performed by: EMERGENCY MEDICINE

## 2023-10-04 PROCEDURE — 84484 ASSAY OF TROPONIN QUANT: CPT | Performed by: EMERGENCY MEDICINE

## 2023-10-04 PROCEDURE — 80307 DRUG TEST PRSMV CHEM ANLYZR: CPT | Performed by: STUDENT IN AN ORGANIZED HEALTH CARE EDUCATION/TRAINING PROGRAM

## 2023-10-04 PROCEDURE — 36415 COLL VENOUS BLD VENIPUNCTURE: CPT | Performed by: STUDENT IN AN ORGANIZED HEALTH CARE EDUCATION/TRAINING PROGRAM

## 2023-10-04 PROCEDURE — 81003 URINALYSIS AUTO W/O SCOPE: CPT | Mod: 59 | Performed by: STUDENT IN AN ORGANIZED HEALTH CARE EDUCATION/TRAINING PROGRAM

## 2023-10-04 RX ORDER — ENOXAPARIN SODIUM 100 MG/ML
40 INJECTION SUBCUTANEOUS EVERY 24 HOURS
Status: DISCONTINUED | OUTPATIENT
Start: 2023-10-04 | End: 2023-10-05 | Stop reason: HOSPADM

## 2023-10-04 RX ORDER — TAMSULOSIN HYDROCHLORIDE 0.4 MG/1
0.4 CAPSULE ORAL NIGHTLY
Status: DISCONTINUED | OUTPATIENT
Start: 2023-10-04 | End: 2023-10-05 | Stop reason: HOSPADM

## 2023-10-04 RX ORDER — SODIUM CHLORIDE 0.9 % (FLUSH) 0.9 %
10 SYRINGE (ML) INJECTION
Status: DISCONTINUED | OUTPATIENT
Start: 2023-10-04 | End: 2023-10-05 | Stop reason: HOSPADM

## 2023-10-04 RX ORDER — HYDROXYZINE PAMOATE 25 MG/1
50 CAPSULE ORAL NIGHTLY
Status: DISCONTINUED | OUTPATIENT
Start: 2023-10-04 | End: 2023-10-05 | Stop reason: HOSPADM

## 2023-10-04 RX ORDER — ONDANSETRON 2 MG/ML
4 INJECTION INTRAMUSCULAR; INTRAVENOUS
Status: COMPLETED | OUTPATIENT
Start: 2023-10-04 | End: 2023-10-04

## 2023-10-04 RX ORDER — SODIUM CHLORIDE 9 MG/ML
INJECTION, SOLUTION INTRAVENOUS CONTINUOUS
Status: DISCONTINUED | OUTPATIENT
Start: 2023-10-04 | End: 2023-10-04

## 2023-10-04 RX ORDER — IBUPROFEN 200 MG
24 TABLET ORAL
Status: DISCONTINUED | OUTPATIENT
Start: 2023-10-04 | End: 2023-10-05 | Stop reason: HOSPADM

## 2023-10-04 RX ORDER — OXYCODONE AND ACETAMINOPHEN 10; 325 MG/1; MG/1
1 TABLET ORAL EVERY 6 HOURS PRN
Status: DISCONTINUED | OUTPATIENT
Start: 2023-10-04 | End: 2023-10-05 | Stop reason: HOSPADM

## 2023-10-04 RX ORDER — CAPSAICIN 0.75 MG/G
CREAM TOPICAL 3 TIMES DAILY
Status: DISCONTINUED | OUTPATIENT
Start: 2023-10-04 | End: 2023-10-05 | Stop reason: HOSPADM

## 2023-10-04 RX ORDER — KETOROLAC TROMETHAMINE 30 MG/ML
10 INJECTION, SOLUTION INTRAMUSCULAR; INTRAVENOUS ONCE
Status: COMPLETED | OUTPATIENT
Start: 2023-10-04 | End: 2023-10-04

## 2023-10-04 RX ORDER — GLUCAGON 1 MG
1 KIT INJECTION
Status: DISCONTINUED | OUTPATIENT
Start: 2023-10-04 | End: 2023-10-05 | Stop reason: HOSPADM

## 2023-10-04 RX ORDER — LOSARTAN POTASSIUM 25 MG/1
25 TABLET ORAL DAILY
Status: DISCONTINUED | OUTPATIENT
Start: 2023-10-05 | End: 2023-10-05 | Stop reason: HOSPADM

## 2023-10-04 RX ORDER — PRAVASTATIN SODIUM 40 MG/1
40 TABLET ORAL DAILY
Status: DISCONTINUED | OUTPATIENT
Start: 2023-10-05 | End: 2023-10-05 | Stop reason: HOSPADM

## 2023-10-04 RX ORDER — DULOXETIN HYDROCHLORIDE 30 MG/1
60 CAPSULE, DELAYED RELEASE ORAL DAILY
Status: DISCONTINUED | OUTPATIENT
Start: 2023-10-05 | End: 2023-10-05 | Stop reason: HOSPADM

## 2023-10-04 RX ORDER — CHLORTHALIDONE 25 MG/1
25 TABLET ORAL DAILY
Status: DISCONTINUED | OUTPATIENT
Start: 2023-10-05 | End: 2023-10-05 | Stop reason: HOSPADM

## 2023-10-04 RX ORDER — PANTOPRAZOLE SODIUM 40 MG/1
40 TABLET, DELAYED RELEASE ORAL DAILY
Status: DISCONTINUED | OUTPATIENT
Start: 2023-10-05 | End: 2023-10-05 | Stop reason: HOSPADM

## 2023-10-04 RX ORDER — IBUPROFEN 200 MG
16 TABLET ORAL
Status: DISCONTINUED | OUTPATIENT
Start: 2023-10-04 | End: 2023-10-05 | Stop reason: HOSPADM

## 2023-10-04 RX ORDER — MAGNESIUM SULFATE HEPTAHYDRATE 40 MG/ML
2 INJECTION, SOLUTION INTRAVENOUS ONCE
Status: COMPLETED | OUTPATIENT
Start: 2023-10-04 | End: 2023-10-04

## 2023-10-04 RX ORDER — DEXTROSE MONOHYDRATE 100 MG/ML
INJECTION, SOLUTION INTRAVENOUS
Status: COMPLETED | OUTPATIENT
Start: 2023-10-04 | End: 2023-10-04

## 2023-10-04 RX ORDER — MORPHINE SULFATE 4 MG/ML
4 INJECTION, SOLUTION INTRAMUSCULAR; INTRAVENOUS
Status: COMPLETED | OUTPATIENT
Start: 2023-10-04 | End: 2023-10-04

## 2023-10-04 RX ORDER — INSULIN ASPART 100 [IU]/ML
0-10 INJECTION, SOLUTION INTRAVENOUS; SUBCUTANEOUS
Status: DISCONTINUED | OUTPATIENT
Start: 2023-10-04 | End: 2023-10-05 | Stop reason: HOSPADM

## 2023-10-04 RX ORDER — TALC
6 POWDER (GRAM) TOPICAL NIGHTLY PRN
Status: DISCONTINUED | OUTPATIENT
Start: 2023-10-04 | End: 2023-10-05 | Stop reason: HOSPADM

## 2023-10-04 RX ADMIN — Medication 6 MG: at 08:10

## 2023-10-04 RX ADMIN — CAPSAICIN: 0.75 CREAM TOPICAL at 08:10

## 2023-10-04 RX ADMIN — INSULIN ASPART 2 UNITS: 100 INJECTION, SOLUTION INTRAVENOUS; SUBCUTANEOUS at 05:10

## 2023-10-04 RX ADMIN — HYDROXYZINE PAMOATE 50 MG: 25 CAPSULE ORAL at 08:10

## 2023-10-04 RX ADMIN — OXYCODONE AND ACETAMINOPHEN 1 TABLET: 10; 325 TABLET ORAL at 05:10

## 2023-10-04 RX ADMIN — MAGNESIUM SULFATE 2 G: 2 INJECTION INTRAVENOUS at 04:10

## 2023-10-04 RX ADMIN — TAMSULOSIN HYDROCHLORIDE 0.4 MG: 0.4 CAPSULE ORAL at 08:10

## 2023-10-04 RX ADMIN — SODIUM CHLORIDE 1000 ML: 9 INJECTION, SOLUTION INTRAVENOUS at 04:10

## 2023-10-04 RX ADMIN — MORPHINE SULFATE 4 MG: 4 INJECTION INTRAVENOUS at 09:10

## 2023-10-04 RX ADMIN — PREGABALIN 200 MG: 75 CAPSULE ORAL at 08:10

## 2023-10-04 RX ADMIN — ONDANSETRON 4 MG: 2 INJECTION INTRAMUSCULAR; INTRAVENOUS at 09:10

## 2023-10-04 RX ADMIN — DEXTROSE MONOHYDRATE: 100 INJECTION, SOLUTION INTRAVENOUS at 08:10

## 2023-10-04 RX ADMIN — ENOXAPARIN SODIUM 40 MG: 40 INJECTION SUBCUTANEOUS at 04:10

## 2023-10-04 RX ADMIN — KETOROLAC TROMETHAMINE 10 MG: 30 INJECTION, SOLUTION INTRAMUSCULAR at 08:10

## 2023-10-04 NOTE — PHARMACY MED REC
"  [facility]           Pharmacy  Admission Medication History     The home medication history was taken by Marcela Woodruff.      Medication history obtained from Medications listed below were obtained from: [employer]- [employer]    Based on information gathered for medication list, you may go to "Admission" then "Reconcile Home Medications" tabs to review and/or act upon those items.     The home medication list has been updated by the Pharmacy department.   Please read ALL comments highlighted in yellow.   Please address this information as you see fit.    Feel free to contact us if you have any questions or require assistance.    The medications listed below were removed from the home medication list.  Please reorder if appropriate:    Patient reports NOT TAKING the following medication(s):  Medrol dpk 4mg    No current facility-administered medications on file prior to encounter.     Current Outpatient Medications on File Prior to Encounter   Medication Sig Dispense Refill    ALPRAZolam (XANAX) 0.5 MG tablet Take 1 tablet (0.5 mg total) by mouth nightly as needed for Anxiety. 30 tablet 0    chlorthalidone (HYGROTEN) 25 MG Tab Take 1 tablet (25 mg total) by mouth once daily. 90 tablet 3    dulaglutide (TRULICITY) 0.75 mg/0.5 mL pen injector Inject 0.75 mg (one pen) into the skin every 7 days. 12 pen 1    DULoxetine (CYMBALTA) 60 MG capsule TAKE 2 CAPSULES BY MOUTH EVERY MORNING (Patient taking differently: Take 120 mg by mouth every morning.) 180 capsule 3    glimepiride (AMARYL) 2 MG tablet Take 1 tablet (2 mg total) by mouth before breakfast. 90 tablet 3    hydrOXYzine pamoate (VISTARIL) 50 MG Cap TAKE 1 CAPSULE (50 MG) BY MOUTH NIGHTLY AS NEEDED FOR INSOMNIA (Patient taking differently: Take 50 mg by mouth nightly as needed (insomnia).) 90 capsule 3    meclizine (ANTIVERT) 12.5 mg tablet Take 1 tablet (12.5 mg total) by mouth 3 (three) times daily as needed for Dizziness. 180 tablet 3 "    metFORMIN (GLUCOPHAGE) 1000 MG tablet Take 1 tablet (1,000 mg total) by mouth 2 (two) times daily with meals. 180 tablet 1    methocarbamoL (ROBAXIN) 750 MG Tab Take 1 tablet (750 mg total) by mouth 3 (three) times daily as needed (muscle spasms). 60 tablet 0    olmesartan (BENICAR) 20 MG tablet Take 1 tablet (20 mg total) by mouth once daily. 90 tablet 0    omeprazole (PRILOSEC) 20 MG capsule Take 1 capsule (20 mg total) by mouth before breakfast. 90 capsule 3    oxyCODONE-acetaminophen (PERCOCET)  mg per tablet Take 1 tablet by mouth every 6 (six) hours as needed for Pain. 60 tablet 0    potassium chloride (KLOR-CON) 10 MEQ TbSR take 1 tablet by mouth once daily (Patient taking differently: Take 10 mEq by mouth once daily.) 90 tablet 3    pravastatin (PRAVACHOL) 40 MG tablet Take 1 tablet (40 mg total) by mouth once daily. 90 tablet 3    pregabalin (LYRICA) 100 MG capsule Take 2 capsules (200 mg total) by mouth 2 (two) times daily. 360 capsule 1    tamsulosin (FLOMAX) 0.4 mg Cap Take 1 capsule (0.4 mg total) by mouth every evening. 90 capsule 3    BLOOD PRESSURE CUFF Misc 1 Units by Misc.(Non-Drug; Combo Route) route once daily. 1 each 0    blood sugar diagnostic (TRUE METRIX GLUCOSE TEST STRIP) Strp use 1 strip to check glucose 2 (two) times a day. 200 strip 6    blood-glucose meter Misc Use as instructed 1 each 0    lancets 30 gauge Misc 1 lancet by Misc.(Non-Drug; Combo Route) route twice daily. 200 each 6    sildenafiL (VIAGRA) 100 MG tablet Take 1 tablet (100 mg total) by mouth daily as needed for Erectile Dysfunction. 30 tablet 3       Please address this information as you see fit.  Feel free to contact us if you have any questions or require assistance.    Marcela Woodruff  [phone]                .

## 2023-10-04 NOTE — H&P
"[facility] Medicine H&P Note     Admitting Team: [facility] Hospitalist Team A  Attending Physician: Shivani Monreal MD  Resident: Chris Hurtado MD  Intern: Heriberto Padilla DO    Date of Admit: 10/4/2023    Chief Complaint     Loss of Consciousness     Subjective:      History of Present Illness:  Fabiano Malik Jr. is a 62 y.o. male with a PMHx of DM-II, HTN, and schizophreia. The patient presented on 10/4/2023 for evaluation of hypoglycemia after a fall and loss of consciousness.  Pt describes decreased food intake in recent days and did not eat anything at all the day before presentation. He then got up to use the bathroom the morning of admission, urinated, felt dizzy, and when returning to his bed, fell and hit his head on the doorknob. He believes that he lost consciousness for several minutes and then called EMS when he regained consciousness. Unclear if he lost consciousness prior to fall, but recalls hitting his head on the doorknob. He then called EMS, who found his blood sugar to be 52 and brought the pt to the ED.  Pt reports having baseline dizziness for "several years", which he describes as feeling like the room is spinning. He does not know of any alleviating factors, but states that it feels worse at times when he stands up. He has had several falls d/t this dizziness, reporting 2 in the last week. However, the episode prior to presentation was the first time he lost consciousness.   In regards to his diabetes, pt checks his blood sugars regularly and it is typically 90s-110s, however it has been 40s-50s in recent days as his food intake has been minimal. Pt's daughter orders groceries to be delivered to him, but he has not felt the motivation to eat and cook for himself in recent months since his long-term partner  of renal failure.    Pt's recent medical history is notable for C5-C7 anterior athrodesis with placement of interbody spacer and plate on 23. Has an extensive history of chronic " back pain and has been on disability since the 1990s d/t these back issues.     Past Medical History:  Past Medical History:   Diagnosis Date    Chronic back pain greater than 3 months duration     Depression     Diabetes mellitus     Diabetes mellitus, type 2     Hypertension     Schizophrenia        Past Surgical History:  Past Surgical History:   Procedure Laterality Date    APPENDECTOMY      CARPAL TUNNEL RELEASE Right 12/13/2022    Procedure: RELEASE, CARPAL TUNNEL;  Surgeon: Everton Walter Jr., MD;  Location: [facility] OR;  Service: Orthopedics;  Laterality: Right;    COLONOSCOPY N/A 5/31/2018    Procedure: COLONOSCOPY;  Surgeon: Guillaume Hatch MD;  Location: [facility] ENDO;  Service: Endoscopy;  Laterality: N/A;    COLONOSCOPY N/A 11/17/2022    Procedure: COLONOSCOPY;  Surgeon: Guillaume Hatch MD;  Location: [facility] ENDO;  Service: Endoscopy;  Laterality: N/A;    DECOMPRESSION, NERVE, ULNAR Right 12/13/2022    Procedure: DECOMPRESSION, NERVE, ULNAR;  Surgeon: Everton Walter Jr., MD;  Location: [facility] OR;  Service: Orthopedics;  Laterality: Right;    FUSION, SPINE, CERVICAL N/A 8/11/2023    Procedure: FUSION, SPINE, CERVICAL;  Surgeon: Guillaume Washington MD;  Location: [facility] OR;  Service: Neurosurgery;  Laterality: N/A;  C3-4, C5-6, C6-7 ACDF C3-C7 anterior instrumentation Depuy  -ANTERIOR DECOMPREESSION 2 LEVELS   -ANTERIOR INSTRUMENTATION INTERBODY CAGE INTERSPACE 3   -LOP 3.5 HR   -LOS 3 DAYS   -GENERAL   -TYPE AND SCREEN   - C ARM   -EMG, SEP, MEP hari notified cc  -ASPEN   -REG    NECK SURGERY      RADIOFREQUENCY ABLATION OF LUMBAR MEDIAL BRANCH NERVE AT SINGLE LEVEL Left 5/29/2018    Procedure: RADIOFREQUENCY THERMOCOAGULATION (RFTC)-NERVE-MEDIAN BRANCH-LUMBAR- Left L2-3-4-5;  Surgeon: Donavon Dennis MD;  Location: [facility] PAIN MGT;  Service: Pain Management;  Laterality: Left;    RADIOFREQUENCY ABLATION OF LUMBAR MEDIAL BRANCH NERVE AT SINGLE LEVEL Right 1/8/2019    Procedure: Radiofrequency Ablation,  Nerve, Spinal, Lumbar, Medial Branch, L2,3,4,5;  Surgeon: Alberto Hernandez Jr., MD;  Location: [facility] PAIN [facility];  Service: Pain Management;  Laterality: Right;  Pt is diabetic    RADIOFREQUENCY ABLATION OF LUMBAR MEDIAL BRANCH NERVE AT SINGLE LEVEL Left 3/12/2019    Procedure: Radiofrequency Ablation, Nerve, Spinal, Lumbar, Medial Branch, Left, L2,3,4,5;  Surgeon: Alberto Hernandez Jr., MD;  Location: [facility] PAIN [facility];  Service: Pain Management;  Laterality: Left;  Pt will be consented the day of procedure.    TRANSFORAMINAL EPIDURAL INJECTION OF STEROID Right 9/12/2019    Procedure: Injection,steroid,epidural,transforaminal,right,L4-5 and L5-S1;  Surgeon: Alberto Hernandez Jr., MD;  Location: [facility] PAIN [facility];  Service: Pain Management;  Laterality: Right;  PT IS DIABETIC       Social History:  Tobacco: None  Alcohol: 1-2 beers per week  Drugs: None    Lives alone. On disability. Previously lived with long-term partner who passed away in recent months.    Family History:  Family History   Problem Relation Age of Onset    Alzheimer's disease Mother     Diabetes Mother     Heart defect Father     Cancer Father     Stroke Father     Heart attack Father     Heart defect Sister     Alzheimer's disease Sister        Home Medications:  - Xanax 0.5 mg nightly  - Chlorthalidone 25 mg daily  - Cymbalta 60 mg  - Glimepiride 2 mg  - Vistaril 50 mg  - Metformin 1,000 mg BID  - Methocarbamol 750 mg  - Olmesartan 20 mg  - Omeprazole 20 mg  - Percocet  mg q6h PRN  - Pregabalin 100 mg BID  - Sildenafil 100 mg PRN  - Tamsulosin 0.4 mg    Allergies:  Review of patient's allergies indicates:   Allergen Reactions    Pcn [penicillins] Other (See Comments)     Childhood rxn, pt does not recall type of rxn       Review of Systems:  Review of Systems   Constitutional:  Positive for weight loss. Negative for chills and fever.   HENT:  Negative for sore throat.    Eyes:  Negative for blurred vision and pain.   Respiratory:  Negative for cough,  "sputum production, shortness of breath and wheezing.    Cardiovascular:  Negative for chest pain, palpitations and orthopnea.   Gastrointestinal:  Negative for abdominal pain, diarrhea, heartburn, nausea and vomiting.   Genitourinary:  Negative for dysuria.   Musculoskeletal:  Positive for neck pain.   Skin:  Negative for itching.   Neurological:  Positive for dizziness, loss of consciousness and weakness.   Psychiatric/Behavioral:  Positive for depression. Negative for substance abuse and suicidal ideas.      Health Care Maintenance :   Primary Care Physician: Lucien Fleming MD     Immunizations:   Immunization History   Administered Date(s) Administered    COVID-19, MRNA, LN-S, PF (Pfizer) (Purple Cap) 2021    Influenza 10/17/2011, 10/13/2017    Influenza - Quadrivalent - PF *Preferred* (6 months and older) 2019, 2019, 2021, 2023    Influenza Split 10/13/2017    Pneumococcal Conjugate - 13 Valent 2016    Pneumococcal Polysaccharide - 23 Valent 2018    Tdap 2018        Cancer Screening:  Colonoscopy: up to date    Objective:   Last 24 Hour Vital Signs:  BP  Min: 96/66  Max: 124/62  Temp  Av.3 °F (36.8 °C)  Min: 98.3 °F (36.8 °C)  Max: 98.3 °F (36.8 °C)  Pulse  Av.8  Min: 79  Max: 95  Resp  Av.4  Min: 11  Max: 20  SpO2  Av %  Min: 92 %  Max: 97 %  Height  Av' 1" (185.4 cm)  Min: 6' 1" (185.4 cm)  Max: 6' 1" (185.4 cm)  Weight  Av.3 kg (230 lb)  Min: 104.3 kg (230 lb)  Max: 104.3 kg (230 lb)  Body mass index is 30.34 kg/m².  No intake/output data recorded.    Physical Examination:  Physical Exam  Constitutional:       Appearance: Normal appearance.   HENT:      Head: Normocephalic and atraumatic.   Neck:      Comments: Neck brace in place. Unable to assess ROM.   Cardiovascular:      Rate and Rhythm: Normal rate and regular rhythm.      Pulses: Normal pulses.      Heart sounds: Normal heart sounds.   Pulmonary:      Effort: " Pulmonary effort is normal.      Breath sounds: Normal breath sounds.   Abdominal:      Palpations: Abdomen is soft.      Comments: Increased bowel sounds   Musculoskeletal:         General: Normal range of motion.   Skin:     General: Skin is warm and dry.      Capillary Refill: Capillary refill takes less than 2 seconds.      Comments: Bruising/abrasion to R forehead    Pt has some minor, blanchable sores on his abdomen and lower extremities.   Neurological:      General: No focal deficit present.      Mental Status: He is alert and oriented to person, place, and time. Mental status is at baseline.   Psychiatric:         Behavior: Behavior normal.         Thought Content: Thought content normal.       Laboratory:  Most Recent Data:  CBC:   Lab Results   Component Value Date    WBC 6.66 10/04/2023    HGB 12.5 (L) 10/04/2023    HCT 35.8 (L) 10/04/2023     10/04/2023    MCV 83 10/04/2023    RDW 13.8 10/04/2023     BMP:   Lab Results   Component Value Date     (L) 10/04/2023    K 3.9 10/04/2023    CL 96 10/04/2023    CO2 23 10/04/2023    BUN 11 10/04/2023    CREATININE 1.2 10/04/2023    GLU 42 (LL) 10/04/2023    CALCIUM 8.7 10/04/2023    MG 1.3 (L) 10/04/2023    PHOS 3.0 10/04/2023     LFTs:   Lab Results   Component Value Date    PROT 6.1 10/04/2023    ALBUMIN 3.6 10/04/2023    BILITOT 0.6 10/04/2023    AST 45 (H) 10/04/2023    ALKPHOS 76 10/04/2023    ALT 23 10/04/2023     Coags:   Lab Results   Component Value Date    INR 1.0 11/28/2018     FLP:   Lab Results   Component Value Date    CHOL 164 02/13/2023    HDL 37 (L) 02/13/2023    LDLCALC 103.4 02/13/2023    TRIG 118 02/13/2023    CHOLHDL 22.6 02/13/2023     DM:   Lab Results   Component Value Date    HGBA1C 5.0 10/04/2023    HGBA1C 5.7 (H) 06/29/2023    HGBA1C 6.5 (H) 02/13/2023    LDLCALC 103.4 02/13/2023    CREATININE 1.2 10/04/2023     Thyroid:   Lab Results   Component Value Date    TSH 12.023 (H) 05/10/2021    FREET4 1.07 05/10/2021     Anemia:  "  Lab Results   Component Value Date    CVRMHBIJ41 952 (H) 04/17/2019     Cardiac:   Lab Results   Component Value Date    TROPONINI 0.015 10/04/2023    BNP 45 04/01/2019     Urinalysis:   Lab Results   Component Value Date    COLORU Yellow 08/15/2023    SPECGRAV 1.010 08/15/2023    NITRITE Negative 08/15/2023    KETONESU Negative 08/15/2023    UROBILINOGEN Negative 08/15/2023    WBCUA 3 07/07/2023       Microbiology Data:   None.    Radiology:  Chest XR (my interpretation): no acute processes    Other Results:  EKG (my interpretation): NSR, no prolonged QT interval or QRS    Assessment:     Fabiano Malik Jr. is a 62 y.o. male with a PMHx of DM-II, HTN, and schizophrenia presenting to the emergency department after a fall with +LOC. Found to be hypoglycemic, BG in 40s. Admitted for syncopal event in the setting of hypoglycemia     Plan:     Syncope and collapse  - Pt reports +LOC with fall  - [place]ble Syncope Risk Score: 0 points, low risk for cardiac syncope  - Blood glucose: 52 per EMS, 40 upon arrival in ED  - In ED: given orange juice w/sugar packets and placed on D10 infusion  - Positive orthostatic hypotension in ED: 122/63 lying, 96/63 sitting, 83/62 standing  - CT head and neck (10/4) show no acute processes  - Pt reports baseline dizziness that has been present for "years"  Plan  - Diabetic diet ordered  - POC glucose 4x daily  - 1 L NS ordered   - PT/OT ordered to evaluate and treat pt  - Consider neurology outpt referral for chronic dizziness upon discharge    Type 2 Diabetes, well-controlled, with peripheral neuropathy  - A1c (10/4): 5.0%, when last measured 3 months ago: 5.7%  - Pt reports adherence to home meds: Metformin, Glimeperide, and Dulaglutide  - Reports numbness in all extremities, followed by podiatry outpatient  Plan  - SSI during admission  - Continue home pregabalin  - Will consider discontinuing Glimeperide upon discharge    Normocytic Anemia  - H/H in ED: 12.5/35.8, MCV: 83  Plan  - " AM labs: CBC, iron profile, folate, B12 to workup anemia    Schizophrenia/Depression  - Questionable schizophrenia diagnosis: pt reports no positive symptoms and has never been seen by a psychiatrist. Not prescribed any antipsychotics.   - Reports anhedonia and no motivation to prepare food for himself, resulting in limited food intake in recent months  - SIGECAPS: all positive, except for SI  Plan  - Inpatient consult to psychiatry placed  - Continue home Duloxetine    Hypomagnesemia  - Magnesium on admission: 1.3  Plan  - 2 g magnesium sulfate ordered  - Will recheck magnesium in AM    Cervical Spondylosis s/p cervical fusion  - Pt ambulates with rollator walker at baseline since cervical spinal surgery on 8/11/23  - Followed outpt by neurosurgery   Plan  - PT/OT ordered to evaluate and treat pt    Hypertension  - Pt has remained normotensive during hospitalization  Plan  - Continue home meds: losartan & chlorthalidone    Hyperlipidemia  - Last lipid panel within normal limits besides low HDL: 37  Plan  - Continue home atorvastatin    PPx: Lovenox  Diet: Diabetic diet  Code: Full code    Disposition: admit to medicine overnight, possible discharge tomorrow    Heriberto Padilla DO  [facility] Internal Medicine, -1    [facility] Medicine Hospitalist Pager numbers:   [facility] Hospitalist Medicine Team A (Rah/Anum): [phone]  [facility] Hospitalist Medicine Team B (Shantelle/Cricket):  [phone]

## 2023-10-04 NOTE — ED PROVIDER NOTES
Encounter Date: 10/4/2023       History     Chief Complaint   Patient presents with    Loss of Consciousness     Passed out at home and fell. Hx neck surgery complaining of neck pain. Numbness and tingling to the hands and feet that has been present since before the surgery. Increased numbness in the hands since falling. Abrasion to the right forehead. EMS reports CBG of 52.     Patient is a 62 year old male with a history of diabetes and hypertension who had a syncopal episode at home this morning.  Patient says he was walking to the bathroom when he passed out.  Patient is on oral hypoglycemic agents and was noted to have a blood sugar of 52 upon EMS arrival.  He had a recent cervical fusion done in August.      Review of patient's allergies indicates:   Allergen Reactions    Pcn [penicillins] Other (See Comments)     Childhood rxn, pt does not recall type of rxn     Past Medical History:   Diagnosis Date    Chronic back pain greater than 3 months duration     Depression     Diabetes mellitus     Diabetes mellitus, type 2     Hypertension     Schizophrenia      Past Surgical History:   Procedure Laterality Date    APPENDECTOMY      CARPAL TUNNEL RELEASE Right 12/13/2022    Procedure: RELEASE, CARPAL TUNNEL;  Surgeon: vEerton Walter Jr., MD;  Location: [facility];  Service: Orthopedics;  Laterality: Right;    COLONOSCOPY N/A 5/31/2018    Procedure: COLONOSCOPY;  Surgeon: Guillaume Hatch MD;  Location: [facility];  Service: Endoscopy;  Laterality: N/A;    COLONOSCOPY N/A 11/17/2022    Procedure: COLONOSCOPY;  Surgeon: Guillaume Hatch MD;  Location: [facility] ENDO;  Service: Endoscopy;  Laterality: N/A;    DECOMPRESSION, NERVE, ULNAR Right 12/13/2022    Procedure: DECOMPRESSION, NERVE, ULNAR;  Surgeon: Everton Walter Jr., MD;  Location: [facility] OR;  Service: Orthopedics;  Laterality: Right;    FUSION, SPINE, CERVICAL N/A 8/11/2023    Procedure: FUSION, SPINE, CERVICAL;  Surgeon: Guillaume Washington MD;  Location: [facility];   "Service: Neurosurgery;  Laterality: N/A;  C3-4, C5-6, C6-7 ACDF C3-C7 anterior instrumentation Depuy  -ANTERIOR DECOMPREESSION 2 LEVELS   -ANTERIOR INSTRUMENTATION INTERBODY CAGE INTERSPACE 3   -LOP 3.5 HR   -LOS 3 DAYS   -GENERAL   -TYPE AND SCREEN   - C ARM   -EMG, SEP, MEP hari notified cc  -ASPEN   -REG    NECK SURGERY      RADIOFREQUENCY ABLATION OF LUMBAR MEDIAL BRANCH NERVE AT SINGLE LEVEL Left 5/29/2018    Procedure: RADIOFREQUENCY THERMOCOAGULATION (RFTC)-NERVE-MEDIAN BRANCH-LUMBAR- Left L2-3-4-5;  Surgeon: Donavon Dennis MD;  Location: [facility];  Service: Pain Management;  Laterality: Left;    RADIOFREQUENCY ABLATION OF LUMBAR MEDIAL BRANCH NERVE AT SINGLE LEVEL Right 1/8/2019    Procedure: Radiofrequency Ablation, Nerve, Spinal, Lumbar, Medial Branch, L2,3,4,5;  Surgeon: Alberto Hernandez Jr., MD;  Location: [facility];  Service: Pain Management;  Laterality: Right;  Pt is diabetic    RADIOFREQUENCY ABLATION OF LUMBAR MEDIAL BRANCH NERVE AT SINGLE LEVEL Left 3/12/2019    Procedure: Radiofrequency Ablation, Nerve, Spinal, Lumbar, Medial Branch, Left, L2,3,4,5;  Surgeon: Alberto Hernandez Jr., MD;  Location: [facility];  Service: Pain Management;  Laterality: Left;  Pt will be consented the day of procedure.    TRANSFORAMINAL EPIDURAL INJECTION OF STEROID Right 9/12/2019    Procedure: Injection,steroid,epidural,transforaminal,right,L4-5 and L5-S1;  Surgeon: Alberot Hernandez Jr., MD;  Location: [facility];  Service: Pain Management;  Laterality: Right;  PT IS DIABETIC     Family History   Problem Relation Age of Onset    Alzheimer's disease Mother     Diabetes Mother     Heart defect Father     Cancer Father     Stroke Father     Heart attack Father     Heart defect Sister     Alzheimer's disease Sister      Social History     Tobacco Use    Smoking status: Never    Smokeless tobacco: Never   Substance Use Topics    Alcohol use: Yes     Comment: "couple of beers a day"    Drug use: No "     Review of Systems   Constitutional:  Negative for fever.   Cardiovascular:  Negative for chest pain.   Gastrointestinal:  Negative for nausea and vomiting.   Musculoskeletal:  Positive for neck pain.       Physical Exam     Initial Vitals [10/04/23 0743]   BP Pulse Resp Temp SpO2   118/66 86 18 98.3 °F (36.8 °C) 95 %      MAP       --         Physical Exam    Nursing note and vitals reviewed.  Constitutional: No distress.   HENT:   Abrasion noted to the right side of the forehead.   Eyes: EOM are normal.   Pinpoint pupils bilaterally.   Cardiovascular:  Normal rate, regular rhythm and normal heart sounds.           Pulmonary/Chest: Breath sounds normal.   Abdominal: Abdomen is soft. There is no abdominal tenderness.   Musculoskeletal:         General: No tenderness or edema.     Neurological: He is alert and oriented to person, place, and time. No cranial nerve deficit. GCS score is 15. GCS eye subscore is 4. GCS verbal subscore is 5. GCS motor subscore is 6.   Skin: Skin is warm and dry.   Psychiatric: His behavior is normal. Thought content normal.         ED Course   Procedures  Labs Reviewed   CBC W/ AUTO DIFFERENTIAL - Abnormal; Notable for the following components:       Result Value    RBC 4.32 (*)     Hemoglobin 12.5 (*)     Hematocrit 35.8 (*)     MPV 8.3 (*)     Eos # 1.8 (*)     Eosinophil % 27.2 (*)     All other components within normal limits   COMPREHENSIVE METABOLIC PANEL - Abnormal; Notable for the following components:    Sodium 135 (*)     Glucose 42 (*)     AST 45 (*)     All other components within normal limits    Narrative:       GLU critical result(s) called and verbal readback obtained from   DALE FREDERICK RN. by [facility] 10/04/2023 08:38   MAGNESIUM - Abnormal; Notable for the following components:    Magnesium 1.3 (*)     All other components within normal limits   POCT GLUCOSE - Abnormal; Notable for the following components:    POCT Glucose 40 (*)     All other components within normal  limits   POCT GLUCOSE - Abnormal; Notable for the following components:    POCT Glucose 42 (*)     All other components within normal limits   POCT GLUCOSE - Abnormal; Notable for the following components:    POCT Glucose 122 (*)     All other components within normal limits   TROPONIN I   HEMOGLOBIN A1C   URINALYSIS   POCT GLUCOSE   POCT GLUCOSE     EKG Readings: (Independently Interpreted)   Initial Reading: No STEMI. Rhythm: Normal Sinus Rhythm. Heart Rate: 83. Ectopy: No Ectopy. ST Segments: Normal ST Segments.     ECG Results              EKG 12-lead (Final result)  Result time 10/04/23 08:58:57      Final result by Interface, Lab In [facility] (10/04/23 08:58:57)                   Narrative:    Test Reason : R55,    Vent. Rate : 083 BPM     Atrial Rate : 083 BPM     P-R Int : 176 ms          QRS Dur : 104 ms      QT Int : 390 ms       P-R-T Axes : 026 020 041 degrees     QTc Int : 458 ms    Normal sinus rhythm  Normal ECG  When compared with ECG of 15-AUG-2023 06:08,  No significant change was found  Confirmed by Eddie Bragg MD ([id number]) on 10/4/2023 8:58:47 AM    Referred By: System System           Confirmed By:Eddie Bragg MD                                  Imaging Results              CT Cervical Spine Without Contrast (Final result)  Result time 10/04/23 09:46:25      Final result by Bob Tenorio MD (10/04/23 09:46:25)                   Impression:      Postsurgical and degenerative change as above, without evidence of an acute displaced fracture.      Electronically signed by: Bob Tenorio MD  Date:    10/04/2023  Time:    09:46               Narrative:    EXAMINATION:  CT CERVICAL SPINE WITHOUT CONTRAST    CLINICAL HISTORY:  Neck trauma, mechanically unstable spine (Age >= 16y);    TECHNIQUE:  Low dose axial CT images through the cervical spine, with sagittal and coronal reformations.  Contrast was not administered.    COMPARISON:  Radiograph 08/25/2023    FINDINGS:  Postsurgical  changes prior osseous fusion of the C4-5 vertebral bodies more recent instrumented anterior cervical discectomy and fusion procedure with anterior plate screw construct and interbody disc spacers spanning C5 through C7.  The orthopedic hardware appears in stable position, intact, and without new surrounding lucency.    No evidence of a new displaced fracture or focal osseous destructive process elsewhere.    No significant spondylolisthesis.    Non operative intervertebral disc heights are well maintained.  Posterior disc osteophyte complex at a few levels without evidence of overt bony spinal canal stenosis.  Mild scattered neural foraminal encroachment from uncovertebral and facet hypertrophy.    Limited evaluation of the intraspinal contents demonstrates no hematoma or mass.    Paraspinal soft tissues exhibit no acute abnormalities.    Scattered atherosclerotic calcification.                                       CT Head Without Contrast (Final result)  Result time 10/04/23 09:28:35      Final result by Marquise Wagner MD (10/04/23 09:28:35)                   Impression:      No acute intracranial findings.      Electronically signed by: Marquise Wagner  Date:    10/04/2023  Time:    09:28               Narrative:    EXAMINATION:  CT HEAD WITHOUT CONTRAST    CLINICAL HISTORY:  Head trauma, moderate-severe;    TECHNIQUE:  Low dose axial images were obtained through the head.  Coronal and sagittal reformations were also performed. Contrast was not administered.    COMPARISON:  CT head 08/26/2022.    FINDINGS:  Blood: No acute intracranial hemorrhage.    Parenchyma: No definite loss of gray-white differentiation to suggest acute or subacute transcortical infarct. Generalized pattern age-related parenchymal volume loss.  Nonspecific areas of white matter hypoattenuation may relate to sequela of chronic small vessel ischemic disease.  Small remote lacunar infarct again suggested in the left  thalamus.    Ventricles/Extra-axial spaces: No abnormal extra-axial fluid collection. Basal cisterns are patent.    Vessels: Moderate to heavy atherosclerotic calcification.    Orbits: Status post bilateral lens replacements.    Scalp: Unremarkable.    Skull: There are no depressed skull fractures or destructive bone lesions.    Sinuses and mastoids: Scattered relatively modest paranasal sinus mucosal thickening.    Other findings: Chronic appearing nasal bone fracture.                                       X-Ray Chest 1 View (Final result)  Result time 10/04/23 08:37:21      Final result by Velma Armstrong MD (10/04/23 08:37:21)                   Impression:      No acute abnormality.      Electronically signed by: Velma Armstrong MD  Date:    10/04/2023  Time:    08:37               Narrative:    EXAMINATION:  XR CHEST 1 VIEW    CLINICAL HISTORY:  Syncope;    TECHNIQUE:  Single frontal view of the chest was performed.    COMPARISON:  08/15/2023    FINDINGS:  The lungs are clear, with normal appearance of pulmonary vasculature and no pleural effusion or pneumothorax.    The cardiac silhouette is normal in size. The hilar and mediastinal contours are unremarkable.    Bones are intact.                                       Medications   dextrose 10 % infusion ( Intravenous New Bag 10/4/23 0814)   morphine injection 4 mg (4 mg Intravenous Given 10/4/23 0935)   ondansetron injection 4 mg (4 mg Intravenous Given 10/4/23 0935)     Medical Decision Making  DDx :  Vasovagal response, dehydration, cardiac arrhythmia, hypoglycemia, electrolyte abnormality, skull fracture, intracranial hemorrhage, cervical spine fracture.    Patient presents with hypoglycemia.  After IV glucose, patient is still noted with episodes of low blood sugar.  D10 drip was started.  I feel the patient will require admission for observation to monitor blood sugar and will be admitted by [facility].    Amount and/or Complexity of  Data Reviewed  Labs: ordered.     Details: CBC shows a hemoglobin of 12.5 and hematocrit of 35.8.  CMP shows a glucose of 42.   Radiology: ordered.     Details: Head and cervical spine CTs without acute abnormalities.  Discussion of management or test interpretation with external provider(s): [facility] resident for admission.    Risk  Prescription drug management.                               Clinical Impression:   Final diagnoses:  [R55] Syncope  [E16.2] Hypoglycemia (Primary)        ED Disposition Condition    Observation Stable                Willi Pierre MD  10/04/23 5920

## 2023-10-04 NOTE — PT/OT/SLP PROGRESS
Physical Therapy Missed Evaluation      Patient Name:  Fabiano Malik Jr.   MRN:  [id number]    Patient not seen today secondary to Testing/imaging (xray/CT/MRI). Will follow-up as able.    10/4/23

## 2023-10-04 NOTE — PLAN OF CARE
Patient is AAOx4. Cardiac monitoring continued. Glucose montoring continued. Tolerated diabetic diet. PRN pain med given.Call light witin reach. Urinal within reach.

## 2023-10-04 NOTE — PROGRESS NOTES
10/04/23 1655   Admission   Initial VN Admission Questions Complete   Communication Issues? None   Shift   Virtual Nurse - Rounding Complete   Virtual Nurse - Patient Verbalized Approval Of Camera Use;VN Rounding   Type of Frequent Check   Type Patient Rounds   Safety/Activity   Patient Rounds call light in patient/parent reach;bed in low position;visualized patient   Positioning   Body Position supine   Head of Bed (HOB) Positioning HOB elevated   Positioning/Transfer Devices pillows;in use   Pain/Comfort/Sleep   Preferred Pain Scale number (Numeric Rating Pain Scale)   Comfort/Acceptable Pain Level 4   POSS ([place] Opioid-Induced Sed Scale) 1 - Awake and alert

## 2023-10-04 NOTE — MEDICAL/APP STUDENT
[facility] Medicine H&P Note     Admitting Team: [facility] Hospitalist Team   Attending Physician: Shivani Monreal MD  Resident: Kaiser Hurtado  Intern: Heriberto Padilla    Date of Admit: 10/4/2023    Chief Complaint     Syncope    Subjective:      History of Present Illness:  Behzad Malik is a 62 y.o. male with a PMHx of hypertension and type 2 diabetes. The patient woke up this morning feeling dizzy. He states that he is normally dizzy and has been for quite some time. He stood out of bed to go to the restroom and that worsened his dizziness, but he used his walker to get to the restroom. While urinating he felt like he was going to fall but used the vanity to help stabilize himself. He used his walker to get back to his room. After he put his walker to the side and then he fell hitting his head on the doorknob as he fell. He says he does not remember the fall and thinks he woke up a few minutes later.     The patient's glucose was 52 upon EMS arrival. Oral glucose x2 was given.       The patient denies chest pain, heart palpitations, SOB, or sweating prior to the syncope episode. The patient reports that this is not the first time that he has fallen like this and fell twice last Saturday. The patient reports that he did not have anything to eat yesterday but did take his medications as prescribed. The patient reports not having the energy to make food. The patient checks his glucose daily and it is usually around 100, but has been around 40-50 the past few days.     Of note, the patient endorses diagnoses of schizophrenia and depression. Patient endorses trouble sleeping at night, lack of interest in things he previously enjoyed, guilt about not being a better person, lack of energy, difficulty concentrating, decreased appetite. The patient is not currently suicidal.     Past Medical History:  Past Medical History:   Diagnosis Date    Chronic back pain greater than 3 months duration     Depression     Diabetes  mellitus     Diabetes mellitus, type 2     Hypertension     Schizophrenia        Past Surgical History:  Past Surgical History:   Procedure Laterality Date    APPENDECTOMY      CARPAL TUNNEL RELEASE Right 12/13/2022    Procedure: RELEASE, CARPAL TUNNEL;  Surgeon: Everton Walter Jr., MD;  Location: [facility] OR;  Service: Orthopedics;  Laterality: Right;    COLONOSCOPY N/A 5/31/2018    Procedure: COLONOSCOPY;  Surgeon: Guillaume Hatch MD;  Location: [facility] ENDO;  Service: Endoscopy;  Laterality: N/A;    COLONOSCOPY N/A 11/17/2022    Procedure: COLONOSCOPY;  Surgeon: Guillaume Hatch MD;  Location: [facility] ENDO;  Service: Endoscopy;  Laterality: N/A;    DECOMPRESSION, NERVE, ULNAR Right 12/13/2022    Procedure: DECOMPRESSION, NERVE, ULNAR;  Surgeon: Everton Walter Jr., MD;  Location: [facility] OR;  Service: Orthopedics;  Laterality: Right;    FUSION, SPINE, CERVICAL N/A 8/11/2023    Procedure: FUSION, SPINE, CERVICAL;  Surgeon: Guillaume Washington MD;  Location: [facility] OR;  Service: Neurosurgery;  Laterality: N/A;  C3-4, C5-6, C6-7 ACDF C3-C7 anterior instrumentation Depuy  -ANTERIOR DECOMPREESSION 2 LEVELS   -ANTERIOR INSTRUMENTATION INTERBODY CAGE INTERSPACE 3   -LOP 3.5 HR   -LOS 3 DAYS   -GENERAL   -TYPE AND SCREEN   - C ARM   -EMG, SEP, MEP hari notified cc  -ASPEN   -REG    NECK SURGERY      RADIOFREQUENCY ABLATION OF LUMBAR MEDIAL BRANCH NERVE AT SINGLE LEVEL Left 5/29/2018    Procedure: RADIOFREQUENCY THERMOCOAGULATION (RFTC)-NERVE-MEDIAN BRANCH-LUMBAR- Left L2-3-4-5;  Surgeon: Donavon Dennis MD;  Location: [facility] PAIN MGT;  Service: Pain Management;  Laterality: Left;    RADIOFREQUENCY ABLATION OF LUMBAR MEDIAL BRANCH NERVE AT SINGLE LEVEL Right 1/8/2019    Procedure: Radiofrequency Ablation, Nerve, Spinal, Lumbar, Medial Branch, L2,3,4,5;  Surgeon: Alberto Hernandez Jr., MD;  Location: [facility] PAIN MGT;  Service: Pain Management;  Laterality: Right;  Pt is diabetic    RADIOFREQUENCY ABLATION OF LUMBAR MEDIAL BRANCH  NERVE AT SINGLE LEVEL Left 3/12/2019    Procedure: Radiofrequency Ablation, Nerve, Spinal, Lumbar, Medial Branch, Left, L2,3,4,5;  Surgeon: Alberto Hernandez Jr., MD;  Location: [facility] PAIN T;  Service: Pain Management;  Laterality: Left;  Pt will be consented the day of procedure.    TRANSFORAMINAL EPIDURAL INJECTION OF STEROID Right 9/12/2019    Procedure: Injection,steroid,epidural,transforaminal,right,L4-5 and L5-S1;  Surgeon: Alberto Hernandez Jr., MD;  Location: [facility] PAIN T;  Service: Pain Management;  Laterality: Right;  PT IS DIABETIC       Social History:  Tobacco: denies  Alcohol: occasionally (last drink 2 weeks ago)  Drugs: denies    Family History:  Family History   Problem Relation Age of Onset    Alzheimer's disease Mother     Diabetes Mother     Heart defect Father     Cancer Father     Stroke Father     Heart attack Father     Heart defect Sister     Alzheimer's disease Sister        Home Medications:  Alprazolam 0.25 mg, nightly for anxiety  Chlorthalidone 25 mg, daily for HTN  Dulaglutide 0.75mg/0.5ml, weekly for T2D  Glimepiride 2 mg, daily for T2D  Hydroxyzine 50 mg, nightly for insomnia  Meclizine 12.5 mg, PRN for dizziness  Metformin 1000 mg, twice daily for T2D  Methocarbamol 750 mg, 3x daily PRN for muscle spasms  Olmesartan 20 mg, daily for HTN   Oeprazole 40 mg, daily   Percocet  mg, PRN q6   Pravastatin 40 mg, daily   Pregabalin 100 mg, twice daily  Tamsulosin 0.4 mg, nightly      Allergies:  Review of patient's allergies indicates:   Allergen Reactions    Pcn [penicillins] Other (See Comments)     Childhood rxn, pt does not recall type of rxn       Review of Systems:  Review of Systems   Constitutional:  Positive for weight loss.   Eyes: Negative.    Respiratory: Negative.     Cardiovascular: Negative.    Gastrointestinal: Negative.    Genitourinary: Negative.    Musculoskeletal:  Positive for back pain and neck pain.   Skin: Negative.    Neurological:  Positive for dizziness.  "  Endo/Heme/Allergies: Negative.    Psychiatric/Behavioral:  Positive for depression.      Patient rates neck pain 10+/10    Health Care Maintenance :   Primary Care Physician: Lucien Fleming MD     Immunizations:   Immunization History   Administered Date(s) Administered    COVID-19, MRNA, LN-S, PF (Pfizer) (Purple Cap) 2021    Influenza 10/17/2011, 10/13/2017    Influenza - Quadrivalent - PF *Preferred* (6 months and older) 2019, 2019, 2021, 2023    Influenza Split 10/13/2017    Pneumococcal Conjugate - 13 Valent 2016    Pneumococcal Polysaccharide - 23 Valent 2018    Tdap 2018        Objective:   Last 24 Hour Vital Signs:  BP  Min: 96/66  Max: 124/62  Temp  Av.3 °F (36.8 °C)  Min: 98.3 °F (36.8 °C)  Max: 98.3 °F (36.8 °C)  Pulse  Av.1  Min: 79  Max: 95  Resp  Av.1  Min: 11  Max: 20  SpO2  Av %  Min: 92 %  Max: 97 %  Height  Av' 1" (185.4 cm)  Min: 6' 1" (185.4 cm)  Max: 6' 1" (185.4 cm)  Weight  Av.3 kg (230 lb)  Min: 104.3 kg (230 lb)  Max: 104.3 kg (230 lb)  Body mass index is 30.34 kg/m².  No intake/output data recorded.      Physical Examination:    General: No apparent distress  Head: Skin wound on patient's left forehead from fall on door knob  Neck: Patient has a neck brace on from his cervical spine surgery  Eyes: Normal sclera, pupils reactive to light bilaterally, extra-occular movements intact  Cardiac: normal rate and rhythm, no murmurs  Pulmonary: Normal breath sounds. Equal bilaterally on ascultation.   Abdominal: Small skin lesions noted. Bowel sounds heard on ascultation   Neurologic: No focal neurological deficits appreciated. Oriented to person, place, and situation.       Orthostatics:  Lyin/63  Sittin/63  Standin/62    Laboratory:  Most Recent Data:  CBC:   Lab Results   Component Value Date    WBC 6.66 10/04/2023    HGB 12.5 (L) 10/04/2023    HCT 35.8 (L) 10/04/2023     10/04/2023    " MCV 83 10/04/2023    RDW 13.8 10/04/2023       BMP:   Lab Results   Component Value Date     (L) 10/04/2023    K 3.9 10/04/2023    CL 96 10/04/2023    CO2 23 10/04/2023    BUN 11 10/04/2023    CREATININE 1.2 10/04/2023    GLU 42 (LL) 10/04/2023    CALCIUM 8.7 10/04/2023    MG 1.3 (L) 10/04/2023    PHOS 5.9 (H) 11/28/2018     LFTs:   Lab Results   Component Value Date    PROT 6.1 10/04/2023    ALBUMIN 3.6 10/04/2023    BILITOT 0.6 10/04/2023    AST 45 (H) 10/04/2023    ALKPHOS 76 10/04/2023    ALT 23 10/04/2023     Coags:   Lab Results   Component Value Date    INR 1.0 11/28/2018     FLP:   Lab Results   Component Value Date    CHOL 164 02/13/2023    HDL 37 (L) 02/13/2023    LDLCALC 103.4 02/13/2023    TRIG 118 02/13/2023    CHOLHDL 22.6 02/13/2023     DM:   Lab Results   Component Value Date    HGBA1C 5.0 10/04/2023    HGBA1C 5.7 (H) 06/29/2023    HGBA1C 6.5 (H) 02/13/2023    LDLCALC 103.4 02/13/2023    CREATININE 1.2 10/04/2023     Thyroid:   Lab Results   Component Value Date    TSH 12.023 (H) 05/10/2021    FREET4 1.07 05/10/2021     Anemia:   Lab Results   Component Value Date    UCZJMNGY15 952 (H) 04/17/2019     Cardiac:   Lab Results   Component Value Date    TROPONINI 0.015 10/04/2023    BNP 45 04/01/2019     Urinalysis:   Lab Results   Component Value Date    COLORU Yellow 08/15/2023    SPECGRAV 1.010 08/15/2023    NITRITE Negative 08/15/2023    KETONESU Negative 08/15/2023    UROBILINOGEN Negative 08/15/2023    WBCUA 3 07/07/2023       Microbiology Data:   None    Radiology:  Cervical Spine X-Ray  Redemonstration of C5-C7 ACDF with disc spacer.  Alignment unchanged.  Hardware projects in similar alignment without evidence for interval loosening or failure.  Upper cervical discogenic change and osseous fusion across C4-C5 again noted.  Normal precervical soft tissue thickness.  No new abnormality.       Other Results:  EKG (my interpretation): Normal sinus rhythm     Assessment:     Fabiano Malik   is a 62 y.o. male with a PMHx of Behzaderic Malik is a 62 y.o. male with a PMHx of hypertension and type 2 diabetes presenting with syncope.      Plan:     Syncope:   - The patient had orthostatic hypotension on exam  - Syncope likely due to hypoglycemia or hypovolemia   - Barb[place] syncope score was 0 (low risk); thus, cardiac workup not warrented  - Give IV saline     Hypoglycemia:   - Hypoglycemia has resolved s/p glucose and juice    Type 2 diabetes:  - Diabetes is controlled (A1C = 5) with home regimen (metformin 1000 mg BID, dulaglutide 0.75 mg weekly, glimepiride 2 mg daily)  - Hold home medications because of concerns with hypoglycemia and using sliding scale insulin inpatient   - Re-evaluate diabetes medications prior to discharge    Symptoms of depression:   - Consult psychiatry to review medication  - Follow up with outpatient psychiatrist      PPx: enoxaparin 40 mg  Diet: Diabetic  Code: Full    Disposition: pending psych evaluation       Dionne Isabel  Medical Student, 3rd year

## 2023-10-04 NOTE — ED NOTES
Pt to ED via EMS for eval after LOC this morning. Pt had recent neck surgery 9/11. Neck brace in place on arrival. Pt reports dec appetite since surgery and continuing to take DM meds. Pt states he got up to use restroom this morning and felt dizzy, passed out walking back to bed. Hit head on door knob. Abrasion noted to right forehead. On EMS arrival, CBG low, oral glucose given x 2 by EMS. Pt awake and alert on ED arrival. Reports pain to neck and lower back, chronic. CBG 40, orange juice and sugar packets given. Placed on cardiac, O2 and BP monitor. Side rails up x 2, call light within reach.

## 2023-10-04 NOTE — ED NOTES
Pt requesting pain medicine and reporting new pain to right elbow, believes he hit it with the fall this morning. MD notified.

## 2023-10-04 NOTE — PT/OT/SLP PROGRESS
Occupational Therapy visit attempt      Patient Name:  Fabiano Malik JrJohn   MRN:  [id number]    Patient not seen today secondary to Testing/imaging (xray/CT/MRI). Will follow-up as able.    10/4/2023

## 2023-10-05 VITALS
HEART RATE: 71 BPM | TEMPERATURE: 98 F | OXYGEN SATURATION: 98 % | RESPIRATION RATE: 18 BRPM | HEIGHT: 73 IN | SYSTOLIC BLOOD PRESSURE: 131 MMHG | BODY MASS INDEX: 29.57 KG/M2 | WEIGHT: 223.13 LBS | DIASTOLIC BLOOD PRESSURE: 74 MMHG

## 2023-10-05 LAB
ALBUMIN SERPL BCP-MCNC: 3.2 G/DL (ref 3.5–5.2)
ALP SERPL-CCNC: 66 U/L (ref 55–135)
ALT SERPL W/O P-5'-P-CCNC: 21 U/L (ref 10–44)
ANION GAP SERPL CALC-SCNC: 8 MMOL/L (ref 8–16)
AST SERPL-CCNC: 33 U/L (ref 10–40)
BILIRUB SERPL-MCNC: 0.6 MG/DL (ref 0.1–1)
BUN SERPL-MCNC: 13 MG/DL (ref 8–23)
CALCIUM SERPL-MCNC: 8.2 MG/DL (ref 8.7–10.5)
CHLORIDE SERPL-SCNC: 96 MMOL/L (ref 95–110)
CO2 SERPL-SCNC: 29 MMOL/L (ref 23–29)
CREAT SERPL-MCNC: 1.2 MG/DL (ref 0.5–1.4)
ERYTHROCYTE [DISTWIDTH] IN BLOOD BY AUTOMATED COUNT: 13.6 % (ref 11.5–14.5)
EST. GFR  (NO RACE VARIABLE): >60 ML/MIN/1.73 M^2
FERRITIN SERPL-MCNC: 86 NG/ML (ref 20–300)
FOLATE SERPL-MCNC: 16.7 NG/ML (ref 4–24)
GLUCOSE SERPL-MCNC: 84 MG/DL (ref 70–110)
HCT VFR BLD AUTO: 34.6 % (ref 40–54)
HGB BLD-MCNC: 11.7 G/DL (ref 14–18)
IRON SERPL-MCNC: 83 UG/DL (ref 45–160)
MAGNESIUM SERPL-MCNC: 1.7 MG/DL (ref 1.6–2.6)
MCH RBC QN AUTO: 28.5 PG (ref 27–31)
MCHC RBC AUTO-ENTMCNC: 33.8 G/DL (ref 32–36)
MCV RBC AUTO: 84 FL (ref 82–98)
PHOSPHATE SERPL-MCNC: 3.3 MG/DL (ref 2.7–4.5)
PLATELET # BLD AUTO: 152 K/UL (ref 150–450)
PMV BLD AUTO: 8.6 FL (ref 9.2–12.9)
POCT GLUCOSE: 117 MG/DL (ref 70–110)
POCT GLUCOSE: 67 MG/DL (ref 70–110)
POCT GLUCOSE: 80 MG/DL (ref 70–110)
POTASSIUM SERPL-SCNC: 3.9 MMOL/L (ref 3.5–5.1)
PROT SERPL-MCNC: 5.4 G/DL (ref 6–8.4)
RBC # BLD AUTO: 4.11 M/UL (ref 4.6–6.2)
SATURATED IRON: 29 % (ref 20–50)
SODIUM SERPL-SCNC: 133 MMOL/L (ref 136–145)
TOTAL IRON BINDING CAPACITY: 290 UG/DL (ref 250–450)
TRANSFERRIN SERPL-MCNC: 196 MG/DL (ref 200–375)
VIT B12 SERPL-MCNC: 859 PG/ML (ref 210–950)
WBC # BLD AUTO: 5.83 K/UL (ref 3.9–12.7)

## 2023-10-05 PROCEDURE — 96376 TX/PRO/DX INJ SAME DRUG ADON: CPT

## 2023-10-05 PROCEDURE — 82607 VITAMIN B-12: CPT | Performed by: STUDENT IN AN ORGANIZED HEALTH CARE EDUCATION/TRAINING PROGRAM

## 2023-10-05 PROCEDURE — 63600175 PHARM REV CODE 636 W HCPCS

## 2023-10-05 PROCEDURE — 63600175 PHARM REV CODE 636 W HCPCS: Performed by: INTERNAL MEDICINE

## 2023-10-05 PROCEDURE — G0008 ADMIN INFLUENZA VIRUS VAC: HCPCS | Performed by: INTERNAL MEDICINE

## 2023-10-05 PROCEDURE — 25000003 PHARM REV CODE 250: Performed by: PSYCHIATRY & NEUROLOGY

## 2023-10-05 PROCEDURE — 82728 ASSAY OF FERRITIN: CPT | Performed by: STUDENT IN AN ORGANIZED HEALTH CARE EDUCATION/TRAINING PROGRAM

## 2023-10-05 PROCEDURE — 84466 ASSAY OF TRANSFERRIN: CPT | Performed by: STUDENT IN AN ORGANIZED HEALTH CARE EDUCATION/TRAINING PROGRAM

## 2023-10-05 PROCEDURE — 97535 SELF CARE MNGMENT TRAINING: CPT

## 2023-10-05 PROCEDURE — 99205 PR OFFICE/OUTPT VISIT, NEW, LEVL V, 60-74 MIN: ICD-10-PCS | Mod: ,,, | Performed by: PSYCHIATRY & NEUROLOGY

## 2023-10-05 PROCEDURE — G0378 HOSPITAL OBSERVATION PER HR: HCPCS

## 2023-10-05 PROCEDURE — 99900035 HC TECH TIME PER 15 MIN (STAT)

## 2023-10-05 PROCEDURE — 90833 PR PSYCHOTHERAPY W/PATIENT W/E&M, 30 MIN (ADD ON): ICD-10-PCS | Mod: ,,, | Performed by: PSYCHIATRY & NEUROLOGY

## 2023-10-05 PROCEDURE — 25000003 PHARM REV CODE 250: Performed by: STUDENT IN AN ORGANIZED HEALTH CARE EDUCATION/TRAINING PROGRAM

## 2023-10-05 PROCEDURE — 25000003 PHARM REV CODE 250

## 2023-10-05 PROCEDURE — 96365 THER/PROPH/DIAG IV INF INIT: CPT

## 2023-10-05 PROCEDURE — 97530 THERAPEUTIC ACTIVITIES: CPT

## 2023-10-05 PROCEDURE — 99205 OFFICE O/P NEW HI 60 MIN: CPT | Mod: ,,, | Performed by: PSYCHIATRY & NEUROLOGY

## 2023-10-05 PROCEDURE — 82746 ASSAY OF FOLIC ACID SERUM: CPT | Performed by: STUDENT IN AN ORGANIZED HEALTH CARE EDUCATION/TRAINING PROGRAM

## 2023-10-05 PROCEDURE — 63600175 PHARM REV CODE 636 W HCPCS: Performed by: STUDENT IN AN ORGANIZED HEALTH CARE EDUCATION/TRAINING PROGRAM

## 2023-10-05 PROCEDURE — 36415 COLL VENOUS BLD VENIPUNCTURE: CPT | Performed by: STUDENT IN AN ORGANIZED HEALTH CARE EDUCATION/TRAINING PROGRAM

## 2023-10-05 PROCEDURE — 80053 COMPREHEN METABOLIC PANEL: CPT | Performed by: STUDENT IN AN ORGANIZED HEALTH CARE EDUCATION/TRAINING PROGRAM

## 2023-10-05 PROCEDURE — 96366 THER/PROPH/DIAG IV INF ADDON: CPT

## 2023-10-05 PROCEDURE — 85027 COMPLETE CBC AUTOMATED: CPT | Performed by: STUDENT IN AN ORGANIZED HEALTH CARE EDUCATION/TRAINING PROGRAM

## 2023-10-05 PROCEDURE — 83735 ASSAY OF MAGNESIUM: CPT | Performed by: STUDENT IN AN ORGANIZED HEALTH CARE EDUCATION/TRAINING PROGRAM

## 2023-10-05 PROCEDURE — 97161 PT EVAL LOW COMPLEX 20 MIN: CPT

## 2023-10-05 PROCEDURE — 84100 ASSAY OF PHOSPHORUS: CPT | Performed by: STUDENT IN AN ORGANIZED HEALTH CARE EDUCATION/TRAINING PROGRAM

## 2023-10-05 PROCEDURE — 90471 IMMUNIZATION ADMIN: CPT | Performed by: INTERNAL MEDICINE

## 2023-10-05 PROCEDURE — 90686 IIV4 VACC NO PRSV 0.5 ML IM: CPT | Performed by: INTERNAL MEDICINE

## 2023-10-05 PROCEDURE — 83540 ASSAY OF IRON: CPT | Performed by: STUDENT IN AN ORGANIZED HEALTH CARE EDUCATION/TRAINING PROGRAM

## 2023-10-05 PROCEDURE — 97165 OT EVAL LOW COMPLEX 30 MIN: CPT

## 2023-10-05 PROCEDURE — 90833 PSYTX W PT W E/M 30 MIN: CPT | Mod: ,,, | Performed by: PSYCHIATRY & NEUROLOGY

## 2023-10-05 RX ORDER — KETOROLAC TROMETHAMINE 30 MG/ML
10 INJECTION, SOLUTION INTRAMUSCULAR; INTRAVENOUS ONCE
Status: COMPLETED | OUTPATIENT
Start: 2023-10-05 | End: 2023-10-05

## 2023-10-05 RX ORDER — PRAZOSIN HYDROCHLORIDE 1 MG/1
1 CAPSULE ORAL NIGHTLY
Qty: 30 CAPSULE | Refills: 11 | Status: SHIPPED | OUTPATIENT
Start: 2023-10-05 | End: 2023-11-15

## 2023-10-05 RX ORDER — DULOXETIN HYDROCHLORIDE 60 MG/1
120 CAPSULE, DELAYED RELEASE ORAL EVERY MORNING
Qty: 60 CAPSULE | Refills: 11 | Status: SHIPPED | OUTPATIENT
Start: 2023-10-05

## 2023-10-05 RX ORDER — PRAZOSIN HYDROCHLORIDE 1 MG/1
1 CAPSULE ORAL NIGHTLY
Status: DISCONTINUED | OUTPATIENT
Start: 2023-10-05 | End: 2023-10-05 | Stop reason: HOSPADM

## 2023-10-05 RX ORDER — ARIPIPRAZOLE 5 MG/1
5 TABLET ORAL DAILY
Status: DISCONTINUED | OUTPATIENT
Start: 2023-10-05 | End: 2023-10-05 | Stop reason: HOSPADM

## 2023-10-05 RX ORDER — MAGNESIUM SULFATE HEPTAHYDRATE 40 MG/ML
2 INJECTION, SOLUTION INTRAVENOUS ONCE
Status: COMPLETED | OUTPATIENT
Start: 2023-10-05 | End: 2023-10-05

## 2023-10-05 RX ORDER — ARIPIPRAZOLE 5 MG/1
5 TABLET ORAL DAILY
Qty: 30 TABLET | Refills: 11 | Status: SHIPPED | OUTPATIENT
Start: 2023-10-06 | End: 2024-10-05

## 2023-10-05 RX ADMIN — PANTOPRAZOLE SODIUM 40 MG: 40 TABLET, DELAYED RELEASE ORAL at 09:10

## 2023-10-05 RX ADMIN — OXYCODONE AND ACETAMINOPHEN 1 TABLET: 10; 325 TABLET ORAL at 02:10

## 2023-10-05 RX ADMIN — CHLORTHALIDONE 25 MG: 25 TABLET ORAL at 09:10

## 2023-10-05 RX ADMIN — ARIPIPRAZOLE 5 MG: 5 TABLET ORAL at 02:10

## 2023-10-05 RX ADMIN — MAGNESIUM SULFATE 2 G: 2 INJECTION INTRAVENOUS at 09:10

## 2023-10-05 RX ADMIN — OXYCODONE AND ACETAMINOPHEN 1 TABLET: 10; 325 TABLET ORAL at 07:10

## 2023-10-05 RX ADMIN — CAPSAICIN: 0.75 CREAM TOPICAL at 09:10

## 2023-10-05 RX ADMIN — PREGABALIN 200 MG: 75 CAPSULE ORAL at 09:10

## 2023-10-05 RX ADMIN — LOSARTAN POTASSIUM 25 MG: 25 TABLET, FILM COATED ORAL at 09:10

## 2023-10-05 RX ADMIN — DULOXETINE HYDROCHLORIDE 60 MG: 30 CAPSULE, DELAYED RELEASE ORAL at 09:10

## 2023-10-05 RX ADMIN — KETOROLAC TROMETHAMINE 10 MG: 30 INJECTION INTRAMUSCULAR; INTRAVENOUS at 09:10

## 2023-10-05 RX ADMIN — PRAVASTATIN SODIUM 40 MG: 40 TABLET ORAL at 09:10

## 2023-10-05 RX ADMIN — CAPSAICIN: 0.75 CREAM TOPICAL at 02:10

## 2023-10-05 RX ADMIN — INFLUENZA VIRUS VACCINE 0.5 ML: 15; 15; 15; 15 SUSPENSION INTRAMUSCULAR at 05:10

## 2023-10-05 NOTE — PLAN OF CARE
ANTONI met with pt at bedside to complete final d/c plan. Pt stated that he will take a cab back home. ANTONI sent HH orders to eve via fax and email. Pt will be assigned a HH company by his insurance. Rounds completed on pt. All questions addressed. Bedside nurse to discuss d/c medications. Discussed importance to attend all f/u appts and take medications as prescribed. Verbalized understanding. Case Management to sign off.     Chris Traylor, MSW  [phone]    Future Appointments   Date Time Provider Department Center   10/6/2023  1:00 PM Lonnie Mccord MD [facility]   10/16/2023  8:15 AM [facility] ODC XR-A LIMIT 350 LBS [facility] XRAY OP [facility]   10/16/2023  9:30 AM Guillaume Washington MD [facility] NEUROSU [facility]   10/19/2023 10:30 AM Lucien Fleming MD [facility] MED [place]   1/2/2024 11:15 AM Shalini Burks, VINCEM [facility] PODIAT [place] [facility]   1/10/2024  1:30 PM Lucien Fleming MD [facility] MED [place]

## 2023-10-05 NOTE — PLAN OF CARE
Problem: Physical Therapy  Goal: Physical Therapy Goal  Description: Goals to be met by: 23     Patient will increase functional independence with mobility by performin. Supine to sit with Modified [place]  2. Sit to supine with Modified [place]  3. Sit to stand transfer with Modified [place] with use of RW  4. Bed to chair transfer with Modified [place] using Rolling Walker  5. Gait  x 150 feet with Modified [place] using Rolling Walker.   6. Ascend/descend 5 stair with left Handrails Stand-by Assistance    Outcome: Ongoing, Progressing    PT/OT co session performed due to anticipated complexity of pt's presentation requiring skilled dual therapy intervention to ensure safety. Pt has decreased food intake; lack of endurance/motivation to prepare meals. Pt was previously MOD I with use of rollator; has ~2-3 falls in past month due to episodes of dizziness. Pt at this time SBA with bed mobility, transfers to standing and lateral steps along bedside with use of RW. Pt at this time not agreeable to progress ambulation distance due to fatigue/pain. PT recommending low intensity therapy (HH PT). Therapy will progress pt as able.

## 2023-10-05 NOTE — PLAN OF CARE
Problem: Adult Inpatient Plan of Care  Goal: Plan of Care Review  Outcome: Ongoing, Progressing  Goal: Patient-Specific Goal (Individualized)  Outcome: Ongoing, Progressing  Goal: Absence of Hospital-Acquired Illness or Injury  Outcome: Ongoing, Progressing  Goal: Optimal Comfort and Wellbeing  Outcome: Ongoing, Progressing  Goal: Readiness for Transition of Care  Outcome: Ongoing, Progressing     Problem: Diabetes Comorbidity  Goal: Blood Glucose Level Within Targeted Range  Outcome: Ongoing, Progressing     Problem: Skin Injury Risk Increased  Goal: Skin Health and Integrity  Outcome: Ongoing, Progressing     Problem: Fall Injury Risk  Goal: Absence of Fall and Fall-Related Injury  Outcome: Ongoing, Progressing     Problem: Hypertension Comorbidity  Goal: Blood Pressure in Desired Range  Outcome: Ongoing, Progressing     Problem: Fatigue  Goal: Improved Activity Tolerance  Outcome: Ongoing, Progressing     Problem: Syncope  Goal: Absence of Syncopal Symptoms  Outcome: Ongoing, Progressing

## 2023-10-05 NOTE — PLAN OF CARE
SW met with pt this AM to complete DCA at bedside. Pt stated that he lives at home alone and will likely have to call a cab at time of d/c. Pt has a rollator and cane at home. SW will request f/u appts. White board updated with CM name and contact information.  Discharge brochure provided.  Pt encouraged to call with any questions or concerns.  Cm will continue to follow pt through transitions of care and assist with any discharge needs.    Chris Traylor, MSW  [phone]    Future Appointments   Date Time Provider Department Center   10/6/2023  1:00 PM Lonnie Mccord MD [facility]   10/16/2023  8:15 AM [facility] ODC XR-A LIMIT 350 LBS [facility] XRAY OP [facility]   10/16/2023  9:30 AM Guillaume Washington MD [facility] NEUROSU [facility]   10/19/2023 10:30 AM Lucien Fleming MD [facility] MED [place]   1/2/2024 11:15 AM Shalini Burks DPM [facility] PODIAT [place] [facility]   1/10/2024  1:30 PM Lucien Fleming MD [facility] MED [place]        10/05/23 1324   Discharge Planning   Assessment Type Discharge Planning Brief Assessment   Resource/Environmental Concerns none   Support Systems Children   Equipment Currently Used at Home rollator;cane, straight   Current Living Arrangements home   Care Facility Name home   Patient/Family Anticipates Transition to home   Patient/Family Anticipated Services at Transition none   DME Needed Upon Discharge  none   Discharge Plan A Home Health

## 2023-10-05 NOTE — PT/OT/SLP EVAL
Physical Therapy Evaluation and Treatment    Patient Name:  Fabiano Malik Jr.   MRN:  [id number]    Recommendations:     Discharge Recommendations: home health PT (low intensity therapy)   Discharge Equipment Recommendations:  (TTB)   Barriers to discharge:  limited functional endurance    Assessment:     Fabiano Malik Jr. is a 62 y.o. male admitted with a medical diagnosis of Hypoglycemia.  He presents with the following impairments/functional limitations: weakness, impaired endurance, impaired sensation, impaired self care skills, impaired functional mobility, gait instability, impaired balance, pain, decreased lower extremity function, orthopedic precautions.    PT/OT co session performed due to anticipated complexity of pt's presentation requiring skilled dual therapy intervention to ensure safety. Pt has decreased food intake; lack of endurance/motivation to prepare meals. Pt was previously MOD I with use of rollator; has ~2-3 falls in past month due to episodes of dizziness. Pt at this time SBA with bed mobility, transfers to standing and lateral steps along bedside with use of RW. Pt at this time not agreeable to progress ambulation distance due to fatigue/pain. PT recommending low intensity therapy (HH PT). Therapy will progress pt as able.        Rehab Prognosis: Good; patient would benefit from acute skilled PT services to address these deficits and reach maximum level of function.    Recent Surgery: * No surgery found *      Plan:     During this hospitalization, patient to be seen 3 x/week to address the identified rehab impairments via gait training, therapeutic activities, therapeutic exercises, neuromuscular re-education and progress toward the following goals:    Plan of Care Expires:  10/26/23    Subjective     Chief Complaint: pain in low back and posterior (L) side of neck  Patient/Family Comments/goals: to go home  Pain/Comfort:  Pain Rating 1: 10/10  Location 1:  (low back and posterior  (L) side of neck)  Pain Addressed 1: Reposition, Cessation of Activity, Other (see comments)  Pain Rating Post-Intervention 1: 10/10    Patients cultural, spiritual, [employer] conflicts given the current situation: no    Living Environment:  Lives alone on 1st floor apt; with 5 steps to enter with LHR. Tub/shower combo.  Prior to admission, patients level of function was MOD I with rollator.  Equipment used at home: rollator, cane, straight.  DME owned (not currently used): single point cane.  Upon discharge, patient will have assistance from unknown (limited).    Objective:     Communicated with nurse prior to session.  Patient found HOB elevated with bed alarm, telemetry, peripheral IV  upon PT entry to room.    General Precautions: Standard, fall  Orthopedic Precautions:spinal precautions   Braces:  (Aspen cervical collar and R hand brace for carpal tunnel)  Respiratory Status: Room air    Exams:  Cognitive Exam:  Patient is oriented to Person, Place, Time, and Situation  Sensation:    -       Intact  light/touch to BLE; however states feels more on L vs R.   RLE ROM: WFL  RLE Strength: WFL  LLE ROM: WFL  LLE Strength: WFL    Functional Mobility:  Bed Mobility:     Supine to Sit: stand by assistance  Sit to Supine: stand by assistance  Transfers:     Sit to Stand:  stand by assistance with rolling walker  Gait: ~4 BLE lateral steps along bedside with use of RW and SBA; pt not agreeable to progress ambulatory distance due to limited functional endurance/pain.  *    AM-PAC 6 CLICK MOBILITY  Total Score:19       Treatment & Education:  Pt educated on role of therapy.   Pt performs mobility as stated above in functional mobility section of note with SBA with use of RW.   Educated on log roll technique with bed mobility.   Pt declines progression of ambulatory distance due to pain and limited functional endurance.   BP monitored throughout session:   -initially supine with HOB elevated: 127/81  -sitting EOB  119/74  -standing 116/65  Pt educated on use of/obtaining TTB for safety in shower vs standing.     Patient left HOB elevated with all lines intact, call button in reach, bed alarm on, and nurse notified.    GOALS:   Multidisciplinary Problems       Physical Therapy Goals          Problem: Physical Therapy    Goal Priority Disciplines Outcome Goal Variances Interventions   Physical Therapy Goal     PT, PT/OT Ongoing, Progressing     Description: Goals to be met by: 23     Patient will increase functional independence with mobility by performin. Supine to sit with Modified [place]  2. Sit to supine with Modified [place]  3. Sit to stand transfer with Modified [place] with use of RW  4. Bed to chair transfer with Modified [place] using Rolling Walker  5. Gait  x 150 feet with Modified [place] using Rolling Walker.   6. Ascend/descend 5 stair with left Handrails Stand-by Assistance                         History:     Past Medical History:   Diagnosis Date    Chronic back pain greater than 3 months duration     Depression     Diabetes mellitus     Diabetes mellitus, type 2     Hypertension     Schizophrenia        Past Surgical History:   Procedure Laterality Date    APPENDECTOMY      CARPAL TUNNEL RELEASE Right 2022    Procedure: RELEASE, CARPAL TUNNEL;  Surgeon: Everton Walter Jr., MD;  Location: [facility] OR;  Service: Orthopedics;  Laterality: Right;    COLONOSCOPY N/A 2018    Procedure: COLONOSCOPY;  Surgeon: Guillaume Hatch MD;  Location: [facility] ENDO;  Service: Endoscopy;  Laterality: N/A;    COLONOSCOPY N/A 2022    Procedure: COLONOSCOPY;  Surgeon: Guillaume Hatch MD;  Location: [facility] ENDO;  Service: Endoscopy;  Laterality: N/A;    DECOMPRESSION, NERVE, ULNAR Right 2022    Procedure: DECOMPRESSION, NERVE, ULNAR;  Surgeon: Everton Walter Jr., MD;  Location: [facility] OR;  Service: Orthopedics;  Laterality: Right;    FUSION, SPINE, CERVICAL N/A 2023     Procedure: FUSION, SPINE, CERVICAL;  Surgeon: Guillaume Washington MD;  Location: [facility] OR;  Service: Neurosurgery;  Laterality: N/A;  C3-4, C5-6, C6-7 ACDF C3-C7 anterior instrumentation Depuy  -ANTERIOR DECOMPREESSION 2 LEVELS   -ANTERIOR INSTRUMENTATION INTERBODY CAGE INTERSPACE 3   -LOP 3.5 HR   -LOS 3 DAYS   -GENERAL   -TYPE AND SCREEN   - C ARM   -EMG, SEP, MEP hari notified cc  -ASPEN   -REG    NECK SURGERY      RADIOFREQUENCY ABLATION OF LUMBAR MEDIAL BRANCH NERVE AT SINGLE LEVEL Left 5/29/2018    Procedure: RADIOFREQUENCY THERMOCOAGULATION (RFTC)-NERVE-MEDIAN BRANCH-LUMBAR- Left L2-3-4-5;  Surgeon: Donavon Dennis MD;  Location: [facility];  Service: Pain Management;  Laterality: Left;    RADIOFREQUENCY ABLATION OF LUMBAR MEDIAL BRANCH NERVE AT SINGLE LEVEL Right 1/8/2019    Procedure: Radiofrequency Ablation, Nerve, Spinal, Lumbar, Medial Branch, L2,3,4,5;  Surgeon: Alberto Hernandez Jr., MD;  Location: [facility] [place];  Service: Pain Management;  Laterality: Right;  Pt is diabetic    RADIOFREQUENCY ABLATION OF LUMBAR MEDIAL BRANCH NERVE AT SINGLE LEVEL Left 3/12/2019    Procedure: Radiofrequency Ablation, Nerve, Spinal, Lumbar, Medial Branch, Left, L2,3,4,5;  Surgeon: Alberto Hernandez Jr., MD;  Location: [facility] [place];  Service: Pain Management;  Laterality: Left;  Pt will be consented the day of procedure.    TRANSFORAMINAL EPIDURAL INJECTION OF STEROID Right 9/12/2019    Procedure: Injection,steroid,epidural,transforaminal,right,L4-5 and L5-S1;  Surgeon: Alberto Hernandez Jr., MD;  Location: [facility] [place];  Service: Pain Management;  Laterality: Right;  PT IS DIABETIC       Time Tracking:     PT Received On: 10/05/23  PT Start Time: 1010     PT Stop Time: 1041  PT Total Time (min): 31 min With OT    Billable Minutes: Evaluation 10 and Therapeutic Activity 21      10/05/2023

## 2023-10-05 NOTE — CONSULTS
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   Comprehensive metabolic panel    Collection Time: 10/05/23  4:55 AM   Result Value Ref Range    Sodium 133 (L) 136 - 145 mmol/L    Potassium 3.9 3.5 - 5.1 mmol/L    Chloride 96 95 - 110 mmol/L    CO2 29 23 - 29 mmol/L    Glucose 84 70 - 110 mg/dL    BUN 13 8 - 23 mg/dL    Creatinine 1.2 0.5 - 1.4 mg/dL    Calcium 8.2 (L) 8.7 - 10.5 mg/dL    Total Protein 5.4 (L) 6.0 - 8.4 g/dL    Albumin 3.2 (L) 3.5 - 5.2 g/dL    Total Bilirubin 0.6 0.1 - 1.0 mg/dL    Alkaline Phosphatase 66 55 - 135 U/L    AST 33 10 - 40 U/L    ALT 21 10 - 44 U/L    eGFR >60 >60 mL/min/1.73 m^2    Anion Gap 8 8 - 16 mmol/L   Magnesium    Collection Time: 10/05/23  4:55 AM   Result Value Ref Range    Magnesium 1.7 1.6 - 2.6 mg/dL   Phosphorus    Collection Time: 10/05/23  4:55 AM   Result Value Ref Range    Phosphorus 3.3 2.7 - 4.5 mg/dL   Iron and TIBC    Collection Time: 10/05/23  4:55 AM   Result Value Ref Range    Iron 83 45 - 160 ug/dL    Transferrin 196 (L) 200 - 375 mg/dL    TIBC 290 250 - 450 ug/dL    Saturated Iron 29 20 - 50 %   Folate    Collection Time: 10/05/23  4:55 AM   Result Value Ref Range    Folate 16.7 4.0 -  "24.0 ng/mL   Vitamin B12    Collection Time: 10/05/23  4:55 AM   Result Value Ref Range    Vitamin B-12 859 210 - 950 pg/mL   Ferritin    Collection Time: 10/05/23  4:55 AM   Result Value Ref Range    Ferritin 86 20.0 - 300.0 ng/mL   POCT glucose    Collection Time: 10/05/23  5:46 AM   Result Value Ref Range    POCT Glucose 80 70 - 110 mg/dL   POCT glucose    Collection Time: 10/05/23 11:03 AM   Result Value Ref Range    POCT Glucose 117 (H) 70 - 110 mg/dL      No results found for: "PHENYTOIN", "PHENOBARB", "VALPROATE", "CBMZ"      EXAMINATION    VITALS   Vitals:    10/05/23 1029 10/05/23 1033 10/05/23 1144 10/05/23 1216   BP: 119/74 116/65 125/75    BP Location: Right arm Right arm     Patient Position: Sitting Standing Sitting    Pulse: 77 76 67 72   Resp:   18    Temp:   97.7 °F (36.5 °C)    TempSrc:   Oral    SpO2:   97%    Weight:       Height:            CONSTITUTIONAL  General Appearance: NAD, unremarkable, age appropriate, lying in bed, overweight, calm    MUSCULOSKELETAL  Muscle Strength and Tone: WNL    Abnormal Involuntary Movements: none observed   Gait and Station: Attempted but unable to assess due to medical acuity     PSYCHIATRIC   Behavior/Cooperation:  friendly and cooperative, eye contact normal, calm  Speech: reduced tone, normal rate, normal pitch, normal volume  Language: grossly intact, able to name, able to repeat with spontaneous speech  Mood:  "depressed"  Affect:  congruent with mood and constricted   Associations: intact; no MARIA ELENA  Thought Process: Linear and logical   Thought Content: denies SI, HI, AH, VH, TH, delusions, or paranoia (no RIS observed)   Sensorium:  Awake  Alert and Oriented: to person, place, city, state, month, year, and situation   Memory: 3/3 immediate, 2/3 at 5 minutes    Recent: Intact; able to report recent events   Remote: Intact; Named 4/4 past presidents   Attention/concentration: Intact. Appropriate for age/education. Able to spell w-o-r-l-d & d-l-r-o-w. " "  Similarities: Intact (difference between apple and orange?)  Abstract reasoning: Intact  Fund of Knowledge: Named 4/4 past presidents  Insight: Intact  Judgment: Intact    CAM ICU Delirium Assessment - NEGATIVE    Is the patient aware of the biomedical complications associated with substance abuse and mental illness? yes        MEDICAL DECISION MAKING    ASSESSMENT        Major Depressive Disorder, Recurrent, Severe, without Psychosis   Generalized Anxiety Disorder   Unspecified Trauma and Stressor-Related Disorder   Unspecified Insomnia   Hx of possible Schizophrenia (no current s/s of psychosis on exam today)       RECOMMENDATIONS       - Patient does not currently meet PEC criteria due to not being an imminent threat to self/others and not being gravely disabled 2/2 mental illness at this time.     - Begin Abilify 5 mg PO daily for MDD and Prazosin 1 mg PO QHS for sleep / traumatic nightmares (discussed risks/benefits/alt vs no treatment with patient).    - Continue Cymbalta 60 mg PO BID for depression / anxiety / pain and Vistaril 25 mg to 50 mg PO TID PRN anxiety and/or insomnia (discussed risks/benefits/alt vs no treatment with patient).    - Informed the patient of the risks of continuous Benzodiazepine use including but not limited to CNS depression, reduced cognition, tolerance, dependence and withdrawals that may be life threatening upon abrupt cessation. Also advised not to take Benzodiazepines with Opiates or other sedatives and also not to drive or operate heavy machinery while using Benzodiazepines.      - Psychotherapy was performed with patient as noted above with a focus on improving depression, trauma-response, anxiety, and sleep.    - Patient's most recent resulted labs, imaging, and EKG were reviewed today ;  UDS pending ; CT Head with "No acute intracranial findings."     - Please have Hospital CM/SW assist patient with ASAP outpatient mental health f/u for s/p discharge from this facility.    "   - Patient was instructed to call 911 and/or [phone] and return to the nearest ED if he begins feeling suicidal, homicidal, or gravely disabled (for s/p discharge from this facility).     - Thank you for this consult ; will continue to follow patient while at [facility]         Total time spent with patient and/or managing/coordinating patient's care today (excluding the time spent on psychotherapy): 78 minutes   Time spent on psychotherapy today (as noted above): 21 minutes   Total time for encounter today including psychotherapy: 99 minutes      More than 50% of the time was spent counseling/coordinating care.     Consulting clinician was informed of the encounter and consult note.       STAFF:  Alex Angeles MD  [facility]   10/5/2023

## 2023-10-05 NOTE — DISCHARGE INSTRUCTIONS
We are wanting you at this time based on your weakness and dizziness to discontinue Alprazolam, Chlorthalidone, Glimepiride, Meclizine, Potassium, and Tamsulosin.  Each of these medications in combination can lead to the symptoms you are experiencing. We strongly recommend you follow up with your primary care physician as well as your urologist to discuss your medications.  Please return to the hospital if you are having nausea, vomiting, persistent dizziness, weakness, chest pain, shortness of breath, or any other concerns you feel should be emergency evaluated by a physician.

## 2023-10-05 NOTE — NURSING
Discharge orders noted. Additional clinical references attached. Patient's discharge instructions given by bedside RN and reviewed by this VN with patient. Education provided on home care instructions, new and previous medications, diagnosis, when to seek medical attention, and follow-up appointments. New medications delivered by pharmacy. Patient verbalized understanding. Patient's family to provide ride/transportation home. All questions answered. Floor nurse notified.

## 2023-10-05 NOTE — PLAN OF CARE
VN Note: Notes, Orders, Labs, Care Plan, will continue to monitor and be available.   Problem: Adult Inpatient Plan of Care  Goal: Plan of Care Review  Outcome: Ongoing, Progressing

## 2023-10-05 NOTE — DISCHARGE SUMMARY
[facility] Medicine Discharge Summary    Primary Team: hospitals Hospitalist Team A  Attending Physician: Shivani Monreal MD  Resident: Chris Hurtado MD  Intern: Heriberto Padilla DO    Date of Admit: 10/4/2023  Date of Discharge: 10/5/2023    Discharge to: home  Condition: Stable     Discharge Diagnoses     Patient Active Problem List   Diagnosis    Visual hallucinations    Low back pain, non-specific    Type 2 diabetes mellitus without complication, without long-term current use of insulin    Hypertension associated with diabetes    HLD (hyperlipidemia)    Schizophrenia    Hyperkalemia    Hypotension    Need for hepatitis C screening test    Polyp of colon    Class 1 obesity with serious comorbidity and body mass index (BMI) of 34.0 to 34.9 in adult    Dizziness    Need for vaccination against Streptococcus pneumoniae using pneumococcal conjugate vaccine 7    Depression    Anemia    Renal insufficiency    Chronic back pain    Dehydration    Neck pain    CKD (chronic kidney disease) stage 3, GFR 30-59 ml/min    EKG abnormalities    Chest pain    Gastroesophageal reflux disease    Chronic pain    Palmar nodule, left    Trigger index finger of right hand    Lumbar facet arthropathy    Hand or foot spasms    Hearing loss of left ear    Cerumen debris on tympanic membrane of right ear    Flu vaccine need    Head trauma    Erectile dysfunction    Rash    Nonintractable headache    Lumbar radiculopathy    Carpal tunnel syndrome of right wrist    History of colon polyps    Weakness    Sedative, hypnotic or anxiolytic dependence, uncomplicated    Pulmonary hypertension, unspecified    Type 2 diabetes mellitus with diabetic peripheral angiopathy without gangrene, without long-term current use of insulin    Aortic calcification    Cervical spondylosis with myelopathy and radiculopathy    Aspiration pneumonia    S/P cervical spinal fusion    Syncope and collapse    Hypoglycemia       Consultants and Procedures  "    Consultants:  Psychiatry    Procedures:   None    Brief History of Present Illness      HPI:   Fabiano Malik Jr. is a 62 y.o. male with a PMHx of DM-II, HTN, and schizophreia. The patient presented on 10/4/2023 for evaluation of hypoglycemia after a fall and loss of consciousness.  Pt describes decreased food intake in recent days and did not eat anything at all the day before presentation. He then got up to use the bathroom the morning of admission, urinated, felt dizzy, and when returning to his bed, fell and hit his head on the doorknob. He believes that he lost consciousness for several minutes and then called EMS when he regained consciousness. Unclear if he lost consciousness prior to fall, but recalls hitting his head on the doorknob. He then called EMS, who found his blood sugar to be 52 and brought the pt to the ED.  Pt reports having baseline dizziness for "several years", which he describes as feeling like the room is spinning. He does not know of any alleviating factors, but states that it feels worse at times when he stands up. He has had several falls d/t this dizziness, reporting 2 in the last week. However, the episode prior to presentation was the first time he lost consciousness.   In regards to his diabetes, pt checks his blood sugars regularly and it is typically 90s-110s, however it has been 40s-50s in recent days as his food intake has been minimal. Pt's daughter orders groceries to be delivered to him, but he has not felt the motivation to eat and cook for himself in recent months since his long-term partner  of renal failure.     Pt's recent medical history is notable for C5-C7 anterior athrodesis with placement of interbody spacer and plate on 23. Has an extensive history of chronic back pain and has been on disability since the  d/t these back issues.     Hospital Course By Problem with Pertinent Findings     Syncope and collapse  - Pt reports +LOC with fall on " "10/4  - Gaston Syncope Risk Score: 0 points, low risk for cardiac syncope  - Blood glucose: 52 per EMS, 40 upon arrival in ED, 84 this AM  - In ED: given orange juice w/sugar packets and D10 bolus, given 1 L NS upon admission  - Positive orthostatic hypotension in ED, negative on the day of discharge  - CT head and neck (10/4) showed no acute processes  - Pt reports baseline dizziness that has been present for "years": neurology outpatient referral for chronic dizziness  - Pt will discontinue home Tamsulosin, f/u appt with Urology on 10/18/23    Type 2 Diabetes, well-controlled, with peripheral neuropathy  - A1c (10/4): 5.0%, when last measured 3 months ago: 5.7%  - Reports numbness in all extremities, followed by podiatry outpatient  - Continue home Pregabalin, Metformin, and Dulaglutide upon discharge  - Pt will discontinue Glimeperide upon discharge     Normocytic Anemia  - H/H in ED: 12.5/35.8, MCV: 83, on day of discharge: 11.7/34.6, iron profile unremarkable  - UTD on colonoscopy screening     Schizophrenia/Depression  - Questionable schizophrenia diagnosis: pt reports no positive symptoms and has never been seen by a psychiatrist. Not prescribed any antipsychotics.   - Reports anhedonia and no motivation to prepare food for himself, resulting in limited food intake in recent months  - SIGECAPS: all positive, except for SI  - Inpatient psychiatry consult: pt discharged with new meds: Aripiprazole and Prazosin. Resuming home Duloxetine.   - Outpt Psychiatry referral placed upon discharge    Hypomagnesemia (resolved)  - Magnesium on admission: 1.3, given 2 g magnesium sulfate x2 during admission     Cervical Spondylosis s/p cervical fusion  - Pt ambulates with rollator walker at baseline since cervical spinal surgery on 8/11/23  - Followed outpt by neurosurgery, next appt 10/16/23  - Home health PT ordered upon discharge     Hypertension  - Pt remained normotensive during hospitalization  - Pt will resume home " Olmesartan 20 mg  - Pt will discontinue Chlorthalidone upon discharge     Hyperlipidemia  - Last lipid panel within normal limits besides low HDL: 37  - Will resume home atorvastatin    Discharge Medications        Medication List        START taking these medications      ARIPiprazole 5 MG Tab  Commonly known as: ABILIFY  Take 1 tablet (5 mg total) by mouth once daily.  Start taking on: October 6, 2023     prazosin 1 MG Cap  Commonly known as: MINIPRESS  Take 1 capsule (1 mg total) by mouth every evening.            CHANGE how you take these medications      hydrOXYzine pamoate 50 MG Cap  Commonly known as: VISTARIL  TAKE 1 CAPSULE (50 MG) BY MOUTH NIGHTLY AS NEEDED FOR INSOMNIA  What changed:   how much to take  how to take this  when to take this  reasons to take this  additional instructions            CONTINUE taking these medications      BLOOD PRESSURE CUFF Misc  Generic drug: miscellaneous medical supply  1 Units by Misc.(Non-Drug; Combo Route) route once daily.     blood sugar diagnostic Strp  Commonly known as: TRUE METRIX GLUCOSE TEST STRIP  use 1 strip to check glucose 2 (two) times a day.     blood-glucose meter Misc  Use as instructed     DULoxetine 60 MG capsule  Commonly known as: CYMBALTA  Take 2 capsules (120 mg total) by mouth every morning.     lancets 30 gauge Misc  1 lancet by Misc.(Non-Drug; Combo Route) route twice daily.     metFORMIN 1000 MG tablet  Commonly known as: GLUCOPHAGE  Take 1 tablet (1,000 mg total) by mouth 2 (two) times daily with meals.     methocarbamoL 750 MG Tab  Commonly known as: ROBAXIN  Take 1 tablet (750 mg total) by mouth 3 (three) times daily as needed (muscle spasms).     olmesartan 20 MG tablet  Commonly known as: BENICAR  Take 1 tablet (20 mg total) by mouth once daily.     omeprazole 20 MG capsule  Commonly known as: PRILOSEC  Take 1 capsule (20 mg total) by mouth before breakfast.     oxyCODONE-acetaminophen  mg per tablet  Commonly known as:  PERCOCET  Take 1 tablet by mouth every 6 (six) hours as needed for Pain.     pravastatin 40 MG tablet  Commonly known as: PRAVACHOL  Take 1 tablet (40 mg total) by mouth once daily.     pregabalin 100 MG capsule  Commonly known as: LYRICA  Take 2 capsules (200 mg total) by mouth 2 (two) times daily.     sildenafiL 100 MG tablet  Commonly known as: VIAGRA  Take 1 tablet (100 mg total) by mouth daily as needed for Erectile Dysfunction.     TRULICITY 0.75 mg/0.5 mL pen injector  Generic drug: dulaglutide  Inject 0.75 mg (one pen) into the skin every 7 days.            STOP taking these medications      ALPRAZolam 0.5 MG tablet  Commonly known as: XANAX     chlorthalidone 25 MG Tab  Commonly known as: HYGROTEN     glimepiride 2 MG tablet  Commonly known as: AMARYL     meclizine 12.5 mg tablet  Commonly known as: ANTIVERT     potassium chloride 10 MEQ Tbsr  Commonly known as: KLOR-CON     tamsulosin 0.4 mg Cap  Commonly known as: FLOMAX               Where to Get Your Medications        These medications were sent to [facility]  [address], [place] [address]      Hours: Mon-Fri, 8a-5:30p Phone: [phone]   ARIPiprazole 5 MG Tab  DULoxetine 60 MG capsule  prazosin 1 MG Cap         Discharge Information:   Diet:  Diabetic diet    Physical Activity:  As tolerated by patient.              Instructions:  1. Take all medications as prescribed  2. Keep all follow-up appointments  3. Return to the hospital or call your primary care physicians if any worsening symptoms such as fever, chest pain, shortness of breath, return of symptoms, or any other concerns.    Follow-Up Appointments:  ENT: 10/6/23  Radiology: 10/16/23  Neurosurgery: 10/16/23  Urology: 10/18/23  PCP: 10/19/23  Podiatry: 1/2/24    Heriberto Padilla DO  [facility] Internal Medicine, [facility]

## 2023-10-05 NOTE — PROGRESS NOTES
"[facility] Medicine Progress Note    Primary Team: \[facility] [place] [facility]\"" Hospitalist Team A  Attending Physician: Shivani Monreal MD  Resident: Chris Hurtado MD  Intern: Heriberto Padilla DO    Date of Admit: 10/4/2023     Chief Complaint: Fall +LOC    Subjective/Interval Events:      Pt had pain yesterday evening in his neck. Stated it was 10/10, received home Percocet & Pregabalin and one-time dose of capsicum topical cream and Ketorolac, with good pain response. This AM pt endorses continued neck pain, but was able to get some sleep. All questions and concerns addressed.      Objective:   Last 24 Hour Vital Signs:  BP  Min: 96/66  Max: 138/78  Temp  Av °F (36.7 °C)  Min: 97.6 °F (36.4 °C)  Max: 98.5 °F (36.9 °C)  Pulse  Av.7  Min: 70  Max: 95  Resp  Av.4  Min: 11  Max: 20  SpO2  Av %  Min: 92 %  Max: 97 %  Height  Av' 1" (185.4 cm)  Min: 6' 1" (185.4 cm)  Max: 6' 1" (185.4 cm)  Weight  Av kg (224 lb 13.1 oz)  Min: 100.4 kg (221 lb 5.5 oz)  Max: 104.3 kg (230 lb)    Intake/Output Summary (Last 24 hours) at 10/5/2023 0702  Last data filed at 10/5/2023 0452  Gross per 24 hour   Intake 240 ml   Output 1200 ml   Net -960 ml       Physical Examination:  Constitutional:       Appearance: Normal appearance.   HENT:      Head: Normocephalic and atraumatic.   Neck:      Comments: Neck brace in place. Unable to assess ROM.   Cardiovascular:      Rate and Rhythm: Normal rate and regular rhythm.      Pulses: Normal pulses.      Heart sounds: Normal heart sounds.   Pulmonary:      Effort: Pulmonary effort is normal.      Breath sounds: Normal breath sounds.   Abdominal:      Palpations: Abdomen is soft.      Comments: Normal bowel sounds   Musculoskeletal:         General: Normal range of motion.   Skin:     General: Skin is warm and dry.      Capillary Refill: Capillary refill takes less than 2 seconds.      Comments: Bruising/abrasion to R forehead  Pt has some minor, blanchable sores on his abdomen and lower " extremities.   Neurological:      General: No focal deficit present.      Mental Status: He is alert and oriented to person, place, and time. Mental status is at baseline.   Psychiatric:         Behavior: Behavior normal.         Thought Content: Thought content normal.     Laboratory:  Recent Labs   Lab 10/04/23  0815 10/05/23  0455   WBC 6.66 5.83   HGB 12.5* 11.7*   HCT 35.8* 34.6*    152   MCV 83 84   RDW 13.8 13.6   * 133*   K 3.9 3.9   CL 96 96   CO2 23 29   BUN 11 13   CREATININE 1.2 1.2   GLU 42* 84   PROT 6.1 5.4*   ALBUMIN 3.6 3.2*   BILITOT 0.6 0.6   AST 45* 33   ALKPHOS 76 66   ALT 23 21       Microbiology:  None    Imaging:  Imaging Results              CT Cervical Spine Without Contrast (Final result)  Result time 10/04/23 09:46:25      Final result by Bob Tenorio MD (10/04/23 09:46:25)                   Impression:      Postsurgical and degenerative change as above, without evidence of an acute displaced fracture.      Electronically signed by: Bob Tenorio MD  Date:    10/04/2023  Time:    09:46               Narrative:    EXAMINATION:  CT CERVICAL SPINE WITHOUT CONTRAST    CLINICAL HISTORY:  Neck trauma, mechanically unstable spine (Age >= 16y);    TECHNIQUE:  Low dose axial CT images through the cervical spine, with sagittal and coronal reformations.  Contrast was not administered.    COMPARISON:  Radiograph 08/25/2023    FINDINGS:  Postsurgical changes prior osseous fusion of the C4-5 vertebral bodies more recent instrumented anterior cervical discectomy and fusion procedure with anterior plate screw construct and interbody disc spacers spanning C5 through C7.  The orthopedic hardware appears in stable position, intact, and without new surrounding lucency.    No evidence of a new displaced fracture or focal osseous destructive process elsewhere.    No significant spondylolisthesis.    Non operative intervertebral disc heights are well maintained.  Posterior disc osteophyte  complex at a few levels without evidence of overt bony spinal canal stenosis.  Mild scattered neural foraminal encroachment from uncovertebral and facet hypertrophy.    Limited evaluation of the intraspinal contents demonstrates no hematoma or mass.    Paraspinal soft tissues exhibit no acute abnormalities.    Scattered atherosclerotic calcification.                                       CT Head Without Contrast (Final result)  Result time 10/04/23 09:28:35      Final result by Marquise Wagner MD (10/04/23 09:28:35)                   Impression:      No acute intracranial findings.      Electronically signed by: Marquise Wagner  Date:    10/04/2023  Time:    09:28               Narrative:    EXAMINATION:  CT HEAD WITHOUT CONTRAST    CLINICAL HISTORY:  Head trauma, moderate-severe;    TECHNIQUE:  Low dose axial images were obtained through the head.  Coronal and sagittal reformations were also performed. Contrast was not administered.    COMPARISON:  CT head 08/26/2022.    FINDINGS:  Blood: No acute intracranial hemorrhage.    Parenchyma: No definite loss of gray-white differentiation to suggest acute or subacute transcortical infarct. Generalized pattern age-related parenchymal volume loss.  Nonspecific areas of white matter hypoattenuation may relate to sequela of chronic small vessel ischemic disease.  Small remote lacunar infarct again suggested in the left thalamus.    Ventricles/Extra-axial spaces: No abnormal extra-axial fluid collection. Basal cisterns are patent.    Vessels: Moderate to heavy atherosclerotic calcification.    Orbits: Status post bilateral lens replacements.    Scalp: Unremarkable.    Skull: There are no depressed skull fractures or destructive bone lesions.    Sinuses and mastoids: Scattered relatively modest paranasal sinus mucosal thickening.    Other findings: Chronic appearing nasal bone fracture.                                       X-Ray Chest 1 View (Final result)  Result time  "10/04/23 08:37:21      Final result by Velma Armstrong MD (10/04/23 08:37:21)                   Impression:      No acute abnormality.      Electronically signed by: Velma Armstrong MD  Date:    10/04/2023  Time:    08:37               Narrative:    EXAMINATION:  XR CHEST 1 VIEW    CLINICAL HISTORY:  Syncope;    TECHNIQUE:  Single frontal view of the chest was performed.    COMPARISON:  08/15/2023    FINDINGS:  The lungs are clear, with normal appearance of pulmonary vasculature and no pleural effusion or pneumothorax.    The cardiac silhouette is normal in size. The hilar and mediastinal contours are unremarkable.    Bones are intact.                                       Current Medications:     Scheduled:   capsicum 0.075%   Topical (Top) TID    chlorthalidone  25 mg Oral Daily    DULoxetine  60 mg Oral Daily    enoxparin  40 mg Subcutaneous Daily    hydrOXYzine pamoate  50 mg Oral QHS    losartan  25 mg Oral Daily    pantoprazole  40 mg Oral Daily    pravastatin  40 mg Oral Daily    pregabalin  200 mg Oral BID    tamsulosin  0.4 mg Oral QHS         Infusions:       PRN:  dextrose 10%, dextrose 10%, glucagon (human recombinant), glucose, glucose, insulin aspart U-100, melatonin, oxyCODONE-acetaminophen, sodium chloride 0.9%    Assessment & Plan:     Fabiano Malik Jr. is a 62 y.o. male with a PMHx of DM-II, HTN, and schizophrenia who is admitted to medicine wards d/t a syncopal episode in the setting of hypoglycemia.    Syncope and collapse  - Pt reports +LOC with fall on 10/4  - [place] Syncope Risk Score: 0 points, low risk for cardiac syncope  - Blood glucose: 52 per EMS, 40 upon arrival in ED, 80 this AM  - In ED: given orange juice w/sugar packets and placed on D10 infusion  - Positive orthostatic hypotension in ED: 122/63 lying, 96/63 sitting, 83/62 standing  - CT head and neck (10/4) showed no acute processes  - Pt reports baseline dizziness that has been present for "years"  - 1 L NS given " on 10/4  Plan  - Diabetic diet ordered  - POC glucose 4x daily  - PT/OT ordered to evaluate and treat pt  - Consider neurology outpt referral for chronic dizziness upon discharge     Type 2 Diabetes, well-controlled, with peripheral neuropathy  - A1c (10/4): 5.0%, when last measured 3 months ago: 5.7%  - Pt reports adherence to home meds: Metformin, Glimeperide, and Dulaglutide  - Reports numbness in all extremities, followed by podiatry outpatient  Plan  - SSI during admission  - Continue home pregabalin  - Will consider discontinuing Glimeperide upon discharge     Normocytic Anemia  - H/H in ED: 12.5/35.8, MCV: 83, this AM: 11.7/34.6  Plan  - iron profile, folate, B12 ordered to workup anemia     Schizophrenia/Depression  - Questionable schizophrenia diagnosis: pt reports no positive symptoms and has never been seen by a psychiatrist. Not prescribed any antipsychotics.   - Reports anhedonia and no motivation to prepare food for himself, resulting in limited food intake in recent months  - SIGECAPS: all positive, except for SI  Plan  - Inpatient consult to psychiatry placed  - Continue home Duloxetine     Hypomagnesemia  - Magnesium on admission: 1.3, given 2 g magnesium sulfate  - Magnesium this AM: 1.7  Plan  - 2 g magnesium sulfate ordered     Cervical Spondylosis s/p cervical fusion  - Pt ambulates with rollator walker at baseline since cervical spinal surgery on 8/11/23  - Followed outpt by neurosurgery   Plan  - PT/OT ordered to evaluate and treat pt     Hypertension  - Pt has remained normotensive during hospitalization  Plan  - Continue home meds: losartan & chlorthalidone     Hyperlipidemia  - Last lipid panel within normal limits besides low HDL: 37  Plan  - Continue home atorvastatin    Diet: diabetic diet  Code: Full code  Dispo: likely discharge home today    Heribetro Padilla, DO  [facility] Internal Medicine, HO-1    [facility] Medicine Hospitalist Pager numbers:   [facility] Hospitalist Medicine Team A  (Rah/Anum): [phone]  [facility] Hospitalist Medicine Team B (Shantelle/Cricket):  [phone]

## 2023-10-05 NOTE — PLAN OF CARE
Pt is AAOx4. Pt reported pain 10/10 throughout the shift. PRN Percocet administered. Pt worked with PT/OT and experienced a little drop in BP. Pt did not want to walk or sit in chair. Pt displayed somber mood.

## 2023-10-05 NOTE — PLAN OF CARE
[facility]      HOME HEALTH ORDERS  FACE TO FACE ENCOUNTER    Patient Name: Fabiano Malik Jr.  YOB: 1960    PCP: Lucien Fleming MD   PCP Address: [address] / [place] [address]  PCP Phone Number: [phone]  PCP Fax: [phone]    Encounter Date: 10/5/23    Admit to Home Health    Diagnoses:  Active Hospital Problems    Diagnosis  POA    *Hypoglycemia [E16.2]  Yes    Syncope and collapse [R55]  Yes    Depression [F32.A]  Yes    Dizziness [R42]  Yes    Hypertension associated with diabetes [E11.59, I15.2]  Yes      Resolved Hospital Problems   No resolved problems to display.       Follow Up Appointments:  Future Appointments   Date Time Provider Department Center   10/6/2023  1:00 PM Lonnie Mccord MD [facility] BENSON [facility]   10/16/2023  8:15 AM [facility] ODC XR-A LIMIT 350 LBS [facility] XRAY OP [facility]   10/16/2023  9:30 AM Guillaume Washington MD [facility] NEUROSU [facility]   10/19/2023 10:30 AM Lucien Fleming MD [facility]   1/2/2024 11:15 AM Shalini Burks DPM [facility] PODIAT [facility]   1/10/2024  1:30 PM Lucien Fleming MD [facility]       Allergies:  Review of patient's allergies indicates:   Allergen Reactions    Pcn [penicillins] Other (See Comments)     Childhood rxn, pt does not recall type of rxn       Medications: Review discharge medications with patient and family and provide education.    Current Facility-Administered Medications   Medication Dose Route Frequency Provider Last Rate Last Admin    ARIPiprazole tablet 5 mg  5 mg Oral Daily Alex Angeles MD   5 mg at 10/05/23 1444    capsicum 0.075% topical cream   Topical (Top) TID Kaiser Hurtado MD   Given at 10/05/23 1444    chlorthalidone tablet 25 mg  25 mg Oral Daily Kaiser Hurtado MD   25 mg at 10/05/23 0943    dextrose 10% bolus 125 mL 125 mL  12.5 g Intravenous PRN Kaiser Hurtado MD        dextrose 10% bolus 250 mL 250 mL  25 g  Intravenous PRN Kaiser Hurtado MD        DULoxetine DR capsule 60 mg  60 mg Oral Daily Heriberto Padilla MD   60 mg at 10/05/23 0940    enoxaparin injection 40 mg  40 mg Subcutaneous Daily Kaiser Hurtado MD   40 mg at 10/04/23 1624    glucagon (human recombinant) injection 1 mg  1 mg Intramuscular PRKaiser Lin MD        glucose chewable tablet 16 g  16 g Oral PRKaiser Lin MD        glucose chewable tablet 24 g  24 g Oral PRKaiser Lin MD        hydrOXYzine pamoate capsule 50 mg  50 mg Oral QHS Kaiser Hurtado MD   50 mg at 10/04/23 2014    insulin aspart U-100 pen 0-10 Units  0-10 Units Subcutaneous QID (AC + HS) PRKaiser Lin MD   2 Units at 10/04/23 1718    losartan tablet 25 mg  25 mg Oral Daily Kaiser Hurtado MD   25 mg at 10/05/23 0940    melatonin tablet 6 mg  6 mg Oral Nightly PRN Kaiser Hurtado MD   6 mg at 10/04/23 2014    oxyCODONE-acetaminophen  mg per tablet 1 tablet  1 tablet Oral Q6H PRKaiser Lin MD   1 tablet at 10/05/23 1444    pantoprazole EC tablet 40 mg  40 mg Oral Daily Kaiser Hurtado MD   40 mg at 10/05/23 0940    pravastatin tablet 40 mg  40 mg Oral Daily Kaiser Hurtado MD   40 mg at 10/05/23 0940    prazosin capsule 1 mg  1 mg Oral QHS Alex Angeles MD        pregabalin capsule 200 mg  200 mg Oral BID Kaiser Huratdo MD   200 mg at 10/05/23 0940    sodium chloride 0.9% flush 10 mL  10 mL Intravenous PRN Kaiser Hurtado MD        tamsulosin 24 hr capsule 0.4 mg  0.4 mg Oral QHS Kaiser Hurtado MD   0.4 mg at 10/04/23 2016     Current Discharge Medication List        START taking these medications    Details   ARIPiprazole (ABILIFY) 5 MG Tab Take 1 tablet (5 mg total) by mouth once daily.  Qty: 30 tablet, Refills: 11      prazosin (MINIPRESS) 1 MG Cap Take 1 capsule (1 mg total) by mouth every evening.  Qty: 30 capsule, Refills: 11           CONTINUE these medications which have  CHANGED    Details   DULoxetine (CYMBALTA) 60 MG capsule Take 2 capsules (120 mg total) by mouth every morning.  Qty: 60 capsule, Refills: 11           CONTINUE these medications which have NOT CHANGED    Details   dulaglutide (TRULICITY) 0.75 mg/0.5 mL pen injector Inject 0.75 mg (one pen) into the skin every 7 days.  Qty: 12 pen , Refills: 1    Associated Diagnoses: Type 2 diabetes mellitus with hyperglycemia      hydrOXYzine pamoate (VISTARIL) 50 MG Cap TAKE 1 CAPSULE (50 MG) BY MOUTH NIGHTLY AS NEEDED FOR INSOMNIA  Qty: 90 capsule, Refills: 3    Associated Diagnoses: Primary insomnia      metFORMIN (GLUCOPHAGE) 1000 MG tablet Take 1 tablet (1,000 mg total) by mouth 2 (two) times daily with meals.  Qty: 180 tablet, Refills: 1    Associated Diagnoses: Type 2 diabetes mellitus without complication, without long-term current use of insulin      methocarbamoL (ROBAXIN) 750 MG Tab Take 1 tablet (750 mg total) by mouth 3 (three) times daily as needed (muscle spasms).  Qty: 60 tablet, Refills: 0      olmesartan (BENICAR) 20 MG tablet Take 1 tablet (20 mg total) by mouth once daily.  Qty: 90 tablet, Refills: 0    Comments: .  Associated Diagnoses: Hypertension associated with diabetes      omeprazole (PRILOSEC) 20 MG capsule Take 1 capsule (20 mg total) by mouth before breakfast.  Qty: 90 capsule, Refills: 3    Associated Diagnoses: Gastroesophageal reflux disease, unspecified whether esophagitis present      oxyCODONE-acetaminophen (PERCOCET)  mg per tablet Take 1 tablet by mouth every 6 (six) hours as needed for Pain.  Qty: 60 tablet, Refills: 0    Comments: Quantity prescribed more than 7 day supply? Yes, quantity medically necessary      pravastatin (PRAVACHOL) 40 MG tablet Take 1 tablet (40 mg total) by mouth once daily.  Qty: 90 tablet, Refills: 3      pregabalin (LYRICA) 100 MG capsule Take 2 capsules (200 mg total) by mouth 2 (two) times daily.  Qty: 360 capsule, Refills: 1    Associated Diagnoses: Other  chronic pain; Cervical radiculopathy; Type 2 diabetes mellitus with diabetic polyneuropathy, without long-term current use of insulin      BLOOD PRESSURE CUFF Misc 1 Units by Misc.(Non-Drug; Combo Route) route once daily.  Qty: 1 each, Refills: 0    Associated Diagnoses: Hypertension associated with diabetes      blood sugar diagnostic (TRUE METRIX GLUCOSE TEST STRIP) Strp use 1 strip to check glucose 2 (two) times a day.  Qty: 200 strip, Refills: 6    Associated Diagnoses: Type 2 diabetes mellitus without complication, without long-term current use of insulin      blood-glucose meter Misc Use as instructed  Qty: 1 each, Refills: 0    Associated Diagnoses: Type 2 diabetes mellitus without complication, without long-term current use of insulin      lancets 30 gauge Misc 1 lancet by Misc.(Non-Drug; Combo Route) route twice daily.  Qty: 200 each, Refills: 6    Comments: True metrix  Associated Diagnoses: Type 2 diabetes mellitus without complication, without long-term current use of insulin      sildenafiL (VIAGRA) 100 MG tablet Take 1 tablet (100 mg total) by mouth daily as needed for Erectile Dysfunction.  Qty: 30 tablet, Refills: 3    Associated Diagnoses: Erectile dysfunction, unspecified erectile dysfunction type           STOP taking these medications       ALPRAZolam (XANAX) 0.5 MG tablet Comments:   Reason for Stopping:         chlorthalidone (HYGROTEN) 25 MG Tab Comments:   Reason for Stopping:         glimepiride (AMARYL) 2 MG tablet Comments:   Reason for Stopping:         meclizine (ANTIVERT) 12.5 mg tablet Comments:   Reason for Stopping:         potassium chloride (KLOR-CON) 10 MEQ TbSR Comments:   Reason for Stopping:         tamsulosin (FLOMAX) 0.4 mg Cap Comments:   Reason for Stopping:                 I have seen and examined this patient within the last 30 days. My clinical findings that support the need for the home health skilled services and home bound status are the  following:no   Weakness/numbness causing balance and gait disturbance due to Weakness/Debility making it taxing to leave home.  Requiring assistive device to leave home due to unsteady gait caused by  Weakness/Debility.     Diet:   regular diet    Labs:  None     Referrals/ Consults  Physical Therapy to evaluate and treat. Evaluate for home safety and equipment needs; Establish/upgrade home exercise program. Perform / instruct on therapeutic exercises, gait training, transfer training, and Range of Motion.  Occupational Therapy to evaluate and treat. Evaluate home environment for safety and equipment needs. Perform/Instruct on transfers, ADL training, ROM, and therapeutic exercises.    Activities:   activity as tolerated    Nursing:   Agency to admit patient within 24 hours of hospital discharge unless specified on physician order or at patient request    SN to complete comprehensive assessment including routine vital signs. Instruct on disease process and s/s of complications to report to MD. Review/verify medication list sent home with the patient at time of discharge  and instruct patient/caregiver as needed. Frequency may be adjusted depending on start of care date.     Skilled nurse to perform up to 3 visits PRN for symptoms related to diagnosis    Notify MD if SBP > 160 or < 90; DBP > 90 or < 50; HR > 120 or < 50; Temp > 101; O2 < 88%; Other:       Ok to schedule additional visits based on staff availability and patient request on consecutive days within the home health episode.    When multiple disciplines ordered:    Start of Care occurs on Sunday - Wednesday schedule remaining discipline evaluations as ordered on separate consecutive days following the start of care.    Thursday SOC -schedule subsequent evaluations Friday and Monday the following week.     Friday - Saturday SOC - schedule subsequent discipline evaluations on consecutive days starting Monday of the following week.    For all post-discharge  communication and subsequent orders please contact patient's primary care physician. If unable to reach primary care physician or do not receive response within 30 minutes and you feel you are having a medical emergency please call 911 for transport to the hospital    Miscellaneous   N/A     Home Health Aide:  Physical Therapy Three times weekly and Occupational Therapy Three times weekly    Wound Care Orders  no    I certify that this patient is confined to his home and needs physical therapy and occupational therapy.

## 2023-10-05 NOTE — MEDICAL/APP STUDENT
"[facility] Medicine Progress Note    Primary Team: \[facility] [place] [facility]\"" Hospitalist Team A  Attending Physician: Shivani Monreal MD  Resident: Kaiser Hurtado   Intern: Heriberto Padilla    Date of Admit: 10/4/2023     Chief Complaint: Fall with loss of consciousness     Subjective/Interval Events:      The patient had 10/10 neck pain last night. He said the ketorolac and cream helped a little. The patient still feels slightly lightheaded. He denies SOB, chest pain, nausea/vomiting. He endorses some tingling in his legs. He is not able to stand on his own.      Objective:   Last 24 Hour Vital Signs:  BP  Min: 96/66  Max: 138/78  Temp  Av °F (36.7 °C)  Min: 97.6 °F (36.4 °C)  Max: 98.5 °F (36.9 °C)  Pulse  Av.7  Min: 70  Max: 95  Resp  Av.4  Min: 11  Max: 20  SpO2  Av %  Min: 92 %  Max: 97 %  Height  Av' 1" (185.4 cm)  Min: 6' 1" (185.4 cm)  Max: 6' 1" (185.4 cm)  Weight  Av kg (224 lb 13.1 oz)  Min: 100.4 kg (221 lb 5.5 oz)  Max: 104.3 kg (230 lb)    Intake/Output Summary (Last 24 hours) at 10/5/2023 0703  Last data filed at 10/5/2023 0452  Gross per 24 hour   Intake 240 ml   Output 1200 ml   Net -960 ml       Physical Examination:  General: No apparent distress  Head: Skin wound on patient's left forehead from fall on door knob  Neck: Patient has a neck brace on from his cervical spine surgery  Cardiac: normal rate and rhythm, no murmurs  Pulmonary: Normal breath sounds. Equal bilaterally on ascultation.   Abdominal: Small skin lesions noted. Bowel sounds heard on ascultation   Neurologic: No focal neurological deficits appreciated. Oriented to person, place, and situation.      Laboratory:  Recent Labs   Lab 10/04/23  0815 10/05/23  0455   WBC 6.66 5.83   HGB 12.5* 11.7*   HCT 35.8* 34.6*    152   MCV 83 84   RDW 13.8 13.6   * 133*   K 3.9 3.9   CL 96 96   CO2 23 29   BUN 11 13   CREATININE 1.2 1.2   GLU 42* 84   PROT 6.1 5.4*   ALBUMIN 3.6 3.2*   BILITOT 0.6 0.6   AST 45* 33   ALKPHOS 76 " 66   ALT 23 21       Microbiology:  None    Imaging:  Cervical Spine X-Ray 10/5  Redemonstration of C5-C7 ACDF with disc spacer.  Alignment unchanged.  Hardware projects in similar alignment without evidence for interval loosening or failure.  Upper cervical discogenic change and osseous fusion across C4-C5 again noted.  Normal precervical soft tissue thickness.  No new abnormality.        Current Medications:     Scheduled:   capsicum 0.075%   Topical (Top) TID    chlorthalidone  25 mg Oral Daily    DULoxetine  60 mg Oral Daily    enoxparin  40 mg Subcutaneous Daily    hydrOXYzine pamoate  50 mg Oral QHS    losartan  25 mg Oral Daily    pantoprazole  40 mg Oral Daily    pravastatin  40 mg Oral Daily    pregabalin  200 mg Oral BID    tamsulosin  0.4 mg Oral QHS         Infusions:       PRN:  dextrose 10%, dextrose 10%, glucagon (human recombinant), glucose, glucose, insulin aspart U-100, melatonin, oxyCODONE-acetaminophen, sodium chloride 0.9%    Assessment:     Fabiano Malik Jr. is a 62 y.o. male with a PMHx of type 2 diabetes, HTN, and possible schizophrenia/depression who presented after a fall with loss of consciousness.    Plan:     Possible Syncope:   - The patient had orthostatic hypotension on exam  - Syncope possibly due to hypoglycemia caused by decreased food intake  - [place] syncope score was 0 (low risk); thus, cardiac workup not warrented  - Patient given IV saline, and glucose is now within normal range  - Consult PT/OT   - Refer to neurology outpatient     Hypoglycemia:   - Hypoglycemia has resolved s/p glucose and juice and 10% dextrose drip in ED  - Glucose in normal range this morning  - Stop patient's glimepiride outpatient      Type 2 diabetes:  - Diabetes is controlled (A1C = 5) with home regimen (metformin 1000 mg BID, dulaglutide 0.75 mg weekly, glimepiride 2 mg daily)  - Currently on sliding scale insulin inpatient   - Continue home regimen outpatient but discontinue glimepiride       Symptoms of depression:   - Psychiatry follow up outpatient         PPx: enoxaparin 40 mg  Diet: Diabetic  Code: Full       Diet: Diabetic  Code: Full  Dispo: Discharge home today       Dionne Isabel  Medical Student, 3rd year

## 2023-10-06 ENCOUNTER — TELEPHONE (OUTPATIENT)
Dept: NEUROSURGERY | Facility: CLINIC | Age: 63
End: 2023-10-06
Payer: MEDICARE

## 2023-10-06 ENCOUNTER — PROCEDURE VISIT (OUTPATIENT)
Dept: OTOLARYNGOLOGY | Facility: CLINIC | Age: 63
End: 2023-10-06
Payer: MEDICARE

## 2023-10-06 VITALS
TEMPERATURE: 99 F | HEART RATE: 101 BPM | RESPIRATION RATE: 20 BRPM | SYSTOLIC BLOOD PRESSURE: 87 MMHG | DIASTOLIC BLOOD PRESSURE: 62 MMHG

## 2023-10-06 DIAGNOSIS — R49.0 DYSPHONIA: ICD-10-CM

## 2023-10-06 DIAGNOSIS — J38.01 UNILATERAL COMPLETE VOCAL FOLD PARALYSIS: Primary | ICD-10-CM

## 2023-10-06 DIAGNOSIS — R13.10 DYSPHAGIA, UNSPECIFIED TYPE: Primary | ICD-10-CM

## 2023-10-06 PROCEDURE — 99499 NO LOS: ICD-10-PCS | Mod: S$GLB,,, | Performed by: OTOLARYNGOLOGY

## 2023-10-06 PROCEDURE — 99499 UNLISTED E&M SERVICE: CPT | Mod: S$GLB,,, | Performed by: OTOLARYNGOLOGY

## 2023-10-06 NOTE — TELEPHONE ENCOUNTER
"----- Message from ALLIE Knapp, CCC-SLP sent at 9/30/2023  7:28 AM CDT -----  Regarding: outpatient MBS  Hi Massiel,    I went to [facility] on Thur and was out on Friday.  I pulled up his chart since I know he was seeing my friend during HH therapy and I am not sure if she d/c'd him or what but the last we talked - He was advanced to thin liquids. It sounds as tho he is displeased with the quality of his voice.     I recall her telling me her was seeing Dr. Mccord with the voice center.  Here is the last clinic note with recs.        "All questions were answered and the patient would like to proceed with an office-based right vocal fold injection augmentation procedure. We will plan to use hyaluronic acid. Informed consent was obtained. We will arrange this in the coming weeks.     I recommended and updated MBSS about 1-2 weeks after the procedure. Ongoing SLP swallowing therapy is medically necessary for retsoration of swallowing function     All questions were answered, and the patient is in agreement with the above."     Lonnie Mccord M.D.  [facility]  Department of Otorhinolaryngology and Communication Sciences        ~~~~~~~At this point in the game, he will get vocal cord injections and now needs to see outpatient SLP services. He can be referred to a speech path at the  Voice center at [facility] or [facility] or [facility] for outpatient dysphonia remediation. If he will continue to see Dr. Mccord, I would say for continuity of care - [facility] is the best bet.   Please enter ambulatory referral for dysphonia eval and treat for him in [facility].   I really hope he makes the follow up to see Flex to maximize his potential for recovery.     Thank you,    Ann Gao              "

## 2023-10-06 NOTE — TELEPHONE ENCOUNTER
Jazmin,    Thanks for your help.  He was more concerned with his swallowing than his voice.    Who does he see in SLP for outpatient fu for his swallowing and how do we schedule that?    I will order the MBSS for 2 weeks from today.    Marek please scheduled the MBSS.    Jazmin I cannot find that referral for the voice center in [facility].    Would it be under a different name?    Thanks,  Massiel Lund, [facility], PA-C  Neurosurgery  [facility]  10/06/2023

## 2023-10-06 NOTE — PT/OT/SLP EVAL
Occupational Therapy   Evaluation and Treatment    Name: Fabiano Malik Jr.  MRN: [id number]  Admitting Diagnosis: Hypoglycemia  Recent Surgery: * No surgery found *      Recommendations:     Discharge Recommendations: other (see comments) (low intensity therapy; home health OT / PT)  Discharge Equipment Recommendations:  other (see comments) (tub transfer bench)  Barriers to discharge:   (pain; limited performance)    Assessment:     Fabiano Malik Jr. is a 62 y.o. male with a medical diagnosis of Hypoglycemia.  He presents with ..The primary encounter diagnosis was Hypoglycemia. Diagnoses of Syncope, Anemia, unspecified type, and Hyperkalemia were also pertinent to this visit.  . Performance deficits affecting function: weakness, impaired endurance, impaired balance, gait instability, decreased upper extremity function, decreased lower extremity function, impaired sensation, visual deficits, impaired coordination, impaired fine motor, impaired self care skills, pain, impaired functional mobility, other (comment) (decreased occupational interests ([facility] consult was performed)).      Occupational therapy evaluation completed, coeval with physical therapy in consideration of patient's anticipated limited tolerance due to severe pain. Patient with prehospitalization functioning of recovering from recent cervical surgery in 8/2023, having advanced through / graduated from home health therapy at modified independent level in the home for ADLs/ IADLs with rollator but with continued pain including return of carpal tunnel discomfort in R, in addition to decreased interests in activities including poor PO intake. Patient with recent falls and this date on evaluation limited to side steps only eob with rolliing walker but able to complete at SBA level and perform partial ADL from sitting eob with SBA. Skilled acute OT to follow while hospitalized; recommend low intensity therapy - home health OT / PT as well as DME of  "tub transfer bench when medically stable.     Rehab Prognosis: Good; patient would benefit from acute skilled OT services to address these deficits and reach maximum level of function.       Plan:     Patient to be seen 3 x/week to address the above listed problems via self-care/home management, therapeutic activities, therapeutic exercises  Plan of Care Expires: 10/26/23  Plan of Care Reviewed with: patient    Subjective     Chief Complaint: having falls occurring due to dizziness (was positive for orthostatics when in ED); pain  Patient/Family Comments/goals: wants to go home    Occupational Profile:  Living Environment: Patient resides alone on 1st floor apartment with 5 steps to enter with a left hand rail. Tub shower combo.  Previous level of function: had progressed to modified independence with rollator for mobility, ADLs, and light IADLs. Daughter brings groceries. Patient cares for his pet cat. Uses public transportation services. Indicating that he had been stepping over tub wall to  shower (does not have access to shower / transfer bench)  Equipment Used at Home: cane, straight, rollator  Assistance upon Discharge: limited; lives alone    Pain/Comfort:  Pain Rating 1: 10/10 (low back straight across; neck radiating; "neck is worse than back")  Pain Addressed 1: Reposition, Pre-medicate for activity, Cessation of Activity  Pain Rating Post-Intervention 1: other (see comments) (continued at same pain level)      Objective:     Communicated with: nursing prior to session.  Patient found HOB elevated with bed alarm, peripheral IV, telemetry upon OT entry to room.    General Precautions: Standard, fall  Orthopedic Precautions: spinal precautions  Braces:  (Aspen cervical collar and R hand brace for carpal tunnel)  Respiratory Status: Room air    Occupational Performance:    Bed Mobility:    Patient completed Supine to Sit with stand by assistance  Patient completed Sit to Supine with stand by " assistance    Functional Mobility/Transfers:  Patient completed Sit <> Stand Transfer with stand by assistance  with  rolling walker   Functional Mobility: Sits eob x8 min with SBA while performing dynamic bedside ADL; performs a few side steps toward head of bed with rolling walker with SBA; 2/2 pain and low endurance requests firmly to not mobilize further    Activities of Daily Living:  Feeding:  independence    Grooming: setup ; items at bedside; manages all items (no demonstration of dropping items)  Lower Body Dressing: stand by assistance ; self initiates cross leg approach  Toileting: not performed during visit; has been using urinal with modified independence    Cognitive/Visual Perceptual:  Cognitive/Psychosocial Skills:     -       Oriented to: Person, Place, Time, and Situation   -       Follows Commands/attention:Follows two-step commands  -       Memory: functional cognition intact  -       Mood/Affect/Coping skills/emotional control: Guarded; positive for decreased interests; indicates poor sleep    Physical Exam:  Skin integrity: ASPEN collar in place  Sensation:    -       Impaired  ; decreased RUE/RLE vs LUE/LLE; indicates median nerve distribution / carpal tunnel discomfort R hand (reports self initiated exercises for  carpal tunnel / returned to wearing brace but takes off for skin checks)  Dominant hand:    -       right  Upper Extremity Range of Motion:     -       Right Upper Extremity: WFL but into scaption during shoulder flexion; cervical not assessed - in collar  -       Left Upper Extremity: WFL; cervical not assessed - in collar  Upper Extremity Strength:    -       Right Upper Extremity: WFL  -       Left Upper Extremity: WFL   Strength:    -       Right Upper Extremity: functional; subjective reports of weakness/ dropping items when grasping  -       Left Upper Extremity: WFL  Fine Motor Coordination:    -       Intact  Left hand, manipulation of objects and Right hand,  manipulation of objects    [facility] 6 Click ADL:  AMPA Total Score: 21    Treatment & Education:  Patient educated on role of OT/ POC development. Co-evaluation with physical therapy in consideration of patient's pain level. Occupational profile developed via interview / clarification questions from prior admission. Patient educated with reminder for log roll technique - reports continuing to perform at home prior to this hospitalization. BP assessed during session - not orthostatic. Guided for ADL training as above; patient with self-recall for cross leg dressing technique. Educated on recommendation of / handout on tub transfer bench to mitigate risk for falling when stepping over tub as well as in response to patient's subjective report of dizziness onset when washing hair while standing with eyes closed. Limited functional mobility performed as above. Returned to bed.      Patient left HOB elevated with all lines intact, call button in reach, and bed alarm on nursing notified    GOALS:   Multidisciplinary Problems       Occupational Therapy Goals          Problem: Occupational Therapy    Goal Priority Disciplines Outcome Interventions   Occupational Therapy Goal     OT, PT/OT Ongoing, Progressing    Description: Goals to be met by: 10/26/2023     Patient will increase functional independence with ADLs by performing:    Toileting regimen including mobility to / from bathroom with modified independence with least restrictive device.  Clothing regimen including object retrieval /transport and donning onself with modified independence with least restrictive device.  100% reciprocation of 2 nonpharmacological strategies to mitigate pain to support engagement in occupations of choice.  100% teachback of BUE exercise program inclusive of tendon glides 2/2 carpal tunnel discomfort to preserve functional ROM                         History:     Past Medical History:   Diagnosis Date    Chronic back pain greater than 3  months duration     Depression     Diabetes mellitus     Diabetes mellitus, type 2     Hypertension     Schizophrenia          Past Surgical History:   Procedure Laterality Date    APPENDECTOMY      CARPAL TUNNEL RELEASE Right 12/13/2022    Procedure: RELEASE, CARPAL TUNNEL;  Surgeon: Everton Walter Jr., MD;  Location: [facility] OR;  Service: Orthopedics;  Laterality: Right;    COLONOSCOPY N/A 5/31/2018    Procedure: COLONOSCOPY;  Surgeon: Guillaume Hatch MD;  Location: [facility] ENDO;  Service: Endoscopy;  Laterality: N/A;    COLONOSCOPY N/A 11/17/2022    Procedure: COLONOSCOPY;  Surgeon: Guillaume Hatch MD;  Location: [facility] ENDO;  Service: Endoscopy;  Laterality: N/A;    DECOMPRESSION, NERVE, ULNAR Right 12/13/2022    Procedure: DECOMPRESSION, NERVE, ULNAR;  Surgeon: Everton Walter Jr., MD;  Location: [facility] OR;  Service: Orthopedics;  Laterality: Right;    FUSION, SPINE, CERVICAL N/A 8/11/2023    Procedure: FUSION, SPINE, CERVICAL;  Surgeon: Guillaume Washington MD;  Location: [facility] OR;  Service: Neurosurgery;  Laterality: N/A;  C3-4, C5-6, C6-7 ACDF C3-C7 anterior instrumentation Depuy  -ANTERIOR DECOMPREESSION 2 LEVELS   -ANTERIOR INSTRUMENTATION INTERBODY CAGE INTERSPACE 3   -LOP 3.5 HR   -LOS 3 DAYS   -GENERAL   -TYPE AND SCREEN   - C ARM   -EMG, SEP, MEP hari notified cc  -ASPEN   -REG    NECK SURGERY      RADIOFREQUENCY ABLATION OF LUMBAR MEDIAL BRANCH NERVE AT SINGLE LEVEL Left 5/29/2018    Procedure: RADIOFREQUENCY THERMOCOAGULATION (RFTC)-NERVE-MEDIAN BRANCH-LUMBAR- Left L2-3-4-5;  Surgeon: Donavon Dennis MD;  Location: [facility] PAIN MGT;  Service: Pain Management;  Laterality: Left;    RADIOFREQUENCY ABLATION OF LUMBAR MEDIAL BRANCH NERVE AT SINGLE LEVEL Right 1/8/2019    Procedure: Radiofrequency Ablation, Nerve, Spinal, Lumbar, Medial Branch, L2,3,4,5;  Surgeon: Alberto Hernandez Jr., MD;  Location: [facility] PAIN MGT;  Service: Pain Management;  Laterality: Right;  Pt is diabetic    RADIOFREQUENCY ABLATION OF  LUMBAR MEDIAL BRANCH NERVE AT SINGLE LEVEL Left 3/12/2019    Procedure: Radiofrequency Ablation, Nerve, Spinal, Lumbar, Medial Branch, Left, L2,3,4,5;  Surgeon: Alberto Hernandez Jr., MD;  Location: [facility] PAIN MGT;  Service: Pain Management;  Laterality: Left;  Pt will be consented the day of procedure.    TRANSFORAMINAL EPIDURAL INJECTION OF STEROID Right 9/12/2019    Procedure: Injection,steroid,epidural,transforaminal,right,L4-5 and L5-S1;  Surgeon: Alberto Hernandez Jr., MD;  Location: [facility] PAIN MGT;  Service: Pain Management;  Laterality: Right;  PT IS DIABETIC       Time Tracking:     OT Date of Treatment: 10/05/23  OT Start Time: 1010  OT Stop Time: 1041  OT Total Time (min): 31 min coeval overlap with PT    Billable Minutes:Evaluation 10 min  Self Care/Home Management 15 min    10/5/2023

## 2023-10-06 NOTE — PLAN OF CARE
Occupational therapy evaluation completed, coeval with physical therapy in consideration of patient's anticipated limited tolerance due to severe pain. Patient with prehospitalization functioning of recovering from recent cervical surgery in 8/2023, having advanced through / graduated from home health therapy at modified independent level in the home for ADLs/ IADLs with rollator but with continued pain including return of carpal tunnel discomfort in R, in addition to decreased interests in activities including poor PO intake. Patient with recent falls and this date on evaluation limited to side steps only eob with rolliing walker but able to complete at SBA level and perform partial ADL from sitting eob with SBA. Skilled acute OT to follow while hospitalized; recommend low intensity therapy - home health OT / PT as well as DME of tub transfer bench when medically stable.     Problem: Occupational Therapy  Goal: Occupational Therapy Goal  Description: Goals to be met by: 10/26/2023     Patient will increase functional independence with ADLs by performing:    Toileting regimen including mobility to / from bathroom with modified independence with least restrictive device.  Clothing regimen including object retrieval /transport and donning onself with modified independence with least restrictive device.  100% reciprocation of 2 nonpharmacological strategies to mitigate pain to support engagement in occupations of choice.  100% teachback of BUE exercise program inclusive of tendon glides 2/2 carpal tunnel discomfort to preserve functional ROM    Outcome: Ongoing, Progressing

## 2023-10-07 LAB
AMPHETAMINES SERPL QL: NEGATIVE
BARBITURATES SERPL QL SCN: NEGATIVE
BENZODIAZ SERPL QL SCN: NEGATIVE
BZE SERPL QL: NEGATIVE
CARBOXYTHC SERPL QL SCN: NEGATIVE
ETHANOL SERPL QL SCN: NEGATIVE
METHADONE SERPL QL SCN: NEGATIVE
OPIATES SERPL QL SCN: NEGATIVE
PCP SERPL QL SCN: NEGATIVE
PROPOXYPH SERPL QL: NEGATIVE

## 2023-10-09 ENCOUNTER — TELEPHONE (OUTPATIENT)
Dept: NEUROSURGERY | Facility: CLINIC | Age: 63
End: 2023-10-09
Payer: MEDICARE

## 2023-10-09 NOTE — PROCEDURES
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  He has been having several falls recently  Today he demonstrates low BPs.  At the same time, his voice has improved significantly.  I recommended expeditious follow up with his PCP.  I recommended no procedural intervention at this time.  He will follow up with me in 2-4 weeks for reassessment of his larynx.

## 2023-10-10 RX ORDER — METHOCARBAMOL 750 MG/1
750 TABLET, FILM COATED ORAL 3 TIMES DAILY PRN
Qty: 60 TABLET | Refills: 0 | Status: SHIPPED | OUTPATIENT
Start: 2023-10-10 | End: 2023-11-06 | Stop reason: SDUPTHER

## 2023-10-11 RX ORDER — OXYCODONE AND ACETAMINOPHEN 10; 325 MG/1; MG/1
1 TABLET ORAL EVERY 8 HOURS PRN
Qty: 60 TABLET | Refills: 0 | Status: SHIPPED | OUTPATIENT
Start: 2023-10-11 | End: 2023-11-06 | Stop reason: SDUPTHER

## 2023-10-12 ENCOUNTER — PATIENT MESSAGE (OUTPATIENT)
Dept: RESPIRATORY THERAPY | Facility: HOSPITAL | Age: 63
End: 2023-10-12
Payer: MEDICARE

## 2023-10-12 NOTE — TELEPHONE ENCOUNTER
Dr. Mccord,    Do you want him seen in the voice center?  I put and order in for that but I see you said in your note that his voice had improved.    Thanks,  Massiel Lund, [facility], PA-C  Neurosurgery  [facility]  10/12/2023

## 2023-10-16 ENCOUNTER — HOSPITAL ENCOUNTER (OUTPATIENT)
Dept: RADIOLOGY | Facility: HOSPITAL | Age: 63
Discharge: HOME | End: 2023-10-16
Attending: NEUROLOGICAL SURGERY
Payer: MEDICARE

## 2023-10-16 ENCOUNTER — OFFICE VISIT (OUTPATIENT)
Dept: NEUROSURGERY | Facility: CLINIC | Age: 63
End: 2023-10-16
Payer: MEDICARE

## 2023-10-16 VITALS
BODY MASS INDEX: 29.57 KG/M2 | HEART RATE: 101 BPM | WEIGHT: 223.13 LBS | DIASTOLIC BLOOD PRESSURE: 62 MMHG | HEIGHT: 73 IN | SYSTOLIC BLOOD PRESSURE: 87 MMHG

## 2023-10-16 DIAGNOSIS — G95.9 CERVICAL MYELOPATHY: ICD-10-CM

## 2023-10-16 DIAGNOSIS — M50.30 DDD (DEGENERATIVE DISC DISEASE), CERVICAL: ICD-10-CM

## 2023-10-16 DIAGNOSIS — M48.02 CERVICAL SPINAL STENOSIS: ICD-10-CM

## 2023-10-16 DIAGNOSIS — Z98.1 S/P CERVICAL SPINAL FUSION: ICD-10-CM

## 2023-10-16 DIAGNOSIS — M51.36 DDD (DEGENERATIVE DISC DISEASE), LUMBAR: ICD-10-CM

## 2023-10-16 DIAGNOSIS — G95.9 CHRONIC MYELOPATHY: ICD-10-CM

## 2023-10-16 DIAGNOSIS — M54.9 DORSALGIA, UNSPECIFIED: Primary | ICD-10-CM

## 2023-10-16 DIAGNOSIS — M54.16 LUMBAR RADICULOPATHY: ICD-10-CM

## 2023-10-16 DIAGNOSIS — M54.12 CERVICAL RADICULOPATHY: ICD-10-CM

## 2023-10-16 PROCEDURE — 3078F PR MOST RECENT DIASTOLIC BLOOD PRESSURE < 80 MM HG: ICD-10-PCS | Mod: CPTII,S$GLB,, | Performed by: NEUROLOGICAL SURGERY

## 2023-10-16 PROCEDURE — 4010F PR ACE/ARB THEARPY RXD/TAKEN: ICD-10-PCS | Mod: CPTII,S$GLB,, | Performed by: NEUROLOGICAL SURGERY

## 2023-10-16 PROCEDURE — 1159F MED LIST DOCD IN RCRD: CPT | Mod: CPTII,S$GLB,, | Performed by: NEUROLOGICAL SURGERY

## 2023-10-16 PROCEDURE — 72040 X-RAY EXAM NECK SPINE 2-3 VW: CPT | Mod: TC,FY

## 2023-10-16 PROCEDURE — 3008F BODY MASS INDEX DOCD: CPT | Mod: CPTII,S$GLB,, | Performed by: NEUROLOGICAL SURGERY

## 2023-10-16 PROCEDURE — 3044F PR MOST RECENT HEMOGLOBIN A1C LEVEL <7.0%: ICD-10-PCS | Mod: CPTII,S$GLB,, | Performed by: NEUROLOGICAL SURGERY

## 2023-10-16 PROCEDURE — 99999 PR PBB SHADOW E&M-EST. PATIENT-LVL V: ICD-10-PCS | Mod: PBBFAC,,, | Performed by: NEUROLOGICAL SURGERY

## 2023-10-16 PROCEDURE — 99213 PR OFFICE/OUTPT VISIT, EST, LEVL III, 20-29 MIN: ICD-10-PCS | Mod: 24,S$GLB,, | Performed by: NEUROLOGICAL SURGERY

## 2023-10-16 PROCEDURE — 3066F NEPHROPATHY DOC TX: CPT | Mod: CPTII,S$GLB,, | Performed by: NEUROLOGICAL SURGERY

## 2023-10-16 PROCEDURE — 3066F PR DOCUMENTATION OF TREATMENT FOR NEPHROPATHY: ICD-10-PCS | Mod: CPTII,S$GLB,, | Performed by: NEUROLOGICAL SURGERY

## 2023-10-16 PROCEDURE — 3061F PR NEG MICROALBUMINURIA RESULT DOCUMENTED/REVIEW: ICD-10-PCS | Mod: CPTII,S$GLB,, | Performed by: NEUROLOGICAL SURGERY

## 2023-10-16 PROCEDURE — 4010F ACE/ARB THERAPY RXD/TAKEN: CPT | Mod: CPTII,S$GLB,, | Performed by: NEUROLOGICAL SURGERY

## 2023-10-16 PROCEDURE — 99213 OFFICE O/P EST LOW 20 MIN: CPT | Mod: 24,S$GLB,, | Performed by: NEUROLOGICAL SURGERY

## 2023-10-16 PROCEDURE — 72040 XR CERVICAL SPINE AP LATERAL: ICD-10-PCS | Mod: 26,,, | Performed by: RADIOLOGY

## 2023-10-16 PROCEDURE — 3074F SYST BP LT 130 MM HG: CPT | Mod: CPTII,S$GLB,, | Performed by: NEUROLOGICAL SURGERY

## 2023-10-16 PROCEDURE — 1159F PR MEDICATION LIST DOCUMENTED IN MEDICAL RECORD: ICD-10-PCS | Mod: CPTII,S$GLB,, | Performed by: NEUROLOGICAL SURGERY

## 2023-10-16 PROCEDURE — 72040 X-RAY EXAM NECK SPINE 2-3 VW: CPT | Mod: 26,,, | Performed by: RADIOLOGY

## 2023-10-16 PROCEDURE — 3061F NEG MICROALBUMINURIA REV: CPT | Mod: CPTII,S$GLB,, | Performed by: NEUROLOGICAL SURGERY

## 2023-10-16 PROCEDURE — 99999 PR PBB SHADOW E&M-EST. PATIENT-LVL V: CPT | Mod: PBBFAC,,, | Performed by: NEUROLOGICAL SURGERY

## 2023-10-16 PROCEDURE — 3044F HG A1C LEVEL LT 7.0%: CPT | Mod: CPTII,S$GLB,, | Performed by: NEUROLOGICAL SURGERY

## 2023-10-16 PROCEDURE — 3008F PR BODY MASS INDEX (BMI) DOCUMENTED: ICD-10-PCS | Mod: CPTII,S$GLB,, | Performed by: NEUROLOGICAL SURGERY

## 2023-10-16 PROCEDURE — 3074F PR MOST RECENT SYSTOLIC BLOOD PRESSURE < 130 MM HG: ICD-10-PCS | Mod: CPTII,S$GLB,, | Performed by: NEUROLOGICAL SURGERY

## 2023-10-16 PROCEDURE — 3078F DIAST BP <80 MM HG: CPT | Mod: CPTII,S$GLB,, | Performed by: NEUROLOGICAL SURGERY

## 2023-10-16 NOTE — PROGRESS NOTES
NEUROSURGICAL PROGRESS NOTE    DATE OF SERVICE:  10/16/2023    ATTENDING PHYSICIAN:  Guillaume Washington MD    SUBJECTIVE:    INTERIM HISTORY:    This is a very pleasant 62 y.o. male,  he had a recent fall.  Following the fall is back pain got worse.  He also complains of right buttock pain.  He has more right than left back pain.  He has been compliant with wearing his neck brace.  His preoperative arm pain has improved.  He is now complaining of numbness in the bilateral upper extremities.  He also complains of some neck pain.  His voice sounds clear.  No new onset of motor weakness.  Reports some involuntary movement of his upper extremity and lower extremity.  Says that sometimes he will have jerking movement in his upper extremities and he will drop things.  He is using a Rollator.              PAST MEDICAL HISTORY:  Active Ambulatory Problems     Diagnosis Date Noted    Visual hallucinations 07/26/2016    Low back pain, non-specific 07/26/2016    Type 2 diabetes mellitus without complication, without long-term current use of insulin 07/26/2016    Hypertension associated with diabetes 07/26/2016    HLD (hyperlipidemia) 07/26/2016    Schizophrenia 07/26/2016    Hyperkalemia 07/26/2016    Hypotension 01/20/2017    Need for hepatitis C screening test 05/31/2018    Polyp of colon 05/31/2018    Class 1 obesity with serious comorbidity and body mass index (BMI) of 34.0 to 34.9 in adult 11/28/2018    Dizziness 11/28/2018    Need for vaccination against Streptococcus pneumoniae using pneumococcal conjugate vaccine 7 12/12/2018    Depression 12/12/2018    Anemia 12/12/2018    Renal insufficiency 12/12/2018    Chronic back pain 12/12/2018    Dehydration     Neck pain 01/08/2019    CKD (chronic kidney disease) stage 3, GFR 30-59 ml/min 01/14/2019    EKG abnormalities 01/14/2019    Chest pain 01/14/2019    Gastroesophageal reflux disease 01/14/2019    Chronic pain 01/15/2019    Palmar nodule, left 02/15/2019    Trigger index  finger of right hand 02/15/2019    Lumbar facet arthropathy 03/12/2019    Hand or foot spasms 04/05/2019    Hearing loss of left ear 04/05/2019    Cerumen debris on tympanic membrane of right ear 04/05/2019    Flu vaccine need 09/04/2019    Head trauma 09/04/2019    Erectile dysfunction 09/04/2019    Rash 09/04/2019    Nonintractable headache 09/04/2019    Lumbar radiculopathy 09/12/2019    Carpal tunnel syndrome of right wrist 04/07/2022    History of colon polyps 11/21/2022    Weakness 01/25/2023    Sedative, hypnotic or anxiolytic dependence, uncomplicated 02/13/2023    Pulmonary hypertension, unspecified 02/13/2023    Type 2 diabetes mellitus with diabetic peripheral angiopathy without gangrene, without long-term current use of insulin 02/13/2023    Aortic calcification 07/14/2023    Cervical spondylosis with myelopathy and radiculopathy 08/11/2023    Aspiration pneumonia 08/15/2023    S/P cervical spinal fusion 08/25/2023    Syncope and collapse 10/04/2023    Hypoglycemia 10/04/2023     Resolved Ambulatory Problems     Diagnosis Date Noted    Acute kidney failure 07/26/2016    MARY GRACE (acute kidney injury) 07/26/2016    Prerenal azotemia 01/21/2017    Diarrhea 01/21/2017    Preventative health care 11/28/2018    Tachycardia 11/28/2018    Upper respiratory tract infection 11/28/2018    SOB (shortness of breath) 11/28/2018    Troponin level elevated 11/28/2018    Atherosclerosis of native coronary artery of native heart without angina pectoris  04/05/2019    Wrist pain, right 01/25/2023    Right elbow pain 01/25/2023    Stiffness in joint 01/25/2023    Chronic foot pain, left 07/14/2023    Impaired gait and mobility 07/18/2023     Past Medical History:   Diagnosis Date    Chronic back pain greater than 3 months duration     Diabetes mellitus     Diabetes mellitus, type 2     Hypertension        PAST SURGICAL HISTORY:  Past Surgical History:   Procedure Laterality Date    APPENDECTOMY      CARPAL TUNNEL RELEASE Right  12/13/2022    Procedure: RELEASE, CARPAL TUNNEL;  Surgeon: Everton Walter Jr., MD;  Location: [facility] OR;  Service: Orthopedics;  Laterality: Right;    COLONOSCOPY N/A 5/31/2018    Procedure: COLONOSCOPY;  Surgeon: Guillaume Hatch MD;  Location: [facility] ENDO;  Service: Endoscopy;  Laterality: N/A;    COLONOSCOPY N/A 11/17/2022    Procedure: COLONOSCOPY;  Surgeon: Guillaume Hatch MD;  Location: [facility] ENDO;  Service: Endoscopy;  Laterality: N/A;    DECOMPRESSION, NERVE, ULNAR Right 12/13/2022    Procedure: DECOMPRESSION, NERVE, ULNAR;  Surgeon: Everton Walter Jr., MD;  Location: [facility] OR;  Service: Orthopedics;  Laterality: Right;    FUSION, SPINE, CERVICAL N/A 8/11/2023    Procedure: FUSION, SPINE, CERVICAL;  Surgeon: Guillaume Washington MD;  Location: [facility] OR;  Service: Neurosurgery;  Laterality: N/A;  C3-4, C5-6, C6-7 ACDF C3-C7 anterior instrumentation Depuy  -ANTERIOR DECOMPREESSION 2 LEVELS   -ANTERIOR INSTRUMENTATION INTERBODY CAGE INTERSPACE 3   -LOP 3.5 HR   -LOS 3 DAYS   -GENERAL   -TYPE AND SCREEN   - C ARM   -EMG, SEP, MEP hari notified cc  -ASPEN   -REG    NECK SURGERY      RADIOFREQUENCY ABLATION OF LUMBAR MEDIAL BRANCH NERVE AT SINGLE LEVEL Left 5/29/2018    Procedure: RADIOFREQUENCY THERMOCOAGULATION (RFTC)-NERVE-MEDIAN BRANCH-LUMBAR- Left L2-3-4-5;  Surgeon: Donavon Dennis MD;  Location: [facility] PAIN MGT;  Service: Pain Management;  Laterality: Left;    RADIOFREQUENCY ABLATION OF LUMBAR MEDIAL BRANCH NERVE AT SINGLE LEVEL Right 1/8/2019    Procedure: Radiofrequency Ablation, Nerve, Spinal, Lumbar, Medial Branch, L2,3,4,5;  Surgeon: Alberto Hernandez Jr., MD;  Location: [facility] PAIN T;  Service: Pain Management;  Laterality: Right;  Pt is diabetic    RADIOFREQUENCY ABLATION OF LUMBAR MEDIAL BRANCH NERVE AT SINGLE LEVEL Left 3/12/2019    Procedure: Radiofrequency Ablation, Nerve, Spinal, Lumbar, Medial Branch, Left, L2,3,4,5;  Surgeon: Alberto Hernandez Jr., MD;  Location: [facility] PAIN MGT;  Service:  "Pain Management;  Laterality: Left;  Pt will be consented the day of procedure.    TRANSFORAMINAL EPIDURAL INJECTION OF STEROID Right 9/12/2019    Procedure: Injection,steroid,epidural,transforaminal,right,L4-5 and L5-S1;  Surgeon: Alberto Hernandez Jr., MD;  Location: [facility] PAIN MGT;  Service: Pain Management;  Laterality: Right;  PT IS DIABETIC       SOCIAL HISTORY:   Social History     Socioeconomic History    Marital status:    Tobacco Use    Smoking status: Never    Smokeless tobacco: Never   Substance and Sexual Activity    Alcohol use: Yes     Comment: "couple of beers a day"    Drug use: No    Sexual activity: Not Currently       FAMILY HISTORY:  Family History   Problem Relation Age of Onset    Alzheimer's disease Mother     Diabetes Mother     Heart defect Father     Cancer Father     Stroke Father     Heart attack Father     Heart defect Sister     Alzheimer's disease Sister        CURRENTS MEDICATIONS:  Current Outpatient Medications on File Prior to Visit   Medication Sig Dispense Refill    ARIPiprazole (ABILIFY) 5 MG Tab Take 1 tablet (5 mg total) by mouth once daily. 30 tablet 11    BLOOD PRESSURE CUFF Misc 1 Units by Misc.(Non-Drug; Combo Route) route once daily. 1 each 0    blood sugar diagnostic (TRUE METRIX GLUCOSE TEST STRIP) Strp use 1 strip to check glucose 2 (two) times a day. 200 strip 6    blood-glucose meter Misc Use as instructed 1 each 0    dulaglutide (TRULICITY) 0.75 mg/0.5 mL pen injector Inject 0.75 mg (one pen) into the skin every 7 days. 12 pen 1    DULoxetine (CYMBALTA) 60 MG capsule Take 2 capsules (120 mg total) by mouth every morning. 60 capsule 11    hydrOXYzine pamoate (VISTARIL) 50 MG Cap TAKE 1 CAPSULE (50 MG) BY MOUTH NIGHTLY AS NEEDED FOR INSOMNIA (Patient taking differently: Take 50 mg by mouth nightly as needed (insomnia).) 90 capsule 3    lancets 30 gauge Misc 1 lancet by Misc.(Non-Drug; Combo Route) route twice daily. 200 each 6    metFORMIN (GLUCOPHAGE) 1000 MG " tablet Take 1 tablet (1,000 mg total) by mouth 2 (two) times daily with meals. 180 tablet 1    methocarbamoL (ROBAXIN) 750 MG Tab Take 1 tablet (750 mg total) by mouth 3 (three) times daily as needed (muscle spasms). 60 tablet 0    olmesartan (BENICAR) 20 MG tablet Take 1 tablet (20 mg total) by mouth once daily. 90 tablet 0    omeprazole (PRILOSEC) 20 MG capsule Take 1 capsule (20 mg total) by mouth before breakfast. 90 capsule 3    oxyCODONE-acetaminophen (PERCOCET)  mg per tablet Take 1 tablet by mouth every 8 (eight) hours as needed for Pain. 60 tablet 0    pravastatin (PRAVACHOL) 40 MG tablet Take 1 tablet (40 mg total) by mouth once daily. 90 tablet 3    prazosin (MINIPRESS) 1 MG Cap Take 1 capsule (1 mg total) by mouth every evening. 30 capsule 11    pregabalin (LYRICA) 100 MG capsule Take 2 capsules (200 mg total) by mouth 2 (two) times daily. 360 capsule 1    sildenafiL (VIAGRA) 100 MG tablet Take 1 tablet (100 mg total) by mouth daily as needed for Erectile Dysfunction. 30 tablet 3     No current facility-administered medications on file prior to visit.       ALLERGIES:  Review of patient's allergies indicates:   Allergen Reactions    Pcn [penicillins] Other (See Comments)     Childhood rxn, pt does not recall type of rxn       REVIEW OF SYSTEMS:  Review of Systems   Constitutional:  Negative for diaphoresis, fever and weight loss.   Respiratory:  Negative for shortness of breath.    Cardiovascular:  Negative for chest pain.   Gastrointestinal:  Negative for blood in stool.   Genitourinary:  Negative for hematuria.   Endo/Heme/Allergies:  Does not bruise/bleed easily.   All other systems reviewed and are negative.        OBJECTIVE:    PHYSICAL EXAMINATION:   Vitals:    10/16/23 0846   BP: (!) 87/62   Pulse: 101       Physical Exam:  Vitals reviewed.    Constitutional: He appears well-developed and well-nourished.     Eyes: Pupils are equal, round, and reactive to light. Conjunctivae and EOM are  normal.     Cardiovascular: Normal distal pulses and no edema.     Abdominal: Soft.     Skin: Skin displays no rash on trunk and no rash on extremities. Skin displays no lesions on trunk and no lesions on extremities.     Psych/Behavior: He is alert. He is oriented to person, place, and time. He has a normal mood and affect.     Musculoskeletal:        Neck: Range of motion is limited.       Ortho Exam        Neurologic Exam     Mental Status   Oriented to person, place, and time.     Cranial Nerves     CN III, IV, VI   Pupils are equal, round, and reactive to light.  Extraocular motions are normal.     Motor Exam     Strength   Strength 5/5 throughout.         DIAGNOSTIC DATA:  I personally interpreted the following imaging:     Cervical x-ray shows good positioning of hardware, no subsidence or lucency around hardware   CT of the cervical spine 10/04/2023 shows no fracture, no complication from C5-C7 fusion surgery   Lumbar spine MRI from 2018 was showing degenerative disc disease at L5-S1 with right foraminal stenosis    ASSESMENT:  This is a 62 y.o. male with     Problem List Items Addressed This Visit          Neuro    Lumbar radiculopathy    Relevant Orders    Ambulatory referral/consult to Physical/Occupational Therapy    S/P cervical spinal fusion    Relevant Orders    Ambulatory referral/consult to Physical/Occupational Therapy     Other Visit Diagnoses       Dorsalgia, unspecified    -  Primary    Relevant Orders    MRI Lumbar Spine Without Contrast    Ambulatory referral/consult to Physical/Occupational Therapy    DDD (degenerative disc disease), lumbar        Chronic myelopathy                  PLAN:   Cervical and lumbar physical therapy 3 times a week for 6 weeks   Follow-up in 3 months.  Lumbar spine MRI to assess radiculopathy.    More than 50% of the time was spent on discussing conservative management treatments (medication, physical therapy exercises) and possible interventions (spinal injections  and surgical procedures). Care coordination was discussed.    20 minutes          Guillaume Washington MD  Cell:[phone]

## 2023-10-19 ENCOUNTER — TELEPHONE (OUTPATIENT)
Dept: FAMILY MEDICINE | Facility: CLINIC | Age: 63
End: 2023-10-19
Payer: MEDICARE

## 2023-10-19 NOTE — TELEPHONE ENCOUNTER
----- Message from Ray Parr sent at 10/19/2023  8:00 AM CDT -----  Type:  Sooner Apoointment Request    Caller is requesting a sooner appointment.  Caller declined first available appointment listed below.  Caller will not accept being placed on the waitlist and is requesting a message be sent to doctor.  Name of Caller:  Fabiano   When is the first available appointment? 10-  Symptoms: hospital f/u discharged from [facility] on 10-  Would the patient rather a call back or a response via [facility]? Call back   Best Call Back Number: [phone]  Additional Information: patient had an appointment scheduled for today 10- at 10:30am but has to cancel due to him not feeling well.

## 2023-10-26 ENCOUNTER — DOCUMENT SCAN (OUTPATIENT)
Dept: HOME HEALTH SERVICES | Facility: HOSPITAL | Age: 63
End: 2023-10-26
Payer: MEDICARE

## 2023-10-30 ENCOUNTER — OFFICE VISIT (OUTPATIENT)
Dept: OTOLARYNGOLOGY | Facility: CLINIC | Age: 63
End: 2023-10-30
Payer: MEDICARE

## 2023-10-30 VITALS — DIASTOLIC BLOOD PRESSURE: 84 MMHG | HEART RATE: 76 BPM | SYSTOLIC BLOOD PRESSURE: 126 MMHG

## 2023-10-30 DIAGNOSIS — R49.0 DYSPHONIA: ICD-10-CM

## 2023-10-30 DIAGNOSIS — J38.01 UNILATERAL COMPLETE VOCAL FOLD PARALYSIS: Primary | ICD-10-CM

## 2023-10-30 DIAGNOSIS — R13.14 DYSPHAGIA, PHARYNGOESOPHAGEAL: ICD-10-CM

## 2023-10-30 PROCEDURE — 3079F PR MOST RECENT DIASTOLIC BLOOD PRESSURE 80-89 MM HG: ICD-10-PCS | Mod: CPTII,S$GLB,, | Performed by: OTOLARYNGOLOGY

## 2023-10-30 PROCEDURE — 31579 PR LARYNGOSCOPY, FLEX/RIGID TELESCOPIC, W/STROBOSCOPY: ICD-10-PCS | Mod: S$GLB,,, | Performed by: OTOLARYNGOLOGY

## 2023-10-30 PROCEDURE — 4010F PR ACE/ARB THEARPY RXD/TAKEN: ICD-10-PCS | Mod: CPTII,S$GLB,, | Performed by: OTOLARYNGOLOGY

## 2023-10-30 PROCEDURE — 3066F PR DOCUMENTATION OF TREATMENT FOR NEPHROPATHY: ICD-10-PCS | Mod: CPTII,S$GLB,, | Performed by: OTOLARYNGOLOGY

## 2023-10-30 PROCEDURE — 3074F SYST BP LT 130 MM HG: CPT | Mod: CPTII,S$GLB,, | Performed by: OTOLARYNGOLOGY

## 2023-10-30 PROCEDURE — 1159F PR MEDICATION LIST DOCUMENTED IN MEDICAL RECORD: ICD-10-PCS | Mod: CPTII,S$GLB,, | Performed by: OTOLARYNGOLOGY

## 2023-10-30 PROCEDURE — 99213 OFFICE O/P EST LOW 20 MIN: CPT | Mod: 25,S$GLB,, | Performed by: OTOLARYNGOLOGY

## 2023-10-30 PROCEDURE — 3044F HG A1C LEVEL LT 7.0%: CPT | Mod: CPTII,S$GLB,, | Performed by: OTOLARYNGOLOGY

## 2023-10-30 PROCEDURE — 1160F PR REVIEW ALL MEDS BY PRESCRIBER/CLIN PHARMACIST DOCUMENTED: ICD-10-PCS | Mod: CPTII,S$GLB,, | Performed by: OTOLARYNGOLOGY

## 2023-10-30 PROCEDURE — 3066F NEPHROPATHY DOC TX: CPT | Mod: CPTII,S$GLB,, | Performed by: OTOLARYNGOLOGY

## 2023-10-30 PROCEDURE — 31579 LARYNGOSCOPY TELESCOPIC: CPT | Mod: S$GLB,,, | Performed by: OTOLARYNGOLOGY

## 2023-10-30 PROCEDURE — 1159F MED LIST DOCD IN RCRD: CPT | Mod: CPTII,S$GLB,, | Performed by: OTOLARYNGOLOGY

## 2023-10-30 PROCEDURE — 3061F PR NEG MICROALBUMINURIA RESULT DOCUMENTED/REVIEW: ICD-10-PCS | Mod: CPTII,S$GLB,, | Performed by: OTOLARYNGOLOGY

## 2023-10-30 PROCEDURE — 3044F PR MOST RECENT HEMOGLOBIN A1C LEVEL <7.0%: ICD-10-PCS | Mod: CPTII,S$GLB,, | Performed by: OTOLARYNGOLOGY

## 2023-10-30 PROCEDURE — 3074F PR MOST RECENT SYSTOLIC BLOOD PRESSURE < 130 MM HG: ICD-10-PCS | Mod: CPTII,S$GLB,, | Performed by: OTOLARYNGOLOGY

## 2023-10-30 PROCEDURE — 1160F RVW MEDS BY RX/DR IN RCRD: CPT | Mod: CPTII,S$GLB,, | Performed by: OTOLARYNGOLOGY

## 2023-10-30 PROCEDURE — 99999 PR PBB SHADOW E&M-EST. PATIENT-LVL III: ICD-10-PCS | Mod: PBBFAC,,, | Performed by: OTOLARYNGOLOGY

## 2023-10-30 PROCEDURE — 3061F NEG MICROALBUMINURIA REV: CPT | Mod: CPTII,S$GLB,, | Performed by: OTOLARYNGOLOGY

## 2023-10-30 PROCEDURE — 99213 PR OFFICE/OUTPT VISIT, EST, LEVL III, 20-29 MIN: ICD-10-PCS | Mod: 25,S$GLB,, | Performed by: OTOLARYNGOLOGY

## 2023-10-30 PROCEDURE — 4010F ACE/ARB THERAPY RXD/TAKEN: CPT | Mod: CPTII,S$GLB,, | Performed by: OTOLARYNGOLOGY

## 2023-10-30 PROCEDURE — 3079F DIAST BP 80-89 MM HG: CPT | Mod: CPTII,S$GLB,, | Performed by: OTOLARYNGOLOGY

## 2023-10-30 PROCEDURE — 99999 PR PBB SHADOW E&M-EST. PATIENT-LVL III: CPT | Mod: PBBFAC,,, | Performed by: OTOLARYNGOLOGY

## 2023-10-30 NOTE — PROGRESS NOTES
[facility]  Department of Otorhinolaryngology and Communication Sciences    Subjective:      Fabiano Malik Jr. is a 62 y.o. male who presents for follow-up. He has a history of right vocal fold immobility, potentially recoverable, following spinal fusion surgery in 8/2023.    His voice has improved significantly, although it does get hoarse from time to time. His swallowing function has also improved, but he still notes occasional obstructive dysphagia.    The patient's medications, allergies, past medical, surgical, social and family histories were reviewed and updated as appropriate.    A detailed review of systems was obtained with pertinent positives as per the above HPI, and otherwise negative.      Objective:     /84 (BP Location: Left arm, Patient Position: Sitting, BP Method: Large (Automatic))   Pulse 76      Constitutional: comfortable, well dressed  Psychiatric: appropriate affect  Respiratory: comfortably breathing, symmetric chest rise, no stridor  Voice:  marked interval improvements across all parameters; faint variable roughness/strain   Head: normocephalic  Eyes: conjunctivae and sclerae clear  Indirect laryngoscopy is limited due to gag    Procedure  Flexible Laryngeal Videostroboscopy (80447): Laryngeal videostroboscopy is indicated to assess laryngeal vibratory biomechanics and vocal fold oscillation, which cannot be assessed with a plain light examination. This was carried out transnasally with a distal chip videoendoscope. After verbal consent was obtained, the patient was positioned and the nose was topically decongested with 1% phenylephrine and topically anesthetized with 4% lidocaine. The endoscope was passed through the most patent nasal cavity and positioned to image the nasopharynx, larynx, and hypopharynx in detail. The following features were examined: nasopharyngeal, laryngeal, hypopharyngeal masses; velopharyngeal strength, closure, and symmetry of motion; vocal  fold range and symmetry of motion; laryngeal mucosal edema, erythema, inflammation, and hydration; salivary pooling; and gross laryngeal sensation. During phonation, the vocal folds were assessed for glottal closure; mucosal wave; vocal fold lesions; vibratory periodicity, amplitude, and phase symmetry; and vertical height match. The equipment was removed. The patient tolerated the procedure well without complication. All findings were normal except:  - mild right vocal fold motion impairment; resting paramedian, interval improvement in tone  - full adduction, very mild, inconsistently limited abduction  - complete closure, pliability intact  - mild concentric supraglottic hyperfunction      Assessment:     Fabiano Malik Jr. is a 62 y.o. male with a history of right vocal fold immobility, following spinal fusion surgery in 8/2023.     He today demonstrates nearly complete recovery of voice, in addition to significant recovery of vocal fold motion.     Plan:     Reassurance was provided.    He will follow up with me in 3-4 months, or sooner if needed.    All questions were answered, and the patient is in agreement with the plan.    Lonnie Mccord M.D.  [facility]  Department of Otorhinolaryngology and Communication Sciences

## 2023-10-31 ENCOUNTER — CLINICAL SUPPORT (OUTPATIENT)
Dept: SPEECH THERAPY | Facility: HOSPITAL | Age: 63
End: 2023-10-31
Attending: PHYSICIAN ASSISTANT
Payer: MEDICARE

## 2023-10-31 ENCOUNTER — HOSPITAL ENCOUNTER (OUTPATIENT)
Dept: RADIOLOGY | Facility: HOSPITAL | Age: 63
Discharge: HOME | End: 2023-10-31
Attending: PHYSICIAN ASSISTANT
Payer: MEDICARE

## 2023-10-31 DIAGNOSIS — R13.10 DYSPHAGIA, UNSPECIFIED TYPE: ICD-10-CM

## 2023-10-31 PROCEDURE — 74230 X-RAY XM SWLNG FUNCJ C+: CPT | Mod: 26,,, | Performed by: RADIOLOGY

## 2023-10-31 PROCEDURE — 25500020 PHARM REV CODE 255: Performed by: PHYSICIAN ASSISTANT

## 2023-10-31 PROCEDURE — 74230 X-RAY XM SWLNG FUNCJ C+: CPT | Mod: TC

## 2023-10-31 PROCEDURE — A9698 NON-RAD CONTRAST MATERIALNOC: HCPCS | Performed by: PHYSICIAN ASSISTANT

## 2023-10-31 PROCEDURE — 74230 FL MODIFIED BARIUM SWALLOW SPEECH STUDY: ICD-10-PCS | Mod: 26,,, | Performed by: RADIOLOGY

## 2023-10-31 RX ADMIN — BARIUM SULFATE 30 ML: 0.81 POWDER, FOR SUSPENSION ORAL at 11:10

## 2023-10-31 NOTE — PROCEDURES
Modified Barium Swallow    Patient Name:  Fabiano Malik Jr.   MRN:  [id number]      Recommendations:     Recommendations:                General Recommendations:  Follow-up not indicated  Diet recommendations:  Easy to Chew Diet - IDDSI Level 7, Thin liquids - IDDSI Level 0   Aspiration Precautions: 1 bite/sip at a time, Alternating bites/sips, HOB to 90 degrees, Meds whole 1 at a time, and Small bites/sips   General Precautions: Standard,    Communication strategies:  none    Referral     Reason for Referral  Patient was referred for a Modified Barium Swallow Study to assess the efficiency of his/her swallow function, rule out aspiration and make recommendations regarding safe dietary consistencies, effective compensatory strategies, and safe eating environment.     Diagnosis: <principal problem not specified>       History:     Past Medical History:   Diagnosis Date    Chronic back pain greater than 3 months duration     Depression     Diabetes mellitus     Diabetes mellitus, type 2     Hypertension     Schizophrenia        Objective:     Current Respiratory Status: 10/31/23    Alert: yes    Cooperative: yes    Follows Directions: yes    Visualization  Patient was seen in the lateral view    Oral Peripheral Examination  Oral Musculature: WFL  Dentition: present and adequate  Secretion Management: adequate  Mucosal Quality: good  Oral Labial Strength and Mobility: WFL  Lingual Strength and Mobility: WFL  Volitional Cough: Intact  Volitional Swallow: Intact  Voice Prior to PO Intake: Clear and audible    Consistencies Assessed  Thin ~30 mL via straw  Puree ~10 mL via tsp  Solids -x1 bite sized piece of krish cracker coated in barium     Oral Preparation/Oral Phase  WFL- Pt with adequate bolus acceptance, containment, control and timely A-P transfer across consistencies     Pharyngeal Phase   Pt with dcr base of tongue retraction leading to delayed swallow initiation, to the level of the  valleculae across  consistencies. Dcr hyolaryngeal elevation and excursion, which led to variable vallecular stasis (mild-mod), which was cleared via liquid washes. Posterior pharyngeal wall edema noted 2/2 ACDF hardware C5-7, which slowed bolus passage, however, did not obstruct bolus passageway. Increased pharyngeal stasis noted 2/2 PPW edema, however, liquid washes were successful in clearing stasis. Adequate epiglottic inversion and subsequent laryngeal vestibule closure for airway protection. Specific results as follows:    THIN LIQUIDS  Rosenbek 8-Point Penetration-Aspiration Scale Score: 1: Material does not enter the airway.     Pharyngeal Residue  Valleculae: Mild  Pyriforms: Mild  *Pt cleared most of residue via cued subsequent swallow.    - - - -     PUREE  Rosenbek 8-Point Penetration-Aspiration Scale Score: 1: Material does not enter the airway.     Pharyngeal Residue  Valleculae: Mild-mod  Pyriforms: Mild  **Pt cleared most of residue via cued liquid washes & subsequent swallow.    - - - -     SOFT SOLID/ REGULAR SOLID  Rosenbek 8-Point Penetration-Aspiration Scale Score: 1: Material does not enter the airway.     Pharyngeal Residue  Valleculae: Mild  Pyriforms: Mild-mod   **Pt cleared most of residue via cued  liquid washes & subsequent swallow.      Cervical Esophageal Phase  UES appeared to accommodate all bolus types without stasis or retrograde movement observed     Assessment:     Impressions    Pt presents with improved pharyngeal swallow compared to previous MBSS conducted in 8/2023 following ACDF procedure (C 5-7). Pt now with mild-mod pharyngeal dysphagia 2/2 cont PPW edema which led to increased pharyngeal stasis, however, no instances of penetration or aspiration noted.      Prognosis: Good    Barriers:  None    Plan  ST recs advance diet to regular, easy to chew and cont thin liquids. Okay for initiation of straw with thin liquids. Strict adherence to the following swallowing precautions during intake:      -Upright for all meals  -Small bites/sips  -Single sips  -Alternate bites/sips every bite    Education  ST utilized live swallow study video via monitor to discuss the following with the pt: normal swallowing anatomy and physiology, pt's specific anatomy and physiology, diet recs, as well as swallowing precautions.      Plan:   Patient to be seen:      Plan of Care expires:     Plan of Care reviewed with:  patient        Discharge recommendations:      Barriers to Discharge:  None    Time Tracking:   SLP Treatment Date:   10/31/23  Speech Start Time:  1050  Speech Stop Time:  1115     Speech Total Time (min):  25 min    10/31/2023

## 2023-11-01 ENCOUNTER — PATIENT OUTREACH (OUTPATIENT)
Dept: ADMINISTRATIVE | Facility: HOSPITAL | Age: 63
End: 2023-11-01
Payer: MEDICARE

## 2023-11-01 ENCOUNTER — PATIENT MESSAGE (OUTPATIENT)
Dept: ADMINISTRATIVE | Facility: HOSPITAL | Age: 63
End: 2023-11-01
Payer: MEDICARE

## 2023-11-01 NOTE — PROGRESS NOTES
2023 Care Everywhere updates requested and reviewed.  Immunizations reconciled. Media reports reviewed.  Duplicate HM overrides and  orders removed.  Overdue HM topic chart audit and/or requested.  Overdue lab testing linked to upcoming lab appointments if applies.   Lab sarah, and DVTel reviewed   Portal outreached regarding Health maintenance

## 2023-11-06 NOTE — ANESTHESIA PREPROCEDURE EVALUATION
"                                                                                                             08/11/2023  Fabiano Malik Jr. is a 62 y.o., male scheduled forFUSION, SPINE, CERVICAL (Spine Cervical) - C3-4, C5-6, C6-7 ACDF C3-C7 anterior instrumentation Depuy 8/11/23.    Per cardiology Dr. Carreon, "The pt is at an acceptable risk from a cardiac perspective for upcoming non cardiac procedure".    Past Medical History:   Diagnosis Date    Chronic back pain greater than 3 months duration     Depression     Diabetes mellitus     Diabetes mellitus, type 2     Hypertension     Schizophrenia      Past Surgical History:   Procedure Laterality Date    APPENDECTOMY      CARPAL TUNNEL RELEASE Right 12/13/2022    Procedure: RELEASE, CARPAL TUNNEL;  Surgeon: Everton Walter Jr., MD;  Location: [facility] OR;  Service: Orthopedics;  Laterality: Right;    COLONOSCOPY N/A 5/31/2018    Procedure: COLONOSCOPY;  Surgeon: Guillaume Hatch MD;  Location: [facility] ENDO;  Service: Endoscopy;  Laterality: N/A;    COLONOSCOPY N/A 11/17/2022    Procedure: COLONOSCOPY;  Surgeon: Guillaume Hatch MD;  Location: [facility] ENDO;  Service: Endoscopy;  Laterality: N/A;    DECOMPRESSION, NERVE, ULNAR Right 12/13/2022    Procedure: DECOMPRESSION, NERVE, ULNAR;  Surgeon: Everton Walter Jr., MD;  Location: [facility] OR;  Service: Orthopedics;  Laterality: Right;    NECK SURGERY      RADIOFREQUENCY ABLATION OF LUMBAR MEDIAL BRANCH NERVE AT SINGLE LEVEL Left 5/29/2018    Procedure: RADIOFREQUENCY THERMOCOAGULATION (RFTC)-NERVE-MEDIAN BRANCH-LUMBAR- Left L2-3-4-5;  Surgeon: Donavon Dennis MD;  Location: [facility] PAIN MGT;  Service: Pain Management;  Laterality: Left;    RADIOFREQUENCY ABLATION OF LUMBAR MEDIAL BRANCH NERVE AT SINGLE LEVEL Right 1/8/2019    Procedure: Radiofrequency Ablation, Nerve, Spinal, Lumbar, Medial Branch, L2,3,4,5;  Surgeon: Alberto Hernandez Jr., MD;  Location: [facility] PAIN MGT;  Service: Pain Management;  " Laterality: Right;  Pt is diabetic    RADIOFREQUENCY ABLATION OF LUMBAR MEDIAL BRANCH NERVE AT SINGLE LEVEL Left 3/12/2019    Procedure: Radiofrequency Ablation, Nerve, Spinal, Lumbar, Medial Branch, Left, L2,3,4,5;  Surgeon: Alberto Hernandez Jr., MD;  Location: [facility] PAIN MGT;  Service: Pain Management;  Laterality: Left;  Pt will be consented the day of procedure.    TRANSFORAMINAL EPIDURAL INJECTION OF STEROID Right 9/12/2019    Procedure: Injection,steroid,epidural,transforaminal,right,L4-5 and L5-S1;  Surgeon: Alberto Hernandez Jr., MD;  Location: [facility] PAIN MGT;  Service: Pain Management;  Laterality: Right;  PT IS DIABETIC     Current Outpatient Medications   Medication Instructions    acetaminophen (TYLENOL) 650 mg, Oral, Every 6 hours PRN    ALPRAZolam (XANAX) 0.5 mg, Oral, Nightly PRN    BLOOD PRESSURE CUFF Misc 1 Units, Misc.(Non-Drug; Combo Route), Daily    blood sugar diagnostic (TRUE METRIX GLUCOSE TEST STRIP) Strp use 1 strip to check glucose 2 (two) times a day.    blood-glucose meter Misc Use as instructed    chlorthalidone (HYGROTEN) 25 mg, Oral, Daily    dulaglutide (TRULICITY) 0.75 mg/0.5 mL pen injector Inject 0.75 mg (one pen) into the skin every 7 days.    DULoxetine (CYMBALTA) 60 MG capsule TAKE 2 CAPSULES BY MOUTH EVERY MORNING    flu vacc [id number] 6mos up,PF, (FLUARIX QUAD [id number], PF,) 60 mcg (15 mcg x 4)/0.5 mL Syrg Intramuscular    glimepiride (AMARYL) 2 mg, Oral, Before breakfast    hydrOXYzine pamoate (VISTARIL) 50 MG Cap TAKE 1 CAPSULE (50 MG) BY MOUTH NIGHTLY AS NEEDED FOR INSOMNIA    lancets 30 gauge Misc 1 lancet by Misc.(Non-Drug; Combo Route) route twice daily.    meclizine (ANTIVERT) 12.5 mg, Oral, 3 times daily PRN    meloxicam (MOBIC) 15 MG tablet No dose, route, or frequency recorded.    meloxicam (MOBIC) 15 mg, Oral, Daily    metFORMIN (GLUCOPHAGE) 1,000 mg, Oral, 2 times daily with meals    olmesartan (BENICAR) 20 mg, Oral, Daily    omeprazole  (PRILOSEC) 20 mg, Oral, Before breakfast    PFIZER COVID-19 CIELO VACCN,PF, 30 mcg/0.3 mL injection No dose, route, or frequency recorded.    potassium chloride (KLOR-CON) 10 MEQ TbSR take 1 tablet by mouth once daily    pravastatin (PRAVACHOL) 40 mg, Oral, Daily    pregabalin (LYRICA) 200 mg, Oral, 2 times daily    SHINGRIX, PF, 50 mcg/0.5 mL injection No dose, route, or frequency recorded.    sildenafiL (VIAGRA) 100 mg, Oral, Daily PRN    tamsulosin (FLOMAX) 0.4 mg, Oral, Nightly       Pre-op Assessment    I have reviewed the Patient Summary Reports.     I have reviewed the Nursing Notes. I have reviewed the NPO Status.   I have reviewed the Medications.     Review of Systems  Anesthesia Hx:  No problems with previous Anesthesia  History of prior surgery of interest to airway management or planning:  Denies Personal Hx of Anesthesia complications.   Social:  Non-Smoker, Social Alcohol Use    Hematology/Oncology:         -- Anemia:   Cardiovascular:   Exercise tolerance: good Denies Pacemaker. Hypertension  Denies Valvular problems/Murmurs.  Denies MI.    Denies Angina. hyperlipidemia    Pulmonary:  Pulmonary Normal  Denies COPD.  Denies Asthma.  Denies Shortness of breath.  Denies Sleep Apnea.    Renal/:   Chronic Renal Disease, CKD    Hepatic/GI:   GERD, well controlled Denies Liver Disease.    Musculoskeletal:  Spine Disorders: lumbar and cervical Chronic Pain    Neurological:   Denies TIA. Denies CVA. Neuromuscular Disease,  Headaches Seizures (over 5 years ago, no current meds), well controlled    Endocrine:   Diabetes (A1C 5.7 6/29/23), well controlled, type 2 Denies Hypothyroidism. Denies Hyperthyroidism.  Obesity / BMI > 30 (BMI 30.4)  Psych:   Psychiatric History depression          Physical Exam  General: Oriented, Cooperative, Well nourished and Alert  [place]   Rash over maxilla and face   Airway:  Mallampati: II   Mouth Opening: Normal  TM Distance: Normal  Tongue:  Normal    Dental:  Intact    Chest/Lungs:  Clear to auscultation, Normal Respiratory Rate    Heart:  Rate: Normal  Rhythm: Regular Rhythm      Lab Results   Component Value Date    WBC 9.82 07/07/2023    HGB 12.9 (L) 07/07/2023    HCT 39.5 (L) 07/07/2023     07/07/2023    CHOL 164 02/13/2023    TRIG 118 02/13/2023    HDL 37 (L) 02/13/2023    ALT 16 02/13/2023    AST 21 02/13/2023     (L) 06/29/2023    K 3.9 06/29/2023     06/29/2023    CREATININE 1.0 06/29/2023    BUN 14 06/29/2023    CO2 25 06/29/2023    TSH 12.023 (H) 05/10/2021    PSA 0.59 02/13/2023    INR 1.0 11/28/2018    HGBA1C 5.7 (H) 06/29/2023     Results for orders placed or performed in visit on 07/14/23   EKG 12-lead    Collection Time: 07/14/23 11:35 AM    Narrative    Test Reason :     Vent. Rate : 086 BPM     Atrial Rate : 086 BPM     P-R Int : 196 ms          QRS Dur : 110 ms      QT Int : 368 ms       P-R-T Axes : 033 000 043 degrees     QTc Int : 440 ms    Normal sinus rhythm  Cannot rule out Inferior infarct ,age undetermined  Abnormal ECG  When compared with ECG of 29-JUN-2023 07:56,  Minimal criteria for Anterior infarct are no longer Present  No significant change was found  Confirmed by Oscar Carreon MD ([id number]) on 7/19/2023 9:11:22 AM    Referred By: [email or web address]   SELF           Confirmed By:Oscar Carreon MD     CXR 7/10/23  FINDINGS:  Chest two views: Heart size is normal. There is aortic plaque. The bones showed DJD and dish.    Impression:    No acute process seen.       Anesthesia Plan  Type of Anesthesia, risks & benefits discussed:    Anesthesia Type: Gen ETT  Intra-op Monitoring Plan: Standard ASA Monitors and Art Line  Post Op Pain Control Plan: multimodal analgesia and IV/PO Opioids PRN  Induction:  IV  Airway Plan: Video, Post-Induction  Informed Consent: Informed consent signed with the Patient and all parties understand the risks and agree with anesthesia plan.  All questions answered. Patient consented to  blood products? Yes  ASA Score: 2  Day of Surgery Review of History & Physical: H&P Update referred to the surgeon/provider.    Ready For Surgery From Anesthesia Perspective.     .       Increased Nutrient Needs...

## 2023-11-07 RX ORDER — METHOCARBAMOL 750 MG/1
750 TABLET, FILM COATED ORAL 3 TIMES DAILY PRN
Qty: 60 TABLET | Refills: 0 | Status: SHIPPED | OUTPATIENT
Start: 2023-11-07 | End: 2024-02-21 | Stop reason: SDUPTHER

## 2023-11-07 RX ORDER — OXYCODONE AND ACETAMINOPHEN 10; 325 MG/1; MG/1
1 TABLET ORAL EVERY 8 HOURS PRN
Qty: 60 TABLET | Refills: 0 | Status: SHIPPED | OUTPATIENT
Start: 2023-11-07 | End: 2023-12-02 | Stop reason: SDUPTHER

## 2023-11-12 DIAGNOSIS — E11.9 TYPE 2 DIABETES MELLITUS WITHOUT COMPLICATION, WITHOUT LONG-TERM CURRENT USE OF INSULIN: ICD-10-CM

## 2023-11-12 NOTE — TELEPHONE ENCOUNTER
Care Due:                  Date            Visit Type   Department     Provider  --------------------------------------------------------------------------------                                EP -                              PRIMARY      [facility]  Last Visit: 07-      CARE ([facility])   LESLEY Fleming                              EP -                              PRIMARY      [facility]  Next Visit: 11-      CARE ([facility])   LESLEY Fleming                                                            Last  Test          Frequency    Reason                     Performed    Due Date  --------------------------------------------------------------------------------    Lipid Panel.  12 months..  pravastatin..............  02- 02-    [facility] Embedded Care Due Messages. Reference number: [id number].   11/12/2023 3:56:36 PM CST

## 2023-11-13 ENCOUNTER — PATIENT MESSAGE (OUTPATIENT)
Dept: FAMILY MEDICINE | Facility: CLINIC | Age: 63
End: 2023-11-13
Payer: MEDICARE

## 2023-11-13 ENCOUNTER — NURSE TRIAGE (OUTPATIENT)
Dept: ADMINISTRATIVE | Facility: CLINIC | Age: 63
End: 2023-11-13
Payer: MEDICARE

## 2023-11-13 RX ORDER — METFORMIN HYDROCHLORIDE 1000 MG/1
1000 TABLET ORAL 2 TIMES DAILY WITH MEALS
Qty: 60 TABLET | Refills: 0 | Status: SHIPPED | OUTPATIENT
Start: 2023-11-13 | End: 2023-12-11 | Stop reason: SDUPTHER

## 2023-11-13 NOTE — TELEPHONE ENCOUNTER
Provider Staff:  Action required for this patient         Please see care gap opportunities below in Care Due Message.    Thanks!  [facility]     Appointments      Date Provider   Last Visit   7/7/2023 Lucien Fleming MD   Next Visit   11/15/2023 Lucien Fleming MD     Refill Decision Note   Fabiano Malik  is requesting a refill authorization.  Brief Assessment and Rationale for Refill:  Approve     Medication Therapy Plan:  Approving for 30 day supply. O/V 11/15/23      Comments:     Note composed:11:58 AM 11/13/2023

## 2023-11-13 NOTE — TELEPHONE ENCOUNTER
Patient is calling to ask what is a too low B/P he then when asking hypotension He states he had to use his walker this AM and that he is very dizzy. I have advised as per protocol.    Reason for Disposition   Difficult to awaken or acting confused (e.g., disoriented, slurred speech)    Additional Information   Negative: Started suddenly after an allergic medicine, an allergic food, or bee sting   Negative: Shock suspected (e.g., cold/pale/clammy skin, too weak to stand, low BP, rapid pulse)    Protocols used: Blood Pressure - Low-A-AH

## 2023-11-14 ENCOUNTER — TELEPHONE (OUTPATIENT)
Dept: FAMILY MEDICINE | Facility: CLINIC | Age: 63
End: 2023-11-14
Payer: MEDICARE

## 2023-11-14 NOTE — TELEPHONE ENCOUNTER
Patient was contacted to get an update regarding his blood pressure and to inform him to bring his home blood pressure machine to his appointment on tomorrow (11/15). Patient stated is blood pressure this morning before taking his medications was 99/68. He stated he doesn't change positions slowly to prevent being dizzy.

## 2023-11-14 NOTE — TELEPHONE ENCOUNTER
----- Message from Xenia Woods NP sent at 11/14/2023  4:02 PM CST -----  Regarding: RE: Dizziness  Marta,    It is for this patient. Please contact him when you get a chance. Agree with recommendations to stay hydrated, change positions slowly. Also advise to bring home BP cuff to appt tomorrow for comparison, as well as all medications being taken.    SIMON  ----- Message -----  From: Tracey Ortiz RN  Sent: 11/13/2023   2:39 PM CST  To: Lucien Fleming MD; Coy Orr MA  Subject: Dizziness                                        Patient C/O severe dizziness. This am 95/60. States he is well hydrated. BP med: Minipress 1mg qd.  Patient states he will send in BP readings for past week.Instructed to sit on side of bed for a few minutes prior to getting up. Will continue to ambulate w rolator in his home. Informed patient that Dr. Santiago is out of the office today, but I will notify him of his BP concerns and someone will get back with him to give any further instructions. Verbalized understanding.

## 2023-11-15 ENCOUNTER — OFFICE VISIT (OUTPATIENT)
Dept: FAMILY MEDICINE | Facility: CLINIC | Age: 63
End: 2023-11-15
Payer: MEDICARE

## 2023-11-15 VITALS
OXYGEN SATURATION: 95 % | DIASTOLIC BLOOD PRESSURE: 60 MMHG | HEART RATE: 81 BPM | WEIGHT: 236.13 LBS | HEIGHT: 73 IN | SYSTOLIC BLOOD PRESSURE: 90 MMHG | BODY MASS INDEX: 31.3 KG/M2

## 2023-11-15 DIAGNOSIS — I10 ESSENTIAL HYPERTENSION: Primary | ICD-10-CM

## 2023-11-15 PROCEDURE — 1160F PR REVIEW ALL MEDS BY PRESCRIBER/CLIN PHARMACIST DOCUMENTED: ICD-10-PCS | Mod: CPTII,S$GLB,, | Performed by: FAMILY MEDICINE

## 2023-11-15 PROCEDURE — 4010F ACE/ARB THERAPY RXD/TAKEN: CPT | Mod: CPTII,S$GLB,, | Performed by: FAMILY MEDICINE

## 2023-11-15 PROCEDURE — 3066F NEPHROPATHY DOC TX: CPT | Mod: CPTII,S$GLB,, | Performed by: FAMILY MEDICINE

## 2023-11-15 PROCEDURE — 3044F PR MOST RECENT HEMOGLOBIN A1C LEVEL <7.0%: ICD-10-PCS | Mod: CPTII,S$GLB,, | Performed by: FAMILY MEDICINE

## 2023-11-15 PROCEDURE — 3008F PR BODY MASS INDEX (BMI) DOCUMENTED: ICD-10-PCS | Mod: CPTII,S$GLB,, | Performed by: FAMILY MEDICINE

## 2023-11-15 PROCEDURE — 3078F PR MOST RECENT DIASTOLIC BLOOD PRESSURE < 80 MM HG: ICD-10-PCS | Mod: CPTII,S$GLB,, | Performed by: FAMILY MEDICINE

## 2023-11-15 PROCEDURE — 3074F SYST BP LT 130 MM HG: CPT | Mod: CPTII,S$GLB,, | Performed by: FAMILY MEDICINE

## 2023-11-15 PROCEDURE — 1160F RVW MEDS BY RX/DR IN RCRD: CPT | Mod: CPTII,S$GLB,, | Performed by: FAMILY MEDICINE

## 2023-11-15 PROCEDURE — 4010F PR ACE/ARB THEARPY RXD/TAKEN: ICD-10-PCS | Mod: CPTII,S$GLB,, | Performed by: FAMILY MEDICINE

## 2023-11-15 PROCEDURE — 3061F NEG MICROALBUMINURIA REV: CPT | Mod: CPTII,S$GLB,, | Performed by: FAMILY MEDICINE

## 2023-11-15 PROCEDURE — 3044F HG A1C LEVEL LT 7.0%: CPT | Mod: CPTII,S$GLB,, | Performed by: FAMILY MEDICINE

## 2023-11-15 PROCEDURE — 3078F DIAST BP <80 MM HG: CPT | Mod: CPTII,S$GLB,, | Performed by: FAMILY MEDICINE

## 2023-11-15 PROCEDURE — 1159F PR MEDICATION LIST DOCUMENTED IN MEDICAL RECORD: ICD-10-PCS | Mod: CPTII,S$GLB,, | Performed by: FAMILY MEDICINE

## 2023-11-15 PROCEDURE — 99999 PR PBB SHADOW E&M-EST. PATIENT-LVL V: CPT | Mod: PBBFAC,,, | Performed by: FAMILY MEDICINE

## 2023-11-15 PROCEDURE — 99999 PR PBB SHADOW E&M-EST. PATIENT-LVL V: ICD-10-PCS | Mod: PBBFAC,,, | Performed by: FAMILY MEDICINE

## 2023-11-15 PROCEDURE — 3066F PR DOCUMENTATION OF TREATMENT FOR NEPHROPATHY: ICD-10-PCS | Mod: CPTII,S$GLB,, | Performed by: FAMILY MEDICINE

## 2023-11-15 PROCEDURE — 1159F MED LIST DOCD IN RCRD: CPT | Mod: CPTII,S$GLB,, | Performed by: FAMILY MEDICINE

## 2023-11-15 PROCEDURE — 99215 OFFICE O/P EST HI 40 MIN: CPT | Mod: S$GLB,,, | Performed by: FAMILY MEDICINE

## 2023-11-15 PROCEDURE — 99215 PR OFFICE/OUTPT VISIT, EST, LEVL V, 40-54 MIN: ICD-10-PCS | Mod: S$GLB,,, | Performed by: FAMILY MEDICINE

## 2023-11-15 PROCEDURE — 3061F PR NEG MICROALBUMINURIA RESULT DOCUMENTED/REVIEW: ICD-10-PCS | Mod: CPTII,S$GLB,, | Performed by: FAMILY MEDICINE

## 2023-11-15 PROCEDURE — 3008F BODY MASS INDEX DOCD: CPT | Mod: CPTII,S$GLB,, | Performed by: FAMILY MEDICINE

## 2023-11-15 PROCEDURE — 3074F PR MOST RECENT SYSTOLIC BLOOD PRESSURE < 130 MM HG: ICD-10-PCS | Mod: CPTII,S$GLB,, | Performed by: FAMILY MEDICINE

## 2023-11-15 NOTE — PROGRESS NOTES
Subjective     Patient ID: Fabiano Malik Jr. is a 62 y.o. male.    Chief Complaint: Hospital Follow Up, Neck Pain, and Back Pain    62 years old male came to the clinic with chronic back pain.  The pain is 7/10 of intensity on and off aggravated with activity better with rest.  Patient with numbness and tingling in both lower extremities.  Patient status post back surgery.  He reports significant dizziness associated with low blood pressure and taking Lyrica.      Neck Pain     Back Pain      Review of Systems   Constitutional: Negative.    HENT: Negative.     Eyes: Negative.    Respiratory: Negative.     Cardiovascular: Negative.    Gastrointestinal: Negative.    Genitourinary: Negative.    Musculoskeletal:  Positive for back pain and neck pain.   Integumentary:  Negative.   Psychiatric/Behavioral:  The patient is nervous/anxious.           Objective     Physical Exam  Vitals and nursing note reviewed.   Constitutional:       General: He is not in acute distress.     Appearance: He is well-developed. He is not diaphoretic.   HENT:      Head: Normocephalic and atraumatic.      Right Ear: External ear normal.      Left Ear: External ear normal.      Nose: Nose normal.      Mouth/Throat:      Pharynx: No oropharyngeal exudate.   Eyes:      General: No scleral icterus.        Right eye: No discharge.         Left eye: No discharge.      Conjunctiva/sclera: Conjunctivae normal.      Pupils: Pupils are equal, round, and reactive to light.   Neck:      Thyroid: No thyromegaly.      Vascular: No JVD.      Trachea: No tracheal deviation.   Cardiovascular:      Rate and Rhythm: Normal rate and regular rhythm.      Heart sounds: Normal heart sounds. No murmur heard.     No friction rub. No gallop.   Pulmonary:      Effort: Pulmonary effort is normal. No respiratory distress.      Breath sounds: Normal breath sounds. No stridor. No wheezing or rales.   Chest:      Chest wall: No tenderness.   Abdominal:      General:  Bowel sounds are normal. There is no distension.      Palpations: Abdomen is soft. There is no mass.      Tenderness: There is no abdominal tenderness. There is no guarding or rebound.   Musculoskeletal:         General: Normal range of motion.      Cervical back: Normal range of motion and neck supple.      Thoracic back: Spasms and tenderness present.   Lymphadenopathy:      Cervical: No cervical adenopathy.   Skin:     General: Skin is warm and dry.      Coloration: Skin is not pale.      Findings: No erythema or rash.   Neurological:      Mental Status: He is alert and oriented to person, place, and time.      Cranial Nerves: No cranial nerve deficit.      Motor: No abnormal muscle tone.      Coordination: Coordination normal.      Deep Tendon Reflexes: Reflexes are normal and symmetric. Reflexes normal.   Psychiatric:         Behavior: Behavior normal.         Thought Content: Thought content normal.         Judgment: Judgment normal.            Assessment and Plan     1. Essential hypertension  -     Lipid Panel; Future; Expected date: 11/15/2023  -     Comprehensive Metabolic Panel; Future; Expected date: 11/15/2023  -     CBC Auto Differential; Future; Expected date: 11/15/2023  -     TSH; Future; Expected date: 11/15/2023      Continue monitoring blood pressure at home, low sodium diet.            Follow up in about 6 months (around 5/15/2024), or if symptoms worsen or fail to improve.

## 2023-11-20 ENCOUNTER — LAB VISIT (OUTPATIENT)
Dept: LAB | Facility: HOSPITAL | Age: 63
End: 2023-11-20
Attending: FAMILY MEDICINE
Payer: MEDICARE

## 2023-11-20 ENCOUNTER — OFFICE VISIT (OUTPATIENT)
Dept: PAIN MEDICINE | Facility: CLINIC | Age: 63
End: 2023-11-20
Payer: MEDICARE

## 2023-11-20 VITALS
HEIGHT: 73 IN | SYSTOLIC BLOOD PRESSURE: 121 MMHG | HEART RATE: 73 BPM | BODY MASS INDEX: 31.4 KG/M2 | WEIGHT: 236.88 LBS | DIASTOLIC BLOOD PRESSURE: 75 MMHG

## 2023-11-20 DIAGNOSIS — E11.42 TYPE 2 DIABETES MELLITUS WITH DIABETIC POLYNEUROPATHY, WITHOUT LONG-TERM CURRENT USE OF INSULIN: ICD-10-CM

## 2023-11-20 DIAGNOSIS — M54.41 CHRONIC BILATERAL LOW BACK PAIN WITH BILATERAL SCIATICA: ICD-10-CM

## 2023-11-20 DIAGNOSIS — M54.16 LUMBAR RADICULOPATHY: Primary | ICD-10-CM

## 2023-11-20 DIAGNOSIS — I10 ESSENTIAL HYPERTENSION: ICD-10-CM

## 2023-11-20 DIAGNOSIS — G89.29 CHRONIC BILATERAL LOW BACK PAIN WITH BILATERAL SCIATICA: ICD-10-CM

## 2023-11-20 DIAGNOSIS — G89.29 OTHER CHRONIC PAIN: ICD-10-CM

## 2023-11-20 DIAGNOSIS — M54.12 CERVICAL RADICULOPATHY: ICD-10-CM

## 2023-11-20 DIAGNOSIS — M54.42 CHRONIC BILATERAL LOW BACK PAIN WITH BILATERAL SCIATICA: ICD-10-CM

## 2023-11-20 PROBLEM — J69.0 ASPIRATION PNEUMONIA: Status: RESOLVED | Noted: 2023-08-15 | Resolved: 2023-11-20

## 2023-11-20 LAB
ALBUMIN SERPL BCP-MCNC: 3.9 G/DL (ref 3.5–5.2)
ALP SERPL-CCNC: 55 U/L (ref 55–135)
ALT SERPL W/O P-5'-P-CCNC: 18 U/L (ref 10–44)
ANION GAP SERPL CALC-SCNC: 9 MMOL/L (ref 8–16)
AST SERPL-CCNC: 23 U/L (ref 10–40)
BASOPHILS # BLD AUTO: 0.08 K/UL (ref 0–0.2)
BASOPHILS NFR BLD: 1.3 % (ref 0–1.9)
BILIRUB SERPL-MCNC: 0.5 MG/DL (ref 0.1–1)
BUN SERPL-MCNC: 23 MG/DL (ref 8–23)
CALCIUM SERPL-MCNC: 9.1 MG/DL (ref 8.7–10.5)
CHLORIDE SERPL-SCNC: 105 MMOL/L (ref 95–110)
CHOLEST SERPL-MCNC: 117 MG/DL (ref 120–199)
CHOLEST/HDLC SERPL: 3 {RATIO} (ref 2–5)
CO2 SERPL-SCNC: 23 MMOL/L (ref 23–29)
CREAT SERPL-MCNC: 1.3 MG/DL (ref 0.5–1.4)
DIFFERENTIAL METHOD: ABNORMAL
EOSINOPHIL # BLD AUTO: 0.7 K/UL (ref 0–0.5)
EOSINOPHIL NFR BLD: 10.9 % (ref 0–8)
ERYTHROCYTE [DISTWIDTH] IN BLOOD BY AUTOMATED COUNT: 14.1 % (ref 11.5–14.5)
EST. GFR  (NO RACE VARIABLE): >60 ML/MIN/1.73 M^2
GLUCOSE SERPL-MCNC: 76 MG/DL (ref 70–110)
HCT VFR BLD AUTO: 33.6 % (ref 40–54)
HDLC SERPL-MCNC: 39 MG/DL (ref 40–75)
HDLC SERPL: 33.3 % (ref 20–50)
HGB BLD-MCNC: 11 G/DL (ref 14–18)
IMM GRANULOCYTES # BLD AUTO: 0.01 K/UL (ref 0–0.04)
IMM GRANULOCYTES NFR BLD AUTO: 0.2 % (ref 0–0.5)
LDLC SERPL CALC-MCNC: 60 MG/DL (ref 63–159)
LYMPHOCYTES # BLD AUTO: 1.9 K/UL (ref 1–4.8)
LYMPHOCYTES NFR BLD: 31.2 % (ref 18–48)
MCH RBC QN AUTO: 27.5 PG (ref 27–31)
MCHC RBC AUTO-ENTMCNC: 32.7 G/DL (ref 32–36)
MCV RBC AUTO: 84 FL (ref 82–98)
MONOCYTES # BLD AUTO: 0.6 K/UL (ref 0.3–1)
MONOCYTES NFR BLD: 9.3 % (ref 4–15)
NEUTROPHILS # BLD AUTO: 2.8 K/UL (ref 1.8–7.7)
NEUTROPHILS NFR BLD: 47.1 % (ref 38–73)
NONHDLC SERPL-MCNC: 78 MG/DL
NRBC BLD-RTO: 0 /100 WBC
PLATELET # BLD AUTO: 221 K/UL (ref 150–450)
PMV BLD AUTO: 9.2 FL (ref 9.2–12.9)
POTASSIUM SERPL-SCNC: 4.6 MMOL/L (ref 3.5–5.1)
PROT SERPL-MCNC: 6.4 G/DL (ref 6–8.4)
RBC # BLD AUTO: 4 M/UL (ref 4.6–6.2)
SODIUM SERPL-SCNC: 137 MMOL/L (ref 136–145)
T4 FREE SERPL-MCNC: 0.83 NG/DL (ref 0.71–1.51)
TRIGL SERPL-MCNC: 90 MG/DL (ref 30–150)
TSH SERPL DL<=0.005 MIU/L-ACNC: 6.14 UIU/ML (ref 0.4–4)
WBC # BLD AUTO: 5.99 K/UL (ref 3.9–12.7)

## 2023-11-20 PROCEDURE — 3066F PR DOCUMENTATION OF TREATMENT FOR NEPHROPATHY: ICD-10-PCS | Mod: CPTII,S$GLB,, | Performed by: STUDENT IN AN ORGANIZED HEALTH CARE EDUCATION/TRAINING PROGRAM

## 2023-11-20 PROCEDURE — 84439 ASSAY OF FREE THYROXINE: CPT | Performed by: FAMILY MEDICINE

## 2023-11-20 PROCEDURE — 3066F NEPHROPATHY DOC TX: CPT | Mod: CPTII,S$GLB,, | Performed by: STUDENT IN AN ORGANIZED HEALTH CARE EDUCATION/TRAINING PROGRAM

## 2023-11-20 PROCEDURE — 80053 COMPREHEN METABOLIC PANEL: CPT | Performed by: FAMILY MEDICINE

## 2023-11-20 PROCEDURE — 3044F PR MOST RECENT HEMOGLOBIN A1C LEVEL <7.0%: ICD-10-PCS | Mod: CPTII,S$GLB,, | Performed by: STUDENT IN AN ORGANIZED HEALTH CARE EDUCATION/TRAINING PROGRAM

## 2023-11-20 PROCEDURE — 99999 PR PBB SHADOW E&M-EST. PATIENT-LVL III: ICD-10-PCS | Mod: PBBFAC,,, | Performed by: STUDENT IN AN ORGANIZED HEALTH CARE EDUCATION/TRAINING PROGRAM

## 2023-11-20 PROCEDURE — 80061 LIPID PANEL: CPT | Performed by: FAMILY MEDICINE

## 2023-11-20 PROCEDURE — 1159F PR MEDICATION LIST DOCUMENTED IN MEDICAL RECORD: ICD-10-PCS | Mod: CPTII,S$GLB,, | Performed by: STUDENT IN AN ORGANIZED HEALTH CARE EDUCATION/TRAINING PROGRAM

## 2023-11-20 PROCEDURE — 3078F DIAST BP <80 MM HG: CPT | Mod: CPTII,S$GLB,, | Performed by: STUDENT IN AN ORGANIZED HEALTH CARE EDUCATION/TRAINING PROGRAM

## 2023-11-20 PROCEDURE — 99214 PR OFFICE/OUTPT VISIT, EST, LEVL IV, 30-39 MIN: ICD-10-PCS | Mod: S$GLB,,, | Performed by: STUDENT IN AN ORGANIZED HEALTH CARE EDUCATION/TRAINING PROGRAM

## 2023-11-20 PROCEDURE — 85025 COMPLETE CBC W/AUTO DIFF WBC: CPT | Performed by: FAMILY MEDICINE

## 2023-11-20 PROCEDURE — 84443 ASSAY THYROID STIM HORMONE: CPT | Performed by: FAMILY MEDICINE

## 2023-11-20 PROCEDURE — 3061F PR NEG MICROALBUMINURIA RESULT DOCUMENTED/REVIEW: ICD-10-PCS | Mod: CPTII,S$GLB,, | Performed by: STUDENT IN AN ORGANIZED HEALTH CARE EDUCATION/TRAINING PROGRAM

## 2023-11-20 PROCEDURE — 3074F SYST BP LT 130 MM HG: CPT | Mod: CPTII,S$GLB,, | Performed by: STUDENT IN AN ORGANIZED HEALTH CARE EDUCATION/TRAINING PROGRAM

## 2023-11-20 PROCEDURE — 3044F HG A1C LEVEL LT 7.0%: CPT | Mod: CPTII,S$GLB,, | Performed by: STUDENT IN AN ORGANIZED HEALTH CARE EDUCATION/TRAINING PROGRAM

## 2023-11-20 PROCEDURE — 36415 COLL VENOUS BLD VENIPUNCTURE: CPT | Performed by: FAMILY MEDICINE

## 2023-11-20 PROCEDURE — 3008F BODY MASS INDEX DOCD: CPT | Mod: CPTII,S$GLB,, | Performed by: STUDENT IN AN ORGANIZED HEALTH CARE EDUCATION/TRAINING PROGRAM

## 2023-11-20 PROCEDURE — 99214 OFFICE O/P EST MOD 30 MIN: CPT | Mod: S$GLB,,, | Performed by: STUDENT IN AN ORGANIZED HEALTH CARE EDUCATION/TRAINING PROGRAM

## 2023-11-20 PROCEDURE — 1160F PR REVIEW ALL MEDS BY PRESCRIBER/CLIN PHARMACIST DOCUMENTED: ICD-10-PCS | Mod: CPTII,S$GLB,, | Performed by: STUDENT IN AN ORGANIZED HEALTH CARE EDUCATION/TRAINING PROGRAM

## 2023-11-20 PROCEDURE — 3061F NEG MICROALBUMINURIA REV: CPT | Mod: CPTII,S$GLB,, | Performed by: STUDENT IN AN ORGANIZED HEALTH CARE EDUCATION/TRAINING PROGRAM

## 2023-11-20 PROCEDURE — 1159F MED LIST DOCD IN RCRD: CPT | Mod: CPTII,S$GLB,, | Performed by: STUDENT IN AN ORGANIZED HEALTH CARE EDUCATION/TRAINING PROGRAM

## 2023-11-20 PROCEDURE — 99999 PR PBB SHADOW E&M-EST. PATIENT-LVL III: CPT | Mod: PBBFAC,,, | Performed by: STUDENT IN AN ORGANIZED HEALTH CARE EDUCATION/TRAINING PROGRAM

## 2023-11-20 PROCEDURE — 4010F PR ACE/ARB THEARPY RXD/TAKEN: ICD-10-PCS | Mod: CPTII,S$GLB,, | Performed by: STUDENT IN AN ORGANIZED HEALTH CARE EDUCATION/TRAINING PROGRAM

## 2023-11-20 PROCEDURE — 1160F RVW MEDS BY RX/DR IN RCRD: CPT | Mod: CPTII,S$GLB,, | Performed by: STUDENT IN AN ORGANIZED HEALTH CARE EDUCATION/TRAINING PROGRAM

## 2023-11-20 PROCEDURE — 4010F ACE/ARB THERAPY RXD/TAKEN: CPT | Mod: CPTII,S$GLB,, | Performed by: STUDENT IN AN ORGANIZED HEALTH CARE EDUCATION/TRAINING PROGRAM

## 2023-11-20 PROCEDURE — 3008F PR BODY MASS INDEX (BMI) DOCUMENTED: ICD-10-PCS | Mod: CPTII,S$GLB,, | Performed by: STUDENT IN AN ORGANIZED HEALTH CARE EDUCATION/TRAINING PROGRAM

## 2023-11-20 PROCEDURE — 3074F PR MOST RECENT SYSTOLIC BLOOD PRESSURE < 130 MM HG: ICD-10-PCS | Mod: CPTII,S$GLB,, | Performed by: STUDENT IN AN ORGANIZED HEALTH CARE EDUCATION/TRAINING PROGRAM

## 2023-11-20 PROCEDURE — 3078F PR MOST RECENT DIASTOLIC BLOOD PRESSURE < 80 MM HG: ICD-10-PCS | Mod: CPTII,S$GLB,, | Performed by: STUDENT IN AN ORGANIZED HEALTH CARE EDUCATION/TRAINING PROGRAM

## 2023-11-20 RX ORDER — PREGABALIN 200 MG/1
200 CAPSULE ORAL 3 TIMES DAILY
Qty: 90 CAPSULE | Refills: 2 | Status: SHIPPED | OUTPATIENT
Start: 2023-11-20 | End: 2024-03-14 | Stop reason: SDUPTHER

## 2023-11-20 NOTE — PROGRESS NOTES
[facility] - New Patient Evaluation    Referred by: No ref. provider found   Reason for referral: Lumbar radiculopathy     CC: No chief complaint on file.        6/20/2023    11:05 AM 10/8/2019    10:53 AM 9/6/2019    10:34 AM   Last 3 PDI Scores   Pain Disability Index (PDI) 60 68 70       Interval Hx:  11/20/2023 - Pt presents for follow up of neck and pack pain.  He is status post C5-7 ACDF with Dr Washington on 08/11/2023.  He reports improvement in his neck and arm pain following the surgery.  He reports he has been cleared by Neurosurgery to start physical therapy.  Today he complains of pain in the low back that radiates down the bilateral lower extremities to the feet.  He is using a rolling walker to ambulate.  He continues to take Lyrica 200 mg b.i.d. with some improvement.  No loss of bowel or bladder control, no saddle anesthesia.    Subjective 06/20/2023:   Fabiano Malik Jr. is a 62 y.o. male who presents complaining of neck pain that radiates into the right arm down to the hand.  He reports numbness in the right arm and hand. He has previously undergone right carpal tunnel release and ulnar nerve decompression in 2022 with no improvement in his arm pain.  He has been evaluated by Dr Washington with NSGY and is currently scheduled for cervical fusion surgery on 7/14/2023. He was recently started on Lyrica 100 mg BID.  He has not noticed a difference in his pain with this medication and denies any side effects.     Location: right arm,neck,back   Onset: years  Current Pain Score: 10/10  Daily Pain of Range: 6-10/10  Quality: Aching, Throbbing, Grabbing, Tingling, Numb, Sharp, and Hot  Radiation: right shoulder,back,neck,legs  Worsened by: extension, flexion, lying down, sitting, standing for more than 1 minutes, walking for more than 1 minutes, and daily activity  Improved by: nothing    Patient denies urinary incontinence, bowel incontinence, significant weight loss, and significant  motor weakness.    Previous Interventions:  - 09/2019 -Right L4/5 and L5/S1 TFESI - [place]  - 02/2019 - RFA L2, 3, 4, 5 - [place]    Previous Therapies:  PT/OT:   Chiropractor:   HEP:   Relevant Surgery: yes   Many years ago - C4-5 anterior fusion   08/11/2023 - C5-7 ACDF with Dr Washington   2022 - right carpal tunnel release and ulnar nerve decompression  Previous Medications:   - NSAIDS: meloxicam  - Muscle Relaxants:    - TCAs:   - SNRIs: Cymbalta  - Topicals:   - Anticonvulsants: lyrica   - Opioids:   - Adjuvants:     Current Pain Medications:  Lyrica  Cymbalta     Review of Systems:  ROS     GENERAL:  No weight loss, malaise or fevers. +DM type II  HEENT:   No recent changes in vision or hearing  NECK:  No difficulty with swallowing. No stridor.   RESPIRATORY:  Negative for cough, wheezing or shortness of breath, patient denies any recent URI.  CARDIOVASCULAR:  Negative for chest pain, leg swelling or palpitations.  +HTN  HLD  GI:  Negative for abdominal discomfort, blood in stools or black stools or change in bowel habits.  MUSCULOSKELETAL:  See HPI.  SKIN:  Negative for lesions, rash, and itching.  PSYCH:  No mood disorder or recent psychosocial stressors.  +Depression, Schizophrenia   HEMATOLOGY/LYMPHOLOGY:  Negative for prolonged bleeding, bruising easily or swollen nodes.  Patient is not currently taking any anti-coagulants  NEURO:   No history of headaches, syncope, paralysis, seizures or tremors.  All other reviewed and negative other than HPI.    History:  Current medications, allergies, medical history, surgical history,   family history, and social history were reviewed in the chart as marked.    Full Medication List:    Current Outpatient Medications:     ARIPiprazole (ABILIFY) 5 MG Tab, Take 1 tablet (5 mg total) by mouth once daily., Disp: 30 tablet, Rfl: 11    BLOOD PRESSURE CUFF Misc, 1 Units by Misc.(Non-Drug; Combo Route) route once daily., Disp: 1 each, Rfl: 0    blood sugar diagnostic (TRUE  METRIX GLUCOSE TEST STRIP) Strp, use 1 strip to check glucose 2 (two) times a day., Disp: 200 strip, Rfl: 6    COVID [id number],,[id number],,PF, (SPIKEVAX [id number],12Y UP,,PF,) 50 mcg/0.5 mL injection, Inject into the muscle., Disp: 0.5 mL, Rfl: 0    dulaglutide (TRULICITY) 0.75 mg/0.5 mL pen injector, Inject 0.75 mg (one pen) into the skin every 7 days., Disp: 12 pen , Rfl: 1    DULoxetine (CYMBALTA) 60 MG capsule, Take 2 capsules (120 mg total) by mouth every morning., Disp: 60 capsule, Rfl: 11    hydrOXYzine pamoate (VISTARIL) 50 MG Cap, TAKE 1 CAPSULE (50 MG) BY MOUTH NIGHTLY AS NEEDED FOR INSOMNIA (Patient taking differently: Take 50 mg by mouth nightly as needed (insomnia).), Disp: 90 capsule, Rfl: 3    ibuprofen (ADVIL,MOTRIN) 800 MG tablet, Take 1 tablet by mouth every 8 hours as needed for pain, Disp: 20 tablet, Rfl: 0    lancets 30 gauge Misc, 1 lancet by Misc.(Non-Drug; Combo Route) route twice daily., Disp: 200 each, Rfl: 6    metFORMIN (GLUCOPHAGE) 1000 MG tablet, Take 1 tablet (1,000 mg total) by mouth 2 (two) times daily with meals., Disp: 60 tablet, Rfl: 0    methocarbamoL (ROBAXIN) 750 MG Tab, Take 1 tablet (750 mg total) by mouth 3 (three) times daily as needed (muscle spasms)., Disp: 60 tablet, Rfl: 0    olmesartan (BENICAR) 20 MG tablet, Take 1 tablet (20 mg total) by mouth once daily., Disp: 90 tablet, Rfl: 0    omeprazole (PRILOSEC) 20 MG capsule, Take 1 capsule (20 mg total) by mouth before breakfast., Disp: 90 capsule, Rfl: 3    oxyCODONE-acetaminophen (PERCOCET)  mg per tablet, Take 1 tablet by mouth every 8 (eight) hours as needed for Pain., Disp: 60 tablet, Rfl: 0    pravastatin (PRAVACHOL) 40 MG tablet, Take 1 tablet (40 mg total) by mouth once daily., Disp: 90 tablet, Rfl: 3    sildenafiL (VIAGRA) 100 MG tablet, Take 1 tablet (100 mg total) by mouth daily as needed for Erectile Dysfunction., Disp: 30 tablet, Rfl: 3    blood-glucose meter Misc, Use as instructed, Disp: 1 each, Rfl:  "0    pregabalin (LYRICA) 100 MG capsule, Take 2 capsules (200 mg total) by mouth 3 (three) times daily., Disp: 180 capsule, Rfl: 2     Allergies:  Pcn [penicillins]     Medical History:   has a past medical history of Chronic back pain greater than 3 months duration, Depression, Diabetes mellitus, Diabetes mellitus, type 2, Hypertension, and Schizophrenia.    Surgical History:   has a past surgical history that includes Neck surgery; Appendectomy; Radiofrequency ablation of lumbar medial branch nerve at single level (Left, 5/29/2018); Colonoscopy (N/A, 5/31/2018); Radiofrequency ablation of lumbar medial branch nerve at single level (Right, 1/8/2019); Radiofrequency ablation of lumbar medial branch nerve at single level (Left, 3/12/2019); Transforaminal epidural injection of steroid (Right, 9/12/2019); Colonoscopy (N/A, 11/17/2022); Carpal tunnel release (Right, 12/13/2022); decompression, nerve, ulnar (Right, 12/13/2022); and fusion, spine, cervical (N/A, 8/11/2023).    Family History:  family history includes Alzheimer's disease in his mother and sister; Cancer in his father; Diabetes in his mother; Heart attack in his father; Heart defect in his father and sister; Stroke in his father.    Social History:   reports that he has never smoked. He has never used smokeless tobacco. He reports current alcohol use. He reports that he does not use drugs.    Physical Exam:  Vitals:    11/20/23 1124   BP: 121/75   Pulse: 73   Weight: 107.5 kg (236 lb 14.2 oz)   Height: 6' 1" (1.854 m)   PainSc: 10-Worst pain ever   PainLoc: Back       GENERAL: Well appearing, in no acute distress, alert and oriented x3.  PSYCH:  Mood and affect appropriate.  SKIN: Skin color, texture, turgor normal, no rashes or lesions.  HEAD/FACE:  Normocephalic, atraumatic. Cranial nerves grossly intact.  NECK: Healed surgical scar noted to anterior neck. Supple.   CV: RRR with palpation of the radial artery.  PULM: No evidence of respiratory difficulty, " symmetric chest rise.  GI:  Soft and non-distended.  MSK: +SLR with modified SLR. No obvious deformities, edema, or skin discoloration.  No atrophy or tone abnormalities are noted.   NEURO: Altered sensation noted over all 5 digits of the right hand compared to left.   MENTAL STATUS: A x O x 3, good concentration, speech is fluent and goal directed  MOTOR: 5/5 in b/l LE muscle groups  GAIT: Ambulates with a rolling walker.    Imaging:  MRI CERVICAL SPINE WITHOUT CONTRAST     CLINICAL HISTORY:  Spinal stenosis, cervical;.  Spinal stenosis, cervical region     TECHNIQUE:  Multiplanar, multisequence MR images of the cervical spine were acquired without the administration of contrast.     COMPARISON:  MRI cervical spine dated 08/08/2013     FINDINGS:  Prior fusion changes at the C4-C5 vertebral bodies.  Remaining vertebral body heights appear maintained.  No infiltrative T1 marrow process.  No abnormal cord STIR edema.  Visualized brainstem and cerebellum are unremarkable.  Prevertebral and paraspinal soft tissues demonstrate no acute abnormality     C2-C3: Mild posterior disc osteophyte with left greater than right facet DJD.  No significant neural foraminal encroachment or spinal canal stenosis.     C3-C4: Posterior disc osteophyte and facet DJD.  Thinning with near effacement of the ventral thecal sac.  Posterior subarachnoid space is maintained.  No significant neural foraminal encroachment.     C4-C5: Vertebral body fusion without significant spinal canal stenosis or neural foraminal impingement.     C5-C6: Posterior disc osteophyte with uncovertebral spurring and facet DJD.  Effacement of the ventral thecal sac with abutment and slight indentation of the anterior cord.  Thinning without effacement of the posterior subarachnoid space.  Severe right and mild-moderate left neural foraminal narrowing.     C6-C7: Posterior disc osteophyte with uncovertebral spurring and facet DJD.  Effacement of the ventral thecal sac  with abutment and slight indentation of the right paracentral anterior cord.  Thinning with near effacement of the posterior subarachnoid space.  Severe neural foraminal narrowing.     C7-T1: No significant spinal canal stenosis or neural foraminal impingement.     Impression:     Cervical spondylosis noting levels of lower cervical spinal canal stenosis and variable neural foraminal narrowing as detailed.     Electronically signed by: Armin Motley  Date:                                            05/31/2023    XR CERVICAL SPINE AP LAT WITH FLEX EXTEN     CLINICAL HISTORY:  Spinal stenosis, cervical region     TECHNIQUE:  Three views of the cervical spine plus flexion and extension views were performed.     COMPARISON:  05/05/2022     FINDINGS:  Visualization to the level of C6-C7 on lateral view.  Similar fusion changes across C4-C5.  Additional cervical discogenic change with marginal spurring elsewhere.  Straightening of the normal cervical lordosis.  Lateral masses appear well seated.  There is minimal grade 1 anterolisthesis at C2-C3 and C3-C4 with flexion, reduced with extension.  Normal precervical soft tissue thickness.        Electronically signed by: Armin Motley  Date:                                            05/31/2023      Labs:  BMP  Lab Results   Component Value Date     11/20/2023    K 4.6 11/20/2023     11/20/2023    CO2 23 11/20/2023    BUN 23 11/20/2023    CREATININE 1.3 11/20/2023    CALCIUM 9.1 11/20/2023    ANIONGAP 9 11/20/2023    EGFRNORACEVR >60 11/20/2023     Lab Results   Component Value Date    ALT 18 11/20/2023    AST 23 11/20/2023    ALKPHOS 55 11/20/2023    BILITOT 0.5 11/20/2023     Lab Results   Component Value Date    WBC 5.99 11/20/2023    HGB 11.0 (L) 11/20/2023    HCT 33.6 (L) 11/20/2023    MCV 84 11/20/2023     11/20/2023         Assessment:  Problem List Items Addressed This Visit          Neuro    Chronic pain    Relevant Medications    pregabalin  (LYRICA) 100 MG capsule    Lumbar radiculopathy - Primary       Orthopedic    Chronic back pain     Other Visit Diagnoses       Cervical radiculopathy        Relevant Medications    pregabalin (LYRICA) 100 MG capsule    Type 2 diabetes mellitus with diabetic polyneuropathy, without long-term current use of insulin        Relevant Medications    pregabalin (LYRICA) 100 MG capsule          06/20/2023 - Fabiano Malik Jr. presents with pain consistent with right cervical radiculopathy.  He is currently scheduled for c-spine fusion surgery on  07/14/2023 with Dr. Washington.  I do not recommend any interventional procedures at this time as there steroid could affect healing and infection risk. Discussed optimizing his Lyrica.     11/20/2023 -  Fabiano Malik Jr. presents today for follow up of his low back pain.  He endorses radicular symptoms in the bilateral lower extremities.  An MRI of the lumbar spine has been ordered by Neurosurgery and is pending completion.  Recommend starting physical therapy.  Increase Lyrica to 200 mg t.i.d..  Follow up in clinic once MRI of the lumbar spine is completed to discuss interventional options.  Close follow up with Neurosurgery.    Treatment Plan:   Procedures: None at this time.  We will review lumbar MRI once completed.  May benefit from epidural steroid injection.  PT/OT/HEP: I have stressed the importance of physical activity and a home exercise plan to help with pain and improve health. He has been cleared by NSGY to starting PT.   Medications:    - Increase Lyrica to 200 mg TID.    -  Reviewed and consistent with medication use as prescribed.  Imaging: MRI Lumbar spine ordered per NSGY.   Follow Up: F/u after MRI Lumbar is completed to discuss imaging results     Zoila Suarez,    Interventional Pain Medicine / Anesthesiology    Disclaimer: This note was partly generated using dictation software which may occasionally result in transcription errors.

## 2023-11-22 ENCOUNTER — TELEPHONE (OUTPATIENT)
Dept: PAIN MEDICINE | Facility: CLINIC | Age: 63
End: 2023-11-22
Payer: MEDICARE

## 2023-11-22 NOTE — TELEPHONE ENCOUNTER
----- Message from Siobhan Martinez sent at 11/22/2023  3:34 PM CST -----  Type:  Needs Medical Advice    Who Called:  Pt    Would the patient rather a call back or a response via [facility]?  call  Best Call Back Number:   [phone]  Additional Information:  Pt states that he was prescribed a cream after his neck surgery and pt states it is burning.  Pt would like a call back on what next steps to take.

## 2023-11-28 ENCOUNTER — CLINICAL SUPPORT (OUTPATIENT)
Dept: SPEECH THERAPY | Facility: HOSPITAL | Age: 63
End: 2023-11-28
Payer: MEDICARE

## 2023-11-28 DIAGNOSIS — R13.10 DYSPHAGIA, UNSPECIFIED TYPE: ICD-10-CM

## 2023-11-28 DIAGNOSIS — R13.14 PHARYNGOESOPHAGEAL DYSPHAGIA: Primary | ICD-10-CM

## 2023-11-28 DIAGNOSIS — J38.01 UNILATERAL COMPLETE VOCAL FOLD PARALYSIS: ICD-10-CM

## 2023-11-28 DIAGNOSIS — R49.0 DYSPHONIA: ICD-10-CM

## 2023-11-28 DIAGNOSIS — J38.01 UNILATERAL VOCAL FOLD PARALYSIS: ICD-10-CM

## 2023-11-28 DIAGNOSIS — R49.0 DYSPHONIA: Primary | ICD-10-CM

## 2023-11-28 DIAGNOSIS — R13.14 PHARYNGOESOPHAGEAL DYSPHAGIA: ICD-10-CM

## 2023-11-28 PROCEDURE — 92524 BEHAVRAL QUALIT ANALYS VOICE: CPT | Mod: GN,95

## 2023-11-28 NOTE — PLAN OF CARE
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 Patient will continue with slightly modiefied diet with added moisture to dry foods, small bites, thorough chewing, slight chin tuck with breath hold. Avoid tilting head back with liquids and or pills.  Rec 2-6 additional swallow therapy sessions as needed.       PLAN OF CARE:  1.  Continuation of slightly modified diet with thin liquids and added moisture to dry solids using trained strategies of effortful swallow with slight chin tuck, follow aspiration precautions, including, but not limited to monitoring for any signs/symptoms of aspiration (such as wet/gurgly voice that does not clear with coughing, inability to make any voice sounds, any persistent coughing with oral intake, otherwise unexplained fever, unexplained increased or new difficulty or discomfort breathing, unexplained increase in  sleepiness/lethargy/significant fatigue, unexplained increase or new onset confusion or change in cognitive functioning, or any other unexplained change in health or well-being that could be related to swallowing).  2.  Employ swallow strategies as trained   3.  Practice 3-5x daily oral hygiene   4.   Monitor weight and hydration    Long-term goals:  Fabiano Malik will continue with oral intake of regular or slightly modified diet with minimal to no sign/symptoms of dysphagia while maintaining weight and hydration needs.    Short-term objectives:  Pt leaves with home program to include:  1. Oral care program   2. Employ swallow strategies to improve swallow function  3. F/u in 2 weeks for monitoring of voice and further swallow therapy

## 2023-11-28 NOTE — PROGRESS NOTES
Referring provider: Massiel Lund  Reason for visit:  Behavioral and qualitative analysis of voice and resonance (CPT 17089)    The patient location is: [place], [place]  The chief complaint leading to consultation is: dysphonia  Visit type: Virtual visit with synchronous audio and video  Face to Face time with patient: 50  60 minutes of total time spent on the encounter, which includes face to face time and non-face to face time preparing to see the patient (eg, review of tests), Obtaining and/or reviewing separately obtained history, Documenting clinical information in the electronic or other health record, Independently interpreting results (not separately reported) and communicating results to the patient/family/caregiver, or Care coordination (not separately reported).   Each patient to whom he or she provides medical services by telemedicine is:  (1) informed of the relationship between the physician and patient and the respective role of any other health care provider with respect to management of the patient; and (2) notified that he or she may decline to receive medical services by telemedicine and may withdraw from such care at any time.      Subjective / History    Fabiano Malik Jr. is a 62 y.o. male referred for voice evaluation (CPT 63100) by Massiel Lund PA-C.   He has known history of right vocal fold immobility, potentially recoverable, following spinal fusion surgery in 8/2023. His voice has improved significantly, Dr Mccord's findings 10/30/23 were promising with mild inconsistent limited abduction and complete closure. At present, he feels voice is very functional with minimal difficulty other than pitch perhaps being slightly higher. His swallowing function has also improved, but he still notes occasional obstructive dysphagia. He describes difficulty swallowing that has improved, but now at a standstill. Liquids make him cough, every time drinks. Pills and solids feel like get stuck  "in mid throat. Avoiding meat. No weight loss at present, initially may have lost about 10 lbs. Throat feels kind of scratchy throughout throat. "Choking" is a bigger issue than voice. Medication and problem lists were reviewed.     Swallow: As noted above and below in MBSS  Breathing: throat clearing         Stroboscopy findings per Dr. Mccord  10/30/23  All findings were normal except:  - mild right vocal fold motion impairment; resting paramedian, interval improvement in tone  - full adduction, very mild, inconsistently limited abduction  - complete closure, pliability intact  - mild concentric supraglottic hyperfunction      MBSS 10/31/2023  Impressions    Pt presents with improved pharyngeal swallow compared to previous MBSS conducted in 8/2023 following ACDF procedure (C 5-7). Pt now with mild-mod pharyngeal dysphagia 2/2 cont PPW edema which led to increased pharyngeal stasis, however, no instances of penetration or aspiration noted.       Prognosis: Good     Barriers:  None     Plan  ST recs advance diet to regular, easy to chew and cont thin liquids. Okay for initiation of straw with thin liquids. Strict adherence to the following swallowing precautions during intake:     -Upright for all meals  -Small bites/sips  -Single sips  -Alternate bites/sips every bite    MBSS 8/29/23  mpressions    Pt presents with improved pharyngeal swallowing since initial MBSS on 8/17/23, however, pt with cont mod pharyngeal dysphagia that is c/b by the following: dcr base of tongue retraction with subsequent delayed swallow initiation, inconsistent epiglottic inversion led to x1 instance of aspiration of thin liquids via straw, dcr hyolaryngeal excursion and variable pharyngeal stasis which was impacted by pt's cont PPW edema 2/2 recent ACDF.         Prognosis: Good     Barriers:  None     Plan  ST recs advance diet to minced with thin liquids via CUP-EDGE ONLY. NO STRAWS. Strict adherence to the following swallowing " precautions during intake:     -Upright for all meals  -Small bites/sips  -Single sips  -NO straws  -Alternate bites/sips every 2 bites   -Double swallows with each bite    Past Medical History:   Diagnosis Date    Chronic back pain greater than 3 months duration     Depression     Diabetes mellitus     Diabetes mellitus, type 2     Hypertension     Schizophrenia      Current Outpatient Medications on File Prior to Visit   Medication Sig Dispense Refill    ARIPiprazole (ABILIFY) 5 MG Tab Take 1 tablet (5 mg total) by mouth once daily. 30 tablet 11    BLOOD PRESSURE CUFF Misc 1 Units by Misc.(Non-Drug; Combo Route) route once daily. 1 each 0    blood sugar diagnostic (TRUE METRIX GLUCOSE TEST STRIP) Strp use 1 strip to check glucose 2 (two) times a day. 200 strip 6    blood-glucose meter Misc Use as instructed 1 each 0    COVID [id number],,andu,,PF, (SPIKEVAX [id number],12Y UP,,PF,) 50 mcg/0.5 mL injection Inject into the muscle. 0.5 mL 0    dulaglutide (TRULICITY) 0.75 mg/0.5 mL pen injector Inject 0.75 mg (one pen) into the skin every 7 days. 12 pen 1    DULoxetine (CYMBALTA) 60 MG capsule Take 2 capsules (120 mg total) by mouth every morning. 60 capsule 11    hydrOXYzine pamoate (VISTARIL) 50 MG Cap TAKE 1 CAPSULE (50 MG) BY MOUTH NIGHTLY AS NEEDED FOR INSOMNIA (Patient taking differently: Take 50 mg by mouth nightly as needed (insomnia).) 90 capsule 3    ibuprofen (ADVIL,MOTRIN) 800 MG tablet Take 1 tablet by mouth every 8 hours as needed for pain 20 tablet 0    lancets 30 gauge Misc 1 lancet by Misc.(Non-Drug; Combo Route) route twice daily. 200 each 6    metFORMIN (GLUCOPHAGE) 1000 MG tablet Take 1 tablet (1,000 mg total) by mouth 2 (two) times daily with meals. 60 tablet 0    methocarbamoL (ROBAXIN) 750 MG Tab Take 1 tablet (750 mg total) by mouth 3 (three) times daily as needed (muscle spasms). 60 tablet 0    olmesartan (BENICAR) 20 MG tablet Take 1 tablet (20 mg total) by mouth once daily. 90 tablet 0     omeprazole (PRILOSEC) 20 MG capsule Take 1 capsule (20 mg total) by mouth before breakfast. 90 capsule 3    oxyCODONE-acetaminophen (PERCOCET)  mg per tablet Take 1 tablet by mouth every 8 (eight) hours as needed for Pain. 60 tablet 0    pravastatin (PRAVACHOL) 40 MG tablet Take 1 tablet (40 mg total) by mouth once daily. 90 tablet 3    pregabalin (LYRICA) 100 MG capsule Take 2 capsules (200 mg total) by mouth 3 (three) times daily. 180 capsule 2    sildenafiL (VIAGRA) 100 MG tablet Take 1 tablet (100 mg total) by mouth daily as needed for Erectile Dysfunction. 30 tablet 3     No current facility-administered medications on file prior to visit.       Objective    Perceptual/behavioral assessment  -Quality: baker/pulse mode phonation  -Volume: appropriate for age and gender  -Pitch: low F0  -Flexibility: diminished mild  -Habitual respiratory pattern: breath holding      Voice Handicap Index-10   Score: 4    Functional Oral Intake Scale:   6 - Total oral intake with no special preparation, but must avoid specific foods or liquid items    Eating Assessment Tool (EAT-10)  0 = No Problem 4 = Severe Problem   My swallowing problem has caused me to lose weight. 0 1 2 3 4   My swallowing problem interferes with my ability to go out for meals. 0 1 2 3 4   Swallowing liquids takes extra effort. 0 1 2 3 4   Swallowing solids takes extra effort. 0 1 2 3 4   Swallowing pills takes extra effort. 0 1 2 3 4   Swallowing is painful. 0 1 2 3 4   The pleasure of eating is affected by my swallowing. 0 1 2 3 4   When I swallow food sticks in my throat. 0 1 2 3 4   I cough when I eat. 0 1 2 3 4   Swallowing is stressful. 0 1 2 3 4   Score: 26  EAT-10 score of 3 or higher may indicate problems swallowing efficiently and safely.     Clinical Evaluation/Treatment: This is a well developed, well nourished male who presents in no acute distress. The patient is fully oriented, affect is flat. Oral mechanism examination reveals  "articulators to have range, speed and coordination of movement WFL for speech and swallow tasks. Voice today is essentially WNL, slightly low fo with slight baker at times. Overall, intensity is WNL.    Education / Stimulability Trials  Education provided on Dr. Mccord's laryngeal videostroboscopy findings as they pertain to voice production, the patient's diagnosis, associated symptoms, rationale for voice therapy and plan of care for management of dysphonia. Review of MBSS studies with explanation of pt c/o pharyngeal stage stasis and "choking." Discussed importance of vocal hygiene including: hydration, reducing caffeine/drying agents and reducing throat clearing, coughing, other phonotraumatic behaviors. Recommended steam and dry mouth lozenges. Patient relays that at this visit, he would prefer to focus on dysphagia and any strategies vs voice training and exercise. Following review of MBSS videos was amenable to all suggestions, patient participated in discussion of normal swallow function vs disordered due to findings of posterior pharyngeal wall edema on MBSS. Strategies trained to include slight chin with effortful swallow with all consistencies. Rec taking medication with applesauce or a thicker liquid than water. Continue with current practice of small bites and sips. Can return to meat given small bites, thorough chewing, added moisture to meat and strategies of slight chin tuck and effortful swallow. He relays good understanding and ability to perform following model.     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 Patient will continue with slightly modiefied diet with added moisture to dry foods, small bites, thorough chewing, slight chin tuck with breath hold. Avoid tilting head back with liquids and or " pills.  Rec 2-6 additional swallow therapy sessions as needed.       PLAN OF CARE:  1.  Continuation of slightly modified diet with thin liquids and added moisture to dry solids using trained strategies of effortful swallow with slight chin tuck, follow aspiration precautions, including, but not limited to monitoring for any signs/symptoms of aspiration (such as wet/gurgly voice that does not clear with coughing, inability to make any voice sounds, any persistent coughing with oral intake, otherwise unexplained fever, unexplained increased or new difficulty or discomfort breathing, unexplained increase in  sleepiness/lethargy/significant fatigue, unexplained increase or new onset confusion or change in cognitive functioning, or any other unexplained change in health or well-being that could be related to swallowing).  2.  Employ swallow strategies as trained   3.  Practice 3-5x daily oral hygiene   4.   Monitor weight and hydration    Long-term goals:  Fabiano Malik will continue with oral intake of regular or slightly modified diet with minimal to no sign/symptoms of dysphagia while maintaining weight and hydration needs.    Short-term objectives:  Pt leaves with home program to include:  1. Oral care program   2. Employ swallow strategies to improve swallow function  3. F/u in 2 weeks for monitoring of voice and further swallow therapy

## 2023-11-28 NOTE — PATIENT INSTRUCTIONS
As discussed:  Use slight chin tuck with eating and drinking  Avoid tilting head back when swallowing liquids or pills, you may use a straw as it may be helpful in maintaining a neutral posture or slight chin tuck  Take medications with applesauce or thicker liquid than water such as chocolate milk  Use slight chin tuck and breath hold  Add moisture to meat and dry solids, continue with small well chewed bites followed by sips of liquids

## 2023-12-05 ENCOUNTER — HOSPITAL ENCOUNTER (OUTPATIENT)
Dept: RADIOLOGY | Facility: HOSPITAL | Age: 63
Discharge: HOME | End: 2023-12-05
Attending: NEUROLOGICAL SURGERY
Payer: MEDICARE

## 2023-12-05 ENCOUNTER — TELEPHONE (OUTPATIENT)
Dept: FAMILY MEDICINE | Facility: CLINIC | Age: 63
End: 2023-12-05
Payer: MEDICARE

## 2023-12-05 ENCOUNTER — TELEPHONE (OUTPATIENT)
Dept: NEUROSURGERY | Facility: CLINIC | Age: 63
End: 2023-12-05
Payer: MEDICARE

## 2023-12-05 DIAGNOSIS — M54.9 DORSALGIA, UNSPECIFIED: ICD-10-CM

## 2023-12-05 DIAGNOSIS — E03.9 HYPOTHYROIDISM, UNSPECIFIED TYPE: Primary | ICD-10-CM

## 2023-12-05 PROCEDURE — 72148 MRI LUMBAR SPINE W/O DYE: CPT | Mod: 26,,, | Performed by: RADIOLOGY

## 2023-12-05 PROCEDURE — 72148 MRI LUMBAR SPINE WITHOUT CONTRAST: ICD-10-PCS | Mod: 26,,, | Performed by: RADIOLOGY

## 2023-12-05 PROCEDURE — 72148 MRI LUMBAR SPINE W/O DYE: CPT | Mod: TC

## 2023-12-05 RX ORDER — LEVOTHYROXINE SODIUM 25 UG/1
25 TABLET ORAL
Qty: 90 TABLET | Refills: 0 | Status: SHIPPED | OUTPATIENT
Start: 2023-12-05 | End: 2024-02-07 | Stop reason: SDUPTHER

## 2023-12-05 RX ORDER — OXYCODONE AND ACETAMINOPHEN 10; 325 MG/1; MG/1
1 TABLET ORAL EVERY 8 HOURS PRN
Qty: 60 TABLET | Refills: 0 | Status: SHIPPED | OUTPATIENT
Start: 2023-12-05 | End: 2024-01-03 | Stop reason: SDUPTHER

## 2023-12-05 NOTE — TELEPHONE ENCOUNTER
Patient notified lab results. Patient was informed medicine was sent to the pharmacy and provider wanted to repeat test in 2 months scheduled.

## 2023-12-05 NOTE — TELEPHONE ENCOUNTER
----- Message from Lucien Fleming MD sent at 12/5/2023  8:05 AM CST -----  Thyroid test was slightly elevated.    Medicine was sent to the pharmacy.    Repeat testing in 2 months.    Please notify the patient.

## 2023-12-05 NOTE — PROGRESS NOTES
Thyroid test was slightly elevated.    Medicine was sent to the pharmacy.    Repeat testing in 2 months.    Please notify the patient.

## 2023-12-06 ENCOUNTER — TELEPHONE (OUTPATIENT)
Dept: NEUROSURGERY | Facility: CLINIC | Age: 63
End: 2023-12-06
Payer: MEDICARE

## 2023-12-06 NOTE — TELEPHONE ENCOUNTER
"Informed pt, per    "I have reviewed his lumbar spine MRI, he has some L5-S1 foraminal stenosis most likely causing the right buttock pain. We can try PT for this and I can also send him for an epidural steroid injection. Let me know if he wants me to order that"  Pt stated that he would like to do the injection and PT. Pt also stated that he did not complete the physical therapy for his neck surgery due to transportation issues. I stated to pt that I can let  know and he can place those orders also. Pt voiced understanding   "

## 2023-12-11 DIAGNOSIS — E11.9 TYPE 2 DIABETES MELLITUS WITHOUT COMPLICATION, WITHOUT LONG-TERM CURRENT USE OF INSULIN: ICD-10-CM

## 2023-12-12 RX ORDER — METFORMIN HYDROCHLORIDE 1000 MG/1
1000 TABLET ORAL 2 TIMES DAILY WITH MEALS
Qty: 180 TABLET | Refills: 1 | Status: SHIPPED | OUTPATIENT
Start: 2023-12-12

## 2023-12-12 NOTE — TELEPHONE ENCOUNTER
Refill Decision Note   Fabiano Malik  is requesting a refill authorization.  Brief Assessment and Rationale for Refill:  Approve     Medication Therapy Plan:         Pharmacist review requested: Yes   Extended chart review required: Yes   Comments:     Note composed:10:33 AM 12/12/2023

## 2023-12-12 NOTE — PROGRESS NOTES
Referring provider: Massiel Lund  Reason for visit:  Treatment of voice and resonance (CPT 9250)     The patient location is: [place], [place]  The chief complaint leading to consultation is: dysphonia  Visit type: Virtual visit with synchronous audio and video  Face to Face time with patient: 30  60 minutes of total time spent on the encounter, which includes face to face time and non-face to face time preparing to see the patient (eg, review of tests), Obtaining and/or reviewing separately obtained history, Documenting clinical information in the electronic or other health record, Independently interpreting results (not separately reported) and communicating results to the patient/family/caregiver, or Care coordination (not separately reported).   Each patient to whom he or she provides medical services by telemedicine is:  (1) informed of the relationship between the physician and patient and the respective role of any other health care provider with respect to management of the patient; and (2) notified that he or she may decline to receive medical services by telemedicine and may withdraw from such care at any time.        Subjective / History    12/13/23: Mr Malik returns for initial voice and swallow therapy session, following completion of evaluation 11/28/23. He continues to report that voice is stable. His primary complaint regarding change in swallow s/p spinal fusion surgery. He continues to deny weight loss, but does avoid food such as meat. Continues with difficulty swallowing pills. He has appt with pain management on Friday, is looking forward to this appt. Admits to considerable pain and depression associated with this. He would like to see psychiatry and will d/w pain management. He will also contact PT.     11/28/23: Fabiano Malik Jr. is a 62 y.o. male referred for voice evaluation (CPT 37492) by Massiel Lund PA-C.   He has known history of right vocal fold immobility,  "potentially recoverable, following spinal fusion surgery in 8/2023. His voice has improved significantly, Dr Mccord's findings 10/30/23 were promising with mild inconsistent limited abduction and complete closure. At present, he feels voice is very functional with minimal difficulty other than pitch perhaps being slightly higher. His swallowing function has also improved, but he still notes occasional obstructive dysphagia. He describes difficulty swallowing that has improved, but now at a standstill. Liquids make him cough, every time drinks. Pills and solids feel like get stuck in mid throat. Avoiding meat. No weight loss at present, initially may have lost about 10 lbs. Throat feels kind of scratchy throughout throat. "Choking" is a bigger issue than voice. Medication and problem lists were reviewed.      Swallow: As noted above and below in MBSS  Breathing: throat clearing           Stroboscopy findings per Dr. Mccord  10/30/23  All findings were normal except:  - mild right vocal fold motion impairment; resting paramedian, interval improvement in tone  - full adduction, very mild, inconsistently limited abduction  - complete closure, pliability intact  - mild concentric supraglottic hyperfunction        MBSS 10/31/2023  Impressions    Pt presents with improved pharyngeal swallow compared to previous MBSS conducted in 8/2023 following ACDF procedure (C 5-7). Pt now with mild-mod pharyngeal dysphagia 2/2 cont PPW edema which led to increased pharyngeal stasis, however, no instances of penetration or aspiration noted.       Prognosis: Good     Barriers:  None     Plan  ST recs advance diet to regular, easy to chew and cont thin liquids. Okay for initiation of straw with thin liquids. Strict adherence to the following swallowing precautions during intake:     -Upright for all meals  -Small bites/sips  -Single sips  -Alternate bites/sips every bite     MBSS 8/29/23  mpressions    Pt presents with improved pharyngeal " swallowing since initial MBSS on 8/17/23, however, pt with cont mod pharyngeal dysphagia that is c/b by the following: dcr base of tongue retraction with subsequent delayed swallow initiation, inconsistent epiglottic inversion led to x1 instance of aspiration of thin liquids via straw, dcr hyolaryngeal excursion and variable pharyngeal stasis which was impacted by pt's cont PPW edema 2/2 recent ACDF.         Prognosis: Good     Barriers:  None     Plan  ST recs advance diet to minced with thin liquids via CUP-EDGE ONLY. NO STRAWS. Strict adherence to the following swallowing precautions during intake:     -Upright for all meals  -Small bites/sips  -Single sips  -NO straws  -Alternate bites/sips every 2 bites   -Double swallows with each bite  Past Medical History:   Diagnosis Date    Chronic back pain greater than 3 months duration     Depression     Diabetes mellitus     Diabetes mellitus, type 2     Hypertension     Schizophrenia      Current Outpatient Medications on File Prior to Visit   Medication Sig Dispense Refill    ARIPiprazole (ABILIFY) 5 MG Tab Take 1 tablet (5 mg total) by mouth once daily. 30 tablet 11    BLOOD PRESSURE CUFF Misc 1 Units by Misc.(Non-Drug; Combo Route) route once daily. 1 each 0    blood sugar diagnostic (TRUE METRIX GLUCOSE TEST STRIP) Strp use 1 strip to check glucose 2 (two) times a day. 200 strip 6    blood-glucose meter Misc Use as instructed 1 each 0    COVID [id number],,andu,,PF, (SPIKEVAX [id number],12Y UP,,PF,) 50 mcg/0.5 mL injection Inject into the muscle. 0.5 mL 0    dulaglutide (TRULICITY) 0.75 mg/0.5 mL pen injector Inject 0.75 mg (one pen) into the skin every 7 days. 12 pen 1    DULoxetine (CYMBALTA) 60 MG capsule Take 2 capsules (120 mg total) by mouth every morning. 60 capsule 11    hydrOXYzine pamoate (VISTARIL) 50 MG Cap TAKE 1 CAPSULE (50 MG) BY MOUTH NIGHTLY AS NEEDED FOR INSOMNIA (Patient taking differently: Take 50 mg by mouth nightly as needed (insomnia).) 90  capsule 3    ibuprofen (ADVIL,MOTRIN) 800 MG tablet Take 1 tablet (800 mg total) by mouth every 8 (eight) hours. 20 tablet 0    lancets 30 gauge Misc 1 lancet by Misc.(Non-Drug; Combo Route) route twice daily. 200 each 6    levothyroxine (SYNTHROID) 25 MCG tablet Take 1 tablet (25 mcg total) by mouth before breakfast. 90 tablet 0    metFORMIN (GLUCOPHAGE) 1000 MG tablet Take 1 tablet (1,000 mg total) by mouth 2 (two) times daily with meals. 180 tablet 1    methocarbamoL (ROBAXIN) 750 MG Tab Take 1 tablet (750 mg total) by mouth 3 (three) times daily as needed (muscle spasms). 60 tablet 0    olmesartan (BENICAR) 20 MG tablet Take 1 tablet (20 mg total) by mouth once daily. 90 tablet 0    omeprazole (PRILOSEC) 20 MG capsule Take 1 capsule (20 mg total) by mouth before breakfast. 90 capsule 3    oxyCODONE-acetaminophen (PERCOCET)  mg per tablet Take 1 tablet by mouth every 8 (eight) hours as needed for Pain. 60 tablet 0    pravastatin (PRAVACHOL) 40 MG tablet Take 1 tablet (40 mg total) by mouth once daily. 90 tablet 3    pregabalin (LYRICA) 200 MG Cap Take 1 capsule (200 mg total) by mouth 3 (three) times daily. 90 capsule 2    sildenafiL (VIAGRA) 100 MG tablet Take 1 tablet (100 mg total) by mouth daily as needed for Erectile Dysfunction. 30 tablet 3     No current facility-administered medications on file prior to visit.          Objective    Perceptual/behavioral assessment  -Quality: baker/pulse mode phonation  -Volume: appropriate for age and gender  -Pitch: low F0  -Flexibility: diminished mild  -Habitual respiratory pattern: breath holding        Voice Handicap Index-10   Score: 4     Functional Oral Intake Scale:   6 - Total oral intake with no special preparation, but must avoid specific foods or liquid items     Eating Assessment Tool (EAT-10)  0 = No Problem 4 = Severe Problem   My swallowing problem has caused me to lose weight. 0 1 2 3 4   My swallowing problem interferes with my ability to go out for  meals. 0 1 2 3 4   Swallowing liquids takes extra effort. 0 1 2 3 4   Swallowing solids takes extra effort. 0 1 2 3 4   Swallowing pills takes extra effort. 0 1 2 3 4   Swallowing is painful. 0 1 2 3 4   The pleasure of eating is affected by my swallowing. 0 1 2 3 4   When I swallow food sticks in my throat. 0 1 2 3 4   I cough when I eat. 0 1 2 3 4   Swallowing is stressful. 0 1 2 3 4   Score: 26  EAT-10 score of 3 or higher may indicate problems swallowing efficiently and safely.      Clinical Evaluation/Treatment: This is a well developed, well nourished male who presents in no acute distress. The patient is fully oriented, affect is flat.  Voice today is essentially WNL, slightly low fo with slight baker at times. Intensity remains WNL. Reviewed MBSS studies, including video, allowing patient to visualize cervical spinal hardware and swallow. He better understood that foods do indeed pass through esophagus, but he may be sensing the presence of post operative edema and or hardware when swallowing Further training given to strategies of slight chin tuck with effortful swallow, whic he was able to demonstrate following model. These strategies along with use of moist foods or foods with added moisture such as gravy, more frequent small meals, taking pills with applesauce of chocolate milk in effort to reduce pharyngeal or cervical stasis. Recommended steam and dry mouth lozenges, as well as frequent sips of water.      IMPRESSION: Mr Malik presents with mild to mod pharyngoesophageal dysphagia that has improved, but perhaps hit a plateau, as well as dysphonia that is improving with known recovering vocal fold paralysis and post pharyngeal wall edema post 8/23 spinal fusion surgery. Patient will continue with slightly modiefied diet with added moisture to dry foods, small bites, thorough chewing, slight chin tuck with breath hold. Avoid tilting head back with liquids and or pills.  Rec 2-6 additional swallow  therapy sessions as needed.         PLAN OF CARE:  1.  Continuation of slightly modified diet with thin liquids and added moisture to dry solids using trained strategies of effortful swallow with slight chin tuck, follow aspiration precautions, including, but not limited to monitoring for any signs/symptoms of aspiration (such as wet/gurgly voice that does not clear with coughing, inability to make any voice sounds, any persistent coughing with oral intake, otherwise unexplained fever, unexplained increased or new difficulty or discomfort breathing, unexplained increase in  sleepiness/lethargy/significant fatigue, unexplained increase or new onset confusion or change in cognitive functioning, or any other unexplained change in health or well-being that could be related to swallowing).  2.  Employ swallow strategies as trained   3.  Practice 3-5x daily oral hygiene   4.   Monitor weight and hydration     Long-term goals:  Fabiano Malik will continue with oral intake of regular or slightly modified diet with minimal to no sign/symptoms of dysphagia while maintaining weight and hydration needs.     Short-term objectives:  Pt leaves with home program to include:  1. Oral care program   2. Employ swallow strategies to improve swallow function  3. F/u prn, per pt request, for further voice and further swallow therapy

## 2023-12-12 NOTE — TELEPHONE ENCOUNTER
No care due was identified.  [facility] Embedded Care Due Messages. Reference number: [id number].   12/11/2023 8:16:51 PM CST

## 2023-12-12 NOTE — TELEPHONE ENCOUNTER
Refill Routing Note   Medication(s) are not appropriate for processing by [facility] for the following reason(s):      Drug-disease interaction    [facility] action(s):  Defer Care Due:  None identified     Medication Therapy Plan: Drug-Disease: metFORMIN and Dehydration    Pharmacist review requested: Yes     Appointments  past 12m or future 3m with PCP    Date Provider   Last Visit   11/15/2023 Lucien Fleming MD   Next Visit   5/10/2024 Lucien Fleming MD   ED visits in past 90 days: 0        Note composed:10:19 AM 12/12/2023

## 2023-12-13 ENCOUNTER — CLINICAL SUPPORT (OUTPATIENT)
Dept: SPEECH THERAPY | Facility: HOSPITAL | Age: 63
End: 2023-12-13
Payer: MEDICARE

## 2023-12-13 DIAGNOSIS — R49.0 DYSPHONIA: ICD-10-CM

## 2023-12-13 DIAGNOSIS — R13.14 PHARYNGOESOPHAGEAL DYSPHAGIA: ICD-10-CM

## 2023-12-13 DIAGNOSIS — J38.01 UNILATERAL COMPLETE VOCAL FOLD PARALYSIS: ICD-10-CM

## 2023-12-13 PROCEDURE — 92610 EVALUATE SWALLOWING FUNCTION: CPT | Mod: GN,95

## 2023-12-15 ENCOUNTER — OFFICE VISIT (OUTPATIENT)
Dept: PAIN MEDICINE | Facility: CLINIC | Age: 63
End: 2023-12-15
Payer: MEDICARE

## 2023-12-15 ENCOUNTER — TELEPHONE (OUTPATIENT)
Dept: PAIN MEDICINE | Facility: CLINIC | Age: 63
End: 2023-12-15
Payer: MEDICARE

## 2023-12-15 VITALS
HEIGHT: 73 IN | BODY MASS INDEX: 31.3 KG/M2 | HEART RATE: 83 BPM | WEIGHT: 236.13 LBS | SYSTOLIC BLOOD PRESSURE: 127 MMHG | DIASTOLIC BLOOD PRESSURE: 80 MMHG

## 2023-12-15 DIAGNOSIS — G89.4 CHRONIC PAIN SYNDROME: ICD-10-CM

## 2023-12-15 DIAGNOSIS — M54.42 CHRONIC BILATERAL LOW BACK PAIN WITH BILATERAL SCIATICA: ICD-10-CM

## 2023-12-15 DIAGNOSIS — M54.16 LUMBAR RADICULOPATHY: Primary | ICD-10-CM

## 2023-12-15 DIAGNOSIS — M54.41 CHRONIC BILATERAL LOW BACK PAIN WITH BILATERAL SCIATICA: ICD-10-CM

## 2023-12-15 DIAGNOSIS — G89.29 CHRONIC BILATERAL LOW BACK PAIN WITH BILATERAL SCIATICA: ICD-10-CM

## 2023-12-15 DIAGNOSIS — M51.36 DDD (DEGENERATIVE DISC DISEASE), LUMBAR: ICD-10-CM

## 2023-12-15 PROCEDURE — 3008F BODY MASS INDEX DOCD: CPT | Mod: CPTII,S$GLB,, | Performed by: NURSE PRACTITIONER

## 2023-12-15 PROCEDURE — 3066F PR DOCUMENTATION OF TREATMENT FOR NEPHROPATHY: ICD-10-PCS | Mod: CPTII,S$GLB,, | Performed by: NURSE PRACTITIONER

## 2023-12-15 PROCEDURE — 99214 OFFICE O/P EST MOD 30 MIN: CPT | Mod: S$GLB,,, | Performed by: NURSE PRACTITIONER

## 2023-12-15 PROCEDURE — 3061F NEG MICROALBUMINURIA REV: CPT | Mod: CPTII,S$GLB,, | Performed by: NURSE PRACTITIONER

## 2023-12-15 PROCEDURE — 3066F NEPHROPATHY DOC TX: CPT | Mod: CPTII,S$GLB,, | Performed by: NURSE PRACTITIONER

## 2023-12-15 PROCEDURE — 99214 PR OFFICE/OUTPT VISIT, EST, LEVL IV, 30-39 MIN: ICD-10-PCS | Mod: S$GLB,,, | Performed by: NURSE PRACTITIONER

## 2023-12-15 PROCEDURE — 1160F PR REVIEW ALL MEDS BY PRESCRIBER/CLIN PHARMACIST DOCUMENTED: ICD-10-PCS | Mod: CPTII,S$GLB,, | Performed by: NURSE PRACTITIONER

## 2023-12-15 PROCEDURE — 3008F PR BODY MASS INDEX (BMI) DOCUMENTED: ICD-10-PCS | Mod: CPTII,S$GLB,, | Performed by: NURSE PRACTITIONER

## 2023-12-15 PROCEDURE — 4010F ACE/ARB THERAPY RXD/TAKEN: CPT | Mod: CPTII,S$GLB,, | Performed by: NURSE PRACTITIONER

## 2023-12-15 PROCEDURE — 3044F HG A1C LEVEL LT 7.0%: CPT | Mod: CPTII,S$GLB,, | Performed by: NURSE PRACTITIONER

## 2023-12-15 PROCEDURE — 3074F PR MOST RECENT SYSTOLIC BLOOD PRESSURE < 130 MM HG: ICD-10-PCS | Mod: CPTII,S$GLB,, | Performed by: NURSE PRACTITIONER

## 2023-12-15 PROCEDURE — 3061F PR NEG MICROALBUMINURIA RESULT DOCUMENTED/REVIEW: ICD-10-PCS | Mod: CPTII,S$GLB,, | Performed by: NURSE PRACTITIONER

## 2023-12-15 PROCEDURE — 4010F PR ACE/ARB THEARPY RXD/TAKEN: ICD-10-PCS | Mod: CPTII,S$GLB,, | Performed by: NURSE PRACTITIONER

## 2023-12-15 PROCEDURE — 3074F SYST BP LT 130 MM HG: CPT | Mod: CPTII,S$GLB,, | Performed by: NURSE PRACTITIONER

## 2023-12-15 PROCEDURE — 1160F RVW MEDS BY RX/DR IN RCRD: CPT | Mod: CPTII,S$GLB,, | Performed by: NURSE PRACTITIONER

## 2023-12-15 PROCEDURE — 3079F PR MOST RECENT DIASTOLIC BLOOD PRESSURE 80-89 MM HG: ICD-10-PCS | Mod: CPTII,S$GLB,, | Performed by: NURSE PRACTITIONER

## 2023-12-15 PROCEDURE — 1159F MED LIST DOCD IN RCRD: CPT | Mod: CPTII,S$GLB,, | Performed by: NURSE PRACTITIONER

## 2023-12-15 PROCEDURE — 3044F PR MOST RECENT HEMOGLOBIN A1C LEVEL <7.0%: ICD-10-PCS | Mod: CPTII,S$GLB,, | Performed by: NURSE PRACTITIONER

## 2023-12-15 PROCEDURE — 1159F PR MEDICATION LIST DOCUMENTED IN MEDICAL RECORD: ICD-10-PCS | Mod: CPTII,S$GLB,, | Performed by: NURSE PRACTITIONER

## 2023-12-15 PROCEDURE — 99999 PR PBB SHADOW E&M-EST. PATIENT-LVL IV: CPT | Mod: PBBFAC,,, | Performed by: NURSE PRACTITIONER

## 2023-12-15 PROCEDURE — 99999 PR PBB SHADOW E&M-EST. PATIENT-LVL IV: ICD-10-PCS | Mod: PBBFAC,,, | Performed by: NURSE PRACTITIONER

## 2023-12-15 PROCEDURE — 3079F DIAST BP 80-89 MM HG: CPT | Mod: CPTII,S$GLB,, | Performed by: NURSE PRACTITIONER

## 2023-12-15 NOTE — PROGRESS NOTES
[facility] - Established Patient Evaluation    Referred by: No ref. provider found   Reason for referral: * No diagnoses found *     CC:   Chief Complaint   Patient presents with    Neck Pain    Low-back Pain         12/15/2023     9:32 AM 6/20/2023    11:05 AM 10/8/2019    10:53 AM   Last 3 PDI Scores   Pain Disability Index (PDI) 70 60 68       Interval Hx:12/15/2023 Patient present here in clinic with complaints of neck and lower back and leg pain.  Patient has is primarily complaining of low back and bilateral leg pain he is also experiencing paresthesia like symptoms in his legs and into his feet describing this as numbness and tingling.  His recent lumbar MRI was reviewed by Neurosurgery/ Dr. Washington and he has recommended this patient for an L5-S1 interlaminar JOHN.  Today he denies any bowel bladder dysfunction denies saddle anesthesia denies any profound weakness he is using a walker to ambulate.        Interval History 11/20/2023 - Pt presents for follow up of neck and pack pain.  He is status post C5-7 ACDF with Dr Washington on 08/11/2023.  He reports improvement in his neck and arm pain following the surgery.  He reports he has been cleared by Neurosurgery to start physical therapy.  Today he complains of pain in the low back that radiates down the bilateral lower extremities to the feet.  He is using a rolling walker to ambulate.  He continues to take Lyrica 200 mg b.i.d. with some improvement.  No loss of bowel or bladder control, no saddle anesthesia.    Subjective 06/20/2023:   Fabiano Malik Jr. is a 63 y.o. male who presents complaining of neck pain that radiates into the right arm down to the hand.  He reports numbness in the right arm and hand. He has previously undergone right carpal tunnel release and ulnar nerve decompression in 2022 with no improvement in his arm pain.  He has been evaluated by Dr Washington with NSGY and is currently scheduled for cervical fusion surgery on  7/14/2023. He was recently started on Lyrica 100 mg BID.  He has not noticed a difference in his pain with this medication and denies any side effects.     Location: right arm,neck,back   Onset: years  Current Pain Score: 10/10  Daily Pain of Range: 6-10/10  Quality: Aching, Throbbing, Grabbing, Tingling, Numb, Sharp, and Hot  Radiation: right shoulder,back,neck,legs  Worsened by: extension, flexion, lying down, sitting, standing for more than 1 minutes, walking for more than 1 minutes, and daily activity  Improved by: nothing    Patient denies urinary incontinence, bowel incontinence, significant weight loss, and significant motor weakness.    Previous Interventions:  - 09/2019 -Right L4/5 and L5/S1 TFESI - [facility]  - 02/2019 - RFA L2, 3, 4, 5 - [facility]    Previous Therapies:  PT/OT:   Chiropractor:   HEP:   Relevant Surgery: yes   Many years ago - C4-5 anterior fusion   08/11/2023 - C5-7 ACDF with Dr Washington   2022 - right carpal tunnel release and ulnar nerve decompression  Previous Medications:   - NSAIDS: meloxicam  - Muscle Relaxants:    - TCAs:   - SNRIs: Cymbalta  - Topicals:   - Anticonvulsants: lyrica   - Opioids:   - Adjuvants:     Current Pain Medications:  Lyrica  Cymbalta     Review of Systems:  ROS     GENERAL:  No weight loss, malaise or fevers. +DM type II  HEENT:   No recent changes in vision or hearing  NECK:  No difficulty with swallowing. No stridor.   RESPIRATORY:  Negative for cough, wheezing or shortness of breath, patient denies any recent URI.  CARDIOVASCULAR:  Negative for chest pain, leg swelling or palpitations.  +HTN  HLD  GI:  Negative for abdominal discomfort, blood in stools or black stools or change in bowel habits.  MUSCULOSKELETAL:  See HPI.  SKIN:  Negative for lesions, rash, and itching.  PSYCH:  No mood disorder or recent psychosocial stressors.  +Depression, Schizophrenia   HEMATOLOGY/LYMPHOLOGY:  Negative for prolonged bleeding, bruising easily or swollen nodes.  Patient is  not currently taking any anti-coagulants  NEURO:   No history of headaches, syncope, paralysis, seizures or tremors.  All other reviewed and negative other than HPI.    History:  Current medications, allergies, medical history, surgical history,   family history, and social history were reviewed in the chart as marked.    Full Medication List:    Current Outpatient Medications:     ARIPiprazole (ABILIFY) 5 MG Tab, Take 1 tablet (5 mg total) by mouth once daily., Disp: 30 tablet, Rfl: 11    BLOOD PRESSURE CUFF Misc, 1 Units by Misc.(Non-Drug; Combo Route) route once daily., Disp: 1 each, Rfl: 0    blood sugar diagnostic (TRUE METRIX GLUCOSE TEST STRIP) Strp, use 1 strip to check glucose 2 (two) times a day., Disp: 200 strip, Rfl: 6    COVID [id number],,andu,,PF, (SPIKEVAX 5139-2004,12Y UP,,PF,) 50 mcg/0.5 mL injection, Inject into the muscle., Disp: 0.5 mL, Rfl: 0    dulaglutide (TRULICITY) 0.75 mg/0.5 mL pen injector, Inject 0.75 mg (one pen) into the skin every 7 days., Disp: 12 pen , Rfl: 1    DULoxetine (CYMBALTA) 60 MG capsule, Take 2 capsules (120 mg total) by mouth every morning., Disp: 60 capsule, Rfl: 11    hydrOXYzine pamoate (VISTARIL) 50 MG Cap, TAKE 1 CAPSULE (50 MG) BY MOUTH NIGHTLY AS NEEDED FOR INSOMNIA (Patient taking differently: Take 50 mg by mouth nightly as needed (insomnia).), Disp: 90 capsule, Rfl: 3    ibuprofen (ADVIL,MOTRIN) 800 MG tablet, Take 1 tablet (800 mg total) by mouth every 8 (eight) hours., Disp: 20 tablet, Rfl: 0    lancets 30 gauge Misc, 1 lancet by Misc.(Non-Drug; Combo Route) route twice daily., Disp: 200 each, Rfl: 6    levothyroxine (SYNTHROID) 25 MCG tablet, Take 1 tablet (25 mcg total) by mouth before breakfast., Disp: 90 tablet, Rfl: 0    metFORMIN (GLUCOPHAGE) 1000 MG tablet, Take 1 tablet (1,000 mg total) by mouth 2 (two) times daily with meals., Disp: 180 tablet, Rfl: 1    methocarbamoL (ROBAXIN) 750 MG Tab, Take 1 tablet (750 mg total) by mouth 3 (three) times daily  as needed (muscle spasms)., Disp: 60 tablet, Rfl: 0    olmesartan (BENICAR) 20 MG tablet, Take 1 tablet (20 mg total) by mouth once daily., Disp: 90 tablet, Rfl: 0    omeprazole (PRILOSEC) 20 MG capsule, Take 1 capsule (20 mg total) by mouth before breakfast., Disp: 90 capsule, Rfl: 3    oxyCODONE-acetaminophen (PERCOCET)  mg per tablet, Take 1 tablet by mouth every 8 (eight) hours as needed for Pain., Disp: 60 tablet, Rfl: 0    pravastatin (PRAVACHOL) 40 MG tablet, Take 1 tablet (40 mg total) by mouth once daily., Disp: 90 tablet, Rfl: 3    pregabalin (LYRICA) 200 MG Cap, Take 1 capsule (200 mg total) by mouth 3 (three) times daily., Disp: 90 capsule, Rfl: 2    sildenafiL (VIAGRA) 100 MG tablet, Take 1 tablet (100 mg total) by mouth daily as needed for Erectile Dysfunction., Disp: 30 tablet, Rfl: 3    blood-glucose meter Misc, Use as instructed, Disp: 1 each, Rfl: 0     Allergies:  Pcn [penicillins]     Medical History:   has a past medical history of Chronic back pain greater than 3 months duration, Depression, Diabetes mellitus, Diabetes mellitus, type 2, Hypertension, and Schizophrenia.    Surgical History:   has a past surgical history that includes Neck surgery; Appendectomy; Radiofrequency ablation of lumbar medial branch nerve at single level (Left, 5/29/2018); Colonoscopy (N/A, 5/31/2018); Radiofrequency ablation of lumbar medial branch nerve at single level (Right, 1/8/2019); Radiofrequency ablation of lumbar medial branch nerve at single level (Left, 3/12/2019); Transforaminal epidural injection of steroid (Right, 9/12/2019); Colonoscopy (N/A, 11/17/2022); Carpal tunnel release (Right, 12/13/2022); decompression, nerve, ulnar (Right, 12/13/2022); and fusion, spine, cervical (N/A, 8/11/2023).    Family History:  family history includes Alzheimer's disease in his mother and sister; Cancer in his father; Diabetes in his mother; Heart attack in his father; Heart defect in his father and sister; Stroke  "in his father.    Social History:   reports that he has never smoked. He has never used smokeless tobacco. He reports current alcohol use. He reports that he does not use drugs.    Physical Exam:  Vitals:    12/15/23 0929   BP: 127/80   Pulse: 83   Weight: 107.1 kg (236 lb 1.8 oz)   Height: 6' 1" (1.854 m)   PainSc: 10-Worst pain ever   PainLoc: Back       GENERAL: Well appearing, in no acute distress, alert and oriented x3.  PSYCH:  Mood and affect appropriate.  SKIN: Skin color, texture, turgor normal, no rashes or lesions.  HEAD/FACE:  Normocephalic, atraumatic. Cranial nerves grossly intact.  NECK: Healed surgical scar noted to anterior neck. Supple.   CV: RRR with palpation of the radial artery.  PULM: No evidence of respiratory difficulty, symmetric chest rise.  GI:  Soft and non-distended.  MSK: +SLR with modified SLR. No obvious deformities, edema, or skin discoloration.  No atrophy or tone abnormalities are noted.   NEURO: Altered sensation noted over all 5 digits of the right hand compared to left.   MENTAL STATUS: A x O x 3, good concentration, speech is fluent and goal directed  MOTOR: 5/5 in b/l LE muscle groups  GAIT: Ambulates with a rolling walker.    Imaging:  MRI CERVICAL SPINE WITHOUT CONTRAST     CLINICAL HISTORY:  Spinal stenosis, cervical;.  Spinal stenosis, cervical region     TECHNIQUE:  Multiplanar, multisequence MR images of the cervical spine were acquired without the administration of contrast.     COMPARISON:  MRI cervical spine dated 08/08/2013     FINDINGS:  Prior fusion changes at the C4-C5 vertebral bodies.  Remaining vertebral body heights appear maintained.  No infiltrative T1 marrow process.  No abnormal cord STIR edema.  Visualized brainstem and cerebellum are unremarkable.  Prevertebral and paraspinal soft tissues demonstrate no acute abnormality     C2-C3: Mild posterior disc osteophyte with left greater than right facet DJD.  No significant neural foraminal encroachment or " spinal canal stenosis.     C3-C4: Posterior disc osteophyte and facet DJD.  Thinning with near effacement of the ventral thecal sac.  Posterior subarachnoid space is maintained.  No significant neural foraminal encroachment.     C4-C5: Vertebral body fusion without significant spinal canal stenosis or neural foraminal impingement.     C5-C6: Posterior disc osteophyte with uncovertebral spurring and facet DJD.  Effacement of the ventral thecal sac with abutment and slight indentation of the anterior cord.  Thinning without effacement of the posterior subarachnoid space.  Severe right and mild-moderate left neural foraminal narrowing.     C6-C7: Posterior disc osteophyte with uncovertebral spurring and facet DJD.  Effacement of the ventral thecal sac with abutment and slight indentation of the right paracentral anterior cord.  Thinning with near effacement of the posterior subarachnoid space.  Severe neural foraminal narrowing.     C7-T1: No significant spinal canal stenosis or neural foraminal impingement.     Impression:     Cervical spondylosis noting levels of lower cervical spinal canal stenosis and variable neural foraminal narrowing as detailed.     Electronically signed by: Armin Motley  Date:                                            05/31/2023    XR CERVICAL SPINE AP LAT WITH FLEX EXTEN     CLINICAL HISTORY:  Spinal stenosis, cervical region     TECHNIQUE:  Three views of the cervical spine plus flexion and extension views were performed.     COMPARISON:  05/05/2022     FINDINGS:  Visualization to the level of C6-C7 on lateral view.  Similar fusion changes across C4-C5.  Additional cervical discogenic change with marginal spurring elsewhere.  Straightening of the normal cervical lordosis.  Lateral masses appear well seated.  There is minimal grade 1 anterolisthesis at C2-C3 and C3-C4 with flexion, reduced with extension.  Normal precervical soft tissue thickness.        Electronically signed by:  Armin Motley  Date:                                            05/31/2023      Labs:  BMP  Lab Results   Component Value Date     11/20/2023    K 4.6 11/20/2023     11/20/2023    CO2 23 11/20/2023    BUN 23 11/20/2023    CREATININE 1.3 11/20/2023    CALCIUM 9.1 11/20/2023    ANIONGAP 9 11/20/2023    EGFRNORACEVR >60 11/20/2023     Lab Results   Component Value Date    ALT 18 11/20/2023    AST 23 11/20/2023    ALKPHOS 55 11/20/2023    BILITOT 0.5 11/20/2023     Lab Results   Component Value Date    WBC 5.99 11/20/2023    HGB 11.0 (L) 11/20/2023    HCT 33.6 (L) 11/20/2023    MCV 84 11/20/2023     11/20/2023         Assessment:  Problem List Items Addressed This Visit    None      06/20/2023 - Fabiano Malik Jr. presents with pain consistent with right cervical radiculopathy.  He is currently scheduled for c-spine fusion surgery on  07/14/2023 with Dr. Washington.  I do not recommend any interventional procedures at this time as there steroid could affect healing and infection risk. Discussed optimizing his Lyrica.     11/20/2023 -  Fabiano Malik Jr. presents today for follow up of his low back pain.  He endorses radicular symptoms in the bilateral lower extremities.  An MRI of the lumbar spine has been ordered by Neurosurgery and is pending completion.  Recommend starting physical therapy.  Increase Lyrica to 200 mg t.i.d..  Follow up in clinic once MRI of the lumbar spine is completed to discuss interventional options.  Close follow up with Neurosurgery.    12/15/2023-Fabiano Malik Jr. is a 63 y.o. male who  has a past medical history of Chronic back pain greater than 3 months duration, Depression, Diabetes mellitus, Diabetes mellitus, type 2, Hypertension, and Schizophrenia.  By history and examination this patient has chronic low back pain with radiculopathy.  The underlying cause cause is facet arthritis, degenerative disc disease, foraminal stenosis, muscle dysfunction, muscles  strain, and deconditioning.  Pathology is confirmed by imaging.  We discussed the underlying diagnoses and multiple treatment options including non-opioid medications, interventional procedures, physical therapy, home exercise, core muscle enhancement, activity modification, and weight loss.  The risks and benefits of each treatment option were discussed and all questions were answered.        Treatment Plan:   Procedures:  scheduled for a lumbar JOHN targeting L5-S1  PT/OT/HEP: I have stressed the importance of physical activity and a home exercise plan to help with pain and improve health. He has been cleared by NSGY to starting PT.   Medications:    - Continue Lyrica to 200 mg TID.    -  Reviewed and consistent with medication use as prescribed.  Imaging:  reviewed patient was recommended for a lumbar JOHN targeting L5-S1 per neurosurgery following MRI review.  Follow Up: 2-3 weeks jefferson [facility]fer [facility]  Interventional Pain Management    Disclaimer: This note was partly generated using dictation software which may occasionally result in transcription errors.

## 2023-12-19 DIAGNOSIS — E11.59 HYPERTENSION ASSOCIATED WITH DIABETES: ICD-10-CM

## 2023-12-19 DIAGNOSIS — I15.2 HYPERTENSION ASSOCIATED WITH DIABETES: ICD-10-CM

## 2023-12-19 DIAGNOSIS — E11.65 TYPE 2 DIABETES MELLITUS WITH HYPERGLYCEMIA: ICD-10-CM

## 2023-12-19 DIAGNOSIS — N52.9 ERECTILE DYSFUNCTION, UNSPECIFIED ERECTILE DYSFUNCTION TYPE: ICD-10-CM

## 2023-12-19 RX ORDER — OLMESARTAN MEDOXOMIL 20 MG/1
20 TABLET ORAL DAILY
Qty: 90 TABLET | Refills: 3 | Status: SHIPPED | OUTPATIENT
Start: 2023-12-19

## 2023-12-19 RX ORDER — SILDENAFIL 100 MG/1
100 TABLET, FILM COATED ORAL DAILY PRN
Qty: 30 TABLET | Refills: 11 | Status: SHIPPED | OUTPATIENT
Start: 2023-12-19

## 2023-12-19 RX ORDER — DULAGLUTIDE 0.75 MG/.5ML
0.75 INJECTION, SOLUTION SUBCUTANEOUS
Qty: 12 PEN | Refills: 1 | Status: SHIPPED | OUTPATIENT
Start: 2023-12-19

## 2023-12-19 NOTE — TELEPHONE ENCOUNTER
No care due was identified.  Health [facility] Embedded Care Due Messages. Reference number: [id number].   12/19/2023 6:22:42 AM CST

## 2023-12-19 NOTE — TELEPHONE ENCOUNTER
No care due was identified.  Health [facility] Embedded Care Due Messages. Reference number: [id number].   12/19/2023 6:21:41 AM CST

## 2023-12-19 NOTE — TELEPHONE ENCOUNTER
Fabiano Malik  is requesting a refill authorization.  Brief Assessment and Rationale for Refill:  Approve     Medication Therapy Plan:         Pharmacist review requested: Yes   Extended chart review required: Yes   Comments:     Note composed:10:02 AM 12/19/2023

## 2023-12-19 NOTE — TELEPHONE ENCOUNTER
Refill Routing Note   Medication(s) are not appropriate for processing by [facility] for the following reason(s):      Drug-disease interaction    [facility] action(s):  Defer Care Due:  None identified     Medication Therapy Plan: sildenafiL and Pharyngoesophageal dysphagia; Trulicity and Dehydration    Pharmacist review requested: Yes     Appointments  past 12m or future 3m with PCP    Date Provider   Last Visit   11/15/2023 Lucien Fleming MD   Next Visit   12/19/2023 Lucien Fleming MD   ED visits in past 90 days: 0        Note composed:7:51 AM 12/19/2023

## 2023-12-19 NOTE — TELEPHONE ENCOUNTER
Refill Routing Note   Medication(s) are not appropriate for processing by [facility] for the following reason(s):        New or recently adjusted medication    [facility] action(s):  Defer               Appointments  past 12m or future 3m with PCP    Date Provider   Last Visit   11/15/2023 Lucien Fleming MD   Next Visit   5/10/2024 Lucien Fleming MD   ED visits in past 90 days: 0        Note composed:6:33 AM 12/19/2023

## 2023-12-20 DIAGNOSIS — E11.65 TYPE 2 DIABETES MELLITUS WITH HYPERGLYCEMIA: ICD-10-CM

## 2023-12-20 NOTE — TELEPHONE ENCOUNTER
No care due was identified.  Health [facility] Embedded Care Due Messages. Reference number: [id number].   12/20/2023 2:40:45 PM CST

## 2023-12-21 RX ORDER — DULAGLUTIDE 0.75 MG/.5ML
0.75 INJECTION, SOLUTION SUBCUTANEOUS
Qty: 12 PEN | Refills: 1 | OUTPATIENT
Start: 2023-12-21

## 2023-12-21 NOTE — TELEPHONE ENCOUNTER
Refill Decision Note   Fabiano Malik  is requesting a refill authorization.  Brief Assessment and Rationale for Refill:  Quick Discontinue     Medication Therapy Plan: Date/Time Signed: 12/19/2023 10:02      Comments:     Note composed:10:50 AM 12/21/2023

## 2023-12-22 ENCOUNTER — TELEPHONE (OUTPATIENT)
Dept: FAMILY MEDICINE | Facility: CLINIC | Age: 63
End: 2023-12-22
Payer: MEDICARE

## 2023-12-22 NOTE — TELEPHONE ENCOUNTER
Patient was contacted and informed of Dr. Santiago' recommendation in regard to seeking assistance at an urgent care. Patient stated he doesn't think anything is broken and will wait until his appointment with Dr. Santiago in January. The patient was informed there no appointment set for him in January and there are no opening with Dr. Santiago either. Patient was informed again to seek assistance at an urgent care to get an x-ray of his arm/shoulder that was injured due to a fall. Patient verbalized understanding and ended the call.

## 2023-12-22 NOTE — TELEPHONE ENCOUNTER
----- Message from Lucien Fleming MD sent at 12/22/2023  3:41 PM CST -----  Regarding: FW: Medical Assistance  Contact: Patient    If symptoms are getting worse or no significant improvement please consider urgent care appointment for x-ray evaluation.    Please notify the patient.   ----- Message -----  From: Jazmin Rizzo LPN  Sent: 12/22/2023   2:21 PM CST  To: Lucien Fleming MD  Subject: FW: Medical Assistance                             ----- Message -----  From: Miriam Ramos  Sent: 12/22/2023   1:53 PM CST  To: Bernadette KEE Staff  Subject: Medical Assistance                               Patient wanted to update physician on medical condition   Patient stated he fell over his Rolator on his arm it is bruised and little pain but not broken   Patient stated he is ok but wanted to inform   Please Assist     Patient can be reached at  [phone]

## 2024-01-02 ENCOUNTER — OFFICE VISIT (OUTPATIENT)
Dept: PODIATRY | Facility: CLINIC | Age: 64
End: 2024-01-02
Payer: MEDICARE

## 2024-01-02 VITALS
HEIGHT: 73 IN | HEART RATE: 85 BPM | DIASTOLIC BLOOD PRESSURE: 63 MMHG | WEIGHT: 236 LBS | SYSTOLIC BLOOD PRESSURE: 109 MMHG | BODY MASS INDEX: 31.28 KG/M2

## 2024-01-02 DIAGNOSIS — L60.9 DISEASE OF NAIL: Primary | ICD-10-CM

## 2024-01-02 DIAGNOSIS — E11.42 TYPE 2 DIABETES MELLITUS WITH DIABETIC POLYNEUROPATHY, WITHOUT LONG-TERM CURRENT USE OF INSULIN: ICD-10-CM

## 2024-01-02 PROCEDURE — 11721 DEBRIDE NAIL 6 OR MORE: CPT | Mod: Q9,S$GLB,, | Performed by: STUDENT IN AN ORGANIZED HEALTH CARE EDUCATION/TRAINING PROGRAM

## 2024-01-02 PROCEDURE — 99999 PR PBB SHADOW E&M-EST. PATIENT-LVL IV: CPT | Mod: PBBFAC,,, | Performed by: STUDENT IN AN ORGANIZED HEALTH CARE EDUCATION/TRAINING PROGRAM

## 2024-01-02 PROCEDURE — 99499 UNLISTED E&M SERVICE: CPT | Mod: S$GLB,,, | Performed by: STUDENT IN AN ORGANIZED HEALTH CARE EDUCATION/TRAINING PROGRAM

## 2024-01-02 RX ORDER — PRENATAL VIT 91/IRON/FOLIC/DHA 28-975-200
COMBINATION PACKAGE (EA) ORAL 2 TIMES DAILY
Qty: 15 G | Refills: 4 | Status: SHIPPED | OUTPATIENT
Start: 2024-01-02

## 2024-01-02 NOTE — TELEPHONE ENCOUNTER
Pt. Will need to be evaluated to get stronger pain med; he can f/u with me or urgent care based on availability; if pain is severe, he should proceed to ER for acute evaluation; he also sees pain management and he can discuss with pain management as well; CT head shows no acute abnormality     VSS, LVAD # WNL. TF at goal rate, BG monitored, insulin given per order. No UOP, bladder scan 158. HTS aware  0400 DP 92/0, cuff MAP 90. HTS notified    Problem: Renal Function Impairment (Acute Kidney Injury/Impairment)  Goal: Effective Renal Function  Outcome: Ongoing, Progressing     Problem: Infection  Goal: Absence of Infection Signs and Symptoms  Outcome: Ongoing, Progressing     Problem: Cardiac Output Decreased (Heart Failure)  Goal: Optimal Cardiac Output  Outcome: Ongoing, Progressing

## 2024-01-02 NOTE — PROCEDURES
"Routine Foot Care    Date/Time: 1/2/2024 11:15 AM    Performed by: Shalini Burks DPM  Authorized by: Shalini Burks DPM    Time out: Immediately prior to procedure a "time out" was called to verify the correct patient, procedure, equipment, support staff and site/side marked as required.    Consent Done?:  Yes (Verbal)  Hyperkeratotic Skin Lesions?: No      Nail Care Type:  Debride  Location(s): All  (Left 1st Toe, Left 3rd Toe, Left 2nd Toe, Left 4th Toe, Left 5th Toe, Right 1st Toe, Right 2nd Toe, Right 3rd Toe, Right 4th Toe and Right 5th Toe)  Patient tolerance:  Patient tolerated the procedure well with no immediate complications    "

## 2024-01-02 NOTE — PROGRESS NOTES
Subjective:      Patient ID: Fabiano Malik Jr. is a 63 y.o. male.    Chief Complaint: Nail Care (3 month nail care f/u)    Fabiano is a 63 y.o. male who presents to the clinic upon referral from Dr. Perez ref. provider found  for evaluation and treatment of diabetic feet. Fabiano has a past medical history of Chronic back pain greater than 3 months duration, Depression, Diabetes mellitus, Diabetes mellitus, type 2, Hypertension, and Schizophrenia. Patient relates no major problem with feet. Only complaints today consist of here for a diabetic foot exam. States his feet are numb. States has trouble trimming his toenails. No further pedal complaints.    9/28/23: Seen today for follow up of left foot pain and to review xray results. Relates foot pain is slowly getting better with exercises he has been performing at home.     1/2/24: Here for routine foot care. Inquiring about treatment for fungal toenails.     PCP: Lucien Fleming MD    Date Last Seen by PCP: 10/19/23    Current shoe gear: Tennis shoes    Hemoglobin A1C   Date Value Ref Range Status   10/04/2023 5.0 4.0 - 5.6 % Final     Comment:     ADA Screening Guidelines:  5.7-6.4%  Consistent with prediabetes  >or=6.5%  Consistent with diabetes    High levels of fetal hemoglobin interfere with the HbA1C  assay. Heterozygous hemoglobin variants (HbS, HgC, etc)do  not significantly interfere with this assay.   However, presence of multiple variants may affect accuracy.     06/29/2023 5.7 (H) 4.0 - 5.6 % Final     Comment:     ADA Screening Guidelines:  5.7-6.4%  Consistent with prediabetes  >or=6.5%  Consistent with diabetes    High levels of fetal hemoglobin interfere with the HbA1C  assay. Heterozygous hemoglobin variants (HbS, HgC, etc)do  not significantly interfere with this assay.   However, presence of multiple variants may affect accuracy.     02/13/2023 6.5 (H) 4.0 - 5.6 % Final     Comment:     ADA Screening Guidelines:  5.7-6.4%  Consistent  with prediabetes  >or=6.5%  Consistent with diabetes    High levels of fetal hemoglobin interfere with the HbA1C  assay. Heterozygous hemoglobin variants (HbS, HgC, etc)do  not significantly interfere with this assay.   However, presence of multiple variants may affect accuracy.           Review of Systems   Constitutional: Negative for chills, decreased appetite, diaphoresis and fever.   HENT:  Negative for congestion and hearing loss.    Cardiovascular:  Negative for chest pain, claudication, leg swelling and syncope.   Respiratory:  Negative for cough and shortness of breath.    Skin:  Positive for dry skin and nail changes. Negative for color change, flushing, itching, poor wound healing and rash.   Musculoskeletal:  Positive for arthritis, back pain and joint pain.   Gastrointestinal:  Negative for nausea and vomiting.   Neurological:  Positive for numbness and paresthesias. Negative for focal weakness and weakness.   Psychiatric/Behavioral:  Negative for altered mental status. The patient is not nervous/anxious.            Objective:      Physical Exam  Constitutional:       General: He is not in acute distress.     Appearance: Normal appearance. He is well-developed. He is not diaphoretic.   Cardiovascular:      Comments: Dorsalis pedis and posterior tibial pulses are within normal limits. Skin temperature is within normal limits. Toes are cool to touch and feet are warm proximally. Hair growth is within normal limits. Skin is normotrophic and without hyperpigmentation. No edema noted. No varicosities or spider veins noted, bilaterally.       Musculoskeletal:         General: No tenderness.      Comments: Adequate joint range of motion without pain, limitation, nor crepitation to foot and ankle joints. Muscle strength is 5/5 in all groups bilaterally.    Pes planus, bilateral foot       Feet:      Right foot:      Skin integrity: Dry skin present. No ulcer, blister, erythema or warmth.      Left foot:       Skin integrity: Dry skin present. No ulcer, blister, erythema or warmth.   Skin:     General: Skin is warm and dry.      Capillary Refill: Capillary refill takes less than 2 seconds.      Comments: Skin is warm and dry, no acute signs of infection noted bilaterally      Toenails are thickened by 2-4 mm's, dystrophic, and are darkened in coloration with subungual fungal debris.     Otherwise, no open wounds, macerations or hyperkeratotic lesions, bilaterally            Neurological:      Mental Status: He is alert and oriented to person, place, and time.      Sensory: Sensory deficit present.      Motor: No abnormal muscle tone.      Comments: Light touch is diminished. [place]-Alonso 5.07 monofilamant testing is diminished. Vibratory sensation is diminished bilaterally.   Psychiatric:         Mood and Affect: Mood normal.         Behavior: Behavior normal.         Thought Content: Thought content normal.             Assessment:       Encounter Diagnoses   Name Primary?    Disease of nail Yes    Type 2 diabetes mellitus with diabetic polyneuropathy, without long-term current use of insulin              Plan:       Fabiano was seen today for nail care.    Diagnoses and all orders for this visit:    Disease of nail  -     Routine Foot Care    Type 2 diabetes mellitus with diabetic polyneuropathy, without long-term current use of insulin  -     Routine Foot Care    Other orders  -     terbinafine HCL (LAMISIL) 1 % cream; Apply topically 2 (two) times daily.          I counseled the patient on his conditions, their implications and medical management.  Routine foot care per attached note  Declines Penlac. Rx Lamisil for toenails. Discussed importance of keeping feet clean and dry to prevent spread of toenail and foot fungus.   Return to clinic in 3 mo, sooner PRN

## 2024-01-03 RX ORDER — OXYCODONE AND ACETAMINOPHEN 10; 325 MG/1; MG/1
1 TABLET ORAL EVERY 8 HOURS PRN
Qty: 60 TABLET | Refills: 0 | Status: SHIPPED | OUTPATIENT
Start: 2024-01-03

## 2024-01-10 ENCOUNTER — TELEPHONE (OUTPATIENT)
Dept: PAIN MEDICINE | Facility: CLINIC | Age: 64
End: 2024-01-10
Payer: MEDICARE

## 2024-01-10 NOTE — TELEPHONE ENCOUNTER
----- Message from Kodak Moreno MA sent at 1/10/2024  8:36 AM CST -----  Please advise   ----- Message -----  From: Lou Darnell  Sent: 1/10/2024   7:58 AM CST  To: Daniela Cummings Staff    Type:  Needs Medical Advice    Who Called: pt     Would the patient rather a call back or a response via [employer]? call  Best Call Back Number: [phone]  Additional Information: pt is scheduled for a surgery on 01/17/24 and he uses [employer] transportation so he needs  to have a arrival time so he can request transportation

## 2024-01-16 ENCOUNTER — TELEPHONE (OUTPATIENT)
Dept: PAIN MEDICINE | Facility: CLINIC | Age: 64
End: 2024-01-16
Payer: MEDICARE

## 2024-01-16 NOTE — TELEPHONE ENCOUNTER
Katia Castro :    [10:45 AM] Katia Castro  [id number] patient wants to cancel procedure on 1/17 due to illness. patient will reach back out to r/s.

## 2024-01-22 NOTE — LETTER
February 18, 2020      Lucien Fleming MD  [address] [facility] [address]           [facility]  [address] [place] [address] [address]  [facility] [address]  Phone: [phone]          Patient: Fabiano Malik Jr.   MR Number: [id number]   YOB: 1960   Date of Visit: 2/18/2020       Dear Dr. Lucien Fleming:    Thank you for referring Fabiano Malik to me for evaluation. Attached you will find relevant portions of my assessment and plan of care.    If you have questions, please do not hesitate to call me. I look forward to following Fabiano Malik along with you.    Sincerely,    Oscar Carreon MD    Enclosure  CC:  No Recipients    If you would like to receive this communication electronically, please contact [email or web address] or [phone] to request more information on [employer] access.    For providers and/or their staff who would like to refer a patient to [facility], please contact us through our one-stop-shop provider referral line, [facility], at [phone].    If you feel you have received this communication in error or would no longer like to receive these types of communications, please e-mail [email or web address]          Detail Level: Simple Price (Do Not Change): 0.00 Instructions: This plan will send the code FBSE to the PM system.  DO NOT or CHANGE the price.

## 2024-01-26 ENCOUNTER — TELEPHONE (OUTPATIENT)
Dept: PAIN MEDICINE | Facility: CLINIC | Age: 64
End: 2024-01-26
Payer: MEDICARE

## 2024-01-26 DIAGNOSIS — M54.16 LUMBAR RADICULOPATHY: Primary | ICD-10-CM

## 2024-01-26 NOTE — TELEPHONE ENCOUNTER
----- Message from Kodak Moreno MA sent at 1/26/2024  1:43 PM CST -----  Please advise   ----- Message -----  From: Lai Steven  Sent: 1/26/2024  12:35 PM CST  To: Daniela Cummings Staff    Type:  Needs Medical Advice    Who Called: pt  Would the patient rather a call back or a response via [employer]? call  Best Call Back Number: [phone]  Additional Information: pt would like a call regarding schedule appointment on 01/30/2024 (Epidural)  Need to reschedule due to transportation   Appointment was not canceled in epic

## 2024-01-29 ENCOUNTER — PATIENT MESSAGE (OUTPATIENT)
Dept: NEUROSURGERY | Facility: CLINIC | Age: 64
End: 2024-01-29
Payer: MEDICARE

## 2024-01-29 RX ORDER — TRAMADOL HYDROCHLORIDE 50 MG/1
50 TABLET ORAL EVERY 8 HOURS PRN
Qty: 90 TABLET | Refills: 3 | Status: SHIPPED | OUTPATIENT
Start: 2024-01-29 | End: 2024-03-20

## 2024-01-29 RX ORDER — OXYCODONE AND ACETAMINOPHEN 10; 325 MG/1; MG/1
1 TABLET ORAL EVERY 8 HOURS PRN
Qty: 60 TABLET | Refills: 0 | OUTPATIENT
Start: 2024-01-29

## 2024-02-07 DIAGNOSIS — E03.9 HYPOTHYROIDISM, UNSPECIFIED TYPE: ICD-10-CM

## 2024-02-07 NOTE — TELEPHONE ENCOUNTER
Refill Routing Note   Medication(s) are not appropriate for processing by [facility] for the following reason(s):        New or recently adjusted medication:     [facility] action(s):  Defer     Requires labs : Yes    Medication Therapy Plan:  Lab (a1c) due 4.1.2024      Appointments  past 12m or future 3m with PCP    Date Provider   Last Visit   11/15/2023 Lucien Fleming MD   Next Visit   5/10/2024 Lucien Fleming MD   ED visits in past 90 days: 0        Note composed:11:04 AM 02/07/2024

## 2024-02-07 NOTE — TELEPHONE ENCOUNTER
Care Due:                  Date            Visit Type   Department     Provider  --------------------------------------------------------------------------------                                EP -                              PRIMARY      [facility]  Last Visit: 11-      CARE ([facility])   LESLEY Fleming                              EP -                              PRIMARY      [facility]  Next Visit: 05-      CARE ([facility])   LESLEY Fleming                                                            Last  Test          Frequency    Reason                     Performed    Due Date  --------------------------------------------------------------------------------    HBA1C.......  6 months...  dulaglutide, metFORMIN...  10-   04-    [facility] Embedded Care Due Messages. Reference number: [id number].   2/07/2024 8:36:33 AM CST

## 2024-02-08 RX ORDER — LEVOTHYROXINE SODIUM 25 UG/1
25 TABLET ORAL
Qty: 90 TABLET | Refills: 3 | Status: SHIPPED | OUTPATIENT
Start: 2024-02-08 | End: 2024-05-10 | Stop reason: SDUPTHER

## 2024-02-14 ENCOUNTER — TELEPHONE (OUTPATIENT)
Dept: PAIN MEDICINE | Facility: CLINIC | Age: 64
End: 2024-02-14
Payer: MEDICARE

## 2024-02-20 NOTE — PRE-PROCEDURE INSTRUCTIONS
Unable to reach pt via phone.  Left voicemail with arrival time also informing pt of need for responsible  accompaniment and instructing pt to follow pre-procedure instructions provided via [facility] portal.  The following messages was sent to pt's portal.       Dear Behzad ,     You are scheduled for a procedure with Dr. Suarez on 2/21/2024.  Your scheduled arrival time is 12:00 noon.  This arrival time is roughly 1 hour before your anticipated procedure time to allow sufficient time for pre-op..  Please wear comfortable clothes.  Most patients do not need to change into a gown.  Please do not wear a dress.  This procedure will take place at the [facility] at the corner of [facility] and [facility].  [facility] to [facility].  The address is:     [address] [place] [address].  [place] [id number]     After entering the building, you will proceed to the second floor where you can check in with registration. You should take any medications that you routinely take for blood pressure, heart medications, thyroid, cholesterol, etc.      The fasting restrictions are dependent on whether or not you are receiving sedation.  Sedation is not available for all procedures.      Your fasting instructions are as follow:  Oral Sedation. You do not need to fast before this procedure.  You can eat and drink like normal.  You CANNOT drive yourself and must have a .     If you are on blood thinners, you need to follow the anticoagulation instructions that had been discussed previously.  You should only stop the blood thinners if it was approved by your primary care physician or your cardiologist.  In the event that you are not able to stop your blood thinners, a blood thinner was not listed on your medication list, or we were not able to get clearance from your cardiologist, then the procedure may have to be postponed/canceled.      IF you were told to stop your blood  thinners, this is how long you should generally hold some of the more common ones.  Remember that stopping blood thinners is only necessary for certain procedures. If you are unsure of your instructions, please call us.   Aspirin - 5 days  Plavix/Clopidogrel - 7 days  Warfarin / Coumadin - 5 days  Eliquis - 3 days  Pradaxa/Dabigatran - 4 days  Xarelto/Rivaroxaban - 3 days     If you are a diabetic, do not take your medication if you will be fasting, but bring it with you. Please plan on being here for roughly 2 hours.     Please call us if you have been sick (running fever, having any flu-like symptoms) or have been taking antibiotics in the past 2 weeks or had any outpatient procedures other than with us (colonoscopy, endoscopy, OBGYN, dental, etc.). If you have been previously COVID positive, you will need to hold off on your procedure until you are symptom free for 10 days. If you did not have any symptoms, you can have your procedure 10 days from your positive test result.       *HOLD ALL VITAMINS, MINERALS, HERBS (INCLUDING HERBAL TEAS) AND SUPPLEMENTS  *SHOWER WITH ANTIBACTERIAL SOAP (EX. DIAL) NIGHT BEFORE AND MORNING OF PROCEDURE  *DO NOT APPLY ANY LOTIONS, OILS, POWDERS, PERFUME/COLOGNE, OINTMENTS, GELS, CREAMS, MAKEUP OR DEODORANT TO YOUR SKIN MORNING OF PROCEDURE  *LEAVE JEWELRY AND ANY VALUABLES AT HOME  *WEAR LOOSE COMFORTABLE CLOTHING (PREFERABLY A BUTTON UP SHIRT)     Please reply to this message as receipt of delivery.     Thank you,  [facility]  [place], LPN [facility]  Pre-Admit

## 2024-02-21 ENCOUNTER — HOSPITAL ENCOUNTER (OUTPATIENT)
Facility: HOSPITAL | Age: 64
Discharge: HOME | End: 2024-02-21
Attending: STUDENT IN AN ORGANIZED HEALTH CARE EDUCATION/TRAINING PROGRAM | Admitting: STUDENT IN AN ORGANIZED HEALTH CARE EDUCATION/TRAINING PROGRAM
Payer: MEDICARE

## 2024-02-21 VITALS
DIASTOLIC BLOOD PRESSURE: 69 MMHG | TEMPERATURE: 98 F | OXYGEN SATURATION: 97 % | RESPIRATION RATE: 14 BRPM | SYSTOLIC BLOOD PRESSURE: 140 MMHG | HEART RATE: 92 BPM

## 2024-02-21 DIAGNOSIS — G89.29 CHRONIC PAIN: ICD-10-CM

## 2024-02-21 DIAGNOSIS — M54.16 LUMBAR RADICULOPATHY: Primary | ICD-10-CM

## 2024-02-21 LAB — POCT GLUCOSE: 99 MG/DL (ref 70–110)

## 2024-02-21 PROCEDURE — 62323 NJX INTERLAMINAR LMBR/SAC: CPT | Mod: ,,, | Performed by: STUDENT IN AN ORGANIZED HEALTH CARE EDUCATION/TRAINING PROGRAM

## 2024-02-21 PROCEDURE — 82962 GLUCOSE BLOOD TEST: CPT | Performed by: STUDENT IN AN ORGANIZED HEALTH CARE EDUCATION/TRAINING PROGRAM

## 2024-02-21 PROCEDURE — 62323 NJX INTERLAMINAR LMBR/SAC: CPT | Performed by: STUDENT IN AN ORGANIZED HEALTH CARE EDUCATION/TRAINING PROGRAM

## 2024-02-21 PROCEDURE — 25500020 PHARM REV CODE 255: Performed by: STUDENT IN AN ORGANIZED HEALTH CARE EDUCATION/TRAINING PROGRAM

## 2024-02-21 PROCEDURE — 63600175 PHARM REV CODE 636 W HCPCS: Performed by: STUDENT IN AN ORGANIZED HEALTH CARE EDUCATION/TRAINING PROGRAM

## 2024-02-21 PROCEDURE — 25000003 PHARM REV CODE 250: Performed by: STUDENT IN AN ORGANIZED HEALTH CARE EDUCATION/TRAINING PROGRAM

## 2024-02-21 RX ORDER — METHOCARBAMOL 750 MG/1
750 TABLET, FILM COATED ORAL 3 TIMES DAILY PRN
Qty: 60 TABLET | Refills: 0 | Status: SHIPPED | OUTPATIENT
Start: 2024-02-21 | End: 2024-03-26 | Stop reason: SDUPTHER

## 2024-02-21 RX ORDER — ALPRAZOLAM 0.5 MG/1
0.5 TABLET ORAL
Status: DISCONTINUED | OUTPATIENT
Start: 2024-02-21 | End: 2024-02-21 | Stop reason: HOSPADM

## 2024-02-21 RX ORDER — DEXAMETHASONE SODIUM PHOSPHATE 10 MG/ML
INJECTION INTRAMUSCULAR; INTRAVENOUS
Status: DISCONTINUED | OUTPATIENT
Start: 2024-02-21 | End: 2024-02-21 | Stop reason: HOSPADM

## 2024-02-21 RX ORDER — LIDOCAINE HYDROCHLORIDE 10 MG/ML
INJECTION INFILTRATION; PERINEURAL
Status: DISCONTINUED | OUTPATIENT
Start: 2024-02-21 | End: 2024-02-21 | Stop reason: HOSPADM

## 2024-02-21 RX ORDER — LIDOCAINE HYDROCHLORIDE 20 MG/ML
INJECTION, SOLUTION EPIDURAL; INFILTRATION; INTRACAUDAL; PERINEURAL
Status: DISCONTINUED | OUTPATIENT
Start: 2024-02-21 | End: 2024-02-21 | Stop reason: HOSPADM

## 2024-02-21 NOTE — DISCHARGE SUMMARY
Discharge Note  Short Stay      SUMMARY     Admit Date: 2/21/2024    Attending Physician: Zoila Suarez      Discharge Physician: Zoila Suarez      Discharge Date: 2/21/2024 1:49 PM    Procedure(s) (LRB):  L5-S1 JOHN (N/A)    Final Diagnosis: Lumbar radiculopathy [M54.16]    Disposition: Home or self care    Patient Instructions:   Current Discharge Medication List        CONTINUE these medications which have NOT CHANGED    Details   ARIPiprazole (ABILIFY) 5 MG Tab Take 1 tablet (5 mg total) by mouth once daily.  Qty: 30 tablet, Refills: 11      dulaglutide (TRULICITY) 0.75 mg/0.5 mL pen injector Inject 0.75 mg (one pen) into the skin every 7 days.  Qty: 12 pen , Refills: 1    Associated Diagnoses: Type 2 diabetes mellitus with hyperglycemia      DULoxetine (CYMBALTA) 60 MG capsule Take 2 capsules (120 mg total) by mouth every morning.  Qty: 60 capsule, Refills: 11      hydrOXYzine pamoate (VISTARIL) 50 MG Cap TAKE 1 CAPSULE (50 MG) BY MOUTH NIGHTLY AS NEEDED FOR INSOMNIA  Qty: 90 capsule, Refills: 3    Associated Diagnoses: Primary insomnia      ibuprofen (ADVIL,MOTRIN) 800 MG tablet Take 1 tablet (800 mg total) by mouth every 8 (eight) hours.  Qty: 20 tablet, Refills: 0      levothyroxine (SYNTHROID) 25 MCG tablet Take 1 tablet (25 mcg total) by mouth before breakfast.  Qty: 90 tablet, Refills: 3    Associated Diagnoses: Hypothyroidism, unspecified type      metFORMIN (GLUCOPHAGE) 1000 MG tablet Take 1 tablet (1,000 mg total) by mouth 2 (two) times daily with meals.  Qty: 180 tablet, Refills: 1    Associated Diagnoses: Type 2 diabetes mellitus without complication, without long-term current use of insulin      methocarbamoL (ROBAXIN) 750 MG Tab Take 1 tablet (750 mg total) by mouth 3 (three) times daily as needed (muscle spasms).  Qty: 60 tablet, Refills: 0      olmesartan (BENICAR) 20 MG tablet Take 1 tablet (20 mg total) by mouth once daily.  Qty: 90 tablet, Refills: 3    Comments: .  Associated  Diagnoses: Hypertension associated with diabetes      pregabalin (LYRICA) 200 MG Cap Take 1 capsule (200 mg total) by mouth 3 (three) times daily.  Qty: 90 capsule, Refills: 2    Comments: jignesh miramontes chatted md ok to change to the 200mg capsule 11/28/2023  Associated Diagnoses: Other chronic pain; Cervical radiculopathy; Type 2 diabetes mellitus with diabetic polyneuropathy, without long-term current use of insulin      traMADoL (ULTRAM) 50 mg tablet Take 1 tablet (50 mg total) by mouth every 8 (eight) hours as needed for Pain.  Qty: 90 tablet, Refills: 3    Comments: Quantity prescribed more than 7 day supply? Yes      BLOOD PRESSURE CUFF Misc 1 Units by Misc.(Non-Drug; Combo Route) route once daily.  Qty: 1 each, Refills: 0    Associated Diagnoses: Hypertension associated with diabetes      blood sugar diagnostic (TRUE METRIX GLUCOSE TEST STRIP) Strp use 1 strip to check glucose 2 (two) times a day.  Qty: 200 strip, Refills: 6    Associated Diagnoses: Type 2 diabetes mellitus without complication, without long-term current use of insulin      blood-glucose meter Misc Use as instructed  Qty: 1 each, Refills: 0    Associated Diagnoses: Type 2 diabetes mellitus without complication, without long-term current use of insulin      COVID [id number],12up,,andu,,PF, (SPIKEVAX [id number],12Y UP,,PF,) 50 mcg/0.5 mL injection Inject into the muscle.  Qty: 0.5 mL, Refills: 0      lancets 30 gauge Misc 1 lancet by Misc.(Non-Drug; Combo Route) route twice daily.  Qty: 200 each, Refills: 6    Comments: True metrix  Associated Diagnoses: Type 2 diabetes mellitus without complication, without long-term current use of insulin      omeprazole (PRILOSEC) 20 MG capsule Take 1 capsule (20 mg total) by mouth before breakfast.  Qty: 90 capsule, Refills: 3    Associated Diagnoses: Gastroesophageal reflux disease, unspecified whether esophagitis present      oxyCODONE-acetaminophen (PERCOCET)  mg per tablet Take 1 tablet by mouth every 8  (eight) hours as needed for Pain.  Qty: 60 tablet, Refills: 0    Comments: Quantity prescribed more than 7 day supply? Yes, quantity medically necessary      pravastatin (PRAVACHOL) 40 MG tablet Take 1 tablet (40 mg total) by mouth once daily.  Qty: 90 tablet, Refills: 3      sildenafiL (VIAGRA) 100 MG tablet Take 1 tablet (100 mg total) by mouth daily as needed for Erectile Dysfunction.  Qty: 30 tablet, Refills: 11    Associated Diagnoses: Erectile dysfunction, unspecified erectile dysfunction type      terbinafine HCL (LAMISIL) 1 % cream Apply topically 2 (two) times daily.  Qty: 15 g, Refills: 4                 Discharge Diagnosis: Lumbar radiculopathy [M54.16]  Condition on Discharge: Stable with no complications to procedure   Diet on Discharge: Same as before.  Activity: as per instruction sheet.  Discharge to: Home with a responsible adult.  Follow up: 2-4 weeks       Please call my office or pager at [phone] if experienced any weakness or loss of sensation, fever > 101.5, pain uncontrolled with oral medications, persistent nausea/vomiting/or diarrhea, redness or drainage from the incisions, or any other worrisome concerns. If physician on call was not reached or could not communicate with our office for any reason please go to the nearest emergency department

## 2024-02-21 NOTE — PLAN OF CARE
Pt in preop bay 26, VSS. Pt denies any open wounds on body or the use of any immunizations or antibiotics in the past 2 weeks. Pt needs consents, otherwise ready to roll. Oral sedation held due to pt having medical transportation bringing him home. Pt okay with doing the procedure without sedation.

## 2024-02-21 NOTE — DISCHARGE INSTRUCTIONS
Home Care Instructions Pain Management:    1.  DIET:    You may resume your normal diet today.    2.  BATHING:    You may shower with luke warm water.    3.  DRESSING:    You may remove your bandage today.    4.  ACTIVITY LEVEL:      You may resume your normal activities 24 hours after your procedure.    5.  MEDICATIONS:    You may resume your normal medications today.    6.  SPECIAL INSTRUCTIONS:    No heat to the injection site for 24 hours including bath or shower, heating pad, moist heat or hot tubs.    Use an ice pack to the injection site for any pain or discomfort.  Apply ice packs for 20 minute intervals as needed.    If you have received any sedatives by mouth today, you can not drive for 12 hours.    If you have received sedation through an IV, you can not drive for 24 hours.    PLEASE CALL YOUR DOCTOR FOR THE FOLLOWIN.  Redness or swelling around the injection site.  2.  Fever of 101 degrees.  3.  Drainage (pus) from the injection site.  4.  For any continuous bleeding (some dried blood over the incision is normal.)    FOR EMERGENCIES:    If any unusual problems or difficulties occur during clinic hours, call [phone] or dial [phone].    Follow up with with your physician in 2-3 weeks.

## 2024-02-21 NOTE — OP NOTE
Lumbar Interlaminar Epidural Steroid Injection under Fluoroscopic Guidance    The procedure, risks, benefits, and options were discussed with the patient. There are no contraindications to the procedure. The patent expressed understanding and agreed to the procedure. Informed written consent was obtained prior to the start of the procedure and can be found in the patient's chart.    PATIENT NAME: Fabiano Malik Jr.   MRN: [id number]     DATE OF PROCEDURE: 02/21/2024    PROCEDURE: Lumbar Interlaminar Epidural Steroid Injection L5/S1 under Fluoroscopic Guidance    PRE-OP DIAGNOSIS: Lumbar radiculopathy [M54.16] Lumbar radiculopathy [M54.16]    POST-OP DIAGNOSIS: Same    PHYSICIAN: Zoila Suarez DO    ASSISTANTS: None     MEDICATIONS INJECTED: Preservative-free Decadron 10mg with 4cc of Lidocaine 1% MPF and preservative free normal saline    LOCAL ANESTHETIC INJECTED: Xylocaine 2%     SEDATION: None    ESTIMATED BLOOD LOSS: None    COMPLICATIONS: None    TECHNIQUE: Time-out was performed to identify the patient and procedure to be performed. With the patient laying in a prone position, the surgical area was prepped and draped in the usual sterile fashion using ChloraPrep and a fenestrated drape. The level was determined under fluoroscopy guidance. Skin anesthesia was achieved by injecting Lidocaine 2% over the injection site. The interlaminar space was then approached with a 20 gauge,  3.5 inch Tuohy needle that was introduced under fluoroscopic guidance in the AP, lateral and/or contralateral oblique imaging. Once the Ligamentum flavum was encountered loss of resistance to saline was used to enter the epidural space. With positive loss of resistance and negative aspiration for CSF or Blood, contrast dye Omnipaque (300mg/mL) was injected to confirm placement and there was no vascular runoff. 5 mL of the medication mixture listed above was injected slowly. Displacement of the radio opaque contrast after injection  of the medication confirmed that the medication went into the area of the epidural space. The needles were removed and bleeding was nil. A sterile dressing was applied. No specimens collected. The patient tolerated the procedure well.       The patient was monitored after the procedure in the recovery area. They were given post-procedure and discharge instructions to follow at home. The patient was discharged in a stable condition.      Zoila Suarez DO

## 2024-02-21 NOTE — H&P
HPI  Patient presenting for Procedure(s) (LRB):  L5-S1 JOHN (N/A)     Patient on Anti-coagulation No    No health changes since previous encounter    Past Medical History:   Diagnosis Date    Chronic back pain greater than 3 months duration     Depression     Diabetes mellitus     Diabetes mellitus, type 2     Hypertension     Schizophrenia      Past Surgical History:   Procedure Laterality Date    APPENDECTOMY      CARPAL TUNNEL RELEASE Right 12/13/2022    Procedure: RELEASE, CARPAL TUNNEL;  Surgeon: Everton Walter Jr., MD;  Location: [facility] OR;  Service: Orthopedics;  Laterality: Right;    COLONOSCOPY N/A 5/31/2018    Procedure: COLONOSCOPY;  Surgeon: Guillaume Hatch MD;  Location: [facility] ENDO;  Service: Endoscopy;  Laterality: N/A;    COLONOSCOPY N/A 11/17/2022    Procedure: COLONOSCOPY;  Surgeon: Guillaume Hatch MD;  Location: [facility] ENDO;  Service: Endoscopy;  Laterality: N/A;    DECOMPRESSION, NERVE, ULNAR Right 12/13/2022    Procedure: DECOMPRESSION, NERVE, ULNAR;  Surgeon: Everton Walter Jr., MD;  Location: [facility] OR;  Service: Orthopedics;  Laterality: Right;    FUSION, SPINE, CERVICAL N/A 8/11/2023    Procedure: FUSION, SPINE, CERVICAL;  Surgeon: Guillaume Washington MD;  Location: [facility] OR;  Service: Neurosurgery;  Laterality: N/A;  C3-4, C5-6, C6-7 ACDF C3-C7 anterior instrumentation Depuy  -ANTERIOR DECOMPREESSION 2 LEVELS   -ANTERIOR INSTRUMENTATION INTERBODY CAGE INTERSPACE 3   -LOP 3.5 HR   -LOS 3 DAYS   -GENERAL   -TYPE AND SCREEN   - C ARM   -EMG, SEP, MEP hari notified cc  -ASPEN   -REG    NECK SURGERY      RADIOFREQUENCY ABLATION OF LUMBAR MEDIAL BRANCH NERVE AT SINGLE LEVEL Left 5/29/2018    Procedure: RADIOFREQUENCY THERMOCOAGULATION (RFTC)-NERVE-MEDIAN BRANCH-LUMBAR- Left L2-3-4-5;  Surgeon: Donavon Dennis MD;  Location: [facility] PAIN MGT;  Service: Pain Management;  Laterality: Left;    RADIOFREQUENCY ABLATION OF LUMBAR MEDIAL BRANCH NERVE AT SINGLE LEVEL Right 1/8/2019    Procedure:  Radiofrequency Ablation, Nerve, Spinal, Lumbar, Medial Branch, L2,3,4,5;  Surgeon: Alberto Hernandez Jr., MD;  Location: [facility] PAIN T;  Service: Pain Management;  Laterality: Right;  Pt is diabetic    RADIOFREQUENCY ABLATION OF LUMBAR MEDIAL BRANCH NERVE AT SINGLE LEVEL Left 3/12/2019    Procedure: Radiofrequency Ablation, Nerve, Spinal, Lumbar, Medial Branch, Left, L2,3,4,5;  Surgeon: Alberto Hernandez Jr., MD;  Location: [facility] PAIN T;  Service: Pain Management;  Laterality: Left;  Pt will be consented the day of procedure.    TRANSFORAMINAL EPIDURAL INJECTION OF STEROID Right 9/12/2019    Procedure: Injection,steroid,epidural,transforaminal,right,L4-5 and L5-S1;  Surgeon: Alberto Hernandez Jr., MD;  [place] [facility] [place] [facility] [place]  Service: Pain Management;  Laterality: Right;  PT IS DIABETIC     Review of patient's allergies indicates:   Allergen Reactions    Pcn [penicillins] Other (See Comments)     Childhood rxn, pt does not recall type of rxn      Current Facility-Administered Medications   Medication    ALPRAZolam tablet 0.5 mg       PMHx, PSHx, Allergies, Medications reviewed in epic    ROS negative except pain complaints in HPI    OBJECTIVE:    /72   Pulse 96   Temp 99.1 °F (37.3 °C) (Temporal)   Resp 18   SpO2 95%     PHYSICAL EXAMINATION:    GENERAL: Well appearing, in no acute distress, alert and oriented x3.  PSYCH:  Mood and affect appropriate.  SKIN: Skin color, texture, turgor normal, no rashes or lesions which will impact the procedure.  CV: RRR with palpation of the radial artery.  PULM: No evidence of respiratory difficulty, symmetric chest rise. Clear to auscultation.  NEURO: Cranial nerves grossly intact.    Plan:    Proceed with procedure as planned Procedure(s) (LRB):  L5-S1 JOHN (N/A)    Zoila Suarez  02/21/2024

## 2024-02-22 ENCOUNTER — TELEPHONE (OUTPATIENT)
Dept: PAIN MEDICINE | Facility: CLINIC | Age: 64
End: 2024-02-22
Payer: MEDICARE

## 2024-02-22 NOTE — TELEPHONE ENCOUNTER
"I spoke with pt in regards to message in the portal Pt stated that he received 100% from the procedure that received from Dr. Suarez . He stated that he is satisfied with the results No further concerns was expressed. Call ended.  ----- Message from Melony Guerrero sent at 2/22/2024  2:16 PM CST -----  Regarding: call back  "Type:  Patient Call Back    Who Called:PT    What is the reqeust in detail:Pt requesting call back in regards to an red spot where his epidural was placed. Please advise    Can the clinic reply by [facility]?no     Best Call Back Number:[phone]      Additional Information:Please give an call back today             "

## 2024-02-28 LAB
LEFT EYE DM RETINOPATHY: POSITIVE
RIGHT EYE DM RETINOPATHY: POSITIVE

## 2024-03-01 NOTE — ASSESSMENT & PLAN NOTE
Due to pneumonia.  Likely secondary to aspiration pneumonia patient with vocal cord inflammation and dysphagia.  Speech therapy consulted.  Patient initiated on IV cefepime and vanc.  MBSS today     This is a video visit.    HPI:  The patient reported that she has  been feeling \" ok\".  At the time of evaluation she denies having neurovegetative symptoms of depression.   she seemed to be more alert and attentive and productive.     However she reported that she started having symptoms of post-menopausal syndrome including moodiness,  hot flashes and sweating.  Additionally she reported having difficulty finding words to complete her sentence during a conversation from time to time.   The patient has been taking her psychotropic medications as directed and she denies having ADRs.   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  She is working in the hybrid setting during the pandemic.   Her job has been becoming more demanding.  Additionally she has 3 teenagers, 2 sons are seniors in  and   her daughter is a freshman. Her sons are  applying for colleges with a major engineering.  Additionally they have been involved in multiple extracurricular activities.     In terms of her past psychiatric history the patient reported  that she has  long standing history of having difficulty maintaining concentration with easy distractibility.  She remembered that in her childhood her teens she was labelled  \"being lazy and stupid\" because she had difficulty maintaining concentration and she was struggling in school.  She said  that she was a terrible student in high school because she could not pay attention and was getting D's, also had very difficult time in college, changing her major several times.  Initially  she had a major in biology and she was thinking about applying for medical school after college.  Eventually she switched to accounting and she took  7 years to get her degree in accounting.  she is feeling  restless.   sometimes she needs TV to be turned on to help her relax and calm down before sleep. She reports having anxiety , especially  social anxiety when she is in a performance situation during the social encounters.  She  feels that she is very self-conscious about how, what other people think of her.  The patient reports that she has propensity toward worry,  thinking excessively about what might happen in the future and she would like to be prepared.   In 2015  her twin sons at age 8 were diagnosed with  ADD/ADHD by a pediatrician and  the patient started realizing that she also showed the symptoms of ADD.  Her son's pediatrician encouraged her  to set an appointment with a psychiatrist for evaluation and intervention.  In November 2015 the patient was referred to the department of psychiatry in the [facility] and she started seeing Dr. Cr.  Initially the patient was diagnosed with  generalized anxiety disorder and rule out ADD.  She was  started on Lexapro 10 mg daily.  And she was instructed to set an appointment with Dr. Campbell for a psychological assessment.  However the patient only tried  Lexapro for 1 week and then she could not tolerate the side effects and discontinued.  then she was started on BuSpar 7.5 mg bid.  she tried BuSpar for 1 week then she discontinued the medication. Then she was  started methylphenidate 5 mg twice daily by Dr. Cr.  The dose of methylphenidate was gradually titrated to 10 mg twice daily.  In September 2016 the patient started seeing Dr. Stringer after Dr. Cr's  departure.  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 Stringer.  The patient was continuously prescribed with methylphenidate at 10 mg twice daily.  In December 2016 [place] [facility] [place].  The patient was diagnosed with  ADD without hyperactivity.  The patient was started on methylphenidate LA 10 mg twice daily.  However the patient reported that methylphenidate long-acting formula is  not as effective as   a  short acting formula.  So  in January 2017 she was switched back to methylphenidate at 10 mg twice daily.  When she had last  appointment with Dr. Torres in August 2018 the patient was continuously prescribed methylphenidate at 10 mg twice daily.  She said she had to stop  taking the medication in the summer 2019 after Dr. Torres's departure.    the patient reported a longstanding history of ADD without hyperactivity, which could be traced back to her childhood and teenage years.  She started taking psychotropic medications in 2015.  Initially she was diagnosed with JODIE  by Dr. Singleton and she was prescribed Lexapro , then Buspar but  she could not tolerated the side effects of the SSRI.  Then she was  started on methylphenidate.  She has been taking  methylphenidate for 4 years.  In the summer 2019  she had to stop taking the medication after  her former psychiatrist Dr. Torres resigned.  The patient denies ever having a major depressive episode that lasted more than 2 weeks.  Denies having symptomatology consistent with generalized anxiety disorder, panic disorder or obsessive-compulsive disorder.    Past Medical History  History of head trauma/concussion when she was 6 years old  Denies h/o seizures or TBI.           Current Outpatient Prescriptions:      Multiple Vitamin (MULTI-VITAMIN OR), Take  by mouth., Disp: , Rfl:   Current Outpatient Medications   Medication Sig Dispense Refill    methylpheniDATE (RITALIN) 10 MG tablet Take 1 tablet by mouth 2 times daily. 60 tablet 0    Sodium Sulfate-Mag Sulfate-KCl 4938-969-361 MG Tab Take 12 tablets by mouth as directed. See Colonoscopy Instructions. 24 tablet 0     No current facility-administered medications for this visit.       Allergies   Allergen Reactions    Penicillins Swelling     Swelling to gums and tongue         Psychosocial history:    The patient was born in [place] and  raised by both parents.    She got and grew up in a family of where her parents   when she was 10 years of old.  she said she had  a rough childhood growing up and did poorly in school and felt that her parents were not very engaged in parenting.  She reported  having academic difficulties. she finished high school well and then struggled in college.  initially she had a major in biology  thinking  about applying for medical school after graduation.  Eventually she switched to accounting  taking up to 7 years to finish an accounting degree.    She got  in 1996.  The patient has been  for 24 years.  Currently employed as , working at a current job for about 12 years.  She has 3 children:  twin boys at age 17, and 15-year-old daughter.  Her  is working as an .   She does not smoke.   She drinks alcohol Socially, about 2 times x a month and has 1- 2 drinks each time.  Denies having  symptoms of alcoholism  In terms of her family history the patient reported that her twin sons both are diagnosed  with ADD/ADHD.  They tried  Adderall and Focalin but they could not tolerated the side effects of the psychostimulants.  Apparently taking Focalin causes tics.  She said her sons needs to be placed on medication for ADD/ADHD because  they are struggling in school.  She said  that 1 of her sisters is taking anxiolytics. Her sister has  tried on Paxil and Zoloft.            MENTAL STATUS EXAMINATION:   Appearance: appropriately dressed and well-groomed.     Behavior: cooperative  Alert and Orientation: person/place/time/situation  Speech: appropriate rate, rhythm and coherent  Mood: \" ok\"  Affect:  calm   Thought process: Seems to be sequential  Thought content: Significant for concerning  having difficulty in word finding during the conversation. there is no perceptual disturbance  attention/Concentration/memory: grossly  intact  Insight and Judgment: good       Assessment:  Axis I: Attention-Deficit/Hyperactivity Disorder, inattentive type; improved    social anxiety/performance anxiety, improved  Axis II: Obsessive-compulsive personality features        Treatment Plan:  During session the patient is encouraged to express her feelings and concerns and potential coping is explored  Discussed with the patient about the potential etiology of having difficulty of word finding during a conversation  I discussed with the patient about primary diagnosis of attention deficit hyperactivity disorder, predominantly inattentive type, the neurobiological basis of ADD, the management of ADD and prognosis.  Discussed with the patient about the potential diagnosis of social anxiety with performance anxiety and management of anxiety disorder  Discussed with the patient about psychopharmacology of psychostimulants including methylphenidate,  clinical indications of taking those psychotropic medications and the potential side effects and the risk of misuse and dependency.  Since patient is feeling well and functioning well she will continue her current medication regimen   methylphenidate  10 mg twice daily  She is scheduled to return to clinic in 6 months for follow-up     Electronically signed by  Ayo Jones MD, Certified by the American Board of Psychiatry and Neurology

## 2024-03-14 DIAGNOSIS — E11.42 TYPE 2 DIABETES MELLITUS WITH DIABETIC POLYNEUROPATHY, WITHOUT LONG-TERM CURRENT USE OF INSULIN: ICD-10-CM

## 2024-03-14 DIAGNOSIS — M54.12 CERVICAL RADICULOPATHY: ICD-10-CM

## 2024-03-14 DIAGNOSIS — G89.29 OTHER CHRONIC PAIN: ICD-10-CM

## 2024-03-14 RX ORDER — PREGABALIN 200 MG/1
200 CAPSULE ORAL 3 TIMES DAILY
Qty: 90 CAPSULE | Refills: 2 | Status: SHIPPED | OUTPATIENT
Start: 2024-03-14 | End: 2024-05-10

## 2024-03-19 ENCOUNTER — TELEPHONE (OUTPATIENT)
Dept: NEUROLOGY | Facility: CLINIC | Age: 64
End: 2024-03-19
Payer: MEDICARE

## 2024-03-19 NOTE — TELEPHONE ENCOUNTER
Returned patients call about appointment scheduled for Wednesday 3/20/2024. I stated I was able to get him scheduled back for 3/20/2024 at 1:20 pm appointment. Pt voiced understanding.

## 2024-03-19 NOTE — TELEPHONE ENCOUNTER
----- Message from Dee Devi sent at 3/19/2024 11:31 AM CDT -----  Type: General Call Back     Name of Caller:URBAN SANCHEZ JR. [[id number]]  Symptoms:general   Would the patient rather a call back or a response via [facility]? Call back   Best Call Back Number: [phone]  Additional Information: pt accidentally canceled appt for tomorrow. Pt indicate he would like to keep appt. Pt indicates if possible please reschedule back. Please call back with further assistance and more information.

## 2024-03-20 ENCOUNTER — OFFICE VISIT (OUTPATIENT)
Dept: NEUROLOGY | Facility: CLINIC | Age: 64
End: 2024-03-20
Payer: MEDICARE

## 2024-03-20 VITALS
HEART RATE: 83 BPM | DIASTOLIC BLOOD PRESSURE: 86 MMHG | WEIGHT: 235.88 LBS | BODY MASS INDEX: 31.26 KG/M2 | SYSTOLIC BLOOD PRESSURE: 143 MMHG | HEIGHT: 73 IN

## 2024-03-20 DIAGNOSIS — Z87.898 HISTORY OF SEIZURE: ICD-10-CM

## 2024-03-20 DIAGNOSIS — E16.2 HYPOGLYCEMIA: ICD-10-CM

## 2024-03-20 DIAGNOSIS — R53.1 WEAKNESS: ICD-10-CM

## 2024-03-20 DIAGNOSIS — E11.42 DIABETIC POLYNEUROPATHY ASSOCIATED WITH TYPE 2 DIABETES MELLITUS: ICD-10-CM

## 2024-03-20 DIAGNOSIS — R41.3 OTHER AMNESIA: ICD-10-CM

## 2024-03-20 DIAGNOSIS — G25.3 MYOCLONUS: Primary | ICD-10-CM

## 2024-03-20 DIAGNOSIS — R55 VASOVAGAL SYNCOPE: ICD-10-CM

## 2024-03-20 DIAGNOSIS — R26.9 NEUROLOGIC GAIT DYSFUNCTION: ICD-10-CM

## 2024-03-20 DIAGNOSIS — F20.9 SCHIZOPHRENIA, UNSPECIFIED TYPE: Chronic | ICD-10-CM

## 2024-03-20 PROCEDURE — 99205 OFFICE O/P NEW HI 60 MIN: CPT | Mod: S$GLB,,, | Performed by: PSYCHIATRY & NEUROLOGY

## 2024-03-20 PROCEDURE — 99999 PR PBB SHADOW E&M-EST. PATIENT-LVL V: CPT | Mod: PBBFAC,,, | Performed by: PSYCHIATRY & NEUROLOGY
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
## 2024-03-27 RX ORDER — METHOCARBAMOL 750 MG/1
750 TABLET, FILM COATED ORAL 2 TIMES DAILY PRN
Qty: 180 TABLET | Refills: 0 | Status: SHIPPED | OUTPATIENT
Start: 2024-03-27

## 2024-04-02 ENCOUNTER — OFFICE VISIT (OUTPATIENT)
Dept: PODIATRY | Facility: CLINIC | Age: 64
End: 2024-04-02
Payer: MEDICARE

## 2024-04-02 VITALS
BODY MASS INDEX: 31.12 KG/M2 | DIASTOLIC BLOOD PRESSURE: 76 MMHG | SYSTOLIC BLOOD PRESSURE: 119 MMHG | HEART RATE: 88 BPM | HEIGHT: 73 IN

## 2024-04-02 DIAGNOSIS — E11.65 TYPE 2 DIABETES MELLITUS WITH HYPERGLYCEMIA: ICD-10-CM

## 2024-04-02 DIAGNOSIS — E11.9 ENCOUNTER FOR DIABETIC FOOT EXAM: ICD-10-CM

## 2024-04-02 DIAGNOSIS — L60.9 DISEASE OF NAIL: Primary | ICD-10-CM

## 2024-04-02 DIAGNOSIS — F51.01 PRIMARY INSOMNIA: ICD-10-CM

## 2024-04-02 DIAGNOSIS — E11.9 TYPE 2 DIABETES MELLITUS WITHOUT COMPLICATION, WITHOUT LONG-TERM CURRENT USE OF INSULIN: ICD-10-CM

## 2024-04-02 DIAGNOSIS — E11.42 TYPE 2 DIABETES MELLITUS WITH DIABETIC POLYNEUROPATHY, WITHOUT LONG-TERM CURRENT USE OF INSULIN: ICD-10-CM

## 2024-04-02 PROCEDURE — 99999 PR PBB SHADOW E&M-EST. PATIENT-LVL III: CPT | Mod: PBBFAC,,, | Performed by: STUDENT IN AN ORGANIZED HEALTH CARE EDUCATION/TRAINING PROGRAM

## 2024-04-02 PROCEDURE — 3008F BODY MASS INDEX DOCD: CPT | Mod: CPTII,S$GLB,, | Performed by: STUDENT IN AN ORGANIZED HEALTH CARE EDUCATION/TRAINING PROGRAM

## 2024-04-02 PROCEDURE — 1159F MED LIST DOCD IN RCRD: CPT | Mod: CPTII,S$GLB,, | Performed by: STUDENT IN AN ORGANIZED HEALTH CARE EDUCATION/TRAINING PROGRAM

## 2024-04-02 PROCEDURE — 11721 DEBRIDE NAIL 6 OR MORE: CPT | Mod: Q9,S$GLB,, | Performed by: STUDENT IN AN ORGANIZED HEALTH CARE EDUCATION/TRAINING PROGRAM

## 2024-04-02 PROCEDURE — 99213 OFFICE O/P EST LOW 20 MIN: CPT | Mod: 25,S$GLB,, | Performed by: STUDENT IN AN ORGANIZED HEALTH CARE EDUCATION/TRAINING PROGRAM

## 2024-04-02 PROCEDURE — 4010F ACE/ARB THERAPY RXD/TAKEN: CPT | Mod: CPTII,S$GLB,, | Performed by: STUDENT IN AN ORGANIZED HEALTH CARE EDUCATION/TRAINING PROGRAM

## 2024-04-02 PROCEDURE — 3074F SYST BP LT 130 MM HG: CPT | Mod: CPTII,S$GLB,, | Performed by: STUDENT IN AN ORGANIZED HEALTH CARE EDUCATION/TRAINING PROGRAM

## 2024-04-02 PROCEDURE — 3078F DIAST BP <80 MM HG: CPT | Mod: CPTII,S$GLB,, | Performed by: STUDENT IN AN ORGANIZED HEALTH CARE EDUCATION/TRAINING PROGRAM

## 2024-04-02 NOTE — PROGRESS NOTES
Subjective:      Patient ID: Fabiano Malik Jr. is a 63 y.o. male.    Chief Complaint: Diabetes Mellitus (PCP Dr. Fleming, 11/15/23), Diabetic Foot Exam, Routine Foot Care, and Peripheral Neuropathy    Fabiano is a 63 y.o. male who presents to the clinic upon referral from Dr. Perez ref. provider found  for evaluation and treatment of diabetic feet. Fabiano has a past medical history of Chronic back pain greater than 3 months duration, Depression, Diabetes mellitus, Diabetes mellitus, type 2, Hypertension, and Schizophrenia. Patient relates no major problem with feet. Only complaints today consist of here for a diabetic foot exam. States his feet are numb. States has trouble trimming his toenails. No further pedal complaints.    9/28/23: Seen today for follow up of left foot pain and to review xray results. Relates foot pain is slowly getting better with exercises he has been performing at home.     1/2/24: Here for routine foot care. Inquiring about treatment for fungal toenails.     4/2/24: Seen today for annual diabetic foot exam and routine foot care     PCP: Lucien Fleming MD    Date Last Seen by PCP: per above    Current shoe gear: Tennis shoes    Hemoglobin A1C   Date Value Ref Range Status   10/04/2023 5.0 4.0 - 5.6 % Final     Comment:     ADA Screening Guidelines:  5.7-6.4%  Consistent with prediabetes  >or=6.5%  Consistent with diabetes    High levels of fetal hemoglobin interfere with the HbA1C  assay. Heterozygous hemoglobin variants (HbS, HgC, etc)do  not significantly interfere with this assay.   However, presence of multiple variants may affect accuracy.     06/29/2023 5.7 (H) 4.0 - 5.6 % Final     Comment:     ADA Screening Guidelines:  5.7-6.4%  Consistent with prediabetes  >or=6.5%  Consistent with diabetes    High levels of fetal hemoglobin interfere with the HbA1C  assay. Heterozygous hemoglobin variants (HbS, HgC, etc)do  not significantly interfere with this assay.   However,  presence of multiple variants may affect accuracy.     02/13/2023 6.5 (H) 4.0 - 5.6 % Final     Comment:     ADA Screening Guidelines:  5.7-6.4%  Consistent with prediabetes  >or=6.5%  Consistent with diabetes    High levels of fetal hemoglobin interfere with the HbA1C  assay. Heterozygous hemoglobin variants (HbS, HgC, etc)do  not significantly interfere with this assay.   However, presence of multiple variants may affect accuracy.           Review of Systems   Constitutional: Negative for chills, decreased appetite, diaphoresis and fever.   HENT:  Negative for congestion and hearing loss.    Cardiovascular:  Negative for chest pain, claudication, leg swelling and syncope.   Respiratory:  Negative for cough and shortness of breath.    Skin:  Positive for dry skin and nail changes. Negative for color change, flushing, itching, poor wound healing and rash.   Musculoskeletal:  Positive for arthritis, back pain and joint pain.   Gastrointestinal:  Negative for nausea and vomiting.   Neurological:  Positive for numbness and paresthesias. Negative for focal weakness and weakness.   Psychiatric/Behavioral:  Negative for altered mental status. The patient is not nervous/anxious.            Objective:      Physical Exam  Constitutional:       General: He is not in acute distress.     Appearance: Normal appearance. He is well-developed. He is not diaphoretic.   Cardiovascular:      Comments: Dorsalis pedis and posterior tibial pulses are within normal limits. Skin temperature is within normal limits. Toes are cool to touch and feet are warm proximally. Hair growth is within normal limits. Skin is normotrophic and without hyperpigmentation. No edema noted. No varicosities or spider veins noted, bilaterally.       Musculoskeletal:         General: No tenderness.      Comments: Adequate joint range of motion without pain, limitation, nor crepitation to foot and ankle joints. Muscle strength is 5/5 in all groups  bilaterally.    Pes planus, bilateral foot       Feet:      Right foot:      Skin integrity: Dry skin present. No ulcer, blister, erythema or warmth.      Left foot:      Skin integrity: Dry skin present. No ulcer, blister, erythema or warmth.   Skin:     General: Skin is warm and dry.      Capillary Refill: Capillary refill takes less than 2 seconds.      Comments: Skin is warm and dry, no acute signs of infection noted bilaterally      Toenails are thickened by 2-4 mm's, dystrophic, and are darkened in coloration with subungual fungal debris.     Otherwise, no open wounds, macerations or hyperkeratotic lesions, bilaterally            Neurological:      Mental Status: He is alert and oriented to person, place, and time.      Sensory: Sensory deficit present.      Motor: No abnormal muscle tone.      Comments: Light touch is diminished. [place]-Alonso 5.07 monofilamant testing is diminished. Vibratory sensation is diminished bilaterally.   Psychiatric:         Mood and Affect: Mood normal.         Behavior: Behavior normal.         Thought Content: Thought content normal.             Assessment:       Encounter Diagnoses   Name Primary?    Disease of nail Yes    Type 2 diabetes mellitus with diabetic polyneuropathy, without long-term current use of insulin     Encounter for diabetic foot exam              Plan:       Fabiano was seen today for diabetes mellitus, diabetic foot exam, routine foot care and peripheral neuropathy.    Diagnoses and all orders for this visit:    Disease of nail  -     Routine Foot Care    Type 2 diabetes mellitus with diabetic polyneuropathy, without long-term current use of insulin  -     Routine Foot Care    Encounter for diabetic foot exam          I counseled the patient on his conditions, their implications and medical management.  Routine foot care per attached note  Annual diabetic foot exam performed today. Shoe inspection. Diabetic Foot Education. Patient reminded of the  importance of good nutrition and blood sugar control to help prevent podiatric complications of diabetes. Patient instructed on proper foot hygeine. We discussed wearing proper shoe gear, daily foot inspections, never walking without protective shoe gear, never putting sharp instruments to feet, routine podiatric nail visits    Return to clinic 3-6 mo, sooner PRN

## 2024-04-02 NOTE — TELEPHONE ENCOUNTER
No care due was identified.  Health [facility] Embedded Care Due Messages. Reference number: [id number].   4/02/2024 2:51:29 PM CDT

## 2024-04-02 NOTE — PROCEDURES
"Routine Foot Care    Date/Time: 4/2/2024 11:15 AM    Performed by: Shalini Burks DPM  Authorized by: Shalini Burks DPM    Time out: Immediately prior to procedure a "time out" was called to verify the correct patient, procedure, equipment, support staff and site/side marked as required.    Consent Done?:  Yes (Verbal)  Hyperkeratotic Skin Lesions?: No      Nail Care Type:  Debride  Location(s): All  (Left 1st Toe, Left 3rd Toe, Left 2nd Toe, Left 4th Toe, Left 5th Toe, Right 1st Toe, Right 2nd Toe, Right 3rd Toe, Right 4th Toe and Right 5th Toe)  Patient tolerance:  Patient tolerated the procedure well with no immediate complications    "

## 2024-04-03 RX ORDER — METFORMIN HYDROCHLORIDE 1000 MG/1
TABLET ORAL
Refills: 0 | OUTPATIENT
Start: 2024-04-03

## 2024-04-03 RX ORDER — HYDROXYZINE PAMOATE 50 MG/1
CAPSULE ORAL
Refills: 0 | OUTPATIENT
Start: 2024-04-03

## 2024-04-03 RX ORDER — DULAGLUTIDE 0.75 MG/.5ML
INJECTION, SOLUTION SUBCUTANEOUS
Refills: 0 | OUTPATIENT
Start: 2024-04-03

## 2024-04-03 NOTE — TELEPHONE ENCOUNTER
Refill Decision Note   Fabiano Malik  is requesting a refill authorization.  Brief Assessment and Rationale for Refill:  Quick Discontinue     Medication Therapy Plan: Pharmacy is requesting new scripts for the following medications without required information, (sig/ frequency/qty/etc)     Medication Reconciliation Completed: No   Comments:     No Care Gaps recommended.     Note composed:8:02 AM 04/03/2024

## 2024-04-09 ENCOUNTER — PATIENT OUTREACH (OUTPATIENT)
Dept: ADMINISTRATIVE | Facility: HOSPITAL | Age: 64
End: 2024-04-09
Payer: MEDICARE

## 2024-04-09 DIAGNOSIS — Z12.5 SCREENING FOR PROSTATE CANCER: Primary | ICD-10-CM

## 2024-04-09 DIAGNOSIS — E11.9 TYPE 2 DIABETES MELLITUS WITHOUT COMPLICATION, WITHOUT LONG-TERM CURRENT USE OF INSULIN: Primary | ICD-10-CM

## 2024-04-09 NOTE — PROGRESS NOTES
Health Maintenance Topic(s) Outreach Outcomes & Actions Taken:    Lab(s) - Outreach Outcomes & Actions Taken  : Overdue Lab(s) Ordered and Overdue Lab(s) Scheduled    Diabetic Foot Exam - Outreach Outcomes & Actions Taken  : sched for appt       Additional Notes:  Md staff message sent for order for PSA and update to update to approved Pharmacy due to insurance          Care Management, Digital Medicine, and/or Education Referrals      Next Steps - Referral Actions: N/A

## 2024-04-12 ENCOUNTER — PATIENT OUTREACH (OUTPATIENT)
Dept: ADMINISTRATIVE | Facility: HOSPITAL | Age: 64
End: 2024-04-12
Payer: MEDICARE

## 2024-04-12 NOTE — PROGRESS NOTES
Population Health Chart Review & Patient Outreach Details      Additional [facility] Health Notes:               Updates Requested / Reviewed:      Updated Care Coordination Note, Care Everywhere, and Immunizations Reconciliation Completed or Queried: [place]         Health Maintenance Topics Overdue:      [facility] Score: 1     Foot Exam    Shingles/Zoster Vaccine                  Health Maintenance Topic(s) Outreach Outcomes & Actions Taken:    Eye Exam - Outreach Outcomes & Actions Taken  : Diabetic Eye External Records Uploaded, Care Team & History Updated if Applicable

## 2024-04-26 ENCOUNTER — TELEPHONE (OUTPATIENT)
Dept: NEUROLOGY | Facility: CLINIC | Age: 64
End: 2024-04-26
Payer: MEDICARE

## 2024-04-26 ENCOUNTER — HOSPITAL ENCOUNTER (OUTPATIENT)
Dept: RADIOLOGY | Facility: HOSPITAL | Age: 64
Discharge: HOME | End: 2024-04-26
Attending: PSYCHIATRY & NEUROLOGY
Payer: MEDICARE

## 2024-04-26 DIAGNOSIS — R55 VASOVAGAL SYNCOPE: ICD-10-CM

## 2024-04-26 DIAGNOSIS — G25.3 MYOCLONUS: ICD-10-CM

## 2024-04-26 PROCEDURE — 70551 MRI BRAIN STEM W/O DYE: CPT | Mod: TC

## 2024-04-26 PROCEDURE — 70551 MRI BRAIN STEM W/O DYE: CPT | Mod: 26,,, | Performed by: RADIOLOGY

## 2024-04-26 NOTE — TELEPHONE ENCOUNTER
----- Message from Mckenzie Guevara MD sent at 4/26/2024  1:40 PM CDT -----  Pls inform the MRI shows mild age related changes. Will discuss further at [facility].

## 2024-04-26 NOTE — TELEPHONE ENCOUNTER
Called and spoke to patient about MRI results. I also schedule him a Tilt table test for 5/22/2024 at 1:00 pm  that was ordered last office visit and also gave him the EEG department to call to schedule. PT voiced understanding.

## 2024-05-10 ENCOUNTER — PATIENT OUTREACH (OUTPATIENT)
Dept: ADMINISTRATIVE | Facility: OTHER | Age: 64
End: 2024-05-10
Payer: MEDICARE

## 2024-05-10 ENCOUNTER — OFFICE VISIT (OUTPATIENT)
Dept: FAMILY MEDICINE | Facility: CLINIC | Age: 64
End: 2024-05-10
Payer: MEDICARE

## 2024-05-10 VITALS
DIASTOLIC BLOOD PRESSURE: 84 MMHG | OXYGEN SATURATION: 98 % | HEIGHT: 73 IN | WEIGHT: 253.5 LBS | HEART RATE: 82 BPM | SYSTOLIC BLOOD PRESSURE: 134 MMHG | BODY MASS INDEX: 33.6 KG/M2

## 2024-05-10 DIAGNOSIS — I15.2 HYPERTENSION ASSOCIATED WITH DIABETES: ICD-10-CM

## 2024-05-10 DIAGNOSIS — E11.51 TYPE 2 DIABETES MELLITUS WITH DIABETIC PERIPHERAL ANGIOPATHY WITHOUT GANGRENE, WITHOUT LONG-TERM CURRENT USE OF INSULIN: ICD-10-CM

## 2024-05-10 DIAGNOSIS — I27.20 PULMONARY HYPERTENSION, UNSPECIFIED: ICD-10-CM

## 2024-05-10 DIAGNOSIS — K21.9 GASTROESOPHAGEAL REFLUX DISEASE, UNSPECIFIED WHETHER ESOPHAGITIS PRESENT: ICD-10-CM

## 2024-05-10 DIAGNOSIS — R42 DIZZINESS: Primary | ICD-10-CM

## 2024-05-10 DIAGNOSIS — R05.2 SUBACUTE COUGH: ICD-10-CM

## 2024-05-10 DIAGNOSIS — D69.2 OTHER NONTHROMBOCYTOPENIC PURPURA: ICD-10-CM

## 2024-05-10 DIAGNOSIS — F20.3 UNDIFFERENTIATED SCHIZOPHRENIA: ICD-10-CM

## 2024-05-10 DIAGNOSIS — F51.01 PRIMARY INSOMNIA: ICD-10-CM

## 2024-05-10 DIAGNOSIS — G95.9 CHRONIC MYELOPATHY: ICD-10-CM

## 2024-05-10 DIAGNOSIS — E11.59 HYPERTENSION ASSOCIATED WITH DIABETES: ICD-10-CM

## 2024-05-10 DIAGNOSIS — E03.9 HYPOTHYROIDISM, UNSPECIFIED TYPE: ICD-10-CM

## 2024-05-10 PROBLEM — F13.20 SEDATIVE, HYPNOTIC OR ANXIOLYTIC DEPENDENCE, UNCOMPLICATED: Status: RESOLVED | Noted: 2023-02-13 | Resolved: 2024-05-10

## 2024-05-10 PROCEDURE — 3051F HG A1C>EQUAL 7.0%<8.0%: CPT | Mod: CPTII,S$GLB,, | Performed by: FAMILY MEDICINE

## 2024-05-10 PROCEDURE — 99999 PR PBB SHADOW E&M-EST. PATIENT-LVL IV: CPT | Mod: PBBFAC,,, | Performed by: FAMILY MEDICINE

## 2024-05-10 PROCEDURE — 3060F POS MICROALBUMINURIA REV: CPT | Mod: CPTII,S$GLB,, | Performed by: FAMILY MEDICINE

## 2024-05-10 PROCEDURE — 1159F MED LIST DOCD IN RCRD: CPT | Mod: CPTII,S$GLB,, | Performed by: FAMILY MEDICINE

## 2024-05-10 PROCEDURE — 3079F DIAST BP 80-89 MM HG: CPT | Mod: CPTII,S$GLB,, | Performed by: FAMILY MEDICINE

## 2024-05-10 PROCEDURE — 4010F ACE/ARB THERAPY RXD/TAKEN: CPT | Mod: CPTII,S$GLB,, | Performed by: FAMILY MEDICINE

## 2024-05-10 PROCEDURE — 99215 OFFICE O/P EST HI 40 MIN: CPT | Mod: S$GLB,,, | Performed by: FAMILY MEDICINE

## 2024-05-10 PROCEDURE — 3008F BODY MASS INDEX DOCD: CPT | Mod: CPTII,S$GLB,, | Performed by: FAMILY MEDICINE

## 2024-05-10 PROCEDURE — 1160F RVW MEDS BY RX/DR IN RCRD: CPT | Mod: CPTII,S$GLB,, | Performed by: FAMILY MEDICINE

## 2024-05-10 PROCEDURE — 3075F SYST BP GE 130 - 139MM HG: CPT | Mod: CPTII,S$GLB,, | Performed by: FAMILY MEDICINE

## 2024-05-10 PROCEDURE — 3066F NEPHROPATHY DOC TX: CPT | Mod: CPTII,S$GLB,, | Performed by: FAMILY MEDICINE

## 2024-05-10 RX ORDER — ARIPIPRAZOLE 5 MG/1
5 TABLET ORAL DAILY
Qty: 90 TABLET | Refills: 3 | Status: SHIPPED | OUTPATIENT
Start: 2024-05-10 | End: 2025-05-10

## 2024-05-10 RX ORDER — LEVOTHYROXINE SODIUM 25 UG/1
25 TABLET ORAL
Qty: 90 TABLET | Refills: 3 | Status: SHIPPED | OUTPATIENT
Start: 2024-05-10

## 2024-05-10 RX ORDER — HYDROXYZINE PAMOATE 50 MG/1
CAPSULE ORAL
Qty: 90 CAPSULE | Refills: 3 | Status: SHIPPED | OUTPATIENT
Start: 2024-05-10

## 2024-05-10 RX ORDER — PRAVASTATIN SODIUM 40 MG/1
40 TABLET ORAL DAILY
Qty: 90 TABLET | Refills: 3 | Status: SHIPPED | OUTPATIENT
Start: 2024-05-10

## 2024-05-10 RX ORDER — DULOXETIN HYDROCHLORIDE 60 MG/1
120 CAPSULE, DELAYED RELEASE ORAL EVERY MORNING
Qty: 180 CAPSULE | Refills: 3 | Status: SHIPPED | OUTPATIENT
Start: 2024-05-10 | End: 2025-05-10

## 2024-05-10 RX ORDER — OMEPRAZOLE 20 MG/1
20 CAPSULE, DELAYED RELEASE ORAL
Qty: 90 CAPSULE | Refills: 3 | Status: SHIPPED | OUTPATIENT
Start: 2024-05-10 | End: 2025-05-10

## 2024-05-10 RX ORDER — OLMESARTAN MEDOXOMIL 20 MG/1
20 TABLET ORAL DAILY
Qty: 90 TABLET | Refills: 3 | Status: SHIPPED | OUTPATIENT
Start: 2024-05-10

## 2024-05-10 NOTE — PROGRESS NOTES
Subjective     Patient ID: Fabiano aMlik Jr. is a 63 y.o. male.    Chief Complaint: Diabetes, Back Pain, Neck Pain, and Depression    63 years old male came to the clinic with episodic dizziness.  Patient currently with ENT follow-up doing additional workup.  Patient was not taking his blood pressure medicine and thyroid medicine.  Blood pressure today was slightly elevated.  No chest pain, palpitation, orthopnea or PND.  Medicine for depression and schizophrenia was restarted.  Patient with good compliance with reflux medicine.  He reports dry cough for the last couple weeks.  No flare ups of chronic myelopathy.  He was previously diagnosed with pulmonary hypertension.    Diabetes  He presents for his follow-up diabetic visit. He has type 2 diabetes mellitus. No MedicAlert identification noted. His disease course has been stable. Hypoglycemia symptoms include dizziness and nervousness/anxiousness. Pertinent negatives for diabetes include no chest pain, no polydipsia, no polyphagia and no polyuria. There are no hypoglycemic complications. Symptoms are stable. Risk factors for coronary artery disease include male sex, obesity, stress and sedentary lifestyle. Current diabetic treatment includes oral agent (monotherapy). He is compliant with treatment most of the time. He is following a generally healthy diet. He has not had a previous visit with a dietitian. He never participates in exercise. An ACE inhibitor/angiotensin II receptor blocker is being taken. He does not see a podiatrist.Eye exam is not current.   Depression  Visit Type: follow-up  Patient presents with the following symptoms: depressed mood, fatigue and nervousness/anxiety.  Patient is not experiencing: palpitations, suicidal ideas, suicidal planning and thoughts of death.  Frequency of symptoms: most days   Episode duration: 1 day  Severity: moderate   Sleep quality: fair  Compliance with medications:  0-25%  Side effects:  Insomnia    Review of  Systems   Constitutional: Negative.  Negative for chills and fever.   HENT: Negative.     Eyes: Negative.    Respiratory:  Positive for cough.    Cardiovascular: Negative.  Negative for chest pain, palpitations, leg swelling and claudication.   Gastrointestinal: Negative.    Endocrine: Negative for polydipsia, polyphagia and polyuria.   Genitourinary: Negative.    Musculoskeletal: Negative.    Integumentary:  Negative.   Neurological:  Positive for dizziness.   Psychiatric/Behavioral:  Positive for dysphoric mood and sleep disturbance. Negative for suicidal ideas. The patient is nervous/anxious.           Objective     Physical Exam  Vitals and nursing note reviewed.   Constitutional:       General: He is not in acute distress.     Appearance: He is well-developed. He is not diaphoretic.   HENT:      Head: Normocephalic and atraumatic.      Right Ear: External ear normal.      Left Ear: External ear normal.      Nose: Nose normal.      Mouth/Throat:      Pharynx: No oropharyngeal exudate.   Eyes:      General: No scleral icterus.        Right eye: No discharge.         Left eye: No discharge.      Conjunctiva/sclera: Conjunctivae normal.      Pupils: Pupils are equal, round, and reactive to light.   Neck:      Thyroid: No thyromegaly.      Vascular: No JVD.      Trachea: No tracheal deviation.   Cardiovascular:      Rate and Rhythm: Normal rate and regular rhythm.      Heart sounds: Normal heart sounds. No murmur heard.     No friction rub. No gallop.   Pulmonary:      Effort: Pulmonary effort is normal. No respiratory distress.      Breath sounds: Normal breath sounds. No stridor. No wheezing or rales.   Chest:      Chest wall: No tenderness.   Abdominal:      General: Bowel sounds are normal. There is no distension.      Palpations: Abdomen is soft. There is no mass.      Tenderness: There is no abdominal tenderness. There is no guarding or rebound.   Musculoskeletal:         General: No tenderness. Normal range  of motion.      Cervical back: Normal range of motion and neck supple.   Lymphadenopathy:      Cervical: No cervical adenopathy.   Skin:     General: Skin is warm and dry.      Coloration: Skin is not pale.      Findings: No erythema or rash.   Neurological:      Mental Status: He is alert and oriented to person, place, and time.      Cranial Nerves: No cranial nerve deficit.      Motor: No abnormal muscle tone.      Coordination: Coordination normal.      Deep Tendon Reflexes: Reflexes are normal and symmetric. Reflexes normal.   Psychiatric:         Mood and Affect: Mood is anxious and depressed.         Behavior: Behavior normal.         Thought Content: Thought content normal. Thought content does not include suicidal ideation. Thought content does not include homicidal or suicidal plan.         Judgment: Judgment normal.            Assessment and Plan     1. Dizziness    2. Hypothyroidism, unspecified type  -     levothyroxine (SYNTHROID) 25 MCG tablet; Take 1 tablet (25 mcg total) by mouth before breakfast.  Dispense: 90 tablet; Refill: 3    3. Hypertension associated with diabetes  Comments:  change ACE to benicar 20 mg BID and repeat BP in 1 month   Orders:  -     olmesartan (BENICAR) 20 MG tablet; Take 1 tablet (20 mg total) by mouth once daily.  Dispense: 90 tablet; Refill: 3  -     Microalbumin/Creatinine Ratio, Urine; Future; Expected date: 05/10/2024  -     Lipid Panel; Future; Expected date: 05/10/2024  -     Hemoglobin A1C; Future; Expected date: 05/10/2024  -     Comprehensive Metabolic Panel; Future; Expected date: 05/10/2024    4. Gastroesophageal reflux disease, unspecified whether esophagitis present  -     pravastatin (PRAVACHOL) 40 MG tablet; Take 1 tablet (40 mg total) by mouth once daily.  Dispense: 90 tablet; Refill: 3  -     omeprazole (PRILOSEC) 20 MG capsule; Take 1 capsule (20 mg total) by mouth before breakfast.  Dispense: 90 capsule; Refill: 3    5. Primary insomnia  -     hydrOXYzine  "pamoate (VISTARIL) 50 MG Cap; TAKE 1 CAPSULE (50 MG) BY MOUTH NIGHTLY AS NEEDED FOR INSOMNIA  Dispense: 90 capsule; Refill: 3    6. Undifferentiated schizophrenia  -     ARIPiprazole (ABILIFY) 5 MG Tab; Take 1 tablet (5 mg total) by mouth once daily.  Dispense: 90 tablet; Refill: 3  -     DULoxetine (CYMBALTA) 60 MG capsule; Take 2 capsules (120 mg total) by mouth every morning.  Dispense: 180 capsule; Refill: 3    7. Subacute cough  -     X-Ray Chest PA And Lateral; Future; Expected date: 05/10/2024    Fabiano Abraham" was seen today for diabetes, back pain, neck pain and depression.    Diagnoses and all orders for this visit:    Dizziness    Hypothyroidism, unspecified type  -     levothyroxine (SYNTHROID) 25 MCG tablet; Take 1 tablet (25 mcg total) by mouth before breakfast.    Hypertension associated with diabetes  Comments:  change ACE to benicar 20 mg BID and repeat BP in 1 month   Orders:  -     olmesartan (BENICAR) 20 MG tablet; Take 1 tablet (20 mg total) by mouth once daily.  -     Microalbumin/Creatinine Ratio, Urine; Future  -     Lipid Panel; Future  -     Hemoglobin A1C; Future  -     Comprehensive Metabolic Panel; Future    Gastroesophageal reflux disease, unspecified whether esophagitis present  -     pravastatin (PRAVACHOL) 40 MG tablet; Take 1 tablet (40 mg total) by mouth once daily.  -     omeprazole (PRILOSEC) 20 MG capsule; Take 1 capsule (20 mg total) by mouth before breakfast.    Primary insomnia  -     hydrOXYzine pamoate (VISTARIL) 50 MG Cap; TAKE 1 CAPSULE (50 MG) BY MOUTH NIGHTLY AS NEEDED FOR INSOMNIA    Undifferentiated schizophrenia  -     ARIPiprazole (ABILIFY) 5 MG Tab; Take 1 tablet (5 mg total) by mouth once daily.  -     DULoxetine (CYMBALTA) 60 MG capsule; Take 2 capsules (120 mg total) by mouth every morning.    Subacute cough  -     X-Ray Chest PA And Lateral; Future    Chronic myelopathy    Other nonthrombocytopenic purpura    Pulmonary hypertension, unspecified    Type 2 " diabetes mellitus with diabetic peripheral angiopathy without gangrene, without long-term current use of insulin         Continue monitoring blood pressure at home, low sodium diet.          Follow up in about 6 months (around 11/10/2024), or if symptoms worsen or fail to improve.

## 2024-05-14 NOTE — PROGRESS NOTES
CHW - Outreach Attempt    Community Health Worker left a In Basket message for 1st attempt to contact patient regarding: SDOH  Community Health Worker to attempt to contact patient on: 5/30  CHW - Outreach Attempt    Community Health Worker left a voicemail message for 2nd attempt to contact patient regarding: SDOH  Community Health Worker to attempt to contact patient on: 6/14  CHW - Initial Contact    This Community Health Worker completed verified updated the Social Determinant of Health questionnaire with patient via telephone today.    Pt identified barriers of most importance are: Food, Transportation   Referrals to community agencies completed with patient/caregiver consent outside of [facility] include:  [facility], [facility], [employer] [facility], [facility] [place], and  [facility]  Referrals were put through [facility] - no: none  Support and Services: CHW provide pt with all resources discussed via mail   Other information discussed the patient needs / wants help with: Pt stated that he currently receives $53/ month in SNAP Food benefits and he is currently using [employer] and [employer] for transportation. CHW provided pt with a third option to utilize for transportation and a local list of free food escobar located in his area. [employer] application provided to the pt and CHW informed the pt that the application has to be dropped off at the agency along with supporting documents, pt voiced his understanding.   Follow up required: Yes, CHW will continue to f/u  Follow-up Outreach - Due: 6/14/2024

## 2024-05-15 ENCOUNTER — HOSPITAL ENCOUNTER (OUTPATIENT)
Dept: RADIOLOGY | Facility: HOSPITAL | Age: 64
Discharge: HOME | End: 2024-05-15
Attending: FAMILY MEDICINE
Payer: MEDICARE

## 2024-05-15 DIAGNOSIS — R05.2 SUBACUTE COUGH: ICD-10-CM

## 2024-05-15 PROCEDURE — 71046 X-RAY EXAM CHEST 2 VIEWS: CPT | Mod: TC,FY,PO

## 2024-05-15 PROCEDURE — 71046 X-RAY EXAM CHEST 2 VIEWS: CPT | Mod: 26,,, | Performed by: RADIOLOGY

## 2024-05-19 DIAGNOSIS — J98.4 SCARRING OF LUNG: Primary | ICD-10-CM

## 2024-05-19 NOTE — PROGRESS NOTES
Probably scar tissue of the lungs.  Patient with cough.  Follow-up appointment with the lung specialist.

## 2024-05-22 ENCOUNTER — HOSPITAL ENCOUNTER (OUTPATIENT)
Dept: CARDIOLOGY | Facility: HOSPITAL | Age: 64
Discharge: HOME | End: 2024-05-22
Attending: PSYCHIATRY & NEUROLOGY
Payer: MEDICARE

## 2024-05-22 DIAGNOSIS — E11.42 DIABETIC POLYNEUROPATHY ASSOCIATED WITH TYPE 2 DIABETES MELLITUS: ICD-10-CM

## 2024-05-22 DIAGNOSIS — R55 VASOVAGAL SYNCOPE: ICD-10-CM

## 2024-05-22 PROCEDURE — 93660 TILT TABLE EVALUATION: CPT | Mod: 26,,, | Performed by: INTERNAL MEDICINE

## 2024-05-22 PROCEDURE — 93660 TILT TABLE EVALUATION: CPT

## 2024-06-26 ENCOUNTER — NURSE TRIAGE (OUTPATIENT)
Dept: ADMINISTRATIVE | Facility: CLINIC | Age: 64
End: 2024-06-26
Payer: MEDICARE

## 2024-06-26 NOTE — TELEPHONE ENCOUNTER
Patient c/o hypertension. His last BP was 172/139. Advised per protocol to go to the office now. Patient states that he does not have transportation. Will send a message to his provider's office to f/u with the patient. Advised the patient to call back with any further questions or if symptoms worsen.      Reason for Disposition   Systolic BP >= 200 OR Diastolic >= 120 and having NO cardiac or neurologic symptoms    Additional Information   Negative: Systolic BP >= 160 OR Diastolic >= 100, and any cardiac (e.g., breathing difficulty, chest pain) or neurologic symptoms (e.g., new-onset blurred or double vision)   Negative: Sounds like a life-threatening emergency to the triager   Negative: Pregnant 20 or more weeks (or postpartum < 6 weeks) with new hand or face swelling   Negative: Pregnant 20 or more weeks (or postpartum < 6 weeks) and Systolic BP >= 160 OR Diastolic >= 110   Negative: Patient sounds very sick or weak to the triager    Protocols used: Blood Pressure - High-A-OH

## 2024-07-02 DIAGNOSIS — E11.9 TYPE 2 DIABETES MELLITUS WITHOUT COMPLICATION, WITHOUT LONG-TERM CURRENT USE OF INSULIN: ICD-10-CM

## 2024-07-02 DIAGNOSIS — E11.65 TYPE 2 DIABETES MELLITUS WITH HYPERGLYCEMIA: ICD-10-CM

## 2024-07-02 RX ORDER — DULAGLUTIDE 0.75 MG/.5ML
0.75 INJECTION, SOLUTION SUBCUTANEOUS
Qty: 12 PEN | Refills: 1 | Status: SHIPPED | OUTPATIENT
Start: 2024-07-02

## 2024-07-02 RX ORDER — BLOOD-GLUCOSE CONTROL, NORMAL
1 EACH MISCELLANEOUS DAILY
Qty: 200 EACH | Refills: 3 | Status: CANCELLED | OUTPATIENT
Start: 2024-07-02

## 2024-07-02 RX ORDER — DEXTROSE 4 G
TABLET,CHEWABLE ORAL
Qty: 1 EACH | Refills: 0 | Status: CANCELLED | OUTPATIENT
Start: 2024-07-02 | End: 2026-07-10

## 2024-07-02 NOTE — TELEPHONE ENCOUNTER
Refill Routing Note   Medication(s) are not appropriate for processing by [facility] for the following reason(s):        Drug-disease interaction: TRULICITY and Dehydration     [facility] action(s):  Defer      Medication Therapy Plan:       Pharmacist review requested: Yes     Appointments  past 12m or future 3m with PCP    Date Provider   Last Visit   5/10/2024 Lucien Fleming MD   Next Visit   11/12/2024 Lucien Fleming MD   ED visits in past 90 days: 0        Note composed:2:43 PM 07/02/2024

## 2024-07-02 NOTE — TELEPHONE ENCOUNTER
No care due was identified.  [facility] Embedded Care Due Messages. Reference number: [id number].   7/02/2024 2:19:37 PM CDT

## 2024-07-02 NOTE — TELEPHONE ENCOUNTER
Refill Routing Note   Medication(s) are not appropriate for processing by [facility] for the following reason(s):        Other: insurance will only cover once daily testing for patients that do not rely on insulin; pended once daily directions for provider's review    [facility] action(s):  Defer  Approve           Pharmacist review requested: Yes     Appointments  past 12m or future 3m with PCP    Date Provider   Last Visit   5/10/2024 Lucien Fleming MD   Next Visit   11/12/2024 Lucien Fleming MD   ED visits in past 90 days: 0        Note composed:5:37 PM 07/02/2024

## 2024-07-03 NOTE — TELEPHONE ENCOUNTER
----- Message from Eve Alvarez sent at 7/3/2024  3:46 PM CDT -----  Type:   Call    Who Called:pt  Does the patient know what this is regarding?:call  Would the patient rather a call back or a response via [facility]? call  Best Call Back Number: [phone]  Additional Information: Pt wanted to thank the doctor for his service, he stated he cannot afford the co-pay to see the doctor.  Pt stated he was going to give up on his care.

## 2024-07-03 NOTE — TELEPHONE ENCOUNTER
"Pt phoned states" the insurance company is too expensive and he cannot afford to continue to come to the dr, he is just going to live out his life and hung up phone "  "

## 2024-07-11 DIAGNOSIS — E11.9 TYPE 2 DIABETES MELLITUS WITHOUT COMPLICATION, WITHOUT LONG-TERM CURRENT USE OF INSULIN: ICD-10-CM

## 2024-07-11 RX ORDER — BLOOD-GLUCOSE CONTROL, NORMAL
1 EACH MISCELLANEOUS DAILY
Qty: 200 EACH | Refills: 3 | Status: SHIPPED | OUTPATIENT
Start: 2024-07-11

## 2024-07-11 NOTE — TELEPHONE ENCOUNTER
No care due was identified.  Health [facility] Embedded Care Due Messages. Reference number: [id number].   7/11/2024 1:03:28 PM CDT

## 2024-07-11 NOTE — TELEPHONE ENCOUNTER
Refill Routing Note   Medication(s) are not appropriate for processing by [facility] for the following reason(s):        No active prescription written by provider    [facility] action(s):  Defer             Appointments  past 12m or future 3m with PCP    Date Provider   Last Visit   5/10/2024 Lucien Fleming MD   Next Visit   11/12/2024 Lucien Fleming MD   ED visits in past 90 days: 0        Note composed:4:42 PM 07/11/2024

## 2024-07-18 NOTE — TELEPHONE ENCOUNTER
----- Message from Ceferino Quigley sent at 7/18/2024  8:29 AM CDT -----  Contact: [facility]  Type:  Pharmacy Calling to Clarify an RX      Pharmacy Name:[facility] - [place] [address]  [address] [address]  Phone: [phone] Fax: [phone]  Prescription Name:lancets 30 gauge Misc  What do they need to clarify?:what brand 2 brands: True Plus 30 g and True plus 28 G  Additional Information:   Reference number : [id number],   Caller stated  this  they're final attempt

## 2024-07-23 ENCOUNTER — TELEPHONE (OUTPATIENT)
Dept: INTERNAL MEDICINE | Facility: CLINIC | Age: 64
End: 2024-07-23
Payer: MEDICARE

## 2024-07-23 ENCOUNTER — NURSE TRIAGE (OUTPATIENT)
Dept: ADMINISTRATIVE | Facility: CLINIC | Age: 64
End: 2024-07-23
Payer: MEDICARE

## 2024-07-23 DIAGNOSIS — I15.2 HYPERTENSION ASSOCIATED WITH DIABETES: ICD-10-CM

## 2024-07-23 DIAGNOSIS — K21.9 GASTROESOPHAGEAL REFLUX DISEASE, UNSPECIFIED WHETHER ESOPHAGITIS PRESENT: ICD-10-CM

## 2024-07-23 DIAGNOSIS — F51.01 PRIMARY INSOMNIA: ICD-10-CM

## 2024-07-23 DIAGNOSIS — E11.59 HYPERTENSION ASSOCIATED WITH DIABETES: ICD-10-CM

## 2024-07-23 DIAGNOSIS — F20.3 UNDIFFERENTIATED SCHIZOPHRENIA: ICD-10-CM

## 2024-07-23 DIAGNOSIS — E11.65 TYPE 2 DIABETES MELLITUS WITH HYPERGLYCEMIA: ICD-10-CM

## 2024-07-23 DIAGNOSIS — E03.9 HYPOTHYROIDISM, UNSPECIFIED TYPE: ICD-10-CM

## 2024-07-23 NOTE — TELEPHONE ENCOUNTER
No care due was identified.  [facility] Embedded Care Due Messages. Reference number: [id number].   7/23/2024 4:12:11 PM CDT

## 2024-07-23 NOTE — TELEPHONE ENCOUNTER
Patient states sudden onset of bilateral foot pain and redness. Patient states his feet are also swollen and rates pain 10/10.    Patient advised to See PCP or Visit an Urgent Care Center today for evaluation. Patient also advised to contact the [facility] for any worsening symptoms. Patient states understanding of care advice.     Reason for Disposition   SEVERE pain (e.g., excruciating, unable to do any normal activities)    Additional Information   Negative: Followed an ankle or foot injury   Negative: Toe pain is main symptom   Negative: Ankle pain is main symptom   Negative: Entire foot is cool or blue in comparison to other foot   Negative: Purple or black skin on foot or toe   Negative: Red area or streak and fever   Negative: Swollen foot and fever   Negative: Patient sounds very sick or weak to the triager    Protocols used: Foot Pain-A-[place]

## 2024-07-23 NOTE — TELEPHONE ENCOUNTER
----- Message from Dulce Wilkins sent at 7/23/2024  2:01 PM CDT -----  Regarding: Pt called to speak to the nurse due to both of his feet being red and burning and pt would like a call back today asap  Type:  Needs Medical Advice    Who Called: Patient   Symptoms (please be specific): both feet red and burning  How long has patient had these symptoms:  started a few minutes ago  Would the patient rather a call back or a response via [facility]? Pt would like an immediate call back  Best Call Back Number: [phone]

## 2024-07-24 RX ORDER — DULAGLUTIDE 0.75 MG/.5ML
0.75 INJECTION, SOLUTION SUBCUTANEOUS
Qty: 12 PEN | Refills: 1 | Status: SHIPPED | OUTPATIENT
Start: 2024-07-24

## 2024-07-24 RX ORDER — ARIPIPRAZOLE 5 MG/1
5 TABLET ORAL DAILY
Qty: 90 TABLET | Refills: 3 | Status: SHIPPED | OUTPATIENT
Start: 2024-07-24 | End: 2025-07-24

## 2024-07-24 RX ORDER — PRAVASTATIN SODIUM 40 MG/1
40 TABLET ORAL DAILY
Qty: 90 TABLET | Refills: 3 | Status: SHIPPED | OUTPATIENT
Start: 2024-07-24

## 2024-07-24 RX ORDER — LEVOTHYROXINE SODIUM 25 UG/1
25 TABLET ORAL
Qty: 90 TABLET | Refills: 3 | Status: SHIPPED | OUTPATIENT
Start: 2024-07-24

## 2024-07-24 RX ORDER — OLMESARTAN MEDOXOMIL 20 MG/1
20 TABLET ORAL DAILY
Qty: 90 TABLET | Refills: 3 | Status: SHIPPED | OUTPATIENT
Start: 2024-07-24

## 2024-07-24 RX ORDER — OMEPRAZOLE 20 MG/1
20 CAPSULE, DELAYED RELEASE ORAL
Qty: 90 CAPSULE | Refills: 3 | Status: SHIPPED | OUTPATIENT
Start: 2024-07-24 | End: 2025-07-24

## 2024-07-24 RX ORDER — HYDROXYZINE PAMOATE 50 MG/1
CAPSULE ORAL
Qty: 90 CAPSULE | Refills: 3 | Status: SHIPPED | OUTPATIENT
Start: 2024-07-24

## 2024-07-24 RX ORDER — DULOXETIN HYDROCHLORIDE 60 MG/1
120 CAPSULE, DELAYED RELEASE ORAL EVERY MORNING
Qty: 180 CAPSULE | Refills: 3 | Status: SHIPPED | OUTPATIENT
Start: 2024-07-24 | End: 2025-07-24

## 2024-07-31 DIAGNOSIS — K21.9 GASTROESOPHAGEAL REFLUX DISEASE, UNSPECIFIED WHETHER ESOPHAGITIS PRESENT: ICD-10-CM

## 2024-07-31 DIAGNOSIS — E11.65 TYPE 2 DIABETES MELLITUS WITH HYPERGLYCEMIA: ICD-10-CM

## 2024-07-31 DIAGNOSIS — F20.3 UNDIFFERENTIATED SCHIZOPHRENIA: ICD-10-CM

## 2024-07-31 DIAGNOSIS — I15.2 HYPERTENSION ASSOCIATED WITH DIABETES: ICD-10-CM

## 2024-07-31 DIAGNOSIS — E03.9 HYPOTHYROIDISM, UNSPECIFIED TYPE: ICD-10-CM

## 2024-07-31 DIAGNOSIS — F51.01 PRIMARY INSOMNIA: ICD-10-CM

## 2024-07-31 DIAGNOSIS — E11.59 HYPERTENSION ASSOCIATED WITH DIABETES: ICD-10-CM

## 2024-07-31 RX ORDER — DULOXETIN HYDROCHLORIDE 60 MG/1
120 CAPSULE, DELAYED RELEASE ORAL EVERY MORNING
Qty: 180 CAPSULE | Refills: 3 | Status: SHIPPED | OUTPATIENT
Start: 2024-07-31 | End: 2025-07-31

## 2024-07-31 RX ORDER — ARIPIPRAZOLE 5 MG/1
5 TABLET ORAL DAILY
Qty: 90 TABLET | Refills: 3 | Status: SHIPPED | OUTPATIENT
Start: 2024-07-31 | End: 2025-07-31

## 2024-07-31 RX ORDER — PRAVASTATIN SODIUM 40 MG/1
40 TABLET ORAL DAILY
Qty: 90 TABLET | Refills: 3 | Status: SHIPPED | OUTPATIENT
Start: 2024-07-31

## 2024-07-31 RX ORDER — HYDROXYZINE PAMOATE 50 MG/1
CAPSULE ORAL
Qty: 90 CAPSULE | Refills: 3 | Status: SHIPPED | OUTPATIENT
Start: 2024-07-31

## 2024-07-31 RX ORDER — DULAGLUTIDE 0.75 MG/.5ML
0.75 INJECTION, SOLUTION SUBCUTANEOUS
Qty: 12 PEN | Refills: 3 | Status: SHIPPED | OUTPATIENT
Start: 2024-07-31 | End: 2025-07-31

## 2024-07-31 RX ORDER — OLMESARTAN MEDOXOMIL 20 MG/1
20 TABLET ORAL DAILY
Qty: 90 TABLET | Refills: 3 | Status: SHIPPED | OUTPATIENT
Start: 2024-07-31

## 2024-07-31 RX ORDER — OMEPRAZOLE 20 MG/1
20 CAPSULE, DELAYED RELEASE ORAL
Qty: 90 CAPSULE | Refills: 3 | Status: SHIPPED | OUTPATIENT
Start: 2024-07-31 | End: 2025-07-31

## 2024-07-31 RX ORDER — LEVOTHYROXINE SODIUM 25 UG/1
25 TABLET ORAL
Qty: 90 TABLET | Refills: 0 | Status: SHIPPED | OUTPATIENT
Start: 2024-07-31

## 2024-07-31 NOTE — TELEPHONE ENCOUNTER
Refill Routing Note   Medication(s) are not appropriate for processing by [facility] for the following reason(s):        Drug-disease interaction  Outside of protocol  New or recently adjusted medication    [facility] action(s):  Defer  Route  Approve        Medication Therapy Plan: Drug-Disease: TRULICITY and Dehydration      Appointments  past 12m or future 3m with PCP    Date Provider   Last Visit   5/10/2024 Lucien Fleming MD   Next Visit   11/12/2024 Lucien Fleming MD   ED visits in past 90 days: 0        Note composed:1:25 PM 07/31/2024

## 2024-07-31 NOTE — TELEPHONE ENCOUNTER
Care Due:                  Date            Visit Type   Department     Provider  --------------------------------------------------------------------------------                                EP -                              PRIMARY      [facility]  Last Visit: 05-      CARE ([facility])   LESLEY Fleming                              EP -                              PRIMARY      [facility]  Next Visit: 11-      CARE ([facility])   LESLEY Fleming                                                            Last  Test          Frequency    Reason                     Performed    Due Date  --------------------------------------------------------------------------------    HBA1C.......  6 months...  dulaglutide, metFORMIN...  04-   10-    Health [facility] Embedded Care Due Messages. Reference number: [id number].   7/31/2024 10:39:08 AM CDT

## 2024-08-07 ENCOUNTER — PATIENT OUTREACH (OUTPATIENT)
Dept: ADMINISTRATIVE | Facility: OTHER | Age: 64
End: 2024-08-07
Payer: MEDICARE

## 2024-08-09 DIAGNOSIS — E11.9 TYPE 2 DIABETES MELLITUS WITHOUT COMPLICATION, WITHOUT LONG-TERM CURRENT USE OF INSULIN: ICD-10-CM

## 2024-08-09 RX ORDER — METFORMIN HYDROCHLORIDE 1000 MG/1
1000 TABLET ORAL 2 TIMES DAILY WITH MEALS
Qty: 180 TABLET | Refills: 0 | Status: SHIPPED | OUTPATIENT
Start: 2024-08-09

## 2024-08-20 ENCOUNTER — OFFICE VISIT (OUTPATIENT)
Dept: PODIATRY | Facility: CLINIC | Age: 64
End: 2024-08-20
Payer: MEDICARE

## 2024-08-20 VITALS
WEIGHT: 253.5 LBS | HEIGHT: 73 IN | SYSTOLIC BLOOD PRESSURE: 111 MMHG | HEART RATE: 89 BPM | DIASTOLIC BLOOD PRESSURE: 64 MMHG | BODY MASS INDEX: 33.6 KG/M2

## 2024-08-20 DIAGNOSIS — E11.9 TYPE 2 DIABETES MELLITUS WITHOUT COMPLICATION, WITHOUT LONG-TERM CURRENT USE OF INSULIN: ICD-10-CM

## 2024-08-20 DIAGNOSIS — E11.42 TYPE 2 DIABETES MELLITUS WITH DIABETIC POLYNEUROPATHY, WITHOUT LONG-TERM CURRENT USE OF INSULIN: Primary | ICD-10-CM

## 2024-08-20 DIAGNOSIS — L60.9 DISEASE OF NAIL: ICD-10-CM

## 2024-08-20 DIAGNOSIS — B35.1 ONYCHOMYCOSIS DUE TO DERMATOPHYTE: ICD-10-CM

## 2024-08-20 PROCEDURE — 99999 PR PBB SHADOW E&M-EST. PATIENT-LVL III: CPT | Mod: PBBFAC,,, | Performed by: STUDENT IN AN ORGANIZED HEALTH CARE EDUCATION/TRAINING PROGRAM

## 2024-08-20 PROCEDURE — 3051F HG A1C>EQUAL 7.0%<8.0%: CPT | Mod: CPTII,S$GLB,, | Performed by: STUDENT IN AN ORGANIZED HEALTH CARE EDUCATION/TRAINING PROGRAM

## 2024-08-20 PROCEDURE — 11721 DEBRIDE NAIL 6 OR MORE: CPT | Mod: Q9,S$GLB,, | Performed by: STUDENT IN AN ORGANIZED HEALTH CARE EDUCATION/TRAINING PROGRAM

## 2024-08-20 PROCEDURE — 3060F POS MICROALBUMINURIA REV: CPT | Mod: CPTII,S$GLB,, | Performed by: STUDENT IN AN ORGANIZED HEALTH CARE EDUCATION/TRAINING PROGRAM

## 2024-08-20 PROCEDURE — 99214 OFFICE O/P EST MOD 30 MIN: CPT | Mod: 25,S$GLB,, | Performed by: STUDENT IN AN ORGANIZED HEALTH CARE EDUCATION/TRAINING PROGRAM

## 2024-08-20 PROCEDURE — 3066F NEPHROPATHY DOC TX: CPT | Mod: CPTII,S$GLB,, | Performed by: STUDENT IN AN ORGANIZED HEALTH CARE EDUCATION/TRAINING PROGRAM

## 2024-08-20 PROCEDURE — 4010F ACE/ARB THERAPY RXD/TAKEN: CPT | Mod: CPTII,S$GLB,, | Performed by: STUDENT IN AN ORGANIZED HEALTH CARE EDUCATION/TRAINING PROGRAM

## 2024-08-20 PROCEDURE — 3008F BODY MASS INDEX DOCD: CPT | Mod: CPTII,S$GLB,, | Performed by: STUDENT IN AN ORGANIZED HEALTH CARE EDUCATION/TRAINING PROGRAM

## 2024-08-20 PROCEDURE — 1159F MED LIST DOCD IN RCRD: CPT | Mod: CPTII,S$GLB,, | Performed by: STUDENT IN AN ORGANIZED HEALTH CARE EDUCATION/TRAINING PROGRAM

## 2024-08-20 PROCEDURE — 3074F SYST BP LT 130 MM HG: CPT | Mod: CPTII,S$GLB,, | Performed by: STUDENT IN AN ORGANIZED HEALTH CARE EDUCATION/TRAINING PROGRAM

## 2024-08-20 PROCEDURE — 3078F DIAST BP <80 MM HG: CPT | Mod: CPTII,S$GLB,, | Performed by: STUDENT IN AN ORGANIZED HEALTH CARE EDUCATION/TRAINING PROGRAM

## 2024-08-20 RX ORDER — METFORMIN HYDROCHLORIDE 1000 MG/1
1000 TABLET ORAL 2 TIMES DAILY WITH MEALS
Qty: 180 TABLET | Refills: 3 | Status: SHIPPED | OUTPATIENT
Start: 2024-08-20 | End: 2024-08-22 | Stop reason: SDUPTHER

## 2024-08-20 RX ORDER — KETOCONAZOLE 20 MG/G
CREAM TOPICAL 2 TIMES DAILY
Qty: 60 G | Refills: 1 | Status: SHIPPED | OUTPATIENT
Start: 2024-08-20

## 2024-08-20 NOTE — PROGRESS NOTES
Subjective:      Patient ID: Fabiano Malik Jr. is a 63 y.o. male.    Chief Complaint: Diabetic Foot Exam    Fabiano is a 63 y.o. male who presents to the clinic upon referral from Dr. Ana perdomo. provider found  for evaluation and treatment of diabetic feet. Fabiano has a past medical history of Chronic back pain greater than 3 months duration, Depression, Diabetes mellitus, Diabetes mellitus, type 2, Hypertension, and Schizophrenia. Patient relates no major problem with feet. Only complaints today consist of here for a diabetic foot exam. States his feet are numb. States has trouble trimming his toenails. No further pedal complaints.    4/2/24: Seen today for annual diabetic foot exam and routine foot care     8/20/24: Seen today, complains of tingling/burning to both feet. Here for routine foot care     PCP: Lucien Fleming MD    Date Last Seen by PCP: per above    Current shoe gear: Tennis shoes    Hemoglobin A1C   Date Value Ref Range Status   04/26/2024 7.2 (H) 4.0 - 5.6 % Final     Comment:     ADA Screening Guidelines:  5.7-6.4%  Consistent with prediabetes  >or=6.5%  Consistent with diabetes    High levels of fetal hemoglobin interfere with the HbA1C  assay. Heterozygous hemoglobin variants (HbS, HgC, etc)do  not significantly interfere with this assay.   However, presence of multiple variants may affect accuracy.     10/04/2023 5.0 4.0 - 5.6 % Final     Comment:     ADA Screening Guidelines:  5.7-6.4%  Consistent with prediabetes  >or=6.5%  Consistent with diabetes    High levels of fetal hemoglobin interfere with the HbA1C  assay. Heterozygous hemoglobin variants (HbS, HgC, etc)do  not significantly interfere with this assay.   However, presence of multiple variants may affect accuracy.     06/29/2023 5.7 (H) 4.0 - 5.6 % Final     Comment:     ADA Screening Guidelines:  5.7-6.4%  Consistent with prediabetes  >or=6.5%  Consistent with diabetes    High levels of fetal hemoglobin interfere with the  HbA1C  assay. Heterozygous hemoglobin variants (HbS, HgC, etc)do  not significantly interfere with this assay.   However, presence of multiple variants may affect accuracy.           Review of Systems   Constitutional: Negative for chills, decreased appetite, diaphoresis and fever.   HENT:  Negative for congestion and hearing loss.    Cardiovascular:  Negative for chest pain, claudication, leg swelling and syncope.   Respiratory:  Negative for cough and shortness of breath.    Skin:  Positive for dry skin and nail changes. Negative for color change, flushing, itching, poor wound healing and rash.   Musculoskeletal:  Positive for arthritis, back pain and joint pain.   Gastrointestinal:  Negative for nausea and vomiting.   Neurological:  Positive for numbness and paresthesias. Negative for focal weakness and weakness.   Psychiatric/Behavioral:  Negative for altered mental status. The patient is not nervous/anxious.            Objective:      Physical Exam  Constitutional:       General: He is not in acute distress.     Appearance: Normal appearance. He is well-developed. He is not diaphoretic.   Cardiovascular:      Comments: Dorsalis pedis and posterior tibial pulses are within normal limits. Skin temperature is within normal limits. Toes are cool to touch and feet are warm proximally. Hair growth is within normal limits. Skin is normotrophic and without hyperpigmentation. No edema noted. No varicosities or spider veins noted, bilaterally.       Musculoskeletal:         General: No tenderness.      Comments: Adequate joint range of motion without pain, limitation, nor crepitation to foot and ankle joints. Muscle strength is 5/5 in all groups bilaterally.    Pes planus, bilateral foot       Feet:      Right foot:      Skin integrity: Dry skin present. No ulcer, blister, erythema or warmth.      Left foot:      Skin integrity: Dry skin present. No ulcer, blister, erythema or warmth.   Skin:     General: Skin is warm  "and dry.      Capillary Refill: Capillary refill takes less than 2 seconds.      Comments: Skin is warm and dry, no acute signs of infection noted bilaterally      Toenails are thickened by 2-4 mm's, dystrophic, and are darkened in coloration with subungual fungal debris.     Otherwise, no open wounds, macerations or hyperkeratotic lesions, bilaterally            Neurological:      Mental Status: He is alert and oriented to person, place, and time.      Sensory: Sensory deficit present.      Motor: No abnormal muscle tone.      Comments: Light touch is diminished. [place]-Alonso 5.07 monofilamant testing is diminished. Vibratory sensation is diminished bilaterally.   Psychiatric:         Mood and Affect: Mood normal.         Behavior: Behavior normal.         Thought Content: Thought content normal.             Assessment:       Encounter Diagnoses   Name Primary?    Type 2 diabetes mellitus with diabetic polyneuropathy, without long-term current use of insulin Yes    Disease of nail     Onychomycosis due to dermatophyte                Plan:       Fabiano Abraham" was seen today for diabetic foot exam.    Diagnoses and all orders for this visit:    Type 2 diabetes mellitus with diabetic polyneuropathy, without long-term current use of insulin  -     Foot Exam Performed  -     Routine Foot Care    Disease of nail  -     Routine Foot Care  -     ketoconazole (NIZORAL) 2 % cream; Apply topically 2 (two) times daily.    Onychomycosis due to dermatophyte  -     ketoconazole (NIZORAL) 2 % cream; Apply topically 2 (two) times daily.            I counseled the patient on his conditions, their implications and medical management.  Routine foot care per attached note  Ketoconazole for toenail fungus  Rx topical analgesic for peripheral neuropathy containing gabapentin from PAP. He is to follow up with PCP for further management  Assisted by Katelyn Gaitan LPN   Return to clinic 3-6 mo, sooner PRN                      "

## 2024-08-20 NOTE — TELEPHONE ENCOUNTER
Care Due:                  Date            Visit Type   Department     Provider  --------------------------------------------------------------------------------                                EP -                              PRIMARY      [facility]  Last Visit: 05-      CARE ([facility])   LESLEY Fleming                              EP -                              PRIMARY      [facility]  Next Visit: 11-      CARE ([facility])   LESLEY Fleming                                                            Last  Test          Frequency    Reason                     Performed    Due Date  --------------------------------------------------------------------------------    CMP.........  12 months..  DULoxetine, metFORMIN,     11- 11-                             olmesartan, pravastatin..    Lipid Panel.  12 months..  pravastatin..............  11- 11-    TSH.........  12 months..  levothyroxine............  11- 11-    [facility] Embedded Care Due Messages. Reference number: [id number].   8/20/2024 4:27:29 PM CDT

## 2024-08-20 NOTE — PROCEDURES
"Routine Foot Care    Date/Time: 8/20/2024 11:00 AM    Performed by: Shalini Burks DPM  Authorized by: Shalini Burks DPM    Time out: Immediately prior to procedure a "time out" was called to verify the correct patient, procedure, equipment, support staff and site/side marked as required.    Consent Done?:  Yes (Verbal)  Hyperkeratotic Skin Lesions?: No      Nail Care Type:  Debride  Location(s): All  (Left 1st Toe, Left 3rd Toe, Left 2nd Toe, Left 4th Toe, Left 5th Toe, Right 1st Toe, Right 2nd Toe, Right 3rd Toe, Right 4th Toe and Right 5th Toe)  Patient tolerance:  Patient tolerated the procedure well with no immediate complications    "

## 2024-08-22 DIAGNOSIS — E11.9 TYPE 2 DIABETES MELLITUS WITHOUT COMPLICATION, WITHOUT LONG-TERM CURRENT USE OF INSULIN: ICD-10-CM

## 2024-08-22 NOTE — TELEPHONE ENCOUNTER
No care due was identified.  [facility] Embedded Care Due Messages. Reference number: [id number].   8/22/2024 3:03:55 PM CDT

## 2024-08-23 DIAGNOSIS — E11.9 TYPE 2 DIABETES MELLITUS WITHOUT COMPLICATION, WITHOUT LONG-TERM CURRENT USE OF INSULIN: ICD-10-CM

## 2024-08-23 RX ORDER — METFORMIN HYDROCHLORIDE 1000 MG/1
TABLET ORAL
Refills: 0 | OUTPATIENT
Start: 2024-08-23

## 2024-08-23 RX ORDER — METFORMIN HYDROCHLORIDE 1000 MG/1
1000 TABLET ORAL 2 TIMES DAILY WITH MEALS
Qty: 180 TABLET | Refills: 0 | Status: SHIPPED | OUTPATIENT
Start: 2024-08-23

## 2024-08-23 NOTE — TELEPHONE ENCOUNTER
Refill Decision Note   Fabiano Malik  is requesting a refill authorization.  Brief Assessment and Rationale for Refill:  Approve     Medication Therapy Plan:  Requesting to different pharmacy      Pharmacist review requested: Yes   Extended chart review required: Yes   Comments:     Note composed:1:41 AM 08/23/2024

## 2024-08-23 NOTE — TELEPHONE ENCOUNTER
No care due was identified.  [facility] Embedded Care Due Messages. Reference number: [id number].   8/23/2024 11:31:19 AM CDT

## 2024-08-23 NOTE — TELEPHONE ENCOUNTER
[facility] Note  Quick DC. Inappropriate Request   Refill request requires further review by MD: NO   Medication Therapy Plan: Pharmacy is requesting new script(s) for the following medications without required information, (sig/ frequency/qty/etc)     [facility] action(s):  Quick Discontinue      Duplicate Pended Encounter(s)/ Last Prescribed Details:    Pharmacies have been requesting medications for patients without required information, (sig, frequency, qty, etc.). In addition, requests are sent for medication(s) pt. are currently not taking, and medications patients have never taken.    We have spoken to the pharmacies about these request types and advised their teams previously that we are unable to assess these New Script requests and require all details for these requests. This is a known issue and has been reported.        Medication related problems are not assessed for QDC.   Medication Reconciliation Completed? NO Were there pending details that required adjustment? NO     Automatic Epic Generated Protocol Data Below:   Requested Prescriptions   Pending Prescriptions Disp Refills    metFORMIN (GLUCOPHAGE) 1000 MG tablet [Pharmacy Med Name: METFORMIN TAB]  0              Appointments      Date Provider   Last Visit   5/10/2024 Lucien Fleming MD   Next Visit   11/12/2024 Lucien Fleming MD        Note composed:5:00 PM 08/23/2024

## 2024-08-23 NOTE — TELEPHONE ENCOUNTER
Refill Routing Note   Medication(s) are not appropriate for processing by [facility] for the following reason(s):        Drug-disease interaction    [facility] action(s):  Defer        Medication Therapy Plan: Drug-Disease: metFORMIN and Dehydration; Alternate pharmacy    Pharmacist review requested: Yes     Appointments  past 12m or future 3m with PCP    Date Provider   Last Visit   5/10/2024 Lucien Fleming MD   Next Visit   11/12/2024 Lucien Fleming MD   ED visits in past 90 days: 0        Note composed:9:23 PM 08/22/2024

## 2024-08-26 DIAGNOSIS — E11.9 TYPE 2 DIABETES MELLITUS WITHOUT COMPLICATION, WITHOUT LONG-TERM CURRENT USE OF INSULIN: ICD-10-CM

## 2024-08-26 RX ORDER — METFORMIN HYDROCHLORIDE 1000 MG/1
1000 TABLET ORAL 2 TIMES DAILY WITH MEALS
Qty: 180 TABLET | Refills: 0 | OUTPATIENT
Start: 2024-08-26

## 2024-08-26 NOTE — TELEPHONE ENCOUNTER
No care due was identified.  [facility] Embedded Care Due Messages. Reference number: [id number].   8/26/2024 11:45:19 AM CDT

## 2024-08-27 NOTE — TELEPHONE ENCOUNTER
Refill Decision Note   Fabiano Malik  is requesting a refill authorization.  Brief Assessment and Rationale for Refill:  Quick Discontinue     Medication Therapy Plan:  E-Prescribing Status: Receipt confirmed by pharmacy 8.23.24([facility])      Comments:     Note composed:9:30 PM 08/26/2024

## 2024-08-28 ENCOUNTER — TELEPHONE (OUTPATIENT)
Dept: PULMONOLOGY | Facility: CLINIC | Age: 64
End: 2024-08-28
Payer: MEDICARE

## 2024-08-29 ENCOUNTER — TELEPHONE (OUTPATIENT)
Dept: PULMONOLOGY | Facility: CLINIC | Age: 64
End: 2024-08-29
Payer: MEDICARE

## 2024-08-29 NOTE — TELEPHONE ENCOUNTER
----- Message from Arash Crespo sent at 8/29/2024  2:21 PM CDT -----  .Type:  Needs Medical Advice    Who Called: pt    Would the patient rather a call back or a response via [facility]? Call back  Best Call Back Number: [phone]  Additional Information:     Pt stated he missed a call from Micki and would like a call back please

## 2024-08-30 ENCOUNTER — TELEPHONE (OUTPATIENT)
Dept: PULMONOLOGY | Facility: CLINIC | Age: 64
End: 2024-08-30
Payer: MEDICARE

## 2024-08-30 NOTE — TELEPHONE ENCOUNTER
----- Message from Cheryle Wynn sent at 8/30/2024 12:13 PM CDT -----  Type:  Needs Medical Advice    Who Called: Patient  Best Call Back Number: [phone]  Additional Information: Patient is requesting a call back

## 2024-09-03 DIAGNOSIS — E11.9 TYPE 2 DIABETES MELLITUS WITHOUT COMPLICATION, WITHOUT LONG-TERM CURRENT USE OF INSULIN: ICD-10-CM

## 2024-09-03 RX ORDER — METFORMIN HYDROCHLORIDE 1000 MG/1
1000 TABLET ORAL 2 TIMES DAILY WITH MEALS
Qty: 180 TABLET | Refills: 0 | Status: SHIPPED | OUTPATIENT
Start: 2024-09-03

## 2024-09-03 NOTE — TELEPHONE ENCOUNTER
Refill Decision Note   Fabiano Malik  is requesting a refill authorization.  Brief Assessment and Rationale for Refill:  Approve     Medication Therapy Plan:  Approved 8/23/24. Requesting to different pharmacy      Comments:     Note composed:2:30 PM 09/03/2024

## 2024-09-03 NOTE — TELEPHONE ENCOUNTER
No care due was identified.  [facility] Embedded Care Due Messages. Reference number: [id number].   9/03/2024 2:27:18 PM CDT

## 2024-09-09 ENCOUNTER — TELEPHONE (OUTPATIENT)
Dept: FAMILY MEDICINE | Facility: CLINIC | Age: 64
End: 2024-09-09
Payer: MEDICARE

## 2024-09-12 ENCOUNTER — TELEPHONE (OUTPATIENT)
Dept: CARDIOLOGY | Facility: CLINIC | Age: 64
End: 2024-09-12
Payer: MEDICARE

## 2024-09-12 NOTE — TELEPHONE ENCOUNTER
Reached out to patient to reschedule his appointment with Dr Curiel.  No answer.  Left message with new appointment date, time and location.

## 2024-09-13 ENCOUNTER — TELEPHONE (OUTPATIENT)
Dept: PULMONOLOGY | Facility: CLINIC | Age: 64
End: 2024-09-13
Payer: MEDICARE

## 2024-09-13 NOTE — TELEPHONE ENCOUNTER
----- Message from Quincy Sevilla sent at 9/12/2024  4:17 PM CDT -----  Contact: Pt  .Type:  Needs Medical Advice    Who Called: pt    Would the patient rather a call back or a response via [facility]? Call back  Best Call Back Number:  [phone]  Additional Information:  Pt. Is returning a call back to Lisa.

## 2024-09-20 ENCOUNTER — OFFICE VISIT (OUTPATIENT)
Dept: FAMILY MEDICINE | Facility: CLINIC | Age: 64
End: 2024-09-20
Payer: MEDICARE

## 2024-09-20 VITALS
WEIGHT: 253.06 LBS | DIASTOLIC BLOOD PRESSURE: 74 MMHG | HEIGHT: 73 IN | HEART RATE: 86 BPM | BODY MASS INDEX: 33.54 KG/M2 | OXYGEN SATURATION: 97 % | SYSTOLIC BLOOD PRESSURE: 110 MMHG

## 2024-09-20 DIAGNOSIS — S00.83XA CONTUSION OF OTHER PART OF HEAD, INITIAL ENCOUNTER: ICD-10-CM

## 2024-09-20 DIAGNOSIS — I15.2 HYPERTENSION ASSOCIATED WITH DIABETES: ICD-10-CM

## 2024-09-20 DIAGNOSIS — G44.201 ACUTE INTRACTABLE TENSION-TYPE HEADACHE: ICD-10-CM

## 2024-09-20 DIAGNOSIS — E11.59 HYPERTENSION ASSOCIATED WITH DIABETES: ICD-10-CM

## 2024-09-20 DIAGNOSIS — R42 DIZZINESS AND GIDDINESS: ICD-10-CM

## 2024-09-20 DIAGNOSIS — M25.512 ACUTE PAIN OF LEFT SHOULDER: ICD-10-CM

## 2024-09-20 DIAGNOSIS — Z23 NEEDS FLU SHOT: Primary | ICD-10-CM

## 2024-09-20 PROCEDURE — 99999 PR PBB SHADOW E&M-EST. PATIENT-LVL IV: CPT | Mod: PBBFAC,,, | Performed by: FAMILY MEDICINE

## 2024-09-20 RX ORDER — DICLOFENAC SODIUM 75 MG/1
75 TABLET, DELAYED RELEASE ORAL 2 TIMES DAILY PRN
Qty: 60 TABLET | Refills: 0 | Status: SHIPPED | OUTPATIENT
Start: 2024-09-20 | End: 2024-10-20

## 2024-09-20 RX ORDER — AMLODIPINE BESYLATE 5 MG/1
5 TABLET ORAL NIGHTLY
Qty: 90 TABLET | Refills: 3 | Status: SHIPPED | OUTPATIENT
Start: 2024-09-20 | End: 2025-09-20

## 2024-09-20 NOTE — PROGRESS NOTES
Subjective     Patient ID: Fabiano Malik Jr. is a 63 y.o. male.    Chief Complaint: Fall, Dizziness, Hypertension, Numbness, Wrist Pain, and Leg Swelling    63 years old male came to the clinic with persistent dizziness associated with headache disease proximally 1 week after a fall from his bed.  Patient with contusion over the left shoulder and occipital area.  He denies focal weakness.  The dizziness is persistent.  Patient is concerned about possible intracranial bleeding.  Blood pressure today was stable.  Patient with elevated blood pressure during the afternoon at home.    Fall  The accident occurred More than 1 week ago. The fall occurred from a bed. He fell from a height of 1 to 2 ft. He landed on Hard floor. There was no blood loss. The point of impact was the left shoulder and head. The pain is present in the head and left shoulder. The pain is at a severity of 8/10. The pain is moderate. The symptoms are aggravated by movement. Pertinent negatives include no bowel incontinence, loss of consciousness, nausea, visual change or vomiting. He has tried nothing for the symptoms. The treatment provided no relief.   Dizziness:   Onset:  1 to 4 weeks ago  Progression since onset:  Unchanged and gradually worsening  Frequency:  Constantly  Severity:  Moderate  Duration:  Off/on all day  Dizziness characteristics:  Lightheaded/impending faintno nausea, no vomiting, no weakness, no palpitations and no chest pain.  Aggravated by:  Nothing  Treatments tried:  Nothing  Improvements on treatment:  No relief   PMH includes: anxiety.  Hypertension  This is a chronic problem. The current episode started more than 1 year ago. The problem has been gradually worsening since onset. The problem is uncontrolled. Associated symptoms include anxiety. Pertinent negatives include no chest pain, orthopnea, palpitations or peripheral edema. There are no associated agents to hypertension. Risk factors for coronary artery disease  include sedentary lifestyle, male gender and obesity. Past treatments include angiotensin blockers. Compliance problems include exercise and diet.  There is no history of angina. There is no history of a hypertension causing med or a thyroid problem.     Review of Systems   Constitutional: Negative.    HENT: Negative.     Eyes: Negative.    Respiratory: Negative.     Cardiovascular: Negative.  Negative for chest pain, palpitations, orthopnea, leg swelling and claudication.   Gastrointestinal: Negative.  Negative for bowel incontinence, nausea and vomiting.   Genitourinary: Negative.    Musculoskeletal:  Positive for gait problem.   Integumentary:  Negative.   Neurological:  Positive for dizziness. Negative for loss of consciousness and weakness.   Psychiatric/Behavioral: Negative.            Objective     Physical Exam  Vitals and nursing note reviewed.   Constitutional:       General: He is not in acute distress.     Appearance: He is well-developed. He is not diaphoretic.   HENT:      Head: Normocephalic and atraumatic.      Right Ear: External ear normal.      Left Ear: External ear normal.      Nose: Nose normal.      Mouth/Throat:      Pharynx: No oropharyngeal exudate.   Eyes:      General: No scleral icterus.        Right eye: No discharge.         Left eye: No discharge.      Conjunctiva/sclera: Conjunctivae normal.      Pupils: Pupils are equal, round, and reactive to light.   Neck:      Thyroid: No thyromegaly.      Vascular: No JVD.      Trachea: No tracheal deviation.   Cardiovascular:      Rate and Rhythm: Normal rate and regular rhythm.      Heart sounds: Normal heart sounds. No murmur heard.     No friction rub. No gallop.   Pulmonary:      Effort: Pulmonary effort is normal. No respiratory distress.      Breath sounds: Normal breath sounds. No stridor. No wheezing or rales.   Chest:      Chest wall: No tenderness.   Abdominal:      General: Bowel sounds are normal. There is no distension.       Palpations: Abdomen is soft. There is no mass.      Tenderness: There is no abdominal tenderness. There is no guarding or rebound.   Musculoskeletal:         General: No tenderness. Normal range of motion.      Cervical back: Normal range of motion and neck supple.   Lymphadenopathy:      Cervical: No cervical adenopathy.   Skin:     General: Skin is warm and dry.      Coloration: Skin is not pale.      Findings: No erythema or rash.   Neurological:      Mental Status: He is alert and oriented to person, place, and time.      Cranial Nerves: No cranial nerve deficit.      Motor: Weakness present. No abnormal muscle tone.      Coordination: Coordination abnormal.      Gait: Gait abnormal.      Deep Tendon Reflexes: Reflexes are normal and symmetric. Reflexes normal.   Psychiatric:         Mood and Affect: Mood is anxious. Mood is not depressed.         Behavior: Behavior normal.         Thought Content: Thought content normal.         Judgment: Judgment normal.            Assessment and Plan     1. Needs flu shot  -     influenza (Egg-FREE) (Flucelvax) 45 mcg/0.5 mL IM vaccine (> or = 6 mo) 0.5 mL    2. Acute intractable tension-type headache    3. Contusion of other part of head, initial encounter  -     diclofenac (VOLTAREN) 75 MG EC tablet; Take 1 tablet (75 mg total) by mouth 2 (two) times daily as needed (pain).  Dispense: 60 tablet; Refill: 0    4. Dizziness    5. Dizziness and giddiness  -     CT Head Without Contrast; Future; Expected date: 09/20/2024    6. Acute pain of left shoulder  -     X-Ray Shoulder 2 or More Views Left; Future; Expected date: 09/20/2024  -     diclofenac (VOLTAREN) 75 MG EC tablet; Take 1 tablet (75 mg total) by mouth 2 (two) times daily as needed (pain).  Dispense: 60 tablet; Refill: 0    7. Hypertension associated with diabetes  -     amLODIPine (NORVASC) 5 MG tablet; Take 1 tablet (5 mg total) by mouth every evening.  Dispense: 90 tablet; Refill: 3        I spent a total of 43  minutes on the day of the visit.This includes face to face time and non-face to face time preparing to see the patient (eg, review of tests), obtaining and/or reviewing separately obtained history, documenting clinical information in the electronic or other health record, independently interpreting results and communicating results to the patient/family/caregiver, or care coordinator.          Follow up if symptoms worsen or fail to improve.

## 2024-09-23 ENCOUNTER — TELEPHONE (OUTPATIENT)
Dept: FAMILY MEDICINE | Facility: CLINIC | Age: 64
End: 2024-09-23
Payer: MEDICARE

## 2024-10-10 ENCOUNTER — HOSPITAL ENCOUNTER (OUTPATIENT)
Dept: RADIOLOGY | Facility: HOSPITAL | Age: 64
Discharge: HOME | End: 2024-10-10
Attending: FAMILY MEDICINE
Payer: MEDICARE

## 2024-10-10 DIAGNOSIS — S00.83XA CONTUSION OF OTHER PART OF HEAD, INITIAL ENCOUNTER: ICD-10-CM

## 2024-10-10 DIAGNOSIS — M25.512 ACUTE PAIN OF LEFT SHOULDER: ICD-10-CM

## 2024-10-10 PROCEDURE — 73030 X-RAY EXAM OF SHOULDER: CPT | Mod: 26,LT,, | Performed by: RADIOLOGY

## 2024-10-10 PROCEDURE — 73030 X-RAY EXAM OF SHOULDER: CPT | Mod: TC,FY,LT

## 2024-10-10 RX ORDER — DICLOFENAC SODIUM 75 MG/1
TABLET, DELAYED RELEASE ORAL
Qty: 60 TABLET | Refills: 11 | Status: SHIPPED | OUTPATIENT
Start: 2024-10-10

## 2024-10-10 NOTE — TELEPHONE ENCOUNTER
Refill Routing Note   Medication(s) are not appropriate for processing by [facility] for the following reason(s):        Outside of protocol    [facility] action(s):  Route        Medication Therapy Plan: FLOS      Appointments  past 12m or future 3m with PCP    Date Provider   Last Visit   9/20/2024 Lucien Fleming MD   Next Visit   11/12/2024 Lucien Fleming MD   ED visits in past 90 days: 0        Note composed:1:30 PM 10/10/2024

## 2024-10-10 NOTE — TELEPHONE ENCOUNTER
Baljeet ordered; please have [facility] nurse facilitator, make arrangements for pt.   
[facility]  
No

## 2024-10-10 NOTE — TELEPHONE ENCOUNTER
Care Due:                  Date            Visit Type   Department     Provider  --------------------------------------------------------------------------------                                EP -                              PRIMARY      [facility]  Last Visit: 09-      CARE ([facility])   LESLEY Fleming                              EP -                              PRIMARY      [facility]  Next Visit: 11-      CARE ([facility])   LESLEY Fleming                                                            Last  Test          Frequency    Reason                     Performed    Due Date  --------------------------------------------------------------------------------    CBC.........  12 months..  diclofenac...............  11- 11-    HBA1C.......  6 months...  dulaglutide, metFORMIN...  04-   10-    [facility] Embedded Care Due Messages. Reference number: [id number].   10/10/2024 4:03:40 AM CDT

## 2024-10-17 DIAGNOSIS — I15.2 HYPERTENSION ASSOCIATED WITH DIABETES: ICD-10-CM

## 2024-10-17 DIAGNOSIS — F20.3 UNDIFFERENTIATED SCHIZOPHRENIA: ICD-10-CM

## 2024-10-17 DIAGNOSIS — E11.59 HYPERTENSION ASSOCIATED WITH DIABETES: ICD-10-CM

## 2024-10-17 DIAGNOSIS — F51.01 PRIMARY INSOMNIA: ICD-10-CM

## 2024-10-17 DIAGNOSIS — K21.9 GASTROESOPHAGEAL REFLUX DISEASE, UNSPECIFIED WHETHER ESOPHAGITIS PRESENT: ICD-10-CM

## 2024-10-17 RX ORDER — ARIPIPRAZOLE 5 MG/1
5 TABLET ORAL DAILY
Qty: 90 TABLET | Refills: 0 | Status: SHIPPED | OUTPATIENT
Start: 2024-10-17 | End: 2025-01-15

## 2024-10-17 RX ORDER — OMEPRAZOLE 20 MG/1
20 CAPSULE, DELAYED RELEASE ORAL
Qty: 90 CAPSULE | Refills: 3 | Status: SHIPPED | OUTPATIENT
Start: 2024-10-17

## 2024-10-17 RX ORDER — OLMESARTAN MEDOXOMIL 20 MG/1
20 TABLET ORAL DAILY
Qty: 90 TABLET | Refills: 0 | Status: SHIPPED | OUTPATIENT
Start: 2024-10-17

## 2024-10-17 RX ORDER — DULOXETIN HYDROCHLORIDE 60 MG/1
120 CAPSULE, DELAYED RELEASE ORAL EVERY MORNING
Qty: 180 CAPSULE | Refills: 0 | Status: SHIPPED | OUTPATIENT
Start: 2024-10-17 | End: 2025-01-15

## 2024-10-17 NOTE — TELEPHONE ENCOUNTER
No care due was identified.  [facility] Embedded Care Due Messages. Reference number: [id number].   10/17/2024 10:25:15 AM CDT

## 2024-10-17 NOTE — TELEPHONE ENCOUNTER
Refill Routing Note   Medication(s) are not appropriate for processing by [facility] for the following reason(s):        Outside of protocol  Drug-disease interaction: olmesartan and Hypotension/Dehydration/Hyperkalemia    [facility] action(s):  Approve  Defer  Route             Pharmacist review requested: Yes     Appointments  past 12m or future 3m with PCP    Date Provider   Last Visit   9/20/2024 Lucien Fleming MD   Next Visit   11/12/2024 Lucien Fleming MD   ED visits in past 90 days: 0        Note composed:6:12 PM 10/17/2024

## 2024-10-18 RX ORDER — HYDROXYZINE PAMOATE 50 MG/1
CAPSULE ORAL
Qty: 90 CAPSULE | Refills: 3 | Status: SHIPPED | OUTPATIENT
Start: 2024-10-18

## 2024-10-18 NOTE — TELEPHONE ENCOUNTER
Refill Routing Note   Medication(s) are not appropriate for processing by [facility] for the following reason(s):        Outside of protocol    [facility] action(s):  Approve  Route           Pharmacist review requested: Yes   Extended chart review required: Yes     Appointments  past 12m or future 3m with PCP    Date Provider   Last Visit   9/20/2024 Lucien Fleming MD   Next Visit   11/12/2024 Lucien Fleming MD   ED visits in past 90 days: 0        Note composed:11:55 PM 10/17/2024

## 2024-11-04 DIAGNOSIS — E11.9 TYPE 2 DIABETES MELLITUS WITHOUT COMPLICATION, WITHOUT LONG-TERM CURRENT USE OF INSULIN: ICD-10-CM

## 2024-11-04 RX ORDER — METFORMIN HYDROCHLORIDE 1000 MG/1
1000 TABLET ORAL 2 TIMES DAILY WITH MEALS
Qty: 180 TABLET | Refills: 3 | Status: SHIPPED | OUTPATIENT
Start: 2024-11-04

## 2024-11-04 NOTE — TELEPHONE ENCOUNTER
Care Due:                  Date            Visit Type   Department     Provider  --------------------------------------------------------------------------------                                EP -                              PRIMARY      [facility]  Last Visit: 09-      CARE ([facility])   MEDICINE       Lucien Fleming                              EP -                              PRIMARY      [facility]  Next Visit: 11-      CARE ([facility])   LESLEY Fleming                                                            Last  Test          Frequency    Reason                     Performed    Due Date  --------------------------------------------------------------------------------    CMP.........  12 months..  DULoxetine, diclofenac,    11- 11-                             metFORMIN, olmesartan,                             pravastatin..............    Lipid Panel.  12 months..  pravastatin..............  11- 11-    TSH.........  12 months..  levothyroxine............  11- 11-    [facility] Embedded Care Due Messages. Reference number: [id number].   11/04/2024 4:06:56 AM CST

## 2024-11-04 NOTE — TELEPHONE ENCOUNTER
Refill Routing Note   Medication(s) are not appropriate for processing by [facility] for the following reason(s):        Required labs outdated    [facility] action(s):  Defer     Requires labs : Yes             Appointments  past 12m or future 3m with PCP    Date Provider   Last Visit   9/20/2024 Lucien Fleming MD   Next Visit   11/12/2024 Lucien Fleming MD   ED visits in past 90 days: 0        Note composed:1:23 PM 11/04/2024

## 2024-11-07 ENCOUNTER — LAB VISIT (OUTPATIENT)
Dept: LAB | Facility: HOSPITAL | Age: 64
End: 2024-11-07
Attending: FAMILY MEDICINE
Payer: MEDICARE

## 2024-11-07 DIAGNOSIS — E11.59 HYPERTENSION ASSOCIATED WITH DIABETES: ICD-10-CM

## 2024-11-07 DIAGNOSIS — I15.2 HYPERTENSION ASSOCIATED WITH DIABETES: ICD-10-CM

## 2024-11-07 LAB
ALBUMIN SERPL BCP-MCNC: 3.7 G/DL (ref 3.5–5.2)
ALP SERPL-CCNC: 71 U/L (ref 40–150)
ALT SERPL W/O P-5'-P-CCNC: 14 U/L (ref 10–44)
ANION GAP SERPL CALC-SCNC: 9 MMOL/L (ref 8–16)
AST SERPL-CCNC: 23 U/L (ref 10–40)
BILIRUB SERPL-MCNC: 0.3 MG/DL (ref 0.1–1)
BUN SERPL-MCNC: 15 MG/DL (ref 8–23)
CALCIUM SERPL-MCNC: 8.7 MG/DL (ref 8.7–10.5)
CHLORIDE SERPL-SCNC: 102 MMOL/L (ref 95–110)
CHOLEST SERPL-MCNC: 128 MG/DL (ref 120–199)
CHOLEST/HDLC SERPL: 3.1 {RATIO} (ref 2–5)
CO2 SERPL-SCNC: 23 MMOL/L (ref 23–29)
CREAT SERPL-MCNC: 1.2 MG/DL (ref 0.5–1.4)
EST. GFR  (NO RACE VARIABLE): >60 ML/MIN/1.73 M^2
ESTIMATED AVG GLUCOSE: 189 MG/DL (ref 68–131)
GLUCOSE SERPL-MCNC: 98 MG/DL (ref 70–110)
HBA1C MFR BLD: 8.2 % (ref 4–5.6)
HDLC SERPL-MCNC: 41 MG/DL (ref 40–75)
HDLC SERPL: 32 % (ref 20–50)
LDLC SERPL CALC-MCNC: 60.6 MG/DL (ref 63–159)
NONHDLC SERPL-MCNC: 87 MG/DL
POTASSIUM SERPL-SCNC: 4.6 MMOL/L (ref 3.5–5.1)
PROT SERPL-MCNC: 6.2 G/DL (ref 6–8.4)
SODIUM SERPL-SCNC: 134 MMOL/L (ref 136–145)
TRIGL SERPL-MCNC: 132 MG/DL (ref 30–150)

## 2024-11-07 PROCEDURE — 83036 HEMOGLOBIN GLYCOSYLATED A1C: CPT | Performed by: FAMILY MEDICINE

## 2024-11-07 PROCEDURE — 80053 COMPREHEN METABOLIC PANEL: CPT | Performed by: FAMILY MEDICINE

## 2024-11-07 PROCEDURE — 80061 LIPID PANEL: CPT | Performed by: FAMILY MEDICINE

## 2024-11-12 ENCOUNTER — OFFICE VISIT (OUTPATIENT)
Dept: FAMILY MEDICINE | Facility: CLINIC | Age: 64
End: 2024-11-12
Payer: MEDICARE

## 2024-11-12 ENCOUNTER — LAB VISIT (OUTPATIENT)
Dept: LAB | Facility: HOSPITAL | Age: 64
End: 2024-11-12
Attending: FAMILY MEDICINE
Payer: MEDICARE

## 2024-11-12 VITALS
BODY MASS INDEX: 33.86 KG/M2 | HEIGHT: 73 IN | HEART RATE: 78 BPM | DIASTOLIC BLOOD PRESSURE: 70 MMHG | WEIGHT: 255.5 LBS | SYSTOLIC BLOOD PRESSURE: 112 MMHG | OXYGEN SATURATION: 97 %

## 2024-11-12 DIAGNOSIS — E11.59 HYPERTENSION ASSOCIATED WITH DIABETES: ICD-10-CM

## 2024-11-12 DIAGNOSIS — E66.811 CLASS 1 OBESITY WITH BODY MASS INDEX (BMI) OF 33.0 TO 33.9 IN ADULT, UNSPECIFIED OBESITY TYPE, UNSPECIFIED WHETHER SERIOUS COMORBIDITY PRESENT: ICD-10-CM

## 2024-11-12 DIAGNOSIS — J06.9 UPPER RESPIRATORY TRACT INFECTION, UNSPECIFIED TYPE: ICD-10-CM

## 2024-11-12 DIAGNOSIS — R60.0 BILATERAL LEG EDEMA: Primary | ICD-10-CM

## 2024-11-12 DIAGNOSIS — E03.9 HYPOTHYROIDISM, UNSPECIFIED TYPE: ICD-10-CM

## 2024-11-12 DIAGNOSIS — E11.65 TYPE 2 DIABETES MELLITUS WITH HYPERGLYCEMIA: ICD-10-CM

## 2024-11-12 DIAGNOSIS — R60.0 BILATERAL LEG EDEMA: ICD-10-CM

## 2024-11-12 DIAGNOSIS — E11.9 TYPE 2 DIABETES MELLITUS WITHOUT COMPLICATION, WITHOUT LONG-TERM CURRENT USE OF INSULIN: ICD-10-CM

## 2024-11-12 DIAGNOSIS — I15.2 HYPERTENSION ASSOCIATED WITH DIABETES: ICD-10-CM

## 2024-11-12 LAB
TSH SERPL DL<=0.005 MIU/L-ACNC: 3.5 UIU/ML (ref 0.4–4)
URATE SERPL-MCNC: 3.5 MG/DL (ref 3.4–7)

## 2024-11-12 PROCEDURE — 4010F ACE/ARB THERAPY RXD/TAKEN: CPT | Mod: CPTII,S$GLB,, | Performed by: FAMILY MEDICINE

## 2024-11-12 PROCEDURE — 3060F POS MICROALBUMINURIA REV: CPT | Mod: CPTII,S$GLB,, | Performed by: FAMILY MEDICINE

## 2024-11-12 PROCEDURE — 99215 OFFICE O/P EST HI 40 MIN: CPT | Mod: S$GLB,,, | Performed by: FAMILY MEDICINE

## 2024-11-12 PROCEDURE — 1160F RVW MEDS BY RX/DR IN RCRD: CPT | Mod: CPTII,S$GLB,, | Performed by: FAMILY MEDICINE

## 2024-11-12 PROCEDURE — 99999 PR PBB SHADOW E&M-EST. PATIENT-LVL V: CPT | Mod: PBBFAC,,, | Performed by: FAMILY MEDICINE

## 2024-11-12 PROCEDURE — 3066F NEPHROPATHY DOC TX: CPT | Mod: CPTII,S$GLB,, | Performed by: FAMILY MEDICINE

## 2024-11-12 PROCEDURE — 3052F HG A1C>EQUAL 8.0%<EQUAL 9.0%: CPT | Mod: CPTII,S$GLB,, | Performed by: FAMILY MEDICINE

## 2024-11-12 PROCEDURE — 36415 COLL VENOUS BLD VENIPUNCTURE: CPT | Mod: PO | Performed by: FAMILY MEDICINE

## 2024-11-12 PROCEDURE — 3078F DIAST BP <80 MM HG: CPT | Mod: CPTII,S$GLB,, | Performed by: FAMILY MEDICINE

## 2024-11-12 PROCEDURE — 3008F BODY MASS INDEX DOCD: CPT | Mod: CPTII,S$GLB,, | Performed by: FAMILY MEDICINE

## 2024-11-12 PROCEDURE — 1159F MED LIST DOCD IN RCRD: CPT | Mod: CPTII,S$GLB,, | Performed by: FAMILY MEDICINE

## 2024-11-12 PROCEDURE — 84550 ASSAY OF BLOOD/URIC ACID: CPT | Performed by: FAMILY MEDICINE

## 2024-11-12 PROCEDURE — 84443 ASSAY THYROID STIM HORMONE: CPT | Performed by: FAMILY MEDICINE

## 2024-11-12 PROCEDURE — 3074F SYST BP LT 130 MM HG: CPT | Mod: CPTII,S$GLB,, | Performed by: FAMILY MEDICINE

## 2024-11-12 RX ORDER — BLOOD-GLUCOSE CONTROL, NORMAL
1 EACH MISCELLANEOUS DAILY
Qty: 200 EACH | Refills: 3 | Status: SHIPPED | OUTPATIENT
Start: 2024-11-12

## 2024-11-12 RX ORDER — DULAGLUTIDE 1.5 MG/.5ML
1.5 INJECTION, SOLUTION SUBCUTANEOUS
Qty: 12 PEN | Refills: 3 | Status: SHIPPED | OUTPATIENT
Start: 2024-11-12 | End: 2025-11-12

## 2024-11-12 RX ORDER — METOPROLOL SUCCINATE 25 MG/1
25 TABLET, EXTENDED RELEASE ORAL DAILY
Qty: 90 TABLET | Refills: 3 | Status: SHIPPED | OUTPATIENT
Start: 2024-11-12 | End: 2025-11-12

## 2024-11-12 RX ORDER — OLMESARTAN MEDOXOMIL 20 MG/1
20 TABLET ORAL DAILY
Qty: 90 TABLET | Refills: 0 | Status: SHIPPED | OUTPATIENT
Start: 2024-11-12

## 2024-11-12 NOTE — PROGRESS NOTES
Subjective     Patient ID: Fabiano Malik Jr. is a 63 y.o. male.    Chief Complaint: Follow-up    63 years old male came to the clinic with bilateral foot swelling.  He is currently taking amlodipine.  Patient is concerned about possible gout.  Patient also with productive cough for the last week.  No fever or chills.    Hypertension  This is a chronic problem. The current episode started more than 1 year ago. The problem is unchanged. The problem is controlled. Associated symptoms include peripheral edema. Pertinent negatives include no chest pain, orthopnea or palpitations. There are no associated agents to hypertension. Risk factors for coronary artery disease include diabetes mellitus, male gender and obesity. Past treatments include beta blockers and angiotensin blockers. The current treatment provides significant improvement. Compliance problems include exercise.  There is no history of a hypertension causing med or a thyroid problem.   Diabetes  He presents for his follow-up diabetic visit. He has type 2 diabetes mellitus. His disease course has been stable. There are no hypoglycemic associated symptoms. Pertinent negatives for diabetes include no chest pain, no polydipsia, no polyphagia and no polyuria. There are no hypoglycemic complications. Symptoms are worsening. There are no diabetic complications. There are no known risk factors for coronary artery disease. Current diabetic treatment includes oral agent (monotherapy). He is compliant with treatment all of the time. He is following a generally healthy diet. He has not had a previous visit with a dietitian. An ACE inhibitor/angiotensin II receptor blocker is being taken.     Review of Systems   Constitutional: Negative.  Negative for fever, night sweats and unexpected weight change.   HENT:  Positive for nasal congestion.    Eyes: Negative.    Respiratory:  Positive for cough.    Cardiovascular: Negative.  Negative for chest pain, palpitations and  orthopnea.   Gastrointestinal: Negative.    Endocrine: Negative for polydipsia, polyphagia and polyuria.   Genitourinary: Negative.    Musculoskeletal: Negative.    Integumentary:  Negative.   Neurological: Negative.    Psychiatric/Behavioral: Negative.            Objective     Physical Exam  Vitals and nursing note reviewed.   Constitutional:       General: He is not in acute distress.     Appearance: He is well-developed. He is not diaphoretic.   HENT:      Head: Normocephalic and atraumatic.      Right Ear: External ear normal.      Left Ear: External ear normal.      Nose: Nose normal.      Mouth/Throat:      Pharynx: No oropharyngeal exudate.   Eyes:      General: No scleral icterus.        Right eye: No discharge.         Left eye: No discharge.      Conjunctiva/sclera: Conjunctivae normal.      Pupils: Pupils are equal, round, and reactive to light.   Neck:      Thyroid: No thyromegaly.      Vascular: No JVD.      Trachea: No tracheal deviation.   Cardiovascular:      Rate and Rhythm: Normal rate and regular rhythm.      Heart sounds: Normal heart sounds. No murmur heard.     No friction rub. No gallop.   Pulmonary:      Effort: Pulmonary effort is normal. No respiratory distress.      Breath sounds: Normal breath sounds. No stridor. No wheezing or rales.   Chest:      Chest wall: No tenderness.   Abdominal:      General: Bowel sounds are normal. There is no distension.      Palpations: Abdomen is soft. There is no mass.      Tenderness: There is no abdominal tenderness. There is no guarding or rebound.   Musculoskeletal:         General: No tenderness. Normal range of motion.      Cervical back: Normal range of motion and neck supple.      Right lower leg: Edema present.      Left lower leg: Edema present.   Lymphadenopathy:      Cervical: No cervical adenopathy.   Skin:     General: Skin is warm and dry.      Coloration: Skin is not pale.      Findings: No erythema or rash.   Neurological:      Mental  Status: He is alert and oriented to person, place, and time.      Cranial Nerves: No cranial nerve deficit.      Motor: No abnormal muscle tone.      Coordination: Coordination normal.      Deep Tendon Reflexes: Reflexes are normal and symmetric. Reflexes normal.   Psychiatric:         Mood and Affect: Mood is anxious.         Behavior: Behavior normal.         Thought Content: Thought content normal.         Judgment: Judgment normal.            Assessment and Plan     1. Bilateral leg edema  -     Uric Acid; Future; Expected date: 11/12/2024    2. Hypertension associated with diabetes  Comments:  change ACE to benicar 20 mg BID and repeat BP in 1 month   Orders:  -     olmesartan (BENICAR) 20 MG tablet; Take 1 tablet (20 mg total) by mouth once daily.  Dispense: 90 tablet; Refill: 0  -     metoprolol succinate (TOPROL-XL) 25 MG 24 hr tablet; Take 1 tablet (25 mg total) by mouth once daily.  Dispense: 90 tablet; Refill: 3  -     Microalbumin/Creatinine Ratio, Urine; Future; Expected date: 11/12/2024  -     Lipid Panel; Future; Expected date: 11/12/2024  -     Hemoglobin A1C; Future; Expected date: 11/12/2024  -     Comprehensive Metabolic Panel; Future; Expected date: 11/12/2024    3. Type 2 diabetes mellitus without complication, without long-term current use of insulin  -     blood sugar diagnostic (TRUE METRIX GLUCOSE TEST STRIP) Strp; use 1 strip to check glucose 2 (two) times a day.  Dispense: 200 strip; Refill: 3  -     lancets 30 gauge Misc; 1 lancet by Misc.(Non-Drug; Combo Route) route twice daily.  Dispense: 200 each; Refill: 3  -     Ambulatory referral/consult to Diabetes Education; Future; Expected date: 11/19/2024  -     dulaglutide (TRULICITY) 1.5 mg/0.5 mL pen injector; Inject 1.5 mg into the skin every 7 days.  Dispense: 12 pen ; Refill: 3  -     Microalbumin/Creatinine Ratio, Urine; Future; Expected date: 11/12/2024  -     Lipid Panel; Future; Expected date: 11/12/2024  -     Hemoglobin A1C;  Future; Expected date: 11/12/2024  -     Comprehensive Metabolic Panel; Future; Expected date: 11/12/2024    4. Hypothyroidism, unspecified type  -     TSH; Future; Expected date: 11/12/2024  -     TSH; Future; Expected date: 11/12/2024    5. Type 2 diabetes mellitus with hyperglycemia  -     Hemoglobin A1C; Future; Expected date: 11/12/2024    6. Class 1 obesity with body mass index (BMI) of 33.0 to 33.9 in adult, unspecified obesity type, unspecified whether serious comorbidity present  -     Lipid Panel; Future; Expected date: 11/12/2024    7. Upper respiratory tract infection, unspecified type  -     X-Ray Chest PA And Lateral; Future; Expected date: 11/12/2024    Continue monitoring blood sugar at home,ADA diet.   Continue monitoring blood pressure at home, low sodium diet.     I spent a total of 43 minutes on the day of the visit.This includes face to face time and non-face to face time preparing to see the patient (eg, review of tests), obtaining and/or reviewing separately obtained history, documenting clinical information in the electronic or other health record, independently interpreting results and communicating results to the patient/family/caregiver, or care coordinator.          Follow up in about 6 months (around 5/12/2025), or if symptoms worsen or fail to improve.

## 2024-11-14 DIAGNOSIS — E11.9 TYPE 2 DIABETES MELLITUS WITHOUT COMPLICATION, WITHOUT LONG-TERM CURRENT USE OF INSULIN: ICD-10-CM

## 2024-11-14 NOTE — TELEPHONE ENCOUNTER
No care due was identified.  [facility] Embedded Care Due Messages. Reference number: [id number].   11/14/2024 4:31:30 PM CST

## 2024-11-19 DIAGNOSIS — E11.9 TYPE 2 DIABETES MELLITUS WITHOUT COMPLICATION, WITHOUT LONG-TERM CURRENT USE OF INSULIN: ICD-10-CM

## 2024-11-19 NOTE — TELEPHONE ENCOUNTER
No care due was identified.  Health [facility] Embedded Care Due Messages. Reference number: [id number].   11/19/2024 4:38:38 PM CST

## 2024-11-21 ENCOUNTER — PATIENT OUTREACH (OUTPATIENT)
Dept: ADMINISTRATIVE | Facility: HOSPITAL | Age: 64
End: 2024-11-21
Payer: MEDICARE

## 2024-11-21 NOTE — PROGRESS NOTES
11/21/2024  VB chart audit performed. Care Everywhere updates requested and reviewed  Overdue HM topic chart audit and/or requested. LINKS triggered and reconciled. Media reviewed.

## 2024-12-02 ENCOUNTER — PATIENT MESSAGE (OUTPATIENT)
Dept: CARDIOLOGY | Facility: CLINIC | Age: 64
End: 2024-12-02
Payer: MEDICARE

## 2024-12-09 ENCOUNTER — PATIENT MESSAGE (OUTPATIENT)
Dept: ADMINISTRATIVE | Facility: OTHER | Age: 64
End: 2024-12-09
Payer: MEDICARE

## 2025-01-12 NOTE — PROGRESS NOTES
"[facility]  Pulmonary Clinic Note      History of Present Illness:  Fabiano Malik Jr. is a 64 y.o. male with PMHx T2DM, HTN, hypothyroidism who was referred to pulmonary clinic for "scarring of lung".     Patient reports he has had SOB for many years, worsening over the past few months. Reports that he develops SOB with walking around his house as well as with all ADLs. Does endorse associated cough productive of sputum which is occasionally green or brown. Also endorses wheeze with cough. He denies fevers, chills, sick contacts. No prior history of lung disease known to him. He denies rashes, joint swelling, myalgias, personal or family history of CTD. Notes he does get some pain in leg and arm joints. He states he did smoke 1-2 cigarettes per week for several years but quit > 30 years ago. Initially satting 86% on RA in clinic but this improved to 97% on RA with rest.     Pulmonary/Cardiology Testing:    CXR 5/15/24  Thin linear opacities within the lung bases likely representing linear atelectatic changes versus fibrotic streaks.     TTE 8/26/22  The left ventricle is normal in size with hyperdynamic systolic function.  The estimated ejection fraction is 75%.  Indeterminate left ventricular diastolic function.  The estimated PA systolic pressure is 28 mmHg.  Normal right ventricular size with normal right ventricular systolic function.  Intermediate central venous pressure (8 mmHg).  The study was difficult due to patient's heart rhythm. Tachycardia was present during the study.    Past Medical History:   Diagnosis Date    Chronic back pain greater than 3 months duration     Depression     Diabetes mellitus     Diabetes mellitus, type 2     Hypertension     Schizophrenia      Past Surgical History:   Procedure Laterality Date    APPENDECTOMY      CARPAL TUNNEL RELEASE Right 12/13/2022    Procedure: RELEASE, CARPAL TUNNEL;  Surgeon: Everton Walter Jr., MD;  Location: [facility] OR;  Service: " Orthopedics;  Laterality: Right;    COLONOSCOPY N/A 5/31/2018    Procedure: COLONOSCOPY;  Surgeon: Guillaume Hatch MD;  Location: [facility] ENDO;  Service: Endoscopy;  Laterality: N/A;    COLONOSCOPY N/A 11/17/2022    Procedure: COLONOSCOPY;  Surgeon: Guillaume Hatch MD;  Location: [facility] ENDO;  Service: Endoscopy;  Laterality: N/A;    DECOMPRESSION, NERVE, ULNAR Right 12/13/2022    Procedure: DECOMPRESSION, NERVE, ULNAR;  Surgeon: Everton Walter Jr., MD;  Location: [facility] OR;  Service: Orthopedics;  Laterality: Right;    EPIDURAL STEROID INJECTION INTO LUMBAR SPINE N/A 2/21/2024    Procedure: L5-S1 JOHN;  Surgeon: Zoila Suarez DO;  Location: [facility] PAIN MANAGEMENT;  Service: Pain Management;  Laterality: N/A;  oral 30 mins    FUSION, SPINE, CERVICAL N/A 8/11/2023    Procedure: FUSION, SPINE, CERVICAL;  Surgeon: Guillaume Washington MD;  Location: [facility] OR;  Service: Neurosurgery;  Laterality: N/A;  C3-4, C5-6, C6-7 ACDF C3-C7 anterior instrumentation Depuy  -ANTERIOR DECOMPREESSION 2 LEVELS   -ANTERIOR INSTRUMENTATION INTERBODY CAGE INTERSPACE 3   -LOP 3.5 HR   -LOS 3 DAYS   -GENERAL   -TYPE AND SCREEN   - C ARM   -EMG, SEP, MEP hari notified cc  -ASPEN   -REG    NECK SURGERY      RADIOFREQUENCY ABLATION OF LUMBAR MEDIAL BRANCH NERVE AT SINGLE LEVEL Left 5/29/2018    Procedure: RADIOFREQUENCY THERMOCOAGULATION (RFTC)-NERVE-MEDIAN BRANCH-LUMBAR- Left L2-3-4-5;  Surgeon: Donavon Dennis MD;  Location: [facility] PAIN MGT;  Service: Pain Management;  Laterality: Left;    RADIOFREQUENCY ABLATION OF LUMBAR MEDIAL BRANCH NERVE AT SINGLE LEVEL Right 1/8/2019    Procedure: Radiofrequency Ablation, Nerve, Spinal, Lumbar, Medial Branch, L2,3,4,5;  Surgeon: Alberto Hernandez Jr., MD;  Location: [facility] PAIN MGT;  Service: Pain Management;  Laterality: Right;  Pt is diabetic    RADIOFREQUENCY ABLATION OF LUMBAR MEDIAL BRANCH NERVE AT SINGLE LEVEL Left 3/12/2019    Procedure: Radiofrequency Ablation, Nerve, Spinal, Lumbar, Medial  "Branch, Left, L2,3,4,5;  Surgeon: Alberto Hernandez Jr., MD;  Location: [facility] PAIN MGT;  Service: Pain Management;  Laterality: Left;  Pt will be consented the day of procedure.    TRANSFORAMINAL EPIDURAL INJECTION OF STEROID Right 9/12/2019    Procedure: Injection,steroid,epidural,transforaminal,right,L4-5 and L5-S1;  Surgeon: Alberto Hernandez Jr., MD;  Location: [facility] PAIN MGT;  Service: Pain Management;  Laterality: Right;  PT IS DIABETIC     Family History   Problem Relation Name Age of Onset    Alzheimer's disease Mother      Diabetes Mother      Heart defect Father      Cancer Father      Stroke Father      Heart attack Father      Heart defect Sister      Alzheimer's disease Sister       Social History     Socioeconomic History    Marital status:    Tobacco Use    Smoking status: Never    Smokeless tobacco: Never   Substance and Sexual Activity    Alcohol use: Yes     Comment: "couple of beers a day"    Drug use: No    Sexual activity: Not Currently     Social Drivers of Health     Financial Resource Strain: High Risk (9/13/2024)    Overall Financial Resource Strain (CARDIA)     Difficulty of Paying Living Expenses: Hard   Food Insecurity: Food Insecurity Present (9/13/2024)    Hunger Vital Sign     Worried About Running Out of Food in the Last Year: Sometimes true     Ran Out of Food in the Last Year: Sometimes true   Transportation Needs: Unmet Transportation Needs (5/8/2024)    PRAPARE - Transportation     Lack of Transportation (Medical): Yes     Lack of Transportation (Non-Medical): No   Physical Activity: Sufficiently Active (9/13/2024)    Exercise Vital Sign     Days of Exercise per Week: 7 days     Minutes of Exercise per Session: 70 min   Stress: Stress Concern Present (9/13/2024)    [employer] - Occupational Stress Questionnaire     Feeling of Stress : Very much   Housing Stability: Low Risk  (9/13/2024)    Housing Stability Vital Sign     Unable to Pay for " Housing in the Last Year: No     Homeless in the Last Year: No     Review of patient's allergies indicates:   Allergen Reactions    Pcn [penicillins] Other (See Comments)     Childhood rxn, pt does not recall type of rxn       Review of Systems:  Review of Systems   Constitutional:  Negative for chills, fever and weight loss.   HENT:  Negative for congestion, sinus pain and sore throat.    Respiratory:  Positive for cough, sputum production, shortness of breath and wheezing.    Cardiovascular:  Negative for chest pain and palpitations.   Gastrointestinal:  Negative for abdominal pain, nausea and vomiting.   Musculoskeletal:  Negative for myalgias.   Skin:  Negative for itching and rash.   Neurological:  Negative for loss of consciousness and headaches.          OBJECTIVE:     Vital Signs  Vitals:    01/13/25 1036   BP: 127/70   BP Location: Right arm   Patient Position: Sitting   Pulse: 80   SpO2: (!) 86%   Weight: 116.5 kg (256 lb 13.4 oz)     Body mass index is 33.89 kg/m².    Physical Exam:  Physical Exam  Constitutional:       General: He is not in acute distress.     Appearance: He is not toxic-appearing.   HENT:      Head: Normocephalic and atraumatic.   Eyes:      Extraocular Movements: Extraocular movements intact.      Conjunctiva/sclera: Conjunctivae normal.   Cardiovascular:      Rate and Rhythm: Normal rate.      Heart sounds: Normal heart sounds.   Pulmonary:      Effort: No respiratory distress.      Breath sounds: Normal breath sounds. No wheezing or rhonchi.   Musculoskeletal:      Cervical back: Normal range of motion and neck supple.   Skin:     General: Skin is warm and dry.      Capillary Refill: Capillary refill takes less than 2 seconds.      Findings: No erythema or rash.   Neurological:      Mental Status: He is alert and oriented to person, place, and time. Mental status is at baseline.          Laboratory:  CBC  Lab Results   Component Value Date    WBC 5.99 11/20/2023    HGB 11.0 (L)  11/20/2023    HCT 33.6 (L) 11/20/2023     11/20/2023    MCV 84 11/20/2023    RDW 14.1 11/20/2023     BMP  Lab Results   Component Value Date     (L) 11/07/2024    K 4.6 11/07/2024     11/07/2024    CO2 23 11/07/2024    BUN 15 11/07/2024    CREATININE 1.2 11/07/2024    GLU 98 11/07/2024    CALCIUM 8.7 11/07/2024    MG 1.7 10/05/2023    PHOS 3.3 10/05/2023     LFTs  Lab Results   Component Value Date    PROT 6.2 11/07/2024    ALBUMIN 3.7 11/07/2024    BILITOT 0.3 11/07/2024    AST 23 11/07/2024    ALKPHOS 71 11/07/2024    ALT 14 11/07/2024         ASSESSMENT/PLAN:       Problem List Items Addressed This Visit       Dyspnea on exertion - Primary    Current Assessment & Plan     - notes VALIENTE for many years, worsening over past few months  - unclear etiology presently, have ordered TTE to rule out cardiac causes, prior TTE was unremarkable several years ago  - also ordered CT chest given his scarring noted on CXR to evaluate parenchyma and rule out ILD, PFTs to rule out obstructive cause although less likely given minimal smoking history  - 6MWT ordered to evaluate need for home O2  - albuterol prescribed in the meantime as rescue inhaler but advised to go to ER if SOB is severe         Relevant Medications    albuterol (PROVENTIL/VENTOLIN HFA) 90 mcg/actuation inhaler    Other Relevant Orders    Echo    Interstitial pulmonary disease, unspecified    Current Assessment & Plan     - CXR 5/2024 with linear opacities in lung bases  - in setting of his severe SOB, have ordered CT chest to evaluate for ILD  - further workup and treatments pending these results         Relevant Orders    Complete PFT with bronchodilator    Six Minute Walk Test to qualify for Home Oxygen    CT Chest Without Contrast    Scarring of lung    Current Assessment & Plan     - see plan for ILD           Return to clinic in 3 months  Will call with results of above testing    Ladonna Garcia MD  Pulmonary/Critical Care  [facility]

## 2025-01-13 ENCOUNTER — OFFICE VISIT (OUTPATIENT)
Dept: PULMONOLOGY | Facility: CLINIC | Age: 65
End: 2025-01-13
Payer: MEDICARE

## 2025-01-13 VITALS
HEART RATE: 79 BPM | DIASTOLIC BLOOD PRESSURE: 70 MMHG | BODY MASS INDEX: 33.89 KG/M2 | SYSTOLIC BLOOD PRESSURE: 127 MMHG | OXYGEN SATURATION: 98 % | WEIGHT: 256.81 LBS

## 2025-01-13 DIAGNOSIS — R06.09 DYSPNEA ON EXERTION: Primary | ICD-10-CM

## 2025-01-13 DIAGNOSIS — J98.4 SCARRING OF LUNG: ICD-10-CM

## 2025-01-13 DIAGNOSIS — J84.9 INTERSTITIAL PULMONARY DISEASE, UNSPECIFIED: ICD-10-CM

## 2025-01-13 PROCEDURE — 3078F DIAST BP <80 MM HG: CPT | Mod: CPTII,S$GLB,, | Performed by: STUDENT IN AN ORGANIZED HEALTH CARE EDUCATION/TRAINING PROGRAM

## 2025-01-13 PROCEDURE — 3008F BODY MASS INDEX DOCD: CPT | Mod: CPTII,S$GLB,, | Performed by: STUDENT IN AN ORGANIZED HEALTH CARE EDUCATION/TRAINING PROGRAM

## 2025-01-13 PROCEDURE — 3074F SYST BP LT 130 MM HG: CPT | Mod: CPTII,S$GLB,, | Performed by: STUDENT IN AN ORGANIZED HEALTH CARE EDUCATION/TRAINING PROGRAM

## 2025-01-13 PROCEDURE — 99204 OFFICE O/P NEW MOD 45 MIN: CPT | Mod: S$GLB,,, | Performed by: STUDENT IN AN ORGANIZED HEALTH CARE EDUCATION/TRAINING PROGRAM

## 2025-01-13 PROCEDURE — 1159F MED LIST DOCD IN RCRD: CPT | Mod: CPTII,S$GLB,, | Performed by: STUDENT IN AN ORGANIZED HEALTH CARE EDUCATION/TRAINING PROGRAM

## 2025-01-13 PROCEDURE — 99999 PR PBB SHADOW E&M-EST. PATIENT-LVL V: CPT | Mod: PBBFAC,,, | Performed by: STUDENT IN AN ORGANIZED HEALTH CARE EDUCATION/TRAINING PROGRAM

## 2025-01-13 RX ORDER — ALBUTEROL SULFATE 90 UG/1
2 INHALANT RESPIRATORY (INHALATION) EVERY 6 HOURS PRN
Qty: 18 G | Refills: 0 | Status: SHIPPED | OUTPATIENT
Start: 2025-01-13 | End: 2026-01-13

## 2025-01-13 NOTE — ASSESSMENT & PLAN NOTE
- notes VALIENTE for many years, worsening over past few months  - unclear etiology presently, have ordered TTE to rule out cardiac causes, prior TTE was unremarkable several years ago  - also ordered CT chest given his scarring noted on CXR to evaluate parenchyma and rule out ILD, PFTs to rule out obstructive cause although less likely given minimal smoking history  - 6MWT ordered to evaluate need for home O2  - albuterol prescribed in the meantime as rescue inhaler but advised to go to ER if SOB is severe

## 2025-01-13 NOTE — ASSESSMENT & PLAN NOTE
- CXR 5/2024 with linear opacities in lung bases  - in setting of his severe SOB, have ordered CT chest to evaluate for ILD  - further workup and treatments pending these results

## 2025-01-24 DIAGNOSIS — E11.9 TYPE 2 DIABETES MELLITUS WITHOUT COMPLICATION, WITHOUT LONG-TERM CURRENT USE OF INSULIN: ICD-10-CM

## 2025-01-24 NOTE — TELEPHONE ENCOUNTER
No care due was identified.  Health [facility] Embedded Care Due Messages. Reference number: [id number].   1/24/2025 10:24:05 AM CST

## 2025-01-29 ENCOUNTER — HOSPITAL ENCOUNTER (OUTPATIENT)
Dept: RADIOLOGY | Facility: HOSPITAL | Age: 65
Discharge: HOME | End: 2025-01-29
Attending: FAMILY MEDICINE
Payer: MEDICARE

## 2025-01-29 ENCOUNTER — CLINICAL SUPPORT (OUTPATIENT)
Dept: DIABETES | Facility: CLINIC | Age: 65
End: 2025-01-29
Payer: MEDICARE

## 2025-01-29 VITALS — BODY MASS INDEX: 34.68 KG/M2 | WEIGHT: 261.69 LBS | HEIGHT: 73 IN

## 2025-01-29 DIAGNOSIS — J06.9 UPPER RESPIRATORY TRACT INFECTION, UNSPECIFIED TYPE: ICD-10-CM

## 2025-01-29 DIAGNOSIS — E11.9 TYPE 2 DIABETES MELLITUS WITHOUT COMPLICATION, WITHOUT LONG-TERM CURRENT USE OF INSULIN: ICD-10-CM

## 2025-01-29 PROCEDURE — G0108 DIAB MANAGE TRN  PER INDIV: HCPCS | Mod: S$GLB,,, | Performed by: DIETITIAN, REGISTERED

## 2025-01-29 PROCEDURE — 71046 X-RAY EXAM CHEST 2 VIEWS: CPT | Mod: 26,,, | Performed by: RADIOLOGY

## 2025-01-29 PROCEDURE — 99999 PR PBB SHADOW E&M-EST. PATIENT-LVL II: CPT | Mod: PBBFAC,,, | Performed by: DIETITIAN, REGISTERED

## 2025-01-29 PROCEDURE — 71046 X-RAY EXAM CHEST 2 VIEWS: CPT | Mod: TC,FY,PO

## 2025-01-29 NOTE — PROGRESS NOTES
"Diabetes Care Specialist Progress Note  Author: Denisha Quijano RD  Date: 1/29/2025    Intake    Program Intake  Reason for Diabetes Program Visit:: Initial Diabetes Assessment  Current diabetes risk level:: moderate  In the last month, have you used the ER or been admitted to the hospital: No  Permission to speak with others about care:: no    Current Diabetes Treatment: Oral Medications, DM Injectables  Oral Medication Type/Dose: Metformin 1000mg BID;  DM Injectables Type/Dose: Trulicity 1.5mg    Continuous Glucose Monitoring  Patient has CGM: No    Lab Results   Component Value Date    HGBA1C 8.2 (H) 11/07/2024       Weight: 118.7 kg (261 lb 11 oz)   Height: 6' 1" (185.4 cm)   Body mass index is 34.53 kg/m².    Lifestyle Coping Support & Clinical    Lifestyle/Coping/Support  Psychosocial/Coping Skills Assessment Completed: : No  Deffered due to:: Time  Area of need?: Deferred    Problem Review  Active Comorbidities: Hypertension, Hyperlipidemia/Dyslipidemia, Obesity, Chronic Kidney Disease    Diabetes Self-Management Skills Assessment    Medication Skills Assessment  Patient is able to identify current diabetes medications, dosages, and appropriate timing of medications.: yes  Patient reports problems or concerns with current medication regimen.: no  Patient is  aware that some diabetes medications can cause low blood sugar?: Yes  Medication Skills Assessment Completed:: Yes  Assessment indicates:: Adequate understanding  Area of need?: No    Diabetes Disease Process/Treatment Options  Diabetes Type?: Type II  When were you diagnosed?: 6 years  If previous diabetes education, when/where:: Pt said none, but he did have education here at [facility] per chart  What are your goals for this education session?: learn how to eat  Is patient aware of what causes diabetes?: No  Does patient understand the pathophysiology of diabetes?: No  Diabetes Disease Process/Treatment Options: Skills Assessment Completed: Yes  Assessment " indicates:: Instruction Needed  Area of need?: Yes    Nutrition/Healthy Eating  Meal Plan 24 Hour Recall - Breakfast: coffee with sugar sub  Meal Plan 24 Hour Recall - Lunch: ramen noodles with seasoning, protein shake, skips sometimes  Meal Plan 24 Hour Recall - Dinner: bag of peas  Meal Plan 24 Hour Recall - Snack: pretzels and popcorn  Meal Plan 24 Hour Recall - Beverage: diet coke, water  Who shops/cooks?: pt does both  Patient can identify foods that impact blood sugar.: yes  Challenges to healthy eating:: lack of will power, portion control, snacking between meals and at night  Nutrition/Healthy Eating Skills Assessment Completed:: Yes  Assessment indicates:: Instruction Needed  Area of need?: Yes    Physical Activity/Exercise  Patient's daily activity level:: lightly active  Patient formally exercises outside of work.: yes  Frequency: four or more times a week  Patient can identify forms of physical activity.: yes  Physical Activity/Exercise Skills Assessment Completed: : Yes  Assessment indicates:: Instruction Needed  Area of need?: Yes    Home Blood Glucose Monitoring  Patient states that blood sugar is checked at home daily.: yes  Monitoring Method:: home glucometer  Fasting BG range history:: 150s  How often do you check your blood sugar?: 1x/day  What is your A1c Target?: 7% or less  Home Blood Glucose Monitoring Skills Assessment Completed: : Yes  Assessment indicates:: Instruction Needed  Area of need?: Yes    Acute Complications  Acute Complications Skills Assessment Completed: : No  Deffered due to:: Time  Area of need?: Deferred    Chronic Complications  Chronic Complications Skills Assessment Completed: : No  Deferred due to:: Time  Area of need?: Deferred      Assessment Summary and Plan    Based on today's diabetes care assessment, the following areas of need were identified:      Identified Areas of Need      Medication/Current Diabetes Treatment: No   Lifestyle Coping/Support: Deferred    Diabetes Disease Process/Treatment Options: Yes, taught pathos of DM2   Nutrition/Healthy Eating: Yes, see care plan below    Physical Activity/Exercise: Yes, reviewed effects of exercise on BG; taught duration, frequency and intensity recs for exercise   Home Blood Glucose Monitoring: Yes, reviewed A1c and BG goals   Acute Complications: Deferred    Chronic Complications: Deferred       Today's interventions were provided through individual discussion, instruction, and written materials were provided.      Patient verbalized understanding of instruction and written materials.  Pt was able to return back demonstration of instructions today. Patient understood key points, needs reinforcement and further instruction.     Diabetes Self-Management Care Plan:    Today's Diabetes Self-Management Care Plan was developed with Fabiano's input. Fabiano has agreed to work toward the following goal(s) to improve his/her overall diabetes control.      Care Plan: Diabetes Management   Updates made since 1/30/2024 12:00 AM        Problem: Healthy Eating         Goal: Eat 3 meals daily with 45-60g/2-3 servings of Carbohydrate per meal.    Start Date: 1/29/2025   Expected End Date: 12/31/2025   Priority: Medium   Barriers: No Barriers Identified   Note:    1/29/25: Pt skips breakfast and sometimes lunch. He stated his eating habits are poor and it is financially hard to buy healthy foods; he is on disability; receives [employer] benefits and is supposed to start receiving food from [facility] [employer] [facility] Aging. He is not sure if it is going to be a box of food or daily meals. We discussed eating more vegetables, eating less simple carbs like ramen noodles and pretzels; making sure he gets protein in at every meal. He was open to drinking a protein shake or eating peanut butter at breakfast, and eating something small for lunch. Pt is on 1.5mg Trulicity weekly. Pt does not drink sugary beverages.       Task: Reviewed the sources and role  of Carbohydrate, Protein, and Fat and how each nutrient impacts blood sugar. Completed 1/29/2025        Task: Provided visual examples using dry measuring cups, food models, and other familiar objects such as computer mouse, deck or cards, tennis ball etc. to help with visualization of portions. Completed 1/29/2025        Task: Explained how to count carbohydrates using the food label and the use of dry measuring cups for accurate carb counting. Completed 1/29/2025        Task: Discussed strategies for choosing healthier menu options when dining out. Completed 1/29/2025     Task: Review the importance of balancing carbohydrates with each meal using portion control techniques to count servings of carbohydrate and label reading to identify serving size and amount of total carbs per serving. Completed 1/29/2025         Follow Up Plan     Follow up in about 6 weeks (around 3/12/2025). Review BG logs, veggie intake, sample plate method, food from [employer] [facility], stress mgmt.    Today's care plan and follow up schedule was discussed with patient.  Fabiano verbalized understanding of the care plan, goals, and agrees to follow up plan.        The patient was encouraged to communicate with his/her health care provider/physician and care team regarding his/her condition(s) and treatment.  I provided the patient with my contact information today and encouraged to contact me via phone or [facility] Portal as needed.     Length of Visit   Total Time: 60 Minutes

## 2025-02-05 ENCOUNTER — HOSPITAL ENCOUNTER (OUTPATIENT)
Dept: CARDIOLOGY | Facility: HOSPITAL | Age: 65
Discharge: HOME | End: 2025-02-05
Attending: STUDENT IN AN ORGANIZED HEALTH CARE EDUCATION/TRAINING PROGRAM
Payer: MEDICARE

## 2025-02-05 ENCOUNTER — HOSPITAL ENCOUNTER (OUTPATIENT)
Dept: RADIOLOGY | Facility: HOSPITAL | Age: 65
Discharge: HOME | End: 2025-02-05
Attending: STUDENT IN AN ORGANIZED HEALTH CARE EDUCATION/TRAINING PROGRAM
Payer: MEDICARE

## 2025-02-05 VITALS — WEIGHT: 261 LBS | HEIGHT: 73 IN | BODY MASS INDEX: 34.59 KG/M2

## 2025-02-05 DIAGNOSIS — J84.9 INTERSTITIAL PULMONARY DISEASE, UNSPECIFIED: ICD-10-CM

## 2025-02-05 DIAGNOSIS — R06.09 DYSPNEA ON EXERTION: ICD-10-CM

## 2025-02-05 LAB
APICAL FOUR CHAMBER EJECTION FRACTION: 61 %
APICAL TWO CHAMBER EJECTION FRACTION: 62 %
ASCENDING AORTA: 3.47 CM
AV INDEX (PROSTH): 0.94
AV MEAN GRADIENT: 4 MMHG
AV PEAK GRADIENT: 8 MMHG
AV VALVE AREA BY VELOCITY RATIO: 3.3 CM²
AV VALVE AREA: 3.9 CM²
AV VELOCITY RATIO: 0.79
BSA FOR ECHO PROCEDURE: 2.47 M2
CV ECHO LV RWT: 0.51 CM
DOP CALC AO PEAK VEL: 1.4 M/S
DOP CALC AO VTI: 29.4 CM
DOP CALC LVOT AREA: 4.2 CM2
DOP CALC LVOT DIAMETER: 2.3 CM
DOP CALC LVOT PEAK VEL: 1.1 M/S
DOP CALC LVOT STROKE VOLUME: 114.6 CM3
DOP CALC MV VTI: 37.4 CM
DOP CALCLVOT PEAK VEL VTI: 27.6 CM
E WAVE DECELERATION TIME: 261 MSEC
E/A RATIO: 1.08
E/E' RATIO: 18 M/S
ECHO LV POSTERIOR WALL: 1.2 CM (ref 0.6–1.1)
FRACTIONAL SHORTENING: 27.7 % (ref 28–44)
INTERVENTRICULAR SEPTUM: 1.4 CM (ref 0.6–1.1)
IVC DIAMETER: 1.87 CM
LEFT ATRIUM AREA SYSTOLIC (APICAL 2 CHAMBER): 26.38 CM2
LEFT ATRIUM AREA SYSTOLIC (APICAL 4 CHAMBER): 22.66 CM2
LEFT ATRIUM VOLUME INDEX MOD: 35 ML/M2
LEFT ATRIUM VOLUME MOD: 84 ML
LEFT INTERNAL DIMENSION IN SYSTOLE: 3.4 CM (ref 2.1–4)
LEFT VENTRICLE DIASTOLIC VOLUME INDEX: 41.94 ML/M2
LEFT VENTRICLE DIASTOLIC VOLUME: 101.07 ML
LEFT VENTRICLE END DIASTOLIC VOLUME APICAL 2 CHAMBER: 103.03 ML
LEFT VENTRICLE END DIASTOLIC VOLUME APICAL 4 CHAMBER: 84.74 ML
LEFT VENTRICLE END SYSTOLIC VOLUME APICAL 2 CHAMBER: 95.27 ML
LEFT VENTRICLE END SYSTOLIC VOLUME APICAL 4 CHAMBER: 74.16 ML
LEFT VENTRICLE MASS INDEX: 98.7 G/M2
LEFT VENTRICLE SYSTOLIC VOLUME INDEX: 19 ML/M2
LEFT VENTRICLE SYSTOLIC VOLUME: 45.78 ML
LEFT VENTRICULAR INTERNAL DIMENSION IN DIASTOLE: 4.7 CM (ref 3.5–6)
LEFT VENTRICULAR MASS: 237.9 G
LV LATERAL E/E' RATIO: 17 M/S
LV SEPTAL E/E' RATIO: 19.4 M/S
LVED V (TEICH): 101.07 ML
LVES V (TEICH): 45.78 ML
LVOT MG: 3.36 MMHG
LVOT MV: 0.88 CM/S
MV MEAN GRADIENT: 5 MMHG
MV PEAK A VEL: 1.26 M/S
MV PEAK E VEL: 1.36 M/S
MV PEAK GRADIENT: 8 MMHG
MV STENOSIS PRESSURE HALF TIME: 75.71 MS
MV VALVE AREA BY CONTINUITY EQUATION: 3.06 CM2
MV VALVE AREA P 1/2 METHOD: 2.91 CM2
OHS CV RV/LV RATIO: 0.81 CM
OHS LV EJECTION FRACTION SIMPSONS BIPLANE MOD: 61 %
PISA MRMAX VEL: 4.83 M/S
PISA TR MAX VEL: 2.8 M/S
PULM VEIN S/D RATIO: 2.12
PV MV: 0.61 M/S
PV PEAK D VEL: 0.34 M/S
PV PEAK GRADIENT: 2 MMHG
PV PEAK S VEL: 0.72 M/S
PV PEAK VELOCITY: 0.77 M/S
RA PRESSURE ESTIMATED: 3 MMHG
RA VOL SYS: 67.23 ML
RIGHT ATRIAL AREA: 20.8 CM2
RIGHT ATRIUM VOLUME AREA LENGTH APICAL 4 CHAMBER: 60.66 ML
RIGHT VENTRICLE DIASTOLIC BASEL DIMENSION: 3.8 CM
RV TB RVSP: 6 MMHG
RV TISSUE DOPPLER FREE WALL SYSTOLIC VELOCITY 1 (APICAL 4 CHAMBER VIEW): 15.19 CM/S
SINUS: 3.58 CM
STJ: 3.04 CM
TDI LATERAL: 0.08 M/S
TDI SEPTAL: 0.07 M/S
TDI: 0.08 M/S
TR MAX PG: 31 MMHG
TRICUSPID ANNULAR PLANE SYSTOLIC EXCURSION: 2.85 CM
TV REST PULMONARY ARTERY PRESSURE: 34 MMHG
Z-SCORE OF LEFT VENTRICULAR DIMENSION IN END DIASTOLE: -8.67
Z-SCORE OF LEFT VENTRICULAR DIMENSION IN END SYSTOLE: -5.35

## 2025-02-05 PROCEDURE — 93306 TTE W/DOPPLER COMPLETE: CPT

## 2025-02-05 PROCEDURE — 71250 CT THORAX DX C-: CPT | Mod: TC

## 2025-02-05 PROCEDURE — 71250 CT THORAX DX C-: CPT | Mod: 26,,, | Performed by: RADIOLOGY

## 2025-02-05 PROCEDURE — 93306 TTE W/DOPPLER COMPLETE: CPT | Mod: 26,,, | Performed by: INTERNAL MEDICINE

## 2025-02-11 ENCOUNTER — TELEPHONE (OUTPATIENT)
Facility: CLINIC | Age: 65
End: 2025-02-11
Payer: MEDICARE

## 2025-02-11 NOTE — TELEPHONE ENCOUNTER
Reached out to speak with patient about his recent CT Chest results. Have been unable to connect with him; left voicemail x2. Will continue to try to reach him this week.     Ladonna Garcia MD  [facility]

## 2025-02-17 DIAGNOSIS — J84.9 INTERSTITIAL LUNG DISEASE: Primary | ICD-10-CM

## 2025-03-05 NOTE — ANESTHESIA POSTPROCEDURE EVALUATION
Anesthesia Post Evaluation    Patient: Fabiano Malik Jr.    Procedure(s) Performed: Procedure(s) (LRB):  COLONOSCOPY (N/A)    Final Anesthesia Type: general      Patient location during evaluation: GI PACU  Patient participation: Yes- Able to Participate  Level of consciousness: awake and alert and oriented  Post-procedure vital signs: reviewed and stable  Pain management: adequate  Airway patency: patent    PONV status at discharge: No PONV  Anesthetic complications: no      Cardiovascular status: blood pressure returned to baseline and hemodynamically stable  Respiratory status: unassisted, spontaneous ventilation and room air  Hydration status: euvolemic  Follow-up not needed.          Vitals Value Taken Time   BP 98/62 11/17/22 1150   Temp 98.2 11/17/22 1150   Pulse 85 11/17/22 1150   Resp 16 11/17/22 1150   SpO2 99 11/17/22 1150         No case tracking events are documented in the log.      Pain/Pauline Score: No data recorded       I left the patient a voicemail to call back

## 2025-03-07 ENCOUNTER — TELEPHONE (OUTPATIENT)
Dept: PAIN MEDICINE | Facility: CLINIC | Age: 65
End: 2025-03-07
Payer: MEDICARE

## 2025-03-07 NOTE — TELEPHONE ENCOUNTER
Called pt. In reference to message no answer, lvm   ----- Message from BRYCE Swanson sent at 3/7/2025  2:11 PM CST -----  What type of  therapies.  ----- Message -----  From: Ktaia Castro MA  Sent: 3/5/2025   5:00 PM CST  To: BRYCE Ward    Please advise  ----- Message -----  From: Coreen Kimball  Sent: 3/5/2025   4:01 PM CST  To: Daniela Cummings Staff    Type:  Pt Advice Who Called: Pt Does the patient know what this is regarding?: has questions about different therapy options Would the patient rather a call back or a response via [employer]? Call Best Call Back Number:[phone] Additional Information:

## 2025-03-12 ENCOUNTER — TELEPHONE (OUTPATIENT)
Dept: PAIN MEDICINE | Facility: CLINIC | Age: 65
End: 2025-03-12
Payer: MEDICARE

## 2025-03-12 NOTE — TELEPHONE ENCOUNTER
Called to schedule [facility] appt. Pt. said he will call back once he figures out his appointments.

## 2025-03-14 ENCOUNTER — TELEPHONE (OUTPATIENT)
Dept: FAMILY MEDICINE | Facility: CLINIC | Age: 65
End: 2025-03-14
Payer: MEDICARE

## 2025-03-14 NOTE — TELEPHONE ENCOUNTER
----- Message from Poly sent at 3/14/2025 11:06 AM CDT -----  Regarding: [facility] ([facility])  Who called: Mayelin is the request in detail: Would like to know if diabetes scripts were rec'd.Can the clinic reply by [facility]? NoWould the patient rather a call back or a response via [facility]? [facility]Be call back number: [phone] Information:

## 2025-03-21 ENCOUNTER — HOSPITAL ENCOUNTER (INPATIENT)
Facility: HOSPITAL | Age: 65
LOS: 6 days | Discharge: HOME | DRG: 177 | End: 2025-03-28
Attending: STUDENT IN AN ORGANIZED HEALTH CARE EDUCATION/TRAINING PROGRAM | Admitting: INTERNAL MEDICINE
Payer: MEDICARE

## 2025-03-21 ENCOUNTER — HOSPITAL ENCOUNTER (OUTPATIENT)
Dept: PULMONOLOGY | Facility: HOSPITAL | Age: 65
Discharge: HOME | End: 2025-03-21
Attending: STUDENT IN AN ORGANIZED HEALTH CARE EDUCATION/TRAINING PROGRAM
Payer: MEDICARE

## 2025-03-21 DIAGNOSIS — J18.9 COMMUNITY ACQUIRED PNEUMONIA: ICD-10-CM

## 2025-03-21 DIAGNOSIS — H55.09 HORIZONTAL NYSTAGMUS: ICD-10-CM

## 2025-03-21 DIAGNOSIS — J84.9 INTERSTITIAL PULMONARY DISEASE, UNSPECIFIED: ICD-10-CM

## 2025-03-21 DIAGNOSIS — R91.8 GROUND GLASS OPACITY PRESENT ON IMAGING OF LUNG: ICD-10-CM

## 2025-03-21 DIAGNOSIS — E11.9 TYPE 2 DIABETES MELLITUS WITHOUT COMPLICATION, WITHOUT LONG-TERM CURRENT USE OF INSULIN: ICD-10-CM

## 2025-03-21 DIAGNOSIS — H93.19 TINNITUS, UNSPECIFIED LATERALITY: ICD-10-CM

## 2025-03-21 DIAGNOSIS — E03.9 HYPOTHYROIDISM, UNSPECIFIED TYPE: ICD-10-CM

## 2025-03-21 DIAGNOSIS — E87.1 HYPONATREMIA: ICD-10-CM

## 2025-03-21 DIAGNOSIS — R42 VERTIGO: ICD-10-CM

## 2025-03-21 DIAGNOSIS — U07.1 COVID-19 VIRUS INFECTION: Primary | ICD-10-CM

## 2025-03-21 DIAGNOSIS — H81.01 MENIERE'S DISEASE OF RIGHT EAR: ICD-10-CM

## 2025-03-21 DIAGNOSIS — D64.9 ANEMIA, UNSPECIFIED TYPE: ICD-10-CM

## 2025-03-21 DIAGNOSIS — I15.2 HYPERTENSION ASSOCIATED WITH DIABETES: ICD-10-CM

## 2025-03-21 DIAGNOSIS — E11.51 TYPE 2 DIABETES MELLITUS WITH DIABETIC PERIPHERAL ANGIOPATHY WITHOUT GANGRENE, WITHOUT LONG-TERM CURRENT USE OF INSULIN: ICD-10-CM

## 2025-03-21 DIAGNOSIS — H81.09 MENIERE'S DISEASE, UNSPECIFIED LATERALITY: ICD-10-CM

## 2025-03-21 DIAGNOSIS — U07.1 COVID-19: ICD-10-CM

## 2025-03-21 DIAGNOSIS — D50.9 MICROCYTIC ANEMIA: ICD-10-CM

## 2025-03-21 DIAGNOSIS — J18.9 COMMUNITY ACQUIRED PNEUMONIA, UNSPECIFIED LATERALITY: ICD-10-CM

## 2025-03-21 DIAGNOSIS — E11.59 HYPERTENSION ASSOCIATED WITH DIABETES: ICD-10-CM

## 2025-03-21 DIAGNOSIS — R42 DIZZINESS: ICD-10-CM

## 2025-03-21 PROBLEM — R07.9 CHEST PAIN: Status: RESOLVED | Noted: 2019-01-14 | Resolved: 2025-03-21

## 2025-03-21 PROBLEM — I95.9 HYPOTENSION: Status: RESOLVED | Noted: 2017-01-20 | Resolved: 2025-03-21

## 2025-03-21 PROBLEM — S09.90XA HEAD TRAUMA: Status: RESOLVED | Noted: 2019-09-04 | Resolved: 2025-03-21

## 2025-03-21 PROBLEM — R51.9 NONINTRACTABLE HEADACHE: Status: RESOLVED | Noted: 2019-09-04 | Resolved: 2025-03-21

## 2025-03-21 PROBLEM — Z23 FLU VACCINE NEED: Status: RESOLVED | Noted: 2019-09-04 | Resolved: 2025-03-21

## 2025-03-21 PROBLEM — N18.30 CKD (CHRONIC KIDNEY DISEASE) STAGE 3, GFR 30-59 ML/MIN: Status: RESOLVED | Noted: 2019-01-14 | Resolved: 2025-03-21

## 2025-03-21 LAB
ALBUMIN SERPL BCP-MCNC: 4.3 G/DL (ref 3.5–5.2)
ALP SERPL-CCNC: 63 U/L (ref 40–150)
ALT SERPL W/O P-5'-P-CCNC: 18 U/L (ref 10–44)
ANION GAP SERPL CALC-SCNC: 10 MMOL/L (ref 8–16)
AST SERPL-CCNC: 24 U/L (ref 10–40)
BASOPHILS # BLD AUTO: 0.06 K/UL (ref 0–0.2)
BASOPHILS NFR BLD: 0.6 % (ref 0–1.9)
BILIRUB SERPL-MCNC: 0.5 MG/DL (ref 0.1–1)
BNP SERPL-MCNC: 52 PG/ML (ref 0–99)
BUN SERPL-MCNC: 20 MG/DL (ref 8–23)
CALCIUM SERPL-MCNC: 9.1 MG/DL (ref 8.7–10.5)
CHLORIDE SERPL-SCNC: 99 MMOL/L (ref 95–110)
CO2 SERPL-SCNC: 19 MMOL/L (ref 23–29)
CREAT SERPL-MCNC: 1.2 MG/DL (ref 0.5–1.4)
DIFFERENTIAL METHOD BLD: ABNORMAL
EOSINOPHIL # BLD AUTO: 0.3 K/UL (ref 0–0.5)
EOSINOPHIL NFR BLD: 3.6 % (ref 0–8)
ERYTHROCYTE [DISTWIDTH] IN BLOOD BY AUTOMATED COUNT: 14.5 % (ref 11.5–14.5)
EST. GFR  (NO RACE VARIABLE): >60 ML/MIN/1.73 M^2
FIO2: 21 %
GLUCOSE SERPL-MCNC: 106 MG/DL (ref 70–110)
HCT VFR BLD AUTO: 35.2 % (ref 40–54)
HGB BLD-MCNC: 12 G/DL (ref 14–18)
IMM GRANULOCYTES # BLD AUTO: 0.05 K/UL (ref 0–0.04)
IMM GRANULOCYTES NFR BLD AUTO: 0.5 % (ref 0–0.5)
LYMPHOCYTES # BLD AUTO: 1.6 K/UL (ref 1–4.8)
LYMPHOCYTES NFR BLD: 17 % (ref 18–48)
MAGNESIUM SERPL-MCNC: 1.7 MG/DL (ref 1.6–2.6)
MCH RBC QN AUTO: 25.8 PG (ref 27–31)
MCHC RBC AUTO-ENTMCNC: 34.1 G/DL (ref 32–36)
MCV RBC AUTO: 76 FL (ref 82–98)
MONOCYTES # BLD AUTO: 0.6 K/UL (ref 0.3–1)
MONOCYTES NFR BLD: 6.3 % (ref 4–15)
NEUTROPHILS # BLD AUTO: 6.8 K/UL (ref 1.8–7.7)
NEUTROPHILS NFR BLD: 72 % (ref 38–73)
NRBC BLD-RTO: 0 /100 WBC
PCO2 BLDA: 40.6 MMHG (ref 35–45)
PH SMN: 7.35 [PH] (ref 7.35–7.45)
PHOSPHATE SERPL-MCNC: 3 MG/DL (ref 2.7–4.5)
PLATELET # BLD AUTO: 302 K/UL (ref 150–450)
PMV BLD AUTO: 8 FL (ref 9.2–12.9)
PO2 BLDA: 48.9 MMHG (ref 40–60)
POC BASE DEFICIT: -3 MMOL/L (ref -2–2)
POC HCO3: 22.4 MMOL/L (ref 24–28)
POC PERFORMED BY: ABNORMAL
POC SATURATED O2: 75.6 % (ref 95–100)
POTASSIUM SERPL-SCNC: 4.5 MMOL/L (ref 3.5–5.1)
PROT SERPL-MCNC: 7.5 G/DL (ref 6–8.4)
RBC # BLD AUTO: 4.65 M/UL (ref 4.6–6.2)
SODIUM SERPL-SCNC: 128 MMOL/L (ref 136–145)
SPECIMEN SOURCE: ABNORMAL
TROPONIN I SERPL DL<=0.01 NG/ML-MCNC: 0.01 NG/ML (ref 0–0.03)
WBC # BLD AUTO: 9.42 K/UL (ref 3.9–12.7)

## 2025-03-21 PROCEDURE — 83880 ASSAY OF NATRIURETIC PEPTIDE: CPT | Performed by: NURSE PRACTITIONER

## 2025-03-21 PROCEDURE — 84484 ASSAY OF TROPONIN QUANT: CPT | Performed by: NURSE PRACTITIONER

## 2025-03-21 PROCEDURE — 96361 HYDRATE IV INFUSION ADD-ON: CPT

## 2025-03-21 PROCEDURE — 85025 COMPLETE CBC W/AUTO DIFF WBC: CPT | Performed by: STUDENT IN AN ORGANIZED HEALTH CARE EDUCATION/TRAINING PROGRAM

## 2025-03-21 PROCEDURE — 80053 COMPREHEN METABOLIC PANEL: CPT | Performed by: NURSE PRACTITIONER

## 2025-03-21 PROCEDURE — 83735 ASSAY OF MAGNESIUM: CPT | Performed by: NURSE PRACTITIONER

## 2025-03-21 PROCEDURE — 99285 EMERGENCY DEPT VISIT HI MDM: CPT | Mod: 25

## 2025-03-21 PROCEDURE — 84443 ASSAY THYROID STIM HORMONE: CPT | Performed by: STUDENT IN AN ORGANIZED HEALTH CARE EDUCATION/TRAINING PROGRAM

## 2025-03-21 PROCEDURE — 86593 SYPHILIS TEST NON-TREP QUANT: CPT | Performed by: STUDENT IN AN ORGANIZED HEALTH CARE EDUCATION/TRAINING PROGRAM

## 2025-03-21 PROCEDURE — 84100 ASSAY OF PHOSPHORUS: CPT | Performed by: NURSE PRACTITIONER

## 2025-03-21 PROCEDURE — 25000003 PHARM REV CODE 250: Performed by: NURSE PRACTITIONER

## 2025-03-21 PROCEDURE — 82607 VITAMIN B-12: CPT | Performed by: STUDENT IN AN ORGANIZED HEALTH CARE EDUCATION/TRAINING PROGRAM

## 2025-03-21 PROCEDURE — G0378 HOSPITAL OBSERVATION PER HR: HCPCS

## 2025-03-21 PROCEDURE — 82803 BLOOD GASES ANY COMBINATION: CPT

## 2025-03-21 PROCEDURE — 25000003 PHARM REV CODE 250: Performed by: STUDENT IN AN ORGANIZED HEALTH CARE EDUCATION/TRAINING PROGRAM

## 2025-03-21 PROCEDURE — 99900035 HC TECH TIME PER 15 MIN (STAT)

## 2025-03-21 RX ORDER — SODIUM CHLORIDE 9 MG/ML
500 INJECTION, SOLUTION INTRAVENOUS
Status: COMPLETED | OUTPATIENT
Start: 2025-03-21 | End: 2025-03-21

## 2025-03-21 RX ORDER — MECLIZINE HYDROCHLORIDE 25 MG/1
25 TABLET ORAL
Status: COMPLETED | OUTPATIENT
Start: 2025-03-21 | End: 2025-03-21

## 2025-03-21 RX ORDER — DIAZEPAM 5 MG/1
5 TABLET ORAL
Status: COMPLETED | OUTPATIENT
Start: 2025-03-21 | End: 2025-03-21

## 2025-03-21 RX ADMIN — SODIUM CHLORIDE 500 ML: 0.9 INJECTION, SOLUTION INTRAVENOUS at 05:03

## 2025-03-21 RX ADMIN — DIAZEPAM 5 MG: 5 TABLET ORAL at 05:03

## 2025-03-21 RX ADMIN — MECLIZINE HYDROCHLORIDE 25 MG: 25 TABLET ORAL at 02:03

## 2025-03-21 NOTE — ED PROVIDER NOTES
Encounter Date: 3/21/2025       History     Chief Complaint   Patient presents with    Dizziness     Pt states that he was here do to breathing test and blood work got dizzy walking to get blood work, pt states chronic with worsening dizziness today     HPI    Mr. Malik is a 64 year old man presenting for evaluation of continuous vertigo, onset 4 days ago. Vertigo is worse with head movement. Present with eyes open and eyes closed. Has tinnitus. No vision changes or diplopia. No nausea or vomiting. No headache. No focal weakness. No fever. No respiratory symptoms. No head trauma. No EtOH use. HIV negative. Chart review shows that the patient has experienced vertigo in the past. MRI brain has been negative in the past. Never experienced continuous unrelenting vertigo like he has been experiencing this week. 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 He has never been evaluated by ENT for vertigo.     Review of patient's allergies indicates:   Allergen Reactions    Pcn [penicillins] Other (See Comments)     Childhood rxn, pt does not recall type of rxn     Past Medical History:   Diagnosis Date    Chronic back pain greater than 3 months duration     Depression     Diabetes mellitus     Diabetes mellitus, type 2     Hypertension     Schizophrenia      Past Surgical History:   Procedure Laterality Date    APPENDECTOMY      CARPAL TUNNEL RELEASE Right 12/13/2022    Procedure: RELEASE, CARPAL TUNNEL;  Surgeon: Everton Walter Jr., MD;  Location: [facility] OR;  Service: Orthopedics;  Laterality: Right;    COLONOSCOPY N/A 5/31/2018    Procedure: COLONOSCOPY;  Surgeon: Guillaume Hatch MD;  Location: [facility] ENDO;  Service: Endoscopy;  Laterality: N/A;    COLONOSCOPY N/A 11/17/2022    Procedure: COLONOSCOPY;  Surgeon: Guillaume Hatch MD;  Location: [facility] ENDO;  Service: Endoscopy;  Laterality: N/A;    DECOMPRESSION, NERVE, ULNAR Right 12/13/2022    Procedure: DECOMPRESSION,  NERVE, ULNAR;  Surgeon: Everton Walter Jr., MD;  Location: [facility] OR;  Service: Orthopedics;  Laterality: Right;    EPIDURAL STEROID INJECTION INTO LUMBAR SPINE N/A 2/21/2024    Procedure: L5-S1 JOHN;  Surgeon: Zoila Suarez DO;  Location: [facility] PAIN MANAGEMENT;  Service: Pain Management;  Laterality: N/A;  oral 30 mins    FUSION, SPINE, CERVICAL N/A 8/11/2023    Procedure: FUSION, SPINE, CERVICAL;  Surgeon: Guillaume Washington MD;  Location: [facility] OR;  Service: Neurosurgery;  Laterality: N/A;  C3-4, C5-6, C6-7 ACDF C3-C7 anterior instrumentation Depuy  -ANTERIOR DECOMPREESSION 2 LEVELS   -ANTERIOR INSTRUMENTATION INTERBODY CAGE INTERSPACE 3   -LOP 3.5 HR   -LOS 3 DAYS   -GENERAL   -TYPE AND SCREEN   - C ARM   -EMG, SEP, MEP hari notified cc  -ASPEN   -REG    NECK SURGERY      RADIOFREQUENCY ABLATION OF LUMBAR MEDIAL BRANCH NERVE AT SINGLE LEVEL Left 5/29/2018    Procedure: RADIOFREQUENCY THERMOCOAGULATION (RFTC)-NERVE-MEDIAN BRANCH-LUMBAR- Left L2-3-4-5;  Surgeon: Donavon Dennis MD;  Location: [facility] PAIN [facility];  Service: Pain Management;  Laterality: Left;    RADIOFREQUENCY ABLATION OF LUMBAR MEDIAL BRANCH NERVE AT SINGLE LEVEL Right 1/8/2019    Procedure: Radiofrequency Ablation, Nerve, Spinal, Lumbar, Medial Branch, L2,3,4,5;  Surgeon: Alberto Hernandez Jr., MD;  Location: [facility] PAIN T;  Service: Pain Management;  Laterality: Right;  Pt is diabetic    RADIOFREQUENCY ABLATION OF LUMBAR MEDIAL BRANCH NERVE AT SINGLE LEVEL Left 3/12/2019    Procedure: Radiofrequency Ablation, Nerve, Spinal, Lumbar, Medial Branch, Left, L2,3,4,5;  Surgeon: Alberto Hernandez Jr., MD;  Location: [facility] PAIN T;  Service: Pain Management;  Laterality: Left;  Pt will be consented the day of procedure.    TRANSFORAMINAL EPIDURAL INJECTION OF STEROID Right 9/12/2019    Procedure: Injection,steroid,epidural,transforaminal,right,L4-5 and L5-S1;  Surgeon: Alberto Hernandez Jr., MD;  Location: [facility] PAIN [facility];  Service: Pain Management;   Laterality: Right;  PT IS DIABETIC     Family History   Problem Relation Name Age of Onset    Alzheimer's disease Mother      Diabetes Mother      Heart defect Father      Cancer Father      Stroke Father      Heart attack Father      Heart defect Sister      Alzheimer's disease Sister       Social History[1]  Review of Systems   Constitutional:  Negative for diaphoresis, fatigue and fever.   HENT:  Negative for ear pain, facial swelling and sore throat.    Eyes:  Negative for visual disturbance.   Respiratory:  Negative for shortness of breath.    Cardiovascular:  Negative for chest pain.   Gastrointestinal:  Negative for nausea.   Musculoskeletal:  Negative for back pain.   Skin:  Negative for rash.   Neurological:  Positive for dizziness. Negative for tremors, seizures, syncope, facial asymmetry, speech difficulty, weakness, light-headedness, numbness and headaches.     Physical Exam     Initial Vitals [03/21/25 1302]   BP Pulse Resp Temp SpO2   132/73 73 18 97.7 °F (36.5 °C) 97 %      MAP       --         Physical Exam    Nursing note and vitals reviewed.  Constitutional: He appears well-developed. He is not diaphoretic. No distress.   HENT:   Head: Normocephalic and atraumatic.   Eyes: Conjunctivae and EOM are normal. Pupils are equal, round, and reactive to light.   Neck: Neck supple.   Normal range of motion.  Cardiovascular:  Normal rate.           Pulmonary/Chest: Breath sounds normal. No respiratory distress.   Abdominal: Abdomen is soft. There is no abdominal tenderness.   Musculoskeletal:         General: No tenderness or edema. Normal range of motion.      Cervical back: Normal range of motion and neck supple.     Neurological: He is alert and oriented to person, place, and time. GCS score is 15. GCS eye subscore is 4. GCS verbal subscore is 5. GCS motor subscore is 6.   No facial asymmetry  No dysarthria  No aphasia  **Bidirectional horizontal (but worse right) nystagmus  **Vertical nystagmus   No  rotary nystagmus  No dysdiadochokinesis  No dysmetria finger to nose  Normal strength upper and lower extremities  No pronator drift  No truncal ataxia  Cannot assess gait 2/2 patient unrelenting vertigo       Skin: Skin is warm and dry.         ED Course   Procedures  Labs Reviewed   CBC W/ AUTO DIFFERENTIAL - Abnormal       Result Value    WBC 9.42      RBC 4.65      Hemoglobin 12.0 (*)     Hematocrit 35.2 (*)     MCV 76 (*)     MCH 25.8 (*)     MCHC 34.1      RDW 14.5      Platelets 302      MPV 8.0 (*)     Immature Granulocytes 0.5      Gran # (ANC) 6.8      Immature Grans (Abs) 0.05 (*)     Lymph # 1.6      Mono # 0.6      Eos # 0.3      Baso # 0.06      nRBC 0      Gran % 72.0      Lymph % 17.0 (*)     Mono % 6.3      Eosinophil % 3.6      Basophil % 0.6      Differential Method Automated     COMPREHENSIVE METABOLIC PANEL - Abnormal    Sodium 128 (*)     Potassium 4.5      Chloride 99      CO2 19 (*)     Glucose 106      BUN 20      Creatinine 1.2      Calcium 9.1      Total Protein 7.5      Albumin 4.3      Total Bilirubin 0.5      Alkaline Phosphatase 63      AST 24      ALT 18      eGFR >60      Anion Gap 10     TROPONIN I    Troponin I 0.013     B-TYPE NATRIURETIC PEPTIDE    BNP 52     CBC W/ AUTO DIFFERENTIAL   COMPREHENSIVE METABOLIC PANEL   MAGNESIUM   PHOSPHORUS   MAGNESIUM    Magnesium 1.7     PHOSPHORUS    Phosphorus 3.0     TREPONEMA PALLIDIUM ANTIBODIES IGG, IGM   VITAMIN B12   TSH          Imaging Results              X-Ray Chest 1 View (Final result)  Result time 03/21/25 17:58:43      Final result by Tommie Villa DO (03/21/25 17:58:43)                   Impression:      No acute abnormality.      Electronically signed by: Tommie Villa  Date:    03/21/2025  Time:    17:58               Narrative:    EXAMINATION:  XR CHEST 1 VIEW    CLINICAL HISTORY:  Shortness of breath    TECHNIQUE:  Single frontal view of the chest was performed.    COMPARISON:  01/29/2025.    FINDINGS:  The lungs are  well expanded and clear. No focal opacities are seen. The pleural spaces are clear. The cardiac silhouette is unremarkable. The visualized osseous structures are unremarkable.                                       Medications   meclizine tablet 25 mg (25 mg Oral Given 3/21/25 1457)   0.9% NaCl infusion (0 mLs Intravenous Stopped 3/21/25 1851)   diazePAM tablet 5 mg (5 mg Oral Given 3/21/25 1741)     Medical Decision Making  MRI brain pending for evaluation of vertigo onset 4 days ago, associated with nystagmus and tinnitus.  Vertigo not responsive to meclizine, fluid bolus, or valium. Patient can barely walk 2/2 dizziness. Will admit to [facility].     Problems Addressed:  Horizontal nystagmus: undiagnosed new problem with uncertain prognosis  Tinnitus, unspecified laterality: undiagnosed new problem with uncertain prognosis  Vertigo: chronic illness or injury with severe exacerbation, progression, or side effects of treatment    Amount and/or Complexity of Data Reviewed  External Data Reviewed: radiology and notes.     Details: Prior ENT notes  Prior MRI March 2024  Labs: ordered. Decision-making details documented in ED Course.  Radiology: ordered.  ECG/medicine tests: ordered and independent interpretation performed.    Risk  OTC drugs.  Prescription drug management.  Parenteral controlled substances.  Decision regarding hospitalization.               ED Course as of 03/21/25 2257   Fri Mar 21, 2025   1539 Troponin I: 0.013 [EL]   1539 BNP: 52 [EL]   2212 Magnesium : 1.7 [EL]   2212 Phosphorus Level: 3.0 [EL]      ED Course User Index  [EL] Cheryle Tong MD                           Clinical Impression:  Final diagnoses:  [R42] Vertigo (Primary)  [H55.09] Horizontal nystagmus  [H93.19] Tinnitus, unspecified laterality          ED Disposition Condition    Observation Stable                  Cheryle Tong MD  03/21/25 2254         [1]   Social History  Tobacco Use    Smoking status: Never     "Smokeless tobacco: Never   Substance Use Topics    Alcohol use: Yes     Comment: "couple of beers a day"    Drug use: No        Cheryle Tong MD  03/21/25 1617    "

## 2025-03-21 NOTE — ED NOTES
Pt. Reports dizziness while walking today. He was at clinic appointment for blood work . He was sent here for further evaluation. He states he has hx. Of dizziness that he takes meclizine for and usually occurs with orthostatic changes. Currently at rest he describes as the room is spinning. Denies nika paul is under care of Pulmonary for lung disease ( poor historian). Denies cp.

## 2025-03-21 NOTE — Clinical Note
Diagnosis: Vertigo [[id number]]   Future Attending Provider: LAKHWINDER DIEHL [[id number]]   Is the patient being sent to ED Observation?: No

## 2025-03-21 NOTE — ED TRIAGE NOTES
Pt reports he has a hx of dizziness that is currently being evaluated. Pt states he was walking to get blood work and he almost fell down because he felt the room spinning. The dizziness is intermittent and has been ongoing for a few years

## 2025-03-21 NOTE — PROCEDURES
Patient arrived to appointment not feeling well and had a severe cough that was productive. We discussed his options after attempting test and not being able to permove FVL. We postponed and rescheduled for 2 weeks from now

## 2025-03-22 PROBLEM — R42 VERTIGO: Status: ACTIVE | Noted: 2025-03-22

## 2025-03-22 PROBLEM — D50.9 MICROCYTIC ANEMIA: Status: ACTIVE | Noted: 2025-03-22

## 2025-03-22 PROBLEM — J18.9 COMMUNITY ACQUIRED PNEUMONIA: Status: RESOLVED | Noted: 2025-03-22 | Resolved: 2025-03-22

## 2025-03-22 PROBLEM — J18.9 COMMUNITY ACQUIRED PNEUMONIA: Status: ACTIVE | Noted: 2025-03-22

## 2025-03-22 PROBLEM — H81.09 MENIERE'S DISEASE: Status: ACTIVE | Noted: 2025-03-22

## 2025-03-22 PROBLEM — F25.9: Status: ACTIVE | Noted: 2025-03-22

## 2025-03-22 LAB
ALBUMIN SERPL BCP-MCNC: 3.9 G/DL (ref 3.5–5.2)
ALBUMIN SERPL BCP-MCNC: 4.1 G/DL (ref 3.5–5.2)
ALP SERPL-CCNC: 63 UNIT/L (ref 40–150)
ALP SERPL-CCNC: 66 UNIT/L (ref 40–150)
ALT SERPL W/O P-5'-P-CCNC: 18 UNIT/L (ref 10–44)
ALT SERPL W/O P-5'-P-CCNC: 18 UNIT/L (ref 10–44)
ANION GAP (OHS): 10 MMOL/L (ref 8–16)
ANION GAP (OHS): 8 MMOL/L (ref 8–16)
AST SERPL-CCNC: 20 UNIT/L (ref 11–45)
AST SERPL-CCNC: 22 UNIT/L (ref 11–45)
BILIRUB SERPL-MCNC: 0.2 MG/DL (ref 0.1–1)
BILIRUB SERPL-MCNC: 0.3 MG/DL (ref 0.1–1)
BILIRUB UR QL STRIP.AUTO: NEGATIVE
BILIRUB UR QL STRIP.AUTO: NEGATIVE
BUN SERPL-MCNC: 17 MG/DL (ref 8–23)
BUN SERPL-MCNC: 25 MG/DL (ref 8–23)
CALCIUM SERPL-MCNC: 8.6 MG/DL (ref 8.7–10.5)
CALCIUM SERPL-MCNC: 9.2 MG/DL (ref 8.7–10.5)
CHLORIDE SERPL-SCNC: 104 MMOL/L (ref 95–110)
CHLORIDE SERPL-SCNC: 98 MMOL/L (ref 95–110)
CLARITY UR: CLEAR
CLARITY UR: CLEAR
CO2 SERPL-SCNC: 19 MMOL/L (ref 23–29)
CO2 SERPL-SCNC: 21 MMOL/L (ref 23–29)
COLOR UR AUTO: COLORLESS
COLOR UR AUTO: YELLOW
CREAT SERPL-MCNC: 1.1 MG/DL (ref 0.5–1.4)
CREAT SERPL-MCNC: 1.6 MG/DL (ref 0.5–1.4)
EAG (OHS): 189 MG/DL (ref 68–131)
ERYTHROCYTE [DISTWIDTH] IN BLOOD BY AUTOMATED COUNT: 14.6 % (ref 11.5–14.5)
FERRITIN SERPL-MCNC: 28.2 NG/ML (ref 20–300)
FOLATE SERPL-MCNC: 15.3 NG/ML (ref 4–24)
GFR SERPLBLD CREATININE-BSD FMLA CKD-EPI: 48 ML/MIN/1.73/M2
GFR SERPLBLD CREATININE-BSD FMLA CKD-EPI: >60 ML/MIN/1.73/M2
GLUCOSE SERPL-MCNC: 107 MG/DL (ref 70–110)
GLUCOSE SERPL-MCNC: 303 MG/DL (ref 70–110)
GLUCOSE UR QL STRIP: NEGATIVE
GLUCOSE UR QL STRIP: NEGATIVE
HBA1C MFR BLD: 8.2 % (ref 4–5.6)
HCT VFR BLD AUTO: 37.3 % (ref 40–54)
HGB BLD-MCNC: 12.4 GM/DL (ref 14–18)
HGB UR QL STRIP: NEGATIVE
HGB UR QL STRIP: NEGATIVE
IRON SATN MFR SERPL: 10 % (ref 20–50)
IRON SERPL-MCNC: 49 UG/DL (ref 45–160)
KETONES UR QL STRIP: ABNORMAL
KETONES UR QL STRIP: NEGATIVE
LEUKOCYTE ESTERASE UR QL STRIP: NEGATIVE
LEUKOCYTE ESTERASE UR QL STRIP: NEGATIVE
MAGNESIUM SERPL-MCNC: 1.7 MG/DL (ref 1.6–2.6)
MCH RBC QN AUTO: 25.3 PG (ref 27–50)
MCHC RBC AUTO-ENTMCNC: 33.2 G/DL (ref 32–36)
MCV RBC AUTO: 76 FL (ref 82–98)
NITRITE UR QL STRIP: NEGATIVE
NITRITE UR QL STRIP: NEGATIVE
PH UR STRIP: 6 [PH]
PH UR STRIP: 6 [PH]
PHOSPHATE SERPL-MCNC: 3.2 MG/DL (ref 2.7–4.5)
PLATELET # BLD AUTO: 333 K/UL (ref 150–450)
PMV BLD AUTO: 8.1 FL (ref 9.2–12.9)
POCT GLUCOSE: 104 MG/DL (ref 70–110)
POCT GLUCOSE: 247 MG/DL (ref 70–110)
POCT GLUCOSE: 247 MG/DL (ref 70–110)
POCT GLUCOSE: 321 MG/DL (ref 70–110)
POCT GLUCOSE: 328 MG/DL (ref 70–110)
POTASSIUM SERPL-SCNC: 4.5 MMOL/L (ref 3.5–5.1)
POTASSIUM SERPL-SCNC: 4.5 MMOL/L (ref 3.5–5.1)
PROT SERPL-MCNC: 6.8 GM/DL (ref 6–8.4)
PROT SERPL-MCNC: 7.1 GM/DL (ref 6–8.4)
PROT UR QL STRIP: ABNORMAL
PROT UR QL STRIP: NEGATIVE
RBC # BLD AUTO: 4.9 M/UL (ref 4.6–6.2)
SODIUM SERPL-SCNC: 125 MMOL/L (ref 136–145)
SODIUM SERPL-SCNC: 135 MMOL/L (ref 136–145)
SP GR UR STRIP: 1.01
SP GR UR STRIP: >=1.03
T4 SERPL-MCNC: 6.8 UG/DL (ref 4.5–11.5)
TIBC SERPL-MCNC: 490 UG/DL (ref 250–450)
TRANSFERRIN SERPL-MCNC: 331 MG/DL (ref 200–375)
TREPONEMA PALLIDUM IGG+IGM AB [PRESENCE] IN SERUM OR PLASMA BY IMMUNOASSAY: NONREACTIVE
UROBILINOGEN UR STRIP-ACNC: NEGATIVE EU/DL
UROBILINOGEN UR STRIP-ACNC: NEGATIVE EU/DL
VIT B12 SERPL-MCNC: >2000 PG/ML (ref 210–950)
WBC # BLD AUTO: 6.43 K/UL (ref 3.9–12.7)

## 2025-03-22 PROCEDURE — 97162 PT EVAL MOD COMPLEX 30 MIN: CPT | Performed by: PHYSICAL THERAPIST

## 2025-03-22 PROCEDURE — 83540 ASSAY OF IRON: CPT | Performed by: STUDENT IN AN ORGANIZED HEALTH CARE EDUCATION/TRAINING PROGRAM

## 2025-03-22 PROCEDURE — 97165 OT EVAL LOW COMPLEX 30 MIN: CPT

## 2025-03-22 PROCEDURE — 82746 ASSAY OF FOLIC ACID SERUM: CPT | Performed by: STUDENT IN AN ORGANIZED HEALTH CARE EDUCATION/TRAINING PROGRAM

## 2025-03-22 PROCEDURE — 87015 SPECIMEN INFECT AGNT CONCNTJ: CPT | Performed by: STUDENT IN AN ORGANIZED HEALTH CARE EDUCATION/TRAINING PROGRAM

## 2025-03-22 PROCEDURE — 82728 ASSAY OF FERRITIN: CPT | Performed by: STUDENT IN AN ORGANIZED HEALTH CARE EDUCATION/TRAINING PROGRAM

## 2025-03-22 PROCEDURE — 0241U SARS-COV2 (COVID) WITH FLU/RSV BY PCR: CPT | Performed by: STUDENT IN AN ORGANIZED HEALTH CARE EDUCATION/TRAINING PROGRAM

## 2025-03-22 PROCEDURE — 25500020 PHARM REV CODE 255: Performed by: INTERNAL MEDICINE

## 2025-03-22 PROCEDURE — 63700000 PHARM REV CODE 250 ALT 637 W/O HCPCS: Performed by: STUDENT IN AN ORGANIZED HEALTH CARE EDUCATION/TRAINING PROGRAM

## 2025-03-22 PROCEDURE — 80053 COMPREHEN METABOLIC PANEL: CPT

## 2025-03-22 PROCEDURE — 36415 COLL VENOUS BLD VENIPUNCTURE: CPT

## 2025-03-22 PROCEDURE — 85027 COMPLETE CBC AUTOMATED: CPT | Performed by: STUDENT IN AN ORGANIZED HEALTH CARE EDUCATION/TRAINING PROGRAM

## 2025-03-22 PROCEDURE — 94761 N-INVAS EAR/PLS OXIMETRY MLT: CPT

## 2025-03-22 PROCEDURE — 83735 ASSAY OF MAGNESIUM: CPT | Performed by: STUDENT IN AN ORGANIZED HEALTH CARE EDUCATION/TRAINING PROGRAM

## 2025-03-22 PROCEDURE — 94640 AIRWAY INHALATION TREATMENT: CPT

## 2025-03-22 PROCEDURE — 25000003 PHARM REV CODE 250

## 2025-03-22 PROCEDURE — 63600175 PHARM REV CODE 636 W HCPCS: Mod: JZ,TB

## 2025-03-22 PROCEDURE — 36415 COLL VENOUS BLD VENIPUNCTURE: CPT | Performed by: STUDENT IN AN ORGANIZED HEALTH CARE EDUCATION/TRAINING PROGRAM

## 2025-03-22 PROCEDURE — 83036 HEMOGLOBIN GLYCOSYLATED A1C: CPT | Performed by: STUDENT IN AN ORGANIZED HEALTH CARE EDUCATION/TRAINING PROGRAM

## 2025-03-22 PROCEDURE — A9585 GADOBUTROL INJECTION: HCPCS | Performed by: INTERNAL MEDICINE

## 2025-03-22 PROCEDURE — 25000242 PHARM REV CODE 250 ALT 637 W/ HCPCS: Performed by: STUDENT IN AN ORGANIZED HEALTH CARE EDUCATION/TRAINING PROGRAM

## 2025-03-22 PROCEDURE — 84075 ASSAY ALKALINE PHOSPHATASE: CPT | Performed by: STUDENT IN AN ORGANIZED HEALTH CARE EDUCATION/TRAINING PROGRAM

## 2025-03-22 PROCEDURE — 63600175 PHARM REV CODE 636 W HCPCS: Performed by: STUDENT IN AN ORGANIZED HEALTH CARE EDUCATION/TRAINING PROGRAM

## 2025-03-22 PROCEDURE — 87070 CULTURE OTHR SPECIMN AEROBIC: CPT | Performed by: STUDENT IN AN ORGANIZED HEALTH CARE EDUCATION/TRAINING PROGRAM

## 2025-03-22 PROCEDURE — 11000001 HC ACUTE MED/SURG PRIVATE ROOM

## 2025-03-22 PROCEDURE — 96374 THER/PROPH/DIAG INJ IV PUSH: CPT

## 2025-03-22 PROCEDURE — 25000003 PHARM REV CODE 250: Performed by: STUDENT IN AN ORGANIZED HEALTH CARE EDUCATION/TRAINING PROGRAM

## 2025-03-22 PROCEDURE — 84100 ASSAY OF PHOSPHORUS: CPT | Performed by: STUDENT IN AN ORGANIZED HEALTH CARE EDUCATION/TRAINING PROGRAM

## 2025-03-22 PROCEDURE — 81003 URINALYSIS AUTO W/O SCOPE: CPT | Performed by: STUDENT IN AN ORGANIZED HEALTH CARE EDUCATION/TRAINING PROGRAM

## 2025-03-22 PROCEDURE — 82962 GLUCOSE BLOOD TEST: CPT

## 2025-03-22 PROCEDURE — 84436 ASSAY OF TOTAL THYROXINE: CPT | Performed by: STUDENT IN AN ORGANIZED HEALTH CARE EDUCATION/TRAINING PROGRAM

## 2025-03-22 PROCEDURE — 99900035 HC TECH TIME PER 15 MIN (STAT)

## 2025-03-22 RX ORDER — INSULIN ASPART 100 [IU]/ML
0-5 INJECTION, SOLUTION INTRAVENOUS; SUBCUTANEOUS
Status: DISCONTINUED | OUTPATIENT
Start: 2025-03-22 | End: 2025-03-23

## 2025-03-22 RX ORDER — GLUCAGON 1 MG
1 KIT INJECTION
Status: DISCONTINUED | OUTPATIENT
Start: 2025-03-22 | End: 2025-03-28 | Stop reason: HOSPADM

## 2025-03-22 RX ORDER — ARIPIPRAZOLE 5 MG/1
5 TABLET ORAL DAILY
Status: DISCONTINUED | OUTPATIENT
Start: 2025-03-22 | End: 2025-03-28 | Stop reason: HOSPADM

## 2025-03-22 RX ORDER — IPRATROPIUM BROMIDE AND ALBUTEROL SULFATE 2.5; .5 MG/3ML; MG/3ML
3 SOLUTION RESPIRATORY (INHALATION) EVERY 6 HOURS
Status: DISCONTINUED | OUTPATIENT
Start: 2025-03-22 | End: 2025-03-28 | Stop reason: HOSPADM

## 2025-03-22 RX ORDER — BENZONATATE 100 MG/1
100 CAPSULE ORAL 3 TIMES DAILY PRN
Status: DISCONTINUED | OUTPATIENT
Start: 2025-03-22 | End: 2025-03-28 | Stop reason: HOSPADM

## 2025-03-22 RX ORDER — KETOCONAZOLE 20 MG/G
CREAM TOPICAL DAILY
COMMUNITY
Start: 2025-03-16

## 2025-03-22 RX ORDER — SODIUM CHLORIDE 0.9 % (FLUSH) 0.9 %
10 SYRINGE (ML) INJECTION EVERY 12 HOURS PRN
Status: DISCONTINUED | OUTPATIENT
Start: 2025-03-22 | End: 2025-03-28 | Stop reason: HOSPADM

## 2025-03-22 RX ORDER — TRIAMTERENE AND HYDROCHLOROTHIAZIDE 37.5; 25 MG/1; MG/1
1 CAPSULE ORAL DAILY
Qty: 30 CAPSULE | Refills: 11 | Status: SHIPPED | OUTPATIENT
Start: 2025-03-23 | End: 2025-03-28 | Stop reason: HOSPADM

## 2025-03-22 RX ORDER — IBUPROFEN 200 MG
24 TABLET ORAL
Status: DISCONTINUED | OUTPATIENT
Start: 2025-03-22 | End: 2025-03-28 | Stop reason: HOSPADM

## 2025-03-22 RX ORDER — NALOXONE HCL 0.4 MG/ML
0.02 VIAL (ML) INJECTION
Status: DISCONTINUED | OUTPATIENT
Start: 2025-03-22 | End: 2025-03-28 | Stop reason: HOSPADM

## 2025-03-22 RX ORDER — PRAVASTATIN SODIUM 40 MG/1
40 TABLET ORAL DAILY
Status: DISCONTINUED | OUTPATIENT
Start: 2025-03-22 | End: 2025-03-28 | Stop reason: HOSPADM

## 2025-03-22 RX ORDER — FUROSEMIDE 20 MG/1
20 TABLET ORAL DAILY
Status: DISCONTINUED | OUTPATIENT
Start: 2025-03-22 | End: 2025-03-22

## 2025-03-22 RX ORDER — HYDROCHLOROTHIAZIDE 25 MG/1
25 TABLET ORAL DAILY
Status: DISCONTINUED | OUTPATIENT
Start: 2025-03-22 | End: 2025-03-22

## 2025-03-22 RX ORDER — GABAPENTIN 300 MG/1
300 CAPSULE ORAL 3 TIMES DAILY
Qty: 90 CAPSULE | Refills: 11 | Status: SHIPPED | OUTPATIENT
Start: 2025-03-23 | End: 2026-03-23

## 2025-03-22 RX ORDER — PREDNISONE 20 MG/1
60 TABLET ORAL DAILY
Status: COMPLETED | OUTPATIENT
Start: 2025-03-22 | End: 2025-03-28

## 2025-03-22 RX ORDER — METOPROLOL SUCCINATE 25 MG/1
25 TABLET, EXTENDED RELEASE ORAL DAILY
Status: DISCONTINUED | OUTPATIENT
Start: 2025-03-22 | End: 2025-03-22

## 2025-03-22 RX ORDER — AMLODIPINE BESYLATE 5 MG/1
5 TABLET ORAL DAILY
COMMUNITY
Start: 2025-03-13

## 2025-03-22 RX ORDER — CEFTRIAXONE 1 G/1
1 INJECTION, POWDER, FOR SOLUTION INTRAMUSCULAR; INTRAVENOUS
Status: COMPLETED | OUTPATIENT
Start: 2025-03-22 | End: 2025-03-26

## 2025-03-22 RX ORDER — ACETAMINOPHEN 325 MG/1
650 TABLET ORAL EVERY 6 HOURS PRN
Status: DISCONTINUED | OUTPATIENT
Start: 2025-03-22 | End: 2025-03-28 | Stop reason: HOSPADM

## 2025-03-22 RX ORDER — TRIAMTERENE AND HYDROCHLOROTHIAZIDE 37.5; 25 MG/1; MG/1
1 CAPSULE ORAL DAILY
Status: DISCONTINUED | OUTPATIENT
Start: 2025-03-22 | End: 2025-03-23

## 2025-03-22 RX ORDER — IBUPROFEN 200 MG
16 TABLET ORAL
Status: DISCONTINUED | OUTPATIENT
Start: 2025-03-22 | End: 2025-03-28 | Stop reason: HOSPADM

## 2025-03-22 RX ORDER — AZITHROMYCIN 250 MG/1
500 TABLET, FILM COATED ORAL DAILY
Status: DISCONTINUED | OUTPATIENT
Start: 2025-03-22 | End: 2025-03-22

## 2025-03-22 RX ORDER — HYDROCHLOROTHIAZIDE 12.5 MG/1
12.5 TABLET ORAL DAILY
Status: DISCONTINUED | OUTPATIENT
Start: 2025-03-22 | End: 2025-03-22

## 2025-03-22 RX ORDER — ENOXAPARIN SODIUM 100 MG/ML
30 INJECTION SUBCUTANEOUS EVERY 24 HOURS
Status: DISCONTINUED | OUTPATIENT
Start: 2025-03-22 | End: 2025-03-24

## 2025-03-22 RX ORDER — KETOROLAC TROMETHAMINE 30 MG/ML
15 INJECTION, SOLUTION INTRAMUSCULAR; INTRAVENOUS ONCE
Status: COMPLETED | OUTPATIENT
Start: 2025-03-22 | End: 2025-03-22

## 2025-03-22 RX ORDER — HYDROXYZINE PAMOATE 25 MG/1
50 CAPSULE ORAL NIGHTLY PRN
Status: DISCONTINUED | OUTPATIENT
Start: 2025-03-22 | End: 2025-03-28 | Stop reason: HOSPADM

## 2025-03-22 RX ORDER — LEVOTHYROXINE SODIUM 25 UG/1
25 TABLET ORAL
Status: DISCONTINUED | OUTPATIENT
Start: 2025-03-22 | End: 2025-03-28 | Stop reason: HOSPADM

## 2025-03-22 RX ORDER — DOXYCYCLINE HYCLATE 100 MG
100 TABLET ORAL EVERY 12 HOURS
Status: DISPENSED | OUTPATIENT
Start: 2025-03-22 | End: 2025-03-27

## 2025-03-22 RX ORDER — GABAPENTIN 300 MG/1
300 CAPSULE ORAL 3 TIMES DAILY
Status: DISCONTINUED | OUTPATIENT
Start: 2025-03-22 | End: 2025-03-28 | Stop reason: HOSPADM

## 2025-03-22 RX ORDER — ONDANSETRON HYDROCHLORIDE 2 MG/ML
4 INJECTION, SOLUTION INTRAVENOUS EVERY 8 HOURS PRN
Status: DISCONTINUED | OUTPATIENT
Start: 2025-03-22 | End: 2025-03-28 | Stop reason: HOSPADM

## 2025-03-22 RX ORDER — LOSARTAN POTASSIUM 50 MG/1
50 TABLET ORAL DAILY
Status: DISCONTINUED | OUTPATIENT
Start: 2025-03-22 | End: 2025-03-28 | Stop reason: HOSPADM

## 2025-03-22 RX ORDER — DULOXETIN HYDROCHLORIDE 30 MG/1
120 CAPSULE, DELAYED RELEASE ORAL EVERY MORNING
Status: DISCONTINUED | OUTPATIENT
Start: 2025-03-22 | End: 2025-03-28 | Stop reason: HOSPADM

## 2025-03-22 RX ORDER — GADOBUTROL 604.72 MG/ML
10 INJECTION INTRAVENOUS
Status: COMPLETED | OUTPATIENT
Start: 2025-03-22 | End: 2025-03-22

## 2025-03-22 RX ADMIN — ACETAMINOPHEN 650 MG: 325 TABLET ORAL at 10:03

## 2025-03-22 RX ADMIN — PRAVASTATIN SODIUM 40 MG: 40 TABLET ORAL at 08:03

## 2025-03-22 RX ADMIN — TRIAMTERENE AND HYDROCHLOROTHIAZIDE 1 CAPSULE: 37.5; 25 CAPSULE ORAL at 09:03

## 2025-03-22 RX ADMIN — FUROSEMIDE 20 MG: 20 TABLET ORAL at 06:03

## 2025-03-22 RX ADMIN — BENZONATATE 100 MG: 100 CAPSULE ORAL at 03:03

## 2025-03-22 RX ADMIN — IPRATROPIUM BROMIDE AND ALBUTEROL SULFATE 3 ML: 2.5; .5 SOLUTION RESPIRATORY (INHALATION) at 07:03

## 2025-03-22 RX ADMIN — INSULIN ASPART 2 UNITS: 100 INJECTION, SOLUTION INTRAVENOUS; SUBCUTANEOUS at 01:03

## 2025-03-22 RX ADMIN — GABAPENTIN 300 MG: 300 CAPSULE ORAL at 08:03

## 2025-03-22 RX ADMIN — CEFTRIAXONE SODIUM 1 G: 1 INJECTION, POWDER, FOR SOLUTION INTRAMUSCULAR; INTRAVENOUS at 06:03

## 2025-03-22 RX ADMIN — GABAPENTIN 300 MG: 300 CAPSULE ORAL at 03:03

## 2025-03-22 RX ADMIN — ENOXAPARIN SODIUM 30 MG: 30 INJECTION SUBCUTANEOUS at 04:03

## 2025-03-22 RX ADMIN — GADOBUTROL 10 ML: 604.72 INJECTION INTRAVENOUS at 10:03

## 2025-03-22 RX ADMIN — IPRATROPIUM BROMIDE AND ALBUTEROL SULFATE 3 ML: 2.5; .5 SOLUTION RESPIRATORY (INHALATION) at 01:03

## 2025-03-22 RX ADMIN — HYDROXYZINE PAMOATE 50 MG: 25 CAPSULE ORAL at 01:03

## 2025-03-22 RX ADMIN — AZITHROMYCIN DIHYDRATE 500 MG: 250 TABLET ORAL at 06:03

## 2025-03-22 RX ADMIN — IOHEXOL 100 ML: 350 INJECTION, SOLUTION INTRAVENOUS at 06:03

## 2025-03-22 RX ADMIN — ACETAMINOPHEN 650 MG: 325 TABLET ORAL at 08:03

## 2025-03-22 RX ADMIN — DOXYCYCLINE HYCLATE 100 MG: 100 TABLET, COATED ORAL at 08:03

## 2025-03-22 RX ADMIN — LEVOTHYROXINE SODIUM 25 MCG: 25 TABLET ORAL at 06:03

## 2025-03-22 RX ADMIN — ARIPIPRAZOLE 5 MG: 5 TABLET ORAL at 08:03

## 2025-03-22 RX ADMIN — DULOXETINE HYDROCHLORIDE 120 MG: 30 CAPSULE, DELAYED RELEASE ORAL at 06:03

## 2025-03-22 RX ADMIN — INSULIN ASPART 4 UNITS: 100 INJECTION, SOLUTION INTRAVENOUS; SUBCUTANEOUS at 04:03

## 2025-03-22 RX ADMIN — PREDNISONE 60 MG: 20 TABLET ORAL at 06:03

## 2025-03-22 RX ADMIN — INSULIN ASPART 1 UNITS: 100 INJECTION, SOLUTION INTRAVENOUS; SUBCUTANEOUS at 08:03

## 2025-03-22 RX ADMIN — LOSARTAN POTASSIUM 50 MG: 50 TABLET, FILM COATED ORAL at 08:03

## 2025-03-22 RX ADMIN — KETOROLAC TROMETHAMINE 15 MG: 30 INJECTION, SOLUTION INTRAMUSCULAR at 11:03

## 2025-03-22 NOTE — PT/OT/SLP EVAL
Occupational Therapy   Evaluation and Discharge Note    Name: Fabiano Malik Jr.  MRN: [id number]  Admitting Diagnosis: <principal problem not specified>  Recent Surgery: * No surgery found *      Recommendations:     Discharge Recommendations: No Therapy Indicated (for OT - OP PT for verstibular tx recommended)  Discharge Equipment Recommendations: shower chair  Barriers to discharge:  Decreased caregiver support, Inaccessible home environment    Assessment:     Fabiano Malik Jr. is a 64 y.o. male with a medical diagnosis of <principal problem not specified>. Pt was agreeable to and participated in OT/PT evaluation.  Pt reports that he lives alone and was mod I with mobility and ADLS at [facility].  During pt's evaluation, pt was noted to perform ADLs at baseline, but needed CGA with functional mobility due to his increasing dizziness/vertigo (not physical or neurological limitations).  Pt reports that he will be able to get family assistance if needed and will be referred to outpatient PT for vestibular rehab upon discharge.  Pt is discharged from acute OT and post acute OT is not recommended at this time, however, pt will benefit from a shower chair for DME upon discharge for safe positioning when bathing.      Plan:     During this hospitalization, patient does not require further acute OT services.  Please re-consult if situation changes.    Plan of Care Reviewed with: patient    Subjective     Chief Complaint: pain and dizziness  Patient/Family Comments/goals: control dizziness    Occupational Profile:  Living Environment: pt lives alone in first floor apt with 4 BECCA to enter and LHR - t/s combo for bathing with GB  Previous level of function: mod I with mobility and ADLS - able to drive but doesn't have car - on disability (not working)  Equipment Used at home: rollator, grab bar, cane, straight  Assistance upon Discharge: daughter lives near by and able to assist if needed    Pain/Comfort:  Pain Rating 1:  10/10  Location 1: back (and neck)  Pain Addressed 1: Reposition, Distraction, Cessation of Activity, Nurse notified  Pain Rating Post-Intervention 1: 10/10  Pain Rating 2: 10/10  Location - Orientation 2: lower  Location 2: foot (neuropathy)  Pain Addressed 2: Reposition, Distraction, Cessation of Activity, Nurse notified  Pain Rating Post-Intervention 2: 10/10    Patients cultural, spiritual, Mandaen conflicts given the current situation: no    Objective:     Communicated with: nurse prior to session.  Patient found HOB elevated with telemetry, blood pressure cuff, pulse ox (continuous) upon OT entry to room.    General Precautions: Standard, fall  Orthopedic Precautions: N/A  Braces: N/A  Respiratory Status: Room air     Occupational Performance:    Bed Mobility:    Patient completed Scooting/Bridging with contact guard assistance  Patient completed Supine to Sit with contact guard assistance  Patient completed Sit to Supine with contact guard assistance    Functional Mobility/Transfers:  Patient completed Sit <> Stand Transfer with contact guard assistance  with  rolling walker   Functional Mobility: pt able to take side steps towards HOB with CGA only for safety due to increasing dizziness - distance walking in room not attempted for safety due to dizziness/vertigo  Dizziness/vertigo present in all positions (from pre-therapy to post-therapy)  Pt assessed for visual tracking - slight nystagmus noted in R eye with increased dizziness/nausea afterwards  BP taken during session:  151/76 supine; 157/72 sitting; 170/77 supine at end of session    Activities of Daily Living:  Feeding:  independence    Grooming: modified independence    Upper Body Dressing: modified [facility] gown as robe  Lower Body Dressing: modified independence socks when long sitting (did not attempt when sitting EOB due to dizziness)    Cognitive/Visual Perceptual:  Cognitive/Psychosocial Skills:     -       Oriented to: Person,  Place, Time, and Situation   -       Follows Commands/attention:Follows two-step commands  -       Communication: clear/fluent  -       Memory: No Deficits noted  -       Safety awareness/insight to disability: impaired   -       Mood/Affect/Coping skills/emotional control: Appropriate to situation    Physical Exam:  Balance: -       sitting and standing with RW = good but CGA provided due to persistent dizziness/vertigo  Postural examination/scapula alignment:    -       No postural abnormalities identified  Skin integrity: Visible skin intact  Edema:  None noted  Sensation: -       Impaired  light/touch feet (neuropathy)  Upper Extremity Range of Motion:   BUEs = WFL  Upper Extremity Strength:  BUEs = WFL   Strength:  BUEs = WFL    AMPAC 6 Click ADL:  AMPAC Total Score: 24    Treatment & Education:  Pt completed ADLs and func mobility activities for tx session as noted above  SC recommended at discharge due to pt c/o dizziness when washing hair  Pt educated on role of OT and POC      Patient left HOB elevated with all lines intact, call button in reach, and nurse notified    GOALS:   Multidisciplinary Problems       Occupational Therapy Goals       Not on file              Multidisciplinary Problems (Resolved)          Problem: Occupational Therapy    Goal Priority Disciplines Outcome Interventions   Occupational Therapy Goal   (Resolved)     OT, PT/OT Met                        DME Justifications:  Shower chair    History:     Past Medical History:   Diagnosis Date    Chronic back pain greater than 3 months duration     Depression     Diabetes mellitus     Diabetes mellitus, type 2     Hypertension     Schizophrenia          Past Surgical History:   Procedure Laterality Date    APPENDECTOMY      CARPAL TUNNEL RELEASE Right 12/13/2022    Procedure: RELEASE, CARPAL TUNNEL;  Surgeon: Everton Walter Jr., MD;  Location: [facility];  Service: Orthopedics;  Laterality: Right;    COLONOSCOPY N/A 5/31/2018     Procedure: COLONOSCOPY;  Surgeon: Guillaume Hatch MD;  Location: [facility];  Service: Endoscopy;  Laterality: N/A;    COLONOSCOPY N/A 11/17/2022    Procedure: COLONOSCOPY;  Surgeon: Guillaume Hatch MD;  Location: [facility];  Service: Endoscopy;  Laterality: N/A;    DECOMPRESSION, NERVE, ULNAR Right 12/13/2022    Procedure: DECOMPRESSION, NERVE, ULNAR;  Surgeon: Everton Walter Jr., MD;  Location: [facility];  Service: Orthopedics;  Laterality: Right;    EPIDURAL STEROID INJECTION INTO LUMBAR SPINE N/A 2/21/2024    Procedure: L5-S1 JOHN;  Surgeon: Zoila Suarez DO;  Location: [facility];  Service: Pain Management;  Laterality: N/A;  oral 30 mins    FUSION, SPINE, CERVICAL N/A 8/11/2023    Procedure: FUSION, SPINE, CERVICAL;  Surgeon: Guillaume Washington MD;  Location: [facility];  Service: Neurosurgery;  Laterality: N/A;  C3-4, C5-6, C6-7 ACDF C3-C7 anterior instrumentation Depuy  -ANTERIOR DECOMPREESSION 2 LEVELS   -ANTERIOR INSTRUMENTATION INTERBODY CAGE INTERSPACE 3   -LOP 3.5 HR   -LOS 3 DAYS   -GENERAL   -TYPE AND SCREEN   - C ARM   -EMG, SEP, MEP hari notified cc  -ASPEN   -REG    NECK SURGERY      RADIOFREQUENCY ABLATION OF LUMBAR MEDIAL BRANCH NERVE AT SINGLE LEVEL Left 5/29/2018    Procedure: RADIOFREQUENCY THERMOCOAGULATION (RFTC)-NERVE-MEDIAN BRANCH-LUMBAR- Left L2-3-4-5;  Surgeon: Donavon Dennis MD;  Location: [facility];  Service: Pain Management;  Laterality: Left;    RADIOFREQUENCY ABLATION OF LUMBAR MEDIAL BRANCH NERVE AT SINGLE LEVEL Right 1/8/2019    Procedure: Radiofrequency Ablation, Nerve, Spinal, Lumbar, Medial Branch, L2,3,4,5;  Surgeon: Alberto Hernandez Jr., MD;  Location: [facility];  Service: Pain Management;  Laterality: Right;  Pt is diabetic    RADIOFREQUENCY ABLATION OF LUMBAR MEDIAL BRANCH NERVE AT SINGLE LEVEL Left 3/12/2019    Procedure: Radiofrequency Ablation, Nerve, Spinal, Lumbar, Medial Branch, Left, L2,3,4,5;  Surgeon: Alberto Hernandez Jr., MD;   Location: [facility];  Service: Pain Management;  Laterality: Left;  Pt will be consented the day of procedure.    TRANSFORAMINAL EPIDURAL INJECTION OF STEROID Right 9/12/2019    Procedure: Injection,steroid,epidural,transforaminal,right,L4-5 and L5-S1;  Surgeon: Alberto Hernandez Jr., MD;  Location: [facility];  Service: Pain Management;  Laterality: Right;  PT IS DIABETIC       Time Tracking:     OT Date of Treatment: 03/22/25  OT Start Time: 0857  OT Stop Time: 0915  OT Total Time (min): 18 min - cotx with PT    Billable Minutes:Evaluation 10  Therapeutic Activity 8    3/22/2025

## 2025-03-22 NOTE — PROGRESS NOTES
VIRTUAL NURSE:  Cued into patient's room.  Permission received per patient to turn camera to view patient.  Introduced as VN that will be working with floor nurse and nursing assistant.  Educated patient on VN's role in patient care and  [facility] model.  Plan of care reviewed with patient.  Education per flowsheet.   Informed patient that staff will round on them every 2 hours but to use call light for any other needs they may have; informed of fall risk and fall precautions.  Patient verbalized understanding.  Call light within reach; bed siderails up x3.  Opportunity given for questions and questions answered.  Admission assessment questions answered.  Patient denies complaints or any needs at this time. Instructed to call for assistance.  Will cont to monitor and intervene as needed.    Labs, notes, orders, and careplan reviewed.        03/22/25 1716   Admission   Communication Issues? None   Shift   Virtual Nurse - Rounding Complete   Pain Management Interventions quiet environment facilitated;relaxation techniques promoted   Virtual Nurse - Patient Verbalized Approval Of VN Rounding;Camera Use   Type of Frequent Check   Type Patient Rounds   Safety/Activity   Patient Rounds visualized patient;placement of personal items at bedside;bed in low position;bed wheels locked;call light in patient/parent reach;clutter free environment maintained;ID band on   Safety Promotion/Fall Prevention instructed to call staff for mobility   Positioning   Body Position sitting up in bed   Head of Bed (HOB) Positioning HOB at 60 degrees

## 2025-03-22 NOTE — ED NOTES
Received report from CASH Humphries and assumed care of patient. Patient resting in stretcher comfortably, respirations even and unlabored, equal rise and fall to chest. Patient reports no needs at this time. Will continue to monitor.

## 2025-03-22 NOTE — ED NOTES
Patient reports chronic pain in back, neck, and feet. He reports pain 10/10. Notified provider and received new orders.

## 2025-03-22 NOTE — PROGRESS NOTES
"[facility] Medicine Progress Note      Subjective/Interval History      Overnight, reports continued persistent vertigo even while laying flat. This morning, says he does not feel any better. Reports neck and back pain which is chronic.     Objective     Physical Examination:  BP (!) 171/74 (BP Location: Left arm)   Pulse 73   Temp 97.7 °F (36.5 °C)   Resp 18   Wt 117.9 kg (260 lb)   SpO2 100%   BMI 34.30 kg/m²      [facility] Internal Medicine History and Physical - Resident Note     Admitting Team: A  Attending Physician: Anum  Resident: Zena     Date of Admit: 3/21/2025     Chief Complaint      Vertigo for 5 days, productive cough and chills x 6 days     Subjective:      History of Present Illness:  Fabiano Malik Jr. is a 64 y.o. man with past medical history of type 2 diabetes, hypertension, hyperlipidemia, GERD, ILD, hypothyroidism presenting to [facility] on 3/21/2025 for concern for worsening vertigo for the past 5 days. He reports that he has experienced multiple episodes of vertigo weekly for the past several years, usually lasting 20-30 minutes though sometimes as long as a day or two, worsened when he closes his eyes. He has had occasional falls due to sudden onset of vertiginous symptoms, and notes associated tinnitus, hearing loss and bilateral R>L ear fullness over this time period. This episode has been more severe and unrelenting for the past 5 days. He additionally notes that over the past week he has had increased dyspnea with exertion from baseline, cough productive of yellow sputum, and episodes of feeling feverish. He presented today for PFTs and a 6MWT as part of evaluation of ILD after a CT scan ordered in January for evaluation of "lung scarring" and chronic exertional dyspnea demonstrated interlobular septal thickening with traction bronchiectasis. However, when he arrived for his scheduled appointment he was unable to follow through with the exams due to " vertigo and coughing fits. He then presented to the [facility] ED. In the ED he was administered meclizine and valium without any improvement.            Past Medical History:       Past Medical History:   Diagnosis Date    Chronic back pain greater than 3 months duration      Depression      Diabetes mellitus      Diabetes mellitus, type 2      Hypertension      Schizophrenia           Past Surgical History:        Past Surgical History:   Procedure Laterality Date    APPENDECTOMY        CARPAL TUNNEL RELEASE Right 12/13/2022     Procedure: RELEASE, CARPAL TUNNEL;  Surgeon: Everton Walter Jr., MD;  Location: [facility] OR;  Service: Orthopedics;  Laterality: Right;    COLONOSCOPY N/A 5/31/2018     Procedure: COLONOSCOPY;  Surgeon: Guillaume Hatch MD;  Location: [facility] ENDO;  Service: Endoscopy;  Laterality: N/A;    COLONOSCOPY N/A 11/17/2022     Procedure: COLONOSCOPY;  Surgeon: Guillaume Hatch MD;  Location: [facility] ENDO;  Service: Endoscopy;  Laterality: N/A;    DECOMPRESSION, NERVE, ULNAR Right 12/13/2022     Procedure: DECOMPRESSION, NERVE, ULNAR;  Surgeon: Everton Walter Jr., MD;  Location: [facility] OR;  Service: Orthopedics;  Laterality: Right;    EPIDURAL STEROID INJECTION INTO LUMBAR SPINE N/A 2/21/2024     Procedure: L5-S1 JOHN;  Surgeon: Zoila Suarez DO;  Location: [facility]IN [facility]NAGEMENT;  Service: Pain Management;  Laterality: N/A;  oral 30 mins    FUSION, SPINE, CERVICAL N/A 8/11/2023     Procedure: FUSION, SPINE, CERVICAL;  Surgeon: Guillaume Washington MD;  Location: [facility] OR;  Service: Neurosurgery;  Laterality: N/A;  C3-4, C5-6, C6-7 ACDF C3-C7 anterior instrumentation Depuy  -ANTERIOR DECOMPREESSION 2 LEVELS   -ANTERIOR INSTRUMENTATION INTERBODY CAGE INTERSPACE 3   -LOP 3.5 HR   -LOS 3 DAYS   -GENERAL   -TYPE AND SCREEN   - C ARM   -EMG, SEP, MEP hari notified cc  -ASPEN   -REG    NECK SURGERY        RADIOFREQUENCY ABLATION OF LUMBAR MEDIAL BRANCH NERVE AT SINGLE LEVEL Left 5/29/2018     Procedure:  RADIOFREQUENCY THERMOCOAGULATION (RFTC)-NERVE-MEDIAN BRANCH-LUMBAR- Left L2-3-4-5;  Surgeon: Donavon Dennis MD;  Location: [facility];  Service: Pain Management;  Laterality: Left;    RADIOFREQUENCY ABLATION OF LUMBAR MEDIAL BRANCH NERVE AT SINGLE LEVEL Right 1/8/2019     Procedure: Radiofrequency Ablation, Nerve, Spinal, Lumbar, Medial Branch, L2,3,4,5;  Surgeon: Alberto Hernandez Jr., MD;  Location: [facility];  Service: Pain Management;  Laterality: Right;  Pt is diabetic    RADIOFREQUENCY ABLATION OF LUMBAR MEDIAL BRANCH NERVE AT SINGLE LEVEL Left 3/12/2019     Procedure: Radiofrequency Ablation, Nerve, Spinal, Lumbar, Medial Branch, Left, L2,3,4,5;  Surgeon: Alberto Hernandez Jr., MD;  Location: [facility];  Service: Pain Management;  Laterality: Left;  Pt will be consented the day of procedure.    TRANSFORAMINAL EPIDURAL INJECTION OF STEROID Right 9/12/2019     Procedure: Injection,steroid,epidural,transforaminal,right,L4-5 and L5-S1;  Surgeon: Alberto Hernandez Jr., MD;  Location: [facility];  Service: Pain Management;  Laterality: Right;  PT IS DIABETIC         Allergies:        Review of patient's allergies indicates:   Allergen Reactions    Pcn [penicillins] Other (See Comments)       Childhood rxn, pt does not recall type of rxn         Home Medications:          Prior to Admission medications    Medication Sig Start Date End Date Taking? Authorizing Provider   albuterol (PROVENTIL/VENTOLIN HFA) 90 mcg/actuation inhaler Inhale 2 puffs into the lungs every 6 (six) hours as needed for Wheezing. Rescue 1/13/25 1/13/26   Ladonna Garcia MD   ARIPiprazole (ABILIFY) 5 MG Tab Take 1 tablet (5 mg total) by mouth once daily. 1/14/25 1/14/26   Lucien Fleming MD   BLOOD PRESSURE CUFF Misc 1 Units by Misc.(Non-Drug; Combo Route) route once daily. 10/8/21     Lucien Fleming MD   blood sugar diagnostic (TRUE METRIX GLUCOSE TEST STRIP) Strp use 1 strip to check glucose 2 (two) times a  day. 1/24/25     Lucien Fleming MD   blood-glucose meter Misc Use as instructed 10/8/21 11/12/24   Lucien Fleming MD   diclofenac (VOLTAREN) 75 MG EC tablet Take 1 tablet (75 mg total) by mouth 2 (two) times daily as needed (pain). 1/14/25     Lucien Fleming MD   dulaglutide (TRULICITY) 1.5 mg/0.5 mL pen injector Inject 1.5 mg into the skin every 7 days. 1/13/25     Lucien Fleming MD   DULoxetine (CYMBALTA) 60 MG capsule Take 2 capsules (120 mg total) by mouth every morning. 1/13/25     Lucien Fleming MD   hydrOXYzine pamoate (VISTARIL) 50 MG Cap TAKE 1 CAPSULE (50 MG) BY MOUTH NIGHTLY AS NEEDED FOR INSOMNIA 1/14/25     Lucien Fleming MD   lancets 30 gauge Misc 1 lancet by Misc.(Non-Drug; Combo Route) route twice daily. 1/13/25     Lucien Fleming MD   levothyroxine (SYNTHROID) 25 MCG tablet Take 1 tablet (25 mcg total) by mouth before breakfast. 1/13/25     Lucien Fleming MD   metFORMIN (GLUCOPHAGE) 1000 MG tablet Take 1 tablet (1,000 mg total) by mouth 2 (two) times daily with meals. 1/13/25     Lucien Fleming MD   metoprolol succinate (TOPROL-XL) 25 MG 24 hr tablet Take 1 tablet (25 mg total) by mouth once daily. 1/13/25 1/13/26   Lucien Fleming MD   olmesartan (BENICAR) 20 MG tablet TAKE 1 TABLET BY MOUTH ONCE  DAILY 1/13/25     Lucien Fleming MD   omeprazole (PRILOSEC) 20 MG capsule Take 1 capsule (20 mg total) by mouth before breakfast. 1/13/25     Lucien Fleming MD   pravastatin (PRAVACHOL) 40 MG tablet Take 1 tablet (40 mg total) by mouth once daily. 1/13/25     Lucien Fleming MD   sildenafiL (VIAGRA) 100 MG tablet Take 1 tablet (100 mg total) by mouth daily as needed for Erectile Dysfunction. 1/13/25     Lucien Fleming MD         Family History:         Family History   Problem Relation Name Age of Onset    Alzheimer's disease Mother        Diabetes Mother        Heart  "defect Father        Cancer Father        Stroke Father        Heart attack Father        Heart defect Sister        Alzheimer's disease Sister             Social History:  [Social History]    [Social History]        Tobacco Use    Smoking status: Never    Smokeless tobacco: Never   Substance Use Topics    Alcohol use: Yes       Comment: "couple of beers a day"    Drug use: No        Health Maintaince :   Primary Care Physician: Lucien Kaufman  Immunizations:   TDap is up to date.  Influenza is up to date.  Pneumovax is up to date.  COVID is up to date.  Cancer Screening:  Colonoscopy: is not up to date. , needs repeat in .     Objective:   Last 24 Hour Vital Signs:  BP  Min: 132/73  Max: 181/100  Temp  Av.7 °F (36.5 °C)  Min: 97.7 °F (36.5 °C)  Max: 97.7 °F (36.5 °C)  Pulse  Av.3  Min: 63  Max: 86  Resp  Av.2  Min: 12  Max: 18  SpO2  Av.4 %  Min: 95 %  Max: 100 %  Weight  Av.9 kg (260 lb)  Min: 117.9 kg (260 lb)  Max: 117.9 kg (260 lb)  Body mass index is 34.3 kg/m².  I/O last 3 completed shifts:  In: 500 [I.V.:500]  Out: 900 [Urine:900]     Physical Examination:  Physical Exam  Vitals and nursing note reviewed. Exam conducted with a chaperone present.   Constitutional:       General: He is not in acute distress.     Appearance: Normal appearance. He is not ill-appearing.   HENT:      Head: Atraumatic.      Right Ear: There is impacted cerumen.      Nose: Nose normal. No congestion or rhinorrhea.      Mouth/Throat:      Mouth: Mucous membranes are moist.      Pharynx: Oropharynx is clear.   Eyes:      Extraocular Movements: Extraocular movements intact.      Conjunctiva/sclera: Conjunctivae normal.      Pupils: Pupils are equal, round, and reactive to light.   Cardiovascular:      Rate and Rhythm: Normal rate and regular rhythm.      Pulses: Normal pulses.   Pulmonary:      Effort: Pulmonary effort is normal. No respiratory distress.      Breath sounds: Examination of the " right-upper field reveals rhonchi and rales. Examination of the left-upper field reveals rhonchi and rales. Examination of the right-middle field reveals rhonchi and rales. Examination of the left-middle field reveals rhonchi and rales. Decreased breath sounds, rhonchi and rales present.   Abdominal:      General: Abdomen is flat.      Palpations: Abdomen is soft.   Musculoskeletal:         General: No swelling, tenderness, deformity or signs of injury. Normal range of motion.      Cervical back: Normal range of motion.      Right lower leg: No edema.      Left lower leg: No edema.   Skin:     General: Skin is warm and dry.      Capillary Refill: Capillary refill takes less than 2 seconds.      Coloration: Skin is not jaundiced or pale.   Neurological:      Mental Status: He is alert and oriented to person, place, and time.      Cranial Nerves: No cranial nerve deficit.      Sensory: No sensory deficit.   Psychiatric:         Mood and Affect: Mood normal.         Behavior: Behavior normal.         Thought Content: Thought content normal.         Judgment: Judgment normal.        Intake/Output:    Intake/Output Summary (Last 24 hours) at 3/22/2025 0725  Last data filed at 3/22/2025 0117  Gross per 24 hour   Intake 500 ml   Output 3400 ml   Net -2900 ml     Net IO Since Admission: -2,900 mL [03/22/25 0725]    Laboratory data: reviewed       Microbiology data: reviewed       EKG data: reviewed       Radiology data: reviewed        Current Medications:     Infusions:       Scheduled:   albuterol-ipratropium  3 mL Nebulization Q6H    ARIPiprazole  5 mg Oral Daily    azithromycin  500 mg Oral Daily    cefTRIAXone (Rocephin) IV (PEDS and ADULTS)  1 g Intravenous Q24H    DULoxetine  120 mg Oral QAM    enoxparin  30 mg Subcutaneous Daily    furosemide  20 mg Oral Daily    levothyroxine  25 mcg Oral Before breakfast    losartan  50 mg Oral Daily    metoprolol succinate  25 mg Oral Daily    pravastatin  40 mg Oral Daily     predniSONE  60 mg Oral Daily        PRN:    Current Facility-Administered Medications:     benzonatate, 100 mg, Oral, TID PRN    dextrose 50%, 12.5 g, Intravenous, PRN    dextrose 50%, 25 g, Intravenous, PRN    glucagon (human recombinant), 1 mg, Intramuscular, PRN    glucose, 16 g, Oral, PRN    glucose, 24 g, Oral, PRN    hydrOXYzine pamoate, 50 mg, Oral, Nightly PRN    insulin aspart U-100, 0-5 Units, Subcutaneous, QID (AC + HS) PRN    naloxone, 0.02 mg, Intravenous, PRN    ondansetron, 4 mg, Intravenous, Q8H PRN    sodium chloride 0.9%, 10 mL, Intravenous, Q12H PRN      Assessment/Plan:     Community acquired pneumonia superimposed on ILD  5 day history of productive cough of yellow sputum, subjective febrile symptoms-cough, chills, sweating, worsened dyspneic symptoms from baseline  Follows with Dr. Ladonna Garcia for aaCT 1/13/2025 demonstrates interlobular septal thickening with traction bronchiectasis with a lower lobe and peripheral predominance which may be seen with interstitial lung disease such as NSIP or UIP. Small amount of ground-glass opacities.   Repeat CT scan tonight appears to me to have more significant GGO from prior.   In setting of abnormal pulmonary architecture and constellation of symptoms, favor treating as superimposed CAP, rocephin + doxy  Sputum cultures obtained with AFB, legionella antigen ordered   Duonebs q6, 6MWT ordered.  Consult placed to pulmonology for further guidance on recent ILD diagnosis.     Vertigo, suspected Meniere Disease  Longstanding history of severe, frequently recurring vertiginous symptoms lasting 15 minutes-48 hours, refractory to initial management in ED   Reported subjective hearing loss, ear fullness, tinnitus  Prior MRI brain wo 3/2024 unremarkable  Ordered MRI brain w/wo, with EAC protocol to assess for cholesteatoma   Favor treating as Meniere Disease with 7-14 day course of prednisone 60mg daily. He does have a mildly decreased bicarbonate on CMP, will use  triamterene/HCTZ as diuretic of choice.      ACC Stage II Hypertension  Prescribed metoprolol 25mg, olmesartan 50 outpatient. Hypertensive here to 174/74, unclear indication for metoprolol.   Losartan 50mg daily while inpatient, starting triamterene/HCTZ  Can increase as needed     Type 2 diabetes   A1c 8.2, Microalbumin/Creatinine ratio 295>44.2 over past year.   Prescribed trulicity 1.5mg once weekly, metformin 1000mg BID  SSI while inpatient  May need adjustment of diabetes medications with high dose prednisone for next 7-14 days     Microcytic anemia  Iron/Ferritin/B12/Folate ordered      Schizoaffective Disorder   On aripiprazole 5mg daily, no acute concerns     Hypothyroidism  On levothyroxine 25mcg daily, no acute concerns        Code: Full  Diet: Diabetic  Dispo: Inpatient pending improvement     Carlos Aden DO MPH  [facility] Internal Medicine HO-III  [facility] Hospitalist Team A     [facility] Medicine Hospitalist Pager numbers:   [facility] Hospitalist Medicine Team A (Rah/Anum):[phone]  [facility] Hospitalist Medicine Team B (Shantelle/Cricket):        [phone]

## 2025-03-22 NOTE — H&P
"[facility] Internal Medicine History and Physical - Resident Note    Admitting Team: A  Attending Physician: Anum  Resident: Zena    Date of Admit: 3/21/2025    Chief Complaint     Vertigo for 5 days, productive cough and chills x 6 days    Subjective:      History of Present Illness:  Fabiano Malik Jr. is a 64 y.o. man with past medical history of type 2 diabetes, hypertension, hyperlipidemia, GERD, ILD, hypothyroidism presenting to [facility] on 3/21/2025 for concern for worsening vertigo for the past 5 days. He reports that he has experienced multiple episodes of vertigo weekly for the past several years, usually lasting 20-30 minutes though sometimes as long as a day or two, worsened when he closes his eyes. He has had occasional falls due to sudden onset of vertiginous symptoms, and notes associated tinnitus, hearing loss and bilateral R>L ear fullness over this time period. This episode has been more severe and unrelenting for the past 5 days. He additionally notes that over the past week he has had increased dyspnea with exertion from baseline, cough productive of yellow sputum, and episodes of feeling feverish. He presented today for PFTs and a 6MWT as part of evaluation of ILD after a CT scan ordered in January for evaluation of "lung scarring" and chronic exertional dyspnea demonstrated interlobular septal thickening with traction bronchiectasis. However, when he arrived for his scheduled appointment he was unable to follow through with the exams due to vertigo and coughing fits. He then presented to the [facility] [place] [place] ED. In the ED he was administered meclizine and valium without any improvement.         Past Medical History:  Past Medical History:   Diagnosis Date    Chronic back pain greater than 3 months duration     Depression     Diabetes mellitus     Diabetes mellitus, type 2     Hypertension     Schizophrenia        Past Surgical History:  Past Surgical History:   Procedure " Laterality Date    APPENDECTOMY      CARPAL TUNNEL RELEASE Right 12/13/2022    Procedure: RELEASE, CARPAL TUNNEL;  Surgeon: Everton Walter Jr., MD;  Location: [facility] OR;  Service: Orthopedics;  Laterality: Right;    COLONOSCOPY N/A 5/31/2018    Procedure: COLONOSCOPY;  Surgeon: Guillaume Hatch MD;  Location: [facility] ENDO;  Service: Endoscopy;  Laterality: N/A;    COLONOSCOPY N/A 11/17/2022    Procedure: COLONOSCOPY;  Surgeon: Guillaume Hatch MD;  Location: [facility] ENDO;  Service: Endoscopy;  Laterality: N/A;    DECOMPRESSION, NERVE, ULNAR Right 12/13/2022    Procedure: DECOMPRESSION, NERVE, ULNAR;  Surgeon: Everton Walter Jr., MD;  Location: [facility] OR;  Service: Orthopedics;  Laterality: Right;    EPIDURAL STEROID INJECTION INTO LUMBAR SPINE N/A 2/21/2024    Procedure: L5-S1 JOHN;  Surgeon: Zoila Suarez DO;  Location: [facility] PAIN MANAGEMENT;  Service: Pain Management;  Laterality: N/A;  oral 30 mins    FUSION, SPINE, CERVICAL N/A 8/11/2023    Procedure: FUSION, SPINE, CERVICAL;  Surgeon: Guillaume Washington MD;  Location: [facility] OR;  Service: Neurosurgery;  Laterality: N/A;  C3-4, C5-6, C6-7 ACDF C3-C7 anterior instrumentation Depuy  -ANTERIOR DECOMPREESSION 2 LEVELS   -ANTERIOR INSTRUMENTATION INTERBODY CAGE INTERSPACE 3   -LOP 3.5 HR   -LOS 3 DAYS   -GENERAL   -TYPE AND SCREEN   - C ARM   -EMG, SEP, MEP hari notified cc  -ASPEN   -REG    NECK SURGERY      RADIOFREQUENCY ABLATION OF LUMBAR MEDIAL BRANCH NERVE AT SINGLE LEVEL Left 5/29/2018    Procedure: RADIOFREQUENCY THERMOCOAGULATION (RFTC)-NERVE-MEDIAN BRANCH-LUMBAR- Left L2-3-4-5;  Surgeon: Donavon Dennis MD;  Location: [facility] PAIN MGT;  Service: Pain Management;  Laterality: Left;    RADIOFREQUENCY ABLATION OF LUMBAR MEDIAL BRANCH NERVE AT SINGLE LEVEL Right 1/8/2019    Procedure: Radiofrequency Ablation, Nerve, Spinal, Lumbar, Medial Branch, L2,3,4,5;  Surgeon: Alberto Hernandez Jr., MD;  Location: [facility] PAIN MGT;  Service: Pain Management;  Laterality:  Right;  Pt is diabetic    RADIOFREQUENCY ABLATION OF LUMBAR MEDIAL BRANCH NERVE AT SINGLE LEVEL Left 3/12/2019    Procedure: Radiofrequency Ablation, Nerve, Spinal, Lumbar, Medial Branch, Left, L2,3,4,5;  Surgeon: Alberto Hernandez Jr., MD;  Location: [facility] PAIN MGT;  Service: Pain Management;  Laterality: Left;  Pt will be consented the day of procedure.    TRANSFORAMINAL EPIDURAL INJECTION OF STEROID Right 9/12/2019    Procedure: Injection,steroid,epidural,transforaminal,right,L4-5 and L5-S1;  Surgeon: Alberto Hernandez Jr., MD;  Location: [facility] PAIN MGT;  Service: Pain Management;  Laterality: Right;  PT IS DIABETIC       Allergies:  Review of patient's allergies indicates:   Allergen Reactions    Pcn [penicillins] Other (See Comments)     Childhood rxn, pt does not recall type of rxn       Home Medications:  Prior to Admission medications    Medication Sig Start Date End Date Taking? Authorizing Provider   albuterol (PROVENTIL/VENTOLIN HFA) 90 mcg/actuation inhaler Inhale 2 puffs into the lungs every 6 (six) hours as needed for Wheezing. Rescue 1/13/25 1/13/26  Ladonna Garcia MD   ARIPiprazole (ABILIFY) 5 MG Tab Take 1 tablet (5 mg total) by mouth once daily. 1/14/25 1/14/26  Lucien Fleming MD   BLOOD PRESSURE CUFF Misc 1 Units by Misc.(Non-Drug; Combo Route) route once daily. 10/8/21   Lucien Fleming MD   blood sugar diagnostic (TRUE METRIX GLUCOSE TEST STRIP) Strp use 1 strip to check glucose 2 (two) times a day. 1/24/25   Lucien Fleming MD   blood-glucose meter Misc Use as instructed 10/8/21 11/12/24  Lucien Fleming MD   diclofenac (VOLTAREN) 75 MG EC tablet Take 1 tablet (75 mg total) by mouth 2 (two) times daily as needed (pain). 1/14/25   Lucien Fleming MD   dulaglutide (TRULICITY) 1.5 mg/0.5 mL pen injector Inject 1.5 mg into the skin every 7 days. 1/13/25   Lucien Fleming MD   DULoxetine (CYMBALTA) 60 MG capsule Take 2 capsules (120 mg total) by  mouth every morning. 25   Lucien Fleming MD   hydrOXYzine pamoate (VISTARIL) 50 MG Cap TAKE 1 CAPSULE (50 MG) BY MOUTH NIGHTLY AS NEEDED FOR INSOMNIA 25   Lucien Fleming MD   lancets 30 gauge Misc 1 lancet by Misc.(Non-Drug; Combo Route) route twice daily. 25   Lucien Fleming MD   levothyroxine (SYNTHROID) 25 MCG tablet Take 1 tablet (25 mcg total) by mouth before breakfast. 25   Lucien Fleming MD   metFORMIN (GLUCOPHAGE) 1000 MG tablet Take 1 tablet (1,000 mg total) by mouth 2 (two) times daily with meals. 25   Lucien Fleming MD   metoprolol succinate (TOPROL-XL) 25 MG 24 hr tablet Take 1 tablet (25 mg total) by mouth once daily. 25  Lucien Fleming MD   olmesartan (BENICAR) 20 MG tablet TAKE 1 TABLET BY MOUTH ONCE  DAILY 25   Lucien Fleming MD   omeprazole (PRILOSEC) 20 MG capsule Take 1 capsule (20 mg total) by mouth before breakfast. 25   Lucien Fleming MD   pravastatin (PRAVACHOL) 40 MG tablet Take 1 tablet (40 mg total) by mouth once daily. 25   Lucien Fleming MD   sildenafiL (VIAGRA) 100 MG tablet Take 1 tablet (100 mg total) by mouth daily as needed for Erectile Dysfunction. 25   Lucien Fleming MD       Family History:  Family History   Problem Relation Name Age of Onset    Alzheimer's disease Mother      Diabetes Mother      Heart defect Father      Cancer Father      Stroke Father      Heart attack Father      Heart defect Sister      Alzheimer's disease Sister         Social History:  Social History[1]    Health Maintaince :   Primary Care Physician: Lucien Kaufman  Immunizations:   TDap is up to date.  Influenza is up to date.  Pneumovax is up to date.  COVID is up to date.  Cancer Screening:  Colonoscopy: is not up to date. , needs repeat in .     Objective:   Last 24 Hour Vital Signs:  BP  Min: 132/73  Max: 181/100  Temp  Av.7 °F  (36.5 °C)  Min: 97.7 °F (36.5 °C)  Max: 97.7 °F (36.5 °C)  Pulse  Av.3  Min: 63  Max: 86  Resp  Av.2  Min: 12  Max: 18  SpO2  Av.4 %  Min: 95 %  Max: 100 %  Weight  Av.9 kg (260 lb)  Min: 117.9 kg (260 lb)  Max: 117.9 kg (260 lb)  Body mass index is 34.3 kg/m².  I/O last 3 completed shifts:  In: 500 [I.V.:500]  Out: 900 [Urine:900]    Physical Examination:  Physical Exam  Vitals and nursing note reviewed. Exam conducted with a chaperone present.   Constitutional:       General: He is not in acute distress.     Appearance: Normal appearance. He is not ill-appearing.   HENT:      Head: Atraumatic.      Right Ear: There is impacted cerumen.      Nose: Nose normal. No congestion or rhinorrhea.      Mouth/Throat:      Mouth: Mucous membranes are moist.      Pharynx: Oropharynx is clear.   Eyes:      Extraocular Movements: Extraocular movements intact.      Conjunctiva/sclera: Conjunctivae normal.      Pupils: Pupils are equal, round, and reactive to light.   Cardiovascular:      Rate and Rhythm: Normal rate and regular rhythm.      Pulses: Normal pulses.   Pulmonary:      Effort: Pulmonary effort is normal. No respiratory distress.      Breath sounds: Examination of the right-upper field reveals rhonchi and rales. Examination of the left-upper field reveals rhonchi and rales. Examination of the right-middle field reveals rhonchi and rales. Examination of the left-middle field reveals rhonchi and rales. Decreased breath sounds, rhonchi and rales present.   Abdominal:      General: Abdomen is flat.      Palpations: Abdomen is soft.   Musculoskeletal:         General: No swelling, tenderness, deformity or signs of injury. Normal range of motion.      Cervical back: Normal range of motion.      Right lower leg: No edema.      Left lower leg: No edema.   Skin:     General: Skin is warm and dry.      Capillary Refill: Capillary refill takes less than 2 seconds.      Coloration: Skin is not jaundiced or  pale.   Neurological:      Mental Status: He is alert and oriented to person, place, and time.      Cranial Nerves: No cranial nerve deficit.      Sensory: No sensory deficit.   Psychiatric:         Mood and Affect: Mood normal.         Behavior: Behavior normal.         Thought Content: Thought content normal.         Judgment: Judgment normal.       Laboratory:  Most Recent Data:  CBC:   Lab Results   Component Value Date    WBC 6.43 03/22/2025    HGB 12.4 (L) 03/22/2025    HCT 37.3 (L) 03/22/2025     03/22/2025    MCV 76 (L) 03/22/2025    RDW 14.6 (H) 03/22/2025     BMP:   Lab Results   Component Value Date     (L) 03/22/2025    K 4.5 03/22/2025     03/22/2025    CO2 21 (L) 03/22/2025    BUN 17 03/22/2025    CREATININE 1.1 03/22/2025     03/21/2025    CALCIUM 9.2 03/22/2025    MG 1.7 03/22/2025    PHOS 3.2 03/22/2025     LFTs:   Lab Results   Component Value Date    PROT 7.5 03/21/2025    ALBUMIN 4.1 03/22/2025    BILITOT 0.3 03/22/2025    AST 22 03/22/2025    ALKPHOS 66 03/22/2025    ALT 18 03/22/2025     Coags:   Lab Results   Component Value Date    INR 1.0 11/28/2018     FLP:   Lab Results   Component Value Date    CHOL 128 11/07/2024    HDL 41 11/07/2024    LDLCALC 60.6 (L) 11/07/2024    TRIG 132 11/07/2024    CHOLHDL 32.0 11/07/2024     DM:   Lab Results   Component Value Date    HGBA1C 8.2 (H) 03/22/2025    HGBA1C 8.2 (H) 11/07/2024    HGBA1C 7.2 (H) 04/26/2024    LDLCALC 60.6 (L) 11/07/2024    CREATININE 1.1 03/22/2025     Thyroid:   Lab Results   Component Value Date    TSH 3.501 11/12/2024    FREET4 0.83 11/20/2023     Anemia:   Lab Results   Component Value Date    IRON 83 10/05/2023    TIBC 290 10/05/2023    FERRITIN 28.2 03/22/2025    ERCRIATY26 >2000 (H) 03/21/2025    FOLATE 16.7 10/05/2023     Cardiac:   Lab Results   Component Value Date    TROPONINI 0.013 03/21/2025    BNP 52 03/21/2025     Urinalysis:   Lab Results   Component Value Date    COLORU Colorless (A)  03/22/2025    SPECGRAV 1.010 03/22/2025    NITRITE Negative 03/22/2025    KETONESU Negative 10/04/2023    UROBILINOGEN Negative 03/22/2025    WBCUA 3 07/07/2023       Trended Lab Data:  Recent Labs   Lab 03/21/25  1229 03/21/25  1458 03/21/25  1620 03/22/25  0159   WBC 8.50  --  9.42 6.43   HGB 12.3*  --  12.0* 12.4*   HCT 36.1*  --  35.2* 37.3*     --  302 333   MCV 74*  --  76* 76*   RDW 14.5  --  14.5 14.6*   * 128*  --  135*   K 4.3 4.5  --  4.5    99  --  104   CO2 18* 19*  --  21*   BUN 20 20  --  17   CREATININE 1.2 1.2  --  1.1    106  --   --    PROT 7.4 7.5  --   --    ALBUMIN 4.3 4.3  --  4.1   BILITOT 0.5 0.5  --  0.3   AST 24 24  --  22   ALKPHOS 64 63  --  66   ALT 17 18  --  18       Trended Cardiac Data:  Recent Labs   Lab 03/21/25  1458   TROPONINI 0.013   BNP 52       Microbiology Data:  Sputum culture and AFB 3/22 ordered      Radiology:  Imaging Results              CT Chest With Contrast (In process)                      X-Ray Chest 1 View (Final result)  Result time 03/21/25 17:58:43      Final result by Tommie Villa DO (03/21/25 17:58:43)                   Impression:      No acute abnormality.      Electronically signed by: Tommie Villa  Date:    03/21/2025  Time:    17:58               Narrative:    EXAMINATION:  XR CHEST 1 VIEW    CLINICAL HISTORY:  Shortness of breath    TECHNIQUE:  Single frontal view of the chest was performed.    COMPARISON:  01/29/2025.    FINDINGS:  The lungs are well expanded and clear. No focal opacities are seen. The pleural spaces are clear. The cardiac silhouette is unremarkable. The visualized osseous structures are unremarkable.                                         Assessment:     Fabiano Malik Jr. is a 64 y.o. male with acutely worsened episode of chronic vertigo and exertional dyspnea, productive cough with subjective chills in the setting of recently diagnosed ILD.       Plan:     Community acquired pneumonia  superimposed on ILD  5 day history of productive cough of yellow sputum, subjective febrile symptoms-cough, chills, sweating, worsened dyspneic symptoms from baseline  Follows with Dr. Ladonna Garcia for aaCT 1/13/2025 demonstrates interlobular septal thickening with traction bronchiectasis with a lower lobe and peripheral predominance which may be seen with interstitial lung disease such as NSIP or UIP. Small amount of ground-glass opacities.   Repeat CT scan tonight appears to me to have more significant GGO from prior.   In setting of abnormal pulmonary architecture and constellation of symptoms, favor treating as superimposed CAP, rocephin + doxy  Sputum cultures obtained with AFB, legionella antigen ordered   Duonebs q6, 6MWT ordered.  Consult placed to pulmonology for further guidance on recent ILD diagnosis.    Vertigo, suspected Meniere Disease  Longstanding history of severe, frequently recurring vertiginous symptoms lasting 15 minutes-48 hours, refractory to initial management in ED   Reported subjective hearing loss, ear fullness, tinnitus  Prior MRI brain wo 3/2024 unremarkable  Ordered MRI brain w/wo, with EAC protocol to assess for cholesteatoma   Favor treating as Meniere Disease with 7-14 day course of prednisone 60mg daily. He does have a mildly decreased bicarbonate on CMP, will use triamterene/HCTZ as diuretic of choice.     ACC Stage II Hypertension  Prescribed metoprolol 25mg, olmesartan 50 outpatient. Hypertensive here to 174/74, unclear indication for metoprolol.   Losartan 50mg daily while inpatient, starting triamterene/HCTZ  Can increase as needed    Type 2 diabetes   A1c 8.2, Microalbumin/Creatinine ratio 295>44.2 over past year.   Prescribed trulicity 1.5mg once weekly, metformin 1000mg BID  SSI while inpatient  May need adjustment of diabetes medications with high dose prednisone for next 7-14 days     Microcytic anemia  Iron/Ferritin/B12/Folate ordered     Schizoaffective Disorder   On  "aripiprazole 5mg daily, no acute concerns    Hypothyroidism  On levothyroxine 25mcg daily, no acute concerns      Code: Full  Diet: Diabetic  Dispo: Inpatient pending improvement    Farheen Freitas  [facility] Internal Medicine HO-IV  U Night Float Service    [facility] Medicine Hospitalist Pager numbers:   U Hospitalist Medicine Team A (Rah/Anum): [phone]  [facility] Hospitalist Medicine Team B (Shantelle/Cricket):  [phone]         [1]   Social History  Tobacco Use    Smoking status: Never    Smokeless tobacco: Never   Substance Use Topics    Alcohol use: Yes     Comment: "couple of beers a day"    Drug use: No     "

## 2025-03-22 NOTE — PHARMACY MED REC
"[facility]           Pharmacy  Admission Medication History     The home medication history was taken by Marcela Woodruff.      Medication history obtained from Medications listed below were obtained from: Patient/family    Based on information gathered for medication list, you may go to "Admission" then "Reconcile Home Medications" tabs to review and/or act upon those items.     The home medication list has been updated by the Pharmacy department.   Please read ALL comments highlighted in yellow.   Please address this information as you see fit.    Feel free to contact us if you have any questions or require assistance.        No current facility-administered medications on file prior to encounter.     Current Outpatient Medications on File Prior to Encounter   Medication Sig Dispense Refill    albuterol (PROVENTIL/VENTOLIN HFA) 90 mcg/actuation inhaler Inhale 2 puffs into the lungs every 6 (six) hours as needed for Wheezing. Rescue 18 g 0    amLODIPine (NORVASC) 5 MG tablet Take 5 mg by mouth once daily.      ARIPiprazole (ABILIFY) 5 MG Tab Take 1 tablet (5 mg total) by mouth once daily. 90 tablet 3    diclofenac (VOLTAREN) 75 MG EC tablet Take 1 tablet (75 mg total) by mouth 2 (two) times daily as needed (pain). 60 tablet 11    dulaglutide (TRULICITY) 1.5 mg/0.5 mL pen injector Inject 1.5 mg into the skin every 7 days. (Patient taking differently: Inject 1.5 mg into the skin every Tuesday.) 12 pen 1    DULoxetine (CYMBALTA) 60 MG capsule Take 2 capsules (120 mg total) by mouth every morning. 180 capsule 3    hydrOXYzine pamoate (VISTARIL) 50 MG Cap TAKE 1 CAPSULE (50 MG) BY MOUTH NIGHTLY AS NEEDED FOR INSOMNIA (Patient taking differently: Take 50 mg by mouth nightly as needed (insomnia).) 90 capsule 3    ketoconazole (NIZORAL) 2 % cream Apply topically once daily.      levothyroxine (SYNTHROID) 25 MCG tablet Take 1 tablet (25 mcg total) by mouth before breakfast. 90 tablet 3    metFORMIN " (GLUCOPHAGE) 1000 MG tablet Take 1 tablet (1,000 mg total) by mouth 2 (two) times daily with meals. 180 tablet 1    olmesartan (BENICAR) 20 MG tablet TAKE 1 TABLET BY MOUTH ONCE  DAILY (Patient taking differently: Take 20 mg by mouth once daily.) 90 tablet 3    omeprazole (PRILOSEC) 20 MG capsule Take 1 capsule (20 mg total) by mouth before breakfast. 90 capsule 3    pravastatin (PRAVACHOL) 40 MG tablet Take 1 tablet (40 mg total) by mouth once daily. 90 tablet 3    sildenafiL (VIAGRA) 100 MG tablet Take 1 tablet (100 mg total) by mouth daily as needed for Erectile Dysfunction. 30 tablet 11    BLOOD PRESSURE CUFF Misc 1 Units by Misc.(Non-Drug; Combo Route) route once daily. 1 each 0    blood sugar diagnostic (TRUE METRIX GLUCOSE TEST STRIP) Strp use 1 strip to check glucose 2 (two) times a day. 200 strip 3    blood-glucose meter Misc Use as instructed 1 each 0    lancets 30 gauge Misc 1 lancet by Misc.(Non-Drug; Combo Route) route twice daily. 200 each 3       Please address this information as you see fit.  Feel free to contact us if you have any questions or require assistance.    Marcela Woodruff  [phone]                  .

## 2025-03-22 NOTE — PLAN OF CARE
Problem: Occupational Therapy  Goal: Occupational Therapy Goal  Outcome: Met     Pt was agreeable to and participated in OT/PT evaluation.  Pt reports that he lives alone and was mod I with mobility and ADLS at [facility].  During pt's evaluation, pt was noted to perform ADLs at baseline, but needed CGA with functional mobility due to his increasing dizziness/vertigo (not physical or neurological limitations).  Pt reports that he will be able to get family assistance if needed and will be referred to outpatient PT for vestibular rehab upon discharge.  Pt is discharged from acute OT and post acute OT is not recommended at this time, however, pt will benefit from a shower chair for DME upon discharge for safe positioning when bathing.      Mi Streeter, OT  3/22/2025

## 2025-03-22 NOTE — PLAN OF CARE
Problem: Physical Therapy  Goal: Physical Therapy Goal  Description: Goals to be met by: 2025     Patient will increase functional independence with mobility by performin. Supine to sit with Modified [place]  2. Sit to supine with Modified [place]  3. Sit to stand transfer with Modified [place] without dizziness  4. Gait  x 150 feet with Modified [place] using Rolling Walker with good balance and control without dizziness.     Outcome: Progressing     Pt presented with increased dizziness with right > left side nystagmus with H test and increased dizziness with standing during evaluation. Pt seems functional, but was not willing to ambulate secondary to progressive dizziness. His bed mobility with CGA but needed trunk support at times as he had challenges as his dizziness progressed. Will continue to see 1-2 times a week if remains hospitalized. No DME, OPPT Neuro for vestibular upon DC.

## 2025-03-22 NOTE — PT/OT/SLP EVAL
Physical Therapy Evaluation    Patient Name:  Fabiano Malik Jr.   MRN:  [id number]    Recommendations:     Discharge Recommendations: Low Intensity Therapy (Neuro OPPT)   Discharge Equipment Recommendations: shower chair   Barriers to discharge: None    Assessment:     Fabiano Malik Jr. is a 64 y.o. male admitted with a medical diagnosis of <principal problem not specified>.  He presents with the following impairments/functional limitations: weakness, impaired sensation, impaired self care skills, impaired functional mobility, gait instability, impaired balance, decreased lower extremity function, decreased safety awareness, impaired coordination Pt presented with increased dizziness with right > left side nystagmus with H test and increased dizziness with standing during evaluation. Pt seems functional, but was not willing to ambulate secondary to progressive dizziness. His bed mobility with CGA but needed trunk support at times as he had challenges as his dizziness progressed. Will continue to see 1-2 times a week if remains hospitalized. No DME, OPPT Neuro for vestibular upon DC. .    Rehab Prognosis: Fair; patient would benefit from acute skilled PT services to address these deficits and reach maximum level of function.    Recent Surgery: * No surgery found *      Plan:     During this hospitalization, patient to be seen 2 x/week to address the identified rehab impairments via gait training, therapeutic activities, therapeutic exercises, neuromuscular re-education and progress toward the following goals:    Plan of Care Expires:  04/12/25    Subjective     Chief Complaint: dizziness will not go away  Patient/Family Comments/goals: none stated  Pain/Comfort:  Pain Rating 1: 10/10  Location 1: back  Pain Rating Post-Intervention 1: 10/10  Location 2: neck    Patients cultural, spiritual, Anabaptist conflicts given the current situation: no    Living Environment:  Lives alone in a 1st floor apartment with 4  steps to enter with rail on the left when entering  Prior to admission, patients level of function was indep.  Equipment used at home: rollator.  DME owned (not currently used): none.  Upon discharge, patient will have assistance from daughter that lives nearby.    Objective:     Communicated with nsg prior to session.  Patient found supine with telemetry, blood pressure cuff, peripheral IV, pulse ox (continuous)  upon PT entry to room.    General Precautions: Standard, fall, other (see comments) (dizziness)  Orthopedic Precautions:    Braces: N/A  Respiratory Status: Room air    Exams:  Sensation: -       Impaired  secondary to neuropathy in the feet  Skin Integrity/Edema:  -       Edema: None noted bilaterally  RLE ROM: WFL  RLE Strength: WFL  LLE ROM: WFL  LLE Strength: WFL    Patient donned non slip socks and gait belt for OOB mobility    Functional Mobility:  Bed Mobility:  Supine to Sit: contact guard assistance  Sit to Supine: contact guard assistance  Transfers:  Sit to Stand:  contact guard assistance with rolling walker  Gait: pt able to take 3 side steps to head of bed with RW with good control but closed his eyes when he became dizzy and became unsteady with his balance      AM-PAC 6 CLICK MOBILITY  Total Score:14       Treatment & Education:   PT educated patient:  PT plan of care/role of PT  Safety with OOB mobility  Use of RW for household and community ambulation. - RW vs rollator use  Energy conservation techniques   Discharge disposition    Pt  verbalized understanding       Patient left supine with call button in reach.    GOALS:   Multidisciplinary Problems       Physical Therapy Goals          Problem: Physical Therapy    Goal Priority Disciplines Outcome Interventions   Physical Therapy Goal     PT, PT/OT Progressing    Description: Goals to be met by: 2025     Patient will increase functional independence with mobility by performin. Supine to sit with Modified [place]  2. Sit  to supine with Modified [place]  3. Sit to stand transfer with Modified [place] without dizziness  4. Gait  x 150 feet with Modified [place] using Rolling Walker with good balance and control without dizziness.                          DME Justifications:  No DME recommended requiring DME justifications    History:     Past Medical History:   Diagnosis Date    Chronic back pain greater than 3 months duration     Depression     Diabetes mellitus     Diabetes mellitus, type 2     Hypertension     Schizophrenia        Past Surgical History:   Procedure Laterality Date    APPENDECTOMY      CARPAL TUNNEL RELEASE Right 12/13/2022    Procedure: RELEASE, CARPAL TUNNEL;  Surgeon: Everton Walter Jr., MD;  Location: [facility] OR;  Service: Orthopedics;  Laterality: Right;    COLONOSCOPY N/A 5/31/2018    Procedure: COLONOSCOPY;  Surgeon: Guillaume Hatch MD;  Location: [facility] ENDO;  Service: Endoscopy;  Laterality: N/A;    COLONOSCOPY N/A 11/17/2022    Procedure: COLONOSCOPY;  Surgeon: Guillaume Hatch MD;  Location: [facility] ENDO;  Service: Endoscopy;  Laterality: N/A;    DECOMPRESSION, NERVE, ULNAR Right 12/13/2022    Procedure: DECOMPRESSION, NERVE, ULNAR;  Surgeon: Everton Walter Jr., MD;  Location: [facility] OR;  Service: Orthopedics;  Laterality: Right;    EPIDURAL STEROID INJECTION INTO LUMBAR SPINE N/A 2/21/2024    Procedure: L5-S1 JOHN;  Surgeon: Zoila Suarez DO;  Location: [facility];  Service: Pain Management;  Laterality: N/A;  oral 30 mins    FUSION, SPINE, CERVICAL N/A 8/11/2023    Procedure: FUSION, SPINE, CERVICAL;  Surgeon: Guillaume Washington MD;  Location: [facility] OR;  Service: Neurosurgery;  Laterality: N/A;  C3-4, C5-6, C6-7 ACDF C3-C7 anterior instrumentation Depuy  -ANTERIOR DECOMPREESSION 2 LEVELS   -ANTERIOR INSTRUMENTATION INTERBODY CAGE INTERSPACE 3   -LOP 3.5 HR   -LOS 3 DAYS   -GENERAL   -TYPE AND SCREEN   - C ARM   -EMG, SEP, MEP hari notified cc  -ASPEN   -REG    NECK SURGERY       RADIOFREQUENCY ABLATION OF LUMBAR MEDIAL BRANCH NERVE AT SINGLE LEVEL Left 5/29/2018    Procedure: RADIOFREQUENCY THERMOCOAGULATION (RFTC)-NERVE-MEDIAN BRANCH-LUMBAR- Left L2-3-4-5;  Surgeon: Donavon Dennis MD;  Location: [facility];  Service: Pain Management;  Laterality: Left;    RADIOFREQUENCY ABLATION OF LUMBAR MEDIAL BRANCH NERVE AT SINGLE LEVEL Right 1/8/2019    Procedure: Radiofrequency Ablation, Nerve, Spinal, Lumbar, Medial Branch, L2,3,4,5;  Surgeon: Alberto Hernandez Jr., MD;  Location: [facility];  Service: Pain Management;  Laterality: Right;  Pt is diabetic    RADIOFREQUENCY ABLATION OF LUMBAR MEDIAL BRANCH NERVE AT SINGLE LEVEL Left 3/12/2019    Procedure: Radiofrequency Ablation, Nerve, Spinal, Lumbar, Medial Branch, Left, L2,3,4,5;  Surgeon: Alberto Hernandez Jr., MD;  Location: [facility];  Service: Pain Management;  Laterality: Left;  Pt will be consented the day of procedure.    TRANSFORAMINAL EPIDURAL INJECTION OF STEROID Right 9/12/2019    Procedure: Injection,steroid,epidural,transforaminal,right,L4-5 and L5-S1;  Surgeon: Alberto Hernandez Jr., MD;  Location: [facility];  Service: Pain Management;  Laterality: Right;  PT IS DIABETIC       Time Tracking:     PT Received On: 03/22/25  PT Start Time: 0857     PT Stop Time: 0912  PT Total Time (min): 15 min     Billable Minutes: Evaluation 15      03/22/2025

## 2025-03-23 PROBLEM — U07.1 COVID-19 VIRUS INFECTION: Status: ACTIVE | Noted: 2025-03-23

## 2025-03-23 PROBLEM — R91.8 GROUND GLASS OPACITY PRESENT ON IMAGING OF LUNG: Status: ACTIVE | Noted: 2025-03-23

## 2025-03-23 PROBLEM — E87.1 HYPONATREMIA: Status: ACTIVE | Noted: 2025-03-23

## 2025-03-23 LAB
ADENOVIRUS: NOT DETECTED
ALBUMIN SERPL BCP-MCNC: 3.5 G/DL (ref 3.5–5.2)
ALP SERPL-CCNC: 56 UNIT/L (ref 40–150)
ALT SERPL W/O P-5'-P-CCNC: 14 UNIT/L (ref 10–44)
ANION GAP (OHS): 9 MMOL/L (ref 8–16)
AST SERPL-CCNC: 16 UNIT/L (ref 11–45)
BILIRUB SERPL-MCNC: 0.3 MG/DL (ref 0.1–1)
BORDETELLA PARAPERTUSSIS (IS1001): NOT DETECTED
BORDETELLA PERTUSSIS (PTXP): NOT DETECTED
BUN SERPL-MCNC: 24 MG/DL (ref 8–23)
CALCIUM SERPL-MCNC: 8.2 MG/DL (ref 8.7–10.5)
CHLAMYDIA PNEUMONIAE: NOT DETECTED
CHLORIDE SERPL-SCNC: 99 MMOL/L (ref 95–110)
CO2 SERPL-SCNC: 19 MMOL/L (ref 23–29)
CORONAVIRUS 229E, COMMON COLD VIRUS: NOT DETECTED
CORONAVIRUS HKU1, COMMON COLD VIRUS: NOT DETECTED
CORONAVIRUS NL63, COMMON COLD VIRUS: NOT DETECTED
CORONAVIRUS OC43, COMMON COLD VIRUS: NOT DETECTED
CREAT SERPL-MCNC: 1.3 MG/DL (ref 0.5–1.4)
ERYTHROCYTE [DISTWIDTH] IN BLOOD BY AUTOMATED COUNT: 14.5 % (ref 11.5–14.5)
FLUBV RNA NPH QL NAA+NON-PROBE: NOT DETECTED
GFR SERPLBLD CREATININE-BSD FMLA CKD-EPI: >60 ML/MIN/1.73/M2
GLUCOSE SERPL-MCNC: 146 MG/DL (ref 70–110)
HCT VFR BLD AUTO: 30.4 % (ref 40–54)
HGB BLD-MCNC: 10.4 GM/DL (ref 14–18)
HPIV1 RNA NPH QL NAA+NON-PROBE: NOT DETECTED
HPIV2 RNA NPH QL NAA+NON-PROBE: NOT DETECTED
HPIV3 RNA NPH QL NAA+NON-PROBE: NOT DETECTED
HPIV4 RNA NPH QL NAA+NON-PROBE: NOT DETECTED
HUMAN METAPNEUMOVIRUS: NOT DETECTED
INFLUENZA A BY PCR (OHS): NEGATIVE
INFLUENZA A: NOT DETECTED
INFLUENZA B BY PCR (OHS): NEGATIVE
MAGNESIUM SERPL-MCNC: 1.6 MG/DL (ref 1.6–2.6)
MCH RBC QN AUTO: 25.9 PG (ref 27–50)
MCHC RBC AUTO-ENTMCNC: 34.2 G/DL (ref 32–36)
MCV RBC AUTO: 76 FL (ref 82–98)
MYCOPLASMA PNEUMONIAE: NOT DETECTED
PHOSPHATE SERPL-MCNC: 3.8 MG/DL (ref 2.7–4.5)
PLATELET # BLD AUTO: 259 K/UL (ref 150–450)
PMV BLD AUTO: 8.1 FL (ref 9.2–12.9)
POCT GLUCOSE: 146 MG/DL (ref 70–110)
POCT GLUCOSE: 245 MG/DL (ref 70–110)
POCT GLUCOSE: 287 MG/DL (ref 70–110)
POCT GLUCOSE: 311 MG/DL (ref 70–110)
POTASSIUM SERPL-SCNC: 4 MMOL/L (ref 3.5–5.1)
PROT SERPL-MCNC: 6.1 GM/DL (ref 6–8.4)
RBC # BLD AUTO: 4.02 M/UL (ref 4.6–6.2)
RESPIRATORY INFECTION PANEL SOURCE: ABNORMAL
RSV A 5' UTR RNA NPH QL NAA+PROBE: NEGATIVE
RSV RNA NPH QL NAA+NON-PROBE: NOT DETECTED
RV+EV RNA NPH QL NAA+NON-PROBE: NOT DETECTED
SARS-COV-2 RNA RESP QL NAA+PROBE: DETECTED
SARS-COV-2 RNA RESP QL NAA+PROBE: POSITIVE
SODIUM SERPL-SCNC: 127 MMOL/L (ref 136–145)
WBC # BLD AUTO: 8.02 K/UL (ref 3.9–12.7)

## 2025-03-23 PROCEDURE — 25000003 PHARM REV CODE 250

## 2025-03-23 PROCEDURE — 63600175 PHARM REV CODE 636 W HCPCS: Performed by: STUDENT IN AN ORGANIZED HEALTH CARE EDUCATION/TRAINING PROGRAM

## 2025-03-23 PROCEDURE — 85027 COMPLETE CBC AUTOMATED: CPT | Performed by: STUDENT IN AN ORGANIZED HEALTH CARE EDUCATION/TRAINING PROGRAM

## 2025-03-23 PROCEDURE — 83735 ASSAY OF MAGNESIUM: CPT | Performed by: STUDENT IN AN ORGANIZED HEALTH CARE EDUCATION/TRAINING PROGRAM

## 2025-03-23 PROCEDURE — XW033E5 INTRODUCTION OF REMDESIVIR ANTI-INFECTIVE INTO PERIPHERAL VEIN, PERCUTANEOUS APPROACH, NEW TECHNOLOGY GROUP 5: ICD-10-PCS | Performed by: INTERNAL MEDICINE

## 2025-03-23 PROCEDURE — 99451 NTRPROF PH1/NTRNET/EHR 5/>: CPT | Mod: ,,, | Performed by: STUDENT IN AN ORGANIZED HEALTH CARE EDUCATION/TRAINING PROGRAM

## 2025-03-23 PROCEDURE — 99900035 HC TECH TIME PER 15 MIN (STAT)

## 2025-03-23 PROCEDURE — 0202U NFCT DS 22 TRGT SARS-COV-2: CPT | Performed by: STUDENT IN AN ORGANIZED HEALTH CARE EDUCATION/TRAINING PROGRAM

## 2025-03-23 PROCEDURE — 94761 N-INVAS EAR/PLS OXIMETRY MLT: CPT

## 2025-03-23 PROCEDURE — 27000207 HC ISOLATION

## 2025-03-23 PROCEDURE — 36415 COLL VENOUS BLD VENIPUNCTURE: CPT

## 2025-03-23 PROCEDURE — 94640 AIRWAY INHALATION TREATMENT: CPT

## 2025-03-23 PROCEDURE — 11000001 HC ACUTE MED/SURG PRIVATE ROOM

## 2025-03-23 PROCEDURE — 63600175 PHARM REV CODE 636 W HCPCS: Mod: JZ,TB

## 2025-03-23 PROCEDURE — 25000003 PHARM REV CODE 250: Performed by: STUDENT IN AN ORGANIZED HEALTH CARE EDUCATION/TRAINING PROGRAM

## 2025-03-23 PROCEDURE — 84100 ASSAY OF PHOSPHORUS: CPT | Performed by: STUDENT IN AN ORGANIZED HEALTH CARE EDUCATION/TRAINING PROGRAM

## 2025-03-23 PROCEDURE — 25000242 PHARM REV CODE 250 ALT 637 W/ HCPCS: Performed by: STUDENT IN AN ORGANIZED HEALTH CARE EDUCATION/TRAINING PROGRAM

## 2025-03-23 PROCEDURE — 80053 COMPREHEN METABOLIC PANEL: CPT

## 2025-03-23 RX ORDER — MAGNESIUM SULFATE HEPTAHYDRATE 40 MG/ML
2 INJECTION, SOLUTION INTRAVENOUS ONCE
Status: COMPLETED | OUTPATIENT
Start: 2025-03-23 | End: 2025-03-23

## 2025-03-23 RX ORDER — NAPROXEN SODIUM 220 MG/1
81 TABLET, FILM COATED ORAL DAILY
Status: DISCONTINUED | OUTPATIENT
Start: 2025-03-23 | End: 2025-03-28 | Stop reason: HOSPADM

## 2025-03-23 RX ORDER — MECLIZINE HCL 12.5 MG 12.5 MG/1
25 TABLET ORAL 3 TIMES DAILY PRN
Status: DISCONTINUED | OUTPATIENT
Start: 2025-03-23 | End: 2025-03-24

## 2025-03-23 RX ORDER — INSULIN ASPART 100 [IU]/ML
0-10 INJECTION, SOLUTION INTRAVENOUS; SUBCUTANEOUS
Status: DISCONTINUED | OUTPATIENT
Start: 2025-03-23 | End: 2025-03-28 | Stop reason: HOSPADM

## 2025-03-23 RX ORDER — CALCIUM GLUCONATE 20 MG/ML
1 INJECTION, SOLUTION INTRAVENOUS ONCE
Status: COMPLETED | OUTPATIENT
Start: 2025-03-23 | End: 2025-03-23

## 2025-03-23 RX ORDER — LANOLIN ALCOHOL/MO/W.PET/CERES
1 CREAM (GRAM) TOPICAL DAILY
Status: DISCONTINUED | OUTPATIENT
Start: 2025-03-23 | End: 2025-03-28 | Stop reason: HOSPADM

## 2025-03-23 RX ADMIN — DOXYCYCLINE HYCLATE 100 MG: 100 TABLET, COATED ORAL at 08:03

## 2025-03-23 RX ADMIN — GABAPENTIN 300 MG: 300 CAPSULE ORAL at 08:03

## 2025-03-23 RX ADMIN — ARIPIPRAZOLE 5 MG: 5 TABLET ORAL at 08:03

## 2025-03-23 RX ADMIN — LEVOTHYROXINE SODIUM 25 MCG: 25 TABLET ORAL at 06:03

## 2025-03-23 RX ADMIN — BENZONATATE 100 MG: 100 CAPSULE ORAL at 02:03

## 2025-03-23 RX ADMIN — CEFTRIAXONE SODIUM 1 G: 1 INJECTION, POWDER, FOR SOLUTION INTRAMUSCULAR; INTRAVENOUS at 06:03

## 2025-03-23 RX ADMIN — INSULIN ASPART 4 UNITS: 100 INJECTION, SOLUTION INTRAVENOUS; SUBCUTANEOUS at 12:03

## 2025-03-23 RX ADMIN — CALCIUM GLUCONATE 1 G: 20 INJECTION, SOLUTION INTRAVENOUS at 03:03

## 2025-03-23 RX ADMIN — IPRATROPIUM BROMIDE AND ALBUTEROL SULFATE 3 ML: 2.5; .5 SOLUTION RESPIRATORY (INHALATION) at 12:03

## 2025-03-23 RX ADMIN — SODIUM CHLORIDE, POTASSIUM CHLORIDE, SODIUM LACTATE AND CALCIUM CHLORIDE 1000 ML: 600; 310; 30; 20 INJECTION, SOLUTION INTRAVENOUS at 02:03

## 2025-03-23 RX ADMIN — REMDESIVIR 200 MG: 100 INJECTION, POWDER, LYOPHILIZED, FOR SOLUTION INTRAVENOUS at 09:03

## 2025-03-23 RX ADMIN — IPRATROPIUM BROMIDE AND ALBUTEROL SULFATE 3 ML: 2.5; .5 SOLUTION RESPIRATORY (INHALATION) at 01:03

## 2025-03-23 RX ADMIN — MECLIZINE 25 MG: 12.5 TABLET ORAL at 12:03

## 2025-03-23 RX ADMIN — ASPIRIN 81 MG CHEWABLE TABLET 81 MG: 81 TABLET CHEWABLE at 12:03

## 2025-03-23 RX ADMIN — PRAVASTATIN SODIUM 40 MG: 40 TABLET ORAL at 08:03

## 2025-03-23 RX ADMIN — GABAPENTIN 300 MG: 300 CAPSULE ORAL at 03:03

## 2025-03-23 RX ADMIN — INSULIN ASPART 4 UNITS: 100 INJECTION, SOLUTION INTRAVENOUS; SUBCUTANEOUS at 10:03

## 2025-03-23 RX ADMIN — INSULIN ASPART 6 UNITS: 100 INJECTION, SOLUTION INTRAVENOUS; SUBCUTANEOUS at 05:03

## 2025-03-23 RX ADMIN — MAGNESIUM SULFATE HEPTAHYDRATE 2 G: 40 INJECTION, SOLUTION INTRAVENOUS at 08:03

## 2025-03-23 RX ADMIN — TRIAMTERENE AND HYDROCHLOROTHIAZIDE 1 CAPSULE: 37.5; 25 CAPSULE ORAL at 08:03

## 2025-03-23 RX ADMIN — MAGNESIUM SULFATE HEPTAHYDRATE 2 G: 40 INJECTION, SOLUTION INTRAVENOUS at 10:03

## 2025-03-23 RX ADMIN — IPRATROPIUM BROMIDE AND ALBUTEROL SULFATE 3 ML: 2.5; .5 SOLUTION RESPIRATORY (INHALATION) at 08:03

## 2025-03-23 RX ADMIN — ENOXAPARIN SODIUM 30 MG: 30 INJECTION SUBCUTANEOUS at 05:03

## 2025-03-23 RX ADMIN — FERROUS SULFATE TAB 325 MG (65 MG ELEMENTAL FE) 1 EACH: 325 (65 FE) TAB at 08:03

## 2025-03-23 RX ADMIN — PREDNISONE 60 MG: 20 TABLET ORAL at 08:03

## 2025-03-23 RX ADMIN — DULOXETINE HYDROCHLORIDE 120 MG: 30 CAPSULE, DELAYED RELEASE ORAL at 06:03

## 2025-03-23 RX ADMIN — IPRATROPIUM BROMIDE AND ALBUTEROL SULFATE 3 ML: 2.5; .5 SOLUTION RESPIRATORY (INHALATION) at 07:03

## 2025-03-23 NOTE — PROGRESS NOTES
"[facility] Consult Note    Primary Attending Physician: Dr. Rowan  Consultant Attending: Dr. Brandt    Reason for Consult:     ILD    Subjective:      24 hour events:  - COVID test positive    Patient reports he is feeling about the same this morning, no improvement in symptoms but no worsening. Feeling nervous about positive COVID test.      Objective:   Last 24 Hour Vital Signs:  BP  Min: 114/60  Max: 152/73  Temp  Av.7 °F (36.5 °C)  Min: 97 °F (36.1 °C)  Max: 98.4 °F (36.9 °C)  Pulse  Av.5  Min: 63  Max: 88  Resp  Av.3  Min: 15  Max: 19  SpO2  Av.7 %  Min: 94 %  Max: 100 %  Height  Av' 1" (185.4 cm)  Min: 6' 1" (185.4 cm)  Max: 6' 1" (185.4 cm)  Weight  Av.1 kg (236 lb 1.8 oz)  Min: 107.1 kg (236 lb 1.8 oz)  Max: 107.1 kg (236 lb 1.8 oz)  I/O last 3 completed shifts:  In: -   Out: 3325 [Urine:3325]    Physical Examination:  GEN: older man, in NAD  HEENT: face symmetric, conjunctivae anicteric,   CV: regular rhythm, normal rate, no audible murmurs  PULM: faint bibasilar crackles, no wheezing, no increased WOB  Abd: non-distended  MSK: moving all extremities spontaneously   Skin: three small raised flesh colored skin lesions around right forearm  Neuro: alert, answering questions appropriately     Laboratory:  Trended Lab Data:  BMP  Lab Results   Component Value Date     (L) 2025    K 4.0 2025    CL 99 2025    CO2 19 (L) 2025    BUN 24 (H) 2025    CREATININE 1.3 2025    CALCIUM 8.2 (L) 2025    ANIONGAP 9 2025    EGFRNORACEVR >60 2025     Lab Results   Component Value Date    WBC 8.02 2025    HGB 10.4 (L) 2025    HCT 30.4 (L) 2025    MCV 76 (L) 2025     2025     Microbiology:  Culture Respiratory - pending    Radiology:  No new imaging     I have personally reviewed the above labs and imaging.    Current Medications:     Infusions:       Scheduled:   " albuterol-ipratropium  3 mL Nebulization Q6H    ARIPiprazole  5 mg Oral Daily    aspirin  81 mg Oral Daily    cefTRIAXone (Rocephin) IV (PEDS and ADULTS)  1 g Intravenous Q24H    doxycycline  100 mg Oral Q12H    DULoxetine  120 mg Oral QAM    enoxparin  30 mg Subcutaneous Daily    ferrous sulfate  1 tablet Oral Daily    gabapentin  300 mg Oral TID    levothyroxine  25 mcg Oral Before breakfast    losartan  50 mg Oral Daily    magnesium sulfate 2 g IVPB  2 g Intravenous Once    pravastatin  40 mg Oral Daily    predniSONE  60 mg Oral Daily    [START ON 3/24/2025] remdesivir infusion  100 mg Intravenous Daily        PRN:    Current Facility-Administered Medications:     acetaminophen, 650 mg, Oral, Q6H PRN    benzonatate, 100 mg, Oral, TID PRN    dextrose 50%, 12.5 g, Intravenous, PRN    dextrose 50%, 25 g, Intravenous, PRN    glucagon (human recombinant), 1 mg, Intramuscular, PRN    glucose, 16 g, Oral, PRN    glucose, 24 g, Oral, PRN    hydrOXYzine pamoate, 50 mg, Oral, Nightly PRN    insulin aspart U-100, 0-10 Units, Subcutaneous, QID (AC + HS) PRN    meclizine, 25 mg, Oral, TID PRN    naloxone, 0.02 mg, Intravenous, PRN    ondansetron, 4 mg, Intravenous, Q8H PRN    sodium chloride 0.9%, 10 mL, Intravenous, Q12H PRN     Assessment:     # Cough  # Dyspnea  # Interstitial abnormalities on CT chest  Patient was undergoing outpatient work-up for ILD by Dr. Garcia. CT chest from 2/2025 with some septal thickening and traction bronchiectasis. He had a repeat CT chest 3/22/2025 that had some slight increase in ground glass opacities in the setting of COVID and URI symptoms. Suspect slight change in imaging between 2/2025 and 3/2025 secondary a viral illness. Of note appears there was also a difference in CT technique, low dose CT in 2/25 which may affect appearance slightly. He does report a history of pneumonia after spinal surgery in 2023 and on review of his prior imaging he has some mild changes at his lung bases since  2023. He will need ongoing outpatient follow-up with PFTs and 6MWT to better determine etiology of his chronic bilateral interstitial lung abnormalities and to monitor for any evidence of progression.     - he will need repeat outpatient PFTs and 6MWT when he has recovered from his acute illness  - can complete antibiotic course for CAP treatment  - follow-up full respiratory infection panel   - outpatient follow-up with Dr. Garcia in pulmonary clinic       Thank you for allowing us to participate in the care of this patient. Pulmonary will sign off at this time. Please contact me if you have any questions regarding this consult.    Claire Harmon MD  Pulmonary & Critical Care Medicine Fellow

## 2025-03-23 NOTE — PLAN OF CARE
Pt on room air with SpO2 97%. No respiratory distress noted. Will continue to monitor. The proper method of use, as well as anticipated side effects, of this aerosol treatment are discussed and demonstrated to the patient.

## 2025-03-23 NOTE — PLAN OF CARE
VIRTUAL NURSE:  Labs, notes, orders, and careplan reviewed. VN to be available as needed.    Problem: Adult Inpatient Plan of Care  Goal: Plan of Care Review  3/23/2025 0713 by Elisabet Jose, RN  Outcome: Progressing  3/22/2025 1716 by Elisabet Jose, RN  Outcome: Progressing

## 2025-03-23 NOTE — CONSULTS
[facility] Pulmonary & [facility] Consult Note    Primary Attending Physician: Dr. Rowan  Consultant Attending: Dr. Brandt    Reason for Consult:     ILD    Subjective:      History of Present Illness:  Fabiano Malik Jr. is a 64 y.o. man with a history of chronic back pain, prior spinal surgery, T2DM, and HTN who presented with dizziness and cough.     Patient reports he has had 2 weeks of productive cough, subjective fevers, and worsening dyspnea. Around the same time period has also had some sinus congestion, GI upset, and vertigo. He presented for outpatient PFTs but was unable to complete due to his current illness, while leaving the testing area had an episode where he thought he was going to pass out so presented to the ED. He had a CT chest done in the ED that showed some slightly worsened interstitial changes at his bases compared to prior imaging.     He reports some nausea and vomiting over this same time period, thinks he probably aspirated when he vomited. He previously worked doing various manual labor jobs, had to stop working due to back pain.     Pulmonary consulted for evaluation of possible ILD.     Past Medical History:  Past Medical History:   Diagnosis Date    Chronic back pain greater than 3 months duration     Depression     Diabetes mellitus     Diabetes mellitus, type 2     Hypertension     Schizophrenia        Past Surgical History:  Past Surgical History:   Procedure Laterality Date    APPENDECTOMY      CARPAL TUNNEL RELEASE Right 12/13/2022    Procedure: RELEASE, CARPAL TUNNEL;  Surgeon: Everton Walter Jr., MD;  Location: [facility] OR;  Service: Orthopedics;  Laterality: Right;    COLONOSCOPY N/A 5/31/2018    Procedure: COLONOSCOPY;  Surgeon: Guillaume Hatch MD;  Location: [facility] ENDO;  Service: Endoscopy;  Laterality: N/A;    COLONOSCOPY N/A 11/17/2022    Procedure: COLONOSCOPY;  Surgeon: Gulilaume Hatch MD;  Location: [facility] ENDO;  Service: Endoscopy;  Laterality: N/A;     DECOMPRESSION, NERVE, ULNAR Right 12/13/2022    Procedure: DECOMPRESSION, NERVE, ULNAR;  Surgeon: Everton Walter Jr., MD;  Location: [facility] OR;  Service: Orthopedics;  Laterality: Right;    EPIDURAL STEROID INJECTION INTO LUMBAR SPINE N/A 2/21/2024    Procedure: L5-S1 JOHN;  Surgeon: Zoila Suarez DO;  Location: [facility] PAIN MANAGEMENT;  Service: Pain Management;  Laterality: N/A;  oral 30 mins    FUSION, SPINE, CERVICAL N/A 8/11/2023    Procedure: FUSION, SPINE, CERVICAL;  Surgeon: Guillaume Washington MD;  Location: [facility] OR;  Service: Neurosurgery;  Laterality: N/A;  C3-4, C5-6, C6-7 ACDF C3-C7 anterior instrumentation Depuy  -ANTERIOR DECOMPREESSION 2 LEVELS   -ANTERIOR INSTRUMENTATION INTERBODY CAGE INTERSPACE 3   -LOP 3.5 HR   -LOS 3 DAYS   -GENERAL   -TYPE AND SCREEN   - C ARM   -EMG, SEP, MEP hari notified cc  -ASPEN   -REG    NECK SURGERY      RADIOFREQUENCY ABLATION OF LUMBAR MEDIAL BRANCH NERVE AT SINGLE LEVEL Left 5/29/2018    Procedure: RADIOFREQUENCY THERMOCOAGULATION (RFTC)-NERVE-MEDIAN BRANCH-LUMBAR- Left L2-3-4-5;  Surgeon: Donavon Dennis MD;  Location: [facility] PAIN T;  Service: Pain Management;  Laterality: Left;    RADIOFREQUENCY ABLATION OF LUMBAR MEDIAL BRANCH NERVE AT SINGLE LEVEL Right 1/8/2019    Procedure: Radiofrequency Ablation, Nerve, Spinal, Lumbar, Medial Branch, L2,3,4,5;  Surgeon: Alberto Hernandez Jr., MD;  Location: [facility] PAIN T;  Service: Pain Management;  Laterality: Right;  Pt is diabetic    RADIOFREQUENCY ABLATION OF LUMBAR MEDIAL BRANCH NERVE AT SINGLE LEVEL Left 3/12/2019    Procedure: Radiofrequency Ablation, Nerve, Spinal, Lumbar, Medial Branch, Left, L2,3,4,5;  Surgeon: Alberto Hernandez Jr., MD;  Location: [facility];  Service: Pain Management;  Laterality: Left;  Pt will be consented the day of procedure.    TRANSFORAMINAL EPIDURAL INJECTION OF STEROID Right 9/12/2019    Procedure: Injection,steroid,epidural,transforaminal,right,L4-5 and L5-S1;  Surgeon: Alberto VILLALOBOS  David Love MD;  Location: [facility] PAIN MGT;  Service: Pain Management;  Laterality: Right;  PT IS DIABETIC       Allergies:  Review of patient's allergies indicates:   Allergen Reactions    Pcn [penicillins] Other (See Comments)     Childhood rxn, pt does not recall type of rxn       Medications:   In-Hospital Scheduled Medications:   albuterol-ipratropium  3 mL Nebulization Q6H    ARIPiprazole  5 mg Oral Daily    cefTRIAXone (Rocephin) IV (PEDS and ADULTS)  1 g Intravenous Q24H    doxycycline  100 mg Oral Q12H    DULoxetine  120 mg Oral QAM    enoxparin  30 mg Subcutaneous Daily    gabapentin  300 mg Oral TID    levothyroxine  25 mcg Oral Before breakfast    losartan  50 mg Oral Daily    pravastatin  40 mg Oral Daily    predniSONE  60 mg Oral Daily    triamterene-hydrochlorothiazide 37.5-25 mg  1 capsule Oral Daily      In-Hospital PRN Medications:    Current Facility-Administered Medications:     acetaminophen, 650 mg, Oral, Q6H PRN    benzonatate, 100 mg, Oral, TID PRN    dextrose 50%, 12.5 g, Intravenous, PRN    dextrose 50%, 25 g, Intravenous, PRN    glucagon (human recombinant), 1 mg, Intramuscular, PRN    glucose, 16 g, Oral, PRN    glucose, 24 g, Oral, PRN    hydrOXYzine pamoate, 50 mg, Oral, Nightly PRN    insulin aspart U-100, 0-5 Units, Subcutaneous, QID (AC + HS) PRN    naloxone, 0.02 mg, Intravenous, PRN    ondansetron, 4 mg, Intravenous, Q8H PRN    sodium chloride 0.9%, 10 mL, Intravenous, Q12H PRN   In-Hospital IV Infusion Medications:     Home Medications:  Prior to Admission medications    Medication Sig Start Date End Date Taking? Authorizing Provider   albuterol (PROVENTIL/VENTOLIN HFA) 90 mcg/actuation inhaler Inhale 2 puffs into the lungs every 6 (six) hours as needed for Wheezing. Rescue 1/13/25 1/13/26 Yes Ladonna Garcia MD   amLODIPine (NORVASC) 5 MG tablet Take 5 mg by mouth once daily. 3/13/25  Yes Provider, Historical   ARIPiprazole (ABILIFY) 5 MG Tab Take 1 tablet (5 mg total) by mouth  once daily. 1/14/25 1/14/26 Yes Lucien Fleming MD   diclofenac (VOLTAREN) 75 MG EC tablet Take 1 tablet (75 mg total) by mouth 2 (two) times daily as needed (pain). 1/14/25  Yes Lucien Fleming MD   dulaglutide (TRULICITY) 1.5 mg/0.5 mL pen injector Inject 1.5 mg into the skin every 7 days.  Patient taking differently: Inject 1.5 mg into the skin every Tuesday. 1/13/25  Yes Lucien Fleming MD   DULoxetine (CYMBALTA) 60 MG capsule Take 2 capsules (120 mg total) by mouth every morning. 1/13/25  Yes Lucien Fleming MD   hydrOXYzine pamoate (VISTARIL) 50 MG Cap TAKE 1 CAPSULE (50 MG) BY MOUTH NIGHTLY AS NEEDED FOR INSOMNIA  Patient taking differently: Take 50 mg by mouth nightly as needed (insomnia). 1/14/25  Yes Lucien Fleming MD   ketoconazole (NIZORAL) 2 % cream Apply topically once daily. 3/16/25  Yes Provider, Historical   levothyroxine (SYNTHROID) 25 MCG tablet Take 1 tablet (25 mcg total) by mouth before breakfast. 1/13/25  Yes Lucien Fleming MD   metFORMIN (GLUCOPHAGE) 1000 MG tablet Take 1 tablet (1,000 mg total) by mouth 2 (two) times daily with meals. 1/13/25  Yes Lucien Fleming MD   olmesartan (BENICAR) 20 MG tablet TAKE 1 TABLET BY MOUTH ONCE  DAILY  Patient taking differently: Take 20 mg by mouth once daily. 1/13/25  Yes Lucien Fleming MD   omeprazole (PRILOSEC) 20 MG capsule Take 1 capsule (20 mg total) by mouth before breakfast. 1/13/25  Yes Lucien Fleming MD   pravastatin (PRAVACHOL) 40 MG tablet Take 1 tablet (40 mg total) by mouth once daily. 1/13/25  Yes Lucien Fleming MD   sildenafiL (VIAGRA) 100 MG tablet Take 1 tablet (100 mg total) by mouth daily as needed for Erectile Dysfunction. 1/13/25  Yes Lucien Fleming MD   BLOOD PRESSURE CUFF Misc 1 Units by Misc.(Non-Drug; Combo Route) route once daily. 10/8/21   Lucien Fleming MD   blood sugar diagnostic (TRUE METRIX GLUCOSE TEST STRIP)  "Strp use 1 strip to check glucose 2 (two) times a day. 25   Lucien Fleming MD   blood-glucose meter Misc Use as instructed 10/8/21 11/12/24  Lucien Fleming MD   lancets 30 gauge Misc 1 lancet by Misc.(Non-Drug; Combo Route) route twice daily. 25   Lucien Fleming MD   metoprolol succinate (TOPROL-XL) 25 MG 24 hr tablet Take 1 tablet (25 mg total) by mouth once daily. 1/13/25 3/22/25  Lucien Fleming MD       Family History:  Family History   Problem Relation Name Age of Onset    Alzheimer's disease Mother      Diabetes Mother      Heart defect Father      Cancer Father      Stroke Father      Heart attack Father      Heart defect Sister      Alzheimer's disease Sister         Social History:  Social History[1]    Review of Systems:  Pertinent items are noted in HPI. All other systems are reviewed and are negative.     Objective:   Last 24 Hour Vital Signs:  BP  Min: 114/60  Max: 171/74  Temp  Av.8 °F (36.6 °C)  Min: 97.5 °F (36.4 °C)  Max: 98.2 °F (36.8 °C)  Pulse  Av.1  Min: 68  Max: 94  Resp  Av.3  Min: 12  Max: 20  SpO2  Av.2 %  Min: 94 %  Max: 100 %  Height  Av' 1" (185.4 cm)  Min: 6' 1" (185.4 cm)  Max: 6' 1" (185.4 cm)  Weight  Av.1 kg (236 lb 1.8 oz)  Min: 107.1 kg (236 lb 1.8 oz)  Max: 107.1 kg (236 lb 1.8 oz)  I/O last 3 completed shifts:  In: 500 [I.V.:500]  Out: 4225 [Urine:4225]    Physical Examination:  GEN: older man, resting in bed  HEENT: face symmetric, conjunctivae anicteric  CV: regular rhythm, normal rate, no audible murmurs  PULM: faint bibasilar crackles, no wheezing, no increased WOB  Abd: soft, non-distended   MSK: moving all extremities spontaneously   Skin: no rashes  Neuro: alert, answering questions appropriately     Laboratory:  Trended Lab Data:  Recent Labs     25  1229 25  0159   WBC 8.50 6.43   HGB 12.3* 12.4*   HCT 36.1* 37.3*    333   * 135*   K 4.3 4.5    104   CO2 18* 21* "   BUN 20 17   CREATININE 1.2 1.1     --    BILITOT 0.5 0.3   AST 24 22   ALT 17 18   ALKPHOS 64 66   CALCIUM 8.9 9.2   ALBUMIN 4.3 4.1   PROT 7.4  --    MG  --  1.7   PHOS  --  3.2     BNP 52    Microbiology:  Culture Respiratory - pending    Radiology:  CT chest 2/5/2025 - reviewed  CT chest 3/22/2025 - reviewed  CXR 8/15/2023 - reviewed     I have personally reviewed the above labs and imaging.    Current Medications:     Infusions:       Scheduled:   albuterol-ipratropium  3 mL Nebulization Q6H    ARIPiprazole  5 mg Oral Daily    cefTRIAXone (Rocephin) IV (PEDS and ADULTS)  1 g Intravenous Q24H    doxycycline  100 mg Oral Q12H    DULoxetine  120 mg Oral QAM    enoxparin  30 mg Subcutaneous Daily    gabapentin  300 mg Oral TID    levothyroxine  25 mcg Oral Before breakfast    losartan  50 mg Oral Daily    pravastatin  40 mg Oral Daily    predniSONE  60 mg Oral Daily    triamterene-hydrochlorothiazide 37.5-25 mg  1 capsule Oral Daily        PRN:    Current Facility-Administered Medications:     acetaminophen, 650 mg, Oral, Q6H PRN    benzonatate, 100 mg, Oral, TID PRN    dextrose 50%, 12.5 g, Intravenous, PRN    dextrose 50%, 25 g, Intravenous, PRN    glucagon (human recombinant), 1 mg, Intramuscular, PRN    glucose, 16 g, Oral, PRN    glucose, 24 g, Oral, PRN    hydrOXYzine pamoate, 50 mg, Oral, Nightly PRN    insulin aspart U-100, 0-5 Units, Subcutaneous, QID (AC + HS) PRN    naloxone, 0.02 mg, Intravenous, PRN    ondansetron, 4 mg, Intravenous, Q8H PRN    sodium chloride 0.9%, 10 mL, Intravenous, Q12H PRN     Assessment:       # Cough  # Dyspnea  # Interstitial abnormalities on CT chest  Patient was undergoing outpatient work-up for ILD by Dr. Garcia. CT chest from 2/2025 with some septal thickening and traction bronchiectasis. He had a repeat CT chest 3/22/2025 that had some slight increase in ground glass opacities in the setting of a suspected recent viral illness (reported URI symptoms over the last 2  "weeks).     Suspect that imaging changes  between 2/2025 and 3/2025 are related to viral illness rather than progression of his ILD. He does not appear fluid overloaded on exam and BNP on admission was 52. Given recent and slow to improve infectious symptoms agree with empiric CAP coverage. He does report being treated for pneumonia after his spinal surgery in 8/2023 and basilar changes may be related to scarring from prior infection. Further outpatient work-up needed to help determine exact etiology and monitor for progression.     - he will need repeat outpatient PFTs and 6MWT when he has recovered from his acute illness  - continue CAP treatment  - check respiratory infection panel       Thank you for allowing us to participate in the care of this patient. Please contact me if you have any questions regarding this consult.    Claire Harmon MD  Pulmonary & Critical Care Medicine Fellow         [1]   Social History  Tobacco Use    Smoking status: Never    Smokeless tobacco: Never   Substance Use Topics    Alcohol use: Yes     Comment: "couple of beers a day"    Drug use: No     "

## 2025-03-23 NOTE — ACP (ADVANCE CARE PLANNING)
Inpatient Upgrade Note    Fabiano Malik Jr. has warranted treatment spanning two or more midnights of hospital level care for the management of pneumonia. He continues to require daily labs and IV medications . His condition is also complicated by the following comorbidities: Diabetes and Obesity.

## 2025-03-23 NOTE — PROGRESS NOTES
"[facility] Medicine Progress Note      Subjective/Interval History      This AM, pt reports continued dizziness even while laying in bed and worse with movement. This morning, says he does not feel any better. Reports some shortness of breath and continued productive cough.      Objective     Physical Examination:  /74 (BP Location: Right arm, Patient Position: Lying)   Pulse 73   Temp 97.5 °F (36.4 °C) (Oral)   Resp 17   Ht 6' 1" (1.854 m)   Wt 107.1 kg (236 lb 1.8 oz)   SpO2 100%   BMI 31.15 kg/m²       Physical Examination:  Physical Exam  Vitals and nursing note reviewed.   Constitutional:       General: He is not in acute distress.     Appearance: Normal appearance. He is not ill-appearing.   HENT:      Head: Atraumatic.      Nose: Nose normal. No congestion or rhinorrhea.      Mouth/Throat:      Mouth: Mucous membranes are moist.      Pharynx: Oropharynx is clear.   Eyes:      Extraocular Movements: Extraocular movements intact.      Conjunctiva/sclera: Conjunctivae normal.   Cardiovascular:      Rate and Rhythm: Normal rate and regular rhythm.      Pulses: Normal pulses.   Pulmonary:      Effort: Pulmonary effort is normal. No respiratory distress.      Breath sounds: Examination of the right-upper field reveals rhonchi and rales. Examination of the left-upper field reveals rhonchi and rales. Examination of the right-middle field reveals rhonchi and rales. Examination of the left-middle field reveals rhonchi and rales.   Abdominal:      General: Abdomen is flat.      Palpations: Abdomen is soft.   Musculoskeletal:         General: No swelling, tenderness, deformity or signs of injury. Normal range of motion.      Cervical back: Normal range of motion.      Right lower leg: No edema.      Left lower leg: No edema.   Skin:     General: Skin is warm and dry.      Capillary Refill: Capillary refill takes less than 2 seconds.      Coloration: Skin is not jaundiced or pale.   Neurological:      " Mental Status: He is alert and oriented to person, place, and time.      Cranial Nerves: No cranial nerve deficit.      Sensory: No sensory deficit.   Psychiatric:         Mood and Affect: Mood normal.         Behavior: Behavior normal.         Thought Content: Thought content normal.         Judgment: Judgment normal.        Intake/Output:    Intake/Output Summary (Last 24 hours) at 3/23/2025 0650  Last data filed at 3/22/2025 0827  Gross per 24 hour   Intake --   Output 825 ml   Net -825 ml     Net IO Since Admission: -3,725 mL [03/23/25 0650]    Laboratory data: reviewed       Microbiology data: reviewed       EKG data: reviewed       Radiology data: reviewed        Current Medications:     Infusions:       Scheduled:   albuterol-ipratropium  3 mL Nebulization Q6H    ARIPiprazole  5 mg Oral Daily    cefTRIAXone (Rocephin) IV (PEDS and ADULTS)  1 g Intravenous Q24H    doxycycline  100 mg Oral Q12H    DULoxetine  120 mg Oral QAM    enoxparin  30 mg Subcutaneous Daily    ferrous sulfate  1 tablet Oral Daily    gabapentin  300 mg Oral TID    levothyroxine  25 mcg Oral Before breakfast    losartan  50 mg Oral Daily    magnesium sulfate 2 g IVPB  2 g Intravenous Once    Followed by    magnesium sulfate 2 g IVPB  2 g Intravenous Once    pravastatin  40 mg Oral Daily    predniSONE  60 mg Oral Daily    triamterene-hydrochlorothiazide 37.5-25 mg  1 capsule Oral Daily        PRN:    Current Facility-Administered Medications:     acetaminophen, 650 mg, Oral, Q6H PRN    benzonatate, 100 mg, Oral, TID PRN    dextrose 50%, 12.5 g, Intravenous, PRN    dextrose 50%, 25 g, Intravenous, PRN    glucagon (human recombinant), 1 mg, Intramuscular, PRN    glucose, 16 g, Oral, PRN    glucose, 24 g, Oral, PRN    hydrOXYzine pamoate, 50 mg, Oral, Nightly PRN    insulin aspart U-100, 0-10 Units, Subcutaneous, QID (AC + HS) PRN    insulin aspart U-100, 0-5 Units, Subcutaneous, QID (AC + HS) PRN    naloxone, 0.02 mg, Intravenous, PRN     ondansetron, 4 mg, Intravenous, Q8H PRN    sodium chloride 0.9%, 10 mL, Intravenous, Q12H PRN      Assessment/Plan:     Community acquired pneumonia superimposed on ILD  COVID-19 infection  - 5 day history of productive cough of yellow sputum, subjective febrile symptoms-cough, chills, sweating, worsened dyspneic symptoms from baseline  - Follows with Dr. Ladonna aGrcia for aaCT 1/13/2025 demonstrates interlobular septal thickening with traction bronchiectasis with a lower lobe and peripheral predominance which may be seen with interstitial lung disease such as NSIP or UIP. Small amount of ground-glass opacities.   - Repeat CT scan appears to me to have more significant GGO from prior.   In setting of abnormal pulmonary architecture and constellation of symptoms, favor treating as superimposed CAP, rocephin + doxy  - Duonebs q6, 6MWT ordered  - pulm consulted, suspected imaging changes related to viral illness rather than progression of ILD; further outpatient work up- will need repeat outpatient PFTs and 6MWT once recovered  - resp infection panel, urine legionella, AFB, culture pending   - COVID-19 +, started remdesevir   - continue ctx and doxycycline       Vertigo, suspected Meniere Disease  - Longstanding history of severe, frequently recurring vertiginous symptoms lasting 15 minutes-48 hours, refractory to initial management in ED   Reported subjective hearing loss, ear fullness, tinnitus  - Prior MRI brain wo 3/2024 unremarkable  - Ordered MRI brain w/wo, with EAC protocol to assess for cholesteatoma   - Favor treating as Meniere Disease with 7-14 day course of prednisone 60mg daily.      Hyponatremia   - Na low at 127   - suspect component of SIADH in setting of Covid  - CTM     ACC Stage II Hypertension  - Prescribed metoprolol 25mg, olmesartan 50 outpatient. Hypertensive here to 174/74, unclear indication for metoprolol.   - Losartan 50mg daily while inpatient     Type 2 diabetes   - A1c 8.2,  Microalbumin/Creatinine ratio 295>44.2 over past year.   - Prescribed trulicity 1.5mg once weekly, metformin 1000mg BID  - SSI while inpatient  - May need adjustment of diabetes medications with high dose prednisone for next 7-14 days     Microcytic anemia  - Iron 49. TIBC 490, iron sat 10%/Ferritin 28.2  - B12 >2000/Folate 15.3  - started oral iron suplementation     Schizoaffective Disorder   - On aripiprazole 5mg daily, no acute concerns     Hypothyroidism  - On levothyroxine 25mcg daily, no acute concerns     Right parietal cortical/subcortical infarcts  - MRI brain showed right parietal cortical/subcortical infarcts and a new deep white matter infarct in the left frontal lobe  - lipid panel: chol 128, HDL 41, trigs 132, LDL 60       Code: Full  Diet: Diabetic  Dispo: Inpatient pending improvement     Cindi Trujillo MD   [facility] HO-I  [facility] Hospitalist Team A     [facility] Medicine Hospitalist Pager numbers:   [facility] Hospitalist Medicine Team A (Rah/Anum):[phone]  [facility] Hospitalist Medicine Team B (Shantelle/Cricket):        [phone]

## 2025-03-23 NOTE — TELEMEDICINE CONSULT
[facility]  Neurology  Tele-Neurology Interprofessional Consult Note           Consult Information  Inpatient Consult to Neurology Services (General Neurology)  Consult performed by: Joel Zamarripa MD  Consult ordered by: Farheen Freitas MD          Consulting Provider:    Patient Location:  [facility] TELEMETRY UNIT     Summary of patient's symptoms:  64 y.o. man with past medical history of type 2 diabetes, hypertension, hyperlipidemia, GERD, ILD, hypothyroidism presenting to [facility] on 3/21/2025 for concern for worsening vertigo for the past 5 days. He reports that he has experienced multiple episodes of vertigo weekly for the past several years, usually lasting 20-30 minutes though sometimes as long as a day or two, worsened when he closes his eyes. He has had occasional falls due to sudden onset of vertiginous symptoms, and notes associated tinnitus, hearing loss and bilateral R>L ear fullness over this time period. This episode has been more severe and unrelenting for the past 5 days.  Neurology was consulted for vertigo.  I attempted to inquire about the cart to see the patient and after waiting for approximately 30 minutes with no response from the nursing end about cart info, dropping into professional note.  Per chart, patient was administered meclizine and volume in the ED without any improvement.     Images personally reviewed and interpreted:  Study: MRI Brain with and without contrast  Study Interpretation: No acute intracranial process or enhancing intracranial lesion with attention to the CP angle/IAC/labyrinth      Assessment and plan:  Severe vertigo with associated hearing symptoms and tinnitus.  Potential differentials can be vestibular neuritis and Meniere's disease.  Initial imaging including MRI brain with contrast is normal ruling out CP angle tumor and middle/inner ear structural abnormalities.    -consult in-person neurology to rule out nystagmus  -recommend  treating with prednisone 60 mg for 1 week later taper to 30 mg for another week  -recommend outpatient ENT appointment  -if symptoms are under meeting, recommend MR venogram to assess for high-riding jugular bulb     I spent approximately 14 minutes on this encounter. More than half of that time was spent communicating with the consulting provider and coordinating patient care.       Joel Zamarripa MD        This encounter was conducted as an interprofessional communication between providers at the [facility] and vascular neurologist. The interaction was completed over the phone or via secure messaging (electronic medical record - [employer] Secure Chat).     Once this note was completed, a written copy was sent back to the provider via fax or electronic medical record.

## 2025-03-24 LAB
ALBUMIN SERPL BCP-MCNC: 3.8 G/DL (ref 3.5–5.2)
ALP SERPL-CCNC: 53 UNIT/L (ref 40–150)
ALT SERPL W/O P-5'-P-CCNC: 17 UNIT/L (ref 10–44)
ANION GAP (OHS): 7 MMOL/L (ref 8–16)
AST SERPL-CCNC: 18 UNIT/L (ref 11–45)
BILIRUB SERPL-MCNC: 0.3 MG/DL (ref 0.1–1)
BUN SERPL-MCNC: 19 MG/DL (ref 8–23)
CALCIUM SERPL-MCNC: 8.4 MG/DL (ref 8.7–10.5)
CHLORIDE SERPL-SCNC: 95 MMOL/L (ref 95–110)
CO2 SERPL-SCNC: 23 MMOL/L (ref 23–29)
CREAT SERPL-MCNC: 1.2 MG/DL (ref 0.5–1.4)
ERYTHROCYTE [DISTWIDTH] IN BLOOD BY AUTOMATED COUNT: 14.2 % (ref 11.5–14.5)
GFR SERPLBLD CREATININE-BSD FMLA CKD-EPI: >60 ML/MIN/1.73/M2
GLUCOSE SERPL-MCNC: 124 MG/DL (ref 70–110)
HCT VFR BLD AUTO: 31.7 % (ref 40–54)
HGB BLD-MCNC: 10.8 GM/DL (ref 14–18)
MAGNESIUM SERPL-MCNC: 1.7 MG/DL (ref 1.6–2.6)
MCH RBC QN AUTO: 25.6 PG (ref 27–50)
MCHC RBC AUTO-ENTMCNC: 34.1 G/DL (ref 32–36)
MCV RBC AUTO: 75 FL (ref 82–98)
OHS QRS DURATION: 110 MS
OHS QTC CALCULATION: 428 MS
PHOSPHATE SERPL-MCNC: 3.1 MG/DL (ref 2.7–4.5)
PLATELET # BLD AUTO: 333 K/UL (ref 150–450)
PMV BLD AUTO: 8.4 FL (ref 9.2–12.9)
POCT GLUCOSE: 140 MG/DL (ref 70–110)
POCT GLUCOSE: 182 MG/DL (ref 70–110)
POCT GLUCOSE: 234 MG/DL (ref 70–110)
POCT GLUCOSE: 347 MG/DL (ref 70–110)
POTASSIUM SERPL-SCNC: 4.2 MMOL/L (ref 3.5–5.1)
PROT SERPL-MCNC: 6.5 GM/DL (ref 6–8.4)
RBC # BLD AUTO: 4.22 M/UL (ref 4.6–6.2)
SODIUM SERPL-SCNC: 125 MMOL/L (ref 136–145)
WBC # BLD AUTO: 8.74 K/UL (ref 3.9–12.7)

## 2025-03-24 PROCEDURE — 83735 ASSAY OF MAGNESIUM: CPT | Performed by: STUDENT IN AN ORGANIZED HEALTH CARE EDUCATION/TRAINING PROGRAM

## 2025-03-24 PROCEDURE — 36415 COLL VENOUS BLD VENIPUNCTURE: CPT

## 2025-03-24 PROCEDURE — 11000001 HC ACUTE MED/SURG PRIVATE ROOM

## 2025-03-24 PROCEDURE — 84520 ASSAY OF UREA NITROGEN: CPT

## 2025-03-24 PROCEDURE — 25000003 PHARM REV CODE 250: Performed by: STUDENT IN AN ORGANIZED HEALTH CARE EDUCATION/TRAINING PROGRAM

## 2025-03-24 PROCEDURE — 84100 ASSAY OF PHOSPHORUS: CPT | Performed by: STUDENT IN AN ORGANIZED HEALTH CARE EDUCATION/TRAINING PROGRAM

## 2025-03-24 PROCEDURE — 25000242 PHARM REV CODE 250 ALT 637 W/ HCPCS: Performed by: STUDENT IN AN ORGANIZED HEALTH CARE EDUCATION/TRAINING PROGRAM

## 2025-03-24 PROCEDURE — 94640 AIRWAY INHALATION TREATMENT: CPT

## 2025-03-24 PROCEDURE — 25000003 PHARM REV CODE 250

## 2025-03-24 PROCEDURE — 85027 COMPLETE CBC AUTOMATED: CPT | Performed by: STUDENT IN AN ORGANIZED HEALTH CARE EDUCATION/TRAINING PROGRAM

## 2025-03-24 PROCEDURE — 63600175 PHARM REV CODE 636 W HCPCS: Mod: JZ,TB

## 2025-03-24 PROCEDURE — 94761 N-INVAS EAR/PLS OXIMETRY MLT: CPT

## 2025-03-24 PROCEDURE — 27000207 HC ISOLATION

## 2025-03-24 PROCEDURE — 63600175 PHARM REV CODE 636 W HCPCS: Performed by: STUDENT IN AN ORGANIZED HEALTH CARE EDUCATION/TRAINING PROGRAM

## 2025-03-24 PROCEDURE — 25000003 PHARM REV CODE 250: Performed by: INTERNAL MEDICINE

## 2025-03-24 PROCEDURE — 63600175 PHARM REV CODE 636 W HCPCS: Performed by: INTERNAL MEDICINE

## 2025-03-24 RX ORDER — MUPIROCIN 20 MG/G
OINTMENT TOPICAL 2 TIMES DAILY
Status: DISCONTINUED | OUTPATIENT
Start: 2025-03-24 | End: 2025-03-28 | Stop reason: HOSPADM

## 2025-03-24 RX ORDER — ENOXAPARIN SODIUM 100 MG/ML
40 INJECTION SUBCUTANEOUS EVERY 24 HOURS
Status: DISCONTINUED | OUTPATIENT
Start: 2025-03-24 | End: 2025-03-28 | Stop reason: HOSPADM

## 2025-03-24 RX ORDER — MECLIZINE HCL 12.5 MG 12.5 MG/1
25 TABLET ORAL 3 TIMES DAILY
Status: DISCONTINUED | OUTPATIENT
Start: 2025-03-24 | End: 2025-03-28 | Stop reason: HOSPADM

## 2025-03-24 RX ORDER — METOCLOPRAMIDE HYDROCHLORIDE 5 MG/ML
10 INJECTION INTRAMUSCULAR; INTRAVENOUS ONCE
Status: COMPLETED | OUTPATIENT
Start: 2025-03-24 | End: 2025-03-24

## 2025-03-24 RX ADMIN — GUAIFENESIN AND DEXTROMETHORPHAN HYDROBROMIDE 1 TABLET: 600; 30 TABLET, EXTENDED RELEASE ORAL at 03:03

## 2025-03-24 RX ADMIN — GABAPENTIN 300 MG: 300 CAPSULE ORAL at 08:03

## 2025-03-24 RX ADMIN — IPRATROPIUM BROMIDE AND ALBUTEROL SULFATE 3 ML: 2.5; .5 SOLUTION RESPIRATORY (INHALATION) at 07:03

## 2025-03-24 RX ADMIN — LOSARTAN POTASSIUM 50 MG: 50 TABLET, FILM COATED ORAL at 08:03

## 2025-03-24 RX ADMIN — IPRATROPIUM BROMIDE AND ALBUTEROL SULFATE 3 ML: 2.5; .5 SOLUTION RESPIRATORY (INHALATION) at 12:03

## 2025-03-24 RX ADMIN — DULOXETINE HYDROCHLORIDE 120 MG: 30 CAPSULE, DELAYED RELEASE ORAL at 06:03

## 2025-03-24 RX ADMIN — FERROUS SULFATE TAB 325 MG (65 MG ELEMENTAL FE) 1 EACH: 325 (65 FE) TAB at 08:03

## 2025-03-24 RX ADMIN — INSULIN ASPART 8 UNITS: 100 INJECTION, SOLUTION INTRAVENOUS; SUBCUTANEOUS at 03:03

## 2025-03-24 RX ADMIN — INSULIN ASPART 4 UNITS: 100 INJECTION, SOLUTION INTRAVENOUS; SUBCUTANEOUS at 11:03

## 2025-03-24 RX ADMIN — MECLIZINE 25 MG: 12.5 TABLET ORAL at 08:03

## 2025-03-24 RX ADMIN — MUPIROCIN: 20 OINTMENT TOPICAL at 08:03

## 2025-03-24 RX ADMIN — ASPIRIN 81 MG CHEWABLE TABLET 81 MG: 81 TABLET CHEWABLE at 08:03

## 2025-03-24 RX ADMIN — GABAPENTIN 300 MG: 300 CAPSULE ORAL at 02:03

## 2025-03-24 RX ADMIN — ARIPIPRAZOLE 5 MG: 5 TABLET ORAL at 08:03

## 2025-03-24 RX ADMIN — METOCLOPRAMIDE 10 MG: 5 INJECTION, SOLUTION INTRAMUSCULAR; INTRAVENOUS at 10:03

## 2025-03-24 RX ADMIN — LEVOTHYROXINE SODIUM 25 MCG: 25 TABLET ORAL at 05:03

## 2025-03-24 RX ADMIN — REMDESIVIR 100 MG: 100 INJECTION, POWDER, LYOPHILIZED, FOR SOLUTION INTRAVENOUS at 08:03

## 2025-03-24 RX ADMIN — INSULIN ASPART 1 UNITS: 100 INJECTION, SOLUTION INTRAVENOUS; SUBCUTANEOUS at 08:03

## 2025-03-24 RX ADMIN — MECLIZINE 25 MG: 12.5 TABLET ORAL at 02:03

## 2025-03-24 RX ADMIN — BENZONATATE 100 MG: 100 CAPSULE ORAL at 11:03

## 2025-03-24 RX ADMIN — PRAVASTATIN SODIUM 40 MG: 40 TABLET ORAL at 08:03

## 2025-03-24 RX ADMIN — PREDNISONE 60 MG: 20 TABLET ORAL at 08:03

## 2025-03-24 RX ADMIN — DOXYCYCLINE HYCLATE 100 MG: 100 TABLET, COATED ORAL at 08:03

## 2025-03-24 RX ADMIN — ENOXAPARIN SODIUM 40 MG: 40 INJECTION SUBCUTANEOUS at 04:03

## 2025-03-24 RX ADMIN — CEFTRIAXONE SODIUM 1 G: 1 INJECTION, POWDER, FOR SOLUTION INTRAMUSCULAR; INTRAVENOUS at 05:03

## 2025-03-24 NOTE — PROGRESS NOTES
"[facility] Medicine Progress Note      Subjective/Interval History      Still with dizziness, feels better compared to admission. Afebrile, no other complaints aside from some mild constipation.        Objective     Physical Examination:  /67 (BP Location: Left arm, Patient Position: Lying)   Pulse 81   Temp 97.9 °F (36.6 °C) (Oral)   Resp 18   Ht 6' 1" (1.854 m)   Wt 114.3 kg (251 lb 15.8 oz)   SpO2 (!) 93%   BMI 33.25 kg/m²       Physical Examination:  Physical Exam  Vitals and nursing note reviewed.   Constitutional:       General: He is not in acute distress.     Appearance: Normal appearance. He is not ill-appearing.   HENT:      Head: Atraumatic.      Nose: Nose normal. No congestion or rhinorrhea.      Mouth/Throat:      Mouth: Mucous membranes are moist.      Pharynx: Oropharynx is clear.   Eyes:      Extraocular Movements: Extraocular movements intact.      Conjunctiva/sclera: Conjunctivae normal.   Cardiovascular:      Rate and Rhythm: Normal rate and regular rhythm.      Pulses: Normal pulses.   Pulmonary:      Effort: Pulmonary effort is normal. No respiratory distress.      Breath sounds: CTAB  Abdominal:      General: Abdomen is flat.      Palpations: Abdomen is soft. nontender  Musculoskeletal:         General: No swelling, tenderness, deformity or signs of injury. Normal range of motion.      Cervical back: Normal range of motion.      Right lower leg: No edema.      Left lower leg: No edema.   Skin:     General: Skin is warm and dry.      Capillary Refill: Capillary refill takes less than 2 seconds.      Coloration: Skin is not jaundiced or pale.   Neurological:      Mental Status: He is alert and oriented to person, place, and time.      Cranial Nerves: No cranial nerve deficit.      Sensory: No sensory deficit.   Psychiatric:         Mood and Affect: Mood normal.         Behavior: Behavior normal.         Thought Content: Thought content normal.         Judgment: Judgment normal.  "       Intake/Output:    Intake/Output Summary (Last 24 hours) at 3/24/2025 0644  Last data filed at 3/24/2025 0428  Gross per 24 hour   Intake 600 ml   Output 4850 ml   Net -4250 ml     Net IO Since Admission: -7,975 mL [03/24/25 0644]    Laboratory data: reviewed       Microbiology data: reviewed       EKG data: reviewed       Radiology data: reviewed        Current Medications:     Infusions:       Scheduled:   albuterol-ipratropium  3 mL Nebulization Q6H    ARIPiprazole  5 mg Oral Daily    aspirin  81 mg Oral Daily    cefTRIAXone (Rocephin) IV (PEDS and ADULTS)  1 g Intravenous Q24H    doxycycline  100 mg Oral Q12H    DULoxetine  120 mg Oral QAM    enoxparin  30 mg Subcutaneous Daily    ferrous sulfate  1 tablet Oral Daily    gabapentin  300 mg Oral TID    levothyroxine  25 mcg Oral Before breakfast    losartan  50 mg Oral Daily    pravastatin  40 mg Oral Daily    predniSONE  60 mg Oral Daily    remdesivir infusion  100 mg Intravenous Daily        PRN:    Current Facility-Administered Medications:     acetaminophen, 650 mg, Oral, Q6H PRN    benzonatate, 100 mg, Oral, TID PRN    dextrose 50%, 12.5 g, Intravenous, PRN    dextrose 50%, 25 g, Intravenous, PRN    glucagon (human recombinant), 1 mg, Intramuscular, PRN    glucose, 16 g, Oral, PRN    glucose, 24 g, Oral, PRN    hydrOXYzine pamoate, 50 mg, Oral, Nightly PRN    insulin aspart U-100, 0-10 Units, Subcutaneous, QID (AC + HS) PRN    meclizine, 25 mg, Oral, TID PRN    naloxone, 0.02 mg, Intravenous, PRN    ondansetron, 4 mg, Intravenous, Q8H PRN    sodium chloride 0.9%, 10 mL, Intravenous, Q12H PRN      Assessment/Plan:     Community acquired pneumonia superimposed on ILD  COVID-19 infection  - 5 day history of productive cough of yellow sputum, subjective febrile symptoms-cough, chills, sweating, worsened dyspneic symptoms from baseline  - Follows with Dr. Ladonna Garcia for aaCT 1/13/2025 demonstrates interlobular septal thickening with traction bronchiectasis  with a lower lobe and peripheral predominance which may be seen with interstitial lung disease such as NSIP or UIP. Small amount of ground-glass opacities.   - Repeat CT scan appears to me to have more significant GGO from prior.   In setting of abnormal pulmonary architecture and constellation of symptoms, favor treating as superimposed CAP, rocephin + doxy  - Duonebs q6, 6MWT ordered  - pulm consulted, suspected imaging changes related to viral illness rather than progression of ILD; further outpatient work up- will need repeat outpatient PFTs and 6MWT once recovered  - resp infection panel, urine legionella, AFB, culture pending   - COVID-19 +, started remdesevir   - continue ctx and doxycycline       Vertigo, suspected Meniere Disease  - Longstanding history of severe, frequently recurring vertiginous symptoms lasting 15 minutes-48 hours, refractory to initial management in ED   Reported subjective hearing loss, ear fullness, tinnitus  - Prior MRI brain wo 3/2024 unremarkable  - Ordered MRI brain w/wo, with EAC protocol to assess for cholesteatoma   - Favor treating as Meniere Disease with 7-14 day course of prednisone 60mg daily.   - schedule meclizine today; trial reglan     Hyponatremia   - Na low at 127   - suspect component of SIADH in setting of Covid  - CTM     ACC Stage II Hypertension  - Prescribed metoprolol 25mg, olmesartan 50 outpatient. Hypertensive here to 174/74, unclear indication for metoprolol.   - Losartan 50mg daily while inpatient     Type 2 diabetes   - A1c 8.2, Microalbumin/Creatinine ratio 295>44.2 over past year.   - Prescribed trulicity 1.5mg once weekly, metformin 1000mg BID  - SSI while inpatient  - May need adjustment of diabetes medications with high dose prednisone for next 7-14 days     Microcytic anemia  - Iron 49. TIBC 490, iron sat 10%/Ferritin 28.2  - B12 >2000/Folate 15.3  - started oral iron suplementation     Schizoaffective Disorder   - On aripiprazole 5mg daily, no acute  concerns     Hypothyroidism  - On levothyroxine 25mcg daily, no acute concerns     Right parietal cortical/subcortical infarcts  - MRI brain showed right parietal cortical/subcortical infarcts and a new deep white matter infarct in the left frontal lobe  - lipid panel: chol 128, HDL 41, trigs 132, LDL 60       Code: Full  Diet: Diabetic  Dispo: Inpatient pending improvement     Ilan Sanches DO  [facility] Internal Medicine/Emergency Medicine HO-V    [facility] Hospital Medicine Team A     [facility] Medicine Hospitalist Pager numbers:   [facility] Hospitalist Medicine Team A (Rah/Anum):[phone]  [facility] Hospitalist Medicine Team B (Shantelle/Cricket):        [phone]

## 2025-03-24 NOTE — PROGRESS NOTES
Pharmacist Renal Dose Adjustment Note    Fabiano Malik Jr. is a 64 y.o. male being treated with the medication enoxaparin    Patient Data:    Vital Signs (Most Recent):  Temp: 97.9 °F (36.6 °C) (03/24/25 1130)  Pulse: 77 (03/24/25 1211)  Resp: 18 (03/24/25 1211)  BP: (!) 141/76 (03/24/25 1130)  SpO2: 97 % (03/24/25 1211) Vital Signs (72h Range):  Temp:  [97 °F (36.1 °C)-98.4 °F (36.9 °C)]   Pulse:  [63-94]   Resp:  [12-20]   BP: (114-181)/()   SpO2:  [92 %-100 %]      Recent Labs   Lab 03/22/25  1735 03/23/25  0401 03/24/25  0610   CREATININE 1.6* 1.3 1.2     Serum creatinine: 1.2 mg/dL 03/24/25 0610  Estimated creatinine clearance: 82.4 mL/min    Medication:enoxaparin dose: 30mg frequency q24h will be changed to medication:enoxaparin dose:40mg frequency:q24h    Pharmacist's Name: Foster Beebe  Pharmacist's Extension: [phone]

## 2025-03-24 NOTE — CONSULTS
Patient in with vertigo, Covid +, and hyperglycemia.  Patient takes Trulicity 1.5mg weekly and metformin 1000mg BID.  Patient has a glucometer and checks his blood sugars periodically throughout the week.  Verbal/written education (Diabetes Management Guide) done with patient.  Education included; what is diabetes, what an A1C level is, A1C goal of <7.0%, current A1C is 8.2%, hyper/hypoglycemia signs & symptoms/treatment, carbohydrate counting diet which includes how to read a nutrition label, phone apps to see how many carbs are in something, etc., portion size, counting 75-90 grams of carbs per meal and 15-30 grams of carbs for snacks (1-2 per day if wanted), and including physical activity 5 days/week.  Medication education included Metformin.   Recommend follow up with PCP upon discharge for diabetes management.    Phone number provided to patient if he has any questions.

## 2025-03-24 NOTE — PLAN OF CARE
Pt on room air with SpO2 95%. No respiratory distress noted. Will continue to monitor. The proper method of use, as well as anticipated side effects, of this aerosol treatment are discussed and demonstrated to the patient.

## 2025-03-24 NOTE — PLAN OF CARE
ANTONI met with pt at bedside this AM to complete DCA. PT reported that he lives at home along and that his drt Tiff may be able to help transport him at  time of dc. Pt stated that he has a RW and Cane at home that he will use to complete his ADLs. Pt does not receive any HH or HD services. Pt expressed that he would be getting a SC from an outside source. White board updated with CM name and contact information.  Discharge brochure provided.  Pt encouraged to call with any questions or concerns.  Cm will continue to follow pt through transitions of care and assist with any discharge needs.    Chris Traylor, MSJOHN  [phone]    Future Appointments   Date Time Provider Department Center   4/4/2025  9:00 AM Latasha Becerra MD [facility]   4/4/2025 10:00 AM [facility]   4/4/2025 10:45 AM IR[facility]ULMONARY FUNCTION [facility] [place]   4/15/2025 10:00 AM Lulu Navarro NP [facility]        03/24/25 1127   Discharge Assessment   Assessment Type Discharge Planning Assessment   Confirmed/corrected address, phone number and insurance Yes   Confirmed Demographics Correct on Facesheet   Source of Information patient   Communicated AMANUEL with patient/caregiver Yes   Reason For Admission Covid   People in Home alone   Facility Arrived From: home   Do you expect to return to your current living situation? No   Do you have help at home or someone to help you manage your care at home? Yes   Who are your caregiver(s) and their phone number(s)? erik Matthew [phone]   Prior to hospitilization cognitive status: Alert/Oriented   Current cognitive status: Alert/Oriented   Walking or Climbing Stairs Difficulty no   Dressing/Bathing Difficulty no   Do you have any problems with: Errands/Grocery   Home Accessibility wheelchair accessible   Home Layout Able to live on 1st floor   Equipment Currently Used at Home none   Readmission within 30 days?  No   Patient currently being followed by outpatient case management? No   Do you currently have service(s) that help you manage your care at home? No   Do you take prescription medications? Yes   Do you have prescription coverage? Yes   Coverage PHN   Do you have any problems affording any of your prescribed medications? No   Is the patient taking medications as prescribed? yes   Who is going to help you get home at discharge? erik Matthew [phone]   How do you get to doctors appointments? family or friend will provide   Are you on dialysis? No   Do you take coumadin? No   Discharge Plan A Home with family   DME Needed Upon Discharge  none   Discharge Plan discussed with: Patient   Transition of Care Barriers None   Health Literacy   How often do you need to have someone help you when you read instructions, pamphlets, or other written material from your doctor or pharmacy? Often

## 2025-03-25 LAB
ABSOLUTE EOSINOPHIL (OHS): 0.09 K/UL
ABSOLUTE MONOCYTE (OHS): 0.62 K/UL (ref 0.3–1)
ABSOLUTE NEUTROPHIL COUNT (OHS): 3.56 K/UL (ref 1.8–7.7)
ALBUMIN SERPL BCP-MCNC: 3.7 G/DL (ref 3.5–5.2)
ALP SERPL-CCNC: 55 UNIT/L (ref 40–150)
ALT SERPL W/O P-5'-P-CCNC: 17 UNIT/L (ref 10–44)
ANION GAP (OHS): 5 MMOL/L (ref 8–16)
AST SERPL-CCNC: 17 UNIT/L (ref 11–45)
BACTERIA SPEC CULT: NORMAL
BASOPHILS # BLD AUTO: 0.04 K/UL
BASOPHILS NFR BLD AUTO: 0.7 %
BILIRUB SERPL-MCNC: 0.3 MG/DL (ref 0.1–1)
BUN SERPL-MCNC: 17 MG/DL (ref 8–23)
CALCIUM SERPL-MCNC: 8.4 MG/DL (ref 8.7–10.5)
CHLORIDE SERPL-SCNC: 94 MMOL/L (ref 95–110)
CO2 SERPL-SCNC: 25 MMOL/L (ref 23–29)
CREAT SERPL-MCNC: 1.1 MG/DL (ref 0.5–1.4)
ERYTHROCYTE [DISTWIDTH] IN BLOOD BY AUTOMATED COUNT: 14.3 % (ref 11.5–14.5)
GFR SERPLBLD CREATININE-BSD FMLA CKD-EPI: >60 ML/MIN/1.73/M2
GLUCOSE SERPL-MCNC: 132 MG/DL (ref 70–110)
HCT VFR BLD AUTO: 33.1 % (ref 40–54)
HGB BLD-MCNC: 11.2 GM/DL (ref 14–18)
IMM GRANULOCYTES # BLD AUTO: 0.01 K/UL (ref 0–0.04)
IMM GRANULOCYTES NFR BLD AUTO: 0.2 % (ref 0–0.5)
LYMPHOCYTES # BLD AUTO: 1.78 K/UL (ref 1–4.8)
MCH RBC QN AUTO: 25.4 PG (ref 27–50)
MCHC RBC AUTO-ENTMCNC: 33.8 G/DL (ref 32–36)
MCV RBC AUTO: 75 FL (ref 82–98)
NUCLEATED RBC (/100WBC) (OHS): 0 /100 WBC
PLATELET # BLD AUTO: 308 K/UL (ref 150–450)
PMV BLD AUTO: 8.1 FL (ref 9.2–12.9)
POCT GLUCOSE: 109 MG/DL (ref 70–110)
POCT GLUCOSE: 186 MG/DL (ref 70–110)
POCT GLUCOSE: 219 MG/DL (ref 70–110)
POCT GLUCOSE: 281 MG/DL (ref 70–110)
POTASSIUM SERPL-SCNC: 3.9 MMOL/L (ref 3.5–5.1)
PROT SERPL-MCNC: 6.5 GM/DL (ref 6–8.4)
RBC # BLD AUTO: 4.41 M/UL (ref 4.6–6.2)
RELATIVE EOSINOPHIL (OHS): 1.5 %
RELATIVE LYMPHOCYTE (OHS): 29.2 % (ref 18–48)
RELATIVE MONOCYTE (OHS): 10.2 % (ref 4–15)
RELATIVE NEUTROPHIL (OHS): 58.2 % (ref 38–73)
SODIUM SERPL-SCNC: 124 MMOL/L (ref 136–145)
WBC # BLD AUTO: 6.1 K/UL (ref 3.9–12.7)

## 2025-03-25 PROCEDURE — 94761 N-INVAS EAR/PLS OXIMETRY MLT: CPT

## 2025-03-25 PROCEDURE — 25000242 PHARM REV CODE 250 ALT 637 W/ HCPCS: Performed by: STUDENT IN AN ORGANIZED HEALTH CARE EDUCATION/TRAINING PROGRAM

## 2025-03-25 PROCEDURE — 25000003 PHARM REV CODE 250: Performed by: STUDENT IN AN ORGANIZED HEALTH CARE EDUCATION/TRAINING PROGRAM

## 2025-03-25 PROCEDURE — 27000207 HC ISOLATION

## 2025-03-25 PROCEDURE — 63600175 PHARM REV CODE 636 W HCPCS: Mod: JZ,TB

## 2025-03-25 PROCEDURE — 36415 COLL VENOUS BLD VENIPUNCTURE: CPT | Performed by: STUDENT IN AN ORGANIZED HEALTH CARE EDUCATION/TRAINING PROGRAM

## 2025-03-25 PROCEDURE — 63600175 PHARM REV CODE 636 W HCPCS: Performed by: INTERNAL MEDICINE

## 2025-03-25 PROCEDURE — 11000001 HC ACUTE MED/SURG PRIVATE ROOM

## 2025-03-25 PROCEDURE — 63600175 PHARM REV CODE 636 W HCPCS: Performed by: STUDENT IN AN ORGANIZED HEALTH CARE EDUCATION/TRAINING PROGRAM

## 2025-03-25 PROCEDURE — 85025 COMPLETE CBC W/AUTO DIFF WBC: CPT | Performed by: STUDENT IN AN ORGANIZED HEALTH CARE EDUCATION/TRAINING PROGRAM

## 2025-03-25 PROCEDURE — 97530 THERAPEUTIC ACTIVITIES: CPT

## 2025-03-25 PROCEDURE — 25000003 PHARM REV CODE 250

## 2025-03-25 PROCEDURE — 80053 COMPREHEN METABOLIC PANEL: CPT

## 2025-03-25 PROCEDURE — 83935 ASSAY OF URINE OSMOLALITY: CPT | Performed by: STUDENT IN AN ORGANIZED HEALTH CARE EDUCATION/TRAINING PROGRAM

## 2025-03-25 PROCEDURE — 97116 GAIT TRAINING THERAPY: CPT

## 2025-03-25 PROCEDURE — 95992 CANALITH REPOSITIONING PROC: CPT

## 2025-03-25 PROCEDURE — 94640 AIRWAY INHALATION TREATMENT: CPT

## 2025-03-25 RX ORDER — METOCLOPRAMIDE 5 MG/1
5 TABLET ORAL
Status: COMPLETED | OUTPATIENT
Start: 2025-03-25 | End: 2025-03-26

## 2025-03-25 RX ORDER — FLUTICASONE PROPIONATE 50 MCG
2 SPRAY, SUSPENSION (ML) NASAL DAILY
Status: DISCONTINUED | OUTPATIENT
Start: 2025-03-25 | End: 2025-03-28 | Stop reason: HOSPADM

## 2025-03-25 RX ADMIN — ACETAMINOPHEN 650 MG: 325 TABLET ORAL at 02:03

## 2025-03-25 RX ADMIN — MUPIROCIN: 20 OINTMENT TOPICAL at 08:03

## 2025-03-25 RX ADMIN — BENZONATATE 100 MG: 100 CAPSULE ORAL at 04:03

## 2025-03-25 RX ADMIN — MECLIZINE 25 MG: 12.5 TABLET ORAL at 02:03

## 2025-03-25 RX ADMIN — FLUTICASONE PROPIONATE 100 MCG: 50 SPRAY, METERED NASAL at 10:03

## 2025-03-25 RX ADMIN — LEVOTHYROXINE SODIUM 25 MCG: 25 TABLET ORAL at 05:03

## 2025-03-25 RX ADMIN — IPRATROPIUM BROMIDE AND ALBUTEROL SULFATE 3 ML: 2.5; .5 SOLUTION RESPIRATORY (INHALATION) at 07:03

## 2025-03-25 RX ADMIN — IPRATROPIUM BROMIDE AND ALBUTEROL SULFATE 3 ML: 2.5; .5 SOLUTION RESPIRATORY (INHALATION) at 11:03

## 2025-03-25 RX ADMIN — REMDESIVIR 100 MG: 100 INJECTION, POWDER, LYOPHILIZED, FOR SOLUTION INTRAVENOUS at 09:03

## 2025-03-25 RX ADMIN — DOXYCYCLINE HYCLATE 100 MG: 100 TABLET, COATED ORAL at 09:03

## 2025-03-25 RX ADMIN — LOSARTAN POTASSIUM 50 MG: 50 TABLET, FILM COATED ORAL at 09:03

## 2025-03-25 RX ADMIN — GUAIFENESIN AND DEXTROMETHORPHAN HYDROBROMIDE 1 TABLET: 600; 30 TABLET, EXTENDED RELEASE ORAL at 10:03

## 2025-03-25 RX ADMIN — ARIPIPRAZOLE 5 MG: 5 TABLET ORAL at 09:03

## 2025-03-25 RX ADMIN — GABAPENTIN 300 MG: 300 CAPSULE ORAL at 02:03

## 2025-03-25 RX ADMIN — FERROUS SULFATE TAB 325 MG (65 MG ELEMENTAL FE) 1 EACH: 325 (65 FE) TAB at 09:03

## 2025-03-25 RX ADMIN — MUPIROCIN: 20 OINTMENT TOPICAL at 09:03

## 2025-03-25 RX ADMIN — METOCLOPRAMIDE 5 MG: 5 TABLET ORAL at 04:03

## 2025-03-25 RX ADMIN — CEFTRIAXONE SODIUM 1 G: 1 INJECTION, POWDER, FOR SOLUTION INTRAMUSCULAR; INTRAVENOUS at 05:03

## 2025-03-25 RX ADMIN — ENOXAPARIN SODIUM 40 MG: 40 INJECTION SUBCUTANEOUS at 04:03

## 2025-03-25 RX ADMIN — MECLIZINE 25 MG: 12.5 TABLET ORAL at 09:03

## 2025-03-25 RX ADMIN — INSULIN ASPART 6 UNITS: 100 INJECTION, SOLUTION INTRAVENOUS; SUBCUTANEOUS at 04:03

## 2025-03-25 RX ADMIN — DULOXETINE HYDROCHLORIDE 120 MG: 30 CAPSULE, DELAYED RELEASE ORAL at 06:03

## 2025-03-25 RX ADMIN — PREDNISONE 60 MG: 20 TABLET ORAL at 09:03

## 2025-03-25 RX ADMIN — ACETAMINOPHEN 650 MG: 325 TABLET ORAL at 08:03

## 2025-03-25 RX ADMIN — INSULIN ASPART 2 UNITS: 100 INJECTION, SOLUTION INTRAVENOUS; SUBCUTANEOUS at 10:03

## 2025-03-25 RX ADMIN — PRAVASTATIN SODIUM 40 MG: 40 TABLET ORAL at 09:03

## 2025-03-25 RX ADMIN — METOCLOPRAMIDE 5 MG: 5 TABLET ORAL at 12:03

## 2025-03-25 RX ADMIN — INSULIN ASPART 2 UNITS: 100 INJECTION, SOLUTION INTRAVENOUS; SUBCUTANEOUS at 12:03

## 2025-03-25 RX ADMIN — MECLIZINE 25 MG: 12.5 TABLET ORAL at 08:03

## 2025-03-25 RX ADMIN — GABAPENTIN 300 MG: 300 CAPSULE ORAL at 09:03

## 2025-03-25 RX ADMIN — DOXYCYCLINE HYCLATE 100 MG: 100 TABLET, COATED ORAL at 08:03

## 2025-03-25 RX ADMIN — ASPIRIN 81 MG CHEWABLE TABLET 81 MG: 81 TABLET CHEWABLE at 09:03

## 2025-03-25 RX ADMIN — IPRATROPIUM BROMIDE AND ALBUTEROL SULFATE 3 ML: 2.5; .5 SOLUTION RESPIRATORY (INHALATION) at 12:03

## 2025-03-25 RX ADMIN — GABAPENTIN 300 MG: 300 CAPSULE ORAL at 08:03

## 2025-03-25 NOTE — PLAN OF CARE
Pt was accepted by [facility]. Pt is not medically ready at this time. Cm will continue to follow pt through transitions of care and assist with any discharge needs.    Chris Traylor, MSJOHN  [phone]    Future Appointments   Date Time Provider Department Center   4/4/2025  9:00 AM [facility]   4/4/2025 10:00 AM [facility]   4/4/2025 10:45 AM [facility]   4/15/2025 10:00 AM Lulu Navarro, [facility] [place] [facility]        03/25/25 1437   Post-Acute Status   Post-Acute Authorization Home Health   Home Health Status Set-up Complete/Auth obtained   Coverage PHN   Discharge Delays None known at this time   Discharge Plan   Discharge Plan A Home Health

## 2025-03-25 NOTE — PT/OT/SLP PROGRESS
Physical Therapy Treatment    Patient Name:  Fabiano Malik Jr.   MRN:  [id number]    Recommendations:     Discharge Recommendations: Low Intensity Therapy (vestibular rehab)  Discharge Equipment Recommendations: shower chair  Barriers to discharge: dizziness    Assessment:     Fabiano Malik Jr. is a 64 y.o. male admitted with a medical diagnosis of COVID-19 virus infection.  He presents with the following impairments/functional limitations: weakness, impaired balance, gait instability, impaired endurance, impaired self care skills, impaired functional mobility, pain, impaired skin, edema, decreased lower extremity function, decreased ROM, impaired joint extensibility . Whitman-Hallpike positive to the R ear - Epley maneuver performed with good tolerance.     Rehab Prognosis: Good; patient would benefit from acute skilled PT services to address these deficits and reach maximum level of function.    Recent Surgery: * No surgery found *      Plan:     During this hospitalization, patient to be seen 2 x/week to address the identified rehab impairments via therapeutic activities, therapeutic exercises, gait training, neuromuscular re-education and progress toward the following goals:    Plan of Care Expires:  04/12/25    Subjective     Chief Complaint: dizziness, pain in chest with coughing  Patient/Family Comments/goals: reduce dizziness  Pain/Comfort:  Pain Rating 1:  (generalized pain all over and in chest with coughing)  Pain Rating Post-Intervention 1:  (endorses pain, pt in no apparent distress)      Objective:     Communicated with nsg prior to session.  Patient found HOB elevated with peripheral IV upon PT entry to room.     General Precautions: Standard, fall, droplet, airborne, contact  Orthopedic Precautions: N/A  Braces: N/A  Respiratory Status: Room air     Functional Mobility:  Bed Mobility:     Rolling Left:  modified independence  Rolling Right: modified independence  Scooting: modified  independence  Supine to Sit: modified independence  Sit to Supine: modified independence  Transfers:     Sit to Stand:  supervision with rolling walker  Bed to Chair: supervision with  rolling walker  using  Step Transfer  Gait: Pt ambulated ~100 ft in room with RW and Supervision, no LOB, pt reports dizziness and spinning sensation       AM-PAC 6 CLICK MOBILITY  Turning over in bed (including adjusting bedclothes, sheets and blankets)?: 4  Sitting down on and standing up from a chair with arms (e.g., wheelchair, bedside commode, etc.): 4  Moving from lying on back to sitting on the side of the bed?: 4  Moving to and from a bed to a chair (including a wheelchair)?: 4  Need to walk in hospital room?: 3  Climbing 3-5 steps with a railing?: 3  Basic Mobility Total Score: 22       Treatment & Education:  Pt reports dizziness as rest, with transitional changes, and with ambulation  Educated pt on slow transitions, keeping head still / stable during mobility, and maintaining with eyes on fixed point to prevent exacerbation of symptoms  Nystagmus noted with transitional changes  Anton-hallpike performed and pt positive to R ear with increased dizziness and horizontal nystagmus noted in B eyes  Pt reports increased symptoms to R ear  Epley maneuver performed with good tolerance  HEP and handout provided on home epley maneuver and advised pt to avoid sleeping on R side / ear  Discussed d/c recs for OP vestibular therapy and use of walker for decreased fall risk; Pt verbalizes understanding.    Encouraged OOB in chair 1-2 hours and to call for assistance for back to bed and/or toileting needs. Pt verbalizes understanding.        Patient left up in chair with all lines intact and call button in reach..    GOALS:   Multidisciplinary Problems       Physical Therapy Goals          Problem: Physical Therapy    Goal Priority Disciplines Outcome Interventions   Physical Therapy Goal     PT, PT/OT Progressing    Description: Goals to  be met by: 2025     Patient will increase functional independence with mobility by performin. Supine to sit with Modified [place]  2. Sit to supine with Modified [place]  3. Sit to stand transfer with Modified [place] without dizziness  4. Gait  x 150 feet with Modified [place] using Rolling Walker with good balance and control without dizziness.                          DME Justifications:  No DME recommended requiring DME justifications    Time Tracking:     PT Received On: 25  PT Start Time: 1145     PT Stop Time: 1216  PT Total Time (min): 31 min     Billable Minutes: Gait Training 13 and Therapeutic Activity 10  Canalith Reposition Procedure 8    Treatment Type: Treatment  PT/PTA: PT     Number of PTA visits since last PT visit: 0     2025

## 2025-03-25 NOTE — PROGRESS NOTES
"[facility] Medicine Progress Note      Subjective/Interval History      Complains of continued nasal congestion and cough today. No shortness of breath. Still with dizzy sensation.  Reports appetite is good and drinking plenty of fluids.      Objective     Physical Examination:  /67 (BP Location: Right arm, Patient Position: Lying)   Pulse 75   Temp 97.9 °F (36.6 °C) (Oral)   Resp 18   Ht 6' 1" (1.854 m)   Wt 114.3 kg (251 lb 15.8 oz)   SpO2 96%   BMI 33.25 kg/m²       Physical Examination:  Physical Exam  Vitals and nursing note reviewed.   Constitutional:       General: He is not in acute distress.     Appearance: Normal appearance. He is not ill-appearing.   HENT:      Head: Atraumatic.      Nose: Nose normal. No congestion or rhinorrhea.      Mouth/Throat:      Mouth: Mucous membranes are moist.      Pharynx: Oropharynx is clear.   Eyes:      Extraocular Movements: Extraocular movements intact.      Conjunctiva/sclera: Conjunctivae normal.   Cardiovascular:      Rate and Rhythm: Normal rate and regular rhythm.      Pulses: Normal pulses.   Pulmonary:      Effort: Pulmonary effort is normal. No respiratory distress.      Breath sounds: CTAB  Abdominal:      General: Abdomen is flat.      Palpations: Abdomen is soft. nontender  Musculoskeletal:         General: No swelling, tenderness, deformity or signs of injury. Normal range of motion.      Cervical back: Normal range of motion.      Right lower leg: No edema.      Left lower leg: No edema.   Skin:     General: Skin is warm and dry.      Capillary Refill: Capillary refill takes less than 2 seconds.      Coloration: Skin is not jaundiced or pale.   Neurological:      Mental Status: He is alert and oriented to person, place, and time.      Cranial Nerves: No cranial nerve deficit.      Sensory: No sensory deficit.   Psychiatric:         Mood and Affect: Mood normal.         Behavior: Behavior normal.         Thought Content: Thought content normal. "         Judgment: Judgment normal.        Intake/Output:    Intake/Output Summary (Last 24 hours) at 3/25/2025 0643  Last data filed at 3/25/2025 0537  Gross per 24 hour   Intake 598 ml   Output 6125 ml   Net -5527 ml     Net IO Since Admission: -13,502 mL [03/25/25 0643]    Laboratory data: reviewed       Microbiology data: reviewed       EKG data: reviewed       Radiology data: reviewed        Current Medications:     Infusions:       Scheduled:   albuterol-ipratropium  3 mL Nebulization Q6H    ARIPiprazole  5 mg Oral Daily    aspirin  81 mg Oral Daily    cefTRIAXone (Rocephin) IV (PEDS and ADULTS)  1 g Intravenous Q24H    doxycycline  100 mg Oral Q12H    DULoxetine  120 mg Oral QAM    enoxparin  40 mg Subcutaneous Daily    ferrous sulfate  1 tablet Oral Daily    gabapentin  300 mg Oral TID    levothyroxine  25 mcg Oral Before breakfast    losartan  50 mg Oral Daily    meclizine  25 mg Oral TID    mupirocin   Nasal BID    pravastatin  40 mg Oral Daily    predniSONE  60 mg Oral Daily    remdesivir infusion  100 mg Intravenous Daily        PRN:    Current Facility-Administered Medications:     acetaminophen, 650 mg, Oral, Q6H PRN    benzonatate, 100 mg, Oral, TID PRN    dextromethorphan-guaiFENesin  mg, 1 tablet, Oral, BID PRN    dextrose 50%, 12.5 g, Intravenous, PRN    dextrose 50%, 25 g, Intravenous, PRN    glucagon (human recombinant), 1 mg, Intramuscular, PRN    glucose, 16 g, Oral, PRN    glucose, 24 g, Oral, PRN    hydrOXYzine pamoate, 50 mg, Oral, Nightly PRN    insulin aspart U-100, 0-10 Units, Subcutaneous, QID (AC + HS) PRN    naloxone, 0.02 mg, Intravenous, PRN    ondansetron, 4 mg, Intravenous, Q8H PRN    sodium chloride 0.9%, 10 mL, Intravenous, Q12H PRN      Assessment/Plan:     Community acquired pneumonia superimposed on ILD  COVID-19 infection  - 5 day history of productive cough of yellow sputum, subjective febrile symptoms-cough, chills, sweating, worsened dyspneic symptoms from baseline  -  Follows with Dr. Ladonna Garcia for aaCT 1/13/2025 demonstrates interlobular septal thickening with traction bronchiectasis with a lower lobe and peripheral predominance which may be seen with interstitial lung disease such as NSIP or UIP. Small amount of ground-glass opacities.   - Repeat CT scan appears to me to have more significant GGO from prior.   In setting of abnormal pulmonary architecture and constellation of symptoms, favor treating as superimposed CAP, rocephin + doxy  - Duonebs q6, 6MWT ordered  - pulm consulted, suspected imaging changes related to viral illness rather than progression of ILD; further outpatient work up- will need repeat outpatient PFTs and 6MWT once recovered  - resp infection panel, urine legionella, AFB, culture pending   - COVID-19 +, started remdesevir   - continue ctx and doxycycline x5 day course      Vertigo, suspected Meniere Disease  - Longstanding history of severe, frequently recurring vertiginous symptoms lasting 15 minutes-48 hours, refractory to initial management in ED   Reported subjective hearing loss, ear fullness, tinnitus  - Prior MRI brain wo 3/2024 unremarkable  - Ordered MRI brain w/wo, with EAC protocol to assess for cholesteatoma   - Favor treating as Meniere Disease with 7-14 day course of prednisone 60mg daily.   - schedule meclizine today; trial reglan     Hyponatremia   - Na low at 127, now 124 on 3/25  - suspect component of SIADH in setting of Covid vs thiazide use  - will resend serum/urine osm and urine sodium      ACC Stage II Hypertension  - Prescribed metoprolol 25mg, olmesartan 50 outpatient. Hypertensive here to 174/74, unclear indication for metoprolol.   - Losartan 50mg daily while inpatient     Type 2 diabetes   - A1c 8.2, Microalbumin/Creatinine ratio 295>44.2 over past year.   - Prescribed trulicity 1.5mg once weekly, metformin 1000mg BID  - SSI while inpatient  - May need adjustment of diabetes medications with high dose prednisone for next 7-14  days     Microcytic anemia  - Iron 49. TIBC 490, iron sat 10%/Ferritin 28.2  - B12 >2000/Folate 15.3  - started oral iron suplementation     Schizoaffective Disorder   - On aripiprazole 5mg daily, no acute concerns     Hypothyroidism  - On levothyroxine 25mcg daily, no acute concerns     Right parietal cortical/subcortical infarcts  - MRI brain showed right parietal cortical/subcortical infarcts and a new deep white matter infarct in the left frontal lobe  - lipid panel: chol 128, HDL 41, trigs 132, LDL 60       Code: Full  Diet: Diabetic  Dispo: Inpatient pending improvement in hyponatremia     Ilan Sanches DO  [facility] Internal Medicine/Emergency Medicine HO-V    [facility] Hospital Medicine Team A     [facility] Medicine Hospitalist Pager numbers:   [facility] Hospitalist Medicine Team A (Rah/Anum):[phone]  [facility] Hospitalist Medicine Team B (Shantelle/Cricket):        [phone]

## 2025-03-25 NOTE — PLAN OF CARE
Problem: Adult Inpatient Plan of Care  Goal: Plan of Care Review  Outcome: Progressing  Goal: Patient-Specific Goal (Individualized)  Outcome: Progressing  Goal: Absence of Hospital-Acquired Illness or Injury  Outcome: Progressing  Goal: Optimal Comfort and Wellbeing  Outcome: Progressing  Goal: Readiness for Transition of Care  Outcome: Progressing     Problem: Diabetes Comorbidity  Goal: Blood Glucose Level Within Targeted Range  Outcome: Progressing     Problem: Pneumonia  Goal: Fluid Balance  Outcome: Progressing  Goal: Resolution of Infection Signs and Symptoms  Outcome: Progressing  Goal: Effective Oxygenation and Ventilation  Outcome: Progressing     Problem: Fall Injury Risk  Goal: Absence of Fall and Fall-Related Injury  Outcome: Progressing     Problem: Comorbidity Management  Goal: Blood Pressure in Desired Range  Outcome: Progressing     Problem: Activity Intolerance  Goal: Enhanced Capacity and Energy  Outcome: Progressing

## 2025-03-26 LAB
ABSOLUTE EOSINOPHIL (OHS): 0.06 K/UL
ABSOLUTE MONOCYTE (OHS): 0.7 K/UL (ref 0.3–1)
ABSOLUTE NEUTROPHIL COUNT (OHS): 4.06 K/UL (ref 1.8–7.7)
ALBUMIN SERPL BCP-MCNC: 3.8 G/DL (ref 3.5–5.2)
ALP SERPL-CCNC: 54 UNIT/L (ref 40–150)
ALT SERPL W/O P-5'-P-CCNC: 22 UNIT/L (ref 10–44)
ANION GAP (OHS): 10 MMOL/L (ref 8–16)
AST SERPL-CCNC: 22 UNIT/L (ref 11–45)
BASOPHILS # BLD AUTO: 0.04 K/UL
BASOPHILS NFR BLD AUTO: 0.6 %
BILIRUB SERPL-MCNC: 0.3 MG/DL (ref 0.1–1)
BUN SERPL-MCNC: 18 MG/DL (ref 8–23)
CALCIUM SERPL-MCNC: 8.5 MG/DL (ref 8.7–10.5)
CHLORIDE SERPL-SCNC: 95 MMOL/L (ref 95–110)
CO2 SERPL-SCNC: 21 MMOL/L (ref 23–29)
CREAT SERPL-MCNC: 1.1 MG/DL (ref 0.5–1.4)
ERYTHROCYTE [DISTWIDTH] IN BLOOD BY AUTOMATED COUNT: 14.2 % (ref 11.5–14.5)
GFR SERPLBLD CREATININE-BSD FMLA CKD-EPI: >60 ML/MIN/1.73/M2
GLUCOSE SERPL-MCNC: 93 MG/DL (ref 70–110)
HCT VFR BLD AUTO: 33 % (ref 40–54)
HGB BLD-MCNC: 11.3 GM/DL (ref 14–18)
IMM GRANULOCYTES # BLD AUTO: 0.03 K/UL (ref 0–0.04)
IMM GRANULOCYTES NFR BLD AUTO: 0.4 % (ref 0–0.5)
LYMPHOCYTES # BLD AUTO: 1.9 K/UL (ref 1–4.8)
MCH RBC QN AUTO: 25.4 PG (ref 27–50)
MCHC RBC AUTO-ENTMCNC: 34.2 G/DL (ref 32–36)
MCV RBC AUTO: 74 FL (ref 82–98)
NUCLEATED RBC (/100WBC) (OHS): 0 /100 WBC
OSMOLALITY SERPL: 271 MOSM/KG (ref 280–300)
OSMOLALITY UR: 189 MOSM/KG (ref 50–1200)
PLATELET # BLD AUTO: 339 K/UL (ref 150–450)
PMV BLD AUTO: 8.2 FL (ref 9.2–12.9)
POCT GLUCOSE: 107 MG/DL (ref 70–110)
POCT GLUCOSE: 239 MG/DL (ref 70–110)
POCT GLUCOSE: 261 MG/DL (ref 70–110)
POCT GLUCOSE: 262 MG/DL (ref 70–110)
POTASSIUM SERPL-SCNC: 3.9 MMOL/L (ref 3.5–5.1)
PROT SERPL-MCNC: 6.5 GM/DL (ref 6–8.4)
RBC # BLD AUTO: 4.45 M/UL (ref 4.6–6.2)
RELATIVE EOSINOPHIL (OHS): 0.9 %
RELATIVE LYMPHOCYTE (OHS): 28 % (ref 18–48)
RELATIVE MONOCYTE (OHS): 10.3 % (ref 4–15)
RELATIVE NEUTROPHIL (OHS): 59.8 % (ref 38–73)
SODIUM SERPL-SCNC: 126 MMOL/L (ref 136–145)
SODIUM UR-SCNC: 39 MMOL/L (ref 20–250)
WBC # BLD AUTO: 6.79 K/UL (ref 3.9–12.7)

## 2025-03-26 PROCEDURE — 36415 COLL VENOUS BLD VENIPUNCTURE: CPT | Performed by: STUDENT IN AN ORGANIZED HEALTH CARE EDUCATION/TRAINING PROGRAM

## 2025-03-26 PROCEDURE — 85025 COMPLETE CBC W/AUTO DIFF WBC: CPT | Performed by: STUDENT IN AN ORGANIZED HEALTH CARE EDUCATION/TRAINING PROGRAM

## 2025-03-26 PROCEDURE — 83930 ASSAY OF BLOOD OSMOLALITY: CPT | Performed by: STUDENT IN AN ORGANIZED HEALTH CARE EDUCATION/TRAINING PROGRAM

## 2025-03-26 PROCEDURE — 25000003 PHARM REV CODE 250: Performed by: STUDENT IN AN ORGANIZED HEALTH CARE EDUCATION/TRAINING PROGRAM

## 2025-03-26 PROCEDURE — 63600175 PHARM REV CODE 636 W HCPCS: Performed by: STUDENT IN AN ORGANIZED HEALTH CARE EDUCATION/TRAINING PROGRAM

## 2025-03-26 PROCEDURE — 25000003 PHARM REV CODE 250

## 2025-03-26 PROCEDURE — 94640 AIRWAY INHALATION TREATMENT: CPT

## 2025-03-26 PROCEDURE — 84300 ASSAY OF URINE SODIUM: CPT | Performed by: STUDENT IN AN ORGANIZED HEALTH CARE EDUCATION/TRAINING PROGRAM

## 2025-03-26 PROCEDURE — 94761 N-INVAS EAR/PLS OXIMETRY MLT: CPT

## 2025-03-26 PROCEDURE — 63600175 PHARM REV CODE 636 W HCPCS: Performed by: INTERNAL MEDICINE

## 2025-03-26 PROCEDURE — 25000242 PHARM REV CODE 250 ALT 637 W/ HCPCS: Performed by: STUDENT IN AN ORGANIZED HEALTH CARE EDUCATION/TRAINING PROGRAM

## 2025-03-26 PROCEDURE — 82040 ASSAY OF SERUM ALBUMIN: CPT

## 2025-03-26 PROCEDURE — 11000001 HC ACUTE MED/SURG PRIVATE ROOM

## 2025-03-26 PROCEDURE — 27000207 HC ISOLATION

## 2025-03-26 RX ORDER — DOXYCYCLINE HYCLATE 100 MG
100 TABLET ORAL EVERY 12 HOURS
Status: DISCONTINUED | OUTPATIENT
Start: 2025-03-27 | End: 2025-03-26

## 2025-03-26 RX ORDER — INSULIN ASPART 100 [IU]/ML
2 INJECTION, SOLUTION INTRAVENOUS; SUBCUTANEOUS
Status: DISCONTINUED | OUTPATIENT
Start: 2025-03-26 | End: 2025-03-28

## 2025-03-26 RX ORDER — METHOCARBAMOL 750 MG/1
750 TABLET, FILM COATED ORAL 3 TIMES DAILY PRN
Status: DISCONTINUED | OUTPATIENT
Start: 2025-03-26 | End: 2025-03-28 | Stop reason: HOSPADM

## 2025-03-26 RX ORDER — METOCLOPRAMIDE HYDROCHLORIDE 5 MG/ML
10 INJECTION INTRAMUSCULAR; INTRAVENOUS ONCE
Status: COMPLETED | OUTPATIENT
Start: 2025-03-26 | End: 2025-03-26

## 2025-03-26 RX ORDER — LIDOCAINE 50 MG/G
2 PATCH TOPICAL
Status: DISCONTINUED | OUTPATIENT
Start: 2025-03-26 | End: 2025-03-28 | Stop reason: HOSPADM

## 2025-03-26 RX ORDER — DOXYCYCLINE HYCLATE 100 MG
100 TABLET ORAL ONCE
Status: COMPLETED | OUTPATIENT
Start: 2025-03-26 | End: 2025-03-26

## 2025-03-26 RX ORDER — TALC
6 POWDER (GRAM) TOPICAL NIGHTLY PRN
Status: DISCONTINUED | OUTPATIENT
Start: 2025-03-27 | End: 2025-03-28 | Stop reason: HOSPADM

## 2025-03-26 RX ADMIN — METHOCARBAMOL TABLETS 750 MG: 750 TABLET, COATED ORAL at 08:03

## 2025-03-26 RX ADMIN — LOSARTAN POTASSIUM 50 MG: 50 TABLET, FILM COATED ORAL at 08:03

## 2025-03-26 RX ADMIN — GABAPENTIN 300 MG: 300 CAPSULE ORAL at 08:03

## 2025-03-26 RX ADMIN — MUPIROCIN: 20 OINTMENT TOPICAL at 08:03

## 2025-03-26 RX ADMIN — MECLIZINE 25 MG: 12.5 TABLET ORAL at 02:03

## 2025-03-26 RX ADMIN — INSULIN ASPART 6 UNITS: 100 INJECTION, SOLUTION INTRAVENOUS; SUBCUTANEOUS at 04:03

## 2025-03-26 RX ADMIN — INSULIN ASPART 2 UNITS: 100 INJECTION, SOLUTION INTRAVENOUS; SUBCUTANEOUS at 11:03

## 2025-03-26 RX ADMIN — GABAPENTIN 300 MG: 300 CAPSULE ORAL at 02:03

## 2025-03-26 RX ADMIN — ARIPIPRAZOLE 5 MG: 5 TABLET ORAL at 08:03

## 2025-03-26 RX ADMIN — DOXYCYCLINE HYCLATE 100 MG: 100 TABLET, COATED ORAL at 11:03

## 2025-03-26 RX ADMIN — ENOXAPARIN SODIUM 40 MG: 40 INJECTION SUBCUTANEOUS at 04:03

## 2025-03-26 RX ADMIN — METOCLOPRAMIDE 5 MG: 5 TABLET ORAL at 10:03

## 2025-03-26 RX ADMIN — Medication 6 MG: at 11:03

## 2025-03-26 RX ADMIN — DOXYCYCLINE HYCLATE 100 MG: 100 TABLET, COATED ORAL at 08:03

## 2025-03-26 RX ADMIN — PRAVASTATIN SODIUM 40 MG: 40 TABLET ORAL at 08:03

## 2025-03-26 RX ADMIN — PREDNISONE 60 MG: 20 TABLET ORAL at 08:03

## 2025-03-26 RX ADMIN — FLUTICASONE PROPIONATE 100 MCG: 50 SPRAY, METERED NASAL at 08:03

## 2025-03-26 RX ADMIN — ACETAMINOPHEN 650 MG: 325 TABLET ORAL at 08:03

## 2025-03-26 RX ADMIN — METOCLOPRAMIDE 10 MG: 5 INJECTION, SOLUTION INTRAMUSCULAR; INTRAVENOUS at 04:03

## 2025-03-26 RX ADMIN — CEFTRIAXONE SODIUM 1 G: 1 INJECTION, POWDER, FOR SOLUTION INTRAMUSCULAR; INTRAVENOUS at 06:03

## 2025-03-26 RX ADMIN — MECLIZINE 25 MG: 12.5 TABLET ORAL at 08:03

## 2025-03-26 RX ADMIN — IPRATROPIUM BROMIDE AND ALBUTEROL SULFATE 3 ML: 2.5; .5 SOLUTION RESPIRATORY (INHALATION) at 12:03

## 2025-03-26 RX ADMIN — LEVOTHYROXINE SODIUM 25 MCG: 25 TABLET ORAL at 06:03

## 2025-03-26 RX ADMIN — IPRATROPIUM BROMIDE AND ALBUTEROL SULFATE 3 ML: 2.5; .5 SOLUTION RESPIRATORY (INHALATION) at 02:03

## 2025-03-26 RX ADMIN — FERROUS SULFATE TAB 325 MG (65 MG ELEMENTAL FE) 1 EACH: 325 (65 FE) TAB at 08:03

## 2025-03-26 RX ADMIN — IPRATROPIUM BROMIDE AND ALBUTEROL SULFATE 3 ML: 2.5; .5 SOLUTION RESPIRATORY (INHALATION) at 09:03

## 2025-03-26 RX ADMIN — INSULIN ASPART 3 UNITS: 100 INJECTION, SOLUTION INTRAVENOUS; SUBCUTANEOUS at 08:03

## 2025-03-26 RX ADMIN — DULOXETINE HYDROCHLORIDE 120 MG: 30 CAPSULE, DELAYED RELEASE ORAL at 06:03

## 2025-03-26 RX ADMIN — METOCLOPRAMIDE 5 MG: 5 TABLET ORAL at 06:03

## 2025-03-26 RX ADMIN — INSULIN ASPART 4 UNITS: 100 INJECTION, SOLUTION INTRAVENOUS; SUBCUTANEOUS at 11:03

## 2025-03-26 RX ADMIN — LIDOCAINE 2 PATCH: 50 PATCH CUTANEOUS at 10:03

## 2025-03-26 RX ADMIN — IPRATROPIUM BROMIDE AND ALBUTEROL SULFATE 3 ML: 2.5; .5 SOLUTION RESPIRATORY (INHALATION) at 07:03

## 2025-03-26 RX ADMIN — ASPIRIN 81 MG CHEWABLE TABLET 81 MG: 81 TABLET CHEWABLE at 08:03

## 2025-03-26 NOTE — PLAN OF CARE
Problem: Adult Inpatient Plan of Care  Goal: Plan of Care Review  Outcome: Progressing  Goal: Optimal Comfort and Wellbeing  Outcome: Progressing     Problem: Pneumonia  Goal: Fluid Balance  Outcome: Progressing     Problem: Fall Injury Risk  Goal: Absence of Fall and Fall-Related Injury  Outcome: Progressing

## 2025-03-26 NOTE — PROGRESS NOTES
"[facility] Medicine Progress Note      Subjective/Interval History      Complains of chest congestion this AM, still dizzy but improved after Epley with PT yesterday. Some chest wall soreness with coughing.      Objective     Physical Examination:  /83 (BP Location: Left arm, Patient Position: Sitting)   Pulse 72   Temp 97.9 °F (36.6 °C) (Oral)   Resp 18   Ht 6' 1" (1.854 m)   Wt 114.3 kg (251 lb 15.8 oz)   SpO2 (!) 93%   BMI 33.25 kg/m²       Physical Examination:  Physical Exam  Vitals and nursing note reviewed.   Constitutional:       General: He is not in acute distress.     Appearance: Normal appearance. He is not ill-appearing.   HENT:      Head: Atraumatic.      Nose: Nose normal. No congestion or rhinorrhea.      Mouth/Throat:      Mouth: Mucous membranes are moist.      Pharynx: Oropharynx is clear.   Eyes:      Extraocular Movements: Extraocular movements intact.      Conjunctiva/sclera: Conjunctivae normal.   Cardiovascular:      Rate and Rhythm: Normal rate and regular rhythm.      Pulses: Normal pulses.   Pulmonary:      Effort: Pulmonary effort is normal. No respiratory distress.      Breath sounds: CTAB  Abdominal:      General: Abdomen is flat.      Palpations: Abdomen is soft. nontender  Musculoskeletal:         General: No swelling, tenderness, deformity or signs of injury. Normal range of motion.      Cervical back: Normal range of motion.      Right lower leg: No edema.      Left lower leg: No edema.   Skin:     General: Skin is warm and dry.      Capillary Refill: Capillary refill takes less than 2 seconds.      Coloration: Skin is not jaundiced or pale.   Neurological:      Mental Status: He is alert and oriented to person, place, and time.      Cranial Nerves: No cranial nerve deficit.      Sensory: No sensory deficit.   Psychiatric:         Mood and Affect: Mood normal.         Behavior: Behavior normal.         Thought Content: Thought content normal.         Judgment: " Judgment normal.        Intake/Output:    Intake/Output Summary (Last 24 hours) at 3/26/2025 0848  Last data filed at 3/26/2025 0600  Gross per 24 hour   Intake --   Output 2200 ml   Net -2200 ml     Net IO Since Admission: -15,702 mL [03/26/25 0848]    Laboratory data: reviewed       Microbiology data: reviewed       EKG data: reviewed       Radiology data: reviewed        Current Medications:     Infusions:       Scheduled:   albuterol-ipratropium  3 mL Nebulization Q6H    ARIPiprazole  5 mg Oral Daily    aspirin  81 mg Oral Daily    doxycycline  100 mg Oral Q12H    DULoxetine  120 mg Oral QAM    enoxparin  40 mg Subcutaneous Daily    ferrous sulfate  1 tablet Oral Daily    fluticasone propionate  2 spray Each Nostril Daily    gabapentin  300 mg Oral TID    insulin aspart U-100  2 Units Subcutaneous with lunch    levothyroxine  25 mcg Oral Before breakfast    losartan  50 mg Oral Daily    meclizine  25 mg Oral TID    metoclopramide HCl  5 mg Oral TID AC    mupirocin   Nasal BID    pravastatin  40 mg Oral Daily    predniSONE  60 mg Oral Daily        PRN:    Current Facility-Administered Medications:     acetaminophen, 650 mg, Oral, Q6H PRN    benzonatate, 100 mg, Oral, TID PRN    dextromethorphan-guaiFENesin  mg, 1 tablet, Oral, BID PRN    dextrose 50%, 12.5 g, Intravenous, PRN    dextrose 50%, 25 g, Intravenous, PRN    glucagon (human recombinant), 1 mg, Intramuscular, PRN    glucose, 16 g, Oral, PRN    glucose, 24 g, Oral, PRN    hydrOXYzine pamoate, 50 mg, Oral, Nightly PRN    insulin aspart U-100, 0-10 Units, Subcutaneous, QID (AC + HS) PRN    naloxone, 0.02 mg, Intravenous, PRN    ondansetron, 4 mg, Intravenous, Q8H PRN    sodium chloride 0.9%, 10 mL, Intravenous, Q12H PRN      Assessment/Plan:     Community acquired pneumonia superimposed on ILD  COVID-19 infection  - 5 day history of productive cough of yellow sputum, subjective febrile symptoms-cough, chills, sweating, worsened dyspneic symptoms from  baseline  - Follows with Dr. Ladonna Garcia for aaCT 1/13/2025 demonstrates interlobular septal thickening with traction bronchiectasis with a lower lobe and peripheral predominance which may be seen with interstitial lung disease such as NSIP or UIP. Small amount of ground-glass opacities.   - Repeat CT scan appears to me to have more significant GGO from prior.   In setting of abnormal pulmonary architecture and constellation of symptoms, favor treating as superimposed CAP, rocephin + doxy  - Duonebs q6, 6MWT ordered  - pulm consulted, suspected imaging changes related to viral illness rather than progression of ILD; further outpatient work up- will need repeat outpatient PFTs and 6MWT once recovered  - resp infection panel, urine legionella, AFB, culture pending   - COVID-19 +, started remdesevir   - continue ctx and doxycycline x5 day course--completed      Vertigo, suspected Meniere Disease  - Longstanding history of severe, frequently recurring vertiginous symptoms lasting 15 minutes-48 hours, refractory to initial management in ED   Reported subjective hearing loss, ear fullness, tinnitus  - Prior MRI brain wo 3/2024 unremarkable  - Ordered MRI brain w/wo, with EAC protocol to assess for cholesteatoma   - Favor treating as Meniere Disease with 7-14 day course of prednisone 60mg daily.   - scheduled meclizine, Epley with PT reproduces/improved symptoms yesterday        Hyponatremia   - Na low at 127, now 124 on 3/25  - suspect component of SIADH in setting of Covid vs thiazide use  - will resend serum/urine osm and urine sodium; pending  - fluid restrict      ACC Stage II Hypertension  - Prescribed metoprolol 25mg, olmesartan 50 outpatient. Hypertensive here to 174/74, unclear indication for metoprolol.   - Losartan 50mg daily while inpatient     Type 2 diabetes   - A1c 8.2, Microalbumin/Creatinine ratio 295>44.2 over past year.   - Prescribed trulicity 1.5mg once weekly, metformin 1000mg BID  - SSI while  inpatient  - May need adjustment of diabetes medications with high dose prednisone for next 7-14 days     Microcytic anemia  - Iron 49. TIBC 490, iron sat 10%/Ferritin 28.2  - B12 >2000/Folate 15.3  - started oral iron suplementation     Schizoaffective Disorder   - On aripiprazole 5mg daily, no acute concerns     Hypothyroidism  - On levothyroxine 25mcg daily, no acute concerns     Right parietal cortical/subcortical infarcts  - MRI brain showed right parietal cortical/subcortical infarcts and a new deep white matter infarct in the left frontal lobe  - lipid panel: chol 128, HDL 41, trigs 132, LDL 60       Code: Full  Diet: Diabetic  Dispo: Inpatient pending improvement in hyponatremia     Ilan Sanches DO  \[facility]\"" Internal Medicine/Emergency Medicine HO-V    \[facility]\"" Hospital Medicine Team A     \[facility]\"" Medicine Hospitalist Pager numbers:   \[facility]\"" Hospitalist Medicine Team A (Rah/Anum):[phone]  \[facility]\"" Hospitalist Medicine Team B (Shantelle/Cricket):        [phone]

## 2025-03-26 NOTE — PT/OT/SLP PROGRESS
"Physical Therapy      Patient Name:  Fabiano Malik Jr.   MRN:  [id number]    Patient not seen today secondary to Other (Comment) (Attempt made at 11:20. Pt politely declined. stated, Can we skip today please? I am very dizzy just sitting here. I have been up and walking around the room. It's been a bad night with the dizziness".). Will follow-up next session.    "

## 2025-03-27 LAB
ABSOLUTE EOSINOPHIL (OHS): 0.05 K/UL
ABSOLUTE MONOCYTE (OHS): 0.84 K/UL (ref 0.3–1)
ABSOLUTE NEUTROPHIL COUNT (OHS): 4.95 K/UL (ref 1.8–7.7)
ALBUMIN SERPL BCP-MCNC: 3.9 G/DL (ref 3.5–5.2)
ALP SERPL-CCNC: 56 UNIT/L (ref 40–150)
ALT SERPL W/O P-5'-P-CCNC: 27 UNIT/L (ref 10–44)
ANION GAP (OHS): 8 MMOL/L (ref 8–16)
AST SERPL-CCNC: 21 UNIT/L (ref 11–45)
BASOPHILS # BLD AUTO: 0.02 K/UL
BASOPHILS NFR BLD AUTO: 0.3 %
BILIRUB SERPL-MCNC: 0.2 MG/DL (ref 0.1–1)
BUN SERPL-MCNC: 25 MG/DL (ref 8–23)
CALCIUM SERPL-MCNC: 8.6 MG/DL (ref 8.7–10.5)
CHLORIDE SERPL-SCNC: 97 MMOL/L (ref 95–110)
CO2 SERPL-SCNC: 21 MMOL/L (ref 23–29)
CREAT SERPL-MCNC: 1.4 MG/DL (ref 0.5–1.4)
ERYTHROCYTE [DISTWIDTH] IN BLOOD BY AUTOMATED COUNT: 14.2 % (ref 11.5–14.5)
GFR SERPLBLD CREATININE-BSD FMLA CKD-EPI: 56 ML/MIN/1.73/M2
GLUCOSE SERPL-MCNC: 225 MG/DL (ref 70–110)
HCT VFR BLD AUTO: 33.7 % (ref 40–54)
HGB BLD-MCNC: 11.3 GM/DL (ref 14–18)
IMM GRANULOCYTES # BLD AUTO: 0.02 K/UL (ref 0–0.04)
IMM GRANULOCYTES NFR BLD AUTO: 0.3 % (ref 0–0.5)
LYMPHOCYTES # BLD AUTO: 1.92 K/UL (ref 1–4.8)
MCH RBC QN AUTO: 25.5 PG (ref 27–50)
MCHC RBC AUTO-ENTMCNC: 33.5 G/DL (ref 32–36)
MCV RBC AUTO: 76 FL (ref 82–98)
NUCLEATED RBC (/100WBC) (OHS): 0 /100 WBC
PLATELET # BLD AUTO: 355 K/UL (ref 150–450)
PMV BLD AUTO: 8.3 FL (ref 9.2–12.9)
POCT GLUCOSE: 188 MG/DL (ref 70–110)
POCT GLUCOSE: 200 MG/DL (ref 70–110)
POCT GLUCOSE: 306 MG/DL (ref 70–110)
POCT GLUCOSE: 323 MG/DL (ref 70–110)
POTASSIUM SERPL-SCNC: 3.9 MMOL/L (ref 3.5–5.1)
PROT SERPL-MCNC: 6.5 GM/DL (ref 6–8.4)
RBC # BLD AUTO: 4.44 M/UL (ref 4.6–6.2)
RELATIVE EOSINOPHIL (OHS): 0.6 %
RELATIVE LYMPHOCYTE (OHS): 24.6 % (ref 18–48)
RELATIVE MONOCYTE (OHS): 10.8 % (ref 4–15)
RELATIVE NEUTROPHIL (OHS): 63.4 % (ref 38–73)
SODIUM SERPL-SCNC: 126 MMOL/L (ref 136–145)
WBC # BLD AUTO: 7.8 K/UL (ref 3.9–12.7)

## 2025-03-27 PROCEDURE — 80053 COMPREHEN METABOLIC PANEL: CPT

## 2025-03-27 PROCEDURE — 63600175 PHARM REV CODE 636 W HCPCS: Performed by: INTERNAL MEDICINE

## 2025-03-27 PROCEDURE — 11000001 HC ACUTE MED/SURG PRIVATE ROOM

## 2025-03-27 PROCEDURE — 25000242 PHARM REV CODE 250 ALT 637 W/ HCPCS: Performed by: STUDENT IN AN ORGANIZED HEALTH CARE EDUCATION/TRAINING PROGRAM

## 2025-03-27 PROCEDURE — 97530 THERAPEUTIC ACTIVITIES: CPT | Mod: CQ

## 2025-03-27 PROCEDURE — 63600175 PHARM REV CODE 636 W HCPCS: Performed by: STUDENT IN AN ORGANIZED HEALTH CARE EDUCATION/TRAINING PROGRAM

## 2025-03-27 PROCEDURE — 25000003 PHARM REV CODE 250: Performed by: STUDENT IN AN ORGANIZED HEALTH CARE EDUCATION/TRAINING PROGRAM

## 2025-03-27 PROCEDURE — 85025 COMPLETE CBC W/AUTO DIFF WBC: CPT | Performed by: STUDENT IN AN ORGANIZED HEALTH CARE EDUCATION/TRAINING PROGRAM

## 2025-03-27 PROCEDURE — 27000207 HC ISOLATION

## 2025-03-27 PROCEDURE — 25000003 PHARM REV CODE 250

## 2025-03-27 PROCEDURE — 97116 GAIT TRAINING THERAPY: CPT | Mod: CQ

## 2025-03-27 PROCEDURE — 94761 N-INVAS EAR/PLS OXIMETRY MLT: CPT

## 2025-03-27 PROCEDURE — 99900035 HC TECH TIME PER 15 MIN (STAT)

## 2025-03-27 PROCEDURE — 94640 AIRWAY INHALATION TREATMENT: CPT

## 2025-03-27 PROCEDURE — 36415 COLL VENOUS BLD VENIPUNCTURE: CPT | Performed by: STUDENT IN AN ORGANIZED HEALTH CARE EDUCATION/TRAINING PROGRAM

## 2025-03-27 RX ORDER — INSULIN GLARGINE 100 [IU]/ML
8 INJECTION, SOLUTION SUBCUTANEOUS DAILY
Status: DISCONTINUED | OUTPATIENT
Start: 2025-03-27 | End: 2025-03-28 | Stop reason: HOSPADM

## 2025-03-27 RX ADMIN — MECLIZINE 25 MG: 12.5 TABLET ORAL at 09:03

## 2025-03-27 RX ADMIN — INSULIN ASPART 8 UNITS: 100 INJECTION, SOLUTION INTRAVENOUS; SUBCUTANEOUS at 04:03

## 2025-03-27 RX ADMIN — GABAPENTIN 300 MG: 300 CAPSULE ORAL at 08:03

## 2025-03-27 RX ADMIN — ARIPIPRAZOLE 5 MG: 5 TABLET ORAL at 09:03

## 2025-03-27 RX ADMIN — BENZONATATE 100 MG: 100 CAPSULE ORAL at 04:03

## 2025-03-27 RX ADMIN — MUPIROCIN: 20 OINTMENT TOPICAL at 08:03

## 2025-03-27 RX ADMIN — GABAPENTIN 300 MG: 300 CAPSULE ORAL at 02:03

## 2025-03-27 RX ADMIN — PRAVASTATIN SODIUM 40 MG: 40 TABLET ORAL at 09:03

## 2025-03-27 RX ADMIN — BENZONATATE 100 MG: 100 CAPSULE ORAL at 09:03

## 2025-03-27 RX ADMIN — GUAIFENESIN AND DEXTROMETHORPHAN HYDROBROMIDE 1 TABLET: 600; 30 TABLET, EXTENDED RELEASE ORAL at 09:03

## 2025-03-27 RX ADMIN — IPRATROPIUM BROMIDE AND ALBUTEROL SULFATE 3 ML: 2.5; .5 SOLUTION RESPIRATORY (INHALATION) at 07:03

## 2025-03-27 RX ADMIN — MECLIZINE 25 MG: 12.5 TABLET ORAL at 02:03

## 2025-03-27 RX ADMIN — INSULIN ASPART 2 UNITS: 100 INJECTION, SOLUTION INTRAVENOUS; SUBCUTANEOUS at 11:03

## 2025-03-27 RX ADMIN — MUPIROCIN: 20 OINTMENT TOPICAL at 09:03

## 2025-03-27 RX ADMIN — DULOXETINE HYDROCHLORIDE 120 MG: 30 CAPSULE, DELAYED RELEASE ORAL at 06:03

## 2025-03-27 RX ADMIN — ACETAMINOPHEN 650 MG: 325 TABLET ORAL at 04:03

## 2025-03-27 RX ADMIN — METHOCARBAMOL TABLETS 750 MG: 750 TABLET, COATED ORAL at 02:03

## 2025-03-27 RX ADMIN — PREDNISONE 60 MG: 20 TABLET ORAL at 09:03

## 2025-03-27 RX ADMIN — FLUTICASONE PROPIONATE 100 MCG: 50 SPRAY, METERED NASAL at 09:03

## 2025-03-27 RX ADMIN — Medication 6 MG: at 08:03

## 2025-03-27 RX ADMIN — IPRATROPIUM BROMIDE AND ALBUTEROL SULFATE 3 ML: 2.5; .5 SOLUTION RESPIRATORY (INHALATION) at 12:03

## 2025-03-27 RX ADMIN — INSULIN GLARGINE 8 UNITS: 100 INJECTION, SOLUTION SUBCUTANEOUS at 09:03

## 2025-03-27 RX ADMIN — ASPIRIN 81 MG CHEWABLE TABLET 81 MG: 81 TABLET CHEWABLE at 09:03

## 2025-03-27 RX ADMIN — FERROUS SULFATE TAB 325 MG (65 MG ELEMENTAL FE) 1 EACH: 325 (65 FE) TAB at 09:03

## 2025-03-27 RX ADMIN — INSULIN ASPART 1 UNITS: 100 INJECTION, SOLUTION INTRAVENOUS; SUBCUTANEOUS at 07:03

## 2025-03-27 RX ADMIN — GUAIFENESIN AND DEXTROMETHORPHAN HYDROBROMIDE 1 TABLET: 600; 30 TABLET, EXTENDED RELEASE ORAL at 08:03

## 2025-03-27 RX ADMIN — GABAPENTIN 300 MG: 300 CAPSULE ORAL at 09:03

## 2025-03-27 RX ADMIN — LEVOTHYROXINE SODIUM 25 MCG: 25 TABLET ORAL at 06:03

## 2025-03-27 RX ADMIN — MECLIZINE 25 MG: 12.5 TABLET ORAL at 08:03

## 2025-03-27 RX ADMIN — ENOXAPARIN SODIUM 40 MG: 40 INJECTION SUBCUTANEOUS at 04:03

## 2025-03-27 RX ADMIN — LIDOCAINE 2 PATCH: 50 PATCH CUTANEOUS at 09:03

## 2025-03-27 RX ADMIN — IPRATROPIUM BROMIDE AND ALBUTEROL SULFATE 3 ML: 2.5; .5 SOLUTION RESPIRATORY (INHALATION) at 02:03

## 2025-03-27 RX ADMIN — LOSARTAN POTASSIUM 50 MG: 50 TABLET, FILM COATED ORAL at 09:03

## 2025-03-27 NOTE — PT/OT/SLP PROGRESS
Physical Therapy Treatment    Patient Name:  Fabiano Malik Jr.   MRN:  [id number]    Recommendations:     Discharge Recommendations: Low Intensity Therapy (vestibular rehab)  Discharge Equipment Recommendations: shower chair  Barriers to discharge:  dizziness with movement    Assessment:     Fabiano Malik Jr. is a 64 y.o. male admitted with a medical diagnosis of COVID-19 virus infection.  He presents with the following impairments/functional limitations: weakness, impaired endurance, impaired self care skills, impaired functional mobility, gait instability, impaired balance, pain, decreased lower extremity function, impaired skin, decreased ROM, edema Pt would continue to benefit from P.T. To address impairments listed above.  .    Rehab Prognosis: Fair; patient would benefit from acute skilled PT services to address these deficits and reach maximum level of function.    Recent Surgery: * No surgery found *      Plan:     During this hospitalization, patient to be seen 2 x/week to address the identified rehab impairments via therapeutic activities, therapeutic exercises, gait training, neuromuscular re-education and progress toward the following goals:    Plan of Care Expires:  04/12/25    Subjective     Patient/Family Comments/goals: Pt agreed to tx  Pain/Comfort:  Pain Rating 1:  (chest pain with coughing; did not rate)  Location - Orientation 1: generalized  Location 1: chest (with coughing)  Pain Addressed 1: Reposition, Nurse notified, Distraction  Pain Rating Post-Intervention 1:  (as above)      Objective:     Communicated with Rn prior to session.  Patient found HOB elevated with bed alarm, peripheral IV upon PT entry to room.     General Precautions: Standard, fall, droplet, airborne, contact  Orthopedic Precautions: N/A  Braces: N/A  Respiratory Status: Room air     Functional Mobility:  Bed Mobility:     Scooting: modified independence  Supine to Sit: modified independence  Transfers:     Sit to  Stand:  supervision with rolling walker and vc's for hand placement.  3 reps  Bed to Chair: supervison with  rolling walker  using  Step Transfer  Gait: 100ft with RW and CGA/SBA in room.  Pt required one brief standing rest during ambulation secondary to dizziness and mild [id number]. VC's for proper, safe proximity to Rw for increased safety and stability.    Balance: sitting good-, standing fair+ Rw, gait fair/fair+ Rw      AM-PAC 6 CLICK MOBILITY  Turning over in bed (including adjusting bedclothes, sheets and blankets)?: 4  Sitting down on and standing up from a chair with arms (e.g., wheelchair, bedside commode, etc.): 3  Moving from lying on back to sitting on the side of the bed?: 4  Moving to and from a bed to a chair (including a wheelchair)?: 3  Need to walk in hospital room?: 3  Climbing 3-5 steps with a railing?: 3  Basic Mobility Total Score: 20       Treatment & Education:  Pt education on the role/POC of PT.  Pt c/o mild to moderate dizziness with bed mobility and sit <> stand.  Pt provided education on slower movements to assist with decreased dizziness with functional mobility.  PTA instructed pt to stand for a moment prior to starting to ambulate to make sure of his balance secondary to having bouts of dizziness.    Gait training as above.  PTA provided education on how to properly size RW for pt's height secondary to pt having a rollator at home, but pt stating it is too short for him.  PTA showed pt a picture of a rollator and where on hand  railing he could adjust height.  If pt's rollator is at it's maximum height and is still too short, pt was interested in getting a RW that he could adjust taller.  If pt continues to have bout of increased instability secondary to dizziness, pt may benefit from a RW versus a rollator at this time.  Pt transferred to b/s chair with SBA and RW and remained up in chair at end of tx.    Patient left b/s chair with all lines intact, call button in reach,  chair alarm on, and RN notified..    GOALS:   Multidisciplinary Problems       Physical Therapy Goals          Problem: Physical Therapy    Goal Priority Disciplines Outcome Interventions   Physical Therapy Goal     PT, PT/OT Progressing    Description: Goals to be met by: 2025     Patient will increase functional independence with mobility by performin. Supine to sit with Modified [place]  2. Sit to supine with Modified [place]  3. Sit to stand transfer with Modified [place] without dizziness  4. Gait  x 150 feet with Modified [place] using Rolling Walker with good balance and control without dizziness.                          DME Justifications:   Fabiano's mobility limitation cannot be sufficiently resolved by the use of a cane. His functional mobility deficit can be sufficiently resolved with the use of a Rolling Walker. Patient's mobility limitation significantly impairs their ability to participate in one of more activities of daily living.  The use of a RW will significantly improve the patient's ability to participate in MRADLS and the patient will use it on regular basis in the home.    Time Tracking:     PT Received On: 25  PT Start Time: 1314     PT Stop Time: 1337  PT Total Time (min): 23 min     Billable Minutes: Gait Training 13 and Therapeutic Activity 10    Treatment Type: Treatment  PT/PTA: PTA     Number of PTA visits since last PT visit: 2025

## 2025-03-27 NOTE — PLAN OF CARE
SW met with pt at bedside to discuss dc planning. The pt reported that he is agreeable to the outpatient PT/OT services. Pt stated that he would have transportation to and from his appts. Pt will contact sw tomorrow to see if he will have transport home at time of dc. Cm will continue to follow pt through transitions of care and assist with any discharge needs.    Chris Layton, MSW  [phone]    Future Appointments   Date Time Provider Department Center   4/4/2025  9:00 AM Latasha Becerra MD [facility]   4/4/2025 10:00 AM [facility] PULMONARY FUNCTION [facility]   4/4/2025 10:45 AM [facility] PULMONARY FUNCTION LAB [facility]   4/15/2025 10:00 AM Lulu Navarro NP [facility]        03/27/25 1420   Post-Acute Status   Coverage PHN   Discharge Delays None known at this time   Discharge Plan   Discharge Plan A Home with family

## 2025-03-27 NOTE — PROGRESS NOTES
"[facility] Medicine Progress Note      Subjective/Interval History      Still with chest congestion and nonproductive cough. Did not work with PT yesterday given symptoms; advised continued rehab efforts to improve his symptoms.      Objective     Physical Examination:  /70 (BP Location: Left arm, Patient Position: Sitting)   Pulse 77   Temp 98 °F (36.7 °C) (Oral)   Resp 18   Ht 6' 1" (1.854 m)   Wt 114.3 kg (251 lb 15.8 oz)   SpO2 97%   BMI 33.25 kg/m²       Physical Examination:  Physical Exam  Vitals and nursing note reviewed.   Constitutional:       General: He is not in acute distress.     Appearance: Normal appearance. He is not ill-appearing.   HENT:      Head: Atraumatic.      Nose: Nose normal. No congestion or rhinorrhea.      Mouth/Throat:      Mouth: Mucous membranes are moist.      Pharynx: Oropharynx is clear.   Eyes:      Extraocular Movements: Extraocular movements intact.      Conjunctiva/sclera: Conjunctivae normal.   Cardiovascular:      Rate and Rhythm: Normal rate and regular rhythm.      Pulses: Normal pulses.   Pulmonary:      Effort: Pulmonary effort is normal. No respiratory distress.      Breath sounds: CTAB  Abdominal:      General: Abdomen is flat.      Palpations: Abdomen is soft. nontender  Musculoskeletal:         General: No swelling, tenderness, deformity or signs of injury. Normal range of motion.      Cervical back: Normal range of motion.      Right lower leg: No edema.      Left lower leg: No edema.   Skin:     General: Skin is warm and dry.      Capillary Refill: Capillary refill takes less than 2 seconds.      Coloration: Skin is not jaundiced or pale.   Neurological:      Mental Status: He is alert and oriented to person, place, and time.      Cranial Nerves: No cranial nerve deficit.      Sensory: No sensory deficit.   Psychiatric:         Mood and Affect: Mood normal.         Behavior: Behavior normal.         Thought Content: Thought content normal.         " Judgment: Judgment normal.        Intake/Output:    Intake/Output Summary (Last 24 hours) at 3/27/2025 0646  Last data filed at 3/27/2025 0507  Gross per 24 hour   Intake --   Output 2380 ml   Net -2380 ml     Net IO Since Admission: -18,082 mL [03/27/25 0646]    Laboratory data: reviewed       Microbiology data: reviewed       EKG data: reviewed       Radiology data: reviewed        Current Medications:     Infusions:       Scheduled:   albuterol-ipratropium  3 mL Nebulization Q6H    ARIPiprazole  5 mg Oral Daily    aspirin  81 mg Oral Daily    doxycycline  100 mg Oral Q12H    DULoxetine  120 mg Oral QAM    enoxparin  40 mg Subcutaneous Daily    ferrous sulfate  1 tablet Oral Daily    fluticasone propionate  2 spray Each Nostril Daily    gabapentin  300 mg Oral TID    insulin aspart U-100  2 Units Subcutaneous with lunch    levothyroxine  25 mcg Oral Before breakfast    LIDOcaine  2 patch Transdermal Q24H    losartan  50 mg Oral Daily    meclizine  25 mg Oral TID    mupirocin   Nasal BID    pravastatin  40 mg Oral Daily    predniSONE  60 mg Oral Daily        PRN:    Current Facility-Administered Medications:     acetaminophen, 650 mg, Oral, Q6H PRN    benzonatate, 100 mg, Oral, TID PRN    dextromethorphan-guaiFENesin  mg, 1 tablet, Oral, BID PRN    dextrose 50%, 12.5 g, Intravenous, PRN    dextrose 50%, 25 g, Intravenous, PRN    glucagon (human recombinant), 1 mg, Intramuscular, PRN    glucose, 16 g, Oral, PRN    glucose, 24 g, Oral, PRN    hydrOXYzine pamoate, 50 mg, Oral, Nightly PRN    insulin aspart U-100, 0-10 Units, Subcutaneous, QID (AC + HS) PRN    melatonin, 6 mg, Oral, Nightly PRN    methocarbamoL, 750 mg, Oral, TID PRN    naloxone, 0.02 mg, Intravenous, PRN    ondansetron, 4 mg, Intravenous, Q8H PRN    sodium chloride 0.9%, 10 mL, Intravenous, Q12H PRN      Assessment/Plan:     Community acquired pneumonia superimposed on ILD  COVID-19 infection  - 5 day history of productive cough of yellow  sputum, subjective febrile symptoms-cough, chills, sweating, worsened dyspneic symptoms from baseline  - Follows with Dr. Ladonna Garcia for aaCT 1/13/2025 demonstrates interlobular septal thickening with traction bronchiectasis with a lower lobe and peripheral predominance which may be seen with interstitial lung disease such as NSIP or UIP. Small amount of ground-glass opacities.   - Repeat CT scan appears to me to have more significant GGO from prior.   In setting of abnormal pulmonary architecture and constellation of symptoms, favor treating as superimposed CAP, rocephin + doxy  - Duonebs q6, 6MWT ordered  - pulm consulted, suspected imaging changes related to viral illness rather than progression of ILD; further outpatient work up- will need repeat outpatient PFTs and 6MWT once recovered  - resp infection panel, urine legionella, AFB, culture pending   - COVID-19 +, started remdesevir   - continue ctx and doxycycline x5 day course--completed      Vertigo, suspected Meniere Disease  - Longstanding history of severe, frequently recurring vertiginous symptoms lasting 15 minutes-48 hours, refractory to initial management in ED   Reported subjective hearing loss, ear fullness, tinnitus  - Prior MRI brain wo 3/2024 unremarkable  - Ordered MRI brain w/wo, with EAC protocol to assess for cholesteatoma   - Favor treating as Meniere Disease with 7-14 day course of prednisone 60mg daily.   - scheduled meclizine, Epley with PT reproduces/improved symptoms yesterday        Hyponatremia  - Na low at 127 on admit, now 124 on 3/25  - suspect component of SIADH in setting of Covid vs thiazide use  - will resend serum/urine osm and urine sodium; pending  - fluid restrict  - gradually improving      ACC Stage II Hypertension  - Prescribed metoprolol 25mg, olmesartan 50 outpatient. Hypertensive here to 174/74, unclear indication for metoprolol.   - Losartan 50mg daily while inpatient     Type 2 diabetes   - A1c 8.2,  Microalbumin/Creatinine ratio 295>44.2 over past year.   - Prescribed trulicity 1.5mg once weekly, metformin 1000mg BID  - SSI while inpatient  - May need adjustment of diabetes medications with high dose prednisone for next 7-14 days     Microcytic anemia  - Iron 49. TIBC 490, iron sat 10%/Ferritin 28.2  - B12 >2000/Folate 15.3  - started oral iron suplementation     Schizoaffective Disorder   - On aripiprazole 5mg daily, no acute concerns     Hypothyroidism  - On levothyroxine 25mcg daily, no acute concerns     Right parietal cortical/subcortical infarcts  - MRI brain showed right parietal cortical/subcortical infarcts and a new deep white matter infarct in the left frontal lobe  - lipid panel: chol 128, HDL 41, trigs 132, LDL 60       Code: Full  Diet: Diabetic  Dispo: Inpatient pending improvement in hyponatremia     Ilan Sanches DO  [facility] Internal Medicine/Emergency Medicine HO-V    [facility] Medicine Team A     [facility] Medicine Hospitalist Pager numbers:   [facility] Hospitalist Medicine Team A (Rha/Anum):[phone]  [facility] Hospitalist Medicine Team B (Shantelle/Cricket):        [phone]

## 2025-03-28 VITALS
WEIGHT: 252 LBS | HEIGHT: 73 IN | HEART RATE: 71 BPM | BODY MASS INDEX: 33.4 KG/M2 | SYSTOLIC BLOOD PRESSURE: 140 MMHG | TEMPERATURE: 98 F | OXYGEN SATURATION: 96 % | RESPIRATION RATE: 18 BRPM | DIASTOLIC BLOOD PRESSURE: 75 MMHG

## 2025-03-28 LAB
ABSOLUTE EOSINOPHIL (OHS): 0.05 K/UL
ABSOLUTE MONOCYTE (OHS): 0.72 K/UL (ref 0.3–1)
ABSOLUTE NEUTROPHIL COUNT (OHS): 6.24 K/UL (ref 1.8–7.7)
ALBUMIN SERPL BCP-MCNC: 3.7 G/DL (ref 3.5–5.2)
ALP SERPL-CCNC: 54 UNIT/L (ref 40–150)
ALT SERPL W/O P-5'-P-CCNC: 26 UNIT/L (ref 10–44)
ANION GAP (OHS): 8 MMOL/L (ref 8–16)
AST SERPL-CCNC: 17 UNIT/L (ref 11–45)
BASOPHILS # BLD AUTO: 0.02 K/UL
BASOPHILS NFR BLD AUTO: 0.2 %
BILIRUB SERPL-MCNC: 0.4 MG/DL (ref 0.1–1)
BUN SERPL-MCNC: 24 MG/DL (ref 8–23)
CALCIUM SERPL-MCNC: 8.4 MG/DL (ref 8.7–10.5)
CHLORIDE SERPL-SCNC: 101 MMOL/L (ref 95–110)
CO2 SERPL-SCNC: 20 MMOL/L (ref 23–29)
CREAT SERPL-MCNC: 1.2 MG/DL (ref 0.5–1.4)
ERYTHROCYTE [DISTWIDTH] IN BLOOD BY AUTOMATED COUNT: 14.3 % (ref 11.5–14.5)
GFR SERPLBLD CREATININE-BSD FMLA CKD-EPI: >60 ML/MIN/1.73/M2
GLUCOSE SERPL-MCNC: 144 MG/DL (ref 70–110)
HCT VFR BLD AUTO: 35.4 % (ref 40–54)
HGB BLD-MCNC: 11.7 GM/DL (ref 14–18)
IMM GRANULOCYTES # BLD AUTO: 0.04 K/UL (ref 0–0.04)
IMM GRANULOCYTES NFR BLD AUTO: 0.5 % (ref 0–0.5)
LYMPHOCYTES # BLD AUTO: 1.55 K/UL (ref 1–4.8)
MCH RBC QN AUTO: 25.4 PG (ref 27–50)
MCHC RBC AUTO-ENTMCNC: 33.1 G/DL (ref 32–36)
MCV RBC AUTO: 77 FL (ref 82–98)
NUCLEATED RBC (/100WBC) (OHS): 0 /100 WBC
PLATELET # BLD AUTO: 354 K/UL (ref 150–450)
PMV BLD AUTO: 8.2 FL (ref 9.2–12.9)
POCT GLUCOSE: 160 MG/DL (ref 70–110)
POCT GLUCOSE: 212 MG/DL (ref 70–110)
POTASSIUM SERPL-SCNC: 4 MMOL/L (ref 3.5–5.1)
PROT SERPL-MCNC: 6.4 GM/DL (ref 6–8.4)
RBC # BLD AUTO: 4.61 M/UL (ref 4.6–6.2)
RELATIVE EOSINOPHIL (OHS): 0.6 %
RELATIVE LYMPHOCYTE (OHS): 18 % (ref 18–48)
RELATIVE MONOCYTE (OHS): 8.4 % (ref 4–15)
RELATIVE NEUTROPHIL (OHS): 72.3 % (ref 38–73)
SODIUM SERPL-SCNC: 129 MMOL/L (ref 136–145)
WBC # BLD AUTO: 8.62 K/UL (ref 3.9–12.7)

## 2025-03-28 PROCEDURE — 25000003 PHARM REV CODE 250: Performed by: STUDENT IN AN ORGANIZED HEALTH CARE EDUCATION/TRAINING PROGRAM

## 2025-03-28 PROCEDURE — G0009 ADMIN PNEUMOCOCCAL VACCINE: HCPCS | Performed by: INTERNAL MEDICINE

## 2025-03-28 PROCEDURE — 94640 AIRWAY INHALATION TREATMENT: CPT

## 2025-03-28 PROCEDURE — 63600175 PHARM REV CODE 636 W HCPCS: Performed by: STUDENT IN AN ORGANIZED HEALTH CARE EDUCATION/TRAINING PROGRAM

## 2025-03-28 PROCEDURE — 63600175 PHARM REV CODE 636 W HCPCS: Performed by: INTERNAL MEDICINE

## 2025-03-28 PROCEDURE — 36415 COLL VENOUS BLD VENIPUNCTURE: CPT | Performed by: STUDENT IN AN ORGANIZED HEALTH CARE EDUCATION/TRAINING PROGRAM

## 2025-03-28 PROCEDURE — 25000003 PHARM REV CODE 250

## 2025-03-28 PROCEDURE — 94664 DEMO&/EVAL PT USE INHALER: CPT

## 2025-03-28 PROCEDURE — 90471 IMMUNIZATION ADMIN: CPT | Performed by: INTERNAL MEDICINE

## 2025-03-28 PROCEDURE — 3E0234Z INTRODUCTION OF SERUM, TOXOID AND VACCINE INTO MUSCLE, PERCUTANEOUS APPROACH: ICD-10-PCS | Performed by: INTERNAL MEDICINE

## 2025-03-28 PROCEDURE — 82040 ASSAY OF SERUM ALBUMIN: CPT

## 2025-03-28 PROCEDURE — 85025 COMPLETE CBC W/AUTO DIFF WBC: CPT | Performed by: STUDENT IN AN ORGANIZED HEALTH CARE EDUCATION/TRAINING PROGRAM

## 2025-03-28 PROCEDURE — 90677 PCV20 VACCINE IM: CPT | Performed by: INTERNAL MEDICINE

## 2025-03-28 PROCEDURE — 99900035 HC TECH TIME PER 15 MIN (STAT)

## 2025-03-28 PROCEDURE — 25000242 PHARM REV CODE 250 ALT 637 W/ HCPCS: Performed by: STUDENT IN AN ORGANIZED HEALTH CARE EDUCATION/TRAINING PROGRAM

## 2025-03-28 PROCEDURE — 94761 N-INVAS EAR/PLS OXIMETRY MLT: CPT

## 2025-03-28 RX ORDER — FERROUS SULFATE 325(65) MG
325 TABLET, DELAYED RELEASE (ENTERIC COATED) ORAL DAILY
Qty: 30 TABLET | Refills: 0 | Status: SHIPPED | OUTPATIENT
Start: 2025-03-28 | End: 2025-04-27

## 2025-03-28 RX ORDER — INSULIN ASPART 100 [IU]/ML
3 INJECTION, SOLUTION INTRAVENOUS; SUBCUTANEOUS
Status: DISCONTINUED | OUTPATIENT
Start: 2025-03-28 | End: 2025-03-28 | Stop reason: HOSPADM

## 2025-03-28 RX ORDER — FLUTICASONE PROPIONATE 50 MCG
1 SPRAY, SUSPENSION (ML) NASAL DAILY
Qty: 16 G | Refills: 0 | Status: SHIPPED | OUTPATIENT
Start: 2025-03-29

## 2025-03-28 RX ORDER — MECLIZINE HYDROCHLORIDE 25 MG/1
25 TABLET ORAL 3 TIMES DAILY
Qty: 90 TABLET | Refills: 0 | Status: SHIPPED | OUTPATIENT
Start: 2025-03-28

## 2025-03-28 RX ORDER — INSULIN ASPART 100 [IU]/ML
4 INJECTION, SOLUTION INTRAVENOUS; SUBCUTANEOUS
Status: DISCONTINUED | OUTPATIENT
Start: 2025-03-28 | End: 2025-03-28 | Stop reason: HOSPADM

## 2025-03-28 RX ORDER — METHOCARBAMOL 750 MG/1
750 TABLET, FILM COATED ORAL 3 TIMES DAILY PRN
Qty: 21 TABLET | Refills: 0 | Status: SHIPPED | OUTPATIENT
Start: 2025-03-28 | End: 2025-04-04

## 2025-03-28 RX ORDER — LIDOCAINE 50 MG/G
2 PATCH TOPICAL DAILY
Qty: 60 PATCH | Refills: 0 | Status: SHIPPED | OUTPATIENT
Start: 2025-03-28

## 2025-03-28 RX ADMIN — METHOCARBAMOL TABLETS 750 MG: 750 TABLET, COATED ORAL at 05:03

## 2025-03-28 RX ADMIN — INSULIN ASPART 4 UNITS: 100 INJECTION, SOLUTION INTRAVENOUS; SUBCUTANEOUS at 11:03

## 2025-03-28 RX ADMIN — ARIPIPRAZOLE 5 MG: 5 TABLET ORAL at 08:03

## 2025-03-28 RX ADMIN — PREDNISONE 60 MG: 20 TABLET ORAL at 08:03

## 2025-03-28 RX ADMIN — PRAVASTATIN SODIUM 40 MG: 40 TABLET ORAL at 08:03

## 2025-03-28 RX ADMIN — DULOXETINE HYDROCHLORIDE 120 MG: 30 CAPSULE, DELAYED RELEASE ORAL at 05:03

## 2025-03-28 RX ADMIN — GABAPENTIN 300 MG: 300 CAPSULE ORAL at 08:03

## 2025-03-28 RX ADMIN — MECLIZINE 25 MG: 12.5 TABLET ORAL at 08:03

## 2025-03-28 RX ADMIN — IPRATROPIUM BROMIDE AND ALBUTEROL SULFATE 3 ML: 2.5; .5 SOLUTION RESPIRATORY (INHALATION) at 12:03

## 2025-03-28 RX ADMIN — GUAIFENESIN AND DEXTROMETHORPHAN HYDROBROMIDE 1 TABLET: 600; 30 TABLET, EXTENDED RELEASE ORAL at 05:03

## 2025-03-28 RX ADMIN — INSULIN GLARGINE 8 UNITS: 100 INJECTION, SOLUTION SUBCUTANEOUS at 08:03

## 2025-03-28 RX ADMIN — PNEUMOCOCCAL 20-VALENT CONJUGATE VACCINE 0.5 ML
2.2; 2.2; 2.2; 2.2; 2.2; 2.2; 2.2; 2.2; 2.2; 2.2; 2.2; 2.2; 2.2; 2.2; 2.2; 2.2; 4.4; 2.2; 2.2; 2.2 INJECTION, SUSPENSION INTRAMUSCULAR at 11:03

## 2025-03-28 RX ADMIN — INSULIN ASPART 2 UNITS: 100 INJECTION, SOLUTION INTRAVENOUS; SUBCUTANEOUS at 08:03

## 2025-03-28 RX ADMIN — LEVOTHYROXINE SODIUM 25 MCG: 25 TABLET ORAL at 05:03

## 2025-03-28 RX ADMIN — MUPIROCIN: 20 OINTMENT TOPICAL at 08:03

## 2025-03-28 RX ADMIN — LIDOCAINE 2 PATCH: 50 PATCH CUTANEOUS at 08:03

## 2025-03-28 RX ADMIN — ACETAMINOPHEN 650 MG: 325 TABLET ORAL at 05:03

## 2025-03-28 RX ADMIN — LOSARTAN POTASSIUM 50 MG: 50 TABLET, FILM COATED ORAL at 08:03

## 2025-03-28 RX ADMIN — ASPIRIN 81 MG CHEWABLE TABLET 81 MG: 81 TABLET CHEWABLE at 08:03

## 2025-03-28 RX ADMIN — INSULIN ASPART 3 UNITS: 100 INJECTION, SOLUTION INTRAVENOUS; SUBCUTANEOUS at 08:03

## 2025-03-28 RX ADMIN — FERROUS SULFATE TAB 325 MG (65 MG ELEMENTAL FE) 1 EACH: 325 (65 FE) TAB at 08:03

## 2025-03-28 RX ADMIN — FLUTICASONE PROPIONATE 100 MCG: 50 SPRAY, METERED NASAL at 08:03

## 2025-03-28 NOTE — PROGRESS NOTES
"[facility] Medicine Progress Note      Subjective/Interval History      Feels better after working with PT yesterday. Still with nonproductive cough and congestion with intercostal muscle pain largely unchanged. Ready to go home.      Objective     Physical Examination:  /78 (BP Location: Right arm, Patient Position: Lying)   Pulse 71   Temp 97.9 °F (36.6 °C) (Oral)   Resp 20   Ht 6' 1" (1.854 m)   Wt 114.3 kg (251 lb 15.8 oz)   SpO2 99%   BMI 33.25 kg/m²       Physical Examination:  Physical Exam  Vitals and nursing note reviewed.   Constitutional:       General: He is not in acute distress.     Appearance: Normal appearance. He is not ill-appearing.   HENT:      Head: Atraumatic.      Nose: Nose normal. No congestion or rhinorrhea.      Mouth/Throat:      Mouth: Mucous membranes are moist.      Pharynx: Oropharynx is clear.   Eyes:      Extraocular Movements: Extraocular movements intact.      Conjunctiva/sclera: Conjunctivae normal.   Cardiovascular:      Rate and Rhythm: Normal rate and regular rhythm.      Pulses: Normal pulses.   Pulmonary:      Effort: Pulmonary effort is normal. No respiratory distress.      Breath sounds: CTAB  Abdominal:      General: Abdomen is flat.      Palpations: Abdomen is soft. nontender  Musculoskeletal:         General: No swelling, tenderness, deformity or signs of injury. Normal range of motion.      Cervical back: Normal range of motion.      Right lower leg: No edema.      Left lower leg: No edema.   Skin:     General: Skin is warm and dry.      Capillary Refill: Capillary refill takes less than 2 seconds.      Coloration: Skin is not jaundiced or pale.   Neurological:      Mental Status: He is alert and oriented to person, place, and time.      Cranial Nerves: No cranial nerve deficit.      Sensory: No sensory deficit.   Psychiatric:         Mood and Affect: Mood normal.         Behavior: Behavior normal.         Thought Content: Thought content normal.         " Judgment: Judgment normal.        Intake/Output:    Intake/Output Summary (Last 24 hours) at 3/28/2025 0848  Last data filed at 3/28/2025 0800  Gross per 24 hour   Intake --   Output 2700 ml   Net -2700 ml     Net IO Since Admission: -20,782 mL [03/28/25 0848]    Laboratory data: reviewed       Microbiology data: reviewed       EKG data: reviewed       Radiology data: reviewed        Current Medications:     Infusions:       Scheduled:   albuterol-ipratropium  3 mL Nebulization Q6H    ARIPiprazole  5 mg Oral Daily    aspirin  81 mg Oral Daily    DULoxetine  120 mg Oral QAM    enoxparin  40 mg Subcutaneous Daily    ferrous sulfate  1 tablet Oral Daily    fluticasone propionate  2 spray Each Nostril Daily    gabapentin  300 mg Oral TID    insulin aspart U-100  3 Units Subcutaneous with breakfast    insulin aspart U-100  3 Units Subcutaneous with dinner    insulin aspart U-100  4 Units Subcutaneous with lunch    insulin glargine U-100  8 Units Subcutaneous Daily    levothyroxine  25 mcg Oral Before breakfast    LIDOcaine  2 patch Transdermal Q24H    losartan  50 mg Oral Daily    meclizine  25 mg Oral TID    mupirocin   Nasal BID    pravastatin  40 mg Oral Daily        PRN:    Current Facility-Administered Medications:     acetaminophen, 650 mg, Oral, Q6H PRN    benzonatate, 100 mg, Oral, TID PRN    dextromethorphan-guaiFENesin  mg, 1 tablet, Oral, BID PRN    dextrose 50%, 12.5 g, Intravenous, PRN    dextrose 50%, 25 g, Intravenous, PRN    glucagon (human recombinant), 1 mg, Intramuscular, PRN    glucose, 16 g, Oral, PRN    glucose, 24 g, Oral, PRN    hydrOXYzine pamoate, 50 mg, Oral, Nightly PRN    insulin aspart U-100, 0-10 Units, Subcutaneous, QID (AC + HS) PRN    melatonin, 6 mg, Oral, Nightly PRN    methocarbamoL, 750 mg, Oral, TID PRN    naloxone, 0.02 mg, Intravenous, PRN    ondansetron, 4 mg, Intravenous, Q8H PRN    sodium chloride 0.9%, 10 mL, Intravenous, Q12H PRN      Assessment/Plan:     Community  acquired pneumonia superimposed on ILD  COVID-19 infection  - 5 day history of productive cough of yellow sputum, subjective febrile symptoms-cough, chills, sweating, worsened dyspneic symptoms from baseline  - Follows with Dr. Ladonna Garcia for aaCT 1/13/2025 demonstrates interlobular septal thickening with traction bronchiectasis with a lower lobe and peripheral predominance which may be seen with interstitial lung disease such as NSIP or UIP. Small amount of ground-glass opacities.   - Repeat CT scan appears to me to have more significant GGO from prior.   In setting of abnormal pulmonary architecture and constellation of symptoms, favor treating as superimposed CAP, rocephin + doxy  - Duonebs q6, 6MWT ordered  - pulm consulted, suspected imaging changes related to viral illness rather than progression of ILD; further outpatient work up- will need repeat outpatient PFTs and 6MWT once recovered  - resp infection panel, urine legionella, AFB, culture pending   - COVID-19 +, started remdesevir   - continue ctx and doxycycline x5 day course--completed      Vertigo, suspected Meniere Disease  - Longstanding history of severe, frequently recurring vertiginous symptoms lasting 15 minutes-48 hours, refractory to initial management in ED   Reported subjective hearing loss, ear fullness, tinnitus  - Prior MRI brain wo 3/2024 unremarkable  - Ordered MRI brain w/wo, with EAC protocol to assess for cholesteatoma   - Favor treating as Meniere Disease with 7-14 day course of prednisone 60mg daily.   - scheduled meclizine, Epley with PT reproduces/improved symptoms yesterday        Hyponatremia  - Na low at 127 on admit, now 124 on 3/25  - suspect component of SIADH in setting of Covid vs thiazide use  - will resend serum/urine osm and urine sodium; pending  - fluid restrict  - gradually improving now to 129 with fluid restriction      ACC Stage II Hypertension  - Prescribed metoprolol 25mg, olmesartan 50 outpatient. Hypertensive  here to 174/74, unclear indication for metoprolol.   - Losartan 50mg daily while inpatient     Type 2 diabetes   - A1c 8.2, Microalbumin/Creatinine ratio 295>44.2 over past year.   - Prescribed trulicity 1.5mg once weekly, metformin 1000mg BID  - SSI while inpatient  - May need adjustment of diabetes medications with high dose prednisone for next 7-14 days     Microcytic anemia  - Iron 49. TIBC 490, iron sat 10%/Ferritin 28.2  - B12 >2000/Folate 15.3  - started oral iron suplementation     Schizoaffective Disorder   - On aripiprazole 5mg daily, no acute concerns     Hypothyroidism  - On levothyroxine 25mcg daily, no acute concerns     Right parietal cortical/subcortical infarcts  - MRI brain showed right parietal cortical/subcortical infarcts and a new deep white matter infarct in the left frontal lobe  - lipid panel: chol 128, HDL 41, trigs 132, LDL 60       Code: Full  Diet: Diabetic  Dispo: Discharge within next 24-48 hrs     Ilan Sanches DO  [facility] Internal Medicine/Emergency Medicine HO-V    [facility] Medicine Team A     [facility] Medicine Hospitalist Pager numbers:   [facility] Hospitalist Medicine Team A (Rah/Anum):[phone]  [facility] Hospitalist Medicine Team B (Shantelle/Cricket):        [phone]

## 2025-03-28 NOTE — PLAN OF CARE
Problem: Adult Inpatient Plan of Care  Goal: Plan of Care Review  3/28/2025 1209 by Ines Mock RN  Outcome: Adequate for Care Transition  3/28/2025 1208 by Ines Mock RN  Outcome: Adequate for Care Transition  Goal: Patient-Specific Goal (Individualized)  3/28/2025 1209 by Ines Mock RN  Outcome: Adequate for Care Transition  3/28/2025 1208 by Ines Mock RN  Outcome: Adequate for Care Transition  Goal: Absence of Hospital-Acquired Illness or Injury  3/28/2025 1209 by Ines Mock RN  Outcome: Adequate for Care Transition  3/28/2025 1208 by Ines Mock RN  Outcome: Adequate for Care Transition  Goal: Optimal Comfort and Wellbeing  3/28/2025 1209 by Ines Mock RN  Outcome: Adequate for Care Transition  3/28/2025 1208 by Ines Mock RN  Outcome: Adequate for Care Transition  Goal: Readiness for Transition of Care  3/28/2025 1209 by Ines Mock RN  Outcome: Adequate for Care Transition  3/28/2025 1208 by Ines Mock RN  Outcome: Adequate for Care Transition     Problem: Diabetes Comorbidity  Goal: Blood Glucose Level Within Targeted Range  3/28/2025 1209 by Ines Mock RN  Outcome: Adequate for Care Transition  3/28/2025 1208 by Ines Mock RN  Outcome: Adequate for Care Transition     Problem: Pneumonia  Goal: Fluid Balance  3/28/2025 1209 by Ines Mock RN  Outcome: Adequate for Care Transition  3/28/2025 1208 by Ines Mock RN  Outcome: Adequate for Care Transition  Goal: Resolution of Infection Signs and Symptoms  3/28/2025 1209 by Ines Mock RN  Outcome: Adequate for Care Transition  3/28/2025 1208 by Ines Mock RN  Outcome: Adequate for Care Transition  Goal: Effective Oxygenation and Ventilation  3/28/2025 1209 by Ines Mock RN  Outcome: Adequate for Care Transition  3/28/2025 1208 by Ines Mock RN  Outcome: Adequate for Care Transition     Problem: Fall Injury Risk  Goal:  Absence of Fall and Fall-Related Injury  3/28/2025 1209 by Ines Mock, RN  Outcome: Adequate for Care Transition  3/28/2025 1208 by Ines Mock, RN  Outcome: Adequate for Care Transition     Problem: Comorbidity Management  Goal: Blood Pressure in Desired Range  3/28/2025 1209 by Ines Mock, RN  Outcome: Adequate for Care Transition  3/28/2025 1208 by Ines Mock, RN  Outcome: Adequate for Care Transition     Problem: Activity Intolerance  Goal: Enhanced Capacity and Energy  3/28/2025 1209 by Ines Mock, RN  Outcome: Adequate for Care Transition  3/28/2025 1208 by Ines Mock, RN  Outcome: Adequate for Care Transition

## 2025-03-28 NOTE — PROGRESS NOTES
[facility]                    Pharmacy       Discharge Medication Education    Patient ACCEPTED medication education. Pharmacy has provided education on the name, indication, and possible side effects of the medication(s) prescribed, using teach-back method.     The following medications have also been discussed, during this admission.        Medication List        START taking these medications      dextromethorphan-guaiFENesin  mg  mg per 12 hr tablet  Commonly known as: MUCINEX DM  Take 1 tablet by mouth 2 (two) times daily as needed (cough/congestion).     ferrous sulfate 325 (65 FE) MG EC tablet  Take 1 tablet (325 mg total) by mouth once daily.     fluticasone propionate 50 mcg/actuation nasal spray  Commonly known as: FLONASE  1 spray (50 mcg total) by Each Nostril route once daily.  Start taking on: March 29, 2025     gabapentin 300 MG capsule  Commonly known as: NEURONTIN  Take 1 capsule (300 mg total) by mouth 3 (three) times daily. For chronic pain     LIDOcaine 5 %  Commonly known as: LIDODERM  Place 2 patches onto the skin once daily. Remove & Discard patch within 12 hours or as directed by MD     meclizine 25 mg tablet  Commonly known as: ANTIVERT  Take 1 tablet (25 mg total) by mouth 3 (three) times daily.     methocarbamoL 750 MG Tab  Commonly known as: ROBAXIN  Take 1 tablet (750 mg total) by mouth 3 (three) times daily as needed (chest wall soreness).            CHANGE how you take these medications      hydrOXYzine pamoate 50 MG Cap  Commonly known as: VISTARIL  TAKE 1 CAPSULE (50 MG) BY MOUTH NIGHTLY AS NEEDED FOR INSOMNIA  What changed:   how much to take  how to take this  when to take this  reasons to take this  additional instructions     olmesartan 20 MG tablet  Commonly known as: BENICAR  TAKE 1 TABLET BY MOUTH ONCE  DAILY  What changed: when to take this     TRULICITY 1.5 mg/0.5 mL pen injector  Generic drug: dulaglutide  Inject 1.5 mg into the skin every 7  days.  What changed: when to take this            CONTINUE taking these medications      albuterol 90 mcg/actuation inhaler  Commonly known as: PROVENTIL/VENTOLIN HFA  Inhale 2 puffs into the lungs every 6 (six) hours as needed for Wheezing. Rescue     amLODIPine 5 MG tablet  Commonly known as: NORVASC     ARIPiprazole 5 MG Tab  Commonly known as: ABILIFY  Take 1 tablet (5 mg total) by mouth once daily.     BLOOD PRESSURE CUFF Misc  Generic drug: miscellaneous medical supply  1 Units by Misc.(Non-Drug; Combo Route) route once daily.     blood sugar diagnostic Strp  Commonly known as: TRUE METRIX GLUCOSE TEST STRIP  use 1 strip to check glucose 2 (two) times a day.     blood-glucose meter Misc  Use as instructed     diclofenac 75 MG EC tablet  Commonly known as: VOLTAREN  Take 1 tablet (75 mg total) by mouth 2 (two) times daily as needed (pain).     DULoxetine 60 MG capsule  Commonly known as: CYMBALTA  Take 2 capsules (120 mg total) by mouth every morning.     ketoconazole 2 % cream  Commonly known as: NIZORAL     lancets 30 gauge Misc  1 lancet by Misc.(Non-Drug; Combo Route) route twice daily.     levothyroxine 25 MCG tablet  Commonly known as: SYNTHROID  Take 1 tablet (25 mcg total) by mouth before breakfast.     metFORMIN 1000 MG tablet  Commonly known as: GLUCOPHAGE  Take 1 tablet (1,000 mg total) by mouth 2 (two) times daily with meals.     omeprazole 20 MG capsule  Commonly known as: PRILOSEC  Take 1 capsule (20 mg total) by mouth before breakfast.     pravastatin 40 MG tablet  Commonly known as: PRAVACHOL  Take 1 tablet (40 mg total) by mouth once daily.     sildenafiL 100 MG tablet  Commonly known as: VIAGRA  Take 1 tablet (100 mg total) by mouth daily as needed for Erectile Dysfunction.            STOP taking these medications      metoprolol succinate 25 MG 24 hr tablet  Commonly known as: TOPROL-XL               Where to Get Your Medications        These medications were sent to [facility]   [address], [place] [address]      Hours: Mon-Fri, 8a-5:30p Phone: [phone]   dextromethorphan-guaiFENesin  mg  mg per 12 hr tablet  ferrous sulfate 325 (65 FE) MG EC tablet  fluticasone propionate 50 mcg/actuation nasal spray  gabapentin 300 MG capsule  LIDOcaine 5 %  meclizine 25 mg tablet  methocarbamoL 750 MG Tab          Thank you  Jaquelin Jama, PharmD  [phone]

## 2025-03-28 NOTE — PLAN OF CARE
Sw met with pt at bedside to complete final dc assessment. Pt will dc later today and will need transportation home. SW will request transport once dc order are placed. Per pt he plans on obtaining his SC from an outside source. SW requested f/u appts for pt. Rounds completed on pt. All questions addressed. Bedside nurse to discuss d/c medications. Discussed importance to attend all f/u appts and take medications as prescribed. Verbalized understanding. Case Management to sign off.     11:19am SW requested [facility] transport for 12:30pm. Pt has orders for outpatient vestibular therapy.    Chris Traylor, SUBHA  [phone]    Future Appointments   Date Time Provider Department Center   4/4/2025  9:00 AM [facility]   4/4/2025 10:00 AM [place]   4/4/2025 10:45 AM [place] PULMONARY FUNCTION LAB [facility]   4/22/2025 10:40 AM Lulu Navarro NP [facility] [place] [facility]

## 2025-03-28 NOTE — DISCHARGE SUMMARY
[facility] Hospital Medicine Discharge Summary    Primary Team: [facility] Hospitalist Team A  Attending Physician: Dr. Rowan  Resident: Myron  Intern: Ozzie    Date of Admit: 3/21/2025  Date of Discharge: 3/28/2025    Discharge to: Home  Condition: Fair    Discharge Diagnoses     Patient Active Problem List  Diagnosis    Low back pain, non-specific    Type 2 diabetes mellitus without complication, without long-term current use of insulin    Hypertension associated with diabetes    HLD (hyperlipidemia)    Schizophrenia    Hyperkalemia    Need for hepatitis C screening test    Polyp of colon    Class 1 obesity with serious comorbidity and body mass index (BMI) of 34.0 to 34.9 in adult    Dizziness    Need for vaccination against Streptococcus pneumoniae using pneumococcal conjugate vaccine 7    Depression    Anemia    Chronic back pain    Neck pain    EKG abnormalities    Gastroesophageal reflux disease    Chronic pain    Palmar nodule, left    Trigger index finger of right hand    Lumbar facet arthropathy    Hand or foot spasms    Hearing loss of left ear    Cerumen debris on tympanic membrane of right ear    Erectile dysfunction    Rash    Lumbar radiculopathy    Carpal tunnel syndrome of right wrist    History of colon polyps    Weakness    Pulmonary hypertension, unspecified    Type 2 diabetes mellitus with diabetic peripheral angiopathy without gangrene, without long-term current use of insulin    Aortic calcification    Cervical spondylosis with myelopathy and radiculopathy    S/P cervical spinal fusion    Syncope and collapse    Hypoglycemia    Chronic myelopathy    Other nonthrombocytopenic purpura    Dyspnea on exertion    Interstitial pulmonary disease, unspecified    Scarring of lung    Schizoaffective disorder with good prognostic features    Meniere's disease    Microcytic anemia    Vertigo    Hyponatremia    COVID-19 virus infection    Ground glass opacity present on imaging of lung       Consultants and  Procedures     Consultants:  Pulmonology  Tele-Neurology     Procedures:   N/a    Imaging:  MRI Brain W WO Contrast   Final Result      No acute intracranial process or enhancing intracranial lesion with attention to the CP angle/IAC/labyrinth.      When compared to 2024 there are however new but chronic infarcts detailed above.         Electronically signed by: Juan A Reis   Date:    03/22/2025   Time:    10:49      CT Chest With Contrast   Final Result      Lower and basilar predominant interstitial thickening and reticulation, nonspecific and potentially on the basis of mild fibrotic change/interstitial lung disease.  No evident honeycombing or air trapping.  Appearance and distribution similar to comparison February 2025 CT.         Electronically signed by: Armin Motley   Date:    03/22/2025   Time:    08:23      X-Ray Chest 1 View   Final Result      No acute abnormality.         Electronically signed by: Tommie Villa   Date:    03/21/2025   Time:    17:58            Brief History of Present Illness      63 yo male with PMH of T2DM, HTN, HLD, GERD, suspected ILD, hypothyroidism presented for worsening vertigo over preceding 5 days in setting of chronic vertigo symptoms over the past several years with occasional falls. He was in clinic for PFTs on day of admission with increased dyspnea on exertion with cough over the past week. PFT and 6MWT deferred in clinic and sent to ED for evaluation. No change in symptoms after meclizine and valium in the ED.     For the full HPI please refer to the History & Physical from this admission.    Hospital Course By Problem with Pertinent Findings     Concern for Community acquired pneumonia superimposed on ILD  COVID-19 infection  - 5 day history of productive cough of yellow sputum, subjective febrile symptoms-cough, chills, sweating, worsened dyspneic symptoms from baseline  - Follows with Dr. Ladonna Garcia for aaCT 1/13/2025 demonstrates interlobular septal  thickening with traction bronchiectasis with a lower lobe and peripheral predominance which may be seen with interstitial lung disease such as NSIP or UIP. Small amount of ground-glass opacities.   - Repeat CT scan appears to me to have more significant GGO from prior however no evidence of consolidation however covered for superimposed CAP given possible ILD  - Duonebs q6, 6MWT deferred to outpatient setting as stable on RA without dyspnea  - pulm consulted, suspected imaging changes related to viral illness rather than progression of ILD with concomitant COVID + testing; further outpatient work up- will need repeat outpatient PFTs and 6MWT once recovered  - COVID-19 +, completed course of remdesivir  - robaxin for chest wall pain w/ coughing, tessalon perles and Robitussin for cough     Vertigo, suspected Meniere Disease  - Longstanding history of severe, frequently recurring vertiginous symptoms lasting 15 minutes-48 hours, refractory to initial management in ED   Reported subjective hearing loss, ear fullness, tinnitus  - Prior MRI brain wo 3/2024 unremarkable; repeat as above without alternate etiology   - Favor treating as Meniere Disease; completed 7 day course with prednisone 60 mg  - scheduled meclizine, Epley with PT reproduces/improved symptoms      Hyponatremia  - Na low at 127 on admit, now 124 on 3/25  - suspect component of SIADH in setting of Covid vs thiazide use  - will resend serum/urine osm and urine sodium; pending  - fluid restrict  - gradually improving now to 129 with fluid restriction      ACC Stage II Hypertension  - Prescribed metoprolol 25mg, olmesartan 50 outpatient. Hypertensive here to 174/74, unclear indication for metoprolol.   - Losartan 50mg daily while inpatient, stable     Type 2 diabetes   - A1c 8.2, Microalbumin/Creatinine ratio 295>44.2 over past year.   - Prescribed trulicity 1.5mg once weekly, metformin 1000mg BID  - SSI while inpatient  - hyperglycemic with steroids;  titrated insulin, finished prior to discharge and will resume home regimen and PCP follow up     Microcytic anemia  - Iron 49. TIBC 490, iron sat 10%/Ferritin 28.2  - B12 >2000/Folate 15.3  - started oral iron suplementation     Schizoaffective Disorder   - On aripiprazole 5mg daily, no acute concerns     Hypothyroidism  - On levothyroxine 25mcg daily, no acute concerns     Right parietal cortical/subcortical infarcts  - MRI brain showed right parietal cortical/subcortical infarcts and a new deep white matter infarct in the left frontal lobe  - lipid panel: chol 128, HDL 41, trigs 132, LDL 60     Discharge Medications        Medication List        START taking these medications      dextromethorphan-guaiFENesin  mg  mg per 12 hr tablet  Commonly known as: MUCINEX DM  Take 1 tablet by mouth 2 (two) times daily as needed (cough/congestion).     ferrous sulfate 325 (65 FE) MG EC tablet  Take 1 tablet (325 mg total) by mouth once daily.     fluticasone propionate 50 mcg/actuation nasal spray  Commonly known as: FLONASE  1 spray (50 mcg total) by Each Nostril route once daily.  Start taking on: March 29, 2025     gabapentin 300 MG capsule  Commonly known as: NEURONTIN  Take 1 capsule (300 mg total) by mouth 3 (three) times daily. For chronic pain     LIDOcaine 5 %  Commonly known as: LIDODERM  Place 2 patches onto the skin once daily. Remove & Discard patch within 12 hours or as directed by MD     meclizine 25 mg tablet  Commonly known as: ANTIVERT  Take 1 tablet (25 mg total) by mouth 3 (three) times daily.     methocarbamoL 750 MG Tab  Commonly known as: ROBAXIN  Take 1 tablet (750 mg total) by mouth 3 (three) times daily as needed (chest wall soreness).            CHANGE how you take these medications      hydrOXYzine pamoate 50 MG Cap  Commonly known as: VISTARIL  TAKE 1 CAPSULE (50 MG) BY MOUTH NIGHTLY AS NEEDED FOR INSOMNIA  What changed:   how much to take  how to take this  when to take this  reasons  to take this  additional instructions     olmesartan 20 MG tablet  Commonly known as: BENICAR  TAKE 1 TABLET BY MOUTH ONCE  DAILY  What changed: when to take this     TRULICITY 1.5 mg/0.5 mL pen injector  Generic drug: dulaglutide  Inject 1.5 mg into the skin every 7 days.  What changed: when to take this            CONTINUE taking these medications      albuterol 90 mcg/actuation inhaler  Commonly known as: PROVENTIL/VENTOLIN HFA  Inhale 2 puffs into the lungs every 6 (six) hours as needed for Wheezing. Rescue     amLODIPine 5 MG tablet  Commonly known as: NORVASC     ARIPiprazole 5 MG Tab  Commonly known as: ABILIFY  Take 1 tablet (5 mg total) by mouth once daily.     BLOOD PRESSURE CUFF Misc  Generic drug: miscellaneous medical supply  1 Units by Misc.(Non-Drug; Combo Route) route once daily.     blood sugar diagnostic Strp  Commonly known as: TRUE METRIX GLUCOSE TEST STRIP  use 1 strip to check glucose 2 (two) times a day.     blood-glucose meter Misc  Use as instructed     diclofenac 75 MG EC tablet  Commonly known as: VOLTAREN  Take 1 tablet (75 mg total) by mouth 2 (two) times daily as needed (pain).     DULoxetine 60 MG capsule  Commonly known as: CYMBALTA  Take 2 capsules (120 mg total) by mouth every morning.     ketoconazole 2 % cream  Commonly known as: NIZORAL     lancets 30 gauge Misc  1 lancet by Misc.(Non-Drug; Combo Route) route twice daily.     levothyroxine 25 MCG tablet  Commonly known as: SYNTHROID  Take 1 tablet (25 mcg total) by mouth before breakfast.     metFORMIN 1000 MG tablet  Commonly known as: GLUCOPHAGE  Take 1 tablet (1,000 mg total) by mouth 2 (two) times daily with meals.     omeprazole 20 MG capsule  Commonly known as: PRILOSEC  Take 1 capsule (20 mg total) by mouth before breakfast.     pravastatin 40 MG tablet  Commonly known as: PRAVACHOL  Take 1 tablet (40 mg total) by mouth once daily.     sildenafiL 100 MG tablet  Commonly known as: VIAGRA  Take 1 tablet (100 mg total) by  mouth daily as needed for Erectile Dysfunction.            STOP taking these medications      metoprolol succinate 25 MG 24 hr tablet  Commonly known as: TOPROL-XL               Where to Get Your Medications        These medications were sent to [facility] [address] [id number]      Hours: Mon-Fri, 8a-5:30p Phone: [phone]   dextromethorphan-guaiFENesin  mg  mg per 12 hr tablet  ferrous sulfate 325 (65 FE) MG EC tablet  fluticasone propionate 50 mcg/actuation nasal spray  gabapentin 300 MG capsule  LIDOcaine 5 %  meclizine 25 mg tablet  methocarbamoL 750 MG Tab         Discharge Information:   Diet:  Diabetic    Physical Activity:  As tolerated             Instructions:  1. Take all medications as prescribed  2. Keep all follow-up appointments  3. Return to the hospital or call your primary care physicians if any worsening symptoms such as fever, chest pain, shortness of breath, return of symptoms, or any other concerns.    Follow-Up Appointments:  Future Appointments   Date Time Provider Department Center   4/4/2025  9:00 AM Latasha Becerra MD [facility]   4/4/2025 10:00 AM [facility]   4/4/2025 10:45 AM [facility]MONARY FUNCTION [facility]   4/22/2025 10:40 AM Lulu Navarro NP [facility]         Ilan Sanches DO  [facility] Internal Medicine/Emergency Medicine, [facility]

## 2025-03-31 ENCOUNTER — PATIENT OUTREACH (OUTPATIENT)
Dept: ADMINISTRATIVE | Facility: CLINIC | Age: 65
End: 2025-03-31
Payer: MEDICARE

## 2025-03-31 NOTE — PROGRESS NOTES
C3 nurse spoke with Fabiano Malik Jr. for a TCC post hospital discharge follow up call. The patient has a scheduled HOSFU with Latasha Becerra MD on 4/4/25 at 0900. No messages routed at time.

## 2025-04-04 ENCOUNTER — TELEPHONE (OUTPATIENT)
Dept: PRIMARY CARE CLINIC | Facility: CLINIC | Age: 65
End: 2025-04-04
Payer: MEDICARE

## 2025-04-08 ENCOUNTER — HOSPITAL ENCOUNTER (OUTPATIENT)
Dept: RADIOLOGY | Facility: HOSPITAL | Age: 65
Discharge: HOME | End: 2025-04-08
Attending: INTERNAL MEDICINE
Payer: MEDICARE

## 2025-04-08 ENCOUNTER — OFFICE VISIT (OUTPATIENT)
Dept: PRIMARY CARE CLINIC | Facility: CLINIC | Age: 65
End: 2025-04-08
Payer: MEDICARE

## 2025-04-08 VITALS
DIASTOLIC BLOOD PRESSURE: 73 MMHG | HEART RATE: 70 BPM | SYSTOLIC BLOOD PRESSURE: 116 MMHG | HEIGHT: 73 IN | OXYGEN SATURATION: 97 % | TEMPERATURE: 98 F | BODY MASS INDEX: 34.51 KG/M2 | WEIGHT: 260.38 LBS

## 2025-04-08 DIAGNOSIS — J84.9 INTERSTITIAL PULMONARY DISEASE, UNSPECIFIED: ICD-10-CM

## 2025-04-08 DIAGNOSIS — R60.9 SWELLING: ICD-10-CM

## 2025-04-08 DIAGNOSIS — U07.1 COVID-19 VIRUS INFECTION: Primary | ICD-10-CM

## 2025-04-08 DIAGNOSIS — R42 VERTIGO: ICD-10-CM

## 2025-04-08 DIAGNOSIS — E87.1 HYPONATREMIA: ICD-10-CM

## 2025-04-08 PROCEDURE — 99999 PR PBB SHADOW E&M-EST. PATIENT-LVL V: CPT | Mod: PBBFAC,,, | Performed by: INTERNAL MEDICINE

## 2025-04-08 PROCEDURE — 93970 EXTREMITY STUDY: CPT | Mod: 26,,, | Performed by: INTERNAL MEDICINE

## 2025-04-08 PROCEDURE — 93970 EXTREMITY STUDY: CPT | Mod: TC

## 2025-04-08 RX ORDER — FUROSEMIDE 40 MG/1
40 TABLET ORAL 2 TIMES DAILY
Qty: 14 TABLET | Refills: 0 | Status: SHIPPED | OUTPATIENT
Start: 2025-04-08 | End: 2025-04-15

## 2025-04-08 RX ORDER — POTASSIUM CHLORIDE 20 MEQ/1
20 TABLET, EXTENDED RELEASE ORAL 2 TIMES DAILY
Qty: 14 TABLET | Refills: 0 | Status: SHIPPED | OUTPATIENT
Start: 2025-04-08 | End: 2025-04-15

## 2025-04-08 NOTE — PROGRESS NOTES
[facility]   New Visit Progress Note   Recent Hospital Discharge     PRESENTING HISTORY     Chief Complaint/Reason for Admission:  Follow up Hospital Discharge   PCP: Lucien Fleming MD    History of Present Illness:  Mr. Fabiano Malik Jr. is a 64 y.o. male who was recently admitted to the hospital.    [facility] Medicine Discharge Summary  Primary Team: [facility] Hospitalist Team A  Attending Physician: Dr. Rowan  Resident: Myron  Intern: Ozzie  Date of Admit: 3/21/2025  Date of Discharge: 3/28/2025  Discharge to: Home  Condition: Fair  ___________________________________________________________________    Today:  [facility] up.  Recently hospitalized for management of COVID infection, with concern for superimposed bacterial pneumonia.  Complicated by underlying interstitial lung disease.   Further complicated by acute on chronic hyponatremia with serum sodium 127.   Admitted to [facility] Medicine service with Pulmonary consultation.   Remdesivir  initiated for COVID.  Ceftriaxone and doxycycline added due to concern for bacterial PNA.  Patient had vertigo; Meniere Disease suspected.  7 day course Prednisone initiated for vertigo symptoms.   Patient responded well to above interventions and supportive care.  Discharged to home.     Patient unaccompanied today.  Ambulatory and independent with ADL's.  Did not bring medication bottles for review.  Does not drive- relies on insurance transportation van.   Reports new onset ( ~ 3 days) bilateral LE swelling with weeping.    Review of Systems  General ROS: negative for chills, fever or weight loss  Psychological ROS: negative for hallucination, depression or suicidal ideation  Ophthalmic ROS: negative for blurry vision, photophobia or eye pain  ENT ROS: negative for epistaxis, sore throat or rhinorrhea  + intermittent dizziness   Respiratory ROS: no cough, shortness of breath, or  wheezing  Cardiovascular ROS: no chest pain or dyspnea on exertion  + new onset bilateral LE swelling with weeping   Gastrointestinal ROS: no abdominal pain, change in bowel habits, or black/ bloody stools  Genito-Urinary ROS: no dysuria, trouble voiding, or hematuria  Musculoskeletal ROS: negative for gait disturbance or muscular weakness  Neurological ROS: no syncope or seizures; no ataxia  Dermatological ROS: negative for pruritis, rash and jaundice      PAST HISTORY:     Past Medical History:   Diagnosis Date    Chronic back pain greater than 3 months duration     Depression     Diabetes mellitus     Diabetes mellitus, type 2     Hypertension     Schizophrenia        Past Surgical History:   Procedure Laterality Date    APPENDECTOMY      CARPAL TUNNEL RELEASE Right 12/13/2022    Procedure: RELEASE, CARPAL TUNNEL;  Surgeon: Everton Walter Jr., MD;  Location: [facility] OR;  Service: Orthopedics;  Laterality: Right;    COLONOSCOPY N/A 5/31/2018    Procedure: COLONOSCOPY;  Surgeon: Guillaume Hatch MD;  Location: [facility] ENDO;  Service: Endoscopy;  Laterality: N/A;    COLONOSCOPY N/A 11/17/2022    Procedure: COLONOSCOPY;  Surgeon: Guillaume Hatch MD;  Location: [facility] ENDO;  Service: Endoscopy;  Laterality: N/A;    DECOMPRESSION, NERVE, ULNAR Right 12/13/2022    Procedure: DECOMPRESSION, NERVE, ULNAR;  Surgeon: Everton Walter Jr., MD;  Location: [facility] OR;  Service: Orthopedics;  Laterality: Right;    EPIDURAL STEROID INJECTION INTO LUMBAR SPINE N/A 2/21/2024    Procedure: L5-S1 JOHN;  Surgeon: Zoila Suarez DO;  Location: [facility] PAIN MANAGEMENT;  Service: Pain Management;  Laterality: N/A;  oral 30 mins    FUSION, SPINE, CERVICAL N/A 8/11/2023    Procedure: FUSION, SPINE, CERVICAL;  Surgeon: Guillaume Washington MD;  Location: [facility] OR;  Service: Neurosurgery;  Laterality: N/A;  C3-4, C5-6, C6-7 ACDF C3-C7 anterior instrumentation Depuy  -ANTERIOR DECOMPREESSION 2 LEVELS   -ANTERIOR INSTRUMENTATION INTERBODY CAGE  INTERSPACE 3   -LOP 3.5 HR   -LOS 3 DAYS   -GENERAL   -TYPE AND SCREEN   - C ARM   -EMG, SEP, MEP hari notified cc  -ASPEN   -REG    NECK SURGERY      RADIOFREQUENCY ABLATION OF LUMBAR MEDIAL BRANCH NERVE AT SINGLE LEVEL Left 5/29/2018    Procedure: RADIOFREQUENCY THERMOCOAGULATION (RFTC)-NERVE-MEDIAN BRANCH-LUMBAR- Left L2-3-4-5;  Surgeon: Donavon Dennis MD;  Location: [facility];  Service: Pain Management;  Laterality: Left;    RADIOFREQUENCY ABLATION OF LUMBAR MEDIAL BRANCH NERVE AT SINGLE LEVEL Right 1/8/2019    Procedure: Radiofrequency Ablation, Nerve, Spinal, Lumbar, Medial Branch, L2,3,4,5;  Surgeon: Alberto Hernandez Jr., MD;  Location: [facility];  Service: Pain Management;  Laterality: Right;  Pt is diabetic    RADIOFREQUENCY ABLATION OF LUMBAR MEDIAL BRANCH NERVE AT SINGLE LEVEL Left 3/12/2019    Procedure: Radiofrequency Ablation, Nerve, Spinal, Lumbar, Medial Branch, Left, L2,3,4,5;  Surgeon: Alberto Hernandez Jr., MD;  Location: [facility];  Service: Pain Management;  Laterality: Left;  Pt will be consented the day of procedure.    TRANSFORAMINAL EPIDURAL INJECTION OF STEROID Right 9/12/2019    Procedure: Injection,steroid,epidural,transforaminal,right,L4-5 and L5-S1;  Surgeon: Alberto Hernandez Jr., MD;  Location: [facility];  Service: Pain Management;  Laterality: Right;  PT IS DIABETIC       Family History   Problem Relation Name Age of Onset    Alzheimer's disease Mother      Diabetes Mother      Heart defect Father      Cancer Father      Stroke Father      Heart attack Father      Heart defect Sister      Alzheimer's disease Sister           MEDICATIONS & ALLERGIES:     Medications Ordered Prior to Encounter[1]     Review of patient's allergies indicates:   Allergen Reactions    Pcn [penicillins] Other (See Comments)     Childhood rxn, pt does not recall type of rxn       OBJECTIVE:     Vital Signs:  /73 (BP Location: Left arm, Patient Position: Sitting)   Pulse 70    "Temp 97.6 °F (36.4 °C) (Oral)   Ht 6' 1" (1.854 m)   Wt 118.1 kg (260 lb 5.8 oz)   SpO2 97%   BMI 34.35 kg/m²   Wt Readings from Last 3 Encounters:   03/24/25 0033 114.3 kg (251 lb 15.8 oz)   03/22/25 1557 107.1 kg (236 lb 1.8 oz)   03/21/25 1302 117.9 kg (260 lb)   02/05/25 1201 118.4 kg (261 lb)   01/29/25 0906 118.7 kg (261 lb 11 oz)     Body mass index is 34.35 kg/m².        Physical Exam:  /73 (BP Location: Left arm, Patient Position: Sitting)   Pulse 70   Temp 97.6 °F (36.4 °C) (Oral)   Ht 6' 1" (1.854 m)   Wt 118.1 kg (260 lb 5.8 oz)   SpO2 97%   BMI 34.35 kg/m²   General appearance: alert, cooperative, no distress  Constitutional:Oriented to person, place, and time  + obese    HEENT: Normocephalic, atraumatic, neck symmetrical, no nasal discharge   Eyes: conjunctivae/corneas clear, PERRL, EOM's intact  Lungs: clear to auscultation bilaterally, no dullness to percussion bilaterally  Heart: regular rate and rhythm without rub; no displacement of the PMI   Abdomen: soft, non-tender; bowel sounds normoactive; no organomegaly  Extremities: extremities symmetric; no clubbing, cyanosis, + 3 pitting edema bilaterally   Integument: Skin color, texture, turgor normal; no rashes; hair distrubution normal  + oozing and blisters to bilateral shins   Neurologic: Alert and oriented X 3, normal strength, normal coordination and gait  Psychiatric: no pressured speech; normal affect; no evidence of impaired cognition     Laboratory  Lab Results   Component Value Date    WBC 8.62 03/28/2025    HGB 11.7 (L) 03/28/2025    HCT 35.4 (L) 03/28/2025    MCV 77 (L) 03/28/2025     03/28/2025     BMP  Lab Results   Component Value Date     (L) 03/28/2025    K 4.0 03/28/2025     03/28/2025    CO2 20 (L) 03/28/2025    BUN 24 (H) 03/28/2025    CREATININE 1.2 03/28/2025    CALCIUM 8.4 (L) 03/28/2025    ANIONGAP 8 03/28/2025    EGFRNORACEVR >60 03/28/2025     Lab Results   Component Value Date    ALT 26 " 03/28/2025    AST 17 03/28/2025    ALKPHOS 54 03/28/2025    BILITOT 0.4 03/28/2025     Lab Results   Component Value Date    INR 1.0 11/28/2018     Lab Results   Component Value Date    HGBA1C 8.2 (H) 03/22/2025       Diagnostic Results:    CT Chest with contrast 3/22/25:  Lower and basilar predominant interstitial thickening and reticulation, nonspecific and potentially on the basis of mild fibrotic change/interstitial lung disease. No evident honeycombing or air trapping. Appearance and distribution similar to comparison February 2025 CT.     TTE 3/21/25:    Left Ventricle: The left ventricle is normal in size. Normal wall thickness. There is concentric remodeling. There is normal systolic function. Quantitated ejection fraction is 61%. Diastolic function cannot be reliably determined in the presence of mitral annular calcification.    Right Ventricle: Normal right ventricular cavity size. Wall thickness is normal. Systolic function is normal.    Aortic Valve: The aortic valve is structurally normal. Mildly calcified noncoronary cusp.    Mitral Valve: There is moderate posterior mitral annular calcification present. There is mild regurgitation.    Pulmonary Artery: The estimated pulmonary artery systolic pressure is 34 mmHg.    IVC/SVC: Normal venous pressure at 3 mmHg.      TRANSITION OF CARE:     [facility] On Call Contact Note: 3/31/25     Family and/or Caretaker present at visit?  No.  Diagnostic tests reviewed/disposition: I have reviewed all completed as well as pending diagnostic tests at the time of discharge.  Disease/illness education: Yes  Home health/community services discussion/referrals: Patient does not have home health established from hospital visit.  They do not need home health.  If needed, we will set up home health for the patient.   Establishment or re-establishment of referral orders for community resources: No other necessary community resources.   Discussion with other health care providers:  No discussion with other health care providers necessary.     ASSESSMENT & PLAN:     COVID-19 virus infection  - recent hospitalization as above  - treated with Remdesivir  - Ceftriaxone/Azithromycin added due to additional concern for bacterial pneumonia    Interstitial pulmonary disease, unspecified  - evaluation in progress  - complete PFT's 4/16/25   - return to Pulmonary team 5/16/25     Swelling  - new onset bilateral LE edema with weeping-  obtain US today  - diurese and assess response   -     US Lower Extremity Veins Bilateral; Future; Expected date: 04/08/2025  -     furosemide (LASIX) 40 MG tablet; Take 1 tablet (40 mg total) by mouth 2 (two) times daily. for 7 days  Dispense: 14 tablet; Refill: 0  -     potassium chloride SA (K-DUR,KLOR-CON) 20 MEQ tablet; Take 1 tablet (20 mEq total) by mouth 2 (two) times daily. for 7 days  Dispense: 14 tablet; Refill: 0    Hyponatremia  - acute on chronic  - monitor     Vertigo  - improved, taking meclizine prn  - attending outpatient vestibular therapy  - see ENT 5/22/25     Return to clinic 4/16/25.  Labs prior.     Instructions for the patient:      Scheduled Follow-up :  Future Appointments   Date Time Provider Department Center   4/16/2025  9:30 AM Latasha Becerra MD [facility]PRI [facility]   4/16/2025  1:30 PM [place] PULMONARY [facility]   4/16/2025  2:15 PM [place] PULMONARY FUNCTION LAB [facility]   4/22/2025 10:40 AM Lulu Navarro NP [facility] GASTRO [facility]   4/23/2025 11:15 AM Franci Packer OT [facility] [place] [facility]   4/28/2025  9:00 AM Dary Chen, PT [facility] [place] [facility]   5/16/2025 11:00 AM Ladonna Garcia MD [facility] PULMO [place] [facility]   5/22/2025  9:00 AM Micki Irizarry, CCC-A [facility] ALCON [place] [facility]   5/22/2025 10:00 AM Stacey Edmonds NP [facility] ALCON [facility]   6/25/2025  1:00 PM Lucien Fleming MD [facility]       Post Visit Medication List:     Medication List             Accurate as of April 8, 2025 11:59 PM. If you have any questions, ask your nurse or doctor.                START taking these medications      furosemide 40 MG tablet  Commonly known as: LASIX  Take 1 tablet (40 mg total) by mouth 2 (two) times daily. for 7 days  Started by: Latasha Becerra MD     potassium chloride SA 20 MEQ tablet  Commonly known as: K-DUR,KLOR-CON  Take 1 tablet (20 mEq total) by mouth 2 (two) times daily. for 7 days  Started by: Latasha Becerra MD            CONTINUE taking these medications      albuterol 90 mcg/actuation inhaler  Commonly known as: PROVENTIL/VENTOLIN HFA  Inhale 2 puffs into the lungs every 6 (six) hours as needed for Wheezing. Rescue     amLODIPine 5 MG tablet  Commonly known as: NORVASC     ARIPiprazole 5 MG Tab  Commonly known as: ABILIFY  Take 1 tablet (5 mg total) by mouth once daily.     BLOOD PRESSURE CUFF Misc  Generic drug: miscellaneous medical supply  1 Units by Misc.(Non-Drug; Combo Route) route once daily.     blood sugar diagnostic Strp  Commonly known as: TRUE METRIX GLUCOSE TEST STRIP  use 1 strip to check glucose 2 (two) times a day.     blood-glucose meter Misc  Use as instructed     dextromethorphan-guaiFENesin  mg  mg per 12 hr tablet  Commonly known as: MUCINEX DM  Take 1 tablet by mouth 2 (two) times daily as needed (cough/congestion).     diclofenac 75 MG EC tablet  Commonly known as: VOLTAREN  Take 1 tablet (75 mg total) by mouth 2 (two) times daily as needed (pain).     DULoxetine 60 MG capsule  Commonly known as: CYMBALTA  Take 2 capsules (120 mg total) by mouth every morning.     ferrous sulfate 325 (65 FE) MG EC tablet  Take 1 tablet (325 mg total) by mouth once daily.     fluticasone propionate 50 mcg/actuation nasal spray  Commonly known as: FLONASE  1 spray (50 mcg total) by Each Nostril route once daily.     gabapentin 300 MG capsule  Commonly known as: NEURONTIN  Take 1 capsule (300 mg total) by mouth 3 (three) times  daily. For chronic pain     hydrOXYzine pamoate 50 MG Cap  Commonly known as: VISTARIL  TAKE 1 CAPSULE (50 MG) BY MOUTH NIGHTLY AS NEEDED FOR INSOMNIA     ketoconazole 2 % cream  Commonly known as: NIZORAL     lancets 30 gauge Misc  1 lancet by Misc.(Non-Drug; Combo Route) route twice daily.     levothyroxine 25 MCG tablet  Commonly known as: SYNTHROID  Take 1 tablet (25 mcg total) by mouth before breakfast.     LIDOcaine 5 %  Commonly known as: LIDODERM  Place 2 patches onto the skin once daily. Remove & Discard patch within 12 hours or as directed by MD     meclizine 25 mg tablet  Commonly known as: ANTIVERT  Take 1 tablet (25 mg total) by mouth 3 (three) times daily.     metFORMIN 1000 MG tablet  Commonly known as: GLUCOPHAGE  Take 1 tablet (1,000 mg total) by mouth 2 (two) times daily with meals.     olmesartan 20 MG tablet  Commonly known as: BENICAR  TAKE 1 TABLET BY MOUTH ONCE  DAILY     omeprazole 20 MG capsule  Commonly known as: PRILOSEC  Take 1 capsule (20 mg total) by mouth before breakfast.     pravastatin 40 MG tablet  Commonly known as: PRAVACHOL  Take 1 tablet (40 mg total) by mouth once daily.     sildenafiL 100 MG tablet  Commonly known as: VIAGRA  Take 1 tablet (100 mg total) by mouth daily as needed for Erectile Dysfunction.     TRULICITY 1.5 mg/0.5 mL pen injector  Generic drug: dulaglutide  Inject 1.5 mg into the skin every 7 days.               Where to Get Your Medications        These medications were sent to [facility]  [address], [place] [address]      Hours: Mon-Fri, 8a-5:30p Phone: [phone]   furosemide 40 MG tablet  potassium chloride SA 20 MEQ tablet         Signing Physician:  Latasha Becerra MD         [1]   Current Outpatient Medications on File Prior to Visit   Medication Sig Dispense Refill    albuterol (PROVENTIL/VENTOLIN HFA) 90 mcg/actuation inhaler Inhale 2 puffs into the lungs every 6 (six) hours as needed for Wheezing. Rescue 18 g 0     amLODIPine (NORVASC) 5 MG tablet Take 5 mg by mouth once daily.      ARIPiprazole (ABILIFY) 5 MG Tab Take 1 tablet (5 mg total) by mouth once daily. 90 tablet 3    BLOOD PRESSURE CUFF Misc 1 Units by Misc.(Non-Drug; Combo Route) route once daily. 1 each 0    blood sugar diagnostic (TRUE METRIX GLUCOSE TEST STRIP) Strp use 1 strip to check glucose 2 (two) times a day. (Patient not taking: Reported on 3/31/2025) 200 strip 3    blood-glucose meter Misc Use as instructed (Patient not taking: Reported on 3/31/2025) 1 each 0    [] dextromethorphan-guaiFENesin  mg (MUCINEX DM)  mg per 12 hr tablet Take 1 tablet by mouth 2 (two) times daily as needed (cough/congestion). (Patient not taking: Reported on 3/31/2025) 20 tablet 0    diclofenac (VOLTAREN) 75 MG EC tablet Take 1 tablet (75 mg total) by mouth 2 (two) times daily as needed (pain). 60 tablet 11    dulaglutide (TRULICITY) 1.5 mg/0.5 mL pen injector Inject 1.5 mg into the skin every 7 days. 12 pen 1    DULoxetine (CYMBALTA) 60 MG capsule Take 2 capsules (120 mg total) by mouth every morning. 180 capsule 3    ferrous sulfate 325 (65 FE) MG EC tablet Take 1 tablet (325 mg total) by mouth once daily. 30 tablet 0    fluticasone propionate (FLONASE) 50 mcg/actuation nasal spray 1 spray (50 mcg total) by Each Nostril route once daily. 16 g 0    gabapentin (NEURONTIN) 300 MG capsule Take 1 capsule (300 mg total) by mouth 3 (three) times daily. For chronic pain 90 capsule 11    hydrOXYzine pamoate (VISTARIL) 50 MG Cap TAKE 1 CAPSULE (50 MG) BY MOUTH NIGHTLY AS NEEDED FOR INSOMNIA 90 capsule 3    ketoconazole (NIZORAL) 2 % cream Apply topically once daily.      lancets 30 gauge Misc 1 lancet by Misc.(Non-Drug; Combo Route) route twice daily. 200 each 3    levothyroxine (SYNTHROID) 25 MCG tablet Take 1 tablet (25 mcg total) by mouth before breakfast. 90 tablet 3    LIDOcaine (LIDODERM) 5 % Place 2 patches onto the skin once daily. Remove & Discard patch within  12 hours or as directed by MD 60 patch 0    meclizine (ANTIVERT) 25 mg tablet Take 1 tablet (25 mg total) by mouth 3 (three) times daily. 90 tablet 0    metFORMIN (GLUCOPHAGE) 1000 MG tablet Take 1 tablet (1,000 mg total) by mouth 2 (two) times daily with meals. 180 tablet 1    olmesartan (BENICAR) 20 MG tablet TAKE 1 TABLET BY MOUTH ONCE  DAILY 90 tablet 3    omeprazole (PRILOSEC) 20 MG capsule Take 1 capsule (20 mg total) by mouth before breakfast. 90 capsule 3    pravastatin (PRAVACHOL) 40 MG tablet Take 1 tablet (40 mg total) by mouth once daily. 90 tablet 3    sildenafiL (VIAGRA) 100 MG tablet Take 1 tablet (100 mg total) by mouth daily as needed for Erectile Dysfunction. 30 tablet 11     No current facility-administered medications on file prior to visit.

## 2025-04-10 ENCOUNTER — RESULTS FOLLOW-UP (OUTPATIENT)
Dept: PRIMARY CARE CLINIC | Facility: CLINIC | Age: 65
End: 2025-04-10

## 2025-04-15 ENCOUNTER — PATIENT OUTREACH (OUTPATIENT)
Dept: ADMINISTRATIVE | Facility: HOSPITAL | Age: 65
End: 2025-04-15
Payer: MEDICARE

## 2025-04-15 DIAGNOSIS — R05.9 COUGH: Primary | ICD-10-CM

## 2025-04-16 ENCOUNTER — TELEPHONE (OUTPATIENT)
Dept: PAIN MEDICINE | Facility: CLINIC | Age: 65
End: 2025-04-16
Payer: MEDICARE

## 2025-04-17 LAB — MYCOBACTERIUM SPEC QL CULT: NORMAL

## 2025-04-21 ENCOUNTER — TELEPHONE (OUTPATIENT)
Dept: PAIN MEDICINE | Facility: CLINIC | Age: 65
End: 2025-04-21
Payer: MEDICARE

## 2025-04-21 NOTE — TELEPHONE ENCOUNTER
I called Julio with [facility] to let him know that the patient is scheduled tomorrow at 10:00. I wasn't able to get him on the phone but I did leave him a voicemail.

## 2025-04-23 ENCOUNTER — TELEPHONE (OUTPATIENT)
Dept: GASTROENTEROLOGY | Facility: CLINIC | Age: 65
End: 2025-04-23
Payer: MEDICARE

## 2025-04-23 NOTE — TELEPHONE ENCOUNTER
----- Message from Little Stahl sent at 4/23/2025  2:42 PM CDT -----  Type: Appointment RequestName of Caller:URBAN SANCHEZ JR. [[id number]]When is the first available appointment?No accessSymptoms:GI follow up Would the patient rather a call back or a response via [facility]? Callback Best Call Back Number:[phone] Additional Information: patient called in to reschedule missed appointment. Patient was unable to get into the portal. Patient would like to schedule for early morning if possible. Please call patient with any additional information.

## 2025-04-23 NOTE — TELEPHONE ENCOUNTER
Spoke with pt and scheduled in person appt may 29 1pm with aliza youngblood. Verbal understanding

## 2025-04-28 DIAGNOSIS — Z00.00 ENCOUNTER FOR MEDICARE ANNUAL WELLNESS EXAM: ICD-10-CM

## 2025-04-30 ENCOUNTER — TELEPHONE (OUTPATIENT)
Dept: PAIN MEDICINE | Facility: CLINIC | Age: 65
End: 2025-04-30
Payer: MEDICARE

## 2025-04-30 ENCOUNTER — PATIENT OUTREACH (OUTPATIENT)
Dept: ADMINISTRATIVE | Facility: HOSPITAL | Age: 65
End: 2025-04-30
Payer: MEDICARE

## 2025-04-30 NOTE — PROGRESS NOTES
04/30/2025  VB chart audit performed. Care Everywhere updates requested and reviewed  Overdue HM topic chart audit and/or requested. LINKS triggered and reconciled. Media reviewed.   Health Maintenance Topic(s) Outreach Outcomes & Actions Taken:    Lab(s) - Outreach Outcomes & Actions Taken  : MD staff message sent to order PSA    Eye Exam - Outreach Outcomes & Actions Taken  : Pt Will Schedule with External Provider / Order Routed & Care Team Updated if Applicable     Additional Notes:       Care Management, Digital Medicine, and/or Education Referrals  Next Steps - Referral Actions: Digital Medicine Outcomes and Actions Taken: Pt Declined or Not Eligible

## 2025-04-30 NOTE — TELEPHONE ENCOUNTER
Called patient back. Called to confirm his appointment with Joseph for tomorrow.  ----- Message from Noman sent at 4/30/2025  1:47 PM CDT -----  Contact: pt  Type:  Patient Returning CallWho Called:pt Who Left Message for Patient: Vern Horne MADoes the patient know what this is regarding?: Would the patient rather a call back or a response via [facility]? Call Best Call Back Number: [phone] Additional Information:

## 2025-05-01 ENCOUNTER — TELEPHONE (OUTPATIENT)
Dept: PAIN MEDICINE | Facility: CLINIC | Age: 65
End: 2025-05-01

## 2025-05-01 ENCOUNTER — OFFICE VISIT (OUTPATIENT)
Dept: PAIN MEDICINE | Facility: CLINIC | Age: 65
End: 2025-05-01
Payer: MEDICARE

## 2025-05-01 VITALS
WEIGHT: 254.94 LBS | SYSTOLIC BLOOD PRESSURE: 99 MMHG | BODY MASS INDEX: 33.79 KG/M2 | HEIGHT: 73 IN | DIASTOLIC BLOOD PRESSURE: 62 MMHG | HEART RATE: 78 BPM

## 2025-05-01 DIAGNOSIS — G89.29 CHRONIC FOOT PAIN, UNSPECIFIED LATERALITY: ICD-10-CM

## 2025-05-01 DIAGNOSIS — G89.4 CHRONIC PAIN SYNDROME: ICD-10-CM

## 2025-05-01 DIAGNOSIS — M79.673 CHRONIC FOOT PAIN, UNSPECIFIED LATERALITY: ICD-10-CM

## 2025-05-01 DIAGNOSIS — E11.42 DIABETIC PERIPHERAL NEUROPATHY ASSOCIATED WITH TYPE 2 DIABETES MELLITUS: Primary | ICD-10-CM

## 2025-05-01 PROCEDURE — 99999 PR PBB SHADOW E&M-EST. PATIENT-LVL V: CPT | Mod: PBBFAC,,, | Performed by: NURSE PRACTITIONER

## 2025-05-01 NOTE — PROGRESS NOTES
[facility] Establ[facility] Patient Evaluation    Referred by: Dr. Lucien Alegria*   Reason for referral: * No diagnoses found *     CC:   Chief Complaint   Patient presents with    Low-back Pain    Neck Pain    Foot Pain         5/1/2025     9:15 AM 12/15/2023     9:32 AM 6/20/2023    11:05 AM   Last 3 PDI Scores   Pain Disability Index (PDI) 60 70 60     Interval History 05/01/2025:   Fabiano Malik Jr. is a 64 y.o. male who  has a past medical history of Chronic back pain greater than 3 months duration, Depression, Diabetes mellitus, Diabetes mellitus, type 2, Hypertension, and Schizophrenia.    Patient complaining of bilateral foot per in that is described as burning he does have  bilateral LE edema with weeping.  Patient is interested in spinal cord stimulation to help with his peripheral diabetic neuropathy.  He is reporting numbness sensory loss and paresthesia in his lower extremities and feet.  He reports currently being on Cymbalta and gabapentin with limited relief he does report a home exercise plan that he has been participating in for greater than 8 weeks he denies any physical therapy at this point.     Interval Hx:12/15/2023 Patient present here in clinic with complaints of neck and lower back and leg pain.  Patient has is primarily complaining of low back and bilateral leg pain he is also experiencing paresthesia like symptoms in his legs and into his feet describing this as numbness and tingling.  His recent lumbar MRI was reviewed by Neurosurgery/ Dr. Washington and he has recommended this patient for an L5-S1 interlaminar JOHN.  Today he denies any bowel bladder dysfunction denies saddle anesthesia denies any profound weakness he is using a walker to ambulate.       Interval History 11/20/2023 - Pt presents for follow up of neck and pack pain.  He is status post C5-7 ACDF with Dr Washington on 08/11/2023.  He reports improvement in his neck and arm pain following the surgery.   He reports he has been cleared by Neurosurgery to start physical therapy.  Today he complains of pain in the low back that radiates down the bilateral lower extremities to the feet.  He is using a rolling walker to ambulate.  He continues to take Lyrica 200 mg b.i.d. with some improvement.  No loss of bowel or bladder control, no saddle anesthesia.    Subjective 06/20/2023:   Fabiano Malik Jr. is a 64 y.o. male who presents complaining of neck pain that radiates into the right arm down to the hand.  He reports numbness in the right arm and hand. He has previously undergone right carpal tunnel release and ulnar nerve decompression in 2022 with no improvement in his arm pain.  He has been evaluated by Dr Washington with NSGY and is currently scheduled for cervical fusion surgery on 7/14/2023. He was recently started on Lyrica 100 mg BID.  He has not noticed a difference in his pain with this medication and denies any side effects.     Location: right arm,neck,back   Onset: years  Current Pain Score: 10/10  Daily Pain of Range: 6-10/10  Quality: Aching, Throbbing, Grabbing, Tingling, Numb, Sharp, and Hot  Radiation: right shoulder,back,neck,legs  Worsened by: extension, flexion, lying down, sitting, standing for more than 1 minutes, walking for more than 1 minutes, and daily activity  Improved by: nothing    Patient denies urinary incontinence, bowel incontinence, significant weight loss, and significant motor weakness.    Previous Interventions:  - 09/2019 -Right L4/5 and L5/S1 TFESI - [place]  - 02/2019 - RFA L2, 3, 4, 5 - [place]    Previous Therapies:  PT/OT:  None for PDN  Chiropractor:   HEP:  Home exercise plan for greater than weeks  Relevant Surgery: yes   Many years ago - C4-5 anterior fusion   08/11/2023 - C5-7 ACDF with Dr Washington   2022 - right carpal tunnel release and ulnar nerve decompression  Previous Medications:   - NSAIDS: meloxicam  - Muscle Relaxants:    - TCAs:   - SNRIs: Cymbalta  - Topicals:    - Anticonvulsants: lyrica   - Opioids:   - Adjuvants:     Current Pain Medications:  Gabapentin 300 mg TID   Cymbalta 60 mg     Review of Systems:  ROS     GENERAL:  No weight loss, malaise or fevers. +DM type II  HEENT:   No recent changes in vision or hearing  NECK:  No difficulty with swallowing. No stridor.   RESPIRATORY:  Negative for cough, wheezing or shortness of breath, patient denies any recent URI.  CARDIOVASCULAR:  Negative for chest pain, leg swelling or palpitations.  +HTN  HLD  GI:  Negative for abdominal discomfort, blood in stools or black stools or change in bowel habits.  MUSCULOSKELETAL:  See HPI.  SKIN:  Negative for lesions, rash, and itching.  PSYCH:  No mood disorder or recent psychosocial stressors.  +Depression, Schizophrenia   HEMATOLOGY/LYMPHOLOGY:  Negative for prolonged bleeding, bruising easily or swollen nodes.  Patient is not currently taking any anti-coagulants  NEURO:   No history of headaches, syncope, paralysis, seizures or tremors.  All other reviewed and negative other than HPI.    History:  Current medications, allergies, medical history, surgical history,   family history, and social history were reviewed in the chart as marked.    Full Medication List:    Current Outpatient Medications:     albuterol (PROVENTIL/VENTOLIN HFA) 90 mcg/actuation inhaler, Inhale 2 puffs into the lungs every 6 (six) hours as needed for Wheezing. Rescue, Disp: 18 g, Rfl: 0    amLODIPine (NORVASC) 5 MG tablet, Take 5 mg by mouth once daily., Disp: , Rfl:     ARIPiprazole (ABILIFY) 5 MG Tab, Take 1 tablet (5 mg total) by mouth once daily., Disp: 90 tablet, Rfl: 3    BLOOD PRESSURE CUFF Misc, 1 Units by Misc.(Non-Drug; Combo Route) route once daily., Disp: 1 each, Rfl: 0    blood sugar diagnostic (TRUE METRIX GLUCOSE TEST STRIP) Strp, use 1 strip to check glucose 2 (two) times a day., Disp: 200 strip, Rfl: 3    diclofenac (VOLTAREN) 75 MG EC tablet, Take 1 tablet (75 mg total) by mouth 2 (two) times  daily as needed (pain)., Disp: 60 tablet, Rfl: 11    dulaglutide (TRULICITY) 1.5 mg/0.5 mL pen injector, Inject 1.5 mg into the skin every 7 days., Disp: 12 pen , Rfl: 1    DULoxetine (CYMBALTA) 60 MG capsule, Take 2 capsules (120 mg total) by mouth every morning., Disp: 180 capsule, Rfl: 3    fluticasone propionate (FLONASE) 50 mcg/actuation nasal spray, 1 spray (50 mcg total) by Each Nostril route once daily., Disp: 16 g, Rfl: 0    gabapentin (NEURONTIN) 300 MG capsule, Take 1 capsule (300 mg total) by mouth 3 (three) times daily. For chronic pain, Disp: 90 capsule, Rfl: 11    hydrOXYzine pamoate (VISTARIL) 50 MG Cap, TAKE 1 CAPSULE (50 MG) BY MOUTH NIGHTLY AS NEEDED FOR INSOMNIA, Disp: 90 capsule, Rfl: 3    ketoconazole (NIZORAL) 2 % cream, Apply topically once daily., Disp: , Rfl:     lancets 30 gauge Misc, 1 lancet by Misc.(Non-Drug; Combo Route) route twice daily., Disp: 200 each, Rfl: 3    levothyroxine (SYNTHROID) 25 MCG tablet, Take 1 tablet (25 mcg total) by mouth before breakfast., Disp: 90 tablet, Rfl: 3    LIDOcaine (LIDODERM) 5 %, Place 2 patches onto the skin once daily. Remove & Discard patch within 12 hours or as directed by MD, Disp: 60 patch, Rfl: 0    meclizine (ANTIVERT) 25 mg tablet, Take 1 tablet (25 mg total) by mouth 3 (three) times daily., Disp: 90 tablet, Rfl: 0    metFORMIN (GLUCOPHAGE) 1000 MG tablet, Take 1 tablet (1,000 mg total) by mouth 2 (two) times daily with meals., Disp: 180 tablet, Rfl: 1    olmesartan (BENICAR) 20 MG tablet, TAKE 1 TABLET BY MOUTH ONCE  DAILY, Disp: 90 tablet, Rfl: 3    omeprazole (PRILOSEC) 20 MG capsule, Take 1 capsule (20 mg total) by mouth before breakfast., Disp: 90 capsule, Rfl: 3    pravastatin (PRAVACHOL) 40 MG tablet, Take 1 tablet (40 mg total) by mouth once daily., Disp: 90 tablet, Rfl: 3    sildenafiL (VIAGRA) 100 MG tablet, Take 1 tablet (100 mg total) by mouth daily as needed for Erectile Dysfunction., Disp: 30 tablet, Rfl: 11    blood-glucose  "meter Misc, Use as instructed (Patient not taking: Reported on 3/31/2025), Disp: 1 each, Rfl: 0    furosemide (LASIX) 40 MG tablet, Take 1 tablet (40 mg total) by mouth 2 (two) times daily. for 7 days, Disp: 14 tablet, Rfl: 0     Allergies:  Pcn [penicillins]     Medical History:   has a past medical history of Chronic back pain greater than 3 months duration, Depression, Diabetes mellitus, Diabetes mellitus, type 2, Hypertension, and Schizophrenia.    Surgical History:   has a past surgical history that includes Neck surgery; Appendectomy; Radiofrequency ablation of lumbar medial branch nerve at single level (Left, 5/29/2018); Colonoscopy (N/A, 5/31/2018); Radiofrequency ablation of lumbar medial branch nerve at single level (Right, 1/8/2019); Radiofrequency ablation of lumbar medial branch nerve at single level (Left, 3/12/2019); Transforaminal epidural injection of steroid (Right, 9/12/2019); Colonoscopy (N/A, 11/17/2022); Carpal tunnel release (Right, 12/13/2022); decompression, nerve, ulnar (Right, 12/13/2022); fusion, spine, cervical (N/A, 8/11/2023); and Epidural steroid injection into lumbar spine (N/A, 2/21/2024).    Family History:  family history includes Alzheimer's disease in his mother and sister; Cancer in his father; Diabetes in his mother; Heart attack in his father; Heart defect in his father and sister; Stroke in his father.    Social History:   reports that he has never smoked. He has never used smokeless tobacco. He reports current alcohol use. He reports that he does not use drugs.    Physical Exam:  Vitals:    05/01/25 0916   BP: 99/62   Pulse: 78   Weight: 115.6 kg (254 lb 15.4 oz)   Height: 6' 1" (1.854 m)   PainSc: 10-Worst pain ever   PainLoc: Back       GENERAL: Well appearing, in no acute distress, alert and oriented x3.  PSYCH:  Mood and affect appropriate.  SKIN: Skin color, texture, turgor normal, no rashes or lesions.+ oozing and blisters to bilateral shins, + 2 pitting edema " bilaterally   HEAD/FACE:  Normocephalic, atraumatic. Cranial nerves grossly intact.  NECK: Healed surgical scar noted to anterior neck. Supple.   CV: RRR with palpation of the radial artery.  PULM: No evidence of respiratory difficulty, symmetric chest rise.  GI:  Soft and non-distended.  MSK: +SLR with modified SLR. No obvious deformities, edema, or skin discoloration.  No atrophy or tone abnormalities are noted.   NEURO: Altered sensation noted over all 5 digits of the right hand compared to left.   MENTAL STATUS: A x O x 3, good concentration, speech is fluent and goal directed  MOTOR: 5/5 in b/l LE muscle groups  GAIT:  Normal    Imaging:  MRI CERVICAL SPINE WITHOUT CONTRAST     CLINICAL HISTORY:  Spinal stenosis, cervical;.  Spinal stenosis, cervical region     TECHNIQUE:  Multiplanar, multisequence MR images of the cervical spine were acquired without the administration of contrast.     COMPARISON:  MRI cervical spine dated 08/08/2013     FINDINGS:  Prior fusion changes at the C4-C5 vertebral bodies.  Remaining vertebral body heights appear maintained.  No infiltrative T1 marrow process.  No abnormal cord STIR edema.  Visualized brainstem and cerebellum are unremarkable.  Prevertebral and paraspinal soft tissues demonstrate no acute abnormality     C2-C3: Mild posterior disc osteophyte with left greater than right facet DJD.  No significant neural foraminal encroachment or spinal canal stenosis.     C3-C4: Posterior disc osteophyte and facet DJD.  Thinning with near effacement of the ventral thecal sac.  Posterior subarachnoid space is maintained.  No significant neural foraminal encroachment.     C4-C5: Vertebral body fusion without significant spinal canal stenosis or neural foraminal impingement.     C5-C6: Posterior disc osteophyte with uncovertebral spurring and facet DJD.  Effacement of the ventral thecal sac with abutment and slight indentation of the anterior cord.  Thinning without effacement of the  posterior subarachnoid space.  Severe right and mild-moderate left neural foraminal narrowing.     C6-C7: Posterior disc osteophyte with uncovertebral spurring and facet DJD.  Effacement of the ventral thecal sac with abutment and slight indentation of the right paracentral anterior cord.  Thinning with near effacement of the posterior subarachnoid space.  Severe neural foraminal narrowing.     C7-T1: No significant spinal canal stenosis or neural foraminal impingement.     Impression:     Cervical spondylosis noting levels of lower cervical spinal canal stenosis and variable neural foraminal narrowing as detailed.     Electronically signed by: Armin Motley  Date:                                            05/31/2023    XR CERVICAL SPINE AP LAT WITH FLEX EXTEN     CLINICAL HISTORY:  Spinal stenosis, cervical region     TECHNIQUE:  Three views of the cervical spine plus flexion and extension views were performed.     COMPARISON:  05/05/2022     FINDINGS:  Visualization to the level of C6-C7 on lateral view.  Similar fusion changes across C4-C5.  Additional cervical discogenic change with marginal spurring elsewhere.  Straightening of the normal cervical lordosis.  Lateral masses appear well seated.  There is minimal grade 1 anterolisthesis at C2-C3 and C3-C4 with flexion, reduced with extension.  Normal precervical soft tissue thickness.        Electronically signed by: Armin Motley  Date:                                            05/31/2023      Labs:  BMP  Lab Results   Component Value Date     (L) 03/28/2025    K 4.0 03/28/2025     03/28/2025    CO2 20 (L) 03/28/2025    BUN 24 (H) 03/28/2025    CREATININE 1.2 03/28/2025    CALCIUM 8.4 (L) 03/28/2025    ANIONGAP 8 03/28/2025    EGFRNORACEVR >60 03/28/2025     Lab Results   Component Value Date    ALT 26 03/28/2025    AST 17 03/28/2025    ALKPHOS 54 03/28/2025    BILITOT 0.4 03/28/2025     Lab Results   Component Value Date    WBC 8.62  03/28/2025    HGB 11.7 (L) 03/28/2025    HCT 35.4 (L) 03/28/2025    MCV 77 (L) 03/28/2025     03/28/2025         Assessment:  Problem List Items Addressed This Visit    None      06/20/2023 Akil Malik Jr. presents with pain consistent with right cervical radiculopathy.  He is currently scheduled for c-spine fusion surgery on  07/14/2023 with Dr. Washington.  I do not recommend any interventional procedures at this time as there steroid could affect healing and infection risk. Discussed optimizing his Lyrica.     11/20/2023 -  Fabiano Malik Jr. presents today for follow up of his low back pain.  He endorses radicular symptoms in the bilateral lower extremities.  An MRI of the lumbar spine has been ordered by Neurosurgery and is pending completion.  Recommend starting physical therapy.  Increase Lyrica to 200 mg t.i.d..  Follow up in clinic once MRI of the lumbar spine is completed to discuss interventional options.  Close follow up with Neurosurgery.    12/15/2023Fernando Malik Jr. is a 64 y.o. male who  has a past medical history of Chronic back pain greater than 3 months duration, Depression, Diabetes mellitus, Diabetes mellitus, type 2, Hypertension, and Schizophrenia.  By history and examination this patient has chronic low back pain with radiculopathy.  The underlying cause cause is facet arthritis, degenerative disc disease, foraminal stenosis, muscle dysfunction, muscles strain, and deconditioning.  Pathology is confirmed by imaging.  We discussed the underlying diagnoses and multiple treatment options including non-opioid medications, interventional procedures, physical therapy, home exercise, core muscle enhancement, activity modification, and weight loss.  The risks and benefits of each treatment option were discussed and all questions were answered.      5/1/20205Akil Malik Jr. is a 64 y.o. male who  has a past medical history of Chronic back pain greater than 3 months  duration, Depression, Diabetes mellitus, Diabetes mellitus, type 2, Hypertension, and Schizophrenia.  By history and examination this patient has chronic bilateral lower extremity and foot pain patient describes numbness tingling and sensory loss in his feet bilaterally..  The underlying cause diabetic peripheral neuropathy.  Pathology is confirmed by imaging.  We discussed the underlying diagnoses and multiple treatment options including non-opioid medications, interventional procedures, physical therapy, home exercise, core muscle enhancement, activity modification, and weight loss.  The risks and benefits of each treatment option were discussed and all questions were answered.      Spoke with the patient regarding bilateral lower extremity edema and oozing wounds encouraged him to follow up with his primary care to establish and direct care regarding this.  Recently hospitalized ultrasound of lower extremity and specific medications were ordered to help with lower extremity edema.  Discussed with the patient that his lower extremity wounds would need to be considering his I did discuss we could order a psych eval to start the process.  I will have the patient follow back up with me to re-evaluate his lower extremity wounds    Treatment Plan:   Procedures:  Consider patient for spinal cord stimulator trial for PDN  PT/OT/HEP:  Continue home exercise plan may have to referred to physical therapy for PDN.  I have stressed the importance of physical activity and a home exercise plan to help with pain and improve health. He has been cleared by NSGY to starting PT.   Medications:    - continue medications as previously prescribed Cymbalta and gabapentin.   -  Reviewed and consistent with medication use as prescribed.  Imaging:  reviewed patient was recommended for a lumbar JOHN targeting L5-S1 per neurosurgery following MRI review.  Psych eval ordered today with Dr. Sosa   Follow Up: 8 weeks to re-evaluate LE wounds  and discuss psych liliana Shafer, NP-C  Interventional Pain Management    Disclaimer: This note was partly generated using dictation software which may occasionally result in transcription errors.

## 2025-05-02 ENCOUNTER — RESULTS FOLLOW-UP (OUTPATIENT)
Dept: FAMILY MEDICINE | Facility: CLINIC | Age: 65
End: 2025-05-02

## 2025-05-02 ENCOUNTER — LAB VISIT (OUTPATIENT)
Dept: LAB | Facility: HOSPITAL | Age: 65
End: 2025-05-02
Attending: NURSE PRACTITIONER
Payer: MEDICARE

## 2025-05-02 ENCOUNTER — PATIENT MESSAGE (OUTPATIENT)
Dept: PSYCHIATRY | Facility: CLINIC | Age: 65
End: 2025-05-02

## 2025-05-02 ENCOUNTER — OFFICE VISIT (OUTPATIENT)
Dept: FAMILY MEDICINE | Facility: CLINIC | Age: 65
End: 2025-05-02
Payer: MEDICARE

## 2025-05-02 VITALS
OXYGEN SATURATION: 98 % | WEIGHT: 243 LBS | HEART RATE: 73 BPM | DIASTOLIC BLOOD PRESSURE: 80 MMHG | SYSTOLIC BLOOD PRESSURE: 104 MMHG | HEIGHT: 73 IN | BODY MASS INDEX: 32.2 KG/M2

## 2025-05-02 DIAGNOSIS — E11.59 HYPERTENSION ASSOCIATED WITH DIABETES: Primary | ICD-10-CM

## 2025-05-02 DIAGNOSIS — I15.2 HYPERTENSION ASSOCIATED WITH DIABETES: ICD-10-CM

## 2025-05-02 DIAGNOSIS — E66.09 CLASS 1 OBESITY DUE TO EXCESS CALORIES WITH SERIOUS COMORBIDITY AND BODY MASS INDEX (BMI) OF 32.0 TO 32.9 IN ADULT: ICD-10-CM

## 2025-05-02 DIAGNOSIS — R60.0 BILATERAL LEG EDEMA: ICD-10-CM

## 2025-05-02 DIAGNOSIS — E11.51 TYPE 2 DIABETES MELLITUS WITH DIABETIC PERIPHERAL ANGIOPATHY WITHOUT GANGRENE, WITHOUT LONG-TERM CURRENT USE OF INSULIN: ICD-10-CM

## 2025-05-02 DIAGNOSIS — Z12.5 SCREENING FOR PROSTATE CANCER: ICD-10-CM

## 2025-05-02 DIAGNOSIS — S81.801A WOUND OF RIGHT LOWER EXTREMITY, INITIAL ENCOUNTER: ICD-10-CM

## 2025-05-02 DIAGNOSIS — E66.811 CLASS 1 OBESITY DUE TO EXCESS CALORIES WITH SERIOUS COMORBIDITY AND BODY MASS INDEX (BMI) OF 32.0 TO 32.9 IN ADULT: ICD-10-CM

## 2025-05-02 DIAGNOSIS — E11.59 HYPERTENSION ASSOCIATED WITH DIABETES: ICD-10-CM

## 2025-05-02 DIAGNOSIS — I15.2 HYPERTENSION ASSOCIATED WITH DIABETES: Primary | ICD-10-CM

## 2025-05-02 DIAGNOSIS — S81.802A WOUND OF LEFT LOWER EXTREMITY, INITIAL ENCOUNTER: ICD-10-CM

## 2025-05-02 LAB
ABSOLUTE EOSINOPHIL (OHS): 0.66 K/UL
ABSOLUTE MONOCYTE (OHS): 0.85 K/UL (ref 0.3–1)
ABSOLUTE NEUTROPHIL COUNT (OHS): 2.59 K/UL (ref 1.8–7.7)
ALBUMIN SERPL BCP-MCNC: 3.5 G/DL (ref 3.5–5.2)
ALP SERPL-CCNC: 86 UNIT/L (ref 40–150)
ALT SERPL W/O P-5'-P-CCNC: 31 UNIT/L (ref 10–44)
ANION GAP (OHS): 10 MMOL/L (ref 8–16)
AST SERPL-CCNC: 31 UNIT/L (ref 11–45)
BASOPHILS # BLD AUTO: 0.11 K/UL
BASOPHILS NFR BLD AUTO: 1.8 %
BILIRUB SERPL-MCNC: 0.6 MG/DL (ref 0.1–1)
BNP SERPL-MCNC: 47 PG/ML (ref 0–99)
BUN SERPL-MCNC: 14 MG/DL (ref 8–23)
CALCIUM SERPL-MCNC: 9 MG/DL (ref 8.7–10.5)
CHLORIDE SERPL-SCNC: 99 MMOL/L (ref 95–110)
CO2 SERPL-SCNC: 25 MMOL/L (ref 23–29)
CREAT SERPL-MCNC: 1.1 MG/DL (ref 0.5–1.4)
ERYTHROCYTE [DISTWIDTH] IN BLOOD BY AUTOMATED COUNT: 16.1 % (ref 11.5–14.5)
GFR SERPLBLD CREATININE-BSD FMLA CKD-EPI: >60 ML/MIN/1.73/M2
GLUCOSE SERPL-MCNC: 106 MG/DL (ref 70–110)
HCT VFR BLD AUTO: 32.9 % (ref 40–54)
HGB BLD-MCNC: 10.4 GM/DL (ref 14–18)
IMM GRANULOCYTES # BLD AUTO: 0.02 K/UL (ref 0–0.04)
IMM GRANULOCYTES NFR BLD AUTO: 0.3 % (ref 0–0.5)
LYMPHOCYTES # BLD AUTO: 1.82 K/UL (ref 1–4.8)
MCH RBC QN AUTO: 25.6 PG (ref 27–31)
MCHC RBC AUTO-ENTMCNC: 31.6 G/DL (ref 32–36)
MCV RBC AUTO: 81 FL (ref 82–98)
NUCLEATED RBC (/100WBC) (OHS): 0 /100 WBC
PLATELET # BLD AUTO: 261 K/UL (ref 150–450)
PMV BLD AUTO: 8.2 FL (ref 9.2–12.9)
POTASSIUM SERPL-SCNC: 4.8 MMOL/L (ref 3.5–5.1)
PROT SERPL-MCNC: 6.4 GM/DL (ref 6–8.4)
RBC # BLD AUTO: 4.06 M/UL (ref 4.6–6.2)
RELATIVE EOSINOPHIL (OHS): 10.9 %
RELATIVE LYMPHOCYTE (OHS): 30.1 % (ref 18–48)
RELATIVE MONOCYTE (OHS): 14 % (ref 4–15)
RELATIVE NEUTROPHIL (OHS): 42.9 % (ref 38–73)
SODIUM SERPL-SCNC: 134 MMOL/L (ref 136–145)
WBC # BLD AUTO: 6.05 K/UL (ref 3.9–12.7)

## 2025-05-02 PROCEDURE — 85025 COMPLETE CBC W/AUTO DIFF WBC: CPT

## 2025-05-02 PROCEDURE — 80053 COMPREHEN METABOLIC PANEL: CPT

## 2025-05-02 PROCEDURE — 83880 ASSAY OF NATRIURETIC PEPTIDE: CPT

## 2025-05-02 PROCEDURE — 99999 PR PBB SHADOW E&M-EST. PATIENT-LVL V: CPT | Mod: PBBFAC,,, | Performed by: NURSE PRACTITIONER

## 2025-05-02 PROCEDURE — 99215 OFFICE O/P EST HI 40 MIN: CPT | Mod: S$PBB,,, | Performed by: NURSE PRACTITIONER

## 2025-05-02 PROCEDURE — 36415 COLL VENOUS BLD VENIPUNCTURE: CPT | Mod: PO

## 2025-05-02 PROCEDURE — 4010F ACE/ARB THERAPY RXD/TAKEN: CPT | Mod: ,,, | Performed by: NURSE PRACTITIONER

## 2025-05-02 RX ORDER — FUROSEMIDE 20 MG/1
20 TABLET ORAL 2 TIMES DAILY
Qty: 6 TABLET | Refills: 0 | Status: SHIPPED | OUTPATIENT
Start: 2025-05-02 | End: 2025-05-09

## 2025-05-05 PROBLEM — R55 SYNCOPE AND COLLAPSE: Status: RESOLVED | Noted: 2023-10-04 | Resolved: 2025-05-05

## 2025-05-05 PROBLEM — U07.1 COVID-19 VIRUS INFECTION: Status: RESOLVED | Noted: 2025-03-23 | Resolved: 2025-05-05

## 2025-05-05 NOTE — PROGRESS NOTES
Subjective     Patient ID: Fabiano Malik Jr. is a 64 y.o. male.    Chief Complaint: Low-back Pain and Edema    History of Present Illness    CHIEF COMPLAINT:  Mr. Malik presents today for follow up of leg swelling and weeping    LOWER EXTREMITY SYMPTOMS:  He reports lower leg swelling that began after hospital discharge. Painful blisters started approximately one week ago. He was previously prescribed Lasix 40mg twice daily for 7 days, which he completed. He has tried compression stockings but does not wear them consistently.    OTHER SYMPTOMS:  He reports intermittent mild shortness of breath and inability to sleep due to current medical conditions.    PAIN MANAGEMENT:  He reports taking ibuprofen 800mg twice daily without pain relief. He continues to use lidocaine patches for back pain management.    CURRENT MEDICATIONS:  He takes Trulicity 1.5 mg weekly, Metformin 1000 mg twice daily, Pravastatin 40 mg daily, Olmesartan 20 mg daily, and Synthroid daily before breakfast. He reports taking Amlodipine 5 mg daily but is uncertain about recent usage.    ALLERGIES:  He reports penicillin allergy since age 10 when appendix was removed, though uncertain if allergy persists.    RECENT LAB RESULTS:  A1C has increased from 5.0 in October 2023 to current value of 8.2, with an interim value of 7.2. Kidney function tests showed recent decline. Sodium level was low on most recent lab check.            Problem List[1]    Review of Systems  Otherwise negative       Objective     Physical Exam  Vitals reviewed.   Constitutional:       General: He is awake. He is not in acute distress.     Appearance: Normal appearance. He is well-developed and well-groomed. He is obese.   HENT:      Head: Normocephalic and atraumatic.      Right Ear: External ear normal.      Left Ear: External ear normal.   Eyes:      General:         Right eye: No discharge.         Left eye: No discharge.   Neck:      Vascular: No carotid bruit.    Cardiovascular:      Rate and Rhythm: Normal rate and regular rhythm.      Pulses:           Radial pulses are 2+ on the right side and 2+ on the left side.        Dorsalis pedis pulses are 2+ on the right side and 2+ on the left side.      Heart sounds: Normal heart sounds, S1 normal and S2 normal. No murmur heard.  Pulmonary:      Effort: Pulmonary effort is normal. No respiratory distress.      Breath sounds: Normal breath sounds. No wheezing or rhonchi.   Musculoskeletal:         General: Tenderness present.      Right lower leg: Edema present.      Left lower leg: Edema present.   Skin:     General: Skin is warm.      Findings: Erythema and lesion present.   Neurological:      Mental Status: He is alert and oriented to person, place, and time.      Coordination: Coordination normal.   Psychiatric:         Attention and Perception: Attention normal.         Mood and Affect: Mood and affect normal.         Speech: Speech normal.         Behavior: Behavior normal. Behavior is cooperative.            Assessment and Plan     1. Hypertension associated with diabetes  -     CBC Auto Differential; Future; Expected date: 05/02/2025  -     Comprehensive Metabolic Panel; Future; Expected date: 05/02/2025    2. Bilateral leg edema  -     BNP; Future; Expected date: 05/02/2025  -     furosemide (LASIX) 20 MG tablet; Take 1 tablet (20 mg total) by mouth 2 (two) times daily. for 3 days  Dispense: 6 tablet; Refill: 0  -     Ambulatory referral/consult to Vascular Cardiology; Future; Expected date: 05/09/2025    3. Wound of left lower extremity, initial encounter  -     Aerobic culture    4. Wound of right lower extremity, initial encounter  -     Aerobic culture    5. Screening for prostate cancer  -     PSA, Screening; Future; Expected date: 05/02/2025    6. Type 2 diabetes mellitus with diabetic peripheral angiopathy without gangrene, without long-term current use of insulin    7. Class 1 obesity due to excess calories  with serious comorbidity and body mass index (BMI) of 32.0 to 32.9 in adult               Assessment & Plan    LEG EDEMA:  - Explained potential causes of leg swelling, including cardiac issues.  - Mr. Malik reports experiencing mild dyspnea occasionally.  - Reviewed February echocardiogram which showed several cardiac abnormalities.  - Ordered labs to evaluate possible cardiac-related causes of edema.  - Referred patient to cardiologist Dr. Buckley, who specializes in vascular issues.    CELLULITIS:  - Observed reddened and warm areas on the legs, indicating possible cellulitis.  - Explained risks associated with prolonged leg edema, including cellulitis.  - Obtained culture from leg lesions to rule out infection.  - Educated the patient on proper wound care techniques to prevent infection and promote healing.    VENOUS INSUFFICIENCY AND EDEMA:  - Noted the patient has edema and weeping in lower extremities, consistent with venous insufficiency.  - Reviewed ultrasound results of lower extremities showing no vascular occlusion, but venous insufficiency suspected.  - Noted the edema in lower extremities has improved slightly since hospitalization, but remains present.  - Prescribed furosemide 20 mg twice daily for 3 days to reduce edema.  - Discontinued amlodipine 5 mg due to potential contribution to edema and hypotension.  - Wrapped legs to manage edema and protect lesions.  - Advised the patient to elevate legs above heart level when sitting or lying down and wear compression stockings daily, removing at night.  - Discussed importance of leg elevation and compression stockings for improving circulation.  - Ordered labs to check BNP.    TYPE 2 DIABETES MELLITUS:  - Noted the patient's hemoglobin A1C has increased from 7.2 to 8.2 since March, indicating worsening glycemic control.  - Discussed diabetes management, including current medications and A1C levels.  - Continued Trulicity 1.5 mg weekly and metformin  1000 mg twice daily for diabetes management.    HYPONATREMIA:  - Noted the patient's sodium level was low during last evaluation.  - Ordered labs to reassess sodium levels and ensure stability.    PENICILLIN ALLERGY:  - Documented the patient has a childhood allergy to penicillin, though uncertain if still allergic.  - Discussed penicillin allergy status and alternative   medication options.  - Considered alternative medications due to penicillin allergy history.    FOLLOW-UP:  - Scheduled follow up in 3-4 weeks to reassess blood pressure and leg edema.  - Referred to Dr. Burks, podiatrist, for toenail evaluation.  - Follow up at the end of June as scheduled.          Follow up if symptoms worsen or fail to improve.        I spent a total of 40 minutes on the day of the visit. This includes face to face time and non-face to face time preparing to see the patient (eg, review of tests), obtaining and/or reviewing separately obtained history, documenting clinical information in the electronic or other health record, independently interpreting results and communicating results to the patient/family/caregiver, or care coordinator.        (Portions of this note may have been dictated using voice recognition software and may contain dictation related errors in spelling/grammar/syntax not found on text review)     This note was generated with the assistance of ambient listening technology. Verbal consent was obtained by the patient and accompanying visitor(s) for the recording of patient appointment to facilitate this note. I attest to having reviewed and edited the generated note for accuracy, though some syntax or spelling errors may persist. Please contact the author of this note for any clarification.              [1]   Patient Active Problem List  Diagnosis    Hypertension associated with diabetes    HLD (hyperlipidemia)    Schizophrenia    Hyperkalemia    Need for hepatitis C screening test    Polyp of colon     Class 1 obesity with serious comorbidity and body mass index (BMI) of 32.0 to 32.9 in adult    Dizziness    Need for vaccination against Streptococcus pneumoniae using pneumococcal conjugate vaccine 7    Depression    Anemia    Chronic back pain    Neck pain    EKG abnormalities    Gastroesophageal reflux disease    Chronic pain    Palmar nodule, left    Trigger index finger of right hand    Lumbar facet arthropathy    Hand or foot spasms    Hearing loss of left ear    Cerumen debris on tympanic membrane of right ear    Erectile dysfunction    Rash    Lumbar radiculopathy    Carpal tunnel syndrome of right wrist    History of colon polyps    Weakness    Pulmonary hypertension, unspecified    Type 2 diabetes mellitus with diabetic peripheral angiopathy without gangrene, without long-term current use of insulin    Aortic calcification    Cervical spondylosis with myelopathy and radiculopathy    S/P cervical spinal fusion    Hypoglycemia    Chronic myelopathy    Other nonthrombocytopenic purpura    Dyspnea on exertion    Interstitial pulmonary disease, unspecified    Scarring of lung    Schizoaffective disorder with good prognostic features    Meniere's disease    Microcytic anemia    Vertigo    Hyponatremia    Ground glass opacity present on imaging of lung

## 2025-05-14 ENCOUNTER — CLINICAL SUPPORT (OUTPATIENT)
Dept: PSYCHIATRY | Facility: CLINIC | Age: 65
End: 2025-05-14
Payer: MEDICARE

## 2025-05-14 ENCOUNTER — TELEPHONE (OUTPATIENT)
Facility: CLINIC | Age: 65
End: 2025-05-14
Payer: MEDICARE

## 2025-05-14 DIAGNOSIS — E11.9 TYPE 2 DIABETES MELLITUS WITHOUT COMPLICATION, WITHOUT LONG-TERM CURRENT USE OF INSULIN: ICD-10-CM

## 2025-05-14 DIAGNOSIS — Z00.8 ENCOUNTER FOR PRE-SURGICAL PSYCHOLOGICAL ASSESSMENT: Primary | ICD-10-CM

## 2025-05-14 RX ORDER — DULAGLUTIDE 1.5 MG/.5ML
1.5 INJECTION, SOLUTION SUBCUTANEOUS
Qty: 12 PEN | Refills: 3 | Status: SHIPPED | OUTPATIENT
Start: 2025-05-14 | End: 2026-05-14

## 2025-05-14 NOTE — TELEPHONE ENCOUNTER
I rescheduled patient due to test he need done before his visit and transportation..      ----- Message from Quincy sent at 5/14/2025  3:11 PM CDT -----  Contact: Pt  .Type:  Needs Medical AdviceWho Called: ptWould the patient rather a call back or a response via [facility]?  Call [facility] Call Back Number:  [phone] Information: pt. Is requesting a call back regarding something relating to Dr. Curiel

## 2025-05-14 NOTE — TELEPHONE ENCOUNTER
No care due was identified.  [facility] Embedded Care Due Messages. Reference number: [id number].   5/14/2025 4:19:17 AM CDT  
no distress

## 2025-05-23 ENCOUNTER — DOCUMENTATION ONLY (OUTPATIENT)
Dept: PSYCHIATRY | Facility: CLINIC | Age: 65
End: 2025-05-23
Payer: MEDICARE

## 2025-05-23 NOTE — PROGRESS NOTES
Patient has not yet shown to his 10:00 AM virtual visit, so a patient text message was sent.       Message History    [facility]: Hello! You are scheduled for a 10:00 AM Virtual Visit with Dr. Mary Ellen Soares. Please log on to [facility] to complete the e-pre-check and start the virtual visit. Thank you!  Reply STOP to opt-out.  From: You - To: Behzad Malik Jr.  via text message - just now      [facility] You are signed up to receive patient update messages.  Reply STOP to opt-out.  From: You - To: Behzad Malik Jr.  via text message - just now

## 2025-06-03 ENCOUNTER — DOCUMENTATION ONLY (OUTPATIENT)
Dept: PSYCHIATRY | Facility: CLINIC | Age: 65
End: 2025-06-03
Payer: MEDICARE

## 2025-06-05 ENCOUNTER — TELEPHONE (OUTPATIENT)
Dept: PSYCHIATRY | Facility: CLINIC | Age: 65
End: 2025-06-05
Payer: MEDICARE

## 2025-06-05 ENCOUNTER — OFFICE VISIT (OUTPATIENT)
Dept: PSYCHIATRY | Facility: CLINIC | Age: 65
End: 2025-06-05
Payer: MEDICARE

## 2025-06-05 DIAGNOSIS — M79.673 CHRONIC FOOT PAIN, UNSPECIFIED LATERALITY: ICD-10-CM

## 2025-06-05 DIAGNOSIS — G89.29 CHRONIC FOOT PAIN, UNSPECIFIED LATERALITY: ICD-10-CM

## 2025-06-05 DIAGNOSIS — E11.42 DIABETIC PERIPHERAL NEUROPATHY ASSOCIATED WITH TYPE 2 DIABETES MELLITUS: ICD-10-CM

## 2025-06-05 DIAGNOSIS — G89.4 CHRONIC PAIN SYNDROME: ICD-10-CM

## 2025-06-09 ENCOUNTER — PATIENT MESSAGE (OUTPATIENT)
Dept: ADMINISTRATIVE | Facility: HOSPITAL | Age: 65
End: 2025-06-09
Payer: MEDICARE

## 2025-06-19 ENCOUNTER — TELEPHONE (OUTPATIENT)
Dept: FAMILY MEDICINE | Facility: CLINIC | Age: 65
End: 2025-06-19
Payer: MEDICARE

## 2025-06-19 NOTE — TELEPHONE ENCOUNTER
Copied from CRM #[id number]. Topic: General Inquiry - Return Call  >> Jun 19, 2025 10:30 AM Quincy wrote:  .Type:  Needs Medical Advice    Who Called: Pt    Would the patient rather a call back or a response via [facility]?  Call back  [facility] Call Back Number: [phone]  Additional Information: Pt.  is returning a call back to the office.

## 2025-06-19 NOTE — TELEPHONE ENCOUNTER
Copied from CRM #[id number]. Topic: General Inquiry - Patient Advice  >> Jun 18, 2025  3:30 PM Coreen wrote:  Type:  Patient Returning Call    Who Called: Pt   Does the patient know what this is regarding?: returning missed call   Would the patient rather a call back or a response via [facility]? Call   [facility] Call Back Number:[phone]   Additional Information:

## 2025-06-19 NOTE — TELEPHONE ENCOUNTER
Return patient phone call. Spoke to patient. Informed patient not sure who called him because there's nothing documented. Patient verbalized understanding.

## 2025-06-20 ENCOUNTER — OFFICE VISIT (OUTPATIENT)
Dept: FAMILY MEDICINE | Facility: CLINIC | Age: 65
End: 2025-06-20
Payer: MEDICARE

## 2025-06-20 VITALS — BODY MASS INDEX: 32.2 KG/M2 | WEIGHT: 242.94 LBS | HEIGHT: 73 IN

## 2025-06-20 DIAGNOSIS — I27.20 PULMONARY HYPERTENSION, UNSPECIFIED: ICD-10-CM

## 2025-06-20 DIAGNOSIS — I70.0 AORTIC ARCH ATHEROSCLEROSIS: ICD-10-CM

## 2025-06-20 DIAGNOSIS — Z00.00 ENCOUNTER FOR MEDICARE ANNUAL WELLNESS EXAM: Primary | ICD-10-CM

## 2025-06-20 DIAGNOSIS — E11.51 TYPE 2 DIABETES MELLITUS WITH DIABETIC PERIPHERAL ANGIOPATHY WITHOUT GANGRENE, WITHOUT LONG-TERM CURRENT USE OF INSULIN: ICD-10-CM

## 2025-06-20 DIAGNOSIS — D69.2 OTHER NONTHROMBOCYTOPENIC PURPURA: ICD-10-CM

## 2025-06-20 DIAGNOSIS — E78.5 HYPERLIPIDEMIA, UNSPECIFIED HYPERLIPIDEMIA TYPE: ICD-10-CM

## 2025-06-20 DIAGNOSIS — G95.9 CHRONIC MYELOPATHY: ICD-10-CM

## 2025-06-20 DIAGNOSIS — E66.811 CLASS 1 OBESITY DUE TO EXCESS CALORIES WITH SERIOUS COMORBIDITY AND BODY MASS INDEX (BMI) OF 32.0 TO 32.9 IN ADULT: ICD-10-CM

## 2025-06-20 DIAGNOSIS — E03.9 HYPOTHYROIDISM, UNSPECIFIED TYPE: ICD-10-CM

## 2025-06-20 DIAGNOSIS — E66.09 CLASS 1 OBESITY DUE TO EXCESS CALORIES WITH SERIOUS COMORBIDITY AND BODY MASS INDEX (BMI) OF 32.0 TO 32.9 IN ADULT: ICD-10-CM

## 2025-06-20 DIAGNOSIS — F20.3 UNDIFFERENTIATED SCHIZOPHRENIA: ICD-10-CM

## 2025-06-20 DIAGNOSIS — K21.9 GASTROESOPHAGEAL REFLUX DISEASE, UNSPECIFIED WHETHER ESOPHAGITIS PRESENT: ICD-10-CM

## 2025-06-20 DIAGNOSIS — I15.2 HYPERTENSION ASSOCIATED WITH DIABETES: ICD-10-CM

## 2025-06-20 DIAGNOSIS — E11.59 HYPERTENSION ASSOCIATED WITH DIABETES: ICD-10-CM

## 2025-06-20 PROCEDURE — 3052F HG A1C>EQUAL 8.0%<EQUAL 9.0%: CPT | Mod: CPTII,95,, | Performed by: NURSE PRACTITIONER

## 2025-06-20 PROCEDURE — 4010F ACE/ARB THERAPY RXD/TAKEN: CPT | Mod: CPTII,95,, | Performed by: NURSE PRACTITIONER

## 2025-06-20 PROCEDURE — G0439 PPPS, SUBSEQ VISIT: HCPCS | Mod: 95,,, | Performed by: NURSE PRACTITIONER

## 2025-06-20 PROCEDURE — 1160F RVW MEDS BY RX/DR IN RCRD: CPT | Mod: CPTII,95,, | Performed by: NURSE PRACTITIONER

## 2025-06-20 PROCEDURE — 1159F MED LIST DOCD IN RCRD: CPT | Mod: CPTII,95,, | Performed by: NURSE PRACTITIONER

## 2025-06-23 NOTE — PATIENT INSTRUCTIONS
Counseling and Referral of Other Preventative  (Italic type indicates deductible and co-insurance are waived)    Patient Name: Fabiano Malik  Today's Date: 6/23/2025    Health Maintenance       Date Due Completion Date    Shingles Vaccine (2 of 2) 12/05/2024 10/10/2024    Diabetic Eye Exam 02/28/2025 2/28/2024    Override on 7/10/2018: Done    PROSTATE-SPECIFIC ANTIGEN 04/26/2025 4/26/2024    Hemoglobin A1c 06/22/2025 3/22/2025    Foot Exam 08/20/2025 8/20/2024    Override on 12/12/2018: Done    Diabetes Urine Screening 11/07/2025 11/7/2024    Lipid Panel 11/07/2025 11/7/2024    High Dose Statin 06/20/2026 6/20/2025    Colorectal Cancer Screening 11/17/2027 11/17/2022    TETANUS VACCINE 12/12/2028 12/12/2018        Orders Placed This Encounter   Procedures    Ambulatory referral/consult to Optometry       The following information is provided to all patients.  This information is to help you find resources for any of the problems found today that may be affecting your health:                  Living healthy guide: [email or web address].[email or web address]      Understanding Diabetes: www.diabetes.org      Eating healthy: www.cdc.gov/healthyweight      CDC home safety checklist: www.cdc.gov/steadi/patient.html      Agency on Aging: www.goea.[place].[facility]      Alcoholics anonymous (AA): www.aa.org      Physical Activity: www.kaiden.nih.gov/zz4wkcz      Tobacco use: www.quitwithusla.org

## 2025-06-23 NOTE — PROGRESS NOTES
"The patient location is: [place]  The chief complaint leading to consultation is: Medicare AWV  Visit type: audiovisual  Face to Face time with patient: 51 min  60 minutes of total time spent on the encounter, which includes face to face time and non-face to face time preparing to see the patient (eg, review of tests), Obtaining and/or reviewing separately obtained history, Documenting clinical information in the electronic or other health record, Independently interpreting results (not separately reported) and communicating results to the patient/family/caregiver, or Care coordination (not separately reported).     Each patient to whom he or she provides medical services by telemedicine is:  (1) informed of the relationship between the physician and patient and the respective role of any other health care provider with respect to management of the patient; and (2) notified that he or she may decline to receive medical services by telemedicine and may withdraw from such care at any time.        Fabiano Malik presented for a  Medicare AWV and comprehensive Health Risk Assessment today. The following components were reviewed and updated:    Medical history  Family History  Social history  Allergies and Current Medications  Health Risk Assessment  Health Maintenance  Care Team         ** See Completed Assessments for Annual Wellness Visit within the encounter summary.**         The following assessments were completed:  Living Situation  CAGE  Depression Screening  Fall Risk Assessment (MACH 10)  Hearing Assessment(HHI)  Cognitive Function Screening  Nutrition Screening  ADL Screening  PAQ Screening    Opioid documentation:      Patient does not have a current opioid prescription.        Vitals:    06/20/25 1308   Weight: 110.2 kg (242 lb 15.2 oz)   Height: 6' 1" (1.854 m)     Body mass index is 32.05 kg/m².    Physical Exam  Constitutional:       General: He is not in acute distress.     Appearance: Normal " appearance. He is well-developed and well-groomed.   HENT:      Head: Normocephalic.   Pulmonary:      Effort: Pulmonary effort is normal. No respiratory distress.   Neurological:      Mental Status: He is alert and oriented to person, place, and time.   Psychiatric:         Attention and Perception: Attention normal.         Mood and Affect: Mood and affect normal.         Speech: Speech normal.         Behavior: Behavior normal. Behavior is cooperative.     Physical exam limited d/t virtual visit. Patient does not appear to be in any respiratory distress. Able to speak in complete sentences without becoming short of breath.        Diagnoses and health risks identified today and associated recommendations/orders:    1. Encounter for Medicare annual wellness exam  - Referral to Enhanced Annual Wellness Visit (eAWV) M+1    2. Undifferentiated schizophrenia  Chronic; stable on Abilify. Followed by Psychiatry.     3. Pulmonary hypertension, unspecified  Chronic; stable. Follow up with PCP.    4. Type 2 diabetes mellitus with diabetic peripheral angiopathy without gangrene, without long-term current use of insulin  Chronic; stable on metformin and Trulicity medication. Follow up with PCP.  - Ambulatory referral/consult to Optometry; Future    5. Hypertension associated with diabetes  Chronic; stable on olmesartan medication. Follow up with PCP.    6. Aortic arch atherosclerosis - seen on CT Chest March 2025  Chronic; stable on pravastatin medication. Follow up with PCP.    7. Other nonthrombocytopenic purpura  Chronic; stable. As seen on previous PE. Follow up with PCP.    8. Hyperlipidemia, unspecified hyperlipidemia type  Chronic; stable on pravastatin medication. Follow up with PCP.    9. Hypothyroidism, unspecified type  Chronic; stable on levothyroxine medication. Follow up with PCP.    10. Gastroesophageal reflux disease, unspecified whether esophagitis present  Chronic; stable on omeprazole medication. Follow up  with PCP.    11. Class 1 obesity due to excess calories with serious comorbidity and body mass index (BMI) of 32.0 to 32.9 in adult  Eat a low salt/low fat ADA diet and discussed importance of engaging in physical activity at least 5x/week for a minimum of 30 min/day.    12. Chronic myelopathy  Chronic; stable. Follow up with PCP.      Provided Fabiano with a 5-10 year written screening schedule and personal prevention plan. Recommendations were developed using the USPSTF age appropriate recommendations. Education, counseling, and referrals were provided as needed. After Visit Summary given to patient which includes a list of additional screenings/tests needed.    Follow up for your next annual wellness visit.    Xenia Woods NP      Advance Care Planning     I offered to discuss advanced care planning, including how to pick a person who would make decisions for you if you were unable to make them for yourself, called a health care power of , and what kind of decisions you might make such as use of life sustaining treatments such as ventilators and tube feeding when faced with a life limiting illness recorded on a living will that they will need to know. (How you want to be cared for as you near the end of your natural life)     X Patient is interested in learning more about how to make advanced directives.  I offered to discuss this with them and instructed to follow up with PCP.

## 2025-06-25 ENCOUNTER — OFFICE VISIT (OUTPATIENT)
Dept: FAMILY MEDICINE | Facility: CLINIC | Age: 65
End: 2025-06-25
Payer: MEDICARE

## 2025-06-25 VITALS
BODY MASS INDEX: 31.85 KG/M2 | HEART RATE: 106 BPM | WEIGHT: 240.31 LBS | DIASTOLIC BLOOD PRESSURE: 70 MMHG | OXYGEN SATURATION: 99 % | SYSTOLIC BLOOD PRESSURE: 98 MMHG | HEIGHT: 73 IN

## 2025-06-25 DIAGNOSIS — M54.42 CHRONIC BILATERAL LOW BACK PAIN WITH BILATERAL SCIATICA: Primary | ICD-10-CM

## 2025-06-25 DIAGNOSIS — I15.2 HYPERTENSION ASSOCIATED WITH DIABETES: ICD-10-CM

## 2025-06-25 DIAGNOSIS — E11.9 TYPE 2 DIABETES MELLITUS WITHOUT COMPLICATION, WITHOUT LONG-TERM CURRENT USE OF INSULIN: ICD-10-CM

## 2025-06-25 DIAGNOSIS — H93.13 TINNITUS AURIUM, BILATERAL: ICD-10-CM

## 2025-06-25 DIAGNOSIS — E11.59 HYPERTENSION ASSOCIATED WITH DIABETES: ICD-10-CM

## 2025-06-25 DIAGNOSIS — M54.41 CHRONIC BILATERAL LOW BACK PAIN WITH BILATERAL SCIATICA: Primary | ICD-10-CM

## 2025-06-25 DIAGNOSIS — Z12.5 SCREENING FOR PROSTATE CANCER: ICD-10-CM

## 2025-06-25 DIAGNOSIS — F51.01 PRIMARY INSOMNIA: ICD-10-CM

## 2025-06-25 DIAGNOSIS — E03.9 HYPOTHYROIDISM, UNSPECIFIED TYPE: ICD-10-CM

## 2025-06-25 DIAGNOSIS — G89.29 CHRONIC BILATERAL LOW BACK PAIN WITH BILATERAL SCIATICA: Primary | ICD-10-CM

## 2025-06-25 DIAGNOSIS — I87.2 VENOUS INSUFFICIENCY: ICD-10-CM

## 2025-06-25 DIAGNOSIS — F20.3 UNDIFFERENTIATED SCHIZOPHRENIA: ICD-10-CM

## 2025-06-25 PROCEDURE — 99999 PR PBB SHADOW E&M-EST. PATIENT-LVL V: CPT | Mod: PBBFAC,,, | Performed by: FAMILY MEDICINE

## 2025-06-25 RX ORDER — KETOROLAC TROMETHAMINE 15 MG/ML
30 INJECTION, SOLUTION INTRAMUSCULAR; INTRAVENOUS
Status: DISCONTINUED | OUTPATIENT
Start: 2025-06-25 | End: 2025-06-25

## 2025-06-25 RX ORDER — HYDROXYZINE PAMOATE 50 MG/1
CAPSULE ORAL
Qty: 90 CAPSULE | Refills: 3 | Status: SHIPPED | OUTPATIENT
Start: 2025-06-25

## 2025-06-25 RX ORDER — OLMESARTAN MEDOXOMIL 20 MG/1
20 TABLET ORAL DAILY
Qty: 90 TABLET | Refills: 3 | Status: SHIPPED | OUTPATIENT
Start: 2025-06-25

## 2025-06-25 RX ORDER — TIRZEPATIDE 5 MG/.5ML
5 INJECTION, SOLUTION SUBCUTANEOUS
Qty: 6 ML | Refills: 0 | Status: SHIPPED | OUTPATIENT
Start: 2025-06-25 | End: 2026-06-25

## 2025-06-25 RX ORDER — DULOXETIN HYDROCHLORIDE 60 MG/1
120 CAPSULE, DELAYED RELEASE ORAL EVERY MORNING
Qty: 180 CAPSULE | Refills: 3 | Status: SHIPPED | OUTPATIENT
Start: 2025-06-25

## 2025-06-25 RX ORDER — METHOCARBAMOL 500 MG/1
500 TABLET, FILM COATED ORAL 2 TIMES DAILY
Qty: 20 TABLET | Refills: 0 | Status: SHIPPED | OUTPATIENT
Start: 2025-06-25 | End: 2025-07-08

## 2025-06-25 RX ORDER — KETOROLAC TROMETHAMINE 30 MG/ML
30 INJECTION, SOLUTION INTRAMUSCULAR; INTRAVENOUS
Status: COMPLETED | OUTPATIENT
Start: 2025-06-25 | End: 2025-06-25

## 2025-06-25 RX ORDER — GABAPENTIN 300 MG/1
300 CAPSULE ORAL 3 TIMES DAILY
Qty: 270 CAPSULE | Refills: 0 | Status: SHIPPED | OUTPATIENT
Start: 2025-06-25 | End: 2026-06-25

## 2025-06-25 RX ORDER — LEVOTHYROXINE SODIUM 25 UG/1
25 TABLET ORAL
Qty: 90 TABLET | Refills: 3 | Status: SHIPPED | OUTPATIENT
Start: 2025-06-25

## 2025-06-25 RX ADMIN — KETOROLAC TROMETHAMINE 30 MG: 30 INJECTION, SOLUTION INTRAMUSCULAR; INTRAVENOUS at 01:06

## 2025-06-25 NOTE — PROGRESS NOTES
Subjective     Patient ID: Fabiano Malik Jr. is a 64 y.o. male.    Chief Complaint: Back Pain, Diabetes, Thyroid Problem, and Hypertension    64 years old male came to the clinic with chronic back pain for more than a year.  Patient under pain management treatment.  He is taking gabapentin to reduce back pain.  Patient with good compliance with hypothyroidism treatment.  Last A1c was elevated.  Patient wants to switch to Mounjaro to help better with the weight loss.  He reports bilateral tinnitus.  Patient due for PSA screening.    Back Pain  This is a chronic problem. The current episode started more than 1 year ago. The problem occurs constantly. The problem is unchanged. The quality of the pain is described as aching. The pain is at a severity of 10/10. The pain is severe. The pain is The same all the time. The symptoms are aggravated by bending and twisting. Associated symptoms include numbness and tingling. Pertinent negatives include no chest pain. The treatment provided no relief.   Diabetes  He presents for his follow-up diabetic visit. He has type 2 diabetes mellitus. His disease course has been worsening. Hypoglycemia symptoms include nervousness/anxiousness. Pertinent negatives for diabetes include no chest pain, no polydipsia, no polyphagia and no polyuria. There are no hypoglycemic complications. Symptoms are worsening. There are no diabetic complications. Risk factors for coronary artery disease include diabetes mellitus, hypertension, male sex and obesity. Current diabetic treatment includes oral agent (monotherapy). He is compliant with treatment all of the time. He is following a generally healthy diet. An ACE inhibitor/angiotensin II receptor blocker is being taken.   Thyroid Problem  Presents for follow-up visit. Symptoms include anxiety and depressed mood. Patient reports no palpitations. The symptoms have been stable.   Hypertension  This is a chronic problem. The current episode started  more than 1 year ago. The problem is unchanged. The problem is controlled. Pertinent negatives include no chest pain, orthopnea, palpitations or peripheral edema. Risk factors for coronary artery disease include diabetes mellitus, male gender, obesity and sedentary lifestyle. Past treatments include angiotensin blockers. There is no history of angina. Identifiable causes of hypertension include a thyroid problem. There is no history of a hypertension causing med.     Review of Systems   Constitutional: Negative.    HENT: Negative.     Eyes: Negative.    Respiratory: Negative.     Cardiovascular: Negative.  Negative for chest pain, palpitations and orthopnea.   Gastrointestinal: Negative.    Endocrine: Negative for polydipsia, polyphagia and polyuria.   Genitourinary: Negative.    Musculoskeletal:  Positive for back pain.   Integumentary:  Negative.   Neurological:  Positive for tingling and numbness.   Psychiatric/Behavioral:  Positive for depressed mood. The patient is nervous/anxious.           Objective     Physical Exam  Vitals and nursing note reviewed.   Constitutional:       General: He is not in acute distress.     Appearance: He is well-developed. He is not diaphoretic.   HENT:      Head: Normocephalic and atraumatic.      Right Ear: External ear normal.      Left Ear: External ear normal.      Nose: Nose normal.      Mouth/Throat:      Pharynx: No oropharyngeal exudate.   Eyes:      General: No scleral icterus.        Right eye: No discharge.         Left eye: No discharge.      Conjunctiva/sclera: Conjunctivae normal.      Pupils: Pupils are equal, round, and reactive to light.   Neck:      Thyroid: No thyromegaly.      Vascular: No JVD.      Trachea: No tracheal deviation.   Cardiovascular:      Rate and Rhythm: Normal rate and regular rhythm.      Heart sounds: Normal heart sounds. No murmur heard.     No friction rub. No gallop.   Pulmonary:      Effort: Pulmonary effort is normal. No respiratory  distress.      Breath sounds: Normal breath sounds. No stridor. No wheezing or rales.   Chest:      Chest wall: No tenderness.   Abdominal:      General: Bowel sounds are normal. There is no distension.      Palpations: Abdomen is soft. There is no mass.      Tenderness: There is no abdominal tenderness. There is no guarding or rebound.   Musculoskeletal:         General: Normal range of motion.      Cervical back: Normal range of motion and neck supple.      Lumbar back: Spasms and tenderness present. Negative right straight leg raise test and negative left straight leg raise test.   Lymphadenopathy:      Cervical: No cervical adenopathy.   Skin:     General: Skin is warm and dry.      Coloration: Skin is not pale.      Findings: No erythema or rash.   Neurological:      Mental Status: He is alert and oriented to person, place, and time.      Cranial Nerves: No cranial nerve deficit.      Motor: Weakness present. No abnormal muscle tone.      Coordination: Coordination abnormal.      Gait: Gait abnormal.      Deep Tendon Reflexes: Reflexes are normal and symmetric. Reflexes normal.   Psychiatric:         Behavior: Behavior normal.         Thought Content: Thought content normal.         Judgment: Judgment normal.            Assessment and Plan     1. Chronic bilateral low back pain with bilateral sciatica  -     ketorolac injection 30 mg  -     gabapentin (NEURONTIN) 300 MG capsule; Take 1 capsule (300 mg total) by mouth 3 (three) times daily. For chronic pain  Dispense: 270 capsule; Refill: 0  -     methocarbamoL (ROBAXIN) 500 MG Tab; Take 1 tablet (500 mg total) by mouth 2 (two) times a day. for 10 days  Dispense: 20 tablet; Refill: 0    2. Primary insomnia  -     hydrOXYzine pamoate (VISTARIL) 50 MG Cap; TAKE 1 CAPSULE (50 MG) BY MOUTH NIGHTLY AS NEEDED FOR INSOMNIA  Dispense: 90 capsule; Refill: 3    3. Type 2 diabetes mellitus without complication, without long-term current use of insulin  -     tirzepatide  (MOUNJARO) 5 mg/0.5 mL PnIj; Inject 5 mg into the skin every 7 days.  Dispense: 6 mL; Refill: 0  -     Microalbumin/Creatinine Ratio, Urine; Future; Expected date: 06/25/2025  -     Lipid Panel; Future; Expected date: 06/25/2025  -     Hemoglobin A1C; Future; Expected date: 06/25/2025  -     Comprehensive Metabolic Panel; Future; Expected date: 06/25/2025  -     Microalbumin/Creatinine Ratio, Urine; Future; Expected date: 06/25/2025  -     Lipid Panel; Future; Expected date: 06/25/2025  -     Hemoglobin A1C; Future; Expected date: 06/25/2025  -     Comprehensive Metabolic Panel; Future; Expected date: 06/25/2025  -     Ambulatory referral/consult to Optometry; Future; Expected date: 07/02/2025    4. Undifferentiated schizophrenia  -     DULoxetine (CYMBALTA) 60 MG capsule; Take 2 capsules (120 mg total) by mouth every morning.  Dispense: 180 capsule; Refill: 3    5. Hypertension associated with diabetes  Comments:  change ACE to benicar 20 mg BID and repeat BP in 1 month   Orders:  -     olmesartan (BENICAR) 20 MG tablet; Take 1 tablet (20 mg total) by mouth once daily.  Dispense: 90 tablet; Refill: 3    6. Hypothyroidism, unspecified type  -     levothyroxine (SYNTHROID) 25 MCG tablet; Take 1 tablet (25 mcg total) by mouth before breakfast.  Dispense: 90 tablet; Refill: 3  -     TSH; Future; Expected date: 06/25/2025  -     TSH; Future; Expected date: 06/25/2025    7. Venous insufficiency  -     COMPRESSION STOCKINGS    8. Screening for prostate cancer    9. Tinnitus aurium, bilateral      Compression stockings during the day.  I spent a total of 41 minutes on the day of the visit.This includes face to face time and non-face to face time preparing to see the patient (eg, review of tests), obtaining and/or reviewing separately obtained history, documenting clinical information in the electronic or other health record, independently interpreting results and communicating results to the patient/family/caregiver, or  care coordinator.          Follow up in about 3 months (around 9/25/2025), or if symptoms worsen or fail to improve.

## 2025-07-08 ENCOUNTER — TELEPHONE (OUTPATIENT)
Dept: FAMILY MEDICINE | Facility: CLINIC | Age: 65
End: 2025-07-08
Payer: MEDICARE

## 2025-07-10 ENCOUNTER — NURSE TRIAGE (OUTPATIENT)
Dept: ADMINISTRATIVE | Facility: CLINIC | Age: 65
End: 2025-07-10
Payer: MEDICARE

## 2025-07-10 ENCOUNTER — HOSPITAL ENCOUNTER (INPATIENT)
Facility: HOSPITAL | Age: 65
LOS: 1 days | Discharge: HOME | DRG: 603 | End: 2025-07-12
Attending: EMERGENCY MEDICINE | Admitting: INTERNAL MEDICINE
Payer: MEDICARE

## 2025-07-10 DIAGNOSIS — L03.90 CELLULITIS: ICD-10-CM

## 2025-07-10 DIAGNOSIS — L03.90 CELLULITIS, UNSPECIFIED CELLULITIS SITE: Primary | ICD-10-CM

## 2025-07-10 DIAGNOSIS — I73.9 PVD (PERIPHERAL VASCULAR DISEASE): ICD-10-CM

## 2025-07-10 DIAGNOSIS — E87.1 HYPONATREMIA: ICD-10-CM

## 2025-07-10 DIAGNOSIS — L97.909 ULCER OF LOWER EXTREMITY, UNSPECIFIED LATERALITY, UNSPECIFIED ULCER STAGE: ICD-10-CM

## 2025-07-10 DIAGNOSIS — R06.02 SHORTNESS OF BREATH: ICD-10-CM

## 2025-07-10 LAB
ABSOLUTE EOSINOPHIL (OHS): 2.28 K/UL
ABSOLUTE MONOCYTE (OHS): 0.78 K/UL (ref 0.3–1)
ABSOLUTE NEUTROPHIL COUNT (OHS): 5.75 K/UL (ref 1.8–7.7)
ALBUMIN SERPL BCP-MCNC: 3.6 G/DL (ref 3.5–5.2)
ALP SERPL-CCNC: 86 UNIT/L (ref 40–150)
ALT SERPL W/O P-5'-P-CCNC: 13 UNIT/L (ref 10–44)
ANION GAP (OHS): 7 MMOL/L (ref 8–16)
AST SERPL-CCNC: 18 UNIT/L (ref 11–45)
BASOPHILS # BLD AUTO: 0.09 K/UL
BASOPHILS NFR BLD AUTO: 0.8 %
BILIRUB SERPL-MCNC: 0.3 MG/DL (ref 0.1–1)
BNP SERPL-MCNC: 75 PG/ML (ref 0–99)
BUN SERPL-MCNC: 21 MG/DL (ref 8–23)
CALCIUM SERPL-MCNC: 8.9 MG/DL (ref 8.7–10.5)
CHLORIDE SERPL-SCNC: 96 MMOL/L (ref 95–110)
CO2 SERPL-SCNC: 22 MMOL/L (ref 23–29)
CREAT SERPL-MCNC: 1.3 MG/DL (ref 0.5–1.4)
ERYTHROCYTE [DISTWIDTH] IN BLOOD BY AUTOMATED COUNT: 14.6 % (ref 11.5–14.5)
GFR SERPLBLD CREATININE-BSD FMLA CKD-EPI: >60 ML/MIN/1.73/M2
GLUCOSE SERPL-MCNC: 223 MG/DL (ref 70–110)
HCT VFR BLD AUTO: 29.6 % (ref 40–54)
HGB BLD-MCNC: 9.9 GM/DL (ref 14–18)
IMM GRANULOCYTES # BLD AUTO: 0.03 K/UL (ref 0–0.04)
IMM GRANULOCYTES NFR BLD AUTO: 0.3 % (ref 0–0.5)
LYMPHOCYTES # BLD AUTO: 1.84 K/UL (ref 1–4.8)
MCH RBC QN AUTO: 24 PG (ref 27–31)
MCHC RBC AUTO-ENTMCNC: 33.4 G/DL (ref 32–36)
MCV RBC AUTO: 72 FL (ref 82–98)
NUCLEATED RBC (/100WBC) (OHS): 0 /100 WBC
PLATELET # BLD AUTO: 292 K/UL (ref 150–450)
PMV BLD AUTO: 7.9 FL (ref 9.2–12.9)
POTASSIUM SERPL-SCNC: 4.6 MMOL/L (ref 3.5–5.1)
PROT SERPL-MCNC: 6.7 GM/DL (ref 6–8.4)
RBC # BLD AUTO: 4.12 M/UL (ref 4.6–6.2)
RELATIVE EOSINOPHIL (OHS): 21.2 %
RELATIVE LYMPHOCYTE (OHS): 17.1 % (ref 18–48)
RELATIVE MONOCYTE (OHS): 7.2 % (ref 4–15)
RELATIVE NEUTROPHIL (OHS): 53.4 % (ref 38–73)
SODIUM SERPL-SCNC: 125 MMOL/L (ref 136–145)
TROPONIN I SERPL DL<=0.01 NG/ML-MCNC: <0.006 NG/ML
WBC # BLD AUTO: 10.77 K/UL (ref 3.9–12.7)

## 2025-07-10 PROCEDURE — 83880 ASSAY OF NATRIURETIC PEPTIDE: CPT | Performed by: EMERGENCY MEDICINE

## 2025-07-10 PROCEDURE — 99285 EMERGENCY DEPT VISIT HI MDM: CPT | Mod: 25

## 2025-07-10 PROCEDURE — 96365 THER/PROPH/DIAG IV INF INIT: CPT

## 2025-07-10 PROCEDURE — 25000003 PHARM REV CODE 250: Performed by: EMERGENCY MEDICINE

## 2025-07-10 PROCEDURE — 85025 COMPLETE CBC W/AUTO DIFF WBC: CPT | Performed by: EMERGENCY MEDICINE

## 2025-07-10 PROCEDURE — 93010 ELECTROCARDIOGRAM REPORT: CPT | Mod: ,,, | Performed by: INTERNAL MEDICINE

## 2025-07-10 PROCEDURE — 84484 ASSAY OF TROPONIN QUANT: CPT | Performed by: EMERGENCY MEDICINE

## 2025-07-10 PROCEDURE — 93005 ELECTROCARDIOGRAM TRACING: CPT

## 2025-07-10 PROCEDURE — 80053 COMPREHEN METABOLIC PANEL: CPT | Performed by: EMERGENCY MEDICINE

## 2025-07-10 RX ORDER — OXYCODONE AND ACETAMINOPHEN 5; 325 MG/1; MG/1
1 TABLET ORAL
Refills: 0 | Status: COMPLETED | OUTPATIENT
Start: 2025-07-10 | End: 2025-07-10

## 2025-07-10 RX ORDER — VANCOMYCIN 2 GRAM/500 ML IN 0.9 % SODIUM CHLORIDE INTRAVENOUS
2000 ONCE
Status: DISCONTINUED | OUTPATIENT
Start: 2025-07-11 | End: 2025-07-11

## 2025-07-10 RX ADMIN — OXYCODONE HYDROCHLORIDE AND ACETAMINOPHEN 1 TABLET: 5; 325 TABLET ORAL at 11:07

## 2025-07-10 RX ADMIN — SODIUM CHLORIDE 2000 MG: 9 INJECTION, SOLUTION INTRAVENOUS at 11:07

## 2025-07-10 NOTE — Clinical Note
Diagnosis: Shortness of breath [786.05.ICD-9-CM]   Future Attending Provider: ODILON GROVE [[id number]]

## 2025-07-10 NOTE — TELEPHONE ENCOUNTER
Patient reports back pain and wounds to his legs that are draining and bleeding. Onset was of sores was 2 weeks ago . He repots swelling to the legs and feet are blue. Disposition provided - go to ER now. Instructed to call back with additional questions or worsening of symptoms. Patient verbalized understanding.     Reason for Disposition   Entire foot is cool or blue in comparison to other side    Additional Information   Negative: Sounds like a life-threatening emergency to the triager   Negative: Difficulty breathing at rest    Protocols used: Leg Swelling and Edema-A-OH

## 2025-07-11 PROBLEM — L03.90 CELLULITIS: Status: ACTIVE | Noted: 2025-07-11

## 2025-07-11 PROBLEM — Z65.9 MEDICATION NONADHERENCE DUE TO PSYCHOSOCIAL PROBLEM: Status: ACTIVE | Noted: 2025-07-11

## 2025-07-11 PROBLEM — Z91.148 MEDICATION NONADHERENCE DUE TO PSYCHOSOCIAL PROBLEM: Status: ACTIVE | Noted: 2025-07-11

## 2025-07-11 LAB
ABSOLUTE NEUTROPHIL MANUAL (OHS): 5.9 K/UL
ALBUMIN SERPL BCP-MCNC: 3.4 G/DL (ref 3.5–5.2)
ALP SERPL-CCNC: 91 UNIT/L (ref 40–150)
ALT SERPL W/O P-5'-P-CCNC: 13 UNIT/L (ref 10–44)
ANION GAP (OHS): 7 MMOL/L (ref 8–16)
AST SERPL-CCNC: 17 UNIT/L (ref 11–45)
BILIRUB SERPL-MCNC: 0.3 MG/DL (ref 0.1–1)
BUN SERPL-MCNC: 20 MG/DL (ref 8–23)
CALCIUM SERPL-MCNC: 8.8 MG/DL (ref 8.7–10.5)
CHLORIDE SERPL-SCNC: 98 MMOL/L (ref 95–110)
CHOLEST SERPL-MCNC: 114 MG/DL (ref 120–199)
CHOLEST/HDLC SERPL: 3.8 {RATIO} (ref 2–5)
CO2 SERPL-SCNC: 22 MMOL/L (ref 23–29)
CREAT SERPL-MCNC: 1.3 MG/DL (ref 0.5–1.4)
EOSINOPHIL NFR BLD MANUAL: 20.4 % (ref 0–8)
ERYTHROCYTE [DISTWIDTH] IN BLOOD BY AUTOMATED COUNT: 14.6 % (ref 11.5–14.5)
FERRITIN SERPL-MCNC: 15.2 NG/ML (ref 20–300)
FOLATE SERPL-MCNC: 15.8 NG/ML (ref 4–24)
GFR SERPLBLD CREATININE-BSD FMLA CKD-EPI: >60 ML/MIN/1.73/M2
GLUCOSE SERPL-MCNC: 188 MG/DL (ref 70–110)
HCT VFR BLD AUTO: 29.4 % (ref 40–54)
HDLC SERPL-MCNC: 30 MG/DL (ref 40–75)
HDLC SERPL: 26.3 % (ref 20–50)
HGB BLD-MCNC: 9.6 GM/DL (ref 14–18)
HOLD SPECIMEN: NORMAL
IRON SATN MFR SERPL: 7 % (ref 20–50)
IRON SERPL-MCNC: 30 UG/DL (ref 45–160)
LDLC SERPL CALC-MCNC: 66.8 MG/DL (ref 63–159)
LEFT ABI: 1.27
LEFT ARM BP: 117 MMHG
LEFT DORSALIS PEDIS: 101 MMHG
LEFT POSTERIOR TIBIAL: 149 MMHG
LYMPHOCYTES NFR BLD MANUAL: 15.3 % (ref 18–48)
MAGNESIUM SERPL-MCNC: 1.5 MG/DL (ref 1.6–2.6)
MCH RBC QN AUTO: 23.6 PG (ref 27–31)
MCHC RBC AUTO-ENTMCNC: 32.7 G/DL (ref 32–36)
MCV RBC AUTO: 72 FL (ref 82–98)
MONOCYTES NFR BLD MANUAL: 8.2 % (ref 4–15)
NEUTROPHILS NFR BLD MANUAL: 56.1 % (ref 38–73)
NONHDLC SERPL-MCNC: 84 MG/DL
NUCLEATED RBC (/100WBC) (OHS): 0 /100 WBC
OSMOLALITY SERPL: 280 MOSM/KG (ref 280–300)
OSMOLALITY UR: 175 MOSM/KG (ref 50–1200)
PHOSPHATE SERPL-MCNC: 3.8 MG/DL (ref 2.7–4.5)
PLATELET # BLD AUTO: 286 K/UL (ref 150–450)
PLATELET BLD QL SMEAR: ABNORMAL
PMV BLD AUTO: 8.1 FL (ref 9.2–12.9)
POCT GLUCOSE: 160 MG/DL (ref 70–110)
POCT GLUCOSE: 173 MG/DL (ref 70–110)
POCT GLUCOSE: 182 MG/DL (ref 70–110)
POCT GLUCOSE: 220 MG/DL (ref 70–110)
POTASSIUM SERPL-SCNC: 4.5 MMOL/L (ref 3.5–5.1)
PROCALCITONIN SERPL-MCNC: 0.03 NG/ML
PROT SERPL-MCNC: 6.4 GM/DL (ref 6–8.4)
RBC # BLD AUTO: 4.06 M/UL (ref 4.6–6.2)
RIGHT ABI: 1.11
RIGHT ARM BP: 100 MMHG
RIGHT DORSALIS PEDIS: 130 MMHG
RIGHT POSTERIOR TIBIAL: 127 MMHG
SODIUM SERPL-SCNC: 127 MMOL/L (ref 136–145)
SODIUM UR-SCNC: 36 MMOL/L (ref 20–250)
TIBC SERPL-MCNC: 456 UG/DL (ref 250–450)
TRANSFERRIN SERPL-MCNC: 308 MG/DL (ref 200–375)
TRIGL SERPL-MCNC: 86 MG/DL (ref 30–150)
TSH SERPL-ACNC: 3.42 UIU/ML (ref 0.4–4)
VIT B12 SERPL-MCNC: 940 PG/ML (ref 210–950)
WBC # BLD AUTO: 10.53 K/UL (ref 3.9–12.7)

## 2025-07-11 PROCEDURE — 63600175 PHARM REV CODE 636 W HCPCS

## 2025-07-11 PROCEDURE — 82746 ASSAY OF FOLIC ACID SERUM: CPT

## 2025-07-11 PROCEDURE — 82607 VITAMIN B-12: CPT

## 2025-07-11 PROCEDURE — 83930 ASSAY OF BLOOD OSMOLALITY: CPT

## 2025-07-11 PROCEDURE — 83735 ASSAY OF MAGNESIUM: CPT

## 2025-07-11 PROCEDURE — 94761 N-INVAS EAR/PLS OXIMETRY MLT: CPT

## 2025-07-11 PROCEDURE — 84100 ASSAY OF PHOSPHORUS: CPT

## 2025-07-11 PROCEDURE — 25000003 PHARM REV CODE 250

## 2025-07-11 PROCEDURE — 96367 TX/PROPH/DG ADDL SEQ IV INF: CPT

## 2025-07-11 PROCEDURE — 83540 ASSAY OF IRON: CPT

## 2025-07-11 PROCEDURE — 80053 COMPREHEN METABOLIC PANEL: CPT

## 2025-07-11 PROCEDURE — 11000001 HC ACUTE MED/SURG PRIVATE ROOM

## 2025-07-11 PROCEDURE — 84443 ASSAY THYROID STIM HORMONE: CPT

## 2025-07-11 PROCEDURE — 25000003 PHARM REV CODE 250: Performed by: EMERGENCY MEDICINE

## 2025-07-11 PROCEDURE — 82728 ASSAY OF FERRITIN: CPT

## 2025-07-11 PROCEDURE — 83935 ASSAY OF URINE OSMOLALITY: CPT

## 2025-07-11 PROCEDURE — 84300 ASSAY OF URINE SODIUM: CPT

## 2025-07-11 PROCEDURE — 99900035 HC TECH TIME PER 15 MIN (STAT)

## 2025-07-11 PROCEDURE — 80202 ASSAY OF VANCOMYCIN: CPT

## 2025-07-11 PROCEDURE — 36415 COLL VENOUS BLD VENIPUNCTURE: CPT

## 2025-07-11 PROCEDURE — 96366 THER/PROPH/DIAG IV INF ADDON: CPT

## 2025-07-11 PROCEDURE — 84145 PROCALCITONIN (PCT): CPT

## 2025-07-11 PROCEDURE — 82962 GLUCOSE BLOOD TEST: CPT

## 2025-07-11 PROCEDURE — 85027 COMPLETE CBC AUTOMATED: CPT

## 2025-07-11 PROCEDURE — 87070 CULTURE OTHR SPECIMN AEROBIC: CPT

## 2025-07-11 PROCEDURE — 82465 ASSAY BLD/SERUM CHOLESTEROL: CPT

## 2025-07-11 PROCEDURE — 63600175 PHARM REV CODE 636 W HCPCS: Performed by: EMERGENCY MEDICINE

## 2025-07-11 RX ORDER — MAGNESIUM SULFATE HEPTAHYDRATE 40 MG/ML
2 INJECTION, SOLUTION INTRAVENOUS ONCE
Status: COMPLETED | OUTPATIENT
Start: 2025-07-11 | End: 2025-07-11

## 2025-07-11 RX ORDER — ALBUTEROL SULFATE 90 UG/1
2 INHALANT RESPIRATORY (INHALATION) EVERY 6 HOURS PRN
Qty: 18 G | Refills: 0 | Status: SHIPPED | OUTPATIENT
Start: 2025-07-11 | End: 2026-07-11

## 2025-07-11 RX ORDER — IBUPROFEN 200 MG
24 TABLET ORAL
Status: DISCONTINUED | OUTPATIENT
Start: 2025-07-11 | End: 2025-07-12 | Stop reason: HOSPADM

## 2025-07-11 RX ORDER — OXYCODONE HYDROCHLORIDE 5 MG/1
5 TABLET ORAL EVERY 6 HOURS PRN
Refills: 0 | Status: DISCONTINUED | OUTPATIENT
Start: 2025-07-11 | End: 2025-07-11

## 2025-07-11 RX ORDER — LINEZOLID 600 MG/1
600 TABLET, FILM COATED ORAL EVERY 12 HOURS
Status: DISCONTINUED | OUTPATIENT
Start: 2025-07-11 | End: 2025-07-11

## 2025-07-11 RX ORDER — OXYCODONE HYDROCHLORIDE 5 MG/1
5 TABLET ORAL EVERY 4 HOURS PRN
Refills: 0 | Status: DISCONTINUED | OUTPATIENT
Start: 2025-07-11 | End: 2025-07-12 | Stop reason: HOSPADM

## 2025-07-11 RX ORDER — LEVOTHYROXINE SODIUM 25 UG/1
25 TABLET ORAL
Status: DISCONTINUED | OUTPATIENT
Start: 2025-07-11 | End: 2025-07-12 | Stop reason: HOSPADM

## 2025-07-11 RX ORDER — PRAVASTATIN SODIUM 40 MG/1
40 TABLET ORAL DAILY
Status: DISCONTINUED | OUTPATIENT
Start: 2025-07-11 | End: 2025-07-12 | Stop reason: HOSPADM

## 2025-07-11 RX ORDER — IBUPROFEN 200 MG
16 TABLET ORAL
Status: DISCONTINUED | OUTPATIENT
Start: 2025-07-11 | End: 2025-07-12 | Stop reason: HOSPADM

## 2025-07-11 RX ORDER — ENOXAPARIN SODIUM 100 MG/ML
40 INJECTION SUBCUTANEOUS EVERY 24 HOURS
Status: DISCONTINUED | OUTPATIENT
Start: 2025-07-11 | End: 2025-07-12 | Stop reason: HOSPADM

## 2025-07-11 RX ORDER — ACETAMINOPHEN 500 MG
1000 TABLET ORAL EVERY 8 HOURS
Status: DISCONTINUED | OUTPATIENT
Start: 2025-07-11 | End: 2025-07-12 | Stop reason: HOSPADM

## 2025-07-11 RX ORDER — SODIUM CHLORIDE 0.9 % (FLUSH) 0.9 %
10 SYRINGE (ML) INJECTION EVERY 12 HOURS PRN
Status: DISCONTINUED | OUTPATIENT
Start: 2025-07-11 | End: 2025-07-12 | Stop reason: HOSPADM

## 2025-07-11 RX ORDER — BLOOD SUGAR DIAGNOSTIC
STRIP MISCELLANEOUS 2 TIMES DAILY
COMMUNITY
Start: 2025-06-20

## 2025-07-11 RX ORDER — SENNOSIDES 8.6 MG/1
8.6 TABLET ORAL DAILY PRN
Status: DISCONTINUED | OUTPATIENT
Start: 2025-07-11 | End: 2025-07-12 | Stop reason: HOSPADM

## 2025-07-11 RX ORDER — ARIPIPRAZOLE 5 MG/1
5 TABLET ORAL DAILY
Status: DISCONTINUED | OUTPATIENT
Start: 2025-07-11 | End: 2025-07-12 | Stop reason: HOSPADM

## 2025-07-11 RX ORDER — ALCOHOL ANTISEPTIC PADS
PADS, MEDICATED (EA) TOPICAL DAILY
COMMUNITY
Start: 2025-06-20

## 2025-07-11 RX ORDER — POLYETHYLENE GLYCOL 3350 17 G/17G
17 POWDER, FOR SOLUTION ORAL DAILY
Status: DISCONTINUED | OUTPATIENT
Start: 2025-07-12 | End: 2025-07-12 | Stop reason: HOSPADM

## 2025-07-11 RX ORDER — ALBUTEROL SULFATE 90 UG/1
2 INHALANT RESPIRATORY (INHALATION) EVERY 6 HOURS PRN
Status: DISCONTINUED | OUTPATIENT
Start: 2025-07-11 | End: 2025-07-12 | Stop reason: HOSPADM

## 2025-07-11 RX ORDER — METHOCARBAMOL 500 MG/1
500 TABLET, FILM COATED ORAL 2 TIMES DAILY
Status: DISCONTINUED | OUTPATIENT
Start: 2025-07-11 | End: 2025-07-12 | Stop reason: HOSPADM

## 2025-07-11 RX ORDER — NALOXONE HCL 0.4 MG/ML
0.02 VIAL (ML) INJECTION
Status: DISCONTINUED | OUTPATIENT
Start: 2025-07-11 | End: 2025-07-12 | Stop reason: HOSPADM

## 2025-07-11 RX ORDER — GABAPENTIN 300 MG/1
300 CAPSULE ORAL 3 TIMES DAILY
Status: DISCONTINUED | OUTPATIENT
Start: 2025-07-11 | End: 2025-07-12 | Stop reason: HOSPADM

## 2025-07-11 RX ORDER — FERROUS SULFATE 324(65)MG
324 TABLET, DELAYED RELEASE (ENTERIC COATED) ORAL EVERY OTHER DAY
Qty: 15 TABLET | Refills: 11 | Status: SHIPPED | OUTPATIENT
Start: 2025-07-11 | End: 2025-07-12

## 2025-07-11 RX ORDER — DULOXETIN HYDROCHLORIDE 30 MG/1
120 CAPSULE, DELAYED RELEASE ORAL EVERY MORNING
Status: DISCONTINUED | OUTPATIENT
Start: 2025-07-11 | End: 2025-07-12 | Stop reason: HOSPADM

## 2025-07-11 RX ORDER — GLUCAGON 1 MG
1 KIT INJECTION
Status: DISCONTINUED | OUTPATIENT
Start: 2025-07-11 | End: 2025-07-12 | Stop reason: HOSPADM

## 2025-07-11 RX ORDER — OXYCODONE HYDROCHLORIDE 5 MG/1
5 TABLET ORAL EVERY 6 HOURS PRN
Status: COMPLETED | OUTPATIENT
Start: 2025-07-11 | End: 2025-07-11

## 2025-07-11 RX ORDER — INSULIN ASPART 100 [IU]/ML
0-5 INJECTION, SOLUTION INTRAVENOUS; SUBCUTANEOUS
Status: DISCONTINUED | OUTPATIENT
Start: 2025-07-11 | End: 2025-07-12 | Stop reason: HOSPADM

## 2025-07-11 RX ADMIN — LEVOTHYROXINE SODIUM 25 MCG: 0.03 TABLET ORAL at 05:07

## 2025-07-11 RX ADMIN — LINEZOLID 600 MG: 600 TABLET, FILM COATED ORAL at 09:07

## 2025-07-11 RX ADMIN — METHOCARBAMOL 500 MG: 500 TABLET ORAL at 01:07

## 2025-07-11 RX ADMIN — METHOCARBAMOL 500 MG: 500 TABLET ORAL at 08:07

## 2025-07-11 RX ADMIN — OXYCODONE HYDROCHLORIDE 5 MG: 5 TABLET ORAL at 03:07

## 2025-07-11 RX ADMIN — MAGNESIUM SULFATE HEPTAHYDRATE 2 G: 40 INJECTION, SOLUTION INTRAVENOUS at 06:07

## 2025-07-11 RX ADMIN — ARIPIPRAZOLE 5 MG: 5 TABLET ORAL at 09:07

## 2025-07-11 RX ADMIN — PRAVASTATIN SODIUM 40 MG: 40 TABLET ORAL at 09:07

## 2025-07-11 RX ADMIN — DULOXETINE 120 MG: 30 CAPSULE, DELAYED RELEASE ORAL at 09:07

## 2025-07-11 RX ADMIN — ACETAMINOPHEN 1000 MG: 500 TABLET ORAL at 08:07

## 2025-07-11 RX ADMIN — GABAPENTIN 300 MG: 300 CAPSULE ORAL at 08:07

## 2025-07-11 RX ADMIN — GABAPENTIN 300 MG: 300 CAPSULE ORAL at 03:07

## 2025-07-11 RX ADMIN — ACETAMINOPHEN 1000 MG: 500 TABLET ORAL at 05:07

## 2025-07-11 RX ADMIN — METHOCARBAMOL 500 MG: 500 TABLET ORAL at 09:07

## 2025-07-11 RX ADMIN — OXYCODONE 5 MG: 5 TABLET ORAL at 11:07

## 2025-07-11 RX ADMIN — SODIUM CHLORIDE 125 MG: 9 INJECTION, SOLUTION INTRAVENOUS at 03:07

## 2025-07-11 RX ADMIN — ENOXAPARIN SODIUM 40 MG: 40 INJECTION SUBCUTANEOUS at 05:07

## 2025-07-11 RX ADMIN — GABAPENTIN 300 MG: 300 CAPSULE ORAL at 09:07

## 2025-07-11 RX ADMIN — OXYCODONE 5 MG: 5 TABLET ORAL at 03:07

## 2025-07-11 NOTE — H&P
[facility] Medicine H&P Note     Admitting Team: [facility] Hospitalist Team B  Attending Physician: Jah Harley MD  Resident: MD Tony  Intern: MD Chary     Date of Admit: 7/10/2025    Chief Complaint     Wounds x one week    Subjective:      History of Present Illness:  Fabiano Malik Jr. is a 64 y.o. male with history of hyponatremia, NIKKI, ILD, schizophrenia, DMT2, HTN who presents to the ED for one week history of lower extremity wounds. States usually have bilaterally lower extremity swelling with drainage but developed blisters about one week ago. Today he showered and wiped with a towel which disrupted blisters on lower legs. States fluid was clear in color, no foul odor. Endorses worsening of usual bilateral lower extremity pain. No known recent trauma.Denies fever, reports chills onset about one week prior. No nausea or vomiting. Denies chest pain, dyspnea. No night sweats. Wanted admission for medication management, reported to the ED doctor that he would benefit from observation to assist with antibiotic therapy if indicated.     Past Medical History:  See above.       Past Surgical History:  Past Surgical History:   Procedure Laterality Date    APPENDECTOMY      CARPAL TUNNEL RELEASE Right 12/13/2022    Procedure: RELEASE, CARPAL TUNNEL;  Surgeon: Everton Walter Jr., MD;  Location: [facility] OR;  Service: Orthopedics;  Laterality: Right;    COLONOSCOPY N/A 5/31/2018    Procedure: COLONOSCOPY;  Surgeon: Guillaume Hatch MD;  Location: [facility] ENDO;  Service: Endoscopy;  Laterality: N/A;    COLONOSCOPY N/A 11/17/2022    Procedure: COLONOSCOPY;  Surgeon: Guillaume Hatch MD;  Location: [facility] ENDO;  Service: Endoscopy;  Laterality: N/A;    DECOMPRESSION, NERVE, ULNAR Right 12/13/2022    Procedure: DECOMPRESSION, NERVE, ULNAR;  Surgeon: Everton Walter Jr., MD;  Location: [facility] OR;  Service: Orthopedics;  Laterality: Right;    EPIDURAL STEROID INJECTION INTO LUMBAR SPINE N/A 2/21/2024    Procedure:  L5-S1 JOHN;  Surgeon: Zoila Suarez DO;  Location: [facility] PAIN MANAGEMENT;  Service: Pain Management;  Laterality: N/A;  oral 30 mins    FUSION, SPINE, CERVICAL N/A 8/11/2023    Procedure: FUSION, SPINE, CERVICAL;  Surgeon: Guillaume Washington MD;  Location: [facility] OR;  Service: Neurosurgery;  Laterality: N/A;  C3-4, C5-6, C6-7 ACDF C3-C7 anterior instrumentation Depuy  -ANTERIOR DECOMPREESSION 2 LEVELS   -ANTERIOR INSTRUMENTATION INTERBODY CAGE INTERSPACE 3   -LOP 3.5 HR   -LOS 3 DAYS   -GENERAL   -TYPE AND SCREEN   - C ARM   -EMG, SEP, MEP hari notified cc  -ASPEN   -REG    NECK SURGERY      RADIOFREQUENCY ABLATION OF LUMBAR MEDIAL BRANCH NERVE AT SINGLE LEVEL Left 5/29/2018    Procedure: RADIOFREQUENCY THERMOCOAGULATION (RFTC)-NERVE-MEDIAN BRANCH-LUMBAR- Left L2-3-4-5;  Surgeon: Donavon Dennis MD;  Location: [facility] PAIN [facility];  Service: Pain Management;  Laterality: Left;    RADIOFREQUENCY ABLATION OF LUMBAR MEDIAL BRANCH NERVE AT SINGLE LEVEL Right 1/8/2019    Procedure: Radiofrequency Ablation, Nerve, Spinal, Lumbar, Medial Branch, L2,3,4,5;  Surgeon: Alberto Hernandez Jr., MD;  Location: [facility];  Service: Pain Management;  Laterality: Right;  Pt is diabetic    RADIOFREQUENCY ABLATION OF LUMBAR MEDIAL BRANCH NERVE AT SINGLE LEVEL Left 3/12/2019    Procedure: Radiofrequency Ablation, Nerve, Spinal, Lumbar, Medial Branch, Left, L2,3,4,5;  Surgeon: Alberto Hernandez Jr., MD;  Location: [facility] PAIN [facility];  Service: Pain Management;  Laterality: Left;  Pt will be consented the day of procedure.    TRANSFORAMINAL EPIDURAL INJECTION OF STEROID Right 9/12/2019    Procedure: Injection,steroid,epidural,transforaminal,right,L4-5 and L5-S1;  Surgeon: Alberto Hernandez Jr., MD;  Location: [facility] PAIN [facility];  Service: Pain Management;  Laterality: Right;  PT IS DIABETIC       Allergies:  PCN: unsure of reaction       Home Medications: verified with patient   Amlodipine 5 mg   Aripiprazole 5 mg   Duloxetine 120 mg daily  "  Lasix? Unsure if taking   Gabapentin  mg   Hydroxyzine 50 nightly PRN for insomnia  Synthroid 25 mcg daily   Meclizine PRN  Metformin 1000 mg BID  Robaxin: unsure if taking   Toprol XL daily   Olmesartan 20 mg   Omeprazole 20 mg   Pravastatin 40 mg  Mounjaro     Family History:  Family History   Problem Relation Name Age of Onset    Alzheimer's disease Mother      Diabetes Mother      Heart defect Father      Cancer Father      Stroke Father      Heart attack Father      Diabetes Sister x2     Heart defect Sister x2     Multiple sclerosis Sister x2     Heart disease Sister x2     Alzheimer's disease Sister x2     No Known Problems Son x1        Social History:  Tobacco use: denies  Alcohol use: denies  Drug use: denies    Review of Systems:  See HPI. All other systems are reviewed and are negative.    Health Maintaince :   Primary Care Physician: Dr. Fleming       Objective:   Last 24 Hour Vital Signs:  BP  Min: 124/61  Max: 131/77  Temp  Av.5 °F (36.9 °C)  Min: 98.5 °F (36.9 °C)  Max: 98.5 °F (36.9 °C)  Pulse  Av  Min: 83  Max: 92  Resp  Avg: 15.3  Min: 14  Max: 16  SpO2  Av.5 %  Min: 97 %  Max: 98 %  Height  Av' 1" (185.4 cm)  Min: 6' 1" (185.4 cm)  Max: 6' 1" (185.4 cm)  Weight  Av.9 kg (240 lb)  Min: 108.9 kg (240 lb)  Max: 108.9 kg (240 lb)  Body mass index is 31.66 kg/m².  No intake/output data recorded.    Physical Examination:   Physical Exam  HENT:      Head: Normocephalic and atraumatic.      Nose: No rhinorrhea.   Eyes:      General: No scleral icterus.     Extraocular Movements: Extraocular movements intact.   Cardiovascular:      Rate and Rhythm: Normal rate and regular rhythm.      Pulses: Normal pulses.      Comments: Palpable bilateral lower extremity pulses.   Pulmonary:      Effort: Pulmonary effort is normal. No respiratory distress.      Breath sounds: Rales present. No wheezing.      Comments: On RA  Inspiratory crackles  Abdominal:      Palpations: Abdomen " is soft.      Tenderness: There is no abdominal tenderness. There is no rebound.   Musculoskeletal:         General: Swelling (bilateral lower extermity swelling.) and tenderness (mild tenderness to palpation to lower) present.      Right lower leg: No edema.      Left lower leg: No edema.      Comments:    Skin:bilateral open wounds, no drainage. Surrounding erythema.      Coloration: Skin is not pale.      Findings: No bruising.   Neurological:      Mental Status: He is oriented to person, place, and time.   Psychiatric:         Mood and Affect: Mood normal.         Behavior: Behavior normal.       Laboratory:  Most Recent Data:  CBC:   Lab Results   Component Value Date    WBC 10.77 07/10/2025    HGB 9.9 (L) 07/10/2025    HCT 29.6 (L) 07/10/2025     07/10/2025    MCV 72 (L) 07/10/2025    RDW 14.6 (H) 07/10/2025       BMP:   Lab Results   Component Value Date     (L) 07/10/2025    K 4.6 07/10/2025    CL 96 07/10/2025    CO2 22 (L) 07/10/2025    BUN 21 07/10/2025    CREATININE 1.3 07/10/2025     (H) 07/10/2025    CALCIUM 8.9 07/10/2025    MG 1.7 03/24/2025    PHOS 3.1 03/24/2025     LFTs:   Lab Results   Component Value Date    PROT 6.7 07/10/2025    ALBUMIN 3.6 07/10/2025    BILITOT 0.3 07/10/2025    AST 18 07/10/2025    ALKPHOS 86 07/10/2025    ALT 13 07/10/2025     Coags:   Lab Results   Component Value Date    INR 1.0 11/28/2018     FLP:   Lab Results   Component Value Date    CHOL 128 11/07/2024    HDL 41 11/07/2024    LDLCALC 60.6 (L) 11/07/2024    TRIG 132 11/07/2024    CHOLHDL 32.0 11/07/2024     DM:   Lab Results   Component Value Date    HGBA1C 8.2 (H) 03/22/2025    HGBA1C 8.2 (H) 11/07/2024    HGBA1C 7.2 (H) 04/26/2024    LDLCALC 60.6 (L) 11/07/2024    CREATININE 1.3 07/10/2025     Thyroid:   Lab Results   Component Value Date    TSH 3.501 11/12/2024    FREET4 0.83 11/20/2023     Anemia:   Lab Results   Component Value Date    IRON 49 03/22/2025    TIBC 490 (H) 03/22/2025    FERRITIN  28.2 03/22/2025    XKFFEBAA57 >2000 (H) 03/21/2025    FOLATE 15.3 03/22/2025     Cardiac:   Lab Results   Component Value Date    TROPONINI <0.006 07/10/2025    BNP 75 07/10/2025     Urinalysis:   Lab Results   Component Value Date    COLORU Yellow 03/22/2025    SPECGRAV >=1.030 (A) 03/22/2025    NITRITE Negative 03/22/2025    KETONESU Negative 10/04/2023    UROBILINOGEN Negative 03/22/2025    WBCUA 3 07/07/2023       Trended Lab Data:  Recent Labs   Lab 07/10/25  2225   WBC 10.77   HGB 9.9*   HCT 29.6*      MCV 72*   RDW 14.6*   *   K 4.6   CL 96   CO2 22*   BUN 21   CREATININE 1.3   *   PROT 6.7   ALBUMIN 3.6   BILITOT 0.3   AST 18   ALKPHOS 86   ALT 13       Trended Cardiac Data:  Recent Labs   Lab 07/10/25  2225   TROPONINI <0.006   BNP 75     Radiology:  Imaging Results              X-Ray Chest AP Portable (Final result)  Result time 07/10/25 23:14:07      Final result by Tommie Villa DO (07/10/25 23:14:07)                   Impression:      No acute abnormality.      Electronically signed by: Tommie Villa  Date:    07/10/2025  Time:    23:14               Narrative:    EXAMINATION:  XR CHEST AP PORTABLE    CLINICAL HISTORY:  CHF;    TECHNIQUE:  Single frontal view of the chest was performed.    COMPARISON:  03/21/2025.    FINDINGS:  The lungs are well expanded and clear. No focal opacities are seen. The pleural spaces are clear. The cardiac silhouette is unremarkable.  There are calcifications of the aortic arch.  The visualized osseous structures are unremarkable.                                       Assessment:     Fabiano Malik Jr. is a 64 y.o. male with:  Patient Active Problem List    Diagnosis Date Noted    Cellulitis 07/11/2025    Medication nonadherence due to psychosocial problem 07/11/2025    Hyponatremia 03/23/2025    Ground glass opacity present on imaging of lung 03/23/2025    Schizoaffective disorder with good prognostic features 03/22/2025    Meniere's disease  03/22/2025    Microcytic anemia 03/22/2025    Vertigo 03/22/2025    Dyspnea on exertion 01/13/2025    Interstitial pulmonary disease, unspecified 01/13/2025    Scarring of lung 01/13/2025    Chronic myelopathy 05/10/2024    Other nonthrombocytopenic purpura 05/10/2024    Hypoglycemia 10/04/2023    S/P cervical spinal fusion 08/25/2023    Cervical spondylosis with myelopathy and radiculopathy 08/11/2023    Aortic arch atherosclerosis 07/14/2023    Pulmonary hypertension, unspecified 02/13/2023    Weakness 01/25/2023    History of colon polyps 11/21/2022    Carpal tunnel syndrome of right wrist 04/07/2022    Lumbar radiculopathy 09/12/2019    Erectile dysfunction 09/04/2019    Rash 09/04/2019    Hand or foot spasms 04/05/2019    Hearing loss of left ear 04/05/2019    Cerumen debris on tympanic membrane of right ear 04/05/2019    Lumbar facet arthropathy 03/12/2019    Palmar nodule, left 02/15/2019    Trigger index finger of right hand 02/15/2019    Chronic pain 01/15/2019    EKG abnormalities 01/14/2019    Gastroesophageal reflux disease 01/14/2019    Neck pain 01/08/2019    Need for vaccination against Streptococcus pneumoniae using pneumococcal conjugate vaccine 7 12/12/2018    Depression 12/12/2018    Anemia 12/12/2018    Chronic back pain 12/12/2018    Class 1 obesity with serious comorbidity and body mass index (BMI) of 32.0 to 32.9 in adult 11/28/2018    Dizziness 11/28/2018    Need for hepatitis C screening test 05/31/2018    Polyp of colon 05/31/2018    Hypertension associated with diabetes 07/26/2016    HLD (hyperlipidemia) 07/26/2016    Schizophrenia 07/26/2016    Hyperkalemia 07/26/2016    Type 2 diabetes mellitus with diabetic peripheral angiopathy without gangrene, without long-term current use of insulin 07/26/2016        Plan:     Concern for bilateral PVD   Cellulitis  Concern for medication non-adherence   Low concern for cellulitis.   S/P vancomycin in ED  -wound care consulted; will need wound care  on discharge  - ABIs ordered; consider arterial studies inpatient vs outpatient  - transitioned to oral Linezolid-> can likely continue short course given systemic symptoms or completely de-escalate     Anemia, microcytic  No evidence of bleeding on examination. Documented history of NIKKI  - repeat anemia studies pending  - resume iron supplementation    Hyponatremia, asymptomatic  -chronic history of hyponatremia. Close to baseline on admission. Originally concerned for SIADH and 2/2 to HCTZ use on prior admission.   - fluid restriction 800 cc/24 hr   - continue BMP daily   - strict I&O  - osmols, urine studies pending.   -outpatient follow up with PCP on discharge for close monitoring     DMT2  A1c 8.2 3 months ago   -SSI inpatient ; goal 140-180    ILD:   No acute concerns on admission  -outpatient follow up with pulmonary on discharge; lost to follow up    Schizophrenia:   Aripiprazole 5 mg     Hypothyroidism  Continue home synthroid     Chronic bilateral back pain w/sciatica   Continue robaxin and gabapentin   -consider outpatient follow up with PM&R for JOHN    HTN:   Continue home antihypertensives     Code Status:     Full    Ceci Guerrero MD  [facility] Internal Medicine HO-3    [facility] Medicine Hospitalist Pager numbers:   [facility] Hospitalist Medicine Team A (Rah/Anum): [phone]  [facility] Hospitalist Medicine Team B (Shantelle/Cricket):  [phone]

## 2025-07-11 NOTE — ED NOTES
Patient transported to ECHO awake, alert, oriented, in stable condition, vitals stable, in no acute distress

## 2025-07-11 NOTE — CONSULTS
"[facility] - Emergency Dept  Wound Care    Patient Name:  Fabiano Malik Jr.   MRN:  [id number]  Date: 7/11/2025  Diagnosis: Cellulitis    History:     Past Medical History:   Diagnosis Date    Chronic back pain greater than 3 months duration     Depression     Diabetes mellitus     Diabetes mellitus, type 2     Hypertension     Schizophrenia        Social History[1]    Precautions:     Allergies as of 07/10/2025 - Reviewed 07/10/2025   Allergen Reaction Noted    Pcn [penicillins] Other (See Comments) 06/25/2016       WO Assessment Details/Treatment       BLE- scattered dried unroofed blisters- no weeping or drainage noted- mild erythema/swelling      Bilateral heels intact with no redness    Recommendations discussed with pt, nurse and Dr. Jimenez:  - Pressure injury prevention interventions   - Betadine to BLE scabs BID- may consider compression therapy depending on arterial status    07/11/2025         [1]   Social History  Socioeconomic History    Marital status:    Tobacco Use    Smoking status: Never    Smokeless tobacco: Never   Substance and Sexual Activity    Alcohol use: Yes     Comment: "couple of beers a day"    Drug use: No    Sexual activity: Not Currently     Social Drivers of Health     Financial Resource Strain: High Risk (3/23/2025)    Overall Financial Resource Strain (CARDIA)     Difficulty of Paying Living Expenses: Very hard   Food Insecurity: Food Insecurity Present (3/23/2025)    Hunger Vital Sign     Worried About Running Out of Food in the Last Year: Often true     Ran Out of Food in the Last Year: Often true   Transportation Needs: No Transportation Needs (3/22/2025)    PRAPARE - Transportation     Lack of Transportation (Medical): No     Lack of Transportation (Non-Medical): No   Recent Concern: Transportation Needs - High Risk (3/21/2025)    Received from [facility] SDOH Screening     Has lack of transportation kept you from medical appointments, meetings, work or from " getting things needed for daily living? choose all that apply.: Yes, it has kept me from medical appointments or from getting my medications     Has lack of transportation kept you from medical appointments, meetings, work or from getting things needed for daily living? choose all that apply.: Yes, it has kept me from non-medical meetings, appointments, work or from getting things that i need   Physical Activity: Sufficiently Active (3/20/2025)    Exercise Vital Sign     Days of Exercise per Week: 7 days     Minutes of Exercise per Session: 70 min   Stress: Stress Concern Present (3/23/2025)    [employer] [place] [employer] - Occupational Stress Questionnaire     Feeling of Stress : Very much   Housing Stability: Low Risk  (3/23/2025)    Housing Stability Vital Sign     Unable to Pay for Housing in the Last Year: No     Number of Times Moved in the Last Year: 0     Homeless in the Last Year: No   Recent Concern: Housing Stability - High Risk (2/23/2025)    Received from [facility] SDOH Screening     In the past year, have you been unable to get any of the following when you really needed them? choose all that apply.: Utilities (electric, gas, and water)

## 2025-07-11 NOTE — ED PROVIDER NOTES
Emergency Department Encounter  Provider Note  Encounter Date: 7/10/2025    Patient Name: Fabiano Malik Jr.  MRN: [id number]    History of Present Illness   HPI  History of Present Illness:    Chief Complaint:   Chief Complaint   Patient presents with    Leg Swelling     Pt presented with bilateral leg swelling and open wounds to bilateral legs x 2 weeks.  Stated he saw a NP 2 weeks ago but stated they have not improved.  Pt stated he has 20 pounds in 2 weeks. Both legs are red to mid calf area. Pt stated he is diabetic.      64-year-old male presenting with weeks of bilateral lower extremity swelling and now with open sores.  No fevers or chills.  Denies any inciting trauma.  He says he was trying to clean his feet and the sores just opened up.    The following PMH/PSH/SocHx/FamHx has been reviewed by myself:  Past Medical History:   Diagnosis Date    Chronic back pain greater than 3 months duration     Depression     Diabetes mellitus     Diabetes mellitus, type 2     Hypertension     Schizophrenia      Past Surgical History:   Procedure Laterality Date    APPENDECTOMY      CARPAL TUNNEL RELEASE Right 12/13/2022    Procedure: RELEASE, CARPAL TUNNEL;  Surgeon: Everton Walter Jr., MD;  Location: [facility] OR;  Service: Orthopedics;  Laterality: Right;    COLONOSCOPY N/A 5/31/2018    Procedure: COLONOSCOPY;  Surgeon: Guillaume Hatch MD;  Location: [facility];  Service: Endoscopy;  Laterality: N/A;    COLONOSCOPY N/A 11/17/2022    Procedure: COLONOSCOPY;  Surgeon: Guillaume Hatch MD;  Location: [facility] ENDO;  Service: Endoscopy;  Laterality: N/A;    DECOMPRESSION, NERVE, ULNAR Right 12/13/2022    Procedure: DECOMPRESSION, NERVE, ULNAR;  Surgeon: Everton Walter Jr., MD;  Location: [facility] OR;  Service: Orthopedics;  Laterality: Right;    EPIDURAL STEROID INJECTION INTO LUMBAR SPINE N/A 2/21/2024    Procedure: L5-S1 JOHN;  Surgeon: Zoila Suarez DO;  Location: [facility] PAIN MANAGEMENT;  Service: Pain Management;   Laterality: N/A;  oral 30 mins    FUSION, SPINE, CERVICAL N/A 8/11/2023    Procedure: FUSION, SPINE, CERVICAL;  Surgeon: Guillaume Washington MD;  Location: [facility] OR;  Service: Neurosurgery;  Laterality: N/A;  C3-4, C5-6, C6-7 ACDF C3-C7 anterior instrumentation Depuy  -ANTERIOR DECOMPREESSION 2 LEVELS   -ANTERIOR INSTRUMENTATION INTERBODY CAGE INTERSPACE 3   -LOP 3.5 HR   -LOS 3 DAYS   -GENERAL   -TYPE AND SCREEN   - C ARM   -EMG, SEP, MEP hari notified cc  -ASPEN   -REG    NECK SURGERY      RADIOFREQUENCY ABLATION OF LUMBAR MEDIAL BRANCH NERVE AT SINGLE LEVEL Left 5/29/2018    Procedure: RADIOFREQUENCY THERMOCOAGULATION (RFTC)-NERVE-MEDIAN BRANCH-LUMBAR- Left L2-3-4-5;  Surgeon: Donavon Dennis MD;  Location: [facility] PAIN T;  Service: Pain Management;  Laterality: Left;    RADIOFREQUENCY ABLATION OF LUMBAR MEDIAL BRANCH NERVE AT SINGLE LEVEL Right 1/8/2019    Procedure: Radiofrequency Ablation, Nerve, Spinal, Lumbar, Medial Branch, L2,3,4,5;  Surgeon: Alberto Hernandez Jr., MD;  Location: [facility] PAIN [facility];  Service: Pain Management;  Laterality: Right;  Pt is diabetic    RADIOFREQUENCY ABLATION OF LUMBAR MEDIAL BRANCH NERVE AT SINGLE LEVEL Left 3/12/2019    Procedure: Radiofrequency Ablation, Nerve, Spinal, Lumbar, Medial Branch, Left, L2,3,4,5;  Surgeon: Alberto Hernandez Jr., MD;  Location: [facility] PAIN [facility];  Service: Pain Management;  Laterality: Left;  Pt will be consented the day of procedure.    TRANSFORAMINAL EPIDURAL INJECTION OF STEROID Right 9/12/2019    Procedure: Injection,steroid,epidural,transforaminal,right,L4-5 and L5-S1;  Surgeon: Alberto Hernandez Jr., MD;  Location: [facility] PAIN [facility];  Service: Pain Management;  Laterality: Right;  PT IS DIABETIC     Social History[1]  Family History   Problem Relation Name Age of Onset    Alzheimer's disease Mother      Diabetes Mother      Heart defect Father      Cancer Father      Stroke Father      Heart attack Father      Diabetes Sister x2     Heart defect Sister  x2     Multiple sclerosis Sister x2     Heart disease Sister x2     Alzheimer's disease Sister x2     No Known Problems Son x1      Allergies reviewed:  Review of patient's allergies indicates:   Allergen Reactions    Pcn [penicillins] Other (See Comments)     Childhood rxn, pt does not recall type of rxn     Medications reviewed:  Medication List with Changes/Refills   Current Medications    ALBUTEROL (PROVENTIL/VENTOLIN HFA) 90 MCG/ACTUATION INHALER    Inhale 2 puffs into the lungs every 6 (six) hours as needed for Wheezing. Rescue    ARIPIPRAZOLE (ABILIFY) 5 MG TAB    Take 1 tablet (5 mg total) by mouth once daily.    BLOOD PRESSURE CUFF MISC    1 Units by Misc.(Non-Drug; Combo Route) route once daily.    BLOOD SUGAR DIAGNOSTIC (TRUE METRIX GLUCOSE TEST STRIP) STRP    use 1 strip to check glucose 2 (two) times a day.    BLOOD-GLUCOSE METER MISC    Use as instructed    DICLOFENAC (VOLTAREN) 75 MG EC TABLET    Take 1 tablet (75 mg total) by mouth 2 (two) times daily as needed (pain).    DULOXETINE (CYMBALTA) 60 MG CAPSULE    Take 2 capsules (120 mg total) by mouth every morning.    GABAPENTIN (NEURONTIN) 300 MG CAPSULE    Take 1 capsule (300 mg total) by mouth 3 (three) times daily. For chronic pain    HYDROXYZINE PAMOATE (VISTARIL) 50 MG CAP    TAKE 1 CAPSULE (50 MG) BY MOUTH NIGHTLY AS NEEDED FOR INSOMNIA    LANCETS 30 GAUGE MISC    1 lancet by Misc.(Non-Drug; Combo Route) route twice daily.    LEVOTHYROXINE (SYNTHROID) 25 MCG TABLET    Take 1 tablet (25 mcg total) by mouth before breakfast.    METFORMIN (GLUCOPHAGE) 1000 MG TABLET    Take 1 tablet (1,000 mg total) by mouth 2 (two) times daily with meals.    METHOCARBAMOL (ROBAXIN) 500 MG TAB    Take 1 tablet (500 mg total) by mouth 2 (two) times a day. for 10 days    OLMESARTAN (BENICAR) 20 MG TABLET    Take 1 tablet (20 mg total) by mouth once daily.    OMEPRAZOLE (PRILOSEC) 20 MG CAPSULE    Take 1 capsule (20 mg total) by mouth before breakfast.    PRAVASTATIN  (PRAVACHOL) 40 MG TABLET    Take 1 tablet (40 mg total) by mouth once daily.    SILDENAFIL (VIAGRA) 100 MG TABLET    Take 1 tablet (100 mg total) by mouth daily as needed for Erectile Dysfunction.    TIRZEPATIDE (MOUNJARO) 5 MG/0.5 ML PNIJ    Inject 5 mg into the skin every 7 days.       Physical Exam     Initial Vitals [07/10/25 2146]   BP Pulse Resp Temp SpO2   131/77 92 16 98.5 °F (36.9 °C) 98 %      MAP       --           Triage vital signs reviewed.    Constitutional: Well-nourished, well-developed. Not in acute distress.  HENT: Normocephalic, atraumatic. Moist mucous membranes.  Eyes: No conjunctival injection.  Resp: Normal respiratory effort, breathing unlabored.  Cardio: Regular rate and rhythm.  GI: Abdomen non-distended.   MSK:  Bilateral lower extremity edema, erythema up to the mid calf.  Multiple open sores and healing sores up to mid thigh.  Strong distal pulses bilaterally.  Skin: Warm and dry. No rashes or lesions noted.  Neuro: Awake and alert. Moves all extremities.    ED Course   Procedures    Medical Decision Making    History Acquisition     The history is provided by the patient.     Review of prior external/non ED notes:  Office visit 06/25 for chronic back pain.  Started on Mounjaro.    Differential Diagnoses   Based on available information and initial assessment, the working Differential Diagnosis includes, but is not limited to:  Cellulitis, abscess, necrotizing fasciitis, osteomyelitis, septic joint, MRSA, DVT, drug eruption, allergic/contact dermatitis, EM/SJS, viral exanthem, local trauma/contusion.    EKG   EKG ordered and independently reviewed by me:   Sinus rhythm, first-degree AV block, rate 83, normal axis, no STEMI    Labs   Lab tests ordered and independently reviewed by me:    Labs Reviewed   COMPREHENSIVE METABOLIC PANEL - Abnormal       Result Value    Sodium 125 (*)     Potassium 4.6      Chloride 96      CO2 22 (*)     Glucose 223 (*)     BUN 21      Creatinine 1.3       Calcium 8.9      Protein Total 6.7      Albumin 3.6      Bilirubin Total 0.3      ALP 86      AST 18      ALT 13      Anion Gap 7 (*)     eGFR >60     CBC WITH DIFFERENTIAL - Abnormal    WBC 10.77      RBC 4.12 (*)     HGB 9.9 (*)     HCT 29.6 (*)     MCV 72 (*)     MCH 24.0 (*)     MCHC 33.4      RDW 14.6 (*)     Platelet Count 292      MPV 7.9 (*)     Nucleated RBC 0      Neut % 53.4      Lymph % 17.1 (*)     Mono % 7.2      Eos % 21.2 (*)     Basophil % 0.8      Imm Grans % 0.3      Neut # 5.75      Lymph # 1.84      Mono # 0.78      Eos # 2.28 (*)     Baso # 0.09      Imm Grans # 0.03     TROPONIN I - Normal    Troponin-I <0.006     B-TYPE NATRIURETIC PEPTIDE - Normal    BNP 75     CBC W/ AUTO DIFFERENTIAL    Narrative:     The following orders were created for panel order CBC auto differential.  Procedure                               Abnormality         Status                     ---------                               -----------         ------                     CBC with Differential[[id number]]       Abnormal            Final result                 Please view results for these tests on the individual orders.   EXTRA TUBES    Narrative:     The following orders were created for panel order EXTRA TUBES.  Procedure                               Abnormality         Status                     ---------                               -----------         ------                     Light Green Top Hold[[id number]]                                                         Please view results for these tests on the individual orders.   LIGHT GREEN TOP HOLD       Imaging   Imaging ordered and independently reviewed by me:   Imaging Results              X-Ray Chest AP Portable (Final result)  Result time 07/10/25 23:14:07      Final result by Tommie Villa DO (07/10/25 23:14:07)                   Impression:      No acute abnormality.      Electronically signed by: Tommie Villa  Date:    07/10/2025  Time:    23:14                Narrative:    EXAMINATION:  XR CHEST AP PORTABLE    CLINICAL HISTORY:  CHF;    TECHNIQUE:  Single frontal view of the chest was performed.    COMPARISON:  03/21/2025.    FINDINGS:  The lungs are well expanded and clear. No focal opacities are seen. The pleural spaces are clear. The cardiac silhouette is unremarkable.  There are calcifications of the aortic arch.  The visualized osseous structures are unremarkable.                                         Additional Consideration   Fabiano Malik Jr.  presents to the Emergency Department today with bilateral lower extremity swelling, open wounds, shortness of breath.  Patient with no history of heart failure.  Does have diabetic neuropathy.  Wounds are numerous.  Will need admission and IV antibiotics.    Additional testing considered but not indicated during the course of this workup: further imaging and labwork, not indicated  Co-morbidities/chronic illness/exacerbation of chronic illness considered which impacted clinical decision making:  Diabetes, age  Procedures done in the ED or plan for the OR: No  Social determinants of care considered during development of treatment plan include: Decreased medical literacy  Discussion of management or test interpretation with external provider: Yes, describe:  See ED course  DNR discussion: No    The patient's list of active medications and allergies as documented per RN staff has been reviewed.  Medications given in the ED and/or prescribed:   Medications   vancomycin - pharmacy to dose (has no administration in time range)   vancomycin 2 g in 0.9% sodium chloride 500 mL IVPB (2,000 mg Intravenous New Bag 7/10/25 1074)   oxyCODONE-acetaminophen 5-325 mg per tablet 1 tablet (1 tablet Oral Given 7/10/25 2318)             ED Course as of 07/11/25 0001 Fri Jul 11, 2025   0000 Sign-out given to [facility] [CS]      ED Course User Index  [CS] Di Tracy MD       Explanation of disposition:  "Admit    Clinical Impression:     1. Shortness of breath             [1]   Social History  Tobacco Use    Smoking status: Never    Smokeless tobacco: Never   Substance Use Topics    Alcohol use: Yes     Comment: "couple of beers a day"    Drug use: No        Di Tracy MD  07/11/25 0001    "

## 2025-07-11 NOTE — ED NOTES
Patient transported to floor, awake, alert, oriented, in stable condition, in no acute distress, with belongings

## 2025-07-11 NOTE — ED NOTES
Patient is awake, alert, oriented, vital stable, bilateral lower extremities are swollen, red, with sores, states legs are painful, urinal at bedisde

## 2025-07-11 NOTE — PHARMACY MED REC
"  [facility]           Pharmacy  Admission Medication History     The home medication history was taken by Marcela Woodruff.      Medication history obtained from Medications listed below were obtained from: Patient/family    Based on information gathered for medication list, you may go to "Admission" then "Reconcile Home Medications" tabs to review and/or act upon those items.     The home medication list has been updated by the Pharmacy department.   Please read ALL comments highlighted in yellow.   Please address this information as you see fit.    Feel free to contact us if you have any questions or require assistance.    The medications listed below were removed from the home medication list.  Please reorder if appropriate:    Patient reports NOT TAKING the following medication(s):  Albuterol hfa      No current facility-administered medications on file prior to encounter.     Current Outpatient Medications on File Prior to Encounter   Medication Sig Dispense Refill    ARIPiprazole (ABILIFY) 5 MG Tab Take 1 tablet (5 mg total) by mouth once daily. 90 tablet 3    DULoxetine (CYMBALTA) 60 MG capsule Take 2 capsules (120 mg total) by mouth every morning. 180 capsule 3    gabapentin (NEURONTIN) 300 MG capsule Take 1 capsule (300 mg total) by mouth 3 (three) times daily. For chronic pain 270 capsule 0    hydrOXYzine pamoate (VISTARIL) 50 MG Cap TAKE 1 CAPSULE (50 MG) BY MOUTH NIGHTLY AS NEEDED FOR INSOMNIA (Patient taking differently: Take 50 mg by mouth nightly as needed (insomnia).) 90 capsule 3    levothyroxine (SYNTHROID) 25 MCG tablet Take 1 tablet (25 mcg total) by mouth before breakfast. 90 tablet 3    metFORMIN (GLUCOPHAGE) 1000 MG tablet Take 1 tablet (1,000 mg total) by mouth 2 (two) times daily with meals. 180 tablet 1    methocarbamoL (ROBAXIN) 500 MG Tab Take 1 tablet (500 mg total) by mouth 2 (two) times a day. for 10 days 20 tablet 0    olmesartan (BENICAR) 20 MG tablet Take 1 " tablet (20 mg total) by mouth once daily. 90 tablet 3    omeprazole (PRILOSEC) 20 MG capsule Take 1 capsule (20 mg total) by mouth before breakfast. 90 capsule 3    pravastatin (PRAVACHOL) 40 MG tablet Take 1 tablet (40 mg total) by mouth once daily. 90 tablet 3    sildenafiL (VIAGRA) 100 MG tablet Take 1 tablet (100 mg total) by mouth daily as needed for Erectile Dysfunction. 30 tablet 11    tirzepatide (MOUNJARO) 5 mg/0.5 mL PnIj Inject 5 mg into the skin every 7 days. (Patient taking differently: Inject 5 mg into the skin every Thursday.) 6 mL 0    ALCOHOL PADS PadM Apply topically once daily.      BLOOD PRESSURE CUFF Misc 1 Units by Misc.(Non-Drug; Combo Route) route once daily. 1 each 0    blood sugar diagnostic (TRUE METRIX GLUCOSE TEST STRIP) Strp use 1 strip to check glucose 2 (two) times a day. 200 strip 3    SAFETY LANCETS 28 gauge Misc 2 (two) times daily.         Please address this information as you see fit.  Feel free to contact us if you have any questions or require assistance.    Marcela Woodruff  [phone]                .

## 2025-07-11 NOTE — PROGRESS NOTES
"Pharmacokinetic Initial Assessment: IV Vancomycin    Assessment/Plan:    Initiate intravenous vancomycin with loading dose of 2000 mg once with subsequent doses when random concentrations are less than 20 mcg/mL  Desired empiric serum trough concentration is 10 to 15 mcg/mL  Draw vancomycin random level on 07/11/25 at 2300.  Pharmacy will continue to follow and monitor vancomycin.      Please contact pharmacy at extension [phone] with any questions regarding this assessment.     Thank you for the consult,   Mallory Almanzar       Patient brief summary:  Fabiano Malik Jr. is a 64 y.o. male initiated on antimicrobial therapy with IV Vancomycin for treatment of suspected skin & soft tissue infection    Drug Allergies:   Review of patient's allergies indicates:   Allergen Reactions    Pcn [penicillins] Other (See Comments)     Childhood rxn, pt does not recall type of rxn       Actual Body Weight:   108.9 kg    Renal Function:   CrCl cannot be calculated (Patient's most recent lab result is older than the maximum 7 days allowed.).,     Dialysis Method (if applicable):  N/A    CBC (last 72 hours):  Recent Labs   Lab Result Units 07/10/25  2225   WBC K/uL 10.77   HGB gm/dL 9.9*   HCT % 29.6*   Platelet Count K/uL 292   Lymph % % 17.1*   Mono % % 7.2   Eos % % 21.2*   Basophil % % 0.8       Metabolic Panel (last 72 hours):  No results for input(s): "SODIUM", "POTASSIUM", "CHLORIDE", "CO2", "GLUCOSE", "BUN BLD", "CREATININE", "ALBUMIN", "BILIRUBIN TOTAL", "ALK PHOS", "AST", "ALT", "MAGNESIUM", "PHOSPHORUS" in the last 72 hours.    Drug levels (last 3 results):  No results for input(s): "VANCOMYCINRA", "VANCORANDOM", "VANCOMYCINPE", "VANCOPEAK", "VANCOMYCINTR", "VANCOTROUGH" in the last 72 hours.    Microbiologic Results:  Microbiology Results (last 7 days)       ** No results found for the last 168 hours. **            "

## 2025-07-11 NOTE — NURSING
VIRTUAL NURSE:  Called patient's room via telephone; verified spelling of patient's name and birthdate.  Plan of care reviewed.  Education per flowsheet.   Informed that staff will round on patient every 2 hours but to use call light for any other needs they may have.  Informed of fall risk and fall precautions; verbalized understanding.  Call light within reach per patient.  Opportunity given for questions and questions answered.  Admission assessment questions answered. . Instructed to call for assistance.         07/11/25 4171   Patient Request   Patient Requested pain medication   Nurse Notification   Charge Nurse Notified? Yes   Name of Charge Nurse aliza adler   Nurse Notfication Method Secure Chat   Nurse Notified Of Patient Request   Admission   Communication Issues? None   Safety/Activity   Safety Promotion/Fall Prevention Fall Risk reviewed with patient/family;instructed to call staff for mobility   Pain/Comfort/Sleep   Preferred Pain Scale number (Numeric Rating Pain Scale)   Pain Body Location - Orientation generalized   Pain Rating (0-10): Rest 10

## 2025-07-12 VITALS
SYSTOLIC BLOOD PRESSURE: 119 MMHG | WEIGHT: 253.31 LBS | HEIGHT: 73 IN | BODY MASS INDEX: 33.57 KG/M2 | RESPIRATION RATE: 18 BRPM | HEART RATE: 73 BPM | OXYGEN SATURATION: 97 % | TEMPERATURE: 97 F | DIASTOLIC BLOOD PRESSURE: 77 MMHG

## 2025-07-12 PROBLEM — R06.02 SHORTNESS OF BREATH: Status: ACTIVE | Noted: 2025-01-13

## 2025-07-12 PROBLEM — I73.9 PVD (PERIPHERAL VASCULAR DISEASE): Status: ACTIVE | Noted: 2025-07-12

## 2025-07-12 LAB
ABSOLUTE NEUTROPHIL MANUAL (OHS): 5.6 K/UL
ALBUMIN SERPL BCP-MCNC: 3.3 G/DL (ref 3.5–5.2)
ALP SERPL-CCNC: 99 UNIT/L (ref 40–150)
ALT SERPL W/O P-5'-P-CCNC: 14 UNIT/L (ref 10–44)
ANION GAP (OHS): 9 MMOL/L (ref 8–16)
AST SERPL-CCNC: 17 UNIT/L (ref 11–45)
BILIRUB SERPL-MCNC: 0.3 MG/DL (ref 0.1–1)
BUN SERPL-MCNC: 16 MG/DL (ref 8–23)
CALCIUM SERPL-MCNC: 8.8 MG/DL (ref 8.7–10.5)
CHLORIDE SERPL-SCNC: 101 MMOL/L (ref 95–110)
CO2 SERPL-SCNC: 24 MMOL/L (ref 23–29)
CREAT SERPL-MCNC: 1.4 MG/DL (ref 0.5–1.4)
EOSINOPHIL NFR BLD MANUAL: 20 % (ref 0–8)
ERYTHROCYTE [DISTWIDTH] IN BLOOD BY AUTOMATED COUNT: 14.9 % (ref 11.5–14.5)
GFR SERPLBLD CREATININE-BSD FMLA CKD-EPI: 56 ML/MIN/1.73/M2
GLUCOSE SERPL-MCNC: 209 MG/DL (ref 70–110)
HCT VFR BLD AUTO: 32.4 % (ref 40–54)
HGB BLD-MCNC: 10 GM/DL (ref 14–18)
LYMPHOCYTES NFR BLD MANUAL: 14 % (ref 18–48)
MAGNESIUM SERPL-MCNC: 1.9 MG/DL (ref 1.6–2.6)
MCH RBC QN AUTO: 23.2 PG (ref 27–31)
MCHC RBC AUTO-ENTMCNC: 30.9 G/DL (ref 32–36)
MCV RBC AUTO: 75 FL (ref 82–98)
MONOCYTES NFR BLD MANUAL: 6 % (ref 4–15)
NEUTROPHILS NFR BLD MANUAL: 60 % (ref 38–73)
NUCLEATED RBC (/100WBC) (OHS): 0 /100 WBC
PHOSPHATE SERPL-MCNC: 4.2 MG/DL (ref 2.7–4.5)
PLATELET # BLD AUTO: 309 K/UL (ref 150–450)
PLATELET BLD QL SMEAR: ABNORMAL
PMV BLD AUTO: 8.1 FL (ref 9.2–12.9)
POCT GLUCOSE: 190 MG/DL (ref 70–110)
POCT GLUCOSE: 215 MG/DL (ref 70–110)
POTASSIUM SERPL-SCNC: 4.6 MMOL/L (ref 3.5–5.1)
PROCALCITONIN SERPL-MCNC: 0.07 NG/ML
PROT SERPL-MCNC: 6.3 GM/DL (ref 6–8.4)
RBC # BLD AUTO: 4.31 M/UL (ref 4.6–6.2)
SODIUM SERPL-SCNC: 134 MMOL/L (ref 136–145)
VANCOMYCIN SERPL-MCNC: 6.8 UG/ML (ref ?–80)
WBC # BLD AUTO: 9.35 K/UL (ref 3.9–12.7)

## 2025-07-12 PROCEDURE — 36415 COLL VENOUS BLD VENIPUNCTURE: CPT

## 2025-07-12 PROCEDURE — 84100 ASSAY OF PHOSPHORUS: CPT

## 2025-07-12 PROCEDURE — 82040 ASSAY OF SERUM ALBUMIN: CPT

## 2025-07-12 PROCEDURE — 84145 PROCALCITONIN (PCT): CPT

## 2025-07-12 PROCEDURE — 83735 ASSAY OF MAGNESIUM: CPT

## 2025-07-12 PROCEDURE — 94761 N-INVAS EAR/PLS OXIMETRY MLT: CPT

## 2025-07-12 PROCEDURE — 25000003 PHARM REV CODE 250

## 2025-07-12 PROCEDURE — 99900035 HC TECH TIME PER 15 MIN (STAT)

## 2025-07-12 PROCEDURE — 85027 COMPLETE CBC AUTOMATED: CPT

## 2025-07-12 PROCEDURE — 63600175 PHARM REV CODE 636 W HCPCS

## 2025-07-12 RX ORDER — FERROUS SULFATE 324(65)MG
324 TABLET, DELAYED RELEASE (ENTERIC COATED) ORAL EVERY OTHER DAY
Qty: 15 TABLET | Refills: 11 | Status: SHIPPED | OUTPATIENT
Start: 2025-07-12

## 2025-07-12 RX ORDER — DOXYCYCLINE 100 MG/1
100 CAPSULE ORAL 2 TIMES DAILY
Qty: 10 CAPSULE | Refills: 0 | Status: SHIPPED | OUTPATIENT
Start: 2025-07-12 | End: 2025-07-12

## 2025-07-12 RX ORDER — LANOLIN ALCOHOL/MO/W.PET/CERES
1 CREAM (GRAM) TOPICAL DAILY
Status: DISCONTINUED | OUTPATIENT
Start: 2025-07-12 | End: 2025-07-12

## 2025-07-12 RX ORDER — LANOLIN ALCOHOL/MO/W.PET/CERES
1 CREAM (GRAM) TOPICAL
Status: DISCONTINUED | OUTPATIENT
Start: 2025-07-12 | End: 2025-07-12 | Stop reason: HOSPADM

## 2025-07-12 RX ORDER — DOXYCYCLINE 100 MG/1
100 CAPSULE ORAL 2 TIMES DAILY
Qty: 10 CAPSULE | Refills: 0 | Status: ON HOLD | OUTPATIENT
Start: 2025-07-12 | End: 2025-07-15 | Stop reason: HOSPADM

## 2025-07-12 RX ADMIN — METHOCARBAMOL 500 MG: 500 TABLET ORAL at 03:07

## 2025-07-12 RX ADMIN — GABAPENTIN 300 MG: 300 CAPSULE ORAL at 02:07

## 2025-07-12 RX ADMIN — ACETAMINOPHEN 1000 MG: 500 TABLET ORAL at 02:07

## 2025-07-12 RX ADMIN — OXYCODONE HYDROCHLORIDE 5 MG: 5 TABLET ORAL at 09:07

## 2025-07-12 RX ADMIN — PRAVASTATIN SODIUM 40 MG: 40 TABLET ORAL at 09:07

## 2025-07-12 RX ADMIN — METHOCARBAMOL 500 MG: 500 TABLET ORAL at 09:07

## 2025-07-12 RX ADMIN — ARIPIPRAZOLE 5 MG: 5 TABLET ORAL at 09:07

## 2025-07-12 RX ADMIN — GABAPENTIN 300 MG: 300 CAPSULE ORAL at 09:07

## 2025-07-12 RX ADMIN — DULOXETINE 120 MG: 30 CAPSULE, DELAYED RELEASE ORAL at 05:07

## 2025-07-12 RX ADMIN — OXYCODONE HYDROCHLORIDE 5 MG: 5 TABLET ORAL at 02:07

## 2025-07-12 RX ADMIN — LEVOTHYROXINE SODIUM 25 MCG: 0.03 TABLET ORAL at 05:07

## 2025-07-12 RX ADMIN — INSULIN ASPART 2 UNITS: 100 INJECTION, SOLUTION INTRAVENOUS; SUBCUTANEOUS at 06:07

## 2025-07-12 RX ADMIN — ACETAMINOPHEN 1000 MG: 500 TABLET ORAL at 05:07

## 2025-07-12 RX ADMIN — POLYETHYLENE GLYCOL 3350 17 G: 17 POWDER, FOR SOLUTION ORAL at 09:07

## 2025-07-12 NOTE — PLAN OF CARE
07/12/25 1227   Discharge Planning   Support Systems None   Equipment Currently Used at Home cane, straight   Current Living Arrangements apartment   Patient/Family Anticipates Transition to home   Patient/Family Anticipated Services at Transition outpatient care   DME Needed Upon Discharge  cane, straight   Discharge Plan A Home   Physical Activity   On average, how many days per week do you engage in moderate to strenuous exercise (like a brisk walk)? 3 days   On average, how many minutes do you engage in exercise at this level? 30 min   Financial Resource Strain   How hard is it for you to pay for the very basics like food, housing, medical care, and heating? Hard   Housing Stability   In the last 12 months, was there a time when you were not able to pay the mortgage or rent on time? N   At any time in the past 12 months, were you homeless or living in a shelter (including now)? N   Transportation Needs   In the past 12 months, has lack of transportation kept you from medical appointments or from getting medications? yes   Food Insecurity   Within the past 12 months, you worried that your food would run out before you got the money to buy more. Sometimes   Within the past 12 months, the food you bought just didn't last and you didn't have money to get more. Sometimes   Stress   Do you feel stress - tense, restless, nervous, or anxious, or unable to sleep at night because your mind is troubled all the time - these days? Rather much   Social Isolation   How often do you feel lonely or isolated from those around you?  Always   Alcohol Use   Q1: How often do you have a drink containing alcohol? Never   Q2: How many drinks containing alcohol do you have on a typical day when you are drinking? None   Q3: How often do you have six or more drinks on one occasion? Never   Utilities   In the past 12 months has the electric, gas, oil, or water company threatened to shut off services in your home? No

## 2025-07-12 NOTE — DISCHARGE SUMMARY
[facility] Hospital Medicine Discharge Summary    Primary Team: [facility] Hospitalist Team B  Attending Physician: Jah Harley MD  Resident: Lexx  Intern: Chu    Date of Admit: 7/10/2025  Date of Discharge: 7/12/2025    Discharge to: Home  Condition: Stable and improved    Discharge Diagnoses     Problem List[1]    Consultants and Procedures     Consultants:  EMY    Procedures:   Arterial and venous US    Brief History of Present Illness      HPI:  Fabiano Malik Jr. is a 64 y.o. male with history of hyponatremia, NIKKI, ILD, schizophrenia, DMT2, HTN who presents to the ED for one week history of lower extremity wounds. States usually have bilaterally lower extremity swelling with drainage but developed blisters about one week ago. Today he showered and wiped with a towel which disrupted blisters on lower legs. States fluid was clear in color, no foul odor. Endorses worsening of usual bilateral lower extremity pain. No known recent trauma.Denies fever, reports chills onset about one week prior. No nausea or vomiting. Denies chest pain, dyspnea. No night sweats. Wanted admission for medication management, reported to the ED doctor that he would benefit from observation to assist with antibiotic therapy if indicated.     [facility] for concern for cellulitic changes to the bilateral LE. He was started on broad spectrum antibiotics with vancomycin which was discontinued the next day due to low concern for infectious etiology. Arterial and venous US were unremarkable. There was mild erythema at the site of the bilateral ulcers but there was no drainage. There was no worsening of the erythema over the course of the hospitalization. Pt was also noted to be iron deficient and was started on supplementation. Pt as poor glucose control at baseline which has contributed to development of ulcers. Will discharge on short course of doxycycline given concern for overlying cellulitis. Patient's sodium remained  stable throughout the hospitalization and he was without mental status changes, neurological changes- it was 134 on the day of discharge-which was improved from prior. Discharged with outpatient wound care and PCP.      For the full HPI please refer to the History & Physical from this admission.    Hospital Course By Problem with Pertinent Findings     C/f Cellulitis  Concern for medication non-adherence    S/P vancomycin in ED  -wound care consulted; will need wound care on discharge  - arterial and venous studies wnl  - discontinued abx yesterday  - pt has some mild erythema to the wound with serous drainage- may benefit from short course of oral abx on Dcx  - discharge on doxycycline 100 mg BID for 5 days  - follow up with PCP and outpatient wound care     Anemia, microcytic  No evidence of bleeding on examination. Documented history of NIKKI  - NIKKI noted on studies  - resume iron supplementation on discharge  -follow up with PCP regarding colon cancer screening and continued management     Hyponatremia, asymptomatic  -chronic history of hyponatremia. Close to baseline on admission. Originally concerned for SIADH and 2/2 to HCTZ use on prior admission.   - fluid restriction 800 cc/24 hr   - continue BMP daily   - strict I&O  - osmols, urine studies pending.   - 134 on the day of discharge  -outpatient follow up with PCP on discharge for close monitoring      DMT2  A1c 8.2 3 months ago   -SSI inpatient ; goal 140-180  - patient to continue manjarou and metformin as outpatient     ILD:   No acute concerns on admission  -outpatient follow up with pulmonary on discharge; lost to follow up     Schizophrenia:   Aripiprazole 5 mg      Hypothyroidism  Continue home synthroid      Chronic bilateral back pain w/sciatica   Continue robaxin and gabapentin   -consider outpatient follow up with PM&R for JOHN     HTN:   Continue home antihypertensives    Arely Hallman DO  [facility] PGY2    Discharge Medications        Medication  List        START taking these medications      doxycycline 100 MG Cap  Commonly known as: VIBRAMYCIN  Take 1 capsule (100 mg total) by mouth 2 (two) times daily. for 5 days     ferrous sulfate 324 mg (65 mg iron) Tbec  Take 1 tablet (324 mg total) by mouth every other day.            CHANGE how you take these medications      hydrOXYzine pamoate 50 MG Cap  Commonly known as: VISTARIL  TAKE 1 CAPSULE (50 MG) BY MOUTH NIGHTLY AS NEEDED FOR INSOMNIA  What changed:   how much to take  how to take this  when to take this  reasons to take this  additional instructions     MOUNJARO 5 mg/0.5 mL Pnij  Generic drug: tirzepatide  Inject 5 mg into the skin every 7 days.  What changed: when to take this            CONTINUE taking these medications      albuterol 90 mcg/actuation inhaler  Commonly known as: PROVENTIL/VENTOLIN HFA  Inhale 2 puffs into the lungs every 6 (six) hours as needed for Wheezing. Rescue     ALCOHOL PADS Padm  Generic drug: alcohol swabs     ARIPiprazole 5 MG Tab  Commonly known as: ABILIFY  Take 1 tablet (5 mg total) by mouth once daily.     BLOOD PRESSURE CUFF Misc  Generic drug: miscellaneous medical supply  1 Units by Misc.(Non-Drug; Combo Route) route once daily.     blood sugar diagnostic Strp  Commonly known as: TRUE METRIX GLUCOSE TEST STRIP  use 1 strip to check glucose 2 (two) times a day.     DULoxetine 60 MG capsule  Commonly known as: CYMBALTA  Take 2 capsules (120 mg total) by mouth every morning.     gabapentin 300 MG capsule  Commonly known as: NEURONTIN  Take 1 capsule (300 mg total) by mouth 3 (three) times daily. For chronic pain     levothyroxine 25 MCG tablet  Commonly known as: SYNTHROID  Take 1 tablet (25 mcg total) by mouth before breakfast.     metFORMIN 1000 MG tablet  Commonly known as: GLUCOPHAGE  Take 1 tablet (1,000 mg total) by mouth 2 (two) times daily with meals.     methocarbamoL 500 MG Tab  Commonly known as: Robaxin  Take 1 tablet (500 mg total) by mouth 2 (two) times a  day. for 10 days     olmesartan 20 MG tablet  Commonly known as: BENICAR  Take 1 tablet (20 mg total) by mouth once daily.     omeprazole 20 MG capsule  Commonly known as: PRILOSEC  Take 1 capsule (20 mg total) by mouth before breakfast.     pravastatin 40 MG tablet  Commonly known as: PRAVACHOL  Take 1 tablet (40 mg total) by mouth once daily.     SAFETY LANCETS 28 gauge Misc  Generic drug: lancets     sildenafiL 100 MG tablet  Commonly known as: VIAGRA  Take 1 tablet (100 mg total) by mouth daily as needed for Erectile Dysfunction.            STOP taking these medications      diclofenac 75 MG EC tablet  Commonly known as: VOLTAREN               Where to Get Your Medications        These medications were sent to [facility] [address], [place] [address]      Hours: Mon-Fri, 8a-5:30p Phone: [phone]   albuterol 90 mcg/actuation inhaler  doxycycline 100 MG Cap  ferrous sulfate 324 mg (65 mg iron) Tbec         Discharge Information:   Diet:  Diabetic Diet    Physical Activity:  As tolerated by patient.             Instructions:  1. Take all medications as prescribed  2. Keep all follow-up appointments  3. Return to the hospital or call your primary care physicians if any worsening symptoms such as fever, chest pain, shortness of breath, return of symptoms, or any other concerns.    Follow-Up Appointments:  PCP  Wound care      Arely Hallman DO  [facility] Medicine PGY2         [1]   Patient Active Problem List  Diagnosis    Hypertension associated with diabetes    HLD (hyperlipidemia)    Schizophrenia    Hyperkalemia    Need for hepatitis C screening test    Polyp of colon    Class 1 obesity with serious comorbidity and body mass index (BMI) of 32.0 to 32.9 in adult    Dizziness    Need for vaccination against Streptococcus pneumoniae using pneumococcal conjugate vaccine 7    Depression    Anemia    Chronic back pain    Neck pain    EKG abnormalities    Gastroesophageal reflux disease     Chronic pain    Palmar nodule, left    Trigger index finger of right hand    Lumbar facet arthropathy    Hand or foot spasms    Hearing loss of left ear    Cerumen debris on tympanic membrane of right ear    Erectile dysfunction    Rash    Lumbar radiculopathy    Carpal tunnel syndrome of right wrist    History of colon polyps    Weakness    Pulmonary hypertension, unspecified    Type 2 diabetes mellitus with diabetic peripheral angiopathy without gangrene, without long-term current use of insulin    Aortic arch atherosclerosis    Cervical spondylosis with myelopathy and radiculopathy    S/P cervical spinal fusion    Hypoglycemia    Chronic myelopathy    Other nonthrombocytopenic purpura    Shortness of breath    Interstitial pulmonary disease, unspecified    Scarring of lung    Schizoaffective disorder with good prognostic features    Meniere's disease    Microcytic anemia    Vertigo    Hyponatremia    Ground glass opacity present on imaging of lung    Cellulitis    Medication nonadherence due to psychosocial problem    PVD (peripheral vascular disease)

## 2025-07-12 NOTE — NURSING
07/12/25 1427   AVS Confirmation   Discharge instructions and AVS provided to and reviewed with patient and/or significant other. Yes       AVS printed and handed to patient by bedside nurse. VN reviewed discharge instructions with patient using teachback method.  Allowed time for questions, all questions answered.  Patient verbalized complete understanding of discharge instructions and voices no concerns. Discharge instructions complete. Bedside nurse notified.

## 2025-07-12 NOTE — PROGRESS NOTES
[facility] Medicine Progress Note    Primary Team: [facility] Hospitalist Team B  Attending Physician: Jah Harley MD  Resident: Lexx  Intern:  Chu    Date of Admit: 7/10/2025     Chief Complaint:  woundsx 1 week    Subjective/Interval Events:      Abdominal pain yesterday with continued bloating this AM. Last BM on Thursday. Pt afebrile through the night. Has some pain at bilateral wounds in LE this morning. Arterial studies unremarkable Blood sugar remains uncontrolled- started Manjarou as outpatient.      Objective:   Last 24 Hour Vital Signs:  BP  Min: 102/64  Max: 136/81  Temp  Av.9 °F (36.6 °C)  Min: 97.5 °F (36.4 °C)  Max: 98.2 °F (36.8 °C)  Pulse  Av.3  Min: 81  Max: 90  Resp  Av.7  Min: 17  Max: 22  SpO2  Av.9 %  Min: 93 %  Max: 98 %  Weight  Av.9 kg (253 lb 4.9 oz)  Min: 114.9 kg (253 lb 4.9 oz)  Max: 114.9 kg (253 lb 4.9 oz)    Intake/Output Summary (Last 24 hours) at 2025 0754  Last data filed at 2025 0356  Gross per 24 hour   Intake 141.82 ml   Output 2500 ml   Net -2358.18 ml       Physical Examination:  Gen: WDWN , NAD, resting comfortably in bed  HEENT: NC/AT, EOMI grossly, normal external ears bilaterally, normal external nose, moist MM  Neck: Supple  Resp: Normal effort. Equal chest rise. No respiratory distress  Abd: Soft, nontender,  mild distended, no rebound/guarding  MSK/Ext: No clubbing, cyanosis, or edema. Moves all four extremities spontaneously. Bilateral lower extremity ulcers on the shins without evidence of infection/ purulence/ mild surrounding erythema   Neuro: GCS 15, alert, speech is normal, face symmetric, no focal motor deficits elicited on exam  Skin: Warm, dry, no rash  Psych: Normal mood and affect      Laboratory:  Recent Labs   Lab 07/10/25  2225 25  0117 25  0439   WBC 10.77 10.53 9.35   HGB 9.9* 9.6* 10.0*   HCT 29.6* 29.4* 32.4*    286 309   MCV 72* 72* 75*   RDW 14.6* 14.6* 14.9*   * 127* 134*   K 4.6 4.5  4.6   CL 96 98 101   CO2 22* 22* 24   BUN 21 20 16   CREATININE 1.3 1.3 1.4   * 188* 209*   PROT 6.7 6.4 6.3   ALBUMIN 3.6 3.4* 3.3*   BILITOT 0.3 0.3 0.3   AST 18 17 17   ALKPHOS 86 91 99   ALT 13 13 14       Microbiology:  Microbiology Results (last 7 days)       Procedure Component Value Units Date/Time    Aerobic culture [[id number]] Collected: 07/11/25 0230    Order Status: Sent Specimen: Wound from Leg, Left Updated: 07/11/25 0250              Imaging:  US Lower Extremity Arteries Bilateral   Final Result      No evidence of any hemodynamically significant stenosis.         Electronically signed by: Morgan Garza MD   Date:    07/11/2025   Time:    13:39      US Lower Extremity Veins Bilateral   Final Result      No evidence of deep venous thrombosis in either lower extremity.         Electronically signed by: Marquise Wagner   Date:    07/11/2025   Time:    08:03      XR Tibia Fibula 2 View Bilateral   Final Result      As above.         Electronically signed by: Morgan Garza MD   Date:    07/11/2025   Time:    08:09      X-Ray Chest AP Portable   Final Result      No acute abnormality.         Electronically signed by: Tommie Villa   Date:    07/10/2025   Time:    23:14      X-Ray KUB    (Results Pending)         Current Medications:     Scheduled:   acetaminophen  1,000 mg Oral Q8H    ARIPiprazole  5 mg Oral Daily    DULoxetine  120 mg Oral QAM    enoxparin  40 mg Subcutaneous Daily    ferrous sulfate  1 tablet Oral Every Mon, Wed, Fri    gabapentin  300 mg Oral TID    levothyroxine  25 mcg Oral Before breakfast    methocarbamoL  500 mg Oral BID    polyethylene glycol  17 g Oral Daily    pravastatin  40 mg Oral Daily         Infusions:       PRN:    Current Facility-Administered Medications:     albuterol, 2 puff, Inhalation, Q6H PRN    dextrose 50%, 12.5 g, Intravenous, PRN    dextrose 50%, 25 g, Intravenous, PRN    glucagon (human recombinant), 1 mg, Intramuscular, PRN    glucose, 16 g, Oral, PRN     glucose, 24 g, Oral, PRN    insulin aspart U-100, 0-5 Units, Subcutaneous, QID (AC + HS) PRN    naloxone, 0.02 mg, Intravenous, PRN    oxyCODONE, 5 mg, Oral, Q4H PRN    senna, 8.6 mg, Oral, Daily PRN    sodium chloride 0.9%, 10 mL, Intravenous, Q12H PRN    Assessment:   Fabiano Malik Jr. is a 64 y.o. male with history of hyponatremia, NIKKI, ILD, schizophrenia, DMT2, HTN who presents to the ED for one week history of lower extremity wounds.  Arterial studies wnl. Will need good wound care/ adequate DM controlled as outpatient.    Plan:     C/f Cellulitis  Concern for medication non-adherence    S/P vancomycin in ED  -wound care consulted; will need wound care on discharge  - arterial and venous studies wnl  - discontinued abx yesterday  - pt has some mild erythema to the wound with serous drainage- may benefit from short course of oral abx on DCx     Anemia, microcytic  No evidence of bleeding on examination. Documented history of NIKKI  - NIKKI noted on studies  - resume iron supplementation     Hyponatremia, asymptomatic  -chronic history of hyponatremia. Close to baseline on admission. Originally concerned for SIADH and 2/2 to HCTZ use on prior admission.   - fluid restriction 800 cc/24 hr   - continue BMP daily   - strict I&O  - osmols, urine studies pending.   -outpatient follow up with PCP on discharge for close monitoring      DMT2  A1c 8.2 3 months ago   -SSI inpatient ; goal 140-180     ILD:   No acute concerns on admission  -outpatient follow up with pulmonary on discharge; lost to follow up     Schizophrenia:   Aripiprazole 5 mg      Hypothyroidism  Continue home synthroid      Chronic bilateral back pain w/sciatica   Continue robaxin and gabapentin   -consider outpatient follow up with PM&R for JOHN     HTN:   Continue home antihypertensives      Dispo: Likely to DC today    Arely Hallman DO  [facility] PGY2    [facility] Hospitalist Phone Numbers:   [facility] Hospitalist Medicine Team A  (Rah/Anum): [phone]  [facility] Hospitalist Medicine Team B (Shantelle/Cricket):  [phone]

## 2025-07-12 NOTE — PLAN OF CARE
Brian is arriving at [facility] in 5 min  [phone]  Anuel Richardsa  [id number]    For LYFT ride to home. Pt nurse notified.

## 2025-07-12 NOTE — PLAN OF CARE
Patient an agreement to discharge home. Patient will be transported home by [facility].    Jose - Telemetry  Discharge Final Note    Primary Care Provider: Lucien Fleming MD    Expected Discharge Date: 7/12/2025    Final Discharge Note (most recent)       Final Note - 07/12/25 1316          Final Note    Assessment Type Final Discharge Note (P)      Anticipated Discharge Disposition Home or Self Care (P)      What phone number can be called within the next 1-3 days to see how you are doing after discharge? [phone] (P)      Hospital Resources/Appts/Education Provided Appointments scheduled and added to AVS (P)         Post-Acute Status    Discharge Delays None known at this time (P)                      Important Message from Medicare             Contact Info       [facility]    [address], [place] [address]       Next Steps: Call    Instructions: Please call [phone]    [facility]    [address]       Next Steps: Call    Instructions: Please contact [employer] or [employer]als [employer] Wheels [phone]    [facility]    [address]  [phone]       Next Steps: Call    Instructions: Please call for additional food resource

## 2025-07-12 NOTE — DISCHARGE INSTRUCTIONS
Our goal at [facility] is to always give you outstanding care and exceptional service. You may receive a survey from [employer] by mail, text or e-mail in the next 24-48 hours asking about the care you received with us. The survey should only take 5-10 minutes to complete and is very important to us.     Your feedback provides us with a way to recognize our staff who work tirelessly to provide the best care! Also, your responses help us learn how to improve when your experience was below our aspiration of excellence. We are always looking for ways to improve your stay. We WILL use your feedback to continue making improvements to help us provide the highest quality care. We keep your personal information and feedback confidential. We appreciate your time completing this survey and can't wait to hear from you!!!    We look forward to your continued care with us! Thanks so much for choosing [facility] for your healthcare needs!

## 2025-07-13 ENCOUNTER — NURSE TRIAGE (OUTPATIENT)
Dept: ADMINISTRATIVE | Facility: CLINIC | Age: 65
End: 2025-07-13
Payer: MEDICARE

## 2025-07-13 ENCOUNTER — HOSPITAL ENCOUNTER (INPATIENT)
Facility: HOSPITAL | Age: 65
LOS: 1 days | Discharge: HOME HEALTH | DRG: 641 | End: 2025-07-16
Attending: EMERGENCY MEDICINE | Admitting: INTERNAL MEDICINE
Payer: MEDICARE

## 2025-07-13 DIAGNOSIS — R07.9 CHEST PAIN: ICD-10-CM

## 2025-07-13 DIAGNOSIS — G89.29 CHRONIC BILATERAL LOW BACK PAIN WITH BILATERAL SCIATICA: ICD-10-CM

## 2025-07-13 DIAGNOSIS — L03.119 CELLULITIS OF LOWER EXTREMITY, UNSPECIFIED LATERALITY: ICD-10-CM

## 2025-07-13 DIAGNOSIS — M54.42 CHRONIC BILATERAL LOW BACK PAIN WITH BILATERAL SCIATICA: ICD-10-CM

## 2025-07-13 DIAGNOSIS — I87.2 VENOUS INSUFFICIENCY OF BOTH LOWER EXTREMITIES: ICD-10-CM

## 2025-07-13 DIAGNOSIS — E87.1 HYPONATREMIA: Primary | ICD-10-CM

## 2025-07-13 DIAGNOSIS — E11.51 TYPE 2 DIABETES MELLITUS WITH DIABETIC PERIPHERAL ANGIOPATHY WITHOUT GANGRENE, WITHOUT LONG-TERM CURRENT USE OF INSULIN: ICD-10-CM

## 2025-07-13 DIAGNOSIS — M54.41 CHRONIC BILATERAL LOW BACK PAIN WITH BILATERAL SCIATICA: ICD-10-CM

## 2025-07-13 LAB
ABSOLUTE EOSINOPHIL (OHS): 1.59 K/UL
ABSOLUTE MONOCYTE (OHS): 0.73 K/UL (ref 0.3–1)
ABSOLUTE NEUTROPHIL COUNT (OHS): 3.61 K/UL (ref 1.8–7.7)
ALBUMIN SERPL BCP-MCNC: 3.3 G/DL (ref 3.5–5.2)
ALP SERPL-CCNC: 87 UNIT/L (ref 40–150)
ALT SERPL W/O P-5'-P-CCNC: 14 UNIT/L (ref 10–44)
ANION GAP (OHS): 7 MMOL/L (ref 8–16)
AST SERPL-CCNC: 16 UNIT/L (ref 11–45)
BASOPHILS # BLD AUTO: 0.13 K/UL
BASOPHILS NFR BLD AUTO: 1.6 %
BILIRUB SERPL-MCNC: 0.2 MG/DL (ref 0.1–1)
BUN SERPL-MCNC: 20 MG/DL (ref 8–23)
CALCIUM SERPL-MCNC: 8.5 MG/DL (ref 8.7–10.5)
CHLORIDE SERPL-SCNC: 101 MMOL/L (ref 95–110)
CO2 SERPL-SCNC: 20 MMOL/L (ref 23–29)
CREAT SERPL-MCNC: 1.3 MG/DL (ref 0.5–1.4)
ERYTHROCYTE [DISTWIDTH] IN BLOOD BY AUTOMATED COUNT: 14.6 % (ref 11.5–14.5)
GFR SERPLBLD CREATININE-BSD FMLA CKD-EPI: >60 ML/MIN/1.73/M2
GLUCOSE SERPL-MCNC: 234 MG/DL (ref 70–110)
HCT VFR BLD AUTO: 28.1 % (ref 40–54)
HGB BLD-MCNC: 9.2 GM/DL (ref 14–18)
IMM GRANULOCYTES # BLD AUTO: 0.02 K/UL (ref 0–0.04)
IMM GRANULOCYTES NFR BLD AUTO: 0.2 % (ref 0–0.5)
LYMPHOCYTES # BLD AUTO: 2.13 K/UL (ref 1–4.8)
MAGNESIUM SERPL-MCNC: 1.5 MG/DL (ref 1.6–2.6)
MCH RBC QN AUTO: 24 PG (ref 27–31)
MCHC RBC AUTO-ENTMCNC: 32.7 G/DL (ref 32–36)
MCV RBC AUTO: 73 FL (ref 82–98)
NUCLEATED RBC (/100WBC) (OHS): 0 /100 WBC
OHS QRS DURATION: 106 MS
OHS QTC CALCULATION: 434 MS
PLATELET # BLD AUTO: 246 K/UL (ref 150–450)
PMV BLD AUTO: 8.2 FL (ref 9.2–12.9)
POCT GLUCOSE: 200 MG/DL (ref 70–110)
POCT GLUCOSE: 252 MG/DL (ref 70–110)
POTASSIUM SERPL-SCNC: 4.5 MMOL/L (ref 3.5–5.1)
PROT SERPL-MCNC: 6.3 GM/DL (ref 6–8.4)
RBC # BLD AUTO: 3.84 M/UL (ref 4.6–6.2)
RELATIVE EOSINOPHIL (OHS): 19.4 %
RELATIVE LYMPHOCYTE (OHS): 25.9 % (ref 18–48)
RELATIVE MONOCYTE (OHS): 8.9 % (ref 4–15)
RELATIVE NEUTROPHIL (OHS): 44 % (ref 38–73)
SODIUM SERPL-SCNC: 128 MMOL/L (ref 136–145)
WBC # BLD AUTO: 8.21 K/UL (ref 3.9–12.7)

## 2025-07-13 PROCEDURE — G0378 HOSPITAL OBSERVATION PER HR: HCPCS

## 2025-07-13 PROCEDURE — 25000003 PHARM REV CODE 250

## 2025-07-13 PROCEDURE — 96366 THER/PROPH/DIAG IV INF ADDON: CPT

## 2025-07-13 PROCEDURE — 82962 GLUCOSE BLOOD TEST: CPT

## 2025-07-13 PROCEDURE — 85025 COMPLETE CBC W/AUTO DIFF WBC: CPT | Performed by: EMERGENCY MEDICINE

## 2025-07-13 PROCEDURE — 63600175 PHARM REV CODE 636 W HCPCS

## 2025-07-13 PROCEDURE — 96375 TX/PRO/DX INJ NEW DRUG ADDON: CPT

## 2025-07-13 PROCEDURE — 80053 COMPREHEN METABOLIC PANEL: CPT | Performed by: EMERGENCY MEDICINE

## 2025-07-13 PROCEDURE — 83735 ASSAY OF MAGNESIUM: CPT | Performed by: EMERGENCY MEDICINE

## 2025-07-13 PROCEDURE — 63600175 PHARM REV CODE 636 W HCPCS: Mod: JZ,TB | Performed by: EMERGENCY MEDICINE

## 2025-07-13 PROCEDURE — 96372 THER/PROPH/DIAG INJ SC/IM: CPT

## 2025-07-13 PROCEDURE — 99285 EMERGENCY DEPT VISIT HI MDM: CPT | Mod: 25

## 2025-07-13 PROCEDURE — 96367 TX/PROPH/DG ADDL SEQ IV INF: CPT

## 2025-07-13 PROCEDURE — 96365 THER/PROPH/DIAG IV INF INIT: CPT

## 2025-07-13 RX ORDER — ENOXAPARIN SODIUM 100 MG/ML
40 INJECTION SUBCUTANEOUS EVERY 24 HOURS
Status: DISCONTINUED | OUTPATIENT
Start: 2025-07-13 | End: 2025-07-16 | Stop reason: HOSPADM

## 2025-07-13 RX ORDER — GLUCAGON 1 MG
1 KIT INJECTION
Status: DISCONTINUED | OUTPATIENT
Start: 2025-07-13 | End: 2025-07-16 | Stop reason: HOSPADM

## 2025-07-13 RX ORDER — INSULIN ASPART 100 [IU]/ML
0-10 INJECTION, SOLUTION INTRAVENOUS; SUBCUTANEOUS
Status: DISCONTINUED | OUTPATIENT
Start: 2025-07-13 | End: 2025-07-16 | Stop reason: HOSPADM

## 2025-07-13 RX ORDER — OXYCODONE HYDROCHLORIDE 5 MG/1
5 TABLET ORAL EVERY 6 HOURS PRN
Refills: 0 | Status: DISCONTINUED | OUTPATIENT
Start: 2025-07-13 | End: 2025-07-16 | Stop reason: HOSPADM

## 2025-07-13 RX ORDER — LOSARTAN POTASSIUM 50 MG/1
50 TABLET ORAL DAILY
Status: DISCONTINUED | OUTPATIENT
Start: 2025-07-14 | End: 2025-07-16 | Stop reason: HOSPADM

## 2025-07-13 RX ORDER — LANOLIN ALCOHOL/MO/W.PET/CERES
1 CREAM (GRAM) TOPICAL DAILY
Status: DISCONTINUED | OUTPATIENT
Start: 2025-07-14 | End: 2025-07-16 | Stop reason: HOSPADM

## 2025-07-13 RX ORDER — METHOCARBAMOL 500 MG/1
500 TABLET, FILM COATED ORAL 2 TIMES DAILY
Status: DISCONTINUED | OUTPATIENT
Start: 2025-07-13 | End: 2025-07-16 | Stop reason: HOSPADM

## 2025-07-13 RX ORDER — GABAPENTIN 300 MG/1
300 CAPSULE ORAL 3 TIMES DAILY
Status: DISCONTINUED | OUTPATIENT
Start: 2025-07-13 | End: 2025-07-16 | Stop reason: HOSPADM

## 2025-07-13 RX ORDER — MULTIVITAMIN
1 TABLET ORAL DAILY
COMMUNITY

## 2025-07-13 RX ORDER — PANTOPRAZOLE SODIUM 40 MG/1
40 TABLET, DELAYED RELEASE ORAL DAILY
Status: DISCONTINUED | OUTPATIENT
Start: 2025-07-14 | End: 2025-07-16 | Stop reason: HOSPADM

## 2025-07-13 RX ORDER — IPRATROPIUM BROMIDE AND ALBUTEROL SULFATE 2.5; .5 MG/3ML; MG/3ML
3 SOLUTION RESPIRATORY (INHALATION) EVERY 6 HOURS PRN
Status: DISCONTINUED | OUTPATIENT
Start: 2025-07-13 | End: 2025-07-16 | Stop reason: HOSPADM

## 2025-07-13 RX ORDER — SIMETHICONE 125 MG
125 TABLET,CHEWABLE ORAL ONCE
Status: COMPLETED | OUTPATIENT
Start: 2025-07-13 | End: 2025-07-13

## 2025-07-13 RX ORDER — ARIPIPRAZOLE 5 MG/1
5 TABLET ORAL DAILY
Status: DISCONTINUED | OUTPATIENT
Start: 2025-07-14 | End: 2025-07-16 | Stop reason: HOSPADM

## 2025-07-13 RX ORDER — PRAVASTATIN SODIUM 40 MG/1
40 TABLET ORAL NIGHTLY
Status: DISCONTINUED | OUTPATIENT
Start: 2025-07-13 | End: 2025-07-16 | Stop reason: HOSPADM

## 2025-07-13 RX ORDER — DULOXETIN HYDROCHLORIDE 30 MG/1
120 CAPSULE, DELAYED RELEASE ORAL EVERY MORNING
Status: DISCONTINUED | OUTPATIENT
Start: 2025-07-14 | End: 2025-07-16 | Stop reason: HOSPADM

## 2025-07-13 RX ORDER — SODIUM CHLORIDE 0.9 % (FLUSH) 0.9 %
10 SYRINGE (ML) INJECTION EVERY 12 HOURS PRN
Status: DISCONTINUED | OUTPATIENT
Start: 2025-07-13 | End: 2025-07-16 | Stop reason: HOSPADM

## 2025-07-13 RX ORDER — HYDROXYZINE PAMOATE 25 MG/1
50 CAPSULE ORAL NIGHTLY PRN
Status: DISCONTINUED | OUTPATIENT
Start: 2025-07-13 | End: 2025-07-16 | Stop reason: HOSPADM

## 2025-07-13 RX ORDER — METHOCARBAMOL 500 MG/1
500 TABLET, FILM COATED ORAL 2 TIMES DAILY
Status: DISCONTINUED | OUTPATIENT
Start: 2025-07-13 | End: 2025-07-13

## 2025-07-13 RX ORDER — IBUPROFEN 200 MG
16 TABLET ORAL
Status: DISCONTINUED | OUTPATIENT
Start: 2025-07-13 | End: 2025-07-16 | Stop reason: HOSPADM

## 2025-07-13 RX ORDER — ACETAMINOPHEN 325 MG/1
650 TABLET ORAL EVERY 4 HOURS PRN
Status: DISCONTINUED | OUTPATIENT
Start: 2025-07-13 | End: 2025-07-16 | Stop reason: HOSPADM

## 2025-07-13 RX ORDER — LEVOTHYROXINE SODIUM 25 UG/1
25 TABLET ORAL
Status: DISCONTINUED | OUTPATIENT
Start: 2025-07-14 | End: 2025-07-16 | Stop reason: HOSPADM

## 2025-07-13 RX ORDER — IBUPROFEN 200 MG
24 TABLET ORAL
Status: DISCONTINUED | OUTPATIENT
Start: 2025-07-13 | End: 2025-07-16 | Stop reason: HOSPADM

## 2025-07-13 RX ORDER — NALOXONE HCL 0.4 MG/ML
0.02 VIAL (ML) INJECTION
Status: DISCONTINUED | OUTPATIENT
Start: 2025-07-13 | End: 2025-07-16 | Stop reason: HOSPADM

## 2025-07-13 RX ORDER — MAGNESIUM SULFATE HEPTAHYDRATE 40 MG/ML
2 INJECTION, SOLUTION INTRAVENOUS ONCE
Status: COMPLETED | OUTPATIENT
Start: 2025-07-13 | End: 2025-07-13

## 2025-07-13 RX ORDER — KETOROLAC TROMETHAMINE 30 MG/ML
15 INJECTION, SOLUTION INTRAMUSCULAR; INTRAVENOUS
Status: COMPLETED | OUTPATIENT
Start: 2025-07-13 | End: 2025-07-13

## 2025-07-13 RX ADMIN — GABAPENTIN 300 MG: 300 CAPSULE ORAL at 09:07

## 2025-07-13 RX ADMIN — SIMETHICONE 125 MG: 125 TABLET, CHEWABLE ORAL at 06:07

## 2025-07-13 RX ADMIN — ENOXAPARIN SODIUM 40 MG: 40 INJECTION SUBCUTANEOUS at 06:07

## 2025-07-13 RX ADMIN — SODIUM CHLORIDE 2250 MG: 9 INJECTION, SOLUTION INTRAVENOUS at 06:07

## 2025-07-13 RX ADMIN — METHOCARBAMOL 500 MG: 500 TABLET ORAL at 06:07

## 2025-07-13 RX ADMIN — KETOROLAC TROMETHAMINE 15 MG: 30 INJECTION, SOLUTION INTRAMUSCULAR; INTRAVENOUS at 05:07

## 2025-07-13 RX ADMIN — INSULIN ASPART 1 UNITS: 100 INJECTION, SOLUTION INTRAVENOUS; SUBCUTANEOUS at 09:07

## 2025-07-13 RX ADMIN — PRAVASTATIN SODIUM 40 MG: 40 TABLET ORAL at 09:07

## 2025-07-13 RX ADMIN — MAGNESIUM SULFATE HEPTAHYDRATE 2 G: 40 INJECTION, SOLUTION INTRAVENOUS at 06:07

## 2025-07-13 NOTE — TELEPHONE ENCOUNTER
"Pt was discharged yesterday and states began with increased pain and "tightness" in both lower legs, states his "open sores" are worse and bleeding/weeping.  States he did not sleep due to the pain.  States the wash and cream he was given to apply to his legs is making it worse/painful and sores appear now bigger.    Care advice states to go to the ED now.  Patient verbally understands, all questions answered, advised to call back for any worsening symptoms or further needs.     Reason for Disposition   SEVERE pain    Additional Information   Skin infection (cellulitis) diagnosed recently   Negative: Shock suspected (e.g., cold/pale/clammy skin, too weak to stand, low BP, rapid pulse)   Negative: Sounds like a life-threatening emergency to the triager    Protocols used: Recent Medical Visit for Illness Follow-up Call-A-AH, Cellulitis on Antibiotic Follow-up Call-A-    "

## 2025-07-13 NOTE — H&P
[facility] Medicine H&P Note     Admitting Team: [facility] Hospitalist Team B  Attending Physician: Shivani Monreal MD  Resident:Dr. Thurman  Intern: Dr. Tapia    Date of Admit: 7/13/2025    Chief Complaint     Wounds x one week    Subjective:      History of Present Illness:  Fabiano Malik Jr. is a 64 y.o. male with history of hyponatremia, NIKKI, ILD, schizophrenia, DMT2, HTN who presents to the ED for one week history of lower extremity wounds. He was discharged yesterday. Last admission he stated he usually has bilaterally lower extremity swelling with clear drainage but developed blisters about one week ago. Today he reports purulent drainage. Endorses worsening of usual bilateral lower extremity pain, describes it as a shooting, cramping pain. No known recent trauma.Denies fever, No nausea or vomiting. Denies chest pain, dyspnea. No night sweats. He's also reporting bloating, abdominal pain    Past Medical History:  See above.       Past Surgical History:  Past Surgical History:   Procedure Laterality Date    APPENDECTOMY      CARPAL TUNNEL RELEASE Right 12/13/2022    Procedure: RELEASE, CARPAL TUNNEL;  Surgeon: Everton Walter Jr., MD;  Location: [facility] OR;  Service: Orthopedics;  Laterality: Right;    COLONOSCOPY N/A 5/31/2018    Procedure: COLONOSCOPY;  Surgeon: Guillaume Hatch MD;  Location: [facility] ENDO;  Service: Endoscopy;  Laterality: N/A;    COLONOSCOPY N/A 11/17/2022    Procedure: COLONOSCOPY;  Surgeon: Guillaume Hatch MD;  Location: [facility] ENDO;  Service: Endoscopy;  Laterality: N/A;    DECOMPRESSION, NERVE, ULNAR Right 12/13/2022    Procedure: DECOMPRESSION, NERVE, ULNAR;  Surgeon: Everton Walter Jr., MD;  Location: [facility] OR;  Service: Orthopedics;  Laterality: Right;    EPIDURAL STEROID INJECTION INTO LUMBAR SPINE N/A 2/21/2024    Procedure: L5-S1 JOHN;  Surgeon: Zoila Suarez DO;  [facility];  Service: Pain Management;  Laterality: N/A;  oral 30 mins    FUSION, SPINE,  CERVICAL N/A 8/11/2023    Procedure: FUSION, SPINE, CERVICAL;  Surgeon: Guillaume Washington MD;  Location: [facility] OR;  Service: Neurosurgery;  Laterality: N/A;  C3-4, C5-6, C6-7 ACDF C3-C7 anterior instrumentation Depuy  -ANTERIOR DECOMPREESSION 2 LEVELS   -ANTERIOR INSTRUMENTATION INTERBODY CAGE INTERSPACE 3   -LOP 3.5 HR   -LOS 3 DAYS   -GENERAL   -TYPE AND SCREEN   - C ARM   -EMG, SEP, MEP hari notified cc  -ASPEN   -REG    NECK SURGERY      RADIOFREQUENCY ABLATION OF LUMBAR MEDIAL BRANCH NERVE AT SINGLE LEVEL Left 5/29/2018    Procedure: RADIOFREQUENCY THERMOCOAGULATION (RFTC)-NERVE-MEDIAN BRANCH-LUMBAR- Left L2-3-4-5;  Surgeon: Donavon Dennis MD;  [place] [facility] [place] [facility] [place]  Service: Pain Management;  Laterality: Left;    RADIOFREQUENCY ABLATION OF LUMBAR MEDIAL BRANCH NERVE AT SINGLE LEVEL Right 1/8/2019    Procedure: Radiofrequency Ablation, Nerve, Spinal, Lumbar, Medial Branch, L2,3,4,5;  Surgeon: Alberto Hernandez Jr., MD;  Location: [facility] PAIN T;  Service: Pain Management;  Laterality: Right;  Pt is diabetic    RADIOFREQUENCY ABLATION OF LUMBAR MEDIAL BRANCH NERVE AT SINGLE LEVEL Left 3/12/2019    Procedure: Radiofrequency Ablation, Nerve, Spinal, Lumbar, Medial Branch, Left, L2,3,4,5;  Surgeon: Alberto Hernandez Jr., MD;  Location: [facility] PAIN T;  Service: Pain Management;  Laterality: Left;  Pt will be consented the day of procedure.    TRANSFORAMINAL EPIDURAL INJECTION OF STEROID Right 9/12/2019    Procedure: Injection,steroid,epidural,transforaminal,right,L4-5 and L5-S1;  Surgeon: Alberto Hernandez Jr., MD;  Location: [facility] PAIN MGT;  Service: Pain Management;  Laterality: Right;  PT IS DIABETIC       Allergies:  PCN: unsure of reaction       Home Medications: verified with patient   Robaxin 500 BID  Gabapentin  mg   Duloxetine 120 mg daily   Metformin 1000 mg BID  Hydroxyzine 50 nightly PRN for insomnia  Aripiprazole 5 mg   Metoprolol succinate 25mg daily   Synthroid 25 mcg daily  "  Pravastatin 40 mg  Doxycyline  Omeprazole 20 mg   Olmesartan 20 mg   Multivitamin  Magnesium  Ferrous sulfate     Family History:  Family History   Problem Relation Name Age of Onset    Alzheimer's disease Mother      Diabetes Mother      Heart defect Father      Cancer Father      Stroke Father      Heart attack Father      Diabetes Sister x2     Heart defect Sister x2     Multiple sclerosis Sister x2     Heart disease Sister x2     Alzheimer's disease Sister x2     No Known Problems Son x1        Social History:  Tobacco use: denies  Alcohol use: denies  Drug use: denies    Review of Systems:  See HPI. All other systems are reviewed and are negative.    Health Maintaince :   Primary Care Physician: Dr. Fleming       Objective:   Last 24 Hour Vital Signs:  BP  Min: 122/58  Max: 122/58  Temp  Av.9 °F (36.6 °C)  Min: 97.9 °F (36.6 °C)  Max: 97.9 °F (36.6 °C)  Pulse  Av.5  Min: 77  Max: 78  Resp  Av  Min: 20  Max: 20  SpO2  Av %  Min: 97 %  Max: 97 %  Height  Av' 1" (185.4 cm)  Min: 6' 1" (185.4 cm)  Max: 6' 1" (185.4 cm)  Weight  Av.9 kg (240 lb)  Min: 108.9 kg (240 lb)  Max: 108.9 kg (240 lb)  Body mass index is 31.66 kg/m².  No intake/output data recorded.    Physical Examination:   Physical Exam  HENT:      Head: Normocephalic and atraumatic.      Nose: No rhinorrhea.   Eyes:      General: No scleral icterus.     Extraocular Movements: Extraocular movements intact.   Cardiovascular:      Rate and Rhythm: Normal rate and regular rhythm.      Pulses: Normal pulses.      Comments: Palpable bilateral lower extremity pulses.   Pulmonary:      Effort: Pulmonary effort is normal. No respiratory distress.      Breath sounds: Clear to auscultation. No wheezing.   Abdominal:      Palpations: Abdomen is soft.      Tenderness: There is no abdominal tenderness. There is no rebound.   Musculoskeletal:         General: Swelling (bilateral lower extermity swelling.) and tenderness (mild " tenderness to palpation to lower) present.      Right lower leg: No edema.      Left lower leg: No edema.      Comments:    Skin:bilateral open wounds, no drainage. Surrounding erythema.      Coloration: Skin is not pale.      Findings: No bruising.   Neurological:      Mental Status: He is oriented to person, place, and time.   Psychiatric:         Mood and Affect: Mood normal.         Behavior: Behavior normal.       Laboratory:  Most Recent Data:  CBC:   Lab Results   Component Value Date    WBC 8.21 07/13/2025    HGB 9.2 (L) 07/13/2025    HCT 28.1 (L) 07/13/2025     07/13/2025    MCV 73 (L) 07/13/2025    RDW 14.6 (H) 07/13/2025       BMP:   Lab Results   Component Value Date     (L) 07/13/2025    K 4.5 07/13/2025     07/13/2025    CO2 20 (L) 07/13/2025    BUN 20 07/13/2025    CREATININE 1.3 07/13/2025     (H) 07/13/2025    CALCIUM 8.5 (L) 07/13/2025    MG 1.5 (L) 07/13/2025    PHOS 4.2 07/12/2025     LFTs:   Lab Results   Component Value Date    PROT 6.3 07/13/2025    ALBUMIN 3.3 (L) 07/13/2025    BILITOT 0.2 07/13/2025    AST 16 07/13/2025    ALKPHOS 87 07/13/2025    ALT 14 07/13/2025       FLP:   Lab Results   Component Value Date    CHOL 114 (L) 07/11/2025    HDL 30 (L) 07/11/2025    LDLCALC 66.8 07/11/2025    TRIG 86 07/11/2025    CHOLHDL 26.3 07/11/2025     DM:   Lab Results   Component Value Date    HGBA1C 8.2 (H) 03/22/2025    HGBA1C 8.2 (H) 11/07/2024    HGBA1C 7.2 (H) 04/26/2024    LDLCALC 66.8 07/11/2025    CREATININE 1.3 07/13/2025     Thyroid:   Lab Results   Component Value Date    TSH 3.416 07/11/2025    FREET4 0.83 11/20/2023     Anemia:   Lab Results   Component Value Date    IRON 30 (L) 07/11/2025    TIBC 456 (H) 07/11/2025    FERRITIN 15.2 (L) 07/11/2025    [id number] 940 07/11/2025    FOLATE 15.8 07/11/2025     Cardiac:   Lab Results   Component Value Date    TROPONINI <0.006 07/10/2025    BNP 75 07/10/2025       Trended Lab Data:  Recent Labs   Lab 07/11/25  0117  07/12/25  0439 07/13/25  1529   WBC 10.53 9.35 8.21   HGB 9.6* 10.0* 9.2*   HCT 29.4* 32.4* 28.1*    309 246   MCV 72* 75* 73*   RDW 14.6* 14.9* 14.6*   * 134* 128*   K 4.5 4.6 4.5   CL 98 101 101   CO2 22* 24 20*   BUN 20 16 20   CREATININE 1.3 1.4 1.3   * 209* 234*   PROT 6.4 6.3 6.3   ALBUMIN 3.4* 3.3* 3.3*   BILITOT 0.3 0.3 0.2   AST 17 17 16   ALKPHOS 91 99 87   ALT 13 14 14       Trended Cardiac Data:  Recent Labs   Lab 07/10/25  2225   TROPONINI <0.006   BNP 75     Assessment:     Fabiano Malik Jr. is a 64 y.o. male with  history of hyponatremia, NIKKI, ILD, schizophrenia, DMT2, HTN who presents for worsening cellulitis. Considering  CT legs to assess for abscess.   Patient Active Problem List    Diagnosis Date Noted    PVD (peripheral vascular disease) 07/12/2025    Cellulitis 07/11/2025    Medication nonadherence due to psychosocial problem 07/11/2025    Hyponatremia 03/23/2025    Ground glass opacity present on imaging of lung 03/23/2025    Schizoaffective disorder with good prognostic features 03/22/2025    Meniere's disease 03/22/2025    Microcytic anemia 03/22/2025    Vertigo 03/22/2025    Shortness of breath 01/13/2025    Interstitial pulmonary disease, unspecified 01/13/2025    Scarring of lung 01/13/2025    Chronic myelopathy 05/10/2024    Other nonthrombocytopenic purpura 05/10/2024    Hypoglycemia 10/04/2023    S/P cervical spinal fusion 08/25/2023    Cervical spondylosis with myelopathy and radiculopathy 08/11/2023    Aortic arch atherosclerosis 07/14/2023    Pulmonary hypertension, unspecified 02/13/2023    Weakness 01/25/2023    History of colon polyps 11/21/2022    Carpal tunnel syndrome of right wrist 04/07/2022    Lumbar radiculopathy 09/12/2019    Erectile dysfunction 09/04/2019    Rash 09/04/2019    Hand or foot spasms 04/05/2019    Hearing loss of left ear 04/05/2019    Cerumen debris on tympanic membrane of right ear 04/05/2019    Lumbar facet arthropathy 03/12/2019     Palmar nodule, left 02/15/2019    Trigger index finger of right hand 02/15/2019    Chronic pain 01/15/2019    EKG abnormalities 01/14/2019    Gastroesophageal reflux disease 01/14/2019    Neck pain 01/08/2019    Need for vaccination against Streptococcus pneumoniae using pneumococcal conjugate vaccine 7 12/12/2018    Depression 12/12/2018    Anemia 12/12/2018    Chronic back pain 12/12/2018    Class 1 obesity with serious comorbidity and body mass index (BMI) of 32.0 to 32.9 in adult 11/28/2018    Dizziness 11/28/2018    Need for hepatitis C screening test 05/31/2018    Polyp of colon 05/31/2018    Hypertension associated with diabetes 07/26/2016    HLD (hyperlipidemia) 07/26/2016    Schizophrenia 07/26/2016    Hyperkalemia 07/26/2016    Type 2 diabetes mellitus with diabetic peripheral angiopathy without gangrene, without long-term current use of insulin 07/26/2016        Plan:     C/f Cellulitis  Concern for medication non-adherence    Last admission received vanc in the ED and discharged on doxycycline  Patient reports he was taking his doxycycline  Reporting purulent drainage, no trauma   Reports cramping pain  Increase in open wounds (check media)   - x ray ordered  -CK Pending  -started robaxin  - consider CT legs to assess for abscess if x ray shows gas   - Started vancomycin, consider de -escalating    Bloating, abdominal pain  Starting simethicone      Anemia, microcytic  No evidence of bleeding on examination. Documented history of NIKKI  - NIKKI noted on studies  - resume iron supplementation     Hyponatremia, asymptomatic  -chronic history of hyponatremia. Corrected close to baseline on admission. Originally concerned for SIADH and 2/2 to HCTZ use on prior admission.   - continue BMP daily   - strict I&O  - osmols, urine studies pending.   -outpatient follow up with PCP on discharge for close monitoring      DMT2  A1c 8.2 3 months ago   -SSI inpatient ; goal 140-180     ILD:   No acute concerns on  admission  -outpatient follow up with pulmonary on discharge; lost to follow up     Schizophrenia:   Aripiprazole 5 mg      Hypothyroidism  Continue home synthroid      Chronic bilateral back pain w/sciatica   Continue robaxin and gabapentin   -consider outpatient follow up with PM&R     HTN:   Continue home antihypertensives    Code Status:     Full    Nora Tapia MD  [facility] HO-1    [facility] Medicine Hospitalist Pager numbers:   [facility] Hospitalist Medicine Team A (Rah/Anum): [phone]  [facility] Hospitalist Medicine Team B (Shantelle/Cricket):  [phone]

## 2025-07-13 NOTE — PHARMACY MED REC
"        [facility]           Pharmacy  Admission Medication History     The home medication history was taken by Marcela Woodruff.      Medication history obtained from Medications listed below were obtained from: Patient/family    Based on information gathered for medication list, you may go to "Admission" then "Reconcile Home Medications" tabs to review and/or act upon those items.     The home medication list has been updated by the Pharmacy department.   Please read ALL comments highlighted in yellow.   Please address this information as you see fit.    Feel free to contact us if you have any questions or require assistance.        No current facility-administered medications on file prior to encounter.     Current Outpatient Medications on File Prior to Encounter   Medication Sig Dispense Refill    albuterol (PROVENTIL/VENTOLIN HFA) 90 mcg/actuation inhaler Inhale 2 puffs into the lungs every 6 (six) hours as needed for Wheezing. Rescue 18 g 0    ARIPiprazole (ABILIFY) 5 MG Tab Take 1 tablet (5 mg total) by mouth once daily. 90 tablet 3    doxycycline (VIBRAMYCIN) 100 MG Cap Take 1 capsule (100 mg total) by mouth 2 (two) times daily. for 5 days 10 capsule 0    DULoxetine (CYMBALTA) 60 MG capsule Take 2 capsules (120 mg total) by mouth every morning. 180 capsule 3    ferrous sulfate 324 mg (65 mg iron) TbEC Take 1 tablet (324 mg total) by mouth every other day. 15 tablet 11    gabapentin (NEURONTIN) 300 MG capsule Take 1 capsule (300 mg total) by mouth 3 (three) times daily. For chronic pain 270 capsule 0    hydrOXYzine pamoate (VISTARIL) 50 MG Cap TAKE 1 CAPSULE (50 MG) BY MOUTH NIGHTLY AS NEEDED FOR INSOMNIA (Patient taking differently: Take 50 mg by mouth nightly as needed (insomnia).) 90 capsule 3    levothyroxine (SYNTHROID) 25 MCG tablet Take 1 tablet (25 mcg total) by mouth before breakfast. 90 tablet 3    metFORMIN (GLUCOPHAGE) 1000 MG tablet Take 1 tablet (1,000 mg total) by mouth 2 " (two) times daily with meals. 180 tablet 1    methocarbamoL (ROBAXIN) 500 MG Tab Take 1 tablet (500 mg total) by mouth 2 (two) times a day. for 10 days 20 tablet 0    multivitamin (ONE DAILY MULTIVITAMIN) per tablet Take 1 tablet by mouth once daily.      olmesartan (BENICAR) 20 MG tablet Take 1 tablet (20 mg total) by mouth once daily. 90 tablet 3    omeprazole (PRILOSEC) 20 MG capsule Take 1 capsule (20 mg total) by mouth before breakfast. 90 capsule 3    pravastatin (PRAVACHOL) 40 MG tablet Take 1 tablet (40 mg total) by mouth once daily. 90 tablet 3    SAFETY LANCETS 28 gauge Misc 2 (two) times daily.      tirzepatide (MOUNJARO) 5 mg/0.5 mL PnIj Inject 5 mg into the skin every 7 days. (Patient taking differently: Inject 5 mg into the skin every Thursday.) 6 mL 0    ALCOHOL PADS PadM Apply topically once daily.      BLOOD PRESSURE CUFF Misc 1 Units by Misc.(Non-Drug; Combo Route) route once daily. 1 each 0    blood sugar diagnostic (TRUE METRIX GLUCOSE TEST STRIP) Strp use 1 strip to check glucose 2 (two) times a day. 200 strip 3    sildenafiL (VIAGRA) 100 MG tablet Take 1 tablet (100 mg total) by mouth daily as needed for Erectile Dysfunction. 30 tablet 11       Please address this information as you see fit.  Feel free to contact us if you have any questions or require assistance.    Marcela Woodruff  [phone]          .

## 2025-07-13 NOTE — ED PROVIDER NOTES
"Encounter Date: 7/13/2025       History     Chief Complaint   Patient presents with    Leg Pain     Pt reports was dc from this facility yesterday, admitted for bilateral cellulitis. Pt reports pain and swelling worsened today w/ "oozing" and bleeding.      64-year-old male recently discharged from the hospital returns complaining of leg pain.  States he has been having leg pain and spasms.  She was recently discharged for cellulitis to his lower extremities.  States he was having pain while he is in the hospital as well.  This does not appear to be a new or worsening symptom.  Denies any fever.  Has been taking his prescribed medication as directed.  No other symptoms reported at this time.      Review of patient's allergies indicates:   Allergen Reactions    Pcn [penicillins] Other (See Comments)     Childhood rxn, pt does not recall type of rxn     Past Medical History:   Diagnosis Date    Chronic back pain greater than 3 months duration     Depression     Diabetes mellitus     Diabetes mellitus, type 2     Hypertension     Schizophrenia      Past Surgical History:   Procedure Laterality Date    APPENDECTOMY      CARPAL TUNNEL RELEASE Right 12/13/2022    Procedure: RELEASE, CARPAL TUNNEL;  Surgeon: Everton Walter Jr., MD;  Location: [facility] OR;  Service: Orthopedics;  Laterality: Right;    COLONOSCOPY N/A 5/31/2018    Procedure: COLONOSCOPY;  Surgeon: Guillaume Hatch MD;  Location: [facility] ENDO;  Service: Endoscopy;  Laterality: N/A;    COLONOSCOPY N/A 11/17/2022    Procedure: COLONOSCOPY;  Surgeon: Guillaume Hatch MD;  Location: [facility] ENDO;  Service: Endoscopy;  Laterality: N/A;    DECOMPRESSION, NERVE, ULNAR Right 12/13/2022    Procedure: DECOMPRESSION, NERVE, ULNAR;  Surgeon: Everton Walter Jr., MD;  Location: [facility] OR;  Service: Orthopedics;  Laterality: Right;    EPIDURAL STEROID INJECTION INTO LUMBAR SPINE N/A 2/21/2024    Procedure: L5-S1 JOHN;  Surgeon: Zoila Suarez DO;  Location: [facility] PAIN " MANAGEMENT;  Service: Pain Management;  Laterality: N/A;  oral 30 mins    FUSION, SPINE, CERVICAL N/A 8/11/2023    Procedure: FUSION, SPINE, CERVICAL;  Surgeon: Guillaume Washington MD;  Location: [facility] OR;  Service: Neurosurgery;  Laterality: N/A;  C3-4, C5-6, C6-7 ACDF C3-C7 anterior instrumentation Depuy  -ANTERIOR DECOMPREESSION 2 LEVELS   -ANTERIOR INSTRUMENTATION INTERBODY CAGE INTERSPACE 3   -LOP 3.5 HR   -LOS 3 DAYS   -GENERAL   -TYPE AND SCREEN   - C ARM   -EMG, SEP, MEP hari notified cc  -ASPEN   -REG    NECK SURGERY      RADIOFREQUENCY ABLATION OF LUMBAR MEDIAL BRANCH NERVE AT SINGLE LEVEL Left 5/29/2018    Procedure: RADIOFREQUENCY THERMOCOAGULATION (RFTC)-NERVE-MEDIAN BRANCH-LUMBAR- Left L2-3-4-5;  Surgeon: Donavon Dennis MD;  Location: [facility] PAIN [facility] [place] [facility];  Service: Pain Management;  Laterality: Left;    RADIOFREQUENCY ABLATION OF LUMBAR MEDIAL BRANCH NERVE AT SINGLE LEVEL Right 1/8/2019    Procedure: Radiofrequency Ablation, Nerve, Spinal, Lumbar, Medial Branch, L2,3,4,5;  Surgeon: Alberto Hernandez Jr., MD;  Location: [facility] PAIN [facility] [place];  Service: Pain Management;  Laterality: Right;  Pt is diabetic    RADIOFREQUENCY ABLATION OF LUMBAR MEDIAL BRANCH NERVE AT SINGLE LEVEL Left 3/12/2019    Procedure: Radiofrequency Ablation, Nerve, Spinal, Lumbar, Medial Branch, Left, L2,3,4,5;  Surgeon: Alberto Hernandez Jr., MD;  Location: [facility] PAIN [facility] [place];  Service: Pain Management;  Laterality: Left;  Pt will be consented the day of procedure.    TRANSFORAMINAL EPIDURAL INJECTION OF STEROID Right 9/12/2019    Procedure: Injection,steroid,epidural,transforaminal,right,L4-5 and L5-S1;  Surgeon: Alberto Hernandez Jr., MD;  Location: [facility] PAIN [facility] [place];  Service: Pain Management;  Laterality: Right;  PT IS DIABETIC     Family History   Problem Relation Name Age of Onset    Alzheimer's disease Mother      Diabetes Mother      Heart defect Father      Cancer Father      Stroke Father      Heart attack Father      Diabetes Sister x2      Heart defect Sister x2     Multiple sclerosis Sister x2     Heart disease Sister x2     Alzheimer's disease Sister x2     No Known Problems Son x1      Social History[1]  Review of Systems   Constitutional:  Negative for chills and fever.   HENT:  Negative for congestion.    Respiratory:  Negative for cough and shortness of breath.    Cardiovascular:  Negative for chest pain.   Gastrointestinal:  Negative for abdominal pain.   Musculoskeletal:  Negative for back pain.   Neurological:  Negative for headaches.       Physical Exam     Initial Vitals [07/13/25 1426]   BP Pulse Resp Temp SpO2   (!) 122/58 78 20 97.9 °F (36.6 °C) 97 %      MAP       --         Physical Exam    Nursing note and vitals reviewed.  Constitutional: He appears well-developed and well-nourished. No distress.   HENT:   Head: Normocephalic and atraumatic.   Eyes: Conjunctivae and EOM are normal. Pupils are equal, round, and reactive to light.   Neck: Neck supple. No tracheal deviation present.   Normal range of motion.  Cardiovascular:  Normal rate and intact distal pulses.           Pulmonary/Chest: No respiratory distress.   Musculoskeletal:         General: Normal range of motion.      Cervical back: Normal range of motion and neck supple.      Comments: Crusting lesions to bilateral lower extremities below-the-knee, some mild erythema, does not appear to have gone outside of the lines drawn at discharge     Neurological: He is alert and oriented to person, place, and time. He has normal strength. No cranial nerve deficit. GCS score is 15. GCS eye subscore is 4. GCS verbal subscore is 5. GCS motor subscore is 6.   Skin: Skin is warm and dry.         ED Course   Procedures  Labs Reviewed   COMPREHENSIVE METABOLIC PANEL - Abnormal       Result Value    Sodium 128 (*)     Potassium 4.5      Chloride 101      CO2 20 (*)     Glucose 234 (*)     BUN 20      Creatinine 1.3      Calcium 8.5 (*)     Protein Total 6.3      Albumin 3.3 (*)      Bilirubin Total 0.2      ALP 87      AST 16      ALT 14      Anion Gap 7 (*)     eGFR >60     MAGNESIUM - Abnormal    Magnesium  1.5 (*)    CBC WITH DIFFERENTIAL - Abnormal    WBC 8.21      RBC 3.84 (*)     HGB 9.2 (*)     HCT 28.1 (*)     MCV 73 (*)     MCH 24.0 (*)     MCHC 32.7      RDW 14.6 (*)     Platelet Count 246      MPV 8.2 (*)     Nucleated RBC 0      Neut % 44.0      Lymph % 25.9      Mono % 8.9      Eos % 19.4 (*)     Basophil % 1.6      Imm Grans % 0.2      Neut # 3.61      Lymph # 2.13      Mono # 0.73      Eos # 1.59 (*)     Baso # 0.13      Imm Grans # 0.02     POCT GLUCOSE - Abnormal    POCT Glucose 252 (*)    CBC W/ AUTO DIFFERENTIAL    Narrative:     The following orders were created for panel order CBC auto differential.  Procedure                               Abnormality         Status                     ---------                               -----------         ------                     CBC with Differential[[id number]]       Abnormal            Final result                 Please view results for these tests on the individual orders.   POCT GLUCOSE MONITORING CONTINUOUS          Imaging Results    None          Medications - No data to display  Medical Decision Making  64-year-old male returns emergency department complaining of lower leg pain      Differential: Leg pain, worsening cellulitis, sepsis,      Reviewed case with [facility] internal medicine team.  Patient is hyponatremic and they reports some worsening his cellulitis in plan to admit.    Problems Addressed:  Cellulitis of lower extremity, unspecified laterality: complicated acute illness or injury  Hyponatremia: complicated acute illness or injury    Amount and/or Complexity of Data Reviewed  External Data Reviewed: notes.     Details: Reviewed recent progress notes from inpatient stay documenting baseline medications and past medical history  Labs: ordered.     Details: CBC without leukocytosis, anemia similar to baseline; CMP with  "normal renal and liver function tests, hyponatremia; magnesium mildly diminished  Discussion of management or test interpretation with external provider(s): Case discussed with [facility] internal medicine team regarding patient's past medical history, presentation, labs, imaging, plan to admit    Risk  OTC drugs.  Prescription drug management.  Decision regarding hospitalization.                                          Clinical Impression:  Final diagnoses:  [E87.1] Hyponatremia (Primary)  [L03.119] Cellulitis of lower extremity, unspecified laterality                       [1]   Social History  Tobacco Use    Smoking status: Never    Smokeless tobacco: Never   Substance Use Topics    Alcohol use: Yes     Comment: "couple of beers a day"    Drug use: No        Frakn Carty MD  07/13/25 0083    "

## 2025-07-13 NOTE — TELEPHONE ENCOUNTER
Post-Discharge Day #1 Outreach callback. Duplicate Encounter. Patient triaged earlier this morning and advised to To Go to ED Now. Case encounter closed at this time.     Reason for Disposition   Caller has already spoken with another triager and has no further questions.    Additional Information   Negative: Caller is angry or rude (e.g., verbally abusive, yelling)   Negative: Caller hangs up   Negative: Caller has already spoken with the doctor (or NP/PA, pharmacist) and has no further questions.    Protocols used: No Contact or Duplicate Contact Call-A-

## 2025-07-13 NOTE — ED NOTES
"Patient seen in room, patient presents anxious and fearful. Patient states " I have so much pain, and it is worse than when I left. I cannot live like this" Patient attached to monitor and vitals obtained. Patient refuses to change into gown at this time.   "

## 2025-07-14 PROBLEM — I87.2 VENOUS INSUFFICIENCY OF BOTH LOWER EXTREMITIES: Status: ACTIVE | Noted: 2025-07-14

## 2025-07-14 LAB
ABSOLUTE EOSINOPHIL (OHS): 1.65 K/UL
ABSOLUTE MONOCYTE (OHS): 0.68 K/UL (ref 0.3–1)
ABSOLUTE NEUTROPHIL COUNT (OHS): 4.64 K/UL (ref 1.8–7.7)
ALBUMIN SERPL BCP-MCNC: 3.4 G/DL (ref 3.5–5.2)
ALP SERPL-CCNC: 86 UNIT/L (ref 40–150)
ALT SERPL W/O P-5'-P-CCNC: 12 UNIT/L (ref 10–44)
ANION GAP (OHS): 6 MMOL/L (ref 8–16)
AST SERPL-CCNC: 18 UNIT/L (ref 11–45)
BACTERIA SPEC AEROBE CULT: NORMAL
BASOPHILS # BLD AUTO: 0.16 K/UL
BASOPHILS NFR BLD AUTO: 1.8 %
BILIRUB SERPL-MCNC: 0.3 MG/DL (ref 0.1–1)
BILIRUB UR QL STRIP.AUTO: NEGATIVE
BUN SERPL-MCNC: 21 MG/DL (ref 8–23)
CALCIUM SERPL-MCNC: 8.5 MG/DL (ref 8.7–10.5)
CHLORIDE SERPL-SCNC: 105 MMOL/L (ref 95–110)
CK SERPL-CCNC: 48 U/L (ref 20–200)
CLARITY UR: CLEAR
CO2 SERPL-SCNC: 23 MMOL/L (ref 23–29)
COLOR UR AUTO: YELLOW
CREAT SERPL-MCNC: 1.3 MG/DL (ref 0.5–1.4)
CRP SERPL-MCNC: 16.6 MG/L
ERYTHROCYTE [DISTWIDTH] IN BLOOD BY AUTOMATED COUNT: 14.7 % (ref 11.5–14.5)
GFR SERPLBLD CREATININE-BSD FMLA CKD-EPI: >60 ML/MIN/1.73/M2
GLUCOSE SERPL-MCNC: 174 MG/DL (ref 70–110)
GLUCOSE UR QL STRIP: NEGATIVE
GRAM STN SPEC: NORMAL
GRAM STN SPEC: NORMAL
HCT VFR BLD AUTO: 32.2 % (ref 40–54)
HGB BLD-MCNC: 10.3 GM/DL (ref 14–18)
HGB UR QL STRIP: NEGATIVE
IMM GRANULOCYTES # BLD AUTO: 0.02 K/UL (ref 0–0.04)
IMM GRANULOCYTES NFR BLD AUTO: 0.2 % (ref 0–0.5)
KETONES UR QL STRIP: NEGATIVE
LEUKOCYTE ESTERASE UR QL STRIP: NEGATIVE
LYMPHOCYTES # BLD AUTO: 1.77 K/UL (ref 1–4.8)
MAGNESIUM SERPL-MCNC: 1.9 MG/DL (ref 1.6–2.6)
MCH RBC QN AUTO: 23.8 PG (ref 27–31)
MCHC RBC AUTO-ENTMCNC: 32 G/DL (ref 32–36)
MCV RBC AUTO: 74 FL (ref 82–98)
NITRITE UR QL STRIP: NEGATIVE
NUCLEATED RBC (/100WBC) (OHS): 0 /100 WBC
PH UR STRIP: 6 [PH]
PHOSPHATE SERPL-MCNC: 3.6 MG/DL (ref 2.7–4.5)
PLATELET # BLD AUTO: 258 K/UL (ref 150–450)
PMV BLD AUTO: 7.8 FL (ref 9.2–12.9)
POCT GLUCOSE: 156 MG/DL (ref 70–110)
POCT GLUCOSE: 159 MG/DL (ref 70–110)
POCT GLUCOSE: 159 MG/DL (ref 70–110)
POCT GLUCOSE: 168 MG/DL (ref 70–110)
POCT GLUCOSE: 172 MG/DL (ref 70–110)
POCT GLUCOSE: 199 MG/DL (ref 70–110)
POTASSIUM SERPL-SCNC: 4.7 MMOL/L (ref 3.5–5.1)
PROT SERPL-MCNC: 6.6 GM/DL (ref 6–8.4)
PROT UR QL STRIP: NEGATIVE
RBC # BLD AUTO: 4.33 M/UL (ref 4.6–6.2)
RELATIVE EOSINOPHIL (OHS): 18.5 %
RELATIVE LYMPHOCYTE (OHS): 19.8 % (ref 18–48)
RELATIVE MONOCYTE (OHS): 7.6 % (ref 4–15)
RELATIVE NEUTROPHIL (OHS): 52.1 % (ref 38–73)
SODIUM SERPL-SCNC: 134 MMOL/L (ref 136–145)
SP GR UR STRIP: 1.01
UROBILINOGEN UR STRIP-ACNC: NEGATIVE EU/DL
WBC # BLD AUTO: 8.92 K/UL (ref 3.9–12.7)

## 2025-07-14 PROCEDURE — G0378 HOSPITAL OBSERVATION PER HR: HCPCS

## 2025-07-14 PROCEDURE — 63600175 PHARM REV CODE 636 W HCPCS

## 2025-07-14 PROCEDURE — 84100 ASSAY OF PHOSPHORUS: CPT

## 2025-07-14 PROCEDURE — 82962 GLUCOSE BLOOD TEST: CPT

## 2025-07-14 PROCEDURE — 86140 C-REACTIVE PROTEIN: CPT

## 2025-07-14 PROCEDURE — 81003 URINALYSIS AUTO W/O SCOPE: CPT

## 2025-07-14 PROCEDURE — 94760 N-INVAS EAR/PLS OXIMETRY 1: CPT

## 2025-07-14 PROCEDURE — 25000003 PHARM REV CODE 250

## 2025-07-14 PROCEDURE — 85025 COMPLETE CBC W/AUTO DIFF WBC: CPT

## 2025-07-14 PROCEDURE — 96366 THER/PROPH/DIAG IV INF ADDON: CPT

## 2025-07-14 PROCEDURE — 96372 THER/PROPH/DIAG INJ SC/IM: CPT

## 2025-07-14 PROCEDURE — 82550 ASSAY OF CK (CPK): CPT

## 2025-07-14 PROCEDURE — 83735 ASSAY OF MAGNESIUM: CPT

## 2025-07-14 PROCEDURE — A9585 GADOBUTROL INJECTION: HCPCS | Performed by: INTERNAL MEDICINE

## 2025-07-14 PROCEDURE — 80053 COMPREHEN METABOLIC PANEL: CPT

## 2025-07-14 PROCEDURE — 36415 COLL VENOUS BLD VENIPUNCTURE: CPT

## 2025-07-14 PROCEDURE — 25500020 PHARM REV CODE 255: Performed by: INTERNAL MEDICINE

## 2025-07-14 RX ORDER — LIDOCAINE 50 MG/G
1 PATCH TOPICAL
Refills: 0 | Status: CANCELLED | OUTPATIENT
Start: 2025-07-15

## 2025-07-14 RX ORDER — GADOBUTROL 604.72 MG/ML
10 INJECTION INTRAVENOUS
Status: COMPLETED | OUTPATIENT
Start: 2025-07-14 | End: 2025-07-14

## 2025-07-14 RX ORDER — OXYCODONE HYDROCHLORIDE 5 MG/1
5 TABLET ORAL EVERY 6 HOURS PRN
Refills: 0 | Status: CANCELLED | OUTPATIENT
Start: 2025-07-14

## 2025-07-14 RX ORDER — LIDOCAINE 50 MG/G
1 PATCH TOPICAL
Status: DISCONTINUED | OUTPATIENT
Start: 2025-07-14 | End: 2025-07-16 | Stop reason: HOSPADM

## 2025-07-14 RX ORDER — ACETAMINOPHEN 500 MG
1000 TABLET ORAL EVERY 6 HOURS PRN
Qty: 30 TABLET | Refills: 0 | Status: CANCELLED | OUTPATIENT
Start: 2025-07-14

## 2025-07-14 RX ADMIN — INSULIN ASPART 2 UNITS: 100 INJECTION, SOLUTION INTRAVENOUS; SUBCUTANEOUS at 06:07

## 2025-07-14 RX ADMIN — INSULIN ASPART 2 UNITS: 100 INJECTION, SOLUTION INTRAVENOUS; SUBCUTANEOUS at 12:07

## 2025-07-14 RX ADMIN — GABAPENTIN 300 MG: 300 CAPSULE ORAL at 09:07

## 2025-07-14 RX ADMIN — ENOXAPARIN SODIUM 40 MG: 40 INJECTION SUBCUTANEOUS at 05:07

## 2025-07-14 RX ADMIN — PANTOPRAZOLE SODIUM 40 MG: 40 TABLET, DELAYED RELEASE ORAL at 08:07

## 2025-07-14 RX ADMIN — INSULIN ASPART 1 UNITS: 100 INJECTION, SOLUTION INTRAVENOUS; SUBCUTANEOUS at 09:07

## 2025-07-14 RX ADMIN — OXYCODONE HYDROCHLORIDE 5 MG: 5 TABLET ORAL at 07:07

## 2025-07-14 RX ADMIN — OXYCODONE HYDROCHLORIDE 5 MG: 5 TABLET ORAL at 04:07

## 2025-07-14 RX ADMIN — GABAPENTIN 300 MG: 300 CAPSULE ORAL at 03:07

## 2025-07-14 RX ADMIN — OXYCODONE HYDROCHLORIDE 5 MG: 5 TABLET ORAL at 11:07

## 2025-07-14 RX ADMIN — LEVOTHYROXINE SODIUM 25 MCG: 0.03 TABLET ORAL at 06:07

## 2025-07-14 RX ADMIN — METHOCARBAMOL 500 MG: 500 TABLET ORAL at 09:07

## 2025-07-14 RX ADMIN — THERA TABS 1 TABLET: TAB at 08:07

## 2025-07-14 RX ADMIN — FERROUS SULFATE TAB 325 MG (65 MG ELEMENTAL FE) 1 EACH: 325 (65 FE) TAB at 08:07

## 2025-07-14 RX ADMIN — DULOXETINE HYDROCHLORIDE 120 MG: 30 CAPSULE, DELAYED RELEASE ORAL at 06:07

## 2025-07-14 RX ADMIN — METHOCARBAMOL 500 MG: 500 TABLET ORAL at 08:07

## 2025-07-14 RX ADMIN — LIDOCAINE 1 PATCH: 50 PATCH CUTANEOUS at 08:07

## 2025-07-14 RX ADMIN — PRAVASTATIN SODIUM 40 MG: 40 TABLET ORAL at 09:07

## 2025-07-14 RX ADMIN — GADOBUTROL 10 ML: 604.72 INJECTION INTRAVENOUS at 09:07

## 2025-07-14 RX ADMIN — VANCOMYCIN HYDROCHLORIDE 1250 MG: 1.25 INJECTION, POWDER, LYOPHILIZED, FOR SOLUTION INTRAVENOUS at 06:07

## 2025-07-14 RX ADMIN — LOSARTAN POTASSIUM 50 MG: 50 TABLET, FILM COATED ORAL at 08:07

## 2025-07-14 RX ADMIN — GABAPENTIN 300 MG: 300 CAPSULE ORAL at 08:07

## 2025-07-14 RX ADMIN — VANCOMYCIN HYDROCHLORIDE 1250 MG: 1.25 INJECTION, POWDER, LYOPHILIZED, FOR SOLUTION INTRAVENOUS at 07:07

## 2025-07-14 RX ADMIN — ARIPIPRAZOLE 5 MG: 5 TABLET ORAL at 08:07

## 2025-07-14 NOTE — ED NOTES
Patient is awake, alert, oriented, on his phone, in no acute distress, complains of bilateral leg pain, pain meds were given by night shift nurse at 4am. Bilateral lower extremities are red, swollen, with sores. Urinal at bedside, patient in a gown

## 2025-07-14 NOTE — PROGRESS NOTES
Pharmacokinetic Initial Assessment: IV Vancomycin    Assessment/Plan:    Initiate intravenous vancomycin with loading dose of 2250 mg once followed by a maintenance dose of vancomycin 1250 mg IV every 12 hours  Desired empiric serum trough concentration is 10 to 15 mcg/mL  Draw vancomycin trough level 60 min prior to fourth dose on 07/15/25 at approximately 0530  Pharmacy will continue to follow and monitor vancomycin.      Please contact pharmacy at extension [phone] with any questions regarding this assessment.     Thank you for the consult,   Tejal Davis       Patient brief summary:  Fabiano Malik Jr. is a 64 y.o. male initiated on antimicrobial therapy with IV Vancomycin for treatment of suspected skin & soft tissue infection    Drug Allergies:   Review of patient's allergies indicates:   Allergen Reactions    Pcn [penicillins] Other (See Comments)     Childhood rxn, pt does not recall type of rxn       Actual Body Weight:   108.9 kg    Renal Function:   Estimated Creatinine Clearance: 74.3 mL/min (based on SCr of 1.3 mg/dL).,     Dialysis Method (if applicable):  N/A    CBC (last 72 hours):  Recent Labs   Lab Result Units 07/10/25  2225 07/11/25  0117 07/12/25  0439 07/13/25  1529   WBC K/uL 10.77 10.53 9.35 8.21   HGB gm/dL 9.9* 9.6* 10.0* 9.2*   HCT % 29.6* 29.4* 32.4* 28.1*   Platelet Count K/uL 292 286 309 246   Lymph % % 17.1*  --   --  25.9   Lymphocyte % %  --  15.3* 14.0*  --    Mono % % 7.2  --   --  8.9   Monocyte % %  --  8.2 6.0  --    Eos % % 21.2*  --   --  19.4*   Eosinophil % %  --  20.4* 20.0*  --    Basophil % % 0.8  --   --  1.6       Metabolic Panel (last 72 hours):  Recent Labs   Lab Result Units 07/10/25  2225 07/11/25  0117 07/11/25  0151 07/12/25 0439 07/13/25  1529   Sodium mmol/L 125* 127*  --  134* 128*   Urine Sodium mmol/L  --   --  36  --   --    Potassium mmol/L 4.6 4.5  --  4.6 4.5   Chloride mmol/L 96 98  --  101 101   CO2 mmol/L 22* 22*  --  24 20*   Glucose  mg/dL 223* 188*  --  209* 234*   BUN mg/dL 21 20  --  16 20   Creatinine mg/dL 1.3 1.3  --  1.4 1.3   Albumin g/dL 3.6 3.4*  --  3.3* 3.3*   Bilirubin Total mg/dL 0.3 0.3  --  0.3 0.2   ALP unit/L 86 91  --  99 87   AST unit/L 18 17  --  17 16   ALT unit/L 13 13  --  14 14   Magnesium  mg/dL  --  1.5*  --  1.9 1.5*   Phosphorus Level mg/dL  --  3.8  --  4.2  --        Drug levels (last 3 results):  Recent Labs   Lab Result Units 07/11/25  2311   Vancomycin Random ug/ml 6.8       Microbiologic Results:  Microbiology Results (last 7 days)       ** No results found for the last 168 hours. **

## 2025-07-14 NOTE — ED NOTES
Patient transported to floor awake, alert, oriented, in stable condition, in no acute distress, with belongings

## 2025-07-14 NOTE — ED NOTES
Admit team messaged via secure chat regarding clarification on POCT glucose check order, noted sliding scale order in MAR. Awaiting response. POCT glucose check in progress prior to pt being served lunch tray.

## 2025-07-14 NOTE — PROGRESS NOTES
"[facility] Medicine Progress Note      Subjective/Interval History      No acute events overnight. Patient reports pain is still present. Denies fever or chills.      Objective     Physical Examination:  /65   Pulse 80   Temp 98.3 °F (36.8 °C) (Oral)   Resp 20   Ht 6' 1" (1.854 m)   Wt 108.9 kg (240 lb)   SpO2 99%   BMI 31.66 kg/m²    General: No acute distress, resting comfortably   HEENT: Atraumatic, normocephalic, moist mucous membranes  Cardiovascular: Regular rate and rhythm, normal S1 and S2, no extra heart sounds or murmurs appreciated   Chest wall: Non-tender to palpation, no gross deformities noted   Back: No abnormal curvature noted, symmetric rise with respiration   Respiratory: Stable on room air, normal work of breathing, no conversational dyspnea, no accessory muscle use noted, clear to auscultation bilaterally, no wheezes or crackles   Abdominal: Non-distended, soft, normoactive bowel sounds, mild tenderness to palpation in epigastric region, no guarding  Extremities: trace bilateral LE swelling, bilateral open wounds with surrounding erythema, no drainage   Skin: Non-diaphoretic, warm, dry  Neurologic: No gross focal neurologic deficits noted      Intake/Output:    Intake/Output Summary (Last 24 hours) at 7/14/2025 0813  Last data filed at 7/14/2025 0758  Gross per 24 hour   Intake 240 ml   Output 2575 ml   Net -2335 ml     Net IO Since Admission: -2,335 mL [07/14/25 0813]    Laboratory data: reviewed     Microbiology data: reviewed     EKG data: reviewed     Radiology data: reviewed      Current Medications:     Infusions:       Scheduled:   ARIPiprazole  5 mg Oral Daily    DULoxetine  120 mg Oral QAM    enoxparin  40 mg Subcutaneous Daily    ferrous sulfate  1 tablet Oral Daily    gabapentin  300 mg Oral TID    levothyroxine  25 mcg Oral Before breakfast    LIDOcaine  1 patch Transdermal Q24H    losartan  50 mg Oral Daily    methocarbamoL  500 mg Oral BID    multivitamin  1 tablet " Oral Daily    pantoprazole  40 mg Oral Daily    pravastatin  40 mg Oral QHS    vancomycin (VANCOCIN) IV (PEDS and ADULTS)  1,250 mg Intravenous Q12H        PRN:    Current Facility-Administered Medications:     acetaminophen, 650 mg, Oral, Q4H PRN    albuterol-ipratropium, 3 mL, Nebulization, Q6H PRN    dextrose 50%, 12.5 g, Intravenous, PRN    dextrose 50%, 25 g, Intravenous, PRN    glucagon (human recombinant), 1 mg, Intramuscular, PRN    glucose, 16 g, Oral, PRN    glucose, 24 g, Oral, PRN    hydrOXYzine pamoate, 50 mg, Oral, Nightly PRN    insulin aspart U-100, 0-10 Units, Subcutaneous, QID (AC + HS) PRN    naloxone, 0.02 mg, Intravenous, PRN    oxyCODONE, 5 mg, Oral, Q6H PRN    sodium chloride 0.9%, 10 mL, Intravenous, Q12H PRN    Pharmacy to dose Vancomycin consult, , , Once **AND** vancomycin - pharmacy to dose, , Intravenous, pharmacy to manage frequency      Assessment/Plan:     Bilateral Lower Extremity Wounds   Intractable Pain   C/f Cellulitis  - recently admitted  with concern for cellulitis changes of bilateral LE  - Last admission received vanc in the ED and discharged on doxycycline  - US LE veins without DVT, US LE arterial without hemodynamically significant stenosis   - Patient reports he was taking his doxycycline  - Reporting purulent drainage, no trauma and worsening pain at home   - x ray tibia fibular without osseous abnormality   - CK Pending  - started robaxin  - MRI bilateral tibia fibula ordered  - continue vancomycin, consider de -escalating     Bloating, abdominal pain  - Starting simethicone      Anemia, microcytic  - No evidence of bleeding on examination. Documented history of NIKKI  - NIKKI noted on studies  - resume iron supplementation     Hyponatremia, asymptomatic  - chronic history of hyponatremia. Corrected close to baseline on admission. Originally concerned for SIADH and 2/2 to HCTZ use on prior admission.   - continue BMP daily   - strict I&O  - outpatient follow up with PCP on  discharge for close monitoring      DMT2  -A1c 8.2 3 months ago   -SSI inpatient ; goal 140-180     ILD:   - No acute concerns on admission  - outpatient follow up with pulmonary on discharge; lost to follow up     Schizophrenia  - continue Aripiprazole 5 mg      Hypothyroidism  - Continue home synthroid 25 mcg      Chronic bilateral back pain w/sciatica   - Continue robaxin and gabapentin   -consider outpatient follow up with PM&R     HTN  - Continue losartan 50 mg         Healthcare maintenance   -primary care provider is Lucien Fleming MD      Diet: diabetic   VTE PPx: lovenox   Code Status: Full     Dispo: pending improvement in pain, MRI tibia fibula   Estimated LOS: 1-2 days      Cindi Trujillo MD   [employer] Internal Medicine PGY-2

## 2025-07-15 LAB
ABSOLUTE EOSINOPHIL (OHS): 1.67 K/UL
ABSOLUTE MONOCYTE (OHS): 0.5 K/UL (ref 0.3–1)
ABSOLUTE NEUTROPHIL COUNT (OHS): 4.16 K/UL (ref 1.8–7.7)
ALBUMIN SERPL BCP-MCNC: 3.3 G/DL (ref 3.5–5.2)
ALP SERPL-CCNC: 85 UNIT/L (ref 40–150)
ALT SERPL W/O P-5'-P-CCNC: 12 UNIT/L (ref 10–44)
ANION GAP (OHS): 4 MMOL/L (ref 8–16)
AST SERPL-CCNC: 19 UNIT/L (ref 11–45)
BASOPHILS # BLD AUTO: 0.11 K/UL
BASOPHILS NFR BLD AUTO: 1.4 %
BILIRUB SERPL-MCNC: 0.3 MG/DL (ref 0.1–1)
BUN SERPL-MCNC: 19 MG/DL (ref 8–23)
CALCIUM SERPL-MCNC: 8.8 MG/DL (ref 8.7–10.5)
CHLORIDE SERPL-SCNC: 106 MMOL/L (ref 95–110)
CO2 SERPL-SCNC: 25 MMOL/L (ref 23–29)
CREAT SERPL-MCNC: 1.3 MG/DL (ref 0.5–1.4)
ERYTHROCYTE [DISTWIDTH] IN BLOOD BY AUTOMATED COUNT: 14.9 % (ref 11.5–14.5)
GFR SERPLBLD CREATININE-BSD FMLA CKD-EPI: >60 ML/MIN/1.73/M2
GLUCOSE SERPL-MCNC: 181 MG/DL (ref 70–110)
HCT VFR BLD AUTO: 32.4 % (ref 40–54)
HGB BLD-MCNC: 10 GM/DL (ref 14–18)
IMM GRANULOCYTES # BLD AUTO: 0.02 K/UL (ref 0–0.04)
IMM GRANULOCYTES NFR BLD AUTO: 0.3 % (ref 0–0.5)
LYMPHOCYTES # BLD AUTO: 1.54 K/UL (ref 1–4.8)
MAGNESIUM SERPL-MCNC: 1.8 MG/DL (ref 1.6–2.6)
MCH RBC QN AUTO: 23.3 PG (ref 27–31)
MCHC RBC AUTO-ENTMCNC: 30.9 G/DL (ref 32–36)
MCV RBC AUTO: 76 FL (ref 82–98)
NUCLEATED RBC (/100WBC) (OHS): 0 /100 WBC
PHOSPHATE SERPL-MCNC: 3.8 MG/DL (ref 2.7–4.5)
PLATELET # BLD AUTO: 253 K/UL (ref 150–450)
PMV BLD AUTO: 8 FL (ref 9.2–12.9)
POCT GLUCOSE: 149 MG/DL (ref 70–110)
POCT GLUCOSE: 161 MG/DL (ref 70–110)
POCT GLUCOSE: 187 MG/DL (ref 70–110)
POCT GLUCOSE: 204 MG/DL (ref 70–110)
POTASSIUM SERPL-SCNC: 4.8 MMOL/L (ref 3.5–5.1)
PROT SERPL-MCNC: 6.3 GM/DL (ref 6–8.4)
RBC # BLD AUTO: 4.29 M/UL (ref 4.6–6.2)
RELATIVE EOSINOPHIL (OHS): 20.9 %
RELATIVE LYMPHOCYTE (OHS): 19.3 % (ref 18–48)
RELATIVE MONOCYTE (OHS): 6.3 % (ref 4–15)
RELATIVE NEUTROPHIL (OHS): 51.8 % (ref 38–73)
SODIUM SERPL-SCNC: 135 MMOL/L (ref 136–145)
VANCOMYCIN TROUGH SERPL-MCNC: 19.2 UG/ML (ref 10–22)
VANCOMYCIN TROUGH SERPL-MCNC: 40.9 UG/ML (ref 10–22)
WBC # BLD AUTO: 8 K/UL (ref 3.9–12.7)

## 2025-07-15 PROCEDURE — 63600175 PHARM REV CODE 636 W HCPCS

## 2025-07-15 PROCEDURE — 82040 ASSAY OF SERUM ALBUMIN: CPT

## 2025-07-15 PROCEDURE — 25000003 PHARM REV CODE 250

## 2025-07-15 PROCEDURE — 36415 COLL VENOUS BLD VENIPUNCTURE: CPT

## 2025-07-15 PROCEDURE — 83735 ASSAY OF MAGNESIUM: CPT

## 2025-07-15 PROCEDURE — 96372 THER/PROPH/DIAG INJ SC/IM: CPT

## 2025-07-15 PROCEDURE — 94760 N-INVAS EAR/PLS OXIMETRY 1: CPT

## 2025-07-15 PROCEDURE — 84100 ASSAY OF PHOSPHORUS: CPT

## 2025-07-15 PROCEDURE — 80202 ASSAY OF VANCOMYCIN: CPT | Performed by: INTERNAL MEDICINE

## 2025-07-15 PROCEDURE — 85025 COMPLETE CBC W/AUTO DIFF WBC: CPT

## 2025-07-15 PROCEDURE — 96366 THER/PROPH/DIAG IV INF ADDON: CPT

## 2025-07-15 PROCEDURE — 11000001 HC ACUTE MED/SURG PRIVATE ROOM

## 2025-07-15 PROCEDURE — 99900035 HC TECH TIME PER 15 MIN (STAT)

## 2025-07-15 RX ORDER — SULFAMETHOXAZOLE AND TRIMETHOPRIM 800; 160 MG/1; MG/1
2 TABLET ORAL 2 TIMES DAILY
Qty: 12 TABLET | Refills: 0 | Status: SHIPPED | OUTPATIENT
Start: 2025-07-15 | End: 2025-07-18

## 2025-07-15 RX ORDER — ACETAMINOPHEN 325 MG/1
650 TABLET ORAL EVERY 4 HOURS PRN
Qty: 30 TABLET | Refills: 0 | Status: SHIPPED | OUTPATIENT
Start: 2025-07-15

## 2025-07-15 RX ORDER — LIDOCAINE 50 MG/G
1 PATCH TOPICAL DAILY
Qty: 30 PATCH | Refills: 0 | Status: SHIPPED | OUTPATIENT
Start: 2025-07-15 | End: 2025-07-30 | Stop reason: ALTCHOICE

## 2025-07-15 RX ORDER — METHOCARBAMOL 500 MG/1
500 TABLET, FILM COATED ORAL 2 TIMES DAILY
Qty: 20 TABLET | Refills: 0 | Status: SHIPPED | OUTPATIENT
Start: 2025-07-15 | End: 2025-07-25

## 2025-07-15 RX ORDER — POLYETHYLENE GLYCOL 3350 17 G/17G
17 POWDER, FOR SOLUTION ORAL DAILY
Qty: 238 G | Refills: 0 | Status: SHIPPED | OUTPATIENT
Start: 2025-07-15

## 2025-07-15 RX ORDER — OXYCODONE HYDROCHLORIDE 5 MG/1
5 TABLET ORAL EVERY 6 HOURS PRN
Qty: 14 TABLET | Refills: 0 | Status: SHIPPED | OUTPATIENT
Start: 2025-07-15 | End: 2025-07-30 | Stop reason: ALTCHOICE

## 2025-07-15 RX ADMIN — LIDOCAINE 1 PATCH: 50 PATCH CUTANEOUS at 08:07

## 2025-07-15 RX ADMIN — VANCOMYCIN HYDROCHLORIDE 1250 MG: 1.25 INJECTION, POWDER, LYOPHILIZED, FOR SOLUTION INTRAVENOUS at 06:07

## 2025-07-15 RX ADMIN — GABAPENTIN 300 MG: 300 CAPSULE ORAL at 08:07

## 2025-07-15 RX ADMIN — OXYCODONE HYDROCHLORIDE 5 MG: 5 TABLET ORAL at 06:07

## 2025-07-15 RX ADMIN — ENOXAPARIN SODIUM 40 MG: 40 INJECTION SUBCUTANEOUS at 05:07

## 2025-07-15 RX ADMIN — GABAPENTIN 300 MG: 300 CAPSULE ORAL at 02:07

## 2025-07-15 RX ADMIN — PANTOPRAZOLE SODIUM 40 MG: 40 TABLET, DELAYED RELEASE ORAL at 08:07

## 2025-07-15 RX ADMIN — FERROUS SULFATE TAB 325 MG (65 MG ELEMENTAL FE) 1 EACH: 325 (65 FE) TAB at 08:07

## 2025-07-15 RX ADMIN — PRAVASTATIN SODIUM 40 MG: 40 TABLET ORAL at 09:07

## 2025-07-15 RX ADMIN — INSULIN ASPART 1 UNITS: 100 INJECTION, SOLUTION INTRAVENOUS; SUBCUTANEOUS at 10:07

## 2025-07-15 RX ADMIN — LOSARTAN POTASSIUM 50 MG: 50 TABLET, FILM COATED ORAL at 08:07

## 2025-07-15 RX ADMIN — DULOXETINE HYDROCHLORIDE 120 MG: 30 CAPSULE, DELAYED RELEASE ORAL at 06:07

## 2025-07-15 RX ADMIN — THERA TABS 1 TABLET: TAB at 08:07

## 2025-07-15 RX ADMIN — LEVOTHYROXINE SODIUM 25 MCG: 0.03 TABLET ORAL at 06:07

## 2025-07-15 RX ADMIN — INSULIN ASPART 4 UNITS: 100 INJECTION, SOLUTION INTRAVENOUS; SUBCUTANEOUS at 11:07

## 2025-07-15 RX ADMIN — METHOCARBAMOL 500 MG: 500 TABLET ORAL at 08:07

## 2025-07-15 RX ADMIN — METHOCARBAMOL 500 MG: 500 TABLET ORAL at 09:07

## 2025-07-15 RX ADMIN — INSULIN ASPART 2 UNITS: 100 INJECTION, SOLUTION INTRAVENOUS; SUBCUTANEOUS at 08:07

## 2025-07-15 RX ADMIN — OXYCODONE HYDROCHLORIDE 5 MG: 5 TABLET ORAL at 12:07

## 2025-07-15 RX ADMIN — GABAPENTIN 300 MG: 300 CAPSULE ORAL at 09:07

## 2025-07-15 RX ADMIN — ARIPIPRAZOLE 5 MG: 5 TABLET ORAL at 08:07

## 2025-07-15 NOTE — PLAN OF CARE
TN met with pt at bedside to discuss discharge planning. He lives alone. Was recently discharged on Saturday 7/12. Pt is independent with ADL's. Pt has multiple DME (see list below) Not current with HH services. Pt is requesting HH when dc from hospital. Referrals sent. Upon discharge, pt will need hospital to provide transport home. Pt made aware to contact TN with any questions or concerns. TN added name and number to white board and will continue to follow and assist with any discharge needs.    Future Appointments   Date Time Provider Department Center   7/21/2025  2:45 PM Jordon Yen, OD [facility] [place]   7/23/2025  2:00 PM Mary Ellen Soares, PhD [facility] PSYCHOL [facility]   7/24/2025 10:30 AM Micki Irizarry, CCC-A [facility]   7/24/2025 11:20 AM Stacey Edmonds, NP [facility]   10/15/2025  7:15 AM [facility] [place]   10/15/2025  7:45 AM LAB, [place]   10/20/2025  2:00 PM Lucien Fleming MD [facility] - Telemetry  Initial Discharge Assessment       Primary Care Provider: Lucien Fleming MD    Admission Diagnosis: Hyponatremia [E87.1]  Chest pain [R07.9]  Cellulitis of lower extremity, unspecified laterality [L03.119]  Chronic bilateral low back pain with bilateral sciatica [M54.42, M54.41, G89.29]    Admission Date: 7/13/2025  Expected Discharge Date:     Transition of Care Barriers: (P) Transportation    Payor: [employer] [facility] / Plan: [employer] [facility] SNP / Product Type: Medicare Advantage /     Extended Emergency Contact Information  Primary Emergency Contact: Tiff Malik [place] [address] [place]  Work Phone: [phone]  Mobile Phone: [phone]  Relation: Daughter   needed? No    Discharge Plan A: (P) Home, Home Health  Discharge Plan B: (P) Home      [id number] - [place] - [address] AT [facility] [address]  [address]  [address]  [place] [address]  Phone: [phone] Fax: [phone]    [facility]  [address]  [place] [address]  Phone: [phone] Fax: [phone]    [facility] - [place], [place] - [address]  [address]  [address]  [facility] [address]  Phone: [phone] Fax: [phone]    [facility] - [place], [place] - [address] [facility]  [address] [facility] [address]  Phone: [phone] Fax: [phone]      Initial Assessment (most recent)       Adult Discharge Assessment - 07/15/25 1247          Discharge Assessment    Assessment Type Discharge Planning Assessment (P)      Confirmed/corrected address, phone number and insurance Yes (P)      Confirmed Demographics Correct on Facesheet (P)      Source of Information patient (P)      Communicated AMANUEL with patient/caregiver Date not available/Unable to determine (P)      People in Home alone (P)      Do you expect to return to your current living situation? Yes (P)      Do you have help at home or someone to help you manage your care at home? No (P)      Prior to hospitilization cognitive status: Alert/Oriented (P)      Current cognitive status: Alert/Oriented (P)      Walking or Climbing Stairs Difficulty yes (P)      Walking or Climbing Stairs ambulation difficulty, requires equipment (P)      Mobility Management Straight Cane (P)      Dressing/Bathing Difficulty no (P)      Equipment Currently Used at Home blood pressure machine;glucometer;cane, straight;rollator (P)      Readmission within 30 days? Yes (P)      Patient currently being followed by outpatient case management? No (P)      Do you currently have service(s) that help you manage your care at home? No (P)      Do you take prescription medications? Yes (P)      Do you have prescription coverage? Yes (P)      Do you have any problems affording any of your prescribed medications? No (P)      Is the  patient taking medications as prescribed? yes (P)      Who is going to help you get home at discharge? Need hospital transport (P)      How do you get to doctors appointments? health plan transportation (P)      Are you on dialysis? No (P)      Do you take coumadin? No (P)      Discharge Plan A Home;Home Health (P)      Discharge Plan B Home (P)      DME Needed Upon Discharge  none (P)      Discharge Plan discussed with: Patient (P)      Transition of Care Barriers Transportation (P)         Physical Activity    On average, how many days per week do you engage in moderate to strenuous exercise (like a brisk walk)? 0 days (P)      On average, how many minutes do you engage in exercise at this level? 0 min (P)         Financial Resource Strain    How hard is it for you to pay for the very basics like food, housing, medical care, and heating? Not hard at all (P)         Housing Stability    In the last 12 months, was there a time when you were not able to pay the mortgage or rent on time? No (P)      At any time in the past 12 months, were you homeless or living in a shelter (including now)? No (P)         Transportation Needs    In the past 12 months, has lack of transportation kept you from medical appointments or from getting medications? No (P)      In the past 12 months, has lack of transportation kept you from meetings, work, or from getting things needed for daily living? No (P)         Food Insecurity    Within the past 12 months, you worried that your food would run out before you got the money to buy more. Never true (P)      Within the past 12 months, the food you bought just didn't last and you didn't have money to get more. Never true (P)         Stress    Do you feel stress - tense, restless, nervous, or anxious, or unable to sleep at night because your mind is troubled all the time - these days? To some extent (P)         Social Isolation    How often do you feel lonely or isolated from those around you?   Rarely (P)         Alcohol Use    Q1: How often do you have a drink containing alcohol? Never (P)      Q2: How many drinks containing alcohol do you have on a typical day when you are drinking? Patient does not drink (P)      Q3: How often do you have six or more drinks on one occasion? Never (P)         Utilities    In the past 12 months has the electric, gas, oil, or water company threatened to shut off services in your home? No (P)         Health Literacy    How often do you need to have someone help you when you read instructions, pamphlets, or other written material from your doctor or pharmacy? Never (P)

## 2025-07-15 NOTE — DISCHARGE SUMMARY
[facility] Hospital Medicine Discharge Summary    Primary Team: [facility] Hospitalist Team A  Attending Physician: Shivani Monreal MD  Resident: Dr. Trujillo  Intern: Dr. Beckman     Date of Admit: 7/13/2025  Date of Discharge: 7/16/2025    Discharge to: Home with Home Health   Condition: Stable    Discharge Diagnoses     Problem List[1]    Consultants and Procedures     Consultants:  Wound care    Procedures:   None     Brief History of Present Illness      Fabiano Malik Jr. is a 64 y.o. male with history of hyponatremia, NIKKI, ILD, schizophrenia, DMT2, HTN who presents to the ED for one week history of lower extremity wounds. He was discharged one day prior to presentation and was treated for bilateral LE cellulitis discharged on doxycycline. On re-presentation, he reported purulent drainage and worsening bilateral LE pain.     During admission, he was treated with IV vancomycin. MRI of bilateral Le was negative for osteomyelitits, septic arthritis. Patient's pain was treated with mulimodal agents. Patient was stable for discharge with PCP follow up and wound care.     For the full HPI please refer to the History & Physical from this admission.    Hospital Course By Problem with Pertinent Findings     Bilateral Lower Extremity Wounds, Venous Stasis Ulcers   Intractable Pain   C/f Cellulitis  - recently admitted  with concern for cellulitis changes of bilateral LE  - Last admission received vanc in the ED and discharged on doxycycline  - US LE veins without DVT, US LE arterial without hemodynamically significant stenosis   - Patient reported he was taking his doxycycline  - Reported purulent drainage, no trauma and worsening pain at home   - x ray tibia fibular without osseous abnormality   - CK 48, CRP 16.6  - MRI bilateral tibia fibula negative for osteomyelitis, septic arthritis; subcutaneous tissue edema   - received 4d of vancomycin, transitioned to PO abx bactrim for 7d total abx   - continue multimodal pain control     "  Bloating, abdominal pain  - continue simethicone prn      Anemia, microcytic  - No evidence of bleeding on examination. Documented history of NIKKI  - NIKKI noted on studies  - resume iron supplementation     Hyponatremia, asymptomatic  - chronic history of hyponatremia. Corrected close to baseline on admission. Originally concerned for SIADH and 2/2 to HCTZ use on prior admission.   - continue BMP daily   - strict I&O  - outpatient follow up with PCP on discharge for close monitoring      DMT2  - A1c 8.2 3 months ago   - SSI inpatient ; goal 140-180     ILD  - No acute concerns on admission  - outpatient follow up with pulmonary on discharge; lost to follow up     Schizophrenia  - continue Aripiprazole 5 mg      Hypothyroidism  - Continue home synthroid 25 mcg      Chronic bilateral back pain w/sciatica   - Continue robaxin and gabapentin      HTN  - Continue olmesartan 20 mg          Discharge vital signs     BP (!) 143/78   Pulse 76   Temp 98 °F (36.7 °C)   Resp 17   Ht 6' 1" (1.854 m)   Wt 108.9 kg (240 lb)   SpO2 99%   BMI 31.66 kg/m²      Discharge Physical Examination:     General: No acute distress, resting comfortably   HEENT: Atraumatic, normocephalic, moist mucous membranes  Cardiovascular: Regular rate and rhythm, normal S1 and S2, no extra heart sounds or murmurs appreciated   Chest wall: Non-tender to palpation, no gross deformities noted   Back: No abnormal curvature noted, symmetric rise with respiration   Respiratory: Stable on room air, normal work of breathing, no conversational dyspnea, no accessory muscle use noted, clear to auscultation bilaterally, no wheezes or crackles   Abdominal: Non-distended, soft, normoactive bowel sounds, mild tenderness to palpation in epigastric region, no guarding  Extremities: , bilateral open wounds, surrounding erythema improved, no drainage   Skin: Non-diaphoretic, warm, dry  Neurologic: No gross focal neurologic deficits noted      Discharge Medications "        Medication List        START taking these medications      acetaminophen 325 MG tablet  Commonly known as: TYLENOL  Take 2 tablets (650 mg total) by mouth every 4 (four) hours as needed for Pain.     LIDOcaine 5 %  Commonly known as: LIDODERM  Place 1 patch onto the skin once daily. Remove & Discard patch within 12 hours or as directed by MD.     oxyCODONE 5 MG immediate release tablet  Commonly known as: ROXICODONE  Take 1 tablet (5 mg total) by mouth every 6 (six) hours as needed for Pain (severe pain, 8-10/10).     polyethylene glycol 17 gram/dose powder  Commonly known as: GLYCOLAX  Mix and dissolve one capful (17 grams) into a beverage and drink by mouth once daily.            CHANGE how you take these medications      hydrOXYzine pamoate 50 MG Cap  Commonly known as: VISTARIL  TAKE 1 CAPSULE (50 MG) BY MOUTH NIGHTLY AS NEEDED FOR INSOMNIA  What changed:   how much to take  how to take this  when to take this  reasons to take this  additional instructions     MOUNJARO 5 mg/0.5 mL Pnij  Generic drug: tirzepatide  Inject 5 mg into the skin every 7 days.  What changed: when to take this            CONTINUE taking these medications      albuterol 90 mcg/actuation inhaler  Commonly known as: PROVENTIL/VENTOLIN HFA  Inhale 2 puffs into the lungs every 6 (six) hours as needed for Wheezing. Rescue     ALCOHOL PADS Padm  Generic drug: alcohol swabs     ARIPiprazole 5 MG Tab  Commonly known as: ABILIFY  Take 1 tablet (5 mg total) by mouth once daily.     BLOOD PRESSURE CUFF Misc  Generic drug: miscellaneous medical supply  1 Units by Misc.(Non-Drug; Combo Route) route once daily.     blood sugar diagnostic Strp  Commonly known as: TRUE METRIX GLUCOSE TEST STRIP  use 1 strip to check glucose 2 (two) times a day.     doxycycline 100 MG Cap  Commonly known as: VIBRAMYCIN  Take 1 capsule (100 mg total) by mouth 2 (two) times daily. for 5 days     DULoxetine 60 MG capsule  Commonly known as: CYMBALTA  Take 2 capsules  (120 mg total) by mouth every morning.     ferrous sulfate 324 mg (65 mg iron) Tbec  Take 1 tablet (324 mg total) by mouth every other day.     gabapentin 300 MG capsule  Commonly known as: NEURONTIN  Take 1 capsule (300 mg total) by mouth 3 (three) times daily. For chronic pain     levothyroxine 25 MCG tablet  Commonly known as: SYNTHROID  Take 1 tablet (25 mcg total) by mouth before breakfast.     metFORMIN 1000 MG tablet  Commonly known as: GLUCOPHAGE  Take 1 tablet (1,000 mg total) by mouth 2 (two) times daily with meals.     methocarbamoL 500 MG Tab  Commonly known as: Robaxin  Take 1 tablet (500 mg total) by mouth 2 (two) times a day for 10 days     olmesartan 20 MG tablet  Commonly known as: BENICAR  Take 1 tablet (20 mg total) by mouth once daily.     omeprazole 20 MG capsule  Commonly known as: PRILOSEC  Take 1 capsule (20 mg total) by mouth before breakfast.     ONE DAILY MULTIVITAMIN per tablet  Generic drug: multivitamin     pravastatin 40 MG tablet  Commonly known as: PRAVACHOL  Take 1 tablet (40 mg total) by mouth once daily.     SAFETY LANCETS 28 gauge Misc  Generic drug: lancets     sildenafiL 100 MG tablet  Commonly known as: VIAGRA  Take 1 tablet (100 mg total) by mouth daily as needed for Erectile Dysfunction.               Where to Get Your Medications        These medications were sent to [facility] [address], [place] [address]      Hours: Mon-Fri, 8a-5:30p Phone: [phone]   acetaminophen 325 MG tablet  LIDOcaine 5 %  methocarbamoL 500 MG Tab  oxyCODONE 5 MG immediate release tablet  polyethylene glycol 17 gram/dose powder          Discharge Information:   Diet:  Cardiac     Physical Activity:  As tolerated             Instructions:  1. Take all medications as prescribed  2. Keep all follow-up appointments  3. Return to the hospital or call your primary care physicians if any worsening symptoms such as fever, chest pain, shortness of breath, return of symptoms,  or any other concerns.    Follow-Up Appointments:  PCP, wound care      Cindi Trujillo MD  [facility] Internal Medicine -II  07/15/2025 8:50 AM         [1]   Patient Active Problem List  Diagnosis    Hypertension associated with diabetes    HLD (hyperlipidemia)    Schizophrenia    Hyperkalemia    Need for hepatitis C screening test    Polyp of colon    Class 1 obesity with serious comorbidity and body mass index (BMI) of 32.0 to 32.9 in adult    Dizziness    Need for vaccination against Streptococcus pneumoniae using pneumococcal conjugate vaccine 7    Depression    Anemia    Chronic back pain    Neck pain    EKG abnormalities    Gastroesophageal reflux disease    Chronic pain    Palmar nodule, left    Trigger index finger of right hand    Lumbar facet arthropathy    Hand or foot spasms    Hearing loss of left ear    Cerumen debris on tympanic membrane of right ear    Erectile dysfunction    Rash    Lumbar radiculopathy    Carpal tunnel syndrome of right wrist    History of colon polyps    Weakness    Pulmonary hypertension, unspecified    Type 2 diabetes mellitus with diabetic peripheral angiopathy without gangrene, without long-term current use of insulin    Aortic arch atherosclerosis    Cervical spondylosis with myelopathy and radiculopathy    S/P cervical spinal fusion    Hypoglycemia    Chronic myelopathy    Other nonthrombocytopenic purpura    Shortness of breath    Interstitial pulmonary disease, unspecified    Scarring of lung    Schizoaffective disorder with good prognostic features    Meniere's disease    Microcytic anemia    Vertigo    Hyponatremia    Ground glass opacity present on imaging of lung    Cellulitis    Medication nonadherence due to psychosocial problem    PVD (peripheral vascular disease)    Venous insufficiency of both lower extremities

## 2025-07-15 NOTE — NURSING
VIRTUAL NURSE:  Called patient's room via telephone; verified spelling of patient's name and birthdate.  Introduced as VN for night shift that will be working with floor nurse and nursing assistant.  Educated on VN's role in patient care and VIP model.  Plan of care reviewed.  Education per flowsheet.   Informed that staff will round on patient every 2 hours but to use call light for any other needs they may have.  Informed of fall risk and fall precautions; verbalized understanding.  Initiated plan of care.

## 2025-07-15 NOTE — PROGRESS NOTES
Pharmacokinetic Assessment Follow Up: IV Vancomycin    Vancomycin serum concentration assessment(s):    The trough level was drawn incorrectly and cannot be used to guide therapy at this time. Level was drawn during infusion    Vancomycin Regimen Plan:    Continue regimen to Vancomycin 1250 mg IV every 12 hours with next serum trough concentration measured at 1730 prior to next dose on      Drug levels (last 3 results):  Recent Labs   Lab Result Units 07/15/25  0654   Vancomycin Trough ug/ml 40.9*       Pharmacy will continue to follow and monitor vancomycin.    Please contact pharmacy at extension  for questions regarding this assessment.    Thank you for the consult,   Foster Beebe       Patient brief summary:  Fabiano Malik Jr. is a 64 y.o. male initiated on antimicrobial therapy with IV Vancomycin for treatment of     The patient's current regimen is     Drug Allergies:   Review of patient's allergies indicates:   Allergen Reactions    Pcn [penicillins] Other (See Comments)     Childhood rxn, pt does not recall type of rxn       Actual Body Weight:       Renal Function:   Estimated Creatinine Clearance: 74.3 mL/min (based on SCr of 1.3 mg/dL).,     Dialysis Method (if applicable):      CBC (last 72 hours):  Recent Labs   Lab Result Units 07/13/25  1529 07/14/25  1027 07/15/25  0654   WBC K/uL 8.21 8.92 8.00   HGB gm/dL 9.2* 10.3* 10.0*   HCT % 28.1* 32.2* 32.4*   Platelet Count K/uL 246 258 253   Lymph % % 25.9 19.8 19.3   Mono % % 8.9 7.6 6.3   Eos % % 19.4* 18.5* 20.9*   Basophil % % 1.6 1.8 1.4       Metabolic Panel (last 72 hours):  Recent Labs   Lab Result Units 07/13/25  1529 07/14/25  0010 07/14/25  1027 07/15/25  0654   Sodium mmol/L 128*  --  134* 135*   Potassium mmol/L 4.5  --  4.7 4.8   Chloride mmol/L 101  --  105 106   CO2 mmol/L 20*  --  23 25   Glucose mg/dL 234*  --  174* 181*   Glucose, UA   --  Negative  --   --    BUN mg/dL 20  --  21 19   Creatinine mg/dL 1.3  --  1.3 1.3   Albumin g/dL  3.3*  --  3.4* 3.3*   Bilirubin Total mg/dL 0.2  --  0.3 0.3   ALP unit/L 87  --  86 85   AST unit/L 16  --  18 19   ALT unit/L 14  --  12 12   Magnesium  mg/dL 1.5*  --  1.9 1.8   Phosphorus Level mg/dL  --   --  3.6 3.8       Vancomycin Administrations:  vancomycin given in the last 96 hours                     vancomycin 1,250 mg in 0.9% NaCl 250 mL IVPB (admixture device) (mg) 1,250 mg New Bag 07/15/25 0606     1,250 mg New Bag 07/14/25 1933     1,250 mg New Bag  0607    vancomycin (VANCOCIN) 2,250 mg in 0.9% NaCl 500 mL IVPB (mg) 2,250 mg New Bag 07/13/25 1818                    Microbiologic Results:  Microbiology Results (last 7 days)       ** No results found for the last 168 hours. **

## 2025-07-15 NOTE — TREATMENT PLAN
Inpatient Upgrade Note     Fabiano Malik Jr. has warranted treatment spanning two or more midnights of hospital level care for the management of Bilateral Lower Extremity Wounds, Venous Statis Ulcers C/f Cellulitis . He continues to require IV antibiotics, daily labs, and medication adjustments. His condition is also complicated by the following comorbidities: Hypertension and Diabetes.     Cindi rTujillo MD   [facility] Internal [facility] PGY-2

## 2025-07-15 NOTE — PLAN OF CARE
Problem: Adult Inpatient Plan of Care  Goal: Plan of Care Review  Outcome: Progressing  Goal: Patient-Specific Goal (Individualized)  Outcome: Progressing  Goal: Absence of Hospital-Acquired Illness or Injury  Outcome: Progressing  Goal: Optimal Comfort and Wellbeing  Outcome: Progressing  Goal: Readiness for Transition of Care  Outcome: Progressing     Problem: Diabetes Comorbidity  Goal: Blood Glucose Level Within Targeted Range  Outcome: Progressing     Problem: Wound  Goal: Optimal Coping  Outcome: Progressing  Goal: Optimal Functional Ability  Outcome: Progressing  Goal: Absence of Infection Signs and Symptoms  Outcome: Progressing  Goal: Improved Oral Intake  Outcome: Progressing  Goal: Optimal Pain Control and Function  Outcome: Progressing  Goal: Skin Health and Integrity  Outcome: Progressing  Goal: Optimal Wound Healing  Outcome: Progressing     Problem: Fall Injury Risk  Goal: Absence of Fall and Fall-Related Injury  Outcome: Progressing     Problem: Skin or Soft Tissue Infection  Goal: Absence of Infection Signs and Symptoms  Outcome: Progressing     Problem: Pain Acute  Goal: Optimal Pain Control and Function  Outcome: Progressing     Problem: Electrolyte Imbalance  Goal: Electrolyte Balance  Outcome: Progressing     Problem: Comorbidity Management  Goal: Maintenance of Behavioral Health Symptom Control  Outcome: Progressing  Goal: Blood Pressure in Desired Range  Outcome: Progressing

## 2025-07-15 NOTE — PROGRESS NOTES
"[facility] Medicine Progress Note      Subjective/Interval History      No acute events overnight. Patient reports pain is still present this AM. Denies fever or chills. Denies shortness of breath, chest pain. Wounds appear stable with some improvement in erythema.      Objective     Physical Examination:  BP (!) 157/75 (BP Location: Right arm, Patient Position: Lying)   Pulse 81   Temp 97.5 °F (36.4 °C) (Oral)   Resp 18   Ht 6' 1" (1.854 m)   Wt 108.9 kg (240 lb)   SpO2 100%   BMI 31.66 kg/m²    General: No acute distress, resting comfortably   HEENT: Atraumatic, normocephalic, moist mucous membranes  Cardiovascular: Regular rate and rhythm, normal S1 and S2, no extra heart sounds or murmurs appreciated   Chest wall: Non-tender to palpation, no gross deformities noted   Back: No abnormal curvature noted, symmetric rise with respiration   Respiratory: Stable on room air, normal work of breathing, no conversational dyspnea, no accessory muscle use noted, clear to auscultation bilaterally, no wheezes or crackles   Abdominal: Non-distended, soft, normoactive bowel sounds, mild tenderness to palpation in epigastric region, no guarding  Extremities: trace bilateral LE swelling, bilateral open wounds with surrounding erythema, no drainage   Skin: Non-diaphoretic, warm, dry  Neurologic: No gross focal neurologic deficits noted      Intake/Output:    Intake/Output Summary (Last 24 hours) at 7/15/2025 0746  Last data filed at 7/15/2025 0622  Gross per 24 hour   Intake 480 ml   Output 3700 ml   Net -3220 ml     Net IO Since Admission: -5,155 mL [07/15/25 0746]    Laboratory data: reviewed     Microbiology data: reviewed     EKG data: reviewed     Radiology data: reviewed      Current Medications:     Infusions:       Scheduled:   ARIPiprazole  5 mg Oral Daily    DULoxetine  120 mg Oral QAM    enoxparin  40 mg Subcutaneous Daily    ferrous sulfate  1 tablet Oral Daily    gabapentin  300 mg Oral TID    levothyroxine  " 25 mcg Oral Before breakfast    LIDOcaine  1 patch Transdermal Q24H    losartan  50 mg Oral Daily    methocarbamoL  500 mg Oral BID    multivitamin  1 tablet Oral Daily    pantoprazole  40 mg Oral Daily    pravastatin  40 mg Oral QHS    vancomycin (VANCOCIN) IV (PEDS and ADULTS)  1,250 mg Intravenous Q12H        PRN:    Current Facility-Administered Medications:     acetaminophen, 650 mg, Oral, Q4H PRN    albuterol-ipratropium, 3 mL, Nebulization, Q6H PRN    dextrose 50%, 12.5 g, Intravenous, PRN    dextrose 50%, 25 g, Intravenous, PRN    glucagon (human recombinant), 1 mg, Intramuscular, PRN    glucose, 16 g, Oral, PRN    glucose, 24 g, Oral, PRN    hydrOXYzine pamoate, 50 mg, Oral, Nightly PRN    insulin aspart U-100, 0-10 Units, Subcutaneous, QID (AC + HS) PRN    naloxone, 0.02 mg, Intravenous, PRN    oxyCODONE, 5 mg, Oral, Q6H PRN    sodium chloride 0.9%, 10 mL, Intravenous, Q12H PRN    Pharmacy to dose Vancomycin consult, , , Once **AND** vancomycin - pharmacy to dose, , Intravenous, pharmacy to manage frequency      Assessment/Plan:     Bilateral Lower Extremity Wounds, Venous Stasis Ulcers   Intractable Pain   C/f Cellulitis  - recently admitted  with concern for cellulitis changes of bilateral LE  - Last admission received vanc in the ED and discharged on doxycycline  - US LE veins without DVT, US LE arterial without hemodynamically significant stenosis   - Patient reports he was taking his doxycycline  - Reporting purulent drainage, no trauma and worsening pain at home   - x ray tibia fibular without osseous abnormality   - CK 48, CRP 16.6  - MRI bilateral tibia fibula negative for osteomyelitis, septic arthritis; subcutaneous tissue edema   - continue vancomycin, will transition to PO today   - continue multimodal pain control      Bloating, abdominal pain  - continue simethicone prn      Anemia, microcytic  - No evidence of bleeding on examination. Documented history of NIKKI  - NIKKI noted on studies  -  resume iron supplementation     Hyponatremia, asymptomatic  - chronic history of hyponatremia. Corrected close to baseline on admission. Originally concerned for SIADH and 2/2 to HCTZ use on prior admission.   - continue BMP daily   - strict I&O  - outpatient follow up with PCP on discharge for close monitoring      DMT2  - A1c 8.2 3 months ago   - SSI inpatient ; goal 140-180     ILD  - No acute concerns on admission  - outpatient follow up with pulmonary on discharge; lost to follow up     Schizophrenia  - continue Aripiprazole 5 mg      Hypothyroidism  - Continue home synthroid 25 mcg      Chronic bilateral back pain w/sciatica   - Continue robaxin and gabapentin      HTN  - Continue losartan 50 mg         Healthcare maintenance   -primary care provider is Lucien Fleming MD      Diet: diabetic   VTE PPx: lovenox   Code Status: Full     Dispo: pending improvement in pain, likely discharge today   Estimated LOS: 0-1 day      Cindi Trujillo MD   [employer] Internal Medicine PGY-2

## 2025-07-15 NOTE — CONSULTS
"[facility] - Telemetry  Wound Care    Patient Name:  Fabiano Malik Jr.   MRN:  [id number]  Date: 7/15/2025  Diagnosis: Hyponatremia    History:     Past Medical History:   Diagnosis Date    Chronic back pain greater than 3 months duration     Depression     Diabetes mellitus     Diabetes mellitus, type 2     Hypertension     Schizophrenia        Social History[1]    Precautions:     Allergies as of 07/13/2025 - Reviewed 07/13/2025   Allergen Reaction Noted    Pcn [penicillins] Other (See Comments) 06/25/2016       WOC Assessment Details/Treatment       BLE- scattered dried venous ulcerations- no drainage- less erythema/edema      Recommendations discussed with pt, nurse and Dr. Trujillo-   - Pressure injury prevention interventions   - Betadine to BLE BID - open to air    07/15/2025         [1]   Social History  Socioeconomic History    Marital status:    Tobacco Use    Smoking status: Never    Smokeless tobacco: Never   Substance and Sexual Activity    Alcohol use: Yes     Comment: "couple of beers a day"    Drug use: No    Sexual activity: Not Currently     Social Drivers of Health     Financial Resource Strain: High Risk (7/14/2025)    Overall Financial Resource Strain (CARDIA)     Difficulty of Paying Living Expenses: Hard   Food Insecurity: Food Insecurity Present (7/14/2025)    Hunger Vital Sign     Worried About Running Out of Food in the Last Year: Sometimes true     Ran Out of Food in the Last Year: Sometimes true   Transportation Needs: Unmet Transportation Needs (7/14/2025)    PRAPARE - Transportation     Lack of Transportation (Medical): Yes     Lack of Transportation (Non-Medical): No   Physical Activity: Insufficiently Active (7/12/2025)    Exercise Vital Sign     Days of Exercise per Week: 3 days     Minutes of Exercise per Session: 30 min   Stress: Stress Concern Present (7/14/2025)    [employer] - Occupational Stress Questionnaire     Feeling of Stress : Rather " much   Housing Stability: Low Risk  (7/14/2025)    Housing Stability Vital Sign     Unable to Pay for Housing in the Last Year: No     Number of Times Moved in the Last Year: 0     Homeless in the Last Year: No

## 2025-07-16 ENCOUNTER — NURSE TRIAGE (OUTPATIENT)
Dept: ADMINISTRATIVE | Facility: CLINIC | Age: 65
End: 2025-07-16
Payer: MEDICARE

## 2025-07-16 VITALS
TEMPERATURE: 98 F | HEART RATE: 79 BPM | WEIGHT: 240 LBS | DIASTOLIC BLOOD PRESSURE: 75 MMHG | OXYGEN SATURATION: 96 % | HEIGHT: 73 IN | BODY MASS INDEX: 31.81 KG/M2 | RESPIRATION RATE: 18 BRPM | SYSTOLIC BLOOD PRESSURE: 146 MMHG

## 2025-07-16 LAB — POCT GLUCOSE: 162 MG/DL (ref 70–110)

## 2025-07-16 PROCEDURE — 63600175 PHARM REV CODE 636 W HCPCS: Performed by: INTERNAL MEDICINE

## 2025-07-16 PROCEDURE — 25000003 PHARM REV CODE 250: Performed by: INTERNAL MEDICINE

## 2025-07-16 PROCEDURE — 25000003 PHARM REV CODE 250

## 2025-07-16 RX ORDER — HYDROXYZINE PAMOATE 25 MG/1
25 CAPSULE ORAL EVERY 8 HOURS PRN
Status: DISCONTINUED | OUTPATIENT
Start: 2025-07-16 | End: 2025-07-16 | Stop reason: HOSPADM

## 2025-07-16 RX ADMIN — LOSARTAN POTASSIUM 50 MG: 50 TABLET, FILM COATED ORAL at 08:07

## 2025-07-16 RX ADMIN — FERROUS SULFATE TAB 325 MG (65 MG ELEMENTAL FE) 1 EACH: 325 (65 FE) TAB at 08:07

## 2025-07-16 RX ADMIN — METHOCARBAMOL 500 MG: 500 TABLET ORAL at 08:07

## 2025-07-16 RX ADMIN — OXYCODONE HYDROCHLORIDE 5 MG: 5 TABLET ORAL at 04:07

## 2025-07-16 RX ADMIN — VANCOMYCIN HYDROCHLORIDE 1000 MG: 1 INJECTION, POWDER, LYOPHILIZED, FOR SOLUTION INTRAVENOUS at 05:07

## 2025-07-16 RX ADMIN — INSULIN ASPART 2 UNITS: 100 INJECTION, SOLUTION INTRAVENOUS; SUBCUTANEOUS at 07:07

## 2025-07-16 RX ADMIN — LIDOCAINE 1 PATCH: 50 PATCH CUTANEOUS at 08:07

## 2025-07-16 RX ADMIN — GABAPENTIN 300 MG: 300 CAPSULE ORAL at 08:07

## 2025-07-16 RX ADMIN — PANTOPRAZOLE SODIUM 40 MG: 40 TABLET, DELAYED RELEASE ORAL at 08:07

## 2025-07-16 RX ADMIN — ARIPIPRAZOLE 5 MG: 5 TABLET ORAL at 08:07

## 2025-07-16 RX ADMIN — LEVOTHYROXINE SODIUM 25 MCG: 0.03 TABLET ORAL at 05:07

## 2025-07-16 RX ADMIN — DULOXETINE HYDROCHLORIDE 120 MG: 30 CAPSULE, DELAYED RELEASE ORAL at 06:07

## 2025-07-16 RX ADMIN — THERA TABS 1 TABLET: TAB at 08:07

## 2025-07-16 NOTE — TREATMENT PLAN
[facility] - Telemetry      HOME HEALTH ORDERS  FACE TO FACE ENCOUNTER    Patient Name: Fabiano Malik Jr.  YOB: 1960    PCP: Lucien Fleming MD   PCP Address: [address] / [place] [address]  PCP Phone Number: [phone]  PCP Fax: [phone]    Encounter Date: 7/13/25    Admit to Home Health    Diagnoses:  Active Hospital Problems    Diagnosis  POA    *Hyponatremia [E87.1]  Yes    Venous insufficiency of both lower extremities [I87.2]  Yes    PVD (peripheral vascular disease) [I73.9]  Yes    Cellulitis [L03.90]  Yes    Microcytic anemia [D50.9]  Yes    Hypertension associated with diabetes [E11.59, I15.2]  Yes      Resolved Hospital Problems   No resolved problems to display.       Follow Up Appointments:  Future Appointments   Date Time Provider Department Center   7/21/2025  2:45 PM Jordon Yen, PJ [facility] [place]   7/24/2025 10:30 AM Micki Irizarry CCC-IVDHYA [facility]   7/24/2025 11:20 AM Stacey Edmonds NP [facility]   10/15/2025  7[facility] [place]   10/15/2025  7:45 AM LAB, [place] [facility]   10/20/2025  2:00 PM Lucien Fleming MD [facility]       Allergies:  Review of patient's allergies indicates:   Allergen Reactions    Pcn [penicillins] Other (See Comments)     Childhood rxn, pt does not recall type of rxn       Medications: Review discharge medications with patient and family and provide education.    Current Medications[1]     Medication List        START taking these medications      acetaminophen 325 MG tablet  Commonly known as: TYLENOL  Take 2 tablets (650 mg total) by mouth every 4 (four) hours as needed for Pain.     LIDOcaine 5 %  Commonly known as: LIDODERM  Place 1 patch onto the skin once daily. Remove & Discard patch within 12 hours or as directed by MD.     oxyCODONE 5 MG immediate release tablet  Commonly known as: ROXICODONE  Take 1 tablet (5 mg total) by mouth  every 6 (six) hours as needed for Pain (severe pain, 8-10/10).     polyethylene glycol 17 gram/dose powder  Commonly known as: GLYCOLAX  Mix and dissolve one capful (17 grams) into a beverage and drink by mouth once daily.     sulfamethoxazole-trimethoprim 800-160mg 800-160 mg Tab  Commonly known as: BACTRIM DS  Take 2 tablets by mouth 2 (two) times daily for 3 days            CHANGE how you take these medications      hydrOXYzine pamoate 50 MG Cap  Commonly known as: VISTARIL  TAKE 1 CAPSULE (50 MG) BY MOUTH NIGHTLY AS NEEDED FOR INSOMNIA  What changed:   how much to take  how to take this  when to take this  reasons to take this  additional instructions     MOUNJARO 5 mg/0.5 mL Pnij  Generic drug: tirzepatide  Inject 5 mg into the skin every 7 days.  What changed: when to take this            CONTINUE taking these medications      albuterol 90 mcg/actuation inhaler  Commonly known as: PROVENTIL/VENTOLIN HFA  Inhale 2 puffs into the lungs every 6 (six) hours as needed for Wheezing. Rescue     ALCOHOL PADS Padm  Generic drug: alcohol swabs  Apply topically once daily.     ARIPiprazole 5 MG Tab  Commonly known as: ABILIFY  Take 1 tablet (5 mg total) by mouth once daily.     BLOOD PRESSURE CUFF Misc  Generic drug: miscellaneous medical supply  1 Units by Misc.(Non-Drug; Combo Route) route once daily.     blood sugar diagnostic Strp  Commonly known as: TRUE METRIX GLUCOSE TEST STRIP  use 1 strip to check glucose 2 (two) times a day.     DULoxetine 60 MG capsule  Commonly known as: CYMBALTA  Take 2 capsules (120 mg total) by mouth every morning.     ferrous sulfate 324 mg (65 mg iron) Tbec  Take 1 tablet (324 mg total) by mouth every other day.     gabapentin 300 MG capsule  Commonly known as: NEURONTIN  Take 1 capsule (300 mg total) by mouth 3 (three) times daily. For chronic pain     levothyroxine 25 MCG tablet  Commonly known as: SYNTHROID  Take 1 tablet (25 mcg total) by mouth before breakfast.     metFORMIN 1000 MG  tablet  Commonly known as: GLUCOPHAGE  Take 1 tablet (1,000 mg total) by mouth 2 (two) times daily with meals.     methocarbamoL 500 MG Tab  Commonly known as: Robaxin  Take 1 tablet (500 mg total) by mouth 2 (two) times a day for 10 days     olmesartan 20 MG tablet  Commonly known as: BENICAR  Take 1 tablet (20 mg total) by mouth once daily.     omeprazole 20 MG capsule  Commonly known as: PRILOSEC  Take 1 capsule (20 mg total) by mouth before breakfast.     ONE DAILY MULTIVITAMIN per tablet  Generic drug: multivitamin  Take 1 tablet by mouth once daily.     pravastatin 40 MG tablet  Commonly known as: PRAVACHOL  Take 1 tablet (40 mg total) by mouth once daily.     SAFETY LANCETS 28 gauge Misc  Generic drug: lancets  2 (two) times daily.     sildenafiL 100 MG tablet  Commonly known as: VIAGRA  Take 1 tablet (100 mg total) by mouth daily as needed for Erectile Dysfunction.            STOP taking these medications      doxycycline 100 MG Cap  Commonly known as: VIBRAMYCIN                I have seen and examined this patient within the last 30 days. My clinical findings that support the need for the home health skilled services and home bound status are the following:no   Medical restrictions requiring assistance of another human to leave home due to  Wound care needs.     Diet:   diabetic diet 2000 calorie    Labs:  Report Lab results to PCP.    Referrals/ Consults  Physical Therapy to evaluate and treat. Evaluate for home safety and equipment needs; Establish/upgrade home exercise program. Perform / instruct on therapeutic exercises, gait training, transfer training, and Range of Motion.  Occupational Therapy to evaluate and treat. Evaluate home environment for safety and equipment needs. Perform/Instruct on transfers, ADL training, ROM, and therapeutic exercises.  Aide to provide assistance with personal care, ADLs, and vital signs.    Activities:   activity as tolerated    Nursing:   Agency to admit patient within  24 hours of hospital discharge unless specified on physician order or at patient request    SN to complete comprehensive assessment including routine vital signs. Instruct on disease process and s/s of complications to report to MD. Review/verify medication list sent home with the patient at time of discharge  and instruct patient/caregiver as needed. Frequency may be adjusted depending on start of care date.     Skilled nurse to perform up to 3 visits PRN for symptoms related to diagnosis    Notify MD if SBP > 160 or < 90; DBP > 90 or < 50; HR > 120 or < 50; Temp > 101; O2 < 88%;     Ok to schedule additional visits based on staff availability and patient request on consecutive days within the home health episode.    When multiple disciplines ordered:    Start of Care occurs on Sunday - Wednesday schedule remaining discipline evaluations as ordered on separate consecutive days following the start of care.    Thursday SOC -schedule subsequent evaluations Friday and Monday the following week.     Friday - Saturday SOC - schedule subsequent discipline evaluations on consecutive days starting Monday of the following week.    For all post-discharge communication and subsequent orders please contact patient's primary care physician. If unable to reach primary care physician or do not receive response within 30 minutes, please contact primary care physician for clinical staff order clarification    Miscellaneous   Diabetic Care:   SN to perform and educate Diabetic management with blood glucose monitoring:    Home Health Aide:  Nursing Twice weekly, Physical Therapy Twice weekly, Occupational Therapy Twice weekly, and Home Health Aide Twice weekly    Wound Care Orders  Bilateral Lower Extremity Venous Stasis Ulcers  - Pressure injury prevention interventions   - Betadine to BLE BID - open to air    I certify that this patient is confined to his home and needs intermittent skilled nursing care, physical therapy, and  occupational therapy.    Cindi Trujillo MD   [facility] Internal Medicine PGY-2               [1]   Current Facility-Administered Medications   Medication Dose Route Frequency Provider Last Rate Last Admin    acetaminophen tablet 650 mg  650 mg Oral Q4H PRN Cherelle Thurman MD        albuterol-ipratropium 2.5 mg-0.5 mg/3 mL nebulizer solution 3 mL  3 mL Nebulization Q6H PRN Cherelle Thurman MD        ARIPiprazole tablet 5 mg  5 mg Oral Daily Cherelle Thurman MD   5 mg at 07/15/25 0832    dextrose 50% injection 12.5 g  12.5 g Intravenous PRN Cherelle Thurman MD        dextrose 50% injection 25 g  25 g Intravenous PRN Cherelle Thurman MD        DULoxetine DR capsule 120 mg  120 mg Oral QAM Cherelle Thurman MD   120 mg at 07/16/25 0610    enoxaparin injection 40 mg  40 mg Subcutaneous Daily Cherelle Thurman MD   40 mg at 07/15/25 1726    ferrous sulfate tablet 1 each  1 tablet Oral Daily Cherelle Thurman MD   1 each at 07/15/25 0832    gabapentin capsule 300 mg  300 mg Oral TID Cherelle Thurman MD   300 mg at 07/15/25 2159    glucagon (human recombinant) injection 1 mg  1 mg Intramuscular PRN Cherelle Thurman MD        glucose chewable tablet 16 g  16 g Oral PRN Cherelle Thurman MD        glucose chewable tablet 24 g  24 g Oral PRN Cherelle Thurman MD        hydrOXYzine pamoate capsule 25 mg  25 mg Oral Q8H PRN Moi Navarro MD        hydrOXYzine pamoate capsule 50 mg  50 mg Oral Nightly PRN Cherelle Thurman MD        insulin aspart U-100 pen 0-10 Units  0-10 Units Subcutaneous QID (AC + HS) PRN Cherelle Thurman MD   2 Units at 07/16/25 0737    levothyroxine tablet 25 mcg  25 mcg Oral Before breakfast Cherelle Thurman MD   25 mcg at 07/16/25 0547    LIDOcaine 5 % patch 1 patch  1 patch Transdermal Q24H Allison Beckman MD   1 patch at 07/15/25 0831    losartan tablet 50 mg  50 mg Oral Daily Cherelle Thurman MD   50 mg at 07/15/25 0832    methocarbamoL tablet 500 mg  500 mg Oral BID Cherelle Thurman MD   500 mg at  07/15/25 2159    multivitamin tablet  1 tablet Oral Daily Cherelle Thurman MD   1 tablet at 07/15/25 0832    naloxone 0.4 mg/mL injection 0.02 mg  0.02 mg Intravenous PRN Cherelle Thurman MD        oxyCODONE immediate release tablet 5 mg  5 mg Oral Q6H PRN Cherelle Thurman MD   5 mg at 07/16/25 0409    pantoprazole EC tablet 40 mg  40 mg Oral Daily Cherelle Thurman MD   40 mg at 07/15/25 0832    pravastatin tablet 40 mg  40 mg Oral QHS Cherelle Thumran MD   40 mg at 07/15/25 2159    sodium chloride 0.9% flush 10 mL  10 mL Intravenous Q12H PRN Cherelle Thurman MD        vancomycin (VANCOCIN) 1,000 mg in 0.9% NaCl 250 mL IVPB (admixture device)  1,000 mg Intravenous Q12H Shivani Monreal MD   Stopped at 07/16/25 0727    vancomycin - pharmacy to dose   Intravenous pharmacy to manage frequency Cherelle Thurman MD

## 2025-07-16 NOTE — PROGRESS NOTES
Pharmacokinetic Assessment Follow Up: IV Vancomycin    Vancomycin serum concentration assessment(s):    The trough level was drawn correctly and can be used to guide therapy at this time. The measurement is above the desired definitive target range of 10 to 15 mcg/mL.    Vancomycin Regimen Plan:    Change regimen to Vancomycin 1000 mg IV every 12 hours with next serum trough concentration measured at 0530 prior to the dose on 7/18    Drug levels (last 3 results):  Recent Labs   Lab Result Units 07/15/25  0654 07/15/25  1740   Vancomycin Trough ug/ml 40.9* 19.2       Pharmacy will continue to follow and monitor vancomycin.    Please contact pharmacy at extension [phone] for questions regarding this assessment.    Thank you for the consult,   Nasim Ridley, PharmD, BCCCP       Patient brief summary:  Fabiano Malik Jr. is a 64 y.o. male initiated on antimicrobial therapy with IV Vancomycin for treatment of skin & soft tissue infection    The patient's current regimen is vancomycin 1250 mg q12h    Drug Allergies:   Review of patient's allergies indicates:   Allergen Reactions    Pcn [penicillins] Other (See Comments)     Childhood rxn, pt does not recall type of rxn       Actual Body Weight:   108.9 kg    Renal Function:   Estimated Creatinine Clearance: 74.3 mL/min (based on SCr of 1.3 mg/dL).    Dialysis Method (if applicable):  N/A    CBC (last 72 hours):  Recent Labs   Lab Result Units 07/13/25  1529 07/14/25  1027 07/15/25  0654   WBC K/uL 8.21 8.92 8.00   HGB gm/dL 9.2* 10.3* 10.0*   HCT % 28.1* 32.2* 32.4*   Platelet Count K/uL 246 258 253   Lymph % % 25.9 19.8 19.3   Mono % % 8.9 7.6 6.3   Eos % % 19.4* 18.5* 20.9*   Basophil % % 1.6 1.8 1.4       Metabolic Panel (last 72 hours):  Recent Labs   Lab Result Units 07/13/25  1529 07/14/25  0010 07/14/25  1027 07/15/25  0654   Sodium mmol/L 128*  --  134* 135*   Potassium mmol/L 4.5  --  4.7 4.8   Chloride mmol/L 101  --  105 106   CO2 mmol/L 20*  --  23 25    Glucose mg/dL 234*  --  174* 181*   Glucose, UA   --  Negative  --   --    BUN mg/dL 20  --  21 19   Creatinine mg/dL 1.3  --  1.3 1.3   Albumin g/dL 3.3*  --  3.4* 3.3*   Bilirubin Total mg/dL 0.2  --  0.3 0.3   ALP unit/L 87  --  86 85   AST unit/L 16  --  18 19   ALT unit/L 14  --  12 12   Magnesium  mg/dL 1.5*  --  1.9 1.8   Phosphorus Level mg/dL  --   --  3.6 3.8       Vancomycin Administrations:  vancomycin given in the last 96 hours                     vancomycin 1,250 mg in 0.9% NaCl 250 mL IVPB (admixture device) (mg) 1,250 mg New Bag 07/15/25 1813     1,250 mg New Bag  0606     1,250 mg New Bag 07/14/25 1933     1,250 mg New Bag  0607    vancomycin (VANCOCIN) 2,250 mg in 0.9% NaCl 500 mL IVPB (mg) 2,250 mg New Bag 07/13/25 1818                    Microbiologic Results:  Microbiology Results (last 7 days)       ** No results found for the last 168 hours. **

## 2025-07-16 NOTE — PLAN OF CARE
AAOx4. VSS. Afebrile. Patient complains of pain to both lower extremities. Oxycodone give. Legs are elevated on a pillow. No swelling noted. Wounds cleaned with betadine and left open to air. Antibiotics given per order. POC reviewed with the patient. Belongings by the bedside. Call bell within reach. Urinal provided.      Problem: Adult Inpatient Plan of Care  Goal: Plan of Care Review  Outcome: Progressing  Goal: Patient-Specific Goal (Individualized)  Outcome: Progressing  Goal: Absence of Hospital-Acquired Illness or Injury  Outcome: Progressing  Goal: Optimal Comfort and Wellbeing  Outcome: Progressing  Goal: Readiness for Transition of Care  Outcome: Progressing     Problem: Diabetes Comorbidity  Goal: Blood Glucose Level Within Targeted Range  Outcome: Progressing     Problem: Wound  Goal: Optimal Coping  Outcome: Progressing  Goal: Optimal Functional Ability  Outcome: Progressing  Goal: Absence of Infection Signs and Symptoms  Outcome: Progressing  Goal: Improved Oral Intake  Outcome: Progressing  Goal: Optimal Pain Control and Function  Outcome: Progressing  Goal: Skin Health and Integrity  Outcome: Progressing  Goal: Optimal Wound Healing  Outcome: Progressing     Problem: Fall Injury Risk  Goal: Absence of Fall and Fall-Related Injury  Outcome: Progressing     Problem: Skin or Soft Tissue Infection  Goal: Absence of Infection Signs and Symptoms  Outcome: Progressing     Problem: Pain Acute  Goal: Optimal Pain Control and Function  Outcome: Progressing     Problem: Electrolyte Imbalance  Goal: Electrolyte Balance  Outcome: Progressing     Problem: Comorbidity Management  Goal: Maintenance of Behavioral Health Symptom Control  Outcome: Progressing  Goal: Blood Pressure in Desired Range  Outcome: Progressing

## 2025-07-16 NOTE — PROGRESS NOTES
Discharge orders noted. Additional clinical references attached. Patient's discharge instructions given by bedside RN. Virtual nurse phoned into room and reviewed discharge instructions. Education provided on new medications, diagnosis, and follow-up appointments. Teach back method used. Patient verbalized understanding. All questions answered. Patient awaiting hospital assisted transportation to home. Bedside nurse updated on patient status.     07/16/25 1027   AVS Confirmation   Discharge instructions and AVS provided to and reviewed with patient and/or significant other. Yes

## 2025-07-16 NOTE — TELEPHONE ENCOUNTER
"Pt discharged on today with cellulitis to bilateral legs. Now c/o having a "pus ball" to right leg now since discharge. Rates pain greater than 10/10. Advised to go back to go to ED per protocol. VU. Encounter routed to provider.   Reason for Disposition   SEVERE pain    Additional Information   Negative: Shock suspected (e.g., cold/pale/clammy skin, too weak to stand, low BP, rapid pulse)   Negative: Sounds like a life-threatening emergency to the triager    Protocols used: Cellulitis on Antibiotic Follow-up Call-A-[facility]    "

## 2025-07-16 NOTE — PLAN OF CARE
Pt agreeable with discharge to home today with  services. Follow up appointment information added for AVS. Pt will be transported home by [facility] with [facility].     Future Appointments   Date Time Provider Department Center   7/21/2025  2:45 PM Jordon Yen, OD [facility] OPTO [place]   7/23/2025  2:00 PM Mary Ellen Soares, PhD [facility] PSYCHOL [address]   7/24/2025 10:30 AM Micki Irizarry, CCC-A [facility] ALCON [place] [facility]   7/24/2025 11:20 AM Stacey Edmonds, NP [facility] ALCON [place] [facility]   7/30/2025  2:00 PM Latasha Becerra MD [facility] IMPRI [place] [facility]   10/15/2025  7:15 AM SPECIMEN, [facility] [place]   10/15/2025  7:45 AM LAB, [facility] [place]   10/20/2025  2:00 PM Lucien Fleming MD [facility]      07/16/25 0954   Final Note   Assessment Type Final Discharge Note   Anticipated Discharge Disposition Home-Health   What phone number can be called within the next 1-3 days to see how you are doing after discharge? [phone]   Hospital Resources/Appts/Education Provided Appointments scheduled and added to AVS   Post-Acute Status   Post-Acute Authorization Home Health   Home Health Status Set-up Complete/Auth obtained   Discharge Delays None known at this time

## 2025-07-17 ENCOUNTER — NURSE TRIAGE (OUTPATIENT)
Dept: ADMINISTRATIVE | Facility: CLINIC | Age: 65
End: 2025-07-17
Payer: MEDICARE

## 2025-07-17 ENCOUNTER — TELEPHONE (OUTPATIENT)
Dept: PRIMARY CARE CLINIC | Facility: CLINIC | Age: 65
End: 2025-07-17
Payer: MEDICARE

## 2025-07-17 NOTE — TELEPHONE ENCOUNTER
"Patient Outreach/TCC CALL     Discharge Information    Discharge Date: 07/16/2025  Primary Discharge Diagnosis: hyponatremia  Discharge Summary:    Medication & Order review    Were medication changes made or new mediacations added? Methocarbamol 500 mg, acetaminophen 325 mg, lidocaine 5%, oxycodone 5 mg, glycolax    If so, has the patient filled the prescriptions? Yes, patient has all medications prescribed, will do a full medicine reconciliation at time of hospital follow up    Was home health ordered? Yes, home health scheduled to admit on 7/22/2025    If so, has home healh contacted patient and/or inititiated services? Yes     Name of Home Health Agency? [facility]     Durable Medical Equipment ordered? N/A    If so, has the DME provider contacted patient and delivered ?    Follow up  Any problems since discharge? Patient is complaining of back pain and leg pain.     Patient states that he has open wounds that have a "pus ball" Patient states that his     pain is over 10/10. On call nurse advised patient to return to ED but patient refused.     How is the patient feeling since returning home? See above    Have you set up recommended follow up appointments? Hospital follow up appointment has been scheduled. Patient has wound care referral in place to be scheduled. All needed follow up appointments will be scheduled during hospital follow up appointment. [employer] r[employer] will be set up for hospital follow up appointment.     Schedule hospital follow up appointment within 7-14 days? Yes, patient is 7/30/2025 at 2:00 pm with Dr. Becerra    Notes: pain level over 10   needs wound care clinic  also has chronic back pain   "

## 2025-07-17 NOTE — TELEPHONE ENCOUNTER
Patient's call transferred as a Priority Call to the [facility] on Call Service. Patient states he was discharged on yesterday, 7/16/25 after a hospital stay for Bilateral Lower Extremity Cellulitis and Hyponatremia. Patient states his cellulitis has worsened since discharged. Patient contacted the [facility] on Call service on yesterday, 7/16/25, for worsening cellulitis and severe pain and advised to Go to ED Now for evaluation. Patient states he did not go to ED and is [facility] for care advice until seen by Home Health Care on Tuesday, 7/22/25.     Patient advised to adhere to care advice and Go to ED Now for evaluation/treatment. [facility] any worsening symptoms. Patient states understanding of care advice.     Reason for Disposition   Caller has already spoken with another triager or PCP (or office), and has further questions and triager able to answer questions.    Additional Information   Negative: Caller has already spoken with the PCP (or office), and has no further questions   Negative: Caller has already spoken with another triager and has no further questions    Protocols used: No Contact or Duplicate Contact Call-A-[place]

## 2025-07-18 ENCOUNTER — PATIENT OUTREACH (OUTPATIENT)
Dept: ADMINISTRATIVE | Facility: CLINIC | Age: 65
End: 2025-07-18
Payer: MEDICARE

## 2025-07-18 ENCOUNTER — NURSE TRIAGE (OUTPATIENT)
Dept: ADMINISTRATIVE | Facility: CLINIC | Age: 65
End: 2025-07-18
Payer: MEDICARE

## 2025-07-18 NOTE — TELEPHONE ENCOUNTER
Patient with recent discharge and current antibiotic treatment for cellulitis reports increased swelling, drainage and pain. He also has weakness and  has had near falls in the home. Disposition provided - call 911 now. Instructed to call back with additional questions or worsening of symptoms. Patient verbalized understanding.       Reason for Disposition   Shock suspected (e.g., cold/pale/clammy skin, too weak to stand, low BP, rapid pulse)    Protocols used: Cellulitis on Antibiotic Follow-up Call-A-[place]

## 2025-07-21 ENCOUNTER — PATIENT MESSAGE (OUTPATIENT)
Dept: ADMINISTRATIVE | Facility: CLINIC | Age: 65
End: 2025-07-21
Payer: MEDICARE

## 2025-07-23 ENCOUNTER — OFFICE VISIT (OUTPATIENT)
Dept: PSYCHIATRY | Facility: CLINIC | Age: 65
End: 2025-07-23
Payer: MEDICARE

## 2025-07-23 ENCOUNTER — NURSE TRIAGE (OUTPATIENT)
Dept: ADMINISTRATIVE | Facility: CLINIC | Age: 65
End: 2025-07-23
Payer: MEDICARE

## 2025-07-23 DIAGNOSIS — G89.4 CHRONIC PAIN SYNDROME: ICD-10-CM

## 2025-07-23 DIAGNOSIS — E11.42 DIABETIC PERIPHERAL NEUROPATHY ASSOCIATED WITH TYPE 2 DIABETES MELLITUS: ICD-10-CM

## 2025-07-23 DIAGNOSIS — G89.29 CHRONIC FOOT PAIN, UNSPECIFIED LATERALITY: ICD-10-CM

## 2025-07-23 DIAGNOSIS — F25.9: ICD-10-CM

## 2025-07-23 DIAGNOSIS — M79.673 CHRONIC FOOT PAIN, UNSPECIFIED LATERALITY: ICD-10-CM

## 2025-07-23 DIAGNOSIS — Z01.818 PREOP EXAMINATION: Primary | ICD-10-CM

## 2025-07-23 DIAGNOSIS — F41.1 GENERALIZED ANXIETY DISORDER WITH PANIC ATTACKS: ICD-10-CM

## 2025-07-23 DIAGNOSIS — F41.0 GENERALIZED ANXIETY DISORDER WITH PANIC ATTACKS: ICD-10-CM

## 2025-07-23 DIAGNOSIS — Z59.41 FOOD INSECURITY: ICD-10-CM

## 2025-07-23 SDOH — SOCIAL DETERMINANTS OF HEALTH (SDOH): FOOD INSECURITY: Z59.41
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
## 2025-07-24 ENCOUNTER — CLINICAL SUPPORT (OUTPATIENT)
Dept: OTOLARYNGOLOGY | Facility: CLINIC | Age: 65
End: 2025-07-24
Payer: MEDICARE

## 2025-07-24 ENCOUNTER — OFFICE VISIT (OUTPATIENT)
Dept: OTOLARYNGOLOGY | Facility: CLINIC | Age: 65
End: 2025-07-24
Payer: MEDICARE

## 2025-07-24 ENCOUNTER — DOCUMENTATION ONLY (OUTPATIENT)
Dept: PSYCHIATRY | Facility: CLINIC | Age: 65
End: 2025-07-24
Payer: MEDICARE

## 2025-07-24 VITALS
SYSTOLIC BLOOD PRESSURE: 110 MMHG | DIASTOLIC BLOOD PRESSURE: 67 MMHG | BODY MASS INDEX: 33.48 KG/M2 | WEIGHT: 253.75 LBS | HEART RATE: 78 BPM

## 2025-07-24 DIAGNOSIS — H90.3 SENSORINEURAL HEARING LOSS (SNHL) OF BOTH EARS: ICD-10-CM

## 2025-07-24 DIAGNOSIS — H93.13 TINNITUS OF BOTH EARS: Primary | ICD-10-CM

## 2025-07-24 DIAGNOSIS — M26.623 BILATERAL TEMPOROMANDIBULAR JOINT PAIN: ICD-10-CM

## 2025-07-24 DIAGNOSIS — H91.93 HIGH FREQUENCY HEARING LOSS OF BOTH EARS: ICD-10-CM

## 2025-07-24 PROBLEM — F41.1 GENERALIZED ANXIETY DISORDER WITH PANIC ATTACKS: Status: ACTIVE | Noted: 2025-07-24

## 2025-07-24 PROBLEM — F41.0 GENERALIZED ANXIETY DISORDER WITH PANIC ATTACKS: Status: ACTIVE | Noted: 2025-07-24

## 2025-07-24 PROCEDURE — 1159F MED LIST DOCD IN RCRD: CPT | Mod: CPTII,S$GLB,, | Performed by: NURSE PRACTITIONER

## 2025-07-24 PROCEDURE — 3052F HG A1C>EQUAL 8.0%<EQUAL 9.0%: CPT | Mod: CPTII,S$GLB,, | Performed by: NURSE PRACTITIONER

## 2025-07-24 PROCEDURE — 3074F SYST BP LT 130 MM HG: CPT | Mod: CPTII,S$GLB,, | Performed by: NURSE PRACTITIONER

## 2025-07-24 PROCEDURE — 99214 OFFICE O/P EST MOD 30 MIN: CPT | Mod: S$GLB,,, | Performed by: NURSE PRACTITIONER

## 2025-07-24 PROCEDURE — 3078F DIAST BP <80 MM HG: CPT | Mod: CPTII,S$GLB,, | Performed by: NURSE PRACTITIONER

## 2025-07-24 PROCEDURE — 1160F RVW MEDS BY RX/DR IN RCRD: CPT | Mod: CPTII,S$GLB,, | Performed by: NURSE PRACTITIONER

## 2025-07-24 PROCEDURE — 3008F BODY MASS INDEX DOCD: CPT | Mod: CPTII,S$GLB,, | Performed by: NURSE PRACTITIONER

## 2025-07-24 PROCEDURE — 1111F DSCHRG MED/CURRENT MED MERGE: CPT | Mod: CPTII,S$GLB,, | Performed by: NURSE PRACTITIONER

## 2025-07-24 PROCEDURE — 4010F ACE/ARB THERAPY RXD/TAKEN: CPT | Mod: CPTII,S$GLB,, | Performed by: NURSE PRACTITIONER

## 2025-07-24 PROCEDURE — 99999 PR PBB SHADOW E&M-EST. PATIENT-LVL V: CPT | Mod: PBBFAC,,, | Performed by: NURSE PRACTITIONER

## 2025-07-24 PROCEDURE — 99999 PR PBB SHADOW E&M-EST. PATIENT-LVL I: CPT | Mod: PBBFAC,,, | Performed by: PHYSICIAN ASSISTANT
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
## 2025-07-24 NOTE — PATIENT INSTRUCTIONS
"Avoid anxiety or stress, as these stimulate an already sensitive hearing system.  Have adequate rest and avoid fatigue.  Avoid the use of stimulants to the nervous system, including coffee (caffeine), alcohol, and smoking (nicotine).  Sleep with your head propped up in an elevated position. This may usually be accomplished with the use of one or two extra pillows. This lessens head congestion, and tinnitus may become less noticeable.  Be aware that tinnitus is usually more noticeable after retiring for the night and the surroundings are quieter. Any noise in the room, such as a ticking clock or softly playing radio, helps to mask tinnitus and make it less irritating.  Use a tinnitus masker if you find this helpful  Some people benefit by using a hearing aid as it amplifies outside noise (like masking)  Avoid situations that can further damage hearing (excessive noise), and protect your ears from injury and occupational hazards. Use protective ear wear when appropriate.  Some people receive considerably relief in alternative therapies  Counseling may be beneficial, especially if people are afraid that they have a serious or progressive disease, such as a brain tumor. Some people worry they may have a mental illness, because the noise is "in their head." Reassurance by a specialist helps to calm such fears and anxieties.       WHAT IS TINNITUS?  The perception of sound heard in one or both sides of the head. Some refer to it as a ringing, noise, buzzing, roaring, clicking, wooshing, crickets, heartbeat, music, or other noises. There are varying levels of severity. The tinnitus may be constant or periodic. Common complaints include difficulty sleeping, struggling to understand other's speech, depression, and problems focusing.  Tinnitus is a symptom, not a disease. It affects 10-15% of adults in the [place].    WHAT CAN YOU DO?  You should seek medical care if you suspect you have tinnitus and tell your physician " if your symptoms are affecting your daily life. Tinnitus may cause anxiety, depression, insomnia, negative emotions, and withdrawal. It's okay to seek help as your symptoms may be managed, and common.    HOW IS TINNITUS DIAGNOSED?  A doctor can diagnose tinnitus after a physical examination and interview. The examination may rule out conditions causing the tinnitus such as wax impaction or fluid behind the eardrum. Tinnitus may also be related to hearing loss, especially hearing loss from frequent noise exposure. A hearing test may be performed if you are experiencing tinnitus.    TREATMENT FOR TINNITUS?  Treatment may improve on its own but if it doesn't there are several ways to manage your symptoms although there is no cure. Possible treatment options include amplification (hearing aids), sound therapy (maskers,white noise,etc.), TMJ treatment, cognitive behavioral therapy, relaxation exercises, and managing your medications.  There is not enough evidence to suggest that over the counter products help patients with tinnitus. Acupuncture can neither be recommended or discouraged due to lack of evidence as well.    Source: American Academy of Otolaryngology-Head And Neck Surgery    ORGANIZATIONS AND LINKS FOR TINNITUS AND HEARING LOSS    American Academy of Audiology      www.audiology.org  American Tinnitus Associate        www.js.org  American Academy of Otolaryngology, Head & Neck Surgery www.entnet.org  American Auditory Society     www.amauditorysoc.org  Better Hearing [place]      www.betterhearing.org  EarHelp        www.earhelp.co.uk/  Hearing Education and Awareness for [place] (HEAR)  www.hearnet.ThinkGrid  Hearing Loss Association of [place]    www.hearingloss.com   Hear-It        www.hear-it.org  Healthy Hearing       www.[employer].ThinkGrid   Sight and Hearing Association     www.sightandhearing.org

## 2025-07-24 NOTE — PROGRESS NOTES
Fabiano Malik Jr., a 64 y.o. male, was seen today in the clinic for an audiologic evaluation.  Patients main complaint was bilateral tinnitus as well as intermittent ear pain. He does feel he hears better from his left ear.  He states he does not have much difficulty understanding conversation in most listening environments. 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       Audiogram results revealed a bilateral high frequency hearing loss; a mild loss in the right ear from 3-8k Hz and a mild to moderate loss in the left ear from 3k-8k Hz. Speech reception thresholds were noted at 10 dB in the right ear and 5 dB in the left ear.  Speech discrimination scores were 92% in the right ear and 100% in the left ear.  Tympanometry revealed Type A in the right ear and Type A in the left ear. The results of the audiogram were reviewed with the patient and the benefits of amplification were discussed.    Recommendations:  Otologic evaluation  Annual audiogram  Hearing protection when in noise  Hearing aid consultation when patient is ready

## 2025-07-24 NOTE — PROGRESS NOTES
Chief Complaint   Patient presents with    Hearing Loss     Positive for noise exposure    Tinnitus     For years    Otalgia     Comes and goes        HPI:   Patient presents today for hearing loss and tinnitus. He reports progressive hearing loss over several years with associated tinnitus. He experiences intermittent sharp ear pain lasting a few seconds, worse in the right ear. He denies ear drainage, fullness, or vertigo. 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 Recent bridge placement noted, though ear pain predated the dental work.  He is a non-smoker, abstaining from tobacco use due to family history of smoking-related health complications. He reports frequent coffee intake and regular diet soda consumption.  He denies seasonal allergies.      Past Medical History:   Diagnosis Date    Chronic back pain greater than 3 months duration     Depression     Diabetes mellitus     Diabetes mellitus, type 2     Hypertension     Schizophrenia      Social History[1]  Past Surgical History:   Procedure Laterality Date    APPENDECTOMY      CARPAL TUNNEL RELEASE Right 12/13/2022    Procedure: RELEASE, CARPAL TUNNEL;  Surgeon: Everton Walter Jr., MD;  Location: [facility] OR;  Service: Orthopedics;  Laterality: Right;    COLONOSCOPY N/A 5/31/2018    Procedure: COLONOSCOPY;  Surgeon: Guillaume Hatch MD;  Location: [facility] ENDO;  Service: Endoscopy;  Laterality: N/A;    COLONOSCOPY N/A 11/17/2022    Procedure: COLONOSCOPY;  Surgeon: Guillaume Hatch MD;  Location: [facility] ENDO;  Service: Endoscopy;  Laterality: N/A;    DECOMPRESSION, NERVE, ULNAR Right 12/13/2022    Procedure: DECOMPRESSION, NERVE, ULNAR;  Surgeon: Everton Walter Jr., MD;  Location: [facility] OR;  Service: Orthopedics;  Laterality: Right;    EPIDURAL STEROID INJECTION INTO LUMBAR SPINE N/A 2/21/2024    Procedure: L5-S1 JOHN;  Surgeon: Zoila Suarez DO;  Location:  [facility];  Service: Pain Management;  Laterality: N/A;  oral 30 mins    FUSION, SPINE, CERVICAL N/A 8/11/2023    Procedure: FUSION, SPINE, CERVICAL;  Surgeon: Guillaume Washington MD;  Location: [facility] OR;  Service: Neurosurgery;  Laterality: N/A;  C3-4, C5-6, C6-7 ACDF C3-C7 anterior instrumentation Depuy  -ANTERIOR DECOMPREESSION 2 LEVELS   -ANTERIOR INSTRUMENTATION INTERBODY CAGE INTERSPACE 3   -LOP 3.5 HR   -LOS 3 DAYS   -GENERAL   -TYPE AND SCREEN   - C ARM   -EMG, SEP, MEP hari notified cc  -ASPEN   -REG    NECK SURGERY      RADIOFREQUENCY ABLATION OF LUMBAR MEDIAL BRANCH NERVE AT SINGLE LEVEL Left 5/29/2018    Procedure: RADIOFREQUENCY THERMOCOAGULATION (RFTC)-NERVE-MEDIAN BRANCH-LUMBAR- Left L2-3-4-5;  Surgeon: Donavon Dennis MD;  Location: [facility];  Service: Pain Management;  Laterality: Left;    RADIOFREQUENCY ABLATION OF LUMBAR MEDIAL BRANCH NERVE AT SINGLE LEVEL Right 1/8/2019    Procedure: Radiofrequency Ablation, Nerve, Spinal, Lumbar, Medial Branch, L2,3,4,5;  Surgeon: Alberto Hernandez Jr., MD;  Location: [facility];  Service: Pain Management;  Laterality: Right;  Pt is diabetic    RADIOFREQUENCY ABLATION OF LUMBAR MEDIAL BRANCH NERVE AT SINGLE LEVEL Left 3/12/2019    Procedure: Radiofrequency Ablation, Nerve, Spinal, Lumbar, Medial Branch, Left, L2,3,4,5;  Surgeon: Alberto Hernandez Jr., MD;  Location: [facility];  Service: Pain Management;  Laterality: Left;  Pt will be consented the day of procedure.    TRANSFORAMINAL EPIDURAL INJECTION OF STEROID Right 9/12/2019    Procedure: Injection,steroid,epidural,transforaminal,right,L4-5 and L5-S1;  Surgeon: Alberto Hernandez Jr., MD;  Location: [facility];  Service: Pain Management;  Laterality: Right;  PT IS DIABETIC     Family History   Problem Relation Name Age of Onset    Alzheimer's disease Mother      Diabetes Mother      Heart defect Father      Cancer Father      Stroke Father      Heart attack Father      Diabetes  Sister x2     Heart defect Sister x2     Multiple sclerosis Sister x2     Heart disease Sister x2     Alzheimer's disease Sister x2     No Known Problems Son x1            Review of Systems  General: negative for chills, fever or weight loss  Psychological: negative for mood changes or depression  Ophthalmic: negative for blurry vision, photophobia or eye pain  ENT: see HPI  Respiratory: no cough, shortness of breath, or wheezing  Cardiovascular: no chest pain or dyspnea on exertion  Gastrointestinal: no abdominal pain, change in bowel habits, or black/ bloody stools  Musculoskeletal: negative for gait disturbance or muscular weakness  Neurological: no syncope or seizures; no ataxia  Dermatological: negative for pruritis,  rash and jaundice  Hematologic/lymphatic: no easy bruising, no new adenopathy    Physical Exam:    Vitals:    07/24/25 1050   BP: 110/67   Pulse: 78       Constitutional: Well appearing / communicating without difficutly.  NAD.  Eyes: EOM I Bilaterally  Head/Face: Normocephalic.  Negative paranasal sinus pressure/tenderness.  Salivary glands WNL.  House Brackmann I Bilaterally.  +TMJ tenderness with palpation  Right Ear: Auricle normal appearance. External Auditory Canal within normal limits no lesions or masses,TM w/o masses/lesions/perforations. TM mobility noted.   Left Ear: Auricle normal appearance. External Auditory Canal within normal limits no lesions or masses,TM w/o masses/lesions/perforations. TM mobility noted.  Nose: No gross nasal septal deviation. Inferior Turbinates 3+ bilaterally. No septal perforation. No masses/lesions. External nasal skin appears normal without masses/lesions.  Oral Cavity: Gingiva/lips within normal limits.  Dentition/gingiva healthy appearing. Mucus membranes moist. Floor of mouth soft, no masses palpated. Oral Tongue mobile. Hard Palate appears normal.    Oropharynx: Base of tongue appears normal. No masses/lesions noted. Tonsillar fossa/pharyngeal wall  without lesions. Posterior oropharynx WNL.  Soft palate without masses. Midline uvula.   Neck/Lymphatic: No LAD I-VI bilaterally.  No thyromegaly.  No masses noted on exam.    Mirror laryngoscopy/nasopharyngoscopy: Active gag reflex.  Unable to perform.    Neuro/Psychiatric: AOx3.  Normal mood and affect.   Cardiovascular: Normal carotid pulses bilaterally, no increasing jugular venous distention noted at cervical region bilaterally.    Respiratory: Normal respiratory effort, no stridor, no retractions noted.      Audiogram: Reviewed and interpreted by me, and discussed with the patient today.    Audiogram results revealed a bilateral high frequency hearing loss; a mild loss in the right ear from 3-8k Hz and a mild to moderate loss in the left ear from 3k-8k Hz. Speech reception thresholds were noted at 10 dB in the right ear and 5 dB in the left ear.  Speech discrimination scores were 92% in the right ear and 100% in the left ear.  Tympanometry revealed Type A in the right ear and Type A in the left ear.           MRI BRAIN W WO CONTRAST 3/22/2025     CLINICAL HISTORY:  Dizziness, persistent/recurrent, cardiac or vascular cause suspected; Meniere's disease, right ear     TECHNIQUE:  Multiplanar multisequence MR imaging of the brain was performed before and after the administration of 10 mL Gadavist intravenous contrast.     COMPARISON:  04/26/2024     FINDINGS:  No evidence of CP angle/IAC mass. No abnormal enhancement of the labyrinth.  No evidence to suggest dehiscence of the superior semicircular canals.     No evidence of hydrocephalus, mass effect, intracranial hemorrhage or acute infarct.     When compared to the prior study 04/26/2024 there are new right parietal cortical/subcortical infarcts and a new deep white matter infarct in the left frontal lobe.     Scattered foci of increased signal within the periventricular and subcortical white matter nonspecific but may reflect mild underlying chronic small  vessel ischemic change.     The cerebellum maintains normal signal intensity.     Normal arterial flow voids are preserved at the skull base.     The visualized paranasal sinuses and mastoid air cells are clear.     Impression:     No acute intracranial process or enhancing intracranial lesion with attention to the CP angle/IAC/labyrinth.     When compared to 2024 there are however new but chronic infarcts detailed above.    Assessment:    ICD-10-CM ICD-9-CM    1. Tinnitus of both ears  H93.13 388.30       2. Sensorineural hearing loss (SNHL) of both ears  H90.3 389.18       3. Bilateral temporomandibular joint pain  M26.623 524.62         The primary encounter diagnosis was Tinnitus of both ears. Diagnoses of Sensorineural hearing loss (SNHL) of both ears and Bilateral temporomandibular joint pain were also pertinent to this visit.      Plan:  No orders of the defined types were placed in this encounter.      SENSORINEURAL HEARING LOSS   - We reviewed the patient's recent audiogram and hearing loss in detail.   We also discussed the use hearing protection when exposed to loud noise, including lawn equipment. Recommend audiogram in 1 year.      TINNITUS IN BILATERAL EARS:  - We also discussed that tinnitus is most often caused by a hearing loss, and that as the hair cells are damaged, either genetic or as a result of loud noise exposure, they then cause tinnitus.  Some patients find that restricting the salt or caffeine in their diet helps.  Tinnitus tends to be louder in times of stress and fatigue, and may decrease with time.  Sound machines may also be an effective masking technique if needed at night.      TEMPOROMANDIBULAR JOINT DISORDER (TMJ):  -otoscopic exam was WNL  - Considered TMJ as potential cause for reported ear pain.  - Explained TMJ and its potential to cause ear pain.  - We had a long discussion regarding the underlying pathology of temporomandibular joint dysfunction (TMD) as the cause of ear  pain.  We further discussed conservative measures to treat TMD including avoiding gum and other foods that require lots of chewing, warm compresses, and scheduled antinflammatories (such as Motrin, Ibuprofen, or Aleve).  The patient should also wear a  (purchased OTC or see dentist for custom), which prevents additional pressure on the TM joint.  Stress can also exacerbate TMJ symptoms.    If the pain persists, the patient will then schedule an appointment with a dentist for further evaluation.      OCCUPATIONAL EXPOSURE TO NOISE:  - Noted history of noise exposure as contributing factor to hearing loss and tinnitus.  - Patient to use ear protection in noisy environments.     FOLLOW UP 1 year or sooner as needed          Stacey Edmonds NP     This note was generated with the assistance of ambient listening technology. Verbal consent was obtained by the patient and accompanying visitor(s) for the recording of patient appointment to facilitate this note. I attest to having reviewed and edited the generated note for accuracy, though some syntax or spelling errors may persist. Please contact the author of this note for any clarification.        Answers submitted by the patient for this visit:  Review of Symptoms Questionnaire  (Submitted on 7/17/2025)  Fatigue (Tiredness)?: Yes  appetite change : Yes  Unexpected weight loss?: Yes  ear pain: Yes  sinus pressure : Yes  postnasal drip: Yes  trouble swallowing: Yes  eye itching: Yes  Sleep Apnea?: Yes  cough: Yes  chest pain: Yes  Foot swelling?: Yes  abdominal pain: Yes  Acid Reflux?: Yes  Difficulty urinating?: Yes  Muscle aches / pain?: Yes  back pain: Yes  neck pain: Yes  None of these : Yes  None of these: Yes  None of these : Yes  dizziness: Yes  tremors: Yes  Light-headedness: Yes  swollen glands: Yes  decreased concentration: Yes  Feeling depressed?: Yes  nervous/ anxious: Yes  sleep disturbance: Yes         [1]   Social History  Socioeconomic History     "Marital status:    Tobacco Use    Smoking status: Never    Smokeless tobacco: Never   Substance and Sexual Activity    Alcohol use: Yes     Comment: "couple of beers a day"    Drug use: No    Sexual activity: Not Currently     Social Drivers of Health     Financial Resource Strain: Low Risk  (7/15/2025)    Overall Financial Resource Strain (CARDIA)     Difficulty of Paying Living Expenses: Not hard at all   Recent Concern: Financial Resource Strain - High Risk (7/14/2025)    Overall Financial Resource Strain (CARDIA)     Difficulty of Paying Living Expenses: Hard   Food Insecurity: No Food Insecurity (7/15/2025)    Hunger Vital Sign     Worried About Running Out of Food in the Last Year: Never true     Ran Out of Food in the Last Year: Never true   Recent Concern: Food Insecurity - Food Insecurity Present (7/14/2025)    Hunger Vital Sign     Worried About Running Out of Food in the Last Year: Sometimes true     Ran Out of Food in the Last Year: Sometimes true   Transportation Needs: No Transportation Needs (7/15/2025)    PRAPARE - Transportation     Lack of Transportation (Medical): No     Lack of Transportation (Non-Medical): No   Recent Concern: Transportation Needs - Unmet Transportation Needs (7/14/2025)    PRAPARE - Transportation     Lack of Transportation (Medical): Yes     Lack of Transportation (Non-Medical): No   Physical Activity: Inactive (7/15/2025)    Exercise Vital Sign     Days of Exercise per Week: 0 days     Minutes of Exercise per Session: 0 min   Stress: Stress Concern Present (7/15/2025)    [place] [employer] - Occupational Stress Questionnaire     Feeling of Stress : To some extent   Housing Stability: Low Risk  (7/15/2025)    Housing Stability Vital Sign     Unable to Pay for Housing in the Last Year: No     Number of Times Moved in the Last Year: 0     Homeless in the Last Year: No     "

## 2025-07-24 NOTE — TELEPHONE ENCOUNTER
Caller states that he is currently out of pain medication s/p discharge from hospital. Caller also c/o increase in leg pain with application of mupirocin cream that was prescribed by HH agency. Caller advised that message will be sent to PCP about medications and that HH should be contacted about cream, not prescribed per [facility] provider. Call also advised to take OTC tylenol for pain.  Pt advised per protocol and verbalized understanding.   Reason for Disposition   [1] Caller requesting NON-URGENT health information AND [2] PCP's office is the best resource    Additional Information   Negative: Triager needs further essential information from caller in order to complete triage    Protocols used: Information Only Call - No Triage-A-

## 2025-07-25 ENCOUNTER — PATIENT OUTREACH (OUTPATIENT)
Dept: ADMINISTRATIVE | Facility: OTHER | Age: 65
End: 2025-07-25
Payer: MEDICARE

## 2025-07-25 ENCOUNTER — TELEPHONE (OUTPATIENT)
Dept: ADMINISTRATIVE | Facility: CLINIC | Age: 65
End: 2025-07-25
Payer: MEDICARE

## 2025-07-25 NOTE — PROGRESS NOTES
CHW - Initial Contact    This Community Health Worker completed OR updated the Social Determinant of Health questionnaire with patient via telephone today.    Pt identified barriers of most importance are: Pt requested financial assistance. Sending pt [employer] assistance application in the mail after verifying address. Pt already uses [facility] for doctor appointments.    Referrals to community agencies completed with patient/caregiver consent outside of [facility] include: yes  Referrals were put through [facility] - no:   Support and Services: Financial Aid/Education  Other information discussed the patient needs / wants help with: SDOH   Follow up required: yes  Follow-up Outreach - Due: 8/8/2025

## 2025-07-25 NOTE — PROGRESS NOTES
Pt requested financial assistance. Sending pt [facility] assistance application in the mail after verifying address. Pt already uses [facility] for doctor appointments. I will continue to follow on [employer] platform.

## 2025-07-28 ENCOUNTER — NURSE TRIAGE (OUTPATIENT)
Dept: ADMINISTRATIVE | Facility: CLINIC | Age: 65
End: 2025-07-28
Payer: MEDICARE

## 2025-07-28 ENCOUNTER — TELEPHONE (OUTPATIENT)
Dept: FAMILY MEDICINE | Facility: CLINIC | Age: 65
End: 2025-07-28
Payer: MEDICARE

## 2025-07-28 DIAGNOSIS — F20.3 UNDIFFERENTIATED SCHIZOPHRENIA: ICD-10-CM

## 2025-07-28 DIAGNOSIS — E11.9 TYPE 2 DIABETES MELLITUS WITHOUT COMPLICATION, WITHOUT LONG-TERM CURRENT USE OF INSULIN: ICD-10-CM

## 2025-07-28 DIAGNOSIS — I15.2 HYPERTENSION ASSOCIATED WITH DIABETES: ICD-10-CM

## 2025-07-28 DIAGNOSIS — E11.59 HYPERTENSION ASSOCIATED WITH DIABETES: ICD-10-CM

## 2025-07-28 DIAGNOSIS — F51.01 PRIMARY INSOMNIA: ICD-10-CM

## 2025-07-28 DIAGNOSIS — E03.9 HYPOTHYROIDISM, UNSPECIFIED TYPE: ICD-10-CM

## 2025-07-28 DIAGNOSIS — K21.9 GASTROESOPHAGEAL REFLUX DISEASE, UNSPECIFIED WHETHER ESOPHAGITIS PRESENT: ICD-10-CM

## 2025-07-28 RX ORDER — SULFAMETHOXAZOLE AND TRIMETHOPRIM 800; 160 MG/1; MG/1
2 TABLET ORAL 2 TIMES DAILY
Qty: 12 TABLET | Refills: 0 | Status: CANCELLED | OUTPATIENT
Start: 2025-07-28 | End: 2025-07-31

## 2025-07-28 NOTE — TELEPHONE ENCOUNTER
Pt calling with c/o abdominal pain and swelling. Pt said the worst is the bloating and has been having issues since D/C'd from the hospital 2 weeks ago. Pt said that he is having normal BM and passing gas but the bloating is unreal. Pt triaged and care advice to go to the Ed and he will go he said and told to call back if any other questions or concerns                   Reason for Disposition   MODERATE - SEVERE SWELLING of abdomen (e.g., looks very distended or swollen) that is NEW-ONSET or much worse    Additional Information   Negative: Sounds like a life-threatening emergency to the triager    Protocols used: Abdomen Bloating and Swelling-A-OH

## 2025-07-29 RX ORDER — HYDROXYZINE PAMOATE 50 MG/1
CAPSULE ORAL
Qty: 90 CAPSULE | Refills: 3 | Status: SHIPPED | OUTPATIENT
Start: 2025-07-29

## 2025-07-29 RX ORDER — OLMESARTAN MEDOXOMIL 20 MG/1
20 TABLET ORAL DAILY
Qty: 90 TABLET | Refills: 3 | Status: SHIPPED | OUTPATIENT
Start: 2025-07-29

## 2025-07-29 RX ORDER — ARIPIPRAZOLE 5 MG/1
5 TABLET ORAL DAILY
Qty: 90 TABLET | Refills: 3 | Status: SHIPPED | OUTPATIENT
Start: 2025-07-29 | End: 2026-07-29

## 2025-07-29 RX ORDER — METFORMIN HYDROCHLORIDE 1000 MG/1
1000 TABLET ORAL 2 TIMES DAILY WITH MEALS
Qty: 180 TABLET | Refills: 3 | Status: SHIPPED | OUTPATIENT
Start: 2025-07-29

## 2025-07-29 RX ORDER — LEVOTHYROXINE SODIUM 25 UG/1
25 TABLET ORAL
Qty: 90 TABLET | Refills: 3 | Status: SHIPPED | OUTPATIENT
Start: 2025-07-29

## 2025-07-29 RX ORDER — PRAVASTATIN SODIUM 40 MG/1
40 TABLET ORAL DAILY
Qty: 90 TABLET | Refills: 3 | Status: SHIPPED | OUTPATIENT
Start: 2025-07-29

## 2025-07-29 NOTE — TELEPHONE ENCOUNTER
Refill Routing Note   Medication(s) are not appropriate for processing by [facility] for the following reason(s):        Outside of protocol  ED/Hospital Visit since last OV with provider    [facility] action(s):  Route  Defer               Appointments  past 12m or future 3m with PCP    Date Provider   Last Visit   6/25/2025 Lucien Fleming MD   Next Visit   10/20/2025 Lucien Fleming MD   ED visits in past 90 days: 0        Note composed:2:26 PM 07/29/2025

## 2025-07-29 NOTE — TELEPHONE ENCOUNTER
Care Due:                  Date            Visit Type   Department     Provider  --------------------------------------------------------------------------------                                EP -                              PRIMARY      [facility]  Last Visit: 06-      CARE ([facility])   LESLEY Fleming                              EP -                              PRIMARY      [facility]  Next Visit: 10-      CARE ([facility])   LESLEY Fleming                                                            Last  Test          Frequency    Reason                     Performed    Due Date  --------------------------------------------------------------------------------    HBA1C.......  6 months...  metFORMIN, tirzepatide...  03- 09-    [facility] Embedded Care Due Messages. Reference number: [id number].   7/29/2025 2:26:13 PM CDT

## 2025-07-30 ENCOUNTER — OFFICE VISIT (OUTPATIENT)
Dept: PRIMARY CARE CLINIC | Facility: CLINIC | Age: 65
End: 2025-07-30
Payer: MEDICARE

## 2025-07-30 ENCOUNTER — HOSPITAL ENCOUNTER (OUTPATIENT)
Dept: WOUND CARE | Facility: HOSPITAL | Age: 65
Discharge: HOME | End: 2025-07-30
Attending: SURGERY
Payer: MEDICARE

## 2025-07-30 VITALS
HEIGHT: 73 IN | HEART RATE: 82 BPM | DIASTOLIC BLOOD PRESSURE: 70 MMHG | BODY MASS INDEX: 33.8 KG/M2 | WEIGHT: 255.06 LBS | SYSTOLIC BLOOD PRESSURE: 122 MMHG

## 2025-07-30 VITALS
WEIGHT: 255.06 LBS | BODY MASS INDEX: 33.8 KG/M2 | TEMPERATURE: 98 F | SYSTOLIC BLOOD PRESSURE: 122 MMHG | HEIGHT: 73 IN | OXYGEN SATURATION: 96 % | HEART RATE: 82 BPM | DIASTOLIC BLOOD PRESSURE: 70 MMHG

## 2025-07-30 DIAGNOSIS — L97.912 ULCER OF RIGHT LOWER EXTREMITY WITH FAT LAYER EXPOSED: ICD-10-CM

## 2025-07-30 DIAGNOSIS — L03.90 CELLULITIS, UNSPECIFIED CELLULITIS SITE: ICD-10-CM

## 2025-07-30 DIAGNOSIS — I87.2 VENOUS INSUFFICIENCY OF BOTH LOWER EXTREMITIES: Primary | ICD-10-CM

## 2025-07-30 DIAGNOSIS — I50.33 ACUTE ON CHRONIC HEART FAILURE WITH PRESERVED EJECTION FRACTION: Primary | ICD-10-CM

## 2025-07-30 PROCEDURE — 99203 OFFICE O/P NEW LOW 30 MIN: CPT

## 2025-07-30 PROCEDURE — 99495 TRANSJ CARE MGMT MOD F2F 14D: CPT | Mod: S$GLB,,, | Performed by: INTERNAL MEDICINE

## 2025-07-30 PROCEDURE — 99999 PR PBB SHADOW E&M-EST. PATIENT-LVL III: CPT | Mod: PBBFAC,,, | Performed by: INTERNAL MEDICINE

## 2025-07-30 PROCEDURE — 3078F DIAST BP <80 MM HG: CPT | Mod: CPTII,S$GLB,, | Performed by: INTERNAL MEDICINE

## 2025-07-30 PROCEDURE — 87070 CULTURE OTHR SPECIMN AEROBIC: CPT | Performed by: SURGERY

## 2025-07-30 PROCEDURE — 3052F HG A1C>EQUAL 8.0%<EQUAL 9.0%: CPT | Mod: CPTII,S$GLB,, | Performed by: INTERNAL MEDICINE

## 2025-07-30 PROCEDURE — 3074F SYST BP LT 130 MM HG: CPT | Mod: CPTII,S$GLB,, | Performed by: INTERNAL MEDICINE

## 2025-07-30 PROCEDURE — 87075 CULTR BACTERIA EXCEPT BLOOD: CPT | Performed by: SURGERY

## 2025-07-30 PROCEDURE — 4010F ACE/ARB THERAPY RXD/TAKEN: CPT | Mod: CPTII,S$GLB,, | Performed by: INTERNAL MEDICINE

## 2025-07-30 RX ORDER — POTASSIUM CHLORIDE 20 MEQ/1
20 TABLET, EXTENDED RELEASE ORAL 2 TIMES DAILY
Qty: 20 TABLET | Refills: 0 | Status: SHIPPED | OUTPATIENT
Start: 2025-07-30 | End: 2025-08-09

## 2025-07-30 RX ORDER — FUROSEMIDE 40 MG/1
40 TABLET ORAL 2 TIMES DAILY
Qty: 20 TABLET | Refills: 0 | Status: SHIPPED | OUTPATIENT
Start: 2025-07-30 | End: 2025-08-09

## 2025-07-30 RX ORDER — SULFAMETHOXAZOLE AND TRIMETHOPRIM 800; 160 MG/1; MG/1
1 TABLET ORAL 2 TIMES DAILY
Start: 2025-07-30 | End: 2025-08-06

## 2025-07-30 NOTE — PROGRESS NOTES
Wound Care & Hyperbaric Medicine    Subjective:       Patient ID: Fabiano Malik Jr. is a 64 y.o. male.    Chief Complaint: Non-healing Wound    Admit to wound care for BLE cellulitis. Discharged last week from hospital, seen in priority care clinic prior to appointment stated on Lasix, Potassium and Bactrim PO.  [facility] assisting with dressing changes 3 x weekly. Right leg cultured, added Bactroban Ointment with dressing changes. Reassess 1 week.        ROS: no fever or chills , swelling right leg with redness more than left        Objective:      Physical Exam  Constitutional:       Appearance: He is well-developed.   HENT:      Head: Normocephalic.   Eyes:      Conjunctiva/sclera: Conjunctivae normal.      Pupils: Pupils are equal, round, and reactive to light.   Cardiovascular:      Rate and Rhythm: Normal rate and regular rhythm.      Heart sounds: Normal heart sounds.   Pulmonary:      Effort: Pulmonary effort is normal.      Breath sounds: Normal breath sounds.   Abdominal:      General: Bowel sounds are normal.      Palpations: Abdomen is soft.   Musculoskeletal:         General: Swelling, tenderness, deformity and signs of injury present. Normal range of motion.      Cervical back: Normal range of motion and neck supple.      Right lower leg: Edema present.      Left lower leg: Edema present.   Skin:     General: Skin is warm and dry.      Findings: Erythema and lesion present.   Neurological:      Mental Status: He is alert and oriented to person, place, and time.      Deep Tendon Reflexes: Reflexes are normal and symmetric.        Wound 07/10/25 2200 Diabetic Ulcer Right anterior Leg  (Active)   07/10/25 2200 Leg   Present on Original Admission: Y   Primary Wound Type: Diabetic ulc   Side: Right   Orientation: anterior   Wound Approximate Age at First Assessment (Weeks):    Wound Number:    Is this injury device related?:    Incision Type:    Closure Method:    Wound  Description (Comments):    Type:    Additional Comments:    Ankle-Brachial Index:    Pulses:    Removal Indication and Assessment:    Wound Outcome:    Wound Image    07/30/25 1533   Dressing Appearance Intact;Moist drainage 07/30/25 1533   Drainage Amount Small 07/30/25 1533   Drainage Characteristics/Odor Serous 07/30/25 1533   Appearance Red;Fibrin 07/30/25 1533   Tissue loss description Partial thickness 07/30/25 1533   Red (%), Wound Tissue Color 50 % 07/30/25 1533   Yellow (%), Wound Tissue Color 50 % 07/30/25 1533   Periwound Area Edematous;Redness;Intact;Satellite lesion 07/30/25 1533   Wound Edges Undefined 07/30/25 1533   Wound Length (cm) 4.8 cm 07/30/25 1533   Wound Width (cm) 2.2 cm 07/30/25 1533   Wound Depth (cm) 0.1 cm 07/30/25 1533   Wound Volume (cm^3) 0.553 cm^3 07/30/25 1533   Wound Surface Area (cm^2) 8.29 cm^2 07/30/25 1533   Supply Surface Area (L x W) 10.56 sq cm 07/30/25 1533   Care Cleansed with:;Soap and water;Antimicrobial agent 07/30/25 1533   Dressing Applied;Non-adherent;Absorptive Pad;Rolled gauze;Tubular bandage 07/30/25 1533   Periwound Care Dry periwound area maintained;Absorptive dressing applied 07/30/25 1533   Compression Tubular elasticized bandage 07/30/25 1533   Dressing Change Due 08/01/25 07/30/25 1533            Wound 07/10/25 2200 Diabetic Ulcer Left anterior Leg  (Active)   07/10/25 2200 Leg   Present on Original Admission: Y   Primary Wound Type: Diabetic ulc   Side: Left   Orientation: anterior   Wound Approximate Age at First Assessment (Weeks):    Wound Number:    Is this injury device related?:    Incision Type:    Closure Method:    Wound Description (Comments):    Type:    Additional Comments:    Ankle-Brachial Index:    Pulses:    Removal Indication and Assessment:    Wound Outcome:    Wound Image   07/30/25 1533   Dressing Appearance Intact;Moist drainage 07/30/25 1533   Drainage Amount Scant 07/30/25 1533   Drainage Characteristics/Odor Serous 07/30/25 1533    Appearance Red;Moist 07/30/25 1533   Tissue loss description Partial thickness 07/30/25 1533   Red (%), Wound Tissue Color 100 % 07/30/25 1533   Periwound Area Intact;Dry;Redness;Edematous;Satellite lesion 07/30/25 1533   Wound Edges Open 07/30/25 1533   [place] Classification (diabetic foot ulcers only) Grade 2 07/30/25 1533   Wound Length (cm) 0.1 cm 07/30/25 1533   Care Cleansed with:;Antimicrobial agent 07/30/25 1533   Dressing Applied;Non-adherent;Absorptive Pad;Rolled gauze;Tubular bandage 07/30/25 1533   Periwound Care Absorptive dressing applied;Dry periwound area maintained 07/30/25 1533   Compression Tubular elasticized bandage 07/30/25 1533   Dressing Change Due 08/01/25 07/30/25 1533           Procedures   Assessment/Plan:         ICD-10-CM ICD-9-CM   1. Venous insufficiency of both lower extremities  I87.2 459.81   2. Ulcer of right lower extremity with fat layer exposed  L97.912 707.10         Tissue pathology and/or culture taken:   [x] Yes    [] No   Sharp debridement performed:    [] Yes    [x] No   Labs ordered this visit:    [] Yes    [x] No   Imaging ordered this visit:    [] Yes    [x] No     Is the wound improving?    [] Yes    [x] No   Any signs of infection?    [x] Yes    [] No             Orders Placed This Encounter   Procedures    CULTURE, AEROBIC  (SPECIFY SOURCE)           Send normal result to authorizing provider's In Basket if patient is active on MyChart::   Yes    CULTURE, ANAEROBE           Send normal result to authorizing provider's In Basket if patient is active on MyChart::   Yes    Ambulatory referral/consult to Wound Clinic     Standing Status:   Standing     Number of Occurrences:   1     Referral Priority:   Routine     Referral Type:   Consultation     Referral Reason:   Specialty Services Required     Requested Specialty:   Wound Care     Number of Visits Requested:   1    Change dressing     Bilateral Lower Legs    Cleanse wound with:vashe  Primary  dressing:mupirocin ointment  Secondary dressing:xeroform, large abd, rolled gauze, tape  Edema control: Tubi  F x 2 toes to knee BLE  Frequency:Every other day and PRN   Follow Up: 1 week   [facility] provide wound care and dressing changes as above.    RX given for Mupirocin Ointment. Bactrim Lasix and Potassium PO per  7/30/25.   Wound cultured 7/30/25.        Follow up in about 2 weeks (around 8/13/2025) for Dr. Gambino .            This includes face to face time and non-face to face time preparing to see the patient (eg, review of tests), obtaining and/or reviewing separately obtained history, documenting clinical information in the electronic or other health record, independently interpreting results and communicating results to the patient/family/caregiver, or care coordinator.  Total time spent  >30  minutes

## 2025-07-30 NOTE — PROGRESS NOTES
[facility]   New Visit Progress Note   Recent Hospital Discharge     PRESENTING HISTORY     Chief Complaint/Reason for Admission:  Follow up Hospital Discharge   PCP: Lucien Fleming MD    History of Present Illness:  Mr. Fabiano Malik Jr. is a 64 y.o. male who was recently admitted to the hospital.    [facility] Medicine Discharge Summary  Primary Team: [facility] Hospitalist Team A  Attending Physician: Shivani Monreal MD  Resident: Dr. Trujillo  Intern: Dr. Beckman   Date of Admit: 2025  Date of Discharge: 2025  Discharge to: Home with Home Health   Condition: Stable  ___________________________________________________________________    Today:  Presents to [facility] for initial hospital follow up.  Hospitalized 7/10/25 - 25 for management of bilateral LE wounds and cellulitis.  Discharged on doxycyline.  Re-presented on day later for recurrent symptoms and re-hospitalized 25 - 25 on [facility] Medicine service.  Imaging without acute findings- no evidence of osteomyelitis or septic arthritis.  No evidence of DVT and arterial US without hemodynamically significant stenosis.   IV Vancomycin administered and patient ultimately discharged on Bactrim.   Condition complicated by schizophrenia, poor health literacy, poor social support, and transportation barriers.  Patient discharged to home with referral to outpatient case management team.   [facility] arranged.     Patient unaccompanied today.  Ambulatory with cane.   Brought medication bottles for review.   Taking Toprol XL 25 mg which was discontinued several months ago.  Taking Methocarbamol which is .  Taking Bactrim DS which was prescribed by home health worker as an extension of the original Bactrim prescription- these home health records are not immediately available to me.     Review of Systems  General ROS: negative for chills, fever or weight loss  Psychological ROS: negative for hallucination,  depression or suicidal ideation  Ophthalmic ROS: negative for blurry vision, photophobia or eye pain  ENT ROS: negative for epistaxis, sore throat or rhinorrhea  Respiratory ROS: no cough, shortness of breath, or wheezing  Cardiovascular ROS: no chest pain + dyspnea on exertion, increasing ABD girth, bilateral LE swelling  Gastrointestinal ROS: no abdominal pain, change in bowel habits, or black/ bloody stools  Genito-Urinary ROS: no dysuria, trouble voiding, or hematuria  Musculoskeletal ROS: negative for gait disturbance or muscular weakness  Neurological ROS: no syncope or seizures; no ataxia  Dermatological ROS: negative for pruritis, rash and jaundice      PAST HISTORY:     Past Medical History:   Diagnosis Date    Chronic back pain greater than 3 months duration     Depression     Diabetes mellitus     Diabetes mellitus, type 2     Hypertension     Schizophrenia        Past Surgical History:   Procedure Laterality Date    APPENDECTOMY      CARPAL TUNNEL RELEASE Right 12/13/2022    Procedure: RELEASE, CARPAL TUNNEL;  Surgeon: Everton Walter Jr., MD;  Location: [facility];  Service: Orthopedics;  Laterality: Right;    COLONOSCOPY N/A 5/31/2018    Procedure: COLONOSCOPY;  Surgeon: Guillaume Hatch MD;  Location: [facility];  Service: Endoscopy;  Laterality: N/A;    COLONOSCOPY N/A 11/17/2022    Procedure: COLONOSCOPY;  Surgeon: Guillaume Hatch MD;  Location: [facility] ENDO;  Service: Endoscopy;  Laterality: N/A;    DECOMPRESSION, NERVE, ULNAR Right 12/13/2022    Procedure: DECOMPRESSION, NERVE, ULNAR;  Surgeon: Everton Walter Jr., MD;  Location: [facility] OR;  Service: Orthopedics;  Laterality: Right;    EPIDURAL STEROID INJECTION INTO LUMBAR SPINE N/A 2/21/2024    Procedure: L5-S1 JOHN;  Surgeon: Zoila Suarez DO;  Location: [facility] PAIN MANAGEMENT;  Service: Pain Management;  Laterality: N/A;  oral 30 mins    FUSION, SPINE, CERVICAL N/A 8/11/2023    Procedure: FUSION, SPINE, CERVICAL;  Surgeon: Guillaume Washington MD;   Location: [facility] OR;  Service: Neurosurgery;  Laterality: N/A;  C3-4, C5-6, C6-7 ACDF C3-C7 anterior instrumentation Depuy  -ANTERIOR DECOMPREESSION 2 LEVELS   -ANTERIOR INSTRUMENTATION INTERBODY CAGE INTERSPACE 3   -LOP 3.5 HR   -LOS 3 DAYS   -GENERAL   -TYPE AND SCREEN   - C ARM   -EMG, SEP, MEP hari notified cc  -ASPEN   -REG    NECK SURGERY      RADIOFREQUENCY ABLATION OF LUMBAR MEDIAL BRANCH NERVE AT SINGLE LEVEL Left 5/29/2018    Procedure: RADIOFREQUENCY THERMOCOAGULATION (RFTC)-NERVE-MEDIAN BRANCH-LUMBAR- Left L2-3-4-5;  Surgeon: Donavon Dennis MD;  [place] [facility] [place] [facility]  Service: Pain Management;  Laterality: Left;    RADIOFREQUENCY ABLATION OF LUMBAR MEDIAL BRANCH NERVE AT SINGLE LEVEL Right 1/8/2019    Procedure: Radiofrequency Ablation, Nerve, Spinal, Lumbar, Medial Branch, L2,3,4,5;  Surgeon: Alberto Hernandez Jr., MD;  [place] [facility] [place] [facility]  Service: Pain Management;  Laterality: Right;  Pt is diabetic    RADIOFREQUENCY ABLATION OF LUMBAR MEDIAL BRANCH NERVE AT SINGLE LEVEL Left 3/12/2019    Procedure: Radiofrequency Ablation, Nerve, Spinal, Lumbar, Medial Branch, Left, L2,3,4,5;  Surgeon: Alberto Hernandez Jr., MD;  Location: [facility];  Service: Pain Management;  Laterality: Left;  Pt will be consented the day of procedure.    TRANSFORAMINAL EPIDURAL INJECTION OF STEROID Right 9/12/2019    Procedure: Injection,steroid,epidural,transforaminal,right,L4-5 and L5-S1;  Surgeon: Alberto Hernandez Jr., MD;  [place] [facility] [place] [facility] [place];  Service: Pain Management;  Laterality: Right;  PT IS DIABETIC       Family History   Problem Relation Name Age of Onset    Alzheimer's disease Mother      Diabetes Mother      Heart defect Father      Cancer Father      Stroke Father      Heart attack Father      Diabetes Sister x2     Heart defect Sister x2     Multiple sclerosis Sister x2     Heart disease Sister x2     Alzheimer's disease Sister x2     No Known Problems Son x1          MEDICATIONS &  "ALLERGIES:     Medications Ordered Prior to Encounter[1]     Review of patient's allergies indicates:   Allergen Reactions    Pcn [penicillins] Other (See Comments)     Childhood rxn, pt does not recall type of rxn       OBJECTIVE:     Vital Signs:  /70 (BP Location: Left arm, Patient Position: Sitting)   Pulse 82   Temp 97.5 °F (36.4 °C) (Oral)   Ht 6' 1" (1.854 m)   Wt 115.7 kg (255 lb 1.2 oz)   SpO2 96%   BMI 33.65 kg/m²   Wt Readings from Last 3 Encounters:   07/24/25 1050 115.1 kg (253 lb 12 oz)   07/15/25 0441 108.9 kg (240 lb)   07/13/25 1426 108.9 kg (240 lb)   07/11/25 1542 114.9 kg (253 lb 4.9 oz)   07/10/25 2146 108.9 kg (240 lb)     Body mass index is 33.65 kg/m².        Physical Exam:  /70 (BP Location: Left arm, Patient Position: Sitting)   Pulse 82   Temp 97.5 °F (36.4 °C) (Oral)   Ht 6' 1" (1.854 m)   Wt 115.7 kg (255 lb 1.2 oz)   SpO2 96%   BMI 33.65 kg/m²   General appearance: alert, cooperative, no distress  Constitutional:Oriented to person, place, and time  + obese    HEENT: Normocephalic, atraumatic, neck symmetrical, no nasal discharge   Eyes: conjunctivae/corneas clear, PERRL, EOM's intact  Lungs: clear to auscultation bilaterally, no dullness to percussion bilaterally  Heart: regular rate and rhythm without rub; no displacement of the PMI   Abdomen: soft, non-tender; bowel sounds normoactive; no organomegaly  Extremities: extremities symmetric; no clubbing, cyanosis, + 3 pitting edema bilateral LE with open wounds   Integument: Skin color, texture, turgor normal; no rashes; hair distrubution normal  Neurologic: Alert and oriented X 3, normal strength, normal coordination and gait  Psychiatric: no pressured speech; normal affect; no evidence of impaired cognition     Laboratory  Lab Results   Component Value Date    WBC 8.00 07/15/2025    HGB 10.0 (L) 07/15/2025    HCT 32.4 (L) 07/15/2025    MCV 76 (L) 07/15/2025     07/15/2025     BMP  Lab Results   Component " Value Date     (L) 07/15/2025    K 4.8 07/15/2025     07/15/2025    CO2 25 07/15/2025    BUN 19 07/15/2025    CREATININE 1.3 07/15/2025    CALCIUM 8.8 07/15/2025    ANIONGAP 4 (L) 07/15/2025    EGFRNORACEVR >60 07/15/2025     Lab Results   Component Value Date    ALT 12 07/15/2025    AST 19 07/15/2025    ALKPHOS 85 07/15/2025    BILITOT 0.3 07/15/2025     Lab Results   Component Value Date    INR 1.0 11/28/2018     Lab Results   Component Value Date    HGBA1C 8.2 (H) 03/22/2025       Diagnostic Results:    LE Arterial US 7/11/25->  No evidence of any hemodynamically significant stenosis.     LE Venous US 7/11/25->  No evidence of deep venous thrombosis in either lower extremity.     MRI Left and Right tibia fibula 7/13/25:  Subcutaneous soft tissue edema circumferentially seen at the mid and lower leg region, nonspecific can be seen with cellulitis, can be seen with chronic venous insufficiency, to correlate clinically.  No evidence of osteomyelitis or septic arthritis.    TRANSITION OF CARE:     [facility] On [facility] Contact Note: 7/17/25     Family and/or Caretaker present at visit?  No.  Diagnostic tests reviewed/disposition: I have reviewed all completed as well as pending diagnostic tests at the time of discharge.  Disease/illness education: Yes  Home health/community services discussion/referrals: Patient has home health established at [facility] .   Establishment or re-establishment of referral orders for community resources: No other necessary community resources.   Discussion with other health care providers: No discussion with other health care providers necessary.     ASSESSMENT & PLAN:       Acute on chronic heart failure with preserved ejection fraction  - volume status not optimized presently; diurese with Lasix and assess response  -     furosemide (LASIX) 40 MG tablet; Take 1 tablet (40 mg total) by mouth 2 (two) times daily. for 10 days  Dispense: 20 tablet; Refill: 0  -     potassium  chloride SA (K-DUR,KLOR-CON) 20 MEQ tablet; Take 1 tablet (20 mEq total) by mouth 2 (two) times daily. for 10 days  Dispense: 20 tablet; Refill: 0    Cellulitis, unspecified cellulitis site  - recent hospitalization as above  - has multiple open wounds on both legs  - continue Bactrim  - see Wound Care team today   -     [facility]  -     sulfamethoxazole-trimethoprim 800-160mg (BACTRIM DS) 800-160 mg Tab; Take 1 tablet by mouth 2 (two) times daily. for 7 days    I will see patient again in hospital follow up clinic 8/7/25.     Instructions for the patient:      Scheduled Follow-up :  Future Appointments   Date Time Provider Department Center   8/5/2025 10:00 AM Xenia Woods, BRANDON [facility]   8/7/2025  9:30 AM Latasha Becerra MD [facility] IMPRI [facility]   8/12/2025  2:00 PM Zoila Suarez DO [facility] PAINMA [place]   8/20/2025 10:15 AM Shalini Burks DPM [facility] PODIAT [facility]   8/29/2025  1:00 PM Mary Ellen Soares, PhD [facility] PSYCHOL [facility]   10/15/2025  7:15 AM SPECIMEN, [facility] LAB [place]   10/15/2025  7:45 AM LAB, [place] [facility]   10/20/2025  2:00 PM Lucien Fleming MD [facility]   12/18/2025  1:20 PM Juan A Rangel OD [facility] OPTOMTY [facility]       Post Visit Medication List:     Medication List            Accurate as of July 30, 2025  3:13 PM. If you have any questions, ask your nurse or doctor.                START taking these medications      furosemide 40 MG tablet  Commonly known as: LASIX  Take 1 tablet (40 mg total) by mouth 2 (two) times daily. for 10 days  Started by: Latasha Becerra MD     potassium chloride SA 20 MEQ tablet  Commonly known as: K-DUR,KLOR-CON  Take 1 tablet (20 mEq total) by mouth 2 (two) times daily. for 10 days  Started by: Latasha Becerra MD     sulfamethoxazole-trimethoprim 800-160mg 800-160 mg Tab  Commonly known as: BACTRIM DS  Take 1 tablet by mouth 2 (two) times  daily. for 7 days  Started by: Latasha Becerra MD            CONTINUE taking these medications      acetaminophen 325 MG tablet  Commonly known as: TYLENOL  Take 2 tablets (650 mg total) by mouth every 4 (four) hours as needed for Pain.     albuterol 90 mcg/actuation inhaler  Commonly known as: PROVENTIL/VENTOLIN HFA  Inhale 2 puffs into the lungs every 6 (six) hours as needed for Wheezing. Rescue     ALCOHOL PADS Padm  Generic drug: alcohol swabs     ARIPiprazole 5 MG Tab  Commonly known as: ABILIFY  Take 1 tablet (5 mg total) by mouth once daily.     BLOOD PRESSURE CUFF Misc  Generic drug: miscellaneous medical supply  1 Units by Misc.(Non-Drug; Combo Route) route once daily.     blood sugar diagnostic Strp  Commonly known as: TRUE METRIX GLUCOSE TEST STRIP  use 1 strip to check glucose 2 (two) times a day.     DULoxetine 60 MG capsule  Commonly known as: CYMBALTA  Take 2 capsules (120 mg total) by mouth every morning.     ferrous sulfate 324 mg (65 mg iron) Tbec  Take 1 tablet (324 mg total) by mouth every other day.     gabapentin 300 MG capsule  Commonly known as: NEURONTIN  Take 1 capsule (300 mg total) by mouth 3 (three) times daily. For chronic pain     hydrOXYzine pamoate 50 MG Cap  Commonly known as: VISTARIL  TAKE 1 CAPSULE (50 MG) BY MOUTH NIGHTLY AS NEEDED FOR INSOMNIA     levothyroxine 25 MCG tablet  Commonly known as: SYNTHROID  Take 1 tablet (25 mcg total) by mouth before breakfast.     metFORMIN 1000 MG tablet  Commonly known as: GLUCOPHAGE  Take 1 tablet (1,000 mg total) by mouth 2 (two) times daily with meals.     MOUNJARO 5 mg/0.5 mL Pnij  Generic drug: tirzepatide  Inject 5 mg into the skin every 7 days.     olmesartan 20 MG tablet  Commonly known as: BENICAR  Take 1 tablet (20 mg total) by mouth once daily.     omeprazole 20 MG capsule  Commonly known as: PRILOSEC  Take 1 capsule (20 mg total) by mouth before breakfast.     ONE DAILY MULTIVITAMIN per tablet  Generic drug: multivitamin      polyethylene glycol 17 gram/dose powder  Commonly known as: GLYCOLAX  Mix and dissolve one capful (17 grams) into a beverage and drink by mouth once daily.     pravastatin 40 MG tablet  Commonly known as: PRAVACHOL  Take 1 tablet (40 mg total) by mouth once daily.     SAFETY LANCETS 28 gauge Misc  Generic drug: lancets     sildenafiL 100 MG tablet  Commonly known as: VIAGRA  Take 1 tablet (100 mg total) by mouth daily as needed for Erectile Dysfunction.            STOP taking these medications      LIDOcaine 5 %  Commonly known as: LIDODERM  Stopped by: Latasha Becerra MD     oxyCODONE 5 MG immediate release tablet  Commonly known as: ROXICODONE  Stopped by: Latasha Becerra MD               Where to Get Your Medications        These medications were sent to [facility] [address], [place] [address]      Hours: Mon-Fri, 8a-5:30p Phone: [phone]   furosemide 40 MG tablet  potassium chloride SA 20 MEQ tablet       Information about where to get these medications is not yet available    Ask your nurse or doctor about these medications  sulfamethoxazole-trimethoprim 800-160mg 800-160 mg Tab         Signing Physician:  Latasha Becerra MD         [1]   Current Outpatient Medications on File Prior to Visit   Medication Sig Dispense Refill    acetaminophen (TYLENOL) 325 MG tablet Take 2 tablets (650 mg total) by mouth every 4 (four) hours as needed for Pain. 30 tablet 0    albuterol (PROVENTIL/VENTOLIN HFA) 90 mcg/actuation inhaler Inhale 2 puffs into the lungs every 6 (six) hours as needed for Wheezing. Rescue 18 g 0    ALCOHOL PADS PadM Apply topically once daily.      ARIPiprazole (ABILIFY) 5 MG Tab Take 1 tablet (5 mg total) by mouth once daily. 90 tablet 3    BLOOD PRESSURE CUFF Misc 1 Units by Misc.(Non-Drug; Combo Route) route once daily. 1 each 0    blood sugar diagnostic (TRUE METRIX GLUCOSE TEST STRIP) Strp use 1 strip to check glucose 2 (two) times a day. 200 strip 3     DULoxetine (CYMBALTA) 60 MG capsule Take 2 capsules (120 mg total) by mouth every morning. 180 capsule 3    ferrous sulfate 324 mg (65 mg iron) TbEC Take 1 tablet (324 mg total) by mouth every other day. 15 tablet 11    gabapentin (NEURONTIN) 300 MG capsule Take 1 capsule (300 mg total) by mouth 3 (three) times daily. For chronic pain 270 capsule 0    hydrOXYzine pamoate (VISTARIL) 50 MG Cap TAKE 1 CAPSULE (50 MG) BY MOUTH NIGHTLY AS NEEDED FOR INSOMNIA 90 capsule 3    levothyroxine (SYNTHROID) 25 MCG tablet Take 1 tablet (25 mcg total) by mouth before breakfast. 90 tablet 3    LIDOcaine (LIDODERM) 5 % Place 1 patch onto the skin once daily. Remove & Discard patch within 12 hours or as directed by MD. 30 patch 0    metFORMIN (GLUCOPHAGE) 1000 MG tablet Take 1 tablet (1,000 mg total) by mouth 2 (two) times daily with meals. 180 tablet 3    multivitamin (ONE DAILY MULTIVITAMIN) per tablet Take 1 tablet by mouth once daily.      olmesartan (BENICAR) 20 MG tablet Take 1 tablet (20 mg total) by mouth once daily. 90 tablet 3    omeprazole (PRILOSEC) 20 MG capsule Take 1 capsule (20 mg total) by mouth before breakfast. 90 capsule 3    polyethylene glycol (GLYCOLAX) 17 gram/dose powder Mix and dissolve one capful (17 grams) into a beverage and drink by mouth once daily. 238 g 0    pravastatin (PRAVACHOL) 40 MG tablet Take 1 tablet (40 mg total) by mouth once daily. 90 tablet 3    SAFETY LANCETS 28 gauge Misc 2 (two) times daily.      sildenafiL (VIAGRA) 100 MG tablet Take 1 tablet (100 mg total) by mouth daily as needed for Erectile Dysfunction. (Patient not taking: Reported on 7/21/2025) 30 tablet 11    tirzepatide (MOUNJARO) 5 mg/0.5 mL PnIj Inject 5 mg into the skin every 7 days. (Patient taking differently: Inject 5 mg into the skin every Thursday.) 6 mL 0     No current facility-administered medications on file prior to visit.

## 2025-07-31 DIAGNOSIS — I87.2 VENOUS INSUFFICIENCY OF BOTH LOWER EXTREMITIES: ICD-10-CM

## 2025-07-31 DIAGNOSIS — L97.912 ULCER OF RIGHT LOWER EXTREMITY WITH FAT LAYER EXPOSED: Primary | ICD-10-CM

## 2025-08-01 LAB — BACTERIA SPEC ANAEROBE CULT: NORMAL

## 2025-08-02 DIAGNOSIS — I50.33 ACUTE ON CHRONIC HEART FAILURE WITH PRESERVED EJECTION FRACTION: ICD-10-CM

## 2025-08-02 LAB — BACTERIA SPEC AEROBE CULT: NORMAL

## 2025-08-04 RX ORDER — FUROSEMIDE 40 MG/1
40 TABLET ORAL 2 TIMES DAILY
Qty: 20 TABLET | Refills: 0 | Status: CANCELLED | OUTPATIENT
Start: 2025-08-04 | End: 2025-08-14

## 2025-08-04 RX ORDER — FUROSEMIDE 40 MG/1
40 TABLET ORAL 2 TIMES DAILY
Qty: 20 TABLET | Refills: 0 | OUTPATIENT
Start: 2025-08-04 | End: 2025-08-14

## 2025-08-04 NOTE — TELEPHONE ENCOUNTER
Refill Routing Note   Medication(s) are not appropriate for processing by [facility] for the following reason(s):        ED/Hospital Visit since last OV with provider  No active prescription written by provider  New or recently adjusted medication    [facility] action(s):  Defer        Medication Therapy Plan: Last ordered: 5 days ago (7/30/2025) by Latasha Becerra MD      Appointments  past 12m or future 3m with PCP    Date Provider   Last Visit   6/25/2025 Lucien Fleming MD   Next Visit   10/20/2025 Lucien Fleming MD   ED visits in past 90 days: 0        Note composed:2:40 PM 08/04/2025

## 2025-08-04 NOTE — TELEPHONE ENCOUNTER
No care due was identified.  [facility] Embedded Care Due Messages. Reference number: [id number].   8/04/2025 12:09:10 PM CDT

## 2025-08-06 DIAGNOSIS — F20.3 UNDIFFERENTIATED SCHIZOPHRENIA: Primary | ICD-10-CM

## 2025-08-07 DIAGNOSIS — I50.33 ACUTE ON CHRONIC HEART FAILURE WITH PRESERVED EJECTION FRACTION: ICD-10-CM

## 2025-08-08 RX ORDER — POTASSIUM CHLORIDE 20 MEQ/1
20 TABLET, EXTENDED RELEASE ORAL 2 TIMES DAILY
Qty: 20 TABLET | Refills: 0 | OUTPATIENT
Start: 2025-08-08 | End: 2025-08-18

## 2025-08-08 RX ORDER — FUROSEMIDE 40 MG/1
40 TABLET ORAL 2 TIMES DAILY
Qty: 20 TABLET | Refills: 0 | OUTPATIENT
Start: 2025-08-08 | End: 2025-08-18

## 2025-08-12 ENCOUNTER — TELEPHONE (OUTPATIENT)
Dept: WOUND CARE | Facility: HOSPITAL | Age: 65
End: 2025-08-12
Payer: MEDICARE

## 2025-08-13 ENCOUNTER — RESULTS FOLLOW-UP (OUTPATIENT)
Dept: PRIMARY CARE CLINIC | Facility: CLINIC | Age: 65
End: 2025-08-13

## 2025-08-13 ENCOUNTER — LAB VISIT (OUTPATIENT)
Dept: LAB | Facility: HOSPITAL | Age: 65
End: 2025-08-13
Attending: INTERNAL MEDICINE
Payer: MEDICARE

## 2025-08-13 ENCOUNTER — OFFICE VISIT (OUTPATIENT)
Dept: PRIMARY CARE CLINIC | Facility: CLINIC | Age: 65
End: 2025-08-13
Payer: MEDICARE

## 2025-08-13 VITALS
BODY MASS INDEX: 34.53 KG/M2 | SYSTOLIC BLOOD PRESSURE: 96 MMHG | WEIGHT: 260.56 LBS | DIASTOLIC BLOOD PRESSURE: 60 MMHG | HEIGHT: 73 IN | OXYGEN SATURATION: 97 % | HEART RATE: 75 BPM | TEMPERATURE: 98 F

## 2025-08-13 DIAGNOSIS — R10.11 RIGHT UPPER QUADRANT PAIN: ICD-10-CM

## 2025-08-13 DIAGNOSIS — I50.33 ACUTE ON CHRONIC HEART FAILURE WITH PRESERVED EJECTION FRACTION: ICD-10-CM

## 2025-08-13 DIAGNOSIS — M79.10 MUSCLE PAIN: Primary | ICD-10-CM

## 2025-08-13 DIAGNOSIS — L03.119 CELLULITIS OF LOWER EXTREMITY, UNSPECIFIED LATERALITY: ICD-10-CM

## 2025-08-13 DIAGNOSIS — R19.8 INCREASED ABDOMINAL GIRTH: ICD-10-CM

## 2025-08-13 DIAGNOSIS — I50.33 ACUTE ON CHRONIC HEART FAILURE WITH PRESERVED EJECTION FRACTION: Primary | ICD-10-CM

## 2025-08-13 LAB
ABSOLUTE EOSINOPHIL (OHS): 0.73 K/UL
ABSOLUTE MONOCYTE (OHS): 0.72 K/UL (ref 0.3–1)
ABSOLUTE NEUTROPHIL COUNT (OHS): 5 K/UL (ref 1.8–7.7)
ALBUMIN SERPL BCP-MCNC: 4 G/DL (ref 3.5–5.2)
ALP SERPL-CCNC: 64 UNIT/L (ref 40–150)
ALT SERPL W/O P-5'-P-CCNC: 16 UNIT/L (ref 10–44)
ANION GAP (OHS): 8 MMOL/L (ref 8–16)
AST SERPL-CCNC: 22 UNIT/L (ref 11–45)
BASOPHILS # BLD AUTO: 0.11 K/UL
BASOPHILS NFR BLD AUTO: 1.3 %
BILIRUB SERPL-MCNC: 0.2 MG/DL (ref 0.1–1)
BUN SERPL-MCNC: 19 MG/DL (ref 8–23)
CALCIUM SERPL-MCNC: 8.7 MG/DL (ref 8.7–10.5)
CHLORIDE SERPL-SCNC: 96 MMOL/L (ref 95–110)
CO2 SERPL-SCNC: 24 MMOL/L (ref 23–29)
CREAT SERPL-MCNC: 1.1 MG/DL (ref 0.5–1.4)
ERYTHROCYTE [DISTWIDTH] IN BLOOD BY AUTOMATED COUNT: 17 % (ref 11.5–14.5)
GFR SERPLBLD CREATININE-BSD FMLA CKD-EPI: >60 ML/MIN/1.73/M2
GLUCOSE SERPL-MCNC: 177 MG/DL (ref 70–110)
HCT VFR BLD AUTO: 32.1 % (ref 40–54)
HGB BLD-MCNC: 10.4 GM/DL (ref 14–18)
IMM GRANULOCYTES # BLD AUTO: 0.03 K/UL (ref 0–0.04)
IMM GRANULOCYTES NFR BLD AUTO: 0.4 % (ref 0–0.5)
LYMPHOCYTES # BLD AUTO: 1.76 K/UL (ref 1–4.8)
MCH RBC QN AUTO: 24 PG (ref 27–31)
MCHC RBC AUTO-ENTMCNC: 32.4 G/DL (ref 32–36)
MCV RBC AUTO: 74 FL (ref 82–98)
NT-PROBNP SERPL-MCNC: 412 PG/ML
NUCLEATED RBC (/100WBC) (OHS): 0 /100 WBC
PLATELET # BLD AUTO: 256 K/UL (ref 150–450)
PMV BLD AUTO: 7.9 FL (ref 9.2–12.9)
POTASSIUM SERPL-SCNC: 5 MMOL/L (ref 3.5–5.1)
PROT SERPL-MCNC: 6.4 GM/DL (ref 6–8.4)
RBC # BLD AUTO: 4.33 M/UL (ref 4.6–6.2)
RELATIVE EOSINOPHIL (OHS): 8.7 %
RELATIVE LYMPHOCYTE (OHS): 21.1 % (ref 18–48)
RELATIVE MONOCYTE (OHS): 8.6 % (ref 4–15)
RELATIVE NEUTROPHIL (OHS): 59.9 % (ref 38–73)
SODIUM SERPL-SCNC: 128 MMOL/L (ref 136–145)
WBC # BLD AUTO: 8.35 K/UL (ref 3.9–12.7)

## 2025-08-13 PROCEDURE — 99214 OFFICE O/P EST MOD 30 MIN: CPT | Mod: S$GLB,,, | Performed by: INTERNAL MEDICINE

## 2025-08-13 PROCEDURE — 82040 ASSAY OF SERUM ALBUMIN: CPT

## 2025-08-13 PROCEDURE — 3074F SYST BP LT 130 MM HG: CPT | Mod: CPTII,S$GLB,, | Performed by: INTERNAL MEDICINE

## 2025-08-13 PROCEDURE — 3078F DIAST BP <80 MM HG: CPT | Mod: CPTII,S$GLB,, | Performed by: INTERNAL MEDICINE

## 2025-08-13 PROCEDURE — 85025 COMPLETE CBC W/AUTO DIFF WBC: CPT

## 2025-08-13 PROCEDURE — 99999 PR PBB SHADOW E&M-EST. PATIENT-LVL III: CPT | Mod: PBBFAC,,, | Performed by: INTERNAL MEDICINE

## 2025-08-13 PROCEDURE — 3052F HG A1C>EQUAL 8.0%<EQUAL 9.0%: CPT | Mod: CPTII,S$GLB,, | Performed by: INTERNAL MEDICINE

## 2025-08-13 PROCEDURE — 36415 COLL VENOUS BLD VENIPUNCTURE: CPT

## 2025-08-13 PROCEDURE — 83880 ASSAY OF NATRIURETIC PEPTIDE: CPT

## 2025-08-13 PROCEDURE — 4010F ACE/ARB THERAPY RXD/TAKEN: CPT | Mod: CPTII,S$GLB,, | Performed by: INTERNAL MEDICINE

## 2025-08-13 PROCEDURE — 1111F DSCHRG MED/CURRENT MED MERGE: CPT | Mod: CPTII,S$GLB,, | Performed by: INTERNAL MEDICINE

## 2025-08-13 PROCEDURE — 3008F BODY MASS INDEX DOCD: CPT | Mod: CPTII,S$GLB,, | Performed by: INTERNAL MEDICINE

## 2025-08-13 RX ORDER — POTASSIUM CHLORIDE 20 MEQ/1
20 TABLET, EXTENDED RELEASE ORAL DAILY
Qty: 90 TABLET | Refills: 3 | Status: SHIPPED | OUTPATIENT
Start: 2025-08-13

## 2025-08-13 RX ORDER — FUROSEMIDE 40 MG/1
40 TABLET ORAL DAILY
Qty: 90 TABLET | Refills: 3 | Status: SHIPPED | OUTPATIENT
Start: 2025-08-13 | End: 2026-08-13

## 2025-08-13 RX ORDER — METHOCARBAMOL 500 MG/1
500 TABLET, FILM COATED ORAL 3 TIMES DAILY PRN
Qty: 30 TABLET | Refills: 3 | Status: SHIPPED | OUTPATIENT
Start: 2025-08-13

## 2025-08-13 RX ORDER — FERROUS SULFATE 324(65)MG
324 TABLET, DELAYED RELEASE (ENTERIC COATED) ORAL EVERY OTHER DAY
Qty: 45 TABLET | Refills: 3 | Status: SHIPPED | OUTPATIENT
Start: 2025-08-13

## 2025-08-19 ENCOUNTER — PATIENT OUTREACH (OUTPATIENT)
Dept: ADMINISTRATIVE | Facility: HOSPITAL | Age: 65
End: 2025-08-19
Payer: MEDICARE

## 2025-08-19 ENCOUNTER — TELEPHONE (OUTPATIENT)
Dept: PODIATRY | Facility: CLINIC | Age: 65
End: 2025-08-19
Payer: MEDICARE

## 2025-08-19 DIAGNOSIS — Z12.5 SCREENING FOR PROSTATE CANCER: Primary | ICD-10-CM

## 2025-08-25 ENCOUNTER — TELEPHONE (OUTPATIENT)
Dept: WOUND CARE | Facility: HOSPITAL | Age: 65
End: 2025-08-25
Payer: MEDICARE

## 2025-08-26 RX ORDER — LANCETS 30 GAUGE
EACH MISCELLANEOUS
Qty: 1 EACH | Refills: 0 | Status: SHIPPED | OUTPATIENT
Start: 2025-08-26

## 2025-08-28 ENCOUNTER — OFFICE VISIT (OUTPATIENT)
Dept: PRIMARY CARE CLINIC | Facility: CLINIC | Age: 65
End: 2025-08-28
Payer: MEDICARE

## 2025-08-28 ENCOUNTER — HOSPITAL ENCOUNTER (OUTPATIENT)
Dept: WOUND CARE | Facility: HOSPITAL | Age: 65
Discharge: HOME | End: 2025-08-28
Attending: SURGERY
Payer: MEDICARE

## 2025-08-28 VITALS
WEIGHT: 260.56 LBS | BODY MASS INDEX: 34.53 KG/M2 | DIASTOLIC BLOOD PRESSURE: 71 MMHG | SYSTOLIC BLOOD PRESSURE: 152 MMHG | HEIGHT: 73 IN | HEART RATE: 104 BPM | TEMPERATURE: 98 F

## 2025-08-28 VITALS
BODY MASS INDEX: 32.93 KG/M2 | SYSTOLIC BLOOD PRESSURE: 124 MMHG | HEART RATE: 94 BPM | OXYGEN SATURATION: 98 % | HEIGHT: 73 IN | DIASTOLIC BLOOD PRESSURE: 72 MMHG | WEIGHT: 248.44 LBS

## 2025-08-28 DIAGNOSIS — R10.11 RIGHT UPPER QUADRANT PAIN: ICD-10-CM

## 2025-08-28 DIAGNOSIS — I87.2 VENOUS INSUFFICIENCY OF BOTH LOWER EXTREMITIES: Primary | ICD-10-CM

## 2025-08-28 DIAGNOSIS — R19.8 INCREASED ABDOMINAL GIRTH: ICD-10-CM

## 2025-08-28 DIAGNOSIS — I50.32 CHRONIC HEART FAILURE WITH PRESERVED EJECTION FRACTION: ICD-10-CM

## 2025-08-28 DIAGNOSIS — R42 VERTIGO: ICD-10-CM

## 2025-08-28 PROCEDURE — 99999 PR PBB SHADOW E&M-EST. PATIENT-LVL IV: CPT | Mod: PBBFAC,,, | Performed by: INTERNAL MEDICINE

## 2025-08-28 PROCEDURE — 99213 OFFICE O/P EST LOW 20 MIN: CPT

## 2025-08-28 PROCEDURE — 87075 CULTR BACTERIA EXCEPT BLOOD: CPT | Performed by: SURGERY

## 2025-08-28 PROCEDURE — 3008F BODY MASS INDEX DOCD: CPT | Mod: CPTII,S$GLB,, | Performed by: INTERNAL MEDICINE

## 2025-08-28 PROCEDURE — 3052F HG A1C>EQUAL 8.0%<EQUAL 9.0%: CPT | Mod: CPTII,S$GLB,, | Performed by: INTERNAL MEDICINE

## 2025-08-28 PROCEDURE — 4010F ACE/ARB THERAPY RXD/TAKEN: CPT | Mod: CPTII,S$GLB,, | Performed by: INTERNAL MEDICINE

## 2025-08-28 PROCEDURE — 99214 OFFICE O/P EST MOD 30 MIN: CPT | Mod: S$GLB,,, | Performed by: INTERNAL MEDICINE

## 2025-08-28 PROCEDURE — 3074F SYST BP LT 130 MM HG: CPT | Mod: CPTII,S$GLB,, | Performed by: INTERNAL MEDICINE

## 2025-08-28 PROCEDURE — 87186 SC STD MICRODIL/AGAR DIL: CPT | Performed by: SURGERY

## 2025-08-28 PROCEDURE — 3078F DIAST BP <80 MM HG: CPT | Mod: CPTII,S$GLB,, | Performed by: INTERNAL MEDICINE

## 2025-08-28 RX ORDER — MUPIROCIN 20 MG/G
OINTMENT TOPICAL EVERY OTHER DAY
Qty: 44 G | Refills: 2 | Status: SHIPPED | OUTPATIENT
Start: 2025-08-28

## 2025-08-29 ENCOUNTER — OFFICE VISIT (OUTPATIENT)
Dept: PSYCHIATRY | Facility: CLINIC | Age: 65
End: 2025-08-29
Payer: MEDICARE

## 2025-08-29 DIAGNOSIS — F32.89 OTHER DEPRESSION: ICD-10-CM

## 2025-08-29 DIAGNOSIS — F41.1 GENERALIZED ANXIETY DISORDER WITH PANIC ATTACKS: ICD-10-CM

## 2025-08-29 DIAGNOSIS — Z00.8 ENCOUNTER FOR PSYCHOLOGICAL EVALUATION: Primary | ICD-10-CM

## 2025-08-29 DIAGNOSIS — G89.4 CHRONIC PAIN SYNDROME: ICD-10-CM

## 2025-08-29 DIAGNOSIS — F25.9: ICD-10-CM

## 2025-08-29 DIAGNOSIS — F41.0 GENERALIZED ANXIETY DISORDER WITH PANIC ATTACKS: ICD-10-CM

## 2025-08-30 LAB — BACTERIA SPEC AEROBE CULT: ABNORMAL

## 2025-09-02 ENCOUNTER — CLINICAL SUPPORT (OUTPATIENT)
Dept: FAMILY MEDICINE | Facility: CLINIC | Age: 65
End: 2025-09-02
Payer: MEDICARE

## 2025-09-02 DIAGNOSIS — E11.59 HYPERTENSION ASSOCIATED WITH DIABETES: Primary | ICD-10-CM

## 2025-09-02 DIAGNOSIS — I15.2 HYPERTENSION ASSOCIATED WITH DIABETES: Primary | ICD-10-CM

## (undated) DEVICE — COVER PROXIMA MAYO STAND

## (undated) DEVICE — SEE MEDLINE ITEM 156905

## (undated) DEVICE — ALCOHOL 70% ISOP W/GREEN 16OZ

## (undated) DEVICE — MANIFOLD 4 PORT

## (undated) DEVICE — TOWEL OR DISP STRL BLUE 4/PK

## (undated) DEVICE — APPLICATOR CHLORAPREP ORN 26ML

## (undated) DEVICE — TOURNIQUET SB QC DP 18X4IN

## (undated) DEVICE — GOWN POLY REINF BRTH SLV LG

## (undated) DEVICE — DRAPE STERI INSTRUMENT 1018

## (undated) DEVICE — BURR MIS CURVED 3.0MM

## (undated) DEVICE — SEE L#120831

## (undated) DEVICE — DRAPE TOP 53X102IN

## (undated) DEVICE — PAD CAST SPECIALIST STRL 4

## (undated) DEVICE — PAD CAST SPECIALIST STRL 3

## (undated) DEVICE — CLOSURE SKIN STERI STRIP 1/2X4

## (undated) DEVICE — CORD BIPOLAR 12 FOOT

## (undated) DEVICE — COVER OVERHEAD SURG LT BLUE

## (undated) DEVICE — GAUZE SPONGE 4X4 12PLY

## (undated) DEVICE — TAPE CURAD SILK ADH 3INX10YD

## (undated) DEVICE — GAUZE SPONGE PEANUT STRL

## (undated) DEVICE — DRAPE C-ARM/MOBILE XRAY 44X80

## (undated) DEVICE — TOWEL OR NONABSORB ADH 17X26

## (undated) DEVICE — SUT CTD VICRYL 3-0 CR/SH

## (undated) DEVICE — DRESSING XEROFORM FOIL PK 1X8

## (undated) DEVICE — DRAPE INCISE IOBAN 2 23X23IN

## (undated) DEVICE — SUT VICRYL CTD 2-0 GI 27 SH

## (undated) DEVICE — BANDAGE ADHESIVE

## (undated) DEVICE — DRAPE STERI-DRAPE 1000 17X11IN

## (undated) DEVICE — DRESSING TEGADERM 4.4X5IN

## (undated) DEVICE — DRAPE LEICA MICROSCOPE 48X120

## (undated) DEVICE — BANDAGE MATRIX HK LOOP 2IN 5YD

## (undated) DEVICE — SUT MONOCYRL 4-0 PS2 UND

## (undated) DEVICE — DRESSING TELFA N ADH 3X8

## (undated) DEVICE — GOWN POLY REINF BRTH SLV XL

## (undated) DEVICE — DRAPE HAND STERILE

## (undated) DEVICE — ADHESIVE MASTISOL VIAL 48/BX

## (undated) DEVICE — BANDAGE MATRIX HK LOOP 3IN 5YD

## (undated) DEVICE — PAD PREP CUFFED NS 24X48IN

## (undated) DEVICE — ADHESIVE DERMABOND ADVANCED

## (undated) DEVICE — NDL HYPO REG 25G X 1 1/2

## (undated) DEVICE — SLING ARM COMFT NAVY BLU LG

## (undated) DEVICE — BLADE SURG #15 CARBON STEEL

## (undated) DEVICE — COVER TABLE HVY DTY 60X90IN

## (undated) DEVICE — BLADE 4IN EDGE INSULATED

## (undated) DEVICE — BLADE SCALP OPHTL BEVEL STR

## (undated) DEVICE — DRESSING AQUACEL SACRAL 9 X 9

## (undated) DEVICE — SPONGE DERMACEA GAUZE 4X4

## (undated) DEVICE — GLOVE BIOGEL ECLIPSE SZ 7

## (undated) DEVICE — GLOVE SURG BIOGEL LATEX SZ 7.5

## (undated) DEVICE — SPLINT COMFORMABLE 5X30

## (undated) DEVICE — SPLINT WRIST FOAM 8.5IN LG/RT

## (undated) DEVICE — BANDAGE MATRIX HK LOOP 4IN 5YD

## (undated) DEVICE — PACK BASIC

## (undated) DEVICE — ELECTRODE REM PLYHSV RETURN 9

## (undated) DEVICE — PACK SURGERY START

## (undated) DEVICE — STOCKINET 4INX48

## (undated) DEVICE — TUBING SUC UNIV W/CONN 12FT

## (undated) DEVICE — DRAPE THYROID SOFT STERILE

## (undated) DEVICE — SUT MCRYL PLUS 4-0 PS2 27IN

## (undated) DEVICE — BANDAGE ESMARK ELASTIC ST 4X9

## (undated) DEVICE — TRAY FOLEY 16FR INFECTION CONT

## (undated) DEVICE — DRESSING SURGICAL 1/2X1/2

## (undated) DEVICE — GELATIN SURGIPOWDER ABSORBABLE

## (undated) DEVICE — SYR 10CC LUER LOCK

## (undated) DEVICE — GLOVE BIOGEL PI MICRO SZ 7.5

## (undated) DEVICE — SUT MONOCRYL 4-0 PS-2

## (undated) DEVICE — PAD CAST SPECIALIST 2X4

## (undated) DEVICE — STAPLER SKIN ROTATING HEAD

## (undated) DEVICE — BUR BONE CUT MICRO TPS 3X3.8MM

## (undated) DEVICE — DRAPE STERILE Z BACK TABLE